# Patient Record
Sex: FEMALE | Race: BLACK OR AFRICAN AMERICAN | Employment: OTHER | URBAN - METROPOLITAN AREA
[De-identification: names, ages, dates, MRNs, and addresses within clinical notes are randomized per-mention and may not be internally consistent; named-entity substitution may affect disease eponyms.]

---

## 2017-01-01 ENCOUNTER — CARE COORDINATION (OUTPATIENT)
Dept: CASE MANAGEMENT | Age: 62
End: 2017-01-01

## 2017-01-03 ENCOUNTER — CARE COORDINATION (OUTPATIENT)
Dept: CASE MANAGEMENT | Age: 62
End: 2017-01-03

## 2017-01-04 ENCOUNTER — TELEPHONE (OUTPATIENT)
Dept: PHARMACY | Facility: CLINIC | Age: 62
End: 2017-01-04

## 2017-01-05 ENCOUNTER — CARE COORDINATION (OUTPATIENT)
Dept: CASE MANAGEMENT | Age: 62
End: 2017-01-05

## 2017-01-06 ENCOUNTER — CARE COORDINATION (OUTPATIENT)
Dept: CASE MANAGEMENT | Age: 62
End: 2017-01-06

## 2017-01-08 ENCOUNTER — CARE COORDINATION (OUTPATIENT)
Dept: CASE MANAGEMENT | Age: 62
End: 2017-01-08

## 2017-01-09 ENCOUNTER — TELEPHONE (OUTPATIENT)
Dept: CARDIOLOGY CLINIC | Age: 62
End: 2017-01-09

## 2017-01-09 ENCOUNTER — CARE COORDINATION (OUTPATIENT)
Dept: CASE MANAGEMENT | Age: 62
End: 2017-01-09

## 2017-01-09 ENCOUNTER — TELEPHONE (OUTPATIENT)
Dept: FAMILY MEDICINE CLINIC | Age: 62
End: 2017-01-09

## 2017-01-09 ENCOUNTER — ANTI-COAG VISIT (OUTPATIENT)
Dept: CARDIOLOGY CLINIC | Age: 62
End: 2017-01-09

## 2017-01-09 LAB — INR BLD: 1.9

## 2017-01-09 RX ORDER — ONDANSETRON 4 MG/1
4 TABLET, FILM COATED ORAL DAILY PRN
Qty: 30 TABLET | Refills: 0 | Status: SHIPPED | OUTPATIENT
Start: 2017-01-09 | End: 2017-01-14 | Stop reason: SDUPTHER

## 2017-01-10 DIAGNOSIS — R80.9 PROTEINURIA: Primary | ICD-10-CM

## 2017-01-11 ENCOUNTER — OFFICE VISIT (OUTPATIENT)
Dept: SURGERY | Age: 62
End: 2017-01-11

## 2017-01-11 VITALS — SYSTOLIC BLOOD PRESSURE: 122 MMHG | DIASTOLIC BLOOD PRESSURE: 84 MMHG

## 2017-01-11 DIAGNOSIS — K46.9 HERNIA: Primary | ICD-10-CM

## 2017-01-11 PROCEDURE — 99024 POSTOP FOLLOW-UP VISIT: CPT | Performed by: SURGERY

## 2017-01-12 ENCOUNTER — CARE COORDINATION (OUTPATIENT)
Dept: CASE MANAGEMENT | Age: 62
End: 2017-01-12

## 2017-01-13 ENCOUNTER — CARE COORDINATION (OUTPATIENT)
Dept: CASE MANAGEMENT | Age: 62
End: 2017-01-13

## 2017-01-14 ENCOUNTER — CARE COORDINATION (OUTPATIENT)
Dept: FAMILY MEDICINE CLINIC | Age: 62
End: 2017-01-14

## 2017-01-16 ENCOUNTER — ANTI-COAG VISIT (OUTPATIENT)
Dept: CARDIOLOGY CLINIC | Age: 62
End: 2017-01-16

## 2017-01-16 ENCOUNTER — TELEPHONE (OUTPATIENT)
Dept: CARDIOLOGY CLINIC | Age: 62
End: 2017-01-16

## 2017-01-16 DIAGNOSIS — J96.11 CHRONIC HYPOXEMIC RESPIRATORY FAILURE (HCC): ICD-10-CM

## 2017-01-16 DIAGNOSIS — J45.50 UNCOMPLICATED SEVERE PERSISTENT ASTHMA: Primary | ICD-10-CM

## 2017-01-16 LAB — INR BLD: 2

## 2017-01-16 RX ORDER — ALBUTEROL SULFATE 90 UG/1
2 AEROSOL, METERED RESPIRATORY (INHALATION) EVERY 6 HOURS PRN
Qty: 1 INHALER | Refills: 3 | Status: SHIPPED | OUTPATIENT
Start: 2017-01-16 | End: 2017-10-26 | Stop reason: SDUPTHER

## 2017-01-24 ENCOUNTER — TELEPHONE (OUTPATIENT)
Dept: CARDIOLOGY CLINIC | Age: 62
End: 2017-01-24

## 2017-01-24 ENCOUNTER — OFFICE VISIT (OUTPATIENT)
Dept: FAMILY MEDICINE CLINIC | Age: 62
End: 2017-01-24

## 2017-01-24 ENCOUNTER — TELEPHONE (OUTPATIENT)
Dept: FAMILY MEDICINE CLINIC | Age: 62
End: 2017-01-24

## 2017-01-24 VITALS
SYSTOLIC BLOOD PRESSURE: 128 MMHG | OXYGEN SATURATION: 97 % | HEIGHT: 63 IN | RESPIRATION RATE: 16 BRPM | DIASTOLIC BLOOD PRESSURE: 82 MMHG | HEART RATE: 94 BPM | WEIGHT: 293 LBS | BODY MASS INDEX: 51.91 KG/M2

## 2017-01-24 DIAGNOSIS — Z98.890 S/P HERNIA REPAIR: Primary | ICD-10-CM

## 2017-01-24 DIAGNOSIS — Z87.19 S/P HERNIA REPAIR: Primary | ICD-10-CM

## 2017-01-24 PROCEDURE — 99212 OFFICE O/P EST SF 10 MIN: CPT | Performed by: NURSE PRACTITIONER

## 2017-01-24 ASSESSMENT — ENCOUNTER SYMPTOMS
ABDOMINAL PAIN: 0
BLOOD IN STOOL: 0
CONSTIPATION: 0
DIARRHEA: 0
VOMITING: 0
NAUSEA: 0

## 2017-01-25 ENCOUNTER — OFFICE VISIT (OUTPATIENT)
Dept: SURGERY | Age: 62
End: 2017-01-25

## 2017-01-25 VITALS — DIASTOLIC BLOOD PRESSURE: 82 MMHG | SYSTOLIC BLOOD PRESSURE: 122 MMHG

## 2017-01-25 DIAGNOSIS — K46.9 HERNIA: Primary | ICD-10-CM

## 2017-01-25 PROCEDURE — 99024 POSTOP FOLLOW-UP VISIT: CPT | Performed by: SURGERY

## 2017-01-26 ENCOUNTER — OFFICE VISIT (OUTPATIENT)
Dept: ORTHOPEDIC SURGERY | Age: 62
End: 2017-01-26

## 2017-01-26 VITALS — HEIGHT: 63 IN | WEIGHT: 293 LBS | BODY MASS INDEX: 51.91 KG/M2 | RESPIRATION RATE: 17 BRPM

## 2017-01-26 DIAGNOSIS — M17.0 PRIMARY OSTEOARTHRITIS OF BOTH KNEES: Primary | ICD-10-CM

## 2017-01-26 DIAGNOSIS — E66.01 MORBID OBESITY WITH BMI OF 60.0-69.9, ADULT (HCC): ICD-10-CM

## 2017-01-26 PROCEDURE — 20610 DRAIN/INJ JOINT/BURSA W/O US: CPT | Performed by: ORTHOPAEDIC SURGERY

## 2017-01-26 PROCEDURE — 99214 OFFICE O/P EST MOD 30 MIN: CPT | Performed by: ORTHOPAEDIC SURGERY

## 2017-01-27 ENCOUNTER — TELEPHONE (OUTPATIENT)
Dept: CARDIOLOGY CLINIC | Age: 62
End: 2017-01-27

## 2017-01-27 ENCOUNTER — TELEPHONE (OUTPATIENT)
Dept: FAMILY MEDICINE CLINIC | Age: 62
End: 2017-01-27

## 2017-01-30 ENCOUNTER — CARE COORDINATION (OUTPATIENT)
Dept: FAMILY MEDICINE CLINIC | Age: 62
End: 2017-01-30

## 2017-01-30 RX ORDER — HYDRALAZINE HYDROCHLORIDE 50 MG/1
TABLET, FILM COATED ORAL
Qty: 270 TABLET | Refills: 0 | Status: ON HOLD | OUTPATIENT
Start: 2017-01-30 | End: 2017-04-24 | Stop reason: HOSPADM

## 2017-01-30 RX ORDER — NITROGLYCERIN 0.4 MG/1
TABLET SUBLINGUAL
Qty: 75 TABLET | Refills: 0 | Status: SHIPPED | OUTPATIENT
Start: 2017-01-30 | End: 2017-05-30 | Stop reason: SDUPTHER

## 2017-01-31 RX ORDER — DOXAZOSIN MESYLATE 4 MG/1
TABLET ORAL
Qty: 180 TABLET | Refills: 3 | Status: ON HOLD | OUTPATIENT
Start: 2017-01-31 | End: 2017-04-24 | Stop reason: HOSPADM

## 2017-02-01 ENCOUNTER — TELEPHONE (OUTPATIENT)
Dept: CARDIOLOGY CLINIC | Age: 62
End: 2017-02-01

## 2017-02-01 RX ORDER — WARFARIN SODIUM 7.5 MG/1
TABLET ORAL
Qty: 30 TABLET | Refills: 3 | Status: SHIPPED | OUTPATIENT
Start: 2017-02-01 | End: 2017-03-23 | Stop reason: SDUPTHER

## 2017-02-02 ENCOUNTER — TELEPHONE (OUTPATIENT)
Dept: CARDIOLOGY CLINIC | Age: 62
End: 2017-02-02

## 2017-02-03 ENCOUNTER — OFFICE VISIT (OUTPATIENT)
Dept: CARDIOLOGY CLINIC | Age: 62
End: 2017-02-03

## 2017-02-03 ENCOUNTER — OFFICE VISIT (OUTPATIENT)
Dept: FAMILY MEDICINE CLINIC | Age: 62
End: 2017-02-03

## 2017-02-03 VITALS
HEIGHT: 63 IN | SYSTOLIC BLOOD PRESSURE: 126 MMHG | OXYGEN SATURATION: 96 % | HEART RATE: 79 BPM | BODY MASS INDEX: 51.91 KG/M2 | DIASTOLIC BLOOD PRESSURE: 84 MMHG | WEIGHT: 293 LBS

## 2017-02-03 VITALS
HEART RATE: 80 BPM | WEIGHT: 293 LBS | BODY MASS INDEX: 63.96 KG/M2 | OXYGEN SATURATION: 94 % | SYSTOLIC BLOOD PRESSURE: 130 MMHG | DIASTOLIC BLOOD PRESSURE: 80 MMHG

## 2017-02-03 DIAGNOSIS — N18.4 CKD (CHRONIC KIDNEY DISEASE), STAGE 4 (SEVERE): ICD-10-CM

## 2017-02-03 DIAGNOSIS — I50.32 DIASTOLIC CHF, CHRONIC (HCC): Primary | ICD-10-CM

## 2017-02-03 DIAGNOSIS — D64.89 ANEMIA DUE TO OTHER CAUSE: ICD-10-CM

## 2017-02-03 DIAGNOSIS — I10 ESSENTIAL HYPERTENSION: ICD-10-CM

## 2017-02-03 DIAGNOSIS — G47.33 OBSTRUCTIVE SLEEP APNEA SYNDROME: ICD-10-CM

## 2017-02-03 DIAGNOSIS — B37.0 THRUSH, ORAL: Primary | ICD-10-CM

## 2017-02-03 PROCEDURE — 99213 OFFICE O/P EST LOW 20 MIN: CPT | Performed by: FAMILY MEDICINE

## 2017-02-03 PROCEDURE — 99214 OFFICE O/P EST MOD 30 MIN: CPT | Performed by: NURSE PRACTITIONER

## 2017-02-10 ENCOUNTER — TELEPHONE (OUTPATIENT)
Dept: CARDIOLOGY CLINIC | Age: 62
End: 2017-02-10

## 2017-02-14 ENCOUNTER — TELEPHONE (OUTPATIENT)
Dept: CARDIOLOGY CLINIC | Age: 62
End: 2017-02-14

## 2017-02-14 ENCOUNTER — ANTI-COAG VISIT (OUTPATIENT)
Dept: CARDIOLOGY CLINIC | Age: 62
End: 2017-02-14

## 2017-02-14 LAB — INR BLD: 2.3

## 2017-02-14 RX ORDER — BUMETANIDE 2 MG/1
2 TABLET ORAL 2 TIMES DAILY
Qty: 30 TABLET | Refills: 3 | Status: SHIPPED | OUTPATIENT
Start: 2017-02-14 | End: 2017-02-28 | Stop reason: SDUPTHER

## 2017-02-15 ENCOUNTER — TELEPHONE (OUTPATIENT)
Dept: FAMILY MEDICINE CLINIC | Age: 62
End: 2017-02-15

## 2017-02-25 ENCOUNTER — CARE COORDINATION (OUTPATIENT)
Dept: CASE MANAGEMENT | Age: 62
End: 2017-02-25

## 2017-02-27 ENCOUNTER — CARE COORDINATION (OUTPATIENT)
Dept: CASE MANAGEMENT | Age: 62
End: 2017-02-27

## 2017-02-27 ENCOUNTER — TELEPHONE (OUTPATIENT)
Dept: FAMILY MEDICINE CLINIC | Age: 62
End: 2017-02-27

## 2017-03-01 ENCOUNTER — HOSPITAL ENCOUNTER (OUTPATIENT)
Dept: OTHER | Age: 62
Discharge: HOME | End: 2017-03-31
Attending: INTERNAL MEDICINE | Admitting: INTERNAL MEDICINE

## 2017-03-01 RX ORDER — BUMETANIDE 2 MG/1
2 TABLET ORAL 2 TIMES DAILY
Qty: 60 TABLET | Refills: 3 | Status: ON HOLD | OUTPATIENT
Start: 2017-03-01 | End: 2017-03-16 | Stop reason: HOSPADM

## 2017-03-03 ENCOUNTER — ANTI-COAG VISIT (OUTPATIENT)
Dept: CARDIOLOGY CLINIC | Age: 62
End: 2017-03-03

## 2017-03-03 ENCOUNTER — TELEPHONE (OUTPATIENT)
Dept: CARDIOLOGY CLINIC | Age: 62
End: 2017-03-03

## 2017-03-03 ENCOUNTER — CARE COORDINATION (OUTPATIENT)
Dept: CASE MANAGEMENT | Age: 62
End: 2017-03-03

## 2017-03-03 LAB — INR BLD: 2.7

## 2017-03-07 ENCOUNTER — CARE COORDINATION (OUTPATIENT)
Dept: CASE MANAGEMENT | Age: 62
End: 2017-03-07

## 2017-03-07 LAB
ANION GAP SERPL CALCULATED.3IONS-SCNC: 14 MMOL/L (ref 3–16)
BUN BLDV-MCNC: 27 MG/DL (ref 7–20)
CALCIUM SERPL-MCNC: 8.7 MG/DL (ref 8.3–10.6)
CHLORIDE BLD-SCNC: 97 MMOL/L (ref 99–110)
CO2: 30 MMOL/L (ref 21–32)
CREAT SERPL-MCNC: 2.9 MG/DL (ref 0.6–1.2)
GFR AFRICAN AMERICAN: 20
GFR NON-AFRICAN AMERICAN: 16
GLUCOSE BLD-MCNC: 289 MG/DL (ref 70–99)
POTASSIUM SERPL-SCNC: 3.5 MMOL/L (ref 3.5–5.1)
PRO-BNP: 466 PG/ML (ref 0–124)
SODIUM BLD-SCNC: 141 MMOL/L (ref 136–145)

## 2017-03-08 ENCOUNTER — CARE COORDINATION (OUTPATIENT)
Dept: CASE MANAGEMENT | Age: 62
End: 2017-03-08

## 2017-03-08 ENCOUNTER — TELEPHONE (OUTPATIENT)
Dept: FAMILY MEDICINE CLINIC | Age: 62
End: 2017-03-08

## 2017-03-08 ENCOUNTER — EPISODE CHANGES (OUTPATIENT)
Dept: CASE MANAGEMENT | Age: 62
End: 2017-03-08

## 2017-03-08 ENCOUNTER — TELEPHONE (OUTPATIENT)
Dept: CARDIOLOGY CLINIC | Age: 62
End: 2017-03-08

## 2017-03-10 ENCOUNTER — TELEPHONE (OUTPATIENT)
Dept: CARDIOLOGY CLINIC | Age: 62
End: 2017-03-10

## 2017-03-10 ENCOUNTER — ANTI-COAG VISIT (OUTPATIENT)
Dept: CARDIOLOGY CLINIC | Age: 62
End: 2017-03-10

## 2017-03-10 LAB — INR BLD: 2.7

## 2017-03-15 ENCOUNTER — TELEPHONE (OUTPATIENT)
Dept: CARDIOLOGY CLINIC | Age: 62
End: 2017-03-15

## 2017-03-16 ENCOUNTER — CARE COORDINATION (OUTPATIENT)
Dept: FAMILY MEDICINE CLINIC | Age: 62
End: 2017-03-16

## 2017-03-17 ENCOUNTER — TELEPHONE (OUTPATIENT)
Dept: CARDIOLOGY CLINIC | Age: 62
End: 2017-03-17

## 2017-03-17 ENCOUNTER — TELEPHONE (OUTPATIENT)
Dept: PHARMACY | Facility: CLINIC | Age: 62
End: 2017-03-17

## 2017-03-17 ENCOUNTER — CARE COORDINATION (OUTPATIENT)
Dept: CASE MANAGEMENT | Age: 62
End: 2017-03-17

## 2017-03-17 RX ORDER — ALLOPURINOL 100 MG/1
100 TABLET ORAL DAILY
COMMUNITY
End: 2019-03-26 | Stop reason: SDUPTHER

## 2017-03-18 ENCOUNTER — CARE COORDINATION (OUTPATIENT)
Dept: CASE MANAGEMENT | Age: 62
End: 2017-03-18

## 2017-03-18 RX ORDER — INSULIN DETEMIR 100 [IU]/ML
INJECTION, SOLUTION SUBCUTANEOUS
Qty: 75 ML | Refills: 5 | Status: ON HOLD | OUTPATIENT
Start: 2017-03-18 | End: 2017-04-24

## 2017-03-19 ENCOUNTER — CARE COORDINATION (OUTPATIENT)
Dept: CASE MANAGEMENT | Age: 62
End: 2017-03-19

## 2017-03-20 ENCOUNTER — TELEPHONE (OUTPATIENT)
Dept: CARDIOLOGY CLINIC | Age: 62
End: 2017-03-20

## 2017-03-20 ENCOUNTER — TELEPHONE (OUTPATIENT)
Dept: OTHER | Age: 62
End: 2017-03-20

## 2017-03-20 ENCOUNTER — CARE COORDINATION (OUTPATIENT)
Dept: FAMILY MEDICINE CLINIC | Age: 62
End: 2017-03-20

## 2017-03-20 DIAGNOSIS — I10 UNSPECIFIED ESSENTIAL HYPERTENSION: ICD-10-CM

## 2017-03-20 RX ORDER — NEBIVOLOL HYDROCHLORIDE 20 MG/1
TABLET ORAL
Qty: 180 TABLET | Refills: 1 | Status: SHIPPED | OUTPATIENT
Start: 2017-03-20 | End: 2017-03-30 | Stop reason: DRUGHIGH

## 2017-03-20 ASSESSMENT — PATIENT HEALTH QUESTIONNAIRE - PHQ9
7. TROUBLE CONCENTRATING ON THINGS, SUCH AS READING THE NEWSPAPER OR WATCHING TELEVISION: 0
9. THOUGHTS THAT YOU WOULD BE BETTER OFF DEAD, OR OF HURTING YOURSELF: 0
2. FEELING DOWN, DEPRESSED OR HOPELESS: 0
10. IF YOU CHECKED OFF ANY PROBLEMS, HOW DIFFICULT HAVE THESE PROBLEMS MADE IT FOR YOU TO DO YOUR WORK, TAKE CARE OF THINGS AT HOME, OR GET ALONG WITH OTHER PEOPLE: 0
8. MOVING OR SPEAKING SO SLOWLY THAT OTHER PEOPLE COULD HAVE NOTICED. OR THE OPPOSITE, BEING SO FIGETY OR RESTLESS THAT YOU HAVE BEEN MOVING AROUND A LOT MORE THAN USUAL: 0
4. FEELING TIRED OR HAVING LITTLE ENERGY: 0
5. POOR APPETITE OR OVEREATING: 0
6. FEELING BAD ABOUT YOURSELF - OR THAT YOU ARE A FAILURE OR HAVE LET YOURSELF OR YOUR FAMILY DOWN: 0
3. TROUBLE FALLING OR STAYING ASLEEP: 0
SUM OF ALL RESPONSES TO PHQ9 QUESTIONS 1 & 2: 0
SUM OF ALL RESPONSES TO PHQ QUESTIONS 1-9: 0
1. LITTLE INTEREST OR PLEASURE IN DOING THINGS: 0

## 2017-03-22 ENCOUNTER — TELEPHONE (OUTPATIENT)
Dept: CARDIOLOGY CLINIC | Age: 62
End: 2017-03-22

## 2017-03-23 ENCOUNTER — TELEPHONE (OUTPATIENT)
Dept: FAMILY MEDICINE CLINIC | Age: 62
End: 2017-03-23

## 2017-03-23 RX ORDER — WARFARIN SODIUM 7.5 MG/1
TABLET ORAL
Qty: 30 TABLET | Refills: 3 | Status: SHIPPED | OUTPATIENT
Start: 2017-03-23 | End: 2018-03-27 | Stop reason: SDUPTHER

## 2017-03-24 ENCOUNTER — ANTI-COAG VISIT (OUTPATIENT)
Dept: CARDIOLOGY CLINIC | Age: 62
End: 2017-03-24

## 2017-03-24 ENCOUNTER — TELEPHONE (OUTPATIENT)
Dept: CARDIOLOGY | Age: 62
End: 2017-03-24

## 2017-03-24 ENCOUNTER — TELEPHONE (OUTPATIENT)
Dept: CARDIOLOGY CLINIC | Age: 62
End: 2017-03-24

## 2017-03-24 LAB
ANION GAP SERPL CALCULATED.3IONS-SCNC: 11 MMOL/L (ref 3–16)
BUN BLDV-MCNC: 43 MG/DL (ref 7–20)
CALCIUM SERPL-MCNC: 8.6 MG/DL (ref 8.3–10.6)
CHLORIDE BLD-SCNC: 102 MMOL/L (ref 99–110)
CO2: 29 MMOL/L (ref 21–32)
CREAT SERPL-MCNC: 2.8 MG/DL (ref 0.6–1.2)
GFR AFRICAN AMERICAN: 21
GFR NON-AFRICAN AMERICAN: 17
GLUCOSE BLD-MCNC: 198 MG/DL (ref 70–99)
INR BLD: 3.6
POTASSIUM SERPL-SCNC: 3.7 MMOL/L (ref 3.5–5.1)
PRO-BNP: 731 PG/ML (ref 0–124)
SODIUM BLD-SCNC: 142 MMOL/L (ref 136–145)

## 2017-03-24 RX ORDER — TORSEMIDE 20 MG/1
40 TABLET ORAL 2 TIMES DAILY
Qty: 30 TABLET | Refills: 3 | Status: SHIPPED | OUTPATIENT
Start: 2017-03-24 | End: 2017-03-30 | Stop reason: ALTCHOICE

## 2017-03-28 ENCOUNTER — TELEPHONE (OUTPATIENT)
Dept: FAMILY MEDICINE CLINIC | Age: 62
End: 2017-03-28

## 2017-03-29 RX ORDER — PEN NEEDLE, DIABETIC 29 G X1/2"
NEEDLE, DISPOSABLE MISCELLANEOUS
Qty: 100 EACH | Refills: 5 | Status: SHIPPED | OUTPATIENT
Start: 2017-03-29 | End: 2018-04-17 | Stop reason: SDUPTHER

## 2017-03-30 ENCOUNTER — CARE COORDINATION (OUTPATIENT)
Dept: FAMILY MEDICINE CLINIC | Age: 62
End: 2017-03-30

## 2017-03-31 ENCOUNTER — TELEPHONE (OUTPATIENT)
Dept: FAMILY MEDICINE CLINIC | Age: 62
End: 2017-03-31

## 2017-03-31 ENCOUNTER — TELEPHONE (OUTPATIENT)
Dept: CARDIOLOGY CLINIC | Age: 62
End: 2017-03-31

## 2017-03-31 ENCOUNTER — ANTI-COAG VISIT (OUTPATIENT)
Dept: CARDIOLOGY CLINIC | Age: 62
End: 2017-03-31

## 2017-03-31 LAB
ANION GAP SERPL CALCULATED.3IONS-SCNC: 16 MMOL/L (ref 3–16)
BUN BLDV-MCNC: 30 MG/DL (ref 7–20)
CALCIUM SERPL-MCNC: 9 MG/DL (ref 8.3–10.6)
CHLORIDE BLD-SCNC: 96 MMOL/L (ref 99–110)
CO2: 28 MMOL/L (ref 21–32)
CREAT SERPL-MCNC: 2.3 MG/DL (ref 0.6–1.2)
GFR AFRICAN AMERICAN: 26
GFR NON-AFRICAN AMERICAN: 21
GLUCOSE BLD-MCNC: 344 MG/DL (ref 70–99)
INR BLD: 2.2
POTASSIUM SERPL-SCNC: 3.8 MMOL/L (ref 3.5–5.1)
PRO-BNP: 1378 PG/ML (ref 0–124)
SODIUM BLD-SCNC: 140 MMOL/L (ref 136–145)

## 2017-04-02 PROBLEM — J96.00 ACUTE RESPIRATORY FAILURE (HCC): Status: ACTIVE | Noted: 2017-04-02

## 2017-04-03 ENCOUNTER — CARE COORDINATION (OUTPATIENT)
Dept: FAMILY MEDICINE CLINIC | Age: 62
End: 2017-04-03

## 2017-04-03 ENCOUNTER — TELEPHONE (OUTPATIENT)
Dept: FAMILY MEDICINE CLINIC | Age: 62
End: 2017-04-03

## 2017-04-04 PROBLEM — J96.00 ACUTE RESPIRATORY FAILURE (HCC): Status: RESOLVED | Noted: 2017-04-02 | Resolved: 2017-04-04

## 2017-04-06 ENCOUNTER — CARE COORDINATION (OUTPATIENT)
Dept: FAMILY MEDICINE CLINIC | Age: 62
End: 2017-04-06

## 2017-04-06 ENCOUNTER — TELEPHONE (OUTPATIENT)
Dept: OTHER | Age: 62
End: 2017-04-06

## 2017-04-06 ENCOUNTER — TELEPHONE (OUTPATIENT)
Dept: PHARMACY | Facility: CLINIC | Age: 62
End: 2017-04-06

## 2017-04-06 ENCOUNTER — CARE COORDINATION (OUTPATIENT)
Dept: CASE MANAGEMENT | Age: 62
End: 2017-04-06

## 2017-04-07 ENCOUNTER — CARE COORDINATION (OUTPATIENT)
Dept: CASE MANAGEMENT | Age: 62
End: 2017-04-07

## 2017-04-10 ENCOUNTER — ANTI-COAG VISIT (OUTPATIENT)
Dept: CARDIOLOGY CLINIC | Age: 62
End: 2017-04-10

## 2017-04-10 ENCOUNTER — OFFICE VISIT (OUTPATIENT)
Dept: FAMILY MEDICINE CLINIC | Age: 62
End: 2017-04-10

## 2017-04-10 ENCOUNTER — TELEPHONE (OUTPATIENT)
Dept: CARDIOLOGY CLINIC | Age: 62
End: 2017-04-10

## 2017-04-10 ENCOUNTER — TELEPHONE (OUTPATIENT)
Dept: FAMILY MEDICINE CLINIC | Age: 62
End: 2017-04-10

## 2017-04-10 ENCOUNTER — CARE COORDINATION (OUTPATIENT)
Dept: CASE MANAGEMENT | Age: 62
End: 2017-04-10

## 2017-04-10 VITALS
BODY MASS INDEX: 51.91 KG/M2 | DIASTOLIC BLOOD PRESSURE: 88 MMHG | RESPIRATION RATE: 20 BRPM | HEIGHT: 63 IN | OXYGEN SATURATION: 98 % | HEART RATE: 82 BPM | WEIGHT: 293 LBS | SYSTOLIC BLOOD PRESSURE: 136 MMHG

## 2017-04-10 DIAGNOSIS — J44.9 CHRONIC OBSTRUCTIVE PULMONARY DISEASE, UNSPECIFIED COPD TYPE (HCC): ICD-10-CM

## 2017-04-10 DIAGNOSIS — N18.4 CKD (CHRONIC KIDNEY DISEASE), STAGE 4 (SEVERE): ICD-10-CM

## 2017-04-10 DIAGNOSIS — Z79.4 TYPE 2 DIABETES MELLITUS WITH STAGE 4 CHRONIC KIDNEY DISEASE, WITH LONG-TERM CURRENT USE OF INSULIN (HCC): ICD-10-CM

## 2017-04-10 DIAGNOSIS — E11.22 TYPE 2 DIABETES MELLITUS WITH STAGE 4 CHRONIC KIDNEY DISEASE, WITH LONG-TERM CURRENT USE OF INSULIN (HCC): ICD-10-CM

## 2017-04-10 DIAGNOSIS — R79.89 ELEVATED PTHRP LEVEL: ICD-10-CM

## 2017-04-10 DIAGNOSIS — Z78.0 POST-MENOPAUSAL: ICD-10-CM

## 2017-04-10 DIAGNOSIS — N18.4 TYPE 2 DIABETES MELLITUS WITH STAGE 4 CHRONIC KIDNEY DISEASE, WITH LONG-TERM CURRENT USE OF INSULIN (HCC): ICD-10-CM

## 2017-04-10 DIAGNOSIS — J96.11 HYPOXEMIC RESPIRATORY FAILURE, CHRONIC (HCC): ICD-10-CM

## 2017-04-10 DIAGNOSIS — J44.1 COPD EXACERBATION (HCC): Primary | ICD-10-CM

## 2017-04-10 LAB
HBA1C MFR BLD: 9.5 %
INR BLD: 1.3

## 2017-04-10 PROCEDURE — 83036 HEMOGLOBIN GLYCOSYLATED A1C: CPT | Performed by: NURSE PRACTITIONER

## 2017-04-10 PROCEDURE — 99214 OFFICE O/P EST MOD 30 MIN: CPT | Performed by: NURSE PRACTITIONER

## 2017-04-13 ENCOUNTER — TELEPHONE (OUTPATIENT)
Dept: CARDIOLOGY CLINIC | Age: 62
End: 2017-04-13

## 2017-04-14 PROBLEM — L03.116 CELLULITIS OF LEFT LOWER EXTREMITY: Status: ACTIVE | Noted: 2017-04-14

## 2017-04-18 ENCOUNTER — TELEPHONE (OUTPATIENT)
Dept: ADMINISTRATIVE | Age: 62
End: 2017-04-18

## 2017-04-20 ENCOUNTER — TELEPHONE (OUTPATIENT)
Dept: FAMILY MEDICINE CLINIC | Age: 62
End: 2017-04-20

## 2017-04-24 ENCOUNTER — CARE COORDINATION (OUTPATIENT)
Dept: FAMILY MEDICINE CLINIC | Age: 62
End: 2017-04-24

## 2017-04-25 ENCOUNTER — CARE COORDINATION (OUTPATIENT)
Dept: FAMILY MEDICINE CLINIC | Age: 62
End: 2017-04-25

## 2017-05-04 ENCOUNTER — CARE COORDINATION (OUTPATIENT)
Dept: OTHER | Facility: CLINIC | Age: 62
End: 2017-05-04

## 2017-05-12 ENCOUNTER — CARE COORDINATION (OUTPATIENT)
Dept: OTHER | Facility: CLINIC | Age: 62
End: 2017-05-12

## 2017-05-19 ENCOUNTER — CARE COORDINATION (OUTPATIENT)
Dept: OTHER | Facility: CLINIC | Age: 62
End: 2017-05-19

## 2017-05-25 ENCOUNTER — TELEPHONE (OUTPATIENT)
Dept: FAMILY MEDICINE CLINIC | Age: 62
End: 2017-05-25

## 2017-05-30 RX ORDER — HYDRALAZINE HYDROCHLORIDE 50 MG/1
TABLET, FILM COATED ORAL
Qty: 270 TABLET | Refills: 0 | Status: SHIPPED | OUTPATIENT
Start: 2017-05-30 | End: 2017-06-26 | Stop reason: ALTCHOICE

## 2017-05-30 RX ORDER — NITROGLYCERIN 0.4 MG/1
TABLET SUBLINGUAL
Qty: 75 TABLET | Refills: 0 | Status: SHIPPED | OUTPATIENT
Start: 2017-05-30 | End: 2017-08-17 | Stop reason: SDUPTHER

## 2017-05-30 RX ORDER — CLONIDINE HYDROCHLORIDE 0.2 MG/1
TABLET ORAL
Qty: 180 TABLET | Refills: 1 | Status: SHIPPED | OUTPATIENT
Start: 2017-05-30 | End: 2017-06-26 | Stop reason: DRUGHIGH

## 2017-05-31 ENCOUNTER — CARE COORDINATION (OUTPATIENT)
Dept: CARE COORDINATION | Age: 62
End: 2017-05-31

## 2017-06-02 ENCOUNTER — CARE COORDINATION (OUTPATIENT)
Dept: OTHER | Facility: CLINIC | Age: 62
End: 2017-06-02

## 2017-06-21 ENCOUNTER — TELEPHONE (OUTPATIENT)
Dept: FAMILY MEDICINE CLINIC | Age: 62
End: 2017-06-21

## 2017-06-21 ENCOUNTER — HOSPITAL ENCOUNTER (OUTPATIENT)
Dept: ENDOSCOPY | Age: 62
Discharge: HOME | End: 2017-06-14
Attending: INTERNAL MEDICINE | Admitting: INTERNAL MEDICINE

## 2017-06-21 ENCOUNTER — TELEPHONE (OUTPATIENT)
Dept: CARDIOLOGY CLINIC | Age: 62
End: 2017-06-21

## 2017-06-21 ENCOUNTER — CARE COORDINATION (OUTPATIENT)
Dept: OTHER | Facility: CLINIC | Age: 62
End: 2017-06-21

## 2017-06-21 ENCOUNTER — CARE COORDINATION (OUTPATIENT)
Dept: CASE MANAGEMENT | Age: 62
End: 2017-06-21

## 2017-06-21 RX ORDER — SODIUM CHLORIDE 0.9 % (FLUSH) 0.9 %
10 SYRINGE (ML) INJECTION EVERY 12 HOURS SCHEDULED
Status: DISCONTINUED | OUTPATIENT
Start: 2017-06-21 | End: 2017-06-22 | Stop reason: HOSPADM

## 2017-06-21 RX ORDER — SODIUM CHLORIDE 9 MG/ML
INJECTION, SOLUTION INTRAVENOUS CONTINUOUS
Status: DISCONTINUED | OUTPATIENT
Start: 2017-06-21 | End: 2017-06-22 | Stop reason: HOSPADM

## 2017-06-21 RX ORDER — SODIUM CHLORIDE 0.9 % (FLUSH) 0.9 %
10 SYRINGE (ML) INJECTION PRN
Status: DISCONTINUED | OUTPATIENT
Start: 2017-06-21 | End: 2017-06-22 | Stop reason: HOSPADM

## 2017-06-22 ENCOUNTER — TELEPHONE (OUTPATIENT)
Dept: FAMILY MEDICINE CLINIC | Age: 62
End: 2017-06-22

## 2017-06-23 ENCOUNTER — TELEPHONE (OUTPATIENT)
Dept: CARDIOLOGY CLINIC | Age: 62
End: 2017-06-23

## 2017-06-23 ENCOUNTER — ANTI-COAG VISIT (OUTPATIENT)
Dept: CARDIOLOGY CLINIC | Age: 62
End: 2017-06-23

## 2017-06-23 LAB — INR BLD: 1.5

## 2017-06-28 ENCOUNTER — CARE COORDINATION (OUTPATIENT)
Dept: CASE MANAGEMENT | Age: 62
End: 2017-06-28

## 2017-06-30 ENCOUNTER — OFFICE VISIT (OUTPATIENT)
Dept: FAMILY MEDICINE CLINIC | Age: 62
End: 2017-06-30

## 2017-06-30 VITALS
SYSTOLIC BLOOD PRESSURE: 134 MMHG | OXYGEN SATURATION: 94 % | DIASTOLIC BLOOD PRESSURE: 84 MMHG | WEIGHT: 293 LBS | RESPIRATION RATE: 16 BRPM | HEART RATE: 80 BPM | BODY MASS INDEX: 61.47 KG/M2

## 2017-06-30 DIAGNOSIS — Z09 HOSPITAL DISCHARGE FOLLOW-UP: Primary | ICD-10-CM

## 2017-06-30 DIAGNOSIS — R77.8 ELEVATED TROPONIN: ICD-10-CM

## 2017-06-30 DIAGNOSIS — E66.01 MORBID OBESITY WITH BMI OF 60.0-69.9, ADULT (HCC): ICD-10-CM

## 2017-06-30 DIAGNOSIS — I10 ACCELERATED HYPERTENSION: ICD-10-CM

## 2017-06-30 DIAGNOSIS — N18.4 CKD (CHRONIC KIDNEY DISEASE) STAGE 4, GFR 15-29 ML/MIN (HCC): ICD-10-CM

## 2017-06-30 DIAGNOSIS — Z12.31 ENCOUNTER FOR SCREENING MAMMOGRAM FOR BREAST CANCER: ICD-10-CM

## 2017-06-30 DIAGNOSIS — R07.89 OTHER CHEST PAIN: ICD-10-CM

## 2017-06-30 DIAGNOSIS — D63.1 ANEMIA OF CHRONIC KIDNEY FAILURE, STAGE 4 (SEVERE) (HCC): ICD-10-CM

## 2017-06-30 DIAGNOSIS — N18.4 ANEMIA OF CHRONIC KIDNEY FAILURE, STAGE 4 (SEVERE) (HCC): ICD-10-CM

## 2017-06-30 LAB — HBA1C MFR BLD: 7.5 %

## 2017-06-30 PROCEDURE — 99495 TRANSJ CARE MGMT MOD F2F 14D: CPT | Performed by: FAMILY MEDICINE

## 2017-06-30 PROCEDURE — 83036 HEMOGLOBIN GLYCOSYLATED A1C: CPT | Performed by: FAMILY MEDICINE

## 2017-06-30 RX ORDER — AMLODIPINE BESYLATE 10 MG/1
TABLET ORAL
Qty: 90 TABLET | Refills: 3 | Status: ON HOLD | OUTPATIENT
Start: 2017-06-30 | End: 2018-01-22 | Stop reason: HOSPADM

## 2017-06-30 RX ORDER — NEBIVOLOL HYDROCHLORIDE 20 MG/1
TABLET ORAL
Qty: 180 TABLET | Refills: 3 | Status: ON HOLD | OUTPATIENT
Start: 2017-06-30 | End: 2017-11-05 | Stop reason: HOSPADM

## 2017-06-30 RX ORDER — OMEPRAZOLE 40 MG/1
CAPSULE, DELAYED RELEASE ORAL
Qty: 90 CAPSULE | Refills: 2 | Status: ON HOLD | OUTPATIENT
Start: 2017-06-30 | End: 2017-09-09 | Stop reason: HOSPADM

## 2017-06-30 RX ORDER — DOXAZOSIN MESYLATE 4 MG/1
4 TABLET ORAL 2 TIMES DAILY
Status: ON HOLD | COMMUNITY
End: 2018-01-22

## 2017-06-30 RX ORDER — HYDRALAZINE HYDROCHLORIDE 25 MG/1
25 TABLET, FILM COATED ORAL 3 TIMES DAILY
COMMUNITY
End: 2017-09-25 | Stop reason: DRUGHIGH

## 2017-06-30 RX ORDER — ROSUVASTATIN CALCIUM 10 MG/1
TABLET, COATED ORAL
Qty: 90 TABLET | Refills: 2 | Status: SHIPPED | OUTPATIENT
Start: 2017-06-30 | End: 2018-02-13 | Stop reason: SDUPTHER

## 2017-07-03 ENCOUNTER — CARE COORDINATION (OUTPATIENT)
Dept: CASE MANAGEMENT | Age: 62
End: 2017-07-03

## 2017-07-05 ENCOUNTER — TELEPHONE (OUTPATIENT)
Dept: FAMILY MEDICINE CLINIC | Age: 62
End: 2017-07-05

## 2017-07-06 ENCOUNTER — TELEPHONE (OUTPATIENT)
Dept: FAMILY MEDICINE CLINIC | Age: 62
End: 2017-07-06

## 2017-07-06 RX ORDER — FLUOCINONIDE 1 MG/G
2 CREAM TOPICAL DAILY
Qty: 120 G | Refills: 0 | Status: SHIPPED | OUTPATIENT
Start: 2017-07-06 | End: 2017-08-09 | Stop reason: SDUPTHER

## 2017-07-07 ENCOUNTER — TELEPHONE (OUTPATIENT)
Dept: FAMILY MEDICINE CLINIC | Age: 62
End: 2017-07-07

## 2017-07-07 ENCOUNTER — CARE COORDINATION (OUTPATIENT)
Dept: CASE MANAGEMENT | Age: 62
End: 2017-07-07

## 2017-07-10 ENCOUNTER — CARE COORDINATION (OUTPATIENT)
Dept: CARE COORDINATION | Age: 62
End: 2017-07-10

## 2017-07-10 ENCOUNTER — TELEPHONE (OUTPATIENT)
Dept: CARDIOLOGY CLINIC | Age: 62
End: 2017-07-10

## 2017-07-13 ENCOUNTER — HOSPITAL ENCOUNTER (OUTPATIENT)
Dept: OTHER | Age: 62
Discharge: HOME | End: 2017-07-13

## 2017-07-17 ENCOUNTER — ANTI-COAG VISIT (OUTPATIENT)
Dept: CARDIOLOGY CLINIC | Age: 62
End: 2017-07-17

## 2017-07-17 ENCOUNTER — TELEPHONE (OUTPATIENT)
Dept: CARDIOLOGY CLINIC | Age: 62
End: 2017-07-17

## 2017-07-17 LAB — INR BLD: 2.6

## 2017-07-21 ENCOUNTER — TELEPHONE (OUTPATIENT)
Dept: FAMILY MEDICINE CLINIC | Age: 62
End: 2017-07-21

## 2017-07-24 ENCOUNTER — TELEPHONE (OUTPATIENT)
Dept: FAMILY MEDICINE CLINIC | Age: 62
End: 2017-07-24

## 2017-07-24 ENCOUNTER — TELEPHONE (OUTPATIENT)
Dept: CARDIOLOGY CLINIC | Age: 62
End: 2017-07-24

## 2017-07-24 LAB — INR BLD: 1.6

## 2017-07-25 ENCOUNTER — ANTI-COAG VISIT (OUTPATIENT)
Dept: CARDIOLOGY CLINIC | Age: 62
End: 2017-07-25

## 2017-07-26 ENCOUNTER — TELEPHONE (OUTPATIENT)
Dept: FAMILY MEDICINE CLINIC | Age: 62
End: 2017-07-26

## 2017-07-31 ENCOUNTER — TELEPHONE (OUTPATIENT)
Dept: CARDIOLOGY CLINIC | Age: 62
End: 2017-07-31

## 2017-07-31 ENCOUNTER — ANTI-COAG VISIT (OUTPATIENT)
Dept: CARDIOLOGY CLINIC | Age: 62
End: 2017-07-31

## 2017-07-31 LAB — INR BLD: 2.3

## 2017-07-31 RX ORDER — POTASSIUM CHLORIDE 750 MG/1
TABLET, EXTENDED RELEASE ORAL
Qty: 180 TABLET | Refills: 0 | OUTPATIENT
Start: 2017-07-31

## 2017-08-04 ENCOUNTER — TELEPHONE (OUTPATIENT)
Dept: FAMILY MEDICINE CLINIC | Age: 62
End: 2017-08-04

## 2017-08-07 ENCOUNTER — TELEPHONE (OUTPATIENT)
Dept: CARDIOLOGY CLINIC | Age: 62
End: 2017-08-07

## 2017-08-07 LAB — INR BLD: 2.5

## 2017-08-08 ENCOUNTER — ANTI-COAG VISIT (OUTPATIENT)
Dept: CARDIOLOGY CLINIC | Age: 62
End: 2017-08-08

## 2017-08-09 ENCOUNTER — TELEPHONE (OUTPATIENT)
Dept: FAMILY MEDICINE CLINIC | Age: 62
End: 2017-08-09

## 2017-08-09 ENCOUNTER — CARE COORDINATION (OUTPATIENT)
Dept: CARE COORDINATION | Age: 62
End: 2017-08-09

## 2017-08-09 ENCOUNTER — OFFICE VISIT (OUTPATIENT)
Dept: FAMILY MEDICINE CLINIC | Age: 62
End: 2017-08-09

## 2017-08-09 VITALS
HEART RATE: 82 BPM | SYSTOLIC BLOOD PRESSURE: 132 MMHG | WEIGHT: 293 LBS | RESPIRATION RATE: 14 BRPM | OXYGEN SATURATION: 96 % | BODY MASS INDEX: 51.91 KG/M2 | DIASTOLIC BLOOD PRESSURE: 88 MMHG | HEIGHT: 63 IN

## 2017-08-09 DIAGNOSIS — E11.65 UNCONTROLLED TYPE 2 DIABETES MELLITUS WITH OTHER DIABETIC KIDNEY COMPLICATION, UNSPECIFIED LONG TERM INSULIN USE STATUS: Primary | ICD-10-CM

## 2017-08-09 DIAGNOSIS — M79.605 PAIN IN BOTH LOWER EXTREMITIES: ICD-10-CM

## 2017-08-09 DIAGNOSIS — L03.116 CELLULITIS OF LEFT LOWER EXTREMITY: ICD-10-CM

## 2017-08-09 DIAGNOSIS — E11.29 UNCONTROLLED TYPE 2 DIABETES MELLITUS WITH OTHER DIABETIC KIDNEY COMPLICATION, UNSPECIFIED LONG TERM INSULIN USE STATUS: Primary | ICD-10-CM

## 2017-08-09 DIAGNOSIS — M79.604 PAIN IN BOTH LOWER EXTREMITIES: ICD-10-CM

## 2017-08-09 LAB — HBA1C MFR BLD: 8.1 %

## 2017-08-09 PROCEDURE — 99214 OFFICE O/P EST MOD 30 MIN: CPT | Performed by: NURSE PRACTITIONER

## 2017-08-09 PROCEDURE — 83036 HEMOGLOBIN GLYCOSYLATED A1C: CPT | Performed by: NURSE PRACTITIONER

## 2017-08-09 RX ORDER — FLUOCINONIDE 1 MG/G
2 CREAM TOPICAL DAILY
Qty: 120 G | Refills: 2 | Status: SHIPPED | OUTPATIENT
Start: 2017-08-09 | End: 2017-08-10

## 2017-08-09 RX ORDER — HYDROMORPHONE HYDROCHLORIDE 4 MG/1
4 TABLET ORAL 2 TIMES DAILY PRN
Qty: 15 TABLET | Refills: 0 | Status: SHIPPED | OUTPATIENT
Start: 2017-08-09 | End: 2017-08-24

## 2017-08-09 RX ORDER — BETAMETHASONE DIPROPIONATE 0.5 MG/G
CREAM TOPICAL
Qty: 45 G | Refills: 2 | Status: ON HOLD | OUTPATIENT
Start: 2017-08-09 | End: 2017-11-01

## 2017-08-09 RX ORDER — TRAZODONE HYDROCHLORIDE 50 MG/1
50 TABLET ORAL NIGHTLY
Qty: 30 TABLET | Refills: 5 | Status: SHIPPED | OUTPATIENT
Start: 2017-08-09 | End: 2018-05-01 | Stop reason: SDUPTHER

## 2017-08-10 RX ORDER — FLUOCINONIDE 1 MG/G
2 CREAM TOPICAL DAILY
Qty: 120 G | Refills: 2 | Status: SHIPPED | OUTPATIENT
Start: 2017-08-10 | End: 2017-11-06 | Stop reason: ALTCHOICE

## 2017-08-12 ENCOUNTER — CARE COORDINATION (OUTPATIENT)
Dept: CARE COORDINATION | Age: 62
End: 2017-08-12

## 2017-08-14 ENCOUNTER — CARE COORDINATION (OUTPATIENT)
Dept: CARE COORDINATION | Age: 62
End: 2017-08-14

## 2017-08-14 ENCOUNTER — TELEPHONE (OUTPATIENT)
Dept: CARDIOLOGY CLINIC | Age: 62
End: 2017-08-14

## 2017-08-14 ENCOUNTER — ANTI-COAG VISIT (OUTPATIENT)
Dept: CARDIOLOGY CLINIC | Age: 62
End: 2017-08-14

## 2017-08-14 LAB — INR BLD: 3.9

## 2017-08-14 RX ORDER — LANCING DEVICE
EACH MISCELLANEOUS
Refills: 11 | COMMUNITY
Start: 2017-08-07 | End: 2017-08-14 | Stop reason: SDUPTHER

## 2017-08-14 RX ORDER — UBIQUINOL 100 MG
CAPSULE ORAL
Refills: 11 | COMMUNITY
Start: 2017-07-03 | End: 2017-08-14 | Stop reason: SDUPTHER

## 2017-08-15 ENCOUNTER — TELEPHONE (OUTPATIENT)
Dept: CARDIOLOGY CLINIC | Age: 62
End: 2017-08-15

## 2017-08-15 RX ORDER — LANCING DEVICE
EACH MISCELLANEOUS
Qty: 1 EACH | Refills: 0 | Status: SHIPPED | OUTPATIENT
Start: 2017-08-15 | End: 2017-11-14 | Stop reason: ALTCHOICE

## 2017-08-15 RX ORDER — BLOOD-GLUCOSE METER
EACH MISCELLANEOUS
Qty: 1 KIT | Refills: 0 | Status: SHIPPED | OUTPATIENT
Start: 2017-08-15

## 2017-08-15 RX ORDER — UBIQUINOL 100 MG
CAPSULE ORAL
Qty: 300 EACH | Refills: 3 | Status: SHIPPED | OUTPATIENT
Start: 2017-08-15

## 2017-08-15 RX ORDER — BLOOD SUGAR DIAGNOSTIC
STRIP MISCELLANEOUS
Qty: 250 STRIP | Refills: 3 | Status: SHIPPED | OUTPATIENT
Start: 2017-08-15 | End: 2017-11-14

## 2017-08-17 ENCOUNTER — OFFICE VISIT (OUTPATIENT)
Dept: ORTHOPEDIC SURGERY | Age: 62
End: 2017-08-17

## 2017-08-17 VITALS — RESPIRATION RATE: 17 BRPM | BODY MASS INDEX: 51.91 KG/M2 | WEIGHT: 293 LBS | HEART RATE: 68 BPM | HEIGHT: 63 IN

## 2017-08-17 DIAGNOSIS — E66.01 MORBID OBESITY WITH BMI OF 60.0-69.9, ADULT (HCC): ICD-10-CM

## 2017-08-17 DIAGNOSIS — M17.0 PRIMARY OSTEOARTHRITIS OF BOTH KNEES: Primary | ICD-10-CM

## 2017-08-17 PROCEDURE — 20610 DRAIN/INJ JOINT/BURSA W/O US: CPT | Performed by: ORTHOPAEDIC SURGERY

## 2017-08-17 RX ORDER — HYDRALAZINE HYDROCHLORIDE 50 MG/1
TABLET, FILM COATED ORAL
Qty: 270 TABLET | Refills: 1 | Status: ON HOLD | OUTPATIENT
Start: 2017-08-17 | End: 2017-11-05 | Stop reason: HOSPADM

## 2017-08-17 RX ORDER — NITROGLYCERIN 0.4 MG/1
TABLET SUBLINGUAL
Qty: 75 TABLET | Refills: 0 | Status: SHIPPED | OUTPATIENT
Start: 2017-08-17 | End: 2018-04-11 | Stop reason: SDUPTHER

## 2017-08-21 ENCOUNTER — ANTI-COAG VISIT (OUTPATIENT)
Dept: CARDIOLOGY CLINIC | Age: 62
End: 2017-08-21

## 2017-08-21 ENCOUNTER — CARE COORDINATION (OUTPATIENT)
Dept: CARE COORDINATION | Age: 62
End: 2017-08-21

## 2017-08-21 ENCOUNTER — TELEPHONE (OUTPATIENT)
Dept: CARDIOLOGY CLINIC | Age: 62
End: 2017-08-21

## 2017-08-21 LAB — INR BLD: 2

## 2017-08-25 ENCOUNTER — TELEPHONE (OUTPATIENT)
Dept: CARDIOLOGY | Age: 62
End: 2017-08-25

## 2017-08-28 ENCOUNTER — ANTI-COAG VISIT (OUTPATIENT)
Dept: CARDIOLOGY CLINIC | Age: 62
End: 2017-08-28

## 2017-08-28 ENCOUNTER — TELEPHONE (OUTPATIENT)
Dept: CARDIOLOGY CLINIC | Age: 62
End: 2017-08-28

## 2017-08-28 DIAGNOSIS — M1A.9XX0 CHRONIC GOUT WITHOUT TOPHUS, UNSPECIFIED CAUSE, UNSPECIFIED SITE: ICD-10-CM

## 2017-08-28 LAB — INR BLD: 2.2

## 2017-08-28 RX ORDER — COLCHICINE 0.6 MG/1
TABLET ORAL
Qty: 30 TABLET | Refills: 3 | Status: SHIPPED | OUTPATIENT
Start: 2017-08-28 | End: 2018-02-13

## 2017-09-05 ENCOUNTER — TELEPHONE (OUTPATIENT)
Dept: FAMILY MEDICINE CLINIC | Age: 62
End: 2017-09-05

## 2017-09-05 ENCOUNTER — ANTI-COAG VISIT (OUTPATIENT)
Dept: CARDIOLOGY CLINIC | Age: 62
End: 2017-09-05

## 2017-09-05 ENCOUNTER — TELEPHONE (OUTPATIENT)
Dept: CARDIOLOGY CLINIC | Age: 62
End: 2017-09-05

## 2017-09-05 LAB — INR BLD: 2.4

## 2017-09-07 ENCOUNTER — CARE COORDINATION (OUTPATIENT)
Dept: CARE COORDINATION | Age: 62
End: 2017-09-07

## 2017-09-08 ENCOUNTER — TELEPHONE (OUTPATIENT)
Dept: CARDIOLOGY CLINIC | Age: 62
End: 2017-09-08

## 2017-09-08 ASSESSMENT — ENCOUNTER SYMPTOMS: DYSPNEA ASSOCIATED WITH: MINIMAL EXERTION

## 2017-09-09 PROBLEM — Z45.2 PICC (PERIPHERALLY INSERTED CENTRAL CATHETER) IN PLACE: Status: ACTIVE | Noted: 2017-09-09

## 2017-09-09 PROBLEM — R11.2 NAUSEA & VOMITING: Status: ACTIVE | Noted: 2017-09-09

## 2017-09-09 PROBLEM — K59.00 CONSTIPATION: Status: ACTIVE | Noted: 2017-09-09

## 2017-09-09 PROBLEM — E11.9 DIABETES (HCC): Status: ACTIVE | Noted: 2017-09-09

## 2017-09-09 PROBLEM — R79.89 ELEVATED TROPONIN: Status: ACTIVE | Noted: 2017-09-09

## 2017-09-09 PROBLEM — R77.8 ELEVATED TROPONIN: Status: ACTIVE | Noted: 2017-09-09

## 2017-09-10 ENCOUNTER — CARE COORDINATION (OUTPATIENT)
Dept: CASE MANAGEMENT | Age: 62
End: 2017-09-10

## 2017-09-11 ENCOUNTER — ANTI-COAG VISIT (OUTPATIENT)
Dept: CARDIOLOGY CLINIC | Age: 62
End: 2017-09-11

## 2017-09-11 ENCOUNTER — TELEPHONE (OUTPATIENT)
Dept: CARDIOLOGY CLINIC | Age: 62
End: 2017-09-11

## 2017-09-11 LAB — INR BLD: 2.5

## 2017-09-12 ENCOUNTER — OFFICE VISIT (OUTPATIENT)
Dept: FAMILY MEDICINE CLINIC | Age: 62
End: 2017-09-12

## 2017-09-12 ENCOUNTER — CARE COORDINATOR VISIT (OUTPATIENT)
Dept: CARE COORDINATION | Age: 62
End: 2017-09-12

## 2017-09-12 VITALS
BODY MASS INDEX: 51.91 KG/M2 | DIASTOLIC BLOOD PRESSURE: 90 MMHG | SYSTOLIC BLOOD PRESSURE: 138 MMHG | OXYGEN SATURATION: 92 % | HEIGHT: 63 IN | HEART RATE: 89 BPM | WEIGHT: 293 LBS

## 2017-09-12 DIAGNOSIS — R11.0 NAUSEA: ICD-10-CM

## 2017-09-12 DIAGNOSIS — Z79.4 TYPE 2 DIABETES MELLITUS WITH DIABETIC NEUROPATHY, WITH LONG-TERM CURRENT USE OF INSULIN (HCC): ICD-10-CM

## 2017-09-12 DIAGNOSIS — H10.33 ACUTE CONJUNCTIVITIS OF BOTH EYES, UNSPECIFIED ACUTE CONJUNCTIVITIS TYPE: Primary | ICD-10-CM

## 2017-09-12 DIAGNOSIS — E11.40 TYPE 2 DIABETES MELLITUS WITH DIABETIC NEUROPATHY, WITH LONG-TERM CURRENT USE OF INSULIN (HCC): ICD-10-CM

## 2017-09-12 PROCEDURE — 99214 OFFICE O/P EST MOD 30 MIN: CPT | Performed by: NURSE PRACTITIONER

## 2017-09-12 RX ORDER — GABAPENTIN 100 MG/1
100 CAPSULE ORAL 3 TIMES DAILY
Qty: 90 CAPSULE | Refills: 5 | Status: SHIPPED | OUTPATIENT
Start: 2017-09-12 | End: 2018-02-13 | Stop reason: SDUPTHER

## 2017-09-12 RX ORDER — PROMETHAZINE HYDROCHLORIDE 25 MG/1
25 TABLET ORAL EVERY 8 HOURS PRN
Qty: 30 TABLET | Refills: 2 | Status: SHIPPED | OUTPATIENT
Start: 2017-09-12 | End: 2019-01-29 | Stop reason: SDUPTHER

## 2017-09-12 RX ORDER — POLYMYXIN B SULFATE AND TRIMETHOPRIM 1; 10000 MG/ML; [USP'U]/ML
1 SOLUTION OPHTHALMIC EVERY 6 HOURS
Qty: 1 BOTTLE | Refills: 0 | Status: SHIPPED | OUTPATIENT
Start: 2017-09-12 | End: 2017-09-19

## 2017-09-12 ASSESSMENT — ENCOUNTER SYMPTOMS
EYE PAIN: 1
COUGH: 0
ABDOMINAL PAIN: 0
DIARRHEA: 0
CONSTIPATION: 0
NAUSEA: 1
EYE DISCHARGE: 1
ORTHOPNEA: 0
DOUBLE VISION: 0
VOMITING: 0
PHOTOPHOBIA: 0
SHORTNESS OF BREATH: 1
SORE THROAT: 0
BLURRED VISION: 0
EYE REDNESS: 1

## 2017-09-15 ENCOUNTER — TELEPHONE (OUTPATIENT)
Dept: FAMILY MEDICINE CLINIC | Age: 62
End: 2017-09-15

## 2017-09-15 RX ORDER — AMOXICILLIN 500 MG/1
500 CAPSULE ORAL 2 TIMES DAILY
Qty: 20 CAPSULE | Refills: 0 | Status: ON HOLD | OUTPATIENT
Start: 2017-09-15 | End: 2017-11-01

## 2017-09-18 ENCOUNTER — TELEPHONE (OUTPATIENT)
Dept: CARDIOLOGY CLINIC | Age: 62
End: 2017-09-18

## 2017-09-18 ENCOUNTER — ANTI-COAG VISIT (OUTPATIENT)
Dept: CARDIOLOGY CLINIC | Age: 62
End: 2017-09-18

## 2017-09-18 LAB — INR BLD: 1.9

## 2017-09-19 ENCOUNTER — CARE COORDINATION (OUTPATIENT)
Dept: CARE COORDINATION | Age: 62
End: 2017-09-19

## 2017-09-19 ENCOUNTER — CARE COORDINATION (OUTPATIENT)
Dept: CASE MANAGEMENT | Age: 62
End: 2017-09-19

## 2017-09-19 ASSESSMENT — ENCOUNTER SYMPTOMS: DYSPNEA ASSOCIATED WITH: MINIMAL EXERTION

## 2017-09-22 ENCOUNTER — CARE COORDINATION (OUTPATIENT)
Dept: CARE COORDINATION | Age: 62
End: 2017-09-22

## 2017-09-25 ENCOUNTER — TELEPHONE (OUTPATIENT)
Dept: CARDIOLOGY CLINIC | Age: 62
End: 2017-09-25

## 2017-09-25 ENCOUNTER — OFFICE VISIT (OUTPATIENT)
Dept: CARDIOLOGY CLINIC | Age: 62
End: 2017-09-25

## 2017-09-25 VITALS
WEIGHT: 293 LBS | DIASTOLIC BLOOD PRESSURE: 90 MMHG | SYSTOLIC BLOOD PRESSURE: 120 MMHG | BODY MASS INDEX: 51.91 KG/M2 | HEIGHT: 63 IN | HEART RATE: 88 BPM

## 2017-09-25 DIAGNOSIS — Z99.89 OBSTRUCTIVE SLEEP APNEA ON CPAP: ICD-10-CM

## 2017-09-25 DIAGNOSIS — I50.32 DIASTOLIC CHF, CHRONIC (HCC): Primary | ICD-10-CM

## 2017-09-25 DIAGNOSIS — N18.4 CKD (CHRONIC KIDNEY DISEASE) STAGE 4, GFR 15-29 ML/MIN (HCC): ICD-10-CM

## 2017-09-25 DIAGNOSIS — J44.9 COPD, MODERATE (HCC): ICD-10-CM

## 2017-09-25 DIAGNOSIS — I10 ESSENTIAL HYPERTENSION, MALIGNANT: ICD-10-CM

## 2017-09-25 DIAGNOSIS — E66.01 MORBID OBESITY WITH BMI OF 60.0-69.9, ADULT (HCC): ICD-10-CM

## 2017-09-25 DIAGNOSIS — G47.33 OBSTRUCTIVE SLEEP APNEA ON CPAP: ICD-10-CM

## 2017-09-25 LAB — INR BLD: 1.7

## 2017-09-25 PROCEDURE — 99214 OFFICE O/P EST MOD 30 MIN: CPT | Performed by: CLINICAL NURSE SPECIALIST

## 2017-09-26 ENCOUNTER — ANTI-COAG VISIT (OUTPATIENT)
Dept: CARDIOLOGY CLINIC | Age: 62
End: 2017-09-26

## 2017-09-28 RX ORDER — POLYMYXIN B SULFATE AND TRIMETHOPRIM 1; 10000 MG/ML; [USP'U]/ML
SOLUTION OPHTHALMIC
Refills: 0 | OUTPATIENT
Start: 2017-09-28

## 2017-09-29 ENCOUNTER — CARE COORDINATION (OUTPATIENT)
Dept: CARE COORDINATION | Age: 62
End: 2017-09-29

## 2017-09-29 ENCOUNTER — TELEPHONE (OUTPATIENT)
Dept: FAMILY MEDICINE CLINIC | Age: 62
End: 2017-09-29

## 2017-10-02 ENCOUNTER — TELEPHONE (OUTPATIENT)
Dept: CARDIOLOGY CLINIC | Age: 62
End: 2017-10-02

## 2017-10-02 ENCOUNTER — ANTI-COAG VISIT (OUTPATIENT)
Dept: CARDIOLOGY CLINIC | Age: 62
End: 2017-10-02

## 2017-10-02 ENCOUNTER — CARE COORDINATION (OUTPATIENT)
Dept: CARE COORDINATION | Age: 62
End: 2017-10-02

## 2017-10-02 LAB — INR BLD: 3.6

## 2017-10-02 NOTE — PROGRESS NOTES
Left a message for Clydia Downs the dosage today to 3.75, no other changes. Recheck in one week. Spoke to the pt with instructions, as well.

## 2017-10-02 NOTE — TELEPHONE ENCOUNTER
Coumadin/PT/INR    PT: 43.1    INR:3.6    Where do you have your blood drawn?home  (lab/HHRN/Home Monitor)    When was it drawn?today    Why do you take Coumadin/Warfarin? Current medication dosage and instructions? 7.5 mg Mon Wed, and 3.75 mg all other days    Have you missed any doses? No    Any change in your other medications? No    Are you taking an antibiotic? No    Any dietary changes?  No

## 2017-10-05 NOTE — CARE COORDINATION
Ambulatory Care Coordination Note  10/5/2017  CM Risk Score: 17  Priti Mortality Risk Score:      ACC: Osmar Gray, RN    Summary Note: Outreach call to pt. Pt stated she is starting to feel better \"but that comes and goes\". Pt encouraged to be vocal and tell people who are ill not to come to her home. Pt verbalized understanding. Pt stated her glucose fasting today was 146. Pt stated over the weekend she was using her rescue/fast acting inhaler more then she was supposed to. Pt encouraged any time she starts using her inhalers more then ordered to make appt to see PCP. Pt verbalized understanding. Pt encouraged to call Cardiology when she was becoming SOB- for possible IV diuretics that could be given via [facility] RN to avoid ED visit or readmission to hospital. Pt verbalized understanding. Will follow up with pt in next 7-10 days. Pt verbalized understanding. Pt encouraged to call office to leave message for her provider if unable to reach writer. Pt verbalized understanding. Pt expressed she had labs drawn at Cardiology office and wasn't sure how they were. Per chart review:    Results for Lillette Session (MRN [id number]) as of 10/5/2017 17:51   Ref. Range 9/25/2017 15:00   Pro-BNP Latest Ref Range: 0 - 124 pg/mL 467 (H)     Pt aware of result.        Care Coordination Interventions    Program Enrollment:  Complex Care   Referral from Primary Care Provider:  No   Suggested Interventions and Community Resources   Diabetes Education:  Completed   Fall Risk Prevention:  Completed (Comment: uses walker)   Home Care Waiver:  Completed (Comment: see services/interventions)   Home Health Services:  Completed (Comment: [facility])   Registered Dietician:  Completed   Transportation Support:  Declined   Other Services or Interventions:  [employer], [facility], [employer], O[employer]/B[facility], [facility], [facility]             Goals Addressed             Most Recent      I will take my medications as prescibed by my 2 times daily.  8/9/17  Yes Rei Gonzales CNP   traZODone (DESYREL) 50 MG tablet Take 1 tablet by mouth nightly 8/9/17  Yes Rei Gonzales CNP   Dulaglutide (TRULICITY) 7.07 RH/3.3CB SOPN Inject 0.75 mg into the skin once a week 8/9/17  Yes Rei Gonzales CNP   BYSTOLIC 20 MG TABS tablet Take 2 tablets by mouth  daily 6/30/17  Yes Nataliia Rebolledo MD   amLODIPine (NORVASC) 10 MG tablet Take 1 tablet by mouth  daily 6/30/17  Yes Nataliia Rebolledo MD   rosuvastatin (CRESTOR) 10 MG tablet Take 1 tablet by mouth  daily 6/30/17  Yes Nataliia Rebolledo MD   doxazosin (CARDURA) 4 MG tablet Take 4 mg by mouth 2 times daily   Yes Mir Jacobo MD   Elastic Bandages & Supports (ABDOMINAL BINDER/ELASTIC 3XL) MISC Wear as directed 6/30/17  Yes Nataliia Rebolledo MD   insulin detemir (LEVEMIR FLEXTOUCH) 100 UNIT/ML injection pen Inject 30 Units into the skin 2 times daily 6/27/17  Yes Bella Esquivel MD   insulin lispro (HUMALOG KWIKPEN) 100 UNIT/ML pen Inject 12 Units into the skin 3 times daily (before meals) 6/27/17  Yes Bella Esquivel MD   cloNIDine (CATAPRES) 0.3 MG tablet Take 1 tablet by mouth 3 times daily  Patient taking differently: Take 0.2 mg by mouth 3 times daily  4/24/17  Yes Zain Bourne MD   bumetanide (BUMEX) 1 MG tablet Take 3 tablets by mouth 2 times daily  Patient taking differently: Take 1 mg by mouth 2 times daily  4/24/17  Yes Zain Bourne MD   ferrous sulfate 325 (65 FE) MG tablet Take 1 tablet by mouth 2 times daily (with meals) 4/24/17  Yes Zain Bourne MD   calcitRIOL (ROCALTROL) 0.25 MCG capsule Take 1 capsule by mouth every other day 4/24/17  Yes Zain Bourne MD   RELION SHORT PEN NEEDLES 31G X 8 MM MISC USE THREE TIMES DAILY AS DIRECTED 3/29/17  Yes Nataliia Rebolledo MD   warfarin (COUMADIN) 7.5 MG tablet Continue f/up with coumadin clinic  Patient taking differently: No sig reported 3/23/17  Yes Nataliia Rebolledo MD   allopurinol (ZYLOPRIM) 100 MG tablet Take 100 mg by mouth daily   Yes Historical Provider, MD   aspirin 81 MG chewable tablet Take 1 tablet by mouth daily 2/24/17  Yes She Sykes MD   albuterol sulfate HFA (PROAIR HFA) 108 (90 BASE) MCG/ACT inhaler Inhale 2 puffs into the lungs every 6 hours as needed for Wheezing 1/16/17  Yes Nataliia Rebolledo MD   isosorbide mononitrate (IMDUR) 60 MG extended release tablet Take 1 tablet by mouth once a day 12/19/16  Yes Rossy Bustillos CNP   BiPAP Machine MISC by Does not apply route nightly   Yes Historical Provider, MD   fluticasone-salmeterol (ADVAIR HFA) 230-21 MCG/ACT inhaler Inhale 1 puff into the lungs 2 times daily 10/4/16  Yes Lauri Rascon MD   ipratropium-albuterol (DUONEB) 0.5-2.5 (3) MG/3ML SOLN nebulizer solution INHALE 3MLS INTO THE LUNGS EVERY 4 HOURS AS NEEDED 8/17/16  Yes Nataliia Rebolledo MD   Spacer/Aero-Holding Chambers VENANCIO 1 Device by Does not apply route daily as needed 3/27/16  Yes Wilda Hernandez CNP   fluticasone (FLONASE) 50 MCG/ACT nasal spray 2 sprays by Nasal route daily 2/23/16  Yes Celine Salgado MD   cetirizine (ZYRTEC) 10 MG tablet Take 1 tablet by mouth daily 12/22/15  Yes Lyle Britt MD   OXYGEN Inhale 2 L into the lungs daily as needed   Yes Historical Provider, MD       No future appointments.     Nadira ROONEYN, RN Care Coordinator  [facility],  [address] [place] [address] Primary Care  [phone]

## 2017-10-09 ENCOUNTER — TELEPHONE (OUTPATIENT)
Dept: CARDIOLOGY CLINIC | Age: 62
End: 2017-10-09

## 2017-10-09 ENCOUNTER — ANTI-COAG VISIT (OUTPATIENT)
Dept: CARDIOLOGY CLINIC | Age: 62
End: 2017-10-09

## 2017-10-09 LAB — INR BLD: 1.8

## 2017-10-16 ENCOUNTER — CARE COORDINATION (OUTPATIENT)
Dept: FAMILY MEDICINE CLINIC | Age: 62
End: 2017-10-16

## 2017-10-16 ENCOUNTER — TELEPHONE (OUTPATIENT)
Dept: CARDIOLOGY CLINIC | Age: 62
End: 2017-10-16

## 2017-10-16 ENCOUNTER — ANTI-COAG VISIT (OUTPATIENT)
Dept: CARDIOLOGY CLINIC | Age: 62
End: 2017-10-16

## 2017-10-16 LAB
INR BLD: 1.6
INR BLD: 1.6

## 2017-10-16 NOTE — TELEPHONE ENCOUNTER
Coumadin/PT/INR    Pt: 19.2    INR:1.6    Where do you have your blood drawn?  (lab/HHRN/Home Monitor) HHRN    When was it drawn?10/16/17    Why do you take Coumadin/Warfarin? Current medication dosage and instructions? 3.75 mg daily    Have you missed any doses? no    Any change in your other medications? no    Are you taking an antibiotic?no    Any dietary changes? no

## 2017-10-16 NOTE — PROGRESS NOTES
Called left message with HHN that we are going to have her do 7.5 mg coumadin tonight and Wednesday and 3.75 mg all other nights recheck in 1 week

## 2017-10-23 ENCOUNTER — CARE COORDINATION (OUTPATIENT)
Dept: CARE COORDINATION | Age: 62
End: 2017-10-23

## 2017-10-23 ENCOUNTER — HOSPITAL ENCOUNTER (OUTPATIENT)
Dept: OTHER | Age: 62
Discharge: HOME | End: 2017-10-31
Attending: INTERNAL MEDICINE | Admitting: INTERNAL MEDICINE

## 2017-10-23 ENCOUNTER — TELEPHONE (OUTPATIENT)
Dept: CARDIOLOGY CLINIC | Age: 62
End: 2017-10-23

## 2017-10-23 ENCOUNTER — ANTI-COAG VISIT (OUTPATIENT)
Dept: CARDIOLOGY CLINIC | Age: 62
End: 2017-10-23

## 2017-10-23 LAB
ANION GAP SERPL CALCULATED.3IONS-SCNC: 13 MMOL/L (ref 3–16)
BASOPHILS ABSOLUTE: 0.1 K/UL (ref 0–0.2)
BASOPHILS RELATIVE PERCENT: 0.9 %
BUN BLDV-MCNC: 21 MG/DL (ref 7–20)
CALCIUM SERPL-MCNC: 9.1 MG/DL (ref 8.3–10.6)
CHLORIDE BLD-SCNC: 107 MMOL/L (ref 99–110)
CO2: 26 MMOL/L (ref 21–32)
CREAT SERPL-MCNC: 2.5 MG/DL (ref 0.6–1.2)
EOSINOPHILS ABSOLUTE: 0.3 K/UL (ref 0–0.6)
EOSINOPHILS RELATIVE PERCENT: 2.8 %
FERRITIN: 271.4 NG/ML (ref 15–150)
GFR AFRICAN AMERICAN: 24
GFR NON-AFRICAN AMERICAN: 19
GLUCOSE BLD-MCNC: 66 MG/DL (ref 70–99)
HCT VFR BLD CALC: 32.4 % (ref 36–48)
HEMOGLOBIN: 10.3 G/DL (ref 12–16)
INR BLD: 1.6
IRON: 51 UG/DL (ref 37–145)
LYMPHOCYTES ABSOLUTE: 1.2 K/UL (ref 1–5.1)
LYMPHOCYTES RELATIVE PERCENT: 13.3 %
MCH RBC QN AUTO: 29.1 PG (ref 26–34)
MCHC RBC AUTO-ENTMCNC: 31.7 G/DL (ref 31–36)
MCV RBC AUTO: 91.7 FL (ref 80–100)
MONOCYTES ABSOLUTE: 0.5 K/UL (ref 0–1.3)
MONOCYTES RELATIVE PERCENT: 5.7 %
NEUTROPHILS ABSOLUTE: 7 K/UL (ref 1.7–7.7)
NEUTROPHILS RELATIVE PERCENT: 77.3 %
PDW BLD-RTO: 16.9 % (ref 12.4–15.4)
PHOSPHORUS: 2.9 MG/DL (ref 2.5–4.9)
PLATELET # BLD: 201 K/UL (ref 135–450)
PMV BLD AUTO: 9.3 FL (ref 5–10.5)
POTASSIUM SERPL-SCNC: 3.5 MMOL/L (ref 3.5–5.1)
RBC # BLD: 3.53 M/UL (ref 4–5.2)
SODIUM BLD-SCNC: 146 MMOL/L (ref 136–145)
TOTAL IRON BINDING CAPACITY: 184 UG/DL (ref 260–445)
VITAMIN D 25-HYDROXY: 23.7 NG/ML
WBC # BLD: 9.1 K/UL (ref 4–11)

## 2017-10-23 NOTE — TELEPHONE ENCOUNTER
Coumadin/PT/INR    PT:  19.2    INR: 1.6    Where do you have your blood drawn?  (lab/HHRN/Home Monitor)    When was it drawn? Today     Why do you take Coumadin/Warfarin? Current medication dosage and instructions? 7.5mg m-w, 3.75mg all other days    Have you missed any doses? Any change in your other medications? no    Are you taking an antibiotic?no    Any dietary changes? no

## 2017-10-23 NOTE — CARE COORDINATION
Ambulatory Care Coordination Note  10/23/2017  CM Risk Score: 17  Priti Mortality Risk Score:      ACC: Maurice Hilario RN    Summary Note: Outreach call to pt. Per pt her glucose today was 138. Pt stated her weight is 344lbs. Pt  Assisted with scheduling flu vaccine. Pt stated her only issue is pain in her stomach pain. Pt stated she is going to call and make an appt to see Dr. Elba Jose. Pt encouraged to call after call ended to call his office to see his availability. Pt verbalized understanding. Care Coordination Interventions    Program Enrollment:  Complex Care  Referral from Primary Care Provider:  No  Suggested Interventions and Community Resources  Diabetes Education:  Completed  Fall Risk Prevention:  Completed (Comment: uses walker)  Home Care Waiver:  Completed (Comment: see services/interventions)  Home Health Services:  Completed (Comment: [facility])  Registered Dietician:  Completed  Transportation Support:  Declined  Other Services or Interventions:  [address], [employer], [employer], [facility], [facility]         Goals Addressed             Most Recent      I will take my medications as prescibed by my providers (pt-stated)   On track (10/23/2017)          Prior to Admission medications    Medication Sig Start Date End Date Taking? Authorizing Provider   amoxicillin (AMOXIL) 500 MG capsule Take 1 capsule by mouth 2 times daily 9/15/17  Yes Erika Chandler CNP   gabapentin (NEURONTIN) 100 MG capsule Take 1 capsule by mouth 3 times daily 9/12/17 3/11/18 Yes Erika Chandler CNP   promethazine (PHENERGAN) 25 MG tablet Take 1 tablet by mouth every 8 hours as needed for Nausea 9/12/17  Yes Erika Chandler CNP   pantoprazole (PROTONIX) 40 MG tablet Take 1 tablet by mouth 2 times daily 9/9/17  Yes Breanna Mcgee MD   colchicine (COLCRYS) 0.6 MG tablet TAKE 2 TABLETS BY MOUTH AT THE ONSET OF FLARE, THEN TAKE ONE TABLET ONE HOUR LATER.  DO NOT EXCEED TREATMENT MORE THAN ONCE EVERY 2 WEEKS 8/28/17  Yes Adam Mclaughlin MD   nitroGLYCERIN (NITROSTAT) 0.4 MG SL tablet Dissolve 1 tablet under  tongue every 5 minutes as  needed. Maximum of 3  tablets in 15 minutes 8/17/17  Yes Di Colindres CNP   hydrALAZINE (APRESOLINE) 50 MG tablet Take 1 tablet by mouth 3  times daily 8/17/17  Yes Di Colindres CNP   Lancet Devices (ADJUSTABLE LANCING DEVICE) MISC USE WITH LANCETS TO TEST BLOOD GLUCOSE 8/15/17  Yes Di Colindres CNP   ASSURE COMFORT LANCETS 30G MISC USE TO TEST BLOOD GLUCOSE THREE TIMES DAILY 8/15/17  Yes Di Colindres CNP   Alcohol Swabs (ALCOHOL PREP) 70 % PADS USE TO TEST BLOOD GLUCOSE THREE TIMES DAILY 8/15/17  Yes Di Colindres CNP   ACCU-CHEK SMARTVIEW strip USE TO TEST BLOOD GLUCOSE THREE TIMES DAILY 8/15/17  Yes Di Colindres CNP   Blood Glucose Monitoring Suppl (ACCU-CHEK KENNETH SMARTVIEW) w/Device KIT USE TO TEST BLOOD GLUCOSE 8/15/17  Yes Di Colindres CNP   Fluocinonide 0.1 % CREA Apply 2 g topically daily To each leg 8/10/17  Yes Di Colindres CNP   betamethasone dipropionate (DIPROLENE) 0.05 % cream Apply topically 2 times daily.  8/9/17  Yes Di Colindres CNP   traZODone (DESYREL) 50 MG tablet Take 1 tablet by mouth nightly 8/9/17  Yes Di Colindres CNP   Dulaglutide (TRULICITY) 9.54 LZ/9.6PN SOPN Inject 0.75 mg into the skin once a week 8/9/17  Yes Di Colindres CNP   BYSTOLIC 20 MG TABS tablet Take 2 tablets by mouth  daily 6/30/17  Yes Adam Mclaughlin MD   amLODIPine (NORVASC) 10 MG tablet Take 1 tablet by mouth  daily 6/30/17  Yes Adam Mclaughlin MD   rosuvastatin (CRESTOR) 10 MG tablet Take 1 tablet by mouth  daily 6/30/17  Yes Adam Mcluaghlin MD   doxazosin (CARDURA) 4 MG tablet Take 4 mg by mouth 2 times daily   Yes Historical MD Odalis   Elastic Bandages & Supports (ABDOMINAL BINDER/ELASTIC 3XL) MISC Wear as directed 6/30/17  Yes Adam Mclaughlin MD   insulin detemir (LEVEMIR FLEXTOUCH) 100 UNIT/ML injection pen Inject 30 Units into the skin 2 times daily 6/27/17  Yes Heavenly Tuttle MD   insulin lispro (HUMALOG KWIKPEN) 100 UNIT/ML pen Inject 12 Units into the skin 3 times daily (before meals) 6/27/17  Yes Heavenly Tuttle MD   cloNIDine (CATAPRES) 0.3 MG tablet Take 1 tablet by mouth 3 times daily  Patient taking differently: Take 0.2 mg by mouth 3 times daily  4/24/17  Yes Jaime Lanier MD   bumetanide (BUMEX) 1 MG tablet Take 3 tablets by mouth 2 times daily  Patient taking differently: Take 1 mg by mouth 2 times daily  4/24/17  Yes Jaime Lanier MD   ferrous sulfate 325 (65 FE) MG tablet Take 1 tablet by mouth 2 times daily (with meals) 4/24/17  Yes Jaime Lanier MD   calcitRIOL (ROCALTROL) 0.25 MCG capsule Take 1 capsule by mouth every other day 4/24/17  Yes Jaime Lanier MD   RELION SHORT PEN NEEDLES 31G X 8 MM MISC USE THREE TIMES DAILY AS DIRECTED 3/29/17  Yes Nori Bella MD   warfarin (COUMADIN) 7.5 MG tablet Continue f/up with coumadin clinic  Patient taking differently: No sig reported 3/23/17  Yes Nori Bella MD   allopurinol (ZYLOPRIM) 100 MG tablet Take 100 mg by mouth daily   Yes Historical Provider, MD   aspirin 81 MG chewable tablet Take 1 tablet by mouth daily 2/24/17  Yes Indra Lazar MD   albuterol sulfate HFA (PROAIR HFA) 108 (90 BASE) MCG/ACT inhaler Inhale 2 puffs into the lungs every 6 hours as needed for Wheezing 1/16/17  Yes Nori Bella MD   isosorbide mononitrate (IMDUR) 60 MG extended release tablet Take 1 tablet by mouth once a day 12/19/16  Yes Kina Burgess CNP   BiPAP Machine MISC by Does not apply route nightly   Yes Historical Provider, MD   fluticasone-salmeterol (ADVAIR HFA) 230-21 MCG/ACT inhaler Inhale 1 puff into the lungs 2 times daily 10/4/16  Yes Vega Espinoza MD   ipratropium-albuterol (DUONEB) 0.5-2.5 (3) MG/3ML SOLN nebulizer solution INHALE 3MLS INTO THE LUNGS EVERY 4 HOURS AS NEEDED 8/17/16  Yes Nori Bella MD   Spacer/Aero-Holding Rolena Brunner 1 Device by Does not apply route daily as needed 3/27/16  Yes Wilda Hernandez CNP   fluticasone (FLONASE) 50 MCG/ACT nasal spray 2 sprays by Nasal route daily 2/23/16  Yes Jase Hamilton MD   cetirizine (ZYRTEC) 10 MG tablet Take 1 tablet by mouth daily 12/22/15  Yes Alia Mcclendon MD   OXYGEN Inhale 2 L into the lungs daily as needed   Yes Historical Provider, MD       Future Appointments  Date Time Provider Aziza Barraza   10/25/2017 2:30 PM SCHEDULE, [facility]     - Pt will be calling Dr. Juan White (GI) for stomach pain      Patricia Hanna BSN, RN Care Coordinator  [address] [place] [address]  [phone]

## 2017-10-25 ENCOUNTER — IMMUNIZATION (OUTPATIENT)
Dept: FAMILY MEDICINE CLINIC | Age: 62
End: 2017-10-25

## 2017-10-25 DIAGNOSIS — Z23 NEEDS FLU SHOT: Primary | ICD-10-CM

## 2017-10-25 PROCEDURE — 90630 INFLUENZA, QUADV, 18-64 YRS, ID, PF, MICRO INJ, 0.1ML (FLUZONE QUADV, PF): CPT | Performed by: NURSE PRACTITIONER

## 2017-10-25 PROCEDURE — G0008 ADMIN INFLUENZA VIRUS VAC: HCPCS | Performed by: NURSE PRACTITIONER

## 2017-10-25 NOTE — CARE COORDINATION
Ambulatory Care Coordination Note  10/25/2017  CM Risk Score: 17  Priti Mortality Risk Score:      ACC: Nisa Day RN    Summary Note: Pt has been working with writer with goal of education to help prevent being sent to ED or readmitted to Hospital related to CHF/DM. Pt has been to ED for chest pain in recent months. Pt given encouragement to call her specialist when having issues related to breathing, swelling ext. Pt is followed by PCP, GI, Nephrology and Cardiology. Pt is active with [facility] (). Ambulatory Care Coordination Assessment    Care Coordination Protocol  Program Enrollment:  Complex Care  Referral from Primary Care Provider:  No  Week 1 - Initial Assessment     Do you have all of your prescriptions and are they filled?:  Yes  Barriers to medication adherence:  None  Are you able to afford your medications?:  Yes  How often do you have trouble taking your medications the way you have been told to take them?:  I always take them as prescribed. Do you have Home O2 Therapy?:  Yes   Oxygen Regimen:  PRN Flow - Enter rate/FIO2:  2   Method of Delivery:  Nasal Cannula, Has cylinders in home   CPAP Use:  BiPAP      Ability to seek help/take action for Emergent Urgent situations i.e. fire, crime, inclement weather or health crisis. :  Independent  Ability to ambulate to restroom:  Independent  Ability handle personal hygeine needs (bathing/dressing/grooming): Independent  Ability to manage Medications: Independent  Ability to prepare Food Preparation:  Independent  Ability to maintain home (clean home, laundry):  Needs Assistance  Ability to drive and/or has transportation:  Needs Assistance  Ability to do shopping:  Independent  Ability to manage finances:   Independent  Is patient able to live independently?:  Yes     Current Housing:  Apartment  Does the person that you care for see a [facility] PCP?:  Yes     Patient Home Equipment:  Oxygen, BiPAP     Per the Fall Risk Screening, did the patient have 2 or more falls or 1 fall with injury in the past year?:  No     Frequent urination at night?:  No  Do you use rails/bars?:  No  Do you have a non-slip tub mat?:  Yes     Are you experiencing loss of meaning?:  No  Are you experiencing loss of hope and peace?:  No     Thinking about your patient's physical health needs, are there any symptoms or problems (risk indicators) you are unsure about that require further investigation?:  Mild vague physical symptoms or problems; but do not impact on daily life or are not of concern to patient   Are the patients physical health problems impacting on their mental well-being?:  Mild impact on mental well-being e.g. \"\"feeling fed-up\"\", \"\"reduced enjoyment\"\"   Are there any problems with your patients lifestyle behaviors (alcohol, drugs, diet, exercise) that are impacting on physical or mental well-being?:  Some mild concern of potential negative impact on well-being   Do you have any other concerns about your patients mental well-being?  How would you rate their severity and impact on the patient?:  Mild problems - don't interfere with function   How would you rate their home environment in terms of safety and stability (including domestic violence, insecure housing, neighbor harassment)?:  Safe, stable, but with some inconsistency   How do daily activities impact on the patient's well-being? (include current or anticipated unemployment, work, caregiving, access to transportation or other):  Some general dissatisfaction but no concern   How would you rate their social network (family, work, friends)?:  Adequate participation with social networks   How would you rate their financial resources (including ability to afford all required medical care)?:  Financially secure, some resource challenges   How wells does the patient now understand their health and well-being (symptoms, signs or risk factors) and what they need to do to manage their health?:  Reasonable to good understanding but do not feel able to engage with advice at this time   How well do you think your patient can engage in healthcare discussions? (Barriers include language, deafness, aphasia, alcohol or drug problems, learning difficulties, concentration): Adequate communication, with or without minor barriers   Do other services need to be involved to help this patient?:  Other care/services in place and adequate   Are current services involved with this patient well-coordinated? (Include coordination with other services you are now recommendation):  Required care/services in place and adequately coordinated   Suggested Interventions and Community Resources  Diabetes Education:  Completed   Fall Risk Prevention:  Completed Home Health Services:  Completed (Comment: [facility])   Home Care Waiver:  Completed   Other Services or Interventions:  [address], [employer], Oxygen/Bip[employer], Nebulizer, [facility]   Registered Dietician:  Completed   Transportation Services:  [address]              Prior to Admission medications    Medication Sig Start Date End Date Taking? Authorizing Provider   amoxicillin (AMOXIL) 500 MG capsule Take 1 capsule by mouth 2 times daily 9/15/17  Yes Eun Byrd CNP   gabapentin (NEURONTIN) 100 MG capsule Take 1 capsule by mouth 3 times daily 9/12/17 3/11/18 Yes Eun Byrd CNP   promethazine (PHENERGAN) 25 MG tablet Take 1 tablet by mouth every 8 hours as needed for Nausea 9/12/17  Yes Eun Byrd CNP   pantoprazole (PROTONIX) 40 MG tablet Take 1 tablet by mouth 2 times daily 9/9/17  Yes Alejandro Presley MD   colchicine (COLCRYS) 0.6 MG tablet TAKE 2 TABLETS BY MOUTH AT THE ONSET OF FLARE, THEN TAKE ONE TABLET ONE HOUR LATER. DO NOT EXCEED TREATMENT MORE THAN ONCE EVERY 2 WEEKS 8/28/17  Yes Shad Choi MD   nitroGLYCERIN (NITROSTAT) 0.4 MG SL tablet Dissolve 1 tablet under  tongue every 5 minutes as  needed.  Maximum of 3 6/27/17  Yes Sophie Avalos MD   cloNIDine (CATAPRES) 0.3 MG tablet Take 1 tablet by mouth 3 times daily  Patient taking differently: Take 0.2 mg by mouth 3 times daily  4/24/17  Yes Arnoldo Zaragoza MD   bumetanide (BUMEX) 1 MG tablet Take 3 tablets by mouth 2 times daily  Patient taking differently: Take 1 mg by mouth 2 times daily  4/24/17  Yes Arnoldo Zaragoza MD   ferrous sulfate 325 (65 FE) MG tablet Take 1 tablet by mouth 2 times daily (with meals) 4/24/17  Yes Arnoldo Zaragoza MD   calcitRIOL (ROCALTROL) 0.25 MCG capsule Take 1 capsule by mouth every other day 4/24/17  Yes Arnoldo Zaragoza MD   RELION SHORT PEN NEEDLES 31G X 8 MM MISC USE THREE TIMES DAILY AS DIRECTED 3/29/17  Yes Alice Soto MD   warfarin (COUMADIN) 7.5 MG tablet Continue f/up with coumadin clinic  Patient taking differently: No sig reported 3/23/17  Yes Alice Soto MD   allopurinol (ZYLOPRIM) 100 MG tablet Take 100 mg by mouth daily   Yes Historical Provider, MD   aspirin 81 MG chewable tablet Take 1 tablet by mouth daily 2/24/17  Yes Katie Erickson MD   albuterol sulfate HFA (PROAIR HFA) 108 (90 BASE) MCG/ACT inhaler Inhale 2 puffs into the lungs every 6 hours as needed for Wheezing 1/16/17  Yes Alice Soto MD   isosorbide mononitrate (IMDUR) 60 MG extended release tablet Take 1 tablet by mouth once a day 12/19/16  Yes Michael Morrison CNP   BiPAP Machine MISC by Does not apply route nightly   Yes Historical Provider, MD   fluticasone-salmeterol (ADVAIR HFA) 230-21 MCG/ACT inhaler Inhale 1 puff into the lungs 2 times daily 10/4/16  Yes Codi Pulido MD   ipratropium-albuterol (DUONEB) 0.5-2.5 (3) MG/3ML SOLN nebulizer solution INHALE 3MLS INTO THE LUNGS EVERY 4 HOURS AS NEEDED 8/17/16  Yes Alice Soto MD   Spacer/Aero-Holding Chambers VENANCIO 1 Device by Does not apply route daily as needed 3/27/16  Yes Wilda Hernandez, CNP   fluticasone (FLONASE) 50 MCG/ACT nasal spray 2 sprays by Nasal route daily

## 2017-10-25 NOTE — PROGRESS NOTES
Vaccine Information Sheet, \"Influenza - Inactivated\"  given to Elvie Combs, or parent/legal guardian of  Elvie Combs and verbalized understanding. Patient responses:    Have you ever had a reaction to a flu vaccine? No  Are you able to eat eggs without adverse effects? Yes  Do you have any current illness? No  Have you ever had Guillian Lachine Syndrome? No    Flu vaccine given per order. Please see immunization tab.

## 2017-10-26 DIAGNOSIS — J96.11 CHRONIC HYPOXEMIC RESPIRATORY FAILURE (HCC): ICD-10-CM

## 2017-10-26 DIAGNOSIS — J45.50 UNCOMPLICATED SEVERE PERSISTENT ASTHMA: ICD-10-CM

## 2017-10-30 ENCOUNTER — TELEPHONE (OUTPATIENT)
Dept: FAMILY MEDICINE CLINIC | Age: 62
End: 2017-10-30

## 2017-10-30 ENCOUNTER — ANTI-COAG VISIT (OUTPATIENT)
Dept: CARDIOLOGY CLINIC | Age: 62
End: 2017-10-30

## 2017-10-30 ENCOUNTER — TELEPHONE (OUTPATIENT)
Dept: CARDIOLOGY CLINIC | Age: 62
End: 2017-10-30

## 2017-10-30 LAB — INR BLD: 2.7

## 2017-10-30 NOTE — TELEPHONE ENCOUNTER
[facility]. She states that she has listen to her and can't hear anything in her lungs. The patient is coughing up thick yellow sputum.  Please advise

## 2017-11-01 ENCOUNTER — TELEPHONE (OUTPATIENT)
Dept: FAMILY MEDICINE CLINIC | Age: 62
End: 2017-11-01

## 2017-11-01 ENCOUNTER — HOSPITAL ENCOUNTER (OUTPATIENT)
Dept: OTHER | Age: 62
Discharge: HOME | End: 2017-11-30
Attending: INTERNAL MEDICINE | Admitting: INTERNAL MEDICINE

## 2017-11-06 ENCOUNTER — TELEPHONE (OUTPATIENT)
Dept: PHARMACY | Facility: CLINIC | Age: 62
End: 2017-11-06

## 2017-11-06 ENCOUNTER — CARE COORDINATION (OUTPATIENT)
Dept: CARE COORDINATION | Age: 62
End: 2017-11-06

## 2017-11-06 ENCOUNTER — ANTI-COAG VISIT (OUTPATIENT)
Dept: CARDIOLOGY CLINIC | Age: 62
End: 2017-11-06

## 2017-11-06 ENCOUNTER — TELEPHONE (OUTPATIENT)
Dept: CARDIOLOGY CLINIC | Age: 62
End: 2017-11-06

## 2017-11-06 LAB — INR BLD: 2.8

## 2017-11-06 NOTE — TELEPHONE ENCOUNTER
Coumadin/PT/INR    PT: 34    INR: 2.8    Where do you have your blood drawn? Home  (lab/HHRN/Home Monitor)    When was it drawn? 11/6/17    Why do you take Coumadin/Warfarin? Current medication dosage and instructions? 7.5 mg     Have you missed any doses? Any change in your other medications? Are you taking an antibiotic? Any dietary changes?

## 2017-11-06 NOTE — TELEPHONE ENCOUNTER
CLINICAL PHARMACY NOTE  Post-Discharge Transitions of Care ([facility])    Patient was discharged from [facility] on 11/5/17. Reached patient to review medications. Declines complete review of medications, stating the home health nurse was just there this morning (also noted in RN CTC outreach earlier today) and reviewed everything. Denies any medication questions or concerns.        Zena Ramirez, PharmD, [address] [facility]  Direct: [phone]  Department, toll free: [phone], option 7    =======================================================   CLINICAL PHARMACY NOTE   POST-DISCHARGE TELEPHONE FOLLOW-UP ADDENDUM    For Pharmacy Admin Tracking Only    TCM Call Made?: Yes  [facility] Select Patient?: Yes  Outreach Status: Patient Refused  Care Coordinator Outreach to Patient?: Yes  Time Spent (min): 5

## 2017-11-06 NOTE — CARE COORDINATION
Ambulatory Care Coordination Note  11/6/2017  CM Risk Score: 13  Priti Mortality Risk Score:      ACC: Shelby Irizarry RN    Summary Note: Outreach call to pt. Pt was released from [facility] on 11/5/2017 for CHF exacerbation. Pt admitted weighing 283. Pt didn't weigh herself today. Pt was presently being visited by Johnna Dietrich RN: [facility]. Soham Butcher was asking for V/O for lab work. Due to Cardiology managing her labs/INR- Soham Butcher referred to call their office. Pt stated she is taking her medications. Med list reviewed. Pt is to follow up with Nephrology. Pt offered assistance with PCP Hospital F/U. Pt stated she will call and make her nephrology appt then call [facility] and make appt with [facility]. Pt's Diuretic was stopped d/t Renal Function (see below)    Results for Pat Sigala (MRN [id number]) as of 11/6/2017 10:59   Ref. Range 11/5/2017 04:40   BUN Latest Ref Range: 7 - 20 mg/dL 39 (H)   Creatinine Latest Ref Range: 0.6 - 1.2 mg/dL 4.5 (H)     Pt stated her Coumadin was held yesterday due to her PT/INR being elevated (see below)    Results for Pat Sigala (MRN [id number]) as of 11/6/2017 10:59   Ref. Range 11/5/2017 04:40   Prothrombin Time Latest Ref Range: 9.6 - 13.0 sec 36.6 (H)   INR Latest Ref Range: 0.85 - 1.15  3.24 (H)     No other needs expressed during call. Message sent to [facility] covering pt post hospital D/C to inform her pt was contacted.        Care Coordination Interventions    Program Enrollment:  Complex Care  Referral from Primary Care Provider:  No  Suggested Interventions and Community Resources  Diabetes Education:  Completed  Fall Risk Prevention:  Completed (Comment: uses walker)  Home Care Waiver:  Completed (Comment: see services/interventions)  Home Health Services:  Completed (Comment: [facility])  Registered Dietician:  Completed  Transportation Support:  Declined  Other Services or Interventions:  [employer], [employer], [employer], [employer], [employer], [employer]         Goals Addressed 8/15/17  Yes Vincent Head CNP   traZODone (DESYREL) 50 MG tablet Take 1 tablet by mouth nightly 8/9/17  Yes Vincent Head CNP   Dulaglutide (TRULICITY) 6.54 DR/0.7NF SOPN Inject 0.75 mg into the skin once a week  Patient taking differently: Inject 0.75 mg into the skin once a week Sunday 8/9/17  Yes Vincent Head CNP   amLODIPine (NORVASC) 10 MG tablet Take 1 tablet by mouth  daily 6/30/17  Yes Catrina Lorenzana MD   rosuvastatin (CRESTOR) 10 MG tablet Take 1 tablet by mouth  daily  Patient taking differently: Take 1 tablet by mouth HS 6/30/17  Yes Catrina Lorenzana MD   doxazosin (CARDURA) 4 MG tablet Take 4 mg by mouth 2 times daily   Yes Historical Provider, MD   insulin detemir (LEVEMIR FLEXTOUCH) 100 UNIT/ML injection pen Inject 30 Units into the skin 2 times daily 6/27/17  Yes Rommel Heredia MD   insulin lispro (HUMALOG KWIKPEN) 100 UNIT/ML pen Inject 12 Units into the skin 3 times daily (before meals) 6/27/17  Yes Rommel Heredia MD   ferrous sulfate 325 (65 FE) MG tablet Take 1 tablet by mouth 2 times daily (with meals) 4/24/17  Yes Kaur aKur MD   calcitRIOL (ROCALTROL) 0.25 MCG capsule Take 1 capsule by mouth every other day 4/24/17  Yes Kaur Kaur MD   RELION SHORT PEN NEEDLES 31G X 8 MM MISC USE THREE TIMES DAILY AS DIRECTED 3/29/17  Yes Catrina Lorenzana MD   allopurinol (ZYLOPRIM) 100 MG tablet Take 100 mg by mouth daily   Yes Historical Provider, MD   aspirin 81 MG chewable tablet Take 1 tablet by mouth daily 2/24/17  Yes Domenico Ramos MD   isosorbide mononitrate (IMDUR) 60 MG extended release tablet Take 1 tablet by mouth once a day 12/19/16  Yes Vincent Head CNP   BiPAP Machine MISC by Does not apply route nightly   Yes Historical Provider, MD   fluticasone-salmeterol (ADVAIR HFA) 230-21 MCG/ACT inhaler Inhale 1 puff into the lungs 2 times daily 10/4/16  Yes Kya Hunt MD   ipratropium-albuterol (DUONEB) 0.5-2.5 (3) MG/3ML SOLN nebulizer solution INHALE 3MLS INTO THE LUNGS EVERY 4 HOURS AS NEEDED 8/17/16  Yes Kj Alberto MD   Spacer/Aero-Holding Chambers VENACNIO 1 Device by Does not apply route daily as needed 3/27/16  Yes Wilda Hernandez CNP   fluticasone (FLONASE) 50 MCG/ACT nasal spray 2 sprays by Nasal route daily  Patient taking differently: 2 sprays by Nasal route daily as needed  2/23/16  Yes Hansa Mackey MD   OXYGEN Inhale 2 L into the lungs daily as needed   Yes Historical Provider, MD   cetirizine (ZYRTEC) 10 MG tablet Take 0.5 tablets by mouth daily 11/5/17   Jocelin Feldman MD   nitroGLYCERIN (NITROSTAT) 0.4 MG SL tablet Dissolve 1 tablet under  tongue every 5 minutes as  needed.  Maximum of 3  tablets in 15 minutes 8/17/17   Jose J Enriquez CNP   Elastic Bandages & Supports (ABDOMINAL BINDER/ELASTIC 3XL) MISC Wear as directed 6/30/17   Kj Alberto MD   warfarin (COUMADIN) 7.5 MG tablet Continue f/up with coumadin clinic  Patient taking differently: No sig reported 3/23/17   Kj Alberto MD       - Pt is to call Nephrologist about F/U regarding Renal Function  - Pt to call and schedule appt with PCP for Hospital F/U    [place], RN Care Coordinator  [facility],  [address] [place] [address] Primary Care  [phone]

## 2017-11-07 ENCOUNTER — TELEPHONE (OUTPATIENT)
Dept: OTHER | Age: 62
End: 2017-11-07

## 2017-11-07 NOTE — TELEPHONE ENCOUNTER
[place] [address]  TELEPHONE ENCOUNTER FORM    Bia Finley 1955    Attempted to call patient for HF follow-up. No answer at this time. Next MD/ Clinic appointment: No follow up appointment scheduled.      KAVITA ZULETA 11/7/2017 4:23 PM

## 2017-11-08 RX ORDER — CLONIDINE HYDROCHLORIDE 0.2 MG/1
TABLET ORAL
Qty: 180 TABLET | Refills: 3 | Status: ON HOLD | OUTPATIENT
Start: 2017-11-08 | End: 2018-01-22 | Stop reason: HOSPADM

## 2017-11-08 RX ORDER — ISOSORBIDE MONONITRATE 60 MG/1
TABLET, EXTENDED RELEASE ORAL
Qty: 90 TABLET | Refills: 3 | Status: SHIPPED | OUTPATIENT
Start: 2017-11-08 | End: 2018-02-13 | Stop reason: SDUPTHER

## 2017-11-08 NOTE — TELEPHONE ENCOUNTER
Medication and Quantity requested: Isosorbide MN ER 60 MG Tab    Last Visit  9/25/17    Pharmacy and phone number updated in [facility]:  yes

## 2017-11-09 ENCOUNTER — CARE COORDINATION (OUTPATIENT)
Dept: CASE MANAGEMENT | Age: 62
End: 2017-11-09

## 2017-11-09 LAB
ANION GAP SERPL CALCULATED.3IONS-SCNC: 12 MMOL/L (ref 3–16)
BASOPHILS ABSOLUTE: 0.1 K/UL (ref 0–0.2)
BASOPHILS RELATIVE PERCENT: 1 %
BUN BLDV-MCNC: 29 MG/DL (ref 7–20)
CALCIUM SERPL-MCNC: 9 MG/DL (ref 8.3–10.6)
CHLORIDE BLD-SCNC: 104 MMOL/L (ref 99–110)
CO2: 28 MMOL/L (ref 21–32)
CREAT SERPL-MCNC: 3.1 MG/DL (ref 0.6–1.2)
EOSINOPHILS ABSOLUTE: 0.3 K/UL (ref 0–0.6)
EOSINOPHILS RELATIVE PERCENT: 3.3 %
FERRITIN: 256.9 NG/ML (ref 15–150)
GFR AFRICAN AMERICAN: 18
GFR NON-AFRICAN AMERICAN: 15
GLUCOSE BLD-MCNC: 82 MG/DL (ref 70–99)
HCT VFR BLD CALC: 28.9 % (ref 36–48)
HEMOGLOBIN: 9.8 G/DL (ref 12–16)
IRON: 31 UG/DL (ref 37–145)
LYMPHOCYTES ABSOLUTE: 1.2 K/UL (ref 1–5.1)
LYMPHOCYTES RELATIVE PERCENT: 14.1 %
MCH RBC QN AUTO: 30.7 PG (ref 26–34)
MCHC RBC AUTO-ENTMCNC: 33.9 G/DL (ref 31–36)
MCV RBC AUTO: 90.4 FL (ref 80–100)
MONOCYTES ABSOLUTE: 0.6 K/UL (ref 0–1.3)
MONOCYTES RELATIVE PERCENT: 6.9 %
NEUTROPHILS ABSOLUTE: 6.6 K/UL (ref 1.7–7.7)
NEUTROPHILS RELATIVE PERCENT: 74.7 %
PARATHYROID HORMONE INTACT: 146.6 PG/ML (ref 14–72)
PDW BLD-RTO: 16.4 % (ref 12.4–15.4)
PHOSPHORUS: 3.2 MG/DL (ref 2.5–4.9)
PLATELET # BLD: 200 K/UL (ref 135–450)
PMV BLD AUTO: 9 FL (ref 5–10.5)
POTASSIUM SERPL-SCNC: 4.5 MMOL/L (ref 3.5–5.1)
RBC # BLD: 3.2 M/UL (ref 4–5.2)
SODIUM BLD-SCNC: 144 MMOL/L (ref 136–145)
TOTAL IRON BINDING CAPACITY: 164 UG/DL (ref 260–445)
VITAMIN D 25-HYDROXY: 24.9 NG/ML
WBC # BLD: 8.8 K/UL (ref 4–11)

## 2017-11-09 NOTE — CARE COORDINATION
Raj 45 Transitions Follow Up Call    2017    Patient: Andrew Gonzalez  Patient : 1955   MRN: [id number]  Reason for Admission: There are no discharge diagnoses documented for the most recent discharge. Discharge Date: 17 RARS: Risk Score: 29     Spoke with: Ryan Pineda    Non-face-to-face services provided:  Obtained and reviewed discharge summary and/or continuity of care documents    Inpatient Assessment  Care Transitions Subsequent and Final Call    Subsequent and Final Calls  Do you have any ongoing symptoms?:  No  Have your medications changed?:  Yes  Patient Reports:  catapress 0.2mg  Do you have any questions related to your medications?:  No  Do you currently have any active services?:  Yes  Are you currently active with any services?:  Home Health  Do you have any needs or concerns that I can assist you with?:  No  Identified Barriers:  Lack of Education  Care Transitions Interventions  No Identified Needs  Other Interventions:          Reports she is doing well, [place] for COPD and CHF. Taking meds as prescribed,   No concerns today. BS 89 this am. Did not give weight. CTC continue to monitor.    Follow Up  Future Appointments  Date Time Provider Aziza Barraza   2017 2:00 PM ALONDRA August RN

## 2017-11-10 ENCOUNTER — TELEPHONE (OUTPATIENT)
Dept: CARDIOLOGY CLINIC | Age: 62
End: 2017-11-10

## 2017-11-10 ENCOUNTER — CARE COORDINATION (OUTPATIENT)
Dept: CARE COORDINATION | Age: 62
End: 2017-11-10

## 2017-11-10 NOTE — TELEPHONE ENCOUNTER
Patient called stating that she has been fatigued, sob and nausea. She has some antinausea medications at home and took one this morning.   I offered her an appt for today at 1030 am.

## 2017-11-10 NOTE — TELEPHONE ENCOUNTER
Calling to see if NPRG can still see pt today. She adv that pt  can bring her to appt.  Please call pt to adv thank you

## 2017-11-11 LAB — VITAMIN D 1,25-DIHYDROXY: 11.5 PG/ML (ref 19.9–79.3)

## 2017-11-13 ENCOUNTER — CARE COORDINATION (OUTPATIENT)
Dept: CASE MANAGEMENT | Age: 62
End: 2017-11-13

## 2017-11-13 NOTE — CARE COORDINATION
Raj 45 Transitions Follow Up Call    2017    Patient: Blessing Cunningham  Patient : 1955   MRN: [id number]  Reason for Admission: CHF  Discharge Date: 17 RARS: Risk Score: 28     Non-face-to-face services provided:  Obtained and reviewed discharge summary and/or continuity of care documents    CTC placed follow up transition call to patient, unable to reach, left message on voicemail, with reason for call and contact information. Care Transitions Subsequent and Final Call    Subsequent and Final Calls  Are you currently active with any services?:  Home Health  Care Transitions Interventions  Other Interventions:             Follow Up  Future Appointments  Date Time Provider Aziza Barraza   2017 2:00 PM Lacy Cottrell CNP [facility] FP MMA   2017 2:30 PM [facility] [address], [address], NP FF Cardio [facility]       Keyonna Chavez Se, RN

## 2017-11-13 NOTE — TELEPHONE ENCOUNTER
Let pt know we will clarify and refill med when I see her tomorrow for hosp follow up  Please ask her to bring all active medication bottles with her

## 2017-11-14 ENCOUNTER — OFFICE VISIT (OUTPATIENT)
Dept: FAMILY MEDICINE CLINIC | Age: 62
End: 2017-11-14

## 2017-11-14 ENCOUNTER — TELEPHONE (OUTPATIENT)
Dept: OTHER | Age: 62
End: 2017-11-14

## 2017-11-14 VITALS
RESPIRATION RATE: 16 BRPM | SYSTOLIC BLOOD PRESSURE: 124 MMHG | HEIGHT: 63 IN | HEART RATE: 83 BPM | WEIGHT: 293 LBS | OXYGEN SATURATION: 92 % | DIASTOLIC BLOOD PRESSURE: 66 MMHG | BODY MASS INDEX: 51.91 KG/M2

## 2017-11-14 DIAGNOSIS — N18.4 CKD (CHRONIC KIDNEY DISEASE) STAGE 4, GFR 15-29 ML/MIN (HCC): ICD-10-CM

## 2017-11-14 DIAGNOSIS — I50.33 ACUTE ON CHRONIC DIASTOLIC CONGESTIVE HEART FAILURE (HCC): Primary | ICD-10-CM

## 2017-11-14 DIAGNOSIS — M25.552 LEFT HIP PAIN: ICD-10-CM

## 2017-11-14 DIAGNOSIS — Z86.711 HISTORY OF PULMONARY EMBOLISM: ICD-10-CM

## 2017-11-14 DIAGNOSIS — Z12.39 SCREENING FOR BREAST CANCER: ICD-10-CM

## 2017-11-14 DIAGNOSIS — E66.01 MORBID OBESITY WITH BMI OF 60.0-69.9, ADULT (HCC): ICD-10-CM

## 2017-11-14 DIAGNOSIS — Z09 HOSPITAL DISCHARGE FOLLOW-UP: ICD-10-CM

## 2017-11-14 DIAGNOSIS — J44.9 COPD, MODERATE (HCC): ICD-10-CM

## 2017-11-14 DIAGNOSIS — E11.29 UNCONTROLLED TYPE 2 DIABETES MELLITUS WITH OTHER DIABETIC KIDNEY COMPLICATION, UNSPECIFIED LONG TERM INSULIN USE STATUS: ICD-10-CM

## 2017-11-14 DIAGNOSIS — E11.65 UNCONTROLLED TYPE 2 DIABETES MELLITUS WITH OTHER DIABETIC KIDNEY COMPLICATION, UNSPECIFIED LONG TERM INSULIN USE STATUS: ICD-10-CM

## 2017-11-14 LAB
HBA1C MFR BLD: 7.2 %
INR BLD: 3.1

## 2017-11-14 PROCEDURE — 83036 HEMOGLOBIN GLYCOSYLATED A1C: CPT | Performed by: NURSE PRACTITIONER

## 2017-11-14 PROCEDURE — 99214 OFFICE O/P EST MOD 30 MIN: CPT | Performed by: NURSE PRACTITIONER

## 2017-11-14 PROCEDURE — 3045F PR MOST RECENT HEMOGLOBIN A1C LEVEL 7.0-9.0%: CPT | Performed by: NURSE PRACTITIONER

## 2017-11-14 RX ORDER — HYDROMORPHONE HYDROCHLORIDE 2 MG/1
2 TABLET ORAL 3 TIMES DAILY PRN
Qty: 20 TABLET | Refills: 0 | Status: ON HOLD | OUTPATIENT
Start: 2017-11-14 | End: 2018-01-22 | Stop reason: HOSPADM

## 2017-11-14 RX ORDER — FLUTICASONE PROPIONATE 50 MCG
2 SPRAY, SUSPENSION (ML) NASAL DAILY PRN
Qty: 1 BOTTLE | Refills: 11 | Status: SHIPPED | OUTPATIENT
Start: 2017-11-14 | End: 2018-09-14 | Stop reason: ALTCHOICE

## 2017-11-14 RX ORDER — PREDNISONE 10 MG/1
TABLET ORAL
Qty: 20 TABLET | Refills: 0 | Status: ON HOLD | OUTPATIENT
Start: 2017-11-14 | End: 2018-01-22 | Stop reason: HOSPADM

## 2017-11-14 NOTE — PROGRESS NOTES
Post-Discharge Transitional Care Management Services      [facility]   YOB: 1955    Date of Visit:  11/14/2017  30 Day Post-Discharge Date: 12/5/17    Allergies   Allergen Reactions    Codeine Nausea Only    Fentanyl Itching    Morphine      CHEST PAIN    Nsaids Other (See Comments)     Due to CRF    Hydrocodone-Acetaminophen Itching and Rash    Percocet [Oxycodone-Acetaminophen] Itching    Procardia [Nifedipine] Nausea And Vomiting     Headache  Takes norvasc at home 12/22/15    Vicodin [Hydrocodone-Acetaminophen] Rash     Outpatient Prescriptions Marked as Taking for the 11/14/17 encounter (Office Visit) with Zak Blue CNP   Medication Sig Dispense Refill    ONE TOUCH ULTRA TEST strip USE TO TEST 3-4 TIMES DAILY DUE TO FLUCTUATING SUGAR 100 strip 5    fluticasone-salmeterol (ADVAIR HFA) 230-21 MCG/ACT inhaler Inhale 1 puff into the lungs 2 times daily 1 Inhaler 11    fluticasone (FLONASE) 50 MCG/ACT nasal spray 2 sprays by Nasal route daily as needed for Allergies 1 Bottle 11    TRULICITY 1.69 WG/2.3IJ SOPN INJECT ONE SYRINGE SUBCUTANEOUSLY ONCE A WEEK 4 Pen 5    traMADol (ULTRAM) 50 MG tablet Take 1 tablet by mouth every 6 hours as needed for Pain . 8 tablet 0    cloNIDine (CATAPRES) 0.2 MG tablet TAKE 1 TABLET BY MOUTH TWO  TIMES DAILY 180 tablet 3    isosorbide mononitrate (IMDUR) 60 MG extended release tablet Take 1 tablet by mouth once a day 90 tablet 3    cetirizine (ZYRTEC) 10 MG tablet Take 0.5 tablets by mouth daily 30 tablet 1    hydrALAZINE (APRESOLINE) 25 MG tablet Take 1 tablet by mouth every 8 hours 90 tablet 0    nebivolol (BYSTOLIC) 20 MG TABS tablet Take 1 tablet by mouth 2 times daily 60 tablet 0    HYDROmorphone (DILAUDID) 2 MG tablet Take 2 mg by mouth every 6 hours as needed for Pain .       PROAIR  (90 Base) MCG/ACT inhaler INHALE TWO PUFFS BY MOUTH INTO THE LUNGS EVERY 6 HOURS AS NEEDED FOR WHEEZING 9 Inhaler 2    gabapentin (NEURONTIN) allopurinol (ZYLOPRIM) 100 MG tablet Take 100 mg by mouth daily      aspirin 81 MG chewable tablet Take 1 tablet by mouth daily 30 tablet 0    BiPAP Machine MISC by Does not apply route nightly      ipratropium-albuterol (DUONEB) 0.5-2.5 (3) MG/3ML SOLN nebulizer solution INHALE 3MLS INTO THE LUNGS EVERY 4 HOURS AS NEEDED 360 vial 3    Spacer/Aero-Holding Chambers VENANCIO 1 Device by Does not apply route daily as needed 1 Device 0    OXYGEN Inhale 2 L into the lungs daily as needed           Vitals:    11/14/17 1419   BP: 124/66   Site: Right Arm   Position: Sitting   Cuff Size: Large Adult   Pulse: 83   Resp: 16   SpO2: 92%   Weight: (!) 360 lb 12.8 oz (163.7 kg)   Height: 5' 3\" (1.6 m)     Body mass index is 63.91 kg/m². Wt Readings from Last 3 Encounters:   11/14/17 (!) 360 lb 12.8 oz (163.7 kg)   11/11/17 (!) 375 lb (170.1 kg)   11/05/17 (!) 383 lb (173.7 kg)     BP Readings from Last 3 Encounters:   11/14/17 124/66   11/11/17 (!) 165/103   11/05/17 129/85        Patient was admitted to [facility] from 11/1/17 to 11/5/17 for Acute on Chronic dCHF; SANGEETHA on CKD; IDDM, HTN. Inpatient course: Discharge summary reviewed- see chart. Current status: states breathing, chest pain and swelling improving. C/o severe left hip pain. Started after getting d/c'd home. Back to [facility] ED 11/11/17 d/t severe pain. Xray completed and showed arthritis. No improvement with Tramadol. Taking occasional dilaudid which lessens pain, however never gets her pain free. States pain so severe causing nausea, decreased appetite and low back pain. Pain from hip radiates to groin. C/o mild SOB today. Only using O2 at night. States making healthier diet options. Compliant with Trulicity and levemir, requesting samples of Trulicity. Review of Systems:  A comprehensive review of systems was negative except for what was noted in the HPI.     Physical Exam:  General Appearance: alert and oriented to person, place and time, well developed and well- nourished, in no acute distress  Skin: warm and dry, no rash or erythema  Head: normocephalic and atraumatic  Eyes: pupils equal, round, and reactive to light, extraocular eye movements intact, conjunctivae normal  Neck: supple and non-tender without mass, no thyromegaly or thyroid nodules, no cervical lymphadenopathy  Pulmonary/Chest: clear to auscultation bilaterally- no wheezes, rales or rhonchi, normal air movement, no respiratory distress. Decreased air mov't bases, bilateral  Cardiovascular: normal rate, regular rhythm, normal S1 and S2, no murmurs, rubs, clicks, or gallops, distal pulses intact, no carotid bruits  Abdomen: obese abdomen; soft, non-tender, non-distended, normal bowel sounds, no masses or organomegaly  Extremities: no cyanosis, clubbing or edema  Musculoskeletal: normal range of motion, no joint swelling, deformity or tenderness  Neurologic: gait, coordination and speech normal    Initial post-discharge communication occurred between nurse care coordinator and patient on 11/6/17- see documentation in chart: telephone encounter. Assessment/Plan: Antony Rizzo was seen today for follow-up from hospital and leg pain. Diagnoses and all orders for this visit:    Acute on chronic diastolic congestive heart failure [facility])  Improving  Continue med changes from hospital  Keep upcoming appt with Cardiology in December  Palliative discussed today- pt apprehensive to start.  Plans to discuss with specialists    CKD (chronic kidney disease) stage 4, GFR 15-29 ml/min (HCC)  Improving  Lasix currently on hold  Keep upcoming appt with Dr Iris Araiza    Uncontrolled type 2 diabetes mellitus with other diabetic kidney complication, unspecified long term insulin use status (HCC)  Stable, improving, at goal  -     POCT glycosylated hemoglobin (Hb A1C): 7.2  Continue current medications  Samples of trulicity today  Tighter control of diet discussed    COPD, moderate (HCC)  Stable  advair

## 2017-11-14 NOTE — TELEPHONE ENCOUNTER
Dhruv Alexandra from [facility] 1965 WOULD LIKE A CLARIFICATION ON medication for her Dulaglutide (TRULICITY) 0.74 PA/6.5IL SOPN  Would  Like to know if it should be 0.75 which one entire syringe or 75mf which is 100 times more?  Please advise

## 2017-11-14 NOTE — PROGRESS NOTES
I have discussed the importance of the Hep C and HIV screening with the patient. She is aware that it is recommended by the CDC, is a one time test, and completed in a single blood draw. The patient is aware that she is due for a Mammogram and eye exam. She understands the significance of completing these tests.

## 2017-11-15 ENCOUNTER — ANTI-COAG VISIT (OUTPATIENT)
Dept: CARDIOLOGY CLINIC | Age: 62
End: 2017-11-15

## 2017-11-17 DIAGNOSIS — R10.32 GROIN PAIN, LEFT: Primary | ICD-10-CM

## 2017-11-20 ENCOUNTER — CARE COORDINATION (OUTPATIENT)
Dept: CARE COORDINATION | Age: 62
End: 2017-11-20

## 2017-11-20 ENCOUNTER — TELEPHONE (OUTPATIENT)
Dept: CARDIOLOGY CLINIC | Age: 62
End: 2017-11-20

## 2017-11-20 ENCOUNTER — TELEPHONE (OUTPATIENT)
Dept: FAMILY MEDICINE CLINIC | Age: 62
End: 2017-11-20

## 2017-11-20 ENCOUNTER — ANTI-COAG VISIT (OUTPATIENT)
Dept: CARDIOLOGY CLINIC | Age: 62
End: 2017-11-20

## 2017-11-20 DIAGNOSIS — M25.552 PAIN OF LEFT HIP JOINT: Primary | ICD-10-CM

## 2017-11-20 LAB — INR BLD: 3.1

## 2017-11-20 NOTE — TELEPHONE ENCOUNTER
NICHO giron from [facility] [phone] . Patient seen Clara Diamond in the office on 11/14/2017 informed her that Clara Diamond recommends she she a ortho doctor and that a referral would be placed in the system. The name of the physician was provided.

## 2017-11-20 NOTE — CARE COORDINATION
DAILY DUE TO FLUCTUATING SUGAR 11/14/17  Yes Steven Salamanca CNP   fluticasone-salmeterol (ADVAIR HFA) 230-21 MCG/ACT inhaler Inhale 1 puff into the lungs 2 times daily 11/14/17  Yes Steven Salamanca CNP   fluticasone (FLONASE) 50 MCG/ACT nasal spray 2 sprays by Nasal route daily as needed for Allergies 11/14/17  Yes Steven Salamanca CNP   Dulaglutide (TRULICITY) 7.01 SY/9.7TS SOPN Inject 75 mg into the skin once a week 11/14/17  Yes Steven Salamanca CNP   predniSONE (DELTASONE) 10 MG tablet 4 po daily for 2 days, 3 for 2 days, 2 for 2 days, 1 for 2 days, then stop 11/14/17  Yes Steven Salamanca CNP   HYDROmorphone (DILAUDID) 2 MG tablet Take 1 tablet by mouth 3 times daily as needed for Pain . 11/14/17  Yes Steven Salamanca CNP   traMADol (ULTRAM) 50 MG tablet Take 1 tablet by mouth every 6 hours as needed for Pain . 11/11/17 11/21/17 Yes Talia Soto DO   cloNIDine (CATAPRES) 0.2 MG tablet TAKE 1 TABLET BY MOUTH TWO  TIMES DAILY 11/8/17  Yes Steven Salamanca CNP   isosorbide mononitrate (IMDUR) 60 MG extended release tablet Take 1 tablet by mouth once a day 11/8/17  Yes Steven Salamanca CNP   cetirizine (ZYRTEC) 10 MG tablet Take 0.5 tablets by mouth daily 11/5/17  Yes Octavio Sage MD   hydrALAZINE (APRESOLINE) 25 MG tablet Take 1 tablet by mouth every 8 hours 11/5/17  Yes Octavio Sage MD   nebivolol (BYSTOLIC) 20 MG TABS tablet Take 1 tablet by mouth 2 times daily 11/5/17  Yes Octavio Sage MD   gabapentin (NEURONTIN) 100 MG capsule Take 1 capsule by mouth 3 times daily 9/12/17 3/11/18 Yes Steven Salamanca CNP   promethazine (PHENERGAN) 25 MG tablet Take 1 tablet by mouth every 8 hours as needed for Nausea 9/12/17  Yes Steven Salamanca CNP   pantoprazole (PROTONIX) 40 MG tablet Take 1 tablet by mouth 2 times daily 9/9/17  Yes Rod Chamorro MD   colchicine (COLCRYS) 0.6 MG tablet TAKE 2 TABLETS BY MOUTH AT THE ONSET OF FLARE, THEN TAKE ONE TABLET ONE HOUR LATER.  DO NOT EXCEED TREATMENT MORE THAN ONCE EVERY 2 WEEKS 8/28/17  Yes Benita Dos Santos MD   nitroGLYCERIN (NITROSTAT) 0.4 MG SL tablet Dissolve 1 tablet under  tongue every 5 minutes as  needed.  Maximum of 3  tablets in 15 minutes 8/17/17  Yes Sorin Bundy CNP   ASSURE COMFORT LANCETS 30G MISC USE TO TEST BLOOD GLUCOSE THREE TIMES DAILY 8/15/17  Yes Sorin Bundy CNP   Alcohol Swabs (ALCOHOL PREP) 70 % PADS USE TO TEST BLOOD GLUCOSE THREE TIMES DAILY 8/15/17  Yes Sorin Bundy CNP   Blood Glucose Monitoring Suppl (ACCU-CHEK KENNETH SMARTVIEW) w/Device KIT USE TO TEST BLOOD GLUCOSE 8/15/17  Yes Sorin Bundy CNP   traZODone (DESYREL) 50 MG tablet Take 1 tablet by mouth nightly 8/9/17  Yes Sorin Bundy CNP   amLODIPine (NORVASC) 10 MG tablet Take 1 tablet by mouth  daily 6/30/17  Yes Benita Dos Santos MD   rosuvastatin (CRESTOR) 10 MG tablet Take 1 tablet by mouth  daily  Patient taking differently: Take 1 tablet by mouth HS 6/30/17  Yes Benita Dos Santos MD   doxazosin (CARDURA) 4 MG tablet Take 4 mg by mouth 2 times daily   Yes Historical Provider, MD   insulin detemir (LEVEMIR FLEXTOUCH) 100 UNIT/ML injection pen Inject 30 Units into the skin 2 times daily 6/27/17  Yes Bernie Wilde MD   insulin lispro (HUMALOG KWIKPEN) 100 UNIT/ML pen Inject 12 Units into the skin 3 times daily (before meals) 6/27/17  Yes Bernie Wilde MD   ferrous sulfate 325 (65 FE) MG tablet Take 1 tablet by mouth 2 times daily (with meals) 4/24/17  Yes Brittany Dennis MD   calcitRIOL (ROCALTROL) 0.25 MCG capsule Take 1 capsule by mouth every other day 4/24/17  Yes Brittany Dennis MD   RELION SHORT PEN NEEDLES 31G X 8 MM MISC USE THREE TIMES DAILY AS DIRECTED 3/29/17  Yes Benita Dos Santos MD   warfarin (COUMADIN) 7.5 MG tablet Continue f/up with coumadin clinic  Patient taking differently: No sig reported 3/23/17  Yes Benita Dos Santos MD   allopurinol (ZYLOPRIM) 100 MG tablet Take 100 mg by mouth daily   Yes Historical Provider, MD   aspirin 81 MG chewable

## 2017-11-20 NOTE — TELEPHONE ENCOUNTER
Left VM for Marcella Mondragon letting her know referral for pt has been placed if any questions give us a call back.

## 2017-11-20 NOTE — TELEPHONE ENCOUNTER
Coumadin/PT/INR    PT: 37.7    INR:3.1    Where do you have your blood drawn?home  (lab/HHRN/Home Monitor)    When was it drawn?today    Why do you take Coumadin/Warfarin? Current medication dosage and instructions? 7.5 mg Wed Fri and 3.75 mg all other days    Have you missed any doses? No     Any change in your other medications? Prescribed Prednisone last week and is now done with it    Are you taking an antibiotic? no    Any dietary changes?  no

## 2017-11-20 NOTE — TELEPHONE ENCOUNTER
I spoke with Claudia Lindsay from [address] at: [phone]. She states that the patient already has an ortho and can go to see him, but they are wondering if a doppler should also be done? The patient has a history of blood clots and the pain is shooting up and down her leg. They are requesting an order if [facility] agrees. Please advise.

## 2017-11-21 ENCOUNTER — OFFICE VISIT (OUTPATIENT)
Dept: ORTHOPEDIC SURGERY | Age: 62
End: 2017-11-21

## 2017-11-21 VITALS
WEIGHT: 293 LBS | SYSTOLIC BLOOD PRESSURE: 167 MMHG | HEART RATE: 96 BPM | DIASTOLIC BLOOD PRESSURE: 84 MMHG | BODY MASS INDEX: 51.91 KG/M2 | HEIGHT: 63 IN

## 2017-11-21 DIAGNOSIS — E66.01 MORBID OBESITY WITH BMI OF 60.0-69.9, ADULT (HCC): ICD-10-CM

## 2017-11-21 DIAGNOSIS — M16.12 PRIMARY OSTEOARTHRITIS OF LEFT HIP: Primary | ICD-10-CM

## 2017-11-21 DIAGNOSIS — S76.212A STRAIN OF ADDUCTOR MAGNUS MUSCLE OF LEFT LOWER EXTREMITY, INITIAL ENCOUNTER: ICD-10-CM

## 2017-11-21 PROCEDURE — 3014F SCREEN MAMMO DOC REV: CPT | Performed by: ORTHOPAEDIC SURGERY

## 2017-11-21 PROCEDURE — 99214 OFFICE O/P EST MOD 30 MIN: CPT | Performed by: ORTHOPAEDIC SURGERY

## 2017-11-21 PROCEDURE — G8417 CALC BMI ABV UP PARAM F/U: HCPCS | Performed by: ORTHOPAEDIC SURGERY

## 2017-11-21 PROCEDURE — 1036F TOBACCO NON-USER: CPT | Performed by: ORTHOPAEDIC SURGERY

## 2017-11-21 PROCEDURE — 1111F DSCHRG MED/CURRENT MED MERGE: CPT | Performed by: ORTHOPAEDIC SURGERY

## 2017-11-21 PROCEDURE — 3017F COLORECTAL CA SCREEN DOC REV: CPT | Performed by: ORTHOPAEDIC SURGERY

## 2017-11-21 PROCEDURE — G8484 FLU IMMUNIZE NO ADMIN: HCPCS | Performed by: ORTHOPAEDIC SURGERY

## 2017-11-21 PROCEDURE — G8427 DOCREV CUR MEDS BY ELIG CLIN: HCPCS | Performed by: ORTHOPAEDIC SURGERY

## 2017-11-21 NOTE — LETTER
Radiology Procedure Request Form:    Patient Name:  Carrie Dooley  Patient :  1955     Patient MR#:  [id number]    Patient Phone:  [phone] (home) [phone] (work)     Patient Address:  [address] [place] [address]    Location:  Patient's choice  Surgeon:  Mary Maravilla. Vinnie Glaser M.D.    PCP:  Yolanda Taylor CNP  Insurance:  Payor/Plan Subscr  Sex Relation Sub. Ins. ID Effective Group Num   1. [address] 1955 Female  [id number] 10/1/17 [id number]                                   [address] [address]   2. Carissa Pineda 1955 Female Self [id number] 11/1/15                                    [address] [address]     Diagnosis:  Left hip osteoarthritis, morbid obesity    M16.12    Procedure:  Fluorscopically guided Left hip injection performed by radiology        Scheduling Instruction:  elective  Requested Date:       Patient Arrival Time:    Comments:  Please inject 4cc 1% lidocaine, 4cc 0.5% marcaine, and 40mg Kenalog              Nilo Glaser M.D.        Pre-Certification: NPR

## 2017-11-21 NOTE — PROGRESS NOTES
CHIEF COMPLAINT: Left hip pain. History: Lorie Lynch is a 58 y.o. morbidly obese Black female who presents today complaining of left hip pain. Patient was referred by [facility]. She states that the pain began 2-3 weeks ago. Patient did not have a history of injury. Patient rates the pain as a 5/10. She states pain is located in her groin and buttock. Hip pain is described as aching. Pain is aggravated by standing, walking, laying flat. Patient states hip pain is relieved by heat. Patient has not had PT. The patient has not taken NSAIDs because of renal insufficiency. The patient has not had an intra-articular hip injection. Outside reports reviewed: ER records.     Past Medical History:   Diagnosis Date    Asthma     CAD (coronary artery disease)     Cervical cancer ([facility])     CHF (congestive heart failure) ([facility])     Chronic kidney disease, stage IV (severe) ([facility])     Dr Lenin Gutierrez CKD (chronic kidney disease) stage 4, GFR 15-29 ml/min ([facility]) 6/30/2017    Colon polyps     COPD (chronic obstructive pulmonary disease) ([facility])     Degenerative disc disease at L5-S1 level 6/18/2013     CT    Diabetes mellitus, insulin dependent (IDDM), uncontrolled ([facility])     GERD (gastroesophageal reflux disease)     Gout     History of blood transfusion     Hyperlipidemia     Hypertension     LVH (left ventricular hypertrophy)     Morbid obesity ([facility])     Obstructive sleep apnea on CPAP     wears 2L O2 and CPAP at night    Pneumonia     Psychiatric problem     breakdown long time ago but not current    Pulmonary embolism ([address] 2/6/13    Type 2 diabetes mellitus with diabetic neuropathy, with long-term current use of insulin ([address] 9/12/2017    Urinary incontinence in female     Venous stasis of lower extremity        Past Surgical History:   Procedure Laterality Date    CARDIAC CATHETERIZATION  1/14    Grace; clean cors    COLONOSCOPY  2010    polyp removed; Caroline Lyons; repeat 5 years    mouth HS) 90 tablet 2    doxazosin (CARDURA) 4 MG tablet Take 4 mg by mouth 2 times daily      insulin detemir (LEVEMIR FLEXTOUCH) 100 UNIT/ML injection pen Inject 30 Units into the skin 2 times daily 75 mL 5    insulin lispro (HUMALOG KWIKPEN) 100 UNIT/ML pen Inject 12 Units into the skin 3 times daily (before meals) 5 Pen 5    ferrous sulfate 325 (65 FE) MG tablet Take 1 tablet by mouth 2 times daily (with meals) 60 tablet 3    calcitRIOL (ROCALTROL) 0.25 MCG capsule Take 1 capsule by mouth every other day 30 capsule 3    RELION SHORT PEN NEEDLES 31G X 8 MM MISC USE THREE TIMES DAILY AS DIRECTED 100 each 5    warfarin (COUMADIN) 7.5 MG tablet Continue f/up with coumadin clinic (Patient taking differently: No sig reported) 30 tablet 3    allopurinol (ZYLOPRIM) 100 MG tablet Take 100 mg by mouth daily      aspirin 81 MG chewable tablet Take 1 tablet by mouth daily 30 tablet 0    BiPAP Machine MISC by Does not apply route nightly      ipratropium-albuterol (DUONEB) 0.5-2.5 (3) MG/3ML SOLN nebulizer solution INHALE 3MLS INTO THE LUNGS EVERY 4 HOURS AS NEEDED 360 vial 3    Spacer/Aero-Holding Chambers VENANCIO 1 Device by Does not apply route daily as needed 1 Device 0    OXYGEN Inhale 2 L into the lungs daily as needed       No current facility-administered medications for this visit. Allergies   Allergen Reactions    Codeine Nausea Only    Fentanyl Itching    Morphine      CHEST PAIN    Nsaids Other (See Comments)     Due to CRF    Hydrocodone-Acetaminophen Itching and Rash    Percocet [Oxycodone-Acetaminophen] Itching    Procardia [Nifedipine] Nausea And Vomiting     Headache  Takes norvasc at home 12/22/15    Vicodin [Hydrocodone-Acetaminophen] Rash        Review of Systems:  Pertinent items are noted in HPI. Review of systems from Patient History Form dated 11/21/17 and available in the patient's chart under the Media tab.       Physical Examination:     Vital signs:   BP (!) 167/84   Pulse

## 2017-11-22 DIAGNOSIS — M16.12 PRIMARY OSTEOARTHRITIS OF LEFT HIP: Primary | ICD-10-CM

## 2017-11-27 ENCOUNTER — ANTI-COAG VISIT (OUTPATIENT)
Dept: CARDIOLOGY CLINIC | Age: 62
End: 2017-11-27

## 2017-11-27 ENCOUNTER — TELEPHONE (OUTPATIENT)
Dept: CARDIOLOGY CLINIC | Age: 62
End: 2017-11-27

## 2017-11-27 LAB — INR BLD: 2

## 2017-11-27 NOTE — TELEPHONE ENCOUNTER
Coumadin/PT/INR    PT: 24.3    INR: 2.0    Where do you have your blood drawn? Home   (lab/HHRN/Home Monitor)    When was it drawn? 11/27/17    Why do you take Coumadin/Warfarin? Current medication dosage and instructions? 7.5 mg Wednesday and Friday and 3.75 all other days     Have you missed any doses? no    Any change in your other medications? no    Are you taking an antibiotic? no    Any dietary changes?  Pt had some greens over the weekend

## 2017-11-29 ENCOUNTER — HOSPITAL ENCOUNTER (OUTPATIENT)
Dept: GENERAL RADIOLOGY | Age: 62
Discharge: HOME | End: 2017-11-29
Attending: ORTHOPAEDIC SURGERY | Admitting: ORTHOPAEDIC SURGERY

## 2017-11-29 DIAGNOSIS — M16.2 BILATERAL OSTEOARTHRITIS RESULTING FROM HIP DYSPLASIA: ICD-10-CM

## 2017-11-29 DIAGNOSIS — M16.12 PRIMARY OSTEOARTHRITIS OF LEFT HIP: ICD-10-CM

## 2017-11-29 RX ORDER — TRIAMCINOLONE ACETONIDE 40 MG/ML
INJECTION, SUSPENSION INTRA-ARTICULAR; INTRAMUSCULAR
Status: COMPLETED
Start: 2017-11-29 | End: 2017-11-29

## 2017-11-29 RX ORDER — LIDOCAINE HYDROCHLORIDE 10 MG/ML
INJECTION, SOLUTION EPIDURAL; INFILTRATION; INTRACAUDAL; PERINEURAL
Status: COMPLETED
Start: 2017-11-29 | End: 2017-11-29

## 2017-11-29 RX ORDER — LIDOCAINE HYDROCHLORIDE 10 MG/ML
30 INJECTION, SOLUTION EPIDURAL; INFILTRATION; INTRACAUDAL; PERINEURAL ONCE
Status: DISCONTINUED | OUTPATIENT
Start: 2017-11-29 | End: 2017-11-30 | Stop reason: HOSPADM

## 2017-11-29 RX ORDER — BUPIVACAINE HYDROCHLORIDE 5 MG/ML
INJECTION, SOLUTION EPIDURAL; INTRACAUDAL
Status: COMPLETED
Start: 2017-11-29 | End: 2017-11-29

## 2017-11-29 RX ADMIN — TRIAMCINOLONE ACETONIDE 40 MG: 40 INJECTION, SUSPENSION INTRA-ARTICULAR; INTRAMUSCULAR at 15:33

## 2017-11-29 RX ADMIN — LIDOCAINE HYDROCHLORIDE 6 ML: 10 INJECTION, SOLUTION EPIDURAL; INFILTRATION; INTRACAUDAL; PERINEURAL at 15:32

## 2017-11-29 RX ADMIN — BUPIVACAINE HYDROCHLORIDE 4 ML: 5 INJECTION, SOLUTION EPIDURAL; INTRACAUDAL at 15:31

## 2017-12-01 ENCOUNTER — HOSPITAL ENCOUNTER (OUTPATIENT)
Dept: OTHER | Age: 62
Discharge: HOME | End: 2017-12-31
Attending: INTERNAL MEDICINE | Admitting: INTERNAL MEDICINE

## 2017-12-04 ENCOUNTER — CARE COORDINATION (OUTPATIENT)
Dept: CARE COORDINATION | Age: 62
End: 2017-12-04

## 2017-12-04 ENCOUNTER — TELEPHONE (OUTPATIENT)
Dept: CARDIOLOGY CLINIC | Age: 62
End: 2017-12-04

## 2017-12-04 ENCOUNTER — ANTI-COAG VISIT (OUTPATIENT)
Dept: CARDIOLOGY CLINIC | Age: 62
End: 2017-12-04

## 2017-12-04 LAB — INR BLD: 1.6

## 2017-12-04 NOTE — TELEPHONE ENCOUNTER
Coumadin/PT/INR    PT: 19.4    INR: 1.6    Where do you have your blood drawn? Home  (lab/HHRN/Home Monitor)    When was it drawn? 12/4/17    Why do you take Coumadin/Warfarin? Current medication dosage and instructions? 7.5 mg Wednesday and Friday and 3.75 all other days     Have you missed any doses? no    Any change in your other medications? Pt had a Cortizone shot on Wednesday in her hip     Are you taking an antibiotic? No    Any dietary changes?  No

## 2017-12-04 NOTE — CARE COORDINATION
Confidence: 7/10  Date goal set: 10/10/2017  Date goal attained: Not as of 8/14/2017- A1C:  Results for Cecilia Irizarry (MRN [id number]) as of 8/14/2017 12:48   Ref. Range 8/9/2017 14:58   Hemoglobin A1C Latest Units: % 8.1            walk for 5 min twice a week   Not on track (12/4/2017)          Prior to Admission medications    Medication Sig Start Date End Date Taking? Authorizing Provider   ONE TOUCH ULTRA TEST strip USE TO TEST 3-4 TIMES DAILY DUE TO FLUCTUATING SUGAR 11/14/17  Yes Lacy Cottrell CNP   fluticasone-salmeterol (ADVAIR HFA) 230-21 MCG/ACT inhaler Inhale 1 puff into the lungs 2 times daily 11/14/17  Yes Lacy Cottrell CNP   fluticasone (FLONASE) 50 MCG/ACT nasal spray 2 sprays by Nasal route daily as needed for Allergies 11/14/17  Yes Lacy Cottrell CNP   Dulaglutide (TRULICITY) 9.92 DI/0.1OA SOPN Inject 75 mg into the skin once a week 11/14/17  Yes Lacy Cottrell CNP   predniSONE (DELTASONE) 10 MG tablet 4 po daily for 2 days, 3 for 2 days, 2 for 2 days, 1 for 2 days, then stop 11/14/17  Yes Lacy Cottrell CNP   HYDROmorphone (DILAUDID) 2 MG tablet Take 1 tablet by mouth 3 times daily as needed for Pain .  11/14/17  Yes Lacy Cottrell CNP   cloNIDine (CATAPRES) 0.2 MG tablet TAKE 1 TABLET BY MOUTH TWO  TIMES DAILY 11/8/17  Yes Lacy Cottrell CNP   isosorbide mononitrate (IMDUR) 60 MG extended release tablet Take 1 tablet by mouth once a day 11/8/17  Yes Lacy Cottrell CNP   cetirizine (ZYRTEC) 10 MG tablet Take 0.5 tablets by mouth daily 11/5/17  Yes Morris Moreno MD   hydrALAZINE (APRESOLINE) 25 MG tablet Take 1 tablet by mouth every 8 hours 11/5/17  Yes Morris Moreno MD   nebivolol (BYSTOLIC) 20 MG TABS tablet Take 1 tablet by mouth 2 times daily 11/5/17  Yes Morris Moreno MD   gabapentin (NEURONTIN) 100 MG capsule Take 1 capsule by mouth 3 times daily 9/12/17 3/11/18 Yes Lacy Cottrell CNP   promethazine (PHENERGAN) 25 MG tablet Take 1 tablet by mouth every 8 hours as DIRECTED 3/29/17  Yes Leana Oates MD   warfarin (COUMADIN) 7.5 MG tablet Continue f/up with coumadin clinic  Patient taking differently: No sig reported 3/23/17  Yes Leana Oates MD   allopurinol (ZYLOPRIM) 100 MG tablet Take 100 mg by mouth daily   Yes Historical Provider, MD   aspirin 81 MG chewable tablet Take 1 tablet by mouth daily 2/24/17  Yes Ninfa Chappell MD   BiPAP Machine MISC by Does not apply route nightly   Yes Historical Provider, MD   ipratropium-albuterol (DUONEB) 0.5-2.5 (3) MG/3ML SOLN nebulizer solution INHALE 3MLS INTO THE LUNGS EVERY 4 HOURS AS NEEDED 8/17/16  Yes Leana Oates MD   Spacer/Aero-Holding [facility] 1 Device by Does not apply route daily as needed 3/27/16  Yes Wilda Hernandez CNP   OXYGEN Inhale 2 L into the lungs daily as needed   Yes Historical Provider, MD   PROAIR  (90 Base) MCG/ACT inhaler INHALE TWO PUFFS BY MOUTH INTO THE LUNGS EVERY 6 HOURS AS NEEDED FOR WHEEZING 10/27/17   Jovanna Berman CNP       Future Appointments  Date Time Provider Aziza Barraza   12/21/2017 2:30 PM Marina Jose, NP FF Cardio MMA           Florian Anders BSN, RN Care Coordinator  [address],  [address] [facility]  [phone]

## 2017-12-04 NOTE — CARE COORDINATION
Writer spoke with PCP: Kathy Ulrich CNP- Recommendation is go to ED for further evaluation, possible admission. Per Brigid Masters she spoke with pt about Palliative Care and pt was not receptive to idea at time of conversation (at last OV). Spoke with pt and encouraged her to go to ED. Pt verbalized understanding.      Dony ROONEYN, RN Care Coordinator  [address] [place] [address] Primary Care  [phone]

## 2017-12-05 LAB
ANION GAP SERPL CALCULATED.3IONS-SCNC: 13 MMOL/L (ref 3–16)
BUN BLDV-MCNC: 42 MG/DL (ref 7–20)
CALCIUM SERPL-MCNC: 8.6 MG/DL (ref 8.3–10.6)
CHLORIDE BLD-SCNC: 103 MMOL/L (ref 99–110)
CO2: 28 MMOL/L (ref 21–32)
CREAT SERPL-MCNC: 3 MG/DL (ref 0.6–1.2)
GFR AFRICAN AMERICAN: 19
GFR NON-AFRICAN AMERICAN: 16
GLUCOSE BLD-MCNC: 199 MG/DL (ref 70–99)
POTASSIUM SERPL-SCNC: 4.2 MMOL/L (ref 3.5–5.1)
SODIUM BLD-SCNC: 144 MMOL/L (ref 136–145)

## 2017-12-06 ENCOUNTER — TELEPHONE (OUTPATIENT)
Dept: FAMILY MEDICINE CLINIC | Age: 62
End: 2017-12-06

## 2017-12-06 ENCOUNTER — CARE COORDINATION (OUTPATIENT)
Dept: CARE COORDINATION | Age: 62
End: 2017-12-06

## 2017-12-07 RX ORDER — BUMETANIDE 1 MG/1
1 TABLET ORAL 3 TIMES DAILY
COMMUNITY
Start: 2017-12-06 | End: 2017-12-11

## 2017-12-07 NOTE — CARE COORDINATION
Ambulatory Care Coordination Note  12/7/2017  CM Risk Score: 17  Priti Mortality Risk Score:      ACC: Niurka Medina RN    Summary Note: Outreach call to pt to review labs per request. Pt updated writer that she will be following up with Nephrology on Monday regarding her current weight and current Bumex dose. Current weight 361lb. Will continue to follow. Results for Michell Camacho (MRN [id number]) as of 12/7/2017 17:35   Ref. Range 12/5/2017 15:46   BUN Latest Ref Range: 7 - 20 mg/dL 42 (H)   Creatinine Latest Ref Range: 0.6 - 1.2 mg/dL 3.0 (H)       Care Coordination Interventions    Program Enrollment:  Complex Care  Referral from Primary Care Provider:  No  Suggested Interventions and Community Resources  Diabetes Education:  Completed  Fall Risk Prevention:  Completed (Comment: uses walker)  Home Care Waiver:  Completed (Comment: see services/interventions)  Home Health Services:  Completed (Comment: [facility])  Registered Dietician:  Completed  Transportation Support:  Declined  Other Services or Interventions:  [facility], [employer], [employer], [facility], [employer]         Goals Addressed     None          Prior to Admission medications    Medication Sig Start Date End Date Taking?  Authorizing Provider   bumetanide (BUMEX) 1 MG tablet Take 1 mg by mouth 3 times daily Dose will be evaluated on 12/11/2017 by Nephrology 12/6/17 12/11/17 Yes Historical Provider, MD   ONE TOUCH ULTRA TEST strip USE TO TEST 3-4 TIMES DAILY DUE TO FLUCTUATING SUGAR 11/14/17   Michael Morrison CNP   fluticasone-salmeterol (ADVAIR HFA) 230-21 MCG/ACT inhaler Inhale 1 puff into the lungs 2 times daily 11/14/17   Michael Morrison CNP   fluticasone (FLONASE) 50 MCG/ACT nasal spray 2 sprays by Nasal route daily as needed for Allergies 11/14/17   Michael Morrison CNP   Dulaglutide (TRULICITY) 5.25 SE/1.4VX SOPN Inject 75 mg into the skin once a week 11/14/17   Michael Morrison CNP   predniSONE (DELTASONE) 10 MG w/Device KIT USE TO TEST BLOOD GLUCOSE 8/15/17   Lacy Plate, CNP   traZODone (DESYREL) 50 MG tablet Take 1 tablet by mouth nightly 8/9/17   Lacy Plate, CNP   amLODIPine (NORVASC) 10 MG tablet Take 1 tablet by mouth  daily 6/30/17   Roz Aj MD   rosuvastatin (CRESTOR) 10 MG tablet Take 1 tablet by mouth  daily  Patient taking differently: Take 1 tablet by mouth HS 6/30/17   Roz Aj MD   doxazosin (CARDURA) 4 MG tablet Take 4 mg by mouth 2 times daily    Historical Provider, MD   insulin detemir (LEVEMIR FLEXTOUCH) 100 UNIT/ML injection pen Inject 30 Units into the skin 2 times daily 6/27/17   Katharine Keith MD   insulin lispro (HUMALOG KWIKPEN) 100 UNIT/ML pen Inject 12 Units into the skin 3 times daily (before meals) 6/27/17   Katharine Keith MD   ferrous sulfate 325 (65 FE) MG tablet Take 1 tablet by mouth 2 times daily (with meals) 4/24/17   Ramesh Wiley MD   calcitRIOL (ROCALTROL) 0.25 MCG capsule Take 1 capsule by mouth every other day 4/24/17   Ramesh Wiley MD   RELION SHORT PEN NEEDLES 31G X 8 MM MISC USE THREE TIMES DAILY AS DIRECTED 3/29/17   Roz Aj MD   warfarin (COUMADIN) 7.5 MG tablet Continue f/up with coumadin clinic  Patient taking differently: No sig reported 3/23/17   Roz Aj MD   allopurinol (ZYLOPRIM) 100 MG tablet Take 100 mg by mouth daily    Historical Provider, MD   aspirin 81 MG chewable tablet Take 1 tablet by mouth daily 2/24/17   Janet Rodriguez MD   BiPAP Machine MISC by Does not apply route nightly    Historical Provider, MD   ipratropium-albuterol (DUONEB) 0.5-2.5 (3) MG/3ML SOLN nebulizer solution INHALE 3MLS INTO THE LUNGS EVERY 4 HOURS AS NEEDED 8/17/16   Roz Aj MD   Spacer/Aero-Holding Chambers VENANCIO 1 Device by Does not apply route daily as needed 3/27/16   Wilda Hernandez CNP   OXYGEN Inhale 2 L into the lungs daily as needed    Historical Provider, MD       Future Appointments  Date Time Provider

## 2017-12-11 ENCOUNTER — TELEPHONE (OUTPATIENT)
Dept: CARDIOLOGY CLINIC | Age: 62
End: 2017-12-11

## 2017-12-11 ENCOUNTER — ANTI-COAG VISIT (OUTPATIENT)
Dept: CARDIOLOGY CLINIC | Age: 62
End: 2017-12-11

## 2017-12-11 ENCOUNTER — CARE COORDINATION (OUTPATIENT)
Dept: CARE COORDINATION | Age: 62
End: 2017-12-11

## 2017-12-11 LAB — INR BLD: 3.1

## 2017-12-11 NOTE — CARE COORDINATION
Ambulatory Care Coordination Note  12/11/2017  CM Risk Score: 17  Priti Mortality Risk Score:      ACC: Griffin Eller RN    Summary Note: Outreach call to pt to follow up regarding weight. Pt stated her weight today was 368lb (increase from 361 on 12/8/2017). Pt stated she called her nephrologist at 0930 and is waiting on a return call. Pt advised to call Cardiology if her SOB worsens and/or to go to the ED for further evaluation. Pt declined need to be seen by PCP. Pt verbalized understanding. Will send FYI to PCP: Jamel Marroquin CNP    Pt aware plan of action starts with Nephrologist (due to renal function) then Cardiology then PCP- If SOB/Weight gain significant pt encouraged to go to ED for further work up. Pt aware of plan of action to follow. Encouraged to watch sodium and fluid intake!!! Pt aware writer will be out of office between 12/13-12/25. Will follow up with pt upon return. Care Coordination Interventions    Program Enrollment:  Complex Care  Referral from Primary Care Provider:  No  Suggested Interventions and Community Resources  Diabetes Education:  Completed  Fall Risk Prevention:  Completed (Comment: uses walker)  Home Care Waiver:  Completed (Comment: see services/interventions)  Home Health Services:  Completed (Comment: [facility])  Registered Dietician:  Completed  Transportation Support:  Declined  Other Services or Interventions:  [address], [employer], [employer], [place], [employer]         Goals Addressed             Most Recent      I will take my medications as prescibed by my providers (pt-stated)   On track (12/11/2017)          Prior to Admission medications    Medication Sig Start Date End Date Taking?  Authorizing Provider   bumetanide (BUMEX) 1 MG tablet Take 1 mg by mouth 3 times daily Dose will be evaluated on 12/11/2017 by Nephrology 12/6/17 12/11/17 Yes Historical Provider, MD   ONE TOUCH ULTRA TEST strip USE TO TEST 3-4 TIMES DAILY DUE TO tablet Take 1 tablet by mouth daily 2/24/17  Yes Marion Cummings MD   BiPAP Machine MISC by Does not apply route nightly   Yes Historical Provider, MD   ipratropium-albuterol (DUONEB) 0.5-2.5 (3) MG/3ML SOLN nebulizer solution INHALE 3MLS INTO THE LUNGS EVERY 4 HOURS AS NEEDED 8/17/16  Yes Derek Jackson MD   Spacer/Aero-Holding Chambers VENANCIO 1 Device by Does not apply route daily as needed 3/27/16  Yes Wilda Hernadnez CNP   OXYGEN Inhale 2 L into the lungs daily as needed   Yes Historical Provider, MD       Future Appointments  Date Time Provider Aziza Barraza   12/21/2017 2:30 PM [address] [id number] , NP FF Cardio MMA       Ayla Payne BSN, RN Care Coordinator  [address] [place] [address] Primary Care  [phone]

## 2017-12-11 NOTE — TELEPHONE ENCOUNTER
Coumadin/PT/INR    Pt:36.7    INR:3.1    Where do you have your blood drawn?  (lab/HHRN/Home Monitor)HHRN    When was it drawn?12/11/17    Why do you take Coumadin/Warfarin? Current medication dosage and instructions? 7.5 mg m w f  And 3.75 all other days    Have you missed any doses no    Any change in your other medications? 1 mg 3x a day bumetanide (BUMEX) 1 MG tablet    Are you taking an antibiotic?no    Any dietary changes? no

## 2017-12-18 ENCOUNTER — TELEPHONE (OUTPATIENT)
Dept: CARDIOLOGY CLINIC | Age: 62
End: 2017-12-18

## 2017-12-18 LAB
ANION GAP SERPL CALCULATED.3IONS-SCNC: 12 MMOL/L (ref 3–16)
BASOPHILS ABSOLUTE: 0 K/UL (ref 0–0.2)
BASOPHILS RELATIVE PERCENT: 0.4 %
BUN BLDV-MCNC: 33 MG/DL (ref 7–20)
CALCIUM SERPL-MCNC: 8.7 MG/DL (ref 8.3–10.6)
CHLORIDE BLD-SCNC: 100 MMOL/L (ref 99–110)
CO2: 28 MMOL/L (ref 21–32)
CREAT SERPL-MCNC: 3.5 MG/DL (ref 0.6–1.2)
EOSINOPHILS ABSOLUTE: 0.2 K/UL (ref 0–0.6)
EOSINOPHILS RELATIVE PERCENT: 2.3 %
GFR AFRICAN AMERICAN: 16
GFR NON-AFRICAN AMERICAN: 13
GLUCOSE BLD-MCNC: 309 MG/DL (ref 70–99)
HCT VFR BLD CALC: 29.5 % (ref 36–48)
HEMOGLOBIN: 9.6 G/DL (ref 12–16)
INR BLD: 2.5
LYMPHOCYTES ABSOLUTE: 1.2 K/UL (ref 1–5.1)
LYMPHOCYTES RELATIVE PERCENT: 14.7 %
MCH RBC QN AUTO: 29.7 PG (ref 26–34)
MCHC RBC AUTO-ENTMCNC: 32.4 G/DL (ref 31–36)
MCV RBC AUTO: 91.5 FL (ref 80–100)
MONOCYTES ABSOLUTE: 0.5 K/UL (ref 0–1.3)
MONOCYTES RELATIVE PERCENT: 5.8 %
NEUTROPHILS ABSOLUTE: 6.1 K/UL (ref 1.7–7.7)
NEUTROPHILS RELATIVE PERCENT: 76.8 %
PARATHYROID HORMONE INTACT: 186.2 PG/ML (ref 14–72)
PDW BLD-RTO: 16.5 % (ref 12.4–15.4)
PHOSPHORUS: 3.8 MG/DL (ref 2.5–4.9)
PLATELET # BLD: 179 K/UL (ref 135–450)
PMV BLD AUTO: 9.6 FL (ref 5–10.5)
POTASSIUM SERPL-SCNC: 3.4 MMOL/L (ref 3.5–5.1)
RBC # BLD: 3.22 M/UL (ref 4–5.2)
SODIUM BLD-SCNC: 140 MMOL/L (ref 136–145)
WBC # BLD: 8 K/UL (ref 4–11)

## 2017-12-18 NOTE — TELEPHONE ENCOUNTER
Coumadin/PT/INR    PT: 30.1    INR: 2.5    Where do you have your blood drawn?home  (lab/HHRN/Home Monitor)    When was it drawn?today    Why do you take Coumadin/Warfarin? Current medication dosage and instructions? 7.5 mg Mon Wed Fri and 3.75 mg all other days    Have you missed any doses? No    Any change in your other medications? NO    Are you taking an antibiotic? No    Any dietary changes?  No

## 2017-12-19 ENCOUNTER — ANTI-COAG VISIT (OUTPATIENT)
Dept: CARDIOLOGY CLINIC | Age: 62
End: 2017-12-19

## 2017-12-19 LAB
FERRITIN: 235.5 NG/ML (ref 15–150)
IRON SATURATION: 23 % (ref 15–50)
IRON: 37 UG/DL (ref 37–145)
TOTAL IRON BINDING CAPACITY: 159 UG/DL (ref 260–445)
VITAMIN D 25-HYDROXY: 17.9 NG/ML

## 2017-12-25 PROBLEM — I50.31 ACUTE DIASTOLIC CHF (CONGESTIVE HEART FAILURE) (HCC): Status: ACTIVE | Noted: 2017-12-25

## 2017-12-26 ENCOUNTER — CARE COORDINATION (OUTPATIENT)
Dept: CARE COORDINATION | Age: 62
End: 2017-12-26

## 2017-12-26 PROBLEM — N18.9 CHRONIC KIDNEY DISEASE: Status: ACTIVE | Noted: 2017-06-30

## 2018-01-01 ENCOUNTER — HOSPITAL ENCOUNTER (OUTPATIENT)
Dept: OTHER | Age: 63
Discharge: HOME | End: 2018-01-31
Attending: INTERNAL MEDICINE | Admitting: INTERNAL MEDICINE

## 2018-01-02 ENCOUNTER — CARE COORDINATION (OUTPATIENT)
Dept: CARE COORDINATION | Age: 63
End: 2018-01-02

## 2018-01-23 ENCOUNTER — CARE COORDINATION (OUTPATIENT)
Dept: CARE COORDINATION | Age: 63
End: 2018-01-23

## 2018-01-30 ENCOUNTER — SURG/PROC ORDERS (OUTPATIENT)
Dept: VASCULAR SURGERY | Age: 63
End: 2018-01-30

## 2018-01-30 DIAGNOSIS — N18.9 CHRONIC KIDNEY DISEASE, UNSPECIFIED CKD STAGE: Primary | ICD-10-CM

## 2018-01-30 RX ORDER — CLINDAMYCIN PHOSPHATE 900 MG/50ML
900 INJECTION INTRAVENOUS
Status: CANCELLED | OUTPATIENT
Start: 2018-01-30 | End: 2018-01-30

## 2018-01-30 RX ORDER — SODIUM CHLORIDE 0.9 % (FLUSH) 0.9 %
10 SYRINGE (ML) INJECTION EVERY 12 HOURS SCHEDULED
Status: CANCELLED | OUTPATIENT
Start: 2018-01-30

## 2018-01-30 RX ORDER — SODIUM CHLORIDE 0.9 % (FLUSH) 0.9 %
10 SYRINGE (ML) INJECTION PRN
Status: CANCELLED | OUTPATIENT
Start: 2018-01-30

## 2018-01-31 ENCOUNTER — TELEPHONE (OUTPATIENT)
Dept: FAMILY MEDICINE CLINIC | Age: 63
End: 2018-01-31

## 2018-02-01 ENCOUNTER — HOSPITAL ENCOUNTER (OUTPATIENT)
Dept: OTHER | Age: 63
Discharge: HOME | End: 2018-02-28
Attending: INTERNAL MEDICINE | Admitting: INTERNAL MEDICINE

## 2018-02-01 ENCOUNTER — TELEPHONE (OUTPATIENT)
Dept: CARDIOLOGY CLINIC | Age: 63
End: 2018-02-01

## 2018-02-02 ENCOUNTER — CARE COORDINATION (OUTPATIENT)
Dept: CASE MANAGEMENT | Age: 63
End: 2018-02-02

## 2018-02-05 ENCOUNTER — TELEPHONE (OUTPATIENT)
Dept: FAMILY MEDICINE CLINIC | Age: 63
End: 2018-02-05

## 2018-02-06 ENCOUNTER — TELEPHONE (OUTPATIENT)
Dept: OTHER | Age: 63
End: 2018-02-06

## 2018-02-06 NOTE — TELEPHONE ENCOUNTER
[facility] FAILURE PROGRAM  TELEPHONE ENCOUNTER FORM    Laurie Vorar 1955    ASSESSMENT:   1. Baseline weight:   340 lbs  2. Current weight:333 lbs  3. Symptoms: dyspnea, edema; denies dyspnea, edema  4. Diet history: following a low sodium diet Yes  5. Medication history: taking medications as instructed Yes; medication side effects noted No  6. Tobacco history: never  7. Activity history: normal activities of daily living  8. Have you made a lifestyle change since being home? Yes    RECOMMENDATIONS:   1. Medication: None  2. Diet: low sodium  3. MD/ Clinic appointment: missed appointment with RG NP  4. Other: Patient currently residing at [facility]. Being sent home Saturday Feb 10 th. Needs to schedule visit with heart failure. She missed appoinment.  Patient will call when get home          [address] 2/6/2018 11:37 AM

## 2018-02-07 DIAGNOSIS — E11.65 UNCONTROLLED TYPE 2 DIABETES MELLITUS WITH OTHER DIABETIC KIDNEY COMPLICATION, UNSPECIFIED LONG TERM INSULIN USE STATUS: Primary | ICD-10-CM

## 2018-02-07 DIAGNOSIS — E11.29 UNCONTROLLED TYPE 2 DIABETES MELLITUS WITH OTHER DIABETIC KIDNEY COMPLICATION, UNSPECIFIED LONG TERM INSULIN USE STATUS: Primary | ICD-10-CM

## 2018-02-12 ENCOUNTER — CARE COORDINATION (OUTPATIENT)
Dept: CASE MANAGEMENT | Age: 63
End: 2018-02-12

## 2018-02-12 ENCOUNTER — TELEPHONE (OUTPATIENT)
Dept: FAMILY MEDICINE CLINIC | Age: 63
End: 2018-02-12

## 2018-02-12 ENCOUNTER — TELEPHONE (OUTPATIENT)
Dept: CARDIOLOGY CLINIC | Age: 63
End: 2018-02-12

## 2018-02-13 ENCOUNTER — TELEPHONE (OUTPATIENT)
Dept: FAMILY MEDICINE CLINIC | Age: 63
End: 2018-02-13

## 2018-02-13 ENCOUNTER — OFFICE VISIT (OUTPATIENT)
Dept: FAMILY MEDICINE CLINIC | Age: 63
End: 2018-02-13

## 2018-02-13 ENCOUNTER — HOSPITAL ENCOUNTER (OUTPATIENT)
Dept: PREADMISSION TESTING | Age: 63
Discharge: HOME | End: 2018-02-15
Attending: SURGERY | Admitting: SURGERY

## 2018-02-13 VITALS
BODY MASS INDEX: 59.59 KG/M2 | WEIGHT: 293 LBS | HEART RATE: 96 BPM | DIASTOLIC BLOOD PRESSURE: 74 MMHG | OXYGEN SATURATION: 96 % | SYSTOLIC BLOOD PRESSURE: 128 MMHG

## 2018-02-13 DIAGNOSIS — J96.11 HYPOXEMIC RESPIRATORY FAILURE, CHRONIC (HCC): ICD-10-CM

## 2018-02-13 DIAGNOSIS — D63.1 ANEMIA OF CHRONIC RENAL FAILURE, STAGE 4 (SEVERE) (HCC): ICD-10-CM

## 2018-02-13 DIAGNOSIS — E11.22 STAGE 5 CHRONIC RENAL IMPAIRMENT ASSOCIATED WITH TYPE 2 DIABETES MELLITUS (HCC): ICD-10-CM

## 2018-02-13 DIAGNOSIS — E11.42 DIABETIC POLYNEUROPATHY ASSOCIATED WITH TYPE 2 DIABETES MELLITUS (HCC): ICD-10-CM

## 2018-02-13 DIAGNOSIS — I50.32 HEART FAILURE, DIASTOLIC, CHRONIC (HCC): ICD-10-CM

## 2018-02-13 DIAGNOSIS — N18.5 STAGE 5 CHRONIC RENAL IMPAIRMENT ASSOCIATED WITH TYPE 2 DIABETES MELLITUS (HCC): ICD-10-CM

## 2018-02-13 DIAGNOSIS — I50.33 ACUTE ON CHRONIC DIASTOLIC CONGESTIVE HEART FAILURE (HCC): ICD-10-CM

## 2018-02-13 DIAGNOSIS — D68.32 WARFARIN-INDUCED COAGULOPATHY (HCC): ICD-10-CM

## 2018-02-13 DIAGNOSIS — Z09 HOSPITAL DISCHARGE FOLLOW-UP: Primary | ICD-10-CM

## 2018-02-13 DIAGNOSIS — N18.6 ESRD (END STAGE RENAL DISEASE) ON DIALYSIS (HCC): ICD-10-CM

## 2018-02-13 DIAGNOSIS — N18.9 CHRONIC KIDNEY DISEASE, UNSPECIFIED CKD STAGE: ICD-10-CM

## 2018-02-13 DIAGNOSIS — N18.5 CHRONIC KIDNEY DISEASE, STAGE V (VERY SEVERE) (HCC): ICD-10-CM

## 2018-02-13 DIAGNOSIS — Z99.2 ESRD (END STAGE RENAL DISEASE) ON DIALYSIS (HCC): ICD-10-CM

## 2018-02-13 DIAGNOSIS — N18.4 ANEMIA OF CHRONIC RENAL FAILURE, STAGE 4 (SEVERE) (HCC): ICD-10-CM

## 2018-02-13 DIAGNOSIS — E66.01 MORBID OBESITY WITH BMI OF 50.0-59.9, ADULT (HCC): ICD-10-CM

## 2018-02-13 DIAGNOSIS — J44.9 COPD, MODERATE (HCC): ICD-10-CM

## 2018-02-13 DIAGNOSIS — T45.515A WARFARIN-INDUCED COAGULOPATHY (HCC): ICD-10-CM

## 2018-02-13 PROBLEM — R11.2 NAUSEA & VOMITING: Status: RESOLVED | Noted: 2017-09-09 | Resolved: 2018-02-13

## 2018-02-13 PROCEDURE — 99496 TRANSJ CARE MGMT HIGH F2F 7D: CPT | Performed by: NURSE PRACTITIONER

## 2018-02-13 RX ORDER — NEBIVOLOL 10 MG/1
10 TABLET ORAL 2 TIMES DAILY
Qty: 60 TABLET | Refills: 1 | Status: SHIPPED | OUTPATIENT
Start: 2018-02-13 | End: 2018-07-10 | Stop reason: SDUPTHER

## 2018-02-13 RX ORDER — ROSUVASTATIN CALCIUM 10 MG/1
TABLET, COATED ORAL
Qty: 90 TABLET | Refills: 2 | Status: SHIPPED | OUTPATIENT
Start: 2018-02-13 | End: 2019-03-08 | Stop reason: SDUPTHER

## 2018-02-13 RX ORDER — ALBUTEROL SULFATE 90 UG/1
2 AEROSOL, METERED RESPIRATORY (INHALATION) EVERY 6 HOURS PRN
Qty: 1 INHALER | Refills: 3 | Status: SHIPPED | OUTPATIENT
Start: 2018-02-13 | End: 2018-04-03 | Stop reason: SDUPTHER

## 2018-02-13 RX ORDER — FERROUS SULFATE 325(65) MG
325 TABLET ORAL 2 TIMES DAILY WITH MEALS
Qty: 60 TABLET | Refills: 3 | Status: ON HOLD | OUTPATIENT
Start: 2018-02-13 | End: 2018-07-10 | Stop reason: HOSPADM

## 2018-02-13 RX ORDER — LISINOPRIL 5 MG/1
5 TABLET ORAL DAILY
Qty: 30 TABLET | Refills: 5 | Status: SHIPPED | OUTPATIENT
Start: 2018-02-13 | End: 2018-04-10 | Stop reason: SDUPTHER

## 2018-02-13 RX ORDER — GABAPENTIN 100 MG/1
100 CAPSULE ORAL 3 TIMES DAILY
Qty: 90 CAPSULE | Refills: 5 | Status: SHIPPED | OUTPATIENT
Start: 2018-02-13 | End: 2018-07-09 | Stop reason: DRUGHIGH

## 2018-02-13 RX ORDER — ISOSORBIDE MONONITRATE 60 MG/1
TABLET, EXTENDED RELEASE ORAL
Qty: 90 TABLET | Refills: 3 | Status: ON HOLD | OUTPATIENT
Start: 2018-02-13 | End: 2018-05-17 | Stop reason: HOSPADM

## 2018-02-13 RX ORDER — PANTOPRAZOLE SODIUM 40 MG/1
40 TABLET, DELAYED RELEASE ORAL 2 TIMES DAILY
Qty: 60 TABLET | Refills: 5 | Status: SHIPPED | OUTPATIENT
Start: 2018-02-13 | End: 2018-07-09 | Stop reason: ALTCHOICE

## 2018-02-13 NOTE — PATIENT INSTRUCTIONS
Stop warfarin 5 days prior to surgery  No insulin morning of the surgery        Patient Education        How to Prepare for Surgery  How do you prepare for surgery? Surgery can be stressful. This information will help you understand what you can expect. And it will help you safely prepare for surgery. Follow-up care is a key part of your treatment and safety. Be sure to make and go to all appointments, and call your doctor if you are having problems. It's also a good idea to know your test results and keep a list of the medicines you take. What happens before surgery? ?Preparing for surgery  ? · Understand exactly what surgery is planned, along with the risks, benefits, and other options. · Tell your doctors ALL the medicines, vitamins, supplements, and herbal remedies you take. Some of these can increase the risk of bleeding or interact with anesthesia. ? · If you take blood thinners, such as warfarin (Coumadin), clopidogrel (Plavix), or aspirin, be sure to talk to your doctor. He or she will tell you if you should stop taking these medicines before your surgery. Make sure that you understand exactly what your doctor wants you to do.   ? · Your doctor will tell you which medicines to take or stop before your surgery. You may need to stop taking certain medicines a week or more before surgery. So talk to your doctor as soon as you can.   ? · If you have an advance directive, let your doctor know. It may include a living will and a durable power of  for health care. Bring a copy to the hospital. If don't have one, you may want to prepare one. It lets your doctor and loved ones know your health care wishes. Doctors advise that everyone prepare these papers before any type of surgery or procedure. What happens on the day of surgery? ? · Follow the instructions about when to stop eating and drinking. If you don't, your surgery may be canceled.  If your doctor told you to take your medicines on the day

## 2018-02-13 NOTE — PROGRESS NOTES
Post-Discharge Transitional Care Management Services      Adiel Lino   YOB: 1955    Date of Visit:  2/13/2018  30 Day Post-Discharge Date: 3/9/18    Allergies   Allergen Reactions    Codeine Nausea Only    Fentanyl Itching    Morphine      CHEST PAIN    Nsaids Other (See Comments)     Due to CRF    Hydrocodone-Acetaminophen Itching and Rash    Percocet [Oxycodone-Acetaminophen] Itching    Procardia [Nifedipine] Nausea And Vomiting     Headache  Takes norvasc at home 12/22/15    Vicodin [Hydrocodone-Acetaminophen] Rash     Outpatient Prescriptions Marked as Taking for the 2/13/18 encounter (Office Visit) with Crystal Low CNP   Medication Sig Dispense Refill    lisinopril (PRINIVIL;ZESTRIL) 5 MG tablet Take 1 tablet by mouth daily 30 tablet 5    Dulaglutide (TRULICITY) 2.37 CD/7.0JB SOPN Inject 75 mg into the skin once a week 12 pen 3    isosorbide mononitrate (IMDUR) 60 MG extended release tablet Take 1 tablet by mouth once a day 90 tablet 3    gabapentin (NEURONTIN) 100 MG capsule Take 1 capsule by mouth 3 times daily for 180 days.  90 capsule 5    rosuvastatin (CRESTOR) 10 MG tablet Take 1 tablet by mouth  daily 90 tablet 2    insulin detemir (LEVEMIR FLEXTOUCH) 100 UNIT/ML injection pen Inject 30 Units into the skin 2 times daily 75 mL 5    insulin lispro (HUMALOG KWIKPEN) 100 UNIT/ML pen Inject 12 Units into the skin 3 times daily (before meals) 5 pen 5    ferrous sulfate 325 (65 Fe) MG tablet Take 1 tablet by mouth 2 times daily (with meals) 60 tablet 3    nebivolol (BYSTOLIC) 10 MG tablet Take 1 tablet by mouth 2 times daily 60 tablet 1    pantoprazole (PROTONIX) 40 MG tablet Take 1 tablet by mouth 2 times daily 60 tablet 5    fluticasone-salmeterol (ADVAIR HFA) 230-21 MCG/ACT inhaler Inhale 2 puffs into the lungs 2 times daily 1 Inhaler 5    albuterol sulfate HFA (PROVENTIL HFA) 108 (90 Base) MCG/ACT inhaler Inhale 2 puffs into the lungs every 6 hours as needed for Vitals:    02/13/18 1333   BP: 128/74   Site: Right Arm   Position: Sitting   Cuff Size: Large Adult   Pulse: 96   SpO2: 96%   Weight: (!) 336 lb 6.4 oz (152.6 kg)     Body mass index is 59.59 kg/m². Wt Readings from Last 3 Encounters:   02/13/18 (!) 336 lb 6.4 oz (152.6 kg)   01/22/18 (!) 334 lb 3.5 oz (151.6 kg)   11/21/17 (!) 359 lb (162.8 kg)     BP Readings from Last 3 Encounters:   02/13/18 128/74   01/22/18 95/69   11/21/17 (!) 167/84        Patient was admitted to [facility] from 12/25/17 to 1/22/18 and [facility] from 1/22/18-2/10/18 for Acute on Chronic hypoxia and hypercapnic failure; Acute on Chronic renal failure; acute on chronic DHF; T2DM. Inpatient course: Discharge summary reviewed- see chart. Current status: Improving slowly. States fatigue still present, dyspnea with exertion. (+) home health with physical therapy. Occasional dizziness or lightheadedness when first standing. She denies chest pain, headache, blurred vision, peripheral edema and palpitations. Checks sugars at home:Yes. states readings are good, can't remember exact readings. .    She denies abd pain, nausea, vomiting, diarrhea, constipation, heartburn or bloating. States urine output smaller quantity. Slight irritation when urinating. Denies freq, abd pain, flank pain, or hematuria. Current warfarin dose 5 mg daily. Home health checking ever Monday    Started hemodialysis when hospitalized. Port in chest at present, will have fistula placed 2.22 with Dr Lon Mijares. M,W,F. Asking for medication refills- not sure what meds have changed since leaving [facility]    Requesting referral to nephrologist closer to home    Review of Systems:  A comprehensive review of systems was negative except for what was noted in the HPI.     Physical Exam:  General Appearance: alert and oriented to person, place and time, well developed and well- nourished, in no acute distress  Skin: warm and dry, no rash or

## 2018-02-13 NOTE — TELEPHONE ENCOUNTER
Moon an Occupational Therapist with [employer] is needing verbal orders to continue in home therapy 2x a week for 2 weeks then 1x a week for 2 weeks.

## 2018-02-14 ENCOUNTER — TELEPHONE (OUTPATIENT)
Dept: FAMILY MEDICINE CLINIC | Age: 63
End: 2018-02-14

## 2018-02-14 PROBLEM — N18.6 ESRD (END STAGE RENAL DISEASE) ON DIALYSIS (HCC): Status: ACTIVE | Noted: 2018-02-14

## 2018-02-14 PROBLEM — Z99.2 ESRD (END STAGE RENAL DISEASE) ON DIALYSIS (HCC): Status: ACTIVE | Noted: 2018-02-14

## 2018-02-14 NOTE — PROGRESS NOTES
[facility] Family Medicine Preoperative Evaluation       Seven Rasheed, CNP     [address], [address]     (phone) [phone]       (fax) [phone]    Dear Dr. Asim Hall,     Thank you for referring Bridget Aschoff to me for Preoperative Evaluation. Below are the relevant portions of my assessment and plan of care. Bridget Aschoff    63 y.o.   1955    [address] [facility],[address]    Vitals:    02/13/18 1333   BP: 128/74   Site: Right Arm   Position: Sitting   Cuff Size: Large Adult   Pulse: 96   SpO2: 96%   Weight: (!) 336 lb 6.4 oz (152.6 kg)      Wt Readings from Last 2 Encounters:   02/13/18 (!) 336 lb 6.4 oz (152.6 kg)   01/22/18 (!) 334 lb 3.5 oz (151.6 kg)     BP Readings from Last 3 Encounters:   02/13/18 128/74   01/22/18 95/69   11/21/17 (!) 167/84        Allergies   Allergen Reactions    Codeine Nausea Only    Fentanyl Itching    Morphine      CHEST PAIN    Nsaids Other (See Comments)     Due to CRF    Hydrocodone-Acetaminophen Itching and Rash    Percocet [Oxycodone-Acetaminophen] Itching    Procardia [Nifedipine] Nausea And Vomiting     Headache  Takes norvasc at home 12/22/15    Vicodin [Hydrocodone-Acetaminophen] Rash     Current Outpatient Prescriptions   Medication Sig Dispense Refill    lisinopril (PRINIVIL;ZESTRIL) 5 MG tablet Take 1 tablet by mouth daily 30 tablet 5    Dulaglutide (TRULICITY) 5.99 BE/1.7XU SOPN Inject 75 mg into the skin once a week 12 pen 3    isosorbide mononitrate (IMDUR) 60 MG extended release tablet Take 1 tablet by mouth once a day 90 tablet 3    gabapentin (NEURONTIN) 100 MG capsule Take 1 capsule by mouth 3 times daily for 180 days.  90 capsule 5    rosuvastatin (CRESTOR) 10 MG tablet Take 1 tablet by mouth  daily 90 tablet 2    insulin detemir (LEVEMIR FLEXTOUCH) 100 UNIT/ML injection pen Inject 30 Units into the skin 2 times daily 75 mL 5    insulin lispro (HUMALOG KWIKPEN) 100 UNIT/ML pen obtained. If you have questions, please do not hesitate to call me.     Sincerely,        Radha Cuello, CNP

## 2018-02-15 ENCOUNTER — TELEPHONE (OUTPATIENT)
Dept: CARDIOLOGY CLINIC | Age: 63
End: 2018-02-15

## 2018-02-15 ENCOUNTER — TELEPHONE (OUTPATIENT)
Dept: FAMILY MEDICINE CLINIC | Age: 63
End: 2018-02-15

## 2018-02-15 ENCOUNTER — ANTI-COAG VISIT (OUTPATIENT)
Dept: CARDIOLOGY CLINIC | Age: 63
End: 2018-02-15

## 2018-02-15 VITALS — BODY MASS INDEX: 51.91 KG/M2 | HEIGHT: 63 IN | WEIGHT: 293 LBS

## 2018-02-15 LAB
ABO/RH: NORMAL
ANTIBODY SCREEN: NORMAL
APTT: 27 SEC (ref 24.1–34.9)
HCT VFR BLD CALC: 33.1 % (ref 36–48)
HEMOGLOBIN: 10.4 G/DL (ref 12–16)
INR BLD: 2.1 (ref 0.85–1.15)
MCH RBC QN AUTO: 30.9 PG (ref 26–34)
MCHC RBC AUTO-ENTMCNC: 31.4 G/DL (ref 31–36)
MCV RBC AUTO: 98.3 FL (ref 80–100)
PDW BLD-RTO: 22.2 % (ref 12.4–15.4)
PLATELET # BLD: 233 K/UL (ref 135–450)
PMV BLD AUTO: 7.7 FL (ref 5–10.5)
PROTHROMBIN TIME: 23.7 SEC (ref 9.6–13)
RBC # BLD: 3.37 M/UL (ref 4–5.2)
WBC # BLD: 7.8 K/UL (ref 4–11)

## 2018-02-15 RX ORDER — CIPROFLOXACIN 250 MG/1
250 TABLET, FILM COATED ORAL 2 TIMES DAILY
Qty: 10 TABLET | Refills: 0 | Status: SHIPPED | OUTPATIENT
Start: 2018-02-15 | End: 2018-02-22 | Stop reason: HOSPADM

## 2018-02-15 ASSESSMENT — ENCOUNTER SYMPTOMS: SHORTNESS OF BREATH: 1

## 2018-02-15 NOTE — ANESTHESIA PRE-OP
3/29/17   Namita Cruz MD   warfarin (COUMADIN) 7.5 MG tablet Continue f/up with coumadin clinic  Patient taking differently: No sig reported 3/23/17   Namita Cruz MD   allopurinol (ZYLOPRIM) 100 MG tablet Take 100 mg by mouth daily    Historical Provider, MD   aspirin 81 MG chewable tablet Take 1 tablet by mouth daily  Patient taking differently: Take 81 mg by mouth daily [address] PCP 2/24/17   Yamila Zavala MD   BiPAP Machine MISC by Does not apply route nightly    Historical Provider, MD   ipratropium-albuterol (DUONEB) 0.5-2.5 (3) MG/3ML SOLN nebulizer solution INHALE 3MLS INTO THE LUNGS EVERY 4 HOURS AS NEEDED 8/17/16   Namita Cruz MD   Spacer/Aero-Holding Vasquez Lolling 1 Device by Does not apply route daily as needed 3/27/16   Wilda Hernandez CNP   OXYGEN Inhale 2 L into the lungs daily as needed At night prn    Historical Provider, MD       Current medications:    Current Outpatient Prescriptions   Medication Sig Dispense Refill    lisinopril (PRINIVIL;ZESTRIL) 5 MG tablet Take 1 tablet by mouth daily 30 tablet 5    Dulaglutide (TRULICITY) 4.30 RA/5.4IH SOPN Inject 75 mg into the skin once a week (Patient taking differently: Inject 75 mg into the skin once a week SUNDAY) 12 pen 3    isosorbide mononitrate (IMDUR) 60 MG extended release tablet Take 1 tablet by mouth once a day 90 tablet 3    gabapentin (NEURONTIN) 100 MG capsule Take 1 capsule by mouth 3 times daily for 180 days.  90 capsule 5    rosuvastatin (CRESTOR) 10 MG tablet Take 1 tablet by mouth  daily 90 tablet 2    insulin detemir (LEVEMIR FLEXTOUCH) 100 UNIT/ML injection pen Inject 30 Units into the skin 2 times daily 75 mL 5    insulin lispro (HUMALOG KWIKPEN) 100 UNIT/ML pen Inject 12 Units into the skin 3 times daily (before meals) 5 pen 5    ferrous sulfate 325 (65 Fe) MG tablet Take 1 tablet by mouth 2 times daily (with meals) 60 tablet 3    nebivolol (BYSTOLIC) 10 MG tablet Take 1 tablet by mouth 2 times daily 60 tablet 1    pantoprazole (PROTONIX) 40 MG tablet Take 1 tablet by mouth 2 times daily 60 tablet 5    fluticasone-salmeterol (ADVAIR HFA) 230-21 MCG/ACT inhaler Inhale 2 puffs into the lungs 2 times daily 1 Inhaler 5    albuterol sulfate HFA (PROVENTIL HFA) 108 (90 Base) MCG/ACT inhaler Inhale 2 puffs into the lungs every 6 hours as needed for Wheezing 1 Inhaler 3    doxazosin (CARDURA) 4 MG tablet Take 0.5 tablets by mouth nightly 30 tablet 3    hydrALAZINE (APRESOLINE) 25 MG tablet Take 0.5 tablets by mouth every 8 hours 90 tablet 0    darbepoetin brii-polysorbate (ARANESP) 100 MCG/ML injection Inject 1 mL into the skin once a week 8.4 mL 0    ONE TOUCH ULTRA TEST strip USE TO TEST 3-4 TIMES DAILY DUE TO FLUCTUATING SUGAR 100 strip 5    fluticasone-salmeterol (ADVAIR HFA) 230-21 MCG/ACT inhaler Inhale 1 puff into the lungs 2 times daily 1 Inhaler 11    fluticasone (FLONASE) 50 MCG/ACT nasal spray 2 sprays by Nasal route daily as needed for Allergies 1 Bottle 11    cetirizine (ZYRTEC) 10 MG tablet Take 0.5 tablets by mouth daily 30 tablet 1    nebivolol (BYSTOLIC) 20 MG TABS tablet Take 1 tablet by mouth 2 times daily 60 tablet 0    PROAIR  (90 Base) MCG/ACT inhaler INHALE TWO PUFFS BY MOUTH INTO THE LUNGS EVERY 6 HOURS AS NEEDED FOR WHEEZING 9 Inhaler 2    promethazine (PHENERGAN) 25 MG tablet Take 1 tablet by mouth every 8 hours as needed for Nausea 30 tablet 2    nitroGLYCERIN (NITROSTAT) 0.4 MG SL tablet Dissolve 1 tablet under  tongue every 5 minutes as  needed.  Maximum of 3  tablets in 15 minutes 75 tablet 0    ASSURE COMFORT LANCETS 30G MISC USE TO TEST BLOOD GLUCOSE THREE TIMES DAILY 300 each 3    Alcohol Swabs (ALCOHOL PREP) 70 % PADS USE TO TEST BLOOD GLUCOSE THREE TIMES DAILY 300 each 3    Blood Glucose Monitoring Suppl (ACCU-CHEK KENNETH SMARTVIEW) w/Device KIT USE TO TEST BLOOD GLUCOSE 1 kit 0    traZODone (DESYREL) 50 MG tablet Take 1 tablet by mouth nightly

## 2018-02-15 NOTE — PRE-PROCEDURE INSTRUCTIONS
DOS             22. Bring a complete list of all your medications with name and dose include any supplements. 26. Other__________________________________________   *Please call pre admission testing if you any further questions   [address]    [phone] [facility]  [phone]   [address] [place] [address]       All above information reviewed with patient in person or by phone. Patient verbalizes understanding. All questions and concerns addressed.                                                                                                  Patient/Rep____________________                                                                                                                                    PRE OP INSTRUCTIONS

## 2018-02-20 ENCOUNTER — TELEPHONE (OUTPATIENT)
Dept: FAMILY MEDICINE CLINIC | Age: 63
End: 2018-02-20

## 2018-02-20 NOTE — TELEPHONE ENCOUNTER
[address] [facility] [phone] patient reported that she has diarrhea and iniated started to use a anti-diarrhea medication called loperamide

## 2018-02-21 RX ORDER — ALBUTEROL SULFATE 90 UG/1
2 AEROSOL, METERED RESPIRATORY (INHALATION) EVERY 6 HOURS PRN
Qty: 1 INHALER | Refills: 3 | Status: SHIPPED | OUTPATIENT
Start: 2018-02-21 | End: 2018-09-25 | Stop reason: SDUPTHER

## 2018-02-21 NOTE — TELEPHONE ENCOUNTER
PA response was received and the PA has been     APPROVED    Exp: 12/31/18    Please advise patient thank you!

## 2018-02-22 ENCOUNTER — HOSPITAL ENCOUNTER (OUTPATIENT)
Dept: SURGERY | Age: 63
Discharge: HOME | End: 2018-02-22
Attending: SURGERY | Admitting: SURGERY

## 2018-02-22 VITALS
OXYGEN SATURATION: 98 % | HEART RATE: 85 BPM | HEIGHT: 63 IN | BODY MASS INDEX: 51.91 KG/M2 | SYSTOLIC BLOOD PRESSURE: 115 MMHG | WEIGHT: 293 LBS | DIASTOLIC BLOOD PRESSURE: 80 MMHG | RESPIRATION RATE: 10 BRPM | TEMPERATURE: 98 F

## 2018-02-22 DIAGNOSIS — N18.6 ESRD (END STAGE RENAL DISEASE) ON DIALYSIS (HCC): Primary | ICD-10-CM

## 2018-02-22 DIAGNOSIS — Z99.2 ESRD (END STAGE RENAL DISEASE) ON DIALYSIS (HCC): Primary | ICD-10-CM

## 2018-02-22 LAB
ABO/RH: NORMAL
ABO/RH: NORMAL
ANION GAP SERPL CALCULATED.3IONS-SCNC: 11 MMOL/L (ref 3–16)
ANTIBODY SCREEN: NORMAL
BUN BLDV-MCNC: 12 MG/DL (ref 7–20)
CALCIUM SERPL-MCNC: 9.2 MG/DL (ref 8.3–10.6)
CHLORIDE BLD-SCNC: 101 MMOL/L (ref 99–110)
CO2: 25 MMOL/L (ref 21–32)
CREAT SERPL-MCNC: 6.4 MG/DL (ref 0.6–1.2)
GFR AFRICAN AMERICAN: 8
GFR NON-AFRICAN AMERICAN: 7
GLUCOSE BLD-MCNC: 115 MG/DL (ref 70–99)
GLUCOSE BLD-MCNC: 165 MG/DL (ref 70–99)
PERFORMED ON: ABNORMAL
POTASSIUM SERPL-SCNC: 5 MMOL/L (ref 3.5–5.1)
SODIUM BLD-SCNC: 137 MMOL/L (ref 136–145)

## 2018-02-22 PROCEDURE — 36830 ARTERY-VEIN NONAUTOGRAFT: CPT | Performed by: SURGERY

## 2018-02-22 RX ORDER — CLINDAMYCIN PHOSPHATE 900 MG/50ML
900 INJECTION INTRAVENOUS
Status: DISCONTINUED | OUTPATIENT
Start: 2018-02-22 | End: 2018-02-22 | Stop reason: ALTCHOICE

## 2018-02-22 RX ORDER — SODIUM CHLORIDE 0.9 % (FLUSH) 0.9 %
10 SYRINGE (ML) INJECTION EVERY 12 HOURS SCHEDULED
Status: DISCONTINUED | OUTPATIENT
Start: 2018-02-22 | End: 2018-02-22 | Stop reason: SDUPTHER

## 2018-02-22 RX ORDER — FENTANYL CITRATE 50 UG/ML
50 INJECTION, SOLUTION INTRAMUSCULAR; INTRAVENOUS EVERY 5 MIN PRN
Status: DISCONTINUED | OUTPATIENT
Start: 2018-02-22 | End: 2018-02-23 | Stop reason: HOSPADM

## 2018-02-22 RX ORDER — ONDANSETRON 2 MG/ML
4 INJECTION INTRAMUSCULAR; INTRAVENOUS PRN
Status: DISCONTINUED | OUTPATIENT
Start: 2018-02-22 | End: 2018-02-23 | Stop reason: HOSPADM

## 2018-02-22 RX ORDER — SODIUM CHLORIDE 9 MG/ML
INJECTION, SOLUTION INTRAVENOUS CONTINUOUS
Status: DISCONTINUED | OUTPATIENT
Start: 2018-02-22 | End: 2018-02-23 | Stop reason: HOSPADM

## 2018-02-22 RX ORDER — FENTANYL CITRATE 50 UG/ML
25 INJECTION, SOLUTION INTRAMUSCULAR; INTRAVENOUS EVERY 5 MIN PRN
Status: DISCONTINUED | OUTPATIENT
Start: 2018-02-22 | End: 2018-02-23 | Stop reason: HOSPADM

## 2018-02-22 RX ORDER — HYDROMORPHONE HCL 110MG/55ML
0.25 PATIENT CONTROLLED ANALGESIA SYRINGE INTRAVENOUS EVERY 5 MIN PRN
Status: DISCONTINUED | OUTPATIENT
Start: 2018-02-22 | End: 2018-02-23 | Stop reason: HOSPADM

## 2018-02-22 RX ORDER — BUPIVACAINE HYDROCHLORIDE 5 MG/ML
INJECTION, SOLUTION EPIDURAL; INTRACAUDAL
Status: DISCONTINUED
Start: 2018-02-22 | End: 2018-02-23 | Stop reason: HOSPADM

## 2018-02-22 RX ORDER — HYDROCODONE BITARTRATE AND ACETAMINOPHEN 5; 325 MG/1; MG/1
1 TABLET ORAL EVERY 4 HOURS PRN
Qty: 30 TABLET | Refills: 0 | Status: SHIPPED | OUTPATIENT
Start: 2018-02-22 | End: 2018-02-25

## 2018-02-22 RX ORDER — SODIUM CHLORIDE 0.9 % (FLUSH) 0.9 %
10 SYRINGE (ML) INJECTION PRN
Status: DISCONTINUED | OUTPATIENT
Start: 2018-02-22 | End: 2018-02-22 | Stop reason: SDUPTHER

## 2018-02-22 RX ORDER — LIDOCAINE HYDROCHLORIDE 10 MG/ML
1 INJECTION, SOLUTION EPIDURAL; INFILTRATION; INTRACAUDAL; PERINEURAL
Status: ACTIVE | OUTPATIENT
Start: 2018-02-22 | End: 2018-02-22

## 2018-02-22 RX ORDER — SODIUM CHLORIDE 0.9 % (FLUSH) 0.9 %
10 SYRINGE (ML) INJECTION EVERY 12 HOURS SCHEDULED
Status: DISCONTINUED | OUTPATIENT
Start: 2018-02-22 | End: 2018-02-23 | Stop reason: HOSPADM

## 2018-02-22 RX ORDER — SODIUM CHLORIDE 0.9 % (FLUSH) 0.9 %
10 SYRINGE (ML) INJECTION PRN
Status: DISCONTINUED | OUTPATIENT
Start: 2018-02-22 | End: 2018-02-23 | Stop reason: HOSPADM

## 2018-02-22 RX ORDER — ONDANSETRON 2 MG/ML
4 INJECTION INTRAMUSCULAR; INTRAVENOUS
Status: ACTIVE | OUTPATIENT
Start: 2018-02-22 | End: 2018-02-22

## 2018-02-22 RX ORDER — HYDROMORPHONE HCL 110MG/55ML
0.5 PATIENT CONTROLLED ANALGESIA SYRINGE INTRAVENOUS EVERY 5 MIN PRN
Status: DISCONTINUED | OUTPATIENT
Start: 2018-02-22 | End: 2018-02-23 | Stop reason: HOSPADM

## 2018-02-22 RX ADMIN — Medication 0.5 MG: at 15:11

## 2018-02-22 RX ADMIN — Medication 0.25 MG: at 15:22

## 2018-02-22 ASSESSMENT — PAIN DESCRIPTION - LOCATION
LOCATION: ARM

## 2018-02-22 ASSESSMENT — PAIN DESCRIPTION - PAIN TYPE
TYPE: SURGICAL PAIN

## 2018-02-22 ASSESSMENT — ENCOUNTER SYMPTOMS
DYSPNEA ACTIVITY LEVEL: AFTER AMBULATING 1 FLIGHT OF STAIRS
SHORTNESS OF BREATH: 1

## 2018-02-22 ASSESSMENT — PAIN DESCRIPTION - ORIENTATION
ORIENTATION: LEFT

## 2018-02-22 ASSESSMENT — PAIN SCALES - GENERAL
PAINLEVEL_OUTOF10: 2
PAINLEVEL_OUTOF10: 7
PAINLEVEL_OUTOF10: 5

## 2018-02-22 ASSESSMENT — PAIN - FUNCTIONAL ASSESSMENT: PAIN_FUNCTIONAL_ASSESSMENT: 0-10

## 2018-02-22 NOTE — ANESTHESIA PRE-OP
needed for Wheezing 2/13/18 2/13/19  Charisma Grover, ALONDRA   doxazosin (CARDURA) 4 MG tablet Take 0.5 tablets by mouth nightly 1/22/18   Evelyn Oviedo MD   hydrALAZINE (APRESOLINE) 25 MG tablet Take 0.5 tablets by mouth every 8 hours 1/22/18   Evelyn Oviedo MD   darbepoetin brii-polysorbate (ARANESP) 100 MCG/ML injection Inject 1 mL into the skin once a week 1/22/18   Evelyn Oviedo MD   ONE TOUCH ULTRA TEST strip USE TO TEST 3-4 TIMES DAILY DUE TO FLUCTUATING SUGAR 11/14/17   Charismaa Govern, ALONDRA   fluticasone-salmeterol (ADVAIR HFA) 230-21 MCG/ACT inhaler Inhale 1 puff into the lungs 2 times daily 11/14/17   Charismaa Govern, ALONDRA   fluticasone (FLONASE) 50 MCG/ACT nasal spray 2 sprays by Nasal route daily as needed for Allergies 11/14/17 Oza Govern, ALONDRA   cetirizine (ZYRTEC) 10 MG tablet Take 0.5 tablets by mouth daily 11/5/17   Amaury Joaquin MD   PROAIR  (64 Base) MCG/ACT inhaler INHALE TWO PUFFS BY MOUTH INTO THE LUNGS EVERY 6 HOURS AS NEEDED FOR WHEEZING 10/27/17   Charismaa Govern, ALONDRA   promethazine (PHENERGAN) 25 MG tablet Take 1 tablet by mouth every 8 hours as needed for Nausea 9/12/17   Charismaa Govern, ALONDRA   nitroGLYCERIN (NITROSTAT) 0.4 MG SL tablet Dissolve 1 tablet under  tongue every 5 minutes as  needed.  Maximum of 3  tablets in 15 minutes 8/17/17 Oza Govern, ALONDRA   ASSURE COMFORT LANCETS 30G MISC USE TO TEST BLOOD GLUCOSE THREE TIMES DAILY 8/15/17   Charisma Lenore, ALONDRA   Alcohol Swabs (ALCOHOL PREP) 70 % PADS USE TO TEST BLOOD GLUCOSE THREE TIMES DAILY 8/15/17   Charisma Lenore, ALONDRA   Blood Glucose Monitoring Suppl (ACCU-CHEK KENNETH SMARTVIEW) w/Device KIT USE TO TEST BLOOD GLUCOSE 8/15/17   Charisma Lenore, ALONDRA   traZODone (DESYREL) 50 MG tablet Take 1 tablet by mouth nightly  Patient taking differently: Take 50 mg by mouth nightly TAKES PRN 8/9/17 Oza Lenore, ALONDRA   RELION SHORT PEN NEEDLES 31G X 8 MM MISC USE THREE TIMES DAILY AS DIRECTED 3/29/17   Ping Gibson mouth 2 times daily (with meals) 60 tablet 3    nebivolol (BYSTOLIC) 10 MG tablet Take 1 tablet by mouth 2 times daily 60 tablet 1    pantoprazole (PROTONIX) 40 MG tablet Take 1 tablet by mouth 2 times daily 60 tablet 5    fluticasone-salmeterol (ADVAIR HFA) 230-21 MCG/ACT inhaler Inhale 2 puffs into the lungs 2 times daily 1 Inhaler 5    albuterol sulfate HFA (PROVENTIL HFA) 108 (90 Base) MCG/ACT inhaler Inhale 2 puffs into the lungs every 6 hours as needed for Wheezing 1 Inhaler 3    doxazosin (CARDURA) 4 MG tablet Take 0.5 tablets by mouth nightly 30 tablet 3    hydrALAZINE (APRESOLINE) 25 MG tablet Take 0.5 tablets by mouth every 8 hours 90 tablet 0    darbepoetin brii-polysorbate (ARANESP) 100 MCG/ML injection Inject 1 mL into the skin once a week 8.4 mL 0    ONE TOUCH ULTRA TEST strip USE TO TEST 3-4 TIMES DAILY DUE TO FLUCTUATING SUGAR 100 strip 5    fluticasone-salmeterol (ADVAIR HFA) 230-21 MCG/ACT inhaler Inhale 1 puff into the lungs 2 times daily 1 Inhaler 11    fluticasone (FLONASE) 50 MCG/ACT nasal spray 2 sprays by Nasal route daily as needed for Allergies 1 Bottle 11    cetirizine (ZYRTEC) 10 MG tablet Take 0.5 tablets by mouth daily 30 tablet 1    PROAIR  (90 Base) MCG/ACT inhaler INHALE TWO PUFFS BY MOUTH INTO THE LUNGS EVERY 6 HOURS AS NEEDED FOR WHEEZING 9 Inhaler 2    promethazine (PHENERGAN) 25 MG tablet Take 1 tablet by mouth every 8 hours as needed for Nausea 30 tablet 2    nitroGLYCERIN (NITROSTAT) 0.4 MG SL tablet Dissolve 1 tablet under  tongue every 5 minutes as  needed.  Maximum of 3  tablets in 15 minutes 75 tablet 0    ASSURE COMFORT LANCETS 30G MISC USE TO TEST BLOOD GLUCOSE THREE TIMES DAILY 300 each 3    Alcohol Swabs (ALCOHOL PREP) 70 % PADS USE TO TEST BLOOD GLUCOSE THREE TIMES DAILY 300 each 3    Blood Glucose Monitoring Suppl (ACCU-CHEK KENNETH SMARTVIEW) w/Device KIT USE TO TEST BLOOD GLUCOSE 1 kit 0    traZODone (DESYREL) 50 MG tablet Take 1 tablet by mouth nightly (Patient taking differently: Take 50 mg by mouth nightly TAKES PRN) 30 tablet 5    RELION SHORT PEN NEEDLES 31G X 8 MM MISC USE THREE TIMES DAILY AS DIRECTED 100 each 5    warfarin (COUMADIN) 7.5 MG tablet Continue f/up with coumadin clinic (Patient taking differently: No sig reported) 30 tablet 3    allopurinol (ZYLOPRIM) 100 MG tablet Take 100 mg by mouth daily      aspirin 81 MG chewable tablet Take 1 tablet by mouth daily (Patient taking differently: Take 81 mg by mouth daily STATES DISCONTINUED BY PCP) 30 tablet 0    BiPAP Machine MISC by Does not apply route nightly      ipratropium-albuterol (DUONEB) 0.5-2.5 (3) MG/3ML SOLN nebulizer solution INHALE 3MLS INTO THE LUNGS EVERY 4 HOURS AS NEEDED 360 vial 3    Spacer/Aero-Holding Chambers VENANCIO 1 Device by Does not apply route daily as needed 1 Device 0    OXYGEN Inhale 2 L into the lungs daily as needed At night prn       Current Facility-Administered Medications   Medication Dose Route Frequency Provider Last Rate Last Dose    ceFAZolin (ANCEF) 3 g in dextrose 5 % 100 mL IVPB  3 g Intravenous On Call to [address] [place] [address], [facility]        fentaNYL (SUBLIMAZE) injection 25 mcg  25 mcg Intravenous Q5 Min PRN Catherin Jensen, DO        fentaNYL (SUBLIMAZE) injection 50 mcg  50 mcg Intravenous Q5 Min PRN Catherin Jensen, DO        HYDROmorphone (DILAUDID) injection 0.25 mg  0.25 mg Intravenous Q5 Min PRN Catherin Jensen, DO        HYDROmorphone (DILAUDID) injection 0.5 mg  0.5 mg Intravenous Q5 Min PRN Catherin Jensen, DO        ondansetron [facility]) injection 4 mg  4 mg Intravenous Once PRN Catherin Jensen, DO           Vital Signs  (Current) There were no vitals filed for this visit.   (for past 48 hrs)  No Data Recorded  (last three values)   BP Readings from Last 3 Encounters:   02/13/18 128/74   01/22/18 95/69   11/21/17 (!) 167/84       CBC  Lab Results   Component Value Date    WBC 7.8 02/15/2018    RBC 3.37 02/15/2018    HGB 10.4 02/15/2018 HCT 33.1 02/15/2018    MCV 98.3 02/15/2018    RDW 22.2 02/15/2018     02/15/2018       CMP    Lab Results   Component Value Date     01/22/2018    K 4.3 01/22/2018    CL 99 01/22/2018    CO2 23 01/22/2018    BUN 19 01/22/2018    CREATININE 6.1 01/22/2018    GFRAA 8 01/22/2018    GFRAA 50 05/12/2013    AGRATIO 0.9 12/25/2017    LABGLOM 7 01/22/2018    GLUCOSE 127 01/22/2018    PROT 6.5 12/25/2017    PROT 6.6 03/05/2013    CALCIUM 9.6 01/22/2018    BILITOT <0.2 12/25/2017    ALKPHOS 101 12/25/2017    AST 17 12/25/2017    ALT 17 12/25/2017       BMP    Lab Results   Component Value Date     01/22/2018    K 4.3 01/22/2018    CL 99 01/22/2018    CO2 23 01/22/2018    BUN 19 01/22/2018    CREATININE 6.1 01/22/2018    CALCIUM 9.6 01/22/2018    GFRAA 8 01/22/2018    GFRAA 50 05/12/2013    LABGLOM 7 01/22/2018    GLUCOSE 127 01/22/2018       Coags   Lab Results   Component Value Date    PROTIME 23.7 02/15/2018    PROTIME 22.8 09/08/2014    INR 2.10 02/15/2018    APTT 27.0 02/15/2018       HCG (If Applicable) No results found for: PREGTESTUR, PREGSERUM, HCG, HCGQUANT     ABGs  Lab Results   Component Value Date    PHART 7.501 01/11/2018    PO2ART 93.9 01/11/2018    DYE7QPE 38.3 01/11/2018    UEF4TII 29.3 01/11/2018    BEART 5.7 01/11/2018    K1QRSBDL 97.9 01/11/2018        Type & Screen (If Applicable)  No results found for: LABABO, LABRH      POCGlucose  No results for input(s): GLUCOSE in the last 72 hours. NPO Status  > 8 hours                       BMI  There is no height or weight on file to calculate BMI. Estimated body mass index is 59.52 kg/m² as calculated from the following:    Height as of 2/15/18: 5' 3\" (1.6 m). Weight as of 2/15/18: 336 lb (152.4 kg).       Additional Testing (Echo, Stress, ECG, PFTs, etc)        Anesthesia Evaluation  Patient summary reviewed and Nursing notes reviewed no history of anesthetic complications:   Airway: Mallampati: IV  TM distance: >3 FB   Neck ROM:

## 2018-02-22 NOTE — BRIEF OP NOTE
Brief Postoperative Note    Maggie Oconnor  YOB: 1955  [id number]    Pre-operative Diagnosis: ESRD    Post-operative Diagnosis: Same    Procedure: left brachial artery axillary vein AV graft    Anesthesia: Nerve Block    Surgeons/Assistants: Zac Gonzalez    Estimated Blood Loss: less than 50     Complications: None    Specimens: Was Not Obtained    Findings: stenotic left cephalic vein proximal to [facility]    Electronically signed by Zainab Arango MD on 2/22/2018 at 2:38 PM

## 2018-02-22 NOTE — OP NOTE
[address]                      [address] [facility], [address] [facility]                                 OPERATIVE REPORT    PATIENT NAME: Trang Martines                    :        1955  MED REC NO:   [id number]                          ROOM:  ACCOUNT NO:   [de-identified]                          ADMIT DATE: 2018  PROVIDER:     Larisa Aguayo II, MD    DATE OF PROCEDURE:  2018        PREPROCEDURE DIAGNOSIS:  End-stage renal disease. POSTPROCEDURE DIAGNOSIS:  End-stage renal disease. PROCEDURE:  Left brachial artery axillary vein AV graft (6 mm probe  patent). SURGEON:  Larisa Aguayo II, MD.    ANESTHESIA:  Nerve block. ESTIMATED BLOOD LOSS:  Minimal.    COMPLICATIONS:  None. INDICATIONS:  The patient is a 41-year-old -American female with  end-stage renal disease, now on hemodialysis with a tunneled dialysis  catheter. She presents for long-term access creation. All risks,  benefits, and alternatives were discussed in detail. All questions were  answered. OPERATIVE PROCEDURE:  After witnessed informed consent was obtained, the  patient was brought to the operating room. A nerve block was performed and  found to be adequate. Her left arm was carefully prepped and draped. A  skin incision was made just proximal left antecubital fossa with a #10  scalpel blade and dissected down through subcutaneous tissue with Bovie  electrocautery. The cephalic vein was identified. There was noted to be  highly fibrotic area in the vein just proximally to the antecubital fossa. The vein was transected  at this point. I was able to take a 2 dilator; however, a 2-1/2 and a 3  dilator were unable to be passed through this location and this vein would  not be suitable for a primary brachiocephalic fistula. As a result, the  decision at this time was then made to proceed with an upper arm AV graft  to allow for long-term access for her.   The

## 2018-02-22 NOTE — ANESTHESIA POST-OP
Anesthesia Post-op Note    Patient:  Tanna Ceron  MRN: [id number]  YOB: 1955  Date of evaluation: 2/22/2018  Time:  4:35 PM     Procedure(s) Performed:     Last Vitals: /80   Pulse 85   Temp 98 °F (36.7 °C) (Temporal)   Resp 10   Ht 5' 3\" (1.6 m)   Wt (!) 336 lb (152.4 kg)   LMP 11/01/1996   SpO2 98%   BMI 59.52 kg/m²     Petra Phase I: Petra Score: 10    Petra Phase II: Petra Score: 10    Anesthesia Post Evaluation    Final anesthesia type: MAC and regional  Patient location during evaluation: PACU  Patient participation: complete - patient participated  Level of consciousness: awake and alert  Pain score: 0  Airway patency: patent  Nausea & Vomiting: no nausea and no vomiting  Complications: no  Cardiovascular status: blood pressure returned to baseline  Respiratory status: acceptable  Hydration status: euvolemic        Stephane Styles DO  4:35 PM

## 2018-02-22 NOTE — H&P
Consultation/History & Physical    Date of Admission:  2/22/2018  Date of Consultation:  2/22/2018    PCP:  Kenyon Holguin CNP     Chief Complaint: ESRD    History of Present Illness: Tanna Ceron is a 61 y.o. female who presents with ESRD in need of access creation. PMH:   has a past medical history of Asthma; CAD (coronary artery disease); Cervical cancer ([address] CHF (congestive heart failure) ([address] Chronic kidney disease, stage IV (severe) (HCC); CKD (chronic kidney disease) stage 4, GFR 15-29 ml/min ([address] Colon polyps; COPD (chronic obstructive pulmonary disease) ([address] Degenerative disc disease at L5-S1 level; Diabetes mellitus, insulin dependent (IDDM), uncontrolled ([address] ESRD (end stage renal disease) on dialysis ([address] GERD (gastroesophageal reflux disease); Gout; History of blood transfusion; History of cervical cancer; Hyperlipidemia; Hypertension; LVH (left ventricular hypertrophy); Morbid obesity ([address] Obstructive sleep apnea on CPAP; Pneumonia; Psychiatric problem; Pulmonary embolism ([address] Type 2 diabetes mellitus with diabetic neuropathy, with long-term current use of insulin ([address] Urinary incontinence in female; and Venous stasis of lower extremity. PSH:   has a past surgical history that includes jennifer and bso (cervix removed) (10/96); Knee arthroscopy (Right, 1999); doppler echocardiography (2/21/09); Colonoscopy (2010); Upper gastrointestinal endoscopy (6/25/13); Colonoscopy (6/25/13, 11/14); Cardiac catheterization (1/14); Hysterectomy; open skull supratent explore; and ventral hernia repair (12/24/2016). Allergies:     Allergies   Allergen Reactions    Codeine Nausea Only    Fentanyl Itching    Morphine      CHEST PAIN    Nsaids Other (See Comments)     Due to CRF    Hydrocodone-Acetaminophen Itching and Rash    Percocet [Oxycodone-Acetaminophen] Itching    Procardia [Nifedipine] Nausea And Vomiting     Headache  Takes norvasc at home 12/22/15    Vicodin KIT USE TO TEST BLOOD GLUCOSE 8/15/17   Jennifer Prince CNP   traZODone (DESYREL) 50 MG tablet Take 1 tablet by mouth nightly  Patient taking differently: Take 50 mg by mouth nightly TAKES PRN 8/9/17   Jennifer Prince CNP   RELION SHORT PEN NEEDLES 31G X 8 MM MISC USE THREE TIMES DAILY AS DIRECTED 3/29/17   Aki Juares MD   BiPAP Machine MISC by Does not apply route nightly    Historical Provider, MD   Spacer/Aero-Holding Chambers VENANCIO 1 Device by Does not apply route daily as needed 3/27/16   Wilda Hernandez CNP   OXYGEN Inhale 2 L into the lungs daily as needed At night prn    Historical Provider, MD        Hospital Meds:    Current Outpatient Prescriptions   Medication Sig Dispense Refill    albuterol sulfate HFA (PROAIR HFA) 108 (90 Base) MCG/ACT inhaler Inhale 2 puffs into the lungs every 6 hours as needed for Wheezing 1 Inhaler 3    lisinopril (PRINIVIL;ZESTRIL) 5 MG tablet Take 1 tablet by mouth daily 30 tablet 5    isosorbide mononitrate (IMDUR) 60 MG extended release tablet Take 1 tablet by mouth once a day 90 tablet 3    gabapentin (NEURONTIN) 100 MG capsule Take 1 capsule by mouth 3 times daily for 180 days.  90 capsule 5    rosuvastatin (CRESTOR) 10 MG tablet Take 1 tablet by mouth  daily 90 tablet 2    insulin detemir (LEVEMIR FLEXTOUCH) 100 UNIT/ML injection pen Inject 30 Units into the skin 2 times daily 75 mL 5    insulin lispro (HUMALOG KWIKPEN) 100 UNIT/ML pen Inject 12 Units into the skin 3 times daily (before meals) 5 pen 5    ferrous sulfate 325 (65 Fe) MG tablet Take 1 tablet by mouth 2 times daily (with meals) 60 tablet 3    nebivolol (BYSTOLIC) 10 MG tablet Take 1 tablet by mouth 2 times daily 60 tablet 1    pantoprazole (PROTONIX) 40 MG tablet Take 1 tablet by mouth 2 times daily 60 tablet 5    fluticasone-salmeterol (ADVAIR HFA) 230-21 MCG/ACT inhaler Inhale 2 puffs into the lungs 2 times daily 1 Inhaler 5    albuterol sulfate HFA (PROVENTIL HFA) 108 (90 Base) MCG/ACT  Smokeless tobacco: Never Used    Alcohol use No    Drug use: No    Sexual activity: Not Asked     Other Topics Concern    None     Social History Narrative    None       Family Histroy:      Family History   Problem Relation Age of Onset    Colon Cancer Father     Diabetes Father     High Blood Pressure Father     Colon Cancer Mother     Diabetes Mother     Colon Cancer Maternal Grandmother     Kidney Cancer Brother     Heart Disease Brother       age 54    High Blood Pressure Brother     High Blood Pressure Sister     High Blood Pressure Maternal Aunt     High Blood Pressure Sister     Heart Disease Sister        Review of Systems:  Review of systems performed and negative with the exception of the above findings        Physical Exam   Vital Signs: /77   Pulse 96   Temp 97.6 °F (36.4 °C) (Temporal)   Resp 18   Ht 5' 3\" (1.6 m)   Wt (!) 336 lb (152.4 kg)   LMP 1996   SpO2 100%   BMI 59.52 kg/m²        Admission Weight: (!) 336 lb (152.4 kg)     General appearance: alert, appears stated age, cooperative and no distress  Head: Normocephalic, without obvious abnormality, atraumatic  Lungs: clear to auscultation bilaterally  Heart: regular rate and rhythm, S1, S2 normal, no murmur, click, rub or gallop  Abdomen: soft, non-tender.  Bowel sounds normal. No masses,  no organomegaly  Extremities: extremities normal, atraumatic, no cyanosis or edema  Pulses:      Labs:    BMP:   Lab Results   Component Value Date     2018    K 4.3 2018    CL 99 2018    CO2 23 2018    PHOS 5.7 2018    BUN 19 2018    CREATININE 6.1 2018      No components found for: GLU  Lab Results   Component Value Date    MG 2.20 2018      CBC:   Lab Results   Component Value Date    WBC 7.8 02/15/2018    HGB 10.4 02/15/2018    HCT 33.1 02/15/2018    MCV 98.3 02/15/2018     02/15/2018      Coagulation:   Lab Results   Component Value Date    INR 2.10

## 2018-02-26 ENCOUNTER — TELEPHONE (OUTPATIENT)
Dept: CARDIOLOGY CLINIC | Age: 63
End: 2018-02-26

## 2018-02-27 NOTE — TELEPHONE ENCOUNTER
Received correspondence from [facility].   They will cover a temporary supply of the following:    Ventolin HFA AER    Would you want to change the medication or start a PA?    (scanned correspondence in epic)

## 2018-03-01 ENCOUNTER — ANTI-COAG VISIT (OUTPATIENT)
Dept: CARDIOLOGY CLINIC | Age: 63
End: 2018-03-01

## 2018-03-01 ENCOUNTER — TELEPHONE (OUTPATIENT)
Dept: CARDIOLOGY CLINIC | Age: 63
End: 2018-03-01

## 2018-03-01 LAB
INR BLD: 1.2
INR BLD: 2.1

## 2018-03-01 NOTE — TELEPHONE ENCOUNTER
Coumadin/PT/INR    PT:13.9    INR:1.2    Where do you have your blood drawn?  (lab/HHRN/Home Monitor)    When was it drawn?today    Why do you take Coumadin/Warfarin? Current medication dosage and instructions? 2mg QD    Have you missed any doses? resumed taking warfarin at the end of last week    Any change in your other medications? Are you taking an antibiotic? Any dietary changes?

## 2018-03-06 ENCOUNTER — TELEPHONE (OUTPATIENT)
Dept: FAMILY MEDICINE CLINIC | Age: 63
End: 2018-03-06

## 2018-03-06 NOTE — TELEPHONE ENCOUNTER
Gisele Horowitz from [address] [place] [address] [phone] patient has declined farther 1 Jhonny Callaway. she  has inproved with strength and endurance. Her oxygen saturation dropped to 86 with activity but able to recover in less than a minute.  Please advise

## 2018-03-06 NOTE — TELEPHONE ENCOUNTER
I spoke with Huy Teixeira from [facility]. Pt has been reluctant and resistant to OT.   Will discontinue d/t patient not participating when they are there

## 2018-03-07 PROBLEM — R07.9 CHEST PAIN: Status: ACTIVE | Noted: 2018-03-07

## 2018-03-10 ENCOUNTER — CARE COORDINATION (OUTPATIENT)
Dept: CASE MANAGEMENT | Age: 63
End: 2018-03-10

## 2018-03-10 DIAGNOSIS — R07.9 CHEST PAIN, UNSPECIFIED TYPE: Primary | ICD-10-CM

## 2018-03-10 PROCEDURE — 1111F DSCHRG MED/CURRENT MED MERGE: CPT | Performed by: NURSE PRACTITIONER

## 2018-03-11 NOTE — CARE COORDINATION
[facility] Transitions Initial Follow Up Call    Call within 2 business days of discharge: Yes    Patient: Titi Craig Patient : 1955   MRN: [id number]  Reason for Admission: CP  Discharge Date: 3/9/18 RARS: [facility] Risk Score: 28     Spoke with: [address]: [facility]    Non-face-to-face services provided:  Obtained and reviewed discharge summary and/or continuity of care documents    Care Transitions 24 Hour Call    Schedule Follow Up Appointment with PCP:  Declined  Do you have any ongoing symptoms?:  No  Do you have a copy of your discharge instructions?:  Yes  Do you have all of your prescriptions and are they filled?:  Yes  Have you been contacted by a [facility] Pharmacist?:  No  Have you scheduled your follow up appointment?:  No (Comment: will make PCP appt on own; has to work around dialysis schedule)  Were you discharged with any Home Care or Post Acute Services:  Yes  Post Acute Services:  [facility]  Patient DME:  Wheelchair, Other, Shower chair, Walker  Patient Home Equipment:  Oxygen, Nebulizer, BiPAP  Do you have support at home?:  Partner/Spouse/SO  Do you feel like you have everything you need to keep you well at home?:  Yes  Are you an active caregiver in your home?:  No  Care Transitions Interventions  No Identified Needs       Pt states doing pretty well, just a little tired. Reviewed all meds, HC will be resuming services. Has an appt with Dr. Sebastien Cobb on 3/20, Pt states she will make own appt with PCP; has to work around  her dialysis schedule.  Agreed to more CTC f/u calls      Follow Up  Future Appointments  Date Time Provider Aziza Barraza   3/20/2018 12:15 PM Hilda Figueredo MD FF VASC/BRONWYN Yuo, RN

## 2018-03-15 ENCOUNTER — CARE COORDINATION (OUTPATIENT)
Dept: CASE MANAGEMENT | Age: 63
End: 2018-03-15

## 2018-03-15 ENCOUNTER — TELEPHONE (OUTPATIENT)
Dept: FAMILY MEDICINE CLINIC | Age: 63
End: 2018-03-15

## 2018-03-15 NOTE — CARE COORDINATION
[address] [facility] Transitions Follow Up Call    3/15/2018    Patient:  Tanna Ceron  Patient : 1955   MRN: [id number]  Reason for Admission: CP  Discharge Date: 3/9/18 RARS: Risk Score: 28       Follow up call attempted, left contact info on vm      Follow Up  Future Appointments  Date Time Provider Aziza Barraza   3/20/2018 12:15 PM Suzie Cooks, MD FF VASC/ENDO LINDA Dawn RN

## 2018-03-16 NOTE — CARE COORDINATION
[facility] Transitions Follow Up Call    3/16/2018    Patient:  [place] A[place]  Patient : 1955   MRN: [id number]  Reason for Admission: CP  Discharge Date: 3/9/18 RARS: Risk Score: 28       2nd attempt at a Follow up call, left contact info on vm    Follow Up  Future Appointments  Date Time Provider Aziza Barraza   3/20/2018 12:15 PM Katerin Gaviria MD FF VASC/ENDO LINDA Solis RN

## 2018-03-20 ENCOUNTER — OFFICE VISIT (OUTPATIENT)
Dept: VASCULAR SURGERY | Age: 63
End: 2018-03-20

## 2018-03-20 VITALS — BODY MASS INDEX: 51.91 KG/M2 | HEIGHT: 63 IN | WEIGHT: 293 LBS

## 2018-03-20 DIAGNOSIS — N18.6 ESRD (END STAGE RENAL DISEASE) ON DIALYSIS (HCC): Primary | ICD-10-CM

## 2018-03-20 DIAGNOSIS — Z99.2 ESRD (END STAGE RENAL DISEASE) ON DIALYSIS (HCC): Primary | ICD-10-CM

## 2018-03-20 PROCEDURE — 99024 POSTOP FOLLOW-UP VISIT: CPT | Performed by: SURGERY

## 2018-03-20 RX ORDER — LIDOCAINE AND PRILOCAINE 25; 25 MG/G; MG/G
CREAM TOPICAL
Qty: 1 TUBE | Refills: 3 | Status: SHIPPED | OUTPATIENT
Start: 2018-03-20 | End: 2018-05-29 | Stop reason: SDUPTHER

## 2018-03-20 NOTE — PROGRESS NOTES
[facility])   Vascular Surgery Followup    Referring Provider:  Guevara Oh CNP     Chief Complaint   Patient presents with    Post-Op Check        History of Present Illness:   40-year-old female here today for postoperative visit following left brachial artery axillary vein AV graft February 22, 2018. She is doing well without complaints. Past Medical History:   has a past medical history of Asthma; CAD (coronary artery disease); Cervical cancer ([address] CHF (congestive heart failure) ([address] Chronic kidney disease, stage IV (severe) (HCC); CKD (chronic kidney disease) stage 4, GFR 15-29 ml/min ([address] Colon polyps; COPD (chronic obstructive pulmonary disease) ([address] Degenerative disc disease at L5-S1 level; Diabetes mellitus, insulin dependent (IDDM), uncontrolled ([address] ESRD (end stage renal disease) on dialysis ([address] GERD (gastroesophageal reflux disease); Gout; History of blood transfusion; History of cervical cancer; Hyperlipidemia; Hypertension; LVH (left ventricular hypertrophy); Morbid obesity ([address] Obstructive sleep apnea on CPAP; Pneumonia; Psychiatric problem; Pulmonary embolism ([address] Type 2 diabetes mellitus with diabetic neuropathy, with long-term current use of insulin ([address] Urinary incontinence in female; and Venous stasis of lower extremity. Surgical History:   has a past surgical history that includes jennifer and bso (cervix removed) (10/96); Knee arthroscopy (Right, 1999); doppler echocardiography (2/21/09); Colonoscopy (2010); Upper gastrointestinal endoscopy (6/25/13); Colonoscopy (6/25/13, 11/14); Cardiac catheterization (1/14); Hysterectomy; pr open skull supratent explore; ventral hernia repair (12/24/2016); and other surgical history (02/22/2018). Social History:   reports that she has never smoked. She has never used smokeless tobacco. She reports that she does not drink alcohol or use drugs.      Family History:  family history includes Colon Cancer in her father, maternal

## 2018-03-21 ENCOUNTER — TELEPHONE (OUTPATIENT)
Dept: SLEEP MEDICINE | Age: 63
End: 2018-03-21

## 2018-03-22 NOTE — TELEPHONE ENCOUNTER
Spoke to pt and scheduled her an appt with Dona on 4/3/18 at [address] [place] [address], advised her to bring her machine.

## 2018-03-27 ENCOUNTER — OFFICE VISIT (OUTPATIENT)
Dept: FAMILY MEDICINE CLINIC | Age: 63
End: 2018-03-27

## 2018-03-27 ENCOUNTER — TELEPHONE (OUTPATIENT)
Dept: CARDIOLOGY CLINIC | Age: 63
End: 2018-03-27

## 2018-03-27 ENCOUNTER — ANTI-COAG VISIT (OUTPATIENT)
Dept: CARDIOLOGY CLINIC | Age: 63
End: 2018-03-27

## 2018-03-27 VITALS
WEIGHT: 293 LBS | SYSTOLIC BLOOD PRESSURE: 110 MMHG | DIASTOLIC BLOOD PRESSURE: 70 MMHG | OXYGEN SATURATION: 96 % | RESPIRATION RATE: 16 BRPM | BODY MASS INDEX: 59.45 KG/M2 | HEART RATE: 94 BPM

## 2018-03-27 DIAGNOSIS — I95.3 HEMODIALYSIS-ASSOCIATED HYPOTENSION: Primary | ICD-10-CM

## 2018-03-27 DIAGNOSIS — Z12.39 SCREENING FOR BREAST CANCER: ICD-10-CM

## 2018-03-27 DIAGNOSIS — T82.9XXA COMPLICATION OF ARTERIOVENOUS DIALYSIS FISTULA, INITIAL ENCOUNTER: ICD-10-CM

## 2018-03-27 DIAGNOSIS — B36.9 FUNGAL DERMATITIS: ICD-10-CM

## 2018-03-27 DIAGNOSIS — G89.4 CHRONIC PAIN SYNDROME: ICD-10-CM

## 2018-03-27 DIAGNOSIS — Z99.2 ESRD (END STAGE RENAL DISEASE) ON DIALYSIS (HCC): ICD-10-CM

## 2018-03-27 DIAGNOSIS — N18.6 ESRD (END STAGE RENAL DISEASE) ON DIALYSIS (HCC): ICD-10-CM

## 2018-03-27 LAB — INR BLD: 1.3

## 2018-03-27 PROCEDURE — 3017F COLORECTAL CA SCREEN DOC REV: CPT | Performed by: NURSE PRACTITIONER

## 2018-03-27 PROCEDURE — 1036F TOBACCO NON-USER: CPT | Performed by: NURSE PRACTITIONER

## 2018-03-27 PROCEDURE — G8427 DOCREV CUR MEDS BY ELIG CLIN: HCPCS | Performed by: NURSE PRACTITIONER

## 2018-03-27 PROCEDURE — G8417 CALC BMI ABV UP PARAM F/U: HCPCS | Performed by: NURSE PRACTITIONER

## 2018-03-27 PROCEDURE — G8482 FLU IMMUNIZE ORDER/ADMIN: HCPCS | Performed by: NURSE PRACTITIONER

## 2018-03-27 PROCEDURE — 1111F DSCHRG MED/CURRENT MED MERGE: CPT | Performed by: NURSE PRACTITIONER

## 2018-03-27 PROCEDURE — 3014F SCREEN MAMMO DOC REV: CPT | Performed by: NURSE PRACTITIONER

## 2018-03-27 PROCEDURE — 99214 OFFICE O/P EST MOD 30 MIN: CPT | Performed by: NURSE PRACTITIONER

## 2018-03-27 RX ORDER — HYDROMORPHONE HYDROCHLORIDE 2 MG/1
2 TABLET ORAL 3 TIMES DAILY PRN
Qty: 20 TABLET | Refills: 0 | Status: SHIPPED | OUTPATIENT
Start: 2018-03-27 | End: 2018-04-26

## 2018-03-27 RX ORDER — CALCITRIOL 0.25 UG/1
0.25 CAPSULE, LIQUID FILLED ORAL DAILY
Status: ON HOLD | COMMUNITY
End: 2018-07-10 | Stop reason: HOSPADM

## 2018-03-27 RX ORDER — WARFARIN SODIUM 7.5 MG/1
TABLET ORAL
Qty: 30 TABLET | Refills: 3 | Status: ON HOLD | OUTPATIENT
Start: 2018-03-27 | End: 2019-05-30 | Stop reason: HOSPADM

## 2018-03-27 RX ORDER — KETOCONAZOLE 20 MG/G
CREAM TOPICAL
Qty: 60 G | Refills: 1 | Status: SHIPPED | OUTPATIENT
Start: 2018-03-27 | End: 2018-09-14 | Stop reason: ALTCHOICE

## 2018-03-27 ASSESSMENT — ENCOUNTER SYMPTOMS
DIARRHEA: 0
SHORTNESS OF BREATH: 0
VOMITING: 0
ABDOMINAL PAIN: 0
BACK PAIN: 1
ORTHOPNEA: 0
NAUSEA: 0

## 2018-03-27 NOTE — PROGRESS NOTES
mouth once a day 90 tablet 3    gabapentin (NEURONTIN) 100 MG capsule Take 1 capsule by mouth 3 times daily for 180 days. 90 capsule 5    rosuvastatin (CRESTOR) 10 MG tablet Take 1 tablet by mouth  daily 90 tablet 2    insulin detemir (LEVEMIR FLEXTOUCH) 100 UNIT/ML injection pen Inject 30 Units into the skin 2 times daily 75 mL 5    insulin lispro (HUMALOG KWIKPEN) 100 UNIT/ML pen Inject 12 Units into the skin 3 times daily (before meals) 5 pen 5    ferrous sulfate 325 (65 Fe) MG tablet Take 1 tablet by mouth 2 times daily (with meals) 60 tablet 3    nebivolol (BYSTOLIC) 10 MG tablet Take 1 tablet by mouth 2 times daily 60 tablet 1    pantoprazole (PROTONIX) 40 MG tablet Take 1 tablet by mouth 2 times daily 60 tablet 5    albuterol sulfate HFA (PROVENTIL HFA) 108 (90 Base) MCG/ACT inhaler Inhale 2 puffs into the lungs every 6 hours as needed for Wheezing 1 Inhaler 3    doxazosin (CARDURA) 4 MG tablet Take 0.5 tablets by mouth nightly 30 tablet 3    hydrALAZINE (APRESOLINE) 25 MG tablet Take 0.5 tablets by mouth every 8 hours 90 tablet 0    darbepoetin brii-polysorbate (ARANESP) 100 MCG/ML injection Inject 1 mL into the skin once a week 8.4 mL 0    ONE TOUCH ULTRA TEST strip USE TO TEST 3-4 TIMES DAILY DUE TO FLUCTUATING SUGAR 100 strip 5    fluticasone-salmeterol (ADVAIR HFA) 230-21 MCG/ACT inhaler Inhale 1 puff into the lungs 2 times daily 1 Inhaler 11    fluticasone (FLONASE) 50 MCG/ACT nasal spray 2 sprays by Nasal route daily as needed for Allergies 1 Bottle 11    cetirizine (ZYRTEC) 10 MG tablet Take 0.5 tablets by mouth daily 30 tablet 1    PROAIR  (90 Base) MCG/ACT inhaler INHALE TWO PUFFS BY MOUTH INTO THE LUNGS EVERY 6 HOURS AS NEEDED FOR WHEEZING 9 Inhaler 2    promethazine (PHENERGAN) 25 MG tablet Take 1 tablet by mouth every 8 hours as needed for Nausea 30 tablet 2    nitroGLYCERIN (NITROSTAT) 0.4 MG SL tablet Dissolve 1 tablet under  tongue every 5 minutes as  needed.  Maximum and malaise/fatigue. Respiratory: Negative for shortness of breath. Cardiovascular: Negative for chest pain, palpitations, orthopnea and leg swelling. Hypotension with dialysis     Gastrointestinal: Negative for abdominal pain, diarrhea, nausea and vomiting. Musculoskeletal: Positive for back pain (chronic) and joint pain (B, chronic). Negative for myalgias. Skin:        Large painful hematoma to dialysis graft    Red rash to groin     Neurological: Negative for dizziness and headaches. Endo/Heme/Allergies: Negative for polydipsia. I have reviewed the patient's medical history in detail and updated the computerized patient record as appropriate. Physical Exam   Constitutional: She is oriented to person, place, and time. She appears well-developed and well-nourished. Neck: Carotid bruit is not present. Cardiovascular: Normal rate, regular rhythm and normal heart sounds. No murmur heard. Pulses:       Radial pulses are 2+ on the right side, and 2+ on the left side. Pulmonary/Chest: Effort normal and breath sounds normal.   Abdominal: Soft. Normal appearance and bowel sounds are normal. There is no tenderness. There is no rigidity, no rebound and no guarding. Neurological: She is alert and oriented to person, place, and time. She has normal reflexes. Skin: Skin is warm and dry. Large hematoma to left upper, medial arm where graft is. approx 3 cm in diameter, TTP. Slight warmth, no erythema. Unable to obtain pulse with graft   Vitals reviewed. /70 (Site: Right Arm, Position: Sitting, Cuff Size: Small Adult) Comment (Site): forearm  Pulse 94   Resp 16   Wt (!) 335 lb 9.6 oz (152.2 kg)   LMP 11/01/1996   SpO2 96%   BMI 59.45 kg/m²       ASSESSMENT:  1. Hemodialysis-associated hypotension    2. ESRD (end stage renal disease) on dialysis ([facility]    3. Chronic pain syndrome    4. Fungal dermatitis    5. Screening for breast cancer    6.  Complication of arteriovenous

## 2018-03-27 NOTE — PATIENT INSTRUCTIONS
doctor about stop-smoking programs and medicines. These can increase your chances of quitting for good. · Limit alcohol to 2 drinks a day for men and 1 drink a day for women. Alcohol may interfere with your medicine. In addition, alcohol can make your low blood pressure worse by causing your body to lose water. When should you call for help? Call 911 anytime you think you may need emergency care. For example, call if:  ? · You passed out (lost consciousness). ?Call your doctor now or seek immediate medical care if:  ? · You are dizzy or lightheaded, or you feel like you may faint. ? Watch closely for changes in your health, and be sure to contact your doctor if you have any problems. Where can you learn more? Go to https://[email or web address]. org and sign in to your [employer] account. Enter C304 in the UserZoom box to learn more about \"Low Blood Pressure: Care Instructions. \"     If you do not have an account, please click on the \"Sign Up Now\" link. Current as of: September 21, 2016  Content Version: 11.5  © 6326-2334 Healthwise, Incorporated. Care instructions adapted under license by [facility] ([facility]). If you have questions about a medical condition or this instruction, always ask your healthcare professional. [address]  any warranty or liability for your use of this information.

## 2018-03-28 ENCOUNTER — CARE COORDINATION (OUTPATIENT)
Dept: CASE MANAGEMENT | Age: 63
End: 2018-03-28

## 2018-03-29 ENCOUNTER — TELEPHONE (OUTPATIENT)
Dept: FAMILY MEDICINE CLINIC | Age: 63
End: 2018-03-29

## 2018-03-29 RX ORDER — SULFAMETHOXAZOLE AND TRIMETHOPRIM 800; 160 MG/1; MG/1
1 TABLET ORAL 2 TIMES DAILY
Qty: 20 TABLET | Refills: 0 | Status: SHIPPED | OUTPATIENT
Start: 2018-03-29 | End: 2018-04-08

## 2018-04-03 ENCOUNTER — ANTI-COAG VISIT (OUTPATIENT)
Dept: CARDIOLOGY CLINIC | Age: 63
End: 2018-04-03

## 2018-04-03 ENCOUNTER — TELEPHONE (OUTPATIENT)
Dept: CARDIOLOGY CLINIC | Age: 63
End: 2018-04-03

## 2018-04-03 ENCOUNTER — OFFICE VISIT (OUTPATIENT)
Dept: SLEEP MEDICINE | Age: 63
End: 2018-04-03

## 2018-04-03 VITALS
WEIGHT: 293 LBS | SYSTOLIC BLOOD PRESSURE: 130 MMHG | HEIGHT: 63 IN | DIASTOLIC BLOOD PRESSURE: 74 MMHG | HEART RATE: 80 BPM | BODY MASS INDEX: 51.91 KG/M2 | OXYGEN SATURATION: 96 %

## 2018-04-03 DIAGNOSIS — J44.9 COPD, MODERATE (HCC): Chronic | ICD-10-CM

## 2018-04-03 DIAGNOSIS — I10 ESSENTIAL HYPERTENSION: Chronic | ICD-10-CM

## 2018-04-03 DIAGNOSIS — I50.32 DIASTOLIC CHF, CHRONIC (HCC): Chronic | ICD-10-CM

## 2018-04-03 DIAGNOSIS — G47.33 OSA (OBSTRUCTIVE SLEEP APNEA): Primary | Chronic | ICD-10-CM

## 2018-04-03 LAB — INR BLD: 4.4

## 2018-04-03 PROCEDURE — 3023F SPIROM DOC REV: CPT | Performed by: NURSE PRACTITIONER

## 2018-04-03 PROCEDURE — G8417 CALC BMI ABV UP PARAM F/U: HCPCS | Performed by: NURSE PRACTITIONER

## 2018-04-03 PROCEDURE — 3014F SCREEN MAMMO DOC REV: CPT | Performed by: NURSE PRACTITIONER

## 2018-04-03 PROCEDURE — 1036F TOBACCO NON-USER: CPT | Performed by: NURSE PRACTITIONER

## 2018-04-03 PROCEDURE — 99214 OFFICE O/P EST MOD 30 MIN: CPT | Performed by: NURSE PRACTITIONER

## 2018-04-03 PROCEDURE — 1111F DSCHRG MED/CURRENT MED MERGE: CPT | Performed by: NURSE PRACTITIONER

## 2018-04-03 PROCEDURE — G8926 SPIRO NO PERF OR DOC: HCPCS | Performed by: NURSE PRACTITIONER

## 2018-04-03 PROCEDURE — 3017F COLORECTAL CA SCREEN DOC REV: CPT | Performed by: NURSE PRACTITIONER

## 2018-04-03 PROCEDURE — G8427 DOCREV CUR MEDS BY ELIG CLIN: HCPCS | Performed by: NURSE PRACTITIONER

## 2018-04-03 ASSESSMENT — SLEEP AND FATIGUE QUESTIONNAIRES
HOW LIKELY ARE YOU TO NOD OFF OR FALL ASLEEP WHILE SITTING QUIETLY AFTER LUNCH WITHOUT ALCOHOL: 3
HOW LIKELY ARE YOU TO NOD OFF OR FALL ASLEEP WHILE SITTING AND READING: 3
ESS TOTAL SCORE: 17
HOW LIKELY ARE YOU TO NOD OFF OR FALL ASLEEP WHEN YOU ARE A PASSENGER IN A CAR FOR AN HOUR WITHOUT A BREAK: 1
HOW LIKELY ARE YOU TO NOD OFF OR FALL ASLEEP WHILE SITTING AND TALKING TO SOMEONE: 2
HOW LIKELY ARE YOU TO NOD OFF OR FALL ASLEEP WHILE WATCHING TV: 3
HOW LIKELY ARE YOU TO NOD OFF OR FALL ASLEEP WHILE SITTING INACTIVE IN A PUBLIC PLACE: 2
HOW LIKELY ARE YOU TO NOD OFF OR FALL ASLEEP IN A CAR, WHILE STOPPED FOR A FEW MINUTES IN TRAFFIC: 0
HOW LIKELY ARE YOU TO NOD OFF OR FALL ASLEEP WHILE LYING DOWN TO REST IN THE AFTERNOON WHEN CIRCUMSTANCES PERMIT: 3

## 2018-04-03 ASSESSMENT — ENCOUNTER SYMPTOMS
COUGH: 0
APNEA: 0
SHORTNESS OF BREATH: 0
ABDOMINAL PAIN: 0
ABDOMINAL DISTENTION: 0
RHINORRHEA: 0
SINUS PRESSURE: 1

## 2018-04-05 ENCOUNTER — TELEPHONE (OUTPATIENT)
Dept: CARDIOLOGY CLINIC | Age: 63
End: 2018-04-05

## 2018-04-05 ENCOUNTER — ANTI-COAG VISIT (OUTPATIENT)
Dept: CARDIOLOGY CLINIC | Age: 63
End: 2018-04-05

## 2018-04-05 ENCOUNTER — TELEPHONE (OUTPATIENT)
Dept: SLEEP MEDICINE | Age: 63
End: 2018-04-05

## 2018-04-05 LAB — INR BLD: 3.5

## 2018-04-06 ENCOUNTER — TELEPHONE (OUTPATIENT)
Dept: SLEEP MEDICINE | Age: 63
End: 2018-04-06

## 2018-04-10 ENCOUNTER — TELEPHONE (OUTPATIENT)
Dept: CARDIOLOGY CLINIC | Age: 63
End: 2018-04-10

## 2018-04-10 ENCOUNTER — ANTI-COAG VISIT (OUTPATIENT)
Dept: CARDIOLOGY CLINIC | Age: 63
End: 2018-04-10

## 2018-04-10 DIAGNOSIS — I50.33 ACUTE ON CHRONIC DIASTOLIC CONGESTIVE HEART FAILURE (HCC): ICD-10-CM

## 2018-04-10 LAB — INR BLD: 2.5

## 2018-04-10 RX ORDER — LISINOPRIL 5 MG/1
5 TABLET ORAL DAILY
Qty: 90 TABLET | Refills: 3 | Status: SHIPPED | OUTPATIENT
Start: 2018-04-10 | End: 2018-04-17

## 2018-04-11 ENCOUNTER — TELEPHONE (OUTPATIENT)
Dept: SLEEP MEDICINE | Age: 63
End: 2018-04-11

## 2018-04-11 ENCOUNTER — TELEPHONE (OUTPATIENT)
Dept: FAMILY MEDICINE CLINIC | Age: 63
End: 2018-04-11

## 2018-04-11 RX ORDER — HYDRALAZINE HYDROCHLORIDE 50 MG/1
TABLET, FILM COATED ORAL
Qty: 270 TABLET | Refills: 1 | Status: ON HOLD | OUTPATIENT
Start: 2018-04-11 | End: 2018-05-17 | Stop reason: HOSPADM

## 2018-04-11 RX ORDER — NITROGLYCERIN 0.4 MG/1
TABLET SUBLINGUAL
Qty: 75 TABLET | Refills: 1 | Status: SHIPPED | OUTPATIENT
Start: 2018-04-11

## 2018-04-13 ENCOUNTER — TELEPHONE (OUTPATIENT)
Dept: FAMILY MEDICINE CLINIC | Age: 63
End: 2018-04-13

## 2018-04-17 ENCOUNTER — OFFICE VISIT (OUTPATIENT)
Dept: FAMILY MEDICINE CLINIC | Age: 63
End: 2018-04-17

## 2018-04-17 VITALS
SYSTOLIC BLOOD PRESSURE: 124 MMHG | HEART RATE: 85 BPM | OXYGEN SATURATION: 97 % | DIASTOLIC BLOOD PRESSURE: 84 MMHG | BODY MASS INDEX: 57.15 KG/M2 | RESPIRATION RATE: 16 BRPM | WEIGHT: 293 LBS

## 2018-04-17 DIAGNOSIS — L29.9 PRURITUS: ICD-10-CM

## 2018-04-17 DIAGNOSIS — I95.3 HEMODIALYSIS-ASSOCIATED HYPOTENSION: Primary | ICD-10-CM

## 2018-04-17 DIAGNOSIS — E16.2 HYPOGLYCEMIA: ICD-10-CM

## 2018-04-17 PROCEDURE — 1036F TOBACCO NON-USER: CPT | Performed by: NURSE PRACTITIONER

## 2018-04-17 PROCEDURE — 3014F SCREEN MAMMO DOC REV: CPT | Performed by: NURSE PRACTITIONER

## 2018-04-17 PROCEDURE — 3017F COLORECTAL CA SCREEN DOC REV: CPT | Performed by: NURSE PRACTITIONER

## 2018-04-17 PROCEDURE — G8427 DOCREV CUR MEDS BY ELIG CLIN: HCPCS | Performed by: NURSE PRACTITIONER

## 2018-04-17 PROCEDURE — 99214 OFFICE O/P EST MOD 30 MIN: CPT | Performed by: NURSE PRACTITIONER

## 2018-04-17 PROCEDURE — G8417 CALC BMI ABV UP PARAM F/U: HCPCS | Performed by: NURSE PRACTITIONER

## 2018-04-17 RX ORDER — PEN NEEDLE, DIABETIC 29 G X1/2"
NEEDLE, DISPOSABLE MISCELLANEOUS
Qty: 100 EACH | Refills: 5 | Status: SHIPPED | OUTPATIENT
Start: 2018-04-17

## 2018-04-17 ASSESSMENT — PATIENT HEALTH QUESTIONNAIRE - PHQ9
2. FEELING DOWN, DEPRESSED OR HOPELESS: 0
SUM OF ALL RESPONSES TO PHQ QUESTIONS 1-9: 0
1. LITTLE INTEREST OR PLEASURE IN DOING THINGS: 0
SUM OF ALL RESPONSES TO PHQ9 QUESTIONS 1 & 2: 0

## 2018-04-17 ASSESSMENT — ENCOUNTER SYMPTOMS
SHORTNESS OF BREATH: 0
ORTHOPNEA: 0

## 2018-04-25 ENCOUNTER — TELEPHONE (OUTPATIENT)
Dept: FAMILY MEDICINE CLINIC | Age: 63
End: 2018-04-25

## 2018-04-25 DIAGNOSIS — N18.6 ESRD (END STAGE RENAL DISEASE) ON DIALYSIS (HCC): ICD-10-CM

## 2018-04-25 DIAGNOSIS — J44.9 COPD, MODERATE (HCC): ICD-10-CM

## 2018-04-25 DIAGNOSIS — I50.31 ACUTE DIASTOLIC CHF (CONGESTIVE HEART FAILURE) (HCC): ICD-10-CM

## 2018-04-25 DIAGNOSIS — J96.11 CHRONIC RESPIRATORY FAILURE WITH HYPOXIA (HCC): ICD-10-CM

## 2018-04-25 DIAGNOSIS — E11.65 UNCONTROLLED TYPE 2 DIABETES MELLITUS WITH OTHER DIABETIC KIDNEY COMPLICATION, UNSPECIFIED LONG TERM INSULIN USE STATUS: Primary | ICD-10-CM

## 2018-04-25 DIAGNOSIS — E66.01 MORBID OBESITY WITH BMI OF 60.0-69.9, ADULT (HCC): ICD-10-CM

## 2018-04-25 DIAGNOSIS — E11.29 UNCONTROLLED TYPE 2 DIABETES MELLITUS WITH OTHER DIABETIC KIDNEY COMPLICATION, UNSPECIFIED LONG TERM INSULIN USE STATUS: Primary | ICD-10-CM

## 2018-04-25 DIAGNOSIS — Z99.2 ESRD (END STAGE RENAL DISEASE) ON DIALYSIS (HCC): ICD-10-CM

## 2018-04-25 PROBLEM — R07.9 CHEST PAIN: Status: RESOLVED | Noted: 2018-03-07 | Resolved: 2018-04-25

## 2018-04-25 PROBLEM — R79.89 ELEVATED TROPONIN: Status: RESOLVED | Noted: 2017-09-09 | Resolved: 2018-04-25

## 2018-04-25 PROBLEM — N18.9 CHRONIC KIDNEY DISEASE: Status: RESOLVED | Noted: 2017-06-30 | Resolved: 2018-04-25

## 2018-04-25 PROBLEM — R77.8 ELEVATED TROPONIN: Status: RESOLVED | Noted: 2017-09-09 | Resolved: 2018-04-25

## 2018-04-26 ENCOUNTER — ANTI-COAG VISIT (OUTPATIENT)
Dept: CARDIOLOGY CLINIC | Age: 63
End: 2018-04-26

## 2018-04-26 ENCOUNTER — TELEPHONE (OUTPATIENT)
Dept: CARDIOLOGY CLINIC | Age: 63
End: 2018-04-26

## 2018-04-26 LAB — INR BLD: 2.3

## 2018-05-01 ENCOUNTER — CARE COORDINATION (OUTPATIENT)
Dept: CARE COORDINATION | Age: 63
End: 2018-05-01

## 2018-05-01 ENCOUNTER — OFFICE VISIT (OUTPATIENT)
Dept: FAMILY MEDICINE CLINIC | Age: 63
End: 2018-05-01

## 2018-05-01 VITALS
HEART RATE: 90 BPM | BODY MASS INDEX: 59.98 KG/M2 | DIASTOLIC BLOOD PRESSURE: 74 MMHG | SYSTOLIC BLOOD PRESSURE: 118 MMHG | OXYGEN SATURATION: 97 % | RESPIRATION RATE: 16 BRPM | WEIGHT: 293 LBS

## 2018-05-01 DIAGNOSIS — Z99.2 ESRD (END STAGE RENAL DISEASE) ON DIALYSIS (HCC): Primary | ICD-10-CM

## 2018-05-01 DIAGNOSIS — E16.2 HYPOGLYCEMIA: ICD-10-CM

## 2018-05-01 DIAGNOSIS — F51.01 PRIMARY INSOMNIA: ICD-10-CM

## 2018-05-01 DIAGNOSIS — I95.3 HEMODIALYSIS-ASSOCIATED HYPOTENSION: ICD-10-CM

## 2018-05-01 DIAGNOSIS — N18.6 ESRD (END STAGE RENAL DISEASE) ON DIALYSIS (HCC): Primary | ICD-10-CM

## 2018-05-01 PROCEDURE — G8417 CALC BMI ABV UP PARAM F/U: HCPCS | Performed by: NURSE PRACTITIONER

## 2018-05-01 PROCEDURE — 3017F COLORECTAL CA SCREEN DOC REV: CPT | Performed by: NURSE PRACTITIONER

## 2018-05-01 PROCEDURE — 1036F TOBACCO NON-USER: CPT | Performed by: NURSE PRACTITIONER

## 2018-05-01 PROCEDURE — G8427 DOCREV CUR MEDS BY ELIG CLIN: HCPCS | Performed by: NURSE PRACTITIONER

## 2018-05-01 PROCEDURE — 99214 OFFICE O/P EST MOD 30 MIN: CPT | Performed by: NURSE PRACTITIONER

## 2018-05-01 RX ORDER — TRAZODONE HYDROCHLORIDE 100 MG/1
100 TABLET ORAL NIGHTLY
Qty: 30 TABLET | Refills: 5 | Status: SHIPPED | OUTPATIENT
Start: 2018-05-01 | End: 2019-05-03 | Stop reason: SDUPTHER

## 2018-05-01 ASSESSMENT — ENCOUNTER SYMPTOMS
SHORTNESS OF BREATH: 0
ORTHOPNEA: 0

## 2018-05-03 ENCOUNTER — TELEPHONE (OUTPATIENT)
Dept: CARDIOLOGY CLINIC | Age: 63
End: 2018-05-03

## 2018-05-03 ENCOUNTER — ANTI-COAG VISIT (OUTPATIENT)
Dept: CARDIOLOGY CLINIC | Age: 63
End: 2018-05-03

## 2018-05-03 LAB — INR BLD: 1.8

## 2018-05-10 ENCOUNTER — ANTI-COAG VISIT (OUTPATIENT)
Dept: CARDIOLOGY CLINIC | Age: 63
End: 2018-05-10

## 2018-05-10 ENCOUNTER — TELEPHONE (OUTPATIENT)
Dept: CARDIOLOGY CLINIC | Age: 63
End: 2018-05-10

## 2018-05-10 LAB — INR BLD: 4.1

## 2018-05-11 ENCOUNTER — TELEPHONE (OUTPATIENT)
Dept: FAMILY MEDICINE CLINIC | Age: 63
End: 2018-05-11

## 2018-05-16 PROBLEM — R07.9 CHEST PAIN: Status: ACTIVE | Noted: 2018-05-16

## 2018-05-17 ENCOUNTER — TELEPHONE (OUTPATIENT)
Dept: FAMILY MEDICINE CLINIC | Age: 63
End: 2018-05-17

## 2018-05-18 ENCOUNTER — CARE COORDINATION (OUTPATIENT)
Dept: CASE MANAGEMENT | Age: 63
End: 2018-05-18

## 2018-05-18 DIAGNOSIS — R07.9 CHEST PAIN, UNSPECIFIED TYPE: Primary | ICD-10-CM

## 2018-05-18 PROCEDURE — 1111F DSCHRG MED/CURRENT MED MERGE: CPT | Performed by: NURSE PRACTITIONER

## 2018-05-22 ENCOUNTER — ANTI-COAG VISIT (OUTPATIENT)
Dept: CARDIOLOGY CLINIC | Age: 63
End: 2018-05-22

## 2018-05-22 ENCOUNTER — TELEPHONE (OUTPATIENT)
Dept: CARDIOLOGY CLINIC | Age: 63
End: 2018-05-22

## 2018-05-22 LAB — INR BLD: 1.8

## 2018-05-23 ENCOUNTER — CARE COORDINATION (OUTPATIENT)
Dept: CASE MANAGEMENT | Age: 63
End: 2018-05-23

## 2018-05-23 ENCOUNTER — ANTI-COAG VISIT (OUTPATIENT)
Dept: CARDIOLOGY CLINIC | Age: 63
End: 2018-05-23

## 2018-05-29 ENCOUNTER — ANTI-COAG VISIT (OUTPATIENT)
Dept: CARDIOLOGY CLINIC | Age: 63
End: 2018-05-29

## 2018-05-29 ENCOUNTER — TELEPHONE (OUTPATIENT)
Dept: CARDIOLOGY CLINIC | Age: 63
End: 2018-05-29

## 2018-05-29 ENCOUNTER — OFFICE VISIT (OUTPATIENT)
Dept: FAMILY MEDICINE CLINIC | Age: 63
End: 2018-05-29

## 2018-05-29 VITALS
HEIGHT: 63 IN | BODY MASS INDEX: 51.91 KG/M2 | OXYGEN SATURATION: 99 % | SYSTOLIC BLOOD PRESSURE: 132 MMHG | WEIGHT: 293 LBS | DIASTOLIC BLOOD PRESSURE: 80 MMHG | HEART RATE: 69 BPM

## 2018-05-29 DIAGNOSIS — I10 ESSENTIAL HYPERTENSION, MALIGNANT: Primary | ICD-10-CM

## 2018-05-29 DIAGNOSIS — I95.3 HEMODIALYSIS-ASSOCIATED HYPOTENSION: ICD-10-CM

## 2018-05-29 DIAGNOSIS — Z09 HOSPITAL DISCHARGE FOLLOW-UP: ICD-10-CM

## 2018-05-29 LAB — INR BLD: 2.8

## 2018-05-29 PROCEDURE — 1036F TOBACCO NON-USER: CPT | Performed by: NURSE PRACTITIONER

## 2018-05-29 PROCEDURE — 99214 OFFICE O/P EST MOD 30 MIN: CPT | Performed by: NURSE PRACTITIONER

## 2018-05-29 PROCEDURE — 3017F COLORECTAL CA SCREEN DOC REV: CPT | Performed by: NURSE PRACTITIONER

## 2018-05-29 PROCEDURE — 2022F DILAT RTA XM EVC RTNOPTHY: CPT | Performed by: NURSE PRACTITIONER

## 2018-05-29 PROCEDURE — G8427 DOCREV CUR MEDS BY ELIG CLIN: HCPCS | Performed by: NURSE PRACTITIONER

## 2018-05-29 PROCEDURE — 3044F HG A1C LEVEL LT 7.0%: CPT | Performed by: NURSE PRACTITIONER

## 2018-05-29 PROCEDURE — G8417 CALC BMI ABV UP PARAM F/U: HCPCS | Performed by: NURSE PRACTITIONER

## 2018-05-29 RX ORDER — LIDOCAINE AND PRILOCAINE 25; 25 MG/G; MG/G
CREAM TOPICAL
Qty: 1 TUBE | Refills: 5 | Status: SHIPPED | OUTPATIENT
Start: 2018-05-29 | End: 2019-03-05 | Stop reason: SDUPTHER

## 2018-05-29 ASSESSMENT — ENCOUNTER SYMPTOMS
SHORTNESS OF BREATH: 0
ORTHOPNEA: 0

## 2018-05-30 ENCOUNTER — CARE COORDINATION (OUTPATIENT)
Dept: CASE MANAGEMENT | Age: 63
End: 2018-05-30

## 2018-06-05 ENCOUNTER — TELEPHONE (OUTPATIENT)
Dept: CARDIOLOGY CLINIC | Age: 63
End: 2018-06-05

## 2018-06-05 ENCOUNTER — ANTI-COAG VISIT (OUTPATIENT)
Dept: CARDIOLOGY CLINIC | Age: 63
End: 2018-06-05

## 2018-06-05 LAB — INR BLD: 2.9

## 2018-06-11 ENCOUNTER — TELEPHONE (OUTPATIENT)
Dept: FAMILY MEDICINE CLINIC | Age: 63
End: 2018-06-11

## 2018-06-12 ENCOUNTER — TELEPHONE (OUTPATIENT)
Dept: CARDIOLOGY CLINIC | Age: 63
End: 2018-06-12

## 2018-06-12 ENCOUNTER — TELEPHONE (OUTPATIENT)
Dept: FAMILY MEDICINE CLINIC | Age: 63
End: 2018-06-12

## 2018-06-12 LAB — INR BLD: 4

## 2018-06-13 ENCOUNTER — TELEPHONE (OUTPATIENT)
Dept: FAMILY MEDICINE CLINIC | Age: 63
End: 2018-06-13

## 2018-06-13 ENCOUNTER — ANTI-COAG VISIT (OUTPATIENT)
Dept: CARDIOLOGY CLINIC | Age: 63
End: 2018-06-13

## 2018-06-13 ENCOUNTER — TELEPHONE (OUTPATIENT)
Dept: PHARMACY | Age: 63
End: 2018-06-13

## 2018-06-13 DIAGNOSIS — T45.515A WARFARIN-INDUCED COAGULOPATHY (HCC): Primary | ICD-10-CM

## 2018-06-13 DIAGNOSIS — D68.32 WARFARIN-INDUCED COAGULOPATHY (HCC): Primary | ICD-10-CM

## 2018-06-13 DIAGNOSIS — Z86.711 HISTORY OF PULMONARY EMBOLISM: ICD-10-CM

## 2018-06-14 ENCOUNTER — ANTI-COAG VISIT (OUTPATIENT)
Dept: CARDIOLOGY CLINIC | Age: 63
End: 2018-06-14

## 2018-06-14 ENCOUNTER — TELEPHONE (OUTPATIENT)
Dept: CARDIOLOGY CLINIC | Age: 63
End: 2018-06-14

## 2018-06-14 ENCOUNTER — TELEPHONE (OUTPATIENT)
Dept: FAMILY MEDICINE CLINIC | Age: 63
End: 2018-06-14

## 2018-06-14 LAB — INR BLD: 3.2

## 2018-06-18 RX ORDER — OMEPRAZOLE 40 MG/1
CAPSULE, DELAYED RELEASE ORAL
Qty: 90 CAPSULE | Refills: 3 | Status: SHIPPED | OUTPATIENT
Start: 2018-06-18 | End: 2019-06-04 | Stop reason: SDUPTHER

## 2018-06-19 ENCOUNTER — TELEPHONE (OUTPATIENT)
Dept: FAMILY MEDICINE CLINIC | Age: 63
End: 2018-06-19

## 2018-06-21 ENCOUNTER — OFFICE VISIT (OUTPATIENT)
Dept: FAMILY MEDICINE CLINIC | Age: 63
End: 2018-06-21

## 2018-06-21 ENCOUNTER — TELEPHONE (OUTPATIENT)
Dept: FAMILY MEDICINE CLINIC | Age: 63
End: 2018-06-21

## 2018-06-21 VITALS
HEART RATE: 81 BPM | DIASTOLIC BLOOD PRESSURE: 60 MMHG | HEIGHT: 63 IN | SYSTOLIC BLOOD PRESSURE: 110 MMHG | WEIGHT: 293 LBS | OXYGEN SATURATION: 97 % | BODY MASS INDEX: 51.91 KG/M2 | RESPIRATION RATE: 16 BRPM

## 2018-06-21 DIAGNOSIS — R07.89 OTHER CHEST PAIN: ICD-10-CM

## 2018-06-21 DIAGNOSIS — I50.31 ACUTE DIASTOLIC CHF (CONGESTIVE HEART FAILURE) (HCC): ICD-10-CM

## 2018-06-21 DIAGNOSIS — R53.1 GENERAL WEAKNESS: ICD-10-CM

## 2018-06-21 DIAGNOSIS — E66.01 MORBID OBESITY (HCC): ICD-10-CM

## 2018-06-21 DIAGNOSIS — R06.02 SHORTNESS OF BREATH: ICD-10-CM

## 2018-06-21 PROCEDURE — 3017F COLORECTAL CA SCREEN DOC REV: CPT | Performed by: NURSE PRACTITIONER

## 2018-06-21 PROCEDURE — 2022F DILAT RTA XM EVC RTNOPTHY: CPT | Performed by: NURSE PRACTITIONER

## 2018-06-21 PROCEDURE — G8510 SCR DEP NEG, NO PLAN REQD: HCPCS | Performed by: NURSE PRACTITIONER

## 2018-06-21 PROCEDURE — G8427 DOCREV CUR MEDS BY ELIG CLIN: HCPCS | Performed by: NURSE PRACTITIONER

## 2018-06-21 PROCEDURE — 1036F TOBACCO NON-USER: CPT | Performed by: NURSE PRACTITIONER

## 2018-06-21 PROCEDURE — G8417 CALC BMI ABV UP PARAM F/U: HCPCS | Performed by: NURSE PRACTITIONER

## 2018-06-21 PROCEDURE — 3044F HG A1C LEVEL LT 7.0%: CPT | Performed by: NURSE PRACTITIONER

## 2018-06-21 PROCEDURE — 99214 OFFICE O/P EST MOD 30 MIN: CPT | Performed by: NURSE PRACTITIONER

## 2018-06-21 ASSESSMENT — ENCOUNTER SYMPTOMS
ORTHOPNEA: 0
DIARRHEA: 0
ABDOMINAL PAIN: 0
NAUSEA: 1
WHEEZING: 0
CONSTIPATION: 0
SHORTNESS OF BREATH: 1
COUGH: 0

## 2018-06-21 ASSESSMENT — PATIENT HEALTH QUESTIONNAIRE - PHQ9
2. FEELING DOWN, DEPRESSED OR HOPELESS: 0
SUM OF ALL RESPONSES TO PHQ QUESTIONS 1-9: 0
SUM OF ALL RESPONSES TO PHQ9 QUESTIONS 1 & 2: 0
1. LITTLE INTEREST OR PLEASURE IN DOING THINGS: 0

## 2018-06-22 DIAGNOSIS — M16.12 PRIMARY OSTEOARTHRITIS OF LEFT HIP: ICD-10-CM

## 2018-06-22 RX ORDER — TRAMADOL HYDROCHLORIDE 50 MG/1
50 TABLET ORAL EVERY 6 HOURS PRN
Qty: 20 TABLET | Refills: 0 | Status: SHIPPED | OUTPATIENT
Start: 2018-06-22 | End: 2018-07-19 | Stop reason: SDUPTHER

## 2018-06-25 ENCOUNTER — TELEPHONE (OUTPATIENT)
Dept: FAMILY MEDICINE CLINIC | Age: 63
End: 2018-06-25

## 2018-06-28 ENCOUNTER — OFFICE VISIT (OUTPATIENT)
Dept: CARDIOLOGY CLINIC | Age: 63
End: 2018-06-28

## 2018-06-28 VITALS
DIASTOLIC BLOOD PRESSURE: 52 MMHG | HEIGHT: 63 IN | WEIGHT: 293 LBS | OXYGEN SATURATION: 99 % | HEART RATE: 74 BPM | SYSTOLIC BLOOD PRESSURE: 100 MMHG | BODY MASS INDEX: 51.91 KG/M2

## 2018-06-28 DIAGNOSIS — E66.01 MORBID OBESITY WITH BMI OF 50.0-59.9, ADULT (HCC): ICD-10-CM

## 2018-06-28 DIAGNOSIS — G47.33 OSA ON CPAP: ICD-10-CM

## 2018-06-28 DIAGNOSIS — I10 ESSENTIAL HYPERTENSION: ICD-10-CM

## 2018-06-28 DIAGNOSIS — J42 CHRONIC BRONCHITIS, UNSPECIFIED CHRONIC BRONCHITIS TYPE (HCC): ICD-10-CM

## 2018-06-28 DIAGNOSIS — N18.5 CKD (CHRONIC KIDNEY DISEASE), STAGE V (HCC): ICD-10-CM

## 2018-06-28 DIAGNOSIS — Z99.89 OSA ON CPAP: ICD-10-CM

## 2018-06-28 DIAGNOSIS — I50.32 CHRONIC DIASTOLIC HEART FAILURE (HCC): Primary | ICD-10-CM

## 2018-06-28 PROCEDURE — 3017F COLORECTAL CA SCREEN DOC REV: CPT | Performed by: CLINICAL NURSE SPECIALIST

## 2018-06-28 PROCEDURE — G8926 SPIRO NO PERF OR DOC: HCPCS | Performed by: CLINICAL NURSE SPECIALIST

## 2018-06-28 PROCEDURE — G8417 CALC BMI ABV UP PARAM F/U: HCPCS | Performed by: CLINICAL NURSE SPECIALIST

## 2018-06-28 PROCEDURE — 99214 OFFICE O/P EST MOD 30 MIN: CPT | Performed by: CLINICAL NURSE SPECIALIST

## 2018-06-28 PROCEDURE — 1036F TOBACCO NON-USER: CPT | Performed by: CLINICAL NURSE SPECIALIST

## 2018-06-28 PROCEDURE — 3023F SPIROM DOC REV: CPT | Performed by: CLINICAL NURSE SPECIALIST

## 2018-06-28 PROCEDURE — G8427 DOCREV CUR MEDS BY ELIG CLIN: HCPCS | Performed by: CLINICAL NURSE SPECIALIST

## 2018-07-09 DIAGNOSIS — I50.33 ACUTE ON CHRONIC DIASTOLIC CONGESTIVE HEART FAILURE (HCC): ICD-10-CM

## 2018-07-11 ENCOUNTER — CARE COORDINATION (OUTPATIENT)
Dept: CASE MANAGEMENT | Age: 63
End: 2018-07-11

## 2018-07-11 DIAGNOSIS — E11.42 DIABETIC POLYNEUROPATHY ASSOCIATED WITH TYPE 2 DIABETES MELLITUS (HCC): ICD-10-CM

## 2018-07-11 DIAGNOSIS — R07.89 OTHER CHEST PAIN: Primary | ICD-10-CM

## 2018-07-11 PROCEDURE — 1111F DSCHRG MED/CURRENT MED MERGE: CPT

## 2018-07-11 RX ORDER — NEBIVOLOL 10 MG/1
10 TABLET ORAL 2 TIMES DAILY
Qty: 180 TABLET | Refills: 3 | Status: ON HOLD | OUTPATIENT
Start: 2018-07-11 | End: 2019-01-05 | Stop reason: HOSPADM

## 2018-07-12 NOTE — CARE COORDINATION
[facility] Transitions Initial Follow Up Call    Call within 2 business days of discharge: Yes    Patient: Haylie Potter Patient : 1955   MRN: [id number]  Reason for Admission: chest pain;UTI  Discharge Date: 7/10/18 RARS: Readmission Risk Score: 48     Spoke with: [address]: F    Non-face-to-face services provided:  Obtained and reviewed discharge summary and/or continuity of care documents    Care Transitions 24 Hour Call    Schedule Follow Up Appointment with PCP:  Completed  Do you have any ongoing symptoms?:  No  Do you have a copy of your discharge instructions?:  Yes  Do you have all of your prescriptions and are they filled?:  Yes  Have you been contacted by a [facility] Pharmacist?:  No  Have you scheduled your follow up appointment?:  Yes  How are you going to get to your appointment?:  Car - family or friend to transport  Were you discharged with any Home Care or Post Acute Services:  No  Post Acute Services:  Home Health (Comment: [facility])  Patient DME:  Wheelchair, Other, Shower chair, Walker  Patient Home Equipment:  Oxygen, Nebulizer, BiPAP  Do you have support at home?:  Partner/Spouse/SO  Do you feel like you have everything you need to keep you well at home?:  Yes  Are you an active caregiver in your home?:  No  Care Transitions Interventions  No Identified Needs       Pt states doing well, no further chest pain, went to dialysis yesterday. Has antibiotic, reviewed all other meds. This nurse helped schedule a PCP f/u appt.   Agreed to more CTC f/u calls      Follow Up  Future Appointments  Date Time Provider Aziza Barraza   2018 2:45 PM JURGEN Sandoval CNP SD[facility] FP [facility]   2018 4:20 PM JURGEN Butts CNP FF SLEEP MED [facility]   2018 10:30 AM [facility] [id number], [address] [facility], APRN - CNS FF Cardio LINDA Tobias RN

## 2018-07-17 ENCOUNTER — CARE COORDINATION (OUTPATIENT)
Dept: CASE MANAGEMENT | Age: 63
End: 2018-07-17

## 2018-07-17 NOTE — CARE COORDINATION
[facility] Transitions Follow Up Call    2018    Patient: Baylee Mckee  Patient : 1955   MRN: [id number]  Reason for Admission: There are no discharge diagnoses documented for the most recent discharge.   Discharge Date: 7/10/18 RARS: Readmission Risk Score: 48       Follow up call attempted, left contact info on vm      Follow Up  Future Appointments  Date Time Provider Aziza Barraza   2018 1:00 PM JURGEN Flanagan CNPALE FP [facility]   2018 4:20 PM JURGEN Anthony CNP FF SLEEP MED [facility]   2018 10:30 AM [facility] [id number], [address] , APRN - CNS FF Cardio [facility]       Abraham Patterson RN

## 2018-07-18 NOTE — CARE COORDINATION
[facility] Transitions Follow Up Call    2018    Patient: Kourtney Jones  Patient : 1955   MRN: [id number]  Reason for Admission: There are no discharge diagnoses documented for the most recent discharge.   Discharge Date: 7/10/18 RARS: Readmission Risk Score: 48     2nd attempt at a Follow up call, left contact info on         Follow Up  Future Appointments  Date Time Provider Aziza Barraza   2018 1:00 PM JURGEN Park CNP [facility] FP [facility]   2018 4:20 PM JURGEN Garcia CNP FF SLEEP MED [facility]   2018 10:30 AM [facility] [address], [address] , APRN - CNS FF Cardio [facility]       She Irby RN

## 2018-07-19 ENCOUNTER — OFFICE VISIT (OUTPATIENT)
Dept: FAMILY MEDICINE CLINIC | Age: 63
End: 2018-07-19

## 2018-07-19 VITALS
RESPIRATION RATE: 16 BRPM | WEIGHT: 293 LBS | BODY MASS INDEX: 51.91 KG/M2 | HEIGHT: 63 IN | OXYGEN SATURATION: 99 % | SYSTOLIC BLOOD PRESSURE: 102 MMHG | DIASTOLIC BLOOD PRESSURE: 70 MMHG | HEART RATE: 74 BPM

## 2018-07-19 DIAGNOSIS — E11.40 TYPE 2 DIABETES MELLITUS WITH DIABETIC NEUROPATHY, WITH LONG-TERM CURRENT USE OF INSULIN (HCC): ICD-10-CM

## 2018-07-19 DIAGNOSIS — L29.9 PRURITUS: ICD-10-CM

## 2018-07-19 DIAGNOSIS — Z79.4 TYPE 2 DIABETES MELLITUS WITH DIABETIC NEUROPATHY, WITH LONG-TERM CURRENT USE OF INSULIN (HCC): ICD-10-CM

## 2018-07-19 DIAGNOSIS — M16.12 PRIMARY OSTEOARTHRITIS OF LEFT HIP: ICD-10-CM

## 2018-07-19 DIAGNOSIS — Z99.2 ESRD (END STAGE RENAL DISEASE) ON DIALYSIS (HCC): Primary | ICD-10-CM

## 2018-07-19 DIAGNOSIS — N18.6 ESRD (END STAGE RENAL DISEASE) ON DIALYSIS (HCC): Primary | ICD-10-CM

## 2018-07-19 DIAGNOSIS — I10 ESSENTIAL HYPERTENSION, MALIGNANT: ICD-10-CM

## 2018-07-19 DIAGNOSIS — Z09 HOSPITAL DISCHARGE FOLLOW-UP: ICD-10-CM

## 2018-07-19 PROCEDURE — 99495 TRANSJ CARE MGMT MOD F2F 14D: CPT | Performed by: NURSE PRACTITIONER

## 2018-07-19 PROCEDURE — 1111F DSCHRG MED/CURRENT MED MERGE: CPT | Performed by: NURSE PRACTITIONER

## 2018-07-19 RX ORDER — HYDROXYZINE HYDROCHLORIDE 25 MG/1
25 TABLET, FILM COATED ORAL EVERY 8 HOURS PRN
Qty: 30 TABLET | Refills: 0 | Status: SHIPPED | OUTPATIENT
Start: 2018-07-19 | End: 2018-07-29

## 2018-07-19 RX ORDER — TRAMADOL HYDROCHLORIDE 50 MG/1
50 TABLET ORAL EVERY 6 HOURS PRN
Qty: 20 TABLET | Refills: 2 | Status: SHIPPED | OUTPATIENT
Start: 2018-07-19 | End: 2018-08-15 | Stop reason: ALTCHOICE

## 2018-07-19 ASSESSMENT — ENCOUNTER SYMPTOMS
DIARRHEA: 0
COLOR CHANGE: 0
VOMITING: 0
CHEST TIGHTNESS: 1
SHORTNESS OF BREATH: 1
CONSTIPATION: 0
ABDOMINAL PAIN: 0
NAUSEA: 0
WHEEZING: 0
COUGH: 0

## 2018-07-19 NOTE — PROGRESS NOTES
home 12/22/15    Vicodin [Hydrocodone-Acetaminophen] Rash       Medications listed as ordered at the time of discharge from hospital     Janet Mcgill   Home Medication Instructions KISHORE:    Printed on:07/19/18 1381   Medication Information                      albuterol sulfate HFA (PROAIR HFA) 108 (90 Base) MCG/ACT inhaler  Inhale 2 puffs into the lungs every 6 hours as needed for Wheezing             Alcohol Swabs (ALCOHOL PREP) 70 % PADS  USE TO TEST BLOOD GLUCOSE THREE TIMES DAILY             allopurinol (ZYLOPRIM) 100 MG tablet  Take 100 mg by mouth daily             ASSURE COMFORT LANCETS 30G MISC  USE TO TEST BLOOD GLUCOSE THREE TIMES DAILY             BiPAP Machine MISC  by Does not apply route nightly             Blood Glucose Monitoring Suppl (ACCU-CHEK KENNETH SMARTVIEW) w/Device KIT  USE TO TEST BLOOD GLUCOSE             cetirizine (ZYRTEC) 10 MG tablet  Take 0.5 tablets by mouth daily             Dulaglutide (TRULICITY) 2.86 RA/1.8QR SOPN  Inject 75 mg into the skin once a week             fluticasone (FLONASE) 50 MCG/ACT nasal spray  2 sprays by Nasal route daily as needed for Allergies             fluticasone-salmeterol (ADVAIR HFA) 230-21 MCG/ACT inhaler  Inhale 1 puff into the lungs 2 times daily             gabapentin (NEURONTIN) 300 MG capsule  Take 300 mg by mouth nightly. .             hydrOXYzine (ATARAX) 25 MG tablet  Take 1 tablet by mouth every 8 hours as needed for Itching             insulin detemir (LEVEMIR FLEXTOUCH) 100 UNIT/ML injection pen  Inject 30 Units into the skin 2 times daily             insulin lispro (HUMALOG KWIKPEN) 100 UNIT/ML pen  Inject 12 Units into the skin 3 times daily (before meals)             ipratropium-albuterol (DUONEB) 0.5-2.5 (3) MG/3ML SOLN nebulizer solution  INHALE 3MLS INTO THE LUNGS EVERY 4 HOURS AS NEEDED             ketoconazole (NIZORAL) 2 % cream  Apply topically daily.              lidocaine-prilocaine (EMLA) 2.5-2.5 % cream  Apply topically as needed. nebivolol (BYSTOLIC) 10 MG tablet  Take 1 tablet by mouth 2 times daily             nitroGLYCERIN (NITROSTAT) 0.4 MG SL tablet  DISSOLVE 1 TABLET UNDER THE TONGUE EVERY 5 MINUTES AS  NEEDED ; MAXIMUM OF 3  TABLETS IN 15 MINUTES             omeprazole (PRILOSEC) 40 MG delayed release capsule  TAKE 1 CAPSULE BY MOUTH  DAILY             ONE TOUCH ULTRA TEST strip  USE TO TEST 3-4 TIMES DAILY DUE TO FLUCTUATING SUGAR             OXYGEN  Inhale 2 L into the lungs daily as needed At night prn             promethazine (PHENERGAN) 25 MG tablet  Take 1 tablet by mouth every 8 hours as needed for Nausea             RELION PEN NEEDLE 31G/8MM 31G X 8 MM MISC  USE  THREE TIMES DAILY AS DIRECTED             rosuvastatin (CRESTOR) 10 MG tablet  Take 1 tablet by mouth  daily             Spacer/Aero-Holding Chambers VENANCIO  1 Device by Does not apply route daily as needed             traMADol (ULTRAM) 50 MG tablet  Take 1 tablet by mouth every 6 hours as needed for Pain for up to 60 days. .             traZODone (DESYREL) 100 MG tablet  Take 1 tablet by mouth nightly TAKES PRN             warfarin (COUMADIN) 7.5 MG tablet  Continue f/up with coumadin clinic                   Medications marked \"taking\" at this time    Outpatient Prescriptions Marked as Taking for the 7/19/18 encounter (Office Visit) with JURGEN Olea - CNP   Medication Sig Dispense Refill    hydrOXYzine (ATARAX) 25 MG tablet Take 1 tablet by mouth every 8 hours as needed for Itching 30 tablet 0    traMADol (ULTRAM) 50 MG tablet Take 1 tablet by mouth every 6 hours as needed for Pain for up to 60 days. . 20 tablet 2    insulin detemir (LEVEMIR FLEXTOUCH) 100 UNIT/ML injection pen Inject 30 Units into the skin 2 times daily 75 mL 5    nebivolol (BYSTOLIC) 10 MG tablet Take 1 tablet by mouth 2 times daily 180 tablet 3    gabapentin (NEURONTIN) 300 MG capsule Take 300 mg by mouth nightly. Luis Estrada omeprazole (PRILOSEC) 40 MG delayed release by mouth daily      BiPAP Machine MISC by Does not apply route nightly      ipratropium-albuterol (DUONEB) 0.5-2.5 (3) MG/3ML SOLN nebulizer solution INHALE 3MLS INTO THE LUNGS EVERY 4 HOURS AS NEEDED 360 vial 3    Spacer/Aero-Holding Chambers VENANCIO 1 Device by Does not apply route daily as needed 1 Device 0    OXYGEN Inhale 2 L into the lungs daily as needed At night prn          Medications patient taking as of now reconciled against medications ordered at time of hospital discharge: Yes    Vitals:    07/19/18 1307   BP: 102/70   Pulse: 74   Resp: 16   SpO2: 99%   Weight: (!) 335 lb 3.2 oz (152 kg)   Height: 5' 3\" (1.6 m)     Body mass index is 59.38 kg/m². Wt Readings from Last 3 Encounters:   07/19/18 (!) 335 lb 3.2 oz (152 kg)   07/10/18 (!) 334 lb (151.5 kg)   06/28/18 (!) 335 lb (152 kg)     BP Readings from Last 3 Encounters:   07/19/18 102/70   07/10/18 113/80   06/28/18 (!) 100/52        Inpatient course: Discharge summary reviewed- see chart. Chief Complaint   Patient presents with    Follow-Up from Hospital     over night stay, breathing issues not any better. itching all over, constant wakes pt up at night , loosing balance after dialysis bp 214/100ish       HPI   Current status: states feeling gen weakness and fatigue and intermittent SOB since discharge. States was not feeling that great when she was discharged. No swelling in legs. Denies CP, palpitations, dizziness or lightheadedness. (+) itching without rash. Using OTC cream without improvement. Started since starting dialysis in Dec. Denies hypo or hyperglycemia. BP fluctuating. High before dialysis and low after. No recent eval with Nephrologist- states keeps missing him on her dialysis days. She has not missed any dialysis since hospitalization. Recent eval with cardiologist and no changes to treatment plan. Review of Systems   Constitutional: Positive for fatigue (chronic).  Negative for activity change, appetite change, chills, diaphoresis and fever. Respiratory: Positive for chest tightness and shortness of breath. Negative for cough and wheezing. On non dialysis days   Cardiovascular: Negative for chest pain, palpitations and leg swelling. Gastrointestinal: Negative for abdominal pain, constipation, diarrhea, nausea and vomiting. Endocrine: Positive for polydipsia. Negative for polyphagia and polyuria. Denies hypo or hyperglycemia     Musculoskeletal:        Chronic knee pain     Skin: Negative for color change, pallor and rash. (+) itching, no rash. No improvement with OTC creams     Neurological: Negative for dizziness, syncope, weakness, light-headedness, numbness and headaches. Non face to face  following discharge, date last encounter closed (first attempt may have been earlier): 7/12/2018 12:08 PM 7/12/2018 12:08 PM    Call initiated 2 business days of discharge: Yes     Interval history/Current status: See HPI      Physical Exam   Constitutional: She is oriented to person, place, and time. She appears well-developed and well-nourished. Neck: Carotid bruit is not present. Cardiovascular: Normal rate, regular rhythm and normal heart sounds. No murmur heard. Pulses:       Radial pulses are 2+ on the right side, and 2+ on the left side. Pulmonary/Chest: Effort normal and breath sounds normal.   Abdominal: Soft. Normal appearance and bowel sounds are normal. There is no tenderness. There is no rigidity, no rebound and no guarding. Neurological: She is alert and oriented to person, place, and time. Skin: Skin is warm and dry. Psychiatric: She has a normal mood and affect. Her behavior is normal.   Vitals reviewed. Assessment/Plan:   [facility] was seen today for follow-up from hospital.    Diagnoses and all orders for this visit:    ESRD (end stage renal disease) on dialysis [facility])  Continue M, W, F schedule  Advised needs to see nephrologist ASAP for routine follow up  Discussed prognosis of dialysis today  Reassurance today    Type 2 diabetes mellitus with diabetic neuropathy, with long-term current use of insulin (HCC)  Stable, well controlled  Continue sugar logs, will consider decreasing Levemir if sugars remain well controlled  Follow up 3 months, sooner prn    Essential hypertension, malignant  Stable  Reassurance today- again I encourage her to discuss fluctuating BP with nephrologist    Pruritus  New problem  -     hydrOXYzine (ATARAX) 25 MG tablet; Take 1 tablet by mouth every 8 hours as needed for Itching    Primary osteoarthritis of left hip  Stable,  -     traMADol (ULTRAM) 50 MG tablet; Take 1 tablet by mouth every 6 hours as needed for Pain for up to 60 days. .  Controlled substances monitoring: possible medication side effects, risk of tolerance and/or dependence, and alternative treatments discussed, no signs of potential drug abuse or diversion identified and [place] report reviewed today- activity consistent with treatment plan.     Hospital discharge follow-up  -     AK DISCHARGE MEDS RECONCILED W/ CURRENT OUTPATIENT MED LIST          Medical Decision Making: moderate complexity

## 2018-07-19 NOTE — CARE COORDINATION
[facility] Transitions Follow Up Call    2018    Patient: Cherelle Rock  Patient : 1955   MRN: [id number]  Reason for Admission: There are no discharge diagnoses documented for the most recent discharge. Discharge Date: 7/10/18 RARS: Readmission Risk Score: 48       3rd and final attempt at a f/u call, contact info left on . Saw PCP today per [facility] notes.     Follow Up  Future Appointments  Date Time Provider Aziza Barraza   2018 4:20 PM JURGEN Ferguson CNP FF SLEEP MED [facility]   2018 10:30 AM JURGEN Peres FF Cardio [facility]       Yun Streeter RN

## 2018-07-24 ENCOUNTER — OFFICE VISIT (OUTPATIENT)
Dept: SLEEP MEDICINE | Age: 63
End: 2018-07-24

## 2018-07-24 VITALS
HEART RATE: 62 BPM | HEIGHT: 63 IN | SYSTOLIC BLOOD PRESSURE: 126 MMHG | WEIGHT: 293 LBS | DIASTOLIC BLOOD PRESSURE: 7 MMHG | BODY MASS INDEX: 51.91 KG/M2 | OXYGEN SATURATION: 93 %

## 2018-07-24 DIAGNOSIS — J44.9 COPD, MODERATE (HCC): Chronic | ICD-10-CM

## 2018-07-24 DIAGNOSIS — I10 ESSENTIAL HYPERTENSION: Chronic | ICD-10-CM

## 2018-07-24 DIAGNOSIS — G47.33 OSA (OBSTRUCTIVE SLEEP APNEA): Primary | Chronic | ICD-10-CM

## 2018-07-24 DIAGNOSIS — I50.32 DIASTOLIC CHF, CHRONIC (HCC): Chronic | ICD-10-CM

## 2018-07-24 PROCEDURE — 1111F DSCHRG MED/CURRENT MED MERGE: CPT | Performed by: NURSE PRACTITIONER

## 2018-07-24 PROCEDURE — 3023F SPIROM DOC REV: CPT | Performed by: NURSE PRACTITIONER

## 2018-07-24 PROCEDURE — 3017F COLORECTAL CA SCREEN DOC REV: CPT | Performed by: NURSE PRACTITIONER

## 2018-07-24 PROCEDURE — 99214 OFFICE O/P EST MOD 30 MIN: CPT | Performed by: NURSE PRACTITIONER

## 2018-07-24 PROCEDURE — G8427 DOCREV CUR MEDS BY ELIG CLIN: HCPCS | Performed by: NURSE PRACTITIONER

## 2018-07-24 PROCEDURE — G8417 CALC BMI ABV UP PARAM F/U: HCPCS | Performed by: NURSE PRACTITIONER

## 2018-07-24 PROCEDURE — 1036F TOBACCO NON-USER: CPT | Performed by: NURSE PRACTITIONER

## 2018-07-24 PROCEDURE — G8926 SPIRO NO PERF OR DOC: HCPCS | Performed by: NURSE PRACTITIONER

## 2018-07-24 ASSESSMENT — SLEEP AND FATIGUE QUESTIONNAIRES
HOW LIKELY ARE YOU TO NOD OFF OR FALL ASLEEP IN A CAR, WHILE STOPPED FOR A FEW MINUTES IN TRAFFIC: 0
HOW LIKELY ARE YOU TO NOD OFF OR FALL ASLEEP WHILE WATCHING TV: 1
HOW LIKELY ARE YOU TO NOD OFF OR FALL ASLEEP WHILE SITTING INACTIVE IN A PUBLIC PLACE: 1
HOW LIKELY ARE YOU TO NOD OFF OR FALL ASLEEP WHILE SITTING AND READING: 1
HOW LIKELY ARE YOU TO NOD OFF OR FALL ASLEEP WHILE SITTING QUIETLY AFTER LUNCH WITHOUT ALCOHOL: 1
HOW LIKELY ARE YOU TO NOD OFF OR FALL ASLEEP WHILE SITTING AND TALKING TO SOMEONE: 0
HOW LIKELY ARE YOU TO NOD OFF OR FALL ASLEEP WHEN YOU ARE A PASSENGER IN A CAR FOR AN HOUR WITHOUT A BREAK: 2
ESS TOTAL SCORE: 7
HOW LIKELY ARE YOU TO NOD OFF OR FALL ASLEEP WHILE LYING DOWN TO REST IN THE AFTERNOON WHEN CIRCUMSTANCES PERMIT: 1

## 2018-07-24 ASSESSMENT — ENCOUNTER SYMPTOMS
ABDOMINAL PAIN: 0
APNEA: 0
COUGH: 0
SINUS PRESSURE: 0
SHORTNESS OF BREATH: 0
ABDOMINAL DISTENTION: 0
RHINORRHEA: 0

## 2018-07-24 NOTE — PROGRESS NOTES
Difficulties falling asleep  [] Yes  [x] No   Difficulties staying asleep  [] Yes  [x] No   Approximate time to bed  11:30pm   Approximate wake time  7-8am   Taking Naps  frequent   If taking naps usual length  15-30 min    If taking naps using the machine  [] Yes  [x] No  [] NA   Drowsy when driving  [] Yes  [x] No     Does patient carry a DOT/CDL  [] Yes  [x] No     Does patient carry FAA/[employer] License   [] Yes  [x] No      Any concerns noted with the machine at this time  [] Yes  [x] No         Review of Systems   Constitutional: Positive for fatigue. Negative for appetite change, chills and fever. HENT: Negative for congestion, nosebleeds, rhinorrhea and sinus pressure. Respiratory: Negative for apnea, cough and shortness of breath. Cardiovascular: Negative for chest pain and palpitations. Gastrointestinal: Negative for abdominal distention and abdominal pain. Neurological: Negative for dizziness and headaches. Psychiatric/Behavioral: Positive for sleep disturbance. Social History     Social History    Marital status:      Spouse name: Romeo Long Number of children: 3    Years of education: N/A     Occupational History    NA      Social History Main Topics    Smoking status: Never Smoker    Smokeless tobacco: Never Used    Alcohol use No    Drug use: No    Sexual activity: Not on file     Other Topics Concern    Not on file     Social History Narrative    No narrative on file       Prior to Admission medications    Medication Sig Start Date End Date Taking? Authorizing Provider   hydrOXYzine (ATARAX) 25 MG tablet Take 1 tablet by mouth every 8 hours as needed for Itching 7/19/18 7/29/18 Yes JURGEN Garnett CNP   traMADol (ULTRAM) 50 MG tablet Take 1 tablet by mouth every 6 hours as needed for Pain for up to 60 days. . 7/19/18 9/17/18 Yes JURGEN Garnett CNP   insulin detemir (LEVEMIR FLEXTOUCH) 100 UNIT/ML injection pen Inject 30 Units into the skin 2 times daily 7/12/18  Yes JURGEN Ba CNP   nebivolol (BYSTOLIC) 10 MG tablet Take 1 tablet by mouth 2 times daily 7/11/18 7/11/19 Yes JURGEN Ba CNP   gabapentin (NEURONTIN) 300 MG capsule Take 300 mg by mouth nightly. .   Yes Historical Provider, MD   omeprazole (PRILOSEC) 40 MG delayed release capsule TAKE 1 CAPSULE BY MOUTH  DAILY 6/18/18  Yes JURGEN Ba CNP   lidocaine-prilocaine (EMLA) 2.5-2.5 % cream Apply topically as needed. 5/29/18  Yes JURGEN Ba CNP   traZODone (DESYREL) 100 MG tablet Take 1 tablet by mouth nightly TAKES PRN 5/1/18  Yes JURGEN Ba CNP   RELION PEN NEEDLE 31G/8MM 31G X 8 MM MISC USE  THREE TIMES DAILY AS DIRECTED 4/17/18  Yes JURGEN Ba CNP   nitroGLYCERIN (NITROSTAT) 0.4 MG SL tablet DISSOLVE 1 TABLET UNDER THE TONGUE EVERY 5 MINUTES AS  NEEDED ; MAXIMUM OF 3  TABLETS IN 15 MINUTES 4/11/18  Yes JURGEN Ba CNP   warfarin (COUMADIN) 7.5 MG tablet Continue f/up with coumadin clinic 3/27/18  Yes JURGEN Ba CNP   ketoconazole (NIZORAL) 2 % cream Apply topically daily.  3/27/18  Yes JURGEN Ba CNP   albuterol sulfate HFA (PROAIR HFA) 108 (90 Base) MCG/ACT inhaler Inhale 2 puffs into the lungs every 6 hours as needed for Wheezing 2/21/18  Yes JURGEN Ba CNP   Dulaglutide (TRULICITY) 2.91 QJ/3.7SS SOPN Inject 75 mg into the skin once a week 2/13/18  Yes JURGEN Ba CNP   rosuvastatin (CRESTOR) 10 MG tablet Take 1 tablet by mouth  daily 2/13/18  Yes JURGEN Ba CNP   insulin lispro (HUMALOG KWIKPEN) 100 UNIT/ML pen Inject 12 Units into the skin 3 times daily (before meals) 2/13/18  Yes JURGEN Ba CNP   ONE TOUCH ULTRA TEST strip USE TO TEST 3-4 TIMES DAILY DUE TO FLUCTUATING SUGAR 11/14/17  Yes JURGEN Ba CNP   fluticasone-salmeterol (ADVAIR HFA) 230-21 MCG/ACT inhaler Inhale 1 puff into the lungs 2 times daily 11/14/17  Yes JURGEN Ba CNP fluticasone (FLONASE) 50 MCG/ACT nasal spray 2 sprays by Nasal route daily as needed for Allergies 11/14/17  Yes JURGEN Barakat CNP   cetirizine (ZYRTEC) 10 MG tablet Take 0.5 tablets by mouth daily 11/5/17  Yes Fidel Boucher MD   promethazine (PHENERGAN) 25 MG tablet Take 1 tablet by mouth every 8 hours as needed for Nausea 9/12/17  Yes JURGEN Barakat CNP   ASSURE COMFORT LANCETS 30G MISC USE TO TEST BLOOD GLUCOSE THREE TIMES DAILY 8/15/17  Yes JURGEN Barakat CNP   Alcohol Swabs (ALCOHOL PREP) 70 % PADS USE TO TEST BLOOD GLUCOSE THREE TIMES DAILY 8/15/17  Yes JURGEN Barakat CNP   Blood Glucose Monitoring Suppl (ACCU-CHEK KENNETH SMARTVIEW) w/Device KIT USE TO TEST BLOOD GLUCOSE 8/15/17  Yes JURGEN Barakat CNP   allopurinol (ZYLOPRIM) 100 MG tablet Take 100 mg by mouth daily   Yes Historical Provider, MD   BiPAP Machine MISC by Does not apply route nightly   Yes Historical Provider, MD   ipratropium-albuterol (DUONEB) 0.5-2.5 (3) MG/3ML SOLN nebulizer solution INHALE 3MLS INTO THE LUNGS EVERY 4 HOURS AS NEEDED 8/17/16  Yes Mackenzie Martines MD   Spacer/Aero-Holding Chambers VENANCIO 1 Device by Does not apply route daily as needed 3/27/16  Yes JURGEN Dey CNP   OXYGEN Inhale 2 L into the lungs daily as needed At night prn   Yes Historical Provider, MD       Allergies as of 07/24/2018 - Review Complete 07/24/2018   Allergen Reaction Noted    Codeine Nausea Only 03/16/2010    Fentanyl Itching 09/05/2014    Morphine  02/25/2010    Nsaids Other (See Comments) 02/27/2013    Hydrocodone-acetaminophen Itching and Rash 09/01/2010    Percocet [oxycodone-acetaminophen] Itching 02/06/2013    Procardia [nifedipine] Nausea And Vomiting 02/25/2010    Vicodin [hydrocodone-acetaminophen] Rash 02/25/2010       Patient Active Problem List   Diagnosis    Asthma    Colon polyps    Microalbuminuria    Venous stasis of lower extremity    Obstructive sleep apnea on CPAP   

## 2018-07-31 RX ORDER — DULAGLUTIDE 0.75 MG/.5ML
INJECTION, SOLUTION SUBCUTANEOUS
Qty: 15 PEN | Refills: 3 | Status: SHIPPED | OUTPATIENT
Start: 2018-07-31

## 2018-08-03 ENCOUNTER — TELEPHONE (OUTPATIENT)
Dept: OTHER | Facility: CLINIC | Age: 63
End: 2018-08-03

## 2018-08-03 NOTE — TELEPHONE ENCOUNTER
Writer contacted Mariely Telles, ED provider to inform of 30 day readmission risk. ED provider informed writer admit or discharge has not been determined. Continue to follow-up.

## 2018-08-15 ENCOUNTER — TELEPHONE (OUTPATIENT)
Dept: FAMILY MEDICINE CLINIC | Age: 63
End: 2018-08-15

## 2018-08-15 RX ORDER — ONDANSETRON 4 MG/1
4 TABLET, FILM COATED ORAL EVERY 8 HOURS PRN
Qty: 20 TABLET | Refills: 0 | Status: ON HOLD | OUTPATIENT
Start: 2018-08-15 | End: 2018-09-17 | Stop reason: HOSPADM

## 2018-08-15 NOTE — PROGRESS NOTES
INR 1.3  Left a message for VAZQUEZ Benitez, with increased dosage instructions. POD 1 from sleeve gastrectomy recovering in stable condition on the floor    - PCA d/c POD 1 and convert to hycet, prn nausea control with zofran and phenergan  - normalizing with home meds POD 1 (crushed form)   - wean to room air   - adv to bariatric CLD, HLIV once tolerating, protonix IV 40 BID  - voiding spontaneously  - prn electrolyte replacement   - alba-op abx  - Plov, ICS 10x/H while awake, OOB, SCDs   Dispo: d/c pending PO tolerance and pain/nausea control

## 2018-08-15 NOTE — TELEPHONE ENCOUNTER
Je Ferguson called today with vomiting, diarrhea, stomach pain, body aches  Was offered and appointment for today , refused appointment. Patient would like JURGEN Garnett CNP to give her suggestions on what she can do to help with these symptoms. [address] confirmed in [facility].

## 2018-08-20 ENCOUNTER — TELEPHONE (OUTPATIENT)
Dept: FAMILY MEDICINE CLINIC | Age: 63
End: 2018-08-20

## 2018-08-20 PROBLEM — R10.9 ABDOMINAL PAIN: Status: ACTIVE | Noted: 2018-08-20

## 2018-08-23 ENCOUNTER — CARE COORDINATION (OUTPATIENT)
Dept: CASE MANAGEMENT | Age: 63
End: 2018-08-23

## 2018-08-23 DIAGNOSIS — R10.32 LEFT LOWER QUADRANT PAIN: Primary | ICD-10-CM

## 2018-08-23 PROCEDURE — 1111F DSCHRG MED/CURRENT MED MERGE: CPT | Performed by: NURSE PRACTITIONER

## 2018-08-23 NOTE — CARE COORDINATION
[facility] Transitions Initial Follow Up Call    Call within 2 business days of discharge: Yes    Patient: Baylee Mckee Patient : 1955   MRN: [id number]  Reason for Admission: abd. pain  Discharge Date: 18 RARS: Readmission Risk Score: 54     Spoke with: [address]: [facility]    Non-face-to-face services provided:  Obtained and reviewed discharge summary and/or continuity of care documents    Care Transitions 24 Hour Call    Do you have any ongoing symptoms?:  Yes  Patient-reported symptoms:  Abdominal Pain  Interventions for patient-reported symptoms:  Other (Comment: MD aware; reason for hospitalization; pt is calling PCP)  Do you have a copy of your discharge instructions?:  Yes  Do you have all of your prescriptions and are they filled?:  Yes  Have you been contacted by a [facility] Pharmacist?:  No  Have you scheduled your follow up appointment?:  No (Comment: She is calling today)  Were you discharged with any Home Care or Post Acute Services:  No  Post Acute Services:  Home Health (Comment: [facility])  Patient DME:  Wheelchair, Other, Shower chair, Walker  Patient Home Equipment:  Oxygen, Nebulizer, BiPAP  Do you have support at home?:  Partner/Spouse/SO  Do you feel like you have everything you need to keep you well at home?:  Yes  Are you an active caregiver in your home?:  No  Care Transitions Interventions  No Identified Needs       Pt states doing ok, still having abdominal pain, states she is calling PCP to see if she can get in to see her. Taking her antibiotic as directed. Reviewed all other meds.  Agreed to more CTC f/u calls      Follow Up  Future Appointments  Date Time Provider Aziza Barraza   2018 2:15 PM Sherryll Shone, MD PULM & CC [facility]   10/30/2018 3:40 PM Dona Gay APRN - CNP FF SLEEP MED [facility]   2018 10:30 AM JURGEN Trimble - CNS FF Cardio [facility]       Abraham Patterson RN

## 2018-08-27 ENCOUNTER — CARE COORDINATION (OUTPATIENT)
Dept: CASE MANAGEMENT | Age: 63
End: 2018-08-27

## 2018-09-18 ENCOUNTER — CARE COORDINATION (OUTPATIENT)
Dept: CASE MANAGEMENT | Age: 63
End: 2018-09-18

## 2018-09-18 NOTE — CARE COORDINATION
[facility] Transitions Initial Follow Up Call    Call within 2 business days of discharge: Yes    Patient: Chris Javed Patient : 1955   MRN: [id number]  Reason for Admission: There are no discharge diagnoses documented for the most recent discharge. Discharge Date: 18 RARS: Readmission Risk Score: 62     Spoke with: [facility]: [facility]     Non-face-to-face services provided:  Obtained and reviewed discharge summary and/or continuity of care documents    Care Transitions 24 Hour Call    Do you have a copy of your discharge instructions?:  Yes  Do you have all of your prescriptions and are they filled?:  Yes  Have you been contacted by a [facility] Pharmacist?:  No  Have you scheduled your follow up appointment?:  Yes  How are you going to get to your appointment?:  Car - family or friend to transport  Were you discharged with any Home Care or Post Acute Services:  No  Post Acute Services:  Home Health (Comment: [facility])  Patient DME:  Wheelchair, Other, Shower chair, Walker  Patient Home Equipment:  Oxygen, Nebulizer, BiPAP  Do you have support at home?:  Partner/Spouse/SO  Do you feel like you have everything you need to keep you well at home?:  Yes  Are you an active caregiver in your home?:  No  Care Transitions Interventions         Follow Up : Spoke with patient who said she is calling Dr. Chichi Dalal for a follow up appt with him to see about her stomach problems. Pt said after she ate yesterday her stomach still \"bothers\" her. Pt did deny any diarrhea or vomiting. Pt said she is checking her blood sugars three times a day but she did not reveal the results to me. Pt declined to review medications as she said the nurse at the hospital already did that. Pt said she has her weight recorded at dialysis before and after her runs. Pt weight entered in record upon discharge from the hospital was # 326 4.5 ounces. Pt said she has not noticed any swelling in her feet or ankels.  Pt is

## 2018-09-25 ENCOUNTER — OFFICE VISIT (OUTPATIENT)
Dept: PULMONOLOGY | Age: 63
End: 2018-09-25
Payer: MEDICARE

## 2018-09-25 VITALS — HEART RATE: 61 BPM | SYSTOLIC BLOOD PRESSURE: 134 MMHG | OXYGEN SATURATION: 99 % | DIASTOLIC BLOOD PRESSURE: 71 MMHG

## 2018-09-25 DIAGNOSIS — J98.4 RESTRICTIVE LUNG DISEASE: ICD-10-CM

## 2018-09-25 DIAGNOSIS — J96.11 CHRONIC HYPOXEMIC RESPIRATORY FAILURE (HCC): ICD-10-CM

## 2018-09-25 DIAGNOSIS — G47.33 OBSTRUCTIVE SLEEP APNEA ON CPAP: ICD-10-CM

## 2018-09-25 DIAGNOSIS — Z99.89 OBSTRUCTIVE SLEEP APNEA ON CPAP: ICD-10-CM

## 2018-09-25 DIAGNOSIS — J44.9 COPD, MODERATE (HCC): Primary | ICD-10-CM

## 2018-09-25 PROCEDURE — G8417 CALC BMI ABV UP PARAM F/U: HCPCS | Performed by: INTERNAL MEDICINE

## 2018-09-25 PROCEDURE — G8926 SPIRO NO PERF OR DOC: HCPCS | Performed by: INTERNAL MEDICINE

## 2018-09-25 PROCEDURE — 99214 OFFICE O/P EST MOD 30 MIN: CPT | Performed by: INTERNAL MEDICINE

## 2018-09-25 PROCEDURE — 3023F SPIROM DOC REV: CPT | Performed by: INTERNAL MEDICINE

## 2018-09-25 PROCEDURE — 3017F COLORECTAL CA SCREEN DOC REV: CPT | Performed by: INTERNAL MEDICINE

## 2018-09-25 PROCEDURE — G8427 DOCREV CUR MEDS BY ELIG CLIN: HCPCS | Performed by: INTERNAL MEDICINE

## 2018-09-25 PROCEDURE — 1111F DSCHRG MED/CURRENT MED MERGE: CPT | Performed by: INTERNAL MEDICINE

## 2018-09-25 PROCEDURE — 1036F TOBACCO NON-USER: CPT | Performed by: INTERNAL MEDICINE

## 2018-09-25 RX ORDER — ALBUTEROL SULFATE 90 UG/1
2 AEROSOL, METERED RESPIRATORY (INHALATION) EVERY 6 HOURS PRN
Qty: 1 INHALER | Refills: 11 | Status: SHIPPED | OUTPATIENT
Start: 2018-09-25

## 2018-09-25 RX ORDER — IPRATROPIUM BROMIDE AND ALBUTEROL SULFATE 2.5; .5 MG/3ML; MG/3ML
1 SOLUTION RESPIRATORY (INHALATION) EVERY 4 HOURS
Qty: 360 ML | Refills: 11 | Status: SHIPPED | OUTPATIENT
Start: 2018-09-25 | End: 2019-09-25 | Stop reason: ALTCHOICE

## 2018-09-25 NOTE — PROGRESS NOTES
[facility]  Progress Note  MD Radha Cash   YOB: 1955    Date of Visit:  9/25/2018    Assessment/Plan:  1. COPD, moderate ([address]  PFT 4/16:  INTERPRETATION: Spirometry showed decreased FVC of 1.77 L or 61% predicted  and decreased FEV-1 of 1.5 L or 64% predicted. FEV-1/FVC ratio was normal.   No response to bronchodilators demonstrated on spirometry. Lung volumes  showed decreased total lung capacity of 76% predicted. Diffusion capacity  showed decreased DLCO of 30% predicted.      IMPRESSION: Moderate restrictive pattern seen on this pulmonary function  test with severe decreased in diffusion capacity. Clinical correlation is  recommended. No response to bronchodilators demonstrated on spirometry. Restrictive component of PFT likely secondary to her obesity. Continue Advair twice daily  She uses DuoNeb and albuterol as needed  Her HHN is broken. She benefits from sing the [facility]. It works better for her than the HFA    2. Chronic hypoxemic respiratory failure (HCC)  O2 sats are acceptable on supplemental O2. The patient benefits from the use of supplemental O2. Resting room air oxygen saturation in the office today was 94%. With minimal exertion, she fell to 87%. While wearing supplemental oxygen at 2 L/m, exercise oximetry was 98%. She will benefit from the addition of supplemental oxygen for exertion as well as her current prescription for supplemental oxygen with sleep    3. Gastroesophageal reflux disease, esophagitis presence not specified  This is actually much better for her  Takes Prilosec only occasionally now    4. Obstructive sleep apnea on CPAP  Known severe COPD  Has a CPAP machine but still struggling with comfort. Is going to try to get a new mask to see if that helps her    5. Essential hypertension  Blood pressure is a little bit high today. Follow up with her primary care physician    6. KWIKPEN) 100 UNIT/ML pen Inject 0-12 Units into the skin 3 times daily (before meals)  Johnathon Farr MD   traMADol (ULTRAM) 50 MG tablet Take 50 mg by mouth every 6 hours as needed for Pain. Ishan Tamayo Historical Provider, MD   TRULICITY 7.35 RT/3.7JH SOPN INJECT ONE  SYRINGE SUBCUTANEOUSLY ONCE A WEEK  JURGEN Jo CNP   nebivolol (BYSTOLIC) 10 MG tablet Take 1 tablet by mouth 2 times daily  JURGEN Jo CNP   gabapentin (NEURONTIN) 300 MG capsule Take 300 mg by mouth nightly. Ishan Tamayo Historical Provider, MD   omeprazole (PRILOSEC) 40 MG delayed release capsule TAKE 1 CAPSULE BY MOUTH  DAILY  JURGEN Jo CNP   lidocaine-prilocaine (EMLA) 2.5-2.5 % cream Apply topically as needed.   JURGEN Jo CNP   traZODone (DESYREL) 100 MG tablet Take 1 tablet by mouth nightly TAKES PRN  JURGEN Jo CNP   RELION PEN NEEDLE 31G/8MM 31G X 8 MM MISC USE  THREE TIMES DAILY AS DIRECTED  JURGEN Jo CNP   nitroGLYCERIN (NITROSTAT) 0.4 MG SL tablet DISSOLVE 1 TABLET UNDER THE TONGUE EVERY 5 MINUTES AS  NEEDED ; MAXIMUM OF 3  TABLETS IN 15 MINUTES  JURGEN Jo CNP   warfarin (COUMADIN) 7.5 MG tablet Continue f/up with coumadin clinic  JURGEN Jo CNP   albuterol sulfate HFA (PROAIR HFA) 108 (90 Base) MCG/ACT inhaler Inhale 2 puffs into the lungs every 6 hours as needed for Wheezing  JURGEN Jo CNP   rosuvastatin (CRESTOR) 10 MG tablet Take 1 tablet by mouth  daily  JURGEN Jo CNP   ONE TOUCH ULTRA TEST strip USE TO TEST 3-4 TIMES DAILY DUE TO FLUCTUATING SUGAR  JURGEN Jo CNP   fluticasone-salmeterol (ADVAIR HFA) 230-21 MCG/ACT inhaler Inhale 1 puff into the lungs 2 times daily  JURGEN Jo CNP   promethazine (PHENERGAN) 25 MG tablet Take 1 tablet by mouth every 8 hours as needed for Nausea  JURGEN Jo CNP   ASSURE COMFORT LANCETS 30G MISC USE TO TEST BLOOD GLUCOSE THREE TIMES DAILY  JURGEN Jo - ALONDRA

## 2018-10-01 ENCOUNTER — TELEPHONE (OUTPATIENT)
Dept: FAMILY MEDICINE CLINIC | Age: 63
End: 2018-10-01

## 2018-10-05 ENCOUNTER — HOSPITAL ENCOUNTER (EMERGENCY)
Age: 63
Discharge: HOME | End: 2018-10-05
Attending: EMERGENCY MEDICINE
Payer: MEDICARE

## 2018-10-05 ENCOUNTER — TELEPHONE (OUTPATIENT)
Dept: OTHER | Facility: CLINIC | Age: 63
End: 2018-10-05

## 2018-10-05 ENCOUNTER — APPOINTMENT (OUTPATIENT)
Dept: GENERAL RADIOLOGY | Age: 63
End: 2018-10-05
Payer: MEDICARE

## 2018-10-05 VITALS
TEMPERATURE: 96.9 F | RESPIRATION RATE: 17 BRPM | HEIGHT: 63 IN | BODY MASS INDEX: 51.91 KG/M2 | SYSTOLIC BLOOD PRESSURE: 133 MMHG | WEIGHT: 293 LBS | DIASTOLIC BLOOD PRESSURE: 93 MMHG | HEART RATE: 74 BPM

## 2018-10-05 DIAGNOSIS — R10.32 ABDOMINAL PAIN, CHRONIC, LEFT LOWER QUADRANT: Primary | ICD-10-CM

## 2018-10-05 DIAGNOSIS — G89.29 ABDOMINAL PAIN, CHRONIC, LEFT LOWER QUADRANT: Primary | ICD-10-CM

## 2018-10-05 LAB
A/G RATIO: 0.8 (ref 1.1–2.2)
ALBUMIN SERPL-MCNC: 3.4 G/DL (ref 3.4–5)
ALP BLD-CCNC: 114 U/L (ref 40–129)
ALT SERPL-CCNC: 34 U/L (ref 10–40)
ANION GAP SERPL CALCULATED.3IONS-SCNC: 17 MMOL/L (ref 3–16)
AST SERPL-CCNC: 21 U/L (ref 15–37)
BASOPHILS ABSOLUTE: 0.1 K/UL (ref 0–0.2)
BASOPHILS RELATIVE PERCENT: 1 %
BILIRUB SERPL-MCNC: 0.3 MG/DL (ref 0–1)
BUN BLDV-MCNC: 32 MG/DL (ref 7–20)
CALCIUM SERPL-MCNC: 9.4 MG/DL (ref 8.3–10.6)
CHLORIDE BLD-SCNC: 96 MMOL/L (ref 99–110)
CO2: 22 MMOL/L (ref 21–32)
CREAT SERPL-MCNC: 8.6 MG/DL (ref 0.6–1.2)
EOSINOPHILS ABSOLUTE: 0.3 K/UL (ref 0–0.6)
EOSINOPHILS RELATIVE PERCENT: 4.8 %
GFR AFRICAN AMERICAN: 6
GFR NON-AFRICAN AMERICAN: 5
GLOBULIN: 4.3 G/DL
GLUCOSE BLD-MCNC: 120 MG/DL (ref 70–99)
HCT VFR BLD CALC: 36 % (ref 36–48)
HEMOGLOBIN: 11.7 G/DL (ref 12–16)
LIPASE: 60 U/L (ref 13–60)
LYMPHOCYTES ABSOLUTE: 1.4 K/UL (ref 1–5.1)
LYMPHOCYTES RELATIVE PERCENT: 21.4 %
MCH RBC QN AUTO: 32.3 PG (ref 26–34)
MCHC RBC AUTO-ENTMCNC: 32.5 G/DL (ref 31–36)
MCV RBC AUTO: 99.3 FL (ref 80–100)
MONOCYTES ABSOLUTE: 0.6 K/UL (ref 0–1.3)
MONOCYTES RELATIVE PERCENT: 8.4 %
NEUTROPHILS ABSOLUTE: 4.3 K/UL (ref 1.7–7.7)
NEUTROPHILS RELATIVE PERCENT: 64.4 %
PDW BLD-RTO: 17.2 % (ref 12.4–15.4)
PLATELET # BLD: 178 K/UL (ref 135–450)
PMV BLD AUTO: 8.5 FL (ref 5–10.5)
POTASSIUM REFLEX MAGNESIUM: 5.2 MMOL/L (ref 3.5–5.1)
RBC # BLD: 3.62 M/UL (ref 4–5.2)
SODIUM BLD-SCNC: 135 MMOL/L (ref 136–145)
TOTAL PROTEIN: 7.7 G/DL (ref 6.4–8.2)
TROPONIN: 0.08 NG/ML
WBC # BLD: 6.7 K/UL (ref 4–11)

## 2018-10-05 PROCEDURE — 84484 ASSAY OF TROPONIN QUANT: CPT

## 2018-10-05 PROCEDURE — 85025 COMPLETE CBC W/AUTO DIFF WBC: CPT

## 2018-10-05 PROCEDURE — 6370000000 HC RX 637 (ALT 250 FOR IP): Performed by: EMERGENCY MEDICINE

## 2018-10-05 PROCEDURE — 96372 THER/PROPH/DIAG INJ SC/IM: CPT

## 2018-10-05 PROCEDURE — 83690 ASSAY OF LIPASE: CPT

## 2018-10-05 PROCEDURE — 93010 ELECTROCARDIOGRAM REPORT: CPT | Performed by: INTERNAL MEDICINE

## 2018-10-05 PROCEDURE — 6360000002 HC RX W HCPCS: Performed by: EMERGENCY MEDICINE

## 2018-10-05 PROCEDURE — 71045 X-RAY EXAM CHEST 1 VIEW: CPT

## 2018-10-05 PROCEDURE — 93005 ELECTROCARDIOGRAM TRACING: CPT | Performed by: EMERGENCY MEDICINE

## 2018-10-05 PROCEDURE — 99285 EMERGENCY DEPT VISIT HI MDM: CPT

## 2018-10-05 PROCEDURE — 80053 COMPREHEN METABOLIC PANEL: CPT

## 2018-10-05 RX ORDER — PROMETHAZINE HYDROCHLORIDE 25 MG/ML
25 INJECTION, SOLUTION INTRAMUSCULAR; INTRAVENOUS ONCE
Status: COMPLETED | OUTPATIENT
Start: 2018-10-05 | End: 2018-10-05

## 2018-10-05 RX ORDER — TRAMADOL HYDROCHLORIDE 50 MG/1
50 TABLET ORAL ONCE
Status: COMPLETED | OUTPATIENT
Start: 2018-10-05 | End: 2018-10-05

## 2018-10-05 RX ORDER — PROMETHAZINE HYDROCHLORIDE 25 MG/1
25 TABLET ORAL EVERY 6 HOURS PRN
Qty: 10 TABLET | Refills: 0 | Status: SHIPPED | OUTPATIENT
Start: 2018-10-05 | End: 2018-10-11

## 2018-10-05 RX ADMIN — TRAMADOL HYDROCHLORIDE 50 MG: 50 TABLET, FILM COATED ORAL at 11:11

## 2018-10-05 RX ADMIN — PROMETHAZINE HYDROCHLORIDE 25 MG: 25 INJECTION INTRAMUSCULAR; INTRAVENOUS at 12:22

## 2018-10-05 ASSESSMENT — PAIN SCALES - GENERAL: PAINLEVEL_OUTOF10: 5

## 2018-10-05 ASSESSMENT — PAIN DESCRIPTION - PAIN TYPE: TYPE: ACUTE PAIN

## 2018-10-05 ASSESSMENT — PAIN DESCRIPTION - LOCATION: LOCATION: CHEST

## 2018-10-08 ENCOUNTER — TELEPHONE (OUTPATIENT)
Dept: OTHER | Facility: CLINIC | Age: 63
End: 2018-10-08

## 2018-10-08 ENCOUNTER — HOSPITAL ENCOUNTER (EMERGENCY)
Age: 63
Discharge: HOME | End: 2018-10-08
Attending: EMERGENCY MEDICINE
Payer: MEDICARE

## 2018-10-08 ENCOUNTER — APPOINTMENT (OUTPATIENT)
Dept: CT IMAGING | Age: 63
End: 2018-10-08
Payer: MEDICARE

## 2018-10-08 VITALS
SYSTOLIC BLOOD PRESSURE: 97 MMHG | OXYGEN SATURATION: 88 % | RESPIRATION RATE: 21 BRPM | DIASTOLIC BLOOD PRESSURE: 57 MMHG | WEIGHT: 293 LBS | HEIGHT: 63 IN | HEART RATE: 91 BPM | BODY MASS INDEX: 51.91 KG/M2 | TEMPERATURE: 97.6 F

## 2018-10-08 DIAGNOSIS — R51.9 HEADACHE, UNSPECIFIED HEADACHE TYPE: Primary | ICD-10-CM

## 2018-10-08 LAB
ANION GAP SERPL CALCULATED.3IONS-SCNC: 14 MMOL/L (ref 3–16)
BASOPHILS ABSOLUTE: 0.1 K/UL (ref 0–0.2)
BASOPHILS RELATIVE PERCENT: 1.2 %
BUN BLDV-MCNC: 13 MG/DL (ref 7–20)
CALCIUM SERPL-MCNC: 9.6 MG/DL (ref 8.3–10.6)
CHLORIDE BLD-SCNC: 94 MMOL/L (ref 99–110)
CO2: 28 MMOL/L (ref 21–32)
CREAT SERPL-MCNC: 5.4 MG/DL (ref 0.6–1.2)
EOSINOPHILS ABSOLUTE: 0.3 K/UL (ref 0–0.6)
EOSINOPHILS RELATIVE PERCENT: 4.4 %
GFR AFRICAN AMERICAN: 10
GFR NON-AFRICAN AMERICAN: 8
GLUCOSE BLD-MCNC: 161 MG/DL (ref 70–99)
HCT VFR BLD CALC: 36.7 % (ref 36–48)
HEMOGLOBIN: 11.8 G/DL (ref 12–16)
INR BLD: 2.33 (ref 0.86–1.14)
LYMPHOCYTES ABSOLUTE: 1 K/UL (ref 1–5.1)
LYMPHOCYTES RELATIVE PERCENT: 14.4 %
MCH RBC QN AUTO: 31.3 PG (ref 26–34)
MCHC RBC AUTO-ENTMCNC: 32.1 G/DL (ref 31–36)
MCV RBC AUTO: 97.5 FL (ref 80–100)
MONOCYTES ABSOLUTE: 0.5 K/UL (ref 0–1.3)
MONOCYTES RELATIVE PERCENT: 7 %
NEUTROPHILS ABSOLUTE: 4.9 K/UL (ref 1.7–7.7)
NEUTROPHILS RELATIVE PERCENT: 73 %
PDW BLD-RTO: 17.2 % (ref 12.4–15.4)
PLATELET # BLD: 226 K/UL (ref 135–450)
PMV BLD AUTO: 8 FL (ref 5–10.5)
POTASSIUM SERPL-SCNC: 4.1 MMOL/L (ref 3.5–5.1)
PROTHROMBIN TIME: 26.6 SEC (ref 9.8–13)
RBC # BLD: 3.76 M/UL (ref 4–5.2)
SODIUM BLD-SCNC: 136 MMOL/L (ref 136–145)
WBC # BLD: 6.7 K/UL (ref 4–11)

## 2018-10-08 PROCEDURE — 93005 ELECTROCARDIOGRAM TRACING: CPT | Performed by: INTERNAL MEDICINE

## 2018-10-08 PROCEDURE — 85025 COMPLETE CBC W/AUTO DIFF WBC: CPT

## 2018-10-08 PROCEDURE — 36415 COLL VENOUS BLD VENIPUNCTURE: CPT

## 2018-10-08 PROCEDURE — 70450 CT HEAD/BRAIN W/O DYE: CPT

## 2018-10-08 PROCEDURE — 85610 PROTHROMBIN TIME: CPT

## 2018-10-08 PROCEDURE — 6360000002 HC RX W HCPCS: Performed by: EMERGENCY MEDICINE

## 2018-10-08 PROCEDURE — 80048 BASIC METABOLIC PNL TOTAL CA: CPT

## 2018-10-08 PROCEDURE — 6370000000 HC RX 637 (ALT 250 FOR IP): Performed by: EMERGENCY MEDICINE

## 2018-10-08 PROCEDURE — 96372 THER/PROPH/DIAG INJ SC/IM: CPT

## 2018-10-08 PROCEDURE — 99285 EMERGENCY DEPT VISIT HI MDM: CPT

## 2018-10-08 RX ORDER — DIPHENHYDRAMINE HYDROCHLORIDE 50 MG/ML
25 INJECTION INTRAMUSCULAR; INTRAVENOUS ONCE
Status: DISCONTINUED | OUTPATIENT
Start: 2018-10-08 | End: 2018-10-08

## 2018-10-08 RX ORDER — METOCLOPRAMIDE HYDROCHLORIDE 5 MG/ML
10 INJECTION INTRAMUSCULAR; INTRAVENOUS ONCE
Status: COMPLETED | OUTPATIENT
Start: 2018-10-08 | End: 2018-10-08

## 2018-10-08 RX ORDER — DIPHENHYDRAMINE HCL 25 MG
25 TABLET ORAL ONCE
Status: COMPLETED | OUTPATIENT
Start: 2018-10-08 | End: 2018-10-08

## 2018-10-08 RX ORDER — METOCLOPRAMIDE HYDROCHLORIDE 5 MG/ML
10 INJECTION INTRAMUSCULAR; INTRAVENOUS ONCE
Status: DISCONTINUED | OUTPATIENT
Start: 2018-10-08 | End: 2018-10-08

## 2018-10-08 RX ADMIN — METOCLOPRAMIDE 10 MG: 5 INJECTION, SOLUTION INTRAMUSCULAR; INTRAVENOUS at 17:59

## 2018-10-08 RX ADMIN — DIPHENHYDRAMINE HCL 25 MG: 25 TABLET ORAL at 17:58

## 2018-10-08 ASSESSMENT — PAIN DESCRIPTION - LOCATION: LOCATION: HEAD

## 2018-10-08 ASSESSMENT — PAIN SCALES - GENERAL: PAINLEVEL_OUTOF10: 8

## 2018-10-08 NOTE — ED PROVIDER NOTES
100 mg by mouth daily      BiPAP Machine MISC by Does not apply route nightly      Spacer/Aero-Holding Chambers VENANCIO 1 Device by Does not apply route daily as needed 1 Device 0    OXYGEN Inhale 2 L into the lungs daily as needed At night prn      promethazine (PHENERGAN) 25 MG tablet Take 1 tablet by mouth every 6 hours as needed for Nausea 10 tablet 0     ALLERGIES  Codeine; Fentanyl; Morphine; Nsaids; Hydrocodone-acetaminophen; Percocet [oxycodone-acetaminophen]; Procardia [nifedipine]; and Vicodin [hydrocodone-acetaminophen]  FAMILY HISTORY  Family History   Problem Relation Age of Onset    Colon Cancer Father     Diabetes Father     High Blood Pressure Father     Colon Cancer Mother     Diabetes Mother     Colon Cancer Maternal Grandmother     Kidney Cancer Brother     Heart Disease Brother          age 54    High Blood Pressure Brother     High Blood Pressure Sister     High Blood Pressure Maternal Aunt     High Blood Pressure Sister     Heart Disease Sister      SOCIAL HISTORY:  Social History   Substance Use Topics    Smoking status: Never Smoker    Smokeless tobacco: Never Used    Alcohol use No     IMMUNIZATIONS:  Noncontributory    PHYSICAL EXAM  VITAL SIGNS:  Blood pressure (!) 141/77, pulse 83, temperature 97.6 °F (36.4 °C), temperature source Oral, resp. rate 16, height 5' 3\" (1.6 m), weight (!) 331 lb (150.1 kg), last menstrual period 1996, SpO2 98 %, not currently breastfeeding.   Constitutional:  61 y.o. female alert, cooperative, nontoxic  HENT:  Atraumatic, mucous membranes moist  Eyes:   Conjunctiva clear, no icterus  Neck:  Supple, no JVD, no signs of injury  Cardiovascular:  Regular, no rubs, no discernible murmur  Thorax & Lungs:  No accessory muscle usage, clear  Abdomen:  Soft, non distended, bowel sounds present, NT  Back:  No deformity  Genitalia:  Deferred  Rectal:  Deferred  Extremities:  No cyanosis, no edema, AV dialysis access site with palpable thrill arm Skin:  Warm, dry  Neurologic:  Alert, no slurred speech, no focal deficits noted  Psychiatric:  Affect appropriate    DIAGNOSTIC RESULTS:  Labs resulted at the time of this note reviewed. Labs Reviewed   CBC WITH AUTO DIFFERENTIAL - Abnormal; Notable for the following:        Result Value    RBC 3.76 (*)     Hemoglobin 11.8 (*)     RDW 17.2 (*)     All other components within normal limits    Narrative:     Performed at:  [facility] [address] [facility] [address], [facility]   Phone [phone]   BASIC METABOLIC PANEL - Abnormal; Notable for the following:     Chloride 94 (*)     Glucose 161 (*)     CREATININE 5.4 (*)     GFR Non- 8 (*)     GFR  10 (*)     All other components within normal limits    Narrative:     Tony EUGENEERF tel. [phone],  Chemistry results called to and read back by Papo Cheng, 10/08/2018 17:53,  by Issophie Cassette  Performed at:  [facility] [address] [facility] [address], [facility]   Phone [phone]   PROTIME-INR - Abnormal; Notable for the following:     Protime 26.6 (*)     INR 2.33 (*)     All other components within normal limits    Narrative:     Performed at:  [facility] [address] [facility] [address], [facility]   Phone [phone]     EKG:  Read by me in the absence of a cardiologist shows:  Sinus rhythm, rate 79, no ectopy, intervals normal, axis -22°, delayed R-wave progression, no acute injury pattern, comparison with 3 August 2018 shows no major change    RADIOLOGY:    Plain x-rays were viewed by me:   CT Head WO Contrast   Final Result   1. No acute intracranial abnormality. 2. Extra-axial parafalcine partially calcified lesion likely representing a   meningioma.            ED COURSE:    Medications administered:  Medications   metoclopramide (REGLAN) injection 10 mg (10 mg Intramuscular Given 10/8/18 8428)   diphenhydrAMINE (BENADRYL) tablet 25 mg

## 2018-10-09 LAB
EKG ATRIAL RATE: 82 BPM
EKG DIAGNOSIS: NORMAL
EKG P AXIS: 36 DEGREES
EKG P-R INTERVAL: 192 MS
EKG Q-T INTERVAL: 376 MS
EKG QRS DURATION: 86 MS
EKG QTC CALCULATION (BAZETT): 439 MS
EKG R AXIS: -24 DEGREES
EKG T AXIS: 76 DEGREES
EKG VENTRICULAR RATE: 82 BPM

## 2018-10-09 PROCEDURE — 93010 ELECTROCARDIOGRAM REPORT: CPT | Performed by: INTERNAL MEDICINE

## 2018-10-09 NOTE — ED NOTES
Pt states HA is better. No signs of distress Respiration even and easy. Pt able to transfer to chair. Pt taken out to car by wheelchair.       Mela Llamas RN  10/08/18 2040

## 2018-10-10 LAB
EKG ATRIAL RATE: 79 BPM
EKG DIAGNOSIS: NORMAL
EKG P AXIS: 38 DEGREES
EKG P-R INTERVAL: 192 MS
EKG Q-T INTERVAL: 382 MS
EKG QRS DURATION: 80 MS
EKG QTC CALCULATION (BAZETT): 438 MS
EKG R AXIS: -22 DEGREES
EKG T AXIS: 83 DEGREES
EKG VENTRICULAR RATE: 79 BPM

## 2018-10-11 ENCOUNTER — OFFICE VISIT (OUTPATIENT)
Dept: FAMILY MEDICINE CLINIC | Age: 63
End: 2018-10-11
Payer: MEDICARE

## 2018-10-11 ENCOUNTER — CARE COORDINATION (OUTPATIENT)
Dept: CARE COORDINATION | Age: 63
End: 2018-10-11

## 2018-10-11 VITALS
WEIGHT: 293 LBS | SYSTOLIC BLOOD PRESSURE: 120 MMHG | DIASTOLIC BLOOD PRESSURE: 90 MMHG | HEART RATE: 58 BPM | BODY MASS INDEX: 51.91 KG/M2 | OXYGEN SATURATION: 98 % | HEIGHT: 63 IN

## 2018-10-11 DIAGNOSIS — N18.6 ESRD (END STAGE RENAL DISEASE) ON DIALYSIS (HCC): Primary | ICD-10-CM

## 2018-10-11 DIAGNOSIS — E66.01 MORBID OBESITY WITH BMI OF 60.0-69.9, ADULT (HCC): ICD-10-CM

## 2018-10-11 DIAGNOSIS — K46.9 NON-RECURRENT ABDOMINAL HERNIA WITHOUT OBSTRUCTION OR GANGRENE, UNSPECIFIED HERNIA TYPE: ICD-10-CM

## 2018-10-11 DIAGNOSIS — Z99.2 ESRD (END STAGE RENAL DISEASE) ON DIALYSIS (HCC): Primary | ICD-10-CM

## 2018-10-11 DIAGNOSIS — F51.01 PRIMARY INSOMNIA: ICD-10-CM

## 2018-10-11 PROCEDURE — G8417 CALC BMI ABV UP PARAM F/U: HCPCS | Performed by: NURSE PRACTITIONER

## 2018-10-11 PROCEDURE — 3017F COLORECTAL CA SCREEN DOC REV: CPT | Performed by: NURSE PRACTITIONER

## 2018-10-11 PROCEDURE — G8484 FLU IMMUNIZE NO ADMIN: HCPCS | Performed by: NURSE PRACTITIONER

## 2018-10-11 PROCEDURE — 1111F DSCHRG MED/CURRENT MED MERGE: CPT | Performed by: NURSE PRACTITIONER

## 2018-10-11 PROCEDURE — G8427 DOCREV CUR MEDS BY ELIG CLIN: HCPCS | Performed by: NURSE PRACTITIONER

## 2018-10-11 PROCEDURE — 99214 OFFICE O/P EST MOD 30 MIN: CPT | Performed by: NURSE PRACTITIONER

## 2018-10-11 PROCEDURE — 1036F TOBACCO NON-USER: CPT | Performed by: NURSE PRACTITIONER

## 2018-10-11 ASSESSMENT — ENCOUNTER SYMPTOMS
NAUSEA: 0
ABDOMINAL PAIN: 0
VOMITING: 0
DIARRHEA: 0
SHORTNESS OF BREATH: 0

## 2018-10-11 NOTE — CARE COORDINATION
Met with pt due to pt expressing she has question regarding HD. Pt stated she has been informed she only needs to see Nephrologist at HD Clinic days she is receiving HD. Outreach call to [facility], per Elissa \"some pt's can be seen in the clinic where they get HD, if they have any other issues to be addressed by Nephrologist they are welcome to be seen in the office by the provider\"- will update pt. Per Noel Ramirez pt is currently a pt of Dr. Murray Lion and hasn't been seen since 11/2017 in the office- at present time pt has to remain with Murray Lion. Pt stated [facility] signed off 2 weeks ago- currently not active with Johnna Dietrich.      Rizwan Peoples BSN, RN Care Coordinator  [address] [place] [address],  [address] [place] [address] Primary Care   [phone]

## 2018-10-23 ENCOUNTER — CARE COORDINATION (OUTPATIENT)
Dept: CARE COORDINATION | Age: 63
End: 2018-10-23

## 2018-10-23 ASSESSMENT — PATIENT HEALTH QUESTIONNAIRE - PHQ9
SUM OF ALL RESPONSES TO PHQ QUESTIONS 1-9: 0
SUM OF ALL RESPONSES TO PHQ QUESTIONS 1-9: 0

## 2018-10-23 NOTE — CARE COORDINATION
weight (lb):  (Comment: weighed at HD M/W/F)  Weight trend:  stable (Comment: no notable swelling, worsening SOB, ISSA per pt)  Salt intake watch compared to last visit:  stable         Diabetes Assessment    Medic Alert ID:  No  Meal Planning:  Avoidance of concentrated sweets   How often do you test your blood sugar?:  Meals   Do you have barriers with adherence to non-pharmacologic self-management interventions? (Nutrition/Exercise/Self-Monitoring):  No   Have you ever had to go to the ED for symptoms of low blood sugar?:  No       Increase or Decrease trend in Blood Sugars, No patient-reported symptoms   Do you have hyperglycemia symptoms?:  No   Do you have hypoglycemia symptoms?:  No   Last Blood Sugar Value:  117   Blood Sugar Monitoring Regimen:  Once a Day   Blood Sugar Trends:  Steady Decrease          Ambulatory Care Coordination Assessment    Care Coordination Protocol  Program Enrollment:  Complex Care  Referral from Primary Care Provider:  No  Week 1 - Initial Assessment     Do you have all of your prescriptions and are they filled?:  Yes  Barriers to medication adherence:  None  Are you able to afford your medications?:  Yes  How often do you have trouble taking your medications the way you have been told to take them?:  I always take them as prescribed. Do you have Home O2 Therapy?:  Yes   Oxygen Regimen:  PRN Flow - Enter rate/FIO2:  2   Method of Delivery:  Nasal Cannula, Has cylinders in home   CPAP Use:  BiPAP      Ability to seek help/take action for Emergent Urgent situations i.e. fire, crime, inclement weather or health crisis.:  Needs Assistance  Ability to ambulate to restroom:  Independent  Ability handle personal hygeine needs (bathing/dressing/grooming): Independent  Ability to manage Medications:   Independent  Ability to prepare Food Preparation:  Needs Assistance  Ability to maintain home (clean home, laundry):  Needs Assistance  Ability to drive and/or has transportation:

## 2018-10-27 DIAGNOSIS — E11.42 DIABETIC POLYNEUROPATHY ASSOCIATED WITH TYPE 2 DIABETES MELLITUS (HCC): ICD-10-CM

## 2018-10-29 RX ORDER — GABAPENTIN 100 MG/1
CAPSULE ORAL
Qty: 90 CAPSULE | Refills: 5 | OUTPATIENT
Start: 2018-10-29

## 2018-10-30 ENCOUNTER — CARE COORDINATION (OUTPATIENT)
Dept: CARE COORDINATION | Age: 63
End: 2018-10-30

## 2018-10-30 ENCOUNTER — OFFICE VISIT (OUTPATIENT)
Dept: ORTHOPEDIC SURGERY | Age: 63
End: 2018-10-30
Payer: MEDICARE

## 2018-10-30 VITALS — HEIGHT: 63 IN | WEIGHT: 293 LBS | BODY MASS INDEX: 51.91 KG/M2

## 2018-10-30 DIAGNOSIS — M17.0 PRIMARY OSTEOARTHRITIS OF BOTH KNEES: Primary | ICD-10-CM

## 2018-10-30 PROCEDURE — 1036F TOBACCO NON-USER: CPT | Performed by: ORTHOPAEDIC SURGERY

## 2018-10-30 PROCEDURE — 3017F COLORECTAL CA SCREEN DOC REV: CPT | Performed by: ORTHOPAEDIC SURGERY

## 2018-10-30 PROCEDURE — 99213 OFFICE O/P EST LOW 20 MIN: CPT | Performed by: ORTHOPAEDIC SURGERY

## 2018-10-30 PROCEDURE — G8417 CALC BMI ABV UP PARAM F/U: HCPCS | Performed by: ORTHOPAEDIC SURGERY

## 2018-10-30 PROCEDURE — G8484 FLU IMMUNIZE NO ADMIN: HCPCS | Performed by: ORTHOPAEDIC SURGERY

## 2018-10-30 PROCEDURE — 20610 DRAIN/INJ JOINT/BURSA W/O US: CPT | Performed by: ORTHOPAEDIC SURGERY

## 2018-10-30 PROCEDURE — G8427 DOCREV CUR MEDS BY ELIG CLIN: HCPCS | Performed by: ORTHOPAEDIC SURGERY

## 2018-10-30 RX ORDER — OXICONAZOLE NITRATE 10 MG/G
CREAM TOPICAL
Refills: 2 | COMMUNITY
Start: 2018-10-17 | End: 2018-12-10 | Stop reason: SDUPTHER

## 2018-10-30 RX ORDER — TRIAMCINOLONE ACETONIDE 40 MG/ML
40 INJECTION, SUSPENSION INTRA-ARTICULAR; INTRAMUSCULAR ONCE
Status: COMPLETED | OUTPATIENT
Start: 2018-10-30 | End: 2018-10-30

## 2018-10-30 RX ORDER — LIDOCAINE 50 MG/G
OINTMENT TOPICAL
Refills: 11 | Status: ON HOLD | COMMUNITY
Start: 2018-10-17 | End: 2019-02-20

## 2018-10-30 RX ADMIN — TRIAMCINOLONE ACETONIDE 40 MG: 40 INJECTION, SUSPENSION INTRA-ARTICULAR; INTRAMUSCULAR at 10:59

## 2018-10-30 NOTE — PROGRESS NOTES
Topics    Smoking status: Never Smoker    Smokeless tobacco: Never Used    Alcohol use No    Drug use: No    Sexual activity: Not Asked     Other Topics Concern    None     Social History Narrative    None       Current Outpatient Prescriptions   Medication Sig Dispense Refill    gabapentin (NEURONTIN) 300 MG capsule Take 1 capsule by mouth nightly for 180 days. . 90 capsule 1    Suvorexant (BELSOMRA) 10 MG TABS Take 10 mg by mouth nightly for 180 days. . 30 tablet 5    albuterol sulfate HFA (PROAIR HFA) 108 (90 Base) MCG/ACT inhaler Inhale 2 puffs into the lungs every 6 hours as needed for Wheezing 1 Inhaler 11    fluticasone-salmeterol (ADVAIR HFA) 230-21 MCG/ACT inhaler Inhale 1 puff into the lungs 2 times daily 1 Inhaler 11    ipratropium-albuterol (DUONEB) 0.5-2.5 (3) MG/3ML SOLN nebulizer solution Inhale 3 mLs into the lungs every 4 hours DX:COPD J44.9 360 mL 11    insulin detemir (LEVEMIR FLEXTOUCH) 100 UNIT/ML injection pen Inject 20 Units into the skin 2 times daily 75 mL 5    insulin lispro (HUMALOG KWIKPEN) 100 UNIT/ML pen Inject 0-12 Units into the skin 3 times daily (before meals) 5 pen 5    traMADol (ULTRAM) 50 MG tablet Take 50 mg by mouth every 6 hours as needed for Pain. .      TRULICITY 0.19 KL/6.8WJ SOPN INJECT ONE  SYRINGE SUBCUTANEOUSLY ONCE A WEEK 15 pen 3    nebivolol (BYSTOLIC) 10 MG tablet Take 1 tablet by mouth 2 times daily 180 tablet 3    omeprazole (PRILOSEC) 40 MG delayed release capsule TAKE 1 CAPSULE BY MOUTH  DAILY 90 capsule 3    lidocaine-prilocaine (EMLA) 2.5-2.5 % cream Apply topically as needed.  1 Tube 5    traZODone (DESYREL) 100 MG tablet Take 1 tablet by mouth nightly TAKES PRN 30 tablet 5    RELION PEN NEEDLE 31G/8MM 31G X 8 MM MISC USE  THREE TIMES DAILY AS DIRECTED 100 each 5    nitroGLYCERIN (NITROSTAT) 0.4 MG SL tablet DISSOLVE 1 TABLET UNDER THE TONGUE EVERY 5 MINUTES AS  NEEDED ; MAXIMUM OF 3  TABLETS IN 15 MINUTES 75 tablet 1    warfarin (COUMADIN)

## 2018-10-31 ENCOUNTER — HOSPITAL ENCOUNTER (EMERGENCY)
Age: 63
Discharge: HOME | End: 2018-10-31
Attending: EMERGENCY MEDICINE
Payer: MEDICARE

## 2018-10-31 ENCOUNTER — APPOINTMENT (OUTPATIENT)
Dept: CT IMAGING | Age: 63
End: 2018-10-31
Payer: MEDICARE

## 2018-10-31 VITALS
HEART RATE: 83 BPM | SYSTOLIC BLOOD PRESSURE: 156 MMHG | TEMPERATURE: 98.5 F | BODY MASS INDEX: 58.46 KG/M2 | DIASTOLIC BLOOD PRESSURE: 71 MMHG | OXYGEN SATURATION: 98 % | WEIGHT: 293 LBS

## 2018-10-31 DIAGNOSIS — K42.9 UMBILICAL HERNIA WITHOUT OBSTRUCTION AND WITHOUT GANGRENE: Primary | ICD-10-CM

## 2018-10-31 LAB
A/G RATIO: 0.8 (ref 1.1–2.2)
ALBUMIN SERPL-MCNC: 3.4 G/DL (ref 3.4–5)
ALP BLD-CCNC: 121 U/L (ref 40–129)
ALT SERPL-CCNC: 23 U/L (ref 10–40)
ANION GAP SERPL CALCULATED.3IONS-SCNC: 17 MMOL/L (ref 3–16)
AST SERPL-CCNC: 18 U/L (ref 15–37)
BILIRUB SERPL-MCNC: 0.3 MG/DL (ref 0–1)
BUN BLDV-MCNC: 32 MG/DL (ref 7–20)
CALCIUM SERPL-MCNC: 9.4 MG/DL (ref 8.3–10.6)
CHLORIDE BLD-SCNC: 95 MMOL/L (ref 99–110)
CO2: 23 MMOL/L (ref 21–32)
CREAT SERPL-MCNC: 6.8 MG/DL (ref 0.6–1.2)
GFR AFRICAN AMERICAN: 7
GFR NON-AFRICAN AMERICAN: 6
GLOBULIN: 4.4 G/DL
GLUCOSE BLD-MCNC: 334 MG/DL (ref 70–99)
HCT VFR BLD CALC: 39.1 % (ref 36–48)
HEMOGLOBIN: 12.7 G/DL (ref 12–16)
LIPASE: 73 U/L (ref 13–60)
MCH RBC QN AUTO: 32.5 PG (ref 26–34)
MCHC RBC AUTO-ENTMCNC: 32.5 G/DL (ref 31–36)
MCV RBC AUTO: 100 FL (ref 80–100)
PDW BLD-RTO: 18.4 % (ref 12.4–15.4)
PLATELET # BLD: 239 K/UL (ref 135–450)
PMV BLD AUTO: 8.5 FL (ref 5–10.5)
POTASSIUM SERPL-SCNC: 5.2 MMOL/L (ref 3.5–5.1)
POTASSIUM SERPL-SCNC: 5.5 MMOL/L (ref 3.5–5.1)
RBC # BLD: 3.91 M/UL (ref 4–5.2)
SODIUM BLD-SCNC: 135 MMOL/L (ref 136–145)
TOTAL PROTEIN: 7.8 G/DL (ref 6.4–8.2)
WBC # BLD: 16.6 K/UL (ref 4–11)

## 2018-10-31 PROCEDURE — 80053 COMPREHEN METABOLIC PANEL: CPT

## 2018-10-31 PROCEDURE — 99284 EMERGENCY DEPT VISIT MOD MDM: CPT

## 2018-10-31 PROCEDURE — 36415 COLL VENOUS BLD VENIPUNCTURE: CPT

## 2018-10-31 PROCEDURE — 83690 ASSAY OF LIPASE: CPT

## 2018-10-31 PROCEDURE — 74176 CT ABD & PELVIS W/O CONTRAST: CPT

## 2018-10-31 PROCEDURE — 96375 TX/PRO/DX INJ NEW DRUG ADDON: CPT

## 2018-10-31 PROCEDURE — 96374 THER/PROPH/DIAG INJ IV PUSH: CPT

## 2018-10-31 PROCEDURE — 85027 COMPLETE CBC AUTOMATED: CPT

## 2018-10-31 PROCEDURE — 6360000002 HC RX W HCPCS: Performed by: EMERGENCY MEDICINE

## 2018-10-31 PROCEDURE — 84132 ASSAY OF SERUM POTASSIUM: CPT

## 2018-10-31 RX ORDER — ONDANSETRON 2 MG/ML
4 INJECTION INTRAMUSCULAR; INTRAVENOUS ONCE
Status: COMPLETED | OUTPATIENT
Start: 2018-10-31 | End: 2018-10-31

## 2018-10-31 RX ADMIN — ONDANSETRON 4 MG: 2 INJECTION INTRAMUSCULAR; INTRAVENOUS at 14:17

## 2018-10-31 RX ADMIN — HYDROMORPHONE HYDROCHLORIDE 0.5 MG: 1 INJECTION, SOLUTION INTRAMUSCULAR; INTRAVENOUS; SUBCUTANEOUS at 14:17

## 2018-10-31 ASSESSMENT — PAIN SCALES - GENERAL
PAINLEVEL_OUTOF10: 9
PAINLEVEL_OUTOF10: 10

## 2018-10-31 NOTE — ED PROVIDER NOTES
Facility-Administered Medications   Medication Dose Route Frequency Provider Last Rate Last Dose    HYDROmorphone (DILAUDID) injection 0.5 mg  0.5 mg Intravenous Once Eric Worthington MD        ondansetron [facility]) injection 4 mg  4 mg Intravenous Once Eric Worthington MD         Current Outpatient Prescriptions   Medication Sig Dispense Refill    lidocaine (XYLOCAINE) 5 % ointment APPLY 2 GRAMS (2 SCOOPS) TO AFFECTED AREA TWO TIMES DAILY AS NEEDED  11    oxiconazole nitrate (OXISTAT) 1 % CREA cream APPLY TO AFFECTED AREA(S) 1 TO 2 TIMES DAILY FOR UP TO 1 MONTH OR AS DIRECTED  2    gabapentin (NEURONTIN) 300 MG capsule Take 1 capsule by mouth nightly for 180 days. . 90 capsule 1    Suvorexant (BELSOMRA) 10 MG TABS Take 10 mg by mouth nightly for 180 days. . 30 tablet 5    albuterol sulfate HFA (PROAIR HFA) 108 (90 Base) MCG/ACT inhaler Inhale 2 puffs into the lungs every 6 hours as needed for Wheezing 1 Inhaler 11    fluticasone-salmeterol (ADVAIR HFA) 230-21 MCG/ACT inhaler Inhale 1 puff into the lungs 2 times daily 1 Inhaler 11    ipratropium-albuterol (DUONEB) 0.5-2.5 (3) MG/3ML SOLN nebulizer solution Inhale 3 mLs into the lungs every 4 hours DX:COPD J44.9 360 mL 11    insulin detemir (LEVEMIR FLEXTOUCH) 100 UNIT/ML injection pen Inject 20 Units into the skin 2 times daily 75 mL 5    insulin lispro (HUMALOG KWIKPEN) 100 UNIT/ML pen Inject 0-12 Units into the skin 3 times daily (before meals) 5 pen 5    traMADol (ULTRAM) 50 MG tablet Take 50 mg by mouth every 6 hours as needed for Pain. .      TRULICITY 0.05 CE/5.0FD SOPN INJECT ONE  SYRINGE SUBCUTANEOUSLY ONCE A WEEK 15 pen 3    nebivolol (BYSTOLIC) 10 MG tablet Take 1 tablet by mouth 2 times daily 180 tablet 3    omeprazole (PRILOSEC) 40 MG delayed release capsule TAKE 1 CAPSULE BY MOUTH  DAILY 90 capsule 3    lidocaine-prilocaine (EMLA) 2.5-2.5 % cream Apply topically as needed.  1 Tube 5    traZODone (DESYREL) 100 MG tablet Take 1 tablet by mouth nightly TAKES PRN 30 tablet 5    RELION PEN NEEDLE 31G/8MM 31G X 8 MM MISC USE  THREE TIMES DAILY AS DIRECTED 100 each 5    nitroGLYCERIN (NITROSTAT) 0.4 MG SL tablet DISSOLVE 1 TABLET UNDER THE TONGUE EVERY 5 MINUTES AS  NEEDED ; MAXIMUM OF 3  TABLETS IN 15 MINUTES 75 tablet 1    warfarin (COUMADIN) 7.5 MG tablet Continue f/up with coumadin clinic 30 tablet 3    rosuvastatin (CRESTOR) 10 MG tablet Take 1 tablet by mouth  daily 90 tablet 2    ONE TOUCH ULTRA TEST strip USE TO TEST 3-4 TIMES DAILY DUE TO FLUCTUATING SUGAR 100 strip 5    promethazine (PHENERGAN) 25 MG tablet Take 1 tablet by mouth every 8 hours as needed for Nausea 30 tablet 2    ASSURE COMFORT LANCETS 30G MISC USE TO TEST BLOOD GLUCOSE THREE TIMES DAILY 300 each 3    Alcohol Swabs (ALCOHOL PREP) 70 % PADS USE TO TEST BLOOD GLUCOSE THREE TIMES DAILY 300 each 3    Blood Glucose Monitoring Suppl (ACCU-CHEK KENNETH SMARTVIEW) w/Device KIT USE TO TEST BLOOD GLUCOSE 1 kit 0    allopurinol (ZYLOPRIM) 100 MG tablet Take 100 mg by mouth daily      BiPAP Machine MISC by Does not apply route nightly      Spacer/Aero-Holding Chambers VENANCIO 1 Device by Does not apply route daily as needed 1 Device 0    OXYGEN Inhale 2 L into the lungs daily as needed At night prn       Allergies   Allergen Reactions    Codeine Nausea Only    Fentanyl Itching    Morphine      CHEST PAIN    Nsaids Other (See Comments)     Due to CRF    Hydrocodone-Acetaminophen Itching and Rash    Percocet [Oxycodone-Acetaminophen] Itching    Procardia [Nifedipine] Nausea And Vomiting     Headache  Takes norvasc at home 12/22/15    Vicodin [Hydrocodone-Acetaminophen] Rash       REVIEW OF SYSTEMS  10 systems reviewed, pertinent positives per HPI otherwise noted to be negative. PHYSICAL EXAM  BP (!) 156/71   Pulse 83   Temp 98.5 °F (36.9 °C)   Wt (!) 330 lb (149.7 kg)   LMP 11/01/1996   SpO2 98%   BMI 58.46 kg/m²   GENERAL APPEARANCE: Awake and alert. RADIOLOGY  Ct Abdomen Pelvis Wo Contrast Additional Contrast? None    Result Date: 10/31/2018  EXAMINATION: CT OF THE ABDOMEN AND PELVIS WITHOUT CONTRAST 10/31/2018 11:50 am TECHNIQUE: CT of the abdomen and pelvis was performed without the administration of intravenous contrast. Multiplanar reformatted images are provided for review. Dose modulation, iterative reconstruction, and/or weight based adjustment of the mA/kV was utilized to reduce the radiation dose to as low as reasonably achievable. COMPARISON: 09/14/2018 08/20/2018 HISTORY: ORDERING SYSTEM PROVIDED HISTORY: periumbilical hernia, eval for incarceration TECHNOLOGIST PROVIDED HISTORY: Additional Contrast?->None Ordering Physician Provided Reason for Exam: periumbilical hernia, eval for incarceration Acuity: Unknown Type of Exam: Unknown FINDINGS: Evaluation is limited by the patient's body habitus, with creates streak artifact. Lower Chest: The visualized lungs are clear. Organs: Evaluation of the solid abdominal viscera is limited without intravenous contrast.  Having said that, no acute abnormalities are seen involving the liver and spleen. The gallbladder is unremarkable. Normal adrenals. Normal noncontrast imaging of the pancreas. Kidneys are stable, with no acute abnormality detected. GI/Bowel: There is a wide mouth periumbilical hernia which extends into the pannus, containing loops of both large and small bowel, but without evidence of obstruction. There is mild diverticulosis of the large bowel, without CT evidence of diverticulitis. The appendix is not well visualized but no asymmetric fat stranding in the pericecal region is found. No small bowel abnormality detected. There is a small moderate-sized hiatal hernia. The stomach and duodenum are otherwise unremarkable in appearance. Pelvis: Uterus is not identified. Urinary bladder is nondistended. No free pelvic fluid is found.  Peritoneum/Retroperitoneum: Mild vascular breath, and advised her to lay more on her side did not put quite as much pressure, and she has follow-up with her surgeon tomorrow. CT did not show any changes and she received dialysis today, we rechecked her potassium which was 5.2 on recheck. Patient was given scripts for the following medications. I counseled patient how to take these medications. New Prescriptions    No medications on file       CLINICAL IMPRESSION  1.  Umbilical hernia without obstruction and without gangrene                      Eric Worthington MD  10/31/18 2035

## 2018-11-01 ENCOUNTER — CARE COORDINATION (OUTPATIENT)
Dept: CARE COORDINATION | Age: 63
End: 2018-11-01

## 2018-11-01 ENCOUNTER — OFFICE VISIT (OUTPATIENT)
Dept: SURGERY | Age: 63
End: 2018-11-01
Payer: MEDICARE

## 2018-11-01 VITALS — DIASTOLIC BLOOD PRESSURE: 70 MMHG | HEIGHT: 63 IN | BODY MASS INDEX: 58.46 KG/M2 | SYSTOLIC BLOOD PRESSURE: 122 MMHG

## 2018-11-01 DIAGNOSIS — K43.2 RECURRENT VENTRAL HERNIA: Primary | ICD-10-CM

## 2018-11-01 PROCEDURE — G8417 CALC BMI ABV UP PARAM F/U: HCPCS | Performed by: SURGERY

## 2018-11-01 PROCEDURE — 1036F TOBACCO NON-USER: CPT | Performed by: SURGERY

## 2018-11-01 PROCEDURE — G8427 DOCREV CUR MEDS BY ELIG CLIN: HCPCS | Performed by: SURGERY

## 2018-11-01 PROCEDURE — G8484 FLU IMMUNIZE NO ADMIN: HCPCS | Performed by: SURGERY

## 2018-11-01 PROCEDURE — 3017F COLORECTAL CA SCREEN DOC REV: CPT | Performed by: SURGERY

## 2018-11-01 PROCEDURE — 99213 OFFICE O/P EST LOW 20 MIN: CPT | Performed by: SURGERY

## 2018-11-01 ASSESSMENT — ENCOUNTER SYMPTOMS
EYES NEGATIVE: 1
APNEA: 1
BACK PAIN: 1
CHEST TIGHTNESS: 1
ALLERGIC/IMMUNOLOGIC NEGATIVE: 1
ABDOMINAL PAIN: 1
NAUSEA: 1

## 2018-11-01 NOTE — PROGRESS NOTES
Subjective: Yoselyn Silva is a 61 y.o. female     CC: Bulge of the abdominal wall    HPI: 57-year-old female who presents for evaluation of a bulge of the abdominal wall.         Family History   Problem Relation Age of Onset    Colon Cancer Father     Diabetes Father     High Blood Pressure Father     Colon Cancer Mother     Diabetes Mother     Colon Cancer Maternal Grandmother     Kidney Cancer Brother     Heart Disease Brother          age 54    High Blood Pressure Brother     High Blood Pressure Sister     High Blood Pressure Maternal Aunt     High Blood Pressure Sister     Heart Disease Sister        Past Medical History:   Diagnosis Date    Acute on chronic congestive heart failure ([address] 2014    Asthma     CAD (coronary artery disease)     Cervical cancer ([address]     Chest pain 2018    [facility]estive [facility]) [facility]     Chronic kidney disease, stage IV (severe) ([facility])     Dr Susana Byrd    Chronic pain syndrome 3/27/2018    Chronic respiratory failure with hypoxia ([facility])     CKD (chronic kidney disease) stage 4, GFR 15-29 ml/min ([facility]) 2017    Colon polyps     COPD (chronic obstructive pulmonary disease) ([address]     Degenerative disc disease at L5-S1 level 2013     CT    Diabetes mellitus, insulin dependent (IDDM), uncontrolled ([address]     Elevated troponin 2017    ESRD (end stage renal disease) on dialysis ([address] 2018    MELVI-WTOMMIE Harris Covert    GERD (gastroesophageal reflux disease)     Gout     History of blood transfusion     History of cervical cancer     Hyperlipidemia     Hypertension     Incarcerated ventral hernia     LVH (left ventricular hypertrophy)     Morbid obesity ([facility])     Mucus plugging of bronchi     Obstructive sleep apnea on CPAP     wears 2L O2 and CPAP at night    Pneumonia     Psychiatric problem     breakdown long time ago but not current    Pulmonary embolism ([address] 13    Type 2 diabetes mellitus with Philippe Price MD   insulin lispro (HUMALOG KWIKPEN) 100 UNIT/ML pen Inject 0-12 Units into the skin 3 times daily (before meals) Yes Philippe Price MD   traMADol (ULTRAM) 50 MG tablet Take 50 mg by mouth every 6 hours as needed for Pain. . Yes Historical Provider, MD   TRULICITY 7.33 LH/6.4VO SOPN INJECT ONE  SYRINGE SUBCUTANEOUSLY ONCE A WEEK Yes JURGEN Chance CNP   nebivolol (BYSTOLIC) 10 MG tablet Take 1 tablet by mouth 2 times daily Yes JURGEN Chance CNP   omeprazole (PRILOSEC) 40 MG delayed release capsule TAKE 1 CAPSULE BY MOUTH  DAILY Yes JURGEN Chance CNP   lidocaine-prilocaine (EMLA) 2.5-2.5 % cream Apply topically as needed.  Yes JURGEN Chance CNP   traZODone (DESYREL) 100 MG tablet Take 1 tablet by mouth nightly TAKES PRN Yes JURGEN Chance CNP   RELION PEN NEEDLE 31G/8MM 31G X 8 MM MISC USE  THREE TIMES DAILY AS DIRECTED Yes JURGEN Chance CNP   nitroGLYCERIN (NITROSTAT) 0.4 MG SL tablet DISSOLVE 1 TABLET UNDER THE TONGUE EVERY 5 MINUTES AS  NEEDED ; MAXIMUM OF 3  TABLETS IN 15 MINUTES Yes JURGEN Chance CNP   warfarin (COUMADIN) 7.5 MG tablet Continue f/up with coumadin clinic Yes JURGEN Chance CNP   rosuvastatin (CRESTOR) 10 MG tablet Take 1 tablet by mouth  daily Yes JURGEN Chance CNP   ONE TOUCH ULTRA TEST strip USE TO TEST 3-4 TIMES DAILY DUE TO FLUCTUATING SUGAR Yes JURGEN Chance CNP   promethazine (PHENERGAN) 25 MG tablet Take 1 tablet by mouth every 8 hours as needed for Nausea Yes JURGEN Chance CNP   ASSURE COMFORT LANCETS 30G MISC USE TO TEST BLOOD GLUCOSE THREE TIMES DAILY Yes JURGEN Chance CNP   Alcohol Swabs (ALCOHOL PREP) 70 % PADS USE TO TEST BLOOD GLUCOSE THREE TIMES DAILY Yes JURGEN Chance CNP   Blood Glucose Monitoring Suppl (ACCU-CHEK KENNETH SMARTVIEW) w/Device KIT USE TO TEST BLOOD GLUCOSE Yes Bri Potter, APRN - CNP   allopurinol (ZYLOPRIM) 100 MG tablet Take 100 mg by mouth Neurological: She is alert and oriented to person, place, and time. Skin: Skin is warm and dry. Psychiatric: She has a normal mood and affect. Her behavior is normal.       Assessment:      71-year-old female who presents for evaluation of a bulge of the abdominal wall. She has a past history of repair of a periumbilical ventral hernia with mesh on 12/24/16. Physical examination reveals a small hernia above the level of the umbilicus. The patient is very symptomatic from a pain standpoint with regards to the hernia. Plan:      Recommended that the patient pursues weight loss prior to the hernia repair as her risk of recurrence is much higher at her current BMI. She strongly wishes to pursue surgery at this time because of the symptomatic nature of the hernia and understands the higher risk of complications. Lap ventral hernia repair with mesh. Patient explained risks, benefits and complications of hernia repair including hernia recurrence, infection requiring mesh removal, bowel injury or erosion of mesh into bowel and nerve entrapment.

## 2018-11-02 ENCOUNTER — TELEPHONE (OUTPATIENT)
Dept: FAMILY MEDICINE CLINIC | Age: 63
End: 2018-11-02

## 2018-11-03 ENCOUNTER — APPOINTMENT (OUTPATIENT)
Dept: CT IMAGING | Age: 63
End: 2018-11-03
Payer: MEDICARE

## 2018-11-03 ENCOUNTER — HOSPITAL ENCOUNTER (EMERGENCY)
Age: 63
Discharge: HOME | End: 2018-11-03
Attending: EMERGENCY MEDICINE
Payer: MEDICARE

## 2018-11-03 VITALS
TEMPERATURE: 98.6 F | RESPIRATION RATE: 20 BRPM | HEART RATE: 82 BPM | DIASTOLIC BLOOD PRESSURE: 74 MMHG | BODY MASS INDEX: 51.91 KG/M2 | OXYGEN SATURATION: 98 % | SYSTOLIC BLOOD PRESSURE: 138 MMHG | HEIGHT: 63 IN | WEIGHT: 293 LBS

## 2018-11-03 DIAGNOSIS — K43.9 VENTRAL HERNIA WITHOUT OBSTRUCTION OR GANGRENE: Primary | ICD-10-CM

## 2018-11-03 DIAGNOSIS — Z79.01 ANTICOAGULATED: ICD-10-CM

## 2018-11-03 DIAGNOSIS — N18.6 ESRD (END STAGE RENAL DISEASE) (HCC): ICD-10-CM

## 2018-11-03 LAB
A/G RATIO: 1.2 (ref 1.1–2.2)
ALBUMIN SERPL-MCNC: 3.7 G/DL (ref 3.4–5)
ALP BLD-CCNC: 102 U/L (ref 40–129)
ALT SERPL-CCNC: 15 U/L (ref 10–40)
ANION GAP SERPL CALCULATED.3IONS-SCNC: 14 MMOL/L (ref 3–16)
AST SERPL-CCNC: 9 U/L (ref 15–37)
BASOPHILS ABSOLUTE: 0 K/UL (ref 0–0.2)
BASOPHILS RELATIVE PERCENT: 0.5 %
BILIRUB SERPL-MCNC: 0.3 MG/DL (ref 0–1)
BUN BLDV-MCNC: 53 MG/DL (ref 7–20)
CALCIUM SERPL-MCNC: 8.3 MG/DL (ref 8.3–10.6)
CHLORIDE BLD-SCNC: 93 MMOL/L (ref 99–110)
CO2: 25 MMOL/L (ref 21–32)
CREAT SERPL-MCNC: 8.7 MG/DL (ref 0.6–1.2)
EOSINOPHILS ABSOLUTE: 0 K/UL (ref 0–0.6)
EOSINOPHILS RELATIVE PERCENT: 0.5 %
GFR AFRICAN AMERICAN: 6
GFR NON-AFRICAN AMERICAN: 5
GLOBULIN: 3.2 G/DL
GLUCOSE BLD-MCNC: 381 MG/DL (ref 70–99)
HCT VFR BLD CALC: 36 % (ref 36–48)
HEMOGLOBIN: 11.6 G/DL (ref 12–16)
INR BLD: 4.2 (ref 0.86–1.14)
LACTIC ACID: 2.1 MMOL/L (ref 0.4–2)
LIPASE: 114 U/L (ref 13–60)
LYMPHOCYTES ABSOLUTE: 1.2 K/UL (ref 1–5.1)
LYMPHOCYTES RELATIVE PERCENT: 13.5 %
MCH RBC QN AUTO: 32 PG (ref 26–34)
MCHC RBC AUTO-ENTMCNC: 32.2 G/DL (ref 31–36)
MCV RBC AUTO: 99.4 FL (ref 80–100)
MONOCYTES ABSOLUTE: 0.8 K/UL (ref 0–1.3)
MONOCYTES RELATIVE PERCENT: 8.5 %
NEUTROPHILS ABSOLUTE: 6.9 K/UL (ref 1.7–7.7)
NEUTROPHILS RELATIVE PERCENT: 77 %
PDW BLD-RTO: 17.8 % (ref 12.4–15.4)
PLATELET # BLD: 217 K/UL (ref 135–450)
PMV BLD AUTO: 8.9 FL (ref 5–10.5)
POTASSIUM SERPL-SCNC: 4.5 MMOL/L (ref 3.5–5.1)
PROTHROMBIN TIME: 47.9 SEC (ref 9.8–13)
RBC # BLD: 3.62 M/UL (ref 4–5.2)
SODIUM BLD-SCNC: 132 MMOL/L (ref 136–145)
TOTAL PROTEIN: 6.9 G/DL (ref 6.4–8.2)
WBC # BLD: 9 K/UL (ref 4–11)

## 2018-11-03 PROCEDURE — 6360000002 HC RX W HCPCS

## 2018-11-03 PROCEDURE — 85610 PROTHROMBIN TIME: CPT

## 2018-11-03 PROCEDURE — 74176 CT ABD & PELVIS W/O CONTRAST: CPT

## 2018-11-03 PROCEDURE — 96375 TX/PRO/DX INJ NEW DRUG ADDON: CPT

## 2018-11-03 PROCEDURE — 83605 ASSAY OF LACTIC ACID: CPT

## 2018-11-03 PROCEDURE — 85025 COMPLETE CBC W/AUTO DIFF WBC: CPT

## 2018-11-03 PROCEDURE — 83690 ASSAY OF LIPASE: CPT

## 2018-11-03 PROCEDURE — 6360000002 HC RX W HCPCS: Performed by: EMERGENCY MEDICINE

## 2018-11-03 PROCEDURE — 99284 EMERGENCY DEPT VISIT MOD MDM: CPT

## 2018-11-03 PROCEDURE — 96372 THER/PROPH/DIAG INJ SC/IM: CPT

## 2018-11-03 PROCEDURE — 80053 COMPREHEN METABOLIC PANEL: CPT

## 2018-11-03 PROCEDURE — 96374 THER/PROPH/DIAG INJ IV PUSH: CPT

## 2018-11-03 RX ORDER — ONDANSETRON 4 MG/1
4-8 TABLET, ORALLY DISINTEGRATING ORAL EVERY 8 HOURS PRN
Qty: 12 TABLET | Refills: 0 | Status: SHIPPED | OUTPATIENT
Start: 2018-11-03 | End: 2019-03-21

## 2018-11-03 RX ORDER — HYDROMORPHONE HYDROCHLORIDE 2 MG/1
2 TABLET ORAL EVERY 8 HOURS PRN
Qty: 15 TABLET | Refills: 0 | Status: SHIPPED | OUTPATIENT
Start: 2018-11-03 | End: 2018-11-09

## 2018-11-03 RX ORDER — ONDANSETRON 2 MG/ML
4 INJECTION INTRAMUSCULAR; INTRAVENOUS ONCE
Status: COMPLETED | OUTPATIENT
Start: 2018-11-03 | End: 2018-11-03

## 2018-11-03 RX ORDER — HYDROMORPHONE HCL 110MG/55ML
PATIENT CONTROLLED ANALGESIA SYRINGE INTRAVENOUS
Status: COMPLETED
Start: 2018-11-03 | End: 2018-11-03

## 2018-11-03 RX ORDER — HYDROMORPHONE HCL 110MG/55ML
1 PATIENT CONTROLLED ANALGESIA SYRINGE INTRAVENOUS ONCE
Status: COMPLETED | OUTPATIENT
Start: 2018-11-03 | End: 2018-11-03

## 2018-11-03 RX ADMIN — ONDANSETRON 4 MG: 2 INJECTION INTRAMUSCULAR; INTRAVENOUS at 16:42

## 2018-11-03 RX ADMIN — HYDROMORPHONE HYDROCHLORIDE 1 MG: 2 INJECTION INTRAMUSCULAR; INTRAVENOUS; SUBCUTANEOUS at 16:43

## 2018-11-03 RX ADMIN — HYDROMORPHONE HYDROCHLORIDE 0.5 MG: 2 INJECTION INTRAMUSCULAR; INTRAVENOUS; SUBCUTANEOUS at 17:59

## 2018-11-03 ASSESSMENT — PAIN SCALES - GENERAL
PAINLEVEL_OUTOF10: 9
PAINLEVEL_OUTOF10: 8
PAINLEVEL_OUTOF10: 10
PAINLEVEL_OUTOF10: 8

## 2018-11-03 NOTE — ED PROVIDER NOTES
capsule 1    Suvorexant (BELSOMRA) 10 MG TABS Take 10 mg by mouth nightly for 180 days. . 30 tablet 5    albuterol sulfate HFA (PROAIR HFA) 108 (90 Base) MCG/ACT inhaler Inhale 2 puffs into the lungs every 6 hours as needed for Wheezing 1 Inhaler 11    fluticasone-salmeterol (ADVAIR HFA) 230-21 MCG/ACT inhaler Inhale 1 puff into the lungs 2 times daily 1 Inhaler 11    ipratropium-albuterol (DUONEB) 0.5-2.5 (3) MG/3ML SOLN nebulizer solution Inhale 3 mLs into the lungs every 4 hours DX:COPD J44.9 360 mL 11    insulin detemir (LEVEMIR FLEXTOUCH) 100 UNIT/ML injection pen Inject 20 Units into the skin 2 times daily 75 mL 5    insulin lispro (HUMALOG KWIKPEN) 100 UNIT/ML pen Inject 0-12 Units into the skin 3 times daily (before meals) 5 pen 5    traMADol (ULTRAM) 50 MG tablet Take 50 mg by mouth every 6 hours as needed for Pain. .      TRULICITY 2.43 KN/5.1XN SOPN INJECT ONE  SYRINGE SUBCUTANEOUSLY ONCE A WEEK 15 pen 3    nebivolol (BYSTOLIC) 10 MG tablet Take 1 tablet by mouth 2 times daily 180 tablet 3    omeprazole (PRILOSEC) 40 MG delayed release capsule TAKE 1 CAPSULE BY MOUTH  DAILY 90 capsule 3    lidocaine-prilocaine (EMLA) 2.5-2.5 % cream Apply topically as needed.  1 Tube 5    traZODone (DESYREL) 100 MG tablet Take 1 tablet by mouth nightly TAKES PRN 30 tablet 5    RELION PEN NEEDLE 31G/8MM 31G X 8 MM MISC USE  THREE TIMES DAILY AS DIRECTED 100 each 5    nitroGLYCERIN (NITROSTAT) 0.4 MG SL tablet DISSOLVE 1 TABLET UNDER THE TONGUE EVERY 5 MINUTES AS  NEEDED ; MAXIMUM OF 3  TABLETS IN 15 MINUTES 75 tablet 1    warfarin (COUMADIN) 7.5 MG tablet Continue f/up with coumadin clinic 30 tablet 3    rosuvastatin (CRESTOR) 10 MG tablet Take 1 tablet by mouth  daily 90 tablet 2    ONE TOUCH ULTRA TEST strip USE TO TEST 3-4 TIMES DAILY DUE TO FLUCTUATING SUGAR 100 strip 5    promethazine (PHENERGAN) 25 MG tablet Take 1 tablet by mouth every 8 hours as needed for Nausea 30 tablet 2    ASSURE COMFORT

## 2018-11-03 NOTE — ED NOTES
Pt reports feeling much relief of pain prior to dismissal.      Luis Enrique Mooney RN  11/03/18 6704

## 2018-11-05 ENCOUNTER — CARE COORDINATION (OUTPATIENT)
Dept: CARE COORDINATION | Age: 63
End: 2018-11-05

## 2018-11-06 ENCOUNTER — TELEPHONE (OUTPATIENT)
Dept: CARDIOLOGY CLINIC | Age: 63
End: 2018-11-06

## 2018-11-06 ENCOUNTER — TELEPHONE (OUTPATIENT)
Dept: PULMONOLOGY | Age: 63
End: 2018-11-06

## 2018-11-06 ENCOUNTER — OFFICE VISIT (OUTPATIENT)
Dept: FAMILY MEDICINE CLINIC | Age: 63
End: 2018-11-06
Payer: MEDICARE

## 2018-11-06 ENCOUNTER — TELEPHONE (OUTPATIENT)
Dept: SURGERY | Age: 63
End: 2018-11-06

## 2018-11-06 VITALS
DIASTOLIC BLOOD PRESSURE: 86 MMHG | OXYGEN SATURATION: 98 % | SYSTOLIC BLOOD PRESSURE: 134 MMHG | HEART RATE: 83 BPM | WEIGHT: 293 LBS | BODY MASS INDEX: 59.06 KG/M2

## 2018-11-06 DIAGNOSIS — K43.9 VENTRAL HERNIA WITHOUT OBSTRUCTION OR GANGRENE: Primary | ICD-10-CM

## 2018-11-06 DIAGNOSIS — D63.1 ANEMIA OF CHRONIC RENAL FAILURE, STAGE 4 (SEVERE) (HCC): ICD-10-CM

## 2018-11-06 DIAGNOSIS — Z01.818 PREOP EXAMINATION: ICD-10-CM

## 2018-11-06 DIAGNOSIS — T45.515A WARFARIN-INDUCED COAGULOPATHY (HCC): ICD-10-CM

## 2018-11-06 DIAGNOSIS — E11.40 TYPE 2 DIABETES MELLITUS WITH DIABETIC NEUROPATHY, WITH LONG-TERM CURRENT USE OF INSULIN (HCC): ICD-10-CM

## 2018-11-06 DIAGNOSIS — E66.2 OBESITY HYPOVENTILATION SYNDROME (HCC): ICD-10-CM

## 2018-11-06 DIAGNOSIS — Z86.711 HISTORY OF PULMONARY EMBOLISM: ICD-10-CM

## 2018-11-06 DIAGNOSIS — Z99.2 ESRD (END STAGE RENAL DISEASE) ON DIALYSIS (HCC): ICD-10-CM

## 2018-11-06 DIAGNOSIS — I10 ESSENTIAL HYPERTENSION, MALIGNANT: ICD-10-CM

## 2018-11-06 DIAGNOSIS — E66.01 MORBID OBESITY (HCC): ICD-10-CM

## 2018-11-06 DIAGNOSIS — I51.7 LVH (LEFT VENTRICULAR HYPERTROPHY): ICD-10-CM

## 2018-11-06 DIAGNOSIS — Z99.89 OBSTRUCTIVE SLEEP APNEA ON CPAP: ICD-10-CM

## 2018-11-06 DIAGNOSIS — G47.33 OBSTRUCTIVE SLEEP APNEA ON CPAP: ICD-10-CM

## 2018-11-06 DIAGNOSIS — N18.6 ESRD (END STAGE RENAL DISEASE) ON DIALYSIS (HCC): ICD-10-CM

## 2018-11-06 DIAGNOSIS — D68.32 WARFARIN-INDUCED COAGULOPATHY (HCC): ICD-10-CM

## 2018-11-06 DIAGNOSIS — Z79.4 TYPE 2 DIABETES MELLITUS WITH DIABETIC NEUROPATHY, WITH LONG-TERM CURRENT USE OF INSULIN (HCC): ICD-10-CM

## 2018-11-06 DIAGNOSIS — J44.9 COPD, MODERATE (HCC): ICD-10-CM

## 2018-11-06 DIAGNOSIS — J45.20 MILD INTERMITTENT ASTHMA WITHOUT COMPLICATION: ICD-10-CM

## 2018-11-06 DIAGNOSIS — I50.32 CHRONIC DIASTOLIC CHF (CONGESTIVE HEART FAILURE) (HCC): ICD-10-CM

## 2018-11-06 DIAGNOSIS — K21.9 GASTROESOPHAGEAL REFLUX DISEASE, ESOPHAGITIS PRESENCE NOT SPECIFIED: ICD-10-CM

## 2018-11-06 DIAGNOSIS — N18.4 ANEMIA OF CHRONIC RENAL FAILURE, STAGE 4 (SEVERE) (HCC): ICD-10-CM

## 2018-11-06 PROBLEM — Z45.2 PICC (PERIPHERALLY INSERTED CENTRAL CATHETER) IN PLACE: Status: RESOLVED | Noted: 2017-09-09 | Resolved: 2018-11-06

## 2018-11-06 LAB — HBA1C MFR BLD: 8.1 %

## 2018-11-06 PROCEDURE — 99214 OFFICE O/P EST MOD 30 MIN: CPT | Performed by: NURSE PRACTITIONER

## 2018-11-06 PROCEDURE — 83036 HEMOGLOBIN GLYCOSYLATED A1C: CPT | Performed by: NURSE PRACTITIONER

## 2018-11-06 PROCEDURE — 2022F DILAT RTA XM EVC RTNOPTHY: CPT | Performed by: NURSE PRACTITIONER

## 2018-11-06 PROCEDURE — 3017F COLORECTAL CA SCREEN DOC REV: CPT | Performed by: NURSE PRACTITIONER

## 2018-11-06 PROCEDURE — G8484 FLU IMMUNIZE NO ADMIN: HCPCS | Performed by: NURSE PRACTITIONER

## 2018-11-06 PROCEDURE — G8417 CALC BMI ABV UP PARAM F/U: HCPCS | Performed by: NURSE PRACTITIONER

## 2018-11-06 PROCEDURE — G8926 SPIRO NO PERF OR DOC: HCPCS | Performed by: NURSE PRACTITIONER

## 2018-11-06 PROCEDURE — 3023F SPIROM DOC REV: CPT | Performed by: NURSE PRACTITIONER

## 2018-11-06 PROCEDURE — 1036F TOBACCO NON-USER: CPT | Performed by: NURSE PRACTITIONER

## 2018-11-06 PROCEDURE — G8427 DOCREV CUR MEDS BY ELIG CLIN: HCPCS | Performed by: NURSE PRACTITIONER

## 2018-11-06 PROCEDURE — 3045F PR MOST RECENT HEMOGLOBIN A1C LEVEL 7.0-9.0%: CPT | Performed by: NURSE PRACTITIONER

## 2018-11-06 NOTE — PATIENT INSTRUCTIONS
water.     · Take a bath or shower before coming in for your surgery. Do not apply lotions, perfumes, deodorants, or nail polish.     · Do not shave the surgical site yourself.     · Take off all jewelry and piercings. And take out contact lenses, if you wear them.    At the hospital or surgery center   · Bring a picture ID.     · The area for surgery is often marked to make sure there are no errors.     · You will be kept comfortable and safe by your anesthesia provider. The anesthesia may make you sleep. Or it may just numb the area being worked on. Going home   · Be sure you have someone to drive you home. Anesthesia and pain medicine make it unsafe for you to drive.     · You will be given more specific instructions about recovering from your surgery. They will cover things like diet, wound care, follow-up care, driving, and getting back to your normal routine. When should you call your doctor? · You have questions or concerns.     · You don't understand how to prepare for your surgery.     · You become ill before the surgery (such as fever, flu, or a cold).     · You need to reschedule or have changed your mind about having the surgery. Where can you learn more? Go to [email or web address] and sign in to your [employer] account. Enter Q270 in the [facility] box to learn more about \"How to Prepare for Surgery. \"     If you do not have an account, please click on the \"Sign Up Now\" link. Current as of: May 16, 2017  Content Version: 11.7  © 6805-5932 [employer]. Care instructions adapted under license by [facility] ([facility] If you have questions about a medical condition or this instruction, always ask your healthcare professional. [place] [address] any warranty or liability for your use of this information.

## 2018-11-06 NOTE — PROGRESS NOTES
Suvorexant (BELSOMRA) 10 MG TABS Take 10 mg by mouth nightly for 180 days. . 30 tablet 5    albuterol sulfate HFA (PROAIR HFA) 108 (90 Base) MCG/ACT inhaler Inhale 2 puffs into the lungs every 6 hours as needed for Wheezing 1 Inhaler 11    fluticasone-salmeterol (ADVAIR HFA) 230-21 MCG/ACT inhaler Inhale 1 puff into the lungs 2 times daily 1 Inhaler 11    ipratropium-albuterol (DUONEB) 0.5-2.5 (3) MG/3ML SOLN nebulizer solution Inhale 3 mLs into the lungs every 4 hours DX:COPD J44.9 360 mL 11    insulin detemir (LEVEMIR FLEXTOUCH) 100 UNIT/ML injection pen Inject 20 Units into the skin 2 times daily 75 mL 5    insulin lispro (HUMALOG KWIKPEN) 100 UNIT/ML pen Inject 0-12 Units into the skin 3 times daily (before meals) 5 pen 5    traMADol (ULTRAM) 50 MG tablet Take 50 mg by mouth every 6 hours as needed for Pain. .      TRULICITY 9.04 PA/8.0FW SOPN INJECT ONE  SYRINGE SUBCUTANEOUSLY ONCE A WEEK 15 pen 3    nebivolol (BYSTOLIC) 10 MG tablet Take 1 tablet by mouth 2 times daily 180 tablet 3    omeprazole (PRILOSEC) 40 MG delayed release capsule TAKE 1 CAPSULE BY MOUTH  DAILY 90 capsule 3    lidocaine-prilocaine (EMLA) 2.5-2.5 % cream Apply topically as needed.  1 Tube 5    traZODone (DESYREL) 100 MG tablet Take 1 tablet by mouth nightly TAKES PRN 30 tablet 5    RELION PEN NEEDLE 31G/8MM 31G X 8 MM MISC USE  THREE TIMES DAILY AS DIRECTED 100 each 5    nitroGLYCERIN (NITROSTAT) 0.4 MG SL tablet DISSOLVE 1 TABLET UNDER THE TONGUE EVERY 5 MINUTES AS  NEEDED ; MAXIMUM OF 3  TABLETS IN 15 MINUTES 75 tablet 1    warfarin (COUMADIN) 7.5 MG tablet Continue f/up with coumadin clinic 30 tablet 3    rosuvastatin (CRESTOR) 10 MG tablet Take 1 tablet by mouth  daily 90 tablet 2    ONE TOUCH ULTRA TEST strip USE TO TEST 3-4 TIMES DAILY DUE TO FLUCTUATING SUGAR 100 strip 5    promethazine (PHENERGAN) 25 MG tablet Take 1 tablet by mouth every 8 hours as needed for Nausea 30 tablet 2    ASSURE COMFORT LANCETS 30G MISC USE

## 2018-11-08 ENCOUNTER — HOSPITAL ENCOUNTER (OUTPATIENT)
Age: 63
Setting detail: OUTPATIENT SURGERY
Discharge: HOME | End: 2018-11-08
Attending: SURGERY | Admitting: SURGERY
Payer: MEDICARE

## 2018-11-08 ENCOUNTER — APPOINTMENT (OUTPATIENT)
Dept: INTERVENTIONAL RADIOLOGY/VASCULAR | Age: 63
End: 2018-11-08
Attending: SURGERY
Payer: MEDICARE

## 2018-11-08 VITALS
OXYGEN SATURATION: 95 % | HEART RATE: 81 BPM | WEIGHT: 293 LBS | HEIGHT: 63 IN | RESPIRATION RATE: 16 BRPM | TEMPERATURE: 97 F | SYSTOLIC BLOOD PRESSURE: 130 MMHG | DIASTOLIC BLOOD PRESSURE: 90 MMHG | BODY MASS INDEX: 51.91 KG/M2

## 2018-11-08 LAB
ANION GAP SERPL CALCULATED.3IONS-SCNC: 13 MMOL/L (ref 3–16)
BUN BLDV-MCNC: 33 MG/DL (ref 7–20)
CALCIUM SERPL-MCNC: 9 MG/DL (ref 8.3–10.6)
CHLORIDE BLD-SCNC: 97 MMOL/L (ref 99–110)
CO2: 28 MMOL/L (ref 21–32)
CREAT SERPL-MCNC: 7.5 MG/DL (ref 0.6–1.2)
GFR AFRICAN AMERICAN: 7
GFR NON-AFRICAN AMERICAN: 5
GLUCOSE BLD-MCNC: 119 MG/DL (ref 70–99)
GLUCOSE BLD-MCNC: 131 MG/DL (ref 70–99)
INR BLD: 1.17 (ref 0.86–1.14)
PERFORMED ON: ABNORMAL
POTASSIUM SERPL-SCNC: 5.5 MMOL/L (ref 3.5–5.1)
PROTHROMBIN TIME: 13.3 SEC (ref 9.8–13)
SODIUM BLD-SCNC: 138 MMOL/L (ref 136–145)

## 2018-11-08 PROCEDURE — 6370000000 HC RX 637 (ALT 250 FOR IP): Performed by: SURGERY

## 2018-11-08 PROCEDURE — 85610 PROTHROMBIN TIME: CPT

## 2018-11-08 PROCEDURE — 36561 INSERT TUNNELED CV CATH: CPT

## 2018-11-08 PROCEDURE — 80048 BASIC METABOLIC PNL TOTAL CA: CPT

## 2018-11-08 PROCEDURE — 76937 US GUIDE VASCULAR ACCESS: CPT

## 2018-11-08 PROCEDURE — 6360000002 HC RX W HCPCS: Performed by: RADIOLOGY

## 2018-11-08 PROCEDURE — 7100000010 HC PHASE II RECOVERY - FIRST 15 MIN: Performed by: SURGERY

## 2018-11-08 PROCEDURE — 6360000002 HC RX W HCPCS: Performed by: SURGERY

## 2018-11-08 PROCEDURE — 77001 FLUOROGUIDE FOR VEIN DEVICE: CPT

## 2018-11-08 PROCEDURE — 94640 AIRWAY INHALATION TREATMENT: CPT

## 2018-11-08 PROCEDURE — 7100000011 HC PHASE II RECOVERY - ADDTL 15 MIN: Performed by: SURGERY

## 2018-11-08 PROCEDURE — 6370000000 HC RX 637 (ALT 250 FOR IP): Performed by: ANESTHESIOLOGY

## 2018-11-08 PROCEDURE — 2580000003 HC RX 258: Performed by: SURGERY

## 2018-11-08 PROCEDURE — 2500000003 HC RX 250 WO HCPCS: Performed by: SURGERY

## 2018-11-08 PROCEDURE — 99152 MOD SED SAME PHYS/QHP 5/>YRS: CPT

## 2018-11-08 PROCEDURE — 99153 MOD SED SAME PHYS/QHP EA: CPT

## 2018-11-08 PROCEDURE — C1769 GUIDE WIRE: HCPCS

## 2018-11-08 RX ORDER — SODIUM CHLORIDE 0.9 % (FLUSH) 0.9 %
10 SYRINGE (ML) INJECTION EVERY 12 HOURS SCHEDULED
Status: DISCONTINUED | OUTPATIENT
Start: 2018-11-08 | End: 2018-11-08 | Stop reason: HOSPADM

## 2018-11-08 RX ORDER — MIDAZOLAM HYDROCHLORIDE 1 MG/ML
INJECTION INTRAMUSCULAR; INTRAVENOUS
Status: COMPLETED | OUTPATIENT
Start: 2018-11-08 | End: 2018-11-08

## 2018-11-08 RX ORDER — CEFAZOLIN SODIUM 2 G/100ML
2 INJECTION, SOLUTION INTRAVENOUS ONCE
Status: COMPLETED | OUTPATIENT
Start: 2018-11-08 | End: 2018-11-08

## 2018-11-08 RX ORDER — HYDROMORPHONE HCL 110MG/55ML
0.5 PATIENT CONTROLLED ANALGESIA SYRINGE INTRAVENOUS ONCE
Status: COMPLETED | OUTPATIENT
Start: 2018-11-08 | End: 2018-11-08

## 2018-11-08 RX ORDER — MEPERIDINE HYDROCHLORIDE 25 MG/ML
12.5 INJECTION INTRAMUSCULAR; INTRAVENOUS; SUBCUTANEOUS EVERY 5 MIN PRN
Status: DISCONTINUED | OUTPATIENT
Start: 2018-11-08 | End: 2018-11-08 | Stop reason: HOSPADM

## 2018-11-08 RX ORDER — FENTANYL CITRATE 50 UG/ML
25 INJECTION, SOLUTION INTRAMUSCULAR; INTRAVENOUS EVERY 5 MIN PRN
Status: DISCONTINUED | OUTPATIENT
Start: 2018-11-08 | End: 2018-11-08 | Stop reason: HOSPADM

## 2018-11-08 RX ORDER — LIDOCAINE HYDROCHLORIDE AND EPINEPHRINE BITARTRATE 20; .01 MG/ML; MG/ML
20 INJECTION, SOLUTION SUBCUTANEOUS ONCE
Status: COMPLETED | OUTPATIENT
Start: 2018-11-08 | End: 2018-11-08

## 2018-11-08 RX ORDER — BUPIVACAINE HYDROCHLORIDE AND EPINEPHRINE 2.5; 5 MG/ML; UG/ML
30 INJECTION, SOLUTION EPIDURAL; INFILTRATION; INTRACAUDAL; PERINEURAL ONCE
Status: COMPLETED | OUTPATIENT
Start: 2018-11-08 | End: 2018-11-08

## 2018-11-08 RX ORDER — HYDROMORPHONE HCL 110MG/55ML
0.5 PATIENT CONTROLLED ANALGESIA SYRINGE INTRAVENOUS ONCE
Status: DISCONTINUED | OUTPATIENT
Start: 2018-11-08 | End: 2018-11-08 | Stop reason: HOSPADM

## 2018-11-08 RX ORDER — HYDROMORPHONE HCL 110MG/55ML
0.25 PATIENT CONTROLLED ANALGESIA SYRINGE INTRAVENOUS EVERY 5 MIN PRN
Status: DISCONTINUED | OUTPATIENT
Start: 2018-11-08 | End: 2018-11-08 | Stop reason: HOSPADM

## 2018-11-08 RX ORDER — FENTANYL CITRATE 50 UG/ML
50 INJECTION, SOLUTION INTRAMUSCULAR; INTRAVENOUS EVERY 5 MIN PRN
Status: DISCONTINUED | OUTPATIENT
Start: 2018-11-08 | End: 2018-11-08 | Stop reason: HOSPADM

## 2018-11-08 RX ORDER — ONDANSETRON 2 MG/ML
4 INJECTION INTRAMUSCULAR; INTRAVENOUS PRN
Status: DISCONTINUED | OUTPATIENT
Start: 2018-11-08 | End: 2018-11-08 | Stop reason: HOSPADM

## 2018-11-08 RX ORDER — MAGNESIUM HYDROXIDE 1200 MG/15ML
LIQUID ORAL CONTINUOUS PRN
Status: DISCONTINUED | OUTPATIENT
Start: 2018-11-08 | End: 2018-11-08 | Stop reason: HOSPADM

## 2018-11-08 RX ORDER — ONDANSETRON 2 MG/ML
4 INJECTION INTRAMUSCULAR; INTRAVENOUS
Status: DISCONTINUED | OUTPATIENT
Start: 2018-11-08 | End: 2018-11-08 | Stop reason: HOSPADM

## 2018-11-08 RX ORDER — HYDROMORPHONE HCL 110MG/55ML
0.5 PATIENT CONTROLLED ANALGESIA SYRINGE INTRAVENOUS EVERY 5 MIN PRN
Status: DISCONTINUED | OUTPATIENT
Start: 2018-11-08 | End: 2018-11-08 | Stop reason: HOSPADM

## 2018-11-08 RX ORDER — BACTERIOSTATIC SODIUM CHLORIDE 0.9 %
15 VIAL (ML) INJECTION ONCE
Status: COMPLETED | OUTPATIENT
Start: 2018-11-08 | End: 2018-11-08

## 2018-11-08 RX ORDER — BUPIVACAINE HYDROCHLORIDE AND EPINEPHRINE 5; 5 MG/ML; UG/ML
INJECTION, SOLUTION EPIDURAL; INTRACAUDAL; PERINEURAL
Status: COMPLETED | OUTPATIENT
Start: 2018-11-08 | End: 2018-11-08

## 2018-11-08 RX ORDER — SODIUM CHLORIDE 9 MG/ML
INJECTION, SOLUTION INTRAVENOUS
Status: DISCONTINUED
Start: 2018-11-08 | End: 2018-11-08 | Stop reason: HOSPADM

## 2018-11-08 RX ORDER — LIDOCAINE HYDROCHLORIDE 10 MG/ML
1 INJECTION, SOLUTION EPIDURAL; INFILTRATION; INTRACAUDAL; PERINEURAL
Status: DISCONTINUED | OUTPATIENT
Start: 2018-11-08 | End: 2018-11-08 | Stop reason: HOSPADM

## 2018-11-08 RX ORDER — SODIUM CHLORIDE 0.9 % (FLUSH) 0.9 %
10 SYRINGE (ML) INJECTION PRN
Status: DISCONTINUED | OUTPATIENT
Start: 2018-11-08 | End: 2018-11-08 | Stop reason: HOSPADM

## 2018-11-08 RX ORDER — IPRATROPIUM BROMIDE AND ALBUTEROL SULFATE 2.5; .5 MG/3ML; MG/3ML
SOLUTION RESPIRATORY (INHALATION)
Status: DISCONTINUED
Start: 2018-11-08 | End: 2018-11-08 | Stop reason: HOSPADM

## 2018-11-08 RX ORDER — LIDOCAINE HYDROCHLORIDE 10 MG/ML
20 INJECTION, SOLUTION EPIDURAL; INFILTRATION; INTRACAUDAL; PERINEURAL ONCE
Status: COMPLETED | OUTPATIENT
Start: 2018-11-08 | End: 2018-11-08

## 2018-11-08 RX ORDER — IPRATROPIUM BROMIDE AND ALBUTEROL SULFATE 2.5; .5 MG/3ML; MG/3ML
1 SOLUTION RESPIRATORY (INHALATION) ONCE
Status: COMPLETED | OUTPATIENT
Start: 2018-11-08 | End: 2018-11-08

## 2018-11-08 RX ORDER — SODIUM CHLORIDE 9 MG/ML
INJECTION, SOLUTION INTRAVENOUS CONTINUOUS
Status: DISCONTINUED | OUTPATIENT
Start: 2018-11-08 | End: 2018-11-08 | Stop reason: HOSPADM

## 2018-11-08 RX ADMIN — SODIUM CHLORIDE 15 ML: 9 INJECTION, SOLUTION INTRAMUSCULAR; INTRAVENOUS; SUBCUTANEOUS at 12:46

## 2018-11-08 RX ADMIN — Medication: at 12:47

## 2018-11-08 RX ADMIN — LIDOCAINE HYDROCHLORIDE AND EPINEPHRINE 8 ML: 20; 10 INJECTION, SOLUTION INFILTRATION; PERINEURAL at 12:44

## 2018-11-08 RX ADMIN — IPRATROPIUM BROMIDE AND ALBUTEROL SULFATE 1 AMPULE: .5; 3 SOLUTION RESPIRATORY (INHALATION) at 10:37

## 2018-11-08 RX ADMIN — BUPIVACAINE HYDROCHLORIDE AND EPINEPHRINE 7.5 ML: 2.5; 5 INJECTION, SOLUTION EPIDURAL; INFILTRATION; INTRACAUDAL; PERINEURAL at 12:48

## 2018-11-08 RX ADMIN — HYDROMORPHONE HYDROCHLORIDE 0.5 MG: 2 INJECTION, SOLUTION INTRAMUSCULAR; INTRAVENOUS; SUBCUTANEOUS at 12:06

## 2018-11-08 RX ADMIN — MIDAZOLAM HYDROCHLORIDE 1 MG: 1 INJECTION INTRAMUSCULAR; INTRAVENOUS at 12:17

## 2018-11-08 RX ADMIN — HEPARIN SODIUM IN SODIUM CHLORIDE 30 ML/HR: 200 INJECTION INTRAVENOUS at 11:50

## 2018-11-08 RX ADMIN — MIDAZOLAM HYDROCHLORIDE 1 MG: 1 INJECTION INTRAMUSCULAR; INTRAVENOUS at 12:08

## 2018-11-08 RX ADMIN — CEFAZOLIN SODIUM 2 G: 2 INJECTION, SOLUTION INTRAVENOUS at 12:05

## 2018-11-08 RX ADMIN — MIDAZOLAM HYDROCHLORIDE 1 MG: 1 INJECTION INTRAMUSCULAR; INTRAVENOUS at 12:22

## 2018-11-08 RX ADMIN — LIDOCAINE HYDROCHLORIDE 8 ML: 10 INJECTION, SOLUTION EPIDURAL; INFILTRATION; INTRACAUDAL; PERINEURAL at 12:44

## 2018-11-08 ASSESSMENT — PAIN SCALES - GENERAL
PAINLEVEL_OUTOF10: 0

## 2018-11-08 NOTE — PROGRESS NOTES
Dr. Ozuna Ing to bedside. Spoke with pt regarding canceling surgery today and pt having PAC placed today in radiology.
Unable to obtain IV Access on pt. DR. Emmanuel Syed, DR. Leena Aly at bedside. Dr. Leena Aly to radiology to speak with radiologist regarding possible PAC placement today.
22. Bring a complete list of all your medications with name and dose include any supplements. 26. Other__________________________________________   *Please call pre admission testing if you any further questions   [facility]   [address]    [phone] [facility]  [phone]   [address] [place] [address]       All above information reviewed with patient in person or by phone. Patient verbalizes understanding. All questions and concerns addressed.                                                                                                  Patient/Rep____________________                                                                                                                                    PRE OP INSTRUCTIONS

## 2018-11-14 ENCOUNTER — CARE COORDINATION (OUTPATIENT)
Dept: CARE COORDINATION | Age: 63
End: 2018-11-14

## 2018-11-20 ENCOUNTER — HOSPITAL ENCOUNTER (INPATIENT)
Age: 63
LOS: 1 days | Discharge: HOME | DRG: 353 | End: 2018-11-21
Attending: SURGERY | Admitting: SURGERY
Payer: MEDICARE

## 2018-11-20 ENCOUNTER — ANESTHESIA (OUTPATIENT)
Dept: OPERATING ROOM | Age: 63
DRG: 353 | End: 2018-11-20
Payer: MEDICARE

## 2018-11-20 ENCOUNTER — ANESTHESIA EVENT (OUTPATIENT)
Dept: OPERATING ROOM | Age: 63
DRG: 353 | End: 2018-11-20
Payer: MEDICARE

## 2018-11-20 VITALS
DIASTOLIC BLOOD PRESSURE: 107 MMHG | RESPIRATION RATE: 3 BRPM | SYSTOLIC BLOOD PRESSURE: 201 MMHG | OXYGEN SATURATION: 100 %

## 2018-11-20 DIAGNOSIS — K43.2 RECURRENT VENTRAL HERNIA: Primary | ICD-10-CM

## 2018-11-20 LAB
ANION GAP SERPL CALCULATED.3IONS-SCNC: 16 MMOL/L (ref 3–16)
APTT: 31 SEC (ref 26–36)
BUN BLDV-MCNC: 25 MG/DL (ref 7–20)
CALCIUM SERPL-MCNC: 9.7 MG/DL (ref 8.3–10.6)
CHLORIDE BLD-SCNC: 97 MMOL/L (ref 99–110)
CO2: 26 MMOL/L (ref 21–32)
CREAT SERPL-MCNC: 8 MG/DL (ref 0.6–1.2)
GFR AFRICAN AMERICAN: 6
GFR NON-AFRICAN AMERICAN: 5
GLUCOSE BLD-MCNC: 102 MG/DL (ref 70–99)
GLUCOSE BLD-MCNC: 106 MG/DL (ref 70–99)
GLUCOSE BLD-MCNC: 221 MG/DL (ref 70–99)
GLUCOSE BLD-MCNC: 81 MG/DL (ref 70–99)
GLUCOSE BLD-MCNC: 98 MG/DL (ref 70–99)
INR BLD: 1.11 (ref 0.86–1.14)
PERFORMED ON: ABNORMAL
PERFORMED ON: ABNORMAL
PERFORMED ON: NORMAL
PERFORMED ON: NORMAL
POTASSIUM SERPL-SCNC: 4.3 MMOL/L (ref 3.5–5.1)
PROTHROMBIN TIME: 12.7 SEC (ref 9.8–13)
SODIUM BLD-SCNC: 139 MMOL/L (ref 136–145)

## 2018-11-20 PROCEDURE — 94760 N-INVAS EAR/PLS OXIMETRY 1: CPT

## 2018-11-20 PROCEDURE — 6360000002 HC RX W HCPCS: Performed by: ANESTHESIOLOGY

## 2018-11-20 PROCEDURE — 2500000003 HC RX 250 WO HCPCS: Performed by: NURSE ANESTHETIST, CERTIFIED REGISTERED

## 2018-11-20 PROCEDURE — 1200000000 HC SEMI PRIVATE

## 2018-11-20 PROCEDURE — 7100000000 HC PACU RECOVERY - FIRST 15 MIN: Performed by: SURGERY

## 2018-11-20 PROCEDURE — 2709999900 HC NON-CHARGEABLE SUPPLY: Performed by: SURGERY

## 2018-11-20 PROCEDURE — 2700000000 HC OXYGEN THERAPY PER DAY

## 2018-11-20 PROCEDURE — 49656 PR LAP, RECURRENT INCISIONAL HERNIA REPAIR,REDUCIBLE: CPT | Performed by: SURGERY

## 2018-11-20 PROCEDURE — 6370000000 HC RX 637 (ALT 250 FOR IP): Performed by: SURGERY

## 2018-11-20 PROCEDURE — G0378 HOSPITAL OBSERVATION PER HR: HCPCS

## 2018-11-20 PROCEDURE — 2580000003 HC RX 258: Performed by: SURGERY

## 2018-11-20 PROCEDURE — 80048 BASIC METABOLIC PNL TOTAL CA: CPT

## 2018-11-20 PROCEDURE — 3700000000 HC ANESTHESIA ATTENDED CARE: Performed by: SURGERY

## 2018-11-20 PROCEDURE — 6360000002 HC RX W HCPCS: Performed by: NURSE ANESTHETIST, CERTIFIED REGISTERED

## 2018-11-20 PROCEDURE — 85730 THROMBOPLASTIN TIME PARTIAL: CPT

## 2018-11-20 PROCEDURE — 2720000010 HC SURG SUPPLY STERILE: Performed by: SURGERY

## 2018-11-20 PROCEDURE — 3600000004 HC SURGERY LEVEL 4 BASE: Performed by: SURGERY

## 2018-11-20 PROCEDURE — 2500000003 HC RX 250 WO HCPCS: Performed by: SURGERY

## 2018-11-20 PROCEDURE — S0028 INJECTION, FAMOTIDINE, 20 MG: HCPCS | Performed by: SURGERY

## 2018-11-20 PROCEDURE — 3700000001 HC ADD 15 MINUTES (ANESTHESIA): Performed by: SURGERY

## 2018-11-20 PROCEDURE — 0WJF4ZZ INSPECTION OF ABDOMINAL WALL, PERCUTANEOUS ENDOSCOPIC APPROACH: ICD-10-PCS | Performed by: SURGERY

## 2018-11-20 PROCEDURE — 7100000001 HC PACU RECOVERY - ADDTL 15 MIN: Performed by: SURGERY

## 2018-11-20 PROCEDURE — C1781 MESH (IMPLANTABLE): HCPCS | Performed by: SURGERY

## 2018-11-20 PROCEDURE — 6360000002 HC RX W HCPCS: Performed by: SURGERY

## 2018-11-20 PROCEDURE — 6370000000 HC RX 637 (ALT 250 FOR IP): Performed by: ANESTHESIOLOGY

## 2018-11-20 PROCEDURE — L0625 LO FLEX L1-BELOW L5 PRE OTS: HCPCS | Performed by: SURGERY

## 2018-11-20 PROCEDURE — 0WUF4JZ SUPPLEMENT ABDOMINAL WALL WITH SYNTHETIC SUBSTITUTE, PERCUTANEOUS ENDOSCOPIC APPROACH: ICD-10-PCS | Performed by: SURGERY

## 2018-11-20 PROCEDURE — 85610 PROTHROMBIN TIME: CPT

## 2018-11-20 PROCEDURE — 94640 AIRWAY INHALATION TREATMENT: CPT

## 2018-11-20 PROCEDURE — 94664 DEMO&/EVAL PT USE INHALER: CPT

## 2018-11-20 PROCEDURE — 6370000000 HC RX 637 (ALT 250 FOR IP): Performed by: HOSPITALIST

## 2018-11-20 PROCEDURE — 2580000003 HC RX 258: Performed by: NURSE ANESTHETIST, CERTIFIED REGISTERED

## 2018-11-20 PROCEDURE — 3600000014 HC SURGERY LEVEL 4 ADDTL 15MIN: Performed by: SURGERY

## 2018-11-20 DEVICE — MESH HERN W6XL8IN ELLIPSE W/ ECHO PS POS SYS VENTRALIGHT ST: Type: IMPLANTABLE DEVICE | Site: ABDOMEN | Status: FUNCTIONAL

## 2018-11-20 RX ORDER — LIDOCAINE HYDROCHLORIDE 10 MG/ML
1 INJECTION, SOLUTION EPIDURAL; INFILTRATION; INTRACAUDAL; PERINEURAL
Status: DISCONTINUED | OUTPATIENT
Start: 2018-11-20 | End: 2018-11-20 | Stop reason: HOSPADM

## 2018-11-20 RX ORDER — GLYCOPYRROLATE 0.2 MG/ML
INJECTION INTRAMUSCULAR; INTRAVENOUS PRN
Status: DISCONTINUED | OUTPATIENT
Start: 2018-11-20 | End: 2018-11-20 | Stop reason: SDUPTHER

## 2018-11-20 RX ORDER — MIDAZOLAM HYDROCHLORIDE 1 MG/ML
INJECTION INTRAMUSCULAR; INTRAVENOUS PRN
Status: DISCONTINUED | OUTPATIENT
Start: 2018-11-20 | End: 2018-11-20 | Stop reason: SDUPTHER

## 2018-11-20 RX ORDER — IPRATROPIUM BROMIDE AND ALBUTEROL SULFATE 2.5; .5 MG/3ML; MG/3ML
1 SOLUTION RESPIRATORY (INHALATION) 4 TIMES DAILY
Status: DISCONTINUED | OUTPATIENT
Start: 2018-11-21 | End: 2018-11-21 | Stop reason: HOSPADM

## 2018-11-20 RX ORDER — HYDROMORPHONE HCL 110MG/55ML
0.5 PATIENT CONTROLLED ANALGESIA SYRINGE INTRAVENOUS EVERY 5 MIN PRN
Status: COMPLETED | OUTPATIENT
Start: 2018-11-20 | End: 2018-11-20

## 2018-11-20 RX ORDER — SODIUM CHLORIDE, SODIUM LACTATE, POTASSIUM CHLORIDE, AND CALCIUM CHLORIDE .6; .31; .03; .02 G/100ML; G/100ML; G/100ML; G/100ML
IRRIGANT IRRIGATION
Status: COMPLETED | OUTPATIENT
Start: 2018-11-20 | End: 2018-11-20

## 2018-11-20 RX ORDER — SUCCINYLCHOLINE CHLORIDE 20 MG/ML
INJECTION INTRAMUSCULAR; INTRAVENOUS PRN
Status: DISCONTINUED | OUTPATIENT
Start: 2018-11-20 | End: 2018-11-20 | Stop reason: SDUPTHER

## 2018-11-20 RX ORDER — BUPIVACAINE HYDROCHLORIDE AND EPINEPHRINE 5; 5 MG/ML; UG/ML
INJECTION, SOLUTION EPIDURAL; INTRACAUDAL; PERINEURAL
Status: COMPLETED | OUTPATIENT
Start: 2018-11-20 | End: 2018-11-20

## 2018-11-20 RX ORDER — GABAPENTIN 300 MG/1
300 CAPSULE ORAL NIGHTLY
Status: DISCONTINUED | OUTPATIENT
Start: 2018-11-20 | End: 2018-11-21 | Stop reason: HOSPADM

## 2018-11-20 RX ORDER — SODIUM CHLORIDE 0.9 % (FLUSH) 0.9 %
10 SYRINGE (ML) INJECTION PRN
Status: DISCONTINUED | OUTPATIENT
Start: 2018-11-20 | End: 2018-11-21 | Stop reason: HOSPADM

## 2018-11-20 RX ORDER — DEXTROSE MONOHYDRATE 25 G/50ML
12.5 INJECTION, SOLUTION INTRAVENOUS PRN
Status: DISCONTINUED | OUTPATIENT
Start: 2018-11-20 | End: 2018-11-21 | Stop reason: HOSPADM

## 2018-11-20 RX ORDER — SODIUM CHLORIDE 0.9 % (FLUSH) 0.9 %
10 SYRINGE (ML) INJECTION EVERY 12 HOURS SCHEDULED
Status: DISCONTINUED | OUTPATIENT
Start: 2018-11-20 | End: 2018-11-21 | Stop reason: HOSPADM

## 2018-11-20 RX ORDER — MAGNESIUM HYDROXIDE 1200 MG/15ML
LIQUID ORAL CONTINUOUS PRN
Status: COMPLETED | OUTPATIENT
Start: 2018-11-20 | End: 2018-11-20

## 2018-11-20 RX ORDER — LIDOCAINE HYDROCHLORIDE 20 MG/ML
INJECTION, SOLUTION EPIDURAL; INFILTRATION; INTRACAUDAL; PERINEURAL PRN
Status: DISCONTINUED | OUTPATIENT
Start: 2018-11-20 | End: 2018-11-20 | Stop reason: SDUPTHER

## 2018-11-20 RX ORDER — HYDROMORPHONE HCL 110MG/55ML
0.5 PATIENT CONTROLLED ANALGESIA SYRINGE INTRAVENOUS EVERY 10 MIN PRN
Status: DISCONTINUED | OUTPATIENT
Start: 2018-11-20 | End: 2018-11-20 | Stop reason: HOSPADM

## 2018-11-20 RX ORDER — FENTANYL CITRATE 50 UG/ML
INJECTION, SOLUTION INTRAMUSCULAR; INTRAVENOUS PRN
Status: DISCONTINUED | OUTPATIENT
Start: 2018-11-20 | End: 2018-11-20 | Stop reason: SDUPTHER

## 2018-11-20 RX ORDER — NEBIVOLOL 5 MG/1
10 TABLET ORAL 2 TIMES DAILY
Status: DISCONTINUED | OUTPATIENT
Start: 2018-11-20 | End: 2018-11-21 | Stop reason: HOSPADM

## 2018-11-20 RX ORDER — HYDRALAZINE HYDROCHLORIDE 20 MG/ML
5 INJECTION INTRAMUSCULAR; INTRAVENOUS EVERY 10 MIN PRN
Status: DISCONTINUED | OUTPATIENT
Start: 2018-11-20 | End: 2018-11-20 | Stop reason: HOSPADM

## 2018-11-20 RX ORDER — DEXTROSE, SODIUM CHLORIDE, AND POTASSIUM CHLORIDE 5; .45; .15 G/100ML; G/100ML; G/100ML
INJECTION INTRAVENOUS CONTINUOUS
Status: DISCONTINUED | OUTPATIENT
Start: 2018-11-20 | End: 2018-11-21 | Stop reason: HOSPADM

## 2018-11-20 RX ORDER — DEXTROSE MONOHYDRATE 50 MG/ML
100 INJECTION, SOLUTION INTRAVENOUS PRN
Status: DISCONTINUED | OUTPATIENT
Start: 2018-11-20 | End: 2018-11-21 | Stop reason: HOSPADM

## 2018-11-20 RX ORDER — OXYCODONE HYDROCHLORIDE AND ACETAMINOPHEN 5; 325 MG/1; MG/1
2 TABLET ORAL EVERY 4 HOURS PRN
Status: DISCONTINUED | OUTPATIENT
Start: 2018-11-20 | End: 2018-11-21 | Stop reason: HOSPADM

## 2018-11-20 RX ORDER — IPRATROPIUM BROMIDE AND ALBUTEROL SULFATE 2.5; .5 MG/3ML; MG/3ML
1 SOLUTION RESPIRATORY (INHALATION) EVERY 4 HOURS
Status: DISCONTINUED | OUTPATIENT
Start: 2018-11-20 | End: 2018-11-20

## 2018-11-20 RX ORDER — LABETALOL HYDROCHLORIDE 5 MG/ML
5 INJECTION, SOLUTION INTRAVENOUS EVERY 10 MIN PRN
Status: DISCONTINUED | OUTPATIENT
Start: 2018-11-20 | End: 2018-11-20 | Stop reason: HOSPADM

## 2018-11-20 RX ORDER — ONDANSETRON 2 MG/ML
4 INJECTION INTRAMUSCULAR; INTRAVENOUS EVERY 6 HOURS PRN
Status: DISCONTINUED | OUTPATIENT
Start: 2018-11-20 | End: 2018-11-21 | Stop reason: HOSPADM

## 2018-11-20 RX ORDER — ONDANSETRON 2 MG/ML
4 INJECTION INTRAMUSCULAR; INTRAVENOUS
Status: COMPLETED | OUTPATIENT
Start: 2018-11-20 | End: 2018-11-20

## 2018-11-20 RX ORDER — OXYCODONE HYDROCHLORIDE AND ACETAMINOPHEN 5; 325 MG/1; MG/1
1 TABLET ORAL
Status: DISCONTINUED | OUTPATIENT
Start: 2018-11-20 | End: 2018-11-20 | Stop reason: HOSPADM

## 2018-11-20 RX ORDER — DEXAMETHASONE SODIUM PHOSPHATE 4 MG/ML
INJECTION, SOLUTION INTRA-ARTICULAR; INTRALESIONAL; INTRAMUSCULAR; INTRAVENOUS; SOFT TISSUE PRN
Status: DISCONTINUED | OUTPATIENT
Start: 2018-11-20 | End: 2018-11-20 | Stop reason: SDUPTHER

## 2018-11-20 RX ORDER — SODIUM CHLORIDE 9 MG/ML
INJECTION, SOLUTION INTRAVENOUS CONTINUOUS PRN
Status: DISCONTINUED | OUTPATIENT
Start: 2018-11-20 | End: 2018-11-20 | Stop reason: SDUPTHER

## 2018-11-20 RX ORDER — FENTANYL CITRATE 50 UG/ML
25 INJECTION, SOLUTION INTRAMUSCULAR; INTRAVENOUS EVERY 5 MIN PRN
Status: DISCONTINUED | OUTPATIENT
Start: 2018-11-20 | End: 2018-11-20 | Stop reason: HOSPADM

## 2018-11-20 RX ORDER — OXYCODONE HYDROCHLORIDE AND ACETAMINOPHEN 5; 325 MG/1; MG/1
1 TABLET ORAL EVERY 4 HOURS PRN
Status: DISCONTINUED | OUTPATIENT
Start: 2018-11-20 | End: 2018-11-21 | Stop reason: HOSPADM

## 2018-11-20 RX ORDER — ALLOPURINOL 100 MG/1
100 TABLET ORAL DAILY
Status: DISCONTINUED | OUTPATIENT
Start: 2018-11-20 | End: 2018-11-21 | Stop reason: HOSPADM

## 2018-11-20 RX ORDER — ACETAMINOPHEN 325 MG/1
650 TABLET ORAL EVERY 4 HOURS PRN
Status: DISCONTINUED | OUTPATIENT
Start: 2018-11-20 | End: 2018-11-21 | Stop reason: HOSPADM

## 2018-11-20 RX ORDER — ALBUTEROL SULFATE 90 UG/1
2 AEROSOL, METERED RESPIRATORY (INHALATION) EVERY 6 HOURS PRN
Status: DISCONTINUED | OUTPATIENT
Start: 2018-11-20 | End: 2018-11-21 | Stop reason: HOSPADM

## 2018-11-20 RX ORDER — SODIUM CHLORIDE 0.9 % (FLUSH) 0.9 %
10 SYRINGE (ML) INJECTION PRN
Status: DISCONTINUED | OUTPATIENT
Start: 2018-11-20 | End: 2018-11-20 | Stop reason: HOSPADM

## 2018-11-20 RX ORDER — ROCURONIUM BROMIDE 10 MG/ML
INJECTION, SOLUTION INTRAVENOUS PRN
Status: DISCONTINUED | OUTPATIENT
Start: 2018-11-20 | End: 2018-11-20 | Stop reason: SDUPTHER

## 2018-11-20 RX ORDER — PROPOFOL 10 MG/ML
INJECTION, EMULSION INTRAVENOUS PRN
Status: DISCONTINUED | OUTPATIENT
Start: 2018-11-20 | End: 2018-11-20 | Stop reason: SDUPTHER

## 2018-11-20 RX ORDER — NICOTINE POLACRILEX 4 MG
15 LOZENGE BUCCAL PRN
Status: DISCONTINUED | OUTPATIENT
Start: 2018-11-20 | End: 2018-11-21 | Stop reason: HOSPADM

## 2018-11-20 RX ORDER — SODIUM CHLORIDE 0.9 % (FLUSH) 0.9 %
10 SYRINGE (ML) INJECTION EVERY 12 HOURS SCHEDULED
Status: DISCONTINUED | OUTPATIENT
Start: 2018-11-20 | End: 2018-11-20 | Stop reason: HOSPADM

## 2018-11-20 RX ORDER — HYDROMORPHONE HCL 110MG/55ML
PATIENT CONTROLLED ANALGESIA SYRINGE INTRAVENOUS
Status: DISPENSED
Start: 2018-11-20 | End: 2018-11-21

## 2018-11-20 RX ORDER — IPRATROPIUM BROMIDE AND ALBUTEROL SULFATE 2.5; .5 MG/3ML; MG/3ML
1 SOLUTION RESPIRATORY (INHALATION) ONCE
Status: COMPLETED | OUTPATIENT
Start: 2018-11-20 | End: 2018-11-20

## 2018-11-20 RX ORDER — SODIUM CHLORIDE, SODIUM LACTATE, POTASSIUM CHLORIDE, CALCIUM CHLORIDE 600; 310; 30; 20 MG/100ML; MG/100ML; MG/100ML; MG/100ML
INJECTION, SOLUTION INTRAVENOUS CONTINUOUS
Status: DISCONTINUED | OUTPATIENT
Start: 2018-11-20 | End: 2018-11-20

## 2018-11-20 RX ORDER — HYDROMORPHONE HCL 110MG/55ML
1 PATIENT CONTROLLED ANALGESIA SYRINGE INTRAVENOUS
Status: DISCONTINUED | OUTPATIENT
Start: 2018-11-20 | End: 2018-11-21 | Stop reason: HOSPADM

## 2018-11-20 RX ADMIN — FENTANYL CITRATE 100 MCG: 50 INJECTION, SOLUTION INTRAMUSCULAR; INTRAVENOUS at 13:33

## 2018-11-20 RX ADMIN — ALLOPURINOL 100 MG: 100 TABLET ORAL at 21:59

## 2018-11-20 RX ADMIN — MIDAZOLAM HYDROCHLORIDE 1 MG: 1 INJECTION, SOLUTION INTRAMUSCULAR; INTRAVENOUS at 12:59

## 2018-11-20 RX ADMIN — IPRATROPIUM BROMIDE AND ALBUTEROL SULFATE 1 AMPULE: .5; 3 SOLUTION RESPIRATORY (INHALATION) at 11:58

## 2018-11-20 RX ADMIN — Medication 2 PUFF: at 20:51

## 2018-11-20 RX ADMIN — HYDROMORPHONE HYDROCHLORIDE 0.5 MG: 2 INJECTION INTRAMUSCULAR; INTRAVENOUS; SUBCUTANEOUS at 15:40

## 2018-11-20 RX ADMIN — HYDROMORPHONE HYDROCHLORIDE 0.5 MG: 2 INJECTION INTRAMUSCULAR; INTRAVENOUS; SUBCUTANEOUS at 17:18

## 2018-11-20 RX ADMIN — FENTANYL CITRATE 50 MCG: 50 INJECTION, SOLUTION INTRAMUSCULAR; INTRAVENOUS at 15:13

## 2018-11-20 RX ADMIN — MIDAZOLAM HYDROCHLORIDE 1 MG: 1 INJECTION, SOLUTION INTRAMUSCULAR; INTRAVENOUS at 13:11

## 2018-11-20 RX ADMIN — SODIUM CHLORIDE: 9 INJECTION, SOLUTION INTRAVENOUS at 14:35

## 2018-11-20 RX ADMIN — PHENYLEPHRINE HYDROCHLORIDE 200 MCG: 10 INJECTION INTRAVENOUS at 13:45

## 2018-11-20 RX ADMIN — HYDROMORPHONE HYDROCHLORIDE 0.5 MG: 2 INJECTION INTRAMUSCULAR; INTRAVENOUS; SUBCUTANEOUS at 16:07

## 2018-11-20 RX ADMIN — ROCURONIUM BROMIDE 30 MG: 10 INJECTION, SOLUTION INTRAVENOUS at 13:20

## 2018-11-20 RX ADMIN — IPRATROPIUM BROMIDE AND ALBUTEROL SULFATE 3 ML: .5; 3 SOLUTION RESPIRATORY (INHALATION) at 20:51

## 2018-11-20 RX ADMIN — SUGAMMADEX 300 MG: 100 INJECTION, SOLUTION INTRAVENOUS at 15:02

## 2018-11-20 RX ADMIN — POTASSIUM CHLORIDE, DEXTROSE MONOHYDRATE AND SODIUM CHLORIDE: 150; 5; 450 INJECTION, SOLUTION INTRAVENOUS at 17:19

## 2018-11-20 RX ADMIN — GLYCOPYRROLATE 0.3 MG: 0.2 INJECTION, SOLUTION INTRAMUSCULAR; INTRAVENOUS at 13:16

## 2018-11-20 RX ADMIN — INSULIN LISPRO 1 UNITS: 100 INJECTION, SOLUTION INTRAVENOUS; SUBCUTANEOUS at 22:11

## 2018-11-20 RX ADMIN — PROPOFOL 30 MG: 10 INJECTION, EMULSION INTRAVENOUS at 14:25

## 2018-11-20 RX ADMIN — LIDOCAINE HYDROCHLORIDE 20 MG: 20 INJECTION, SOLUTION EPIDURAL; INFILTRATION; INTRACAUDAL; PERINEURAL at 13:12

## 2018-11-20 RX ADMIN — HYDROMORPHONE HYDROCHLORIDE 0.5 MG: 2 INJECTION INTRAMUSCULAR; INTRAVENOUS; SUBCUTANEOUS at 15:29

## 2018-11-20 RX ADMIN — HYDROMORPHONE HYDROCHLORIDE 1 MG: 2 INJECTION INTRAMUSCULAR; INTRAVENOUS; SUBCUTANEOUS at 22:02

## 2018-11-20 RX ADMIN — MIDAZOLAM HYDROCHLORIDE 2 MG: 1 INJECTION, SOLUTION INTRAMUSCULAR; INTRAVENOUS at 12:38

## 2018-11-20 RX ADMIN — SODIUM CHLORIDE: 9 INJECTION, SOLUTION INTRAVENOUS at 12:32

## 2018-11-20 RX ADMIN — IPRATROPIUM BROMIDE AND ALBUTEROL SULFATE 3 ML: .5; 3 SOLUTION RESPIRATORY (INHALATION) at 18:30

## 2018-11-20 RX ADMIN — INSULIN GLARGINE 20 UNITS: 100 INJECTION, SOLUTION SUBCUTANEOUS at 22:12

## 2018-11-20 RX ADMIN — HYDROMORPHONE HYDROCHLORIDE 0.5 MG: 2 INJECTION INTRAMUSCULAR; INTRAVENOUS; SUBCUTANEOUS at 15:16

## 2018-11-20 RX ADMIN — PHENYLEPHRINE HYDROCHLORIDE 200 MCG: 10 INJECTION INTRAVENOUS at 13:49

## 2018-11-20 RX ADMIN — SUCCINYLCHOLINE CHLORIDE 120 MG: 20 INJECTION, SOLUTION INTRAMUSCULAR; INTRAVENOUS at 13:12

## 2018-11-20 RX ADMIN — Medication 10 ML: at 22:03

## 2018-11-20 RX ADMIN — NEBIVOLOL HYDROCHLORIDE 10 MG: 5 TABLET ORAL at 21:59

## 2018-11-20 RX ADMIN — FAMOTIDINE 20 MG: 10 INJECTION, SOLUTION INTRAVENOUS at 22:01

## 2018-11-20 RX ADMIN — GABAPENTIN 300 MG: 300 CAPSULE ORAL at 21:59

## 2018-11-20 RX ADMIN — HYDROMORPHONE HYDROCHLORIDE 1 MG: 2 INJECTION INTRAMUSCULAR; INTRAVENOUS; SUBCUTANEOUS at 18:38

## 2018-11-20 RX ADMIN — Medication 3 G: at 12:28

## 2018-11-20 RX ADMIN — PHENYLEPHRINE HYDROCHLORIDE 100 MCG: 10 INJECTION INTRAVENOUS at 13:42

## 2018-11-20 RX ADMIN — ONDANSETRON 4 MG: 2 INJECTION INTRAMUSCULAR; INTRAVENOUS at 13:32

## 2018-11-20 RX ADMIN — ROCURONIUM BROMIDE 10 MG: 10 INJECTION, SOLUTION INTRAVENOUS at 14:25

## 2018-11-20 RX ADMIN — PROPOFOL 50 MG: 10 INJECTION, EMULSION INTRAVENOUS at 13:12

## 2018-11-20 RX ADMIN — DEXAMETHASONE SODIUM PHOSPHATE 8 MG: 4 INJECTION, SOLUTION INTRAMUSCULAR; INTRAVENOUS at 13:31

## 2018-11-20 ASSESSMENT — PULMONARY FUNCTION TESTS
PIF_VALUE: 38
PIF_VALUE: 38
PIF_VALUE: 4
PIF_VALUE: 1
PIF_VALUE: 18
PIF_VALUE: 38
PIF_VALUE: 0
PIF_VALUE: 38
PIF_VALUE: 39
PIF_VALUE: 0
PIF_VALUE: 38
PIF_VALUE: 38
PIF_VALUE: 35
PIF_VALUE: 38
PIF_VALUE: 38
PIF_VALUE: 39
PIF_VALUE: 1
PIF_VALUE: 38
PIF_VALUE: 0
PIF_VALUE: 38
PIF_VALUE: 33
PIF_VALUE: 35
PIF_VALUE: 1
PIF_VALUE: 0
PIF_VALUE: 39
PIF_VALUE: 38
PIF_VALUE: 38
PIF_VALUE: 2
PIF_VALUE: 38
PIF_VALUE: 38
PIF_VALUE: 39
PIF_VALUE: 38
PIF_VALUE: 38
PIF_VALUE: 1
PIF_VALUE: 38
PIF_VALUE: 1
PIF_VALUE: 32
PIF_VALUE: 1
PIF_VALUE: 38
PIF_VALUE: 6
PIF_VALUE: 39
PIF_VALUE: 38
PIF_VALUE: 0
PIF_VALUE: 1
PIF_VALUE: 8
PIF_VALUE: 39
PIF_VALUE: 39
PIF_VALUE: 1
PIF_VALUE: 39
PIF_VALUE: 33
PIF_VALUE: 38
PIF_VALUE: 38
PIF_VALUE: 32
PIF_VALUE: 39
PIF_VALUE: 38
PIF_VALUE: 38
PIF_VALUE: 1
PIF_VALUE: 2
PIF_VALUE: 38
PIF_VALUE: 8
PIF_VALUE: 1
PIF_VALUE: 38
PIF_VALUE: 38
PIF_VALUE: 39
PIF_VALUE: 39
PIF_VALUE: 6
PIF_VALUE: 39
PIF_VALUE: 1
PIF_VALUE: 38
PIF_VALUE: 38
PIF_VALUE: 39
PIF_VALUE: 0
PIF_VALUE: 38
PIF_VALUE: 0
PIF_VALUE: 32
PIF_VALUE: 1
PIF_VALUE: 39
PIF_VALUE: 38
PIF_VALUE: 1
PIF_VALUE: 5
PIF_VALUE: 39
PIF_VALUE: 1
PIF_VALUE: 39
PIF_VALUE: 39
PIF_VALUE: 38
PIF_VALUE: 38
PIF_VALUE: 23
PIF_VALUE: 6
PIF_VALUE: 0
PIF_VALUE: 38
PIF_VALUE: 39
PIF_VALUE: 34
PIF_VALUE: 33
PIF_VALUE: 38
PIF_VALUE: 0
PIF_VALUE: 1
PIF_VALUE: 33
PIF_VALUE: 39
PIF_VALUE: 38
PIF_VALUE: 44
PIF_VALUE: 1
PIF_VALUE: 38
PIF_VALUE: 1
PIF_VALUE: 33
PIF_VALUE: 38
PIF_VALUE: 39
PIF_VALUE: 36
PIF_VALUE: 38
PIF_VALUE: 39
PIF_VALUE: 1
PIF_VALUE: 39
PIF_VALUE: 38
PIF_VALUE: 33
PIF_VALUE: 2
PIF_VALUE: 39
PIF_VALUE: 39
PIF_VALUE: 32
PIF_VALUE: 38
PIF_VALUE: 41
PIF_VALUE: 38
PIF_VALUE: 38
PIF_VALUE: 1
PIF_VALUE: 28
PIF_VALUE: 2
PIF_VALUE: 38
PIF_VALUE: 21
PIF_VALUE: 33
PIF_VALUE: 39
PIF_VALUE: 38
PIF_VALUE: 36
PIF_VALUE: 36
PIF_VALUE: 38

## 2018-11-20 ASSESSMENT — PAIN DESCRIPTION - PAIN TYPE
TYPE: ACUTE PAIN;SURGICAL PAIN
TYPE: SURGICAL PAIN

## 2018-11-20 ASSESSMENT — PAIN DESCRIPTION - DESCRIPTORS
DESCRIPTORS: SHARP

## 2018-11-20 ASSESSMENT — PAIN SCALES - GENERAL
PAINLEVEL_OUTOF10: 7
PAINLEVEL_OUTOF10: 8
PAINLEVEL_OUTOF10: 10
PAINLEVEL_OUTOF10: 8
PAINLEVEL_OUTOF10: 9
PAINLEVEL_OUTOF10: 8

## 2018-11-20 ASSESSMENT — PAIN DESCRIPTION - LOCATION
LOCATION: ABDOMEN

## 2018-11-20 NOTE — ANESTHESIA POSTPROCEDURE EVALUATION
Department of Anesthesiology  Postprocedure Note    Patient: Henrique Webb  MRN: [id number]  YOB: 1955  Date of evaluation: 11/20/2018  Time:  3:33 PM     Procedure Summary     Date:  11/20/18 Room / Location:  [facility] OR [address] [place] [address] OR    Anesthesia Start:  1232 Anesthesia Stop:  0927    Procedure:  LAPAROSCOPIC VENTRAL HERNIA REPAIR WITH MESH (N/A Abdomen) Diagnosis:  (K43.9 VENTRAL HERNIA)    Surgeon:  Isha Howell MD Responsible Provider:  Dimas Gaviria MD    Anesthesia Type:  general ASA Status:  3          Anesthesia Type: general    Petra Phase I: Petra Score: 9    Petra Phase II:      Last vitals: Reviewed and per EMR flowsheets.        Anesthesia Post Evaluation    Patient location during evaluation: PACU  Patient participation: complete - patient participated  Level of consciousness: awake and alert  Airway patency: patent  Nausea & Vomiting: no nausea and no vomiting  Complications: no  Cardiovascular status: hemodynamically stable  Respiratory status: acceptable  Hydration status: stable

## 2018-11-20 NOTE — ANESTHESIA PRE PROCEDURE
1 Device by Does not apply route daily as needed 3/27/16   Wilda Hernandez, APRN - CNP   OXYGEN Inhale 2 L into the lungs daily as needed At night prn    Historical Provider, MD       Current medications:    No current facility-administered medications for this encounter. Allergies:     Allergies   Allergen Reactions    Codeine Nausea Only    Fentanyl Itching    Morphine      CHEST PAIN    Nsaids Other (See Comments)     Due to CRF    Hydrocodone-Acetaminophen Itching and Rash    Percocet [Oxycodone-Acetaminophen] Itching    Procardia [Nifedipine] Nausea And Vomiting     Headache  Takes norvasc at home 12/22/15    Vicodin [Hydrocodone-Acetaminophen] Rash       Problem List:    Patient Active Problem List   Diagnosis Code    Asthma J45.909    Colon polyps K63.5    Microalbuminuria R80.9    Venous stasis of lower extremity I87.8    Obstructive sleep apnea on CPAP G47.33, Z99.89    Chronic diastolic CHF (congestive heart failure) ([facility]) I50.32    Renal osteodystrophy N25.0    Uncontrolled type 2 diabetes with renal manifestation ([facility]) E11.29, E11.65    History of pulmonary embolism Z86.711    Anemia of chronic kidney failure ([facility]) N18.9, D63.1    Primary osteoarthritis of both knees M17.0    Essential hypertension, malignant I10    Restrictive lung disease J98.4    Gastroesophageal reflux disease K21.9    LVH (left ventricular hypertrophy) I51.7    Morbid obesity with BMI of 60.0-69.9, adult ([facility]) E66.01, Z68.44    Acute on chronic respiratory failure with hypoxia and hypercapnia ([facility]) J96.21, J96.22    Obesity hypoventilation syndrome ([facility]) E66.2    Morbid obesity ([facility]) E66.01    COPD, moderate ([facility]) J44.9    Gout M10.9    Warfarin-induced coagulopathy ([facility]) D68.32, T45.515A    Chronic hypoxemic respiratory failure ([facility]) J96.11    Constipation K59.00    Diabetes ([facility]) E11.9    Type 2 diabetes mellitus with diabetic neuropathy, with long-term current use of insulin ([facility]) E11.40,

## 2018-11-21 VITALS
DIASTOLIC BLOOD PRESSURE: 88 MMHG | TEMPERATURE: 98.7 F | SYSTOLIC BLOOD PRESSURE: 130 MMHG | HEART RATE: 97 BPM | WEIGHT: 293 LBS | RESPIRATION RATE: 18 BRPM | OXYGEN SATURATION: 100 % | BODY MASS INDEX: 51.91 KG/M2 | HEIGHT: 63 IN

## 2018-11-21 LAB
BASOPHILS ABSOLUTE: 0.1 K/UL (ref 0–0.2)
BASOPHILS RELATIVE PERCENT: 0.5 %
EOSINOPHILS ABSOLUTE: 0 K/UL (ref 0–0.6)
EOSINOPHILS RELATIVE PERCENT: 0 %
GLUCOSE BLD-MCNC: 266 MG/DL (ref 70–99)
HCT VFR BLD CALC: 31.7 % (ref 36–48)
HEMOGLOBIN: 10.2 G/DL (ref 12–16)
LYMPHOCYTES ABSOLUTE: 0.7 K/UL (ref 1–5.1)
LYMPHOCYTES RELATIVE PERCENT: 5.2 %
MCH RBC QN AUTO: 32.1 PG (ref 26–34)
MCHC RBC AUTO-ENTMCNC: 32 G/DL (ref 31–36)
MCV RBC AUTO: 100.3 FL (ref 80–100)
MONOCYTES ABSOLUTE: 0.9 K/UL (ref 0–1.3)
MONOCYTES RELATIVE PERCENT: 6.3 %
NEUTROPHILS ABSOLUTE: 12 K/UL (ref 1.7–7.7)
NEUTROPHILS RELATIVE PERCENT: 88 %
PDW BLD-RTO: 17.3 % (ref 12.4–15.4)
PERFORMED ON: ABNORMAL
PLATELET # BLD: 181 K/UL (ref 135–450)
PMV BLD AUTO: 8.9 FL (ref 5–10.5)
RBC # BLD: 3.16 M/UL (ref 4–5.2)
WBC # BLD: 13.6 K/UL (ref 4–11)

## 2018-11-21 PROCEDURE — 99024 POSTOP FOLLOW-UP VISIT: CPT | Performed by: SURGERY

## 2018-11-21 PROCEDURE — 96376 TX/PRO/DX INJ SAME DRUG ADON: CPT

## 2018-11-21 PROCEDURE — 85025 COMPLETE CBC W/AUTO DIFF WBC: CPT

## 2018-11-21 PROCEDURE — 2500000003 HC RX 250 WO HCPCS

## 2018-11-21 PROCEDURE — G0378 HOSPITAL OBSERVATION PER HR: HCPCS

## 2018-11-21 PROCEDURE — 96374 THER/PROPH/DIAG INJ IV PUSH: CPT

## 2018-11-21 PROCEDURE — 5A1D70Z PERFORMANCE OF URINARY FILTRATION, INTERMITTENT, LESS THAN 6 HOURS PER DAY: ICD-10-PCS | Performed by: INTERNAL MEDICINE

## 2018-11-21 PROCEDURE — G0257 UNSCHED DIALYSIS ESRD PT HOS: HCPCS

## 2018-11-21 PROCEDURE — 6360000002 HC RX W HCPCS: Performed by: SURGERY

## 2018-11-21 PROCEDURE — 94640 AIRWAY INHALATION TREATMENT: CPT

## 2018-11-21 PROCEDURE — 94760 N-INVAS EAR/PLS OXIMETRY 1: CPT

## 2018-11-21 PROCEDURE — 6370000000 HC RX 637 (ALT 250 FOR IP): Performed by: HOSPITALIST

## 2018-11-21 PROCEDURE — 2580000003 HC RX 258: Performed by: SURGERY

## 2018-11-21 PROCEDURE — 90937 HEMODIALYSIS REPEATED EVAL: CPT

## 2018-11-21 RX ORDER — LIDOCAINE HYDROCHLORIDE 10 MG/ML
INJECTION, SOLUTION EPIDURAL; INFILTRATION; INTRACAUDAL; PERINEURAL
Status: COMPLETED
Start: 2018-11-21 | End: 2018-11-21

## 2018-11-21 RX ORDER — TRAMADOL HYDROCHLORIDE 50 MG/1
50 TABLET ORAL EVERY 4 HOURS PRN
Qty: 42 TABLET | Refills: 0 | Status: ON HOLD | OUTPATIENT
Start: 2018-11-21 | End: 2018-11-29 | Stop reason: HOSPADM

## 2018-11-21 RX ORDER — DOCUSATE SODIUM 100 MG/1
100 CAPSULE, LIQUID FILLED ORAL 2 TIMES DAILY
Qty: 60 CAPSULE | Refills: 0 | Status: SHIPPED | OUTPATIENT
Start: 2018-11-21 | End: 2019-09-25 | Stop reason: ALTCHOICE

## 2018-11-21 RX ORDER — SODIUM CHLORIDE 9 MG/ML
INJECTION, SOLUTION INTRAVENOUS
Status: DISCONTINUED
Start: 2018-11-21 | End: 2018-11-21 | Stop reason: HOSPADM

## 2018-11-21 RX ORDER — WARFARIN SODIUM 5 MG/1
5 TABLET ORAL
Status: DISCONTINUED | OUTPATIENT
Start: 2018-11-22 | End: 2018-11-21 | Stop reason: HOSPADM

## 2018-11-21 RX ORDER — LIDOCAINE HYDROCHLORIDE 10 MG/ML
0.4 INJECTION, SOLUTION EPIDURAL; INFILTRATION; INTRACAUDAL; PERINEURAL PRN
Status: DISCONTINUED | OUTPATIENT
Start: 2018-11-21 | End: 2018-11-21 | Stop reason: HOSPADM

## 2018-11-21 RX ADMIN — Medication 2 PUFF: at 04:00

## 2018-11-21 RX ADMIN — HYDROMORPHONE HYDROCHLORIDE 1 MG: 2 INJECTION INTRAMUSCULAR; INTRAVENOUS; SUBCUTANEOUS at 01:50

## 2018-11-21 RX ADMIN — HYDROMORPHONE HYDROCHLORIDE 1 MG: 2 INJECTION INTRAMUSCULAR; INTRAVENOUS; SUBCUTANEOUS at 08:26

## 2018-11-21 RX ADMIN — LIDOCAINE HYDROCHLORIDE: 10 INJECTION, SOLUTION EPIDURAL; INFILTRATION; INTRACAUDAL; PERINEURAL at 09:10

## 2018-11-21 RX ADMIN — HYDROMORPHONE HYDROCHLORIDE 1 MG: 2 INJECTION INTRAMUSCULAR; INTRAVENOUS; SUBCUTANEOUS at 05:23

## 2018-11-21 RX ADMIN — INSULIN LISPRO 3 UNITS: 100 INJECTION, SOLUTION INTRAVENOUS; SUBCUTANEOUS at 08:25

## 2018-11-21 RX ADMIN — INSULIN GLARGINE 20 UNITS: 100 INJECTION, SOLUTION SUBCUTANEOUS at 08:25

## 2018-11-21 RX ADMIN — HYDROMORPHONE HYDROCHLORIDE 1 MG: 2 INJECTION INTRAMUSCULAR; INTRAVENOUS; SUBCUTANEOUS at 12:02

## 2018-11-21 RX ADMIN — Medication 10 ML: at 08:26

## 2018-11-21 ASSESSMENT — PAIN DESCRIPTION - PAIN TYPE
TYPE: SURGICAL PAIN
TYPE: SURGICAL PAIN

## 2018-11-21 ASSESSMENT — PAIN SCALES - GENERAL
PAINLEVEL_OUTOF10: 7
PAINLEVEL_OUTOF10: 8
PAINLEVEL_OUTOF10: 0
PAINLEVEL_OUTOF10: 10
PAINLEVEL_OUTOF10: 9
PAINLEVEL_OUTOF10: 0

## 2018-11-21 ASSESSMENT — PAIN DESCRIPTION - LOCATION
LOCATION: ABDOMEN
LOCATION: ABDOMEN

## 2018-11-21 ASSESSMENT — PAIN DESCRIPTION - DESCRIPTORS: DESCRIPTORS: ACHING;SHARP

## 2018-11-23 ENCOUNTER — CARE COORDINATION (OUTPATIENT)
Dept: CASE MANAGEMENT | Age: 63
End: 2018-11-23

## 2018-11-23 ENCOUNTER — HOSPITAL ENCOUNTER (EMERGENCY)
Age: 63
Discharge: LEFT BEFORE DISCHARGE | DRG: 190 | End: 2018-11-23
Attending: EMERGENCY MEDICINE
Payer: MEDICARE

## 2018-11-23 ENCOUNTER — CARE COORDINATION (OUTPATIENT)
Dept: CARE COORDINATION | Age: 63
End: 2018-11-23

## 2018-11-23 ENCOUNTER — APPOINTMENT (OUTPATIENT)
Dept: GENERAL RADIOLOGY | Age: 63
DRG: 190 | End: 2018-11-23
Payer: MEDICARE

## 2018-11-23 ENCOUNTER — TELEPHONE (OUTPATIENT)
Dept: OTHER | Facility: CLINIC | Age: 63
End: 2018-11-23

## 2018-11-23 VITALS
HEART RATE: 103 BPM | RESPIRATION RATE: 20 BRPM | TEMPERATURE: 97.5 F | WEIGHT: 293 LBS | DIASTOLIC BLOOD PRESSURE: 83 MMHG | SYSTOLIC BLOOD PRESSURE: 126 MMHG | OXYGEN SATURATION: 99 % | BODY MASS INDEX: 58.1 KG/M2

## 2018-11-23 PROCEDURE — 93005 ELECTROCARDIOGRAM TRACING: CPT | Performed by: NURSE PRACTITIONER

## 2018-11-23 PROCEDURE — 4500000002 HC ER NO CHARGE

## 2018-11-23 PROCEDURE — 71046 X-RAY EXAM CHEST 2 VIEWS: CPT

## 2018-11-23 ASSESSMENT — PATIENT HEALTH QUESTIONNAIRE - PHQ9
SUM OF ALL RESPONSES TO PHQ QUESTIONS 1-9: 1
SUM OF ALL RESPONSES TO PHQ QUESTIONS 1-9: 1

## 2018-11-23 ASSESSMENT — PAIN DESCRIPTION - LOCATION: LOCATION: CHEST

## 2018-11-23 ASSESSMENT — PAIN DESCRIPTION - ORIENTATION: ORIENTATION: LEFT

## 2018-11-23 ASSESSMENT — PAIN SCALES - GENERAL: PAINLEVEL_OUTOF10: 8

## 2018-11-23 ASSESSMENT — PAIN DESCRIPTION - PAIN TYPE: TYPE: ACUTE PAIN

## 2018-11-24 ENCOUNTER — APPOINTMENT (OUTPATIENT)
Dept: CT IMAGING | Age: 63
DRG: 190 | End: 2018-11-24
Payer: MEDICARE

## 2018-11-24 ENCOUNTER — HOSPITAL ENCOUNTER (INPATIENT)
Age: 63
LOS: 5 days | Discharge: HOME | DRG: 190 | End: 2018-11-29
Attending: EMERGENCY MEDICINE | Admitting: INTERNAL MEDICINE
Payer: MEDICARE

## 2018-11-24 ENCOUNTER — APPOINTMENT (OUTPATIENT)
Dept: GENERAL RADIOLOGY | Age: 63
DRG: 190 | End: 2018-11-24
Payer: MEDICARE

## 2018-11-24 ENCOUNTER — TELEPHONE (OUTPATIENT)
Dept: OTHER | Facility: CLINIC | Age: 63
End: 2018-11-24

## 2018-11-24 DIAGNOSIS — R06.00 DYSPNEA, UNSPECIFIED TYPE: Primary | ICD-10-CM

## 2018-11-24 DIAGNOSIS — E11.42 DIABETIC POLYNEUROPATHY ASSOCIATED WITH TYPE 2 DIABETES MELLITUS (HCC): ICD-10-CM

## 2018-11-24 DIAGNOSIS — E16.2 HYPOGLYCEMIA: ICD-10-CM

## 2018-11-24 LAB
A/G RATIO: 0.9 (ref 1.1–2.2)
ALBUMIN SERPL-MCNC: 3.4 G/DL (ref 3.4–5)
ALP BLD-CCNC: 101 U/L (ref 40–129)
ALT SERPL-CCNC: <5 U/L (ref 10–40)
ANION GAP SERPL CALCULATED.3IONS-SCNC: 20 MMOL/L (ref 3–16)
AST SERPL-CCNC: 12 U/L (ref 15–37)
BASOPHILS ABSOLUTE: 0.1 K/UL (ref 0–0.2)
BASOPHILS RELATIVE PERCENT: 0.6 %
BILIRUB SERPL-MCNC: 0.3 MG/DL (ref 0–1)
BUN BLDV-MCNC: 44 MG/DL (ref 7–20)
CALCIUM SERPL-MCNC: 8.7 MG/DL (ref 8.3–10.6)
CHLORIDE BLD-SCNC: 99 MMOL/L (ref 99–110)
CO2: 22 MMOL/L (ref 21–32)
CREAT SERPL-MCNC: 11 MG/DL (ref 0.6–1.2)
EOSINOPHILS ABSOLUTE: 0.3 K/UL (ref 0–0.6)
EOSINOPHILS RELATIVE PERCENT: 3.3 %
GFR AFRICAN AMERICAN: 4
GFR NON-AFRICAN AMERICAN: 4
GLOBULIN: 3.6 G/DL
GLUCOSE BLD-MCNC: 271 MG/DL (ref 70–99)
GLUCOSE BLD-MCNC: 343 MG/DL (ref 70–99)
GLUCOSE BLD-MCNC: 49 MG/DL (ref 70–99)
GLUCOSE BLD-MCNC: 51 MG/DL (ref 70–99)
GLUCOSE BLD-MCNC: 57 MG/DL (ref 70–99)
GLUCOSE BLD-MCNC: 70 MG/DL (ref 70–99)
GLUCOSE BLD-MCNC: 92 MG/DL (ref 70–99)
GLUCOSE BLD-MCNC: 95 MG/DL (ref 70–99)
HCT VFR BLD CALC: 29.3 % (ref 36–48)
HEMOGLOBIN: 9.3 G/DL (ref 12–16)
HEPATITIS B SURFACE ANTIGEN INTERPRETATION: NORMAL
HEPATITIS B SURFACE ANTIGEN INTERPRETATION: NORMAL
INR BLD: 1.23 (ref 0.86–1.14)
INR BLD: 1.23 (ref 0.86–1.14)
LACTIC ACID: 1.4 MMOL/L (ref 0.4–2)
LYMPHOCYTES ABSOLUTE: 1.2 K/UL (ref 1–5.1)
LYMPHOCYTES RELATIVE PERCENT: 13.2 %
MCH RBC QN AUTO: 31.6 PG (ref 26–34)
MCHC RBC AUTO-ENTMCNC: 31.6 G/DL (ref 31–36)
MCV RBC AUTO: 99.9 FL (ref 80–100)
MONOCYTES ABSOLUTE: 0.8 K/UL (ref 0–1.3)
MONOCYTES RELATIVE PERCENT: 8.6 %
NEUTROPHILS ABSOLUTE: 7 K/UL (ref 1.7–7.7)
NEUTROPHILS RELATIVE PERCENT: 74.3 %
PDW BLD-RTO: 17.5 % (ref 12.4–15.4)
PERFORMED ON: ABNORMAL
PERFORMED ON: NORMAL
PLATELET # BLD: 205 K/UL (ref 135–450)
PMV BLD AUTO: 8.2 FL (ref 5–10.5)
POTASSIUM REFLEX MAGNESIUM: 4.1 MMOL/L (ref 3.5–5.1)
PROTHROMBIN TIME: 14 SEC (ref 9.8–13)
PROTHROMBIN TIME: 14 SEC (ref 9.8–13)
RAPID INFLUENZA  B AGN: NEGATIVE
RAPID INFLUENZA A AGN: NEGATIVE
RBC # BLD: 2.93 M/UL (ref 4–5.2)
SODIUM BLD-SCNC: 141 MMOL/L (ref 136–145)
TOTAL PROTEIN: 7 G/DL (ref 6.4–8.2)
TROPONIN: 0.1 NG/ML
WBC # BLD: 9.4 K/UL (ref 4–11)

## 2018-11-24 PROCEDURE — 74176 CT ABD & PELVIS W/O CONTRAST: CPT

## 2018-11-24 PROCEDURE — 96376 TX/PRO/DX INJ SAME DRUG ADON: CPT

## 2018-11-24 PROCEDURE — 87086 URINE CULTURE/COLONY COUNT: CPT

## 2018-11-24 PROCEDURE — 6360000002 HC RX W HCPCS: Performed by: EMERGENCY MEDICINE

## 2018-11-24 PROCEDURE — 93010 ELECTROCARDIOGRAM REPORT: CPT | Performed by: INTERNAL MEDICINE

## 2018-11-24 PROCEDURE — 93005 ELECTROCARDIOGRAM TRACING: CPT | Performed by: EMERGENCY MEDICINE

## 2018-11-24 PROCEDURE — 94664 DEMO&/EVAL PT USE INHALER: CPT

## 2018-11-24 PROCEDURE — 2700000000 HC OXYGEN THERAPY PER DAY

## 2018-11-24 PROCEDURE — 85610 PROTHROMBIN TIME: CPT

## 2018-11-24 PROCEDURE — 71250 CT THORAX DX C-: CPT

## 2018-11-24 PROCEDURE — 90937 HEMODIALYSIS REPEATED EVAL: CPT

## 2018-11-24 PROCEDURE — 87350 HEPATITIS BE AG IA: CPT

## 2018-11-24 PROCEDURE — 2500000003 HC RX 250 WO HCPCS: Performed by: EMERGENCY MEDICINE

## 2018-11-24 PROCEDURE — 6370000000 HC RX 637 (ALT 250 FOR IP): Performed by: INTERNAL MEDICINE

## 2018-11-24 PROCEDURE — 85025 COMPLETE CBC W/AUTO DIFF WBC: CPT

## 2018-11-24 PROCEDURE — 87804 INFLUENZA ASSAY W/OPTIC: CPT

## 2018-11-24 PROCEDURE — 84484 ASSAY OF TROPONIN QUANT: CPT

## 2018-11-24 PROCEDURE — 94760 N-INVAS EAR/PLS OXIMETRY 1: CPT

## 2018-11-24 PROCEDURE — 96372 THER/PROPH/DIAG INJ SC/IM: CPT

## 2018-11-24 PROCEDURE — 87340 HEPATITIS B SURFACE AG IA: CPT

## 2018-11-24 PROCEDURE — 6360000002 HC RX W HCPCS: Performed by: INTERNAL MEDICINE

## 2018-11-24 PROCEDURE — 5A1D70Z PERFORMANCE OF URINARY FILTRATION, INTERMITTENT, LESS THAN 6 HOURS PER DAY: ICD-10-PCS | Performed by: INTERNAL MEDICINE

## 2018-11-24 PROCEDURE — 94640 AIRWAY INHALATION TREATMENT: CPT

## 2018-11-24 PROCEDURE — 96366 THER/PROPH/DIAG IV INF ADDON: CPT

## 2018-11-24 PROCEDURE — 2580000003 HC RX 258: Performed by: EMERGENCY MEDICINE

## 2018-11-24 PROCEDURE — 80053 COMPREHEN METABOLIC PANEL: CPT

## 2018-11-24 PROCEDURE — 6370000000 HC RX 637 (ALT 250 FOR IP): Performed by: NURSE PRACTITIONER

## 2018-11-24 PROCEDURE — 96365 THER/PROPH/DIAG IV INF INIT: CPT

## 2018-11-24 PROCEDURE — 94762 N-INVAS EAR/PLS OXIMTRY CONT: CPT

## 2018-11-24 PROCEDURE — 99285 EMERGENCY DEPT VISIT HI MDM: CPT

## 2018-11-24 PROCEDURE — 2580000003 HC RX 258: Performed by: INTERNAL MEDICINE

## 2018-11-24 PROCEDURE — 6370000000 HC RX 637 (ALT 250 FOR IP): Performed by: EMERGENCY MEDICINE

## 2018-11-24 PROCEDURE — 96375 TX/PRO/DX INJ NEW DRUG ADDON: CPT

## 2018-11-24 PROCEDURE — 2060000000 HC ICU INTERMEDIATE R&B

## 2018-11-24 PROCEDURE — 83605 ASSAY OF LACTIC ACID: CPT

## 2018-11-24 RX ORDER — DEXTROSE MONOHYDRATE 25 G/50ML
25 INJECTION, SOLUTION INTRAVENOUS PRN
Status: DISCONTINUED | OUTPATIENT
Start: 2018-11-24 | End: 2018-11-24 | Stop reason: HOSPADM

## 2018-11-24 RX ORDER — LIDOCAINE HYDROCHLORIDE 10 MG/ML
2 INJECTION, SOLUTION EPIDURAL; INFILTRATION; INTRACAUDAL; PERINEURAL PRN
Status: DISCONTINUED | OUTPATIENT
Start: 2018-11-24 | End: 2018-11-29 | Stop reason: HOSPADM

## 2018-11-24 RX ORDER — TRAMADOL HYDROCHLORIDE 50 MG/1
50 TABLET ORAL EVERY 4 HOURS PRN
Status: DISCONTINUED | OUTPATIENT
Start: 2018-11-24 | End: 2018-11-29 | Stop reason: HOSPADM

## 2018-11-24 RX ORDER — DOCUSATE SODIUM 100 MG/1
100 CAPSULE, LIQUID FILLED ORAL 2 TIMES DAILY
Status: DISCONTINUED | OUTPATIENT
Start: 2018-11-24 | End: 2018-11-29 | Stop reason: HOSPADM

## 2018-11-24 RX ORDER — SODIUM CHLORIDE 9 MG/ML
INJECTION, SOLUTION INTRAVENOUS
Status: DISPENSED
Start: 2018-11-24 | End: 2018-11-25

## 2018-11-24 RX ORDER — IPRATROPIUM BROMIDE AND ALBUTEROL SULFATE 2.5; .5 MG/3ML; MG/3ML
1 SOLUTION RESPIRATORY (INHALATION) ONCE
Status: COMPLETED | OUTPATIENT
Start: 2018-11-24 | End: 2018-11-24

## 2018-11-24 RX ORDER — SODIUM CHLORIDE 0.9 % (FLUSH) 0.9 %
10 SYRINGE (ML) INJECTION PRN
Status: DISCONTINUED | OUTPATIENT
Start: 2018-11-24 | End: 2018-11-29 | Stop reason: HOSPADM

## 2018-11-24 RX ORDER — NICOTINE POLACRILEX 4 MG
15 LOZENGE BUCCAL PRN
Status: DISCONTINUED | OUTPATIENT
Start: 2018-11-24 | End: 2018-11-29 | Stop reason: HOSPADM

## 2018-11-24 RX ORDER — ALLOPURINOL 100 MG/1
100 TABLET ORAL DAILY
Status: DISCONTINUED | OUTPATIENT
Start: 2018-11-24 | End: 2018-11-29 | Stop reason: HOSPADM

## 2018-11-24 RX ORDER — ONDANSETRON 4 MG/1
4 TABLET, ORALLY DISINTEGRATING ORAL EVERY 8 HOURS PRN
Status: DISCONTINUED | OUTPATIENT
Start: 2018-11-24 | End: 2018-11-29 | Stop reason: HOSPADM

## 2018-11-24 RX ORDER — HEPARIN SODIUM 5000 [USP'U]/ML
5000 INJECTION, SOLUTION INTRAVENOUS; SUBCUTANEOUS EVERY 8 HOURS SCHEDULED
Status: DISCONTINUED | OUTPATIENT
Start: 2018-11-24 | End: 2018-11-24

## 2018-11-24 RX ORDER — DEXTROSE MONOHYDRATE 25 G/50ML
12.5 INJECTION, SOLUTION INTRAVENOUS PRN
Status: DISCONTINUED | OUTPATIENT
Start: 2018-11-24 | End: 2018-11-29 | Stop reason: HOSPADM

## 2018-11-24 RX ORDER — HEPARIN SODIUM 10000 [USP'U]/100ML
18 INJECTION, SOLUTION INTRAVENOUS CONTINUOUS
Status: DISCONTINUED | OUTPATIENT
Start: 2018-11-25 | End: 2018-11-24

## 2018-11-24 RX ORDER — ROSUVASTATIN CALCIUM 10 MG/1
10 TABLET, COATED ORAL NIGHTLY
Status: DISCONTINUED | OUTPATIENT
Start: 2018-11-24 | End: 2018-11-29 | Stop reason: HOSPADM

## 2018-11-24 RX ORDER — SODIUM CHLORIDE 0.9 % (FLUSH) 0.9 %
10 SYRINGE (ML) INJECTION EVERY 12 HOURS SCHEDULED
Status: DISCONTINUED | OUTPATIENT
Start: 2018-11-24 | End: 2018-11-29 | Stop reason: HOSPADM

## 2018-11-24 RX ORDER — ONDANSETRON 2 MG/ML
4 INJECTION INTRAMUSCULAR; INTRAVENOUS EVERY 6 HOURS PRN
Status: DISCONTINUED | OUTPATIENT
Start: 2018-11-24 | End: 2018-11-29 | Stop reason: HOSPADM

## 2018-11-24 RX ORDER — NITROGLYCERIN 0.4 MG/1
0.4 TABLET SUBLINGUAL EVERY 5 MIN PRN
Status: DISCONTINUED | OUTPATIENT
Start: 2018-11-24 | End: 2018-11-29 | Stop reason: HOSPADM

## 2018-11-24 RX ORDER — LIDOCAINE HYDROCHLORIDE 10 MG/ML
INJECTION, SOLUTION EPIDURAL; INFILTRATION; INTRACAUDAL; PERINEURAL
Status: DISPENSED
Start: 2018-11-24 | End: 2018-11-25

## 2018-11-24 RX ORDER — PROMETHAZINE HYDROCHLORIDE 25 MG/1
25 TABLET ORAL EVERY 8 HOURS PRN
Status: DISCONTINUED | OUTPATIENT
Start: 2018-11-24 | End: 2018-11-29 | Stop reason: HOSPADM

## 2018-11-24 RX ORDER — IPRATROPIUM BROMIDE AND ALBUTEROL SULFATE 2.5; .5 MG/3ML; MG/3ML
1 SOLUTION RESPIRATORY (INHALATION) EVERY 4 HOURS
Status: DISCONTINUED | OUTPATIENT
Start: 2018-11-24 | End: 2018-11-24

## 2018-11-24 RX ORDER — HEPARIN SODIUM 1000 [USP'U]/ML
80 INJECTION, SOLUTION INTRAVENOUS; SUBCUTANEOUS PRN
Status: DISCONTINUED | OUTPATIENT
Start: 2018-11-24 | End: 2018-11-24

## 2018-11-24 RX ORDER — SODIUM CHLORIDE 0.9 % (FLUSH) 0.9 %
SYRINGE (ML) INJECTION
Status: DISPENSED
Start: 2018-11-24 | End: 2018-11-25

## 2018-11-24 RX ORDER — PANTOPRAZOLE SODIUM 40 MG/1
40 TABLET, DELAYED RELEASE ORAL
Status: DISCONTINUED | OUTPATIENT
Start: 2018-11-25 | End: 2018-11-29 | Stop reason: HOSPADM

## 2018-11-24 RX ORDER — IPRATROPIUM BROMIDE AND ALBUTEROL SULFATE 2.5; .5 MG/3ML; MG/3ML
1 SOLUTION RESPIRATORY (INHALATION) EVERY 4 HOURS
Status: DISCONTINUED | OUTPATIENT
Start: 2018-11-24 | End: 2018-11-28

## 2018-11-24 RX ORDER — ALBUTEROL SULFATE 90 UG/1
2 AEROSOL, METERED RESPIRATORY (INHALATION) EVERY 6 HOURS PRN
Status: DISCONTINUED | OUTPATIENT
Start: 2018-11-24 | End: 2018-11-29 | Stop reason: HOSPADM

## 2018-11-24 RX ORDER — NEBIVOLOL 5 MG/1
10 TABLET ORAL 2 TIMES DAILY
Status: DISCONTINUED | OUTPATIENT
Start: 2018-11-24 | End: 2018-11-29 | Stop reason: HOSPADM

## 2018-11-24 RX ORDER — WARFARIN SODIUM 7.5 MG/1
7.5 TABLET ORAL DAILY
Status: DISCONTINUED | OUTPATIENT
Start: 2018-11-24 | End: 2018-11-26

## 2018-11-24 RX ORDER — DEXTROSE MONOHYDRATE 50 MG/ML
100 INJECTION, SOLUTION INTRAVENOUS PRN
Status: DISCONTINUED | OUTPATIENT
Start: 2018-11-24 | End: 2018-11-29 | Stop reason: HOSPADM

## 2018-11-24 RX ORDER — HEPARIN SODIUM 1000 [USP'U]/ML
40 INJECTION, SOLUTION INTRAVENOUS; SUBCUTANEOUS PRN
Status: DISCONTINUED | OUTPATIENT
Start: 2018-11-24 | End: 2018-11-24

## 2018-11-24 RX ORDER — GABAPENTIN 300 MG/1
300 CAPSULE ORAL NIGHTLY
Status: DISCONTINUED | OUTPATIENT
Start: 2018-11-24 | End: 2018-11-29 | Stop reason: HOSPADM

## 2018-11-24 RX ORDER — METHYLPREDNISOLONE SODIUM SUCCINATE 40 MG/ML
40 INJECTION, POWDER, LYOPHILIZED, FOR SOLUTION INTRAMUSCULAR; INTRAVENOUS EVERY 8 HOURS
Status: DISCONTINUED | OUTPATIENT
Start: 2018-11-24 | End: 2018-11-27

## 2018-11-24 RX ORDER — METHYLPREDNISOLONE SODIUM SUCCINATE 125 MG/2ML
125 INJECTION, POWDER, LYOPHILIZED, FOR SOLUTION INTRAMUSCULAR; INTRAVENOUS ONCE
Status: COMPLETED | OUTPATIENT
Start: 2018-11-24 | End: 2018-11-24

## 2018-11-24 RX ORDER — TRAZODONE HYDROCHLORIDE 50 MG/1
100 TABLET ORAL NIGHTLY PRN
Status: DISCONTINUED | OUTPATIENT
Start: 2018-11-24 | End: 2018-11-29 | Stop reason: HOSPADM

## 2018-11-24 RX ORDER — HEPARIN SODIUM 1000 [USP'U]/ML
80 INJECTION, SOLUTION INTRAVENOUS; SUBCUTANEOUS ONCE
Status: DISCONTINUED | OUTPATIENT
Start: 2018-11-25 | End: 2018-11-24

## 2018-11-24 RX ADMIN — Medication 10 ML: at 21:44

## 2018-11-24 RX ADMIN — NYSTATIN 500000 UNITS: 500000 SUSPENSION ORAL at 23:14

## 2018-11-24 RX ADMIN — INSULIN GLARGINE 20 UNITS: 100 INJECTION, SOLUTION SUBCUTANEOUS at 21:37

## 2018-11-24 RX ADMIN — IPRATROPIUM BROMIDE AND ALBUTEROL SULFATE 3 ML: .5; 3 SOLUTION RESPIRATORY (INHALATION) at 20:45

## 2018-11-24 RX ADMIN — GABAPENTIN 300 MG: 300 CAPSULE ORAL at 21:35

## 2018-11-24 RX ADMIN — NEBIVOLOL HYDROCHLORIDE 10 MG: 5 TABLET ORAL at 21:35

## 2018-11-24 RX ADMIN — HYDROMORPHONE HYDROCHLORIDE 0.5 MG: 1 INJECTION, SOLUTION INTRAMUSCULAR; INTRAVENOUS; SUBCUTANEOUS at 04:12

## 2018-11-24 RX ADMIN — HEPARIN SODIUM 5000 UNITS: 5000 INJECTION INTRAVENOUS; SUBCUTANEOUS at 21:36

## 2018-11-24 RX ADMIN — DOCUSATE SODIUM 100 MG: 100 CAPSULE, LIQUID FILLED ORAL at 21:36

## 2018-11-24 RX ADMIN — Medication 2 PUFF: at 13:33

## 2018-11-24 RX ADMIN — INSULIN LISPRO 2 UNITS: 100 INJECTION, SOLUTION INTRAVENOUS; SUBCUTANEOUS at 21:37

## 2018-11-24 RX ADMIN — METHYLPREDNISOLONE SODIUM SUCCINATE 125 MG: 125 INJECTION, POWDER, FOR SOLUTION INTRAMUSCULAR; INTRAVENOUS at 10:55

## 2018-11-24 RX ADMIN — DEXTROSE MONOHYDRATE 25 G: 25 INJECTION, SOLUTION INTRAVENOUS at 07:34

## 2018-11-24 RX ADMIN — HYDROMORPHONE HYDROCHLORIDE 0.5 MG: 1 INJECTION, SOLUTION INTRAMUSCULAR; INTRAVENOUS; SUBCUTANEOUS at 06:06

## 2018-11-24 RX ADMIN — INSULIN LISPRO 3 UNITS: 100 INJECTION, SOLUTION INTRAVENOUS; SUBCUTANEOUS at 19:18

## 2018-11-24 RX ADMIN — DEXTROSE MONOHYDRATE 25 G: 25 INJECTION, SOLUTION INTRAVENOUS at 05:05

## 2018-11-24 RX ADMIN — Medication 2 PUFF: at 20:46

## 2018-11-24 RX ADMIN — IPRATROPIUM BROMIDE AND ALBUTEROL SULFATE 1 AMPULE: .5; 3 SOLUTION RESPIRATORY (INHALATION) at 08:42

## 2018-11-24 RX ADMIN — METHYLPREDNISOLONE SODIUM SUCCINATE 40 MG: 40 INJECTION, POWDER, FOR SOLUTION INTRAMUSCULAR; INTRAVENOUS at 21:36

## 2018-11-24 RX ADMIN — IPRATROPIUM BROMIDE AND ALBUTEROL SULFATE 3 ML: .5; 3 SOLUTION RESPIRATORY (INHALATION) at 16:00

## 2018-11-24 RX ADMIN — TRAMADOL HYDROCHLORIDE 50 MG: 50 TABLET, FILM COATED ORAL at 11:05

## 2018-11-24 RX ADMIN — TRAMADOL HYDROCHLORIDE 50 MG: 50 TABLET, FILM COATED ORAL at 21:38

## 2018-11-24 RX ADMIN — ROSUVASTATIN CALCIUM 10 MG: 10 TABLET, FILM COATED ORAL at 21:35

## 2018-11-24 RX ADMIN — WARFARIN SODIUM 7.5 MG: 7.5 TABLET ORAL at 23:13

## 2018-11-24 RX ADMIN — GLUCAGON HYDROCHLORIDE 1 MG: KIT at 06:37

## 2018-11-24 ASSESSMENT — PAIN SCALES - GENERAL
PAINLEVEL_OUTOF10: 8
PAINLEVEL_OUTOF10: 8
PAINLEVEL_OUTOF10: 5
PAINLEVEL_OUTOF10: 3
PAINLEVEL_OUTOF10: 8
PAINLEVEL_OUTOF10: 8

## 2018-11-24 ASSESSMENT — PAIN DESCRIPTION - LOCATION: LOCATION: ABDOMEN

## 2018-11-24 ASSESSMENT — PAIN DESCRIPTION - PAIN TYPE: TYPE: SURGICAL PAIN

## 2018-11-24 NOTE — ED PROVIDER NOTES
Signed out pending admission. History of asthma. No history of intubation. Last on steroids years ago. Presents with wheezing and cough. Did evaluate her. Immediately instructed her to take 2 puffs of her albuterol rescue inhaler. With some improvement. She continues to have diminished breath sounds in the right lung field on auscultation with expiratory wheeze prolonged expiratory phase. No rhonchi crackles. CT of the chest is negative for pneumonia. Likely asthma exacerbation. Stress steroid dose given. LenooNeb.   Will follow     Arminda Jerez MD  11/24/18 3658
abdominal hernia repair may be   still postoperative related given proximity to surgery as opposed to   infection. Correlate with wound evaluation. No acute intra-abdominal or intrapelvic abnormality. Cholelithiasis without evidence of cholecystitis or bile duct dilatation. No acute cardiopulmonary findings. CT ABDOMEN PELVIS WO CONTRAST Additional Contrast? None   Final Result   Fluid collection and gas along the ventral abdominal hernia repair may be   still postoperative related given proximity to surgery as opposed to   infection. Correlate with wound evaluation. No acute intra-abdominal or intrapelvic abnormality. Cholelithiasis without evidence of cholecystitis or bile duct dilatation. No acute cardiopulmonary findings. ED BEDSIDE ULTRASOUND:   Performed by ED Physician - none    LABS:  Labs Reviewed   CBC WITH AUTO DIFFERENTIAL - Abnormal; Notable for the following:        Result Value    RBC 2.93 (*)     Hemoglobin 9.3 (*)     Hematocrit 29.3 (*)     RDW 17.5 (*)     All other components within normal limits    Narrative:     Performed at:  [facility] [address], [place], [facility] [employer]   Phone [phone]   COMPREHENSIVE METABOLIC PANEL W/ REFLEX TO MG FOR LOW K - Abnormal; Notable for the following:      Anion Gap 20 (*)     Glucose 49 (*)     BUN 44 (*)     CREATININE 11.0 (*)     GFR Non- 4 (*)     GFR African American 4 (*)     Albumin/Globulin Ratio 0.9 (*)     ALT <5 (*)     AST 12 (*)     All other components within normal limits    Narrative:     CALL  [facility] tel. [phone],  Chemistry results called to and read back by Melissa Gilbert RN, 11/24/2018  05:00, by Ricardo Beltran  Performed at:  [facility] [address], [place], [facility] [employer]   Phone [phone]   TROPONIN - Abnormal; Notable for the following:     Troponin 0.10 (*)     All other components

## 2018-11-24 NOTE — PROGRESS NOTES
RESPIRATORY THERAPY ASSESSMENT    Name:  [address] Record Number:  [id number]  Age: 61 y.o. Gender: female  : 1955  Today's Date:  2018  Room:  [facility]3379/3379-01    Assessment   Patient Admission Diagnosis      Allergies  Allergies   Allergen Reactions    Codeine Nausea Only    Fentanyl Itching    Morphine      CHEST PAIN    Nsaids Other (See Comments)     Due to CRF    Hydrocodone-Acetaminophen Itching and Rash    Percocet [Oxycodone-Acetaminophen] Itching    Procardia [Nifedipine] Nausea And Vomiting     Headache  Takes norvasc at home 12/22/15    Vicodin [Hydrocodone-Acetaminophen] Rash       Minimum Predicted Vital Capacity:     she          Actual Vital Capacity:      she          Pulmonary History: COPD, Asthma and CHF/Pulmonary Edema   Home Oxygen Therapy:  2 liters/min via nasal cannula at night  Home Respiratory Therapy: albuterol mdi q6 prn, advair, duoneb q4  Current Respiratory Therapy:  same  Treatment Type: HHN  Medications: Albuterol/Ipratropium    Respiratory Severity Index(RSI)   Patients with orders for inhalation medications, oxygen, or any therapeutic treatment modality will be placed on Respiratory Protocol. They will be assessed with the first treatment and at least every 72 hours thereafter. The following severity scale will be used to determine frequency of treatment intervention.     Smoking History: Pulmonary Disease or Smoking History, Greater than 15 pack year = 2    Social History  Social History   Substance Use Topics    Smoking status: Never Smoker    Smokeless tobacco: Never Used    Alcohol use No       Recent Surgical History: None = 0  Past Surgical History  Past Surgical History:   Procedure Laterality Date    CARDIAC CATHETERIZATION      Grace; clean cors    COLONOSCOPY      polyp removed; Lucie Arvizu; repeat 5 years    COLONOSCOPY  13,     Carmen    DOPPLER ECHOCARDIOGRAPHY  09    LVH with global hypokinesis; EF 55-60%    HYSTERECTOMY      KNEE ARTHROSCOPY Right 1999    OTHER SURGICAL HISTORY  02/22/2018    LEFT brachial artery axillary vein AV graft     PA OPEN SKULL SUPRATENT EXPLORE      Craniotomy    PA REPAIR INCISIONAL HERNIA,REDUCIBLE N/A 11/20/2018    LAPAROSCOPIC VENTRAL HERNIA REPAIR WITH MESH performed by Matilde Mandujano MD at [place] [id number]  10/96    UPPER GASTROINTESTINAL ENDOSCOPY  6/25/13    VENTRAL HERNIA REPAIR  12/24/2016    Incarcerated ventral hernia repair with mesh       Level of Consciousness: Alert, Oriented, and Cooperative = 0    Level of Activity: Walking with assistance = 1    Respiratory Pattern: Regular Pattern; RR 8-20 = 0    Breath Sounds: Diminshed bilaterally and/or crackles = 2    Sputum   ,  ,    Cough: Strong, spontaneous, non-productive = 0    Vital Signs   BP (!) 148/79   Pulse 96   Temp 98.2 °F (36.8 °C) (Temporal)   Resp 14   Ht 5' 3\" (1.6 m)   Wt (!) 328 lb (148.8 kg)   LMP 11/01/1996 (Exact Date)   SpO2 93%   BMI 58.10 kg/m²   SPO2 (COPD values may differ): Greater than or equal to 92% on room air = 0    Peak Flow (asthma only): not applicable = 0    RSI: 0-4 = See once and convert to home regimen or discontinue        Plan       Goals: medication delivery, mobilize retained secretions, volume expansion and improve oxygenation  Plan of Care: home regimen    Patient/caregiver was educated on the proper method of use of Respiratory Therapy device:  Yes      Level of understanding, patient and caregiver was able to:(check appropriate box(es))   [x] Verbalize understanding   [] Demonstrate understanding       [] Teach back        [] Needs reinforcement       []  No available caregiver               []  Other:     Response to education:  Excellent     Teaching Time:  5  minutes      Is patient being placed on Home Treatment Regimen?   Yes     Electronically signed by Ronak Barakat RCP on 11/24/2018 at 1:24 PM    Respiratory Protocol Guidelines

## 2018-11-24 NOTE — ED NOTES
Dr. Grace Teran notified of BG 49 and Creat 11.  Verbal orders received for D50     Erin Boland RN  11/24/18 1973

## 2018-11-24 NOTE — CONSULTS
[facility]  Consult Note      Referring Physician:  Susan Burt MD    Reason:    ESRD    HPI:   The patient is a 61 y.o. female with significant past medical history of ESRD on HD MWF, HTN, CHF/CAD, DM2, COPD who is admitted with shortness of breath. She is s/p electiveventral  hernia repair on 11/2/108. She reports last HD to be on Wednesday. She was in ED through the day yesterday. Has wheezing and exertional shortness of breath. No abdominal pain.        Past Medical History   Active Ambulatory Problems     Diagnosis Date Noted    Asthma 11/11/2010    Colon polyps     Microalbuminuria     Venous stasis of lower extremity     Obstructive sleep apnea on CPAP     Chronic diastolic CHF (congestive heart failure) ([address] 09/27/2013    Renal osteodystrophy 07/07/2014    Uncontrolled type 2 diabetes with renal manifestation ([address] 09/05/2014    History of pulmonary embolism 10/16/2014    Anemia of chronic kidney failure ([address] 01/02/2015    Primary osteoarthritis of both knees 09/18/2015    Essential hypertension, malignant 11/06/2015    Restrictive lung disease     Gastroesophageal reflux disease     LVH (left ventricular hypertrophy) 01/14/2016    Morbid obesity with BMI of 60.0-69.9, adult ([address]     Acute on chronic respiratory failure with hypoxia and hypercapnia (HCC)     Obesity hypoventilation syndrome (HCC)     Morbid obesity (HCC)     COPD, moderate ([address] 05/04/2016    Gout     Warfarin-induced coagulopathy ([address] 12/05/2016    Chronic hypoxemic respiratory failure ([address]     Constipation 09/09/2017    Diabetes ([address] 09/09/2017    Type 2 diabetes mellitus with diabetic neuropathy, with long-term current use of insulin ([address] 09/12/2017    Primary osteoarthritis of left hip 11/21/2017    Acute diastolic CHF (congestive heart failure) ([address] 12/25/2017    ESRD (end stage renal disease) on dialysis ([address] 02/14/2018    Chronic pain syndrome 03/27/2018    Chest pain mucous membranes moist, pharynx normal without lesions  Neck - supple, no significant adenopathy, trachea midline  Chest - fair air entry, bilateral expiratory wheeze  Heart - normal rate, regular rhythm, normal S1, S2,   Abdomen - soft, nontender, nondistended, no masses or organomegaly  Neurological - alert, oriented, normal speech, no focal findings or movement disorder noted  Extremities - peripheral pulses normal, no pedal edema, no clubbing or cyanosis  Skin - normal coloration and turgor, no rashes, no suspicious skin lesions noted  Access - left upper arm AVF, right IJ port       Labs:  CBC:   Lab Results   Component Value Date    WBC 9.4 11/24/2018    RBC 2.93 11/24/2018    HGB 9.3 11/24/2018    HCT 29.3 11/24/2018    MCV 99.9 11/24/2018    MCH 31.6 11/24/2018    MCHC 31.6 11/24/2018    RDW 17.5 11/24/2018     11/24/2018    MPV 8.2 11/24/2018     BMP:    Lab Results   Component Value Date     11/24/2018    K 4.1 11/24/2018    CL 99 11/24/2018    CO2 22 11/24/2018    BUN 44 11/24/2018    LABALBU 3.4 11/24/2018    CREATININE 11.0 11/24/2018    CALCIUM 8.7 11/24/2018    GFRAA 4 11/24/2018    GFRAA 50 05/12/2013    LABGLOM 4 11/24/2018    GLUCOSE 49 11/24/2018     U/A:    Lab Results   Component Value Date    NITRITE neg 05/06/2015    COLORU YELLOW 09/15/2018    PHUR 6.0 09/15/2018    LABCAST 1-3 Mixed Cells 07/09/2018    WBCUA 50 09/15/2018    RBCUA 32 09/15/2018    MUCUS 3+ 06/18/2013    YEAST Present 08/20/2018    BACTERIA 2+ 09/15/2018    CLARITYU TURBID 09/15/2018    SPECGRAV 1.015 09/15/2018    LEUKOCYTESUR SMALL 09/15/2018    UROBILINOGEN 0.2 09/15/2018    BILIRUBINUR SMALL 09/15/2018    BILIRUBINUR neg 05/06/2015    BILIRUBINUR NEGATIVE 11/23/2010    BLOODU SMALL 09/15/2018    GLUCOSEU Negative 09/15/2018    GLUCOSEU NEGATIVE 11/23/2010           Assessment/Plan:      ESRD: HD MWF  -HD today     SOB: oxygenating well  -perhaps COPD related  -she is 4 kg over dry weight.  Will try to get volume off of her     FEN:  -K stable      Ventral hernia: s/p repair   -healing well     HTN: variable  -on bystolic will continue same     Anemia: stable post op-recheck in am      CKD-MBD: check phosphorus in am        Dr[facility]

## 2018-11-24 NOTE — ED NOTES
Pt medicated per MAR. VS obtained. Pt to CT with rad tech.       Criss Almendarez, RN  11/24/18 0651

## 2018-11-24 NOTE — ED NOTES
Pt's power port accessed with 3/4\" lopez needles on first attempt by this RN using sterile technique, pt tolerated well. Blood work collected and sent to lab, pt medicated per [facility]. Pt to x-ray. Will continue to monitor.       Maria Esther Deshpande RN  11/24/18 4822

## 2018-11-25 LAB
ANION GAP SERPL CALCULATED.3IONS-SCNC: 15 MMOL/L (ref 3–16)
BACTERIA: ABNORMAL /HPF
BILIRUBIN URINE: ABNORMAL
BLOOD, URINE: ABNORMAL
BUN BLDV-MCNC: 28 MG/DL (ref 7–20)
CALCIUM SERPL-MCNC: 9.2 MG/DL (ref 8.3–10.6)
CHLORIDE BLD-SCNC: 92 MMOL/L (ref 99–110)
CLARITY: ABNORMAL
CO2: 28 MMOL/L (ref 21–32)
COLOR: ABNORMAL
CREAT SERPL-MCNC: 7.2 MG/DL (ref 0.6–1.2)
EPITHELIAL CELLS, UA: >100 /HPF
GFR AFRICAN AMERICAN: 7
GFR NON-AFRICAN AMERICAN: 6
GLUCOSE BLD-MCNC: 243 MG/DL (ref 70–99)
GLUCOSE BLD-MCNC: 262 MG/DL (ref 70–99)
GLUCOSE BLD-MCNC: 277 MG/DL (ref 70–99)
GLUCOSE BLD-MCNC: 305 MG/DL (ref 70–99)
GLUCOSE BLD-MCNC: 338 MG/DL (ref 70–99)
GLUCOSE BLD-MCNC: 341 MG/DL (ref 70–99)
GLUCOSE URINE: 100 MG/DL
HCT VFR BLD CALC: 29.1 % (ref 36–48)
HEMOGLOBIN: 9.6 G/DL (ref 12–16)
INR BLD: 1.38 (ref 0.86–1.14)
KETONES, URINE: ABNORMAL MG/DL
LEUKOCYTE ESTERASE, URINE: ABNORMAL
MCH RBC QN AUTO: 32.7 PG (ref 26–34)
MCHC RBC AUTO-ENTMCNC: 32.9 G/DL (ref 31–36)
MCV RBC AUTO: 99.4 FL (ref 80–100)
MICROSCOPIC EXAMINATION: YES
NITRITE, URINE: POSITIVE
PDW BLD-RTO: 17.4 % (ref 12.4–15.4)
PERFORMED ON: ABNORMAL
PH UA: 5.5
PHOSPHORUS: 4.4 MG/DL (ref 2.5–4.9)
PLATELET # BLD: 214 K/UL (ref 135–450)
PMV BLD AUTO: 8.5 FL (ref 5–10.5)
POTASSIUM REFLEX MAGNESIUM: 5.4 MMOL/L (ref 3.5–5.1)
PROTEIN UA: >=300 MG/DL
PROTHROMBIN TIME: 15.7 SEC (ref 9.8–13)
RBC # BLD: 2.93 M/UL (ref 4–5.2)
RBC UA: ABNORMAL /HPF (ref 0–2)
SODIUM BLD-SCNC: 135 MMOL/L (ref 136–145)
SPECIFIC GRAVITY UA: >=1.03
URINE REFLEX TO CULTURE: YES
URINE TYPE: ABNORMAL
UROBILINOGEN, URINE: 0.2 E.U./DL
WBC # BLD: 10.9 K/UL (ref 4–11)
WBC UA: ABNORMAL /HPF (ref 0–5)

## 2018-11-25 PROCEDURE — 2700000000 HC OXYGEN THERAPY PER DAY

## 2018-11-25 PROCEDURE — G8979 MOBILITY GOAL STATUS: HCPCS

## 2018-11-25 PROCEDURE — 94760 N-INVAS EAR/PLS OXIMETRY 1: CPT

## 2018-11-25 PROCEDURE — G8980 MOBILITY D/C STATUS: HCPCS

## 2018-11-25 PROCEDURE — 6370000000 HC RX 637 (ALT 250 FOR IP): Performed by: NURSE PRACTITIONER

## 2018-11-25 PROCEDURE — 6370000000 HC RX 637 (ALT 250 FOR IP): Performed by: INTERNAL MEDICINE

## 2018-11-25 PROCEDURE — 2580000003 HC RX 258: Performed by: INTERNAL MEDICINE

## 2018-11-25 PROCEDURE — 97162 PT EVAL MOD COMPLEX 30 MIN: CPT

## 2018-11-25 PROCEDURE — 36591 DRAW BLOOD OFF VENOUS DEVICE: CPT

## 2018-11-25 PROCEDURE — 6360000002 HC RX W HCPCS: Performed by: INTERNAL MEDICINE

## 2018-11-25 PROCEDURE — 80048 BASIC METABOLIC PNL TOTAL CA: CPT

## 2018-11-25 PROCEDURE — 85610 PROTHROMBIN TIME: CPT

## 2018-11-25 PROCEDURE — 85027 COMPLETE CBC AUTOMATED: CPT

## 2018-11-25 PROCEDURE — 84100 ASSAY OF PHOSPHORUS: CPT

## 2018-11-25 PROCEDURE — 2060000000 HC ICU INTERMEDIATE R&B

## 2018-11-25 PROCEDURE — 81001 URINALYSIS AUTO W/SCOPE: CPT

## 2018-11-25 PROCEDURE — 94640 AIRWAY INHALATION TREATMENT: CPT

## 2018-11-25 RX ORDER — CYCLOBENZAPRINE HCL 10 MG
10 TABLET ORAL 3 TIMES DAILY PRN
Status: DISCONTINUED | OUTPATIENT
Start: 2018-11-25 | End: 2018-11-29 | Stop reason: HOSPADM

## 2018-11-25 RX ADMIN — NYSTATIN 500000 UNITS: 500000 SUSPENSION ORAL at 14:24

## 2018-11-25 RX ADMIN — IPRATROPIUM BROMIDE AND ALBUTEROL SULFATE 3 ML: .5; 3 SOLUTION RESPIRATORY (INHALATION) at 21:12

## 2018-11-25 RX ADMIN — DOCUSATE SODIUM 100 MG: 100 CAPSULE, LIQUID FILLED ORAL at 21:03

## 2018-11-25 RX ADMIN — NYSTATIN 500000 UNITS: 500000 SUSPENSION ORAL at 21:03

## 2018-11-25 RX ADMIN — METHYLPREDNISOLONE SODIUM SUCCINATE 40 MG: 40 INJECTION, POWDER, FOR SOLUTION INTRAMUSCULAR; INTRAVENOUS at 06:02

## 2018-11-25 RX ADMIN — ALLOPURINOL 100 MG: 100 TABLET ORAL at 09:06

## 2018-11-25 RX ADMIN — IPRATROPIUM BROMIDE AND ALBUTEROL SULFATE 3 ML: .5; 3 SOLUTION RESPIRATORY (INHALATION) at 04:28

## 2018-11-25 RX ADMIN — IPRATROPIUM BROMIDE AND ALBUTEROL SULFATE 3 ML: .5; 3 SOLUTION RESPIRATORY (INHALATION) at 09:26

## 2018-11-25 RX ADMIN — IPRATROPIUM BROMIDE AND ALBUTEROL SULFATE 3 ML: .5; 3 SOLUTION RESPIRATORY (INHALATION) at 13:15

## 2018-11-25 RX ADMIN — TRAZODONE HYDROCHLORIDE 100 MG: 50 TABLET ORAL at 00:58

## 2018-11-25 RX ADMIN — METHYLPREDNISOLONE SODIUM SUCCINATE 40 MG: 40 INJECTION, POWDER, FOR SOLUTION INTRAMUSCULAR; INTRAVENOUS at 21:04

## 2018-11-25 RX ADMIN — IPRATROPIUM BROMIDE AND ALBUTEROL SULFATE 3 ML: .5; 3 SOLUTION RESPIRATORY (INHALATION) at 00:47

## 2018-11-25 RX ADMIN — DOCUSATE SODIUM 100 MG: 100 CAPSULE, LIQUID FILLED ORAL at 09:06

## 2018-11-25 RX ADMIN — PANTOPRAZOLE SODIUM 40 MG: 40 TABLET, DELAYED RELEASE ORAL at 06:02

## 2018-11-25 RX ADMIN — INSULIN LISPRO 2 UNITS: 100 INJECTION, SOLUTION INTRAVENOUS; SUBCUTANEOUS at 21:07

## 2018-11-25 RX ADMIN — WARFARIN SODIUM 7.5 MG: 7.5 TABLET ORAL at 18:33

## 2018-11-25 RX ADMIN — Medication 10 ML: at 21:04

## 2018-11-25 RX ADMIN — NYSTATIN 500000 UNITS: 500000 SUSPENSION ORAL at 09:06

## 2018-11-25 RX ADMIN — METHYLPREDNISOLONE SODIUM SUCCINATE 40 MG: 40 INJECTION, POWDER, FOR SOLUTION INTRAMUSCULAR; INTRAVENOUS at 15:53

## 2018-11-25 RX ADMIN — Medication 2 PUFF: at 21:12

## 2018-11-25 RX ADMIN — Medication 10 ML: at 09:10

## 2018-11-25 RX ADMIN — NEBIVOLOL HYDROCHLORIDE 10 MG: 5 TABLET ORAL at 09:05

## 2018-11-25 RX ADMIN — NYSTATIN 500000 UNITS: 500000 SUSPENSION ORAL at 18:21

## 2018-11-25 RX ADMIN — TRAMADOL HYDROCHLORIDE 50 MG: 50 TABLET, FILM COATED ORAL at 14:24

## 2018-11-25 RX ADMIN — Medication 2 PUFF: at 09:26

## 2018-11-25 RX ADMIN — NEBIVOLOL HYDROCHLORIDE 10 MG: 5 TABLET ORAL at 21:03

## 2018-11-25 RX ADMIN — CYCLOBENZAPRINE 10 MG: 10 TABLET, FILM COATED ORAL at 15:55

## 2018-11-25 RX ADMIN — INSULIN LISPRO 3 UNITS: 100 INJECTION, SOLUTION INTRAVENOUS; SUBCUTANEOUS at 12:42

## 2018-11-25 RX ADMIN — GABAPENTIN 300 MG: 300 CAPSULE ORAL at 21:03

## 2018-11-25 RX ADMIN — INSULIN LISPRO 3 UNITS: 100 INJECTION, SOLUTION INTRAVENOUS; SUBCUTANEOUS at 09:07

## 2018-11-25 RX ADMIN — INSULIN LISPRO 5 UNITS: 100 INJECTION, SOLUTION INTRAVENOUS; SUBCUTANEOUS at 18:19

## 2018-11-25 RX ADMIN — ROSUVASTATIN CALCIUM 10 MG: 10 TABLET, FILM COATED ORAL at 21:03

## 2018-11-25 RX ADMIN — IPRATROPIUM BROMIDE AND ALBUTEROL SULFATE 3 ML: .5; 3 SOLUTION RESPIRATORY (INHALATION) at 16:34

## 2018-11-25 ASSESSMENT — PAIN SCALES - GENERAL
PAINLEVEL_OUTOF10: 0
PAINLEVEL_OUTOF10: 0
PAINLEVEL_OUTOF10: 8
PAINLEVEL_OUTOF10: 0

## 2018-11-26 ENCOUNTER — CARE COORDINATION (OUTPATIENT)
Dept: CARE COORDINATION | Age: 63
End: 2018-11-26

## 2018-11-26 LAB
ALBUMIN SERPL-MCNC: 3.4 G/DL (ref 3.4–5)
ANION GAP SERPL CALCULATED.3IONS-SCNC: 18 MMOL/L (ref 3–16)
BUN BLDV-MCNC: 51 MG/DL (ref 7–20)
CALCIUM SERPL-MCNC: 9.5 MG/DL (ref 8.3–10.6)
CHLORIDE BLD-SCNC: 91 MMOL/L (ref 99–110)
CO2: 25 MMOL/L (ref 21–32)
CREAT SERPL-MCNC: 9 MG/DL (ref 0.6–1.2)
GFR AFRICAN AMERICAN: 5
GFR NON-AFRICAN AMERICAN: 4
GLUCOSE BLD-MCNC: 290 MG/DL (ref 70–99)
GLUCOSE BLD-MCNC: 346 MG/DL (ref 70–99)
GLUCOSE BLD-MCNC: 348 MG/DL (ref 70–99)
GLUCOSE BLD-MCNC: 381 MG/DL (ref 70–99)
GLUCOSE BLD-MCNC: 391 MG/DL (ref 70–99)
HCT VFR BLD CALC: 30.4 % (ref 36–48)
HEMOGLOBIN: 9.7 G/DL (ref 12–16)
HEPATITIS BE ANTIGEN: NEGATIVE
INR BLD: 1.65 (ref 0.86–1.14)
MCH RBC QN AUTO: 31.6 PG (ref 26–34)
MCHC RBC AUTO-ENTMCNC: 31.8 G/DL (ref 31–36)
MCV RBC AUTO: 99.1 FL (ref 80–100)
PDW BLD-RTO: 17.5 % (ref 12.4–15.4)
PERFORMED ON: ABNORMAL
PHOSPHORUS: 6 MG/DL (ref 2.5–4.9)
PLATELET # BLD: 224 K/UL (ref 135–450)
PMV BLD AUTO: 7.8 FL (ref 5–10.5)
POTASSIUM SERPL-SCNC: 5.1 MMOL/L (ref 3.5–5.1)
PROTHROMBIN TIME: 18.8 SEC (ref 9.8–13)
RBC # BLD: 3.07 M/UL (ref 4–5.2)
SODIUM BLD-SCNC: 134 MMOL/L (ref 136–145)
WBC # BLD: 11.9 K/UL (ref 4–11)

## 2018-11-26 PROCEDURE — 6370000000 HC RX 637 (ALT 250 FOR IP): Performed by: NURSE PRACTITIONER

## 2018-11-26 PROCEDURE — 6370000000 HC RX 637 (ALT 250 FOR IP): Performed by: INTERNAL MEDICINE

## 2018-11-26 PROCEDURE — 85027 COMPLETE CBC AUTOMATED: CPT

## 2018-11-26 PROCEDURE — 94640 AIRWAY INHALATION TREATMENT: CPT

## 2018-11-26 PROCEDURE — 2580000003 HC RX 258: Performed by: INTERNAL MEDICINE

## 2018-11-26 PROCEDURE — 2500000003 HC RX 250 WO HCPCS: Performed by: INTERNAL MEDICINE

## 2018-11-26 PROCEDURE — 2060000000 HC ICU INTERMEDIATE R&B

## 2018-11-26 PROCEDURE — 80069 RENAL FUNCTION PANEL: CPT

## 2018-11-26 PROCEDURE — 85610 PROTHROMBIN TIME: CPT

## 2018-11-26 PROCEDURE — 94760 N-INVAS EAR/PLS OXIMETRY 1: CPT

## 2018-11-26 PROCEDURE — 90937 HEMODIALYSIS REPEATED EVAL: CPT

## 2018-11-26 PROCEDURE — 6360000002 HC RX W HCPCS: Performed by: INTERNAL MEDICINE

## 2018-11-26 RX ADMIN — IPRATROPIUM BROMIDE AND ALBUTEROL SULFATE 3 ML: .5; 3 SOLUTION RESPIRATORY (INHALATION) at 00:25

## 2018-11-26 RX ADMIN — GABAPENTIN 300 MG: 300 CAPSULE ORAL at 20:41

## 2018-11-26 RX ADMIN — NYSTATIN 500000 UNITS: 500000 SUSPENSION ORAL at 20:41

## 2018-11-26 RX ADMIN — Medication 10 ML: at 12:34

## 2018-11-26 RX ADMIN — IPRATROPIUM BROMIDE AND ALBUTEROL SULFATE 3 ML: .5; 3 SOLUTION RESPIRATORY (INHALATION) at 12:37

## 2018-11-26 RX ADMIN — WARFARIN SODIUM 6 MG: 5 TABLET ORAL at 17:50

## 2018-11-26 RX ADMIN — INSULIN LISPRO 6 UNITS: 100 INJECTION, SOLUTION INTRAVENOUS; SUBCUTANEOUS at 20:47

## 2018-11-26 RX ADMIN — METHYLPREDNISOLONE SODIUM SUCCINATE 40 MG: 40 INJECTION, POWDER, FOR SOLUTION INTRAMUSCULAR; INTRAVENOUS at 14:02

## 2018-11-26 RX ADMIN — Medication 2 PUFF: at 21:40

## 2018-11-26 RX ADMIN — IPRATROPIUM BROMIDE AND ALBUTEROL SULFATE 3 ML: .5; 3 SOLUTION RESPIRATORY (INHALATION) at 16:58

## 2018-11-26 RX ADMIN — Medication 10 ML: at 20:46

## 2018-11-26 RX ADMIN — NEBIVOLOL HYDROCHLORIDE 10 MG: 5 TABLET ORAL at 12:31

## 2018-11-26 RX ADMIN — DOCUSATE SODIUM 100 MG: 100 CAPSULE, LIQUID FILLED ORAL at 12:32

## 2018-11-26 RX ADMIN — ROSUVASTATIN CALCIUM 10 MG: 10 TABLET, FILM COATED ORAL at 20:41

## 2018-11-26 RX ADMIN — NYSTATIN 500000 UNITS: 500000 SUSPENSION ORAL at 17:50

## 2018-11-26 RX ADMIN — LIDOCAINE HYDROCHLORIDE 0.2 ML: 10 INJECTION, SOLUTION EPIDURAL; INFILTRATION; INTRACAUDAL; PERINEURAL at 07:20

## 2018-11-26 RX ADMIN — NYSTATIN 500000 UNITS: 500000 SUSPENSION ORAL at 12:32

## 2018-11-26 RX ADMIN — PANTOPRAZOLE SODIUM 40 MG: 40 TABLET, DELAYED RELEASE ORAL at 05:57

## 2018-11-26 RX ADMIN — METHYLPREDNISOLONE SODIUM SUCCINATE 40 MG: 40 INJECTION, POWDER, FOR SOLUTION INTRAMUSCULAR; INTRAVENOUS at 20:41

## 2018-11-26 RX ADMIN — IPRATROPIUM BROMIDE AND ALBUTEROL SULFATE 3 ML: .5; 3 SOLUTION RESPIRATORY (INHALATION) at 04:14

## 2018-11-26 RX ADMIN — NEBIVOLOL HYDROCHLORIDE 10 MG: 5 TABLET ORAL at 20:41

## 2018-11-26 RX ADMIN — TRAMADOL HYDROCHLORIDE 50 MG: 50 TABLET, FILM COATED ORAL at 14:01

## 2018-11-26 RX ADMIN — INSULIN LISPRO 9 UNITS: 100 INJECTION, SOLUTION INTRAVENOUS; SUBCUTANEOUS at 12:32

## 2018-11-26 RX ADMIN — IPRATROPIUM BROMIDE AND ALBUTEROL SULFATE 3 ML: .5; 3 SOLUTION RESPIRATORY (INHALATION) at 21:36

## 2018-11-26 RX ADMIN — METHYLPREDNISOLONE SODIUM SUCCINATE 40 MG: 40 INJECTION, POWDER, FOR SOLUTION INTRAMUSCULAR; INTRAVENOUS at 05:57

## 2018-11-26 RX ADMIN — ALLOPURINOL 100 MG: 100 TABLET ORAL at 12:32

## 2018-11-26 RX ADMIN — INSULIN LISPRO 12 UNITS: 100 INJECTION, SOLUTION INTRAVENOUS; SUBCUTANEOUS at 17:50

## 2018-11-26 RX ADMIN — DOCUSATE SODIUM 100 MG: 100 CAPSULE, LIQUID FILLED ORAL at 20:41

## 2018-11-26 ASSESSMENT — PAIN SCALES - GENERAL
PAINLEVEL_OUTOF10: 0
PAINLEVEL_OUTOF10: 8
PAINLEVEL_OUTOF10: 4
PAINLEVEL_OUTOF10: 0
PAINLEVEL_OUTOF10: 0

## 2018-11-26 NOTE — PROGRESS NOTES
[facility]  Progress Note      Referring Physician:  Minh Carter MD    Reason:      ESRD on HD    HPI:   The patient is a 61 y.o. female with significant past medical history of ESRD on HD MWF, HTN, CHF/CAD, DM2, COPD  is admitted with shortness of breath. She is s/p elective ventral  hernia repair on 11/2/108.  She was also found to be hypoglycemic on arrival.  Last HD as out-pt was on 11/19/18    Subjective  Pt seen and examined on HD  Tolerating well  Sob better  On room air  Denies chest pain      Physical Exam:  Vitals:    11/26/18 0724   BP: (!) 141/92   Pulse: 103   Resp:    Temp: 97.4 °F (36.3 °C)   SpO2:      General appearance - alert, well appearing, and in no distress, obese  Mental status - alert, oriented to person, place, and time  HEENT- PERRL, no palor or icterus  Chest decreased breath sounds at the bases  Heart - regular rhythm, normal S1, S2 +  Abdomen - soft, nontender, nondistended  Neurological - alert, oriented, normal speech, no focal findings    Extremities - no pedal edema, no clubbing or cyanosis  Access - left upper arm AVG, right IJ mediport +      Labs:  CBC:   Lab Results   Component Value Date    WBC 11.9 11/26/2018    RBC 3.07 11/26/2018    HGB 9.7 11/26/2018    HCT 30.4 11/26/2018    MCV 99.1 11/26/2018    MCH 31.6 11/26/2018    MCHC 31.8 11/26/2018    RDW 17.5 11/26/2018     11/26/2018    MPV 7.8 11/26/2018     BMP:    Lab Results   Component Value Date     11/26/2018    K 5.1 11/26/2018    K 5.4 11/25/2018    CL 91 11/26/2018    CO2 25 11/26/2018    BUN 51 11/26/2018    LABALBU 3.4 11/26/2018    CREATININE 9.0 11/26/2018    CALCIUM 9.5 11/26/2018    GFRAA 5 11/26/2018    GFRAA 50 05/12/2013    LABGLOM 4 11/26/2018    GLUCOSE 381 11/26/2018     U/A:    Lab Results   Component Value Date    NITRITE neg 05/06/2015    COLORU Brown 11/25/2018    PHUR 5.5 11/25/2018    LABCAST 1-3 Mixed Cells 07/09/2018    WBCUA see below 11/25/2018    RBCUA see

## 2018-11-26 NOTE — FLOWSHEET NOTE
11/26/18 0724 11/26/18 1132   Vital Signs   BP (!) 141/92 (!) 157/90   Temp 97.4 °F (36.3 °C) 98.3 °F (36.8 °C)   Pulse 103 98     Treatment time: 4 hours    Net UF: 1.4 L    Pre weight: 149.2 kg (standing scale)  Post weight: 147.3 kg (standing scale)  EDW: 148 kg    Access used: CAREY AVG  Access function: High  throughout tx would improve after needle manipulation, then would increase again. Qb 250 last .5 hour of tx. States is going to access center in near future, Dr. Gera Hauser aware. Medications or blood products given: None    Regular outpatient schedule: [facility]    Summary of response to treatment: Tolerated tx without difficulty. VSS. Copy of dialysis treatment record placed in chart, to be scanned into EMR. Report called to Haylie Alvarez RN.

## 2018-11-27 LAB
GLUCOSE BLD-MCNC: 268 MG/DL (ref 70–99)
GLUCOSE BLD-MCNC: 301 MG/DL (ref 70–99)
GLUCOSE BLD-MCNC: 336 MG/DL (ref 70–99)
GLUCOSE BLD-MCNC: 361 MG/DL (ref 70–99)
GLUCOSE BLD-MCNC: 363 MG/DL (ref 70–99)
INR BLD: 2.08 (ref 0.86–1.14)
PERFORMED ON: ABNORMAL
PROTHROMBIN TIME: 23.7 SEC (ref 9.8–13)
URINE CULTURE, ROUTINE: NORMAL

## 2018-11-27 PROCEDURE — 6360000002 HC RX W HCPCS: Performed by: INTERNAL MEDICINE

## 2018-11-27 PROCEDURE — 6370000000 HC RX 637 (ALT 250 FOR IP): Performed by: INTERNAL MEDICINE

## 2018-11-27 PROCEDURE — 2580000003 HC RX 258: Performed by: INTERNAL MEDICINE

## 2018-11-27 PROCEDURE — 94760 N-INVAS EAR/PLS OXIMETRY 1: CPT

## 2018-11-27 PROCEDURE — 85610 PROTHROMBIN TIME: CPT

## 2018-11-27 PROCEDURE — 6370000000 HC RX 637 (ALT 250 FOR IP): Performed by: NURSE PRACTITIONER

## 2018-11-27 PROCEDURE — 94664 DEMO&/EVAL PT USE INHALER: CPT

## 2018-11-27 PROCEDURE — 94667 MNPJ CHEST WALL 1ST: CPT

## 2018-11-27 PROCEDURE — 2060000000 HC ICU INTERMEDIATE R&B

## 2018-11-27 PROCEDURE — 2700000000 HC OXYGEN THERAPY PER DAY

## 2018-11-27 PROCEDURE — 94640 AIRWAY INHALATION TREATMENT: CPT

## 2018-11-27 RX ORDER — METHYLPREDNISOLONE SODIUM SUCCINATE 40 MG/ML
40 INJECTION, POWDER, LYOPHILIZED, FOR SOLUTION INTRAMUSCULAR; INTRAVENOUS EVERY 12 HOURS
Status: DISCONTINUED | OUTPATIENT
Start: 2018-11-27 | End: 2018-11-29 | Stop reason: HOSPADM

## 2018-11-27 RX ADMIN — IPRATROPIUM BROMIDE AND ALBUTEROL SULFATE 3 ML: .5; 3 SOLUTION RESPIRATORY (INHALATION) at 15:22

## 2018-11-27 RX ADMIN — IPRATROPIUM BROMIDE AND ALBUTEROL SULFATE 3 ML: .5; 3 SOLUTION RESPIRATORY (INHALATION) at 11:28

## 2018-11-27 RX ADMIN — METHYLPREDNISOLONE SODIUM SUCCINATE 40 MG: 40 INJECTION, POWDER, FOR SOLUTION INTRAMUSCULAR; INTRAVENOUS at 17:35

## 2018-11-27 RX ADMIN — METHYLPREDNISOLONE SODIUM SUCCINATE 40 MG: 40 INJECTION, POWDER, FOR SOLUTION INTRAMUSCULAR; INTRAVENOUS at 05:54

## 2018-11-27 RX ADMIN — PANTOPRAZOLE SODIUM 40 MG: 40 TABLET, DELAYED RELEASE ORAL at 05:54

## 2018-11-27 RX ADMIN — IPRATROPIUM BROMIDE AND ALBUTEROL SULFATE 3 ML: .5; 3 SOLUTION RESPIRATORY (INHALATION) at 07:06

## 2018-11-27 RX ADMIN — INSULIN LISPRO 15 UNITS: 100 INJECTION, SOLUTION INTRAVENOUS; SUBCUTANEOUS at 12:00

## 2018-11-27 RX ADMIN — WARFARIN SODIUM 6 MG: 5 TABLET ORAL at 17:35

## 2018-11-27 RX ADMIN — NEBIVOLOL HYDROCHLORIDE 10 MG: 5 TABLET ORAL at 09:45

## 2018-11-27 RX ADMIN — INSULIN LISPRO 12 UNITS: 100 INJECTION, SOLUTION INTRAVENOUS; SUBCUTANEOUS at 17:35

## 2018-11-27 RX ADMIN — NYSTATIN 500000 UNITS: 500000 SUSPENSION ORAL at 09:45

## 2018-11-27 RX ADMIN — IPRATROPIUM BROMIDE AND ALBUTEROL SULFATE 3 ML: .5; 3 SOLUTION RESPIRATORY (INHALATION) at 03:46

## 2018-11-27 RX ADMIN — TRAMADOL HYDROCHLORIDE 50 MG: 50 TABLET, FILM COATED ORAL at 21:26

## 2018-11-27 RX ADMIN — TRAMADOL HYDROCHLORIDE 50 MG: 50 TABLET, FILM COATED ORAL at 11:20

## 2018-11-27 RX ADMIN — DOCUSATE SODIUM 100 MG: 100 CAPSULE, LIQUID FILLED ORAL at 21:26

## 2018-11-27 RX ADMIN — IPRATROPIUM BROMIDE AND ALBUTEROL SULFATE 3 ML: .5; 3 SOLUTION RESPIRATORY (INHALATION) at 00:08

## 2018-11-27 RX ADMIN — ROSUVASTATIN CALCIUM 10 MG: 10 TABLET, FILM COATED ORAL at 21:26

## 2018-11-27 RX ADMIN — DOCUSATE SODIUM 100 MG: 100 CAPSULE, LIQUID FILLED ORAL at 09:45

## 2018-11-27 RX ADMIN — TRAMADOL HYDROCHLORIDE 50 MG: 50 TABLET, FILM COATED ORAL at 17:35

## 2018-11-27 RX ADMIN — Medication 2 PUFF: at 07:07

## 2018-11-27 RX ADMIN — NYSTATIN 500000 UNITS: 500000 SUSPENSION ORAL at 17:35

## 2018-11-27 RX ADMIN — Medication 10 ML: at 09:45

## 2018-11-27 RX ADMIN — NEBIVOLOL HYDROCHLORIDE 10 MG: 5 TABLET ORAL at 21:31

## 2018-11-27 RX ADMIN — ALLOPURINOL 100 MG: 100 TABLET ORAL at 09:45

## 2018-11-27 RX ADMIN — NYSTATIN 500000 UNITS: 500000 SUSPENSION ORAL at 13:05

## 2018-11-27 RX ADMIN — INSULIN LISPRO 5 UNITS: 100 INJECTION, SOLUTION INTRAVENOUS; SUBCUTANEOUS at 17:35

## 2018-11-27 RX ADMIN — GABAPENTIN 300 MG: 300 CAPSULE ORAL at 21:26

## 2018-11-27 RX ADMIN — NYSTATIN 500000 UNITS: 500000 SUSPENSION ORAL at 21:26

## 2018-11-27 RX ADMIN — Medication 10 ML: at 21:27

## 2018-11-27 RX ADMIN — INSULIN LISPRO 7 UNITS: 100 INJECTION, SOLUTION INTRAVENOUS; SUBCUTANEOUS at 21:31

## 2018-11-27 RX ADMIN — INSULIN LISPRO 9 UNITS: 100 INJECTION, SOLUTION INTRAVENOUS; SUBCUTANEOUS at 09:45

## 2018-11-27 RX ADMIN — INSULIN LISPRO 5 UNITS: 100 INJECTION, SOLUTION INTRAVENOUS; SUBCUTANEOUS at 12:00

## 2018-11-27 RX ADMIN — TRAZODONE HYDROCHLORIDE 100 MG: 50 TABLET ORAL at 21:26

## 2018-11-27 ASSESSMENT — PAIN DESCRIPTION - LOCATION
LOCATION: ABDOMEN

## 2018-11-27 ASSESSMENT — PAIN DESCRIPTION - PAIN TYPE
TYPE: ACUTE PAIN

## 2018-11-27 ASSESSMENT — PAIN DESCRIPTION - DESCRIPTORS: DESCRIPTORS: ACHING

## 2018-11-27 ASSESSMENT — PAIN SCALES - GENERAL
PAINLEVEL_OUTOF10: 0
PAINLEVEL_OUTOF10: 7
PAINLEVEL_OUTOF10: 5
PAINLEVEL_OUTOF10: 0
PAINLEVEL_OUTOF10: 4
PAINLEVEL_OUTOF10: 8

## 2018-11-27 NOTE — PROGRESS NOTES
Occupational Therapy    Patient moving I in room, declines need for OT politely. Will discharge at this time.  Thank you,  Kirk Ordonez OTR/L [id number]

## 2018-11-27 NOTE — PROGRESS NOTES
Hospitalist Progress Note      PCP: Jose Khan, APRN - CNP    Date of Admission: 11/24/2018    Chief Complaint: dyspnea      Subjective:   Dyspnea is improving. Intermittent productive cough. BS still uncontrolled. Medications:  Reviewed    Infusion Medications    dextrose       Scheduled Medications    insulin glargine  25 Units Subcutaneous BID    insulin lispro  5 Units Subcutaneous TID WC    methylPREDNISolone  40 mg Intravenous Q12H    warfarin  6 mg Oral Daily    insulin lispro  0-18 Units Subcutaneous TID WC    insulin lispro  0-9 Units Subcutaneous Nightly    allopurinol  100 mg Oral Daily    docusate sodium  100 mg Oral BID    mometasone-formoterol  2 puff Inhalation BID    gabapentin  300 mg Oral Nightly    rosuvastatin  10 mg Oral Nightly    pantoprazole  40 mg Oral QAM AC    nebivolol  10 mg Oral BID    sodium chloride flush  10 mL Intravenous 2 times per day    ipratropium-albuterol  1 vial Inhalation Q4H    nystatin  5 mL Oral 4x Daily     PRN Meds: cyclobenzaprine, albuterol sulfate HFA, traZODone, traMADol, promethazine, ondansetron, nitroGLYCERIN, sodium chloride flush, ondansetron, glucose, dextrose, glucagon (rDNA), dextrose, lidocaine PF      Intake/Output Summary (Last 24 hours) at 11/27/18 1410  Last data filed at 11/26/18 1758   Gross per 24 hour   Intake              360 ml   Output                0 ml   Net              360 ml       Exam:    /84   Pulse 108   Temp 97.9 °F (36.6 °C) (Oral)   Resp 16   Ht 5' 3\" (1.6 m)   Wt (!) 328 lb 3.2 oz (148.9 kg)   LMP 11/01/1996 (Exact Date)   SpO2 98%   BMI 58.14 kg/m²     Gen/overall appearance: Not in acute distress. Alert. Obese  Head: Normocephalic, atraumatic  Eyes: EOMI, no scleral icterus  CVS: regular rate and rhythm, Normal S1S2  Pulm: Diminished, Clear to auscultation bilaterally. No crackles/wheezes  Gastrointestinal: Soft, nontender, nondistended, no guarding or rebound  Extremities: No edema.

## 2018-11-28 LAB
ANION GAP SERPL CALCULATED.3IONS-SCNC: 18 MMOL/L (ref 3–16)
BUN BLDV-MCNC: 66 MG/DL (ref 7–20)
CALCIUM SERPL-MCNC: 9.2 MG/DL (ref 8.3–10.6)
CHLORIDE BLD-SCNC: 93 MMOL/L (ref 99–110)
CO2: 23 MMOL/L (ref 21–32)
CREAT SERPL-MCNC: 9.7 MG/DL (ref 0.6–1.2)
GFR AFRICAN AMERICAN: 5
GFR NON-AFRICAN AMERICAN: 4
GLUCOSE BLD-MCNC: 235 MG/DL (ref 70–99)
GLUCOSE BLD-MCNC: 246 MG/DL (ref 70–99)
GLUCOSE BLD-MCNC: 275 MG/DL (ref 70–99)
GLUCOSE BLD-MCNC: 291 MG/DL (ref 70–99)
HCT VFR BLD CALC: 28.3 % (ref 36–48)
HEMOGLOBIN: 9.1 G/DL (ref 12–16)
INR BLD: 2.81 (ref 0.86–1.14)
MCH RBC QN AUTO: 31.9 PG (ref 26–34)
MCHC RBC AUTO-ENTMCNC: 32.3 G/DL (ref 31–36)
MCV RBC AUTO: 98.8 FL (ref 80–100)
PDW BLD-RTO: 17.6 % (ref 12.4–15.4)
PERFORMED ON: ABNORMAL
PLATELET # BLD: 218 K/UL (ref 135–450)
PMV BLD AUTO: 8.4 FL (ref 5–10.5)
POTASSIUM SERPL-SCNC: 5 MMOL/L (ref 3.5–5.1)
PROTHROMBIN TIME: 32 SEC (ref 9.8–13)
RBC # BLD: 2.86 M/UL (ref 4–5.2)
SODIUM BLD-SCNC: 134 MMOL/L (ref 136–145)
WBC # BLD: 10.4 K/UL (ref 4–11)

## 2018-11-28 PROCEDURE — 6370000000 HC RX 637 (ALT 250 FOR IP): Performed by: INTERNAL MEDICINE

## 2018-11-28 PROCEDURE — 6370000000 HC RX 637 (ALT 250 FOR IP): Performed by: NURSE PRACTITIONER

## 2018-11-28 PROCEDURE — 90937 HEMODIALYSIS REPEATED EVAL: CPT

## 2018-11-28 PROCEDURE — 6360000002 HC RX W HCPCS: Performed by: INTERNAL MEDICINE

## 2018-11-28 PROCEDURE — 80048 BASIC METABOLIC PNL TOTAL CA: CPT

## 2018-11-28 PROCEDURE — 85610 PROTHROMBIN TIME: CPT

## 2018-11-28 PROCEDURE — 2060000000 HC ICU INTERMEDIATE R&B

## 2018-11-28 PROCEDURE — 94640 AIRWAY INHALATION TREATMENT: CPT

## 2018-11-28 PROCEDURE — 85027 COMPLETE CBC AUTOMATED: CPT

## 2018-11-28 PROCEDURE — 94668 MNPJ CHEST WALL SBSQ: CPT

## 2018-11-28 PROCEDURE — 94760 N-INVAS EAR/PLS OXIMETRY 1: CPT

## 2018-11-28 PROCEDURE — 2580000003 HC RX 258: Performed by: INTERNAL MEDICINE

## 2018-11-28 RX ORDER — SODIUM CHLORIDE 9 MG/ML
INJECTION, SOLUTION INTRAVENOUS
Status: DISPENSED
Start: 2018-11-28 | End: 2018-11-28

## 2018-11-28 RX ORDER — IPRATROPIUM BROMIDE AND ALBUTEROL SULFATE 2.5; .5 MG/3ML; MG/3ML
1 SOLUTION RESPIRATORY (INHALATION)
Status: DISCONTINUED | OUTPATIENT
Start: 2018-11-28 | End: 2018-11-29 | Stop reason: HOSPADM

## 2018-11-28 RX ORDER — IPRATROPIUM BROMIDE AND ALBUTEROL SULFATE 2.5; .5 MG/3ML; MG/3ML
1 SOLUTION RESPIRATORY (INHALATION) EVERY 4 HOURS PRN
Status: DISCONTINUED | OUTPATIENT
Start: 2018-11-28 | End: 2018-11-29 | Stop reason: HOSPADM

## 2018-11-28 RX ADMIN — Medication 10 ML: at 17:50

## 2018-11-28 RX ADMIN — INSULIN LISPRO 3 UNITS: 100 INJECTION, SOLUTION INTRAVENOUS; SUBCUTANEOUS at 21:10

## 2018-11-28 RX ADMIN — INSULIN LISPRO 9 UNITS: 100 INJECTION, SOLUTION INTRAVENOUS; SUBCUTANEOUS at 17:50

## 2018-11-28 RX ADMIN — Medication 10 ML: at 06:09

## 2018-11-28 RX ADMIN — DOCUSATE SODIUM 100 MG: 100 CAPSULE, LIQUID FILLED ORAL at 21:10

## 2018-11-28 RX ADMIN — ALLOPURINOL 100 MG: 100 TABLET ORAL at 15:00

## 2018-11-28 RX ADMIN — NYSTATIN 500000 UNITS: 500000 SUSPENSION ORAL at 17:50

## 2018-11-28 RX ADMIN — DOCUSATE SODIUM 100 MG: 100 CAPSULE, LIQUID FILLED ORAL at 14:59

## 2018-11-28 RX ADMIN — NEBIVOLOL HYDROCHLORIDE 10 MG: 5 TABLET ORAL at 15:00

## 2018-11-28 RX ADMIN — Medication 10 ML: at 21:10

## 2018-11-28 RX ADMIN — METHYLPREDNISOLONE SODIUM SUCCINATE 40 MG: 40 INJECTION, POWDER, FOR SOLUTION INTRAMUSCULAR; INTRAVENOUS at 17:50

## 2018-11-28 RX ADMIN — NEBIVOLOL HYDROCHLORIDE 10 MG: 5 TABLET ORAL at 21:10

## 2018-11-28 RX ADMIN — IPRATROPIUM BROMIDE AND ALBUTEROL SULFATE 3 ML: .5; 3 SOLUTION RESPIRATORY (INHALATION) at 19:36

## 2018-11-28 RX ADMIN — IPRATROPIUM BROMIDE AND ALBUTEROL SULFATE 3 ML: .5; 3 SOLUTION RESPIRATORY (INHALATION) at 00:00

## 2018-11-28 RX ADMIN — GABAPENTIN 300 MG: 300 CAPSULE ORAL at 21:10

## 2018-11-28 RX ADMIN — IPRATROPIUM BROMIDE AND ALBUTEROL SULFATE 3 ML: .5; 3 SOLUTION RESPIRATORY (INHALATION) at 16:23

## 2018-11-28 RX ADMIN — METHYLPREDNISOLONE SODIUM SUCCINATE 40 MG: 40 INJECTION, POWDER, FOR SOLUTION INTRAMUSCULAR; INTRAVENOUS at 06:08

## 2018-11-28 RX ADMIN — INSULIN LISPRO 6 UNITS: 100 INJECTION, SOLUTION INTRAVENOUS; SUBCUTANEOUS at 15:02

## 2018-11-28 RX ADMIN — Medication 10 ML: at 15:01

## 2018-11-28 RX ADMIN — PANTOPRAZOLE SODIUM 40 MG: 40 TABLET, DELAYED RELEASE ORAL at 06:08

## 2018-11-28 RX ADMIN — TRAMADOL HYDROCHLORIDE 50 MG: 50 TABLET, FILM COATED ORAL at 09:26

## 2018-11-28 RX ADMIN — WARFARIN SODIUM 3 MG: 2 TABLET ORAL at 17:50

## 2018-11-28 RX ADMIN — NYSTATIN 500000 UNITS: 500000 SUSPENSION ORAL at 15:00

## 2018-11-28 RX ADMIN — Medication 2 PUFF: at 19:40

## 2018-11-28 RX ADMIN — ROSUVASTATIN CALCIUM 10 MG: 10 TABLET, FILM COATED ORAL at 21:10

## 2018-11-28 RX ADMIN — NYSTATIN 500000 UNITS: 500000 SUSPENSION ORAL at 21:10

## 2018-11-28 RX ADMIN — TRAMADOL HYDROCHLORIDE 50 MG: 50 TABLET, FILM COATED ORAL at 17:50

## 2018-11-28 ASSESSMENT — PAIN DESCRIPTION - PAIN TYPE
TYPE: ACUTE PAIN
TYPE: ACUTE PAIN

## 2018-11-28 ASSESSMENT — PAIN SCALES - GENERAL
PAINLEVEL_OUTOF10: 0
PAINLEVEL_OUTOF10: 0
PAINLEVEL_OUTOF10: 7
PAINLEVEL_OUTOF10: 8
PAINLEVEL_OUTOF10: 7
PAINLEVEL_OUTOF10: 0

## 2018-11-28 ASSESSMENT — PAIN DESCRIPTION - LOCATION
LOCATION: ABDOMEN
LOCATION: ABDOMEN

## 2018-11-28 ASSESSMENT — PAIN DESCRIPTION - ORIENTATION: ORIENTATION: LEFT

## 2018-11-28 ASSESSMENT — PAIN DESCRIPTION - DESCRIPTORS: DESCRIPTORS: ACHING

## 2018-11-28 NOTE — PROGRESS NOTES
RESPIRATORY THERAPY ASSESSMENT    Name:  [address] Record Number:  [id number]  Age: 61 y.o. Gender: female  : 1955  Today's Date:  2018  Room:  [facility]3379/3379-01    Assessment   Patient Admission Diagnosis      Allergies  Allergies   Allergen Reactions    Codeine Nausea Only    Fentanyl Itching    Morphine      CHEST PAIN    Nsaids Other (See Comments)     Due to CRF    Hydrocodone-Acetaminophen Itching and Rash    Percocet [Oxycodone-Acetaminophen] Itching    Procardia [Nifedipine] Nausea And Vomiting     Headache  Takes norvasc at home 12/22/15    Vicodin [Hydrocodone-Acetaminophen] Rash       Minimum Predicted Vital Capacity:     CRISTINA          Actual Vital Capacity:      CRISTINA          Pulmonary History: COPD, Asthma and CHF/Pulmonary Edema   Home Oxygen Therapy:  2 liters/min via nasal cannula  Home Respiratory Therapy: Albuterol, Atrovent and Duoneb  Current Respiratory Therapy:  HHN Duoneb q4h  Treatment [facility]: [facility]  Medications: Albuterol/Ipratropium    Respiratory Severity Index(RSI)   Patients with orders for inhalation medications, oxygen, or any therapeutic treatment modality will be placed on Respiratory Protocol. They will be assessed with the first treatment and at least every 72 hours thereafter. The following severity scale will be used to determine frequency of treatment intervention.     Smoking History: Pulmonary Disease or Smoking History, Greater than 15 pack year = 2    Social History  Social History   Substance Use Topics    Smoking status: Never Smoker    Smokeless tobacco: Never Used    Alcohol use No       Recent Surgical History: None = 0  Past Surgical History  Past Surgical History:   Procedure Laterality Date    CARDIAC CATHETERIZATION      Grace; clean cors    COLONOSCOPY      polyp removed; Jeb Ramey; repeat 5 years    COLONOSCOPY  13,     Carmen    DOPPLER ECHOCARDIOGRAPHY  09    LVH with global hypokinesis; EF 55-60%    Amy Miller RCP on 11/28/2018 at 5:38 PM    Respiratory Protocol Guidelines     1. Assessment and treatment by Respiratory Therapy will be initiated for medication and therapeutic interventions upon initiation of aerosolized medication. 2. Physician will be contacted for respiratory rate (RR) greater than 35 breaths per minute. Therapy will be held for heart rate (HR) greater than 140 beats per minute, pending direction from physician. 3. Bronchodilators will be administered via Metered Dose Inhaler (MDI) with spacer when the following criteria are met:  a. Alert and cooperative     b. HR < 140 bpm  c. RR < 30 bpm                d. Can demonstrate a 2-3 second inspiratory hold  4. Bronchodilators will be administered via Hand Held Nebulizer [facility]) to patients when ANY of the following criteria are met  a. Incognizant or uncooperative          b. Patients treated with HHN at Home        c. Unable to demonstrate proper MDI or HFA technique     d. RR > 30 bpm   5. Bronchodilators will be delivered via Metered Dose Inhaler (MDI) or Hydrofluoroalkane (HFA) with spacer to intubated patients on mechanical ventilation. 6. Inhalation medication orders will be delivered and/or substituted as outlined below. Aerosolized Medications Ordering and Administration Guidelines:    1. All Medications will be ordered by a physician, and their frequency and/or modality will be adjusted as defined by the patients Respiratory Severity Index (RSI) score. 2. If the patient does not have documented COPD, consider discontinuing anticholinergics when RSI is less than 9.  3. If the bronchospasm worsens (increased RSI), then the bronchodilator frequency can be increased to a maximum of every 4 hours. If greater than every 4 hours is required, the physician will be contacted.   4. If the bronchospasm improves, the frequency of the bronchodilator can be decreased, based on the patient's RSI, but not less than home treatment regimen

## 2018-11-29 VITALS
SYSTOLIC BLOOD PRESSURE: 138 MMHG | DIASTOLIC BLOOD PRESSURE: 86 MMHG | WEIGHT: 293 LBS | HEART RATE: 94 BPM | RESPIRATION RATE: 16 BRPM | BODY MASS INDEX: 51.91 KG/M2 | OXYGEN SATURATION: 97 % | TEMPERATURE: 98.1 F | HEIGHT: 63 IN

## 2018-11-29 LAB
GLUCOSE BLD-MCNC: 192 MG/DL (ref 70–99)
GLUCOSE BLD-MCNC: 194 MG/DL (ref 70–99)
GLUCOSE BLD-MCNC: 264 MG/DL (ref 70–99)
INR BLD: 2.35 (ref 0.86–1.14)
PERFORMED ON: ABNORMAL
PROTHROMBIN TIME: 26.8 SEC (ref 9.8–13)

## 2018-11-29 PROCEDURE — 2580000003 HC RX 258: Performed by: INTERNAL MEDICINE

## 2018-11-29 PROCEDURE — 6370000000 HC RX 637 (ALT 250 FOR IP): Performed by: INTERNAL MEDICINE

## 2018-11-29 PROCEDURE — 85610 PROTHROMBIN TIME: CPT

## 2018-11-29 PROCEDURE — 94668 MNPJ CHEST WALL SBSQ: CPT

## 2018-11-29 PROCEDURE — 94640 AIRWAY INHALATION TREATMENT: CPT

## 2018-11-29 PROCEDURE — 6370000000 HC RX 637 (ALT 250 FOR IP): Performed by: NURSE PRACTITIONER

## 2018-11-29 PROCEDURE — 6360000002 HC RX W HCPCS: Performed by: INTERNAL MEDICINE

## 2018-11-29 PROCEDURE — 94760 N-INVAS EAR/PLS OXIMETRY 1: CPT

## 2018-11-29 RX ORDER — PREDNISONE 20 MG/1
40 TABLET ORAL DAILY
Qty: 10 TABLET | Refills: 0 | Status: SHIPPED | OUTPATIENT
Start: 2018-11-29 | End: 2018-12-04

## 2018-11-29 RX ADMIN — NEBIVOLOL HYDROCHLORIDE 10 MG: 5 TABLET ORAL at 08:38

## 2018-11-29 RX ADMIN — PANTOPRAZOLE SODIUM 40 MG: 40 TABLET, DELAYED RELEASE ORAL at 05:40

## 2018-11-29 RX ADMIN — METHYLPREDNISOLONE SODIUM SUCCINATE 40 MG: 40 INJECTION, POWDER, FOR SOLUTION INTRAMUSCULAR; INTRAVENOUS at 05:41

## 2018-11-29 RX ADMIN — Medication 10 ML: at 05:40

## 2018-11-29 RX ADMIN — TRAMADOL HYDROCHLORIDE 50 MG: 50 TABLET, FILM COATED ORAL at 10:54

## 2018-11-29 RX ADMIN — IPRATROPIUM BROMIDE AND ALBUTEROL SULFATE 3 ML: .5; 3 SOLUTION RESPIRATORY (INHALATION) at 11:18

## 2018-11-29 RX ADMIN — DOCUSATE SODIUM 100 MG: 100 CAPSULE, LIQUID FILLED ORAL at 08:38

## 2018-11-29 RX ADMIN — Medication 10 ML: at 08:38

## 2018-11-29 RX ADMIN — INSULIN LISPRO 3 UNITS: 100 INJECTION, SOLUTION INTRAVENOUS; SUBCUTANEOUS at 12:52

## 2018-11-29 RX ADMIN — IPRATROPIUM BROMIDE AND ALBUTEROL SULFATE 3 ML: .5; 3 SOLUTION RESPIRATORY (INHALATION) at 08:43

## 2018-11-29 RX ADMIN — NYSTATIN 500000 UNITS: 500000 SUSPENSION ORAL at 08:38

## 2018-11-29 RX ADMIN — ALLOPURINOL 100 MG: 100 TABLET ORAL at 08:38

## 2018-11-29 RX ADMIN — INSULIN LISPRO 3 UNITS: 100 INJECTION, SOLUTION INTRAVENOUS; SUBCUTANEOUS at 08:33

## 2018-11-29 RX ADMIN — Medication 2 PUFF: at 08:44

## 2018-11-29 RX ADMIN — TRAMADOL HYDROCHLORIDE 50 MG: 50 TABLET, FILM COATED ORAL at 03:08

## 2018-11-29 ASSESSMENT — PAIN DESCRIPTION - FREQUENCY: FREQUENCY: CONTINUOUS

## 2018-11-29 ASSESSMENT — PAIN SCALES - GENERAL
PAINLEVEL_OUTOF10: 2
PAINLEVEL_OUTOF10: 5
PAINLEVEL_OUTOF10: 8
PAINLEVEL_OUTOF10: 7

## 2018-11-29 ASSESSMENT — PAIN DESCRIPTION - PAIN TYPE: TYPE: ACUTE PAIN

## 2018-11-29 ASSESSMENT — PAIN DESCRIPTION - LOCATION: LOCATION: ABDOMEN

## 2018-11-29 ASSESSMENT — PAIN DESCRIPTION - ORIENTATION: ORIENTATION: LOWER

## 2018-11-29 ASSESSMENT — PAIN DESCRIPTION - ONSET: ONSET: ON-GOING

## 2018-11-29 ASSESSMENT — PAIN DESCRIPTION - DESCRIPTORS: DESCRIPTORS: ACHING

## 2018-11-29 ASSESSMENT — PAIN DESCRIPTION - PROGRESSION: CLINICAL_PROGRESSION: NOT CHANGED

## 2018-11-29 NOTE — DISCHARGE SUMMARY
Hospital Medicine Discharge Summary    Patient ID: Neelam Velasco      Patient's PCP: Brandie Rondon, APRN - CNP    Admit Date: 11/24/2018     Discharge Date: 11/29/2018    Admitting Physician: Benson Spencer MD     Discharge Physician: Gilda Giles MD     Discharge Diagnoses and Hospital Course: Active Hospital Problems    Diagnosis Date Noted    Hypoglycemia [E16.2] 11/24/2018     Acute COPD exacerbation. CT chest non acute. Flu neg. Pt with clinical improvement.    - Weaned from IV CS to po prednisone upon discharge  - Med nebs  - Respiratory care  - O2 per protocol    Pulmonary insufficiency 2/2 above. Resolved.     Hypoglycemia likely sec to poor intake upon admission. Now with hyperglycemia likely 2/2 IV steroids. - lantus with mild titration up upon discharge  - controlled with sliding scale  - weaned CS     ESRD: HD per nephro. Appears euvolemic     Ho PE: inr subtherapeutic upon admission.   - coumadin dosing per pharmacy     Recent ventral hernia repair         PMH of htn, GERD, hld    The patient was seen and examined on day of discharge and this discharge summary is in conjunction with any daily progress note from day of discharge. Exam:     /86   Pulse 94   Temp 98.1 °F (36.7 °C) (Oral)   Resp 16   Ht 5' 3\" (1.6 m)   Wt (!) 332 lb (150.6 kg)   LMP 11/01/1996 (Exact Date)   SpO2 97%   BMI 58.81 kg/m²     Gen/overall appearance: Not in acute distress. Alert. Obese  Head: Normocephalic, atraumatic  Eyes: EOMI, no scleral icterus  CVS: regular rate and rhythm, Normal S1S2  Pulm: Diminished, Clear to auscultation bilaterally. No crackles/wheezes  Gastrointestinal: Soft, nontender, nondistended, no guarding or rebound  Extremities: No edema.  No erythema or warmth  Neuro: No gross focal deficits noted  Skin: Warm, dry    Consults:     IP CONSULT TO HOSPITALIST  IP CONSULT TO SOCIAL WORK  IP CONSULT TO NEPHROLOGY  IP CONSULT TO PHARMACY    Disposition:  home needed for Wheezing, Disp-1 Inhaler, R-11Normal      fluticasone-salmeterol (ADVAIR HFA) 230-21 MCG/ACT inhaler Inhale 1 puff into the lungs 2 times daily, Disp-1 Inhaler, R-11Normal      ipratropium-albuterol (DUONEB) 0.5-2.5 (3) MG/3ML SOLN nebulizer solution Inhale 3 mLs into the lungs every 4 hours DX:COPD J44.9, Disp-360 mL, R-11Normal      insulin lispro (HUMALOG KWIKPEN) 100 UNIT/ML pen Inject 0-12 Units into the skin 3 times daily (before meals), Disp-5 pen, R-5Normal      traMADol (ULTRAM) 50 MG tablet Take 50 mg by mouth every 6 hours as needed for Pain. [facility]      TRULICITY 4.86 GI/3.2SW SOPN INJECT ONE  SYRINGE SUBCUTANEOUSLY ONCE A WEEK, Disp-15 pen, R-3Please consider 90 day supplies to promote better adherenceNormal      nebivolol (BYSTOLIC) 10 MG tablet Take 1 tablet by mouth 2 times daily, Disp-180 tablet, R-3Normal      omeprazole (PRILOSEC) 40 MG delayed release capsule TAKE 1 CAPSULE BY MOUTH  DAILY, Disp-90 capsule, R-3Normal      lidocaine-prilocaine (EMLA) 2.5-2.5 % cream Apply topically as needed. , Disp-1 Tube, R-5, Normal      traZODone (DESYREL) 100 MG tablet Take 1 tablet by mouth nightly TAKES PRN, Disp-30 tablet, R-5Normal      RELION PEN NEEDLE 31G/8MM 31G X 8 MM MISC Disp-100 each, R-5, Normal      nitroGLYCERIN (NITROSTAT) 0.4 MG SL tablet DISSOLVE 1 TABLET UNDER THE TONGUE EVERY 5 MINUTES AS  NEEDED ; MAXIMUM OF 3  TABLETS IN 15 MINUTES, Disp-75 tablet, R-1Normal      warfarin (COUMADIN) 7.5 MG tablet Continue f/up with coumadin clinic, Disp-30 tablet, R-3Normal      rosuvastatin (CRESTOR) 10 MG tablet Take 1 tablet by mouth  daily, Disp-90 tablet, R-2Normal      ONE TOUCH ULTRA TEST strip USE TO TEST 3-4 TIMES DAILY DUE TO FLUCTUATING SUGAR, Disp-100 strip, R-5Please consider 90 day supplies to promote better adherenceNormal      promethazine (PHENERGAN) 25 MG tablet Take 1 tablet by mouth every 8 hours as needed for Nausea, Disp-30 tablet, R-2Normal      ASSURE COMFORT

## 2018-11-30 ENCOUNTER — TELEPHONE (OUTPATIENT)
Dept: FAMILY MEDICINE CLINIC | Age: 63
End: 2018-11-30

## 2018-11-30 ENCOUNTER — CARE COORDINATION (OUTPATIENT)
Dept: CASE MANAGEMENT | Age: 63
End: 2018-11-30

## 2018-11-30 DIAGNOSIS — E16.2 HYPOGLYCEMIA: Primary | ICD-10-CM

## 2018-11-30 LAB
EKG ATRIAL RATE: 100 BPM
EKG ATRIAL RATE: 103 BPM
EKG DIAGNOSIS: NORMAL
EKG DIAGNOSIS: NORMAL
EKG P AXIS: 27 DEGREES
EKG P AXIS: 46 DEGREES
EKG P-R INTERVAL: 142 MS
EKG P-R INTERVAL: 178 MS
EKG Q-T INTERVAL: 344 MS
EKG Q-T INTERVAL: 350 MS
EKG QRS DURATION: 76 MS
EKG QRS DURATION: 86 MS
EKG QTC CALCULATION (BAZETT): 450 MS
EKG QTC CALCULATION (BAZETT): 451 MS
EKG R AXIS: -22 DEGREES
EKG R AXIS: -23 DEGREES
EKG T AXIS: 62 DEGREES
EKG T AXIS: 88 DEGREES
EKG VENTRICULAR RATE: 100 BPM
EKG VENTRICULAR RATE: 103 BPM

## 2018-11-30 PROCEDURE — 1111F DSCHRG MED/CURRENT MED MERGE: CPT | Performed by: NURSE PRACTITIONER

## 2018-11-30 NOTE — CARE COORDINATION
[facility]    Call within 2 business days of discharge: Yes    Patient:  Cheryle Brooke Patient : 1955   MRN: [id number]  Reason for Admission: SOB;hypoglycemia  Discharge Date: 18 RARS: Readmission Risk Score: 66     Initial 24 hr call attempted, contact info left on vm    Follow Up  Future Appointments  Date Time Provider Aziza Barraza   2018 2:45 PM MD PHIL Rodriguez & CC [facility]   2018 10:00 AM JURGEN Hernandez CNP FF Cardio [facility]   2019 2:00 PM JURGEN Rodriguez CNP SDALE FP [facility]   2019 2:40 PM JURGEN Curz CNP FF SLEEP MED [facility]   3/26/2019 2:15 PM MD PHIL Rodriguez & CC LINDA Thomson RN

## 2018-12-02 ENCOUNTER — CARE COORDINATION (OUTPATIENT)
Dept: CASE MANAGEMENT | Age: 63
End: 2018-12-02

## 2018-12-03 ENCOUNTER — TELEPHONE (OUTPATIENT)
Dept: FAMILY MEDICINE CLINIC | Age: 63
End: 2018-12-03

## 2018-12-06 ENCOUNTER — CARE COORDINATION (OUTPATIENT)
Dept: CASE MANAGEMENT | Age: 63
End: 2018-12-06

## 2018-12-06 ENCOUNTER — TELEPHONE (OUTPATIENT)
Dept: FAMILY MEDICINE CLINIC | Age: 63
End: 2018-12-06

## 2018-12-07 ENCOUNTER — TELEPHONE (OUTPATIENT)
Dept: GENERAL RADIOLOGY | Age: 63
End: 2018-12-07

## 2018-12-10 RX ORDER — OXICONAZOLE NITRATE 10 MG/G
CREAM TOPICAL
Qty: 60 G | Refills: 2 | Status: SHIPPED | OUTPATIENT
Start: 2018-12-10 | End: 2019-01-29 | Stop reason: ALTCHOICE

## 2018-12-10 NOTE — CARE COORDINATION
[facility] Transitions Follow Up Call    12/10/2018    Patient: Jose Antonio Candelaria  Patient : 1955   MRN: [id number]  Reason for Admission: There are no discharge diagnoses documented for the most recent discharge. Discharge Date: 18 RARS: Readmission Risk Score: 77       Spoke with: [address] Transitions Subsequent and Final Call    Subsequent and Final Calls  Do you have any ongoing symptoms?:  No  Have your medications changed?:  No  Do you have any questions related to your medications?:  No  Do you currently have any active services?:  Yes  Are you currently active with any services?:  Home Health  Do you have any needs or concerns that I can assist you with?:  No  Identified Barriers:  None  Care Transitions Interventions  No Identified Needs  Other Interventions:          Pt states finally feeling like herself, no issues or concerns. Denies SOB, CP. Bs have been stable. F/u appts listed below. No need for further f/u CTC calls, will notify the [address].       Follow Up  Future Appointments  Date Time Provider Aziza Barraza   2018 9:30 AM JURGEN Hill CNP [facility]   2018 10:00 AM JURGEN Law CNP Cardio [facility]   2019 2:00 PM JURGEN Hill CNP [facility]   2019 2:40 PM JURGEN Mcclain CNP SLEEP MED [facility]   3/26/2019 2:15 PM Thelma Sands MD PULM & CC [facility]       Maciej Pulido RN

## 2018-12-11 ENCOUNTER — OFFICE VISIT (OUTPATIENT)
Dept: FAMILY MEDICINE CLINIC | Age: 63
End: 2018-12-11
Payer: MEDICARE

## 2018-12-11 VITALS
HEART RATE: 96 BPM | SYSTOLIC BLOOD PRESSURE: 116 MMHG | BODY MASS INDEX: 58.28 KG/M2 | WEIGHT: 293 LBS | DIASTOLIC BLOOD PRESSURE: 70 MMHG

## 2018-12-11 DIAGNOSIS — Z87.19 HISTORY OF VENTRAL HERNIA REPAIR: ICD-10-CM

## 2018-12-11 DIAGNOSIS — Z98.890 HISTORY OF VENTRAL HERNIA REPAIR: ICD-10-CM

## 2018-12-11 DIAGNOSIS — Z09 HOSPITAL DISCHARGE FOLLOW-UP: ICD-10-CM

## 2018-12-11 DIAGNOSIS — Z99.2 ESRD (END STAGE RENAL DISEASE) ON DIALYSIS (HCC): ICD-10-CM

## 2018-12-11 DIAGNOSIS — J44.1 COPD EXACERBATION (HCC): Primary | ICD-10-CM

## 2018-12-11 DIAGNOSIS — E16.2 HYPOGLYCEMIA: ICD-10-CM

## 2018-12-11 DIAGNOSIS — T82.9XXD COMPLICATION OF ARTERIOVENOUS DIALYSIS FISTULA, SUBSEQUENT ENCOUNTER: ICD-10-CM

## 2018-12-11 DIAGNOSIS — N18.6 ESRD (END STAGE RENAL DISEASE) ON DIALYSIS (HCC): ICD-10-CM

## 2018-12-11 PROCEDURE — 1111F DSCHRG MED/CURRENT MED MERGE: CPT | Performed by: NURSE PRACTITIONER

## 2018-12-11 PROCEDURE — 99495 TRANSJ CARE MGMT MOD F2F 14D: CPT | Performed by: NURSE PRACTITIONER

## 2018-12-11 ASSESSMENT — ENCOUNTER SYMPTOMS
ABDOMINAL PAIN: 0
SHORTNESS OF BREATH: 0
VOMITING: 0
NAUSEA: 0
DIARRHEA: 0

## 2018-12-11 NOTE — PROGRESS NOTES
(EMLA) 2.5-2.5 % cream  Apply topically as needed. nebivolol (BYSTOLIC) 10 MG tablet  Take 1 tablet by mouth 2 times daily             nitroGLYCERIN (NITROSTAT) 0.4 MG SL tablet  DISSOLVE 1 TABLET UNDER THE TONGUE EVERY 5 MINUTES AS  NEEDED ; MAXIMUM OF 3  TABLETS IN 15 MINUTES             nystatin (MYCOSTATIN) 518043 UNIT/ML suspension  Take 5 mLs by mouth 4 times daily             omeprazole (PRILOSEC) 40 MG delayed release capsule  TAKE 1 CAPSULE BY MOUTH  DAILY             ondansetron (ZOFRAN ODT) 4 MG disintegrating tablet  Take 1-2 tablets by mouth every 8 hours as needed for Nausea May substitute the non ODT tablets if not covered financially by the insurance plan. ONE TOUCH ULTRA TEST strip  USE TO TEST 3-4 TIMES DAILY DUE TO FLUCTUATING SUGAR             oxiconazole nitrate (OXISTAT) 1 % CREA cream  APPLY TO AFFECTED AREA(S) 1 TO 2 TIMES DAILY FOR UP TO 1 MONTH OR AS DIRECTED             OXYGEN  Inhale 2 L into the lungs daily as needed At night prn             promethazine (PHENERGAN) 25 MG tablet  Take 1 tablet by mouth every 8 hours as needed for Nausea             RELION PEN NEEDLE 31G/8MM 31G X 8 MM MISC  USE  THREE TIMES DAILY AS DIRECTED             rosuvastatin (CRESTOR) 10 MG tablet  Take 1 tablet by mouth  daily             Spacer/Aero-Holding Chambers VENANCIO  1 Device by Does not apply route daily as needed             traMADol (ULTRAM) 50 MG tablet  Take 50 mg by mouth every 6 hours as needed for Pain. .             traZODone (DESYREL) 100 MG tablet  Take 1 tablet by mouth nightly TAKES PRN             TRULICITY 9.47 RT/5.9AQ SOPN  INJECT ONE  SYRINGE SUBCUTANEOUSLY ONCE A WEEK             warfarin (COUMADIN) 7.5 MG tablet  Continue f/up with coumadin clinic                   Medications marked \"taking\" at this time  Outpatient Prescriptions Marked as Taking for the 12/11/18 encounter (Office Visit) with JURGEN Rodriguez CNP   Medication Sig Dispense Refill   [facility] filtering blood correctly. Next week having vein mapping for new location- likely right arm. While inpatient levemir decreased to 23 units BID, states no hypoglycemia since. SS and other meds remained unchanged. No new starts. Current SS is 12 units with meals. Diet poor at present with holidays. Laparoscopic ventral hernia repair 11/20with Dr Antonio Hewitt. Med port placed at same time. States abd pain better since surgery. Denies nausea, vomiting, diarrhea or constipation at present    Review of Systems   Constitutional: Negative for diaphoresis. Respiratory: Negative for shortness of breath. Cardiovascular: Negative for chest pain, palpitations and leg swelling. Gastrointestinal: Negative for abdominal pain, diarrhea, nausea and vomiting. Endocrine: Negative for polydipsia. Musculoskeletal: Negative for myalgias. Neurological: Negative for dizziness and headaches. Vitals:    12/11/18 0938   BP: 116/70   Pulse: 96   Weight: (!) 329 lb (149.2 kg)     Body mass index is 58.28 kg/m². Wt Readings from Last 3 Encounters:   12/11/18 (!) 329 lb (149.2 kg)   11/29/18 (!) 332 lb (150.6 kg)   11/23/18 (!) 328 lb (148.8 kg)     BP Readings from Last 3 Encounters:   12/11/18 116/70   11/29/18 138/86   11/23/18 126/83       Physical Exam   Constitutional: She is oriented to person, place, and time. She appears well-developed and well-nourished. Neck: Carotid bruit is not present. Cardiovascular: Normal rate, regular rhythm and normal heart sounds. No murmur heard. Pulses:       Radial pulses are 2+ on the right side, and 2+ on the left side. Pulmonary/Chest: Effort normal and breath sounds normal.   Abdominal: Soft. Normal appearance and bowel sounds are normal. There is no tenderness. There is no rigidity, no rebound and no guarding. Neurological: She is alert and oriented to person, place, and time. Skin: Skin is warm and dry. Psychiatric: She has a normal mood and affect.  Her

## 2018-12-13 ENCOUNTER — TELEPHONE (OUTPATIENT)
Dept: FAMILY MEDICINE CLINIC | Age: 63
End: 2018-12-13

## 2018-12-13 ENCOUNTER — CARE COORDINATION (OUTPATIENT)
Dept: CARE COORDINATION | Age: 63
End: 2018-12-13

## 2018-12-13 ASSESSMENT — ENCOUNTER SYMPTOMS: DYSPNEA ASSOCIATED WITH: MINIMAL EXERTION

## 2018-12-13 NOTE — CARE COORDINATION
Ambulatory Care Coordination Note  12/13/2018  CM Risk Score: 17  Priti Mortality Risk Score:      ACC: Lou Biggs, RN    Summary Note: Outreach call to pt. Pt stated she did follow up as recommended for appt to see Dr. Jaimee Thomas. Pt stated she is waiting for return call from the office once Dr. Irvin Aviles signs off, then she can make an appt with Dr. Jaimee Thomas in . Pt stated right now she is not interested in [employer] transportation- however she still has their number, mainly due to the fact that her spouse is driving her to her appts. Pt stated glucoses are doing well (hasn't tested today yet). Pt expressed she is taking all her medications as directed and her breathing, SOB has improved- still there, but improved. Pt stated she has appt for vein mapping on 12/27 for a new graft or fistula most likely to be placed in her right arm. Pt aware writer will follow up next week to make sure she has an appt with Jaimee Thomas and to answer any questions or concerns. Diabetes Assessment    Medic Alert ID:  No  Meal Planning:  Avoidance of concentrated sweets   How often do you test your blood sugar?:  Meals   Do you have barriers with adherence to non-pharmacologic self-management interventions?  (Nutrition/Exercise/Self-Monitoring):  No   Have you ever had to go to the ED for symptoms of low blood sugar?:  No       No patient-reported symptoms   Do you have hyperglycemia symptoms?:  No   Do you have hypoglycemia symptoms?:  No   Last Blood Sugar Value:  118   Blood Sugar Trends:  Other (Comment: per pt glucoses are improving and no Hypo events since medication decrease)           Congestive Heart Failure Assessment    Are you currently restricting fluids?:  Other (Comment: 1500)  Do you understand a low sodium diet?:  Yes  Do you understand how to read food labels?:  Yes  How many restaurant meals do you eat per week?:  0  Do you salt your food before tasting it?:  No     No patient-reported symptoms      Symptoms:   CHF associated

## 2018-12-19 ENCOUNTER — HOSPITAL ENCOUNTER (OUTPATIENT)
Dept: MAMMOGRAPHY | Age: 63
Discharge: HOME | End: 2018-12-23
Payer: MEDICARE

## 2018-12-19 DIAGNOSIS — Z12.31 VISIT FOR SCREENING MAMMOGRAM: ICD-10-CM

## 2018-12-20 ENCOUNTER — CARE COORDINATION (OUTPATIENT)
Dept: CARE COORDINATION | Age: 63
End: 2018-12-20

## 2018-12-20 ASSESSMENT — PATIENT HEALTH QUESTIONNAIRE - PHQ9
SUM OF ALL RESPONSES TO PHQ QUESTIONS 1-9: 1
SUM OF ALL RESPONSES TO PHQ QUESTIONS 1-9: 1

## 2019-01-01 ENCOUNTER — APPOINTMENT (OUTPATIENT)
Dept: GENERAL RADIOLOGY | Age: 64
End: 2019-01-01
Payer: MEDICARE

## 2019-01-01 ENCOUNTER — HOSPITAL ENCOUNTER (OUTPATIENT)
Age: 64
Setting detail: OBSERVATION
Discharge: HOME | End: 2019-01-05
Attending: EMERGENCY MEDICINE | Admitting: INTERNAL MEDICINE
Payer: MEDICARE

## 2019-01-01 DIAGNOSIS — Z99.2 END STAGE RENAL DISEASE ON DIALYSIS (HCC): ICD-10-CM

## 2019-01-01 DIAGNOSIS — I20.0 UNSTABLE ANGINA (HCC): ICD-10-CM

## 2019-01-01 DIAGNOSIS — N18.6 END STAGE RENAL DISEASE ON DIALYSIS (HCC): ICD-10-CM

## 2019-01-01 DIAGNOSIS — R77.8 ELEVATED TROPONIN: ICD-10-CM

## 2019-01-01 DIAGNOSIS — R07.89 OTHER CHEST PAIN: Primary | ICD-10-CM

## 2019-01-01 LAB
A/G RATIO: 1.1 (ref 1.1–2.2)
ALBUMIN SERPL-MCNC: 3.6 G/DL (ref 3.4–5)
ALP BLD-CCNC: 115 U/L (ref 40–129)
ALT SERPL-CCNC: 13 U/L (ref 10–40)
ANION GAP SERPL CALCULATED.3IONS-SCNC: 15 MMOL/L (ref 3–16)
ANISOCYTOSIS: ABNORMAL
AST SERPL-CCNC: 16 U/L (ref 15–37)
BASOPHILS ABSOLUTE: 0.1 K/UL (ref 0–0.2)
BASOPHILS RELATIVE PERCENT: 1.1 %
BILIRUB SERPL-MCNC: 0.4 MG/DL (ref 0–1)
BUN BLDV-MCNC: 28 MG/DL (ref 7–20)
CALCIUM SERPL-MCNC: 9.1 MG/DL (ref 8.3–10.6)
CHLORIDE BLD-SCNC: 95 MMOL/L (ref 99–110)
CO2: 26 MMOL/L (ref 21–32)
CREAT SERPL-MCNC: 9.2 MG/DL (ref 0.6–1.2)
EKG ATRIAL RATE: 89 BPM
EKG DIAGNOSIS: NORMAL
EKG P AXIS: 37 DEGREES
EKG P-R INTERVAL: 176 MS
EKG Q-T INTERVAL: 370 MS
EKG QRS DURATION: 78 MS
EKG QTC CALCULATION (BAZETT): 450 MS
EKG R AXIS: -25 DEGREES
EKG T AXIS: 75 DEGREES
EKG VENTRICULAR RATE: 89 BPM
EOSINOPHILS ABSOLUTE: 0.1 K/UL (ref 0–0.6)
EOSINOPHILS RELATIVE PERCENT: 1.3 %
GFR AFRICAN AMERICAN: 5
GFR NON-AFRICAN AMERICAN: 4
GLOBULIN: 3.3 G/DL
GLUCOSE BLD-MCNC: 186 MG/DL (ref 70–99)
GLUCOSE BLD-MCNC: 214 MG/DL (ref 70–99)
GLUCOSE BLD-MCNC: 281 MG/DL (ref 70–99)
GLUCOSE BLD-MCNC: 285 MG/DL (ref 70–99)
GLUCOSE BLD-MCNC: 295 MG/DL (ref 70–99)
HCT VFR BLD CALC: 32.6 % (ref 36–48)
HEMOGLOBIN: 10.3 G/DL (ref 12–16)
INR BLD: 1.39 (ref 0.86–1.14)
LYMPHOCYTES ABSOLUTE: 1.5 K/UL (ref 1–5.1)
LYMPHOCYTES RELATIVE PERCENT: 18.6 %
MACROCYTES: ABNORMAL
MCH RBC QN AUTO: 32.9 PG (ref 26–34)
MCHC RBC AUTO-ENTMCNC: 31.5 G/DL (ref 31–36)
MCV RBC AUTO: 104.7 FL (ref 80–100)
MONOCYTES ABSOLUTE: 0.7 K/UL (ref 0–1.3)
MONOCYTES RELATIVE PERCENT: 8.5 %
NEUTROPHILS ABSOLUTE: 5.5 K/UL (ref 1.7–7.7)
NEUTROPHILS RELATIVE PERCENT: 70.5 %
PDW BLD-RTO: 22.5 % (ref 12.4–15.4)
PERFORMED ON: ABNORMAL
PHOSPHORUS: 6.1 MG/DL (ref 2.5–4.9)
PLATELET # BLD: 213 K/UL (ref 135–450)
PLATELET SLIDE REVIEW: ADEQUATE
PMV BLD AUTO: 8.4 FL (ref 5–10.5)
POLYCHROMASIA: ABNORMAL
POTASSIUM REFLEX MAGNESIUM: 4 MMOL/L (ref 3.5–5.1)
PRO-BNP: 2516 PG/ML (ref 0–124)
PROTHROMBIN TIME: 15.8 SEC (ref 9.8–13)
RBC # BLD: 3.12 M/UL (ref 4–5.2)
SLIDE REVIEW: ABNORMAL
SODIUM BLD-SCNC: 136 MMOL/L (ref 136–145)
TOTAL PROTEIN: 6.9 G/DL (ref 6.4–8.2)
TROPONIN: 0.09 NG/ML
TROPONIN: 0.1 NG/ML
WBC # BLD: 7.8 K/UL (ref 4–11)

## 2019-01-01 PROCEDURE — 85610 PROTHROMBIN TIME: CPT

## 2019-01-01 PROCEDURE — 94640 AIRWAY INHALATION TREATMENT: CPT

## 2019-01-01 PROCEDURE — 93010 ELECTROCARDIOGRAM REPORT: CPT | Performed by: INTERNAL MEDICINE

## 2019-01-01 PROCEDURE — 84484 ASSAY OF TROPONIN QUANT: CPT

## 2019-01-01 PROCEDURE — G0378 HOSPITAL OBSERVATION PER HR: HCPCS

## 2019-01-01 PROCEDURE — 2580000003 HC RX 258: Performed by: EMERGENCY MEDICINE

## 2019-01-01 PROCEDURE — 2580000003 HC RX 258

## 2019-01-01 PROCEDURE — 84100 ASSAY OF PHOSPHORUS: CPT

## 2019-01-01 PROCEDURE — 94760 N-INVAS EAR/PLS OXIMETRY 1: CPT

## 2019-01-01 PROCEDURE — 85025 COMPLETE CBC W/AUTO DIFF WBC: CPT

## 2019-01-01 PROCEDURE — 6370000000 HC RX 637 (ALT 250 FOR IP): Performed by: INTERNAL MEDICINE

## 2019-01-01 PROCEDURE — 96375 TX/PRO/DX INJ NEW DRUG ADDON: CPT

## 2019-01-01 PROCEDURE — 2580000003 HC RX 258: Performed by: INTERNAL MEDICINE

## 2019-01-01 PROCEDURE — 36415 COLL VENOUS BLD VENIPUNCTURE: CPT

## 2019-01-01 PROCEDURE — 99220 PR INITIAL OBSERVATION CARE/DAY 70 MINUTES: CPT | Performed by: INTERNAL MEDICINE

## 2019-01-01 PROCEDURE — 96376 TX/PRO/DX INJ SAME DRUG ADON: CPT

## 2019-01-01 PROCEDURE — 83036 HEMOGLOBIN GLYCOSYLATED A1C: CPT

## 2019-01-01 PROCEDURE — 83880 ASSAY OF NATRIURETIC PEPTIDE: CPT

## 2019-01-01 PROCEDURE — 6360000002 HC RX W HCPCS: Performed by: INTERNAL MEDICINE

## 2019-01-01 PROCEDURE — 80053 COMPREHEN METABOLIC PANEL: CPT

## 2019-01-01 PROCEDURE — 6370000000 HC RX 637 (ALT 250 FOR IP)

## 2019-01-01 PROCEDURE — 71046 X-RAY EXAM CHEST 2 VIEWS: CPT

## 2019-01-01 PROCEDURE — 93005 ELECTROCARDIOGRAM TRACING: CPT | Performed by: INTERNAL MEDICINE

## 2019-01-01 PROCEDURE — 99285 EMERGENCY DEPT VISIT HI MDM: CPT

## 2019-01-01 PROCEDURE — 96372 THER/PROPH/DIAG INJ SC/IM: CPT

## 2019-01-01 RX ORDER — DEXTROSE MONOHYDRATE 25 G/50ML
12.5 INJECTION, SOLUTION INTRAVENOUS PRN
Status: DISCONTINUED | OUTPATIENT
Start: 2019-01-01 | End: 2019-01-05 | Stop reason: HOSPADM

## 2019-01-01 RX ORDER — MORPHINE SULFATE 4 MG/ML
4 INJECTION, SOLUTION INTRAMUSCULAR; INTRAVENOUS EVERY 30 MIN PRN
Status: DISCONTINUED | OUTPATIENT
Start: 2019-01-01 | End: 2019-01-01 | Stop reason: HOSPADM

## 2019-01-01 RX ORDER — NICOTINE POLACRILEX 4 MG
15 LOZENGE BUCCAL PRN
Status: DISCONTINUED | OUTPATIENT
Start: 2019-01-01 | End: 2019-01-05 | Stop reason: HOSPADM

## 2019-01-01 RX ORDER — WARFARIN SODIUM 5 MG/1
5 TABLET ORAL
Status: DISCONTINUED | OUTPATIENT
Start: 2019-01-01 | End: 2019-01-03

## 2019-01-01 RX ORDER — TRAMADOL HYDROCHLORIDE 50 MG/1
50 TABLET ORAL EVERY 6 HOURS PRN
Status: DISCONTINUED | OUTPATIENT
Start: 2019-01-01 | End: 2019-01-05 | Stop reason: HOSPADM

## 2019-01-01 RX ORDER — ROSUVASTATIN CALCIUM 10 MG/1
10 TABLET, COATED ORAL NIGHTLY
Status: DISCONTINUED | OUTPATIENT
Start: 2019-01-01 | End: 2019-01-05 | Stop reason: HOSPADM

## 2019-01-01 RX ORDER — ASPIRIN 81 MG/1
81 TABLET, CHEWABLE ORAL DAILY
Status: DISCONTINUED | OUTPATIENT
Start: 2019-01-02 | End: 2019-01-05 | Stop reason: HOSPADM

## 2019-01-01 RX ORDER — SODIUM CHLORIDE 0.9 % (FLUSH) 0.9 %
10 SYRINGE (ML) INJECTION EVERY 12 HOURS SCHEDULED
Status: DISCONTINUED | OUTPATIENT
Start: 2019-01-01 | End: 2019-01-05 | Stop reason: HOSPADM

## 2019-01-01 RX ORDER — DEXTROSE MONOHYDRATE 50 MG/ML
100 INJECTION, SOLUTION INTRAVENOUS PRN
Status: DISCONTINUED | OUTPATIENT
Start: 2019-01-01 | End: 2019-01-05 | Stop reason: HOSPADM

## 2019-01-01 RX ORDER — SODIUM CHLORIDE 9 MG/ML
INJECTION, SOLUTION INTRAVENOUS
Status: COMPLETED
Start: 2019-01-01 | End: 2019-01-01

## 2019-01-01 RX ORDER — OMEPRAZOLE 20 MG/1
40 CAPSULE, DELAYED RELEASE ORAL EVERY MORNING
Status: DISCONTINUED | OUTPATIENT
Start: 2019-01-01 | End: 2019-01-01 | Stop reason: CLARIF

## 2019-01-01 RX ORDER — IPRATROPIUM BROMIDE AND ALBUTEROL SULFATE 2.5; .5 MG/3ML; MG/3ML
1 SOLUTION RESPIRATORY (INHALATION) 4 TIMES DAILY
Status: DISCONTINUED | OUTPATIENT
Start: 2019-01-01 | End: 2019-01-05 | Stop reason: HOSPADM

## 2019-01-01 RX ORDER — 0.9 % SODIUM CHLORIDE 0.9 %
1000 INTRAVENOUS SOLUTION INTRAVENOUS ONCE
Status: COMPLETED | OUTPATIENT
Start: 2019-01-01 | End: 2019-01-01

## 2019-01-01 RX ORDER — NEBIVOLOL 5 MG/1
10 TABLET ORAL 2 TIMES DAILY
Status: DISCONTINUED | OUTPATIENT
Start: 2019-01-01 | End: 2019-01-03

## 2019-01-01 RX ORDER — GABAPENTIN 300 MG/1
300 CAPSULE ORAL NIGHTLY
Status: DISCONTINUED | OUTPATIENT
Start: 2019-01-01 | End: 2019-01-05 | Stop reason: HOSPADM

## 2019-01-01 RX ORDER — SODIUM CHLORIDE 0.9 % (FLUSH) 0.9 %
10 SYRINGE (ML) INJECTION PRN
Status: DISCONTINUED | OUTPATIENT
Start: 2019-01-01 | End: 2019-01-05 | Stop reason: HOSPADM

## 2019-01-01 RX ORDER — PANTOPRAZOLE SODIUM 40 MG/1
40 TABLET, DELAYED RELEASE ORAL
Status: DISCONTINUED | OUTPATIENT
Start: 2019-01-01 | End: 2019-01-05 | Stop reason: HOSPADM

## 2019-01-01 RX ORDER — TRAZODONE HYDROCHLORIDE 50 MG/1
100 TABLET ORAL NIGHTLY
Status: DISCONTINUED | OUTPATIENT
Start: 2019-01-01 | End: 2019-01-05 | Stop reason: HOSPADM

## 2019-01-01 RX ORDER — NITROGLYCERIN 0.4 MG/1
0.4 TABLET SUBLINGUAL EVERY 5 MIN PRN
Status: DISCONTINUED | OUTPATIENT
Start: 2019-01-01 | End: 2019-01-05 | Stop reason: HOSPADM

## 2019-01-01 RX ORDER — ONDANSETRON 2 MG/ML
4 INJECTION INTRAMUSCULAR; INTRAVENOUS EVERY 6 HOURS PRN
Status: DISCONTINUED | OUTPATIENT
Start: 2019-01-01 | End: 2019-01-05 | Stop reason: HOSPADM

## 2019-01-01 RX ORDER — ALLOPURINOL 100 MG/1
100 TABLET ORAL DAILY
Status: DISCONTINUED | OUTPATIENT
Start: 2019-01-01 | End: 2019-01-05 | Stop reason: HOSPADM

## 2019-01-01 RX ADMIN — WARFARIN SODIUM 5 MG: 5 TABLET ORAL at 17:34

## 2019-01-01 RX ADMIN — INSULIN LISPRO 5 UNITS: 100 INJECTION, SOLUTION INTRAVENOUS; SUBCUTANEOUS at 20:56

## 2019-01-01 RX ADMIN — SODIUM CHLORIDE: 9 INJECTION, SOLUTION INTRAVENOUS at 07:51

## 2019-01-01 RX ADMIN — Medication 10 ML: at 16:25

## 2019-01-01 RX ADMIN — NEBIVOLOL HYDROCHLORIDE 10 MG: 5 TABLET ORAL at 20:55

## 2019-01-01 RX ADMIN — INSULIN GLARGINE 23 UNITS: 100 INJECTION, SOLUTION SUBCUTANEOUS at 20:56

## 2019-01-01 RX ADMIN — IPRATROPIUM BROMIDE AND ALBUTEROL SULFATE 3 ML: .5; 3 SOLUTION RESPIRATORY (INHALATION) at 20:24

## 2019-01-01 RX ADMIN — HYDROMORPHONE HYDROCHLORIDE 0.5 MG: 1 INJECTION, SOLUTION INTRAMUSCULAR; INTRAVENOUS; SUBCUTANEOUS at 20:59

## 2019-01-01 RX ADMIN — HYDROMORPHONE HYDROCHLORIDE 0.5 MG: 1 INJECTION, SOLUTION INTRAMUSCULAR; INTRAVENOUS; SUBCUTANEOUS at 08:00

## 2019-01-01 RX ADMIN — GABAPENTIN 300 MG: 300 CAPSULE ORAL at 20:55

## 2019-01-01 RX ADMIN — Medication 10 ML: at 12:12

## 2019-01-01 RX ADMIN — Medication 2 PUFF: at 20:24

## 2019-01-01 RX ADMIN — INSULIN GLARGINE 23 UNITS: 100 INJECTION, SOLUTION SUBCUTANEOUS at 09:24

## 2019-01-01 RX ADMIN — IPRATROPIUM BROMIDE AND ALBUTEROL SULFATE 3 ML: .5; 3 SOLUTION RESPIRATORY (INHALATION) at 11:16

## 2019-01-01 RX ADMIN — SODIUM CHLORIDE 1000 ML: 9 INJECTION, SOLUTION INTRAVENOUS at 05:22

## 2019-01-01 RX ADMIN — Medication 2 PUFF: at 11:17

## 2019-01-01 RX ADMIN — NITROGLYCERIN 0.5 INCH: 20 OINTMENT TOPICAL at 04:12

## 2019-01-01 RX ADMIN — NEBIVOLOL HYDROCHLORIDE 10 MG: 5 TABLET ORAL at 08:00

## 2019-01-01 RX ADMIN — Medication 10 ML: at 21:01

## 2019-01-01 RX ADMIN — INSULIN LISPRO 6 UNITS: 100 INJECTION, SOLUTION INTRAVENOUS; SUBCUTANEOUS at 09:23

## 2019-01-01 RX ADMIN — HYDROMORPHONE HYDROCHLORIDE 0.5 MG: 1 INJECTION, SOLUTION INTRAMUSCULAR; INTRAVENOUS; SUBCUTANEOUS at 12:11

## 2019-01-01 RX ADMIN — INSULIN LISPRO 9 UNITS: 100 INJECTION, SOLUTION INTRAVENOUS; SUBCUTANEOUS at 12:12

## 2019-01-01 RX ADMIN — TRAZODONE HYDROCHLORIDE 100 MG: 50 TABLET ORAL at 20:55

## 2019-01-01 RX ADMIN — IPRATROPIUM BROMIDE AND ALBUTEROL SULFATE 3 ML: .5; 3 SOLUTION RESPIRATORY (INHALATION) at 16:48

## 2019-01-01 RX ADMIN — PANTOPRAZOLE SODIUM 40 MG: 40 TABLET, DELAYED RELEASE ORAL at 09:23

## 2019-01-01 RX ADMIN — HYDROMORPHONE HYDROCHLORIDE 0.5 MG: 1 INJECTION, SOLUTION INTRAMUSCULAR; INTRAVENOUS; SUBCUTANEOUS at 16:25

## 2019-01-01 RX ADMIN — ROSUVASTATIN CALCIUM 10 MG: 10 TABLET, FILM COATED ORAL at 20:55

## 2019-01-01 RX ADMIN — ALLOPURINOL 100 MG: 100 TABLET ORAL at 08:00

## 2019-01-01 RX ADMIN — Medication 10 ML: at 08:00

## 2019-01-01 RX ADMIN — INSULIN LISPRO 3 UNITS: 100 INJECTION, SOLUTION INTRAVENOUS; SUBCUTANEOUS at 17:11

## 2019-01-01 ASSESSMENT — PAIN DESCRIPTION - LOCATION
LOCATION: CHEST
LOCATION: ABDOMEN

## 2019-01-01 ASSESSMENT — PAIN DESCRIPTION - PAIN TYPE
TYPE: ACUTE PAIN
TYPE: ACUTE PAIN
TYPE: CHRONIC PAIN

## 2019-01-01 ASSESSMENT — PAIN SCALES - GENERAL
PAINLEVEL_OUTOF10: 8
PAINLEVEL_OUTOF10: 3
PAINLEVEL_OUTOF10: 7
PAINLEVEL_OUTOF10: 8
PAINLEVEL_OUTOF10: 4
PAINLEVEL_OUTOF10: 8

## 2019-01-01 ASSESSMENT — PAIN DESCRIPTION - ONSET: ONSET: ON-GOING

## 2019-01-01 ASSESSMENT — PAIN DESCRIPTION - DESCRIPTORS: DESCRIPTORS: SHARP

## 2019-01-01 ASSESSMENT — PAIN DESCRIPTION - FREQUENCY: FREQUENCY: CONTINUOUS

## 2019-01-02 ENCOUNTER — CARE COORDINATION (OUTPATIENT)
Dept: CARE COORDINATION | Age: 64
End: 2019-01-02

## 2019-01-02 LAB
ALBUMIN SERPL-MCNC: 3.5 G/DL (ref 3.4–5)
ANION GAP SERPL CALCULATED.3IONS-SCNC: 18 MMOL/L (ref 3–16)
BUN BLDV-MCNC: 42 MG/DL (ref 7–20)
CALCIUM SERPL-MCNC: 8.8 MG/DL (ref 8.3–10.6)
CHLORIDE BLD-SCNC: 97 MMOL/L (ref 99–110)
CO2: 22 MMOL/L (ref 21–32)
CREAT SERPL-MCNC: 11.4 MG/DL (ref 0.6–1.2)
ESTIMATED AVERAGE GLUCOSE: 185.8 MG/DL
GFR AFRICAN AMERICAN: 4
GFR NON-AFRICAN AMERICAN: 3
GLUCOSE BLD-MCNC: 136 MG/DL (ref 70–99)
GLUCOSE BLD-MCNC: 194 MG/DL (ref 70–99)
GLUCOSE BLD-MCNC: 234 MG/DL (ref 70–99)
GLUCOSE BLD-MCNC: 239 MG/DL (ref 70–99)
GLUCOSE BLD-MCNC: 249 MG/DL (ref 70–99)
HBA1C MFR BLD: 8.1 %
HCT VFR BLD CALC: 28.2 % (ref 36–48)
HEMOGLOBIN: 9.1 G/DL (ref 12–16)
INR BLD: 1.41 (ref 0.86–1.14)
MCH RBC QN AUTO: 34 PG (ref 26–34)
MCHC RBC AUTO-ENTMCNC: 32.4 G/DL (ref 31–36)
MCV RBC AUTO: 105.1 FL (ref 80–100)
PDW BLD-RTO: 23 % (ref 12.4–15.4)
PERFORMED ON: ABNORMAL
PHOSPHORUS: 8.2 MG/DL (ref 2.5–4.9)
PLATELET # BLD: 191 K/UL (ref 135–450)
PMV BLD AUTO: 8 FL (ref 5–10.5)
POTASSIUM SERPL-SCNC: 4.2 MMOL/L (ref 3.5–5.1)
PROTHROMBIN TIME: 16.1 SEC (ref 9.8–13)
RBC # BLD: 2.69 M/UL (ref 4–5.2)
SODIUM BLD-SCNC: 137 MMOL/L (ref 136–145)
WBC # BLD: 8.8 K/UL (ref 4–11)

## 2019-01-02 PROCEDURE — 85610 PROTHROMBIN TIME: CPT

## 2019-01-02 PROCEDURE — 90937 HEMODIALYSIS REPEATED EVAL: CPT

## 2019-01-02 PROCEDURE — 86706 HEP B SURFACE ANTIBODY: CPT

## 2019-01-02 PROCEDURE — 85027 COMPLETE CBC AUTOMATED: CPT

## 2019-01-02 PROCEDURE — G0378 HOSPITAL OBSERVATION PER HR: HCPCS

## 2019-01-02 PROCEDURE — 36415 COLL VENOUS BLD VENIPUNCTURE: CPT

## 2019-01-02 PROCEDURE — 94640 AIRWAY INHALATION TREATMENT: CPT

## 2019-01-02 PROCEDURE — 96376 TX/PRO/DX INJ SAME DRUG ADON: CPT

## 2019-01-02 PROCEDURE — 6370000000 HC RX 637 (ALT 250 FOR IP): Performed by: INTERNAL MEDICINE

## 2019-01-02 PROCEDURE — 6360000002 HC RX W HCPCS: Performed by: INTERNAL MEDICINE

## 2019-01-02 PROCEDURE — 87340 HEPATITIS B SURFACE AG IA: CPT

## 2019-01-02 PROCEDURE — 86704 HEP B CORE ANTIBODY TOTAL: CPT

## 2019-01-02 PROCEDURE — 99233 SBSQ HOSP IP/OBS HIGH 50: CPT | Performed by: INTERNAL MEDICINE

## 2019-01-02 PROCEDURE — 94760 N-INVAS EAR/PLS OXIMETRY 1: CPT

## 2019-01-02 PROCEDURE — 80069 RENAL FUNCTION PANEL: CPT

## 2019-01-02 PROCEDURE — G0257 UNSCHED DIALYSIS ESRD PT HOS: HCPCS

## 2019-01-02 PROCEDURE — 2580000003 HC RX 258: Performed by: INTERNAL MEDICINE

## 2019-01-02 RX ORDER — LIDOCAINE HYDROCHLORIDE 10 MG/ML
5 INJECTION, SOLUTION INFILTRATION; PERINEURAL PRN
Status: DISCONTINUED | OUTPATIENT
Start: 2019-01-02 | End: 2019-01-05 | Stop reason: HOSPADM

## 2019-01-02 RX ADMIN — INSULIN LISPRO 3 UNITS: 100 INJECTION, SOLUTION INTRAVENOUS; SUBCUTANEOUS at 22:50

## 2019-01-02 RX ADMIN — Medication 2 PUFF: at 18:14

## 2019-01-02 RX ADMIN — WARFARIN SODIUM 6 MG: 5 TABLET ORAL at 17:04

## 2019-01-02 RX ADMIN — Medication 10 ML: at 13:57

## 2019-01-02 RX ADMIN — HYDROMORPHONE HYDROCHLORIDE 0.5 MG: 1 INJECTION, SOLUTION INTRAMUSCULAR; INTRAVENOUS; SUBCUTANEOUS at 05:55

## 2019-01-02 RX ADMIN — TRAZODONE HYDROCHLORIDE 100 MG: 50 TABLET ORAL at 22:41

## 2019-01-02 RX ADMIN — INSULIN LISPRO 6 UNITS: 100 INJECTION, SOLUTION INTRAVENOUS; SUBCUTANEOUS at 17:05

## 2019-01-02 RX ADMIN — CALCIUM ACETATE 1334 MG: 667 CAPSULE ORAL at 13:57

## 2019-01-02 RX ADMIN — HYDROMORPHONE HYDROCHLORIDE 0.5 MG: 1 INJECTION, SOLUTION INTRAMUSCULAR; INTRAVENOUS; SUBCUTANEOUS at 01:28

## 2019-01-02 RX ADMIN — HYDROMORPHONE HYDROCHLORIDE 0.5 MG: 1 INJECTION, SOLUTION INTRAMUSCULAR; INTRAVENOUS; SUBCUTANEOUS at 10:05

## 2019-01-02 RX ADMIN — IPRATROPIUM BROMIDE AND ALBUTEROL SULFATE 3 ML: .5; 3 SOLUTION RESPIRATORY (INHALATION) at 15:27

## 2019-01-02 RX ADMIN — IPRATROPIUM BROMIDE AND ALBUTEROL SULFATE 3 ML: .5; 3 SOLUTION RESPIRATORY (INHALATION) at 18:14

## 2019-01-02 RX ADMIN — DARBEPOETIN ALFA 60 MCG: 60 SOLUTION INTRAVENOUS; SUBCUTANEOUS at 15:03

## 2019-01-02 RX ADMIN — HYDROMORPHONE HYDROCHLORIDE 0.5 MG: 1 INJECTION, SOLUTION INTRAMUSCULAR; INTRAVENOUS; SUBCUTANEOUS at 23:05

## 2019-01-02 RX ADMIN — Medication 10 ML: at 22:43

## 2019-01-02 RX ADMIN — INSULIN LISPRO 3 UNITS: 100 INJECTION, SOLUTION INTRAVENOUS; SUBCUTANEOUS at 07:54

## 2019-01-02 RX ADMIN — INSULIN GLARGINE 23 UNITS: 100 INJECTION, SOLUTION SUBCUTANEOUS at 22:47

## 2019-01-02 RX ADMIN — GABAPENTIN 300 MG: 300 CAPSULE ORAL at 22:41

## 2019-01-02 RX ADMIN — ASPIRIN 81 MG 81 MG: 81 TABLET ORAL at 13:56

## 2019-01-02 RX ADMIN — PANTOPRAZOLE SODIUM 40 MG: 40 TABLET, DELAYED RELEASE ORAL at 05:46

## 2019-01-02 RX ADMIN — HYDROMORPHONE HYDROCHLORIDE 0.5 MG: 1 INJECTION, SOLUTION INTRAMUSCULAR; INTRAVENOUS; SUBCUTANEOUS at 14:08

## 2019-01-02 RX ADMIN — CALCIUM ACETATE 1334 MG: 667 CAPSULE ORAL at 17:05

## 2019-01-02 RX ADMIN — ALLOPURINOL 100 MG: 100 TABLET ORAL at 13:57

## 2019-01-02 RX ADMIN — ROSUVASTATIN CALCIUM 10 MG: 10 TABLET, FILM COATED ORAL at 22:40

## 2019-01-02 ASSESSMENT — PAIN DESCRIPTION - ORIENTATION
ORIENTATION: MID
ORIENTATION: MID

## 2019-01-02 ASSESSMENT — PAIN SCALES - GENERAL
PAINLEVEL_OUTOF10: 0
PAINLEVEL_OUTOF10: 2
PAINLEVEL_OUTOF10: 0
PAINLEVEL_OUTOF10: 8
PAINLEVEL_OUTOF10: 7
PAINLEVEL_OUTOF10: 8
PAINLEVEL_OUTOF10: 8
PAINLEVEL_OUTOF10: 0
PAINLEVEL_OUTOF10: 9
PAINLEVEL_OUTOF10: 0
PAINLEVEL_OUTOF10: 0

## 2019-01-02 ASSESSMENT — PAIN DESCRIPTION - LOCATION
LOCATION: CHEST
LOCATION: CHEST

## 2019-01-02 ASSESSMENT — PAIN DESCRIPTION - PAIN TYPE
TYPE: CHRONIC PAIN
TYPE: CHRONIC PAIN

## 2019-01-02 ASSESSMENT — PAIN DESCRIPTION - DESCRIPTORS: DESCRIPTORS: SHARP

## 2019-01-03 ENCOUNTER — APPOINTMENT (OUTPATIENT)
Dept: GENERAL RADIOLOGY | Age: 64
End: 2019-01-03
Payer: MEDICARE

## 2019-01-03 LAB
GLUCOSE BLD-MCNC: 230 MG/DL (ref 70–99)
GLUCOSE BLD-MCNC: 261 MG/DL (ref 70–99)
GLUCOSE BLD-MCNC: 263 MG/DL (ref 70–99)
GLUCOSE BLD-MCNC: 368 MG/DL (ref 70–99)
HBV SURFACE AB TITR SER: <3.5 MIU/ML
HEPATITIS B SURFACE ANTIGEN INTERPRETATION: NORMAL
INR BLD: 1.51 (ref 0.86–1.14)
PERFORMED ON: ABNORMAL
PROTHROMBIN TIME: 17.2 SEC (ref 9.8–13)

## 2019-01-03 PROCEDURE — 2700000000 HC OXYGEN THERAPY PER DAY

## 2019-01-03 PROCEDURE — G0378 HOSPITAL OBSERVATION PER HR: HCPCS

## 2019-01-03 PROCEDURE — 6370000000 HC RX 637 (ALT 250 FOR IP): Performed by: INTERNAL MEDICINE

## 2019-01-03 PROCEDURE — 85610 PROTHROMBIN TIME: CPT

## 2019-01-03 PROCEDURE — 96366 THER/PROPH/DIAG IV INF ADDON: CPT

## 2019-01-03 PROCEDURE — 93308 TTE F-UP OR LMTD: CPT

## 2019-01-03 PROCEDURE — 6360000002 HC RX W HCPCS: Performed by: INTERNAL MEDICINE

## 2019-01-03 PROCEDURE — 2580000003 HC RX 258: Performed by: INTERNAL MEDICINE

## 2019-01-03 PROCEDURE — 94760 N-INVAS EAR/PLS OXIMETRY 1: CPT

## 2019-01-03 PROCEDURE — 99232 SBSQ HOSP IP/OBS MODERATE 35: CPT | Performed by: INTERNAL MEDICINE

## 2019-01-03 PROCEDURE — 94640 AIRWAY INHALATION TREATMENT: CPT

## 2019-01-03 PROCEDURE — 71045 X-RAY EXAM CHEST 1 VIEW: CPT

## 2019-01-03 PROCEDURE — 2580000003 HC RX 258

## 2019-01-03 PROCEDURE — 96365 THER/PROPH/DIAG IV INF INIT: CPT

## 2019-01-03 RX ORDER — WARFARIN SODIUM 5 MG/1
5 TABLET ORAL
Status: DISCONTINUED | OUTPATIENT
Start: 2019-01-05 | End: 2019-01-05

## 2019-01-03 RX ORDER — LEVOFLOXACIN 5 MG/ML
250 INJECTION, SOLUTION INTRAVENOUS
Status: DISCONTINUED | OUTPATIENT
Start: 2019-01-03 | End: 2019-01-03 | Stop reason: DRUGHIGH

## 2019-01-03 RX ORDER — SODIUM CHLORIDE 9 MG/ML
INJECTION, SOLUTION INTRAVENOUS
Status: COMPLETED
Start: 2019-01-03 | End: 2019-01-03

## 2019-01-03 RX ORDER — LEVOFLOXACIN 5 MG/ML
500 INJECTION, SOLUTION INTRAVENOUS
Status: DISCONTINUED | OUTPATIENT
Start: 2019-01-05 | End: 2019-01-05 | Stop reason: HOSPADM

## 2019-01-03 RX ORDER — LEVOFLOXACIN 5 MG/ML
750 INJECTION, SOLUTION INTRAVENOUS ONCE
Status: COMPLETED | OUTPATIENT
Start: 2019-01-03 | End: 2019-01-03

## 2019-01-03 RX ORDER — HYDROMORPHONE HYDROCHLORIDE 2 MG/1
0.5 TABLET ORAL ONCE
Status: COMPLETED | OUTPATIENT
Start: 2019-01-03 | End: 2019-01-03

## 2019-01-03 RX ORDER — WARFARIN SODIUM 5 MG/1
10 TABLET ORAL
Status: DISCONTINUED | OUTPATIENT
Start: 2019-01-03 | End: 2019-01-03

## 2019-01-03 RX ORDER — GUAIFENESIN 600 MG/1
600 TABLET, EXTENDED RELEASE ORAL 2 TIMES DAILY
Status: DISCONTINUED | OUTPATIENT
Start: 2019-01-03 | End: 2019-01-05 | Stop reason: HOSPADM

## 2019-01-03 RX ORDER — ACETAMINOPHEN 80 MG
TABLET,CHEWABLE ORAL
Status: COMPLETED
Start: 2019-01-03 | End: 2019-01-03

## 2019-01-03 RX ORDER — NEBIVOLOL 5 MG/1
5 TABLET ORAL 2 TIMES DAILY
Status: DISCONTINUED | OUTPATIENT
Start: 2019-01-03 | End: 2019-01-04

## 2019-01-03 RX ADMIN — LEVOFLOXACIN 750 MG: 5 INJECTION, SOLUTION INTRAVENOUS at 16:53

## 2019-01-03 RX ADMIN — ASPIRIN 81 MG 81 MG: 81 TABLET ORAL at 09:43

## 2019-01-03 RX ADMIN — IPRATROPIUM BROMIDE AND ALBUTEROL SULFATE 3 ML: .5; 3 SOLUTION RESPIRATORY (INHALATION) at 07:34

## 2019-01-03 RX ADMIN — INSULIN LISPRO 5 UNITS: 100 INJECTION, SOLUTION INTRAVENOUS; SUBCUTANEOUS at 21:57

## 2019-01-03 RX ADMIN — ALLOPURINOL 100 MG: 100 TABLET ORAL at 09:43

## 2019-01-03 RX ADMIN — ROSUVASTATIN CALCIUM 10 MG: 10 TABLET, FILM COATED ORAL at 21:53

## 2019-01-03 RX ADMIN — PANTOPRAZOLE SODIUM 40 MG: 40 TABLET, DELAYED RELEASE ORAL at 06:39

## 2019-01-03 RX ADMIN — CALCIUM ACETATE 1334 MG: 667 CAPSULE ORAL at 17:03

## 2019-01-03 RX ADMIN — WARFARIN SODIUM 8 MG: 5 TABLET ORAL at 17:03

## 2019-01-03 RX ADMIN — IPRATROPIUM BROMIDE AND ALBUTEROL SULFATE 3 ML: .5; 3 SOLUTION RESPIRATORY (INHALATION) at 15:39

## 2019-01-03 RX ADMIN — IPRATROPIUM BROMIDE AND ALBUTEROL SULFATE 3 ML: .5; 3 SOLUTION RESPIRATORY (INHALATION) at 19:22

## 2019-01-03 RX ADMIN — CALCIUM ACETATE 1334 MG: 667 CAPSULE ORAL at 09:43

## 2019-01-03 RX ADMIN — HYDROMORPHONE HYDROCHLORIDE 0.5 MG: 2 TABLET ORAL at 10:34

## 2019-01-03 RX ADMIN — INSULIN GLARGINE 23 UNITS: 100 INJECTION, SOLUTION SUBCUTANEOUS at 21:53

## 2019-01-03 RX ADMIN — INSULIN LISPRO 6 UNITS: 100 INJECTION, SOLUTION INTRAVENOUS; SUBCUTANEOUS at 12:17

## 2019-01-03 RX ADMIN — CALCIUM ACETATE 1334 MG: 667 CAPSULE ORAL at 12:31

## 2019-01-03 RX ADMIN — IPRATROPIUM BROMIDE AND ALBUTEROL SULFATE 3 ML: .5; 3 SOLUTION RESPIRATORY (INHALATION) at 11:46

## 2019-01-03 RX ADMIN — Medication 10 ML: at 21:58

## 2019-01-03 RX ADMIN — INSULIN LISPRO 9 UNITS: 100 INJECTION, SOLUTION INTRAVENOUS; SUBCUTANEOUS at 09:44

## 2019-01-03 RX ADMIN — INSULIN GLARGINE 23 UNITS: 100 INJECTION, SOLUTION SUBCUTANEOUS at 09:44

## 2019-01-03 RX ADMIN — Medication 2 PUFF: at 07:34

## 2019-01-03 RX ADMIN — Medication 2 PUFF: at 19:22

## 2019-01-03 RX ADMIN — GUAIFENESIN 600 MG: 600 TABLET, EXTENDED RELEASE ORAL at 17:03

## 2019-01-03 RX ADMIN — TRAZODONE HYDROCHLORIDE 100 MG: 50 TABLET ORAL at 21:53

## 2019-01-03 RX ADMIN — GABAPENTIN 300 MG: 300 CAPSULE ORAL at 21:53

## 2019-01-03 RX ADMIN — Medication: at 12:17

## 2019-01-03 RX ADMIN — SODIUM CHLORIDE 250 ML: 9 INJECTION, SOLUTION INTRAVENOUS at 17:04

## 2019-01-03 RX ADMIN — Medication 10 ML: at 09:43

## 2019-01-03 RX ADMIN — INSULIN LISPRO 15 UNITS: 100 INJECTION, SOLUTION INTRAVENOUS; SUBCUTANEOUS at 17:05

## 2019-01-03 ASSESSMENT — PAIN SCALES - GENERAL
PAINLEVEL_OUTOF10: 0
PAINLEVEL_OUTOF10: 0
PAINLEVEL_OUTOF10: 7

## 2019-01-04 LAB
ANION GAP SERPL CALCULATED.3IONS-SCNC: 15 MMOL/L (ref 3–16)
BASOPHILS ABSOLUTE: 0.1 K/UL (ref 0–0.2)
BASOPHILS RELATIVE PERCENT: 0.7 %
BUN BLDV-MCNC: 30 MG/DL (ref 7–20)
CALCIUM SERPL-MCNC: 9 MG/DL (ref 8.3–10.6)
CHLORIDE BLD-SCNC: 99 MMOL/L (ref 99–110)
CO2: 24 MMOL/L (ref 21–32)
CREAT SERPL-MCNC: 9.3 MG/DL (ref 0.6–1.2)
EOSINOPHILS ABSOLUTE: 0.1 K/UL (ref 0–0.6)
EOSINOPHILS RELATIVE PERCENT: 1.4 %
GFR AFRICAN AMERICAN: 5
GFR NON-AFRICAN AMERICAN: 4
GLUCOSE BLD-MCNC: 132 MG/DL (ref 70–99)
GLUCOSE BLD-MCNC: 177 MG/DL (ref 70–99)
GLUCOSE BLD-MCNC: 226 MG/DL (ref 70–99)
GLUCOSE BLD-MCNC: 233 MG/DL (ref 70–99)
HCT VFR BLD CALC: 26.1 % (ref 36–48)
HEMOGLOBIN: 8.5 G/DL (ref 12–16)
INR BLD: 1.93 (ref 0.86–1.14)
LYMPHOCYTES ABSOLUTE: 0.9 K/UL (ref 1–5.1)
LYMPHOCYTES RELATIVE PERCENT: 12.2 %
MCH RBC QN AUTO: 33.8 PG (ref 26–34)
MCHC RBC AUTO-ENTMCNC: 32.6 G/DL (ref 31–36)
MCV RBC AUTO: 103.5 FL (ref 80–100)
MONOCYTES ABSOLUTE: 0.5 K/UL (ref 0–1.3)
MONOCYTES RELATIVE PERCENT: 6.3 %
NEUTROPHILS ABSOLUTE: 6.1 K/UL (ref 1.7–7.7)
NEUTROPHILS RELATIVE PERCENT: 79.4 %
PDW BLD-RTO: 23 % (ref 12.4–15.4)
PERFORMED ON: ABNORMAL
PHOSPHORUS: 6.3 MG/DL (ref 2.5–4.9)
PLATELET # BLD: 164 K/UL (ref 135–450)
PMV BLD AUTO: 8.4 FL (ref 5–10.5)
POTASSIUM SERPL-SCNC: 4.6 MMOL/L (ref 3.5–5.1)
PROTHROMBIN TIME: 22 SEC (ref 9.8–13)
RBC # BLD: 2.53 M/UL (ref 4–5.2)
SODIUM BLD-SCNC: 138 MMOL/L (ref 136–145)
WBC # BLD: 7.7 K/UL (ref 4–11)

## 2019-01-04 PROCEDURE — 85025 COMPLETE CBC W/AUTO DIFF WBC: CPT

## 2019-01-04 PROCEDURE — 6360000002 HC RX W HCPCS: Performed by: INTERNAL MEDICINE

## 2019-01-04 PROCEDURE — 85610 PROTHROMBIN TIME: CPT

## 2019-01-04 PROCEDURE — 90937 HEMODIALYSIS REPEATED EVAL: CPT

## 2019-01-04 PROCEDURE — 6370000000 HC RX 637 (ALT 250 FOR IP): Performed by: INTERNAL MEDICINE

## 2019-01-04 PROCEDURE — G0257 UNSCHED DIALYSIS ESRD PT HOS: HCPCS

## 2019-01-04 PROCEDURE — 84100 ASSAY OF PHOSPHORUS: CPT

## 2019-01-04 PROCEDURE — 2500000003 HC RX 250 WO HCPCS: Performed by: INTERNAL MEDICINE

## 2019-01-04 PROCEDURE — 99232 SBSQ HOSP IP/OBS MODERATE 35: CPT | Performed by: INTERNAL MEDICINE

## 2019-01-04 PROCEDURE — G0378 HOSPITAL OBSERVATION PER HR: HCPCS

## 2019-01-04 PROCEDURE — 94640 AIRWAY INHALATION TREATMENT: CPT

## 2019-01-04 PROCEDURE — P9047 ALBUMIN (HUMAN), 25%, 50ML: HCPCS | Performed by: INTERNAL MEDICINE

## 2019-01-04 PROCEDURE — 96376 TX/PRO/DX INJ SAME DRUG ADON: CPT

## 2019-01-04 PROCEDURE — 2580000003 HC RX 258: Performed by: INTERNAL MEDICINE

## 2019-01-04 PROCEDURE — 94660 CPAP INITIATION&MGMT: CPT

## 2019-01-04 PROCEDURE — 80048 BASIC METABOLIC PNL TOTAL CA: CPT

## 2019-01-04 PROCEDURE — 94760 N-INVAS EAR/PLS OXIMETRY 1: CPT

## 2019-01-04 RX ORDER — NEBIVOLOL 5 MG/1
2.5 TABLET ORAL 2 TIMES DAILY
Status: DISCONTINUED | OUTPATIENT
Start: 2019-01-04 | End: 2019-01-05 | Stop reason: HOSPADM

## 2019-01-04 RX ORDER — IPRATROPIUM BROMIDE AND ALBUTEROL SULFATE 2.5; .5 MG/3ML; MG/3ML
1 SOLUTION RESPIRATORY (INHALATION) EVERY 4 HOURS PRN
Status: DISCONTINUED | OUTPATIENT
Start: 2019-01-04 | End: 2019-01-05 | Stop reason: HOSPADM

## 2019-01-04 RX ORDER — ALBUMIN (HUMAN) 12.5 G/50ML
50 SOLUTION INTRAVENOUS ONCE
Status: COMPLETED | OUTPATIENT
Start: 2019-01-04 | End: 2019-01-04

## 2019-01-04 RX ADMIN — ROSUVASTATIN CALCIUM 10 MG: 10 TABLET, FILM COATED ORAL at 21:53

## 2019-01-04 RX ADMIN — CALCIUM ACETATE 1334 MG: 667 CAPSULE ORAL at 12:58

## 2019-01-04 RX ADMIN — IPRATROPIUM BROMIDE AND ALBUTEROL SULFATE 1 AMPULE: .5; 3 SOLUTION RESPIRATORY (INHALATION) at 01:32

## 2019-01-04 RX ADMIN — PANTOPRAZOLE SODIUM 40 MG: 40 TABLET, DELAYED RELEASE ORAL at 06:14

## 2019-01-04 RX ADMIN — Medication 2 PUFF: at 07:56

## 2019-01-04 RX ADMIN — ALBUMIN (HUMAN) 50 G: 0.25 INJECTION, SOLUTION INTRAVENOUS at 08:37

## 2019-01-04 RX ADMIN — HYDROMORPHONE HYDROCHLORIDE 0.5 MG: 1 INJECTION, SOLUTION INTRAMUSCULAR; INTRAVENOUS; SUBCUTANEOUS at 00:46

## 2019-01-04 RX ADMIN — IPRATROPIUM BROMIDE AND ALBUTEROL SULFATE 3 ML: .5; 3 SOLUTION RESPIRATORY (INHALATION) at 21:26

## 2019-01-04 RX ADMIN — Medication 10 ML: at 21:55

## 2019-01-04 RX ADMIN — GUAIFENESIN 600 MG: 600 TABLET, EXTENDED RELEASE ORAL at 13:08

## 2019-01-04 RX ADMIN — Medication 2 PUFF: at 21:26

## 2019-01-04 RX ADMIN — NEBIVOLOL HYDROCHLORIDE 2.5 MG: 5 TABLET ORAL at 21:55

## 2019-01-04 RX ADMIN — INSULIN LISPRO 3 UNITS: 100 INJECTION, SOLUTION INTRAVENOUS; SUBCUTANEOUS at 21:56

## 2019-01-04 RX ADMIN — INSULIN GLARGINE 23 UNITS: 100 INJECTION, SOLUTION SUBCUTANEOUS at 21:56

## 2019-01-04 RX ADMIN — ALLOPURINOL 100 MG: 100 TABLET ORAL at 12:58

## 2019-01-04 RX ADMIN — CALCIUM ACETATE 1334 MG: 667 CAPSULE ORAL at 17:22

## 2019-01-04 RX ADMIN — Medication 10 ML: at 13:08

## 2019-01-04 RX ADMIN — ASPIRIN 81 MG 81 MG: 81 TABLET ORAL at 12:58

## 2019-01-04 RX ADMIN — LIDOCAINE HYDROCHLORIDE 0.5 ML: 10 INJECTION, SOLUTION EPIDURAL; INFILTRATION; INTRACAUDAL; PERINEURAL at 07:14

## 2019-01-04 RX ADMIN — INSULIN LISPRO 3 UNITS: 100 INJECTION, SOLUTION INTRAVENOUS; SUBCUTANEOUS at 17:22

## 2019-01-04 RX ADMIN — TRAZODONE HYDROCHLORIDE 100 MG: 50 TABLET ORAL at 21:53

## 2019-01-04 RX ADMIN — WARFARIN SODIUM 6 MG: 5 TABLET ORAL at 17:22

## 2019-01-04 RX ADMIN — IPRATROPIUM BROMIDE AND ALBUTEROL SULFATE 3 ML: .5; 3 SOLUTION RESPIRATORY (INHALATION) at 07:56

## 2019-01-04 RX ADMIN — GUAIFENESIN 600 MG: 600 TABLET, EXTENDED RELEASE ORAL at 21:53

## 2019-01-04 RX ADMIN — IPRATROPIUM BROMIDE AND ALBUTEROL SULFATE 3 ML: .5; 3 SOLUTION RESPIRATORY (INHALATION) at 15:06

## 2019-01-04 RX ADMIN — GABAPENTIN 300 MG: 300 CAPSULE ORAL at 21:53

## 2019-01-04 ASSESSMENT — PAIN SCALES - GENERAL
PAINLEVEL_OUTOF10: 0
PAINLEVEL_OUTOF10: 8
PAINLEVEL_OUTOF10: 10

## 2019-01-05 VITALS
TEMPERATURE: 97.5 F | BODY MASS INDEX: 51.91 KG/M2 | RESPIRATION RATE: 18 BRPM | WEIGHT: 293 LBS | OXYGEN SATURATION: 100 % | HEART RATE: 101 BPM | SYSTOLIC BLOOD PRESSURE: 132 MMHG | DIASTOLIC BLOOD PRESSURE: 76 MMHG | HEIGHT: 63 IN

## 2019-01-05 LAB
ALBUMIN SERPL-MCNC: 3.7 G/DL (ref 3.4–5)
ANION GAP SERPL CALCULATED.3IONS-SCNC: 12 MMOL/L (ref 3–16)
ANION GAP SERPL CALCULATED.3IONS-SCNC: 12 MMOL/L (ref 3–16)
BUN BLDV-MCNC: 26 MG/DL (ref 7–20)
BUN BLDV-MCNC: 26 MG/DL (ref 7–20)
CALCIUM SERPL-MCNC: 9.3 MG/DL (ref 8.3–10.6)
CALCIUM SERPL-MCNC: 9.4 MG/DL (ref 8.3–10.6)
CHLORIDE BLD-SCNC: 99 MMOL/L (ref 99–110)
CHLORIDE BLD-SCNC: 99 MMOL/L (ref 99–110)
CO2: 27 MMOL/L (ref 21–32)
CO2: 27 MMOL/L (ref 21–32)
CREAT SERPL-MCNC: 7.3 MG/DL (ref 0.6–1.2)
CREAT SERPL-MCNC: 7.5 MG/DL (ref 0.6–1.2)
GFR AFRICAN AMERICAN: 7
GFR AFRICAN AMERICAN: 7
GFR NON-AFRICAN AMERICAN: 5
GFR NON-AFRICAN AMERICAN: 6
GLUCOSE BLD-MCNC: 209 MG/DL (ref 70–99)
GLUCOSE BLD-MCNC: 308 MG/DL (ref 70–99)
GLUCOSE BLD-MCNC: 312 MG/DL (ref 70–99)
GLUCOSE BLD-MCNC: >600 MG/DL (ref 70–99)
HCT VFR BLD CALC: 27.1 % (ref 36–48)
HEMOGLOBIN: 8.6 G/DL (ref 12–16)
HEPATITIS B CORE TOTAL ANTIBODY: NEGATIVE
INR BLD: 2.49 (ref 0.86–1.14)
MCH RBC QN AUTO: 33.1 PG (ref 26–34)
MCHC RBC AUTO-ENTMCNC: 31.7 G/DL (ref 31–36)
MCV RBC AUTO: 104.3 FL (ref 80–100)
PDW BLD-RTO: 23 % (ref 12.4–15.4)
PERFORMED ON: ABNORMAL
PERFORMED ON: ABNORMAL
PHOSPHORUS: 4.8 MG/DL (ref 2.5–4.9)
PHOSPHORUS: 4.8 MG/DL (ref 2.5–4.9)
PLATELET # BLD: 169 K/UL (ref 135–450)
PMV BLD AUTO: 8.7 FL (ref 5–10.5)
POTASSIUM SERPL-SCNC: 5 MMOL/L (ref 3.5–5.1)
POTASSIUM SERPL-SCNC: 5 MMOL/L (ref 3.5–5.1)
PROTHROMBIN TIME: 28.4 SEC (ref 9.8–13)
RBC # BLD: 2.6 M/UL (ref 4–5.2)
SODIUM BLD-SCNC: 138 MMOL/L (ref 136–145)
SODIUM BLD-SCNC: 138 MMOL/L (ref 136–145)
WBC # BLD: 6.6 K/UL (ref 4–11)

## 2019-01-05 PROCEDURE — 2500000003 HC RX 250 WO HCPCS: Performed by: INTERNAL MEDICINE

## 2019-01-05 PROCEDURE — 6370000000 HC RX 637 (ALT 250 FOR IP): Performed by: INTERNAL MEDICINE

## 2019-01-05 PROCEDURE — 85610 PROTHROMBIN TIME: CPT

## 2019-01-05 PROCEDURE — 84100 ASSAY OF PHOSPHORUS: CPT

## 2019-01-05 PROCEDURE — 85027 COMPLETE CBC AUTOMATED: CPT

## 2019-01-05 PROCEDURE — 80069 RENAL FUNCTION PANEL: CPT

## 2019-01-05 PROCEDURE — 99225 PR SBSQ OBSERVATION CARE/DAY 25 MINUTES: CPT | Performed by: NURSE PRACTITIONER

## 2019-01-05 PROCEDURE — 2580000003 HC RX 258: Performed by: INTERNAL MEDICINE

## 2019-01-05 PROCEDURE — 94640 AIRWAY INHALATION TREATMENT: CPT

## 2019-01-05 PROCEDURE — 94760 N-INVAS EAR/PLS OXIMETRY 1: CPT

## 2019-01-05 PROCEDURE — G0257 UNSCHED DIALYSIS ESRD PT HOS: HCPCS

## 2019-01-05 PROCEDURE — 90937 HEMODIALYSIS REPEATED EVAL: CPT

## 2019-01-05 PROCEDURE — G0378 HOSPITAL OBSERVATION PER HR: HCPCS

## 2019-01-05 RX ORDER — WARFARIN SODIUM 2.5 MG/1
2.5 TABLET ORAL DAILY
Status: DISCONTINUED | OUTPATIENT
Start: 2019-01-05 | End: 2019-01-05 | Stop reason: HOSPADM

## 2019-01-05 RX ORDER — GUAIFENESIN 600 MG/1
600 TABLET, EXTENDED RELEASE ORAL 2 TIMES DAILY
Qty: 10 TABLET | Refills: 0 | Status: SHIPPED | OUTPATIENT
Start: 2019-01-05 | End: 2019-01-29 | Stop reason: ALTCHOICE

## 2019-01-05 RX ORDER — NEBIVOLOL 2.5 MG/1
2.5 TABLET ORAL 2 TIMES DAILY
Qty: 30 TABLET | Refills: 3 | Status: SHIPPED | OUTPATIENT
Start: 2019-01-05 | End: 2019-09-25 | Stop reason: ALTCHOICE

## 2019-01-05 RX ORDER — LEVOFLOXACIN 250 MG/1
250 TABLET ORAL EVERY OTHER DAY
Qty: 3 TABLET | Refills: 0 | Status: SHIPPED | OUTPATIENT
Start: 2019-01-05 | End: 2019-01-10

## 2019-01-05 RX ADMIN — LIDOCAINE HYDROCHLORIDE 0.2 ML: 10 INJECTION, SOLUTION EPIDURAL; INFILTRATION; INTRACAUDAL; PERINEURAL at 11:27

## 2019-01-05 RX ADMIN — IPRATROPIUM BROMIDE AND ALBUTEROL SULFATE 3 ML: .5; 3 SOLUTION RESPIRATORY (INHALATION) at 08:56

## 2019-01-05 RX ADMIN — INSULIN GLARGINE 23 UNITS: 100 INJECTION, SOLUTION SUBCUTANEOUS at 07:48

## 2019-01-05 RX ADMIN — INSULIN LISPRO 6 UNITS: 100 INJECTION, SOLUTION INTRAVENOUS; SUBCUTANEOUS at 07:48

## 2019-01-05 RX ADMIN — Medication 2 PUFF: at 08:54

## 2019-01-05 RX ADMIN — Medication 10 ML: at 08:56

## 2019-01-05 RX ADMIN — IPRATROPIUM BROMIDE AND ALBUTEROL SULFATE 3 ML: .5; 3 SOLUTION RESPIRATORY (INHALATION) at 15:37

## 2019-01-05 RX ADMIN — ASPIRIN 81 MG 81 MG: 81 TABLET ORAL at 10:48

## 2019-01-05 RX ADMIN — PANTOPRAZOLE SODIUM 40 MG: 40 TABLET, DELAYED RELEASE ORAL at 06:57

## 2019-01-05 RX ADMIN — GUAIFENESIN 600 MG: 600 TABLET, EXTENDED RELEASE ORAL at 08:30

## 2019-01-05 RX ADMIN — ALLOPURINOL 100 MG: 100 TABLET ORAL at 10:48

## 2019-01-05 ASSESSMENT — PAIN SCALES - GENERAL: PAINLEVEL_OUTOF10: 10

## 2019-01-07 ENCOUNTER — TELEPHONE (OUTPATIENT)
Dept: FAMILY MEDICINE CLINIC | Age: 64
End: 2019-01-07

## 2019-01-15 ENCOUNTER — CARE COORDINATION (OUTPATIENT)
Dept: CARE COORDINATION | Age: 64
End: 2019-01-15

## 2019-01-15 ENCOUNTER — OFFICE VISIT (OUTPATIENT)
Dept: FAMILY MEDICINE CLINIC | Age: 64
End: 2019-01-15
Payer: MEDICARE

## 2019-01-15 ENCOUNTER — TELEPHONE (OUTPATIENT)
Dept: FAMILY MEDICINE CLINIC | Age: 64
End: 2019-01-15

## 2019-01-15 VITALS
WEIGHT: 293 LBS | OXYGEN SATURATION: 95 % | DIASTOLIC BLOOD PRESSURE: 74 MMHG | TEMPERATURE: 98.6 F | SYSTOLIC BLOOD PRESSURE: 102 MMHG | BODY MASS INDEX: 59.52 KG/M2 | HEART RATE: 60 BPM

## 2019-01-15 DIAGNOSIS — R07.9 CHEST PAIN, UNSPECIFIED TYPE: Primary | ICD-10-CM

## 2019-01-15 DIAGNOSIS — K52.9 ACUTE GASTROENTERITIS: ICD-10-CM

## 2019-01-15 DIAGNOSIS — Z99.2 ESRD (END STAGE RENAL DISEASE) ON DIALYSIS (HCC): ICD-10-CM

## 2019-01-15 DIAGNOSIS — I50.32 CHRONIC DIASTOLIC CHF (CONGESTIVE HEART FAILURE) (HCC): ICD-10-CM

## 2019-01-15 DIAGNOSIS — E66.2 OBESITY HYPOVENTILATION SYNDROME (HCC): ICD-10-CM

## 2019-01-15 DIAGNOSIS — N18.6 ESRD (END STAGE RENAL DISEASE) ON DIALYSIS (HCC): ICD-10-CM

## 2019-01-15 DIAGNOSIS — T45.515A WARFARIN-INDUCED COAGULOPATHY (HCC): ICD-10-CM

## 2019-01-15 DIAGNOSIS — Z99.89 OBSTRUCTIVE SLEEP APNEA ON CPAP: ICD-10-CM

## 2019-01-15 DIAGNOSIS — E66.01 MORBID OBESITY (HCC): ICD-10-CM

## 2019-01-15 DIAGNOSIS — E11.40 TYPE 2 DIABETES MELLITUS WITH DIABETIC NEUROPATHY, WITH LONG-TERM CURRENT USE OF INSULIN (HCC): ICD-10-CM

## 2019-01-15 DIAGNOSIS — Z09 HOSPITAL DISCHARGE FOLLOW-UP: ICD-10-CM

## 2019-01-15 DIAGNOSIS — J44.9 COPD, MODERATE (HCC): ICD-10-CM

## 2019-01-15 DIAGNOSIS — Z79.4 TYPE 2 DIABETES MELLITUS WITH DIABETIC NEUROPATHY, WITH LONG-TERM CURRENT USE OF INSULIN (HCC): ICD-10-CM

## 2019-01-15 DIAGNOSIS — D68.32 WARFARIN-INDUCED COAGULOPATHY (HCC): ICD-10-CM

## 2019-01-15 DIAGNOSIS — G47.33 OBSTRUCTIVE SLEEP APNEA ON CPAP: ICD-10-CM

## 2019-01-15 PROBLEM — E11.9 DIABETES (HCC): Status: RESOLVED | Noted: 2017-09-09 | Resolved: 2019-01-15

## 2019-01-15 PROBLEM — I50.31 ACUTE DIASTOLIC CHF (CONGESTIVE HEART FAILURE) (HCC): Status: RESOLVED | Noted: 2017-12-25 | Resolved: 2019-01-15

## 2019-01-15 PROBLEM — R10.9 ABDOMINAL PAIN: Status: RESOLVED | Noted: 2018-08-20 | Resolved: 2019-01-15

## 2019-01-15 PROCEDURE — 1111F DSCHRG MED/CURRENT MED MERGE: CPT | Performed by: NURSE PRACTITIONER

## 2019-01-15 PROCEDURE — 99495 TRANSJ CARE MGMT MOD F2F 14D: CPT | Performed by: NURSE PRACTITIONER

## 2019-01-15 ASSESSMENT — ENCOUNTER SYMPTOMS
SHORTNESS OF BREATH: 0
NAUSEA: 1
VOMITING: 1
ABDOMINAL PAIN: 0
DIARRHEA: 1

## 2019-01-18 ENCOUNTER — OFFICE VISIT (OUTPATIENT)
Dept: VASCULAR SURGERY | Age: 64
End: 2019-01-18
Payer: MEDICARE

## 2019-01-18 VITALS
SYSTOLIC BLOOD PRESSURE: 130 MMHG | HEIGHT: 63 IN | WEIGHT: 293 LBS | BODY MASS INDEX: 51.91 KG/M2 | DIASTOLIC BLOOD PRESSURE: 74 MMHG

## 2019-01-18 DIAGNOSIS — Z99.2 ESRD (END STAGE RENAL DISEASE) ON DIALYSIS (HCC): Primary | ICD-10-CM

## 2019-01-18 DIAGNOSIS — N18.6 ESRD (END STAGE RENAL DISEASE) ON DIALYSIS (HCC): Primary | ICD-10-CM

## 2019-01-18 PROCEDURE — G8417 CALC BMI ABV UP PARAM F/U: HCPCS | Performed by: SURGERY

## 2019-01-18 PROCEDURE — 1036F TOBACCO NON-USER: CPT | Performed by: SURGERY

## 2019-01-18 PROCEDURE — 99212 OFFICE O/P EST SF 10 MIN: CPT | Performed by: SURGERY

## 2019-01-18 PROCEDURE — G8484 FLU IMMUNIZE NO ADMIN: HCPCS | Performed by: SURGERY

## 2019-01-18 PROCEDURE — 3017F COLORECTAL CA SCREEN DOC REV: CPT | Performed by: SURGERY

## 2019-01-18 PROCEDURE — G8598 ASA/ANTIPLAT THER USED: HCPCS | Performed by: SURGERY

## 2019-01-18 PROCEDURE — G8427 DOCREV CUR MEDS BY ELIG CLIN: HCPCS | Performed by: SURGERY

## 2019-01-25 ENCOUNTER — CARE COORDINATION (OUTPATIENT)
Dept: FAMILY MEDICINE CLINIC | Age: 64
End: 2019-01-25

## 2019-01-28 ENCOUNTER — TELEPHONE (OUTPATIENT)
Dept: VASCULAR SURGERY | Age: 64
End: 2019-01-28

## 2019-01-28 DIAGNOSIS — N18.6 END STAGE RENAL DISEASE (HCC): Primary | ICD-10-CM

## 2019-01-29 ENCOUNTER — HOSPITAL ENCOUNTER (OUTPATIENT)
Dept: VASCULAR LAB | Age: 64
Discharge: HOME | End: 2019-01-29
Payer: MEDICARE

## 2019-01-29 ENCOUNTER — OFFICE VISIT (OUTPATIENT)
Dept: CARDIOLOGY CLINIC | Age: 64
End: 2019-01-29
Payer: MEDICARE

## 2019-01-29 VITALS
BODY MASS INDEX: 51.91 KG/M2 | HEART RATE: 80 BPM | SYSTOLIC BLOOD PRESSURE: 104 MMHG | DIASTOLIC BLOOD PRESSURE: 60 MMHG | RESPIRATION RATE: 16 BRPM | HEIGHT: 63 IN | WEIGHT: 293 LBS | OXYGEN SATURATION: 99 %

## 2019-01-29 DIAGNOSIS — N18.6 ESRD (END STAGE RENAL DISEASE) ON DIALYSIS (HCC): ICD-10-CM

## 2019-01-29 DIAGNOSIS — Z99.2 ESRD (END STAGE RENAL DISEASE) ON DIALYSIS (HCC): ICD-10-CM

## 2019-01-29 DIAGNOSIS — G47.33 OSA (OBSTRUCTIVE SLEEP APNEA): ICD-10-CM

## 2019-01-29 DIAGNOSIS — I10 ESSENTIAL HYPERTENSION: ICD-10-CM

## 2019-01-29 DIAGNOSIS — R11.0 NAUSEA: ICD-10-CM

## 2019-01-29 DIAGNOSIS — N18.6 END STAGE RENAL DISEASE (HCC): ICD-10-CM

## 2019-01-29 DIAGNOSIS — I50.32 CHRONIC DIASTOLIC CHF (CONGESTIVE HEART FAILURE) (HCC): Primary | ICD-10-CM

## 2019-01-29 PROCEDURE — 3017F COLORECTAL CA SCREEN DOC REV: CPT | Performed by: NURSE PRACTITIONER

## 2019-01-29 PROCEDURE — G8417 CALC BMI ABV UP PARAM F/U: HCPCS | Performed by: NURSE PRACTITIONER

## 2019-01-29 PROCEDURE — G0365 VESSEL MAPPING HEMO ACCESS: HCPCS

## 2019-01-29 PROCEDURE — G8598 ASA/ANTIPLAT THER USED: HCPCS | Performed by: NURSE PRACTITIONER

## 2019-01-29 PROCEDURE — G8484 FLU IMMUNIZE NO ADMIN: HCPCS | Performed by: NURSE PRACTITIONER

## 2019-01-29 PROCEDURE — G8427 DOCREV CUR MEDS BY ELIG CLIN: HCPCS | Performed by: NURSE PRACTITIONER

## 2019-01-29 PROCEDURE — 99214 OFFICE O/P EST MOD 30 MIN: CPT | Performed by: NURSE PRACTITIONER

## 2019-01-29 PROCEDURE — 1036F TOBACCO NON-USER: CPT | Performed by: NURSE PRACTITIONER

## 2019-01-30 RX ORDER — PROMETHAZINE HYDROCHLORIDE 25 MG/1
25 TABLET ORAL EVERY 8 HOURS PRN
Qty: 30 TABLET | Refills: 2 | Status: ON HOLD
Start: 2019-01-30 | End: 2020-02-05 | Stop reason: HOSPADM

## 2019-02-12 ENCOUNTER — OFFICE VISIT (OUTPATIENT)
Dept: PULMONOLOGY | Age: 64
End: 2019-02-12
Payer: MEDICARE

## 2019-02-12 VITALS
SYSTOLIC BLOOD PRESSURE: 110 MMHG | DIASTOLIC BLOOD PRESSURE: 68 MMHG | WEIGHT: 293 LBS | BODY MASS INDEX: 51.91 KG/M2 | HEART RATE: 77 BPM | HEIGHT: 63 IN | OXYGEN SATURATION: 99 %

## 2019-02-12 DIAGNOSIS — E66.2 OBESITY HYPOVENTILATION SYNDROME (HCC): ICD-10-CM

## 2019-02-12 DIAGNOSIS — I10 ESSENTIAL HYPERTENSION, MALIGNANT: ICD-10-CM

## 2019-02-12 DIAGNOSIS — E66.01 MORBID OBESITY (HCC): ICD-10-CM

## 2019-02-12 DIAGNOSIS — J98.4 RESTRICTIVE LUNG DISEASE: ICD-10-CM

## 2019-02-12 DIAGNOSIS — I50.32 CHRONIC DIASTOLIC CHF (CONGESTIVE HEART FAILURE) (HCC): ICD-10-CM

## 2019-02-12 DIAGNOSIS — G47.33 OBSTRUCTIVE SLEEP APNEA ON CPAP: Primary | ICD-10-CM

## 2019-02-12 DIAGNOSIS — Z99.89 OBSTRUCTIVE SLEEP APNEA ON CPAP: Primary | ICD-10-CM

## 2019-02-12 PROBLEM — E11.9 TYPE II OR UNSPECIFIED TYPE DIABETES MELLITUS WITHOUT MENTION OF COMPLICATION, NOT STATED AS UNCONTROLLED: Status: ACTIVE | Noted: 2017-09-12

## 2019-02-12 PROCEDURE — 3045F PR MOST RECENT HEMOGLOBIN A1C LEVEL 7.0-9.0%: CPT | Performed by: NURSE PRACTITIONER

## 2019-02-12 PROCEDURE — G8427 DOCREV CUR MEDS BY ELIG CLIN: HCPCS | Performed by: NURSE PRACTITIONER

## 2019-02-12 PROCEDURE — G8598 ASA/ANTIPLAT THER USED: HCPCS | Performed by: NURSE PRACTITIONER

## 2019-02-12 PROCEDURE — 3017F COLORECTAL CA SCREEN DOC REV: CPT | Performed by: NURSE PRACTITIONER

## 2019-02-12 PROCEDURE — 1036F TOBACCO NON-USER: CPT | Performed by: NURSE PRACTITIONER

## 2019-02-12 PROCEDURE — G8417 CALC BMI ABV UP PARAM F/U: HCPCS | Performed by: NURSE PRACTITIONER

## 2019-02-12 PROCEDURE — G8484 FLU IMMUNIZE NO ADMIN: HCPCS | Performed by: NURSE PRACTITIONER

## 2019-02-12 PROCEDURE — 99214 OFFICE O/P EST MOD 30 MIN: CPT | Performed by: NURSE PRACTITIONER

## 2019-02-12 PROCEDURE — 2022F DILAT RTA XM EVC RTNOPTHY: CPT | Performed by: NURSE PRACTITIONER

## 2019-02-12 ASSESSMENT — ENCOUNTER SYMPTOMS
SINUS PRESSURE: 0
COUGH: 0
ABDOMINAL DISTENTION: 0
APNEA: 0
RHINORRHEA: 0
SHORTNESS OF BREATH: 0
ABDOMINAL PAIN: 0
EYE PAIN: 0
EYE REDNESS: 0

## 2019-02-12 ASSESSMENT — SLEEP AND FATIGUE QUESTIONNAIRES
HOW LIKELY ARE YOU TO NOD OFF OR FALL ASLEEP WHILE SITTING AND READING: 3
HOW LIKELY ARE YOU TO NOD OFF OR FALL ASLEEP WHILE SITTING INACTIVE IN A PUBLIC PLACE: 1
HOW LIKELY ARE YOU TO NOD OFF OR FALL ASLEEP WHILE LYING DOWN TO REST IN THE AFTERNOON WHEN CIRCUMSTANCES PERMIT: 2
HOW LIKELY ARE YOU TO NOD OFF OR FALL ASLEEP IN A CAR, WHILE STOPPED FOR A FEW MINUTES IN TRAFFIC: 0
ESS TOTAL SCORE: 11
HOW LIKELY ARE YOU TO NOD OFF OR FALL ASLEEP WHILE SITTING AND TALKING TO SOMEONE: 0
HOW LIKELY ARE YOU TO NOD OFF OR FALL ASLEEP WHILE SITTING QUIETLY AFTER LUNCH WITHOUT ALCOHOL: 1
HOW LIKELY ARE YOU TO NOD OFF OR FALL ASLEEP WHILE WATCHING TV: 3
HOW LIKELY ARE YOU TO NOD OFF OR FALL ASLEEP WHEN YOU ARE A PASSENGER IN A CAR FOR AN HOUR WITHOUT A BREAK: 1

## 2019-02-18 ENCOUNTER — TELEPHONE (OUTPATIENT)
Dept: FAMILY MEDICINE CLINIC | Age: 64
End: 2019-02-18

## 2019-02-20 ENCOUNTER — APPOINTMENT (OUTPATIENT)
Dept: GENERAL RADIOLOGY | Age: 64
End: 2019-02-20
Payer: MEDICARE

## 2019-02-20 ENCOUNTER — HOSPITAL ENCOUNTER (OUTPATIENT)
Age: 64
Setting detail: OBSERVATION
Discharge: HOME HEALTH | End: 2019-02-21
Attending: EMERGENCY MEDICINE | Admitting: INTERNAL MEDICINE
Payer: MEDICARE

## 2019-02-20 DIAGNOSIS — R77.8 ELEVATED TROPONIN I MEASUREMENT: ICD-10-CM

## 2019-02-20 DIAGNOSIS — G89.4 CHRONIC PAIN SYNDROME: ICD-10-CM

## 2019-02-20 DIAGNOSIS — Z99.2 END-STAGE RENAL DISEASE ON HEMODIALYSIS (HCC): ICD-10-CM

## 2019-02-20 DIAGNOSIS — R07.9 CHEST PAIN, UNSPECIFIED TYPE: Primary | ICD-10-CM

## 2019-02-20 DIAGNOSIS — N18.6 END-STAGE RENAL DISEASE ON HEMODIALYSIS (HCC): ICD-10-CM

## 2019-02-20 DIAGNOSIS — E66.01 MORBID OBESITY (HCC): ICD-10-CM

## 2019-02-20 LAB
A/G RATIO: 0.9 (ref 1.1–2.2)
ALBUMIN SERPL-MCNC: 3.5 G/DL (ref 3.4–5)
ALP BLD-CCNC: 97 U/L (ref 40–129)
ALT SERPL-CCNC: 9 U/L (ref 10–40)
ANION GAP SERPL CALCULATED.3IONS-SCNC: 13 MMOL/L (ref 3–16)
ANISOCYTOSIS: ABNORMAL
APTT: 44 SEC (ref 26–36)
AST SERPL-CCNC: 12 U/L (ref 15–37)
BANDED NEUTROPHILS RELATIVE PERCENT: 4 % (ref 0–7)
BASOPHILS ABSOLUTE: 0 K/UL (ref 0–0.2)
BASOPHILS RELATIVE PERCENT: 0 %
BILIRUB SERPL-MCNC: 0.4 MG/DL (ref 0–1)
BUN BLDV-MCNC: 10 MG/DL (ref 7–20)
CALCIUM SERPL-MCNC: 9.4 MG/DL (ref 8.3–10.6)
CHLORIDE BLD-SCNC: 98 MMOL/L (ref 99–110)
CO2: 27 MMOL/L (ref 21–32)
CREAT SERPL-MCNC: 4.9 MG/DL (ref 0.6–1.2)
EKG ATRIAL RATE: 81 BPM
EKG DIAGNOSIS: NORMAL
EKG P AXIS: 29 DEGREES
EKG P-R INTERVAL: 162 MS
EKG Q-T INTERVAL: 386 MS
EKG QRS DURATION: 80 MS
EKG QTC CALCULATION (BAZETT): 448 MS
EKG R AXIS: -20 DEGREES
EKG T AXIS: 83 DEGREES
EKG VENTRICULAR RATE: 81 BPM
EOSINOPHILS ABSOLUTE: 0.1 K/UL (ref 0–0.6)
EOSINOPHILS RELATIVE PERCENT: 2 %
GFR AFRICAN AMERICAN: 11
GFR NON-AFRICAN AMERICAN: 9
GLOBULIN: 3.7 G/DL
GLUCOSE BLD-MCNC: 110 MG/DL (ref 70–99)
GLUCOSE BLD-MCNC: 113 MG/DL (ref 70–99)
GLUCOSE BLD-MCNC: 139 MG/DL (ref 70–99)
HCT VFR BLD CALC: 34.9 % (ref 36–48)
HEMOGLOBIN: 11.2 G/DL (ref 12–16)
INR BLD: 2.81 (ref 0.86–1.14)
LIPASE: 42 U/L (ref 13–60)
LYMPHOCYTES ABSOLUTE: 1 K/UL (ref 1–5.1)
LYMPHOCYTES RELATIVE PERCENT: 13 %
MACROCYTES: ABNORMAL
MCH RBC QN AUTO: 32.8 PG (ref 26–34)
MCHC RBC AUTO-ENTMCNC: 32.2 G/DL (ref 31–36)
MCV RBC AUTO: 102.1 FL (ref 80–100)
MONOCYTES ABSOLUTE: 0.6 K/UL (ref 0–1.3)
MONOCYTES RELATIVE PERCENT: 8 %
NEUTROPHILS ABSOLUTE: 5.7 K/UL (ref 1.7–7.7)
NEUTROPHILS RELATIVE PERCENT: 73 %
PDW BLD-RTO: 18.6 % (ref 12.4–15.4)
PERFORMED ON: ABNORMAL
PERFORMED ON: ABNORMAL
PLATELET # BLD: 203 K/UL (ref 135–450)
PLATELET SLIDE REVIEW: ADEQUATE
PMV BLD AUTO: 7.9 FL (ref 5–10.5)
POTASSIUM REFLEX MAGNESIUM: 3.8 MMOL/L (ref 3.5–5.1)
PRO-BNP: 4183 PG/ML (ref 0–124)
PROTHROMBIN TIME: 32 SEC (ref 9.8–13)
RBC # BLD: 3.42 M/UL (ref 4–5.2)
SLIDE REVIEW: ABNORMAL
SODIUM BLD-SCNC: 138 MMOL/L (ref 136–145)
TEAR DROP CELLS: ABNORMAL
TOTAL PROTEIN: 7.2 G/DL (ref 6.4–8.2)
TROPONIN: 0.1 NG/ML
WBC # BLD: 7.4 K/UL (ref 4–11)

## 2019-02-20 PROCEDURE — 2580000003 HC RX 258: Performed by: INTERNAL MEDICINE

## 2019-02-20 PROCEDURE — 6370000000 HC RX 637 (ALT 250 FOR IP): Performed by: INTERNAL MEDICINE

## 2019-02-20 PROCEDURE — 93005 ELECTROCARDIOGRAM TRACING: CPT | Performed by: EMERGENCY MEDICINE

## 2019-02-20 PROCEDURE — 93010 ELECTROCARDIOGRAM REPORT: CPT | Performed by: INTERNAL MEDICINE

## 2019-02-20 PROCEDURE — 85730 THROMBOPLASTIN TIME PARTIAL: CPT

## 2019-02-20 PROCEDURE — 83880 ASSAY OF NATRIURETIC PEPTIDE: CPT

## 2019-02-20 PROCEDURE — 83690 ASSAY OF LIPASE: CPT

## 2019-02-20 PROCEDURE — G0378 HOSPITAL OBSERVATION PER HR: HCPCS

## 2019-02-20 PROCEDURE — 85610 PROTHROMBIN TIME: CPT

## 2019-02-20 PROCEDURE — 80053 COMPREHEN METABOLIC PANEL: CPT

## 2019-02-20 PROCEDURE — 71045 X-RAY EXAM CHEST 1 VIEW: CPT

## 2019-02-20 PROCEDURE — 84484 ASSAY OF TROPONIN QUANT: CPT

## 2019-02-20 PROCEDURE — 99285 EMERGENCY DEPT VISIT HI MDM: CPT

## 2019-02-20 PROCEDURE — 6370000000 HC RX 637 (ALT 250 FOR IP): Performed by: PHYSICIAN ASSISTANT

## 2019-02-20 PROCEDURE — 85025 COMPLETE CBC W/AUTO DIFF WBC: CPT

## 2019-02-20 RX ORDER — ASPIRIN 81 MG/1
324 TABLET, CHEWABLE ORAL ONCE
Status: COMPLETED | OUTPATIENT
Start: 2019-02-20 | End: 2019-02-20

## 2019-02-20 RX ORDER — ALBUTEROL SULFATE 90 UG/1
2 AEROSOL, METERED RESPIRATORY (INHALATION) EVERY 6 HOURS PRN
Status: DISCONTINUED | OUTPATIENT
Start: 2019-02-20 | End: 2019-02-21

## 2019-02-20 RX ORDER — DEXTROSE MONOHYDRATE 50 MG/ML
100 INJECTION, SOLUTION INTRAVENOUS PRN
Status: DISCONTINUED | OUTPATIENT
Start: 2019-02-20 | End: 2019-02-21 | Stop reason: HOSPADM

## 2019-02-20 RX ORDER — DEXTROSE MONOHYDRATE 25 G/50ML
12.5 INJECTION, SOLUTION INTRAVENOUS PRN
Status: DISCONTINUED | OUTPATIENT
Start: 2019-02-20 | End: 2019-02-21 | Stop reason: HOSPADM

## 2019-02-20 RX ORDER — TRAZODONE HYDROCHLORIDE 50 MG/1
100 TABLET ORAL NIGHTLY
Status: DISCONTINUED | OUTPATIENT
Start: 2019-02-20 | End: 2019-02-21 | Stop reason: HOSPADM

## 2019-02-20 RX ORDER — NEBIVOLOL 5 MG/1
2.5 TABLET ORAL 2 TIMES DAILY
Status: DISCONTINUED | OUTPATIENT
Start: 2019-02-20 | End: 2019-02-21 | Stop reason: HOSPADM

## 2019-02-20 RX ORDER — NITROGLYCERIN 0.4 MG/1
0.4 TABLET SUBLINGUAL EVERY 5 MIN PRN
Status: DISCONTINUED | OUTPATIENT
Start: 2019-02-20 | End: 2019-02-21 | Stop reason: HOSPADM

## 2019-02-20 RX ORDER — TRAMADOL HYDROCHLORIDE 50 MG/1
50 TABLET ORAL EVERY 6 HOURS PRN
Status: DISCONTINUED | OUTPATIENT
Start: 2019-02-20 | End: 2019-02-21 | Stop reason: HOSPADM

## 2019-02-20 RX ORDER — WARFARIN SODIUM 5 MG/1
5 TABLET ORAL DAILY
Status: DISCONTINUED | OUTPATIENT
Start: 2019-02-20 | End: 2019-02-21 | Stop reason: CLARIF

## 2019-02-20 RX ORDER — ONDANSETRON 2 MG/ML
4 INJECTION INTRAMUSCULAR; INTRAVENOUS EVERY 6 HOURS PRN
Status: DISCONTINUED | OUTPATIENT
Start: 2019-02-20 | End: 2019-02-21 | Stop reason: HOSPADM

## 2019-02-20 RX ORDER — ALLOPURINOL 100 MG/1
100 TABLET ORAL DAILY
Status: DISCONTINUED | OUTPATIENT
Start: 2019-02-20 | End: 2019-02-21 | Stop reason: HOSPADM

## 2019-02-20 RX ORDER — NICOTINE POLACRILEX 4 MG
15 LOZENGE BUCCAL PRN
Status: DISCONTINUED | OUTPATIENT
Start: 2019-02-20 | End: 2019-02-21 | Stop reason: HOSPADM

## 2019-02-20 RX ORDER — SODIUM CHLORIDE 0.9 % (FLUSH) 0.9 %
10 SYRINGE (ML) INJECTION EVERY 12 HOURS SCHEDULED
Status: DISCONTINUED | OUTPATIENT
Start: 2019-02-20 | End: 2019-02-21 | Stop reason: HOSPADM

## 2019-02-20 RX ORDER — GABAPENTIN 300 MG/1
300 CAPSULE ORAL NIGHTLY
Status: DISCONTINUED | OUTPATIENT
Start: 2019-02-20 | End: 2019-02-21 | Stop reason: HOSPADM

## 2019-02-20 RX ORDER — PANTOPRAZOLE SODIUM 40 MG/1
40 TABLET, DELAYED RELEASE ORAL
Status: DISCONTINUED | OUTPATIENT
Start: 2019-02-21 | End: 2019-02-21 | Stop reason: HOSPADM

## 2019-02-20 RX ORDER — PROMETHAZINE HYDROCHLORIDE 25 MG/1
25 TABLET ORAL EVERY 8 HOURS PRN
Status: DISCONTINUED | OUTPATIENT
Start: 2019-02-20 | End: 2019-02-21 | Stop reason: HOSPADM

## 2019-02-20 RX ORDER — ROSUVASTATIN CALCIUM 10 MG/1
10 TABLET, COATED ORAL NIGHTLY
Status: DISCONTINUED | OUTPATIENT
Start: 2019-02-20 | End: 2019-02-21 | Stop reason: HOSPADM

## 2019-02-20 RX ORDER — SODIUM CHLORIDE 0.9 % (FLUSH) 0.9 %
10 SYRINGE (ML) INJECTION PRN
Status: DISCONTINUED | OUTPATIENT
Start: 2019-02-20 | End: 2019-02-21 | Stop reason: HOSPADM

## 2019-02-20 RX ADMIN — WARFARIN SODIUM 5 MG: 5 TABLET ORAL at 20:10

## 2019-02-20 RX ADMIN — ROSUVASTATIN CALCIUM 10 MG: 10 TABLET, FILM COATED ORAL at 20:09

## 2019-02-20 RX ADMIN — ASPIRIN 81 MG 324 MG: 81 TABLET ORAL at 16:16

## 2019-02-20 RX ADMIN — GABAPENTIN 300 MG: 300 CAPSULE ORAL at 20:10

## 2019-02-20 RX ADMIN — ALLOPURINOL 100 MG: 100 TABLET ORAL at 20:10

## 2019-02-20 RX ADMIN — NEBIVOLOL HYDROCHLORIDE 2.5 MG: 5 TABLET ORAL at 20:11

## 2019-02-20 RX ADMIN — TRAZODONE HYDROCHLORIDE 100 MG: 50 TABLET ORAL at 20:10

## 2019-02-20 RX ADMIN — Medication 10 ML: at 20:12

## 2019-02-20 RX ADMIN — INSULIN GLARGINE 30 UNITS: 100 INJECTION, SOLUTION SUBCUTANEOUS at 22:25

## 2019-02-20 ASSESSMENT — ENCOUNTER SYMPTOMS
COLOR CHANGE: 0
ABDOMINAL PAIN: 0
COUGH: 0
VOMITING: 0
CHEST TIGHTNESS: 0
BACK PAIN: 0
NAUSEA: 0
WHEEZING: 0
SHORTNESS OF BREATH: 0
DIARRHEA: 0

## 2019-02-20 ASSESSMENT — PAIN SCALES - GENERAL
PAINLEVEL_OUTOF10: 4
PAINLEVEL_OUTOF10: 0
PAINLEVEL_OUTOF10: 0

## 2019-02-20 ASSESSMENT — HEART SCORE: ECG: 1

## 2019-02-21 VITALS
HEIGHT: 63 IN | HEART RATE: 71 BPM | DIASTOLIC BLOOD PRESSURE: 71 MMHG | BODY MASS INDEX: 51.91 KG/M2 | RESPIRATION RATE: 16 BRPM | WEIGHT: 293 LBS | OXYGEN SATURATION: 99 % | SYSTOLIC BLOOD PRESSURE: 120 MMHG | TEMPERATURE: 97.6 F

## 2019-02-21 LAB
ANION GAP SERPL CALCULATED.3IONS-SCNC: 13 MMOL/L (ref 3–16)
BUN BLDV-MCNC: 17 MG/DL (ref 7–20)
CALCIUM SERPL-MCNC: 9.1 MG/DL (ref 8.3–10.6)
CHLORIDE BLD-SCNC: 98 MMOL/L (ref 99–110)
CO2: 27 MMOL/L (ref 21–32)
CREAT SERPL-MCNC: 6.9 MG/DL (ref 0.6–1.2)
GFR AFRICAN AMERICAN: 7
GFR NON-AFRICAN AMERICAN: 6
GLUCOSE BLD-MCNC: 104 MG/DL (ref 70–99)
GLUCOSE BLD-MCNC: 143 MG/DL (ref 70–99)
GLUCOSE BLD-MCNC: 148 MG/DL (ref 70–99)
INR BLD: 3.3 (ref 0.86–1.14)
PERFORMED ON: ABNORMAL
PERFORMED ON: ABNORMAL
POTASSIUM REFLEX MAGNESIUM: 4.3 MMOL/L (ref 3.5–5.1)
PROTHROMBIN TIME: 37.6 SEC (ref 9.8–13)
SODIUM BLD-SCNC: 138 MMOL/L (ref 136–145)
TROPONIN: 0.09 NG/ML
TROPONIN: 0.1 NG/ML

## 2019-02-21 PROCEDURE — 99215 OFFICE O/P EST HI 40 MIN: CPT | Performed by: INTERNAL MEDICINE

## 2019-02-21 PROCEDURE — 85610 PROTHROMBIN TIME: CPT

## 2019-02-21 PROCEDURE — G0378 HOSPITAL OBSERVATION PER HR: HCPCS

## 2019-02-21 PROCEDURE — 6370000000 HC RX 637 (ALT 250 FOR IP): Performed by: INTERNAL MEDICINE

## 2019-02-21 PROCEDURE — 80048 BASIC METABOLIC PNL TOTAL CA: CPT

## 2019-02-21 PROCEDURE — 84484 ASSAY OF TROPONIN QUANT: CPT

## 2019-02-21 PROCEDURE — 36415 COLL VENOUS BLD VENIPUNCTURE: CPT

## 2019-02-21 PROCEDURE — 2580000003 HC RX 258: Performed by: INTERNAL MEDICINE

## 2019-02-21 RX ORDER — ALBUTEROL SULFATE 90 UG/1
2 AEROSOL, METERED RESPIRATORY (INHALATION) EVERY 4 HOURS PRN
Status: DISCONTINUED | OUTPATIENT
Start: 2019-02-21 | End: 2019-02-21 | Stop reason: HOSPADM

## 2019-02-21 RX ADMIN — NEBIVOLOL HYDROCHLORIDE 2.5 MG: 5 TABLET ORAL at 12:41

## 2019-02-21 RX ADMIN — Medication 10 ML: at 08:39

## 2019-02-21 RX ADMIN — INSULIN GLARGINE 30 UNITS: 100 INJECTION, SOLUTION SUBCUTANEOUS at 08:40

## 2019-02-21 RX ADMIN — INSULIN LISPRO 2 UNITS: 100 INJECTION, SOLUTION INTRAVENOUS; SUBCUTANEOUS at 08:39

## 2019-02-21 RX ADMIN — ALLOPURINOL 100 MG: 100 TABLET ORAL at 08:39

## 2019-02-21 RX ADMIN — PANTOPRAZOLE SODIUM 40 MG: 40 TABLET, DELAYED RELEASE ORAL at 06:07

## 2019-02-21 ASSESSMENT — PAIN SCALES - GENERAL
PAINLEVEL_OUTOF10: 0

## 2019-03-05 RX ORDER — LIDOCAINE AND PRILOCAINE 25; 25 MG/G; MG/G
CREAM TOPICAL
Qty: 1 TUBE | Refills: 5 | Status: SHIPPED | OUTPATIENT
Start: 2019-03-05 | End: 2019-09-06 | Stop reason: SDUPTHER

## 2019-03-06 DIAGNOSIS — E11.42 DIABETIC POLYNEUROPATHY ASSOCIATED WITH TYPE 2 DIABETES MELLITUS (HCC): ICD-10-CM

## 2019-03-07 DIAGNOSIS — E11.42 DIABETIC POLYNEUROPATHY ASSOCIATED WITH TYPE 2 DIABETES MELLITUS (HCC): ICD-10-CM

## 2019-03-08 RX ORDER — ROSUVASTATIN CALCIUM 10 MG/1
10 TABLET, COATED ORAL DAILY
Qty: 90 TABLET | Refills: 3 | Status: SHIPPED | OUTPATIENT
Start: 2019-03-08 | End: 2019-05-11 | Stop reason: SDUPTHER

## 2019-03-08 RX ORDER — PEN NEEDLE, DIABETIC 31 GX3/16"
NEEDLE, DISPOSABLE MISCELLANEOUS
Qty: 400 EACH | Refills: 3 | Status: SHIPPED | OUTPATIENT
Start: 2019-03-08

## 2019-03-12 ENCOUNTER — OFFICE VISIT (OUTPATIENT)
Dept: VASCULAR SURGERY | Age: 64
End: 2019-03-12
Payer: MEDICARE

## 2019-03-12 VITALS
SYSTOLIC BLOOD PRESSURE: 110 MMHG | DIASTOLIC BLOOD PRESSURE: 70 MMHG | BODY MASS INDEX: 51.91 KG/M2 | WEIGHT: 293 LBS | HEIGHT: 63 IN

## 2019-03-12 DIAGNOSIS — N18.6 END STAGE RENAL DISEASE (HCC): Primary | ICD-10-CM

## 2019-03-12 PROCEDURE — G8484 FLU IMMUNIZE NO ADMIN: HCPCS | Performed by: SURGERY

## 2019-03-12 PROCEDURE — G8417 CALC BMI ABV UP PARAM F/U: HCPCS | Performed by: SURGERY

## 2019-03-12 PROCEDURE — 1036F TOBACCO NON-USER: CPT | Performed by: SURGERY

## 2019-03-12 PROCEDURE — G8427 DOCREV CUR MEDS BY ELIG CLIN: HCPCS | Performed by: SURGERY

## 2019-03-12 PROCEDURE — 99213 OFFICE O/P EST LOW 20 MIN: CPT | Performed by: SURGERY

## 2019-03-12 PROCEDURE — 3017F COLORECTAL CA SCREEN DOC REV: CPT | Performed by: SURGERY

## 2019-03-12 PROCEDURE — G8598 ASA/ANTIPLAT THER USED: HCPCS | Performed by: SURGERY

## 2019-03-15 ENCOUNTER — TELEPHONE (OUTPATIENT)
Dept: FAMILY MEDICINE CLINIC | Age: 64
End: 2019-03-15

## 2019-03-16 RX ORDER — INCONTINENCE PAD,LINER,DISP
1 EACH MISCELLANEOUS PRN
Qty: 60 EACH | Refills: 5 | Status: SHIPPED | OUTPATIENT
Start: 2019-03-16

## 2019-03-18 ENCOUNTER — PREP FOR PROCEDURE (OUTPATIENT)
Dept: VASCULAR SURGERY | Age: 64
End: 2019-03-18

## 2019-03-18 DIAGNOSIS — N18.6 END STAGE RENAL DISEASE (HCC): Primary | ICD-10-CM

## 2019-03-18 RX ORDER — SODIUM CHLORIDE 0.9 % (FLUSH) 0.9 %
10 SYRINGE (ML) INJECTION PRN
Status: CANCELLED | OUTPATIENT
Start: 2019-03-18

## 2019-03-18 RX ORDER — SODIUM CHLORIDE 0.9 % (FLUSH) 0.9 %
10 SYRINGE (ML) INJECTION EVERY 12 HOURS SCHEDULED
Status: CANCELLED | OUTPATIENT
Start: 2019-03-18

## 2019-03-19 ENCOUNTER — TELEPHONE (OUTPATIENT)
Dept: FAMILY MEDICINE CLINIC | Age: 64
End: 2019-03-19

## 2019-03-19 PROBLEM — E11.9 TYPE II OR UNSPECIFIED TYPE DIABETES MELLITUS WITHOUT MENTION OF COMPLICATION, NOT STATED AS UNCONTROLLED: Chronic | Status: ACTIVE | Noted: 2017-09-12

## 2019-03-21 ENCOUNTER — ANESTHESIA EVENT (OUTPATIENT)
Dept: OPERATING ROOM | Age: 64
DRG: 628 | End: 2019-03-21
Payer: MEDICARE

## 2019-03-21 ENCOUNTER — HOSPITAL ENCOUNTER (OUTPATIENT)
Dept: PREADMISSION TESTING | Age: 64
Discharge: HOME | End: 2019-03-25
Payer: MEDICARE

## 2019-03-21 VITALS — BODY MASS INDEX: 51.91 KG/M2 | WEIGHT: 293 LBS | HEIGHT: 63 IN

## 2019-03-21 DIAGNOSIS — N18.6 END STAGE RENAL DISEASE (HCC): ICD-10-CM

## 2019-03-21 LAB
ABO/RH: NORMAL
ANION GAP SERPL CALCULATED.3IONS-SCNC: 16 MMOL/L (ref 3–16)
ANTIBODY SCREEN: NORMAL
APTT: 48.2 SEC (ref 26–36)
BUN BLDV-MCNC: 17 MG/DL (ref 7–20)
CALCIUM SERPL-MCNC: 9.8 MG/DL (ref 8.3–10.6)
CHLORIDE BLD-SCNC: 98 MMOL/L (ref 99–110)
CO2: 26 MMOL/L (ref 21–32)
CREAT SERPL-MCNC: 6.6 MG/DL (ref 0.6–1.2)
GFR AFRICAN AMERICAN: 8
GFR NON-AFRICAN AMERICAN: 6
GLUCOSE BLD-MCNC: 147 MG/DL (ref 70–99)
HCT VFR BLD CALC: 38.7 % (ref 36–48)
HEMOGLOBIN: 12.4 G/DL (ref 12–16)
INR BLD: 2.61 (ref 0.86–1.14)
MCH RBC QN AUTO: 32.5 PG (ref 26–34)
MCHC RBC AUTO-ENTMCNC: 32.1 G/DL (ref 31–36)
MCV RBC AUTO: 101.2 FL (ref 80–100)
PDW BLD-RTO: 18.5 % (ref 12.4–15.4)
PLATELET # BLD: 211 K/UL (ref 135–450)
PMV BLD AUTO: 8.3 FL (ref 5–10.5)
POTASSIUM SERPL-SCNC: 4 MMOL/L (ref 3.5–5.1)
PROTHROMBIN TIME: 29.8 SEC (ref 9.8–13)
RBC # BLD: 3.82 M/UL (ref 4–5.2)
SODIUM BLD-SCNC: 140 MMOL/L (ref 136–145)
WBC # BLD: 5.6 K/UL (ref 4–11)

## 2019-03-21 PROCEDURE — 85027 COMPLETE CBC AUTOMATED: CPT

## 2019-03-21 PROCEDURE — 86850 RBC ANTIBODY SCREEN: CPT

## 2019-03-21 PROCEDURE — 36415 COLL VENOUS BLD VENIPUNCTURE: CPT

## 2019-03-21 PROCEDURE — 85730 THROMBOPLASTIN TIME PARTIAL: CPT

## 2019-03-21 PROCEDURE — 86900 BLOOD TYPING SEROLOGIC ABO: CPT

## 2019-03-21 PROCEDURE — 85610 PROTHROMBIN TIME: CPT

## 2019-03-21 PROCEDURE — 86901 BLOOD TYPING SEROLOGIC RH(D): CPT

## 2019-03-21 PROCEDURE — 80048 BASIC METABOLIC PNL TOTAL CA: CPT

## 2019-03-21 NOTE — PROGRESS NOTES
Name_______________________________________Printed:____________________  Date and time of surgery_3/28/18  1245_______________________Arrival Time:_1115____main hospital___________   1. Do not eat or drink anything after 12 midnight (or____hours) prior to surgery. This includes no water, chewing gum or mints. Endoscopy patients follow your doctors bowel prep instructions,which may include taking part of prep after midnight. 2. Take the following pills with a small sip of water on the morning of surgery_____________________________________________________   3. Aspirin, Ibuprofen, Advil, Naproxen, Vitamin E and other Anti-inflammatory products should be stopped for 5 days before surgery or as directed by your physician. 4. Check with your Doctor regarding stopping Plavix, Coumadin,Eliquis, Lovenox,Effient,Pradaxa,Xarelto, Fragmin or other blood thinners and follow their instructions. 5. Do not smoke, and do not drink any alcoholic beverages 24 hours prior to surgery. This includes NA Beer. Refrain from the usage of any recreational drugs. 6. You may brush your teeth and gargle the morning of surgery. DO NOT SWALLOW WATER   7. You MUST make arrangements for a responsible adult to stay on site while you are here and take you home after your surgery. You will not be allowed to leave alone or drive yourself home. It is strongly suggested someone stay with you the first 24 hrs. Your surgery will be cancelled if you do not have a ride home. 8. A parent/legal guardian must accompany a child scheduled for surgery and plan to stay at the hospital until the child is discharged. Please do not bring other children with you. 9. Please wear simple, loose fitting clothing to the hospital.  [facility] not bring valuables (money, credit cards, checkbooks, etc.) Do not wear any makeup (including no eye makeup) or nail polish on your fingers or toes. 10. DO NOT wear any jewelry or piercings on day of surgery.   All body piercing jewelry must be removed. 11. If you have ___dentures, they will be removed before going to the OR; we will provide you a container. If you wear ___contact lenses or ___glasses, they will be removed; please bring a case for them. 12. Please see your family doctor/pediatrician for a history & physical and/or concerning medications. Bring any test results/reports from your physician's office. PCP__________________Phone___________H&P Appt. Date________             13 If you  have a Living Will and Durable Power of  for Healthcare, please bring in a copy. 15. Notify your Surgeon if you develop any illness between now and surgery  time, cough, cold, fever, sore throat, nausea, vomiting, etc.  Please notify your surgeon if you experience dizziness, shortness of breath or blurred vision between now & the time of your surgery             15. DO NOT shave your operative site 96 hours prior to surgery. For face & neck surgery, men may use an electric razor 48 hours prior to surgery. 16. Shower the night before surgery with ___Antibacterial soap ___Hibiclens             17. To provide excellent care visitors will be limited to one in the room at any given time. 18.  Please bring picture ID and insurance card. 19.  Visit our web site for additional information:  [email or web address]              20.During flu season no children under the age of 15 are permitted in the hospital for the safety of all patients. 21. If you take a long acting insulin in the evening only  take half of your usual  dose the night  before your procedure              22. If you use a c-pap please bring DOS if staying overnight,             23.For your convenience [id number] [facility] has a pharmacy on site to fill your prescriptions.              24. If you use oxygen and have a portable tank please bring it  with you the DOS             25. Bring a complete list of all your medications with name and dose include any supplements. 26. Other__________________________________________   *Please call pre admission testing if you any further questions   [address]    [place] [id number] [facility]  [phone]   [address] [place] [address]       All above information reviewed with patient in person or by phone. Patient verbalizes understanding. All questions and concerns addressed.                                                                                                  Patient/Rep_patient___________________                                                                                                                                    PRE OP INSTRUCTIONS

## 2019-03-26 ENCOUNTER — OFFICE VISIT (OUTPATIENT)
Dept: FAMILY MEDICINE CLINIC | Age: 64
End: 2019-03-26
Payer: MEDICARE

## 2019-03-26 VITALS
DIASTOLIC BLOOD PRESSURE: 84 MMHG | BODY MASS INDEX: 58 KG/M2 | HEART RATE: 83 BPM | WEIGHT: 293 LBS | SYSTOLIC BLOOD PRESSURE: 126 MMHG | OXYGEN SATURATION: 99 %

## 2019-03-26 DIAGNOSIS — J96.11 CHRONIC HYPOXEMIC RESPIRATORY FAILURE (HCC): ICD-10-CM

## 2019-03-26 DIAGNOSIS — E11.42 DIABETIC POLYNEUROPATHY ASSOCIATED WITH TYPE 2 DIABETES MELLITUS (HCC): Primary | ICD-10-CM

## 2019-03-26 PROCEDURE — G8598 ASA/ANTIPLAT THER USED: HCPCS | Performed by: NURSE PRACTITIONER

## 2019-03-26 PROCEDURE — 3017F COLORECTAL CA SCREEN DOC REV: CPT | Performed by: NURSE PRACTITIONER

## 2019-03-26 PROCEDURE — 99213 OFFICE O/P EST LOW 20 MIN: CPT | Performed by: NURSE PRACTITIONER

## 2019-03-26 PROCEDURE — G8417 CALC BMI ABV UP PARAM F/U: HCPCS | Performed by: NURSE PRACTITIONER

## 2019-03-26 PROCEDURE — G8484 FLU IMMUNIZE NO ADMIN: HCPCS | Performed by: NURSE PRACTITIONER

## 2019-03-26 PROCEDURE — 3045F PR MOST RECENT HEMOGLOBIN A1C LEVEL 7.0-9.0%: CPT | Performed by: NURSE PRACTITIONER

## 2019-03-26 PROCEDURE — G8427 DOCREV CUR MEDS BY ELIG CLIN: HCPCS | Performed by: NURSE PRACTITIONER

## 2019-03-26 PROCEDURE — 2022F DILAT RTA XM EVC RTNOPTHY: CPT | Performed by: NURSE PRACTITIONER

## 2019-03-26 PROCEDURE — G8510 SCR DEP NEG, NO PLAN REQD: HCPCS | Performed by: NURSE PRACTITIONER

## 2019-03-26 PROCEDURE — 1036F TOBACCO NON-USER: CPT | Performed by: NURSE PRACTITIONER

## 2019-03-26 RX ORDER — PREGABALIN 50 MG/1
50 CAPSULE ORAL 3 TIMES DAILY
Qty: 90 CAPSULE | Refills: 5 | Status: SHIPPED | OUTPATIENT
Start: 2019-03-26 | End: 2019-04-16 | Stop reason: SDUPTHER

## 2019-03-26 RX ORDER — ALLOPURINOL 100 MG/1
100 TABLET ORAL DAILY
Qty: 90 TABLET | Refills: 3 | Status: ON HOLD
Start: 2019-03-26 | End: 2020-02-05 | Stop reason: HOSPADM

## 2019-03-26 ASSESSMENT — PATIENT HEALTH QUESTIONNAIRE - PHQ9
SUM OF ALL RESPONSES TO PHQ QUESTIONS 1-9: 0
1. LITTLE INTEREST OR PLEASURE IN DOING THINGS: 0
SUM OF ALL RESPONSES TO PHQ9 QUESTIONS 1 & 2: 0
2. FEELING DOWN, DEPRESSED OR HOPELESS: 0
SUM OF ALL RESPONSES TO PHQ QUESTIONS 1-9: 0

## 2019-03-26 ASSESSMENT — ENCOUNTER SYMPTOMS: SHORTNESS OF BREATH: 0

## 2019-03-28 ENCOUNTER — HOSPITAL ENCOUNTER (OUTPATIENT)
Age: 64
Setting detail: OUTPATIENT SURGERY
Discharge: HOME | DRG: 628 | End: 2019-03-28
Attending: SURGERY | Admitting: SURGERY
Payer: MEDICARE

## 2019-03-28 ENCOUNTER — ANESTHESIA (OUTPATIENT)
Dept: OPERATING ROOM | Age: 64
DRG: 628 | End: 2019-03-28
Payer: MEDICARE

## 2019-03-28 VITALS
DIASTOLIC BLOOD PRESSURE: 80 MMHG | OXYGEN SATURATION: 92 % | RESPIRATION RATE: 16 BRPM | TEMPERATURE: 97.9 F | SYSTOLIC BLOOD PRESSURE: 180 MMHG

## 2019-03-28 VITALS
SYSTOLIC BLOOD PRESSURE: 169 MMHG | OXYGEN SATURATION: 93 % | HEART RATE: 75 BPM | DIASTOLIC BLOOD PRESSURE: 85 MMHG | TEMPERATURE: 97.7 F | WEIGHT: 293 LBS | BODY MASS INDEX: 57.98 KG/M2 | RESPIRATION RATE: 19 BRPM

## 2019-03-28 DIAGNOSIS — N18.6 END STAGE RENAL DISEASE (HCC): ICD-10-CM

## 2019-03-28 LAB
ANION GAP SERPL CALCULATED.3IONS-SCNC: 13 MMOL/L (ref 3–16)
BUN BLDV-MCNC: 31 MG/DL (ref 7–20)
CALCIUM SERPL-MCNC: 9.4 MG/DL (ref 8.3–10.6)
CHLORIDE BLD-SCNC: 98 MMOL/L (ref 99–110)
CO2: 26 MMOL/L (ref 21–32)
CREAT SERPL-MCNC: 8.2 MG/DL (ref 0.6–1.2)
GFR AFRICAN AMERICAN: 6
GFR NON-AFRICAN AMERICAN: 5
GLUCOSE BLD-MCNC: 132 MG/DL (ref 70–99)
GLUCOSE BLD-MCNC: 70 MG/DL (ref 70–99)
GLUCOSE BLD-MCNC: 81 MG/DL (ref 70–99)
GLUCOSE BLD-MCNC: 90 MG/DL (ref 70–99)
INR BLD: 1.1 (ref 0.86–1.14)
PERFORMED ON: NORMAL
POTASSIUM SERPL-SCNC: 4.1 MMOL/L (ref 3.5–5.1)
PROTHROMBIN TIME: 12.5 SEC (ref 9.8–13)
SODIUM BLD-SCNC: 137 MMOL/L (ref 136–145)

## 2019-03-28 PROCEDURE — 85610 PROTHROMBIN TIME: CPT

## 2019-03-28 PROCEDURE — 7100000000 HC PACU RECOVERY - FIRST 15 MIN: Performed by: SURGERY

## 2019-03-28 PROCEDURE — 6360000002 HC RX W HCPCS: Performed by: FAMILY MEDICINE

## 2019-03-28 PROCEDURE — 36415 COLL VENOUS BLD VENIPUNCTURE: CPT

## 2019-03-28 PROCEDURE — 6360000002 HC RX W HCPCS: Performed by: NURSE ANESTHETIST, CERTIFIED REGISTERED

## 2019-03-28 PROCEDURE — 2500000003 HC RX 250 WO HCPCS: Performed by: FAMILY MEDICINE

## 2019-03-28 PROCEDURE — 03170ZD BYPASS RIGHT BRACHIAL ARTERY TO UPPER ARM VEIN, OPEN APPROACH: ICD-10-PCS | Performed by: SURGERY

## 2019-03-28 PROCEDURE — 7100000001 HC PACU RECOVERY - ADDTL 15 MIN: Performed by: SURGERY

## 2019-03-28 PROCEDURE — 6370000000 HC RX 637 (ALT 250 FOR IP): Performed by: SURGERY

## 2019-03-28 PROCEDURE — 7100000010 HC PHASE II RECOVERY - FIRST 15 MIN: Performed by: SURGERY

## 2019-03-28 PROCEDURE — 7100000011 HC PHASE II RECOVERY - ADDTL 15 MIN: Performed by: SURGERY

## 2019-03-28 PROCEDURE — 36821 AV FUSION DIRECT ANY SITE: CPT | Performed by: SURGERY

## 2019-03-28 PROCEDURE — 80048 BASIC METABOLIC PNL TOTAL CA: CPT

## 2019-03-28 PROCEDURE — 2500000003 HC RX 250 WO HCPCS: Performed by: NURSE ANESTHETIST, CERTIFIED REGISTERED

## 2019-03-28 PROCEDURE — 6360000002 HC RX W HCPCS

## 2019-03-28 PROCEDURE — 6360000002 HC RX W HCPCS: Performed by: NURSE PRACTITIONER

## 2019-03-28 PROCEDURE — 3700000001 HC ADD 15 MINUTES (ANESTHESIA): Performed by: SURGERY

## 2019-03-28 PROCEDURE — 6360000002 HC RX W HCPCS: Performed by: SURGERY

## 2019-03-28 PROCEDURE — 3700000000 HC ANESTHESIA ATTENDED CARE: Performed by: SURGERY

## 2019-03-28 PROCEDURE — 6370000000 HC RX 637 (ALT 250 FOR IP)

## 2019-03-28 PROCEDURE — 2580000003 HC RX 258: Performed by: NURSE PRACTITIONER

## 2019-03-28 PROCEDURE — 2580000003 HC RX 258: Performed by: NURSE ANESTHETIST, CERTIFIED REGISTERED

## 2019-03-28 PROCEDURE — 2709999900 HC NON-CHARGEABLE SUPPLY: Performed by: SURGERY

## 2019-03-28 PROCEDURE — 3600000014 HC SURGERY LEVEL 4 ADDTL 15MIN: Performed by: SURGERY

## 2019-03-28 PROCEDURE — 3600000004 HC SURGERY LEVEL 4 BASE: Performed by: SURGERY

## 2019-03-28 PROCEDURE — 2580000003 HC RX 258: Performed by: SURGERY

## 2019-03-28 RX ORDER — PROMETHAZINE HYDROCHLORIDE 25 MG/ML
6.25 INJECTION, SOLUTION INTRAMUSCULAR; INTRAVENOUS PRN
Status: DISCONTINUED | OUTPATIENT
Start: 2019-03-28 | End: 2019-03-28 | Stop reason: HOSPADM

## 2019-03-28 RX ORDER — CLINDAMYCIN PHOSPHATE 900 MG/50ML
900 INJECTION INTRAVENOUS
Status: DISCONTINUED | OUTPATIENT
Start: 2019-03-28 | End: 2019-03-28

## 2019-03-28 RX ORDER — NEOSTIGMINE METHYLSULFATE 5 MG/5 ML
SYRINGE (ML) INTRAVENOUS PRN
Status: DISCONTINUED | OUTPATIENT
Start: 2019-03-28 | End: 2019-03-28 | Stop reason: SDUPTHER

## 2019-03-28 RX ORDER — ONDANSETRON 2 MG/ML
INJECTION INTRAMUSCULAR; INTRAVENOUS PRN
Status: DISCONTINUED | OUTPATIENT
Start: 2019-03-28 | End: 2019-03-28 | Stop reason: SDUPTHER

## 2019-03-28 RX ORDER — HYDROMORPHONE HCL 110MG/55ML
PATIENT CONTROLLED ANALGESIA SYRINGE INTRAVENOUS PRN
Status: DISCONTINUED | OUTPATIENT
Start: 2019-03-28 | End: 2019-03-28 | Stop reason: SDUPTHER

## 2019-03-28 RX ORDER — SODIUM CHLORIDE 9 MG/ML
INJECTION, SOLUTION INTRAVENOUS CONTINUOUS
Status: CANCELLED | OUTPATIENT
Start: 2019-03-28

## 2019-03-28 RX ORDER — HYDROMORPHONE HCL 110MG/55ML
0.5 PATIENT CONTROLLED ANALGESIA SYRINGE INTRAVENOUS EVERY 5 MIN PRN
Status: DISCONTINUED | OUTPATIENT
Start: 2019-03-28 | End: 2019-03-28 | Stop reason: HOSPADM

## 2019-03-28 RX ORDER — ONDANSETRON 2 MG/ML
4 INJECTION INTRAMUSCULAR; INTRAVENOUS
Status: CANCELLED | OUTPATIENT
Start: 2019-03-28 | End: 2019-03-28

## 2019-03-28 RX ORDER — PROTAMINE SULFATE 10 MG/ML
INJECTION, SOLUTION INTRAVENOUS PRN
Status: DISCONTINUED | OUTPATIENT
Start: 2019-03-28 | End: 2019-03-28 | Stop reason: SDUPTHER

## 2019-03-28 RX ORDER — LIDOCAINE HYDROCHLORIDE 10 MG/ML
1 INJECTION, SOLUTION EPIDURAL; INFILTRATION; INTRACAUDAL; PERINEURAL
Status: CANCELLED | OUTPATIENT
Start: 2019-03-28 | End: 2019-03-28

## 2019-03-28 RX ORDER — SODIUM CHLORIDE 0.9 % (FLUSH) 0.9 %
10 SYRINGE (ML) INJECTION EVERY 12 HOURS SCHEDULED
Status: DISCONTINUED | OUTPATIENT
Start: 2019-03-28 | End: 2019-03-28 | Stop reason: HOSPADM

## 2019-03-28 RX ORDER — PROPOFOL 10 MG/ML
INJECTION, EMULSION INTRAVENOUS PRN
Status: DISCONTINUED | OUTPATIENT
Start: 2019-03-28 | End: 2019-03-28 | Stop reason: SDUPTHER

## 2019-03-28 RX ORDER — HYDROMORPHONE HCL 110MG/55ML
0.25 PATIENT CONTROLLED ANALGESIA SYRINGE INTRAVENOUS EVERY 5 MIN PRN
Status: DISCONTINUED | OUTPATIENT
Start: 2019-03-28 | End: 2019-03-28 | Stop reason: HOSPADM

## 2019-03-28 RX ORDER — ROCURONIUM BROMIDE 10 MG/ML
INJECTION, SOLUTION INTRAVENOUS PRN
Status: DISCONTINUED | OUTPATIENT
Start: 2019-03-28 | End: 2019-03-28 | Stop reason: SDUPTHER

## 2019-03-28 RX ORDER — LIDOCAINE HYDROCHLORIDE 20 MG/ML
INJECTION, SOLUTION EPIDURAL; INFILTRATION; INTRACAUDAL; PERINEURAL PRN
Status: DISCONTINUED | OUTPATIENT
Start: 2019-03-28 | End: 2019-03-28 | Stop reason: SDUPTHER

## 2019-03-28 RX ORDER — SODIUM CHLORIDE 0.9 % (FLUSH) 0.9 %
10 SYRINGE (ML) INJECTION PRN
Status: DISCONTINUED | OUTPATIENT
Start: 2019-03-28 | End: 2019-03-28 | Stop reason: HOSPADM

## 2019-03-28 RX ORDER — HEPARIN SODIUM 1000 [USP'U]/ML
INJECTION, SOLUTION INTRAVENOUS; SUBCUTANEOUS PRN
Status: DISCONTINUED | OUTPATIENT
Start: 2019-03-28 | End: 2019-03-28 | Stop reason: SDUPTHER

## 2019-03-28 RX ORDER — DIPHENHYDRAMINE HYDROCHLORIDE 50 MG/ML
12.5 INJECTION INTRAMUSCULAR; INTRAVENOUS
Status: DISCONTINUED | OUTPATIENT
Start: 2019-03-28 | End: 2019-03-28 | Stop reason: HOSPADM

## 2019-03-28 RX ORDER — DEXAMETHASONE SODIUM PHOSPHATE 4 MG/ML
INJECTION, SOLUTION INTRA-ARTICULAR; INTRALESIONAL; INTRAMUSCULAR; INTRAVENOUS; SOFT TISSUE PRN
Status: DISCONTINUED | OUTPATIENT
Start: 2019-03-28 | End: 2019-03-28 | Stop reason: SDUPTHER

## 2019-03-28 RX ORDER — GLYCOPYRROLATE 0.2 MG/ML
INJECTION INTRAMUSCULAR; INTRAVENOUS PRN
Status: DISCONTINUED | OUTPATIENT
Start: 2019-03-28 | End: 2019-03-28 | Stop reason: SDUPTHER

## 2019-03-28 RX ORDER — HYDROMORPHONE HYDROCHLORIDE 2 MG/1
2 TABLET ORAL EVERY 12 HOURS PRN
Qty: 20 TABLET | Refills: 0 | Status: ON HOLD | OUTPATIENT
Start: 2019-03-28 | End: 2019-04-01 | Stop reason: HOSPADM

## 2019-03-28 RX ORDER — SODIUM CHLORIDE 9 MG/ML
INJECTION, SOLUTION INTRAVENOUS CONTINUOUS PRN
Status: DISCONTINUED | OUTPATIENT
Start: 2019-03-28 | End: 2019-03-28 | Stop reason: SDUPTHER

## 2019-03-28 RX ORDER — LABETALOL HYDROCHLORIDE 5 MG/ML
5 INJECTION, SOLUTION INTRAVENOUS EVERY 10 MIN PRN
Status: DISCONTINUED | OUTPATIENT
Start: 2019-03-28 | End: 2019-03-28 | Stop reason: HOSPADM

## 2019-03-28 RX ORDER — HYDROMORPHONE HCL 110MG/55ML
PATIENT CONTROLLED ANALGESIA SYRINGE INTRAVENOUS
Status: COMPLETED
Start: 2019-03-28 | End: 2019-03-28

## 2019-03-28 RX ORDER — SODIUM CHLORIDE 9 MG/ML
INJECTION, SOLUTION INTRAVENOUS
Status: COMPLETED
Start: 2019-03-28 | End: 2019-03-28

## 2019-03-28 RX ADMIN — HYDROMORPHONE HYDROCHLORIDE 0.2 MG: 2 INJECTION, SOLUTION INTRAMUSCULAR; INTRAVENOUS; SUBCUTANEOUS at 16:26

## 2019-03-28 RX ADMIN — DEXAMETHASONE SODIUM PHOSPHATE 8 MG: 4 INJECTION, SOLUTION INTRAMUSCULAR; INTRAVENOUS at 16:20

## 2019-03-28 RX ADMIN — PHENYLEPHRINE HYDROCHLORIDE 200 MCG: 10 INJECTION INTRAVENOUS at 17:03

## 2019-03-28 RX ADMIN — HEPARIN SODIUM 5000 UNITS: 1000 INJECTION INTRAVENOUS; SUBCUTANEOUS at 16:33

## 2019-03-28 RX ADMIN — PROTAMINE SULFATE 30 MG: 10 INJECTION, SOLUTION INTRAVENOUS at 17:11

## 2019-03-28 RX ADMIN — PROPOFOL 50 MG: 10 INJECTION, EMULSION INTRAVENOUS at 17:25

## 2019-03-28 RX ADMIN — HYDROMORPHONE HYDROCHLORIDE 0.2 MG: 2 INJECTION, SOLUTION INTRAMUSCULAR; INTRAVENOUS; SUBCUTANEOUS at 16:07

## 2019-03-28 RX ADMIN — PHENYLEPHRINE HYDROCHLORIDE 100 MCG: 10 INJECTION INTRAVENOUS at 16:55

## 2019-03-28 RX ADMIN — LABETALOL HYDROCHLORIDE 5 MG: 5 INJECTION INTRAVENOUS at 18:32

## 2019-03-28 RX ADMIN — PHENYLEPHRINE HYDROCHLORIDE 100 MCG: 10 INJECTION INTRAVENOUS at 16:20

## 2019-03-28 RX ADMIN — Medication 2 MG: at 17:21

## 2019-03-28 RX ADMIN — Medication 3 G: at 16:00

## 2019-03-28 RX ADMIN — ONDANSETRON 4 MG: 2 INJECTION INTRAMUSCULAR; INTRAVENOUS at 16:20

## 2019-03-28 RX ADMIN — HYDROMORPHONE HYDROCHLORIDE 0.5 MG: 2 INJECTION, SOLUTION INTRAMUSCULAR; INTRAVENOUS; SUBCUTANEOUS at 18:05

## 2019-03-28 RX ADMIN — SODIUM CHLORIDE: 9 INJECTION, SOLUTION INTRAVENOUS at 16:01

## 2019-03-28 RX ADMIN — LIDOCAINE HYDROCHLORIDE 100 MG: 20 INJECTION, SOLUTION EPIDURAL; INFILTRATION; INTRACAUDAL; PERINEURAL at 16:08

## 2019-03-28 RX ADMIN — PROPOFOL 100 MG: 10 INJECTION, EMULSION INTRAVENOUS at 16:09

## 2019-03-28 RX ADMIN — HYDROMORPHONE HYDROCHLORIDE 0.5 MG: 2 INJECTION, SOLUTION INTRAMUSCULAR; INTRAVENOUS; SUBCUTANEOUS at 17:55

## 2019-03-28 RX ADMIN — PHENYLEPHRINE HYDROCHLORIDE 100 MCG: 10 INJECTION INTRAVENOUS at 16:38

## 2019-03-28 RX ADMIN — PHENYLEPHRINE HYDROCHLORIDE 200 MCG: 10 INJECTION INTRAVENOUS at 16:45

## 2019-03-28 RX ADMIN — GLYCOPYRROLATE 0.2 MG: 0.2 INJECTION INTRAMUSCULAR; INTRAVENOUS at 17:21

## 2019-03-28 RX ADMIN — PHENYLEPHRINE HYDROCHLORIDE 100 MCG: 10 INJECTION INTRAVENOUS at 16:30

## 2019-03-28 RX ADMIN — GLYCOPYRROLATE 0.2 MG: 0.2 INJECTION INTRAMUSCULAR; INTRAVENOUS at 16:13

## 2019-03-28 RX ADMIN — ROCURONIUM BROMIDE 20 MG: 10 INJECTION, SOLUTION INTRAVENOUS at 16:25

## 2019-03-28 RX ADMIN — LABETALOL HYDROCHLORIDE 5 MG: 5 INJECTION INTRAVENOUS at 18:43

## 2019-03-28 RX ADMIN — PHENYLEPHRINE HYDROCHLORIDE 200 MCG: 10 INJECTION INTRAVENOUS at 17:08

## 2019-03-28 ASSESSMENT — PULMONARY FUNCTION TESTS
PIF_VALUE: 33
PIF_VALUE: 33
PIF_VALUE: 34
PIF_VALUE: 33
PIF_VALUE: 27
PIF_VALUE: 3
PIF_VALUE: 34
PIF_VALUE: 35
PIF_VALUE: 34
PIF_VALUE: 35
PIF_VALUE: 32
PIF_VALUE: 33
PIF_VALUE: 4
PIF_VALUE: 33
PIF_VALUE: 34
PIF_VALUE: 34
PIF_VALUE: 19
PIF_VALUE: 34
PIF_VALUE: 35
PIF_VALUE: 35
PIF_VALUE: 33
PIF_VALUE: 35
PIF_VALUE: 29
PIF_VALUE: 23
PIF_VALUE: 34
PIF_VALUE: 33
PIF_VALUE: 28
PIF_VALUE: 35
PIF_VALUE: 39
PIF_VALUE: 35
PIF_VALUE: 35
PIF_VALUE: 33
PIF_VALUE: 34
PIF_VALUE: 34
PIF_VALUE: 0
PIF_VALUE: 32
PIF_VALUE: 34
PIF_VALUE: 34
PIF_VALUE: 4
PIF_VALUE: 33
PIF_VALUE: 34
PIF_VALUE: 35
PIF_VALUE: 34
PIF_VALUE: 0
PIF_VALUE: 18
PIF_VALUE: 34
PIF_VALUE: 34
PIF_VALUE: 33
PIF_VALUE: 15
PIF_VALUE: 32
PIF_VALUE: 37
PIF_VALUE: 34
PIF_VALUE: 33
PIF_VALUE: 2
PIF_VALUE: 34
PIF_VALUE: 0
PIF_VALUE: 34
PIF_VALUE: 35
PIF_VALUE: 30
PIF_VALUE: 34
PIF_VALUE: 40
PIF_VALUE: 35
PIF_VALUE: 34
PIF_VALUE: 34
PIF_VALUE: 54
PIF_VALUE: 34
PIF_VALUE: 34
PIF_VALUE: 38
PIF_VALUE: 34
PIF_VALUE: 35
PIF_VALUE: 16
PIF_VALUE: 34
PIF_VALUE: 22
PIF_VALUE: 1
PIF_VALUE: 33
PIF_VALUE: 34
PIF_VALUE: 32
PIF_VALUE: 0
PIF_VALUE: 35
PIF_VALUE: 35
PIF_VALUE: 33
PIF_VALUE: 13
PIF_VALUE: 29
PIF_VALUE: 29
PIF_VALUE: 3

## 2019-03-28 ASSESSMENT — PAIN SCALES - GENERAL
PAINLEVEL_OUTOF10: 10
PAINLEVEL_OUTOF10: 9

## 2019-03-28 NOTE — H&P
H&P Update    Patient's History and Physical from March 12,  was reviewed. Patient examined. There has been no change. Clarisa Navarrete M.D., FACS.   3/28/2019  3:14 PM

## 2019-03-28 NOTE — PROGRESS NOTES
Rechecked blood sugar and up to 81. Pt stated she is not feeling nauseous. Pain is tolerable. Pt is ready to be discharged.

## 2019-03-28 NOTE — OP NOTE
Indications: Marylen Poet is a 59 y.o. female with ESRD                   Nephrologist:  Paula  Hand Dominance:  right  Antibiotics: Ancef    Procedure:   Right brachiocephalic fistula    After witnessed informed consent was obtained the patient was brought to the operating room and placed in the supine position. GET anesthesia was administered and found to be adequate. The Right arm was prepped and draped in a sterile fashion. After injection of local anesthesia, the incision was made and carried through the superficial fascia. The cephalic vein was exposed and tributaries were ligated with 4-0 silk ties and divided. The cephalic vein was circumferentially mobilized. It was marked anteriorly with a surgical marker to ensure no twisting of the vein. Moving to the brachial artery, it was fully mobilized and its branches were ligated and divided to assist in elevating the artery above the fascial layer. It was noted to be of good caliper. The vein was divided and its transected end was irrigated with a heparin solution to allow for gentle mechanical dilatation of the vein. The artery was occluded with vascular clamps and an arteriotomy was made. An end of vein to side of the artery anastomosis was created with 6-0 prolene suture in a parachute technique. The distal arterial clamp was removed and inspection of the suture line for bleeding was made. The proximal arterial clamp was removed. Hemostasis was obtained. The course of the outflow vein was checked to remove any fascial bands and to assure that there were no kinks or twists. Wound closure was accomplished with 3-0 Vicryl in the superficial fascia and 4-0 Vicryl in the skin. A gauze and Tegaderm dressing was applied. The patient was transferred to the [address] [place] [address] Unit in good condition. Oksana Zarco M.D., FACS.   3/28/2019  5:19 PM

## 2019-03-28 NOTE — PROGRESS NOTES
VSS. Dressing c/d/i. Right radial pulse +2. Pt no longer complaining of pain and nausea. Criteria for phase one met. Will transition to phase two.

## 2019-03-30 ENCOUNTER — APPOINTMENT (OUTPATIENT)
Dept: GENERAL RADIOLOGY | Age: 64
DRG: 628 | End: 2019-03-30
Payer: MEDICARE

## 2019-03-30 ENCOUNTER — HOSPITAL ENCOUNTER (INPATIENT)
Age: 64
LOS: 2 days | Discharge: HOME | DRG: 628 | End: 2019-04-01
Attending: EMERGENCY MEDICINE | Admitting: INTERNAL MEDICINE
Payer: MEDICARE

## 2019-03-30 DIAGNOSIS — R06.00 DYSPNEA AND RESPIRATORY ABNORMALITIES: Primary | ICD-10-CM

## 2019-03-30 DIAGNOSIS — T81.40XA POSTOPERATIVE INFECTION, UNSPECIFIED TYPE, INITIAL ENCOUNTER: ICD-10-CM

## 2019-03-30 DIAGNOSIS — Z99.2 DIALYSIS PATIENT (HCC): ICD-10-CM

## 2019-03-30 DIAGNOSIS — R06.89 DYSPNEA AND RESPIRATORY ABNORMALITIES: Primary | ICD-10-CM

## 2019-03-30 PROBLEM — T82.7XXA INFECTION OF ARTERIOVENOUS GRAFT FOR HEMODIALYSIS (HCC): Status: ACTIVE | Noted: 2019-03-30

## 2019-03-30 PROBLEM — I10 ESSENTIAL HYPERTENSION: Status: ACTIVE | Noted: 2019-03-30

## 2019-03-30 LAB
A/G RATIO: 0.9 (ref 1.1–2.2)
ALBUMIN SERPL-MCNC: 3.3 G/DL (ref 3.4–5)
ALP BLD-CCNC: 95 U/L (ref 40–129)
ALT SERPL-CCNC: <5 U/L (ref 10–40)
ANION GAP SERPL CALCULATED.3IONS-SCNC: 17 MMOL/L (ref 3–16)
APTT: 26.4 SEC (ref 26–36)
AST SERPL-CCNC: 8 U/L (ref 15–37)
BASOPHILS ABSOLUTE: 0.1 K/UL (ref 0–0.2)
BASOPHILS RELATIVE PERCENT: 0.7 %
BILIRUB SERPL-MCNC: <0.2 MG/DL (ref 0–1)
BUN BLDV-MCNC: 58 MG/DL (ref 7–20)
CALCIUM SERPL-MCNC: 8.6 MG/DL (ref 8.3–10.6)
CHLORIDE BLD-SCNC: 97 MMOL/L (ref 99–110)
CO2: 21 MMOL/L (ref 21–32)
CREAT SERPL-MCNC: 12.3 MG/DL (ref 0.6–1.2)
EOSINOPHILS ABSOLUTE: 0.2 K/UL (ref 0–0.6)
EOSINOPHILS RELATIVE PERCENT: 2.2 %
GFR AFRICAN AMERICAN: 4
GFR NON-AFRICAN AMERICAN: 3
GLOBULIN: 3.5 G/DL
GLUCOSE BLD-MCNC: 175 MG/DL (ref 70–99)
GLUCOSE BLD-MCNC: 238 MG/DL (ref 70–99)
HCT VFR BLD CALC: 32.8 % (ref 36–48)
HEMOGLOBIN: 10.5 G/DL (ref 12–16)
INR BLD: 1.05 (ref 0.86–1.14)
LACTIC ACID: 1.4 MMOL/L (ref 0.4–2)
LYMPHOCYTES ABSOLUTE: 1.1 K/UL (ref 1–5.1)
LYMPHOCYTES RELATIVE PERCENT: 12.8 %
MCH RBC QN AUTO: 32.6 PG (ref 26–34)
MCHC RBC AUTO-ENTMCNC: 32 G/DL (ref 31–36)
MCV RBC AUTO: 101.8 FL (ref 80–100)
MONOCYTES ABSOLUTE: 0.6 K/UL (ref 0–1.3)
MONOCYTES RELATIVE PERCENT: 7.5 %
NEUTROPHILS ABSOLUTE: 6.4 K/UL (ref 1.7–7.7)
NEUTROPHILS RELATIVE PERCENT: 76.8 %
PDW BLD-RTO: 19.1 % (ref 12.4–15.4)
PERFORMED ON: ABNORMAL
PLATELET # BLD: 177 K/UL (ref 135–450)
PMV BLD AUTO: 8.4 FL (ref 5–10.5)
POTASSIUM REFLEX MAGNESIUM: 4.3 MMOL/L (ref 3.5–5.1)
PROTHROMBIN TIME: 12 SEC (ref 9.8–13)
RBC # BLD: 3.22 M/UL (ref 4–5.2)
SODIUM BLD-SCNC: 135 MMOL/L (ref 136–145)
TOTAL PROTEIN: 6.8 G/DL (ref 6.4–8.2)
TROPONIN: 0.06 NG/ML
WBC # BLD: 8.3 K/UL (ref 4–11)

## 2019-03-30 PROCEDURE — 96365 THER/PROPH/DIAG IV INF INIT: CPT

## 2019-03-30 PROCEDURE — 6360000002 HC RX W HCPCS: Performed by: EMERGENCY MEDICINE

## 2019-03-30 PROCEDURE — 6370000000 HC RX 637 (ALT 250 FOR IP): Performed by: EMERGENCY MEDICINE

## 2019-03-30 PROCEDURE — 2580000003 HC RX 258: Performed by: EMERGENCY MEDICINE

## 2019-03-30 PROCEDURE — 1200000000 HC SEMI PRIVATE

## 2019-03-30 PROCEDURE — 93005 ELECTROCARDIOGRAM TRACING: CPT | Performed by: EMERGENCY MEDICINE

## 2019-03-30 PROCEDURE — 85025 COMPLETE CBC W/AUTO DIFF WBC: CPT

## 2019-03-30 PROCEDURE — 99285 EMERGENCY DEPT VISIT HI MDM: CPT

## 2019-03-30 PROCEDURE — 96375 TX/PRO/DX INJ NEW DRUG ADDON: CPT

## 2019-03-30 PROCEDURE — 36415 COLL VENOUS BLD VENIPUNCTURE: CPT

## 2019-03-30 PROCEDURE — 85730 THROMBOPLASTIN TIME PARTIAL: CPT

## 2019-03-30 PROCEDURE — 2500000003 HC RX 250 WO HCPCS: Performed by: EMERGENCY MEDICINE

## 2019-03-30 PROCEDURE — 6370000000 HC RX 637 (ALT 250 FOR IP): Performed by: INTERNAL MEDICINE

## 2019-03-30 PROCEDURE — 85610 PROTHROMBIN TIME: CPT

## 2019-03-30 PROCEDURE — 2580000003 HC RX 258: Performed by: INTERNAL MEDICINE

## 2019-03-30 PROCEDURE — 87040 BLOOD CULTURE FOR BACTERIA: CPT

## 2019-03-30 PROCEDURE — 84484 ASSAY OF TROPONIN QUANT: CPT

## 2019-03-30 PROCEDURE — 94640 AIRWAY INHALATION TREATMENT: CPT

## 2019-03-30 PROCEDURE — 80053 COMPREHEN METABOLIC PANEL: CPT

## 2019-03-30 PROCEDURE — 96366 THER/PROPH/DIAG IV INF ADDON: CPT

## 2019-03-30 PROCEDURE — 71045 X-RAY EXAM CHEST 1 VIEW: CPT

## 2019-03-30 PROCEDURE — 83605 ASSAY OF LACTIC ACID: CPT

## 2019-03-30 RX ORDER — HYDROMORPHONE HYDROCHLORIDE 1 MG/ML
1 INJECTION, SOLUTION INTRAMUSCULAR; INTRAVENOUS; SUBCUTANEOUS ONCE
Status: COMPLETED | OUTPATIENT
Start: 2019-03-30 | End: 2019-03-30

## 2019-03-30 RX ORDER — NICOTINE POLACRILEX 4 MG
15 LOZENGE BUCCAL PRN
Status: DISCONTINUED | OUTPATIENT
Start: 2019-03-30 | End: 2019-04-01 | Stop reason: HOSPADM

## 2019-03-30 RX ORDER — PREGABALIN 50 MG/1
50 CAPSULE ORAL 3 TIMES DAILY
Status: DISCONTINUED | OUTPATIENT
Start: 2019-03-30 | End: 2019-03-30

## 2019-03-30 RX ORDER — SODIUM CHLORIDE 0.9 % (FLUSH) 0.9 %
10 SYRINGE (ML) INJECTION PRN
Status: DISCONTINUED | OUTPATIENT
Start: 2019-03-30 | End: 2019-04-01 | Stop reason: HOSPADM

## 2019-03-30 RX ORDER — ONDANSETRON 2 MG/ML
4 INJECTION INTRAMUSCULAR; INTRAVENOUS ONCE
Status: COMPLETED | OUTPATIENT
Start: 2019-03-30 | End: 2019-03-30

## 2019-03-30 RX ORDER — WARFARIN SODIUM 7.5 MG/1
7.5 TABLET ORAL DAILY
Status: DISCONTINUED | OUTPATIENT
Start: 2019-03-30 | End: 2019-04-01 | Stop reason: HOSPADM

## 2019-03-30 RX ORDER — ALLOPURINOL 100 MG/1
100 TABLET ORAL DAILY
Status: DISCONTINUED | OUTPATIENT
Start: 2019-03-31 | End: 2019-04-01 | Stop reason: HOSPADM

## 2019-03-30 RX ORDER — ACETAMINOPHEN 325 MG/1
650 TABLET ORAL EVERY 4 HOURS PRN
Status: DISCONTINUED | OUTPATIENT
Start: 2019-03-30 | End: 2019-04-01 | Stop reason: HOSPADM

## 2019-03-30 RX ORDER — HYDROMORPHONE HYDROCHLORIDE 2 MG/1
2 TABLET ORAL EVERY 12 HOURS PRN
Status: DISCONTINUED | OUTPATIENT
Start: 2019-03-30 | End: 2019-04-01 | Stop reason: HOSPADM

## 2019-03-30 RX ORDER — NEBIVOLOL 5 MG/1
2.5 TABLET ORAL 2 TIMES DAILY
Status: DISCONTINUED | OUTPATIENT
Start: 2019-03-30 | End: 2019-04-01 | Stop reason: HOSPADM

## 2019-03-30 RX ORDER — IPRATROPIUM BROMIDE AND ALBUTEROL SULFATE 2.5; .5 MG/3ML; MG/3ML
1 SOLUTION RESPIRATORY (INHALATION) EVERY 4 HOURS PRN
Status: DISCONTINUED | OUTPATIENT
Start: 2019-03-30 | End: 2019-03-30 | Stop reason: SDUPTHER

## 2019-03-30 RX ORDER — DEXTROSE MONOHYDRATE 25 G/50ML
12.5 INJECTION, SOLUTION INTRAVENOUS PRN
Status: DISCONTINUED | OUTPATIENT
Start: 2019-03-30 | End: 2019-04-01 | Stop reason: HOSPADM

## 2019-03-30 RX ORDER — TRAZODONE HYDROCHLORIDE 50 MG/1
100 TABLET ORAL NIGHTLY
Status: DISCONTINUED | OUTPATIENT
Start: 2019-03-30 | End: 2019-04-01 | Stop reason: HOSPADM

## 2019-03-30 RX ORDER — DEXTROSE MONOHYDRATE 50 MG/ML
100 INJECTION, SOLUTION INTRAVENOUS PRN
Status: DISCONTINUED | OUTPATIENT
Start: 2019-03-30 | End: 2019-04-01 | Stop reason: HOSPADM

## 2019-03-30 RX ORDER — IPRATROPIUM BROMIDE AND ALBUTEROL SULFATE 2.5; .5 MG/3ML; MG/3ML
1 SOLUTION RESPIRATORY (INHALATION) EVERY 4 HOURS PRN
Status: DISCONTINUED | OUTPATIENT
Start: 2019-03-30 | End: 2019-04-01 | Stop reason: HOSPADM

## 2019-03-30 RX ORDER — DOCUSATE SODIUM 100 MG/1
100 CAPSULE, LIQUID FILLED ORAL 2 TIMES DAILY PRN
Status: DISCONTINUED | OUTPATIENT
Start: 2019-03-30 | End: 2019-04-01 | Stop reason: HOSPADM

## 2019-03-30 RX ORDER — PREGABALIN 50 MG/1
50 CAPSULE ORAL 2 TIMES DAILY
Status: DISCONTINUED | OUTPATIENT
Start: 2019-03-30 | End: 2019-04-01 | Stop reason: HOSPADM

## 2019-03-30 RX ORDER — ROSUVASTATIN CALCIUM 10 MG/1
10 TABLET, COATED ORAL DAILY
Status: DISCONTINUED | OUTPATIENT
Start: 2019-03-31 | End: 2019-04-01 | Stop reason: HOSPADM

## 2019-03-30 RX ORDER — TRAMADOL HYDROCHLORIDE 50 MG/1
50 TABLET ORAL EVERY 6 HOURS PRN
Status: DISCONTINUED | OUTPATIENT
Start: 2019-03-30 | End: 2019-04-01 | Stop reason: HOSPADM

## 2019-03-30 RX ORDER — SODIUM CHLORIDE 0.9 % (FLUSH) 0.9 %
10 SYRINGE (ML) INJECTION EVERY 12 HOURS SCHEDULED
Status: DISCONTINUED | OUTPATIENT
Start: 2019-03-30 | End: 2019-04-01 | Stop reason: HOSPADM

## 2019-03-30 RX ORDER — ONDANSETRON 2 MG/ML
4 INJECTION INTRAMUSCULAR; INTRAVENOUS EVERY 4 HOURS PRN
Status: DISCONTINUED | OUTPATIENT
Start: 2019-03-30 | End: 2019-04-01 | Stop reason: HOSPADM

## 2019-03-30 RX ORDER — IPRATROPIUM BROMIDE AND ALBUTEROL SULFATE 2.5; .5 MG/3ML; MG/3ML
1 SOLUTION RESPIRATORY (INHALATION) ONCE
Status: COMPLETED | OUTPATIENT
Start: 2019-03-30 | End: 2019-03-30

## 2019-03-30 RX ORDER — PANTOPRAZOLE SODIUM 40 MG/1
40 TABLET, DELAYED RELEASE ORAL
Status: DISCONTINUED | OUTPATIENT
Start: 2019-03-31 | End: 2019-04-01 | Stop reason: HOSPADM

## 2019-03-30 RX ADMIN — TRAMADOL HYDROCHLORIDE 50 MG: 50 TABLET, FILM COATED ORAL at 22:45

## 2019-03-30 RX ADMIN — CEFEPIME HYDROCHLORIDE 1 G: 1 INJECTION, POWDER, FOR SOLUTION INTRAMUSCULAR; INTRAVENOUS at 18:27

## 2019-03-30 RX ADMIN — Medication 10 ML: at 22:47

## 2019-03-30 RX ADMIN — HYDROMORPHONE HYDROCHLORIDE 2 MG: 2 TABLET ORAL at 20:33

## 2019-03-30 RX ADMIN — HYDROMORPHONE HYDROCHLORIDE 1 MG: 1 INJECTION, SOLUTION INTRAMUSCULAR; INTRAVENOUS; SUBCUTANEOUS at 15:39

## 2019-03-30 RX ADMIN — INSULIN GLARGINE 30 UNITS: 100 INJECTION, SOLUTION SUBCUTANEOUS at 21:10

## 2019-03-30 RX ADMIN — NEBIVOLOL HYDROCHLORIDE 2.5 MG: 5 TABLET ORAL at 20:33

## 2019-03-30 RX ADMIN — INSULIN LISPRO 1 UNITS: 100 INJECTION, SOLUTION INTRAVENOUS; SUBCUTANEOUS at 21:10

## 2019-03-30 RX ADMIN — ONDANSETRON 4 MG: 2 INJECTION INTRAMUSCULAR; INTRAVENOUS at 15:38

## 2019-03-30 RX ADMIN — IPRATROPIUM BROMIDE AND ALBUTEROL SULFATE 1 AMPULE: .5; 3 SOLUTION RESPIRATORY (INHALATION) at 14:55

## 2019-03-30 RX ADMIN — PREGABALIN 50 MG: 50 CAPSULE ORAL at 20:32

## 2019-03-30 RX ADMIN — TRAZODONE HYDROCHLORIDE 100 MG: 50 TABLET ORAL at 20:33

## 2019-03-30 RX ADMIN — VANCOMYCIN HYDROCHLORIDE 1250 MG: 10 INJECTION, POWDER, LYOPHILIZED, FOR SOLUTION INTRAVENOUS at 16:44

## 2019-03-30 ASSESSMENT — PAIN DESCRIPTION - LOCATION
LOCATION: ARM

## 2019-03-30 ASSESSMENT — PAIN SCALES - GENERAL
PAINLEVEL_OUTOF10: 6
PAINLEVEL_OUTOF10: 8
PAINLEVEL_OUTOF10: 5
PAINLEVEL_OUTOF10: 4
PAINLEVEL_OUTOF10: 6
PAINLEVEL_OUTOF10: 5
PAINLEVEL_OUTOF10: 8

## 2019-03-30 ASSESSMENT — PAIN DESCRIPTION - PAIN TYPE
TYPE: SURGICAL PAIN

## 2019-03-30 ASSESSMENT — PAIN DESCRIPTION - ORIENTATION
ORIENTATION: RIGHT

## 2019-03-30 NOTE — ED NOTES
Pt report called to , RN, states no questions or concerns at this time.       [facility], RN  03/30/19 980 [facility], RN  03/30/19 [id number]

## 2019-03-30 NOTE — ED PROVIDER NOTES
[facility] Emergency Department      Pt Name: Alisha Blue  MRN: [id number]  [place] 1955  Date of evaluation: 3/30/2019  Provider: Elliott Winter MD     CHIEF COMPLAINT  Chief Complaint   Patient presents with    Shortness of Breath     worsening shortness of breath since yesterday with chest \"tightness\". No better after home tx. HPI  Alisha Blue is a 59 y.o. female who presents because of difficulty breathing. She has a history of COPD but is also a dialysis patient and she missed dialysis yesterday because she felt poorly. She had a fistula surgically placed to her right arm two days ago and it is giving her pain. She denies chest pain. She does have some extremity cramping. She denies fever. She received dialysis to her left arm the last time she received dialysis. REVIEW OF SYSTEMS:  No fever, fatigue, arm pain, no abdominal pain Pertinent positives and negatives as per the HPI. All other review of systems reviewed and negative. Nursing notes reviewed.     PAST MEDICAL HISTORY  Past Medical History:   Diagnosis Date    Abdominal pain 8/20/2018    Acute on chronic congestive heart failure ([address] 6/26/2014    Asthma     Cervical cancer ([address]     Chest pain 5/16/2018    CHF (congestive heart failure) (HCC)     Chronic kidney disease, stage IV (severe) ([facility])     Dr Lucia Kearney Chronic pain syndrome 3/27/2018    Chronic respiratory failure with hypoxia ([facility])     CKD (chronic kidney disease) stage 4, GFR 15-29 ml/min ([facility]) 6/30/2017    Colon polyps     COPD (chronic obstructive pulmonary disease) ([address]     Degenerative disc disease at L5-S1 level 6/18/2013     CT    Diabetes mellitus, insulin dependent (IDDM), uncontrolled ([address]     Diabetic polyneuropathy associated with type 2 diabetes mellitus ([address] 3/26/2019    Elevated troponin 9/9/2017    ESRD (end stage renal disease) on dialysis ([address] 02/14/2018    JU SHAFER    GERD (gastroesophageal reflux disease)     Gout     History of blood transfusion     History of cervical cancer     Hyperlipidemia     Hypertension     Incarcerated ventral hernia     LVH (left ventricular hypertrophy)     Morbid obesity (HCC)     Mucus plugging of bronchi     Obstructive sleep apnea on CPAP     wears 2L O2 and BIPAP at night    Pneumonia     Psychiatric problem     breakdown long time ago but not current    Pulmonary embolism ([facility] 2/6/13    Type 2 diabetes mellitus with diabetic neuropathy, with long-term current use of insulin ([facility] 9/12/2017    Urinary incontinence in female     Venous stasis of lower extremity      SURGICAL HISTORY  Past Surgical History:   Procedure Laterality Date    CARDIAC CATHETERIZATION  1/14    Grace; clean cors    COLONOSCOPY  2010    polyp removed; Rohan Marmolejo; repeat 5 years    COLONOSCOPY  6/25/13, 11/14    [place]    DIALYSIS FISTULA CREATION Right 3/28/2019    RIGHT BRACHIO CEPHALIC FISTULA CREATION performed by Chapman Goldberg, MD at [address]  2/21/09    LVH with global hypokinesis; EF 55-60%    HYSTERECTOMY      KNEE ARTHROSCOPY Right 1999    OTHER SURGICAL HISTORY  02/22/2018    LEFT brachial artery axillary vein AV graft     OK OPEN SKULL SUPRATENT EXPLORE      Craniotomy, 3/21/19 patient denies any brain surgery or craniotomy    OK REPAIR INCISIONAL HERNIA,REDUCIBLE N/A 11/20/2018    LAPAROSCOPIC VENTRAL HERNIA REPAIR WITH MESH performed by Dorota Hicks MD at [place] [address]  10/96    TUNNELED VENOUS PORT PLACEMENT Right 11/2019    UPPER GASTROINTESTINAL ENDOSCOPY  6/25/13    VENTRAL HERNIA REPAIR  12/24/2016    Incarcerated ventral hernia repair with mesh     MEDICATIONS:  No current facility-administered medications on file prior to encounter.       Current Outpatient Medications on File Prior to Encounter   Medication Sig Dispense Refill    TRULICITY 9.81 DENISE/8.2FC SOPN INJECT ONE  SYRINGE SUBCUTANEOUSLY ONCE A WEEK 15 pen 3    HYDROmorphone (DILAUDID) 2 MG tablet Take 1 tablet by mouth every 12 hours as needed for Pain for up to 3 days. 20 tablet 0    allopurinol (ZYLOPRIM) 100 MG tablet Take 1 tablet by mouth daily 90 tablet 3    pregabalin (LYRICA) 50 MG capsule Take 1 capsule by mouth 3 times daily for 30 days. 90 capsule 5    Incontinence Supply Disposable (DEPEND ADJUSTABLE UNDERWEAR LG) MISC 1 each by Does not apply route as needed (for urine incompetence) 60 each 5    UNIFINE PENTIPS 31G X 5 MM MISC USE WITH INSULIN PENS FOUR TIMES DAILY 400 each 3    rosuvastatin (CRESTOR) 10 MG tablet Take 1 tablet by mouth daily Take 1 tablet by mouth  daily 90 tablet 3    insulin lispro (HUMALOG KWIKPEN) 100 UNIT/ML pen Inject 0-12 Units into the skin 3 times daily (before meals) 5 pen 5    lidocaine-prilocaine (EMLA) 2.5-2.5 % cream Apply topically as needed.  1 Tube 5    promethazine (PHENERGAN) 25 MG tablet Take 1 tablet by mouth every 8 hours as needed for Nausea 30 tablet 2    calcium acetate (PHOSLO) 667 MG capsule Take 2 capsules by mouth 3 times daily (with meals) 60 capsule 3    nebivolol (BYSTOLIC) 2.5 MG tablet Take 1 tablet by mouth 2 times daily 30 tablet 3    insulin detemir (LEVEMIR FLEXTOUCH) 100 UNIT/ML injection pen Inject 23 Units into the skin 2 times daily (Patient taking differently: Inject 30 Units into the skin 2 times daily ) 75 mL 5    docusate sodium (COLACE) 100 MG capsule Take 1 capsule by mouth 2 times daily (Patient taking differently: Take 100 mg by mouth 2 times daily as needed ) 60 capsule 0    albuterol sulfate HFA (PROAIR HFA) 108 (90 Base) MCG/ACT inhaler Inhale 2 puffs into the lungs every 6 hours as needed for Wheezing 1 Inhaler 11    fluticasone-salmeterol (ADVAIR HFA) 230-21 MCG/ACT inhaler Inhale 1 puff into the lungs 2 times daily 1 Inhaler 11    ipratropium-albuterol (DUONEB) 0.5-2.5 (3) MG/3ML SOLN nebulizer solution Inhale 3 mLs into the lungs every 4 hours DX:COPD J44.9 (Patient taking differently: Inhale 1 vial into the lungs every 4 hours as needed DX:COPD J44.9) 360 mL 11    traMADol (ULTRAM) 50 MG tablet Take 50 mg by mouth every 6 hours as needed for Pain. Jose Lyons omeprazole (PRILOSEC) 40 MG delayed release capsule TAKE 1 CAPSULE BY MOUTH  DAILY 90 capsule 3    traZODone (DESYREL) 100 MG tablet Take 1 tablet by mouth nightly TAKES PRN 30 tablet 5    RELION PEN NEEDLE 31G/8MM 31G X 8 MM MISC USE  THREE TIMES DAILY AS DIRECTED 100 each 5    nitroGLYCERIN (NITROSTAT) 0.4 MG SL tablet DISSOLVE 1 TABLET UNDER THE TONGUE EVERY 5 MINUTES AS  NEEDED ; MAXIMUM OF 3  TABLETS IN 15 MINUTES 75 tablet 1    warfarin (COUMADIN) 7.5 MG tablet Continue f/up with coumadin clinic (Patient taking differently: 5 mg daily Patient taking 4mg on non dialysis days, 5 mg on dialysis days (Mon, Weds, Fri). Told to hold 5 days before surgery by Dr Kyree Roberts' office. Per patient coumadin is sometimes held by dialysis when levels high.) 30 tablet 3    ONE TOUCH ULTRA TEST strip USE TO TEST 3-4 TIMES DAILY DUE TO FLUCTUATING SUGAR 100 strip 5    ASSURE COMFORT LANCETS 30G MISC USE TO TEST BLOOD GLUCOSE THREE TIMES DAILY 300 each 3    Alcohol Swabs (ALCOHOL PREP) 70 % PADS USE TO TEST BLOOD GLUCOSE THREE TIMES DAILY 300 each 3    Blood Glucose Monitoring Suppl (ACCU-CHEK KENNETH SMARTVIEW) w/Device KIT USE TO TEST BLOOD GLUCOSE 1 kit 0    BiPAP Machine MISC by Does not apply route nightly      Spacer/Aero-Holding Chambers VENANCIO 1 Device by Does not apply route daily as needed 1 Device 0    OXYGEN Inhale 2 L into the lungs daily as needed At night prn       ALLERGIES  Codeine; Fentanyl; Morphine; Nsaids; Hydrocodone-acetaminophen; Percocet [oxycodone-acetaminophen];  Procardia [nifedipine]; and Vicodin [hydrocodone-acetaminophen]  FAMILY HISTORY:  Family History   Problem Relation Age of Onset    Colon Cancer Father     Diabetes Father     High Blood Pressure Father     Colon Cancer Mother     Diabetes Mother     Colon Cancer Maternal Grandmother     Kidney Cancer Brother     Heart Disease Brother          age 50    High Blood Pressure Brother     High Blood Pressure Sister     High Blood Pressure Maternal Aunt     High Blood Pressure Sister     Heart Disease Sister      SOCIAL HISTORY:  Social History     Tobacco Use    Smoking status: Never Smoker    Smokeless tobacco: Never Used   Substance Use Topics    Alcohol use: No     Alcohol/week: 0.0 oz    Drug use: No     IMMUNIZATIONS:    Immunization History   Administered Date(s) Administered    Influenza Vaccine, unspecified formulation 2016    Influenza Virus Vaccine 2010, 10/25/2011, 09/10/2014, 2015, 10/17/2018    Influenza, Intradermal, Preservative free 2012, 10/16/2013    Influenza, Intradermal, Quadrivalent, Preservative Free 10/25/2017    Pneumococcal 13-valent Conjugate ([id number]) 2016    Pneumococcal Conjugate 7-valent 2010    Pneumococcal Polysaccharide ([id number]) 2015    Tdap (Boostrix, Adacel) 2011    Zoster Live (Zostavax) 2015       PHYSICAL EXAM  VITAL SIGNS:  BP (!) 183/96   Pulse 102   Temp 98 °F (36.7 °C)   Resp 28   Ht 5' 3\" (1.6 m)   Wt (!) 327 lb (148.3 kg)   LMP 1996 (Exact Date)   BMI 57.93 kg/m²   Constitutional:  59 y.o. female alert, nontoxic  HENT:  Atraumatic, mucous membranes moist  Eyes:   Conjunctiva clear, no discharge, no icterus  Neck:  Supple, no adenopathy, no visible JVD, no stridor  Cardiovascular:  Regular, no rubs, no discernible murmur  Thorax & Lungs:  No accessory muscle usage, wheezes  Abdomen:  Soft, non distended, bowel sounds present, nontender  Back:  No deformity  Genitalia:  Deferred  Rectal:  Deferred  Extremities:  No cyanosis, no tenderness, edema mild  Skin:  Warm, dry, erythema over the surgical vascular fistula on right with warmth and tenderness  Neurologic:  Alert, mentation normal  Psychiatric:  Affect appropriate    DIAGNOSTIC RESULTS:  Labs resulted at the time of this note reviewed.   Labs Reviewed   CBC WITH AUTO DIFFERENTIAL - Abnormal; Notable for the following components:       Result Value    RBC 3.22 (*)     Hemoglobin 10.5 (*)     Hematocrit 32.8 (*)     .8 (*)     RDW 19.1 (*)     All other components within normal limits    Narrative:     Performed at:  [facility] [address], [place], [address] [place]   Phone [phone]   COMPREHENSIVE METABOLIC PANEL W/ REFLEX TO MG FOR LOW K - Abnormal; Notable for the following components:    Sodium 135 (*)     Chloride 97 (*)     Anion Gap 17 (*)     Glucose 238 (*)     BUN 58 (*)     CREATININE 12.3 (*)     GFR Non- 3 (*)     GFR  4 (*)     Alb 3.3 (*)     Albumin/Globulin Ratio 0.9 (*)     ALT <5 (*)     AST 8 (*)     All other components within normal limits    Narrative:     CALL  [facility] tel. [phone],  Chemistry results called to and read back by RNdominick, 03/30/2019  16:18, by [facility]  Performed at:  [facility] [address], [place], [facility] [place]   Phone [phone]   TROPONIN - Abnormal; Notable for the following components:    Troponin 0.06 (*)     All other components within normal limits    Narrative:     Performed at:  [facility] [address], [place], [address] [place]   Phone [phone]   POCT GLUCOSE - Abnormal; Notable for the following components:    POC Glucose 175 (*)     All other components within normal limits    Narrative:     Performed at:  [facility] [address], [place], [facility] [place]   Phone [phone]   CULTURE BLOOD #1   CULTURE BLOOD #2   LACTIC ACID, PLASMA    Narrative:     Performed at:  [facility] [address] [facility] [place]   Phone [phone]   PROTIME-INR    Narrative: Performed at:  [facility]  [address] [facility]  [place], [address] [facility]   Phone [phone]   APTT    Narrative:     Performed at:  [facility]  [address] [facility],  [place], [address] [facility]   Phone [phone]   PROTIME-INR   BASIC METABOLIC PANEL W/ REFLEX TO MG FOR LOW K   CBC WITH AUTO DIFFERENTIAL   POCT GLUCOSE   POCT GLUCOSE   POCT GLUCOSE     RADIOLOGY:    Plain x-rays were viewed by me:   XR CHEST PORTABLE   Final Result   No acute process.            EKG:  Read by me in the absence of a cardiologist shows: st, rate 104, no acute injury pattern, left axis, poor r wave progression, no major change from prior study     ED COURSE:    Medications administered:  Medications   traZODone (DESYREL) tablet 100 mg (100 mg Oral Given 3/30/19 2033)   traMADol (ULTRAM) tablet 50 mg (50 mg Oral Given 3/30/19 2245)   rosuvastatin (CRESTOR) tablet 10 mg (has no administration in time range)   pantoprazole (PROTONIX) tablet 40 mg (has no administration in time range)   nebivolol (BYSTOLIC) tablet 2.5 mg (2.5 mg Oral Given 3/30/19 2033)   HYDROmorphone (DILAUDID) tablet 2 mg (2 mg Oral Given 3/30/19 2033)   mometasone-formoterol (DULERA) 200-5 MCG/ACT inhaler 2 puff (0 puffs Inhalation Held 3/30/19 2211)   docusate sodium (COLACE) capsule 100 mg (has no administration in time range)   calcium acetate (PHOSLO) capsule 1,334 mg (has no administration in time range)   allopurinol (ZYLOPRIM) tablet 100 mg (has no administration in time range)   cefepime (MAXIPIME) 1 g IVPB minibag (has no administration in time range)   sodium chloride flush 0.9 % injection 10 mL (10 mLs Intravenous Given 3/30/19 2247)   sodium chloride flush 0.9 % injection 10 mL (has no administration in time range)   magnesium hydroxide (MILK OF MAGNESIA) 400 MG/5ML suspension 30 mL (has no administration in time range)   ondansetron (ZOFRAN) injection 4 mg (has no administration in time range)   insulin lispro (HUMALOG) injection pen 0-12 Units (has no administration in time range)   insulin lispro (HUMALOG) injection pen 0-6 Units (1 Units Subcutaneous Given 3/30/19 2110)   glucose (GLUTOSE) 40 % oral gel 15 g (has no administration in time range)   dextrose 50 % solution 12.5 g (has no administration in time range)   glucagon (rDNA) injection 1 mg (has no administration in time range)   dextrose 5 % solution (has no administration in time range)   acetaminophen (TYLENOL) tablet 650 mg (650 mg Oral Given 3/30/19 2244)   ipratropium-albuterol (DUONEB) nebulizer solution 1 ampule (has no administration in time range)   insulin glargine (LANTUS) injection pen 30 Units (30 Units Subcutaneous Given 3/30/19 2110)   pregabalin (LYRICA) capsule 50 mg (50 mg Oral Given 3/30/19 2032)   vancomycin (VANCOCIN) intermittent dosing (placeholder) (has no administration in time range)   warfarin (COUMADIN) tablet 7.5 mg (7.5 mg Oral Given 3/30/19 2244)   HYDROmorphone HCl PF (DILAUDID) injection 1 mg (1 mg Intravenous Given 3/30/19 1539)   ondansetron (ZOFRAN) injection 4 mg (4 mg Intravenous Given 3/30/19 1538)   ipratropium-albuterol (DUONEB) nebulizer solution 1 ampule (1 ampule Inhalation Given 3/30/19 1455)   cefepime (MAXIPIME) 1 g IVPB minibag (0 g Intravenous Stopped 3/30/19 1900)   vancomycin (VANCOCIN) 1,250 mg in dextrose 5 % 250 mL IVPB (0 mg Intravenous Stopped 3/30/19 1827)     PROCEDURES:  None    CRITICAL CARE:  None    CONSULTATIONS:  Hospitalist, Dr. Maryanna Pallas, Dr. Shanika Montenegro: Shira Mcdermott is a 59 y.o. female who presented because of breathing difficulty. She did miss dialysis but does not appear volume overloaded and potassium level is acceptable. She has findings of cellulitis to her surgical vascular site. Her breathing was improved with respiratory treatment and she is oxygenating well. I consulted with the hospitalist, vascular surgery, nephrology.   I discussed the case in full with the admitting physician. Differential Diagnosis: pneumonia, pneumothorax, PE, ACS, CHF, aspiration, sepsis, other    FINAL IMPRESSION:    1. Dyspnea and respiratory abnormalities    2. Postoperative infection, unspecified type, initial encounter    3. Dialysis patient [facility])        (Please note that I used voice recognition software to generate this note.   Occasionally words are mistranscribed despite my efforts to edit errors.)       Fleeta Blizzard, MD  03/30/19 2090

## 2019-03-30 NOTE — ED NOTES
Bed: 15  Expected date:   Expected time:   Means of arrival:   Comments:  Anali after triage     Hilaria Bella RN  03/30/19 [id number]

## 2019-03-30 NOTE — H&P
Hospital Medicine  History and Physical    PCP: JURGEN Bartlett - ALONDRA  Patient Name: Sue Sexton    Date of Service: Pt seen/examined on 03/30/2019 and Admitted to Inpatient with expected LOS greater than two midnights due to medical therapy    CHIEF COMPLAINT:  Pt c/o shortness of breath, HD graft pain and erythema  HISTORY OF PRESENT ILLNESS: Patient is a 78-year-old woman with a history of hypertension, diabetes mellitus, COPD, CHF, and hemodialysis dependent end stage renal disease. She recently had a new HD graft placed, and comes to the emergency room complaining of a 2 to 3 day history of increased pain and erythema of the area. She also reports increased shortness of breath, she was unable to go to hemodialysis yesterday due to the pain in her new graft. She is being admitted for further evaluation and treatment. Associated signs and symptoms do not include fever or chills, nausea, vomiting, abdominal pain, hemoptysis, hematochezia, diarrhea, constipation or urinary symptoms.       Past Medical History:        Diagnosis Date    Abdominal pain 8/20/2018    Acute on chronic congestive heart failure (HCC) 6/26/2014    Asthma     Cervical cancer ([address]     Chest pain 5/16/2018    CH[facility]congestive heart failure) [facility])     Chronic kidney disease, stage IV (severe) ([facility])     Dr Natalie Hargrove Chronic pain syndrome 3/27/2018    Chronic respiratory failure with hypoxia ([facility])     CKD (chronic kidney disease) stage 4, GFR 15-29 ml/min ([facility]) 6/30/2017    Colon polyps     COPD (chronic obstructive pulmonary disease) ([address]     Degenerative disc disease at L5-S1 level 6/18/2013     CT    Diabetes mellitus, insulin dependent (IDDM), uncontrolled ([address]     Diabetic polyneuropathy associated with type 2 diabetes mellitus ([address] 3/26/2019    Elevated troponin 9/9/2017    ESRD (end stage renal disease) on dialysis ([address] 02/14/2018    JU SHAFER    GERD (gastroesophageal reflux disease)     Gout  History of blood transfusion     History of cervical cancer     Hyperlipidemia     Hypertension     Incarcerated ventral hernia     LVH (left ventricular hypertrophy)     Morbid obesity (HCC)     Mucus plugging of bronchi     Obstructive sleep apnea on CPAP     wears 2L O2 and BIPAP at night    Pneumonia     Psychiatric problem     breakdown long time ago but not current    Pulmonary embolism [facility] 2/6/13    Type 2 diabetes mellitus with diabetic neuropathy, with long-term current use of insulin ([facility] 9/12/2017    Urinary incontinence in female     Venous stasis of lower extremity        Past Surgical History:        Procedure Laterality Date    CARDIAC CATHETERIZATION  1/14    Grace; clean cors    COLONOSCOPY  2010    polyp removed; [facility] Safe; repeat 5 years    COLONOSCOPY  6/25/13, 11/14    [place]    DIALYSIS FISTULA CREATION Right 3/28/2019    RIGHT BRACHIO CEPHALIC FISTULA CREATION performed by Jose Khan MD at [address]  2/21/09    LVH with global hypokinesis; EF 55-60%    HYSTERECTOMY      KNEE ARTHROSCOPY Right 1999    OTHER SURGICAL HISTORY  02/22/2018    LEFT brachial artery axillary vein AV graft     NJ OPEN SKULL SUPRATENT EXPLORE      Craniotomy, 3/21/19 patient denies any brain surgery or craniotomy    NJ REPAIR INCISIONAL HERNIA,REDUCIBLE N/A 11/20/2018    LAPAROSCOPIC VENTRAL HERNIA REPAIR WITH MESH performed by Lucia Sacks, MD at [place] [address]  10/96    TUNNELED VENOUS PORT PLACEMENT Right 11/2019    UPPER GASTROINTESTINAL ENDOSCOPY  6/25/13    VENTRAL HERNIA REPAIR  12/24/2016    Incarcerated ventral hernia repair with mesh       Allergies:  Codeine; Fentanyl; Morphine; Nsaids; Hydrocodone-acetaminophen; Percocet [oxycodone-acetaminophen]; Procardia [nifedipine]; and Vicodin [hydrocodone-acetaminophen]    Medications Prior to Admission:    Prior to Admission medications    Medication Sig Start Date End Date Taking? the lungs every 6 hours as needed for Wheezing 9/25/18   Charisse Guerra MD   fluticasone-salmeterol ([facility]) 336-33 MCG/ACT inhaler Inhale 1 puff into the lungs 2 times daily 9/25/18   Charisse Guerra MD   ipratropium-albuterol (DUONEB) 0.5-2.5 (3) MG/3ML SOLN nebulizer solution Inhale 3 mLs into the lungs every 4 hours DX:COPD J44.9  Patient taking differently: Inhale 1 vial into the lungs every 4 hours as needed DX:COPD J44.9 9/25/18   Charisse Guerra MD   traMADol (ULTRAM) 50 MG tablet Take 50 mg by mouth every 6 hours as needed for Pain. Grayson Early Historical Provider, MD   TRULICITY 4.02 AY/1.4MS SOPN INJECT ONE  SYRINGE SUBCUTANEOUSLY ONCE A WEEK 7/31/18   JURGEN Malone CNP   omeprazole (PRILOSEC) 40 MG delayed release capsule TAKE 1 CAPSULE BY MOUTH  DAILY 6/18/18   JURGEN Malone CNP   traZODone (DESYREL) 100 MG tablet Take 1 tablet by mouth nightly TAKES PRN 5/1/18   JURGEN Malone CNP   RELION PEN NEEDLE 31G/8MM 31G X 8 MM MISC USE  THREE TIMES DAILY AS DIRECTED 4/17/18   JURGEN Malone CNP   nitroGLYCERIN (NITROSTAT) 0.4 MG SL tablet DISSOLVE 1 TABLET UNDER THE TONGUE EVERY 5 MINUTES AS  NEEDED ; MAXIMUM OF 3  TABLETS IN 15 MINUTES 4/11/18   JURGEN Malone CNP   warfarin (COUMADIN) 7.5 MG tablet Continue f/up with coumadin clinic  Patient taking differently: 5 mg daily Patient taking 4mg on non dialysis days, 5 mg on dialysis days (Mon, Weds, Fri). Told to hold 5 days before surgery by Dr Terrence Rocha' office.   Per patient coumadin is sometimes held by dialysis when levels high. 3/27/18   JURGEN Malone CNP   ONE TOUCH ULTRA TEST strip USE TO TEST 3-4 TIMES DAILY DUE TO FLUCTUATING SUGAR 11/14/17   JURGEN Malone CNP   ASSURE COMFORT LANCETS 30G MISC USE TO TEST BLOOD GLUCOSE THREE TIMES DAILY 8/15/17   JURGEN Malone CNP   Alcohol Swabs (ALCOHOL PREP) 70 % PADS USE TO TEST BLOOD GLUCOSE THREE TIMES DAILY 8/15/17   JURGEN Malone CNP   Blood Glucose Monitoring Suppl (ACCU-CHEK KENNETH SMARTVIEW) w/Device KIT USE TO TEST BLOOD GLUCOSE 8/15/17   JURGEN Kaiser CNP   BiPAP Machine MISC by Does not apply route nightly    Historical Provider, MD   Spacer/Aero-Holding Chambers VENANCIO 1 Device by Does not apply route daily as needed 3/27/16   JURGEN Dey CNP   OXYGEN Inhale 2 L into the lungs daily as needed At night prn    Historical Provider, MD       Family History:       Problem Relation Age of Onset    Colon Cancer Father     Diabetes Father     High Blood Pressure Father     Colon Cancer Mother     Diabetes Mother     Colon Cancer Maternal Grandmother     Kidney Cancer Brother     Heart Disease Brother          age 54    High Blood Pressure Brother     High Blood Pressure Sister     High Blood Pressure Maternal Aunt     High Blood Pressure Sister     Heart Disease Sister      Social History:   TOBACCO:   reports that she has never smoked. She has never used smokeless tobacco.  ETOH:   reports that she does not drink alcohol. OCCUPATION:      REVIEW OF SYSTEMS:  A full review of systems was performed and is negative except for that which appears in the HPI    Physical Exam:    Vitals: BP (!) 161/78   Pulse 91   Temp 97.2 °F (36.2 °C) (Oral)   Resp 14   Ht 5' 3\" (1.6 m)   Wt (!) 327 lb (148.3 kg)   LMP 1996 (Exact Date)   SpO2 98%   BMI 57.93 kg/m²   General appearance: Morbidly Obese 59y.o. year-old AA female who is alert, appears stated age and is cooperative  HEENT: Head: Normocephalic, no lesions, without obvious abnormality. Eye: Normal external eye, conjunctiva, lids cornea, ELVA. Ears: Normal external ears. Non-tender. Nose: Normal external nose, mucus membranes and septum. Pharynx: Dental Hygiene adequate. Normal buccal mucosa. Normal pharynx.   Neck: no adenopathy, no carotid bruit, no JVD, supple, symmetrical, trachea midline and thyroid not enlarged, symmetric, no tenderness/mass/nodules  Lungs: clear to auscultation bilaterally and no use of accessory muscles. Heart: regular rate and rhythm, S1, S2 normal, no murmur, click, rub or gallop and normal apical impulse  Abdomen: soft, non-tender; bowel sounds normal; no masses,  no organomegaly  Extremities: right arm HD graft with erythema, otherwise extremities atraumatic, no cyanosis or edema and Homans sign is negative, no sign of DVT. Capillary Refill: Acceptable < 3 seconds  Peripheral Pulses: +3 easily felt, not easily obliterated with pressures  Skin: Skin color, texture, turgor normal. No rashes or lesions on exposed skin  Neurologic: Neurovascularly intact without any focal sensory/motor deficits. Cranial nerves: II-XII intact, grossly non-focal. Gait was not tested. Psychiatric: Alert and oriented, thought content appropriate, normal insight    CBC:   Recent Labs     03/30/19  1541   WBC 8.3   HGB 10.5*        BMP:    Recent Labs     03/28/19  1304 03/30/19  1541    135*   K 4.1 4.3   CL 98* 97*   CO2 26 21   BUN 31* 58*   CREATININE 8.2* 12.3*   GLUCOSE 132* 238*     Troponin:   Recent Labs     03/30/19  1541   TROPONINI 0.06*     PT/INR:  No results found for: PTINR  U/A:    Lab Results   Component Value Date    LEUKOCYTESUR SMALL 11/25/2018    NITRITE neg 05/06/2015    RBCUA see below 11/25/2018    SPECGRAV >=1.030 11/25/2018    UROBILINOGEN 0.2 11/25/2018    BILIRUBINUR SMALL 11/25/2018    BILIRUBINUR neg 05/06/2015    BILIRUBINUR NEGATIVE 11/23/2010    BLOODU TRACE-INTACT 11/25/2018    GLUCOSEU 100 11/25/2018    GLUCOSEU NEGATIVE 11/23/2010    PROTEINU >=300 11/25/2018         RAD:   I have independently reviewed and interpreted the imaging studies below and based my recommendations to the patient on those findings.     Xr Chest Portable    Result Date: 3/30/2019  EXAMINATION: SINGLE XRAY VIEW OF THE CHEST 3/30/2019 2:40 pm COMPARISON: 02/20/2019 HISTORY: ORDERING SYSTEM PROVIDED HISTORY: sob TECHNOLOGIST PROVIDED HISTORY: Reason for exam:->sob Acuity: Unknown Type of Exam: Unknown FINDINGS: Right-sided central venous catheter remains in place terminating in the SVC. The lungs are without acute focal process. There is no effusion or pneumothorax. The cardiomediastinal silhouette is stable. The osseous structures are stable. No acute process. Assessment:   Principal Problem:    Infection of arteriovenous graft for hemodialysis ([facility])  Active Problems:    Chronic diastolic CHF (congestive heart failure) ([facility])    History of pulmonary embolism    Morbid obesity due to excess calories ([facility])    COPD, moderate ([facility])    DMII (diabetes mellitus, type 2) ([facility])    ESRD (end stage renal disease) on dialysis [facility])    Essential hypertension  Resolved Problems:    * No resolved hospital problems. *      Plan:       Infection of arteriovenous graft for hemodialysis ([address] - Pt started on Vancomycin and Cefepime. Nephrology and ID consulted    ESRD (end stage renal disease) on dialysis [facility]) - Nephrology consulted. Missed HD yesterday    Chronic diastolic CHF (congestive heart failure) ([facility]) - compensated; monitor I&Os and daily weights    History of pulmonary embolism - continue Warfarin    COPD (chronic obstructive pulmonary disease) - without acute exacerbation. Will provide Nebulizer treatments as needed and continue home medications. Patient will be monitored closely, and deep breathing and coughing will be encouraged while awake. Diabetes mellitus II - Levemir, SSI and CCD    Essential (primary) hypertension - continue home meds and monitor blood pressure    Morbid obesity due to excess calories (Body mass index is 57.93 kg/m². ) - Complicating assessment and treatment. Placing patient at risk for multiple co-morbidities as well as early death and contributing to the patient's presentation.  on weight loss when appropriate.           DVT Prophylaxis: Coumadin  Diet: No diet orders on file  Code Status: Prior  (Advanced care planning has been discussed with patient and/or responsible family member and is reflected in the code status. Further orders associated with this have been entered if appropriate)    Disposition: Anticipate that patient will remain in the hospital for 2 to 3 days depending on further evaluation and clinical course.      Alba Guerrero MD

## 2019-03-31 LAB
ANION GAP SERPL CALCULATED.3IONS-SCNC: 16 MMOL/L (ref 3–16)
BASOPHILS ABSOLUTE: 0.1 K/UL (ref 0–0.2)
BASOPHILS RELATIVE PERCENT: 0.9 %
BUN BLDV-MCNC: 64 MG/DL (ref 7–20)
CALCIUM SERPL-MCNC: 8.4 MG/DL (ref 8.3–10.6)
CHLORIDE BLD-SCNC: 100 MMOL/L (ref 99–110)
CO2: 22 MMOL/L (ref 21–32)
CREAT SERPL-MCNC: 13.5 MG/DL (ref 0.6–1.2)
EKG ATRIAL RATE: 104 BPM
EKG DIAGNOSIS: NORMAL
EKG P AXIS: 50 DEGREES
EKG P-R INTERVAL: 172 MS
EKG Q-T INTERVAL: 338 MS
EKG QRS DURATION: 78 MS
EKG QTC CALCULATION (BAZETT): 444 MS
EKG R AXIS: -30 DEGREES
EKG T AXIS: 93 DEGREES
EKG VENTRICULAR RATE: 104 BPM
EOSINOPHILS ABSOLUTE: 0.3 K/UL (ref 0–0.6)
EOSINOPHILS RELATIVE PERCENT: 4.2 %
GFR AFRICAN AMERICAN: 3
GFR NON-AFRICAN AMERICAN: 3
GLUCOSE BLD-MCNC: 121 MG/DL (ref 70–99)
GLUCOSE BLD-MCNC: 137 MG/DL (ref 70–99)
GLUCOSE BLD-MCNC: 150 MG/DL (ref 70–99)
GLUCOSE BLD-MCNC: 172 MG/DL (ref 70–99)
GLUCOSE BLD-MCNC: 187 MG/DL (ref 70–99)
HCT VFR BLD CALC: 33.3 % (ref 36–48)
HEMOGLOBIN: 10.6 G/DL (ref 12–16)
INR BLD: 1.13 (ref 0.86–1.14)
LYMPHOCYTES ABSOLUTE: 1.1 K/UL (ref 1–5.1)
LYMPHOCYTES RELATIVE PERCENT: 13.4 %
MCH RBC QN AUTO: 31.6 PG (ref 26–34)
MCHC RBC AUTO-ENTMCNC: 31.7 G/DL (ref 31–36)
MCV RBC AUTO: 99.7 FL (ref 80–100)
MONOCYTES ABSOLUTE: 0.6 K/UL (ref 0–1.3)
MONOCYTES RELATIVE PERCENT: 7.6 %
NEUTROPHILS ABSOLUTE: 6 K/UL (ref 1.7–7.7)
NEUTROPHILS RELATIVE PERCENT: 73.9 %
PDW BLD-RTO: 18.7 % (ref 12.4–15.4)
PERFORMED ON: ABNORMAL
PLATELET # BLD: 181 K/UL (ref 135–450)
PMV BLD AUTO: 7.7 FL (ref 5–10.5)
POTASSIUM REFLEX MAGNESIUM: 5.7 MMOL/L (ref 3.5–5.1)
PROTHROMBIN TIME: 12.9 SEC (ref 9.8–13)
RBC # BLD: 3.34 M/UL (ref 4–5.2)
SODIUM BLD-SCNC: 138 MMOL/L (ref 136–145)
WBC # BLD: 8.2 K/UL (ref 4–11)

## 2019-03-31 PROCEDURE — 2580000003 HC RX 258: Performed by: INTERNAL MEDICINE

## 2019-03-31 PROCEDURE — 93010 ELECTROCARDIOGRAM REPORT: CPT | Performed by: INTERNAL MEDICINE

## 2019-03-31 PROCEDURE — 6370000000 HC RX 637 (ALT 250 FOR IP): Performed by: INTERNAL MEDICINE

## 2019-03-31 PROCEDURE — 1200000000 HC SEMI PRIVATE

## 2019-03-31 PROCEDURE — 94640 AIRWAY INHALATION TREATMENT: CPT

## 2019-03-31 PROCEDURE — 6370000000 HC RX 637 (ALT 250 FOR IP): Performed by: PHYSICIAN ASSISTANT

## 2019-03-31 PROCEDURE — 2500000003 HC RX 250 WO HCPCS: Performed by: SURGERY

## 2019-03-31 PROCEDURE — 85610 PROTHROMBIN TIME: CPT

## 2019-03-31 PROCEDURE — 90937 HEMODIALYSIS REPEATED EVAL: CPT

## 2019-03-31 PROCEDURE — 6360000002 HC RX W HCPCS: Performed by: PHYSICIAN ASSISTANT

## 2019-03-31 PROCEDURE — 94760 N-INVAS EAR/PLS OXIMETRY 1: CPT

## 2019-03-31 PROCEDURE — 85025 COMPLETE CBC W/AUTO DIFF WBC: CPT

## 2019-03-31 PROCEDURE — 80048 BASIC METABOLIC PNL TOTAL CA: CPT

## 2019-03-31 PROCEDURE — 5A1D70Z PERFORMANCE OF URINARY FILTRATION, INTERMITTENT, LESS THAN 6 HOURS PER DAY: ICD-10-PCS | Performed by: INTERNAL MEDICINE

## 2019-03-31 RX ORDER — HYDROMORPHONE HYDROCHLORIDE 1 MG/ML
0.5 INJECTION, SOLUTION INTRAMUSCULAR; INTRAVENOUS; SUBCUTANEOUS EVERY 4 HOURS PRN
Status: DISCONTINUED | OUTPATIENT
Start: 2019-03-31 | End: 2019-04-01 | Stop reason: HOSPADM

## 2019-03-31 RX ORDER — HEPARIN SODIUM 5000 [USP'U]/ML
5000 INJECTION, SOLUTION INTRAVENOUS; SUBCUTANEOUS EVERY 8 HOURS SCHEDULED
Status: DISCONTINUED | OUTPATIENT
Start: 2019-03-31 | End: 2019-04-01 | Stop reason: HOSPADM

## 2019-03-31 RX ORDER — CEPHALEXIN 250 MG/1
500 CAPSULE ORAL EVERY 12 HOURS SCHEDULED
Status: DISCONTINUED | OUTPATIENT
Start: 2019-03-31 | End: 2019-04-01 | Stop reason: HOSPADM

## 2019-03-31 RX ORDER — ACETAMINOPHEN 80 MG
TABLET,CHEWABLE ORAL
Status: DISPENSED
Start: 2019-03-31 | End: 2019-03-31

## 2019-03-31 RX ORDER — LIDOCAINE HYDROCHLORIDE 10 MG/ML
INJECTION, SOLUTION EPIDURAL; INFILTRATION; INTRACAUDAL; PERINEURAL
Status: DISPENSED
Start: 2019-03-31 | End: 2019-04-01

## 2019-03-31 RX ORDER — SODIUM CHLORIDE 0.9 % (FLUSH) 0.9 %
SYRINGE (ML) INJECTION
Status: DISPENSED
Start: 2019-03-31 | End: 2019-03-31

## 2019-03-31 RX ORDER — SODIUM CHLORIDE 9 MG/ML
INJECTION, SOLUTION INTRAVENOUS
Status: DISPENSED
Start: 2019-03-31 | End: 2019-03-31

## 2019-03-31 RX ADMIN — ALLOPURINOL 100 MG: 100 TABLET ORAL at 09:51

## 2019-03-31 RX ADMIN — ROSUVASTATIN CALCIUM 10 MG: 10 TABLET, FILM COATED ORAL at 09:51

## 2019-03-31 RX ADMIN — TRAMADOL HYDROCHLORIDE 50 MG: 50 TABLET, FILM COATED ORAL at 05:34

## 2019-03-31 RX ADMIN — CALCIUM ACETATE 1334 MG: 667 CAPSULE ORAL at 09:51

## 2019-03-31 RX ADMIN — Medication 2 PUFF: at 09:20

## 2019-03-31 RX ADMIN — HYDROMORPHONE HYDROCHLORIDE 0.5 MG: 1 INJECTION, SOLUTION INTRAMUSCULAR; INTRAVENOUS; SUBCUTANEOUS at 21:28

## 2019-03-31 RX ADMIN — NEBIVOLOL HYDROCHLORIDE 2.5 MG: 5 TABLET ORAL at 21:19

## 2019-03-31 RX ADMIN — HEPARIN SODIUM 5000 UNITS: 5000 INJECTION INTRAVENOUS; SUBCUTANEOUS at 21:33

## 2019-03-31 RX ADMIN — TRAZODONE HYDROCHLORIDE 100 MG: 50 TABLET ORAL at 21:18

## 2019-03-31 RX ADMIN — Medication 2 PUFF: at 21:44

## 2019-03-31 RX ADMIN — INSULIN LISPRO 2 UNITS: 100 INJECTION, SOLUTION INTRAVENOUS; SUBCUTANEOUS at 09:59

## 2019-03-31 RX ADMIN — CEPHALEXIN 500 MG: 250 CAPSULE ORAL at 21:19

## 2019-03-31 RX ADMIN — PREGABALIN 50 MG: 50 CAPSULE ORAL at 21:19

## 2019-03-31 RX ADMIN — CALCIUM ACETATE 1334 MG: 667 CAPSULE ORAL at 16:58

## 2019-03-31 RX ADMIN — Medication 10 ML: at 21:23

## 2019-03-31 RX ADMIN — PANTOPRAZOLE SODIUM 40 MG: 40 TABLET, DELAYED RELEASE ORAL at 05:34

## 2019-03-31 RX ADMIN — INSULIN GLARGINE 30 UNITS: 100 INJECTION, SOLUTION SUBCUTANEOUS at 21:35

## 2019-03-31 RX ADMIN — PREGABALIN 50 MG: 50 CAPSULE ORAL at 09:51

## 2019-03-31 RX ADMIN — HYDROMORPHONE HYDROCHLORIDE 0.5 MG: 1 INJECTION, SOLUTION INTRAMUSCULAR; INTRAVENOUS; SUBCUTANEOUS at 09:55

## 2019-03-31 RX ADMIN — NEBIVOLOL HYDROCHLORIDE 2.5 MG: 5 TABLET ORAL at 09:51

## 2019-03-31 RX ADMIN — INSULIN LISPRO 1 UNITS: 100 INJECTION, SOLUTION INTRAVENOUS; SUBCUTANEOUS at 21:35

## 2019-03-31 RX ADMIN — HYDROMORPHONE HYDROCHLORIDE 0.5 MG: 1 INJECTION, SOLUTION INTRAMUSCULAR; INTRAVENOUS; SUBCUTANEOUS at 16:58

## 2019-03-31 RX ADMIN — INSULIN GLARGINE 30 UNITS: 100 INJECTION, SOLUTION SUBCUTANEOUS at 10:19

## 2019-03-31 RX ADMIN — WARFARIN SODIUM 7.5 MG: 7.5 TABLET ORAL at 17:04

## 2019-03-31 ASSESSMENT — PAIN SCALES - GENERAL
PAINLEVEL_OUTOF10: 6
PAINLEVEL_OUTOF10: 7
PAINLEVEL_OUTOF10: 0
PAINLEVEL_OUTOF10: 6
PAINLEVEL_OUTOF10: 5
PAINLEVEL_OUTOF10: 3
PAINLEVEL_OUTOF10: 8
PAINLEVEL_OUTOF10: 0
PAINLEVEL_OUTOF10: 6

## 2019-03-31 NOTE — CONSULTS
12/2016. FAMILY HISTORY:  Positive for high blood pressure and diabetes. No  history of kidney disease or kidney stone disease. PERSONAL HISTORY:  She is housewife. Does not abuse alcohol or drug. She does not smoke. ALLERGIES:  HYDROCODONE, TYLENOL, PERCOCET, and VICODIN. PHYSICAL EXAMINATION:  GENERAL:  Middle-aged morbidly obese female, alert, oriented, conscious,  cooperative, bit short of breath at rest.  VITAL SIGNS:  Blood pressure 137/72, pulse 87, regular, temperature 98  Fahrenheit, respiratory rate 17. Height 5 feet 3 inches, weight 154 kg  with a BMI of 60. HEENT:  Pupils are midsize, reacting to light. Extraocular movement  normal.  Pallor positive. No icterus. NECK:  JVP not raised. ACCESS EXAM:  Left upper arm AV graft which has failed to mature and  then either thrombosed or failed to mature right brachiocephalic  fistula. CARDIOVASCULAR SYSTEM:  Regular rate and rhythm. No murmur or rub. LUNGS:  Bilateral air entry. Bilateral rhonchi. ABDOMEN:  Obese, _____ scar, nonreducible hernia. EXTREMITIES:  There is no edema. SKIN:  No rash. NEUROLOGIC:  No neurological deficit. LABORATORY DATA:  Hemoglobin 10.6 with a crit of 33.3, white count 8200,  platelets are 089. Sodium 138, potassium 5.7, chloride 100, bicarb 22,  BUN 64, creatinine 13.5. Glucose 177. Phosphorous 12.8. Troponin  0.06. DIAGNOSES AND PLAN:  1. ESRD, on hemodialysis. She has missed her dialysis on Friday. We  will do make up dialysis today for fluid removal and hyperkalemia. 2.  Fluid overload, will improve with dialysis. 3.  Hyperkalemia, should correct with dialysis today. 4.  Anemia. The patient's target of a hemoglobin lies between 9 and 11.  3.  Renal osteodystrophy. We will monitor calcium and phosphorus  levels.         Kenia Frias MD    D: 03/31/2019 12:21:10       T: 03/31/2019 14:22:30     [email or web address]  Job#: [id number]     Doc#: [id number]    CC:

## 2019-03-31 NOTE — CONSULTS
GFRAA 3 03/31/2019    GFRAA 50 05/12/2013    AGRATIO 0.9 03/30/2019    LABGLOM 3 03/31/2019    GLUCOSE 172 03/31/2019    PROT 6.8 03/30/2019    PROT 6.6 03/05/2013    LABALBU 3.3 03/30/2019    CALCIUM 8.4 03/31/2019    BILITOT <0.2 03/30/2019    ALKPHOS 95 03/30/2019    AST 8 03/30/2019    ALT <5 03/30/2019     Phosphorus:    Lab Results   Component Value Date    PHOS 4.8 01/05/2019    PHOS 4.8 01/05/2019     Troponin:    Lab Results   Component Value Date    TROPONINI 0.06 03/30/2019         IMPRESSION/RECOMMENDATIONS:      Diagnosis and Plan     1. ESRD on HD  MWF at [facility]  2. Fluid overload  3. Hyperkalemia  k 5.7 , for urgent HD today   4. Anemia of CKD  Target Hb 9-11   5.  Renal osteodystrophy     Mary Rebolledo

## 2019-03-31 NOTE — PROGRESS NOTES
Clinical Pharmacist Note:    Pharmacy consulted by Dr. Stu Kramer to dose warfarin for h/o PE. Home dose: 5 mg on MWF and 4 mg all other days. Goal INR range: 2-3    INR today: 1.05  Plan to start warfarin 7.5 mg tonight. Daily INR is ordered. Pharmacy will continue to follow.      Kit Marie, PharmD  PGY-1 Pharmacy Resident  [phone]

## 2019-03-31 NOTE — PLAN OF CARE
Problem: Falls - Risk of:  Goal: Will remain free from falls  Description  Will remain free from falls  3/31/2019 0750 by Reza Ramirez RN  Outcome: Ongoing  Note:   Fall precautions in place. Hourly rounding, call light & belongings in reach. Bed in lowest position, wheels locked. Side rails up x2. Walk way free of clutter. 3/31/2019 0034 by Sundeep Massey RN  Outcome: Ongoing  Goal: Absence of physical injury  Description  Absence of physical injury  3/31/2019 0750 by Reza Ramirez RN  Outcome: Ongoing  3/31/2019 0034 by Sundeep Massey RN  Outcome: Ongoing     Problem: Pain:  Description  Pain management should include both nonpharmacologic and pharmacologic interventions. Goal: Pain level will decrease  Description  Pain level will decrease  3/31/2019 0750 by Reza Ramirez RN  Outcome: Ongoing  Note:   Pain assessed using 0-10 scale. Patient able to voice pain level and states pain improved with PRN medication administration. 3/31/2019 0034 by Sundeep Massey RN  Outcome: Ongoing  Goal: Control of acute pain  Description  Control of acute pain  3/31/2019 0750 by Reza Ramirez RN  Outcome: Ongoing  3/31/2019 0034 by Sundeep Massey RN  Outcome: Ongoing  Goal: Control of chronic pain  Description  Control of chronic pain  3/31/2019 0750 by Reza Ramirez RN  Outcome: Ongoing  3/31/2019 0034 by Sundeep Massey RN  Outcome: Ongoing     Problem: Risk for Impaired Skin Integrity  Goal: Tissue integrity - skin and mucous membranes  Description  Structural intactness and normal physiological function of skin and  mucous membranes. Outcome: Ongoing  Note:   Skin and mucous membranes will remain CDI.

## 2019-03-31 NOTE — PROGRESS NOTES
nebulizer solution 1 ampule Q4H PRN   insulin glargine (LANTUS) injection pen 30 Units BID   pregabalin (LYRICA) capsule 50 mg BID   warfarin (COUMADIN) tablet 7.5 mg Daily       Objective:  /72   Pulse 87   Temp 98 °F (36.7 °C) (Oral)   Resp 17   Ht 5' 3\" (1.6 m)   Wt (!) 338 lb 9.6 oz (153.6 kg)   LMP 11/01/1996 (Exact Date)   SpO2 97%   BMI 59.98 kg/m²     Intake/Output Summary (Last 24 hours) at 3/31/2019 1244  Last data filed at 3/30/2019 2247  Gross per 24 hour   Intake 10 ml   Output --   Net 10 ml    Wt Readings from Last 3 Encounters:   03/31/19 (!) 338 lb 9.6 oz (153.6 kg)   03/28/19 (!) 327 lb 4.8 oz (148.5 kg)   03/26/19 (!) 327 lb 6.4 oz (148.5 kg)       General appearance:  Appears comfortable  Eyes: Sclera clear. Pupils equal.  ENT: Moist oral mucosa. Trachea midline, no adenopathy. Cardiovascular: Regular rhythm, normal S1, S2. No murmur. No edema in lower extremities  Respiratory: Not using accessory muscles. Good inspiratory effort. GI: Abdomen soft, no tenderness, not distended, normal bowel sounds  Musculoskeletal: No cyanosis in digits, neck supple. ecchymosis and hematoma r antecubital fossa. Fistula patent. Neurology: CN 2-12 grossly intact. No speech or motor deficits  Psych: Normal affect.  Alert and oriented in time, place and person  Skin: Warm, dry, normal turgor    Labs and Tests:  CBC:   Recent Labs     03/30/19  1541 03/31/19  0547   WBC 8.3 8.2   HGB 10.5* 10.6*    181     BMP:  Recent Labs     03/28/19  1304 03/30/19  1541 03/31/19  0547    135* 138   K 4.1 4.3 5.7*   CL 98* 97* 100   CO2 26 21 22   BUN 31* 58* 64*   CREATININE 8.2* 12.3* 13.5*   GLUCOSE 132* 238* 172*     Hepatic: Recent Labs     03/30/19  1541   AST 8*   ALT <5*   BILITOT <0.2   ALKPHOS 95       Problem List  Principal Problem:    Infection of arteriovenous graft for hemodialysis (HCC)  Active Problems:    Chronic diastolic CHF (congestive heart failure) (HCC)    History of pulmonary embolism    Morbid obesity due to excess calories (HCC)    COPD, moderate (HCC)    DMII (diabetes mellitus, type 2) ([facility]    ESRD (end stage renal disease) on dialysis [facility])    Essential hypertension  Resolved Problems:    * No resolved hospital problems. *       Assessment & Plan:   1. Pulmonary edema-secondary to missed dialysis. Going to be dialyzed today. 2. R arm hematoma-doubt cellulitis. Looks more like ecchymosis, hematoma. Dc iv abx, change to keflex. Could stop abx if ok with surgery. 3. ESRD-to be dialyzed today. 4. History of PE-on coumadin  5. Dm 2-on lantus 30 and SSI  6.  Morbid obesity      Diet: DIET RENAL; Dental Soft  Code:Full Code  DVT PPX: heparin      Raffi Villarreal PA-C   3/31/2019 12:44 PM

## 2019-03-31 NOTE — PROGRESS NOTES
13.5 03/31/2019    GFRAA 3 03/31/2019    GFRAA 50 05/12/2013    AGRATIO 0.9 03/30/2019    LABGLOM 3 03/31/2019    GLUCOSE 172 03/31/2019    PROT 6.8 03/30/2019    PROT 6.6 03/05/2013    LABALBU 3.3 03/30/2019    CALCIUM 8.4 03/31/2019    BILITOT <0.2 03/30/2019    ALKPHOS 95 03/30/2019    AST 8 03/30/2019    ALT <5 03/30/2019     Phosphorus:    Lab Results   Component Value Date    PHOS 4.8 01/05/2019    PHOS 4.8 01/05/2019     Troponin:    Lab Results   Component Value Date    TROPONINI 0.06 03/30/2019         IMPRESSION/RECOMMENDATIONS:      Diagnosis and Plan     1. ESRD on HD  MWF at [facility]  2. Fluid overload  Will remove 3 kg today   3. Hyperkalemia  k 5.7 , for urgent HD today   4. Anemia of CKD  Target Hb 9-11   5.  Renal osteodystrophy     Mera Goldsmith

## 2019-03-31 NOTE — PROGRESS NOTES
Vascular f/u    R Brachio-cephalic AVF created 1/03. Reports pain at site. Significant SOB- which is why she came to ED. Concern for possible infection. On exam bruit heard in fistula, but no palpable thrill. Bruising and swelling around incision. No fluctuance. No drainage. WBC normal.  Afebrile. Not convinced there is infection present. [id number] [address] Hamilton with short course antbx nevertheless- could change to po. Requesting Dilaudid, IV for pain while inpt.    Dr Bola Orellana to f/u tomorrow am.

## 2019-04-01 VITALS
HEIGHT: 63 IN | WEIGHT: 293 LBS | TEMPERATURE: 98.4 F | BODY MASS INDEX: 51.91 KG/M2 | DIASTOLIC BLOOD PRESSURE: 72 MMHG | RESPIRATION RATE: 18 BRPM | SYSTOLIC BLOOD PRESSURE: 106 MMHG | OXYGEN SATURATION: 95 % | HEART RATE: 83 BPM

## 2019-04-01 PROBLEM — Z71.89 DIABETES EDUCATION, ENCOUNTER FOR: Chronic | Status: ACTIVE | Noted: 2017-09-12

## 2019-04-01 LAB
ALBUMIN SERPL-MCNC: 3.5 G/DL (ref 3.4–5)
ANION GAP SERPL CALCULATED.3IONS-SCNC: 16 MMOL/L (ref 3–16)
BASOPHILS ABSOLUTE: 0.1 K/UL (ref 0–0.2)
BASOPHILS RELATIVE PERCENT: 0.7 %
BUN BLDV-MCNC: 41 MG/DL (ref 7–20)
CALCIUM SERPL-MCNC: 8.5 MG/DL (ref 8.3–10.6)
CHLORIDE BLD-SCNC: 96 MMOL/L (ref 99–110)
CO2: 24 MMOL/L (ref 21–32)
CREAT SERPL-MCNC: 9.7 MG/DL (ref 0.6–1.2)
EOSINOPHILS ABSOLUTE: 0.2 K/UL (ref 0–0.6)
EOSINOPHILS RELATIVE PERCENT: 2.9 %
GFR AFRICAN AMERICAN: 5
GFR NON-AFRICAN AMERICAN: 4
GLUCOSE BLD-MCNC: 128 MG/DL (ref 70–99)
GLUCOSE BLD-MCNC: 71 MG/DL (ref 70–99)
GLUCOSE BLD-MCNC: 79 MG/DL (ref 70–99)
HBV SURFACE AB TITR SER: <3.5 MIU/ML
HCT VFR BLD CALC: 32.8 % (ref 36–48)
HEMOGLOBIN: 10.5 G/DL (ref 12–16)
HEPATITIS B SURFACE ANTIGEN INTERPRETATION: NORMAL
INR BLD: 1.25 (ref 0.86–1.14)
LYMPHOCYTES ABSOLUTE: 1.2 K/UL (ref 1–5.1)
LYMPHOCYTES RELATIVE PERCENT: 13.9 %
MCH RBC QN AUTO: 31.9 PG (ref 26–34)
MCHC RBC AUTO-ENTMCNC: 31.9 G/DL (ref 31–36)
MCV RBC AUTO: 100 FL (ref 80–100)
MONOCYTES ABSOLUTE: 0.8 K/UL (ref 0–1.3)
MONOCYTES RELATIVE PERCENT: 9.8 %
NEUTROPHILS ABSOLUTE: 6.2 K/UL (ref 1.7–7.7)
NEUTROPHILS RELATIVE PERCENT: 72.7 %
PDW BLD-RTO: 18.8 % (ref 12.4–15.4)
PERFORMED ON: NORMAL
PERFORMED ON: NORMAL
PHOSPHORUS: 4.9 MG/DL (ref 2.5–4.9)
PLATELET # BLD: 186 K/UL (ref 135–450)
PMV BLD AUTO: 8.2 FL (ref 5–10.5)
POTASSIUM SERPL-SCNC: 5 MMOL/L (ref 3.5–5.1)
PROTHROMBIN TIME: 14.2 SEC (ref 9.8–13)
RBC # BLD: 3.28 M/UL (ref 4–5.2)
SODIUM BLD-SCNC: 136 MMOL/L (ref 136–145)
WBC # BLD: 8.5 K/UL (ref 4–11)

## 2019-04-01 PROCEDURE — 2500000003 HC RX 250 WO HCPCS: Performed by: INTERNAL MEDICINE

## 2019-04-01 PROCEDURE — 86706 HEP B SURFACE ANTIBODY: CPT

## 2019-04-01 PROCEDURE — 94760 N-INVAS EAR/PLS OXIMETRY 1: CPT

## 2019-04-01 PROCEDURE — 2700000000 HC OXYGEN THERAPY PER DAY

## 2019-04-01 PROCEDURE — 5A1D70Z PERFORMANCE OF URINARY FILTRATION, INTERMITTENT, LESS THAN 6 HOURS PER DAY: ICD-10-PCS | Performed by: INTERNAL MEDICINE

## 2019-04-01 PROCEDURE — 80069 RENAL FUNCTION PANEL: CPT

## 2019-04-01 PROCEDURE — 85610 PROTHROMBIN TIME: CPT

## 2019-04-01 PROCEDURE — 99222 1ST HOSP IP/OBS MODERATE 55: CPT | Performed by: INTERNAL MEDICINE

## 2019-04-01 PROCEDURE — 94660 CPAP INITIATION&MGMT: CPT

## 2019-04-01 PROCEDURE — 87340 HEPATITIS B SURFACE AG IA: CPT

## 2019-04-01 PROCEDURE — 6370000000 HC RX 637 (ALT 250 FOR IP): Performed by: PHYSICIAN ASSISTANT

## 2019-04-01 PROCEDURE — 90937 HEMODIALYSIS REPEATED EVAL: CPT

## 2019-04-01 PROCEDURE — 85025 COMPLETE CBC W/AUTO DIFF WBC: CPT

## 2019-04-01 PROCEDURE — 2500000003 HC RX 250 WO HCPCS: Performed by: SURGERY

## 2019-04-01 PROCEDURE — 92610 EVALUATE SWALLOWING FUNCTION: CPT

## 2019-04-01 PROCEDURE — 6370000000 HC RX 637 (ALT 250 FOR IP): Performed by: INTERNAL MEDICINE

## 2019-04-01 PROCEDURE — 6360000002 HC RX W HCPCS: Performed by: PHYSICIAN ASSISTANT

## 2019-04-01 PROCEDURE — APPSS30 APP SPLIT SHARED TIME 16-30 MINUTES: Performed by: NURSE PRACTITIONER

## 2019-04-01 PROCEDURE — 92526 ORAL FUNCTION THERAPY: CPT

## 2019-04-01 RX ADMIN — PREGABALIN 50 MG: 50 CAPSULE ORAL at 12:22

## 2019-04-01 RX ADMIN — CEPHALEXIN 500 MG: 250 CAPSULE ORAL at 12:22

## 2019-04-01 RX ADMIN — NEBIVOLOL HYDROCHLORIDE 2.5 MG: 5 TABLET ORAL at 12:22

## 2019-04-01 RX ADMIN — INSULIN GLARGINE 30 UNITS: 100 INJECTION, SOLUTION SUBCUTANEOUS at 12:31

## 2019-04-01 RX ADMIN — LIDOCAINE HYDROCHLORIDE 0.2 ML: 10 INJECTION, SOLUTION EPIDURAL; INFILTRATION; INTRACAUDAL; PERINEURAL at 07:28

## 2019-04-01 RX ADMIN — HYDROMORPHONE HYDROCHLORIDE 0.5 MG: 1 INJECTION, SOLUTION INTRAMUSCULAR; INTRAVENOUS; SUBCUTANEOUS at 01:43

## 2019-04-01 RX ADMIN — HEPARIN SODIUM 5000 UNITS: 5000 INJECTION INTRAVENOUS; SUBCUTANEOUS at 14:28

## 2019-04-01 RX ADMIN — CALCIUM ACETATE 1334 MG: 667 CAPSULE ORAL at 12:21

## 2019-04-01 RX ADMIN — HYDROMORPHONE HYDROCHLORIDE 0.5 MG: 1 INJECTION, SOLUTION INTRAMUSCULAR; INTRAVENOUS; SUBCUTANEOUS at 06:30

## 2019-04-01 RX ADMIN — ALLOPURINOL 100 MG: 100 TABLET ORAL at 12:22

## 2019-04-01 RX ADMIN — ROSUVASTATIN CALCIUM 10 MG: 10 TABLET, FILM COATED ORAL at 12:21

## 2019-04-01 RX ADMIN — HEPARIN SODIUM 5000 UNITS: 5000 INJECTION INTRAVENOUS; SUBCUTANEOUS at 06:31

## 2019-04-01 RX ADMIN — PANTOPRAZOLE SODIUM 40 MG: 40 TABLET, DELAYED RELEASE ORAL at 06:31

## 2019-04-01 RX ADMIN — HYDROMORPHONE HYDROCHLORIDE 0.5 MG: 1 INJECTION, SOLUTION INTRAMUSCULAR; INTRAVENOUS; SUBCUTANEOUS at 12:22

## 2019-04-01 ASSESSMENT — ENCOUNTER SYMPTOMS
CHEST TIGHTNESS: 0
RHINORRHEA: 0
DIARRHEA: 0
STRIDOR: 0
COUGH: 0
TROUBLE SWALLOWING: 0
SHORTNESS OF BREATH: 0
FACIAL SWELLING: 0
ABDOMINAL PAIN: 0
PHOTOPHOBIA: 0
EYE DISCHARGE: 0
APNEA: 0
EYE REDNESS: 0
BLOOD IN STOOL: 0
NAUSEA: 0
CHOKING: 0
COLOR CHANGE: 0

## 2019-04-01 ASSESSMENT — PAIN SCALES - GENERAL
PAINLEVEL_OUTOF10: 8
PAINLEVEL_OUTOF10: 8
PAINLEVEL_OUTOF10: 10
PAINLEVEL_OUTOF10: 8
PAINLEVEL_OUTOF10: 9

## 2019-04-01 NOTE — CARE COORDINATION
Patient is okay to be discharged from infectious disease standpoint, once cleared by primary service and other sub-specialties. My final orders are in the chart. Feel free to call me with questions, if needed.       Francesca Heath MD, MPH  4/1/2019, 2:48 PM  [facility] Infectious Disease   Office: [phone]  Fax: [phone]  Tuesday AM clinic:   [address] [address]  Thursday AM clinic: [facility]

## 2019-04-01 NOTE — PROGRESS NOTES
Discharge instructions completed w/ pt and family. Informed for f/u apt. No new medications. PAC de-accessed, tolerated well. Transport request placed.

## 2019-04-01 NOTE — PROGRESS NOTES
Speech Language Pathology  Attempt   Nitishmo Richardson   1955     SLP eval and treat orders received. SLP attempted to see pt to complete evaluation. Pt off floor at time of attempt. Will re-attempt to follow-up as therapy schedule allows.     Thanks,  Ish Heredia, [address] [facility]  Speech Language Pathologist

## 2019-04-01 NOTE — DISCHARGE INSTR - COC
Continuity of Care Form    Patient Name: Marylen Poet   :  1955  MRN:  [id number]    Admit date:  3/30/2019  Discharge date:  ***    Code Status Order: Full Code   Advance Directives:   Advance Care Flowsheet Documentation     Date/Time Healthcare Directive Type of Healthcare Directive Copy in [address] Agent's Name Healthcare Agent's Phone Number    19  Yes, patient has an advance directive for healthcare treatment  Durable power of  for health care  No, copy requested from family  --  Ananda Sexton  [phone]          Admitting Physician:  Genoveva Draper MD  PCP: JURGEN Downing CNP    Discharging Nurse: [facility] Unit/Room#: [id number]-[id number]/9933-10  Discharging Unit Phone Number: ***    Emergency Contact:   Extended Emergency Contact Information  Primary Emergency Contact: Anoop Briones  Address: [address], [address] [place] [address] Phone: [phone]  Relation: Spouse  Secondary Emergency Contact: [address] [place] [address] Phone: [phone]  Mobile Phone: [phone]  Relation: Brother/Sister    Past Surgical History:  Past Surgical History:   Procedure Laterality Date    CARDIAC CATHETERIZATION      Grace; clean cors    COLONOSCOPY      polyp removed; Jamila Smith; repeat 5 years    COLONOSCOPY  13,     Carmen    DIALYSIS FISTULA CREATION Right 3/28/2019    RIGHT BRACHIO CEPHALIC FISTULA CREATION performed by Jing Mclean MD at [address]  09    LVH with global hypokinesis; EF 55-60%    HYSTERECTOMY      KNEE ARTHROSCOPY Right     OTHER SURGICAL HISTORY  2018    LEFT brachial artery axillary vein AV graft     CT OPEN SKULL SUPRATENT EXPLORE      Craniotomy, 3/21/19 patient denies any brain surgery or craniotomy    CT REPAIR INCISIONAL HERNIA,REDUCIBLE N/A 2018    LAPAROSCOPIC VENTRAL HERNIA REPAIR WITH MESH performed by Tanya Oro MD at [place] [address]  10/96    TUNNELED VENOUS PORT PLACEMENT Right 11/2019    UPPER GASTROINTESTINAL ENDOSCOPY  6/25/13    VENTRAL HERNIA REPAIR  12/24/2016    Incarcerated ventral hernia repair with mesh       Immunization History:   Immunization History   Administered Date(s) Administered    Influenza Vaccine, unspecified formulation 09/18/2016    Influenza Virus Vaccine 11/01/2010, 10/25/2011, 09/10/2014, 09/18/2015, 10/17/2018    Influenza, Intradermal, Preservative free 09/19/2012, 10/16/2013    Influenza, Intradermal, Quadrivalent, Preservative Free 10/25/2017    Pneumococcal 13-valent Conjugate ([id number]) 08/25/2016    Pneumococcal Conjugate 7-valent 11/01/2010    Pneumococcal Polysaccharide ([id number]) 09/18/2015    Tdap (Boostrix, Adacel) 05/31/2011    Zoster Live (Zostavax) 02/19/2015       Active Problems:  Patient Active Problem List   Diagnosis Code    Asthma J45.909    Colon polyps K63.5    Microalbuminuria R80.9    Venous stasis of lower extremity I87.8    Obstructive sleep apnea on CPAP G47.33, Z99.89    Chronic diastolic CHF (congestive heart failure) ([facility]) I50.32    Renal osteodystrophy N25.0    Uncontrolled type 2 diabetes with renal manifestation ([facility]) E11.29, E11.65    History of pulmonary embolism Z86.711    Anemia of chronic kidney failure ([facility]) N18.9, D63.1    Primary osteoarthritis of both knees M17.0    Essential hypertension, malignant I10    Restrictive lung disease J98.4    Gastroesophageal reflux disease K21.9    LVH (left ventricular hypertrophy) I51.7    Acute on chronic respiratory failure with hypoxia and hypercapnia ([facility]) J96.21, J96.22    Obesity hypoventilation syndrome ([facility]) E66.2    Morbid obesity due to excess calories ([facility]) E66.01    COPD, moderate ([facility]) J44.9    Gout M10.9    Warfarin-induced coagulopathy ([facility]) D68.32, T45.515A    Chronic hypoxemic respiratory failure ([facility] J96.11    Constipation K59.00    DMII (diabetes mellitus, type 2) ([facility] E11.9    Primary osteoarthritis of left hip M16.12    ESRD (end stage renal disease) on dialysis ([facility]) N18.6, Z99.2    Chronic pain syndrome G89.4    Chest pain R07.9    Recurrent ventral hernia K43.2    Hypoglycemia E16.2    Diabetic polyneuropathy associated with type 2 diabetes mellitus ([facility] E11.42    Infection of arteriovenous graft for hemodialysis ([facility] T82. 7XXA    Essential hypertension I10       Isolation/Infection:   Isolation          No Isolation            Nurse Assessment:  Last Vital Signs: /72   Pulse 83   Temp 98.4 °F (36.9 °C) (Oral)   Resp 18   Ht 5' 3\" (1.6 m)   Wt (!) 328 lb 11.3 oz (149.1 kg)   LMP 1996 (Exact Date)   SpO2 95%   BMI 58.23 kg/m²     Last documented pain score (0-10 scale): Pain Level: 8  Last Weight:   Wt Readings from Last 1 Encounters:   19 (!) 328 lb 11.3 oz (149.1 kg)     Mental Status:  {IP PT MENTAL STATUS:}    IV Access:  { RONALDO IV ACCESS:564895635}    Nursing Mobility/ADLs:  Walking   {CHP DME CYAK:349704390}  Transfer  {CHP DME ULQC:496653627}  Bathing  {CHP DME XTFO:508784440}  Dressing  {CHP DME HNGA:804386736}  Toileting  {CHP DME APR}  Feeding  {P DME MNCB:132765645}  Med Admin  {P DME IGHC:940858218}  Med Delivery   { RONALDO MED Delivery:823333671}    Wound Care Documentation and Therapy:        Elimination:  Continence:   · Bowel: {YES / GN:04347}  · Bladder: {YES / LN:12305}  Urinary Catheter: {Urinary Catheter:169798850}   Colostomy/Ileostomy/Ileal Conduit: {YES / UW:89942}       Date of Last BM: ***    Intake/Output Summary (Last 24 hours) at 2019 1506  Last data filed at 2019 1104  Gross per 24 hour   Intake 1640 ml   Output 5500 ml   Net -3860 ml     I/O last 3 completed shifts:   In: 1640 [P.O.:240]  Out: 5500     Safety Concerns:     [address] Safety Concerns:424312554}    Impairments/Disabilities:      [address] H[address] Impairments/Disabilities:405217489}    Nutrition Therapy:  Current Nutrition Therapy:   [address] RONALDO Diet List:383845667}    Routes of Feeding: {CHP DME Other Feedings:908013969}  Liquids: {Slp liquid thickness:04467}  Daily Fluid Restriction: {CHP DME Yes amt example:561061444}  Last Modified Barium Swallow with Video (Video Swallowing Test): {Done Not Done FJVP:723133163}    Treatments at the Time of Hospital Discharge:   Respiratory Treatments: ***  Oxygen Therapy:  {Therapy; copd oxygen:61852}  Ventilator:    {MH CC Vent XMAJ:436724199}    Rehab Therapies: {THERAPEUTIC INTERVENTION:7818223130}  Weight Bearing Status/Restrictions: [address] Zur[address] CC Weight Bearin}  Other Medical Equipment (for information only, NOT a DME order):  {EQUIPMENT:933125519}  Other Treatments: ***    Patient's personal belongings (please select all that are sent with patient):  {[facility] DME Belongings:613957568}    RN SIGNATURE:  {Esignature:167483804}    CASE MANAGEMENT/SOCIAL WORK SECTION    Inpatient Status Date: 19    Readmission Risk Assessment Score:  Readmission Risk              Risk of Unplanned Readmission:        54           Discharging to Facility/ Agency   · Name:   · Address:  · Phone:  · Fax:  / signature:Electronically signed by HOLLIS Krueger on 2019 at 3:06 PM      PHYSICIAN SECTION    Prognosis: Fair    Condition at Discharge: Stable    Rehab Potential (if transferring to Rehab):     Recommended Labs or Other Treatments After Discharge: continue routine HD q M W F,  Home speech therapy ,  Skilled RN visits     Physician Certification: I certify the above information and transfer of Obed Allen  is necessary for the continuing treatment of the diagnosis listed and that she requires Home Care for greater 30 days.      Update Admission H&P: No change in H&P    PHYSICIAN SIGNATURE:  Electronically signed by JURGEN Ga CNP on 19 at 3:13 PM

## 2019-04-01 NOTE — PROGRESS NOTES
[facility])  Nephrology Progress Note  2019 1:25 PM     Date of Admit: 3/30/2019 LOS: 2 days  Referring physician: Jayjay Jarquin MD  Outpatient Nephrologist: Dr. Paulo Sylvester M/W/F    Consulted on and following since: 3/31/19  Reason for Consult / Summary / Events   ESRD Management Assistance    Chief concern on admission: SOB after missed HD, RUE pain  Interval History   Patient seen and examined on iHD. Tolerating UF well approximately 1.5L. Pain improved from RUE. Mildly swollen but no fever or chills or drainage. Vascular saw in AM; stable for DC. Denies N/V/D/F/C/Cp. SOB improved. Past medical, family, and social histories were reviewed as previously documented. Updates were made as necessary. Review of Systems: ROS with pertinent positives and negatives listed in interval history  Medications   Inpatient Meds:  Medications Prior to Admission: TRULICITY 7.49 KF/1.8IS SOPN, INJECT ONE  SYRINGE SUBCUTANEOUSLY ONCE A WEEK  [] HYDROmorphone (DILAUDID) 2 MG tablet, Take 1 tablet by mouth every 12 hours as needed for Pain for up to 3 days. allopurinol (ZYLOPRIM) 100 MG tablet, Take 1 tablet by mouth daily  pregabalin (LYRICA) 50 MG capsule, Take 1 capsule by mouth 3 times daily for 30 days. Incontinence Supply Disposable (DEPEND ADJUSTABLE UNDERWEAR LG) MISC, 1 each by Does not apply route as needed (for urine incompetence)  UNIFINE PENTIPS 31G X 5 MM MISC, USE WITH INSULIN PENS FOUR TIMES DAILY  rosuvastatin (CRESTOR) 10 MG tablet, Take 1 tablet by mouth daily Take 1 tablet by mouth  daily  insulin lispro (HUMALOG KWIKPEN) 100 UNIT/ML pen, Inject 0-12 Units into the skin 3 times daily (before meals)  lidocaine-prilocaine (EMLA) 2.5-2.5 % cream, Apply topically as needed.   promethazine (PHENERGAN) 25 MG tablet, Take 1 tablet by mouth every 8 hours as needed for Nausea  calcium acetate (PHOSLO) 667 MG capsule, Take 2 capsules by mouth 3 times daily (with meals)  nebivolol (BYSTOLIC) 2.5 MG tablet, Take 1 tablet by mouth 2 times daily  insulin detemir (LEVEMIR FLEXTOUCH) 100 UNIT/ML injection pen, Inject 23 Units into the skin 2 times daily (Patient taking differently: Inject 30 Units into the skin 2 times daily )  docusate sodium (COLACE) 100 MG capsule, Take 1 capsule by mouth 2 times daily (Patient taking differently: Take 100 mg by mouth 2 times daily as needed )  albuterol sulfate HFA (PROAIR HFA) 108 (90 Base) MCG/ACT inhaler, Inhale 2 puffs into the lungs every 6 hours as needed for Wheezing  fluticasone-salmeterol (ADVAIR HFA) 230-21 MCG/ACT inhaler, Inhale 1 puff into the lungs 2 times daily  ipratropium-albuterol (DUONEB) 0.5-2.5 (3) MG/3ML SOLN nebulizer solution, Inhale 3 mLs into the lungs every 4 hours DX:COPD J44.9 (Patient taking differently: Inhale 1 vial into the lungs every 4 hours as needed DX:COPD J44.9)  traMADol (ULTRAM) 50 MG tablet, Take 50 mg by mouth every 6 hours as needed for Pain. Eric Chino omeprazole (PRILOSEC) 40 MG delayed release capsule, TAKE 1 CAPSULE BY MOUTH  DAILY  traZODone (DESYREL) 100 MG tablet, Take 1 tablet by mouth nightly TAKES PRN  RELION PEN NEEDLE 31G/8MM 31G X 8 MM MISC, USE  THREE TIMES DAILY AS DIRECTED  nitroGLYCERIN (NITROSTAT) 0.4 MG SL tablet, DISSOLVE 1 TABLET UNDER THE TONGUE EVERY 5 MINUTES AS  NEEDED ; MAXIMUM OF 3  TABLETS IN 15 MINUTES  warfarin (COUMADIN) 7.5 MG tablet, Continue f/up with coumadin clinic (Patient taking differently: 5 mg daily Patient taking 4mg on non dialysis days, 5 mg on dialysis days (Mon, Weds, Fri). Told to hold 5 days before surgery by Dr Kasi Nguyen' office.  Per patient coumadin is sometimes held by dialysis when levels high.)  ONE TOUCH ULTRA TEST strip, USE TO TEST 3-4 TIMES DAILY DUE TO FLUCTUATING SUGAR  ASSURE COMFORT LANCETS 30G MISC, USE TO TEST BLOOD GLUCOSE THREE TIMES DAILY  Alcohol Swabs (ALCOHOL PREP) 70 % PADS, USE TO TEST BLOOD GLUCOSE THREE TIMES DAILY  Blood Glucose Monitoring Suppl (ACCU-CHEK KENNETH SMARTVIEW) w/Device KIT, USE TO TEST BLOOD GLUCOSE  BiPAP Machine MISC, by Does not apply route nightly  Spacer/Aero-Holding Chambers Bijan CRAFT Device by Does not apply route daily as needed  OXYGEN, Inhale 2 L into the lungs daily as needed At night prn    Scheduled Meds:   mometasone-formoterol  2 puff Inhalation BID    cephALEXin  500 mg Oral 2 times per day    heparin (porcine)  5,000 Units Subcutaneous 3 times per day    traZODone  100 mg Oral Nightly    rosuvastatin  10 mg Oral Daily    pantoprazole  40 mg Oral QAM AC    nebivolol  2.5 mg Oral BID    calcium acetate  1,334 mg Oral TID WC    allopurinol  100 mg Oral Daily    sodium chloride flush  10 mL Intravenous 2 times per day    insulin lispro  0-12 Units Subcutaneous TID WC    insulin lispro  0-6 Units Subcutaneous Nightly    insulin glargine  30 Units Subcutaneous BID    pregabalin  50 mg Oral BID    warfarin  7.5 mg Oral Daily     Continuous Infusions:   dextrose       PRN medications: HYDROmorphone, lidocaine, traMADol, HYDROmorphone, docusate sodium, sodium chloride flush, magnesium hydroxide, ondansetron, glucose, dextrose, glucagon (rDNA), dextrose, acetaminophen, ipratropium-albuterol    Allergies:    Allergies   Allergen Reactions    Codeine Nausea Only    Fentanyl Itching    Morphine      CHEST PAIN    Nsaids Other (See Comments)     Due to CRF    Hydrocodone-Acetaminophen Itching and Rash    Percocet [Oxycodone-Acetaminophen] Itching    Procardia [Nifedipine] Nausea And Vomiting     Headache  Takes norvasc at home 12/22/15    Vicodin [Hydrocodone-Acetaminophen] Rash     Vital Signs     Vitals:    04/01/19 0408 04/01/19 0700 04/01/19 1104 04/01/19 1200   BP: 104/74 119/75 (!) 141/79 106/72   Pulse: 83 85 82 83   Resp: 16   18   Temp: 98.3 °F (36.8 °C) 98.7 °F (37.1 °C) 98.4 °F (36.9 °C) 98.4 °F (36.9 °C)   TempSrc: Axillary   Oral   SpO2: 98%   95%   Weight: (!) 333 lb 11.2 oz (151.4 kg) (!) 332 lb 0.2 oz (150.6 kg) (!) 328 lb 11.3 oz (149.1 kg)    Height:         Wt Readings from Last 3 Encounters:   19 (!) 328 lb 11.3 oz (149.1 kg)   19 (!) 327 lb 4.8 oz (148.5 kg)   19 (!) 327 lb 6.4 oz (148.5 kg)     Admit Wt: Weight: (!) 327 lb (148.3 kg)   Todays Wt: Weight: (!) 328 lb 11.3 oz (149.1 kg)  Average, Min, and Max for last 24 hours Vitals:  TEMPERATURE:  Temp  Av.5 °F (36.9 °C)  Min: 98.3 °F (36.8 °C)  Max: 98.8 °F (37.1 °C)  RESPIRATIONS RANGE: Resp  Av.8  Min: 12  Max: 19  PULSE RANGE: Pulse  Av.1  Min: 80  Max: 85  BLOOD PRESSURE RANGE:    Systolic (73XAV), FMD:700 , Min:104 , GJY:736     Diastolic (63MLE), OTD:45, Min:58, Max:85    PULSE OXIMETRY RANGE: SpO2  Av.1 %  Min: 92 %  Max: 98 %    Estimated body mass index is 58.23 kg/m² as calculated from the following:    Height as of this encounter: 5' 3\" (1.6 m). Weight as of this encounter: 328 lb 11.3 oz (149.1 kg). Date 19 0000 - 19 235   Shift 6729-3373 0020-0204 3392-0422 24 Hour Total   INTAKE   Dialysis(mL/kg)  400(2.7)  400(2.7)   Shift Total(mL/kg)  400(2.7)  400(2.7)   OUTPUT   Dialysis  1500  1500   Shift Total(mL/kg)  1500(10.1)  1500(10.1)   Weight (kg) 150.6 149.1 149.1 149.1       Intake/Output Summary (Last 24 hours) at 2019 1325  Last data filed at 2019 1104  Gross per 24 hour   Intake 1640 ml   Output 5500 ml   Net -3860 ml     I/O last 3 completed shifts: In: 200 [P.O.:240]  Out: 4000     Physical Exam     General Appearance NAD, Alert, Comfortable appearing. Cardiovascular RRR, no heave. Respiratory Clear to auscultation bilaterally, unlabored. MSK No clubbing or cyanosis,  warm well perfused, no obvious edema  RUE AVF weak thrill bruit,    LUE AVG in use cannulated.  Deferred.      Laboratory Data     Recent Labs     19  1541 19  0547 19  0715   WBC 8.3 8.2 8.5   HGB 10.5* 10.6* 10.5*   HCT 32.8* 33.3* 32.8*   .8* 99.7 100.0  181 186     Recent Labs     03/30/19  1541 03/31/19  0547 04/01/19  0715   * 138 136   K 4.3 5.7* 5.0   CL 97* 100 96*   CO2 21 22 24   BUN 58* 64* 41*   CREATININE 12.3* 13.5* 9.7*   GLUCOSE 238* 172* 128*   CALCIUM 8.6 8.4 8.5   PHOS  --   --  4.9   ANIONGAP 17* 16 16     Estimated Creatinine Clearance: 8 mL/min (A) (based on SCr of 9.7 mg/dL [facility])).       Lab Results   Component Value Date    .2 (H) 12/18/2017    CALCIUM 8.5 04/01/2019    CAION 1.26 01/07/2018    PHOS 4.9 04/01/2019      Recent Labs     03/30/19  1541 03/31/19  0547 04/01/19  0729   INR 1.05 1.13 1.25*   PROTIME 12.0 12.9 14.2*       Lab Results   Component Value Date    IRON 23 (L) 01/11/2018    TIBC 119 (L) 01/11/2018    FERRITIN 556.3 (H) 01/11/2018     Lab Results   Component Value Date    TEGCVFMV08 465 01/11/2018       Lab Results   Component Value Date    COLORU Brown 11/25/2018    CLARITYU TURBID (A) 11/25/2018    PHUR 5.5 11/25/2018    GLUCOSEU 100 (A) 11/25/2018    BLOODU TRACE-INTACT (A) 11/25/2018    LEUKOCYTESUR SMALL (A) 11/25/2018    NITRITE neg 05/06/2015    BILIRUBINUR SMALL (A) 11/25/2018    UROBILINOGEN 0.2 11/25/2018    RBCUA see below 11/25/2018    WBCUA see below 11/25/2018    BACTERIA 3+ (A) 11/25/2018    CRYSTAL None Seen 07/02/2014         Lab Results   Component Value Date    HEPBCAB Negative 01/02/2019    HBEAG Negative 11/24/2018        Recent Labs     03/30/19  1541   ALT <5*   AST 8*   ALKPHOS 95   BILITOT <0.2       Recent Labs     03/30/19  1541   TROPONINI 0.06*     Lab Results   Component Value Date    LABA1C 8.1 01/01/2019       Morbidity / complication risk is felt to be: moderate  Diagnostic Studies   Xr Chest Portable    Result Date: 3/30/2019  EXAMINATION: SINGLE XRAY VIEW OF THE CHEST 3/30/2019 2:40 pm COMPARISON: 02/20/2019 HISTORY: ORDERING SYSTEM PROVIDED HISTORY: sob TECHNOLOGIST PROVIDED HISTORY: Reason for exam:->sob Acuity: Unknown Type of Exam: Unknown FINDINGS: Right-sided central venous catheter remains in place terminating in the SVC. The lungs are without acute focal process. There is no effusion or pneumothorax. The cardiomediastinal silhouette is stable. The osseous structures are stable. No acute process. Prior TTE:  12/25/17   Summary   -Very technically difficult exam due to morbid obesity.   -Global left ventricular function is normal with ejection fraction estimated   at 55 %. -Wall motion assessment was limited due to poor endocardial border   definition secondary to large body habitus.   -There is mild tricuspid regurgitation with RVSP estimated at 30 mmHg. -Mild circumferential pericardial effusion noted. There is no cardiac   evidence of tamponade. In addition to the above personally reviewed labs, vitals, imaging and chart. Morbidity / complication risk is felt to be: moderate  Assessment & Plan   Hemal Tavarez is a 59 y.o. female with a past medical history of DM, HTN, Obesity who presented 3/30/2019  with SOB after missed HD for whom we are consulted 4/1/19 for ESRD management assistance. #.) ESRD 2/2 presumed DM/HTN; on iHD M/W/F @ [place] under care of Dr. Zurita   - HD continued today; pre weight 150.6 kg target UF 1.5L; .5 kg.    #.) Anemia of CKD; epo continued  #.) Secondary HPTH; binders/vda continued  #.) RUE pain; RUE BC AVF 3/28/19 By Dr. Patsy Hollis; no signs of infection. Seen by vascular; stable for DC and f/u with Dr. Patsy Hollis in 2 weeks  #.) SOB; 2/2 missed HD/vol o/l. Improved with fluid removal with HD. Recommendations:   iHD today below EDW; stable and doing well on RA  Stable for DC. Thank you for involving us in the care of this patient, please call with any questions.   Signed:  Dany Portillo M.D.   [facility]  Office Number: [phone]  Fax Number: [phone]  Answering Service: [phone]  4/1/2019, 1:25 PM

## 2019-04-01 NOTE — PROGRESS NOTES
[facility]    4/1/2019 7:49 AM        Name: Barbara Alvarado . Admitted: 3/30/2019  Primary Care Provider: JURGEN Sher CNP (Tel: [phone])      Subjective: Lucrecia Elias Pt seen this am while on HD  States she is feeling better today but does reports some difficulty with swallowing. Speech is following     Breathing is improved. No current reports of chest pain.     Also had HD yesterday   Notes reflect that she missed HD on Friday     Reviewed interval ancillary notes    Current Medications    mometasone-formoterol (DULERA) 200-5 MCG/ACT inhaler 2 puff BID   HYDROmorphone HCl PF (DILAUDID) injection 0.5 mg Q4H PRN   lidocaine 1 % injection 1 mL PRN   cephALEXin (KEFLEX) capsule 500 mg 2 times per day   heparin (porcine) injection 5,000 Units 3 times per day   traZODone (DESYREL) tablet 100 mg Nightly   traMADol (ULTRAM) tablet 50 mg Q6H PRN   rosuvastatin (CRESTOR) tablet 10 mg Daily   pantoprazole (PROTONIX) tablet 40 mg QAM AC   nebivolol (BYSTOLIC) tablet 2.5 mg BID   HYDROmorphone (DILAUDID) tablet 2 mg Q12H PRN   docusate sodium (COLACE) capsule 100 mg BID PRN   calcium acetate (PHOSLO) capsule 1,334 mg TID WC   allopurinol (ZYLOPRIM) tablet 100 mg Daily   sodium chloride flush 0.9 % injection 10 mL 2 times per day   sodium chloride flush 0.9 % injection 10 mL PRN   magnesium hydroxide (MILK OF MAGNESIA) 400 MG/5ML suspension 30 mL Daily PRN   ondansetron (ZOFRAN) injection 4 mg Q4H PRN   insulin lispro (HUMALOG) injection pen 0-12 Units TID WC   insulin lispro (HUMALOG) injection pen 0-6 Units Nightly   glucose (GLUTOSE) 40 % oral gel 15 g PRN   dextrose 50 % solution 12.5 g PRN   glucagon (rDNA) injection 1 mg PRN   dextrose 5 % solution PRN   acetaminophen (TYLENOL) tablet 650 mg Q4H PRN   ipratropium-albuterol (DUONEB) nebulizer solution 1 ampule Q4H PRN   insulin glargine (LANTUS) injection pen 30 Units BID   pregabalin (LYRICA) capsule 50 mg BID   warfarin (COUMADIN) tablet 7.5 mg Daily       Objective:  /74   Pulse 83   Temp 98.3 °F (36.8 °C) (Axillary)   Resp 16   Ht 5' 3\" (1.6 m)   Wt (!) 333 lb 11.2 oz (151.4 kg)   LMP 11/01/1996 (Exact Date)   SpO2 98%   BMI 59.11 kg/m²     Intake/Output Summary (Last 24 hours) at 4/1/2019 0749  Last data filed at 3/31/2019 1713  Gross per 24 hour   Intake 1240 ml   Output 4000 ml   Net -2760 ml    Wt Readings from Last 3 Encounters:   04/01/19 (!) 333 lb 11.2 oz (151.4 kg)   03/28/19 (!) 327 lb 4.8 oz (148.5 kg)   03/26/19 (!) 327 lb 6.4 oz (148.5 kg)       General appearance:  Appears comfortable  Eyes: Sclera clear. Pupils equal.  ENT: Moist oral mucosa. Trachea midline, no adenopathy. Cardiovascular: Regular rhythm, normal S1, S2. No murmur. No edema in lower extremities  Respiratory: Not using accessory muscles. Good inspiratory effort. Clear to auscultation bilaterally, no wheeze or crackles. GI: Abdomen soft, no tenderness, not distended, normal bowel sounds  Musculoskeletal: No cyanosis in digits, neck supple  Neurology: CN 2-12 grossly intact. No speech or motor deficits  Psych: Normal affect. Alert and oriented in time, place and person  Skin: Warm, dry, normal turgor,  Some ecchymosis noted right AC fossa     Labs and Tests:  CBC:   Recent Labs     03/30/19  1541 03/31/19  0547 04/01/19  0715   WBC 8.3 8.2 8.5   HGB 10.5* 10.6* 10.5*    181 186     BMP:  Recent Labs     03/30/19  1541 03/31/19  0547 04/01/19  0715   * 138 136   K 4.3 5.7* 5.0   CL 97* 100 96*   CO2 21 22 24   BUN 58* 64* 41*   CREATININE 12.3* 13.5* 9.7*   GLUCOSE 238* 172* 128*     Hepatic: Recent Labs     03/30/19  1541   AST 8*   ALT <5*   BILITOT <0.2   ALKPHOS 95       Results for Ruthy Syed (MRN [id number]) as of 4/1/2019 14:57   Ref.  Range 3/31/2019 07:55 3/31/2019 11:29 3/31/2019 16:43 3/31/2019 20:17 4/1/2019 12:11   POC Glucose Latest Ref Range: 70 - 99 mg/dl 187 (H) 121 (H) 137 (H) 150 (H) 71       Problem List  Principal Problem:    Infection of arteriovenous graft for hemodialysis (HCC)  Active Problems:    Chronic diastolic CHF (congestive heart failure) (HCC)    History of pulmonary embolism    Morbid obesity due to excess calories (HCC)    COPD, moderate (HCC)    DMII (diabetes mellitus, type 2) (HCC)    ESRD (end stage renal disease) on dialysis [facility])    Essential hypertension  Resolved Problems:    * No resolved hospital problems. *       Assessment & Plan:   1. Previous reports of dyspnea/ chest pain: likely due to missed HD:  Sx improved with fluid removal with HD on Sunday and today. 2. ESRD:  On HD,  Nephrology is following  3. Anemia of chronic disease: On epo   4. S/p right bracheocephalic AV fistua on 1/40/1972 with Dr Lucinda Dawson,  Vascular does not feel site is infected ,  ID to  Follow  5. T2DM:  BG control is adequate on current insulin therapy   6. History of PE: on daily coumadin. INR is 1.25 today   7. Likely ready for d/c home in the next 24 hours.        Diet: DIET RENAL; Dental Soft  Code:Full Code  DVT PPX      Tanya Prieto, APRN - CNP   4/1/2019 7:49 AM

## 2019-04-01 NOTE — DISCHARGE SUMMARY
[facility]    [facility] Demographics    Patient. Ricardo Figueroa  Date of Birth. 1955  MRN. [id number]     Primary care provider. JURGEN Dave CNP  (Tel: [phone])    Admit date: 3/30/2019    Discharge date 2019  Note Date: 2019     Reason for Hospitalization. Chief Complaint   Patient presents with    Shortness of Breath     worsening shortness of breath since yesterday with chest \"tightness\". No better after home tx. Significant Findings. 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 *       Problems and results from this hospitalization that need follow up. 1. ESRD:  Follow up HD q M W   2. Recent right AV fistula: Follow up with Dr Roberto Mckenzie in 2 weeks     Significant test results and incidental findings. Invasive procedures and treatments. [facility] Course. The patient missed her HD appointment on Friday and was admitted with fluid overload. She was followed by nephrology , vascular and ID. She was treated for the followin. Previous reports of dyspnea/ chest pain: likely due to missed HD:  Sx improved with fluid removal with HD on  and Monday. 2. ESRD:  On HD,  Nephrology is following  3. Anemia of chronic disease: On epo   4. S/p right bracheocephalic AV fistua on  with Dr Roberto Mckenzie,  Vascular and ID  does not feel site is infected. No antibiotics indicated at discharge.    5. T2DM:  BG control is adequate on current insulin therapy   6. History of PE: on daily coumadin. INR is 1.25 today ,  Pt has outpatient follow up for coumadin monitoring. She was feeling improved after HD and was eager to be released           Consults. PHARMACY TO DOSE VANCOMYCIN  PHARMACY TO DOSE VANCOMYCIN  IP CONSULT TO HOSPITALIST  IP CONSULT TO NEPHROLOGY  IP CONSULT TO VASCULAR SURGERY  IP CONSULT TO INFECTIOUS DISEASES  IP CONSULT TO PHARMACY  PHARMACY TO DOSE VANCOMYCIN  IP CONSULT TO NEPHROLOGY  IP CONSULT TO HOME CARE NEEDS    Physical examination on discharge day. /72   Pulse 83   Temp 98.4 °F (36.9 °C) (Oral)   Resp 18   Ht 5' 3\" (1.6 m)   Wt (!) 328 lb 11.3 oz (149.1 kg)   LMP 11/01/1996 (Exact Date)   SpO2 95%   BMI 58.23 kg/m²   General appearance. Alert. Looks comfortable but chronically ill and morbidly obese   HEENT. Sclera clear. Moist mucus membranes. Cardiovascular. Regular rate and rhythm, normal S1, S2. No murmur. Respiratory. Not using accessory muscles. Clear to auscultation bilaterally, no wheeze. Gastrointestinal. Abdomen soft, non-tender, not distended, normal bowel sounds  Neurology. Facial symmetry. No speech deficits. Moving all extremities equally. Extremities. No edema in lower extremities. Skin. Warm, dry, echymosis right AC fossa noted. No signs of infection     Condition at time of discharge:   Stable     Medication instructions provided to patient at discharge.      Medication List      CHANGE how you take these medications    docusate sodium 100 MG capsule  Commonly known as:  COLACE  Take 1 capsule by mouth 2 times daily  What changed:    · when to take this  · reasons to take this     insulin detemir 100 UNIT/ML injection pen  Commonly known as:  LEVEMIR FLEXTOUCH  Inject 23 Units into the skin 2 times daily  What changed:  how much to take     ipratropium-albuterol 0.5-2.5 (3) MG/3ML Soln nebulizer solution  Commonly known as:  DUONEB  Inhale 3 mLs into the lungs every 4 hours DX:COPD J44.9  What changed:    · when to take this  · reasons to take this  · additional instructions     warfarin 7.5 MG tablet  Commonly known as:  COUMADIN  Continue f/up with coumadin clinic  What changed:    · how much to take  · when to take this  · additional instructions        CONTINUE taking these medications    ACCU-CHEK KENNETH SMARTVIEW w/Device Kit  USE TO TEST BLOOD GLUCOSE     albuterol sulfate  (90 Base) MCG/ACT inhaler  Commonly known as:  PROAIR HFA  Inhale 2 puffs into the lungs every 6 hours as needed for Wheezing     Alcohol Prep 70 % Pads  USE TO TEST BLOOD GLUCOSE THREE TIMES DAILY     allopurinol 100 MG tablet  Commonly known as:  ZYLOPRIM  Take 1 tablet by mouth daily     ASSURE COMFORT LANCETS 30G Misc  USE TO TEST BLOOD GLUCOSE THREE TIMES DAILY     BiPAP Machine Misc     calcium acetate 667 MG capsule  Commonly known as:  PHOSLO  Take 2 capsules by mouth 3 times daily (with meals)     DEPEND ADJUSTABLE UNDERWEAR LG Misc  1 each by Does not apply route as needed (for urine incompetence)     fluticasone-salmeterol 230-21 MCG/ACT inhaler  Commonly known as:  ADVAIR HFA  Inhale 1 puff into the lungs 2 times daily     insulin lispro 100 UNIT/ML pen  Commonly known as:  HUMALOG KWIKPEN  Inject 0-12 Units into the skin 3 times daily (before meals)     lidocaine-prilocaine 2.5-2.5 % cream  Commonly known as:  EMLA  Apply topically as needed.      nebivolol 2.5 MG tablet  Commonly known as:  BYSTOLIC  Take 1 tablet by mouth 2 times daily     nitroGLYCERIN 0.4 MG SL tablet  Commonly known as:  NITROSTAT  DISSOLVE 1 TABLET UNDER THE TONGUE EVERY 5 MINUTES AS  NEEDED ; MAXIMUM OF 3  TABLETS IN 15 MINUTES     omeprazole 40 MG delayed release capsule  Commonly known as:  PRILOSEC  TAKE 1 CAPSULE BY MOUTH  DAILY     ONE TOUCH ULTRA TEST strip  Generic drug:  blood glucose test strips  USE TO TEST 3-4 TIMES DAILY DUE TO FLUCTUATING SUGAR     OXYGEN     pregabalin 50 MG capsule  Commonly known as: LYRICA  Take 1 capsule by mouth 3 times daily for 30 days. promethazine 25 MG tablet  Commonly known as:  PHENERGAN  Take 1 tablet by mouth every 8 hours as needed for Nausea     * RELION PEN NEEDLE 31G/8MM 31G X 8 MM Misc  Generic drug:  Insulin Pen Needle  USE  THREE TIMES DAILY AS DIRECTED     * UNIFINE PENTIPS 31G X 5 MM Misc  Generic drug:  Insulin Pen Needle  USE WITH INSULIN PENS FOUR TIMES DAILY     rosuvastatin 10 MG tablet  Commonly known as:  CRESTOR  Take 1 tablet by mouth daily Take 1 tablet by mouth  daily     Spacer/Aero-Holding [employer]  1 Device by Does not apply route daily as needed     traMADol 50 MG tablet  Commonly known as:  ULTRAM     traZODone 100 MG tablet  Commonly known as:  DESYREL  Take 1 tablet by mouth nightly TAKES PRN     TRULICITY 6.23 TX/2.5TP Sopn  Generic drug:  Dulaglutide  INJECT ONE  SYRINGE SUBCUTANEOUSLY ONCE A WEEK         * This list has 2 medication(s) that are the same as other medications prescribed for you. Read the directions carefully, and ask your doctor or other care provider to review them with you. STOP taking these medications    HYDROmorphone 2 MG tablet  Commonly known as:  DILAUDID            Discharge recommendations given to patient. Follow Up. in 1 week   Disposition. Home with home care and speech therapy   Activity. activity as tolerated  Diet: DIET RENAL; Dental Soft      Spent > 30  minutes in discharge process.     Signed:  JURGEN Carballo CNP     4/1/2019 4:19 PM

## 2019-04-01 NOTE — CARE COORDINATION
Discharge Planning Assessment  SW discharge planner met with patient and spouse to discuss reason for admission, current living situation, and potential needs at the time of discharge     Demographics/Insurance verified Yes     Current type of dwelling:  Apt     Living arrangements:  w/spouse     Level of function/Support:  Pt reports uses cane at all times but independent with other ADLs. Spouse is supportive but has some of his own medical limitations     PCP:  Dr. Rendon Friends     Last Visit to PCP:  03/26/19     DME:  Lift chair, walker w/seat, shower chair     Active with any community resources/agencies/skilled home care:  Yes, active with [facility]. Has adie to assist with cleaning, cooking and personal care. Pt stated \"She does anything I need. \"      Medication compliance issues:  No     Financial issues that could impact healthcare: No     Transportation at the time of discharge:  Spouse     Tentative discharge plan:  Most likely home. Has non-skilled with [facility]. No other needs identified at this time.      Electronically signed by HOLLIS Joy on 4/1/2019 at 1:20 PM

## 2019-04-01 NOTE — PROGRESS NOTES
Vascular Progress Note    4/1/2019 10:53 AM    Chief complaint / Reason for visit : Right brachiocephalic AV fistula created on 3/28/19. She presented with complaints of SOB. We were asked to see for concerns for infection from AV fistula. Subjective:  Patient seen during HD. She reports her breathing has improved. She reports the redness and swelling to her right arm surgical site has improved. She is afebrile and appears hemodynamically stable. Vital Signs: /75   Pulse 85   Temp 98.7 °F (37.1 °C)   Resp 16   Ht 5' 3\" (1.6 m)   Wt (!) 332 lb 0.2 oz (150.6 kg)   LMP 11/01/1996 (Exact Date)   SpO2 98%   BMI 58.81 kg/m²  O2 Flow Rate (L/min): 0 L/min   I/O:      Intake/Output Summary (Last 24 hours) at 4/1/2019 1053  Last data filed at 3/31/2019 1713  Gross per 24 hour   Intake 1240 ml   Output 4000 ml   Net -2760 ml       Physical Exam:   General: no apparent distress, appears stated age  Chest/Lungs: clear to auscultation bilaterally, no accessory muscle use  Cardiac:  regular rate and rhythm, S1, S2 normal, no murmur, click, rub or gallop  Abdomen:  abdomen is soft without significant tenderness, masses, organomegaly or guarding  Vascular: 2+ right radial pulse, hand warm. Right upper arm AV fistula with no erythema or drainage, some mild ecchymosis and swelling noted around incision. Weak bruit/thrill.       Labs:   Lab Results   Component Value Date     04/01/2019    K 5.0 04/01/2019    K 5.7 03/31/2019    CL 96 04/01/2019    CO2 24 04/01/2019    BUN 41 04/01/2019    CREATININE 9.7 04/01/2019    GFRAA 5 04/01/2019    GFRAA 50 05/12/2013    LABGLOM 4 04/01/2019    GLUCOSE 128 04/01/2019    PHOS 4.9 04/01/2019    MG 2.40 08/03/2018    CALCIUM 8.5 04/01/2019     Lab Results   Component Value Date    WBC 8.5 04/01/2019    RBC 3.28 04/01/2019    HGB 10.5 04/01/2019    HCT 32.8 04/01/2019    .0 04/01/2019    RDW 18.8 04/01/2019     04/01/2019     Lab Results   Component Value Date    INR 1.25 (H) 04/01/2019    PROTIME 14.2 (H) 04/01/2019        Scheduled Meds:    mometasone-formoterol  2 puff Inhalation BID    cephALEXin  500 mg Oral 2 times per day    heparin (porcine)  5,000 Units Subcutaneous 3 times per day    traZODone  100 mg Oral Nightly    rosuvastatin  10 mg Oral Daily    pantoprazole  40 mg Oral QAM AC    nebivolol  2.5 mg Oral BID    calcium acetate  1,334 mg Oral TID WC    allopurinol  100 mg Oral Daily    sodium chloride flush  10 mL Intravenous 2 times per day    insulin lispro  0-12 Units Subcutaneous TID WC    insulin lispro  0-6 Units Subcutaneous Nightly    insulin glargine  30 Units Subcutaneous BID    pregabalin  50 mg Oral BID    warfarin  7.5 mg Oral Daily     Continuous Infusions:    dextrose           Assessment:   ESRD on HD  SOB, pulmonary edema  S/p right brachiocephalic AV fistula on 1/52/27 - do not feel cellulitis/infection is present, afebrile, no leukocytosis, no drainage or erythema, mild ecchymosis and swelling    Plan:  Okay with short course of oral antibiotics. Okay to discharge from vascular standpoint. Follow up with Dr. Kenny Coker in about 2 weeks. Patient is stable from vascular standpoint. We will sign off for now, but please do not hesitate to contact us with any questions. Thank you for the consultation. Patient educated on plan of care and disease process. All questions answered.         Electronically signed by JURGEN Chaves CNP on 4/1/2019 at 10:53 AM

## 2019-04-01 NOTE — FLOWSHEET NOTE
04/01/19 0700 04/01/19 1104   Vital Signs   /75 (!) 141/79   Temp 98.7 °F (37.1 °C) 98.4 °F (36.9 °C)   Pulse 85 82     Treatment time: 4 hours    Net UF: 1.1 L    Pre weight: 150.6 kg (standing scale)  Post weight: 149.1 kg (standing scale)  EDW: 149.5 kg    Access used: CAREY AVG. ROBERT AVF maturing, not in use. Access function: No problems    Medications or blood products given: Lidocaine precannulation given. Regular outpatient schedule: [facility] HOLLIS    Summary of response to treatment: Tolerated 4 hour HD tx without difficulty. VSS. Copy of dialysis treatment record placed in chart, to be scanned into EMR. Report called to Benita Norwood RN.

## 2019-04-01 NOTE — CARE COORDINATION
Aware of speech recommendations for home care. Met w/pt to discuss. Pt is refusing speech services at home at this time. Informed provider.   Electronically signed by HOLLIS Pollard on 4/1/2019 at 3:31 PM

## 2019-04-01 NOTE — PROGRESS NOTES
Speech Language Pathology  CLINICAL BEDSIDE SWALLOWING EVALUATION  Speech Therapy Department    Patient Name:  Bridgette Dill  :  1955  Pain level: Denies  Medical Diagnosis:   Infection of arteriovenous graft for hemodialysis ([facility] [address] [T82. 7XXA]  Infection of arteriovenous graft for hemodialysis ([facility] [address] [T82. 7XXA]    HPI: Tommie Nolasco is a 59 y.o. female who presents because of difficulty breathing. She has a history of COPD but is also a dialysis patient and she missed dialysis yesterday because she felt poorly. She had a fistula surgically placed to her right arm two days ago and it is giving her pain. She denies chest pain. She does have some extremity cramping. She denies fever. She received dialysis to her left arm the last time she received dialysis. \"    CXR (3/30/19): Impression   No acute process. SLP eval and treat orders received due to pt's complaint of difficulty swallowing. Pt placed on dental soft diet 3/30/19. Pt previously seen by ST 2018, recommended Dysphagia III (Advanced) diet with thin liquids at d/c. Treatment Diagnosis: Mild Oropharyngeal Dysphagia    Impressions: Pt sitting upright in bed on RA upon arrival, A&Ox4. Pt reports feeling like dry and hard foods are getting stuck in her throat, and she is occasionally choking. Oral mechanism examination revealed all structures to be grossly intact and functional. Pt difficult to palpate larynx during swallow due to body habitus. Various consistency texture trials were given to assess overall swallow function. Thin and nectar liquids via cup revealed suspected delayed swallow initiation with no overt clinical s/s of aspiration/penetration. Regular texture solids revealed prolonged mastication with delayed bolus transfer. Soft solids revealed improved timing of mastication and improved A-P propulsion.  Pt tolerated all solid textures in isolation and in combination with thin liquids with no overt clinical s/s of aspiration/penetration. At this time, recommend continuation of dental soft diet with thin liquids. If symptoms persist or worsen pt may benefit from completion of Modified Barium Swallow (can be completed as an outpatient). Dietary Recommendations:  1) Continue Dental soft diet with thin liquids    Strategies: 90 degree positioning with all p.o. intake; small bites/sips; alternate textures through meal; reduce rate of intake    Treatment/Goals: Speech therapy for dysphagia tx 3-5 times per week. ST.) Pt will tolerate recommended diet without s/s of aspiration   2.) If clinical symptoms of penetration/aspiration continue to be noted,Pt will tolerate MBS to r/o aspiration and determine appropriate diet/liquid level. 3.) Pt will tolerate diet advance to least restricted diet, as clinically indicated, with no signs of aspiration     Oral motor Exam:  Dentition: adequate  Labial/Facial: grossly functional  Lingual: grossly functional  Voice: mildly hoarse    Oral Phase:   Reduced mastication  Reduced A-P propulsion    Pharyngeal Phase:  Apparent pharyngeal pooling  Delayed swallow initiation  Suspect mildly decreased laryngeal elevation via palpation; difficult to determine due to pt's habitus      Patient/Family Education:Education, results and recommendations given to the Pt and nurse, who verbalized understanding    Timed Code Treatment: 0 minutes    Total Treatment Time: 23 minutes    Discharge Recommendations: Speech Therapy for Speech/Dysphagia treatment at discharge. Signature:  Mikal Elkins, [address] [place] [address] [id number]  Speech Language Pathologist      The speech-language pathologist was present, directed the patient's care, made skilled judgment and was responsible for assessment and treatment.     [facility]   Speech-Language Pathology Student

## 2019-04-01 NOTE — CONSULTS
Infectious Diseases   Consult Note        Admission Date: 3/30/2019  Hospital Day: Hospital Day: 3  Attending: No att. providers found  Date of service: 4/1/19    Presenting complaint:   Chief Complaint   Patient presents with    Shortness of Breath     worsening shortness of breath since yesterday with chest \"tightness\". No better after home tx. Reason for admission: Infection of arteriovenous graft for hemodialysis ([facility] [address] [T82. 7XXA]  Infection of arteriovenous graft for hemodialysis ([facility] [address] [T82. 7XXA]    Chief complaint/ Reason for consult : Concern for infection of right arm AV graft    Problem list:       Patient Active Problem List   Diagnosis Code    Asthma J45.909    Colon polyps K63.5    Microalbuminuria R80.9    Venous stasis of lower extremity I87.8    Obstructive sleep apnea on CPAP G47.33, Z99.89    Chronic diastolic CHF (congestive heart failure) ([facility]) I50.32    Renal osteodystrophy N25.0    Uncontrolled type 2 diabetes with renal manifestation (HCC) E11.29, E11.65    History of pulmonary embolism Z86.711    Anemia of chronic kidney failure (HCC) N18.9, D63.1    Primary osteoarthritis of both knees M17.0    Essential hypertension, malignant I10    Restrictive lung disease J98.4    Gastroesophageal reflux disease K21.9    LVH (left ventricular hypertrophy) I51.7    Dyspnea and respiratory abnormalities R06.00, R06.89    Acute on chronic respiratory failure with hypoxia and hypercapnia (HCC) J96.21, J96.22    Obesity hypoventilation syndrome (HCC) E66.2    Morbid obesity due to excess calories ([facility]) E66.01    COPD, moderate (HCC) J44.9    Gout M10.9    Warfarin-induced coagulopathy (HCC) D68.32, T45.515A    Chronic hypoxemic respiratory failure (HCC) J96.11    Constipation K59.00    Diabetes education, encounter for Z71.89    Primary osteoarthritis of left hip M16.12    ESRD (end stage renal disease) on dialysis (HCC) N18.6, Z99.2    Chronic pain syndrome G89.4    lower extremity. with following problems:    · Pain at hemodialysis the right brachial cephalic fistula site  · Status post right brachial cephalic fistula creation on 3/28/19  · Poorly controlled type 2 diabetes mellitus  · Diabetic nephropathy  · ESRD on hemodialysis  · GERD  · Essential hypertension  · Mixed hyperlipidemia  · GALE on CPAP next history of pulmonary embolism  · COPD  · Obesity Class 3 due to excess calorie intake : Body mass index is 58.23 kg/m². ·       Discussion:      The patient was given IV vancomycin and IV cefepime on admission and oral cephalexin yesterday. She has been afebrile. She has no leukocytosis. Shortness of breath on presentation was likely due to fluid overload due to her missing her hemodialysis. Right arm AV graft site looks healthy. No signs of obvious infection    Plan:     Diagnostic Workup:    · Continue to follow fever curve, WBC count and blood cultures  · Follow up on liverand renal functions closely    Antimicrobials:    · No signs of obvious infection at right arm AV graft site  · I don't think any antibiotics are needed  · Will recommend stopping all antibiotics and watch him clinically  · The pain in the right arm AV fistula is likely postoperative. Continue to watch for any signs of infection like worsening redness or purulence. She has none of these at this time  · Maintain good glycemic control  · DVT prophylaxis  · Discussed the above plan with patient and RN     I/v access Management:    · Continue to monitor i.v access sites for erythema, induration, discharge or tenderness. · As always, continue efforts to minimizetubes/lines/drains as clinically appropriate to reduce chances of line associated infections.     Patient education and counseling:     · The patient was educated in detailabout the side-effects of various antibiotics and things to watch for like new rashes, lip swelling, severe reaction, worsening diarrhea, break through fever etc.  · Discussed patient'scondition and what to expect. All of the patient's questions were addressed in a satisfactory manner and patient verbalized understanding all instructions. Diabetes mellitus education and counseling:    Patient was educated in detail about the importance of keeping diabetes under control to improve the cure rate of infection and prevent future infections. Patient was advised to check blood glucose level regularly and to stay compliant with the diabetes medications. Patient was advised to the trim the toe nails carefully, wear shoes or slippers at all times and check both feet everyday before going to bed to look for any cuts, blisters, swelling or redness. Importance of washing the feet everyday with soap and water and keeping them dry, and seeking prompt medical attention in case of a non-healing wound or ulcer on the feet was also highlighted. Weight loss counseling:    Extensive weight loss counseling was done. It is important to set a realistic weight loss goal. First goal should be to avoid gaining more weight and staying at current weight (or within 5 percent). People at high risk of developing diabetes who are able to lose 5 percent of their body weight and maintain this weight will reduce their risk of developing diabetes by about 50 percent and reduce their blood pressure. Losing more than 15 percent of  body weight and staying at this weight is an extremely good result, even if you never reach your \"dream\" or \"ideal\" weight. Lifestyle changes including changing eating habits, substituting excess carbohydrates with proteins, stress reduction, using self-help programs like Weight Watchers®, Overeaters Anonymous®, and Take Off Pounds Sensibly (TOPS)© , following DASH diet and increasing exercise or walking briskly daily for half hour to and hour 5-7 days a week was suggested among other measures.  Information was given about various weight loss education programs and their websites like www.cdc.gov/healthyweight, www.choosemyplate.gov and www.health.gov/dietaryguidelines/      Thank you for involving me in the care of your patient. I will continue to follow. If you have any additional questions, please do not hesitate to contact me. Subjective:       HPI: Aliza Medina is a 59 y.o. female patient, who was seen at the request of Dr. Spring painter. providers found. History was obtained from chart review and the patient. The patient was admitted on 3/30/2019. I have been consulted to see the patient for above mentioned reason(s). The patient has multiple medical comorbidities, and presented to the ER for difficulty breathing. The patient has COPD and end-stage renal disease for which she has been on hemodialysis. She had a right arm fistula placed 2 days before presentation to the ER which was becoming painful. Due to the pain, she did not go for her scheduled dialysis on March 29 and became short of breath and came to the ER and was admitted. Primary team was concerned about possible graft site infection and I have been asked to see the patient for this. Past Medical History: All past medical history reviewed today.     Past Medical History:   Diagnosis Date    Abdominal pain 8/20/2018    Acute on chronic congestive heart failure (HCC) 6/26/2014    Asthma     Cervical cancer ([address]     Chest pain 5/16/2018    CH[facility]congestive [facility] failure) [facility]     Chronic kidney disease, stage IV (severe) ([facility])     Dr Lizarraga Number    Chronic pain syndrome 3/27/2018    Chronic respiratory failure with hypoxia ([facility])     CKD (chronic kidney disease) stage 4, GFR 15-29 ml/min ([facility]) 6/30/2017    Colon polyps     COPD (chronic obstructive pulmonary disease) ([address]     Degenerative disc disease at L5-S1 level 6/18/2013     CT    Diabetes mellitus, insulin dependent (IDDM), uncontrolled ([address]     Diabetic polyneuropathy associated with type 2 diabetes mellitus ([facility] [address] 3/26/2019    Elevated troponin 9/9/2017    ESRD (end stage renal disease) on dialysis ([facility] [address] 02/14/2018    M-W-F JOSE Smyth Ihrosita    GERD (gastroesophageal reflux disease)     Gout     History of blood transfusion     History of cervical cancer     Hyperlipidemia     Hypertension     Incarcerated ventral hernia     LVH (left ventricular hypertrophy)     Morbid obesity (HCC)     Mucus plugging of bronchi     Obstructive sleep apnea on CPAP     wears 2L O2 and BIPAP at night    Pneumonia     Psychiatric problem     breakdown long time ago but not current    Pulmonary embolism ([facility] [address] 2/6/13    Type 2 diabetes mellitus with diabetic neuropathy, with long-term current use of insulin ([facility] [address] 9/12/2017    Urinary incontinence in female     Venous stasis of lower extremity          Past Surgical History: All pastsurgical history was reviewed today.     Past Surgical History:   Procedure Laterality Date    CARDIAC CATHETERIZATION  1/14    Grace; clean cors    COLONOSCOPY  2010    polyp removed; Kina Sousa; repeat 5 years    COLONOSCOPY  6/25/13, 11/14    [facility]    DIALYSIS FISTULA CREATION Right 3/28/2019    RIGHT BRACHIO CEPHALIC FISTULA CREATION performed by Denis Talamantes MD at [address]  2/21/09    LVH with global hypokinesis; EF 55-60%    HYSTERECTOMY      KNEE ARTHROSCOPY Right 1999    OTHER SURGICAL HISTORY  02/22/2018    LEFT brachial artery axillary vein AV graft     MI OPEN SKULL SUPRATENT EXPLORE      Craniotomy, 3/21/19 patient denies any brain surgery or craniotomy    MI REPAIR INCISIONAL HERNIA,REDUCIBLE N/A 11/20/2018    LAPAROSCOPIC VENTRAL HERNIA REPAIR WITH MESH performed by Patrice Juarez MD at [place] [address]  10/96    TUNNELED VENOUS PORT PLACEMENT Right 11/2019    UPPER GASTROINTESTINAL ENDOSCOPY  6/25/13    VENTRAL HERNIA REPAIR  12/24/2016    Incarcerated ventral hernia repair with mesh       Social History:  All social discharge, ear pain, facial swelling, hearing loss, rhinorrhea and trouble swallowing. Eyes: Negative for photophobia, discharge, redness and visual disturbance. Respiratory: Negative for apnea, cough, choking, chest tightness, shortness of breath and stridor. Cardiovascular: Negative for chest pain and palpitations. Gastrointestinal: Negative for abdominal pain, blood in stool, diarrhea and nausea. Endocrine: Negative for polydipsia, polyphagia and polyuria. Genitourinary: Negative for difficulty urinating, dysuria, frequency, hematuria, menstrual problem and vaginal discharge. Musculoskeletal: Negative for arthralgias, joint swelling, myalgias and neck stiffness. Skin: Negative for color change and rash. Postoperative pain in the right arm AV graft site   Allergic/Immunologic: Negative for immunocompromised state. Neurological: Negative for dizziness, seizures, speech difficulty, light-headedness and headaches. Hematological: Negative for adenopathy. Psychiatric/Behavioral: Negative for agitation, hallucinations and suicidal ideas. Objective:       PHYSICAL EXAM:      Vitals:   Vitals:    04/01/19 0408 04/01/19 0700 04/01/19 1104 04/01/19 1200   BP: 104/74 119/75 (!) 141/79 106/72   Pulse: 83 85 82 83   Resp: 16   18   Temp: 98.3 °F (36.8 °C) 98.7 °F (37.1 °C) 98.4 °F (36.9 °C) 98.4 °F (36.9 °C)   TempSrc: Axillary   Oral   SpO2: 98%   95%   Weight: (!) 333 lb 11.2 oz (151.4 kg) (!) 332 lb 0.2 oz (150.6 kg) (!) 328 lb 11.3 oz (149.1 kg)    Height:           Physical Exam   Constitutional: She is oriented to person, place, and time. She appears well-developed. No distress. HENT:   Head: Normocephalic. Right Ear: External ear normal.   Left Ear: External ear normal.   Nose: Nose normal.   Mouth/Throat: Oropharynx is clear and moist.   Eyes: Pupils are equal, round, and reactive to light. Conjunctivae are normal. Right eye exhibits no discharge.  Left eye exhibits no Date    CKTOTAL 129 07/11/2017     ESR:   Lab Results   Component Value Date    SEDRATE 69 (H) 04/10/2013     CRP: No results found for: CRP      Imaging: All pertinent images and reports for the current visit were reviewed by meduring this visit. XR CHEST PORTABLE   Final Result   No acute process. Outside records:    Labs, Microbiology, Radiology and pertinent results from Care everywhere, if available, were reviewed as a part ofthe consultation. Known drug Allergies: All allergies were reviewed and updated    Allergies   Allergen Reactions    Codeine Nausea Only    Fentanyl Itching    Morphine      CHEST PAIN    Nsaids Other (See Comments)     Due to CRF    Hydrocodone-Acetaminophen Itching and Rash    Percocet [Oxycodone-Acetaminophen] Itching    Procardia [Nifedipine] Nausea And Vomiting     Headache  Takes norvasc at home 12/22/15    Vicodin [Hydrocodone-Acetaminophen] Rash         Please note that this chart was generated using Dragon dictation software. Although every effort was made to ensure the accuracy of this automated transcription, some errors in transcription may have occurred inadvertently. If you may need any clarification, please do not hesitate to contact me through EPIC or at the phone number provided below with my electronic signature.       Peri Stock MD, MPH  4/1/2019, 11:04 PM  [facility]   Office: [phone]  Fax: [phone]  Tuesday AM clinic:   [address] [facility] [address]  Thursday AM clinic: [address] [facility]

## 2019-04-02 ENCOUNTER — CARE COORDINATION (OUTPATIENT)
Dept: CASE MANAGEMENT | Age: 64
End: 2019-04-02

## 2019-04-02 NOTE — CARE COORDINATION
[facility] Transitions Initial Follow Up Call    Call within 2 business days of discharge: Yes    Patient: Francesco Blakely Patient : 1955   MRN: [id number]  Reason for Admission: SOB  Discharge Date: 19 RARS: Readmission Risk Score: 47       Spoke with: Soni: Deonte    Non-face-to-face services provided:  Obtained and reviewed discharge summary and/or continuity of care documents    Care Transitions 24 Hour Call    Do you have any ongoing symptoms?:  No  Do you have a copy of your discharge instructions?:  Yes  Do you have all of your prescriptions and are they filled?:  Yes  Have you been contacted by a [facility] Pharmacist?:  No  Have you scheduled your follow up appointment?:  Yes  How are you going to get to your appointment?:  Car - family or friend to transport  Were you discharged with any Home Care or Post Acute Services:  No  Post Acute Services:  Home Health (Comment: [facility])  Patient DME:  Wheelchair, Other, Shower chair, Walker  Other Patient DME:  glucometer  Patient Home Equipment:  Oxygen, Nebulizer, BiPAP  Do you have support at home?:  Partner/Spouse/SO  Do you feel like you have everything you need to keep you well at home?:  Yes  Are you an active caregiver in your home?:  No  Care Transitions Interventions         Follow Up: Patient reports that she is doing well, denies SOB or chest pain. Discussed discharge instructions, patient declined to review med list at this time. CTC explained that it could be reviewed at a later time. Patient will see Dr. Kamaljit Stephens today. CTC provided contact information and will continue with follow up outreach calls.         Future Appointments   Date Time Provider Aziza Barraza   2019  1:45 PM Onelia Palza MD PULM & CC [facility]   2019  2:20 PM Dona Goodman, APRN - CNP FF SLEEP MED [facility]   2019  2:00 PM Guillermina Islas MD FF Cardio [facility]       Yarely Camargo RN

## 2019-04-02 NOTE — PROGRESS NOTES
Speech/Language Pathology  Discharge Note    Name: Barbara Alvarado   : 1955     Speech Therapy/Dysphagia  Pt discharged to home on 19. Bedside Swallow Eval completed 19 (see report). No goals met prior to discharge. Recommend: Continued Dysphagia treatment at home, with goals and treatment per [facility]. DIET LEVEL AT DISCHARGE:  Dental soft diet with thin liquids    DYSPHAGIA GOALS:  1.) Pt will tolerate recommended diet without s/s of aspiration (GOAL NOT MET)  2.) If clinical symptoms of penetration/aspiration continue to be noted,Pt will tolerate MBS to r/o aspiration and determine appropriate diet/liquid level.   (GOAL NOT MET)  3.) Pt will tolerate diet advance to least restricted diet, as clinically indicated, with no signs of aspiration  (GOAL NOT MET)    Kanwal Marroquin M.A., [address] [place] [address]  Speech-Language Pathologist

## 2019-04-03 ENCOUNTER — OFFICE VISIT (OUTPATIENT)
Dept: FAMILY MEDICINE CLINIC | Age: 64
End: 2019-04-03
Payer: MEDICARE

## 2019-04-03 ENCOUNTER — TELEPHONE (OUTPATIENT)
Dept: FAMILY MEDICINE CLINIC | Age: 64
End: 2019-04-03

## 2019-04-03 VITALS
OXYGEN SATURATION: 97 % | BODY MASS INDEX: 58.1 KG/M2 | SYSTOLIC BLOOD PRESSURE: 142 MMHG | HEART RATE: 106 BPM | DIASTOLIC BLOOD PRESSURE: 80 MMHG | WEIGHT: 293 LBS

## 2019-04-03 DIAGNOSIS — M54.2 NECK PAIN ON LEFT SIDE: Primary | ICD-10-CM

## 2019-04-03 DIAGNOSIS — M54.2 NECK PAIN ON LEFT SIDE: ICD-10-CM

## 2019-04-03 PROCEDURE — G8427 DOCREV CUR MEDS BY ELIG CLIN: HCPCS | Performed by: NURSE PRACTITIONER

## 2019-04-03 PROCEDURE — G8598 ASA/ANTIPLAT THER USED: HCPCS | Performed by: NURSE PRACTITIONER

## 2019-04-03 PROCEDURE — 1036F TOBACCO NON-USER: CPT | Performed by: NURSE PRACTITIONER

## 2019-04-03 PROCEDURE — G8417 CALC BMI ABV UP PARAM F/U: HCPCS | Performed by: NURSE PRACTITIONER

## 2019-04-03 PROCEDURE — 3017F COLORECTAL CA SCREEN DOC REV: CPT | Performed by: NURSE PRACTITIONER

## 2019-04-03 PROCEDURE — 1111F DSCHRG MED/CURRENT MED MERGE: CPT | Performed by: NURSE PRACTITIONER

## 2019-04-03 PROCEDURE — 99213 OFFICE O/P EST LOW 20 MIN: CPT | Performed by: NURSE PRACTITIONER

## 2019-04-03 RX ORDER — TRAMADOL HYDROCHLORIDE 50 MG/1
50 TABLET ORAL EVERY 8 HOURS PRN
Qty: 42 TABLET | Refills: 0 | Status: SHIPPED | OUTPATIENT
Start: 2019-04-03 | End: 2019-04-17

## 2019-04-03 RX ORDER — TRAMADOL HYDROCHLORIDE 50 MG/1
50 TABLET ORAL EVERY 8 HOURS PRN
Qty: 42 TABLET | Refills: 0 | Status: SHIPPED | OUTPATIENT
Start: 2019-04-03 | End: 2019-04-03 | Stop reason: SDUPTHER

## 2019-04-03 RX ORDER — TIZANIDINE 4 MG/1
4 TABLET ORAL 3 TIMES DAILY PRN
Qty: 30 TABLET | Refills: 0 | Status: SHIPPED | OUTPATIENT
Start: 2019-04-03 | End: 2019-05-03

## 2019-04-03 ASSESSMENT — ENCOUNTER SYMPTOMS: SHORTNESS OF BREATH: 0

## 2019-04-03 NOTE — TELEPHONE ENCOUNTER
Pharmacy is asking for acute or chronic condition for the Tramadol prescribed today by valerio White CNP  If acute she can only be on for 7 days  If chronic, what specific pain ?   Pharmacy has concerns because she is on Lyrica and Dilaudid  Ph [phone] [facility]-contact

## 2019-04-03 NOTE — TELEPHONE ENCOUNTER
Dilaudid was given by vascular surgeon for recent dialysis graft placement- pt is no longer taking  Lyrica is for diabetic neuropathy pain  Pt has chronic pain and has taken and tolerated Ultram well.  Recent next strain- ok to fill

## 2019-04-03 NOTE — PROGRESS NOTES
SUBJECTIVE:  Pt is a of 59 y.o. female comes in today with   Chief Complaint   Patient presents with    Neck Pain     pt states she has neck pain and head hurting        Patient presenting today for evaluation of left sided neck pain. Started 2 days ago. Feels tight, \"feels like I can't hold my head up\". Denies injury. (+) decreased ROM. Had dialysis graft placed in right arm 1 week ago. Denies numbness or tingling in arm. Denies CP, SOB, palpitations, dizziness or lightheadedness. No previous eval or treatment. Out of Tramadol. Prior to Visit Medications    Medication Sig Taking? Authorizing Provider   allopurinol (ZYLOPRIM) 100 MG tablet Take 1 tablet by mouth daily Yes JURGEN Vergara CNP   pregabalin (LYRICA) 50 MG capsule Take 1 capsule by mouth 3 times daily for 30 days. Yes JURGEN Vergara CNP   Incontinence Supply Disposable (DEPEND ADJUSTABLE UNDERWEAR LG) MISC 1 each by Does not apply route as needed (for urine incompetence) Yes JURGEN Vergara CNP   UNIFINE PENTIPS 31G X 5 MM MISC USE WITH INSULIN PENS FOUR TIMES DAILY Yes JURGEN Vergara CNP   rosuvastatin (CRESTOR) 10 MG tablet Take 1 tablet by mouth daily Take 1 tablet by mouth  daily Yes JURGEN Vergara CNP   insulin lispro (HUMALOG KWIKPEN) 100 UNIT/ML pen Inject 0-12 Units into the skin 3 times daily (before meals) Yes JURGEN Vergara CNP   lidocaine-prilocaine (EMLA) 2.5-2.5 % cream Apply topically as needed.  Yes JURGEN Vergara CNP   promethazine (PHENERGAN) 25 MG tablet Take 1 tablet by mouth every 8 hours as needed for Nausea Yes JURGEN Vergara CNP   calcium acetate (PHOSLO) 667 MG capsule Take 2 capsules by mouth 3 times daily (with meals) Yes Abner Diaz MD   nebivolol (BYSTOLIC) 2.5 MG tablet Take 1 tablet by mouth 2 times daily Yes Abner Diaz MD   insulin detemir (LEVEMIR FLEXTOUCH) 100 UNIT/ML injection pen Inject 23 Units into the skin 2 times daily  Patient taking differently: Inject 30 Units into the skin 2 times daily  Yes Narendra Canales MD   docusate sodium (COLACE) 100 MG capsule Take 1 capsule by mouth 2 times daily  Patient taking differently: Take 100 mg by mouth 2 times daily as needed  Yes Iftikhar Buitrago MD   albuterol sulfate HFA (PROAIR HFA) 108 (90 Base) MCG/ACT inhaler Inhale 2 puffs into the lungs every 6 hours as needed for Wheezing Yes Luis Cardona MD   fluticasone-salmeterol (ADVAIR HFA) 230-21 MCG/ACT inhaler Inhale 1 puff into the lungs 2 times daily Yes Luis Cardona MD   ipratropium-albuterol (DUONEB) 0.5-2.5 (3) MG/3ML SOLN nebulizer solution Inhale 3 mLs into the lungs every 4 hours DX:COPD J44.9  Patient taking differently: Inhale 1 vial into the lungs every 4 hours as needed DX:COPD J44.9 Yes Luis Cardona MD   traMADol (ULTRAM) 50 MG tablet Take 50 mg by mouth every 6 hours as needed for Pain. . Yes Historical Provider, MD   TRULICITY 1.01 OX/3.9PV SOPN INJECT ONE  SYRINGE SUBCUTANEOUSLY ONCE A WEEK Yes JURGEN Jansen CNP   omeprazole (PRILOSEC) 40 MG delayed release capsule TAKE 1 CAPSULE BY MOUTH  DAILY Yes JURGEN Jansen CNP   traZODone (DESYREL) 100 MG tablet Take 1 tablet by mouth nightly TAKES PRN Yes JURGEN Jansen CNP   RELION PEN NEEDLE 31G/8MM 31G X 8 MM MISC USE  THREE TIMES DAILY AS DIRECTED Yes JURGEN Jansen CNP   nitroGLYCERIN (NITROSTAT) 0.4 MG SL tablet DISSOLVE 1 TABLET UNDER THE TONGUE EVERY 5 MINUTES AS  NEEDED ; MAXIMUM OF 3  TABLETS IN 15 MINUTES Yes JURGEN Jansen CNP   warfarin (COUMADIN) 7.5 MG tablet Continue f/up with coumadin clinic  Patient taking differently: 5 mg daily Patient taking 4mg on non dialysis days, 5 mg on dialysis days (Mon, Weds, Fri). Told to hold 5 days before surgery by Dr Kristen Montalvo' office. Per patient coumadin is sometimes held by dialysis when levels high.  Yes JURGEN Jansen CNP   ONE TOUCH ULTRA TEST strip USE TO TEST 3-4 TIMES DAILY DUE TO FLUCTUATING SUGAR Yes JURGEN Ornelas CNP   ASSURE COMFORT LANCETS 30G MISC USE TO TEST BLOOD GLUCOSE THREE TIMES DAILY Yes JURGEN Ornelas CNP   Alcohol Swabs (ALCOHOL PREP) 70 % PADS USE TO TEST BLOOD GLUCOSE THREE TIMES DAILY Yes JURGEN Ornelas CNP   Blood Glucose Monitoring Suppl (ACCU-CHEK KENNETH SMARTVIEW) w/Device KIT USE TO TEST BLOOD GLUCOSE Yes JURGEN Ornelas CNP   BiPAP Machine MISC by Does not apply route nightly Yes Historical Provider, MD   Spacer/Aero-Holding Chambers VENANCIO 1 Device by Does not apply route daily as needed Yes JURGEN Dey CNP   OXYGEN Inhale 2 L into the lungs daily as needed At night prn Yes Historical Provider, MD         Patient's allergies, past medical, surgical, social and family histories werereviewed and updated as appropriate. Review of Systems   Constitutional: Negative for diaphoresis. Respiratory: Negative for shortness of breath. Cardiovascular: Negative for chest pain and palpitations. Musculoskeletal: Positive for joint swelling (left side of neck), neck pain and neck stiffness. Left sided neck pain     Neurological: Negative for dizziness and headaches. Physical Exam   Constitutional: She is oriented to person, place, and time. She appears well-developed and well-nourished. Neck: Muscular tenderness present. No spinous process tenderness present. Decreased range of motion present. No erythema present. Cardiovascular: Normal rate, regular rhythm and normal heart sounds. No murmur heard. Pulses:       Radial pulses are 2+ on the left side. Pulmonary/Chest: Effort normal and breath sounds normal.   Neurological: She is alert and oriented to person, place, and time. Skin: Skin is warm and dry. Vitals reviewed. Vitals:    04/03/19 1200   BP: (!) 142/80   Pulse: 106   SpO2: 97%   Weight: (!) 328 lb (148.8 kg)             ASSESSMENT:  1.  Neck pain on left side      Controlled Substances Monitoring:     RX Monitoring 4/3/2019   Attestation The Prescription Monitoring Report for this patient was reviewed today. Chronic Pain Routine Monitoring Possible medication side effects, risk of tolerance/dependence & alternative treatments discussed. ;No signs of potential drug abuse or diversion identified: otherwise, see note documentation       PLAN:  1. Neck pain on left side  New problem  Suspect a strain  Rest, ice and heat prn  -     tiZANidine (ZANAFLEX) 4 MG tablet; Take 1 tablet by mouth 3 times daily as needed (neck pain)  -     traMADol (ULTRAM) 50 MG tablet; Take 1 tablet by mouth every 8 hours as needed for Pain for up to 14 days. See pt instructions  F/u 5-7 days if no improvement, sooner if symptomsworsen. Discussed use, benefit, and side effects of prescribed medications. All patient questions answered. Pt voiced understanding.

## 2019-04-03 NOTE — PATIENT INSTRUCTIONS
Patient Education        Neck Pain: Care Instructions  Your Care Instructions    You can have neck pain anywhere from the bottom of your head to the top of your shoulders. It can spread to the upper back or arms. Injuries, painting a ceiling, sleeping with your neck twisted, staying in one position for too long, and many other activities can cause neck pain. Most neck pain gets better with home care. Your doctor may recommend medicine to relieve pain or relax your muscles. He or she may suggest exercise and physical therapy to increase flexibility and relieve stress. You may need to wear a special (cervical) collar to support your neck for a day or two. Follow-up care is a key part of your treatment and safety. Be sure to make and go to all appointments, and call your doctor if you are having problems. It's also a good idea to know your test results and keep a list of the medicines you take. How can you care for yourself at home? · Try using a heating pad on a low or medium setting for 15 to 20 minutes every 2 or 3 hours. Try a warm shower in place of one session with the heating pad. · You can also try an ice pack for 10 to 15 minutes every 2 to 3 hours. Put a thin cloth between the ice and your skin. · Take pain medicines exactly as directed. ? If the doctor gave you a prescription medicine for pain, take it as prescribed. ? If you are not taking a prescription pain medicine, ask your doctor if you can take an over-the-counter medicine. · If your doctor recommends a cervical collar, wear it exactly as directed. When should you call for help? Call your doctor now or seek immediate medical care if:    · You have new or worsening numbness in your arms, buttocks or legs.     · You have new or worsening weakness in your arms or legs.  (This could make it hard to stand up.)     · You lose control of your bladder or bowels.    Watch closely for changes in your health, and be sure to contact your doctor if:    · Your neck pain is getting worse.     · You are not getting better after 1 week.     · You do not get better as expected. Where can you learn more? Go to https://chpeflaviaeb.[email or web address]. org and sign in to your [employer] account. Enter [id number] in the [facility] box to learn more about \"Neck Pain: Care Instructions. \"     If you do not have an account, please click on the \"Sign Up Now\" link. Current as of: September 20, 2018  Content Version: 11.9  © 7833-3671 [employer]. Care instructions adapted under license by [facility] ([facility]). If you have questions about a medical condition or this instruction, always ask your healthcare professional. [address] any warranty or liability for your use of this information. Patient Education        Neck: Exercises  Your Care Instructions  Here are some examples of typical rehabilitation exercises for your condition. Start each exercise slowly. Ease off the exercise if you start to have pain. Your doctor or physical therapist will tell you when you can start these exercises and which ones will work best for you. How to do the exercises  Neck stretch    1. This stretch works best if you keep your shoulder down as you lean away from it. To help you remember to do this, start by relaxing your shoulders and lightly holding on to your thighs or your chair. 2. Tilt your head toward your shoulder and hold for 15 to 30 seconds. Let the weight of your head stretch your muscles. 3. If you would like a little added stretch, use your hand to gently and steadily pull your head toward your shoulder. For example, keeping your right shoulder down, lean your head to the left. 4. Repeat 2 to 4 times toward each shoulder. Diagonal neck stretch    1. Turn your head slightly toward the direction you will be stretching, and tilt your head diagonally toward your chest and hold for 15 to 30 seconds.   2. If you would like a little added

## 2019-04-04 LAB
BLOOD CULTURE, ROUTINE: NORMAL
CULTURE, BLOOD 2: NORMAL

## 2019-04-06 ENCOUNTER — TELEPHONE (OUTPATIENT)
Dept: FAMILY MEDICINE CLINIC | Age: 64
End: 2019-04-06

## 2019-04-06 NOTE — TELEPHONE ENCOUNTER
Pt is requesting order for 2XL women's pull up incontinance diapers be sent to [facility] phone: [phone]

## 2019-04-07 ENCOUNTER — HOSPITAL ENCOUNTER (EMERGENCY)
Age: 64
Discharge: HOME | End: 2019-04-08
Payer: MEDICARE

## 2019-04-07 VITALS
RESPIRATION RATE: 18 BRPM | OXYGEN SATURATION: 100 % | BODY MASS INDEX: 51.91 KG/M2 | SYSTOLIC BLOOD PRESSURE: 146 MMHG | HEIGHT: 63 IN | DIASTOLIC BLOOD PRESSURE: 87 MMHG | TEMPERATURE: 98.2 F | HEART RATE: 78 BPM | WEIGHT: 293 LBS

## 2019-04-07 DIAGNOSIS — S16.1XXA STRAIN OF NECK MUSCLE, INITIAL ENCOUNTER: Primary | ICD-10-CM

## 2019-04-07 PROCEDURE — 99282 EMERGENCY DEPT VISIT SF MDM: CPT

## 2019-04-07 ASSESSMENT — PAIN SCALES - GENERAL: PAINLEVEL_OUTOF10: 8

## 2019-04-07 ASSESSMENT — PAIN DESCRIPTION - ORIENTATION: ORIENTATION: LEFT

## 2019-04-07 ASSESSMENT — PAIN DESCRIPTION - LOCATION: LOCATION: NECK

## 2019-04-07 ASSESSMENT — PAIN DESCRIPTION - PAIN TYPE: TYPE: ACUTE PAIN

## 2019-04-08 PROCEDURE — 6370000000 HC RX 637 (ALT 250 FOR IP): Performed by: PHYSICIAN ASSISTANT

## 2019-04-08 RX ORDER — DIAZEPAM 5 MG/1
5 TABLET ORAL EVERY 8 HOURS PRN
Qty: 15 TABLET | Refills: 0 | Status: SHIPPED | OUTPATIENT
Start: 2019-04-08 | End: 2019-04-13

## 2019-04-08 RX ORDER — DIAZEPAM 5 MG/1
5 TABLET ORAL ONCE
Status: COMPLETED | OUTPATIENT
Start: 2019-04-08 | End: 2019-04-08

## 2019-04-08 RX ADMIN — DIAZEPAM 5 MG: 5 TABLET ORAL at 00:08

## 2019-04-08 NOTE — ED PROVIDER NOTES
eMERGENCY dEPARTMENT eNCOUnter        [facility]    Chief Complaint   Patient presents with    Neck Pain     Patient presents to ER with complaints of left sided neck pain x1 week. Patient states that it hurts to turn her head to the left. Saw PCP this week and was given muscle relaxers; no improvement. HPI    Robert Kee is a 59 y.o. female who presents with left sided neck pain. Onset was 7-10 days ago. The duration has been constant. Mechanism of injury: no specific injury or cause. Reports that she was seen by her PCP for the same and started on Ultram and Zanaflex. Reports no improvement in her pain. Patient rates pain as 8/10 and described as aching and sharp. Denies fever, chills, other joint pain, numbness, tingling, focal weakness, or other associated signs or symptoms. REVIEW OF SYSTEMS    At least 6 systems reviewed and otherwise acutely negative except as in the [address]       PAST MEDICAL AND SURGICAL HISTORY    Past Medical History:   Diagnosis Date    Abdominal pain 8/20/2018    Acute on chronic congestive heart failure ([address] 6/26/2014    Asthma     Cervical cancer ([address]     Chest pain 5/16/2018    CHF (congestive heart failure) (HCC)     Chronic kidney disease, stage IV (severe) ([facility])     Dr Prema Villanueva    Chronic pain syndrome 3/27/2018    Chronic respiratory failure with hypoxia ([facility])     CKD (chronic kidney disease) stage 4, GFR 15-29 ml/min ([facility]) 6/30/2017    Colon polyps     COPD (chronic obstructive pulmonary disease) ([address]     Degenerative disc disease at L5-S1 level 6/18/2013     CT    Diabetes mellitus, insulin dependent (IDDM), uncontrolled ([address]     Diabetic polyneuropathy associated with type 2 diabetes mellitus ([address] 3/26/2019    Elevated troponin 9/9/2017    ESRD (end stage renal disease) on dialysis ([address] 02/14/2018    JU Dela Cruz    GERD (gastroesophageal reflux disease)     Gout     History of blood transfusion     History of cervical cancer     Hyperlipidemia     Hypertension     Incarcerated ventral hernia     LVH (left ventricular hypertrophy)     Morbid obesity (HCC)     Mucus plugging of bronchi     Obstructive sleep apnea on CPAP     wears 2L O2 and BIPAP at night    Pneumonia     Psychiatric problem     breakdown long time ago but not current    Pulmonary embolism [facility] 2/6/13    Type 2 diabetes mellitus with diabetic neuropathy, with long-term current use of insulin ([facility] 9/12/2017    Urinary incontinence in female     Venous stasis of lower extremity      Past Surgical History:   Procedure Laterality Date    CARDIAC CATHETERIZATION  1/14    Grace; clean cors    COLONOSCOPY  2010    polyp removed; [facility] Safe; repeat 5 years    COLONOSCOPY  6/25/13, 11/14    [place]    DIALYSIS FISTULA CREATION Right 3/28/2019    RIGHT BRACHIO CEPHALIC FISTULA CREATION performed by Jose Khan MD at [facility]  2/21/09    LVH with global hypokinesis; EF 55-60%    HYSTERECTOMY      KNEE ARTHROSCOPY Right 1999    OTHER SURGICAL HISTORY  02/22/2018    LEFT brachial artery axillary vein AV graft     OH OPEN SKULL SUPRATENT EXPLORE      Craniotomy, 3/21/19 patient denies any brain surgery or craniotomy    OH REPAIR INCISIONAL HERNIA,REDUCIBLE N/A 11/20/2018    LAPAROSCOPIC VENTRAL HERNIA REPAIR WITH MESH performed by Lucia Sacks, MD at [place] [id number]  10/96    TUNNELED VENOUS PORT PLACEMENT Right 11/2019    UPPER GASTROINTESTINAL ENDOSCOPY  6/25/13    VENTRAL HERNIA REPAIR  12/24/2016    Incarcerated ventral hernia repair with mesh       CURRENT MEDICATIONS    Current Outpatient Rx   Medication Sig Dispense Refill    diazepam (VALIUM) 5 MG tablet Take 1 tablet by mouth every 8 hours as needed (muscle spasms) for up to 5 days.  15 tablet 0    tiZANidine (ZANAFLEX) 4 MG tablet Take 1 tablet by mouth 3 times daily as needed (neck pain) 30 tablet 0    traMADol (ULTRAM) 50 MG tablet Take 1 Inhale 1 vial into the lungs every 4 hours as needed DX:COPD J44.9) 368 mL 11    TRULICITY 7.12 PX/5.4OF SOPN INJECT ONE  SYRINGE SUBCUTANEOUSLY ONCE A WEEK 15 pen 3    omeprazole (PRILOSEC) 40 MG delayed release capsule TAKE 1 CAPSULE BY MOUTH  DAILY 90 capsule 3    traZODone (DESYREL) 100 MG tablet Take 1 tablet by mouth nightly TAKES PRN 30 tablet 5    RELION PEN NEEDLE 31G/8MM 31G X 8 MM MISC USE  THREE TIMES DAILY AS DIRECTED 100 each 5    nitroGLYCERIN (NITROSTAT) 0.4 MG SL tablet DISSOLVE 1 TABLET UNDER THE TONGUE EVERY 5 MINUTES AS  NEEDED ; MAXIMUM OF 3  TABLETS IN 15 MINUTES 75 tablet 1    warfarin (COUMADIN) 7.5 MG tablet Continue f/up with coumadin clinic (Patient taking differently: 5 mg daily Patient taking 4mg on non dialysis days, 5 mg on dialysis days (Mon, Weds, Fri). Told to hold 5 days before surgery by Dr Roberto Mckenzie' office.   Per patient coumadin is sometimes held by dialysis when levels high.) 30 tablet 3    ONE TOUCH ULTRA TEST strip USE TO TEST 3-4 TIMES DAILY DUE TO FLUCTUATING SUGAR 100 strip 5    ASSURE COMFORT LANCETS 30G MISC USE TO TEST BLOOD GLUCOSE THREE TIMES DAILY 300 each 3    Alcohol Swabs (ALCOHOL PREP) 70 % PADS USE TO TEST BLOOD GLUCOSE THREE TIMES DAILY 300 each 3    Blood Glucose Monitoring Suppl (ACCU-CHEK KENNETH SMARTVIEW) w/Device KIT USE TO TEST BLOOD GLUCOSE 1 kit 0    BiPAP Machine MISC by Does not apply route nightly      Spacer/Aero-Holding Chambers VENANCIO 1 Device by Does not apply route daily as needed 1 Device 0    OXYGEN Inhale 2 L into the lungs daily as needed At night prn         ALLERGIES    Allergies   Allergen Reactions    Codeine Nausea Only    Fentanyl Itching    Morphine      CHEST PAIN    Nsaids Other (See Comments)     Due to CRF    Hydrocodone-Acetaminophen Itching and Rash    Percocet [Oxycodone-Acetaminophen] Itching    Procardia [Nifedipine] Nausea And Vomiting     Headache  Takes norvasc at home 12/22/15    Vicodin [Hydrocodone-Acetaminophen] Rash       SOCIAL AND FAMILY HISTORY    Social History     Socioeconomic History    Marital status:      Spouse name: Mela Mario Number of children: 3    Years of education: None    Highest education level: None   Occupational History    Occupation: NA   Social Needs    Financial resource strain: None    Food insecurity:     Worry: None     Inability: None    Transportation needs:     Medical: None     Non-medical: None   Tobacco Use    Smoking status: Never Smoker    Smokeless tobacco: Never Used   Substance and Sexual Activity    Alcohol use: No     Alcohol/week: 0.0 oz    Drug use: No    Sexual activity: Never   Lifestyle    Physical activity:     Days per week: None     Minutes per session: None    Stress: None   Relationships    Social connections:     Talks on phone: None     Gets together: None     Attends Denominational service: None     Active member of club or organization: None     Attends meetings of clubs or organizations: None     Relationship status: None    Intimate partner violence:     Fear of current or ex partner: None     Emotionally abused: None     Physically abused: None     Forced sexual activity: None   Other Topics Concern    None   Social History Narrative    None     Family History   Problem Relation Age of Onset    Colon Cancer Father     Diabetes Father     High Blood Pressure Father     Colon Cancer Mother     Diabetes Mother     Colon Cancer Maternal Grandmother     Kidney Cancer Brother     Heart Disease Brother          age 54    High Blood Pressure Brother     High Blood Pressure Sister     High Blood Pressure Maternal Aunt     High Blood Pressure Sister     Heart Disease Sister        PHYSICAL EXAM    VITAL SIGNS: BP (!) 146/87   Pulse 78   Temp 98.2 °F (36.8 °C) (Oral)   Resp 18   Ht 5' 3\" (1.6 m)   Wt (!) 328 lb (148.8 kg)   LMP 1996 (Exact Date)   SpO2 100%   BMI 58.10 kg/m²   Constitutional:  Well developed, well nourished, mild distress due to pain  Vascular: Radial pulses 2+ bilaterally, capillary refill <3 sec  Musculoskeletal:  CERVICAL SPINE: There is no cervical midline tenderness to palpation, step-offs, or acute deformities. No posterior midline pain on ROM. There is left sided trapezius and paraspinous tenderness to palpation. Moderate tenderness over the left SCM as well. Full ROM of c-spine. Integument:  Skin is warm and dry, no rash   Neurologic:  Hand  5/5 motor testing bilaterally, patellar reflexes 2+ equal bilaterally       X-RAYS:   No orders to display        Chart review shows recent radiographs:  Xr Chest Portable    Result Date: 3/30/2019  EXAMINATION: SINGLE XRAY VIEW OF THE CHEST 3/30/2019 2:40 pm COMPARISON: 02/20/2019 HISTORY: ORDERING SYSTEM PROVIDED HISTORY: sob TECHNOLOGIST PROVIDED HISTORY: Reason for exam:->sob Acuity: Unknown Type of Exam: Unknown FINDINGS: Right-sided central venous catheter remains in place terminating in the SVC. The lungs are without acute focal process. There is no effusion or pneumothorax. The cardiomediastinal silhouette is stable. The osseous structures are stable. No acute process. MEDICAL DECISION MAKING / ED COURSE:      Patient seen by Advanced practice provider    Differential Diagnosis: Neck fracture or dislocation, visceral injury, Intracranial Bleed, spinal cord injury, muscle strain, other. See chart for details of medications given during the ED stay. Vital signs and nursing notes reviewed during ED course. Any prior medical records have been reviewed. Please see Medical record for review of this ED visit for care recieved. Given patient's history and symptoms no labs or imaging or indicated at this time. Likely cervical muscles/trapezius strain. Discussed with patient about changing up muscle relaxing agent for optimal relief. She will be switched to Valium and is instructed to stop taking the Zanaflex.   She is to continue to use heat and gentle massage to help increase her range of motion. They are to follow up with their PCP within a week or return if new or worsening symptoms develop. Patient voiced understanding and is in agreement with this plan. I estimate there is LOW risk for CENTRAL CORD SYNDROME, EPIDURAL MASS LESION, HERNIATED DISK CAUSING SEVERE STENOSIS, or BACTERIAL MENINGITIS thus I consider the discharge disposition reasonable. Vidal Likes (or their surrogate) and I have discussed the diagnosis and risks, and we agree with discharging home with close follow-up. We also discussed returning to the Emergency Department immediately if new or worsening symptoms occur. We have discussed the symptoms which are most concerning that necessitate immediate return. CLINICAL IMPRESSION:  1. Strain of neck muscle, initial encounter          DISCHARGE MEDICATIONS:  Discharge Medication List as of 4/8/2019 12:04 AM      START taking these medications    Details   diazepam (VALIUM) 5 MG tablet Take 1 tablet by mouth every 8 hours as needed (muscle spasms) for up to 5 days. , Disp-15 tablet, R-0Print             (Please note the MDM and HPI sections of this note were completed with a voice recognition program.  Efforts were made to edit the dictations but occasionally words are mis-transcribed.)       Otf Villegas PA-C  04/08/19 [id number]

## 2019-04-10 ENCOUNTER — TELEPHONE (OUTPATIENT)
Dept: FAMILY MEDICINE CLINIC | Age: 64
End: 2019-04-10

## 2019-04-10 ENCOUNTER — CARE COORDINATION (OUTPATIENT)
Dept: CASE MANAGEMENT | Age: 64
End: 2019-04-10

## 2019-04-10 ENCOUNTER — HOSPITAL ENCOUNTER (EMERGENCY)
Age: 64
Discharge: LEFT BEFORE DISCHARGE | End: 2019-04-10
Payer: MEDICARE

## 2019-04-10 VITALS
HEIGHT: 63 IN | WEIGHT: 293 LBS | DIASTOLIC BLOOD PRESSURE: 75 MMHG | TEMPERATURE: 98 F | HEART RATE: 71 BPM | OXYGEN SATURATION: 95 % | RESPIRATION RATE: 16 BRPM | SYSTOLIC BLOOD PRESSURE: 155 MMHG | BODY MASS INDEX: 51.91 KG/M2

## 2019-04-10 PROCEDURE — 4500000002 HC ER NO CHARGE

## 2019-04-10 NOTE — ED NOTES
Bed: 22  Expected date:   Expected time:   Means of arrival: Walk In  Comments:     Hector Mendoza, SYMONE  04/10/19 [id number]

## 2019-04-10 NOTE — TELEPHONE ENCOUNTER
Patient was seen by Nayan Leblanc on 4-3-19  Patient was given muscle relaxer and other medications to help with her neck pain  Patient went to ED on 4-7-19 and was taken off the muscle relaxer-  ED told her to take Valium and it would help with the neck pain  Patient is having trouble walking, weak, neck pain, hurts more when she turns her neck, HA, hands and legs shake  Contact patient

## 2019-04-10 NOTE — TELEPHONE ENCOUNTER
FYI  Patient called in first wanting to speak to [facility]. Advised that [facility] was out of the office and that I could put in a message for her to call back. She then stated that she was going to go to the ER because she was had been having head pain on side of head, swelling in neck and feeling very weak. Going on for about 1 week. I advised her that I agree she should go to ER if she feels she needs to. Advised that I would let Susan Bundy know.

## 2019-04-10 NOTE — TELEPHONE ENCOUNTER
I left detailed message with pt  Explained strains can take couple weeks to improve, I am not comfortable prescribing Valium with other medications she takes- she can continue muscle relaxer.    She would need eval for other symptoms- trouble walking, weakness d/t I doubt they are related to neck pain  Advised to call back as needed  ED if symptoms are severe  Ok to close

## 2019-04-11 ENCOUNTER — CARE COORDINATION (OUTPATIENT)
Dept: CASE MANAGEMENT | Age: 64
End: 2019-04-11

## 2019-04-16 ENCOUNTER — OFFICE VISIT (OUTPATIENT)
Dept: FAMILY MEDICINE CLINIC | Age: 64
End: 2019-04-16
Payer: MEDICARE

## 2019-04-16 VITALS
SYSTOLIC BLOOD PRESSURE: 135 MMHG | WEIGHT: 293 LBS | BODY MASS INDEX: 56.54 KG/M2 | HEART RATE: 70 BPM | DIASTOLIC BLOOD PRESSURE: 85 MMHG | OXYGEN SATURATION: 98 %

## 2019-04-16 DIAGNOSIS — R11.0 NAUSEA: ICD-10-CM

## 2019-04-16 DIAGNOSIS — R63.4 WEIGHT LOSS: ICD-10-CM

## 2019-04-16 DIAGNOSIS — E11.42 DIABETIC POLYNEUROPATHY ASSOCIATED WITH TYPE 2 DIABETES MELLITUS (HCC): Primary | ICD-10-CM

## 2019-04-16 DIAGNOSIS — R42 DIZZINESS: ICD-10-CM

## 2019-04-16 DIAGNOSIS — E66.01 MORBID OBESITY (HCC): ICD-10-CM

## 2019-04-16 LAB — HBA1C MFR BLD: 7 %

## 2019-04-16 PROCEDURE — 2022F DILAT RTA XM EVC RTNOPTHY: CPT | Performed by: NURSE PRACTITIONER

## 2019-04-16 PROCEDURE — 3045F PR MOST RECENT HEMOGLOBIN A1C LEVEL 7.0-9.0%: CPT | Performed by: NURSE PRACTITIONER

## 2019-04-16 PROCEDURE — 1111F DSCHRG MED/CURRENT MED MERGE: CPT | Performed by: NURSE PRACTITIONER

## 2019-04-16 PROCEDURE — 1036F TOBACCO NON-USER: CPT | Performed by: NURSE PRACTITIONER

## 2019-04-16 PROCEDURE — G8417 CALC BMI ABV UP PARAM F/U: HCPCS | Performed by: NURSE PRACTITIONER

## 2019-04-16 PROCEDURE — 99214 OFFICE O/P EST MOD 30 MIN: CPT | Performed by: NURSE PRACTITIONER

## 2019-04-16 PROCEDURE — G8427 DOCREV CUR MEDS BY ELIG CLIN: HCPCS | Performed by: NURSE PRACTITIONER

## 2019-04-16 PROCEDURE — G8598 ASA/ANTIPLAT THER USED: HCPCS | Performed by: NURSE PRACTITIONER

## 2019-04-16 PROCEDURE — 83036 HEMOGLOBIN GLYCOSYLATED A1C: CPT | Performed by: NURSE PRACTITIONER

## 2019-04-16 PROCEDURE — 3017F COLORECTAL CA SCREEN DOC REV: CPT | Performed by: NURSE PRACTITIONER

## 2019-04-16 RX ORDER — PREGABALIN 50 MG/1
50 CAPSULE ORAL 3 TIMES DAILY
Qty: 90 CAPSULE | Refills: 5 | Status: SHIPPED | OUTPATIENT
Start: 2019-04-16 | End: 2019-09-25 | Stop reason: ALTCHOICE

## 2019-04-16 RX ORDER — ONDANSETRON 4 MG/1
4 TABLET, ORALLY DISINTEGRATING ORAL 3 TIMES DAILY PRN
Qty: 30 TABLET | Refills: 0 | Status: SHIPPED | OUTPATIENT
Start: 2019-04-16 | End: 2019-04-26

## 2019-04-16 ASSESSMENT — ENCOUNTER SYMPTOMS
NAUSEA: 1
VOMITING: 0
SHORTNESS OF BREATH: 0

## 2019-04-16 NOTE — PATIENT INSTRUCTIONS
May take over the counter Zyrtec 10 mg nightly and Flonase 2 sprays each nostril once a day for 7-10 days      Patient Education        Vertigo: Care Instructions  Your Care Instructions    Vertigo is the feeling that you or your surroundings are moving when there is no actual movement. It is often described as a feeling of spinning, whirling, falling, or tilting. Vertigo may make you vomit or feel nauseated. You may have trouble standing or walking and may lose your balance. Vertigo is often related to an inner ear problem, but it can have other more serious causes. If vertigo continues, you may need more tests to find its cause. Follow-up care is a key part of your treatment and safety. Be sure to make and go to all appointments, and call your doctor if you are having problems. It's also a good idea to know your test results and keep a list of the medicines you take. How can you care for yourself at home? · Do not lie flat on your back. Prop yourself up slightly. This may reduce the spinning feeling. Keep your eyes open. · Move slowly so that you do not fall. · If your doctor recommends medicine, take it exactly as directed. · Do not drive while you are having vertigo. Certain exercises, called Weiner-Daroff exercises, can help decrease vertigo. To do Weiner-Daroff exercises:  · Sit on the edge of a bed or sofa and quickly lie down on the side that causes the worst vertigo. Lie on your side with your ear down. · Stay in this position for at least 30 seconds or until the vertigo goes away. · Sit up. If this causes vertigo, wait for it to stop. · Repeat the procedure on the other side. · Repeat this 10 times. Do these exercises 2 times a day until the vertigo is gone. When should you call for help? Call 911 anytime you think you may need emergency care. For example, call if:    · You passed out (lost consciousness).     · You have symptoms of a stroke.  These may include:  ? Sudden numbness, tingling,

## 2019-04-16 NOTE — PROGRESS NOTES
SUBJECTIVE:  Pt is a of 59 y.o. female comes in today with   Chief Complaint   Patient presents with    Dizziness     pt states she's been losing weight and feeling dizzy. Patient presenting today for evaluation of dizziness. Intermittent dizziness, happens with position changes. Present for 3 weeks, can happen when first standing, sitting, rotating head or repositioning in bed. Dizziness present for couple minutes and resolves. Associated with nausea, no vomiting. Also notices left side of body feeling weak at times- will feel weak even without dizziness. Ears feel full/pressure. Also c/o facial itching and ears itching. Having left sided head pain. Decreased appetite d/t nausea, (+) weight loss. Still maintaining normal dialysis schedule, due to be dialyzed tomorrow. Wt Readings from Last 3 Encounters:   04/16/19 (!) 319 lb 3.2 oz (144.8 kg)   04/10/19 (!) 327 lb (148.3 kg)   04/07/19 (!) 328 lb (148.8 kg)       Prior to Visit Medications    Medication Sig Taking? Authorizing Provider   pregabalin (LYRICA) 50 MG capsule Take 1 capsule by mouth 3 times daily for 180 days. Yes JURGEN Montes CNP   ondansetron (ZOFRAN-ODT) 4 MG disintegrating tablet Take 1 tablet by mouth 3 times daily as needed for Nausea or Vomiting Yes JURGEN Montes CNP   Diapers & Supplies MISC 1 each by Does not apply route 2 times daily Yes JURGEN Montes CNP   tiZANidine (ZANAFLEX) 4 MG tablet Take 1 tablet by mouth 3 times daily as needed (neck pain) Yes JURGEN Montes CNP   traMADol (ULTRAM) 50 MG tablet Take 1 tablet by mouth every 8 hours as needed for Pain (neck pain, chronic pain) for up to 14 days.  Yes JURGEN Montes CNP   allopurinol (ZYLOPRIM) 100 MG tablet Take 1 tablet by mouth daily Yes JURGEN Montes CNP   Incontinence Supply Disposable (DEPEND ADJUSTABLE UNDERWEAR LG) MISC 1 each by Does not apply route as needed (for urine incompetence) Yes JURGEN Montes CNP UNIFINE PENTIPS 31G X 5 MM MISC USE WITH INSULIN PENS FOUR TIMES DAILY Yes JURGEN Randle CNP   rosuvastatin (CRESTOR) 10 MG tablet Take 1 tablet by mouth daily Take 1 tablet by mouth  daily Yes JURGEN Randle CNP   insulin lispro (HUMALOG KWIKPEN) 100 UNIT/ML pen Inject 0-12 Units into the skin 3 times daily (before meals) Yes JURGEN Randle CNP   lidocaine-prilocaine (EMLA) 2.5-2.5 % cream Apply topically as needed.  Yes JURGEN Randle CNP   promethazine (PHENERGAN) 25 MG tablet Take 1 tablet by mouth every 8 hours as needed for Nausea Yes JURGEN Randle CNP   calcium acetate (PHOSLO) 667 MG capsule Take 2 capsules by mouth 3 times daily (with meals) Yes Brittnee Broussard MD   nebivolol (BYSTOLIC) 2.5 MG tablet Take 1 tablet by mouth 2 times daily Yes Brittnee Broussard MD   insulin detemir (LEVEMIR FLEXTOUCH) 100 UNIT/ML injection pen Inject 23 Units into the skin 2 times daily  Patient taking differently: Inject 30 Units into the skin 2 times daily  Yes Baron Manish MD   docusate sodium (COLACE) 100 MG capsule Take 1 capsule by mouth 2 times daily  Patient taking differently: Take 100 mg by mouth 2 times daily as needed  Yes Lyudmila Gabriel MD   albuterol sulfate HFA (PROAIR HFA) 108 (90 Base) MCG/ACT inhaler Inhale 2 puffs into the lungs every 6 hours as needed for Wheezing Yes Pedrito Xiao MD   fluticasone-salmeterol (ADVAIR HFA) 230-21 MCG/ACT inhaler Inhale 1 puff into the lungs 2 times daily Yes Pedrito Xiao MD   ipratropium-albuterol (DUONEB) 0.5-2.5 (3) MG/3ML SOLN nebulizer solution Inhale 3 mLs into the lungs every 4 hours DX:COPD J44.9  Patient taking differently: Inhale 1 vial into the lungs every 4 hours as needed DX:COPD J44.9 Yes Pedrito Xiao MD   TRULICITY 1.[address] RD/8.3NG SOPN INJECT ONE  SYRINGE SUBCUTANEOUSLY ONCE A WEEK Yes Marney Ari, APRN - CNP   omeprazole (PRILOSEC) 40 MG delayed release capsule TAKE 1 CAPSULE BY MOUTH DAILY Yes JURGEN Kaiser - CNP   traZODone (DESYREL) 100 MG tablet Take 1 tablet by mouth nightly TAKES PRN Yes JURGEN Kaiser - CNP   RELION PEN NEEDLE 31G/8MM 31G X 8 MM MISC USE  THREE TIMES DAILY AS DIRECTED Yes Daxa Pruitt APRN - CNP   nitroGLYCERIN (NITROSTAT) 0.4 MG SL tablet DISSOLVE 1 TABLET UNDER THE TONGUE EVERY 5 MINUTES AS  NEEDED ; MAXIMUM OF 3  TABLETS IN 15 MINUTES Yes JURGEN Kaiser CNP   warfarin (COUMADIN) 7.5 MG tablet Continue f/up with coumadin clinic  Patient taking differently: 5 mg daily Patient taking 4mg on non dialysis days, 5 mg on dialysis days (Mon, Weds, Fri). Told to hold 5 days before surgery by Dr Denis Kline' office. Per patient coumadin is sometimes held by dialysis when levels high. Yes JURGEN Kaiser - CNP   ONE TOUCH ULTRA TEST strip USE TO TEST 3-4 TIMES DAILY DUE TO FLUCTUATING SUGAR Yes JURGEN Kaiser - CNP   ASSURE COMFORT LANCETS 30G MISC USE TO TEST BLOOD GLUCOSE THREE TIMES DAILY Yes Daxa Pruitt APRN - CNP   Alcohol Swabs (ALCOHOL PREP) 70 % PADS USE TO TEST BLOOD GLUCOSE THREE TIMES DAILY Yes Daxa Pruitt APRN - CNP   Blood Glucose Monitoring Suppl (ACCU-CHEK KENNETH SMARTVIEW) w/Device KIT USE TO TEST BLOOD GLUCOSE Yes JURGEN Kaiser - CNP   BiPAP Machine MISC by Does not apply route nightly Yes Historical Provider, MD   Spacer/Aero-Holding Chambers VENANCIO 1 Device by Does not apply route daily as needed Yes JURGEN Dey CNP   OXYGEN Inhale 2 L into the lungs daily as needed At night prn Yes Historical Provider, MD         Patient's allergies, past medical, surgical, social and family histories werereviewed and updated as appropriate. Review of Systems   Constitutional: Positive for appetite change (decreased) and unexpected weight change (weight loss). Negative for chills, fatigue and fever. Respiratory: Negative for shortness of breath. Cardiovascular: Negative for chest pain and palpitations.    Gastrointestinal: Positive for nausea. Negative for vomiting. Neurological: Positive for dizziness, weakness (left sided, intermittent) and headaches (left sided). Negative for speech difficulty, light-headedness and numbness. Physical Exam   Constitutional: She is oriented to person, place, and time. She appears well-developed and well-nourished. HENT:   Right Ear: Tympanic membrane is bulging. Left Ear: Tympanic membrane is bulging (fluid present). Cardiovascular: Normal rate, regular rhythm and normal heart sounds. No murmur heard. No lower extremity edema noted. Pulmonary/Chest: Effort normal and breath sounds normal.   Neurological: She is alert and oriented to person, place, and time. Skin: Skin is warm and dry. Vitals reviewed. Vitals:    04/16/19 1210 04/16/19 1231   BP: (!) 142/100 135/85   Pulse: 70    SpO2: 98%    Weight: (!) 319 lb 3.2 oz (144.8 kg)              ASSESSMENT:  1. Diabetic polyneuropathy associated with type 2 diabetes mellitus ([address]    2. Dizziness    3. Nausea    4. Weight loss    5. Morbid obesity ([address]        PLAN:  1. Diabetic polyneuropathy associated with type 2 diabetes mellitus ([address]  Improving  Recommend weight loss- recommend she keep eating smaller portions with less snacking  -     pregabalin (LYRICA) 50 MG capsule; Take 1 capsule by mouth 3 times daily for 180 days.  -     POCT glycosylated hemoglobin (Hb A1C)    2. Dizziness  New problem  Suspect vertigo  Recommend OTC Zyrtec and Flonase    3. Nausea  New problem  Zofran per orders    4. Weight loss  Related to nausea from dizziness  Trial Zofran    5. Morbid obesity ([address]  Recommend weight reduction, healthy diet and regular exercise    See pt instructions  F/u prn  Discussed use, benefit, and side effects of prescribed medications. All patient questions answered. Pt voiced understanding.

## 2019-04-17 ENCOUNTER — CARE COORDINATION (OUTPATIENT)
Dept: CASE MANAGEMENT | Age: 64
End: 2019-04-17

## 2019-04-17 RX ORDER — FLUTICASONE PROPIONATE 50 MCG
SPRAY, SUSPENSION (ML) NASAL
Qty: 1 BOTTLE | Refills: 5 | Status: ON HOLD
Start: 2019-04-17 | End: 2020-02-05 | Stop reason: HOSPADM

## 2019-05-06 ENCOUNTER — TELEPHONE (OUTPATIENT)
Dept: FAMILY MEDICINE CLINIC | Age: 64
End: 2019-05-06

## 2019-05-06 NOTE — TELEPHONE ENCOUNTER
Home Care Prescription from [facility] received, scanned, and forwarded to Katey Segovia for completion.

## 2019-05-07 ENCOUNTER — OFFICE VISIT (OUTPATIENT)
Dept: VASCULAR SURGERY | Age: 64
End: 2019-05-07

## 2019-05-07 VITALS — WEIGHT: 293 LBS | HEIGHT: 63 IN | BODY MASS INDEX: 51.91 KG/M2

## 2019-05-07 DIAGNOSIS — N18.6 END STAGE RENAL DISEASE (HCC): Primary | ICD-10-CM

## 2019-05-07 PROCEDURE — 99024 POSTOP FOLLOW-UP VISIT: CPT | Performed by: SURGERY

## 2019-05-07 RX ORDER — PREGABALIN 50 MG/1
50 CAPSULE ORAL 3 TIMES DAILY
COMMUNITY
End: 2019-05-29 | Stop reason: SDUPTHER

## 2019-05-10 DIAGNOSIS — E11.42 DIABETIC POLYNEUROPATHY ASSOCIATED WITH TYPE 2 DIABETES MELLITUS (HCC): ICD-10-CM

## 2019-05-11 RX ORDER — ROSUVASTATIN CALCIUM 10 MG/1
10 TABLET, COATED ORAL DAILY
Qty: 90 TABLET | Refills: 3 | Status: ON HOLD
Start: 2019-05-11 | End: 2020-02-05 | Stop reason: HOSPADM

## 2019-05-13 NOTE — TELEPHONE ENCOUNTER
[address] [facility] - prescription for rosuvastatin has not been picked up yet. Pharmacy staff verified that the patient is enrolled in notifications when a medication is ready to . Called patient and informed her that she has a prescription ready to  at [address] [facility]. Patient states that she will pick this up today. Will sign off at this time.      Cherise Lara, PharmD, [address] [facility]  Direct: [phone]  Department, toll free [phone], option 7        For Pharmacy Admin Tracking Only    PHSO: Yes  Total # of Interventions Recommended: 1  - New Order #: 1 New Medication Order Reason(s): Needs Additional Medication Therapy  - Maintenance Safety Lab Monitoring #: 1  Recommended intervention potential cost savings: 1  Total Interventions Accepted: 1  Time Spent (min): 20
Lucille Gonsalves, APRN-CNP    Patient has not filled her rosuvastatin in 2019. Obtained refill from you on 3/8/19 and was sent to [address] [place] [address]. Patient then requested prescriptions to be sent to [facility] pharmacy. The following day, she called and requested the prescriptions be sent back to [facility]. [facility] does not have the rosuvastatin on file and neither does [facility]. I am trying to get this patient the medication that she needs. Can you please authorize another refill of this medication to be sent to [facility]? Thank you,   Danny OglesbyD, [address]  Direct: [phone]  Department, toll free [phone], option 7    =======================================  CLINICAL PHARMACY: STATIN THERAPY REVIEW    Identified care gap per [employer]: statin use in persons with diabetes and adherence   Per chart review, patient is prescribed rosuvastatin 10 mg daily. New script was sent to [address] [place] [address] on 3/8/19. Per [facility]: patient transferred all prescriptions back to [facility]. Per [facility]: they received all prescriptions back from [facility] pharmacy except for rosuvastatin. [facility] does not have a prescription and neither does [facility]. Will outreach to PCP to send a new script to [address] [place] [address] today. Attempted to contact patient to review above. Left message for patient to return my call. Will attempt to contact patient again next week.      Felix Gillette PharmD, [address]  Direct: [phone]  Department, toll free [phone], option 7
abdominal pain

## 2019-05-20 ENCOUNTER — HOSPITAL ENCOUNTER (EMERGENCY)
Age: 64
Discharge: HOME | End: 2019-05-20
Payer: MEDICARE

## 2019-05-20 ENCOUNTER — APPOINTMENT (OUTPATIENT)
Dept: GENERAL RADIOLOGY | Age: 64
End: 2019-05-20
Payer: MEDICARE

## 2019-05-20 VITALS
BODY MASS INDEX: 51.91 KG/M2 | WEIGHT: 293 LBS | HEIGHT: 63 IN | SYSTOLIC BLOOD PRESSURE: 145 MMHG | RESPIRATION RATE: 18 BRPM | TEMPERATURE: 98.1 F | HEART RATE: 79 BPM | DIASTOLIC BLOOD PRESSURE: 78 MMHG | OXYGEN SATURATION: 96 %

## 2019-05-20 DIAGNOSIS — N18.6 ESRD (END STAGE RENAL DISEASE) ON DIALYSIS (HCC): ICD-10-CM

## 2019-05-20 DIAGNOSIS — Z99.2 ESRD (END STAGE RENAL DISEASE) ON DIALYSIS (HCC): ICD-10-CM

## 2019-05-20 DIAGNOSIS — M79.605 LEFT LEG PAIN: Primary | ICD-10-CM

## 2019-05-20 LAB
A/G RATIO: 0.9 (ref 1.1–2.2)
ALBUMIN SERPL-MCNC: 3.4 G/DL (ref 3.4–5)
ALP BLD-CCNC: 90 U/L (ref 40–129)
ALT SERPL-CCNC: 9 U/L (ref 10–40)
ANION GAP SERPL CALCULATED.3IONS-SCNC: 13 MMOL/L (ref 3–16)
AST SERPL-CCNC: 9 U/L (ref 15–37)
BASOPHILS ABSOLUTE: 0.1 K/UL (ref 0–0.2)
BASOPHILS RELATIVE PERCENT: 0.6 %
BILIRUB SERPL-MCNC: 0.4 MG/DL (ref 0–1)
BUN BLDV-MCNC: 39 MG/DL (ref 7–20)
CALCIUM SERPL-MCNC: 9.3 MG/DL (ref 8.3–10.6)
CHLORIDE BLD-SCNC: 97 MMOL/L (ref 99–110)
CO2: 23 MMOL/L (ref 21–32)
CREAT SERPL-MCNC: 11.5 MG/DL (ref 0.6–1.2)
EOSINOPHILS ABSOLUTE: 0.1 K/UL (ref 0–0.6)
EOSINOPHILS RELATIVE PERCENT: 1.1 %
GFR AFRICAN AMERICAN: 4
GFR NON-AFRICAN AMERICAN: 3
GLOBULIN: 3.6 G/DL
GLUCOSE BLD-MCNC: 170 MG/DL (ref 70–99)
HCT VFR BLD CALC: 31.7 % (ref 36–48)
HEMOGLOBIN: 10.3 G/DL (ref 12–16)
INR BLD: 3.89 (ref 0.86–1.14)
LYMPHOCYTES ABSOLUTE: 1.2 K/UL (ref 1–5.1)
LYMPHOCYTES RELATIVE PERCENT: 12.4 %
MCH RBC QN AUTO: 32.5 PG (ref 26–34)
MCHC RBC AUTO-ENTMCNC: 32.4 G/DL (ref 31–36)
MCV RBC AUTO: 100.4 FL (ref 80–100)
MONOCYTES ABSOLUTE: 0.6 K/UL (ref 0–1.3)
MONOCYTES RELATIVE PERCENT: 6.2 %
NEUTROPHILS ABSOLUTE: 8 K/UL (ref 1.7–7.7)
NEUTROPHILS RELATIVE PERCENT: 79.7 %
PDW BLD-RTO: 20.4 % (ref 12.4–15.4)
PLATELET # BLD: 186 K/UL (ref 135–450)
PMV BLD AUTO: 8.8 FL (ref 5–10.5)
POTASSIUM SERPL-SCNC: 5 MMOL/L (ref 3.5–5.1)
PROTHROMBIN TIME: 44.3 SEC (ref 9.8–13)
RBC # BLD: 3.16 M/UL (ref 4–5.2)
SODIUM BLD-SCNC: 133 MMOL/L (ref 136–145)
TOTAL PROTEIN: 7 G/DL (ref 6.4–8.2)
WBC # BLD: 10.1 K/UL (ref 4–11)

## 2019-05-20 PROCEDURE — 99284 EMERGENCY DEPT VISIT MOD MDM: CPT

## 2019-05-20 PROCEDURE — 85025 COMPLETE CBC W/AUTO DIFF WBC: CPT

## 2019-05-20 PROCEDURE — 6370000000 HC RX 637 (ALT 250 FOR IP): Performed by: NURSE PRACTITIONER

## 2019-05-20 PROCEDURE — 6360000002 HC RX W HCPCS: Performed by: NURSE PRACTITIONER

## 2019-05-20 PROCEDURE — 80053 COMPREHEN METABOLIC PANEL: CPT

## 2019-05-20 PROCEDURE — 96374 THER/PROPH/DIAG INJ IV PUSH: CPT

## 2019-05-20 PROCEDURE — 73560 X-RAY EXAM OF KNEE 1 OR 2: CPT

## 2019-05-20 PROCEDURE — 71045 X-RAY EXAM CHEST 1 VIEW: CPT

## 2019-05-20 PROCEDURE — 85610 PROTHROMBIN TIME: CPT

## 2019-05-20 PROCEDURE — 93971 EXTREMITY STUDY: CPT

## 2019-05-20 PROCEDURE — 96375 TX/PRO/DX INJ NEW DRUG ADDON: CPT

## 2019-05-20 RX ORDER — DIPHENHYDRAMINE HYDROCHLORIDE 50 MG/ML
25 INJECTION INTRAMUSCULAR; INTRAVENOUS ONCE
Status: DISCONTINUED | OUTPATIENT
Start: 2019-05-20 | End: 2019-05-20

## 2019-05-20 RX ORDER — LIDOCAINE 4 G/G
1 PATCH TOPICAL DAILY
Status: DISCONTINUED | OUTPATIENT
Start: 2019-05-20 | End: 2019-05-20

## 2019-05-20 RX ORDER — FENTANYL CITRATE 50 UG/ML
50 INJECTION, SOLUTION INTRAMUSCULAR; INTRAVENOUS ONCE
Status: DISCONTINUED | OUTPATIENT
Start: 2019-05-20 | End: 2019-05-20

## 2019-05-20 RX ORDER — MORPHINE SULFATE 4 MG/ML
4 INJECTION, SOLUTION INTRAMUSCULAR; INTRAVENOUS ONCE
Status: COMPLETED | OUTPATIENT
Start: 2019-05-20 | End: 2019-05-20

## 2019-05-20 RX ORDER — ONDANSETRON 2 MG/ML
4 INJECTION INTRAMUSCULAR; INTRAVENOUS EVERY 6 HOURS PRN
Status: DISCONTINUED | OUTPATIENT
Start: 2019-05-20 | End: 2019-05-20 | Stop reason: HOSPADM

## 2019-05-20 RX ORDER — CYCLOBENZAPRINE HCL 10 MG
10 TABLET ORAL 3 TIMES DAILY PRN
Qty: 30 TABLET | Refills: 0 | Status: SHIPPED | OUTPATIENT
Start: 2019-05-20 | End: 2019-05-30

## 2019-05-20 RX ORDER — TRAMADOL HYDROCHLORIDE 50 MG/1
50 TABLET ORAL ONCE
Status: COMPLETED | OUTPATIENT
Start: 2019-05-20 | End: 2019-05-20

## 2019-05-20 RX ORDER — LIDOCAINE 4 G/G
1 PATCH TOPICAL ONCE
Status: DISCONTINUED | OUTPATIENT
Start: 2019-05-20 | End: 2019-05-20 | Stop reason: HOSPADM

## 2019-05-20 RX ADMIN — ONDANSETRON 4 MG: 2 INJECTION INTRAMUSCULAR; INTRAVENOUS at 09:21

## 2019-05-20 RX ADMIN — TRAMADOL HYDROCHLORIDE 50 MG: 50 TABLET, FILM COATED ORAL at 06:34

## 2019-05-20 RX ADMIN — MORPHINE SULFATE 4 MG: 4 INJECTION INTRAVENOUS at 08:08

## 2019-05-20 ASSESSMENT — PAIN DESCRIPTION - PROGRESSION
CLINICAL_PROGRESSION: GRADUALLY IMPROVING
CLINICAL_PROGRESSION: NOT CHANGED

## 2019-05-20 ASSESSMENT — PAIN DESCRIPTION - DESCRIPTORS: DESCRIPTORS: ACHING

## 2019-05-20 ASSESSMENT — PAIN DESCRIPTION - LOCATION
LOCATION: LEG

## 2019-05-20 ASSESSMENT — PAIN DESCRIPTION - ORIENTATION
ORIENTATION: LEFT

## 2019-05-20 ASSESSMENT — PAIN DESCRIPTION - FREQUENCY: FREQUENCY: CONTINUOUS

## 2019-05-20 ASSESSMENT — PAIN SCALES - GENERAL
PAINLEVEL_OUTOF10: 10
PAINLEVEL_OUTOF10: 10
PAINLEVEL_OUTOF10: 8
PAINLEVEL_OUTOF10: 10

## 2019-05-20 ASSESSMENT — ENCOUNTER SYMPTOMS
NAUSEA: 0
CHEST TIGHTNESS: 0
DIARRHEA: 0
VOMITING: 0
ABDOMINAL PAIN: 0
SHORTNESS OF BREATH: 0

## 2019-05-20 ASSESSMENT — PAIN DESCRIPTION - PAIN TYPE
TYPE: ACUTE PAIN

## 2019-05-20 NOTE — ED NOTES
Pt. Reports that her pain level remains the same 10/10. Pt. Repositioned in bed per pt. Request, Lucie Ugarte NP notified that pt. Is still in a lot of pain and states that she will order more meds.       Alix Harvey RN  05/20/19 9859

## 2019-05-20 NOTE — ED NOTES
Pt with right sided portacath - accessed site with difficulty with blood obtained and sent to lab. Meds given per orders. Xrays to be obtained.       Kay Colon RN  05/20/19 6799

## 2019-05-20 NOTE — ED PROVIDER NOTES
[facility]        Pt Name: Thuy Spann  MRN: [id number]  [place] 1955  Date of evaluation: 5/20/2019  Provider: JURGEN Espinosa CNP  PCP: JURGEN Rodríguez CNP    This patient was not seen and evaluated by the attending physician No att. providers found. CHIEF COMPLAINT       Chief Complaint   Patient presents with    Leg Pain     pt went to dialysis this morning and started c/o left leg pain. Denies any shortness of breath       HISTORY OF PRESENT ILLNESS   (Location/Symptom, Timing/Onset, Context/Setting, Quality, Duration, Modifying Factors, Severity)  Note limiting factors. Thuy Spann is a 59 y.o. female presents to the emergency department with complaint of left leg pain. The patient does report a dull aching sensation to the left hip without injury or trauma radiates up into the left thigh. States that the pain has been present for a couple of days but was overwhelming this morning, she was unable to go to dialysis. She is unable to walk on the leg due to pain, no swelling appreciated. Denies any headache, fever, lightheadedness, dizziness, visual disturbances. No chest pain or pressure. No neck or back pain. No shortness of breath, cough, or congestion. No abdominal pain, nausea, vomiting, diarrhea, constipation, or dysuria. No rash. Nursing Notes were all reviewed and agreed with or any disagreements were addressed  in the HPI. REVIEW OF SYSTEMS    (2-9 systems for level 4, 10 or more for level 5)     Review of Systems   Constitutional: Negative for activity change, chills and fever. Respiratory: Negative for chest tightness and shortness of breath. Cardiovascular: Negative for chest pain. Gastrointestinal: Negative for abdominal pain, diarrhea, nausea and vomiting. Genitourinary: Negative for dysuria.    Musculoskeletal:        Left leg pain starts as knee and radiates into the left thigh   All other systems reviewed and are negative. Positives and Pertinent negatives as per HPI. Except as noted abovein the ROS, all other systems were reviewed and negative.        PAST MEDICAL HISTORY     Past Medical History:   Diagnosis Date    Abdominal pain 8/20/2018    Acute on chronic congestive heart failure ([address] 6/26/2014    Asthma     Cervical cancer ([address]     Chest pain 5/16/2018    CHF [facility]congestive heart failure) [facility])     Chronic kidney disease, stage IV (severe) ([facility])     Dr Bledsoe Falling Chronic pain syndrome 3/27/2018    Chronic respiratory failure with hypoxia ([facility])     CKD (chronic kidney disease) stage 4, GFR 15-29 ml/min ([facility]) 6/30/2017    Colon polyps     COPD (chronic obstructive pulmonary disease) ([address]     Degenerative disc disease at L5-S1 level 6/18/2013     CT    Diabetes mellitus, insulin dependent (IDDM), uncontrolled ([address]     Diabetic polyneuropathy associated with type 2 diabetes mellitus ([address] 3/26/2019    Elevated troponin 9/9/2017    ESRD (end stage renal disease) on dialysis ([address] 02/14/2018    M-W-F [facility]    GERD (gastroesophageal reflux disease)     Gout     History of blood transfusion     History of cervical cancer     Hyperlipidemia     Hypertension     Incarcerated ventral hernia     LVH (left ventricular hypertrophy)     Morbid obesity ([facility])     Mucus plugging of bronchi     Obstructive sleep apnea on CPAP     wears 2L O2 and BIPAP at night    Pneumonia     Psychiatric problem     breakdown long time ago but not current    Pulmonary embolism ([address] 2/6/13    Type 2 diabetes mellitus with diabetic neuropathy, with long-term current use of insulin ([address] 9/12/2017    Urinary incontinence in female     Venous stasis of lower extremity          SURGICAL HISTORY     Past Surgical History:   Procedure Laterality Date    CARDIAC CATHETERIZATION  1/14    Grace; clean cors    COLONOSCOPY  2010    polyp removed; Kraig Herring; repeat 5 years    COLONOSCOPY  6/25/13, 11/14    [facility]    DIALYSIS FISTULA CREATION Right 3/28/2019    RIGHT BRACHIO CEPHALIC FISTULA CREATION performed by Georgie Morataya MD at [address]  2/21/09    LVH with global hypokinesis; EF 55-60%    HYSTERECTOMY      KNEE ARTHROSCOPY Right 1999    OTHER SURGICAL HISTORY  02/22/2018    LEFT brachial artery axillary vein AV graft     NM OPEN SKULL SUPRATENT EXPLORE      Craniotomy, 3/21/19 patient denies any brain surgery or craniotomy    NM REPAIR INCISIONAL HERNIA,REDUCIBLE N/A 11/20/2018    LAPAROSCOPIC VENTRAL HERNIA REPAIR WITH MESH performed by Bebeto Bustamante MD at [place] [address]  10/96    TUNNELED VENOUS PORT PLACEMENT Right 11/2019    UPPER GASTROINTESTINAL ENDOSCOPY  6/25/13    VENTRAL HERNIA REPAIR  12/24/2016    Incarcerated ventral hernia repair with mesh         CURRENTMEDICATIONS       Discharge Medication List as of 5/20/2019  9:35 AM      CONTINUE these medications which have NOT CHANGED    Details   rosuvastatin (CRESTOR) 10 MG tablet Take 1 tablet by mouth daily Take 1 tablet by mouth  daily, Disp-90 tablet, R-3Normal      !! pregabalin (LYRICA) 50 MG capsule Take 50 mg by mouth 3 times daily. Historical Med      traZODone (DESYREL) 100 MG tablet TAKE 1 TABLET BY MOUTH ONCE DAILY NIGHTLY  AS  NEEDED, Disp-90 tablet, R-3Please consider 90 day supplies to promote better adherenceNormal      fluticasone (FLONASE) 50 MCG/ACT nasal spray USE TWO SPRAY(S) IN EACH NOSTRIL ONCE DAILY, Disp-1 Bottle, R-5Please consider 90 day supplies to promote better adherenceNormal      !! pregabalin (LYRICA) 50 MG capsule Take 1 capsule by mouth 3 times daily for 180 days. , Disp-90 capsule, R-5Normal      allopurinol (ZYLOPRIM) 100 MG tablet Take 1 tablet by mouth daily, Disp-90 tablet, R-3Normal      insulin lispro (HUMALOG KWIKPEN) 100 UNIT/ML pen Inject 0-12 Units into the skin 3 times daily (before meals), Disp-5 pen, Disp-100 each, R-5, Print      Incontinence Supply Disposable (DEPEND ADJUSTABLE UNDERWEAR LG) MISC PRN Starting Sat 3/16/2019, Disp-60 each, R-5, Print      !! UNIFINE PENTIPS 31G X 5 MM MISC Disp-400 each, R-3, Normal      !! RELION PEN NEEDLE 31G/8MM 31G X 8 MM MISC Disp-100 each, R-5, Normal      ONE TOUCH ULTRA TEST strip USE TO TEST 3-4 TIMES DAILY DUE TO FLUCTUATING SUGAR, Disp-100 strip, R-5Please consider 90 day supplies to promote better adherenceNormal      ASSURE COMFORT LANCETS 30G MISC USE TO TEST BLOOD GLUCOSE THREE TIMES DAILY, Disp-300 each, R-3Normal      Alcohol Swabs (ALCOHOL PREP) 70 % PADS Disp-300 each, R-3, Normal      Blood Glucose Monitoring Suppl (ACCU-CHEK KENNETH SMARTVIEW) w/Device KIT Disp-1 kit, R-0, Normal      Spacer/Aero-Holding Chambers VENANCIO DAILY PRN Starting 3/27/2016, Until Discontinued, Disp-1 Device, R-0, Print       !! - Potential duplicate medications found. Please discuss with provider. ALLERGIES     Codeine; Fentanyl; Morphine; Nsaids; Hydrocodone-acetaminophen; Percocet [oxycodone-acetaminophen];  Procardia [nifedipine]; and Vicodin [hydrocodone-acetaminophen]    FAMILYHISTORY       Family History   Problem Relation Age of Onset    Colon Cancer Father     Diabetes Father     High Blood Pressure Father     Colon Cancer Mother     Diabetes Mother     Colon Cancer Maternal Grandmother     Kidney Cancer Brother     Heart Disease Brother          age 54    High Blood Pressure Brother     High Blood Pressure Sister     High Blood Pressure Maternal Aunt     High Blood Pressure Sister     Heart Disease Sister           SOCIAL HISTORY       Social History     Socioeconomic History    Marital status:      Spouse name: Casimiro Ferguson Number of children: 1    Years of education: None    Highest education level: None   Occupational History    Occupation: NA   Social Needs    Financial resource strain: None    Food insecurity:     Worry: None Inability: None    Transportation needs:     Medical: None     Non-medical: None   Tobacco Use    Smoking status: Never Smoker    Smokeless tobacco: Never Used   Substance and Sexual Activity    Alcohol use: No     Alcohol/week: 0.0 oz    Drug use: No    Sexual activity: Never   Lifestyle    Physical activity:     Days per week: None     Minutes per session: None    Stress: None   Relationships    Social connections:     Talks on phone: None     Gets together: None     Attends [employer] service: None     Active member of club or organization: None     Attends meetings of clubs or organizations: None     Relationship status: None    Intimate partner violence:     Fear of current or ex partner: None     Emotionally abused: None     Physically abused: None     Forced sexual activity: None   Other Topics Concern    None   Social History Narrative    None       SCREENINGS             PHYSICAL EXAM    (up to 7 for level 4, 8 or more for level 5)     ED Triage Vitals [05/20/19 0602]   BP Temp Temp src Pulse Resp SpO2 Height Weight   139/71 98.1 °F (36.7 °C) -- 84 20 97 % 5' 3\" (1.6 m) (!) 319 lb (144.7 kg)       Physical Exam   Constitutional: She is oriented to person, place, and time. She appears well-developed and well-nourished. HENT:   Head: Normocephalic and atraumatic. Right Ear: External ear normal.   Left Ear: External ear normal.   Eyes: Right eye exhibits no discharge. Left eye exhibits no discharge. Neck: Normal range of motion. Neck supple. No JVD present. Cardiovascular: Normal rate and regular rhythm. Exam reveals no friction rub. No murmur heard. Pulmonary/Chest: Effort normal and breath sounds normal. No stridor. No respiratory distress. She has no wheezes. Abdominal: Soft. She exhibits no mass. There is no tenderness. Musculoskeletal: Normal range of motion. Left knee: Tenderness found. Legs:  Neurological: She is alert and oriented to person, place, and time. visualized andpreliminarily interpreted by the  ED Provider with the below findings:        Interpretation perthe Radiologist below, if available at the time of this note:    VL Extremity Venous Left         XR CHEST PORTABLE   Final Result   No acute disease. No significant change         XR KNEE LEFT (1-2 VIEWS)   Final Result   1. No acute abnormality. Xr Knee Left (1-2 Views)    Result Date: 5/20/2019  EXAMINATION: 2 XRAY VIEWS OF THE LEFT KNEE 5/20/2019 6:44 am COMPARISON: 10/30/2018 HISTORY: ORDERING SYSTEM PROVIDED HISTORY: pain, no injury- arthritis TECHNOLOGIST PROVIDED HISTORY: Reason for exam:->pain, no injury- arthritis Ordering Physician Provided Reason for Exam: Leg Pain (pt went to dialysis this morning and started c/o left leg pain. Denies any shortness of breath) Acuity: Unknown Type of Exam: Unknown FINDINGS: There is no acute fracture, dislocation or joint effusion. The bones are demineralized. There are no bony destructive lesions. There is moderate tricompartmental osteoarthritis. Chondrocalcinosis involves the bilateral menisci. Calcifications present along the course of the quadriceps tendon. Small patellar enthesophytes are noted. 1. No acute abnormality. Xr Chest Portable    Result Date: 5/20/2019  EXAMINATION: ONE XRAY VIEW OF THE CHEST 5/20/2019 6:44 am COMPARISON: 03/30/2019 HISTORY: ORDERING SYSTEM PROVIDED HISTORY: SOB TECHNOLOGIST PROVIDED HISTORY: Reason for exam:->SOB Ordering Physician Provided Reason for Exam: Leg Pain (pt went to dialysis this morning and started c/o left leg pain. Denies any shortness of breath) Acuity: Unknown Type of Exam: Unknown FINDINGS: Lung volumes are low accentuating heart size and bronchovascular markings at the lung bases. Patient body habitus limits evaluation of fine pulmonary parenchymal changes. Course of MediPort is unchanged. No areas of increasing lung consolidation     No acute disease.   No significant change PROCEDURES   Unless otherwise noted below, none     Procedures    CRITICAL CARE TIME   N/A    CONSULTS:  None      EMERGENCY DEPARTMENT COURSE and DIFFERENTIALDIAGNOSIS/MDM:   Vitals:    Vitals:    05/20/19 0602 05/20/19 0745 05/20/19 0903 05/20/19 0938   BP: 139/71 138/73 (!) 149/75 (!) 145/78   Pulse: 84 80 79 79   Resp: 20 20 20 18   Temp: 98.1 °F (36.7 °C)      SpO2: 97% 95% 99% 96%   Weight: (!) 319 lb (144.7 kg)      Height: 5' 3\" (1.6 m)          Patient was given thefollowing medications:  Medications   traMADol (ULTRAM) tablet 50 mg (50 mg Oral Given 5/20/19 0634)   morphine injection 4 mg (4 mg Intravenous Given 5/20/19 0808)       Briefly, this is a 59year old female that presents to the emergency department with complaint of left leg pain. The patient does report a dull aching sensation to the left hip without injury or trauma radiates up into the left thigh. States that the pain has been present for a couple of days but was overwhelming this morning, she was unable to go to dialysis. She is unable to walk on the leg due to pain, no swelling appreciated. Patient was given morphine after being offered fentanyl and Benadryl, she did request Dilaudid several times by name. She accepted the morphine and had no particular allergic reaction. This allergy should be removed from her allergy list.  Ultram was also given without significant relief. We placed a lidocaine patch on the knee. Labs were collected and studies ordered. CBC shows no acute infection. CMP does show end-stage renal failure with a creatinine of 11.5, patient did miss dialysis this morning. inr 3.89. Left  Limited study due to pt's increased body habitus and depth of vessels. No evidence of deep vein or superficial vein thrombosis involving the left  lower extremity and the right common femoral vein. Calf veins were poorly visualized on the left. Peroneal veins were not  visualized. Impression:    1.  No acute abnormality. The patient will be discharged to dialysis, I did speak with her dialysis center and they're welcome to accept her, she was taken over by taxicab given a prescription for Flexeril. I estimate there is LOW risk for FRACTURE, COMPARTMENT SYNDROME, DEEP VENOUS THROMBOSIS, SEPTIC ARTHRITIS, TENDON OR NEUROVASCULAR INJURY, thus I consider the discharge disposition reasonable. FINAL IMPRESSION      1. Left leg pain    2. ESRD (end stage renal disease) on dialysis [facility])          DISPOSITION/PLAN   DISPOSITION Decision To Discharge 05/20/2019 09:20:43 AM      PATIENT REFERREDTO:  JURGEN Garcia CNP  [address] [address]  [phone]    Schedule an appointment as soon as possible for a visit       [facility]  [address] [place] [address]  [phone]    If symptoms worsen      DISCHARGE MEDICATIONS:  Discharge Medication List as of 5/20/2019  9:35 AM      START taking these medications    Details   cyclobenzaprine (FLEXERIL) 10 MG tablet Take 1 tablet by mouth 3 times daily as needed for Muscle spasms, Disp-30 tablet, R-0Print             DISCONTINUED MEDICATIONS:  Discharge Medication List as of 5/20/2019  9:35 AM                 (Please note that portions ofthis note were completed with a voice recognition program.  Efforts were made to edit the dictations but occasionally words are mis-transcribed.)    JURGEN Mcqueen CNP (electronically signed)           JURGEN Mcqueen CNP  05/20/19 [id number]

## 2019-05-20 NOTE — ED NOTES
Pt presents with left leg pain that seemed to get worse when getting to dialysis this morning. Pt stated that her legs hurt sometimes at night but it really seemed severe this morning. Denies any known injury. Moves all extremities. Pt did not receive any of her dialysis this morning - usually M,W,F. Warm blanket provided. Call ty in reach.      Awilda Dong RN  05/20/19 3284

## 2019-05-20 NOTE — ED NOTES
Pt. Reports that her pain level has improved rating it an 8/10. Pt. Resting in bed with no distress noted, pt. Requesting nausea medication, Lacy Banks notified.      Rodrigo Marrero RN  05/20/19 0978

## 2019-05-21 ENCOUNTER — TELEPHONE (OUTPATIENT)
Dept: FAMILY MEDICINE CLINIC | Age: 64
End: 2019-05-21

## 2019-05-29 ENCOUNTER — APPOINTMENT (OUTPATIENT)
Dept: GENERAL RADIOLOGY | Age: 64
End: 2019-05-29
Payer: MEDICARE

## 2019-05-29 ENCOUNTER — HOSPITAL ENCOUNTER (OUTPATIENT)
Age: 64
Setting detail: OBSERVATION
Discharge: HOME | End: 2019-05-30
Attending: EMERGENCY MEDICINE | Admitting: HOSPITALIST
Payer: MEDICARE

## 2019-05-29 DIAGNOSIS — R07.9 CHEST PAIN, UNSPECIFIED TYPE: Primary | ICD-10-CM

## 2019-05-29 DIAGNOSIS — Z99.2 ESRD ON HEMODIALYSIS (HCC): ICD-10-CM

## 2019-05-29 DIAGNOSIS — N18.6 ESRD ON HEMODIALYSIS (HCC): ICD-10-CM

## 2019-05-29 LAB
A/G RATIO: 1 (ref 1.1–2.2)
ALBUMIN SERPL-MCNC: 3.9 G/DL (ref 3.4–5)
ALP BLD-CCNC: 108 U/L (ref 40–129)
ALT SERPL-CCNC: 17 U/L (ref 10–40)
ANION GAP SERPL CALCULATED.3IONS-SCNC: 12 MMOL/L (ref 3–16)
AST SERPL-CCNC: 17 U/L (ref 15–37)
BASOPHILS ABSOLUTE: 0.1 K/UL (ref 0–0.2)
BASOPHILS RELATIVE PERCENT: 0.9 %
BILIRUB SERPL-MCNC: 0.4 MG/DL (ref 0–1)
BUN BLDV-MCNC: 11 MG/DL (ref 7–20)
CALCIUM SERPL-MCNC: 10.1 MG/DL (ref 8.3–10.6)
CHLORIDE BLD-SCNC: 98 MMOL/L (ref 99–110)
CO2: 28 MMOL/L (ref 21–32)
CREAT SERPL-MCNC: 4.4 MG/DL (ref 0.6–1.2)
EKG ATRIAL RATE: 85 BPM
EKG DIAGNOSIS: NORMAL
EKG P AXIS: 38 DEGREES
EKG P-R INTERVAL: 178 MS
EKG Q-T INTERVAL: 368 MS
EKG QRS DURATION: 80 MS
EKG QTC CALCULATION (BAZETT): 437 MS
EKG R AXIS: -29 DEGREES
EKG T AXIS: 68 DEGREES
EKG VENTRICULAR RATE: 85 BPM
EOSINOPHILS ABSOLUTE: 0.2 K/UL (ref 0–0.6)
EOSINOPHILS RELATIVE PERCENT: 2 %
GFR AFRICAN AMERICAN: 12
GFR NON-AFRICAN AMERICAN: 10
GLOBULIN: 4.1 G/DL
GLUCOSE BLD-MCNC: 149 MG/DL (ref 70–99)
GLUCOSE BLD-MCNC: 158 MG/DL (ref 70–99)
GLUCOSE BLD-MCNC: 178 MG/DL (ref 70–99)
HCT VFR BLD CALC: 35.3 % (ref 36–48)
HEMOGLOBIN: 11.6 G/DL (ref 12–16)
INR BLD: 1.18 (ref 0.86–1.14)
LYMPHOCYTES ABSOLUTE: 1.2 K/UL (ref 1–5.1)
LYMPHOCYTES RELATIVE PERCENT: 14.6 %
MCH RBC QN AUTO: 33.3 PG (ref 26–34)
MCHC RBC AUTO-ENTMCNC: 32.8 G/DL (ref 31–36)
MCV RBC AUTO: 101.6 FL (ref 80–100)
MONOCYTES ABSOLUTE: 0.7 K/UL (ref 0–1.3)
MONOCYTES RELATIVE PERCENT: 7.9 %
NEUTROPHILS ABSOLUTE: 6.2 K/UL (ref 1.7–7.7)
NEUTROPHILS RELATIVE PERCENT: 74.6 %
PDW BLD-RTO: 22.5 % (ref 12.4–15.4)
PERFORMED ON: ABNORMAL
PERFORMED ON: ABNORMAL
PLATELET # BLD: 233 K/UL (ref 135–450)
PMV BLD AUTO: 9 FL (ref 5–10.5)
POTASSIUM SERPL-SCNC: 4.5 MMOL/L (ref 3.5–5.1)
PROTHROMBIN TIME: 13.4 SEC (ref 9.8–13)
RBC # BLD: 3.47 M/UL (ref 4–5.2)
SODIUM BLD-SCNC: 138 MMOL/L (ref 136–145)
TOTAL PROTEIN: 8 G/DL (ref 6.4–8.2)
TROPONIN: 0.1 NG/ML
TROPONIN: 0.11 NG/ML
TROPONIN: 0.11 NG/ML
WBC # BLD: 8.4 K/UL (ref 4–11)

## 2019-05-29 PROCEDURE — 93010 ELECTROCARDIOGRAM REPORT: CPT | Performed by: INTERNAL MEDICINE

## 2019-05-29 PROCEDURE — 93005 ELECTROCARDIOGRAM TRACING: CPT | Performed by: PHYSICIAN ASSISTANT

## 2019-05-29 PROCEDURE — 71045 X-RAY EXAM CHEST 1 VIEW: CPT

## 2019-05-29 PROCEDURE — G0378 HOSPITAL OBSERVATION PER HR: HCPCS

## 2019-05-29 PROCEDURE — 99285 EMERGENCY DEPT VISIT HI MDM: CPT

## 2019-05-29 PROCEDURE — 6370000000 HC RX 637 (ALT 250 FOR IP): Performed by: HOSPITALIST

## 2019-05-29 PROCEDURE — 84484 ASSAY OF TROPONIN QUANT: CPT

## 2019-05-29 PROCEDURE — 80053 COMPREHEN METABOLIC PANEL: CPT

## 2019-05-29 PROCEDURE — 85610 PROTHROMBIN TIME: CPT

## 2019-05-29 PROCEDURE — 94761 N-INVAS EAR/PLS OXIMETRY MLT: CPT

## 2019-05-29 PROCEDURE — 2580000003 HC RX 258: Performed by: HOSPITALIST

## 2019-05-29 PROCEDURE — 6360000002 HC RX W HCPCS: Performed by: PHYSICIAN ASSISTANT

## 2019-05-29 PROCEDURE — 36415 COLL VENOUS BLD VENIPUNCTURE: CPT

## 2019-05-29 PROCEDURE — 6370000000 HC RX 637 (ALT 250 FOR IP): Performed by: PHYSICIAN ASSISTANT

## 2019-05-29 PROCEDURE — 83036 HEMOGLOBIN GLYCOSYLATED A1C: CPT

## 2019-05-29 PROCEDURE — 85025 COMPLETE CBC W/AUTO DIFF WBC: CPT

## 2019-05-29 PROCEDURE — 94640 AIRWAY INHALATION TREATMENT: CPT

## 2019-05-29 RX ORDER — PANTOPRAZOLE SODIUM 40 MG/1
40 TABLET, DELAYED RELEASE ORAL
Status: DISCONTINUED | OUTPATIENT
Start: 2019-05-30 | End: 2019-05-30 | Stop reason: HOSPADM

## 2019-05-29 RX ORDER — PREGABALIN 25 MG/1
50 CAPSULE ORAL 3 TIMES DAILY
Status: DISCONTINUED | OUTPATIENT
Start: 2019-05-29 | End: 2019-05-30 | Stop reason: HOSPADM

## 2019-05-29 RX ORDER — ORPHENADRINE CITRATE 30 MG/ML
60 INJECTION INTRAMUSCULAR; INTRAVENOUS ONCE
Status: COMPLETED | OUTPATIENT
Start: 2019-05-29 | End: 2019-05-29

## 2019-05-29 RX ORDER — PROMETHAZINE HYDROCHLORIDE 25 MG/1
25 TABLET ORAL EVERY 8 HOURS PRN
Status: DISCONTINUED | OUTPATIENT
Start: 2019-05-29 | End: 2019-05-30 | Stop reason: HOSPADM

## 2019-05-29 RX ORDER — ROSUVASTATIN CALCIUM 10 MG/1
10 TABLET, COATED ORAL NIGHTLY
Status: DISCONTINUED | OUTPATIENT
Start: 2019-05-29 | End: 2019-05-30 | Stop reason: HOSPADM

## 2019-05-29 RX ORDER — NICOTINE POLACRILEX 4 MG
15 LOZENGE BUCCAL PRN
Status: DISCONTINUED | OUTPATIENT
Start: 2019-05-29 | End: 2019-05-30 | Stop reason: HOSPADM

## 2019-05-29 RX ORDER — HYDROXYZINE HYDROCHLORIDE 25 MG/1
50 TABLET, FILM COATED ORAL ONCE
Status: COMPLETED | OUTPATIENT
Start: 2019-05-29 | End: 2019-05-29

## 2019-05-29 RX ORDER — ONDANSETRON 2 MG/ML
4 INJECTION INTRAMUSCULAR; INTRAVENOUS EVERY 6 HOURS PRN
Status: DISCONTINUED | OUTPATIENT
Start: 2019-05-29 | End: 2019-05-30 | Stop reason: HOSPADM

## 2019-05-29 RX ORDER — NITROGLYCERIN 0.4 MG/1
0.4 TABLET SUBLINGUAL EVERY 5 MIN PRN
Status: DISCONTINUED | OUTPATIENT
Start: 2019-05-29 | End: 2019-05-30 | Stop reason: HOSPADM

## 2019-05-29 RX ORDER — LIDOCAINE 4 G/G
1 PATCH TOPICAL DAILY
Status: DISCONTINUED | OUTPATIENT
Start: 2019-05-29 | End: 2019-05-29 | Stop reason: HOSPADM

## 2019-05-29 RX ORDER — NEBIVOLOL 5 MG/1
2.5 TABLET ORAL 2 TIMES DAILY
Status: DISCONTINUED | OUTPATIENT
Start: 2019-05-29 | End: 2019-05-30 | Stop reason: HOSPADM

## 2019-05-29 RX ORDER — ASPIRIN 81 MG/1
81 TABLET, CHEWABLE ORAL DAILY
Status: DISCONTINUED | OUTPATIENT
Start: 2019-05-30 | End: 2019-05-30 | Stop reason: HOSPADM

## 2019-05-29 RX ORDER — SODIUM CHLORIDE 0.9 % (FLUSH) 0.9 %
10 SYRINGE (ML) INJECTION PRN
Status: DISCONTINUED | OUTPATIENT
Start: 2019-05-29 | End: 2019-05-30 | Stop reason: HOSPADM

## 2019-05-29 RX ORDER — DOCUSATE SODIUM 100 MG/1
100 CAPSULE, LIQUID FILLED ORAL 2 TIMES DAILY
Status: DISCONTINUED | OUTPATIENT
Start: 2019-05-29 | End: 2019-05-30 | Stop reason: HOSPADM

## 2019-05-29 RX ORDER — ALLOPURINOL 100 MG/1
100 TABLET ORAL DAILY
Status: DISCONTINUED | OUTPATIENT
Start: 2019-05-29 | End: 2019-05-30 | Stop reason: HOSPADM

## 2019-05-29 RX ORDER — PROMETHAZINE HYDROCHLORIDE 25 MG/1
25 TABLET ORAL ONCE
Status: DISCONTINUED | OUTPATIENT
Start: 2019-05-29 | End: 2019-05-29 | Stop reason: HOSPADM

## 2019-05-29 RX ORDER — WARFARIN SODIUM 5 MG/1
5 TABLET ORAL
Status: COMPLETED | OUTPATIENT
Start: 2019-05-29 | End: 2019-05-29

## 2019-05-29 RX ORDER — SODIUM CHLORIDE 0.9 % (FLUSH) 0.9 %
10 SYRINGE (ML) INJECTION EVERY 12 HOURS SCHEDULED
Status: DISCONTINUED | OUTPATIENT
Start: 2019-05-29 | End: 2019-05-30 | Stop reason: HOSPADM

## 2019-05-29 RX ORDER — NITROGLYCERIN 0.4 MG/1
0.4 TABLET SUBLINGUAL EVERY 5 MIN PRN
Status: DISCONTINUED | OUTPATIENT
Start: 2019-05-29 | End: 2019-05-29 | Stop reason: SDUPTHER

## 2019-05-29 RX ORDER — CYCLOBENZAPRINE HCL 10 MG
10 TABLET ORAL 3 TIMES DAILY PRN
Status: DISCONTINUED | OUTPATIENT
Start: 2019-05-29 | End: 2019-05-30 | Stop reason: HOSPADM

## 2019-05-29 RX ORDER — TRAZODONE HYDROCHLORIDE 50 MG/1
50 TABLET ORAL NIGHTLY PRN
Status: DISCONTINUED | OUTPATIENT
Start: 2019-05-29 | End: 2019-05-30 | Stop reason: HOSPADM

## 2019-05-29 RX ORDER — DEXTROSE MONOHYDRATE 25 G/50ML
12.5 INJECTION, SOLUTION INTRAVENOUS PRN
Status: DISCONTINUED | OUTPATIENT
Start: 2019-05-29 | End: 2019-05-30 | Stop reason: HOSPADM

## 2019-05-29 RX ORDER — ACETAMINOPHEN 500 MG
1000 TABLET ORAL ONCE
Status: COMPLETED | OUTPATIENT
Start: 2019-05-29 | End: 2019-05-29

## 2019-05-29 RX ORDER — ALBUTEROL SULFATE 90 UG/1
2 AEROSOL, METERED RESPIRATORY (INHALATION) EVERY 6 HOURS PRN
Status: DISCONTINUED | OUTPATIENT
Start: 2019-05-29 | End: 2019-05-30 | Stop reason: HOSPADM

## 2019-05-29 RX ORDER — DEXTROSE MONOHYDRATE 50 MG/ML
100 INJECTION, SOLUTION INTRAVENOUS PRN
Status: DISCONTINUED | OUTPATIENT
Start: 2019-05-29 | End: 2019-05-30 | Stop reason: HOSPADM

## 2019-05-29 RX ORDER — ATORVASTATIN CALCIUM 40 MG/1
40 TABLET, FILM COATED ORAL NIGHTLY
Status: DISCONTINUED | OUTPATIENT
Start: 2019-05-29 | End: 2019-05-29 | Stop reason: ALTCHOICE

## 2019-05-29 RX ORDER — FLUTICASONE PROPIONATE 50 MCG
1 SPRAY, SUSPENSION (ML) NASAL DAILY
Status: DISCONTINUED | OUTPATIENT
Start: 2019-05-30 | End: 2019-05-30 | Stop reason: HOSPADM

## 2019-05-29 RX ADMIN — ROSUVASTATIN CALCIUM 10 MG: 10 TABLET, FILM COATED ORAL at 19:54

## 2019-05-29 RX ADMIN — Medication 2 PUFF: at 21:37

## 2019-05-29 RX ADMIN — INSULIN GLARGINE 30 UNITS: 100 INJECTION, SOLUTION SUBCUTANEOUS at 19:55

## 2019-05-29 RX ADMIN — DOCUSATE SODIUM 100 MG: 100 CAPSULE, LIQUID FILLED ORAL at 19:54

## 2019-05-29 RX ADMIN — NEBIVOLOL HYDROCHLORIDE 2.5 MG: 5 TABLET ORAL at 19:54

## 2019-05-29 RX ADMIN — CALCIUM ACETATE 1334 MG: 667 CAPSULE ORAL at 18:47

## 2019-05-29 RX ADMIN — ORPHENADRINE CITRATE 60 MG: 30 INJECTION INTRAMUSCULAR; INTRAVENOUS at 11:54

## 2019-05-29 RX ADMIN — ALLOPURINOL 100 MG: 100 TABLET ORAL at 18:47

## 2019-05-29 RX ADMIN — HYDROXYZINE HYDROCHLORIDE 50 MG: 25 TABLET, FILM COATED ORAL at 11:54

## 2019-05-29 RX ADMIN — WARFARIN SODIUM 5 MG: 5 TABLET ORAL at 18:47

## 2019-05-29 RX ADMIN — Medication 10 ML: at 19:55

## 2019-05-29 RX ADMIN — INSULIN LISPRO 1 UNITS: 100 INJECTION, SOLUTION INTRAVENOUS; SUBCUTANEOUS at 19:55

## 2019-05-29 RX ADMIN — INSULIN LISPRO 1 UNITS: 100 INJECTION, SOLUTION INTRAVENOUS; SUBCUTANEOUS at 18:47

## 2019-05-29 RX ADMIN — ACETAMINOPHEN 1000 MG: 500 TABLET, FILM COATED ORAL at 11:54

## 2019-05-29 RX ADMIN — PREGABALIN 50 MG: 25 CAPSULE ORAL at 19:54

## 2019-05-29 ASSESSMENT — PAIN SCALES - GENERAL
PAINLEVEL_OUTOF10: 0
PAINLEVEL_OUTOF10: 7
PAINLEVEL_OUTOF10: 7
PAINLEVEL_OUTOF10: 0

## 2019-05-29 ASSESSMENT — ENCOUNTER SYMPTOMS
ABDOMINAL PAIN: 0
SHORTNESS OF BREATH: 1
BACK PAIN: 0
VOMITING: 0
BLOOD IN STOOL: 0
COUGH: 1
DIARRHEA: 0

## 2019-05-29 NOTE — ED PROVIDER NOTES
Kal Navarro        Pt Name: Sue Sexton  MRN: [id number]  [place] 1955  Date of evaluation: 5/29/2019  Provider: Garfield Sauer PA-C  PCP: JURGEN Bartlett CNP    This patient was seen and evaluated by the attending physician Dr. Danilo Barnhart   Patient presents with    Chest Pain     Patient in with complaints of chest pain at dialysis. States the pain eased up following nitro. States no radiation. Did get all of dialysis treatment        HISTORY OF PRESENT ILLNESS   (Location/Symptom, Timing/Onset, Context/Setting, Quality, Duration, Modifying Factors, Severity)  Note limiting factors. Sue Sexton is a 59 y.o. female that presents to the emergency department with a chief complaint of sharp left-sided chest pain that began 3 days ago. This been intermittent over the past 3 days coming every few hours. She states the episodes she has right now however began last night at 11:30 PM when she was just sitting down. She went to bed with the pain woke up at 3:00 this morning and the pain was still there. She went to her dialysis this morning and got all 4 hours of her dialysis and then told him about the pain she was having. Patient states she really did not want to come to the emergency department. She was given full strength aspirin and 1 nitro in squad and she states she's had some mild improvement of her pain to a 7 out of 10. She has history of neuropathy, CHF, hypertension, hyperlipidemia, diabetes and obesity. Gets dialysis Monday Wednesday and Friday. Does not produce urine. She states before the pain began 3 days ago she started with a mild cough that she has some mild productive clear sputum when she sits up along with some generalized fatigue and shortness of breath first.  She has diarrhea, bloody stool, rash.   Had normal angiogram per chart review in 2014 and unremarkable stress test last  Hyperlipidemia     Hypertension     Incarcerated ventral hernia     LVH (left ventricular hypertrophy)     Morbid obesity (HCC)     Mucus plugging of bronchi     Obstructive sleep apnea on CPAP     wears 2L O2 and BIPAP at night    Pneumonia     Psychiatric problem     breakdown long time ago but not current    Pulmonary embolism ([address] 2/6/13    Type 2 diabetes mellitus with diabetic neuropathy, with long-term current use of insulin ([facility] [address] [address] 9/12/2017    Urinary incontinence in female     Venous stasis of lower extremity          SURGICAL HISTORY     Past Surgical History:   Procedure Laterality Date    CARDIAC CATHETERIZATION  1/14    Grace; clean cors    COLONOSCOPY  2010    polyp removed; Bridgette Gonzalez; repeat 5 years    COLONOSCOPY  6/25/13, 11/14    Carmen    CORONARY ANGIOPLASTY WITH STENT PLACEMENT      DIALYSIS FISTULA CREATION Right 3/28/2019    RIGHT BRACHIO CEPHALIC FISTULA CREATION performed by Nelly Simpson MD at [address]  2/21/09    LVH with global hypokinesis; EF 55-60%    HYSTERECTOMY      KNEE ARTHROSCOPY Right 1999    OTHER SURGICAL HISTORY  02/22/2018    LEFT brachial artery axillary vein AV graft     VT OPEN SKULL SUPRATENT EXPLORE      Craniotomy, 3/21/19 patient denies any brain surgery or craniotomy    VT REPAIR INCISIONAL HERNIA,REDUCIBLE N/A 11/20/2018    LAPAROSCOPIC VENTRAL HERNIA REPAIR WITH MESH performed by Brent Owens MD at [place] [address]  10/96    TUNNELED VENOUS PORT PLACEMENT Right 11/2019    UPPER GASTROINTESTINAL ENDOSCOPY  6/25/13    VENTRAL HERNIA REPAIR  12/24/2016    Incarcerated ventral hernia repair with mesh         CURRENTMEDICATIONS       Current Discharge Medication List      CONTINUE these medications which have NOT CHANGED    Details   insulin detemir (LEVEMIR FLEXTOUCH) 100 UNIT/ML injection pen Inject 30 Units into the skin 2 times daily      cyclobenzaprine (FLEXERIL) 10 MG tablet Take 1 tablet by (ADVAIR HFA) 230-21 MCG/ACT inhaler Inhale 1 puff into the lungs 2 times daily  Qty: 1 Inhaler, Refills: 11      ipratropium-albuterol (DUONEB) 0.5-2.5 (3) MG/3ML SOLN nebulizer solution Inhale 3 mLs into the lungs every 4 hours DX:COPD J44.9  Qty: 360 mL, Refills: 11      omeprazole (PRILOSEC) 40 MG delayed release capsule TAKE 1 CAPSULE BY MOUTH  DAILY  Qty: 90 capsule, Refills: 3      nitroGLYCERIN (NITROSTAT) 0.4 MG SL tablet DISSOLVE 1 TABLET UNDER THE TONGUE EVERY 5 MINUTES AS  NEEDED ; MAXIMUM OF 3  TABLETS IN 15 MINUTES  Qty: 75 tablet, Refills: 1      warfarin (COUMADIN) 7.5 MG tablet Continue f/up with coumadin clinic  Qty: 30 tablet, Refills: 3    Associated Diagnoses: ESRD (end stage renal disease) on dialysis ([facility])      Diapers & Supplies MISC 1 each by Does not apply route 2 times daily  Qty: 100 each, Refills: 5      Incontinence Supply Disposable (DEPEND ADJUSTABLE UNDERWEAR LG) MISC 1 each by Does not apply route as needed (for urine incompetence)  Qty: 60 each, Refills: 5      !! UNIFINE PENTIPS 31G X 5 MM MISC USE WITH INSULIN PENS FOUR TIMES DAILY  Qty: 400 each, Refills: 3      lidocaine-prilocaine (EMLA) 2.5-2.5 % cream Apply topically as needed.   Qty: 1 Tube, Refills: 5      TRULICITY 6.12 OW/4.8GX SOPN INJECT ONE  SYRINGE SUBCUTANEOUSLY ONCE A WEEK  Qty: 15 pen, Refills: 3    Comments: Please consider 90 day supplies to promote better adherence      !! RELION PEN NEEDLE 31G/8MM 31G X 8 MM MISC USE  THREE TIMES DAILY AS DIRECTED  Qty: 100 each, Refills: 5    Comments: Please consider 90 day supplies to promote better adherence      ONE TOUCH ULTRA TEST strip USE TO TEST 3-4 TIMES DAILY DUE TO FLUCTUATING SUGAR  Qty: 100 strip, Refills: 5    Comments: Please consider 90 day supplies to promote better adherence      ASSURE COMFORT LANCETS 30G MISC USE TO TEST BLOOD GLUCOSE THREE TIMES DAILY  Qty: 300 each, Refills: 3      Alcohol Swabs (ALCOHOL PREP) 70 % PADS USE TO TEST BLOOD GLUCOSE THREE TIMES DAILY  Qty: 300 each, Refills: 3      Blood Glucose Monitoring Suppl (ACCU-CHEK KENNETH SMARTVIEW) w/Device KIT USE TO TEST BLOOD GLUCOSE  Qty: 1 kit, Refills: 0      BiPAP Machine MISC by Does not apply route nightly      Spacer/Aero-Holding Chambers VENANCIO 1 Device by Does not apply route daily as needed  Qty: 1 Device, Refills: 0      OXYGEN Inhale 2 L into the lungs daily as needed At night prn       !! - Potential duplicate medications found. Please discuss with provider. ALLERGIES     Codeine; Fentanyl; Morphine; Nsaids; Hydrocodone-acetaminophen; Percocet [oxycodone-acetaminophen];  Procardia [nifedipine]; and Vicodin [hydrocodone-acetaminophen]    FAMILYHISTORY       Family History   Problem Relation Age of Onset    Colon Cancer Father     Diabetes Father     High Blood Pressure Father     Colon Cancer Mother     Diabetes Mother     Colon Cancer Maternal Grandmother     Kidney Cancer Brother     Heart Disease Brother          age 54    High Blood Pressure Brother     High Blood Pressure Sister     High Blood Pressure Maternal Aunt     High Blood Pressure Sister     Heart Disease Sister           SOCIAL HISTORY       Social History     Socioeconomic History    Marital status:      Spouse name: Augusto Ross Number of children: 1    Years of education: None    Highest education level: None   Occupational History    Occupation: NA   Social Needs    Financial resource strain: None    Food insecurity:     Worry: None     Inability: None    Transportation needs:     Medical: None     Non-medical: None   Tobacco Use    Smoking status: Never Smoker    Smokeless tobacco: Never Used   Substance and Sexual Activity    Alcohol use: No     Alcohol/week: 0.0 oz    Drug use: No    Sexual activity: None   Lifestyle    Physical activity:     Days per week: None     Minutes per session: None    Stress: None   Relationships    Social connections:     Talks on phone: None     Gets together: None     Attends Yazdanism service: None     Active member of club or organization: None     Attends meetings of clubs or organizations: None     Relationship status: None    Intimate partner violence:     Fear of current or ex partner: None     Emotionally abused: None     Physically abused: None     Forced sexual activity: None   Other Topics Concern    None   Social History Narrative    None       SCREENINGS             PHYSICAL EXAM    (up to 7 for level 4, 8 or more for level 5)     ED Triage Vitals [05/29/19 1006]   BP Temp Temp src Pulse Resp SpO2 Height Weight   118/70 97.7 °F (36.5 °C) -- 83 18 100 % -- --       Physical Exam   Constitutional: She is oriented to person, place, and time. HENT:   Head: Atraumatic. Eyes: Right eye exhibits no discharge. Left eye exhibits no discharge. Neck: Normal range of motion. Cardiovascular: Normal rate, regular rhythm and normal heart sounds. Exam reveals no gallop and no friction rub. No murmur heard. Pulmonary/Chest: Effort normal and breath sounds normal. No stridor. No respiratory distress. She has no wheezes. She has no rales. She exhibits tenderness. Tenderness to palpation over the left anterior chest without rash, color change   Abdominal: Soft. Bowel sounds are normal. She exhibits no distension and no mass. There is no tenderness. There is no rebound and no guarding. No hernia. Musculoskeletal: Normal range of motion. She exhibits no edema, tenderness or deformity. Neurological: She is alert and oriented to person, place, and time. No cranial nerve deficit. Skin: Skin is warm and dry. No rash noted. She is not diaphoretic. No erythema. Psychiatric: She has a normal mood and affect. Her behavior is normal.   Nursing note and vitals reviewed.       DIAGNOSTIC RESULTS   LABS:    Labs Reviewed   CBC WITH AUTO DIFFERENTIAL - Abnormal; Notable for the following components:       Result Value    RBC 3.47 (*)     Hemoglobin 11.6 (*) Hematocrit 35.3 (*)     .6 (*)     RDW 22.5 (*)     All other components within normal limits    Narrative:     Performed at:  [facility] [address], [place]   Phone [phone]   COMPREHENSIVE METABOLIC PANEL - Abnormal; Notable for the following components:    Chloride 98 (*)     Glucose 149 (*)     CREATININE 4.4 (*)     GFR Non- 10 (*)     GFR African American 12 (*)     Albumin/Globulin Ratio 1.0 (*)     All other components within normal limits    Narrative:     Performed at:  [facility] [address], [place]   Phone [phone]   TROPONIN - Abnormal; Notable for the following components:    Troponin 0.10 (*)     All other components within normal limits    Narrative:     Performed at:  [facility] [address], [place]   Phone [phone]   PROTIME-INR - Abnormal; Notable for the following components:    Protime 13.4 (*)     INR 1.18 (*)     All other components within normal limits    Narrative:     Performed at:  [facility] [address], [place]   Phone [phone]   TROPONIN - Abnormal; Notable for the following components:    Troponin 0.11 (*)     All other components within normal limits    Narrative:     Performed at:  [facility] [address], [place]   Phone [phone]   TROPONIN - Abnormal; Notable for the following components:    Troponin 0.11 (*)     All other components within normal limits    Narrative:     Collection has been rescheduled by HIPOLITO at 05/29/2019 17:18.  Reason:   Patient has port or line  Performed at:  [facility] [address], [place]   Phone [phone]   POCT GLUCOSE - Abnormal; Notable for the following components:    POC Glucose 158 (*) hemodialysis [facility])          DISPOSITION/PLAN   DISPOSITION Admitted 05/29/2019 03:27:14 PM      PATIENT REFERREDTO:  Satya Deshpande, APRN - CNP  [address] [facility]  [phone]            DISCHARGE MEDICATIONS:  Current Discharge Medication List          DISCONTINUED MEDICATIONS:  Current Discharge Medication List                 (Please note that portions ofthis note were completed with a voice recognition program.  Efforts were made to edit the dictations but occasionally words are mis-transcribed.)    Kassandra Seo PA-C (electronically signed)            Kassandra Seo PA-C  05/29/19 2007

## 2019-05-29 NOTE — ED PROVIDER NOTES
different    MDM:  Diagnostic considerations included acute coronary syndrome, pulmonary embolism, COPD/asthma, pneumonia, musculoskeletal, reflux/PUD/gastritis, pneumothorax, CHF, thoracic aortic dissection, anxiety    ED course was notable for chest pain, somewhat chronic. Trop is 0.1 which is stable for her. EKG is not acutely ischemic and pt did get dialysis today. Her pain is reproducible on exam.  CXR is without infiltrate or acute structural disease. Had lengthy shared MDM discussion with pt who would prefer to go home. Dr. Mercy Dalton from cardiology consulted and did not offer specific comment regarding disposition recommendations. Repeat troponin is marginally increased but increased nonetheless so we will admit. Per cardiology note from Dr. Laine Amezcua in February:  No need for further work up  Chest pain is chronic. Troponin always 0.09 to 0.1. Cath 2014 is normal  Okay for discharge from our perspective  She can continue to take NTG when she has the pain  She does not need to come to the ED every time she has chest pain. During the patient's ED course, the patient was given:  Medications   lidocaine 4 % external patch 1 patch (1 patch Transdermal Patch Applied 5/29/19 1154)   promethazine (PHENERGAN) tablet 25 mg (0 mg Oral Held 5/29/19 1404)   orphenadrine (NORFLEX) injection 60 mg (60 mg Intravenous Given 5/29/19 1154)   acetaminophen (TYLENOL) tablet 1,000 mg (1,000 mg Oral Given 5/29/19 1154)   hydrOXYzine (ATARAX) tablet 50 mg (50 mg Oral Given 5/29/19 1154)        CLINICAL IMPRESSION  1. Chest pain, unspecified type    2. ESRD on hemodialysis [facility]        [facility] was admitted in fair condition. I have discussed the findings of today's workup with the patient and addressed the patient's questions and concerns. The plan is to admit to the hospital at this time under the hospitalist service.   I spoke with Dr. Raul Mendenhall who accepted the patient and will take over the

## 2019-05-29 NOTE — ED NOTES
CHRISTEN, PA at bedside for update of plan of care. Patient remains stable. Denies needs. Will continue to monitor.       Mckenna Roman RN  05/29/19 2664

## 2019-05-29 NOTE — ED NOTES
Pharmacy Medication History Note      List of current medications patient is taking is complete. Source of information: patient    Changes made to medication list:  Medications flagged for removal (include reason, ex. noncompliance):  N/A    Medications removed (include reason, ex. therapy complete or physician discontinued):  N/A    Medications added/doses adjusted:  N/A    Other notes (ex. Recent course of antibiotics, Coumadin dosing):  INR monitored by dialysis taking 3mg QD, checked today patient unsure of result. Denies use of other OTC or herbal medications. Last dose times updated. Jovana Palacios [facility]        No current facility-administered medications on file prior to encounter. Current Outpatient Medications on File Prior to Encounter   Medication Sig Dispense Refill    insulin detemir (LEVEMIR FLEXTOUCH) 100 UNIT/ML injection pen Inject 30 Units into the skin 2 times daily      cyclobenzaprine (FLEXERIL) 10 MG tablet Take 1 tablet by mouth 3 times daily as needed for Muscle spasms 30 tablet 0    rosuvastatin (CRESTOR) 10 MG tablet Take 1 tablet by mouth daily Take 1 tablet by mouth  daily 90 tablet 3    traZODone (DESYREL) 100 MG tablet TAKE 1 TABLET BY MOUTH ONCE DAILY NIGHTLY  AS  NEEDED 90 tablet 3    fluticasone (FLONASE) 50 MCG/ACT nasal spray USE TWO SPRAY(S) IN EACH NOSTRIL ONCE DAILY 1 Bottle 5    pregabalin (LYRICA) 50 MG capsule Take 1 capsule by mouth 3 times daily for 180 days.  90 capsule 5    allopurinol (ZYLOPRIM) 100 MG tablet Take 1 tablet by mouth daily 90 tablet 3    insulin lispro (HUMALOG KWIKPEN) 100 UNIT/ML pen Inject 0-12 Units into the skin 3 times daily (before meals) 5 pen 5    promethazine (PHENERGAN) 25 MG tablet Take 1 tablet by mouth every 8 hours as needed for Nausea 30 tablet 2    calcium acetate (PHOSLO) 667 MG capsule Take 2 capsules by mouth 3 times daily (with meals) 60 capsule 3    nebivolol (BYSTOLIC) 2.5 MG tablet Take 1 tablet by mouth 2 times daily 30 tablet 3    docusate sodium (COLACE) 100 MG capsule Take 1 capsule by mouth 2 times daily 60 capsule 0    albuterol sulfate HFA (PROAIR HFA) 108 (90 Base) MCG/ACT inhaler Inhale 2 puffs into the lungs every 6 hours as needed for Wheezing 1 Inhaler 11    fluticasone-salmeterol (ADVAIR HFA) 230-21 MCG/ACT inhaler Inhale 1 puff into the lungs 2 times daily 1 Inhaler 11    ipratropium-albuterol (DUONEB) 0.5-2.5 (3) MG/3ML SOLN nebulizer solution Inhale 3 mLs into the lungs every 4 hours DX:COPD J44.9 360 mL 11    omeprazole (PRILOSEC) 40 MG delayed release capsule TAKE 1 CAPSULE BY MOUTH  DAILY 90 capsule 3    nitroGLYCERIN (NITROSTAT) 0.4 MG SL tablet DISSOLVE 1 TABLET UNDER THE TONGUE EVERY 5 MINUTES AS  NEEDED ; MAXIMUM OF 3  TABLETS IN 15 MINUTES 75 tablet 1    warfarin (COUMADIN) 7.5 MG tablet Continue f/up with coumadin clinic 30 tablet 3    [DISCONTINUED] pregabalin (LYRICA) 50 MG capsule Take 50 mg by mouth 3 times daily. Diapers & Supplies MISC 1 each by Does not apply route 2 times daily 100 each 5    Incontinence Supply Disposable (DEPEND ADJUSTABLE UNDERWEAR LG) MISC 1 each by Does not apply route as needed (for urine incompetence) 60 each 5    UNIFINE PENTIPS 31G X 5 MM MISC USE WITH INSULIN PENS FOUR TIMES DAILY 400 each 3    lidocaine-prilocaine (EMLA) 2.5-2.5 % cream Apply topically as needed.  1 Tube 5    [DISCONTINUED] insulin detemir (LEVEMIR FLEXTOUCH) 100 UNIT/ML injection pen Inject 23 Units into the skin 2 times daily (Patient taking differently: Inject 30 Units into the skin 2 times daily ) 75 mL 5    TRULICITY 7.55 PP/0.7OU SOPN INJECT ONE  SYRINGE SUBCUTANEOUSLY ONCE A WEEK 15 pen 3    RELION PEN NEEDLE 31G/8MM 31G X 8 MM MISC USE  THREE TIMES DAILY AS DIRECTED 100 each 5    ONE TOUCH ULTRA TEST strip USE TO TEST 3-4 TIMES DAILY DUE TO FLUCTUATING SUGAR 100 strip 5    ASSURE COMFORT LANCETS 30G MISC USE TO TEST BLOOD GLUCOSE THREE TIMES DAILY 300 each 3    Alcohol Swabs (ALCOHOL PREP) 70

## 2019-05-29 NOTE — ED NOTES
Pt alert and oriented, complaints of non radiating chest pain following dialysis treatment, heart rate regular, S1, S2 heard. Cap refill less than three seconds, radial pulse 2+, no signs of edema or JVD and lungs sounds clear. Port accessed and blood sent to lab. Denies needs at this time. Nitro given in route with relief of pain. Will continue to monitor.      Flori Rodriguez RN  05/29/19 [id number]

## 2019-05-29 NOTE — ED NOTES
Patient asking to eat. Per [facility] wait until consult with cardiology.  Patient updated      Tamara Monroe RN  05/29/19 2328

## 2019-05-29 NOTE — PROGRESS NOTES
Clinical Pharmacist Note: Pharmacy to dose warfarin. Indication: PE  INR Goal Range: 2-3  Home regimen: 3 mg daily    Prothrombin Time/INR: 1.18    INR today is subtherapeutic. Will dose one-time 5 mg dose tonight. Plan to resume home dose warfarin 3 mg po daily starting tomorrow night. Daily INR is ordered. Pharmacy will continue to daily monitor and dose warfarin. Thanks for the consult.     Yandel Jeter, PharmD  5/29/2019

## 2019-05-29 NOTE — H&P
[facility]    5/29/2019 3:27 PM    Patient Information:  Isha Enamorado is a 59 y.o. female [id number]  PCP:  JURGEN Sanchez CNP (Tel: [phone] )    Chief complaint:    Chief Complaint   Patient presents with    Chest Pain     Patient in with complaints of chest pain at dialysis. States the pain eased up following nitro. States no radiation. Did get all of dialysis treatment         History of Present Illness: Sarika Aviles is a 59 y.o. present with chest pain. Left sided sharp pain. Onset about 3 days ago. Intermittent on and off for past few hours. States woke uer from sleep as well. Went to her Diaylsis center and got her dialysis. Had cp there. She was give asa. And nitro with some improvement. Currently rates it at 6/10. No n/v/d. Pt with hx of neuropathy, CHF, HTN  - some cough. .    onset about   Location. Radiation  Describes it as  imnproved with  Worsen with  Associated symptoms include   In ER give nitro with   Previous cardiac work up  [employer]. History obtained from patient  Old medical records show   REVIEW OF SYSTEMS:   Constitutional: Negative for fever,chills or night sweats  ENT: Negative for rhinorrhea, epistaxis, hoarseness, sore throat. Respiratory: Negative for shortness of breath,wheezing  Cardiovascular:postive  for chest pain, negative for palpitations   Gastrointestinal: Negative for nausea, vomiting, diarrhea  Genitourinary: Negative for polyuria, dysuria   Hematologic/Lymphatic: Negative for bleeding tendency, easy bruising  Musculoskeletal: Negative for myalgias and arthralgias  Neurologic: Negative for confusion,dysarthria. Skin: Negative for itching,rash  Psychiatric: Negative for depression,anxiety, agitation. Endocrine: Negative for polydipsia,polyuria,heat /cold intolerance.     Past Medical History:   has a past medical history of Abdominal pain, Acute injection pen Inject 30 Units into the skin 2 times daily      cyclobenzaprine (FLEXERIL) 10 MG tablet Take 1 tablet by mouth 3 times daily as needed for Muscle spasms 30 tablet 0    rosuvastatin (CRESTOR) 10 MG tablet Take 1 tablet by mouth daily Take 1 tablet by mouth  daily 90 tablet 3    traZODone (DESYREL) 100 MG tablet TAKE 1 TABLET BY MOUTH ONCE DAILY NIGHTLY  AS  NEEDED 90 tablet 3    fluticasone (FLONASE) 50 MCG/ACT nasal spray USE TWO SPRAY(S) IN EACH NOSTRIL ONCE DAILY 1 Bottle 5    pregabalin (LYRICA) 50 MG capsule Take 1 capsule by mouth 3 times daily for 180 days.  90 capsule 5    allopurinol (ZYLOPRIM) 100 MG tablet Take 1 tablet by mouth daily 90 tablet 3    insulin lispro (HUMALOG KWIKPEN) 100 UNIT/ML pen Inject 0-12 Units into the skin 3 times daily (before meals) 5 pen 5    promethazine (PHENERGAN) 25 MG tablet Take 1 tablet by mouth every 8 hours as needed for Nausea 30 tablet 2    calcium acetate (PHOSLO) 667 MG capsule Take 2 capsules by mouth 3 times daily (with meals) 60 capsule 3    nebivolol (BYSTOLIC) 2.5 MG tablet Take 1 tablet by mouth 2 times daily 30 tablet 3    docusate sodium (COLACE) 100 MG capsule Take 1 capsule by mouth 2 times daily 60 capsule 0    albuterol sulfate HFA (PROAIR HFA) 108 (90 Base) MCG/ACT inhaler Inhale 2 puffs into the lungs every 6 hours as needed for Wheezing 1 Inhaler 11    fluticasone-salmeterol (ADVAIR HFA) 230-21 MCG/ACT inhaler Inhale 1 puff into the lungs 2 times daily 1 Inhaler 11    ipratropium-albuterol (DUONEB) 0.5-2.5 (3) MG/3ML SOLN nebulizer solution Inhale 3 mLs into the lungs every 4 hours DX:COPD J44.9 360 mL 11    omeprazole (PRILOSEC) 40 MG delayed release capsule TAKE 1 CAPSULE BY MOUTH  DAILY 90 capsule 3    nitroGLYCERIN (NITROSTAT) 0.4 MG SL tablet DISSOLVE 1 TABLET UNDER THE TONGUE EVERY 5 MINUTES AS  NEEDED ; MAXIMUM OF 3  TABLETS IN 15 MINUTES 75 tablet 1    warfarin (COUMADIN) 7.5 MG tablet Continue f/up with coumadin clinic 30 tablet 3    Diapers & Supplies MISC 1 each by Does not apply route 2 times daily 100 each 5    Incontinence Supply Disposable (DEPEND ADJUSTABLE UNDERWEAR LG) MISC 1 each by Does not apply route as needed (for urine incompetence) 60 each 5    UNIFINE PENTIPS 31G X 5 MM MISC USE WITH INSULIN PENS FOUR TIMES DAILY 400 each 3    lidocaine-prilocaine (EMLA) 2.5-2.5 % cream Apply topically as needed. 1 Tube 5    TRULICITY 5.20 BR/0.9TV SOPN INJECT ONE  SYRINGE SUBCUTANEOUSLY ONCE A WEEK 15 pen 3    RELION PEN NEEDLE 31G/8MM 31G X 8 MM MISC USE  THREE TIMES DAILY AS DIRECTED 100 each 5    ONE TOUCH ULTRA TEST strip USE TO TEST 3-4 TIMES DAILY DUE TO FLUCTUATING SUGAR 100 strip 5    ASSURE COMFORT LANCETS 30G MISC USE TO TEST BLOOD GLUCOSE THREE TIMES DAILY 300 each 3    Alcohol Swabs (ALCOHOL PREP) 70 % PADS USE TO TEST BLOOD GLUCOSE THREE TIMES DAILY 300 each 3    Blood Glucose Monitoring Suppl (ACCU-CHEK KENNETH SMARTVIEW) w/Device KIT USE TO TEST BLOOD GLUCOSE 1 kit 0    BiPAP Machine MISC by Does not apply route nightly      Spacer/Aero-Holding Chambers VENANCIO 1 Device by Does not apply route daily as needed 1 Device 0    OXYGEN Inhale 2 L into the lungs daily as needed At night prn         Allergies: Allergies   Allergen Reactions    Codeine Nausea Only    Fentanyl Itching    Morphine      CHEST PAIN    Nsaids Other (See Comments)     Due to CRF    Hydrocodone-Acetaminophen Itching and Rash    Percocet [Oxycodone-Acetaminophen] Itching    Procardia [Nifedipine] Nausea And Vomiting     Headache  Takes norvasc at home 12/22/15    Vicodin [Hydrocodone-Acetaminophen] Rash        Social History:   reports that she has never smoked. She has never used smokeless tobacco. She reports that she does not drink alcohol or use drugs.      Family History:  family history includes Colon Cancer in her father, maternal grandmother, and mother; Diabetes in her father and mother; Heart Disease in her brother and sister; High Blood Pressure in her brother, father, maternal aunt, sister, and sister; Kidney Cancer in her brother. ,     Physical Exam:  /68   Pulse 84   Temp 97.7 °F (36.5 °C)   Resp 18   LMP 11/01/1996 (Exact Date)   SpO2 91%     General appearance:  Appears comfortable. Well nourished  Eyes: Sclera clear, pupils equal  ENT: Moist mucus membranes, no thrush. Trachea midline. Cardiovascular: Regular rhythm, normal S1, S2. No murmur, gallop, rub. No edema in lower extremities  Respiratory: Clear to auscultation bilaterally, no wheeze, good inspiratory effort  Gastrointestinal: Abdomen soft, non-tender, not distended, normal bowel sounds  Musculoskeletal: No cyanosis in digits, neck supple  Neurology: Cranial nerves grossly intact. Alert and oriented in time, place and person. No speech or motor deficits  Psychiatry: Appropriate affect. Not agitated  Skin: Warm, dry, normal turgor, no rash    Labs:  CBC:   Lab Results   Component Value Date    WBC 8.4 05/29/2019    RBC 3.47 05/29/2019    HGB 11.6 05/29/2019    HCT 35.3 05/29/2019    .6 05/29/2019    MCH 33.3 05/29/2019    MCHC 32.8 05/29/2019    RDW 22.5 05/29/2019     05/29/2019    MPV 9.0 05/29/2019     BMP:    Lab Results   Component Value Date     05/29/2019    K 4.5 05/29/2019    K 5.7 03/31/2019    CL 98 05/29/2019    CO2 28 05/29/2019    BUN 11 05/29/2019    CREATININE 4.4 05/29/2019    CALCIUM 10.1 05/29/2019    GFRAA 12 05/29/2019    GFRAA 50 05/12/2013    LABGLOM 10 05/29/2019    GLUCOSE 149 05/29/2019       Chest Xray:   EKG:    I visualized CXR images and EKG strips-         Problem List  Active Problems:    Chest pain  Resolved Problems:    * No resolved hospital problems. *        Assessment/Plan:   Assessment:     1. Chest pain  Chest pain  - unclear etilogy.  ddx iclude SHEILA, angina, MSK, GI cause  - intial work up no acute ischemia  - does have risk factors and warrants further work up  - stress test,  If positive consult cardiogly x  - negative trops. Tomas trops x2.   - ASA, statin BB, morphine and oxygen therapy. - risk factor discussed. Continue medical management     ESRD on HD  - per nephrology  = had HD today    IDDM  - resume home regimen. GALE  - continue cpap    On coumadin  - daily inr.          Pam Jamison MD    5/29/2019 3:27 PM

## 2019-05-30 ENCOUNTER — TELEPHONE (OUTPATIENT)
Dept: FAMILY MEDICINE CLINIC | Age: 64
End: 2019-05-30

## 2019-05-30 VITALS
DIASTOLIC BLOOD PRESSURE: 70 MMHG | HEIGHT: 63 IN | SYSTOLIC BLOOD PRESSURE: 109 MMHG | OXYGEN SATURATION: 95 % | BODY MASS INDEX: 51.91 KG/M2 | RESPIRATION RATE: 18 BRPM | WEIGHT: 293 LBS | HEART RATE: 91 BPM | TEMPERATURE: 98.4 F

## 2019-05-30 LAB
ESTIMATED AVERAGE GLUCOSE: 165.7 MG/DL
GLUCOSE BLD-MCNC: 140 MG/DL (ref 70–99)
GLUCOSE BLD-MCNC: 237 MG/DL (ref 70–99)
HBA1C MFR BLD: 7.4 %
HCT VFR BLD CALC: 33.7 % (ref 36–48)
HEMOGLOBIN: 10.9 G/DL (ref 12–16)
INR BLD: 1.32 (ref 0.86–1.14)
MCH RBC QN AUTO: 32.8 PG (ref 26–34)
MCHC RBC AUTO-ENTMCNC: 32.4 G/DL (ref 31–36)
MCV RBC AUTO: 101.4 FL (ref 80–100)
PDW BLD-RTO: 22.7 % (ref 12.4–15.4)
PERFORMED ON: ABNORMAL
PERFORMED ON: ABNORMAL
PLATELET # BLD: 214 K/UL (ref 135–450)
PMV BLD AUTO: 8.5 FL (ref 5–10.5)
PROTHROMBIN TIME: 15.1 SEC (ref 9.8–13)
RBC # BLD: 3.33 M/UL (ref 4–5.2)
TROPONIN: 0.1 NG/ML
WBC # BLD: 7.7 K/UL (ref 4–11)

## 2019-05-30 PROCEDURE — G0378 HOSPITAL OBSERVATION PER HR: HCPCS

## 2019-05-30 PROCEDURE — 2580000003 HC RX 258: Performed by: HOSPITALIST

## 2019-05-30 PROCEDURE — 99222 1ST HOSP IP/OBS MODERATE 55: CPT | Performed by: INTERNAL MEDICINE

## 2019-05-30 PROCEDURE — 6370000000 HC RX 637 (ALT 250 FOR IP): Performed by: INTERNAL MEDICINE

## 2019-05-30 PROCEDURE — 94761 N-INVAS EAR/PLS OXIMETRY MLT: CPT

## 2019-05-30 PROCEDURE — 94640 AIRWAY INHALATION TREATMENT: CPT

## 2019-05-30 PROCEDURE — 85610 PROTHROMBIN TIME: CPT

## 2019-05-30 PROCEDURE — 85027 COMPLETE CBC AUTOMATED: CPT

## 2019-05-30 PROCEDURE — 6370000000 HC RX 637 (ALT 250 FOR IP): Performed by: HOSPITALIST

## 2019-05-30 RX ORDER — CELECOXIB 200 MG/1
200 CAPSULE ORAL 2 TIMES DAILY
Status: DISCONTINUED | OUTPATIENT
Start: 2019-05-30 | End: 2019-05-30 | Stop reason: HOSPADM

## 2019-05-30 RX ORDER — WARFARIN SODIUM 1 MG/1
3 TABLET ORAL DAILY
Qty: 30 TABLET | Refills: 0 | Status: ON HOLD | OUTPATIENT
Start: 2019-05-30 | End: 2019-08-07 | Stop reason: SDUPTHER

## 2019-05-30 RX ORDER — CELECOXIB 200 MG/1
200 CAPSULE ORAL 2 TIMES DAILY
Qty: 60 CAPSULE | Refills: 0 | Status: SHIPPED | OUTPATIENT
Start: 2019-05-30 | End: 2019-06-20

## 2019-05-30 RX ADMIN — CYCLOBENZAPRINE HYDROCHLORIDE 10 MG: 10 TABLET, FILM COATED ORAL at 09:48

## 2019-05-30 RX ADMIN — ASPIRIN 81 MG 81 MG: 81 TABLET ORAL at 09:48

## 2019-05-30 RX ADMIN — CALCIUM ACETATE 1334 MG: 667 CAPSULE ORAL at 12:39

## 2019-05-30 RX ADMIN — INSULIN LISPRO 2 UNITS: 100 INJECTION, SOLUTION INTRAVENOUS; SUBCUTANEOUS at 12:39

## 2019-05-30 RX ADMIN — NEBIVOLOL HYDROCHLORIDE 2.5 MG: 5 TABLET ORAL at 09:48

## 2019-05-30 RX ADMIN — CELECOXIB 200 MG: 200 CAPSULE ORAL at 12:39

## 2019-05-30 RX ADMIN — ALLOPURINOL 100 MG: 100 TABLET ORAL at 09:48

## 2019-05-30 RX ADMIN — Medication 10 ML: at 09:56

## 2019-05-30 RX ADMIN — CALCIUM ACETATE 1334 MG: 667 CAPSULE ORAL at 09:49

## 2019-05-30 RX ADMIN — Medication 2 PUFF: at 08:32

## 2019-05-30 RX ADMIN — INSULIN GLARGINE 30 UNITS: 100 INJECTION, SOLUTION SUBCUTANEOUS at 09:49

## 2019-05-30 RX ADMIN — PREGABALIN 50 MG: 25 CAPSULE ORAL at 09:48

## 2019-05-30 RX ADMIN — PANTOPRAZOLE SODIUM 40 MG: 40 TABLET, DELAYED RELEASE ORAL at 05:45

## 2019-05-30 RX ADMIN — DOCUSATE SODIUM 100 MG: 100 CAPSULE, LIQUID FILLED ORAL at 09:48

## 2019-05-30 RX ADMIN — INSULIN LISPRO 1 UNITS: 100 INJECTION, SOLUTION INTRAVENOUS; SUBCUTANEOUS at 09:46

## 2019-05-30 ASSESSMENT — PAIN SCALES - GENERAL
PAINLEVEL_OUTOF10: 0

## 2019-05-30 NOTE — PROGRESS NOTES
Data- discharge order received, pt verbalized agreement to discharge, disposition to previous residence, no needs for HHC/DME. Action- discharge instructions prepared and given to pt, pt verbalized understanding. Medication information packet given r/t NEW and/or CHANGED prescriptions emphasizing name/purpose/side effects, pt verbalized understanding. Discharge instruction summary: Diet- renal carb control, Activity- as tolerated, Primary Care Physician as follows: Gavin Aase, JURGEN - [phone] f/u appointment in 1 week,  prescription medications filled at [facility]. Inpatient surgical procedure precautions reviewed: n/a CHF Education reviewed. Pt/ Family has had a total of 60 minutes CHF education this admission encounter. Response- Pt belongings gathered, IV removed. Disposition is home (no HHC/DME needs), transported with , taken to lobby via w/c w/ RN, no complications.

## 2019-05-30 NOTE — DISCHARGE SUMMARY
Hospital Discharge Summary    Patient's PCP: JURGEN Ornelas - CNP  Admit Date: 5/29/2019   Discharge Date: 5/30/2019    Admitting Physician: Dr. Jose Collins MD  Discharge Physician: Dr. Soren Reynolds:   MARIO [place] Bethanie    Brief HPI:   Hemal Tavarez is a 59 y.o. present with chest pain. Left sided sharp pain. Onset about 3 days ago. Intermittent on and off for past few hours. States woke uer from sleep as well. Went to her Diaylsis center and got her dialysis. Had cp there. She was give asa. And nitro with some improvement. Currently rates it at 6/10. No n/v/d. Pt with hx of neuropathy, CHF, HTN  - some cough. .    onset about   Location. Radiation  Describes it as  imnproved with  Worsen with  Associated symptoms include   In ER give nitro with   Previous cardiac work up  Family hx.            Brief hospital course:   Chest pain  Chest pain  - unclear etilogy. ddx iclude SHEILA, angina, MSK, GI cause  - intial work up no acute ischemia  - does have risk factors and warrants further work up  - stress test,  If positive consult cardiogly x  - negative trops. Tomas trops x2.   - ASA, statin BB, morphine and oxygen therapy. - risk factor discussed. Continue medical management      ESRD on HD  - per nephrology  = had HD today     IDDM  - resume home regimen.      GALE  - continue cpap     On coumadin  - daily inr. Pt seen by cardio and nephro and cleared for DC, will take celebrex for chest wall pain and place ice to affected area. Discharge Diagnoses: Active Problems:    Obstructive sleep apnea on CPAP    Uncontrolled type 2 diabetes with renal manifestation (HCC)    Morbid obesity due to excess calories (HCC)    Chronic hypoxemic respiratory failure (HCC)    Chronic pain syndrome    Chest pain    Dialysis patient [facility])  Resolved Problems:    * No resolved hospital problems. *      Physical Exam: /70   Pulse 91   Temp 98.4 °F (36.9 °C) (Oral)   Resp 18   Ht 5' 3\" (1.6 m)   Wt (!) 324 lb 12.8 oz (147.3 kg)   LMP 11/01/1996 (Exact Date)   SpO2 95%   BMI 57.54 kg/m²   Gen/overall appearance: Not in acute distress. Alert. Head: Normocephalic, atraumatic  Eyes: EOMI, good acuity  ENT:- Oral mucosa moist  Neck: No JVD, thyromegaly  CVS: Nml S1S2, no MRG, RRR  Pulm: Clear bilaterally. No crackles/wheezes  Gastrointestinal: Soft, NT/ND, +BS  Musculoskeletal: No edema. Warm, + chest wall TTP  Neuro: No focal deficit. Moves extremity spontaneously. Psychiatry: Appropriate affect. Not agitated. Skin: Warm, dry with normal turgor. No rash        Significant diagnostic studies that may require follow up:  Xr Knee Left (1-2 Views)    Result Date: 5/20/2019  EXAMINATION: 2 XRAY VIEWS OF THE LEFT KNEE 5/20/2019 6:44 am COMPARISON: 10/30/2018 HISTORY: ORDERING SYSTEM PROVIDED HISTORY: pain, no injury- arthritis TECHNOLOGIST PROVIDED HISTORY: Reason for exam:->pain, no injury- arthritis Ordering Physician Provided Reason for Exam: Leg Pain (pt went to dialysis this morning and started c/o left leg pain. Denies any shortness of breath) Acuity: Unknown Type of Exam: Unknown FINDINGS: There is no acute fracture, dislocation or joint effusion. The bones are demineralized. There are no bony destructive lesions. There is moderate tricompartmental osteoarthritis. Chondrocalcinosis involves the bilateral menisci. Calcifications present along the course of the quadriceps tendon. Small patellar enthesophytes are noted. 1. No acute abnormality.      Vl Extremity Venous Left    Result Date: 5/21/2019  Lower Extremities DVT Study  Demographics   Patient Name       Tiesha Cruz   Date of Study      05/20/2019         Gender              Female   Patient Number     [id number]         Date of Birth       1955   Visit Number       [id number]          Age                 59 year(s) Accession Number   [id number]          Room Number         JUAN   Corporate ID       [id number]            Sonographer         Andriy Gaviria                                                            RVT, RDMS, AB,                                                            OB/GYN   Ordering Physician Elsa Melendez,  Interpreting        Shaista Manjarrez MD                     CNP                Physician  Procedure Type of Study:   Veins:Lower Extremities DVT Study, VL EXTREMITY VENOUS DUPLEX LEFT. Vascular Sonographer Report  Additional Indications:left leg pain Impressions Left Impression Limited study due to pt's increased body habitus and depth of vessels. No evidence of deep vein or superficial vein thrombosis involving the left lower extremity and the right common femoral vein. Calf veins were poorly visualized on the left. Peroneal veins were not visualized. Conclusions   Summary   No evidence of deep vein or superficial vein thrombosis involving the left  lower extremity and the right common femoral vein, within the limits of the  study. Signature   ------------------------------------------------------------------  Electronically signed by Shaista Manjarrez MD (Interpreting  physician) on 05/21/2019 at 07:48 AM  ------------------------------------------------------------------  Patient Status:ER. [address] [place] [address] Vascular Lab. Technical Quality:Limited visualization. Velocities are measured in cm/s ; Diameters are measured in mm Right Lower Extremities DVT Study Measurements Right 2D Measurements +--------------+----------+---------------+----------+ ! Location      ! Visualized! Compressibility! Thrombosis! +--------------+----------+---------------+----------+ ! Common Femoral!Yes       ! Yes            ! None      ! +--------------+----------+---------------+----------+ Right Doppler Measurements +--------------+------+------+------------+ ! Location      ! Signal!Reflux! Reflux (sec)! +--------------+------+------+------------+ ! Common Femoral!Phasic!      !            ! +--------------+------+------+------------+ Left Lower Extremities DVT Study Measurements Left 2D Measurements +------------------------+----------+---------------+----------+ ! Location                ! Visualized! Compressibility! Thrombosis! +------------------------+----------+---------------+----------+ ! Sapheno Femoral Junction! Yes       ! Yes            ! None      ! +------------------------+----------+---------------+----------+ ! GSV Thigh               ! Yes       ! Yes            ! None      ! +------------------------+----------+---------------+----------+ ! Common Femoral          !Yes       ! Yes            ! None      ! +------------------------+----------+---------------+----------+ ! Prox Femoral            !Yes       ! Yes            ! None      ! +------------------------+----------+---------------+----------+ ! Mid Femoral             !Yes       ! Yes            ! None      ! +------------------------+----------+---------------+----------+ ! Dist Femoral            !Partial   !Yes            ! None      ! +------------------------+----------+---------------+----------+ ! Deep Femoral            !Yes       ! Yes            ! None      ! +------------------------+----------+---------------+----------+ ! Popliteal               !Yes       ! Yes            ! None      ! +------------------------+----------+---------------+----------+ ! GSV Below Knee          ! Yes       ! Yes            ! None      ! +------------------------+----------+---------------+----------+ ! Gastroc                 ! Yes       ! Yes            ! None      ! +------------------------+----------+---------------+----------+ ! PTV                     ! Partial   !Yes            ! None      ! +------------------------+----------+---------------+----------+ ! SSV                     ! Partial   !Yes            ! None      ! for Exam: Leg Pain (pt went to dialysis this morning and started c/o left leg pain. Denies any shortness of breath) Acuity: Unknown Type of Exam: Unknown FINDINGS: Lung volumes are low accentuating heart size and bronchovascular markings at the lung bases. Patient body habitus limits evaluation of fine pulmonary parenchymal changes. Course of MediPort is unchanged. No areas of increasing lung consolidation     No acute disease. No significant change             Treatments: As above.       Discharge Medications:     Medication List      ASK your doctor about these medications    ACCU-CHEK KENNETH SMARTVIEW w/Device Kit  USE TO TEST BLOOD GLUCOSE     albuterol sulfate  (90 Base) MCG/ACT inhaler  Commonly known as:  PROAIR HFA  Inhale 2 puffs into the lungs every 6 hours as needed for Wheezing     Alcohol Prep 70 % Pads  USE TO TEST BLOOD GLUCOSE THREE TIMES DAILY     allopurinol 100 MG tablet  Commonly known as:  ZYLOPRIM  Take 1 tablet by mouth daily     ASSURE COMFORT LANCETS 30G Misc  USE TO TEST BLOOD GLUCOSE THREE TIMES DAILY     BiPAP Machine Misc     calcium acetate 667 MG capsule  Commonly known as:  PHOSLO  Take 2 capsules by mouth 3 times daily (with meals)     cyclobenzaprine 10 MG tablet  Commonly known as:  FLEXERIL  Take 1 tablet by mouth 3 times daily as needed for Muscle spasms     DEPEND ADJUSTABLE UNDERWEAR LG Misc  1 each by Does not apply route as needed (for urine incompetence)     Diapers & Supplies Misc  1 each by Does not apply route 2 times daily     docusate sodium 100 MG capsule  Commonly known as:  COLACE  Take 1 capsule by mouth 2 times daily     fluticasone 50 MCG/ACT nasal spray  Commonly known as:  FLONASE  USE TWO SPRAY(S) IN EACH NOSTRIL ONCE DAILY     fluticasone-salmeterol 230-21 MCG/ACT inhaler  Commonly known as:  ADVAIR HFA  Inhale 1 puff into the lungs 2 times daily     insulin lispro 100 UNIT/ML pen  Commonly known as:  HUMALOG KWIKPEN  Inject 0-12 Units into the skin 3 times daily (before meals)     ipratropium-albuterol 0.5-2.5 (3) MG/3ML Soln nebulizer solution  Commonly known as:  DUONEB  Inhale 3 mLs into the lungs every 4 hours DX:COPD J44.9     LEVEMIR FLEXTOUCH 100 UNIT/ML injection pen  Generic drug:  insulin detemir     lidocaine-prilocaine 2.5-2.5 % cream  Commonly known as:  EMLA  Apply topically as needed. nebivolol 2.5 MG tablet  Commonly known as:  BYSTOLIC  Take 1 tablet by mouth 2 times daily     nitroGLYCERIN 0.4 MG SL tablet  Commonly known as:  NITROSTAT  DISSOLVE 1 TABLET UNDER THE TONGUE EVERY 5 MINUTES AS  NEEDED ; MAXIMUM OF 3  TABLETS IN 15 MINUTES     omeprazole 40 MG delayed release capsule  Commonly known as:  PRILOSEC  TAKE 1 CAPSULE BY MOUTH  DAILY     ONE TOUCH ULTRA TEST strip  Generic drug:  blood glucose test strips  USE TO TEST 3-4 TIMES DAILY DUE TO FLUCTUATING SUGAR     OXYGEN     pregabalin 50 MG capsule  Commonly known as:  LYRICA  Take 1 capsule by mouth 3 times daily for 180 days. promethazine 25 MG tablet  Commonly known as:  PHENERGAN  Take 1 tablet by mouth every 8 hours as needed for Nausea     * RELION PEN NEEDLE 31G/8MM 31G X 8 MM Misc  Generic drug:  Insulin Pen Needle  USE  THREE TIMES DAILY AS DIRECTED     * UNIFINE PENTIPS 31G X 5 MM Misc  Generic drug:  Insulin Pen Needle  USE WITH INSULIN PENS FOUR TIMES DAILY     rosuvastatin 10 MG tablet  Commonly known as:  CRESTOR  Take 1 tablet by mouth daily Take 1 tablet by mouth  daily     Spacer/Aero-Holding [employer]  1 Device by Does not apply route daily as needed     traZODone 100 MG tablet  Commonly known as:  DESYREL  TAKE 1 TABLET BY MOUTH ONCE DAILY NIGHTLY  AS  NEEDED     TRULICITY 9.97 SUDHA/3.1WG Sopn  Generic drug:  Dulaglutide  INJECT ONE  SYRINGE SUBCUTANEOUSLY ONCE A WEEK     warfarin 7.5 MG tablet  Commonly known as:  COUMADIN  Continue f/up with coumadin clinic         * This list has 2 medication(s) that are the same as other medications prescribed for you. Read the directions carefully, and ask your doctor or other care provider to review them with you. Activity: activity as tolerated  Diet: DIET RENAL; Low Sodium (2 GM); Daily Fluid Restriction: 1800 ml; No Caffeine      Disposition: home  Discharged Condition: Stable  Follow Up:   JURGEN Donaldson CNP  [facility] [address]  [phone]              Code status:  Full Code         Total time spent on discharge, finalizing medications, referrals and arranging outpatient follow up was more than 1 hour      Thank you JURGEN Damon CNP for the opportunity to be involved in this patients care.

## 2019-05-30 NOTE — TELEPHONE ENCOUNTER
Please complete form and I will sign. There is previously completed form in media from March. Please ask them why this needs to be completed so often- are they not receiving the completed form?

## 2019-05-30 NOTE — CARE COORDINATION
Discharge Planning Assessment  SW discharge planner met with patient and spouse to discuss reason for admission, current living situation, and potential needs at the time of discharge     Demographics/Insurance verified Yes     Current type of dwelling:  Apartment     Living arrangements:  w/spouse     Level of function/Support:  Pt reports uses cane at all times but independent with other ADLs. Spouse is supportive but has some of his own medical limitations     PCP:  Dr. Jossue Lee     Last Visit to PCP:  4/16/19     DME:  Lift chair, walker w/seat, shower chair, nebulizer     Active with any community resources/agencies/skilled home care:  Yes, active with [facility]. Has adie to assist with cleaning, cooking and personal care. ESRD on HD, MWF at [facility] 5am chair time.       Medication compliance issues:  No     Financial issues that could impact healthcare: No     Transportation at the time of discharge:  Spouse     Tentative discharge plan:  Most likely home. Has non-skilled with [facility]. No other needs identified at this time.       Claudetta Honour MSW, [address]

## 2019-05-30 NOTE — CONSULTS
Nephrology Attending  Progress Note        SUMMARY :  We are following this patient for ESRD   Admitted with chest pain     SUBJECTIVE:   Patient progress reviewed.  The patient has sharp chest pain , no radiation , mimicked by pressing costochondral junction   No fever/ cough     Physical Exam:    VITALS:  /70   Pulse 91   Temp 98.4 °F (36.9 °C) (Oral)   Resp 18   Ht 5' 3\" (1.6 m)   Wt (!) 324 lb 12.8 oz (147.3 kg)   LMP 11/01/1996 (Exact Date)   SpO2 95%   BMI 57.54 kg/m²   BLOOD PRESSURE RANGE:  Systolic (64HDT), GKY:898 , Min:103 , RXM:598   ; Diastolic (10TKE), QCO:16, Min:50, Max:90    24HR INTAKE/OUTPUT:  No intake or output data in the 24 hours ending 05/30/19 1111    Constitutional:  Alert, oriented x 3  PERRL , EOM +  Pallor +  Morbid obesity   Access Exam:  Left Upper arm AVG , Thrill +  Cardiovascular/Edema: RRR, no murmur/ rub   Tender costochondral Junction - Left and Rt  Respiratory:  B/ L air entry, Normal breath sounds   Gastrointestinal:  Pannus +, No organomegaly   Other:  No edema   No rash  No focal neurological deficit     DATA:    CBC with Differential:    Lab Results   Component Value Date    WBC 7.7 05/30/2019    RBC 3.33 05/30/2019    HGB 10.9 05/30/2019    HCT 33.7 05/30/2019     05/30/2019    .4 05/30/2019    MCH 32.8 05/30/2019    MCHC 32.4 05/30/2019    RDW 22.7 05/30/2019    SEGSPCT 77.6 05/12/2013    BANDSPCT 4 02/20/2019    METASPCT 1 01/12/2018    LYMPHOPCT 14.6 05/29/2019    MONOPCT 7.9 05/29/2019    EOSPCT 2.6 01/24/2012    BASOPCT 0.9 05/29/2019    MONOSABS 0.7 05/29/2019    LYMPHSABS 1.2 05/29/2019    EOSABS 0.2 05/29/2019    BASOSABS 0.1 05/29/2019    DIFFTYPE Auto 05/12/2013     CMP:    Lab Results   Component Value Date     05/29/2019    K 4.5 05/29/2019    K 5.7 03/31/2019    CL 98 05/29/2019    CO2 28 05/29/2019    BUN 11 05/29/2019    CREATININE 4.4 05/29/2019    GFRAA 12 05/29/2019    GFRAA 50 05/12/2013    AGRATIO 1.0 05/29/2019

## 2019-05-30 NOTE — CONSULTS
[address] [facility]  [phone]        Reason for Consultation/Chief Complaint: \" Chest pain\"    History of Present Illness: Marylen Poet is a 59 y.o. patient who presented to the hospital with complaints of left sided chest pain which started about 4 days ago. Sharp, non-radiating. Lasting about 15 minutes which would then diminish to a level that she could tolerate. Associated shortness of breath. Nothing helped improve it until the ER yesterday during which she receives which she was to be a muscle relaxant. Past Medical History:   has a past medical history of Abdominal pain, Acute on chronic congestive heart failure ([address] Asthma, Cervical cancer ([address] Chest pain, CHF (congestive heart failure) ([address] Chronic kidney disease, stage IV (severe) ([facility]), Chronic pain syndrome, Chronic respiratory failure with hypoxia ([address] CKD (chronic kidney disease) stage 4, GFR 15-29 ml/min ([facility]), Colon polyps, COPD (chronic obstructive pulmonary disease) ([address] Degenerative disc disease at L5-S1 level, Diabetes mellitus, insulin dependent (IDDM), uncontrolled ([address] Diabetic polyneuropathy associated with type 2 diabetes mellitus ([address] Elevated troponin, ESRD (end stage renal disease) on dialysis ([address] GERD (gastroesophageal reflux disease), Gout, History of blood transfusion, History of cervical cancer, Hyperlipidemia, Hypertension, Incarcerated ventral hernia, LVH (left ventricular hypertrophy), Morbid obesity ([address] Mucus plugging of bronchi, Obstructive sleep apnea on CPAP, Pneumonia, Psychiatric problem, Pulmonary embolism ([address] Type 2 diabetes mellitus with diabetic neuropathy, with long-term current use of insulin ([address] Urinary incontinence in female, and Venous stasis of lower extremity. Surgical History:   has a past surgical history that includes jennifer and bso (cervix removed) (10/96); Knee arthroscopy (Right, 1999); doppler echocardiography (2/21/09); Colonoscopy (2010);  Upper 3/25/20 Yes JURGEN Downing CNP   insulin lispro (HUMALOG KWIKPEN) 100 UNIT/ML pen Inject 0-12 Units into the skin 3 times daily (before meals) 3/7/19  Yes JURGEN Downing CNP   promethazine (PHENERGAN) 25 MG tablet Take 1 tablet by mouth every 8 hours as needed for Nausea 1/30/19  Yes JURGEN Downing CNP   calcium acetate (PHOSLO) 667 MG capsule Take 2 capsules by mouth 3 times daily (with meals) 1/5/19  Yes Jose Cardozo MD   nebivolol (BYSTOLIC) 2.5 MG tablet Take 1 tablet by mouth 2 times daily 1/5/19  Yes Jose Cardozo MD   docusate sodium (COLACE) 100 MG capsule Take 1 capsule by mouth 2 times daily 11/21/18  Yes Ford Shepard MD   albuterol sulfate HFA (PROAIR HFA) 108 (90 Base) MCG/ACT inhaler Inhale 2 puffs into the lungs every 6 hours as needed for Wheezing 9/25/18  Yes Cade Monson MD   fluticasone-salmeterol ([facility]) 230-21 MCG/ACT inhaler Inhale 1 puff into the lungs 2 times daily 9/25/18  Yes Cade Monson MD   ipratropium-albuterol (DUONEB) 0.5-2.5 (3) MG/3ML SOLN nebulizer solution Inhale 3 mLs into the lungs every 4 hours DX:COPD J44.9 9/25/18  Yes Cade Monson MD   omeprazole (PRILOSEC) 40 MG delayed release capsule TAKE 1 CAPSULE BY MOUTH  DAILY 6/18/18  Yes JURGEN Downing CNP   nitroGLYCERIN (NITROSTAT) 0.4 MG SL tablet DISSOLVE 1 TABLET UNDER THE TONGUE EVERY 5 MINUTES AS  NEEDED ; MAXIMUM OF 3  TABLETS IN 15 MINUTES 4/11/18  Yes JURGEN Downing CNP   warfarin (COUMADIN) 7.5 MG tablet Continue f/up with coumadin clinic 3/27/18  Yes JURGEN Downing CNP   Diapers & Supplies MISC 1 each by Does not apply route 2 times daily 4/8/19   [employer], APRN - CNP   Incontinence Supply Disposable (DEPEND ADJUSTABLE UNDERWEAR LG) MISC 1 each by Does not apply route as needed (for urine incompetence) 3/16/19   JURGEN Downing - CNP   UNIFINE PENTIPS 31G X 5 MM MISC USE WITH INSULIN PENS FOUR TIMES DAILY 3/8/19   Christian Moore, APRN - CNP   lidocaine-prilocaine (EMLA) 2.5-2.5 % cream Apply topically as needed. 3/5/19   [facility], APRN - CNP   TRULICITY 2.99 JR/3.6MM SOPN INJECT ONE  SYRINGE SUBCUTANEOUSLY ONCE A WEEK 7/31/18   [facility], APRN - CNP   RELION PEN NEEDLE 31G/8MM 31G X 8 MM MISC USE  THREE TIMES DAILY AS DIRECTED 4/17/18   [facility], APRN - CNP   ONE TOUCH ULTRA TEST strip USE TO TEST 3-4 TIMES DAILY DUE TO FLUCTUATING SUGAR 11/14/17   [facility], APRN - CNP   ASSURE COMFORT LANCETS 30G MISC USE TO TEST BLOOD GLUCOSE THREE TIMES DAILY 8/15/17   [facility], APRN - CNP   Alcohol Swabs (ALCOHOL PREP) 70 % PADS USE TO TEST BLOOD GLUCOSE THREE TIMES DAILY 8/15/17   [facility], APRN - CNP   Blood Glucose Monitoring Suppl (ACCU-CHEK KENNETH SMARTVIEW) w/Device KIT USE TO TEST BLOOD GLUCOSE 8/15/17   [facility], APRN - CNP   BiPAP Machine MISC by Does not apply route nightly    Historical Provider, MD   Spacer/Aero-Holding Chambers VENANCIO 1 Device by Does not apply route daily as needed 3/27/16   JURGEN Dey CNP   OXYGEN Inhale 2 L into the lungs daily as needed At night prn    Historical Provider, MD        Allergies:  Codeine; Fentanyl; Morphine; Nsaids; Hydrocodone-acetaminophen; Percocet [oxycodone-acetaminophen]; Procardia [nifedipine]; and Vicodin [hydrocodone-acetaminophen]     Review of Systems:   A complete review of systems has been reviewed and updated today and is negative except as noted in the history of present illness.       Physical Examination:    Vitals:    05/30/19 0545   BP: 111/74   Pulse: 86   Resp: 18   Temp: 98.1 °F (36.7 °C)   SpO2:     Weight: (!) 324 lb 12.8 oz (147.3 kg)         General Appearance:  Alert, cooperative, no distress, appears stated age   Head:  Normocephalic, without obvious abnormality, atraumatic   Eyes:  EOMI, conjunctiva/corneas clear       Nose: Nares normal   Throat: Lips normal   Neck: Supple, symmetrical, trachea midline,  no carotid bruit or JVD Lungs:   Clear to auscultation bilaterally, respirations unlabored   Chest Wall:  +++ Chest wall tenderness   Heart:  Regular rate and rhythm, S1, S2 normal, no significant murmur, rub or gallop   Abdomen:   Soft, non-tender, bowel sounds active all four quadrants,  no masses, no organomegaly           Extremities: Extremities normal, atraumatic, no cyanosis or edema   Pulses: 1+ and symmetric   Skin: Skin color, texture, turgor normal, no rashes or lesions   Pysch: Normal mood and affect   Neurologic: Normal gross motor and sensory exam.         Labs  CBC:   Lab Results   Component Value Date    WBC 7.7 05/30/2019    RBC 3.33 05/30/2019    HGB 10.9 05/30/2019    HCT 33.7 05/30/2019    .4 05/30/2019    RDW 22.7 05/30/2019     05/30/2019     CMP:    Lab Results   Component Value Date     05/29/2019    K 4.5 05/29/2019    K 5.7 03/31/2019    CL 98 05/29/2019    CO2 28 05/29/2019    BUN 11 05/29/2019    CREATININE 4.4 05/29/2019    GFRAA 12 05/29/2019    GFRAA 50 05/12/2013    AGRATIO 1.0 05/29/2019    LABGLOM 10 05/29/2019    GLUCOSE 149 05/29/2019    PROT 8.0 05/29/2019    PROT 6.6 03/05/2013    CALCIUM 10.1 05/29/2019    BILITOT 0.4 05/29/2019    ALKPHOS 108 05/29/2019    AST 17 05/29/2019    ALT 17 05/29/2019     PT/INR:  No results found for: PTINR  Lab Results   Component Value Date    CKTOTAL 129 07/11/2017    CKMB 1.9 04/19/2015    TROPONINI 0.10 (H) 05/29/2019       EKG:  I have reviewed EKG with the following interpretation:  Impression:  29-MAY-2019 10:31:49 [facility] ROUTINE RECORD  Normal sinus rhythm  Low voltage QRS  Possible Anterolateral infarct , age undetermined  No significant change    Myoview stress test 3/9/18:  Summary   Normal myocardial perfusion.   Borderline LV function with ejection fraction of 52%.   Low risk scan.     Echo 12/26/17:  Summary   -Very technically difficult exam due to morbid obesity.   -Global left ventricular function is normal with ejection fraction estimated   at 55 %.  -Wall motion assessment was limited due to poor endocardial border   definition secondary to large body habitus.   -There is mild tricuspid regurgitation with RVSP estimated at 30 mmHg.   -Mild circumferential pericardial effusion noted. There is no cardiac   evidence of tamponade.     Myoview stress test 2/23/17:  Summary    Normal myocardial perfusion study.    Normal LV size and systolic function. Assessment/Plan:  Patient Active Hospital Problem List:   Chest pain (5/16/2018)    Assessment: Noncardiac. Definite musculoskeletal component. Plan: Does not need cardiac workup at this time. Recommend analgesia. If okay from nephrology standpoint, trial of Toradol may be helpful. She can be discharged from a cardiology standpoint. We will sign off, please call if we can be of further assistance. Thank you for allowing us to participate in the care of Sue Sexton. Further evaluation will be based upon the patient's clinical course and testing results. All questions and concerns were addressed to the patient/family. Alternatives to my treatment were discussed. The note was completed using EMR. Every effort was made to ensure accuracy; however, inadvertent computerized transcription errors may be present.     Mahamed Titus M.D.

## 2019-06-03 ENCOUNTER — HOSPITAL ENCOUNTER (OUTPATIENT)
Dept: MAMMOGRAPHY | Age: 64
Discharge: HOME | End: 2019-06-07
Payer: MEDICARE

## 2019-06-03 DIAGNOSIS — Z12.31 VISIT FOR SCREENING MAMMOGRAM: ICD-10-CM

## 2019-06-04 ENCOUNTER — TELEPHONE (OUTPATIENT)
Dept: FAMILY MEDICINE CLINIC | Age: 64
End: 2019-06-04

## 2019-06-04 ENCOUNTER — OFFICE VISIT (OUTPATIENT)
Dept: VASCULAR SURGERY | Age: 64
End: 2019-06-04

## 2019-06-04 ENCOUNTER — TELEPHONE (OUTPATIENT)
Dept: VASCULAR SURGERY | Age: 64
End: 2019-06-04

## 2019-06-04 VITALS
BODY MASS INDEX: 51.91 KG/M2 | WEIGHT: 293 LBS | SYSTOLIC BLOOD PRESSURE: 130 MMHG | HEIGHT: 63 IN | DIASTOLIC BLOOD PRESSURE: 66 MMHG

## 2019-06-04 DIAGNOSIS — N18.6 END STAGE RENAL DISEASE (HCC): Primary | ICD-10-CM

## 2019-06-04 PROCEDURE — 1036F TOBACCO NON-USER: CPT | Performed by: SURGERY

## 2019-06-04 PROCEDURE — G8417 CALC BMI ABV UP PARAM F/U: HCPCS | Performed by: SURGERY

## 2019-06-04 PROCEDURE — G8598 ASA/ANTIPLAT THER USED: HCPCS | Performed by: SURGERY

## 2019-06-04 PROCEDURE — 99024 POSTOP FOLLOW-UP VISIT: CPT | Performed by: SURGERY

## 2019-06-04 PROCEDURE — 3017F COLORECTAL CA SCREEN DOC REV: CPT | Performed by: SURGERY

## 2019-06-04 PROCEDURE — G8427 DOCREV CUR MEDS BY ELIG CLIN: HCPCS | Performed by: SURGERY

## 2019-06-04 RX ORDER — OMEPRAZOLE 40 MG/1
CAPSULE, DELAYED RELEASE ORAL
Qty: 90 CAPSULE | Refills: 3 | Status: SHIPPED | OUTPATIENT
Start: 2019-06-04

## 2019-06-04 NOTE — PROGRESS NOTES
[facility])   Vascular Surgery Followup    Referring Provider:  JURGEN Bartlett CNP     Chief Complaint   Patient presents with    Post-Op Check        History of Present Illness:   66-year-old female here today for follow-up of a right brachiocephalic fistula, March 28. She has no new complaints today. Past Medical History:   has a past medical history of Abdominal pain, Acute on chronic congestive heart failure ([address] Asthma, Cervical cancer ([address] Chest pain, CHF (congestive heart failure) ([address] Chronic kidney disease, stage IV (severe) ([facility]), Chronic pain syndrome, Chronic respiratory failure with hypoxia ([address] CKD (chronic kidney disease) stage 4, GFR 15-29 ml/min ([facility]), Colon polyps, COPD (chronic obstructive pulmonary disease) ([address] Degenerative disc disease at L5-S1 level, Diabetes mellitus, insulin dependent (IDDM), uncontrolled ([address] Diabetic polyneuropathy associated with type 2 diabetes mellitus ([address] Elevated troponin, ESRD (end stage renal disease) on dialysis ([address] GERD (gastroesophageal reflux disease), Gout, History of blood transfusion, History of cervical cancer, Hyperlipidemia, Hypertension, Incarcerated ventral hernia, LVH (left ventricular hypertrophy), Morbid obesity ([address] Mucus plugging of bronchi, Obstructive sleep apnea on CPAP, Pneumonia, Psychiatric problem, Pulmonary embolism ([address] Type 2 diabetes mellitus with diabetic neuropathy, with long-term current use of insulin ([address] Urinary incontinence in female, and Venous stasis of lower extremity. Surgical History:   has a past surgical history that includes jennifer and bso (cervix removed) (10/96); Knee arthroscopy (Right, 1999); doppler echocardiography (2/21/09); Colonoscopy (2010); Upper gastrointestinal endoscopy (6/25/13); Colonoscopy (6/25/13, 11/14); Cardiac catheterization (1/14);  Hysterectomy; pr open skull supratent explore; ventral hernia repair (12/24/2016); other surgical history (02/22/2018); pr repair incisional hernia,reducible (N/A, 11/20/2018); Tunneled venous port placement (Right, 11/2019); Dialysis fistula creation (Right, 3/28/2019); and Coronary angioplasty with stent. Social History:   reports that she has never smoked. She has never used smokeless tobacco. She reports that she does not drink alcohol or use drugs. Family History:  family history includes Colon Cancer in her father, maternal grandmother, and mother; Diabetes in her father and mother; Heart Disease in her brother and sister; High Blood Pressure in her brother, father, maternal aunt, sister, and sister; Kidney Cancer in her brother. Home Medications:  Current Outpatient Medications   Medication Sig Dispense Refill    celecoxib (CELEBREX) 200 MG capsule Take 1 capsule by mouth 2 times daily 60 capsule 0    warfarin (COUMADIN) 1 MG tablet Take 3 tablets by mouth daily 30 tablet 0    insulin detemir (LEVEMIR FLEXTOUCH) 100 UNIT/ML injection pen Inject 30 Units into the skin 2 times daily      rosuvastatin (CRESTOR) 10 MG tablet Take 1 tablet by mouth daily Take 1 tablet by mouth  daily 90 tablet 3    traZODone (DESYREL) 100 MG tablet TAKE 1 TABLET BY MOUTH ONCE DAILY NIGHTLY  AS  NEEDED 90 tablet 3    fluticasone (FLONASE) 50 MCG/ACT nasal spray USE TWO SPRAY(S) IN EACH NOSTRIL ONCE DAILY 1 Bottle 5    pregabalin (LYRICA) 50 MG capsule Take 1 capsule by mouth 3 times daily for 180 days.  90 capsule 5    Diapers & Supplies MISC 1 each by Does not apply route 2 times daily 100 each 5    allopurinol (ZYLOPRIM) 100 MG tablet Take 1 tablet by mouth daily 90 tablet 3    Incontinence Supply Disposable (DEPEND ADJUSTABLE UNDERWEAR LG) MISC 1 each by Does not apply route as needed (for urine incompetence) 60 each 5    UNIFINE PENTIPS 31G X 5 MM MISC USE WITH INSULIN PENS FOUR TIMES DAILY 400 each 3    insulin lispro (HUMALOG KWIKPEN) 100 UNIT/ML pen Inject 0-12 Units into the skin 3 times daily (before meals) 5 pen 5    lidocaine-prilocaine (EMLA) 2.5-2.5 % cream Apply topically as needed.  1 Tube 5    promethazine (PHENERGAN) 25 MG tablet Take 1 tablet by mouth every 8 hours as needed for Nausea 30 tablet 2    calcium acetate (PHOSLO) 667 MG capsule Take 2 capsules by mouth 3 times daily (with meals) 60 capsule 3    nebivolol (BYSTOLIC) 2.5 MG tablet Take 1 tablet by mouth 2 times daily 30 tablet 3    docusate sodium (COLACE) 100 MG capsule Take 1 capsule by mouth 2 times daily 60 capsule 0    albuterol sulfate HFA (PROAIR HFA) 108 (90 Base) MCG/ACT inhaler Inhale 2 puffs into the lungs every 6 hours as needed for Wheezing 1 Inhaler 11    fluticasone-salmeterol (ADVAIR HFA) 230-21 MCG/ACT inhaler Inhale 1 puff into the lungs 2 times daily 1 Inhaler 11    ipratropium-albuterol (DUONEB) 0.5-2.5 (3) MG/3ML SOLN nebulizer solution Inhale 3 mLs into the lungs every 4 hours DX:COPD J44.9 427 mL 11    TRULICITY 4.09 VF/6.1RX SOPN INJECT ONE  SYRINGE SUBCUTANEOUSLY ONCE A WEEK 15 pen 3    omeprazole (PRILOSEC) 40 MG delayed release capsule TAKE 1 CAPSULE BY MOUTH  DAILY 90 capsule 3    RELION PEN NEEDLE 31G/8MM 31G X 8 MM MISC USE  THREE TIMES DAILY AS DIRECTED 100 each 5    nitroGLYCERIN (NITROSTAT) 0.4 MG SL tablet DISSOLVE 1 TABLET UNDER THE TONGUE EVERY 5 MINUTES AS  NEEDED ; MAXIMUM OF 3  TABLETS IN 15 MINUTES 75 tablet 1    ONE TOUCH ULTRA TEST strip USE TO TEST 3-4 TIMES DAILY DUE TO FLUCTUATING SUGAR 100 strip 5    ASSURE COMFORT LANCETS 30G MISC USE TO TEST BLOOD GLUCOSE THREE TIMES DAILY 300 each 3    Alcohol Swabs (ALCOHOL PREP) 70 % PADS USE TO TEST BLOOD GLUCOSE THREE TIMES DAILY 300 each 3    Blood Glucose Monitoring Suppl (ACCU-CHEK KENNETH SMARTVIEW) w/Device KIT USE TO TEST BLOOD GLUCOSE 1 kit 0    BiPAP Machine MISC by Does not apply route nightly      Spacer/Aero-Holding Chambers VENANCIO 1 Device by Does not apply route daily as needed 1 Device 0    OXYGEN Inhale 2 L into the lungs daily as needed At night prn       No current facility-administered medications for this visit. Allergies:  Codeine; Fentanyl; Morphine; Hydrocodone-acetaminophen; Percocet [oxycodone-acetaminophen]; Procardia [nifedipine]; and Vicodin [hydrocodone-acetaminophen]     Review of Systems:   neg      Physical Examination:    There were no vitals filed for this visit. General appearance: alert, appears stated age, cooperative and no distress  Right arm with thrill and bruit. Fistula does feel deep. No distal cyanosis or ischemia.       Assessment:     Patient Active Problem List   Diagnosis    Asthma    Colon polyps    Microalbuminuria    Venous stasis of lower extremity    Obstructive sleep apnea on CPAP    Chronic diastolic CHF (congestive heart failure) ([address]    Renal osteodystrophy    Uncontrolled type 2 diabetes with renal manifestation (HCC)    History of pulmonary embolism    Anemia of chronic kidney failure (HCC)    Primary osteoarthritis of both knees    Essential hypertension, malignant    Restrictive lung disease    Gastroesophageal reflux disease    LVH (left ventricular hypertrophy)    Dyspnea and respiratory abnormalities    Acute on chronic respiratory failure with hypoxia and hypercapnia (HCC)    Obesity hypoventilation syndrome ([address]    Morbid obesity due to excess calories ([address]    COPD, moderate (HCC)    Gout    Warfarin-induced coagulopathy (HCC)    Chronic hypoxemic respiratory failure (HCC)    Constipation    Diabetes education, encounter for    Primary osteoarthritis of left hip    ESRD (end stage renal disease) on dialysis ([address]    Chronic pain syndrome    Chest pain    Recurrent ventral hernia    Hypoglycemia    Diabetic polyneuropathy associated with type 2 diabetes mellitus ([address]    Infection of arteriovenous graft for hemodialysis ([address]    Essential hypertension    Dialysis patient ([address]    Postoperative pain    Other complication of arteriovenous dialysis fistula ([address]    Weight loss counseling, encounter for       Plan:  71-year-old female with functioning right brachiocephalic fistula. Unclear if the fistula is deep given the patient's size or there is some underlying stenosis. Clinically, it does not feel like there is a stenosis. We'll refer her to the access center for imaging to ensure wide patency of her fistula. If it is widely patent would recommend a duplex of her right arm to assess for depth and she may need a superficialization procedure    Thank you for allowing me to participate in the care of this individual.  Please do not hesitate to contact me with any questions. Chilango Roberts M.D., FACS.  6/4/2019  10:05 AM

## 2019-06-20 ENCOUNTER — OFFICE VISIT (OUTPATIENT)
Dept: FAMILY MEDICINE CLINIC | Age: 64
End: 2019-06-20
Payer: MEDICARE

## 2019-06-20 VITALS
HEART RATE: 104 BPM | SYSTOLIC BLOOD PRESSURE: 138 MMHG | BODY MASS INDEX: 58.56 KG/M2 | OXYGEN SATURATION: 100 % | DIASTOLIC BLOOD PRESSURE: 86 MMHG | WEIGHT: 293 LBS

## 2019-06-20 DIAGNOSIS — N61.0 MASTITIS, RIGHT, ACUTE: ICD-10-CM

## 2019-06-20 DIAGNOSIS — E11.42 DIABETIC POLYNEUROPATHY ASSOCIATED WITH TYPE 2 DIABETES MELLITUS (HCC): ICD-10-CM

## 2019-06-20 DIAGNOSIS — Z12.39 SCREENING FOR BREAST CANCER: ICD-10-CM

## 2019-06-20 DIAGNOSIS — Z09 HOSPITAL DISCHARGE FOLLOW-UP: ICD-10-CM

## 2019-06-20 DIAGNOSIS — R07.89 OTHER CHEST PAIN: Primary | ICD-10-CM

## 2019-06-20 PROCEDURE — 3017F COLORECTAL CA SCREEN DOC REV: CPT | Performed by: NURSE PRACTITIONER

## 2019-06-20 PROCEDURE — 2022F DILAT RTA XM EVC RTNOPTHY: CPT | Performed by: NURSE PRACTITIONER

## 2019-06-20 PROCEDURE — 1036F TOBACCO NON-USER: CPT | Performed by: NURSE PRACTITIONER

## 2019-06-20 PROCEDURE — G8427 DOCREV CUR MEDS BY ELIG CLIN: HCPCS | Performed by: NURSE PRACTITIONER

## 2019-06-20 PROCEDURE — 3045F PR MOST RECENT HEMOGLOBIN A1C LEVEL 7.0-9.0%: CPT | Performed by: NURSE PRACTITIONER

## 2019-06-20 PROCEDURE — 1111F DSCHRG MED/CURRENT MED MERGE: CPT | Performed by: NURSE PRACTITIONER

## 2019-06-20 PROCEDURE — G8417 CALC BMI ABV UP PARAM F/U: HCPCS | Performed by: NURSE PRACTITIONER

## 2019-06-20 PROCEDURE — G8598 ASA/ANTIPLAT THER USED: HCPCS | Performed by: NURSE PRACTITIONER

## 2019-06-20 PROCEDURE — 99214 OFFICE O/P EST MOD 30 MIN: CPT | Performed by: NURSE PRACTITIONER

## 2019-06-20 RX ORDER — TRAMADOL HYDROCHLORIDE 50 MG/1
50 TABLET ORAL EVERY 8 HOURS PRN
Qty: 75 TABLET | Refills: 2 | Status: ON HOLD | OUTPATIENT
Start: 2019-06-20 | End: 2019-08-22 | Stop reason: HOSPADM

## 2019-06-20 RX ORDER — CEPHALEXIN 500 MG/1
500 CAPSULE ORAL 3 TIMES DAILY
Qty: 30 CAPSULE | Refills: 0 | Status: SHIPPED | OUTPATIENT
Start: 2019-06-20 | End: 2019-06-30

## 2019-06-20 ASSESSMENT — ENCOUNTER SYMPTOMS
ROS SKIN COMMENTS: RIGHT BREAST PAIN
SHORTNESS OF BREATH: 0

## 2019-06-20 NOTE — PROGRESS NOTES
Post-Discharge Transitional Care Management Services or Hospital Follow Up      Carrie Short   YOB: 1955    Date of Office Visit:  6/20/2019  Date of Hospital Admission: 5/29/19  Date of Hospital Discharge: 5/30/19  Readmission Risk Score(high >=14%.  Medium >=10%):Readmission Risk Score: 0      Care management risk score Rising risk (score 2-5) and Complex Care (Scores >=6): 17     Non face to face  following discharge, date last encounter closed (first attempt may have been earlier): *No documented post hospital discharge outreach found in the last 14 days *No documented post hospital discharge outreach found in the last 14 days    Call initiated 2 business days of discharge: *No response recorded in the last 14 days     Patient Active Problem List   Diagnosis    Asthma    Colon polyps    Microalbuminuria    Venous stasis of lower extremity    Obstructive sleep apnea on CPAP    Chronic diastolic CHF (congestive heart failure) ([address]    Renal osteodystrophy    Uncontrolled type 2 diabetes with renal manifestation ([address]    History of pulmonary embolism    Anemia of chronic kidney failure ([address]    Primary osteoarthritis of both knees    Essential hypertension, malignant    Restrictive lung disease    Gastroesophageal reflux disease    LVH (left ventricular hypertrophy)    Dyspnea and respiratory abnormalities    Acute on chronic respiratory failure with hypoxia and hypercapnia ([address]    Obesity hypoventilation syndrome ([address]    Morbid obesity due to excess calories ([address]    COPD, moderate (HCC)    Gout    Warfarin-induced coagulopathy (HCC)    Chronic hypoxemic respiratory failure (HCC)    Constipation    Diabetes education, encounter for    Primary osteoarthritis of left hip    ESRD (end stage renal disease) on dialysis ([address]    Chronic pain syndrome    Chest pain    Recurrent ventral hernia    Hypoglycemia    Diabetic polyneuropathy associated with type 2 diabetes mellitus [facility]    Infection of arteriovenous graft for hemodialysis ([facility]    Essential hypertension    Dialysis patient ([facility]    Postoperative pain    Other complication of arteriovenous dialysis fistula (HCC)    Weight loss counseling, encounter for       Allergies   Allergen Reactions    Codeine Nausea Only    Fentanyl Itching    Morphine      CHEST PAIN    Hydrocodone-Acetaminophen Itching and Rash    Percocet [Oxycodone-Acetaminophen] Itching    Procardia [Nifedipine] Nausea And Vomiting     Headache  Takes norvasc at home 12/22/15    Vicodin [Hydrocodone-Acetaminophen] Rash       Medications listed as ordered at the time of discharge from hospital   [facility]   Home Medication Instructions KISHORE:    Printed on:06/20/19 7973   Medication Information                      albuterol sulfate HFA (PROAIR HFA) 108 (90 Base) MCG/ACT inhaler  Inhale 2 puffs into the lungs every 6 hours as needed for Wheezing             Alcohol Swabs (ALCOHOL PREP) 70 % PADS  USE TO TEST BLOOD GLUCOSE THREE TIMES DAILY             allopurinol (ZYLOPRIM) 100 MG tablet  Take 1 tablet by mouth daily             ASSURE COMFORT LANCETS 30G MISC  USE TO TEST BLOOD GLUCOSE THREE TIMES DAILY             BiPAP Machine MISC  by Does not apply route nightly             Blood Glucose Monitoring Suppl (ACCU-CHEK KENNETH SMARTVIEW) w/Device KIT  USE TO TEST BLOOD GLUCOSE             calcium acetate (PHOSLO) 667 MG capsule  Take 2 capsules by mouth 3 times daily (with meals)             cephALEXin (KEFLEX) 500 MG capsule  Take 1 capsule by mouth 3 times daily for 10 days             Diapers & Supplies MISC  1 each by Does not apply route 2 times daily             docusate sodium (COLACE) 100 MG capsule  Take 1 capsule by mouth 2 times daily             fluticasone (FLONASE) 50 MCG/ACT nasal spray  USE TWO SPRAY(S) IN EACH NOSTRIL ONCE DAILY             fluticasone-salmeterol (ADVAIR HFA) 230-21 MCG/ACT inhaler  Inhale 1 puff into the lungs 2 times daily             Incontinence Supply Disposable (DEPEND ADJUSTABLE UNDERWEAR LG) MISC  1 each by Does not apply route as needed (for urine incompetence)             insulin detemir (LEVEMIR FLEXTOUCH) 100 UNIT/ML injection pen  Inject 30 Units into the skin 2 times daily             insulin lispro (HUMALOG KWIKPEN) 100 UNIT/ML pen  Inject 0-12 Units into the skin 3 times daily (before meals)             ipratropium-albuterol (DUONEB) 0.5-2.5 (3) MG/3ML SOLN nebulizer solution  Inhale 3 mLs into the lungs every 4 hours DX:COPD J44.9             lidocaine-prilocaine (EMLA) 2.5-2.5 % cream  Apply topically as needed. nebivolol (BYSTOLIC) 2.5 MG tablet  Take 1 tablet by mouth 2 times daily             nitroGLYCERIN (NITROSTAT) 0.4 MG SL tablet  DISSOLVE 1 TABLET UNDER THE TONGUE EVERY 5 MINUTES AS  NEEDED ; MAXIMUM OF 3  TABLETS IN 15 MINUTES             omeprazole (PRILOSEC) 40 MG delayed release capsule  TAKE 1 CAPSULE BY MOUTH  DAILY             ONE TOUCH ULTRA TEST strip  USE TO TEST 3-4 TIMES DAILY DUE TO FLUCTUATING SUGAR             OXYGEN  Inhale 2 L into the lungs daily as needed At night prn             pregabalin (LYRICA) 50 MG capsule  Take 1 capsule by mouth 3 times daily for 180 days. promethazine (PHENERGAN) 25 MG tablet  Take 1 tablet by mouth every 8 hours as needed for Nausea             RELION PEN NEEDLE 31G/8MM 31G X 8 MM MISC  USE  THREE TIMES DAILY AS DIRECTED             rosuvastatin (CRESTOR) 10 MG tablet  Take 1 tablet by mouth daily Take 1 tablet by mouth  daily             Spacer/Aero-Holding Chambers VENANCIO  1 Device by Does not apply route daily as needed             traMADol (ULTRAM) 50 MG tablet  Take 1 tablet by mouth every 8 hours as needed for Pain for up to 90 days.              traZODone (DESYREL) 100 MG tablet  TAKE 1 TABLET BY MOUTH ONCE DAILY NIGHTLY  AS  NEEDED             TRULICITY 0.52 QS/0.0XB SOPN  INJECT ONE  SYRINGE SUBCUTANEOUSLY ONCE A WEEK             UNIFINE PENTIPS 31G X 5 MM MISC  USE WITH INSULIN PENS FOUR TIMES DAILY             warfarin (COUMADIN) 1 MG tablet  Take 3 tablets by mouth daily                   Medications marked \"taking\" at this time  Outpatient Medications Marked as Taking for the 6/20/19 encounter (Office Visit) with JURGEN Iverson CNP   Medication Sig Dispense Refill    traMADol (ULTRAM) 50 MG tablet Take 1 tablet by mouth every 8 hours as needed for Pain for up to 90 days. 75 tablet 2    cephALEXin (KEFLEX) 500 MG capsule Take 1 capsule by mouth 3 times daily for 10 days 30 capsule 0    omeprazole (PRILOSEC) 40 MG delayed release capsule TAKE 1 CAPSULE BY MOUTH  DAILY 90 capsule 3    warfarin (COUMADIN) 1 MG tablet Take 3 tablets by mouth daily 30 tablet 0    insulin detemir (LEVEMIR FLEXTOUCH) 100 UNIT/ML injection pen Inject 30 Units into the skin 2 times daily      rosuvastatin (CRESTOR) 10 MG tablet Take 1 tablet by mouth daily Take 1 tablet by mouth  daily 90 tablet 3    traZODone (DESYREL) 100 MG tablet TAKE 1 TABLET BY MOUTH ONCE DAILY NIGHTLY  AS  NEEDED 90 tablet 3    fluticasone (FLONASE) 50 MCG/ACT nasal spray USE TWO SPRAY(S) IN EACH NOSTRIL ONCE DAILY 1 Bottle 5    pregabalin (LYRICA) 50 MG capsule Take 1 capsule by mouth 3 times daily for 180 days. 90 capsule 5    Diapers & Supplies MISC 1 each by Does not apply route 2 times daily 100 each 5    allopurinol (ZYLOPRIM) 100 MG tablet Take 1 tablet by mouth daily 90 tablet 3    Incontinence Supply Disposable (DEPEND ADJUSTABLE UNDERWEAR LG) MISC 1 each by Does not apply route as needed (for urine incompetence) 60 each 5    UNIFINE PENTIPS 31G X 5 MM MISC USE WITH INSULIN PENS FOUR TIMES DAILY 400 each 3    insulin lispro (HUMALOG KWIKPEN) 100 UNIT/ML pen Inject 0-12 Units into the skin 3 times daily (before meals) 5 pen 5    lidocaine-prilocaine (EMLA) 2.5-2.5 % cream Apply topically as needed.  1 Tube 5    promethazine (PHENERGAN) 25 MG tablet Take 1 tablet by mouth every 8 hours as needed for Nausea 30 tablet 2    calcium acetate (PHOSLO) 667 MG capsule Take 2 capsules by mouth 3 times daily (with meals) 60 capsule 3    nebivolol (BYSTOLIC) 2.5 MG tablet Take 1 tablet by mouth 2 times daily 30 tablet 3    docusate sodium (COLACE) 100 MG capsule Take 1 capsule by mouth 2 times daily 60 capsule 0    albuterol sulfate HFA (PROAIR HFA) 108 (90 Base) MCG/ACT inhaler Inhale 2 puffs into the lungs every 6 hours as needed for Wheezing 1 Inhaler 11    fluticasone-salmeterol (ADVAIR HFA) 230-21 MCG/ACT inhaler Inhale 1 puff into the lungs 2 times daily 1 Inhaler 11    ipratropium-albuterol (DUONEB) 0.5-2.5 (3) MG/3ML SOLN nebulizer solution Inhale 3 mLs into the lungs every 4 hours DX:COPD J44.9 892 mL 11    TRULICITY 2.30 NQ/2.9SU SOPN INJECT ONE  SYRINGE SUBCUTANEOUSLY ONCE A WEEK 15 pen 3    RELION PEN NEEDLE 31G/8MM 31G X 8 MM MISC USE  THREE TIMES DAILY AS DIRECTED 100 each 5    nitroGLYCERIN (NITROSTAT) 0.4 MG SL tablet DISSOLVE 1 TABLET UNDER THE TONGUE EVERY 5 MINUTES AS  NEEDED ; MAXIMUM OF 3  TABLETS IN 15 MINUTES 75 tablet 1    ONE TOUCH ULTRA TEST strip USE TO TEST 3-4 TIMES DAILY DUE TO FLUCTUATING SUGAR 100 strip 5    ASSURE COMFORT LANCETS 30G MISC USE TO TEST BLOOD GLUCOSE THREE TIMES DAILY 300 each 3    Alcohol Swabs (ALCOHOL PREP) 70 % PADS USE TO TEST BLOOD GLUCOSE THREE TIMES DAILY 300 each 3    Blood Glucose Monitoring Suppl (ACCU-CHEK KENNETH SMARTVIEW) w/Device KIT USE TO TEST BLOOD GLUCOSE 1 kit 0    BiPAP Machine MISC by Does not apply route nightly      Spacer/Aero-Holding Chambers VENANCIO 1 Device by Does not apply route daily as needed 1 Device 0    OXYGEN Inhale 2 L into the lungs daily as needed At night prn          Medications patient taking as of now reconciled against medications ordered at time of hospital discharge: Yes    Chief Complaint   Patient presents with    [facility] from [facility] 5/29-5/30 chest pain, pain with neuropathy feels lyrica is not doing anything, rx for nerves    Other     lump on right side of breast       HPI    Inpatient course: Discharge summary reviewed- see chart. Interval history/Current status: admitted for CP, sharp left sided chest pain. She was able to complete dialysis prior to hospital. She had been having pain for 3 days prior. Had some improvement NTG. States occasional chest pain on dialysis days since hospital. Feels manageable, so does not go to hospital. Denies associated symptoms with it- no SOB, palpitations, dizziness or lightheadedness    C/o worsening neuropathy in hands and feet. Lyrical helps some but not much. Dr Goyo Taveras increased  To 100 mg at once on dialysis and no change. Feeling constant tingling and stinging pain in hands and feet. No redness or swelling. Also c/o right breast pain. Painful lump just below nipple. Present for several days, unchanged. No fevers or malaise. No drainage. Overdue for mammogram.      Review of Systems   Constitutional: Negative for diaphoresis, fatigue and fever. Respiratory: Negative for shortness of breath. Cardiovascular: Positive for chest pain (intermittent). Negative for palpitations and leg swelling. Skin:        Right breast pain   Neurological: Positive for numbness (with tingling and stinging pain to hands and feet). Negative for dizziness and headaches. Vitals:    06/20/19 1406   BP: 138/86   Pulse: 104   SpO2: 100%   Weight: (!) 330 lb 9.6 oz (150 kg)     Body mass index is 58.56 kg/m². Wt Readings from Last 3 Encounters:   06/20/19 (!) 330 lb 9.6 oz (150 kg)   06/04/19 (!) 324 lb 12.8 oz (147.3 kg)   05/30/19 (!) 324 lb 12.8 oz (147.3 kg)     BP Readings from Last 3 Encounters:   06/20/19 138/86   06/04/19 130/66   05/30/19 109/70       Physical Exam   Constitutional: She is oriented to person, place, and time.    Morbidly obese AA female   Cardiovascular: Normal rate, regular rhythm and normal

## 2019-07-02 ENCOUNTER — HOSPITAL ENCOUNTER (EMERGENCY)
Age: 64
Discharge: HOME | End: 2019-07-02
Payer: MEDICARE

## 2019-07-02 ENCOUNTER — OFFICE VISIT (OUTPATIENT)
Dept: FAMILY MEDICINE CLINIC | Age: 64
End: 2019-07-02
Payer: MEDICARE

## 2019-07-02 VITALS
WEIGHT: 293 LBS | OXYGEN SATURATION: 97 % | RESPIRATION RATE: 18 BRPM | DIASTOLIC BLOOD PRESSURE: 85 MMHG | SYSTOLIC BLOOD PRESSURE: 138 MMHG | HEIGHT: 63 IN | HEART RATE: 83 BPM | TEMPERATURE: 98.4 F | BODY MASS INDEX: 51.91 KG/M2

## 2019-07-02 VITALS
BODY MASS INDEX: 57.54 KG/M2 | DIASTOLIC BLOOD PRESSURE: 88 MMHG | SYSTOLIC BLOOD PRESSURE: 142 MMHG | OXYGEN SATURATION: 99 % | HEART RATE: 84 BPM | WEIGHT: 293 LBS

## 2019-07-02 DIAGNOSIS — L02.91 ABSCESS: Primary | ICD-10-CM

## 2019-07-02 DIAGNOSIS — M79.641 HAND PAIN, RIGHT: ICD-10-CM

## 2019-07-02 DIAGNOSIS — Z99.2 ESRD (END STAGE RENAL DISEASE) ON DIALYSIS (HCC): ICD-10-CM

## 2019-07-02 DIAGNOSIS — N18.6 ESRD (END STAGE RENAL DISEASE) ON DIALYSIS (HCC): ICD-10-CM

## 2019-07-02 DIAGNOSIS — G62.9 NEUROPATHY: ICD-10-CM

## 2019-07-02 PROCEDURE — G8417 CALC BMI ABV UP PARAM F/U: HCPCS | Performed by: NURSE PRACTITIONER

## 2019-07-02 PROCEDURE — 99283 EMERGENCY DEPT VISIT LOW MDM: CPT

## 2019-07-02 PROCEDURE — 99213 OFFICE O/P EST LOW 20 MIN: CPT | Performed by: NURSE PRACTITIONER

## 2019-07-02 PROCEDURE — 1036F TOBACCO NON-USER: CPT | Performed by: NURSE PRACTITIONER

## 2019-07-02 PROCEDURE — 4500000021 HC ED LEVEL 1 PROCEDURE

## 2019-07-02 PROCEDURE — G8598 ASA/ANTIPLAT THER USED: HCPCS | Performed by: NURSE PRACTITIONER

## 2019-07-02 PROCEDURE — G8427 DOCREV CUR MEDS BY ELIG CLIN: HCPCS | Performed by: NURSE PRACTITIONER

## 2019-07-02 PROCEDURE — 3017F COLORECTAL CA SCREEN DOC REV: CPT | Performed by: NURSE PRACTITIONER

## 2019-07-02 RX ORDER — CEPHALEXIN 500 MG/1
500 CAPSULE ORAL 3 TIMES DAILY
Qty: 30 CAPSULE | Refills: 0 | Status: SHIPPED | OUTPATIENT
Start: 2019-07-02 | End: 2019-07-11

## 2019-07-02 RX ORDER — SULFAMETHOXAZOLE AND TRIMETHOPRIM 800; 160 MG/1; MG/1
1 TABLET ORAL 2 TIMES DAILY
Qty: 20 TABLET | Refills: 0 | Status: SHIPPED | OUTPATIENT
Start: 2019-07-02 | End: 2019-07-11 | Stop reason: ALTCHOICE

## 2019-07-02 ASSESSMENT — ENCOUNTER SYMPTOMS
SHORTNESS OF BREATH: 0
NAUSEA: 0
VOMITING: 0
COLOR CHANGE: 1

## 2019-07-02 ASSESSMENT — PAIN DESCRIPTION - PAIN TYPE: TYPE: ACUTE PAIN

## 2019-07-02 ASSESSMENT — PAIN DESCRIPTION - LOCATION: LOCATION: ARM

## 2019-07-02 ASSESSMENT — PAIN SCALES - GENERAL: PAINLEVEL_OUTOF10: 5

## 2019-07-02 ASSESSMENT — PAIN DESCRIPTION - ORIENTATION: ORIENTATION: RIGHT

## 2019-07-02 NOTE — ED PROVIDER NOTES
UPPER GASTROINTESTINAL ENDOSCOPY  6/25/13    VENTRAL HERNIA REPAIR  12/24/2016    Incarcerated ventral hernia repair with mesh       Medications:  Discharge Medication List as of 7/2/2019  5:32 PM      CONTINUE these medications which have NOT CHANGED    Details   sulfamethoxazole-trimethoprim (BACTRIM DS;SEPTRA DS) 800-160 MG per tablet Take 1 tablet by mouth 2 times daily for 10 days, Disp-20 tablet, R-0Normal      cephALEXin (KEFLEX) 500 MG capsule Take 1 capsule by mouth 3 times daily for 10 days, Disp-30 capsule, R-0Normal      traMADol (ULTRAM) 50 MG tablet Take 1 tablet by mouth every 8 hours as needed for Pain for up to 90 days. , Disp-75 tablet, R-2Normal      omeprazole (PRILOSEC) 40 MG delayed release capsule TAKE 1 CAPSULE BY MOUTH  DAILY, Disp-90 capsule, R-3Normal      warfarin (COUMADIN) 1 MG tablet Take 3 tablets by mouth daily, Disp-30 tablet, R-0Normal      insulin detemir (LEVEMIR FLEXTOUCH) 100 UNIT/ML injection pen Inject 30 Units into the skin 2 times dailyHistorical Med      rosuvastatin (CRESTOR) 10 MG tablet Take 1 tablet by mouth daily Take 1 tablet by mouth  daily, Disp-90 tablet, R-3Normal      traZODone (DESYREL) 100 MG tablet TAKE 1 TABLET BY MOUTH ONCE DAILY NIGHTLY  AS  NEEDED, Disp-90 tablet, R-3Please consider 90 day supplies to promote better adherenceNormal      fluticasone (FLONASE) 50 MCG/ACT nasal spray USE TWO SPRAY(S) IN EACH NOSTRIL ONCE DAILY, Disp-1 Bottle, R-5Please consider 90 day supplies to promote better adherenceNormal      pregabalin (LYRICA) 50 MG capsule Take 1 capsule by mouth 3 times daily for 180 days. , Disp-90 capsule, R-5Normal      Diapers & Supplies MISC 2 TIMES DAILY Starting Mon 4/8/2019, Disp-100 each, R-5, Print      allopurinol (ZYLOPRIM) 100 MG tablet Take 1 tablet by mouth daily, Disp-90 tablet, R-3Normal      Incontinence Supply Disposable (DEPEND ADJUSTABLE UNDERWEAR LG) MISC PRN Starting Sat 3/16/2019, Disp-60 each, R-5, Print      !! Gabriele Hewitt PENTIPS 31G X 5 MM MISC Disp-400 each, R-3, Normal      insulin lispro (HUMALOG KWIKPEN) 100 UNIT/ML pen Inject 0-12 Units into the skin 3 times daily (before meals), Disp-5 pen, R-5Normal      lidocaine-prilocaine (EMLA) 2.5-2.5 % cream Apply topically as needed. , Disp-1 Tube, R-5, Normal      promethazine (PHENERGAN) 25 MG tablet Take 1 tablet by mouth every 8 hours as needed for Nausea, Disp-30 tablet, R-2Normal      calcium acetate (PHOSLO) 667 MG capsule Take 2 capsules by mouth 3 times daily (with meals), Disp-60 capsule, R-3Normal      nebivolol (BYSTOLIC) 2.5 MG tablet Take 1 tablet by mouth 2 times daily, Disp-30 tablet, R-3Normal      docusate sodium (COLACE) 100 MG capsule Take 1 capsule by mouth 2 times daily, Disp-60 capsule, R-0Print      albuterol sulfate HFA (PROAIR HFA) 108 (90 Base) MCG/ACT inhaler Inhale 2 puffs into the lungs every 6 hours as needed for Wheezing, Disp-1 Inhaler, R-11Normal      fluticasone-salmeterol (ADVAIR HFA) 230-21 MCG/ACT inhaler Inhale 1 puff into the lungs 2 times daily, Disp-1 Inhaler, R-11Normal      ipratropium-albuterol (DUONEB) 0.5-2.5 (3) MG/3ML SOLN nebulizer solution Inhale 3 mLs into the lungs every 4 hours DX:COPD J44.9, Disp-360 mL, T-40SESIDB      TRULICITY 3.78 WL/1.8AJ SOPN INJECT ONE  SYRINGE SUBCUTANEOUSLY ONCE A WEEK, Disp-15 pen, R-3Please consider 90 day supplies to promote better adherenceNormal      !! RELION PEN NEEDLE 31G/8MM 31G X 8 MM MISC Disp-100 each, R-5, Normal      nitroGLYCERIN (NITROSTAT) 0.4 MG SL tablet DISSOLVE 1 TABLET UNDER THE TONGUE EVERY 5 MINUTES AS  NEEDED ; MAXIMUM OF 3  TABLETS IN 15 MINUTES, Disp-75 tablet, R-1Normal      ONE TOUCH ULTRA TEST strip USE TO TEST 3-4 TIMES DAILY DUE TO FLUCTUATING SUGAR, Disp-100 strip, R-5Please consider 90 day supplies to promote better adherenceNormal      ASSURE COMFORT LANCETS 30G MISC USE TO TEST BLOOD GLUCOSE THREE TIMES DAILY, Disp-300 each, R-3Normal      Alcohol Swabs (ALCOHOL PREP) 70

## 2019-07-11 ENCOUNTER — OFFICE VISIT (OUTPATIENT)
Dept: CARDIOLOGY CLINIC | Age: 64
End: 2019-07-11
Payer: MEDICARE

## 2019-07-11 VITALS
HEART RATE: 84 BPM | DIASTOLIC BLOOD PRESSURE: 67 MMHG | HEIGHT: 63 IN | WEIGHT: 293 LBS | SYSTOLIC BLOOD PRESSURE: 101 MMHG | BODY MASS INDEX: 51.91 KG/M2

## 2019-07-11 DIAGNOSIS — I50.32 CHRONIC DIASTOLIC CHF (CONGESTIVE HEART FAILURE) (HCC): Primary | ICD-10-CM

## 2019-07-11 DIAGNOSIS — E66.01 MORBID OBESITY DUE TO EXCESS CALORIES (HCC): ICD-10-CM

## 2019-07-11 DIAGNOSIS — I10 ESSENTIAL HYPERTENSION, MALIGNANT: ICD-10-CM

## 2019-07-11 DIAGNOSIS — Z99.2 ESRD (END STAGE RENAL DISEASE) ON DIALYSIS (HCC): ICD-10-CM

## 2019-07-11 DIAGNOSIS — R06.89 DYSPNEA AND RESPIRATORY ABNORMALITIES: ICD-10-CM

## 2019-07-11 DIAGNOSIS — I51.7 LVH (LEFT VENTRICULAR HYPERTROPHY): ICD-10-CM

## 2019-07-11 DIAGNOSIS — E66.2 OBESITY HYPOVENTILATION SYNDROME (HCC): ICD-10-CM

## 2019-07-11 DIAGNOSIS — N18.6 ESRD (END STAGE RENAL DISEASE) ON DIALYSIS (HCC): ICD-10-CM

## 2019-07-11 DIAGNOSIS — Z99.89 OBSTRUCTIVE SLEEP APNEA ON CPAP: ICD-10-CM

## 2019-07-11 DIAGNOSIS — G47.33 OBSTRUCTIVE SLEEP APNEA ON CPAP: ICD-10-CM

## 2019-07-11 DIAGNOSIS — R06.00 DYSPNEA AND RESPIRATORY ABNORMALITIES: ICD-10-CM

## 2019-07-11 PROCEDURE — 3017F COLORECTAL CA SCREEN DOC REV: CPT | Performed by: INTERNAL MEDICINE

## 2019-07-11 PROCEDURE — G8417 CALC BMI ABV UP PARAM F/U: HCPCS | Performed by: INTERNAL MEDICINE

## 2019-07-11 PROCEDURE — 99214 OFFICE O/P EST MOD 30 MIN: CPT | Performed by: INTERNAL MEDICINE

## 2019-07-11 PROCEDURE — 1036F TOBACCO NON-USER: CPT | Performed by: INTERNAL MEDICINE

## 2019-07-11 PROCEDURE — G8598 ASA/ANTIPLAT THER USED: HCPCS | Performed by: INTERNAL MEDICINE

## 2019-07-11 PROCEDURE — G8427 DOCREV CUR MEDS BY ELIG CLIN: HCPCS | Performed by: INTERNAL MEDICINE

## 2019-07-11 NOTE — PROGRESS NOTES
[address]  Advanced CHF/Pulmonary Hypertension   Cardiac Evaluation      Henry Arguello  YOB: 1955    Date of Visit:  7/12/19      Chief Complaint   Patient presents with    Congestive Heart Failure     Dialysis      fistula right arm         History of Present Illness: Henry Arguello is a 59 y.o. female who presents from referral from Dr. Elicia Sousa for consultation and management of shortness of breath. I saw her in consult at the hospital on 2-21-19. She has ESRD and chronic chest pain who was at the dialysis center and had onset of chest pain described as pressure in the center of her chest at the end of dialysis. She took a NTG and pain went away. The dialysis nurses urged her to come to the ED for evaluation. She has chronic shortness of breath but is morbidly obese. Angiogram in 2014 showed normal coronary arteries. She has been admitted about once a month for the same type of pain. She says that the NTG usually helps the pain. No vomiting or diarrhea. Her troponin was 0.09 and 0.1, but this is what it almost always is. Today she states she has an axially abscess and a right breast mass. She is getting 3 hours of dialysis instead of 4 hours due to pain in her right hand and her fistula has been manipulated as it was placed too deep per her statement. Her weight has been steady. Dialysis is Mon, Wed, Fri. Right upper arm fistula in place. Allergies   Allergen Reactions    Codeine Nausea Only    Fentanyl Itching    Morphine      CHEST PAIN    Hydrocodone-Acetaminophen Itching and Rash    Percocet [Oxycodone-Acetaminophen] Itching    Procardia [Nifedipine] Nausea And Vomiting     Headache  Takes norvasc at home 12/22/15    Vicodin [Hydrocodone-Acetaminophen] Rash     Current Outpatient Medications   Medication Sig Dispense Refill    traMADol (ULTRAM) 50 MG tablet Take 1 tablet by mouth every 8 hours as needed for Pain for up to 90 days.  75 tablet dependent (IDDM), uncontrolled ([address]     Diabetic polyneuropathy associated with type 2 diabetes mellitus ([address] 3/26/2019    Elevated troponin 9/9/2017    ESRD (end stage renal disease) on dialysis ([address] 02/14/2018    JU Hunt    GERD (gastroesophageal reflux disease)     Gout     History of blood transfusion     History of cervical cancer     Hyperlipidemia     Hypertension     Incarcerated ventral hernia     LVH (left ventricular hypertrophy)     Morbid obesity (HCC)     Mucus plugging of bronchi     Obstructive sleep apnea on CPAP     wears 2L O2 and BIPAP at night    Pneumonia     Psychiatric problem     breakdown long time ago but not current    Pulmonary embolism ([address] 2/6/13    Type 2 diabetes mellitus with diabetic neuropathy, with long-term current use of insulin ([address] 9/12/2017    Urinary incontinence in female     Venous stasis of lower extremity      Past Surgical History:   Procedure Laterality Date    CARDIAC CATHETERIZATION  1/14    Grace; clean cors    COLONOSCOPY  2010    polyp removed; Analisa Petties; repeat 5 years    COLONOSCOPY  6/25/13, 11/14    [place]    CORONARY ANGIOPLASTY WITH STENT PLACEMENT      DIALYSIS FISTULA CREATION Right 3/28/2019    RIGHT BRACHIO CEPHALIC FISTULA CREATION performed by Mega Hooper MD at [address]  2/21/09    LVH with global hypokinesis; EF 55-60%    HYSTERECTOMY      KNEE ARTHROSCOPY Right 1999    OTHER SURGICAL HISTORY  02/22/2018    LEFT brachial artery axillary vein AV graft     MI OPEN SKULL SUPRATENT EXPLORE      Craniotomy, 3/21/19 patient denies any brain surgery or craniotomy    MI REPAIR INCISIONAL HERNIA,REDUCIBLE N/A 11/20/2018    LAPAROSCOPIC VENTRAL HERNIA REPAIR WITH MESH performed by Germain Lorenzo MD at [place] [address]  10/96    TUNNELED VENOUS PORT PLACEMENT Right 11/2019    UPPER GASTROINTESTINAL ENDOSCOPY  6/25/13    VENTRAL HERNIA REPAIR  12/24/2016 been no change in energy level, sleep pattern, or activity level. · Eyes: No visual changes or diplopia. No scleral icterus. · ENT: No Headaches, hearing loss or vertigo. No mouth sores or sore throat. · Cardiovascular: Reviewed in HPI  · Respiratory: No cough or wheezing, no sputum production. No hematemesis. · Gastrointestinal: No abdominal pain, appetite loss, blood in stools. No change in bowel or bladder habits. · Genitourinary: No dysuria, trouble voiding, or hematuria. · Musculoskeletal:  No gait disturbance, weakness or joint complaints. · Integumentary: No rash or pruritis. · Neurological: No headache, diplopia, change in muscle strength, numbness or tingling. No change in gait, balance, coordination, mood, affect, memory, mentation, behavior. · Psychiatric: No anxiety, no depression. · Endocrine: No malaise, fatigue or temperature intolerance. No excessive thirst, fluid intake, or urination. No tremor. · Hematologic/Lymphatic: No abnormal bruising or bleeding, blood clots or swollen lymph nodes. · Allergic/Immunologic: No nasal congestion or hives. Physical Examination:    Vitals:    07/11/19 1344   BP: 101/67   Pulse: 84   Weight: (!) 329 lb (149.2 kg)   Height: 5' 3\" (1.6 m)     Body mass index is 58.28 kg/m². Wt Readings from Last 3 Encounters:   07/11/19 (!) 329 lb (149.2 kg)   07/02/19 (!) 327 lb (148.3 kg)   07/02/19 (!) 324 lb 12.8 oz (147.3 kg)     BP Readings from Last 3 Encounters:   07/11/19 101/67   07/02/19 138/85   07/02/19 (!) 142/88     Constitutional and General Appearance: In wheel chair.    WD/WN in NAD  HEENT:  NC/AT  TRE  No problems with hearing  Skin:  Warm, dry  Respiratory:  · Normal excursion and expansion without use of accessory muscles  · Resp Auscultation: Normal breath sounds without dullness  Cardiovascular:  · The apical impulses not displaced  · Heart tones are crisp and normal  · Cervical veins are not engorged  · The carotid upstroke is normal

## 2019-07-12 ENCOUNTER — TELEPHONE (OUTPATIENT)
Dept: VASCULAR SURGERY | Age: 64
End: 2019-07-12

## 2019-07-12 NOTE — TELEPHONE ENCOUNTER
Discuss recent fistulogram results with patient. We'll move forward with right upper arm AV graft and ligation of her brachiocephalic fistula.

## 2019-07-15 ENCOUNTER — PREP FOR PROCEDURE (OUTPATIENT)
Dept: VASCULAR SURGERY | Age: 64
End: 2019-07-15

## 2019-07-15 DIAGNOSIS — N18.6 ESRD (END STAGE RENAL DISEASE) ON DIALYSIS (HCC): Primary | ICD-10-CM

## 2019-07-15 DIAGNOSIS — Z99.2 ESRD (END STAGE RENAL DISEASE) ON DIALYSIS (HCC): Primary | ICD-10-CM

## 2019-07-15 RX ORDER — SODIUM CHLORIDE 0.9 % (FLUSH) 0.9 %
10 SYRINGE (ML) INJECTION EVERY 12 HOURS SCHEDULED
Status: CANCELLED | OUTPATIENT
Start: 2019-07-15

## 2019-07-15 RX ORDER — SODIUM CHLORIDE 0.9 % (FLUSH) 0.9 %
10 SYRINGE (ML) INJECTION PRN
Status: CANCELLED | OUTPATIENT
Start: 2019-07-15

## 2019-07-16 ENCOUNTER — ANESTHESIA EVENT (OUTPATIENT)
Dept: OPERATING ROOM | Age: 64
DRG: 189 | End: 2019-07-16
Payer: MEDICARE

## 2019-07-16 ENCOUNTER — HOSPITAL ENCOUNTER (OUTPATIENT)
Dept: PREADMISSION TESTING | Age: 64
Discharge: HOME | DRG: 189 | End: 2019-07-20
Payer: MEDICARE

## 2019-07-16 VITALS — BODY MASS INDEX: 51.91 KG/M2 | WEIGHT: 293 LBS | HEIGHT: 63 IN

## 2019-07-16 DIAGNOSIS — N18.6 ESRD (END STAGE RENAL DISEASE) ON DIALYSIS (HCC): ICD-10-CM

## 2019-07-16 DIAGNOSIS — Z99.2 ESRD (END STAGE RENAL DISEASE) ON DIALYSIS (HCC): ICD-10-CM

## 2019-07-16 LAB
ABO/RH: NORMAL
ABO/RH: NORMAL
ANION GAP SERPL CALCULATED.3IONS-SCNC: 13 MMOL/L (ref 3–16)
ANTIBODY SCREEN: NORMAL
APTT: 28.5 SEC (ref 26–36)
BUN BLDV-MCNC: 33 MG/DL (ref 7–20)
CALCIUM SERPL-MCNC: 9.6 MG/DL (ref 8.3–10.6)
CHLORIDE BLD-SCNC: 96 MMOL/L (ref 99–110)
CO2: 26 MMOL/L (ref 21–32)
CREAT SERPL-MCNC: 9.8 MG/DL (ref 0.6–1.2)
GFR AFRICAN AMERICAN: 5
GFR NON-AFRICAN AMERICAN: 4
GLUCOSE BLD-MCNC: 119 MG/DL (ref 70–99)
HCT VFR BLD CALC: 36.4 % (ref 36–48)
HEMOGLOBIN: 11.7 G/DL (ref 12–16)
INR BLD: 1.26 (ref 0.86–1.14)
MCH RBC QN AUTO: 34.4 PG (ref 26–34)
MCHC RBC AUTO-ENTMCNC: 32.2 G/DL (ref 31–36)
MCV RBC AUTO: 107 FL (ref 80–100)
PDW BLD-RTO: 20.5 % (ref 12.4–15.4)
PLATELET # BLD: 202 K/UL (ref 135–450)
PMV BLD AUTO: 8.6 FL (ref 5–10.5)
POTASSIUM SERPL-SCNC: 5.9 MMOL/L (ref 3.5–5.1)
PROTHROMBIN TIME: 14.4 SEC (ref 9.8–13)
RBC # BLD: 3.4 M/UL (ref 4–5.2)
SODIUM BLD-SCNC: 135 MMOL/L (ref 136–145)
WBC # BLD: 6.3 K/UL (ref 4–11)

## 2019-07-16 PROCEDURE — 86850 RBC ANTIBODY SCREEN: CPT

## 2019-07-16 PROCEDURE — 85610 PROTHROMBIN TIME: CPT

## 2019-07-16 PROCEDURE — 36415 COLL VENOUS BLD VENIPUNCTURE: CPT

## 2019-07-16 PROCEDURE — 86901 BLOOD TYPING SEROLOGIC RH(D): CPT

## 2019-07-16 PROCEDURE — 86900 BLOOD TYPING SEROLOGIC ABO: CPT

## 2019-07-16 PROCEDURE — 85027 COMPLETE CBC AUTOMATED: CPT

## 2019-07-16 PROCEDURE — 80048 BASIC METABOLIC PNL TOTAL CA: CPT

## 2019-07-16 PROCEDURE — 85730 THROMBOPLASTIN TIME PARTIAL: CPT

## 2019-07-16 ASSESSMENT — PAIN DESCRIPTION - PAIN TYPE: TYPE: ACUTE PAIN

## 2019-07-16 ASSESSMENT — PAIN DESCRIPTION - LOCATION: LOCATION: HAND

## 2019-07-16 ASSESSMENT — PAIN DESCRIPTION - ORIENTATION: ORIENTATION: RIGHT

## 2019-07-16 ASSESSMENT — LIFESTYLE VARIABLES: SMOKING_STATUS: 0

## 2019-07-16 ASSESSMENT — COPD QUESTIONNAIRES: CAT_SEVERITY: MODERATE

## 2019-07-16 ASSESSMENT — ENCOUNTER SYMPTOMS: SHORTNESS OF BREATH: 1

## 2019-07-16 ASSESSMENT — PAIN SCALES - GENERAL: PAINLEVEL_OUTOF10: 7

## 2019-07-16 NOTE — PROGRESS NOTES
23.For your convenience [facility] has a pharmacy on site to fill your prescriptions. 24. If you use oxygen and have a portable tank please bring it  with you the DOS             25. Bring a complete list of all your medications with name and dose include any supplements. 26. Other__________________________________________   *Please call pre admission testing if you any further questions   [facility] [id number]    [facility] [address] [facility]  [phone]   [address] [place] [address]       All above information reviewed with patient in person or by phone. Patient verbalizes understanding. All questions and concerns addressed.                                                                                                  Patient/Rep__PT__________________                                                                                                                                    PRE OP INSTRUCTIONS

## 2019-07-16 NOTE — ANESTHESIA PRE PROCEDURE
incompetence) 3/16/19   JURGEN Rose - CNP   UNIFINE PENTIPS 31G X 5 MM MISC USE WITH INSULIN PENS FOUR TIMES DAILY 3/8/19   JURGEN Rose CNP   insulin lispro (HUMALOG KWIKPEN) 100 UNIT/ML pen Inject 0-12 Units into the skin 3 times daily (before meals) 3/7/19   JURGEN Rose - CNP   lidocaine-prilocaine (EMLA) 2.5-2.5 % cream Apply topically as needed.  3/5/19   JURGEN Rose - CNP   promethazine (PHENERGAN) 25 MG tablet Take 1 tablet by mouth every 8 hours as needed for Nausea 1/30/19   JURGEN Rose CNP   calcium acetate (PHOSLO) 667 MG capsule Take 2 capsules by mouth 3 times daily (with meals) 1/5/19   Capri Hobbs MD   nebivolol (BYSTOLIC) 2.5 MG tablet Take 1 tablet by mouth 2 times daily 1/5/19   Capri Hobbs MD   docusate sodium (COLACE) 100 MG capsule Take 1 capsule by mouth 2 times daily 11/21/18   Liudmila Shah MD   albuterol sulfate HFA (PROAIR HFA) 108 (90 Base) MCG/ACT inhaler Inhale 2 puffs into the lungs every 6 hours as needed for Wheezing 9/25/18   Elijah Brothers MD   fluticasone-salmeterol (ADVAIR HFA) 230-21 MCG/ACT inhaler Inhale 1 puff into the lungs 2 times daily 9/25/18   Elijah Brothers MD   ipratropium-albuterol (DUONEB) 0.5-2.5 (3) MG/3ML SOLN nebulizer solution Inhale 3 mLs into the lungs every 4 hours DX:COPD J44.9 9/25/18   Elijah Brothers MD   TRULICITY 8.06 MC/0.3FB SOPN INJECT ONE  SYRINGE SUBCUTANEOUSLY ONCE A WEEK 7/31/18   JURGEN Rose - CNP   RELION PEN NEEDLE 31G/8MM 31G X 8 MM MISC USE  THREE TIMES DAILY AS DIRECTED 4/17/18   JURGEN Rose - CNP   nitroGLYCERIN (NITROSTAT) 0.4 MG SL tablet DISSOLVE 1 TABLET UNDER THE TONGUE EVERY 5 MINUTES AS  NEEDED ; MAXIMUM OF 3  TABLETS IN 15 MINUTES 4/11/18   JURGEN Rose CNP   ONE TOUCH ULTRA TEST strip USE TO TEST 3-4 TIMES DAILY DUE TO FLUCTUATING SUGAR 11/14/17   JURGEN Rose CNP   ASSURE COMFORT LANCETS 30G MISC USE TO TEST BLOOD GLUCOSE  UNIFINE PENTIPS 31G X 5 MM MISC USE WITH INSULIN PENS FOUR TIMES DAILY 400 each 3    insulin lispro (HUMALOG KWIKPEN) 100 UNIT/ML pen Inject 0-12 Units into the skin 3 times daily (before meals) 5 pen 5    lidocaine-prilocaine (EMLA) 2.5-2.5 % cream Apply topically as needed.  1 Tube 5    promethazine (PHENERGAN) 25 MG tablet Take 1 tablet by mouth every 8 hours as needed for Nausea 30 tablet 2    calcium acetate (PHOSLO) 667 MG capsule Take 2 capsules by mouth 3 times daily (with meals) 60 capsule 3    nebivolol (BYSTOLIC) 2.5 MG tablet Take 1 tablet by mouth 2 times daily 30 tablet 3    docusate sodium (COLACE) 100 MG capsule Take 1 capsule by mouth 2 times daily 60 capsule 0    albuterol sulfate HFA (PROAIR HFA) 108 (90 Base) MCG/ACT inhaler Inhale 2 puffs into the lungs every 6 hours as needed for Wheezing 1 Inhaler 11    fluticasone-salmeterol (ADVAIR HFA) 230-21 MCG/ACT inhaler Inhale 1 puff into the lungs 2 times daily 1 Inhaler 11    ipratropium-albuterol (DUONEB) 0.5-2.5 (3) MG/3ML SOLN nebulizer solution Inhale 3 mLs into the lungs every 4 hours DX:COPD J44.9 128 mL 11    TRULICITY 8.95 PS/6.0NL SOPN INJECT ONE  SYRINGE SUBCUTANEOUSLY ONCE A WEEK 15 pen 3    RELION PEN NEEDLE 31G/8MM 31G X 8 MM MISC USE  THREE TIMES DAILY AS DIRECTED 100 each 5    nitroGLYCERIN (NITROSTAT) 0.4 MG SL tablet DISSOLVE 1 TABLET UNDER THE TONGUE EVERY 5 MINUTES AS  NEEDED ; MAXIMUM OF 3  TABLETS IN 15 MINUTES 75 tablet 1    ONE TOUCH ULTRA TEST strip USE TO TEST 3-4 TIMES DAILY DUE TO FLUCTUATING SUGAR 100 strip 5    ASSURE COMFORT LANCETS 30G MISC USE TO TEST BLOOD GLUCOSE THREE TIMES DAILY 300 each 3    Alcohol Swabs (ALCOHOL PREP) 70 % PADS USE TO TEST BLOOD GLUCOSE THREE TIMES DAILY 300 each 3    Blood Glucose Monitoring Suppl (ACCU-CHEK KENNETH SMARTVIEW) w/Device KIT USE TO TEST BLOOD GLUCOSE 1 kit 0    BiPAP Machine MISC by Does not apply route nightly      Spacer/Aero-Holding Chambers VENANCIO 1 Device by

## 2019-07-17 ENCOUNTER — TELEPHONE (OUTPATIENT)
Dept: FAMILY MEDICINE CLINIC | Age: 64
End: 2019-07-17

## 2019-07-17 RX ORDER — SODIUM CHLORIDE 0.9 % (FLUSH) 0.9 %
30 SYRINGE (ML) INJECTION ONCE
Status: CANCELLED
Start: 2019-07-17

## 2019-07-18 ENCOUNTER — ANESTHESIA (OUTPATIENT)
Dept: OPERATING ROOM | Age: 64
DRG: 189 | End: 2019-07-18
Payer: MEDICARE

## 2019-07-18 ENCOUNTER — HOSPITAL ENCOUNTER (OUTPATIENT)
Age: 64
Setting detail: OUTPATIENT SURGERY
Discharge: HOME | DRG: 189 | End: 2019-07-18
Attending: SURGERY | Admitting: SURGERY
Payer: MEDICARE

## 2019-07-18 VITALS
SYSTOLIC BLOOD PRESSURE: 173 MMHG | RESPIRATION RATE: 6 BRPM | OXYGEN SATURATION: 86 % | TEMPERATURE: 96.8 F | DIASTOLIC BLOOD PRESSURE: 79 MMHG

## 2019-07-18 VITALS
HEART RATE: 77 BPM | OXYGEN SATURATION: 98 % | RESPIRATION RATE: 21 BRPM | TEMPERATURE: 97 F | BODY MASS INDEX: 57.85 KG/M2 | DIASTOLIC BLOOD PRESSURE: 87 MMHG | SYSTOLIC BLOOD PRESSURE: 130 MMHG | WEIGHT: 293 LBS

## 2019-07-18 DIAGNOSIS — N18.6 ESRD (END STAGE RENAL DISEASE) ON DIALYSIS (HCC): Primary | ICD-10-CM

## 2019-07-18 DIAGNOSIS — Z99.2 ESRD (END STAGE RENAL DISEASE) ON DIALYSIS (HCC): Primary | ICD-10-CM

## 2019-07-18 LAB
ABO/RH: NORMAL
ABO/RH: NORMAL
ANION GAP SERPL CALCULATED.3IONS-SCNC: 14 MMOL/L (ref 3–16)
ANTIBODY SCREEN: NORMAL
APTT: 31.7 SEC (ref 26–36)
BUN BLDV-MCNC: 35 MG/DL (ref 7–20)
CALCIUM SERPL-MCNC: 9.3 MG/DL (ref 8.3–10.6)
CHLORIDE BLD-SCNC: 98 MMOL/L (ref 99–110)
CO2: 24 MMOL/L (ref 21–32)
CREAT SERPL-MCNC: 9.4 MG/DL (ref 0.6–1.2)
EKG ATRIAL RATE: 80 BPM
EKG DIAGNOSIS: NORMAL
EKG P AXIS: 43 DEGREES
EKG P-R INTERVAL: 186 MS
EKG Q-T INTERVAL: 382 MS
EKG QRS DURATION: 82 MS
EKG QTC CALCULATION (BAZETT): 440 MS
EKG R AXIS: -29 DEGREES
EKG T AXIS: 77 DEGREES
EKG VENTRICULAR RATE: 80 BPM
GFR AFRICAN AMERICAN: 5
GFR NON-AFRICAN AMERICAN: 4
GLUCOSE BLD-MCNC: 123 MG/DL (ref 70–99)
GLUCOSE BLD-MCNC: 137 MG/DL (ref 70–99)
INR BLD: 1.22 (ref 0.86–1.14)
PERFORMED ON: ABNORMAL
POTASSIUM SERPL-SCNC: 5.6 MMOL/L (ref 3.5–5.1)
PROTHROMBIN TIME: 13.9 SEC (ref 9.8–13)
SODIUM BLD-SCNC: 136 MMOL/L (ref 136–145)

## 2019-07-18 PROCEDURE — 2580000003 HC RX 258: Performed by: SURGERY

## 2019-07-18 PROCEDURE — 2580000003 HC RX 258: Performed by: NURSE PRACTITIONER

## 2019-07-18 PROCEDURE — 6360000002 HC RX W HCPCS: Performed by: ANESTHESIOLOGY

## 2019-07-18 PROCEDURE — 94760 N-INVAS EAR/PLS OXIMETRY 1: CPT

## 2019-07-18 PROCEDURE — 3600000004 HC SURGERY LEVEL 4 BASE: Performed by: SURGERY

## 2019-07-18 PROCEDURE — 3700000001 HC ADD 15 MINUTES (ANESTHESIA): Performed by: SURGERY

## 2019-07-18 PROCEDURE — 86900 BLOOD TYPING SEROLOGIC ABO: CPT

## 2019-07-18 PROCEDURE — 86850 RBC ANTIBODY SCREEN: CPT

## 2019-07-18 PROCEDURE — 6370000000 HC RX 637 (ALT 250 FOR IP): Performed by: ANESTHESIOLOGY

## 2019-07-18 PROCEDURE — 36830 ARTERY-VEIN NONAUTOGRAFT: CPT | Performed by: SURGERY

## 2019-07-18 PROCEDURE — 93010 ELECTROCARDIOGRAM REPORT: CPT | Performed by: INTERNAL MEDICINE

## 2019-07-18 PROCEDURE — 2500000003 HC RX 250 WO HCPCS: Performed by: NURSE ANESTHETIST, CERTIFIED REGISTERED

## 2019-07-18 PROCEDURE — 6360000002 HC RX W HCPCS: Performed by: SURGERY

## 2019-07-18 PROCEDURE — 94640 AIRWAY INHALATION TREATMENT: CPT

## 2019-07-18 PROCEDURE — 93005 ELECTROCARDIOGRAM TRACING: CPT | Performed by: ANESTHESIOLOGY

## 2019-07-18 PROCEDURE — 3600000014 HC SURGERY LEVEL 4 ADDTL 15MIN: Performed by: SURGERY

## 2019-07-18 PROCEDURE — 85610 PROTHROMBIN TIME: CPT

## 2019-07-18 PROCEDURE — 2709999900 HC NON-CHARGEABLE SUPPLY: Performed by: SURGERY

## 2019-07-18 PROCEDURE — 86901 BLOOD TYPING SEROLOGIC RH(D): CPT

## 2019-07-18 PROCEDURE — C1768 GRAFT, VASCULAR: HCPCS | Performed by: SURGERY

## 2019-07-18 PROCEDURE — 6360000002 HC RX W HCPCS: Performed by: NURSE ANESTHETIST, CERTIFIED REGISTERED

## 2019-07-18 PROCEDURE — 37607 LIG/BANDING ANGIOACS AV FSTL: CPT | Performed by: SURGERY

## 2019-07-18 PROCEDURE — 6360000002 HC RX W HCPCS: Performed by: NURSE PRACTITIONER

## 2019-07-18 PROCEDURE — 2580000003 HC RX 258: Performed by: ANESTHESIOLOGY

## 2019-07-18 PROCEDURE — 7100000010 HC PHASE II RECOVERY - FIRST 15 MIN: Performed by: SURGERY

## 2019-07-18 PROCEDURE — 6370000000 HC RX 637 (ALT 250 FOR IP): Performed by: SURGERY

## 2019-07-18 PROCEDURE — 7100000011 HC PHASE II RECOVERY - ADDTL 15 MIN: Performed by: SURGERY

## 2019-07-18 PROCEDURE — 80048 BASIC METABOLIC PNL TOTAL CA: CPT

## 2019-07-18 PROCEDURE — 85730 THROMBOPLASTIN TIME PARTIAL: CPT

## 2019-07-18 PROCEDURE — 7100000000 HC PACU RECOVERY - FIRST 15 MIN: Performed by: SURGERY

## 2019-07-18 PROCEDURE — 3700000000 HC ANESTHESIA ATTENDED CARE: Performed by: SURGERY

## 2019-07-18 PROCEDURE — 6370000000 HC RX 637 (ALT 250 FOR IP)

## 2019-07-18 PROCEDURE — 7100000001 HC PACU RECOVERY - ADDTL 15 MIN: Performed by: SURGERY

## 2019-07-18 DEVICE — GRAFT VASC L45CM DIA4-6MM UNIV PTFE STR STD WALL TAPR: Type: IMPLANTABLE DEVICE | Site: ARTERIAL | Status: FUNCTIONAL

## 2019-07-18 RX ORDER — NEOSTIGMINE METHYLSULFATE 5 MG/5 ML
SYRINGE (ML) INTRAVENOUS PRN
Status: DISCONTINUED | OUTPATIENT
Start: 2019-07-18 | End: 2019-07-18 | Stop reason: SDUPTHER

## 2019-07-18 RX ORDER — HEPARIN SODIUM 1000 [USP'U]/ML
INJECTION, SOLUTION INTRAVENOUS; SUBCUTANEOUS PRN
Status: DISCONTINUED | OUTPATIENT
Start: 2019-07-18 | End: 2019-07-18 | Stop reason: SDUPTHER

## 2019-07-18 RX ORDER — IPRATROPIUM BROMIDE AND ALBUTEROL SULFATE 2.5; .5 MG/3ML; MG/3ML
SOLUTION RESPIRATORY (INHALATION)
Status: DISCONTINUED
Start: 2019-07-18 | End: 2019-07-18 | Stop reason: HOSPADM

## 2019-07-18 RX ORDER — LIDOCAINE HYDROCHLORIDE 10 MG/ML
1 INJECTION, SOLUTION EPIDURAL; INFILTRATION; INTRACAUDAL; PERINEURAL
Status: DISCONTINUED | OUTPATIENT
Start: 2019-07-18 | End: 2019-07-18 | Stop reason: HOSPADM

## 2019-07-18 RX ORDER — HYDROMORPHONE HCL 110MG/55ML
0.25 PATIENT CONTROLLED ANALGESIA SYRINGE INTRAVENOUS EVERY 5 MIN PRN
Status: DISCONTINUED | OUTPATIENT
Start: 2019-07-18 | End: 2019-07-18 | Stop reason: HOSPADM

## 2019-07-18 RX ORDER — HYDROMORPHONE HCL 110MG/55ML
0.5 PATIENT CONTROLLED ANALGESIA SYRINGE INTRAVENOUS EVERY 5 MIN PRN
Status: DISCONTINUED | OUTPATIENT
Start: 2019-07-18 | End: 2019-07-18 | Stop reason: HOSPADM

## 2019-07-18 RX ORDER — IPRATROPIUM BROMIDE AND ALBUTEROL SULFATE 2.5; .5 MG/3ML; MG/3ML
1 SOLUTION RESPIRATORY (INHALATION) ONCE
Status: DISCONTINUED | OUTPATIENT
Start: 2019-07-18 | End: 2019-07-18

## 2019-07-18 RX ORDER — ONDANSETRON 2 MG/ML
INJECTION INTRAMUSCULAR; INTRAVENOUS PRN
Status: DISCONTINUED | OUTPATIENT
Start: 2019-07-18 | End: 2019-07-18 | Stop reason: SDUPTHER

## 2019-07-18 RX ORDER — GLYCOPYRROLATE 1 MG/5 ML
SYRINGE (ML) INTRAVENOUS PRN
Status: DISCONTINUED | OUTPATIENT
Start: 2019-07-18 | End: 2019-07-18 | Stop reason: SDUPTHER

## 2019-07-18 RX ORDER — HYDROMORPHONE HCL 110MG/55ML
PATIENT CONTROLLED ANALGESIA SYRINGE INTRAVENOUS PRN
Status: DISCONTINUED | OUTPATIENT
Start: 2019-07-18 | End: 2019-07-18 | Stop reason: SDUPTHER

## 2019-07-18 RX ORDER — CISATRACURIUM BESYLATE 2 MG/ML
INJECTION, SOLUTION INTRAVENOUS PRN
Status: DISCONTINUED | OUTPATIENT
Start: 2019-07-18 | End: 2019-07-18 | Stop reason: SDUPTHER

## 2019-07-18 RX ORDER — PROTAMINE SULFATE 10 MG/ML
INJECTION, SOLUTION INTRAVENOUS PRN
Status: DISCONTINUED | OUTPATIENT
Start: 2019-07-18 | End: 2019-07-18 | Stop reason: SDUPTHER

## 2019-07-18 RX ORDER — HYDROCODONE BITARTRATE AND ACETAMINOPHEN 5; 325 MG/1; MG/1
1 TABLET ORAL ONCE
Status: DISCONTINUED | OUTPATIENT
Start: 2019-07-18 | End: 2019-07-18

## 2019-07-18 RX ORDER — SODIUM CHLORIDE 9 MG/ML
INJECTION, SOLUTION INTRAVENOUS CONTINUOUS
Status: DISCONTINUED | OUTPATIENT
Start: 2019-07-18 | End: 2019-07-18 | Stop reason: HOSPADM

## 2019-07-18 RX ORDER — PHENYLEPHRINE HCL IN 0.9% NACL 1 MG/10 ML
SYRINGE (ML) INTRAVENOUS PRN
Status: DISCONTINUED | OUTPATIENT
Start: 2019-07-18 | End: 2019-07-18 | Stop reason: SDUPTHER

## 2019-07-18 RX ORDER — KETAMINE HCL IN NACL, ISO-OSM 100MG/10ML
SYRINGE (ML) INJECTION PRN
Status: DISCONTINUED | OUTPATIENT
Start: 2019-07-18 | End: 2019-07-18 | Stop reason: SDUPTHER

## 2019-07-18 RX ORDER — HYDROCODONE BITARTRATE AND ACETAMINOPHEN 5; 325 MG/1; MG/1
TABLET ORAL
Status: DISCONTINUED
Start: 2019-07-18 | End: 2019-07-18 | Stop reason: HOSPADM

## 2019-07-18 RX ORDER — SODIUM CHLORIDE 0.9 % (FLUSH) 0.9 %
10 SYRINGE (ML) INJECTION PRN
Status: DISCONTINUED | OUTPATIENT
Start: 2019-07-18 | End: 2019-07-18 | Stop reason: HOSPADM

## 2019-07-18 RX ORDER — IPRATROPIUM BROMIDE AND ALBUTEROL SULFATE 2.5; .5 MG/3ML; MG/3ML
1 SOLUTION RESPIRATORY (INHALATION) ONCE
Status: COMPLETED | OUTPATIENT
Start: 2019-07-18 | End: 2019-07-18

## 2019-07-18 RX ORDER — SODIUM CHLORIDE 0.9 % (FLUSH) 0.9 %
10 SYRINGE (ML) INJECTION EVERY 12 HOURS SCHEDULED
Status: DISCONTINUED | OUTPATIENT
Start: 2019-07-18 | End: 2019-07-18 | Stop reason: HOSPADM

## 2019-07-18 RX ORDER — CLINDAMYCIN PHOSPHATE 900 MG/50ML
900 INJECTION INTRAVENOUS
Status: DISCONTINUED | OUTPATIENT
Start: 2019-07-18 | End: 2019-07-18 | Stop reason: HOSPADM

## 2019-07-18 RX ORDER — HYDROCODONE BITARTRATE AND ACETAMINOPHEN 5; 325 MG/1; MG/1
1 TABLET ORAL EVERY 6 HOURS PRN
Qty: 10 TABLET | Refills: 0 | Status: SHIPPED | OUTPATIENT
Start: 2019-07-18 | End: 2019-07-21

## 2019-07-18 RX ORDER — ONDANSETRON 2 MG/ML
4 INJECTION INTRAMUSCULAR; INTRAVENOUS
Status: COMPLETED | OUTPATIENT
Start: 2019-07-18 | End: 2019-07-18

## 2019-07-18 RX ADMIN — HYDROMORPHONE HYDROCHLORIDE 0.5 MG: 2 INJECTION, SOLUTION INTRAMUSCULAR; INTRAVENOUS; SUBCUTANEOUS at 14:22

## 2019-07-18 RX ADMIN — Medication 100 MCG: at 13:38

## 2019-07-18 RX ADMIN — SODIUM CHLORIDE: 9 INJECTION, SOLUTION INTRAVENOUS at 10:41

## 2019-07-18 RX ADMIN — CISATRACURIUM BESYLATE 8 MG: 2 INJECTION INTRAVENOUS at 11:39

## 2019-07-18 RX ADMIN — HYDROMORPHONE HYDROCHLORIDE 0.5 MG: 2 INJECTION, SOLUTION INTRAMUSCULAR; INTRAVENOUS; SUBCUTANEOUS at 14:10

## 2019-07-18 RX ADMIN — Medication 0.4 MG: at 13:18

## 2019-07-18 RX ADMIN — PROTAMINE SULFATE 20 MG: 10 INJECTION, SOLUTION INTRAVENOUS at 13:10

## 2019-07-18 RX ADMIN — Medication 4 MG: at 13:18

## 2019-07-18 RX ADMIN — Medication 10 MG: at 12:34

## 2019-07-18 RX ADMIN — ONDANSETRON 4 MG: 2 INJECTION INTRAMUSCULAR; INTRAVENOUS at 13:39

## 2019-07-18 RX ADMIN — CISATRACURIUM BESYLATE 2 MG: 2 INJECTION INTRAVENOUS at 12:10

## 2019-07-18 RX ADMIN — Medication 100 MCG: at 12:23

## 2019-07-18 RX ADMIN — HYDROMORPHONE HYDROCHLORIDE 0.5 MG: 2 INJECTION, SOLUTION INTRAMUSCULAR; INTRAVENOUS; SUBCUTANEOUS at 11:51

## 2019-07-18 RX ADMIN — Medication 100 MCG: at 12:25

## 2019-07-18 RX ADMIN — HEPARIN SODIUM 5000 UNITS: 1000 INJECTION INTRAVENOUS; SUBCUTANEOUS at 12:28

## 2019-07-18 RX ADMIN — Medication 100 MCG: at 12:13

## 2019-07-18 RX ADMIN — Medication 3 G: at 11:20

## 2019-07-18 RX ADMIN — Medication 200 MCG: at 12:38

## 2019-07-18 RX ADMIN — ONDANSETRON 4 MG: 2 INJECTION INTRAMUSCULAR; INTRAVENOUS at 14:04

## 2019-07-18 RX ADMIN — CISATRACURIUM BESYLATE 2 MG: 2 INJECTION INTRAVENOUS at 12:02

## 2019-07-18 RX ADMIN — Medication 100 MCG: at 12:19

## 2019-07-18 RX ADMIN — IPRATROPIUM BROMIDE AND ALBUTEROL SULFATE 1 AMPULE: .5; 3 SOLUTION RESPIRATORY (INHALATION) at 11:09

## 2019-07-18 RX ADMIN — Medication 100 MCG: at 13:14

## 2019-07-18 RX ADMIN — Medication 100 MCG: at 12:17

## 2019-07-18 RX ADMIN — HYDROMORPHONE HYDROCHLORIDE 0.5 MG: 2 INJECTION, SOLUTION INTRAMUSCULAR; INTRAVENOUS; SUBCUTANEOUS at 14:49

## 2019-07-18 RX ADMIN — HYDROMORPHONE HYDROCHLORIDE 0.5 MG: 2 INJECTION, SOLUTION INTRAMUSCULAR; INTRAVENOUS; SUBCUTANEOUS at 11:20

## 2019-07-18 RX ADMIN — Medication 10 MG: at 13:03

## 2019-07-18 RX ADMIN — Medication 10 MG: at 11:56

## 2019-07-18 ASSESSMENT — PULMONARY FUNCTION TESTS
PIF_VALUE: 35
PIF_VALUE: 34
PIF_VALUE: 35
PIF_VALUE: 35
PIF_VALUE: 36
PIF_VALUE: 34
PIF_VALUE: 1
PIF_VALUE: 35
PIF_VALUE: 35
PIF_VALUE: 37
PIF_VALUE: 38
PIF_VALUE: 36
PIF_VALUE: 35
PIF_VALUE: 34
PIF_VALUE: 37
PIF_VALUE: 36
PIF_VALUE: 35
PIF_VALUE: 34
PIF_VALUE: 35
PIF_VALUE: 36
PIF_VALUE: 35
PIF_VALUE: 4
PIF_VALUE: 37
PIF_VALUE: 38
PIF_VALUE: 35
PIF_VALUE: 33
PIF_VALUE: 36
PIF_VALUE: 37
PIF_VALUE: 37
PIF_VALUE: 36
PIF_VALUE: 35
PIF_VALUE: 3
PIF_VALUE: 36
PIF_VALUE: 36
PIF_VALUE: 1
PIF_VALUE: 3
PIF_VALUE: 35
PIF_VALUE: 36
PIF_VALUE: 3
PIF_VALUE: 37
PIF_VALUE: 35
PIF_VALUE: 34
PIF_VALUE: 35
PIF_VALUE: 0
PIF_VALUE: 34
PIF_VALUE: 35
PIF_VALUE: 36
PIF_VALUE: 34
PIF_VALUE: 35
PIF_VALUE: 36
PIF_VALUE: 38
PIF_VALUE: 34
PIF_VALUE: 38
PIF_VALUE: 35
PIF_VALUE: 36
PIF_VALUE: 32
PIF_VALUE: 34
PIF_VALUE: 38
PIF_VALUE: 35
PIF_VALUE: 33
PIF_VALUE: 34
PIF_VALUE: 3
PIF_VALUE: 39
PIF_VALUE: 35
PIF_VALUE: 1
PIF_VALUE: 38
PIF_VALUE: 35
PIF_VALUE: 37
PIF_VALUE: 37
PIF_VALUE: 1
PIF_VALUE: 36
PIF_VALUE: 3
PIF_VALUE: 1
PIF_VALUE: 1
PIF_VALUE: 34
PIF_VALUE: 36
PIF_VALUE: 35
PIF_VALUE: 39
PIF_VALUE: 35
PIF_VALUE: 0
PIF_VALUE: 38
PIF_VALUE: 36
PIF_VALUE: 35
PIF_VALUE: 37
PIF_VALUE: 34
PIF_VALUE: 35
PIF_VALUE: 36
PIF_VALUE: 35
PIF_VALUE: 37
PIF_VALUE: 35
PIF_VALUE: 36
PIF_VALUE: 35
PIF_VALUE: 1
PIF_VALUE: 37
PIF_VALUE: 38
PIF_VALUE: 32
PIF_VALUE: 33
PIF_VALUE: 36
PIF_VALUE: 0
PIF_VALUE: 35
PIF_VALUE: 36
PIF_VALUE: 37
PIF_VALUE: 35
PIF_VALUE: 36
PIF_VALUE: 35
PIF_VALUE: 37
PIF_VALUE: 37
PIF_VALUE: 35
PIF_VALUE: 23
PIF_VALUE: 36
PIF_VALUE: 37
PIF_VALUE: 35
PIF_VALUE: 33
PIF_VALUE: 38
PIF_VALUE: 35
PIF_VALUE: 35
PIF_VALUE: 36
PIF_VALUE: 35
PIF_VALUE: 38
PIF_VALUE: 34
PIF_VALUE: 35
PIF_VALUE: 37
PIF_VALUE: 38
PIF_VALUE: 5
PIF_VALUE: 1
PIF_VALUE: 35
PIF_VALUE: 38
PIF_VALUE: 35
PIF_VALUE: 21
PIF_VALUE: 35

## 2019-07-18 ASSESSMENT — PAIN SCALES - GENERAL
PAINLEVEL_OUTOF10: 7
PAINLEVEL_OUTOF10: 7
PAINLEVEL_OUTOF10: 8

## 2019-07-18 ASSESSMENT — COPD QUESTIONNAIRES: CAT_SEVERITY: MODERATE

## 2019-07-18 ASSESSMENT — ENCOUNTER SYMPTOMS: SHORTNESS OF BREATH: 1

## 2019-07-18 ASSESSMENT — LIFESTYLE VARIABLES: SMOKING_STATUS: 0

## 2019-07-18 ASSESSMENT — PAIN - FUNCTIONAL ASSESSMENT: PAIN_FUNCTIONAL_ASSESSMENT: 0-10

## 2019-07-18 NOTE — H&P
and negative with the exception of the above findings        Physical Exam   Vital Signs: /86   Pulse 75   Temp 96.6 °F (35.9 °C) (Temporal)   Resp 18   Wt (!) 326 lb 9 oz (148.1 kg)   LMP 11/01/1996 (Exact Date)   SpO2 100%   BMI 57.85 kg/m²        Admission Weight: (!) 326 lb 9 oz (148.1 kg)     General appearance: alert, appears stated age, cooperative and no distress  Head: Normocephalic, without obvious abnormality, atraumatic  Lungs: clear to auscultation bilaterally  Heart: regular rate and rhythm  Abdomen: soft, non-tender.  Bowel sounds normal. No masses,  no organomegaly  Extremities: extremities normal, atraumatic, no cyanosis or edema  Pulses:      Labs:    BMP:   Lab Results   Component Value Date     07/18/2019    K 5.6 07/18/2019    K 5.7 03/31/2019    CL 98 07/18/2019    CO2 24 07/18/2019    PHOS 4.9 04/01/2019    BUN 35 07/18/2019    CREATININE 9.4 07/18/2019      No components found for: GLU  Lab Results   Component Value Date    MG 2.40 08/03/2018      CBC:   Lab Results   Component Value Date    WBC 6.3 07/16/2019    HGB 11.7 07/16/2019    HCT 36.4 07/16/2019    .0 07/16/2019     07/16/2019      Coagulation:   Lab Results   Component Value Date    INR 1.22 07/18/2019    APTT 31.7 07/18/2019     Cardiac markers:   Lab Results   Component Value Date    CKMB 1.9 04/19/2015    CKMB 1.65 06/29/2011    CKTOTAL 129 07/11/2017    TROPONINI 0.10 05/29/2019     Lab Results   Component Value Date    LABA1C 7.4 05/29/2019    No results found for: ALB    Lab Results   Component Value Date    BILITOT 0.4 05/29/2019    BILIDIR 0.2 10/16/2014    AST 17 05/29/2019    ALT 17 05/29/2019    ALKPHOS 108 05/29/2019      Lab Results   Component Value Date    CHOL 117 06/27/2017    HDL 36 06/27/2017    HDL 60 09/30/2011    LDLCALC 52 06/27/2017    TRIG 144 06/27/2017          Diagnosis:  Patient Active Problem List   Diagnosis    Asthma    Colon polyps    Microalbuminuria    Venous

## 2019-07-18 NOTE — BRIEF OP NOTE
Brief Postoperative Note  ______________________________________________________________    Patient: William Ayoub  YOB: 1955  MRN: [id number]  Date of Procedure: 7/18/2019    Pre-Op Diagnosis: N18.6  END STAGE RENAL DISEASE    Post-Op Diagnosis: Same       Procedure(s):  RIGHT BRACHIAL ARTERY AXILLARY VEIN GRAFT AND LIGATION OF RIGHT BRACHIO-CEPHALIC FISTULA (03712, 49807)    Anesthesia: General    Surgeon(s):  Trinidad Hernandez MD    Assistant: Fab King    Estimated Blood Loss (mL): 656     Complications: None    Specimens:   * No specimens in log *    Implants:  Implant Name Type Inv.  Item Serial No.  Lot No. LRB No. Used   GRAFT HEP 4-6MM X 45CM US Vascular/Graft/Patch/Filter GRAFT HEP 4-6MM X 45CM US   [employer] [id number] Right 1         Drains: * No LDAs found *        Trinidad Hernandez MD  Date: 7/18/2019  Time: 1:18 PM

## 2019-07-18 NOTE — ANESTHESIA PRE PROCEDURE
incompetence) 3/16/19   JURGEN Mills CNP   UNIFINE PENTIPS 31G X 5 MM MISC USE WITH INSULIN PENS FOUR TIMES DAILY 3/8/19   JURGEN Mills CNP   insulin lispro (HUMALOG KWIKPEN) 100 UNIT/ML pen Inject 0-12 Units into the skin 3 times daily (before meals) 3/7/19   JURGEN Mills CNP   lidocaine-prilocaine (EMLA) 2.5-2.5 % cream Apply topically as needed.  3/5/19   JURGEN Mills CNP   promethazine (PHENERGAN) 25 MG tablet Take 1 tablet by mouth every 8 hours as needed for Nausea 1/30/19   JURGEN Mills CNP   calcium acetate (PHOSLO) 667 MG capsule Take 2 capsules by mouth 3 times daily (with meals) 1/5/19   Dimitrios Timmons MD   nebivolol (BYSTOLIC) 2.5 MG tablet Take 1 tablet by mouth 2 times daily 1/5/19   Dimitrios Timmons MD   docusate sodium (COLACE) 100 MG capsule Take 1 capsule by mouth 2 times daily 11/21/18   Ramsey Gilbert MD   albuterol sulfate HFA (PROAIR HFA) 108 (90 Base) MCG/ACT inhaler Inhale 2 puffs into the lungs every 6 hours as needed for Wheezing 9/25/18   Delmi Sanchez MD   fluticasone-salmeterol (ADVAIR HFA) 230-21 MCG/ACT inhaler Inhale 1 puff into the lungs 2 times daily 9/25/18   Delmi Sanchez MD   ipratropium-albuterol (DUONEB) 0.5-2.5 (3) MG/3ML SOLN nebulizer solution Inhale 3 mLs into the lungs every 4 hours DX:COPD J44.9 9/25/18   Delmi Sanchez MD   TRULICITY 5.43 TU/1.9UC SOPN INJECT ONE  SYRINGE SUBCUTANEOUSLY ONCE A WEEK 7/31/18   JURGEN Mills CNP   RELION PEN NEEDLE 31G/8MM 31G X 8 MM MISC USE  THREE TIMES DAILY AS DIRECTED 4/17/18   JURGEN Mills CNP   nitroGLYCERIN (NITROSTAT) 0.4 MG SL tablet DISSOLVE 1 TABLET UNDER THE TONGUE EVERY 5 MINUTES AS  NEEDED ; MAXIMUM OF 3  TABLETS IN 15 MINUTES 4/11/18   JURGEN Mills CNP   ONE TOUCH ULTRA TEST strip USE TO TEST 3-4 TIMES DAILY DUE TO FLUCTUATING SUGAR 11/14/17   JURGEN Mills CNP   ASSURE COMFORT LANCETS 30G MISC USE TO TEST BLOOD GLUCOSE within 24 Hrs      ROS comment: Has not used ntg in 'a few months'     Neuro/Psych:   (+) seizures (diabetic neuropathy):, neuromuscular disease:,    (-) TIA and CVA           GI/Hepatic/Renal:   (+) GERD: well controlled, renal disease (HD M/W/F): ESRD and dialysis, morbid obesity     (-) liver disease       Endo/Other:    (+) Diabetes (a1c 7.4)Type II DM, using insulin, blood dyscrasia: anticoagulation therapy, arthritis (Gout and OA): OA., .    (-) hypothyroidism, hyperthyroidism               Abdominal:   (+) obese,         Vascular:   + PE (on coumadin lifelong). Anesthesia Plan      general     ASA 4     (Patient verbalizes understanding that she is at increased risk for complications with anesthesia including respiratory and cardiovascular due to her medical history. Patient verbalizes request to be \"completely asleep\" with GA for procedure.)  Induction: intravenous. MIPS: Postoperative opioids intended, Prophylactic antiemetics administered and Postoperative trial extubation. Anesthetic plan and risks discussed with patient. Use of blood products discussed with patient whom consented to blood products. Plan discussed with CRNA.                 Nba Torrez MD   7/18/2019

## 2019-07-19 NOTE — OP NOTE
[address]                     [address] [facility], [address] [facility]                                OPERATIVE REPORT    PATIENT NAME: Tulio Barton                    :        1955  MED REC NO:   [id number]                          ROOM:  ACCOUNT NO:   [de-identified]                           ADMIT DATE: 2019  PROVIDER:     Mark Whyte MD    DATE OF PROCEDURE:  2019    PRE-PROCEDURE DIAGNOSIS:  End-stage renal disease. POST-PROCEDURE DIAGNOSIS:  End-stage renal disease. PROCEDURE:  1.  Ligation of right brachiocephalic fistula. 2.  Right brachial artery to axillary vein, upper arm arteriovenous  graft (4-6 mm tapered graft). SURGEON:  Mark Whyte MD    ANESTHESIA:  General endotracheal anesthesia. ESTIMATED BLOOD LOSS:  100 mL. COMPLICATIONS:  None. INDICATIONS:  The patient is a 72-year-old female with difficult  vascular access. She underwent previous brachiocephalic fistula  creation and has developed a steal in her right forearm. She has a  cephalic arch stenosis and the recommendation was for ligation of this  fistula and placement of a new graft in her right upper arm. All risks,  benefits, and alternative were discussed in detail. All questions were  answered. PROCEDURE DETAILS:  After witnessed informed consent was obtained, the  patient was brought to the operating room where general anesthesia was  administered and found be adequate. Prior to the induction of  anesthesia, she received her appropriate preoperative antibiotics. Her  right arm was carefully prepped and draped. A skin incision was made in  the right axillary region with a #10 scalpel blade and dissected down  through the subcutaneous tissue with Bovie electrocautery. The axillary  vein was carefully dissected free and encircled proximally and distally  with a vessel loop.   With this then done, the previous brachial incision  just proximal to the

## 2019-07-20 ENCOUNTER — APPOINTMENT (OUTPATIENT)
Dept: GENERAL RADIOLOGY | Age: 64
DRG: 189 | End: 2019-07-20
Payer: MEDICARE

## 2019-07-20 ENCOUNTER — APPOINTMENT (OUTPATIENT)
Dept: CT IMAGING | Age: 64
DRG: 189 | End: 2019-07-20
Payer: MEDICARE

## 2019-07-20 ENCOUNTER — HOSPITAL ENCOUNTER (INPATIENT)
Age: 64
LOS: 1 days | Discharge: HOME | DRG: 189 | End: 2019-07-21
Attending: EMERGENCY MEDICINE | Admitting: FAMILY MEDICINE
Payer: MEDICARE

## 2019-07-20 DIAGNOSIS — N18.6 ESRD ON HEMODIALYSIS (HCC): ICD-10-CM

## 2019-07-20 DIAGNOSIS — R06.02 SHORTNESS OF BREATH: Primary | ICD-10-CM

## 2019-07-20 DIAGNOSIS — Z99.2 ESRD ON HEMODIALYSIS (HCC): ICD-10-CM

## 2019-07-20 DIAGNOSIS — R06.03 ACUTE RESPIRATORY DISTRESS: ICD-10-CM

## 2019-07-20 DIAGNOSIS — E87.5 HYPERKALEMIA: ICD-10-CM

## 2019-07-20 DIAGNOSIS — E87.20 LACTIC ACIDOSIS: ICD-10-CM

## 2019-07-20 PROBLEM — J96.90 RESPIRATORY FAILURE (HCC): Status: ACTIVE | Noted: 2019-07-20

## 2019-07-20 LAB
A/G RATIO: 0.9 (ref 1.1–2.2)
ALBUMIN SERPL-MCNC: 3.2 G/DL (ref 3.4–5)
ALP BLD-CCNC: 78 U/L (ref 40–129)
ALT SERPL-CCNC: <5 U/L (ref 10–40)
ANION GAP SERPL CALCULATED.3IONS-SCNC: 21 MMOL/L (ref 3–16)
APTT: 31.9 SEC (ref 26–36)
AST SERPL-CCNC: 12 U/L (ref 15–37)
BASE EXCESS VENOUS: -4.9 MMOL/L (ref -3–3)
BASOPHILS ABSOLUTE: 0.1 K/UL (ref 0–0.2)
BASOPHILS RELATIVE PERCENT: 0.6 %
BILIRUB SERPL-MCNC: 0.3 MG/DL (ref 0–1)
BUN BLDV-MCNC: 55 MG/DL (ref 7–20)
CALCIUM SERPL-MCNC: 8.9 MG/DL (ref 8.3–10.6)
CARBOXYHEMOGLOBIN: 3.4 % (ref 0–1.5)
CHLORIDE BLD-SCNC: 94 MMOL/L (ref 99–110)
CO2: 19 MMOL/L (ref 21–32)
CREAT SERPL-MCNC: 13.4 MG/DL (ref 0.6–1.2)
EOSINOPHILS ABSOLUTE: 0.2 K/UL (ref 0–0.6)
EOSINOPHILS RELATIVE PERCENT: 2.1 %
GFR AFRICAN AMERICAN: 3
GFR NON-AFRICAN AMERICAN: 3
GLOBULIN: 3.7 G/DL
GLUCOSE BLD-MCNC: 187 MG/DL (ref 70–99)
GLUCOSE BLD-MCNC: 227 MG/DL (ref 70–99)
HCO3 VENOUS: 19.6 MMOL/L (ref 23–29)
HCT VFR BLD CALC: 32.1 % (ref 36–48)
HEMOGLOBIN: 10.2 G/DL (ref 12–16)
INR BLD: 1.48 (ref 0.86–1.14)
LACTIC ACID: 3.1 MMOL/L (ref 0.4–2)
LYMPHOCYTES ABSOLUTE: 1.1 K/UL (ref 1–5.1)
LYMPHOCYTES RELATIVE PERCENT: 13.4 %
MAGNESIUM: 2.1 MG/DL (ref 1.8–2.4)
MCH RBC QN AUTO: 34.2 PG (ref 26–34)
MCHC RBC AUTO-ENTMCNC: 31.9 G/DL (ref 31–36)
MCV RBC AUTO: 107.1 FL (ref 80–100)
METHEMOGLOBIN VENOUS: 0.7 %
MONOCYTES ABSOLUTE: 0.6 K/UL (ref 0–1.3)
MONOCYTES RELATIVE PERCENT: 7.7 %
NEUTROPHILS ABSOLUTE: 6.4 K/UL (ref 1.7–7.7)
NEUTROPHILS RELATIVE PERCENT: 76.2 %
O2 CONTENT, VEN: 15 VOL %
O2 SAT, VEN: 100 %
O2 THERAPY: ABNORMAL
PCO2, VEN: 33.6 MMHG (ref 40–50)
PDW BLD-RTO: 19.2 % (ref 12.4–15.4)
PERFORMED ON: ABNORMAL
PH VENOUS: 7.38 (ref 7.35–7.45)
PLATELET # BLD: 164 K/UL (ref 135–450)
PMV BLD AUTO: 8.7 FL (ref 5–10.5)
PO2, VEN: 179 MMHG (ref 25–40)
POTASSIUM SERPL-SCNC: 5.5 MMOL/L (ref 3.5–5.1)
PRO-BNP: 2376 PG/ML (ref 0–124)
PROTHROMBIN TIME: 16.9 SEC (ref 9.8–13)
RBC # BLD: 3 M/UL (ref 4–5.2)
SODIUM BLD-SCNC: 134 MMOL/L (ref 136–145)
TCO2 CALC VENOUS: 46 MMOL/L
TOTAL PROTEIN: 6.9 G/DL (ref 6.4–8.2)
TROPONIN: 0.11 NG/ML
WBC # BLD: 8.4 K/UL (ref 4–11)

## 2019-07-20 PROCEDURE — 2580000003 HC RX 258: Performed by: FAMILY MEDICINE

## 2019-07-20 PROCEDURE — 84484 ASSAY OF TROPONIN QUANT: CPT

## 2019-07-20 PROCEDURE — 87040 BLOOD CULTURE FOR BACTERIA: CPT

## 2019-07-20 PROCEDURE — 82803 BLOOD GASES ANY COMBINATION: CPT

## 2019-07-20 PROCEDURE — 2060000000 HC ICU INTERMEDIATE R&B

## 2019-07-20 PROCEDURE — 6360000004 HC RX CONTRAST MEDICATION: Performed by: EMERGENCY MEDICINE

## 2019-07-20 PROCEDURE — 71045 X-RAY EXAM CHEST 1 VIEW: CPT

## 2019-07-20 PROCEDURE — G0378 HOSPITAL OBSERVATION PER HR: HCPCS

## 2019-07-20 PROCEDURE — 2700000000 HC OXYGEN THERAPY PER DAY

## 2019-07-20 PROCEDURE — 6370000000 HC RX 637 (ALT 250 FOR IP): Performed by: FAMILY MEDICINE

## 2019-07-20 PROCEDURE — 85730 THROMBOPLASTIN TIME PARTIAL: CPT

## 2019-07-20 PROCEDURE — 99285 EMERGENCY DEPT VISIT HI MDM: CPT

## 2019-07-20 PROCEDURE — 93005 ELECTROCARDIOGRAM TRACING: CPT | Performed by: EMERGENCY MEDICINE

## 2019-07-20 PROCEDURE — 6370000000 HC RX 637 (ALT 250 FOR IP): Performed by: PHYSICIAN ASSISTANT

## 2019-07-20 PROCEDURE — 96374 THER/PROPH/DIAG INJ IV PUSH: CPT

## 2019-07-20 PROCEDURE — 93005 ELECTROCARDIOGRAM TRACING: CPT | Performed by: INTERNAL MEDICINE

## 2019-07-20 PROCEDURE — 83880 ASSAY OF NATRIURETIC PEPTIDE: CPT

## 2019-07-20 PROCEDURE — 83735 ASSAY OF MAGNESIUM: CPT

## 2019-07-20 PROCEDURE — 85610 PROTHROMBIN TIME: CPT

## 2019-07-20 PROCEDURE — 5A1D70Z PERFORMANCE OF URINARY FILTRATION, INTERMITTENT, LESS THAN 6 HOURS PER DAY: ICD-10-PCS | Performed by: INTERNAL MEDICINE

## 2019-07-20 PROCEDURE — 90935 HEMODIALYSIS ONE EVALUATION: CPT

## 2019-07-20 PROCEDURE — 94640 AIRWAY INHALATION TREATMENT: CPT

## 2019-07-20 PROCEDURE — 2500000003 HC RX 250 WO HCPCS: Performed by: PHYSICIAN ASSISTANT

## 2019-07-20 PROCEDURE — 85025 COMPLETE CBC W/AUTO DIFF WBC: CPT

## 2019-07-20 PROCEDURE — 94761 N-INVAS EAR/PLS OXIMETRY MLT: CPT

## 2019-07-20 PROCEDURE — 94660 CPAP INITIATION&MGMT: CPT

## 2019-07-20 PROCEDURE — 83605 ASSAY OF LACTIC ACID: CPT

## 2019-07-20 PROCEDURE — 80053 COMPREHEN METABOLIC PANEL: CPT

## 2019-07-20 PROCEDURE — 83036 HEMOGLOBIN GLYCOSYLATED A1C: CPT

## 2019-07-20 PROCEDURE — 71260 CT THORAX DX C+: CPT

## 2019-07-20 RX ORDER — SENNA PLUS 8.6 MG/1
1 TABLET ORAL DAILY PRN
Status: DISCONTINUED | OUTPATIENT
Start: 2019-07-20 | End: 2019-07-21 | Stop reason: HOSPADM

## 2019-07-20 RX ORDER — LIDOCAINE HYDROCHLORIDE 10 MG/ML
INJECTION, SOLUTION EPIDURAL; INFILTRATION; INTRACAUDAL; PERINEURAL
Status: DISPENSED
Start: 2019-07-20 | End: 2019-07-21

## 2019-07-20 RX ORDER — ACETAMINOPHEN 325 MG/1
325 TABLET ORAL ONCE
Status: COMPLETED | OUTPATIENT
Start: 2019-07-20 | End: 2019-07-20

## 2019-07-20 RX ORDER — PANTOPRAZOLE SODIUM 40 MG/1
40 TABLET, DELAYED RELEASE ORAL
Status: DISCONTINUED | OUTPATIENT
Start: 2019-07-21 | End: 2019-07-21 | Stop reason: HOSPADM

## 2019-07-20 RX ORDER — SODIUM CHLORIDE 0.9 % (FLUSH) 0.9 %
10 SYRINGE (ML) INJECTION EVERY 12 HOURS SCHEDULED
Status: DISCONTINUED | OUTPATIENT
Start: 2019-07-20 | End: 2019-07-21 | Stop reason: HOSPADM

## 2019-07-20 RX ORDER — DEXTROSE MONOHYDRATE 50 MG/ML
100 INJECTION, SOLUTION INTRAVENOUS PRN
Status: DISCONTINUED | OUTPATIENT
Start: 2019-07-20 | End: 2019-07-21 | Stop reason: HOSPADM

## 2019-07-20 RX ORDER — ALLOPURINOL 100 MG/1
100 TABLET ORAL DAILY
Status: DISCONTINUED | OUTPATIENT
Start: 2019-07-20 | End: 2019-07-21 | Stop reason: HOSPADM

## 2019-07-20 RX ORDER — NICOTINE POLACRILEX 4 MG
15 LOZENGE BUCCAL PRN
Status: DISCONTINUED | OUTPATIENT
Start: 2019-07-20 | End: 2019-07-21 | Stop reason: HOSPADM

## 2019-07-20 RX ORDER — IPRATROPIUM BROMIDE AND ALBUTEROL SULFATE 2.5; .5 MG/3ML; MG/3ML
1 SOLUTION RESPIRATORY (INHALATION) ONCE
Status: COMPLETED | OUTPATIENT
Start: 2019-07-20 | End: 2019-07-20

## 2019-07-20 RX ORDER — SODIUM CHLORIDE 0.9 % (FLUSH) 0.9 %
10 SYRINGE (ML) INJECTION PRN
Status: DISCONTINUED | OUTPATIENT
Start: 2019-07-20 | End: 2019-07-21 | Stop reason: HOSPADM

## 2019-07-20 RX ORDER — DEXTROSE MONOHYDRATE 25 G/50ML
12.5 INJECTION, SOLUTION INTRAVENOUS PRN
Status: DISCONTINUED | OUTPATIENT
Start: 2019-07-20 | End: 2019-07-21 | Stop reason: HOSPADM

## 2019-07-20 RX ORDER — OXYCODONE HYDROCHLORIDE AND ACETAMINOPHEN 5; 325 MG/1; MG/1
1 TABLET ORAL ONCE
Status: DISCONTINUED | OUTPATIENT
Start: 2019-07-20 | End: 2019-07-21 | Stop reason: HOSPADM

## 2019-07-20 RX ORDER — PREGABALIN 25 MG/1
50 CAPSULE ORAL 3 TIMES DAILY
Status: DISCONTINUED | OUTPATIENT
Start: 2019-07-20 | End: 2019-07-21 | Stop reason: HOSPADM

## 2019-07-20 RX ORDER — ROSUVASTATIN CALCIUM 10 MG/1
10 TABLET, COATED ORAL DAILY
Status: DISCONTINUED | OUTPATIENT
Start: 2019-07-20 | End: 2019-07-21 | Stop reason: HOSPADM

## 2019-07-20 RX ORDER — IPRATROPIUM BROMIDE AND ALBUTEROL SULFATE 2.5; .5 MG/3ML; MG/3ML
1 SOLUTION RESPIRATORY (INHALATION)
Status: DISCONTINUED | OUTPATIENT
Start: 2019-07-20 | End: 2019-07-21 | Stop reason: HOSPADM

## 2019-07-20 RX ORDER — HYDROMORPHONE HYDROCHLORIDE 1 MG/ML
0.5 INJECTION, SOLUTION INTRAMUSCULAR; INTRAVENOUS; SUBCUTANEOUS ONCE
Status: COMPLETED | OUTPATIENT
Start: 2019-07-20 | End: 2019-07-20

## 2019-07-20 RX ORDER — HYDROCODONE BITARTRATE AND ACETAMINOPHEN 5; 325 MG/1; MG/1
1 TABLET ORAL EVERY 6 HOURS PRN
Status: DISCONTINUED | OUTPATIENT
Start: 2019-07-20 | End: 2019-07-21 | Stop reason: HOSPADM

## 2019-07-20 RX ORDER — OMEPRAZOLE 20 MG/1
40 CAPSULE, DELAYED RELEASE ORAL
Status: DISCONTINUED | OUTPATIENT
Start: 2019-07-21 | End: 2019-07-20 | Stop reason: CLARIF

## 2019-07-20 RX ORDER — NITROGLYCERIN 0.4 MG/1
0.4 TABLET SUBLINGUAL EVERY 5 MIN PRN
Status: DISCONTINUED | OUTPATIENT
Start: 2019-07-20 | End: 2019-07-21 | Stop reason: HOSPADM

## 2019-07-20 RX ORDER — ONDANSETRON 2 MG/ML
4 INJECTION INTRAMUSCULAR; INTRAVENOUS EVERY 6 HOURS PRN
Status: DISCONTINUED | OUTPATIENT
Start: 2019-07-20 | End: 2019-07-21 | Stop reason: HOSPADM

## 2019-07-20 RX ADMIN — Medication 10 ML: at 23:36

## 2019-07-20 RX ADMIN — HYDROCODONE BITARTRATE AND ACETAMINOPHEN 1 TABLET: 5; 325 TABLET ORAL at 23:35

## 2019-07-20 RX ADMIN — ACETAMINOPHEN 325 MG: 325 TABLET, FILM COATED ORAL at 12:59

## 2019-07-20 RX ADMIN — IPRATROPIUM BROMIDE AND ALBUTEROL SULFATE 1 AMPULE: .5; 3 SOLUTION RESPIRATORY (INHALATION) at 11:35

## 2019-07-20 RX ADMIN — INSULIN LISPRO 4 UNITS: 100 INJECTION, SOLUTION INTRAVENOUS; SUBCUTANEOUS at 23:58

## 2019-07-20 RX ADMIN — HYDROMORPHONE HYDROCHLORIDE 0.5 MG: 1 INJECTION, SOLUTION INTRAMUSCULAR; INTRAVENOUS; SUBCUTANEOUS at 14:43

## 2019-07-20 RX ADMIN — ALLOPURINOL 100 MG: 100 TABLET ORAL at 23:35

## 2019-07-20 RX ADMIN — ROSUVASTATIN CALCIUM 10 MG: 10 TABLET, FILM COATED ORAL at 23:34

## 2019-07-20 RX ADMIN — PREGABALIN 50 MG: 25 CAPSULE ORAL at 23:35

## 2019-07-20 RX ADMIN — WARFARIN SODIUM 3 MG: 2 TABLET ORAL at 23:35

## 2019-07-20 RX ADMIN — INSULIN GLARGINE 30 UNITS: 100 INJECTION, SOLUTION SUBCUTANEOUS at 23:57

## 2019-07-20 RX ADMIN — IOPAMIDOL 75 ML: 755 INJECTION, SOLUTION INTRAVENOUS at 13:07

## 2019-07-20 ASSESSMENT — PAIN SCALES - GENERAL
PAINLEVEL_OUTOF10: 7
PAINLEVEL_OUTOF10: 9
PAINLEVEL_OUTOF10: 9
PAINLEVEL_OUTOF10: 8
PAINLEVEL_OUTOF10: 9

## 2019-07-20 ASSESSMENT — ENCOUNTER SYMPTOMS
ABDOMINAL DISTENTION: 0
BACK PAIN: 0
COLOR CHANGE: 0
NAUSEA: 0
COUGH: 0
ABDOMINAL PAIN: 0
DIARRHEA: 0
SHORTNESS OF BREATH: 1
VOMITING: 0
STRIDOR: 0
WHEEZING: 1
CONSTIPATION: 0

## 2019-07-20 ASSESSMENT — PAIN DESCRIPTION - PAIN TYPE
TYPE: ACUTE PAIN
TYPE: ACUTE PAIN;SURGICAL PAIN

## 2019-07-20 ASSESSMENT — PAIN DESCRIPTION - LOCATION
LOCATION: ARM;HAND
LOCATION: ARM

## 2019-07-20 ASSESSMENT — PAIN DESCRIPTION - ORIENTATION
ORIENTATION: RIGHT
ORIENTATION: RIGHT;UPPER

## 2019-07-20 NOTE — ED PROVIDER NOTES
[facility]        Pt Name: Prema Bergman  MRN: [id number]  [place] 1955  Date of evaluation: 7/20/2019  Provider: Alejandra Andrew PA-C  PCP: JURGEN Hand CNP    This patient was seen and evaluated by the attending physician Dr. Sabi Prather       Chief Complaint   Patient presents with    Shortness of Breath     pt reprots she had surgery on Wednesday done by Dr Mily Aguila, states she is now SOB, states she last got dialyzed on Wednesday, missed yesterday because she didnt feel good, denies CP       HISTORY OF PRESENT ILLNESS   (Location/Symptom, Timing/Onset, Context/Setting, Quality, Duration, Modifying Factors, Severity)  Note limiting factors. Prema Bergman is a 59 y.o. female with extensive past medical history who presents to the emergency department complaining of shortness of breath for 2 days. She denies productive cough, chest pain, palpitations, hemoptysis. She reports that on Thursday, she had a vascular graft placed in her right upper extremity by Dr. Prasanna Cerrato for dialysis. She currently has a fistula in her left upper extremity that they use for dialysis. The last time she went to dialysis was on Wednesday. She missed Fridays appointment for dialysis because she was not feeling well. She is not reporting any significant pain or bleeding or drainage from the recent surgical incision site of the right upper extremity. She does have history of pulmonary embolism. She does take Coumadin but was recently taken off for this procedure. She does not produce urine any longer. Dr. Bishnu Adams is her nephrologist.    Nursing Notes were all reviewed and agreed with or any disagreements were addressed  in the HPI. REVIEW OF SYSTEMS    (2-9 systems for level 4, 10 or more for level 5)     Review of Systems   Constitutional: Positive for fatigue. Negative for chills and fever. HENT: Negative. education level: None   Occupational History    Occupation: NA   Social Needs    Financial resource strain: None    Food insecurity:     Worry: None     Inability: None    Transportation needs:     Medical: None     Non-medical: None   Tobacco Use    Smoking status: Never Smoker    Smokeless tobacco: Never Used   Substance and Sexual Activity    Alcohol use: No     Alcohol/week: 0.0 standard drinks    Drug use: No    Sexual activity: None   Lifestyle    Physical activity:     Days per week: None     Minutes per session: None    Stress: None   Relationships    Social connections:     Talks on phone: None     Gets together: None     Attends Tenri[employer] service: None     Active member of club or organization: None     Attends meetings of clubs or organizations: None     Relationship status: None    Intimate partner violence:     Fear of current or ex partner: None     Emotionally abused: None     Physically abused: None     Forced sexual activity: None   Other Topics Concern    None   Social History Narrative    None       SCREENINGS             PHYSICAL EXAM    (up to 7 for level 4, 8 or more for level 5)     ED Triage Vitals [07/20/19 1110]   BP Temp Temp Source Pulse Resp SpO2 Height Weight   (!) 105/59 97.2 °F (36.2 °C) Oral 117 18 -- 5' 3\" (1.6 m) (!) 326 lb (147.9 kg)       Physical Exam   Constitutional: She is oriented to person, place, and time. She appears well-developed and well-nourished. She appears distressed. HENT:   Head: Normocephalic and atraumatic. Right Ear: External ear normal.   Left Ear: External ear normal.   Nose: Nose normal.   Mouth/Throat: Oropharynx is clear and moist.   Eyes: Pupils are equal, round, and reactive to light. Conjunctivae and EOM are normal. Right eye exhibits no discharge. Left eye exhibits no discharge. No scleral icterus. Neck: Normal range of motion. No JVD present. Cardiovascular: Tachycardia present.    Pulmonary/Chest: Accessory muscle usage 07/20/2019  12:38, by Johnson Mandujano  Performed at:  [facility] [address], [place], [address] [employer]   Phone [phone]   TROPONIN - Abnormal; Notable for the following components:    Troponin 0.11 (*)     All other components within normal limits    Narrative:     Arlys Aid  SFERF tel. [phone],  Chemistry results called to and read back by Cade ASHBY, 07/20/2019  12:38, by Johnson Mandujano  Performed at:  [facility] [address], [place], [facility] [employer]   Phone [phone]   PROTIME-INR - Abnormal; Notable for the following components:    Protime 16.9 (*)     INR 1.48 (*)     All other components within normal limits    Narrative:     Performed at:  [facility] [address], [place], [facility] [employer]   Phone 21  - Abnormal; Notable for the following components:    Pro-BNP 2,376 (*)     All other components within normal limits    Narrative:     Arlys Aid  SFERF tel. [phone],  Chemistry results called to and read back by Cade ASHBY, 07/20/2019  12:38, by Johnson Mandujano  Performed at:  [facility] [address], [place], [facility] [employer]   Phone [phone]   LACTIC ACID, PLASMA - Abnormal; Notable for the following components:    Lactic Acid 3.1 (*)     All other components within normal limits    Narrative:     Performed at:  [facility] [address], [place], [facility] [employer]   Phone [phone]   BLOOD GAS, VENOUS - Abnormal; Notable for the following components:    pCO2, Cristopher 33.6 (*)     pO2, Cristopher 179.0 (*)     HCO3, Venous 19.6 (*)     Base Excess, Cristopher -4.9 (*)     Carboxyhemoglobin 3.4 (*)     All other components within normal limits    Narrative:     Performed at:  [facility] [address], [place], [facility] [employer]   Phone [phone]   CULTURE BLOOD #2   CULTURE BLOOD TO HOSPITALIST  I spoke with Dr. Zaid Jorgensen, nephrologist, at [phone]. I discussed doing a CTPA and he agrees with plan to evaluate for PE. He will arrange for dialysis during patient's admission. Dr. CRUZ [facility] will admit ([id number])    EMERGENCY DEPARTMENT COURSE and DIFFERENTIAL DIAGNOSIS/MDM:   Vitals:    Vitals:    07/20/19 1200 07/20/19 1230 07/20/19 1245 07/20/19 1300   BP: 127/65 125/64 132/62 (!) 127/59   Pulse: 114 112 110 109   Resp:    17   Temp:       TempSrc:       SpO2: (!) 86% 98% (!) 88% 92%   Weight:       Height:           Patient was given thefollowing medications:  Medications   ipratropium-albuterol (DUONEB) nebulizer solution 1 ampule (1 ampule Inhalation Given 7/20/19 1135)   acetaminophen (TYLENOL) tablet 325 mg (325 mg Oral Given 7/20/19 1259)   iopamidol (ISOVUE-370) 76 % injection 75 mL (75 mLs Intravenous Given 7/20/19 1307)       This patient presents with shortness of breath and hypoxia. She appears very short of breath and tachypnea on arrival.  Did not get much improvement with breathing treatment, therefore was placed on BiPAP. She is feeling much better now. Should be able to wean her off of BiPAP. She does have some slight metabolic disturbances, likely because she missed dialysis on Friday. Chest x-ray shows some mild interstitial edema. CT scan does not show PE. We do feel admission is warranted for further management. Nephrologist will manage dialysis. Plan for hospitalist to admit. Patient understands and agrees with plan. My suspicion is low for ACS, PE, myocarditis, pericarditis, endocarditis,  pleural effusion, pericardial effusion, cardiac tamponade,  thoracic aortic dissection, esophageal rupture, other life-threatening arrhythmia, hypertensive urgency or emergency, hemothorax, pulmonary contusion, subcutaneous emphysema, flail chest, pneumo mediastinum,acute  rib fracture, pneumonia, pneumothorax or other concerning pathology. FINAL IMPRESSION      1.  Shortness of breath    2. ESRD on hemodialysis ([facility] [address]    3. Hyperkalemia    4. Acute respiratory distress    5. Lactic acidosis          DISPOSITION/PLAN   DISPOSITION        PATIENT REFERREDTO:  No follow-up provider specified.     DISCHARGE MEDICATIONS:  New Prescriptions    No medications on file       DISCONTINUED MEDICATIONS:  Discontinued Medications    No medications on file              (Please note that portions ofthis note were completed with a voice recognition program.  Efforts were made to edit the dictations but occasionally words are mis-transcribed.)    Dory Sainz PA-C (electronically signed)           Dory Sainz PA-C  07/20/19 5240

## 2019-07-20 NOTE — H&P
for up to 3 days. Intended supply: 3 days. Take lowest dose possible to manage pain 10 tablet 0    traMADol (ULTRAM) 50 MG tablet Take 1 tablet by mouth every 8 hours as needed for Pain for up to 90 days. 75 tablet 2    omeprazole (PRILOSEC) 40 MG delayed release capsule TAKE 1 CAPSULE BY MOUTH  DAILY 90 capsule 3    warfarin (COUMADIN) 1 MG tablet Take 3 tablets by mouth daily (Patient taking differently: Take 3 mg by mouth daily Managed by [facility] in [place], [place]) 30 tablet 0    insulin detemir (LEVEMIR FLEXTOUCH) 100 UNIT/ML injection pen Inject 30 Units into the skin 2 times daily      rosuvastatin (CRESTOR) 10 MG tablet Take 1 tablet by mouth daily Take 1 tablet by mouth  daily 90 tablet 3    traZODone (DESYREL) 100 MG tablet TAKE 1 TABLET BY MOUTH ONCE DAILY NIGHTLY  AS  NEEDED 90 tablet 3    fluticasone (FLONASE) 50 MCG/ACT nasal spray USE TWO SPRAY(S) IN EACH NOSTRIL ONCE DAILY 1 Bottle 5    pregabalin (LYRICA) 50 MG capsule Take 1 capsule by mouth 3 times daily for 180 days. 90 capsule 5    Diapers & Supplies MISC 1 each by Does not apply route 2 times daily 100 each 5    allopurinol (ZYLOPRIM) 100 MG tablet Take 1 tablet by mouth daily 90 tablet 3    Incontinence Supply Disposable (DEPEND ADJUSTABLE UNDERWEAR LG) MISC 1 each by Does not apply route as needed (for urine incompetence) 60 each 5    UNIFINE PENTIPS 31G X 5 MM MISC USE WITH INSULIN PENS FOUR TIMES DAILY 400 each 3    insulin lispro (HUMALOG KWIKPEN) 100 UNIT/ML pen Inject 0-12 Units into the skin 3 times daily (before meals) 5 pen 5    lidocaine-prilocaine (EMLA) 2.5-2.5 % cream Apply topically as needed.  1 Tube 5    promethazine (PHENERGAN) 25 MG tablet Take 1 tablet by mouth every 8 hours as needed for Nausea 30 tablet 2    calcium acetate (PHOSLO) 667 MG capsule Take 2 capsules by mouth 3 times daily (with meals) 60 capsule 3    nebivolol (BYSTOLIC) 2.5 MG tablet Take 1 tablet by mouth 2 times daily 30 tablet 3    alcohol or use drugs. Family History:  family history includes Colon Cancer in her father, maternal grandmother, and mother; Diabetes in her father and mother; Heart Disease in her brother and sister; High Blood Pressure in her brother, father, maternal aunt, sister, and sister; Kidney Cancer in her brother. ,     Physical Exam:  /65   Pulse 97   Temp 97.4 °F (36.3 °C) (Oral)   Resp 16   Ht 5' 3\" (1.6 m)   Wt (!) 326 lb (147.9 kg)   LMP 11/01/1996 (Exact Date)   SpO2 93%   BMI 57.75 kg/m²     General appearance:  Appears comfortable. Well nourished  Eyes: Sclera clear, pupils equal  ENT: Moist mucus membranes, no thrush. Trachea midline. Cardiovascular: Regular rhythm, normal S1, S2. No murmur, gallop, rub. No edema in lower extremities  Respiratory: Clear to auscultation bilaterally, no wheeze, good inspiratory effort  Gastrointestinal: Abdomen soft, non-tender, not distended, normal bowel sounds  Musculoskeletal: No cyanosis in digits, neck supple  Neurology: Cranial nerves grossly intact. Alert and oriented in time, place and person. No speech or motor deficits  Psychiatry: Appropriate affect. Not agitated  Skin: Warm, dry, normal turgor, no rash    Labs:  CBC:   Lab Results   Component Value Date    WBC 8.4 07/20/2019    RBC 3.00 07/20/2019    HGB 10.2 07/20/2019    HCT 32.1 07/20/2019    .1 07/20/2019    MCH 34.2 07/20/2019    MCHC 31.9 07/20/2019    RDW 19.2 07/20/2019     07/20/2019    MPV 8.7 07/20/2019     BMP:    Lab Results   Component Value Date     07/20/2019    K 5.5 07/20/2019    K 5.7 03/31/2019    CL 94 07/20/2019    CO2 19 07/20/2019    BUN 55 07/20/2019    CREATININE 13.4 07/20/2019    CALCIUM 8.9 07/20/2019    GFRAA 3 07/20/2019    GFRAA 50 05/12/2013    LABGLOM 3 07/20/2019    GLUCOSE 187 07/20/2019       Chest Xray:   EKG:        Problem List  Active Problems:    Respiratory failure ([address]  Resolved Problems:    * No resolved hospital problems.  *

## 2019-07-20 NOTE — PROGRESS NOTES
Pt admitted to [phone] from ED. Pt is a/o, VSS. She is on 3L O2 currently, wears prn at home and has biPaP at home. Pt skipped HD yesterday d/t not feeling well. She is in dialysis now. Admission completed.  will be staying at bedside tonight. Will monitor.

## 2019-07-20 NOTE — ED NOTES
Pt alert and oriented, Pt to Er with SOB since yesterday, states missed dialysis yesterday because was feeling sick, states sOb started after. Pt states surgery to have graph placed in right arm on Thursday. lung sounds diminished, respirations labored with RR 25/min, no use of accessory respiratory muscles. No pursed lip breathing present, pt unable to lay flat, pt able to speak in full sentences with short pauses. Pt skin warm, dry, and temp WNL. Heart sounds regular, S1, S2 heard. Pt states pain from graph 9/10 at this time. No signs of infection noted at this time. Pt denies any other problems or needs at this time.      Gianna Shah, SYMONE  07/20/19 [id number]

## 2019-07-20 NOTE — ED NOTES
Pt updated on plan of care. Requesting something more for pain.nayla aiken aware. Patient resting comfortably with no signs of distress. Denies any needs at this time. Bed locked and in lowest position with both side rails raised. Call light within reach.      Sofia Pradhan RN  07/20/19 7553

## 2019-07-21 VITALS
BODY MASS INDEX: 51.91 KG/M2 | HEIGHT: 63 IN | RESPIRATION RATE: 18 BRPM | WEIGHT: 293 LBS | SYSTOLIC BLOOD PRESSURE: 134 MMHG | TEMPERATURE: 97.2 F | OXYGEN SATURATION: 100 % | DIASTOLIC BLOOD PRESSURE: 66 MMHG | HEART RATE: 89 BPM

## 2019-07-21 LAB
ANION GAP SERPL CALCULATED.3IONS-SCNC: 10 MMOL/L (ref 3–16)
BUN BLDV-MCNC: 23 MG/DL (ref 7–20)
CALCIUM SERPL-MCNC: 8.1 MG/DL (ref 8.3–10.6)
CHLORIDE BLD-SCNC: 97 MMOL/L (ref 99–110)
CO2: 29 MMOL/L (ref 21–32)
CREAT SERPL-MCNC: 7.7 MG/DL (ref 0.6–1.2)
EKG ATRIAL RATE: 118 BPM
EKG ATRIAL RATE: 78 BPM
EKG DIAGNOSIS: NORMAL
EKG DIAGNOSIS: NORMAL
EKG P AXIS: 27 DEGREES
EKG P AXIS: 39 DEGREES
EKG P-R INTERVAL: 146 MS
EKG P-R INTERVAL: 162 MS
EKG Q-T INTERVAL: 314 MS
EKG Q-T INTERVAL: 372 MS
EKG QRS DURATION: 76 MS
EKG QRS DURATION: 84 MS
EKG QTC CALCULATION (BAZETT): 424 MS
EKG QTC CALCULATION (BAZETT): 440 MS
EKG R AXIS: -45 DEGREES
EKG R AXIS: 50 DEGREES
EKG T AXIS: 104 DEGREES
EKG T AXIS: 2 DEGREES
EKG VENTRICULAR RATE: 118 BPM
EKG VENTRICULAR RATE: 78 BPM
ESTIMATED AVERAGE GLUCOSE: 151.3 MG/DL
GFR AFRICAN AMERICAN: 6
GFR NON-AFRICAN AMERICAN: 5
GLUCOSE BLD-MCNC: 105 MG/DL (ref 70–99)
GLUCOSE BLD-MCNC: 137 MG/DL (ref 70–99)
GLUCOSE BLD-MCNC: 181 MG/DL (ref 70–99)
GLUCOSE BLD-MCNC: 187 MG/DL (ref 70–99)
GLUCOSE BLD-MCNC: 25 MG/DL (ref 70–99)
GLUCOSE BLD-MCNC: 35 MG/DL (ref 70–99)
GLUCOSE BLD-MCNC: 37 MG/DL (ref 70–99)
GLUCOSE BLD-MCNC: 96 MG/DL (ref 70–99)
HBA1C MFR BLD: 6.9 %
HCT VFR BLD CALC: 28.5 % (ref 36–48)
HEMOGLOBIN: 9.3 G/DL (ref 12–16)
INR BLD: 1.64 (ref 0.86–1.14)
MCH RBC QN AUTO: 34.4 PG (ref 26–34)
MCHC RBC AUTO-ENTMCNC: 32.6 G/DL (ref 31–36)
MCV RBC AUTO: 105.5 FL (ref 80–100)
PDW BLD-RTO: 19.6 % (ref 12.4–15.4)
PERFORMED ON: ABNORMAL
PERFORMED ON: NORMAL
PLATELET # BLD: 164 K/UL (ref 135–450)
PMV BLD AUTO: 8.7 FL (ref 5–10.5)
POTASSIUM SERPL-SCNC: 5 MMOL/L (ref 3.5–5.1)
PROTHROMBIN TIME: 18.7 SEC (ref 9.8–13)
RBC # BLD: 2.7 M/UL (ref 4–5.2)
SODIUM BLD-SCNC: 136 MMOL/L (ref 136–145)
WBC # BLD: 7.6 K/UL (ref 4–11)

## 2019-07-21 PROCEDURE — 6370000000 HC RX 637 (ALT 250 FOR IP): Performed by: FAMILY MEDICINE

## 2019-07-21 PROCEDURE — 94761 N-INVAS EAR/PLS OXIMETRY MLT: CPT

## 2019-07-21 PROCEDURE — 93010 ELECTROCARDIOGRAM REPORT: CPT | Performed by: INTERNAL MEDICINE

## 2019-07-21 PROCEDURE — 6370000000 HC RX 637 (ALT 250 FOR IP): Performed by: NURSE PRACTITIONER

## 2019-07-21 PROCEDURE — 90935 HEMODIALYSIS ONE EVALUATION: CPT

## 2019-07-21 PROCEDURE — 94660 CPAP INITIATION&MGMT: CPT

## 2019-07-21 PROCEDURE — 94640 AIRWAY INHALATION TREATMENT: CPT

## 2019-07-21 PROCEDURE — 2580000003 HC RX 258: Performed by: FAMILY MEDICINE

## 2019-07-21 PROCEDURE — 2700000000 HC OXYGEN THERAPY PER DAY

## 2019-07-21 PROCEDURE — 85027 COMPLETE CBC AUTOMATED: CPT

## 2019-07-21 PROCEDURE — G0378 HOSPITAL OBSERVATION PER HR: HCPCS

## 2019-07-21 PROCEDURE — 85610 PROTHROMBIN TIME: CPT

## 2019-07-21 PROCEDURE — 80048 BASIC METABOLIC PNL TOTAL CA: CPT

## 2019-07-21 RX ORDER — WARFARIN SODIUM 5 MG/1
5 TABLET ORAL DAILY
Status: DISCONTINUED | OUTPATIENT
Start: 2019-07-21 | End: 2019-07-21 | Stop reason: HOSPADM

## 2019-07-21 RX ORDER — TRAZODONE HYDROCHLORIDE 50 MG/1
100 TABLET ORAL NIGHTLY
Status: DISCONTINUED | OUTPATIENT
Start: 2019-07-21 | End: 2019-07-21 | Stop reason: HOSPADM

## 2019-07-21 RX ORDER — DIPHENHYDRAMINE HCL 25 MG
25 TABLET ORAL EVERY 6 HOURS PRN
Status: DISCONTINUED | OUTPATIENT
Start: 2019-07-21 | End: 2019-07-21 | Stop reason: HOSPADM

## 2019-07-21 RX ADMIN — ROSUVASTATIN CALCIUM 10 MG: 10 TABLET, FILM COATED ORAL at 09:32

## 2019-07-21 RX ADMIN — DIPHENHYDRAMINE HCL 25 MG: 25 TABLET ORAL at 08:00

## 2019-07-21 RX ADMIN — DIPHENHYDRAMINE HCL 25 MG: 25 TABLET ORAL at 00:35

## 2019-07-21 RX ADMIN — INSULIN GLARGINE 30 UNITS: 100 INJECTION, SOLUTION SUBCUTANEOUS at 09:33

## 2019-07-21 RX ADMIN — PREGABALIN 50 MG: 25 CAPSULE ORAL at 09:32

## 2019-07-21 RX ADMIN — ALLOPURINOL 100 MG: 100 TABLET ORAL at 09:32

## 2019-07-21 RX ADMIN — Medication 10 ML: at 09:32

## 2019-07-21 RX ADMIN — IPRATROPIUM BROMIDE AND ALBUTEROL SULFATE 1 AMPULE: .5; 3 SOLUTION RESPIRATORY (INHALATION) at 07:01

## 2019-07-21 RX ADMIN — HYDROCODONE BITARTRATE AND ACETAMINOPHEN 1 TABLET: 5; 325 TABLET ORAL at 08:00

## 2019-07-21 RX ADMIN — PANTOPRAZOLE SODIUM 40 MG: 40 TABLET, DELAYED RELEASE ORAL at 05:49

## 2019-07-21 RX ADMIN — PREGABALIN 50 MG: 25 CAPSULE ORAL at 17:13

## 2019-07-21 RX ADMIN — INSULIN LISPRO 2 UNITS: 100 INJECTION, SOLUTION INTRAVENOUS; SUBCUTANEOUS at 00:03

## 2019-07-21 RX ADMIN — TRAZODONE HYDROCHLORIDE 100 MG: 50 TABLET ORAL at 00:35

## 2019-07-21 RX ADMIN — IPRATROPIUM BROMIDE AND ALBUTEROL SULFATE 1 AMPULE: .5; 3 SOLUTION RESPIRATORY (INHALATION) at 11:25

## 2019-07-21 ASSESSMENT — PAIN DESCRIPTION - PAIN TYPE: TYPE: ACUTE PAIN

## 2019-07-21 ASSESSMENT — PAIN SCALES - GENERAL
PAINLEVEL_OUTOF10: 9
PAINLEVEL_OUTOF10: 4
PAINLEVEL_OUTOF10: 8

## 2019-07-21 ASSESSMENT — PAIN DESCRIPTION - ORIENTATION: ORIENTATION: RIGHT

## 2019-07-21 ASSESSMENT — PAIN DESCRIPTION - LOCATION: LOCATION: ARM;HAND

## 2019-07-21 NOTE — PROGRESS NOTES
Data- discharge order received, pt verbalized agreement to discharge, disposition to previous residence, no needs for HHC/DME. Action- discharge instructions prepared and given to patient, pt verbalized understanding. Medication information packet given r/t NEW and/or CHANGED prescriptions emphasizing name/purpose/side effects, pt verbalized understanding. Discharge instruction summary: Diet- renal, Activity- up ad fiorella, Primary Care Physician as follows: Kevyn Duggan, APRN - [phone] f/u appointment to be made by patient, immunizations reviewed, prescription medications filled none. Response- Pt belongings gathered, IV removed. Disposition is home (no HHC/DME needs), transported with , taken to lobby via w/c w/ transport, no complications.

## 2019-07-21 NOTE — PROGRESS NOTES
Patient was eating breakfast I took her vitals and gave her Norco for  pain and benadryl for itching.

## 2019-07-21 NOTE — ED PROVIDER NOTES
ED Course as of Jul 20 2105   Sat Jul 20, 2019 2058 Brain Natriuretic Peptide(!):    Pro-BNP 2,376(!) [WL]   2058 Comprehensive Metabolic Panel(!!):    Sodium 134(!)   Potassium 5.5(!)   Chloride 94(!)   CO2 19(!)   Anion Gap 21(!)   Glucose 187(!)   BUN 55(!)   Creatinine 13.4(!!)   GFR Non- 3(!)   GFR African American 3(!)   Calcium 8.9   Total Protein 6.9   Albumin 3.2(!)   Albumin/Globulin Ratio 0.9(!)   Bilirubin 0.3   Alk Phos 78   ALT <5(!)   AST 12(!)   Globulin 3.7 [WL]   2058 Blood gas, venous(!):    pH, Cristopher 7.375   pCO2, Cristopher 33.6(!)   pO2, Cristopher 179.0(!)   HCO3, Venous 19.6(!)   Base Excess, Cristopher -4. 9(!)   O2 Sat, Cristopher 100   Carboxyhemoglobin 3.4(!)   MetHgb, Cristopher 0.7   TC02 (Calc), Cristopher 46   O2 Content, Cristopher 15   O2 Therapy Unknown [WL]   2058 Magnesium:    Magnesium 2.10 [WL]   2058 Troponin(!):    Troponin 0.11(!) [WL]   2058 Lactic Acid, Plasma(!):    Lactic Acid 3.1(!) [WL]   2058 Protime-INR(!):    Prothrombin Time 16.9(!)   INR 1.48(!) [WL]   2058 CBC Auto Differential(!):    WBC 8.4   RBC 3.00(!)   Hemoglobin Quant 10. 2(!)   Hematocrit 32.1(!)   . 1(!)   MCH 34.2(!)   MCHC 31.9   RDW 19.2(!)   Platelet Count 429   MPV 8.7   Neutrophils % 76.2   Lymphocyte % 13.4   Monocytes % 7.7   Eosinophils % 2.1   Basophils % 0.6   Neutrophils # 6.4   Lymphocytes # 1.1   Monocytes # 0.6   Eosinophils # 0.2   Basophils # 0.1 [WL]   2058 Sinus rhythm at 118. Left axis. QTc 440. Anterolateral Q waves, and high lateral T wave inversion stable from prior July 18, 2018   EKG 12 Lead [WL]   2059 CT Chest Pulmonary Embolism W Contrast [WL]   2059 XR CHEST PORTABLE [WL]   2059 I have personally performed and/or participated in the history, exam and medical decision making and agree with all pertinent clinical information. I have also reviewed and agree with the past medical, family and social history unless otherwise noted. Patient presents for evaluation of worsening dyspnea.   She missed dialysis yesterday due to not feeling well. She has not had fever or chills. She denies any chest pain or pressure. Due to respiratory distress, patient required BiPAP on arrival.  Overall symptoms consistent with volume overload due to missed dialysis. Lung sounds bilaterally diminished. Cardiac exam shows regular rate and rhythm no murmurs rubs gallops. She has mild lower extremity edema. Patient with volume overload, symptoms consistent with pulmonary edema required BiPAP in the ED for respiratory support. She was admitted for urgent/emergent dialysis. The total Critical Care time is 30 minutes which excludes separately billable procedures.         [WL]      ED Course User Index  [WL] DO Jewel You DO  07/20/19 0901

## 2019-07-21 NOTE — PLAN OF CARE
Problem: Pain:  Goal: Control of chronic pain  Description  Control of chronic pain  7/21/2019 1118 by Millie Lundborg, RN  Outcome: Ongoing  Pt able to properly identify pain and properly notified RN when necessary. Pt able to properly use pain scale and rate pain. Home norco and benadryl administered PRN per MAR. Problem: Falls - Risk of:  Goal: Will remain free from falls  Description  Will remain free from falls  7/21/2019 1118 by Millie Lundborg, RN  Outcome: Ongoing   Fall precautions in place, bed alarm on, nonskid foot wear applied, bed in lowest position, and call light within reach. Will continue to monitor. Problem: Fluid Volume:  Goal: Will show no signs or symptoms of fluid imbalance  Description  Will show no signs or symptoms of fluid imbalance  Outcome: Ongoing    HD last night, resume MWF. Nephrology following.   Strict I&Os, daily weights

## 2019-07-21 NOTE — PROGRESS NOTES
Pt completed 4 hours of hemodialysis and removed 2 liters of fluid.  Hypotensive at times with sbp in the 70s, and corrected with reduction in uf rate.       07/20/19 1840 07/20/19 2310   Vital Signs   Temp 96.9 °F (36.1 °C) 98.4 °F (36.9 °C)   Pulse 97 95   Resp 18 16   /66 109/61   Height and Weight   Weight (!) 335 lb 8.6 oz (152.2 kg) (!) 330 lb 11 oz (150 kg)

## 2019-07-22 ENCOUNTER — CARE COORDINATION (OUTPATIENT)
Dept: CASE MANAGEMENT | Age: 64
End: 2019-07-22

## 2019-07-22 NOTE — CARE COORDINATION
7/22/19  First 24 hr call contact attempt. Left message with name and number on Patients Home VM and Mobile VM. Will try at another time. Blaze ROONEYN RN.

## 2019-07-23 ENCOUNTER — OFFICE VISIT (OUTPATIENT)
Dept: VASCULAR SURGERY | Age: 64
End: 2019-07-23

## 2019-07-23 VITALS — BODY MASS INDEX: 51.91 KG/M2 | WEIGHT: 293 LBS | HEIGHT: 63 IN

## 2019-07-23 DIAGNOSIS — N18.6 ESRD (END STAGE RENAL DISEASE) ON DIALYSIS (HCC): Primary | ICD-10-CM

## 2019-07-23 DIAGNOSIS — Z99.2 ESRD (END STAGE RENAL DISEASE) ON DIALYSIS (HCC): Primary | ICD-10-CM

## 2019-07-23 PROCEDURE — 99024 POSTOP FOLLOW-UP VISIT: CPT | Performed by: SURGERY

## 2019-07-23 NOTE — PROGRESS NOTES
(6/25/13, 11/14); Cardiac catheterization (1/14); Hysterectomy; pr open skull supratent explore; ventral hernia repair (12/24/2016); other surgical history (02/22/2018); pr repair incisional hernia,reducible (N/A, 11/20/2018); Tunneled venous port placement (Right, 11/2019); Dialysis fistula creation (Right, 3/28/2019); Coronary angioplasty with stent; and shunt revision (Right, 7/18/2019). Social History:   reports that she has never smoked. She has never used smokeless tobacco. She reports that she does not drink alcohol or use drugs. Family History:  family history includes Colon Cancer in her father, maternal grandmother, and mother; Diabetes in her father and mother; Heart Disease in her brother and sister; High Blood Pressure in her brother, father, maternal aunt, sister, and sister; Kidney Cancer in her brother. Home Medications:  Current Outpatient Medications   Medication Sig Dispense Refill    traMADol (ULTRAM) 50 MG tablet Take 1 tablet by mouth every 8 hours as needed for Pain for up to 90 days. 75 tablet 2    omeprazole (PRILOSEC) 40 MG delayed release capsule TAKE 1 CAPSULE BY MOUTH  DAILY 90 capsule 3    warfarin (COUMADIN) 1 MG tablet Take 3 tablets by mouth daily (Patient taking differently: Take 3 mg by mouth daily [facility] [place] [place]) 30 tablet 0    insulin detemir (LEVEMIR FLEXTOUCH) 100 UNIT/ML injection pen Inject 30 Units into the skin 2 times daily      rosuvastatin (CRESTOR) 10 MG tablet Take 1 tablet by mouth daily Take 1 tablet by mouth  daily 90 tablet 3    traZODone (DESYREL) 100 MG tablet TAKE 1 TABLET BY MOUTH ONCE DAILY NIGHTLY  AS  NEEDED 90 tablet 3    fluticasone (FLONASE) 50 MCG/ACT nasal spray USE TWO SPRAY(S) IN EACH NOSTRIL ONCE DAILY 1 Bottle 5    pregabalin (LYRICA) 50 MG capsule Take 1 capsule by mouth 3 times daily for 180 days.  90 capsule 5    Diapers & Supplies MISC 1 each by Does not apply route 2 times daily 100 each 5   

## 2019-07-24 RX ORDER — ALBUTEROL SULFATE 90 UG/1
AEROSOL, METERED RESPIRATORY (INHALATION)
Qty: 1 INHALER | Refills: 3 | Status: SHIPPED | OUTPATIENT
Start: 2019-07-24 | End: 2019-09-25 | Stop reason: SDUPTHER

## 2019-07-25 ENCOUNTER — TELEPHONE (OUTPATIENT)
Dept: FAMILY MEDICINE CLINIC | Age: 64
End: 2019-07-25

## 2019-07-25 LAB
BLOOD CULTURE, ROUTINE: NORMAL
CULTURE, BLOOD 2: NORMAL

## 2019-07-29 ENCOUNTER — OFFICE VISIT (OUTPATIENT)
Dept: FAMILY MEDICINE CLINIC | Age: 64
End: 2019-07-29
Payer: MEDICARE

## 2019-07-29 VITALS
SYSTOLIC BLOOD PRESSURE: 132 MMHG | DIASTOLIC BLOOD PRESSURE: 86 MMHG | HEART RATE: 81 BPM | BODY MASS INDEX: 56.12 KG/M2 | OXYGEN SATURATION: 96 % | WEIGHT: 293 LBS

## 2019-07-29 DIAGNOSIS — Z99.2 DIALYSIS PATIENT (HCC): ICD-10-CM

## 2019-07-29 DIAGNOSIS — E11.22 TYPE 2 DIABETES MELLITUS WITH STAGE 5 CHRONIC KIDNEY DISEASE NOT ON CHRONIC DIALYSIS, WITH LONG-TERM CURRENT USE OF INSULIN (HCC): ICD-10-CM

## 2019-07-29 DIAGNOSIS — R06.02 SOB (SHORTNESS OF BREATH): ICD-10-CM

## 2019-07-29 DIAGNOSIS — Z79.4 TYPE 2 DIABETES MELLITUS WITH STAGE 5 CHRONIC KIDNEY DISEASE NOT ON CHRONIC DIALYSIS, WITH LONG-TERM CURRENT USE OF INSULIN (HCC): ICD-10-CM

## 2019-07-29 DIAGNOSIS — J44.1 CHRONIC OBSTRUCTIVE PULMONARY DISEASE WITH ACUTE EXACERBATION (HCC): Primary | ICD-10-CM

## 2019-07-29 DIAGNOSIS — R42 DIZZINESS: ICD-10-CM

## 2019-07-29 DIAGNOSIS — N18.5 TYPE 2 DIABETES MELLITUS WITH STAGE 5 CHRONIC KIDNEY DISEASE NOT ON CHRONIC DIALYSIS, WITH LONG-TERM CURRENT USE OF INSULIN (HCC): ICD-10-CM

## 2019-07-29 PROCEDURE — 99214 OFFICE O/P EST MOD 30 MIN: CPT | Performed by: NURSE PRACTITIONER

## 2019-07-29 PROCEDURE — 3017F COLORECTAL CA SCREEN DOC REV: CPT | Performed by: NURSE PRACTITIONER

## 2019-07-29 PROCEDURE — 2022F DILAT RTA XM EVC RTNOPTHY: CPT | Performed by: NURSE PRACTITIONER

## 2019-07-29 PROCEDURE — 1111F DSCHRG MED/CURRENT MED MERGE: CPT | Performed by: NURSE PRACTITIONER

## 2019-07-29 PROCEDURE — G8417 CALC BMI ABV UP PARAM F/U: HCPCS | Performed by: NURSE PRACTITIONER

## 2019-07-29 PROCEDURE — G8427 DOCREV CUR MEDS BY ELIG CLIN: HCPCS | Performed by: NURSE PRACTITIONER

## 2019-07-29 PROCEDURE — 3023F SPIROM DOC REV: CPT | Performed by: NURSE PRACTITIONER

## 2019-07-29 PROCEDURE — 3044F HG A1C LEVEL LT 7.0%: CPT | Performed by: NURSE PRACTITIONER

## 2019-07-29 PROCEDURE — G8926 SPIRO NO PERF OR DOC: HCPCS | Performed by: NURSE PRACTITIONER

## 2019-07-29 PROCEDURE — G8598 ASA/ANTIPLAT THER USED: HCPCS | Performed by: NURSE PRACTITIONER

## 2019-07-29 PROCEDURE — 1036F TOBACCO NON-USER: CPT | Performed by: NURSE PRACTITIONER

## 2019-07-29 RX ORDER — PREDNISONE 20 MG/1
40 TABLET ORAL DAILY
Qty: 10 TABLET | Refills: 0 | Status: ON HOLD | OUTPATIENT
Start: 2019-07-29 | End: 2019-08-07

## 2019-07-29 ASSESSMENT — ENCOUNTER SYMPTOMS
COUGH: 0
SHORTNESS OF BREATH: 1
WHEEZING: 0
CHEST TIGHTNESS: 0

## 2019-08-02 ENCOUNTER — HOSPITAL ENCOUNTER (INPATIENT)
Age: 64
LOS: 4 days | Discharge: HOME HEALTH | DRG: 190 | End: 2019-08-07
Attending: EMERGENCY MEDICINE | Admitting: INTERNAL MEDICINE
Payer: MEDICARE

## 2019-08-02 DIAGNOSIS — R06.02 SOB (SHORTNESS OF BREATH): ICD-10-CM

## 2019-08-02 DIAGNOSIS — J44.1 COPD EXACERBATION (HCC): Primary | ICD-10-CM

## 2019-08-02 DIAGNOSIS — J44.1 CHRONIC OBSTRUCTIVE PULMONARY DISEASE WITH ACUTE EXACERBATION (HCC): ICD-10-CM

## 2019-08-02 PROCEDURE — 99285 EMERGENCY DEPT VISIT HI MDM: CPT

## 2019-08-02 PROCEDURE — 93005 ELECTROCARDIOGRAM TRACING: CPT | Performed by: EMERGENCY MEDICINE

## 2019-08-02 ASSESSMENT — PAIN DESCRIPTION - LOCATION: LOCATION: CHEST

## 2019-08-02 ASSESSMENT — PAIN DESCRIPTION - PAIN TYPE: TYPE: ACUTE PAIN

## 2019-08-02 ASSESSMENT — PAIN DESCRIPTION - ORIENTATION: ORIENTATION: MID

## 2019-08-02 ASSESSMENT — PAIN SCALES - GENERAL: PAINLEVEL_OUTOF10: 5

## 2019-08-03 ENCOUNTER — APPOINTMENT (OUTPATIENT)
Dept: CT IMAGING | Age: 64
DRG: 190 | End: 2019-08-03
Payer: MEDICARE

## 2019-08-03 ENCOUNTER — APPOINTMENT (OUTPATIENT)
Dept: GENERAL RADIOLOGY | Age: 64
DRG: 190 | End: 2019-08-03
Payer: MEDICARE

## 2019-08-03 ENCOUNTER — TELEPHONE (OUTPATIENT)
Dept: OTHER | Facility: CLINIC | Age: 64
End: 2019-08-03

## 2019-08-03 PROBLEM — J44.1 COPD EXACERBATION (HCC): Status: ACTIVE | Noted: 2019-08-03

## 2019-08-03 LAB
A/G RATIO: 1 (ref 1.1–2.2)
ALBUMIN SERPL-MCNC: 3.6 G/DL (ref 3.4–5)
ALP BLD-CCNC: 98 U/L (ref 40–129)
ALT SERPL-CCNC: 10 U/L (ref 10–40)
ANION GAP SERPL CALCULATED.3IONS-SCNC: 17 MMOL/L (ref 3–16)
AST SERPL-CCNC: 10 U/L (ref 15–37)
BASOPHILS ABSOLUTE: 0 K/UL (ref 0–0.2)
BASOPHILS RELATIVE PERCENT: 0.2 %
BILIRUB SERPL-MCNC: <0.2 MG/DL (ref 0–1)
BUN BLDV-MCNC: 41 MG/DL (ref 7–20)
CALCIUM SERPL-MCNC: 9.4 MG/DL (ref 8.3–10.6)
CHLORIDE BLD-SCNC: 90 MMOL/L (ref 99–110)
CO2: 22 MMOL/L (ref 21–32)
CREAT SERPL-MCNC: 7.5 MG/DL (ref 0.6–1.2)
EKG ATRIAL RATE: 105 BPM
EKG DIAGNOSIS: NORMAL
EKG P AXIS: 46 DEGREES
EKG P-R INTERVAL: 168 MS
EKG Q-T INTERVAL: 330 MS
EKG QRS DURATION: 88 MS
EKG QTC CALCULATION (BAZETT): 436 MS
EKG R AXIS: -38 DEGREES
EKG T AXIS: 83 DEGREES
EKG VENTRICULAR RATE: 105 BPM
EOSINOPHILS ABSOLUTE: 0 K/UL (ref 0–0.6)
EOSINOPHILS RELATIVE PERCENT: 0 %
GFR AFRICAN AMERICAN: 7
GFR NON-AFRICAN AMERICAN: 5
GLOBULIN: 3.7 G/DL
GLUCOSE BLD-MCNC: 232 MG/DL (ref 70–99)
GLUCOSE BLD-MCNC: 284 MG/DL (ref 70–99)
GLUCOSE BLD-MCNC: 301 MG/DL (ref 70–99)
GLUCOSE BLD-MCNC: 338 MG/DL (ref 70–99)
GLUCOSE BLD-MCNC: 341 MG/DL (ref 70–99)
GLUCOSE BLD-MCNC: 429 MG/DL (ref 70–99)
GLUCOSE BLD-MCNC: 433 MG/DL (ref 70–99)
GLUCOSE BLD-MCNC: 570 MG/DL (ref 70–99)
HCT VFR BLD CALC: 34.6 % (ref 36–48)
HEMOGLOBIN: 11 G/DL (ref 12–16)
INR BLD: 2.42 (ref 0.86–1.14)
LYMPHOCYTES ABSOLUTE: 0.9 K/UL (ref 1–5.1)
LYMPHOCYTES RELATIVE PERCENT: 8.4 %
MCH RBC QN AUTO: 34.2 PG (ref 26–34)
MCHC RBC AUTO-ENTMCNC: 31.7 G/DL (ref 31–36)
MCV RBC AUTO: 107.8 FL (ref 80–100)
MONOCYTES ABSOLUTE: 0.7 K/UL (ref 0–1.3)
MONOCYTES RELATIVE PERCENT: 6.4 %
NEUTROPHILS ABSOLUTE: 9 K/UL (ref 1.7–7.7)
NEUTROPHILS RELATIVE PERCENT: 85 %
PDW BLD-RTO: 19.3 % (ref 12.4–15.4)
PERFORMED ON: ABNORMAL
PLATELET # BLD: 160 K/UL (ref 135–450)
PMV BLD AUTO: 8.8 FL (ref 5–10.5)
POTASSIUM REFLEX MAGNESIUM: 4.9 MMOL/L (ref 3.5–5.1)
PROTHROMBIN TIME: 27.6 SEC (ref 9.8–13)
RBC # BLD: 3.21 M/UL (ref 4–5.2)
SODIUM BLD-SCNC: 129 MMOL/L (ref 136–145)
TOTAL PROTEIN: 7.3 G/DL (ref 6.4–8.2)
WBC # BLD: 10.6 K/UL (ref 4–11)

## 2019-08-03 PROCEDURE — 71045 X-RAY EXAM CHEST 1 VIEW: CPT

## 2019-08-03 PROCEDURE — 6370000000 HC RX 637 (ALT 250 FOR IP): Performed by: INTERNAL MEDICINE

## 2019-08-03 PROCEDURE — 85610 PROTHROMBIN TIME: CPT

## 2019-08-03 PROCEDURE — 6360000002 HC RX W HCPCS: Performed by: INTERNAL MEDICINE

## 2019-08-03 PROCEDURE — 2580000003 HC RX 258: Performed by: INTERNAL MEDICINE

## 2019-08-03 PROCEDURE — 93010 ELECTROCARDIOGRAM REPORT: CPT | Performed by: INTERNAL MEDICINE

## 2019-08-03 PROCEDURE — 96374 THER/PROPH/DIAG INJ IV PUSH: CPT

## 2019-08-03 PROCEDURE — 85025 COMPLETE CBC W/AUTO DIFF WBC: CPT

## 2019-08-03 PROCEDURE — 71250 CT THORAX DX C-: CPT

## 2019-08-03 PROCEDURE — 83036 HEMOGLOBIN GLYCOSYLATED A1C: CPT

## 2019-08-03 PROCEDURE — 80053 COMPREHEN METABOLIC PANEL: CPT

## 2019-08-03 PROCEDURE — 99223 1ST HOSP IP/OBS HIGH 75: CPT | Performed by: INTERNAL MEDICINE

## 2019-08-03 PROCEDURE — 94640 AIRWAY INHALATION TREATMENT: CPT

## 2019-08-03 PROCEDURE — 2580000003 HC RX 258: Performed by: PHYSICIAN ASSISTANT

## 2019-08-03 PROCEDURE — 6360000002 HC RX W HCPCS: Performed by: PHYSICIAN ASSISTANT

## 2019-08-03 PROCEDURE — 96375 TX/PRO/DX INJ NEW DRUG ADDON: CPT

## 2019-08-03 PROCEDURE — 6370000000 HC RX 637 (ALT 250 FOR IP): Performed by: PHYSICIAN ASSISTANT

## 2019-08-03 PROCEDURE — 1200000000 HC SEMI PRIVATE

## 2019-08-03 PROCEDURE — 94761 N-INVAS EAR/PLS OXIMETRY MLT: CPT

## 2019-08-03 PROCEDURE — 2700000000 HC OXYGEN THERAPY PER DAY

## 2019-08-03 PROCEDURE — 36415 COLL VENOUS BLD VENIPUNCTURE: CPT

## 2019-08-03 PROCEDURE — 94660 CPAP INITIATION&MGMT: CPT

## 2019-08-03 PROCEDURE — 96361 HYDRATE IV INFUSION ADD-ON: CPT

## 2019-08-03 RX ORDER — ROSUVASTATIN CALCIUM 10 MG/1
10 TABLET, COATED ORAL NIGHTLY
Status: DISCONTINUED | OUTPATIENT
Start: 2019-08-03 | End: 2019-08-07 | Stop reason: HOSPADM

## 2019-08-03 RX ORDER — IPRATROPIUM BROMIDE AND ALBUTEROL SULFATE 2.5; .5 MG/3ML; MG/3ML
1 SOLUTION RESPIRATORY (INHALATION)
Status: DISCONTINUED | OUTPATIENT
Start: 2019-08-03 | End: 2019-08-07 | Stop reason: HOSPADM

## 2019-08-03 RX ORDER — PREDNISONE 20 MG/1
40 TABLET ORAL DAILY
Status: DISCONTINUED | OUTPATIENT
Start: 2019-08-05 | End: 2019-08-04

## 2019-08-03 RX ORDER — TRAMADOL HYDROCHLORIDE 50 MG/1
50 TABLET ORAL EVERY 6 HOURS PRN
Status: DISCONTINUED | OUTPATIENT
Start: 2019-08-03 | End: 2019-08-07 | Stop reason: HOSPADM

## 2019-08-03 RX ORDER — ALLOPURINOL 100 MG/1
100 TABLET ORAL DAILY
Status: DISCONTINUED | OUTPATIENT
Start: 2019-08-03 | End: 2019-08-07 | Stop reason: HOSPADM

## 2019-08-03 RX ORDER — NICOTINE POLACRILEX 4 MG
15 LOZENGE BUCCAL PRN
Status: DISCONTINUED | OUTPATIENT
Start: 2019-08-03 | End: 2019-08-07 | Stop reason: HOSPADM

## 2019-08-03 RX ORDER — DEXTROSE MONOHYDRATE 50 MG/ML
100 INJECTION, SOLUTION INTRAVENOUS PRN
Status: DISCONTINUED | OUTPATIENT
Start: 2019-08-03 | End: 2019-08-07 | Stop reason: HOSPADM

## 2019-08-03 RX ORDER — IPRATROPIUM BROMIDE AND ALBUTEROL SULFATE 2.5; .5 MG/3ML; MG/3ML
1 SOLUTION RESPIRATORY (INHALATION) ONCE
Status: COMPLETED | OUTPATIENT
Start: 2019-08-03 | End: 2019-08-03

## 2019-08-03 RX ORDER — SODIUM CHLORIDE 0.9 % (FLUSH) 0.9 %
10 SYRINGE (ML) INJECTION EVERY 12 HOURS SCHEDULED
Status: DISCONTINUED | OUTPATIENT
Start: 2019-08-03 | End: 2019-08-07 | Stop reason: HOSPADM

## 2019-08-03 RX ORDER — IPRATROPIUM BROMIDE AND ALBUTEROL SULFATE 2.5; .5 MG/3ML; MG/3ML
1 SOLUTION RESPIRATORY (INHALATION) EVERY 4 HOURS
Status: DISCONTINUED | OUTPATIENT
Start: 2019-08-03 | End: 2019-08-03

## 2019-08-03 RX ORDER — DOCUSATE SODIUM 100 MG/1
100 CAPSULE, LIQUID FILLED ORAL 2 TIMES DAILY
Status: DISCONTINUED | OUTPATIENT
Start: 2019-08-03 | End: 2019-08-07 | Stop reason: HOSPADM

## 2019-08-03 RX ORDER — ONDANSETRON 2 MG/ML
4 INJECTION INTRAMUSCULAR; INTRAVENOUS ONCE
Status: COMPLETED | OUTPATIENT
Start: 2019-08-03 | End: 2019-08-03

## 2019-08-03 RX ORDER — METHYLPREDNISOLONE SODIUM SUCCINATE 40 MG/ML
40 INJECTION, POWDER, LYOPHILIZED, FOR SOLUTION INTRAMUSCULAR; INTRAVENOUS EVERY 6 HOURS
Status: DISCONTINUED | OUTPATIENT
Start: 2019-08-03 | End: 2019-08-04

## 2019-08-03 RX ORDER — AZITHROMYCIN 250 MG/1
500 TABLET, FILM COATED ORAL DAILY
Status: COMPLETED | OUTPATIENT
Start: 2019-08-03 | End: 2019-08-03

## 2019-08-03 RX ORDER — ALBUTEROL SULFATE 2.5 MG/3ML
2.5 SOLUTION RESPIRATORY (INHALATION)
Status: DISCONTINUED | OUTPATIENT
Start: 2019-08-03 | End: 2019-08-07 | Stop reason: HOSPADM

## 2019-08-03 RX ORDER — NEBIVOLOL 5 MG/1
2.5 TABLET ORAL 2 TIMES DAILY
Status: DISCONTINUED | OUTPATIENT
Start: 2019-08-03 | End: 2019-08-07 | Stop reason: HOSPADM

## 2019-08-03 RX ORDER — ONDANSETRON 2 MG/ML
4 INJECTION INTRAMUSCULAR; INTRAVENOUS EVERY 6 HOURS PRN
Status: DISCONTINUED | OUTPATIENT
Start: 2019-08-03 | End: 2019-08-07 | Stop reason: HOSPADM

## 2019-08-03 RX ORDER — DEXTROSE MONOHYDRATE 25 G/50ML
12.5 INJECTION, SOLUTION INTRAVENOUS PRN
Status: DISCONTINUED | OUTPATIENT
Start: 2019-08-03 | End: 2019-08-07 | Stop reason: HOSPADM

## 2019-08-03 RX ORDER — AZITHROMYCIN 250 MG/1
250 TABLET, FILM COATED ORAL DAILY
Status: COMPLETED | OUTPATIENT
Start: 2019-08-04 | End: 2019-08-07

## 2019-08-03 RX ORDER — TRAMADOL HYDROCHLORIDE 50 MG/1
100 TABLET ORAL EVERY 6 HOURS PRN
Status: DISCONTINUED | OUTPATIENT
Start: 2019-08-03 | End: 2019-08-07 | Stop reason: HOSPADM

## 2019-08-03 RX ORDER — OMEPRAZOLE 10 MG/1
40 CAPSULE, DELAYED RELEASE ORAL EVERY MORNING
Status: DISCONTINUED | OUTPATIENT
Start: 2019-08-03 | End: 2019-08-03 | Stop reason: CLARIF

## 2019-08-03 RX ORDER — SODIUM CHLORIDE 0.9 % (FLUSH) 0.9 %
10 SYRINGE (ML) INJECTION PRN
Status: DISCONTINUED | OUTPATIENT
Start: 2019-08-03 | End: 2019-08-07 | Stop reason: HOSPADM

## 2019-08-03 RX ORDER — PANTOPRAZOLE SODIUM 40 MG/1
40 TABLET, DELAYED RELEASE ORAL
Status: DISCONTINUED | OUTPATIENT
Start: 2019-08-03 | End: 2019-08-07 | Stop reason: HOSPADM

## 2019-08-03 RX ORDER — 0.9 % SODIUM CHLORIDE 0.9 %
1000 INTRAVENOUS SOLUTION INTRAVENOUS ONCE
Status: COMPLETED | OUTPATIENT
Start: 2019-08-03 | End: 2019-08-03

## 2019-08-03 RX ADMIN — METHYLPREDNISOLONE SODIUM SUCCINATE 40 MG: 40 INJECTION, POWDER, FOR SOLUTION INTRAMUSCULAR; INTRAVENOUS at 21:30

## 2019-08-03 RX ADMIN — ALBUTEROL SULFATE 2.5 MG: 2.5 SOLUTION RESPIRATORY (INHALATION) at 23:30

## 2019-08-03 RX ADMIN — Medication 10 ML: at 21:37

## 2019-08-03 RX ADMIN — INSULIN LISPRO 8 UNITS: 100 INJECTION, SOLUTION INTRAVENOUS; SUBCUTANEOUS at 13:06

## 2019-08-03 RX ADMIN — Medication 10 ML: at 08:55

## 2019-08-03 RX ADMIN — INSULIN LISPRO 6 UNITS: 100 INJECTION, SOLUTION INTRAVENOUS; SUBCUTANEOUS at 21:26

## 2019-08-03 RX ADMIN — ROSUVASTATIN CALCIUM 10 MG: 10 TABLET, FILM COATED ORAL at 21:30

## 2019-08-03 RX ADMIN — NEBIVOLOL HYDROCHLORIDE 2.5 MG: 5 TABLET ORAL at 08:52

## 2019-08-03 RX ADMIN — NEBIVOLOL HYDROCHLORIDE 2.5 MG: 5 TABLET ORAL at 21:31

## 2019-08-03 RX ADMIN — ONDANSETRON 4 MG: 2 INJECTION INTRAMUSCULAR; INTRAVENOUS at 10:16

## 2019-08-03 RX ADMIN — ALBUTEROL SULFATE 5 MG: 2.5 SOLUTION RESPIRATORY (INHALATION) at 03:14

## 2019-08-03 RX ADMIN — INSULIN HUMAN 10 UNITS: 100 INJECTION, SOLUTION PARENTERAL at 04:02

## 2019-08-03 RX ADMIN — METHYLPREDNISOLONE SODIUM SUCCINATE 40 MG: 40 INJECTION, POWDER, FOR SOLUTION INTRAMUSCULAR; INTRAVENOUS at 08:55

## 2019-08-03 RX ADMIN — INSULIN LISPRO 18 UNITS: 100 INJECTION, SOLUTION INTRAVENOUS; SUBCUTANEOUS at 16:37

## 2019-08-03 RX ADMIN — CALCIUM ACETATE 1334 MG: 667 CAPSULE ORAL at 16:31

## 2019-08-03 RX ADMIN — METHYLPREDNISOLONE SODIUM SUCCINATE 40 MG: 40 INJECTION, POWDER, FOR SOLUTION INTRAMUSCULAR; INTRAVENOUS at 13:15

## 2019-08-03 RX ADMIN — INSULIN GLARGINE 30 UNITS: 100 INJECTION, SOLUTION SUBCUTANEOUS at 21:24

## 2019-08-03 RX ADMIN — INSULIN GLARGINE 30 UNITS: 100 INJECTION, SOLUTION SUBCUTANEOUS at 09:03

## 2019-08-03 RX ADMIN — IPRATROPIUM BROMIDE AND ALBUTEROL SULFATE 1 AMPULE: .5; 3 SOLUTION RESPIRATORY (INHALATION) at 15:15

## 2019-08-03 RX ADMIN — CALCIUM ACETATE 1334 MG: 667 CAPSULE ORAL at 08:52

## 2019-08-03 RX ADMIN — SODIUM CHLORIDE 1000 ML: 9 INJECTION, SOLUTION INTRAVENOUS at 04:02

## 2019-08-03 RX ADMIN — IPRATROPIUM BROMIDE AND ALBUTEROL SULFATE 1 AMPULE: .5; 3 SOLUTION RESPIRATORY (INHALATION) at 10:30

## 2019-08-03 RX ADMIN — WARFARIN SODIUM 3 MG: 2 TABLET ORAL at 16:31

## 2019-08-03 RX ADMIN — ALLOPURINOL 100 MG: 100 TABLET ORAL at 08:51

## 2019-08-03 RX ADMIN — PANTOPRAZOLE SODIUM 40 MG: 40 TABLET, DELAYED RELEASE ORAL at 08:52

## 2019-08-03 RX ADMIN — AZITHROMYCIN MONOHYDRATE 500 MG: 250 TABLET ORAL at 08:52

## 2019-08-03 RX ADMIN — TRAMADOL HYDROCHLORIDE 100 MG: 50 TABLET, FILM COATED ORAL at 18:58

## 2019-08-03 RX ADMIN — IPRATROPIUM BROMIDE AND ALBUTEROL SULFATE 1 AMPULE: .5; 3 SOLUTION RESPIRATORY (INHALATION) at 03:14

## 2019-08-03 RX ADMIN — INSULIN LISPRO 4 UNITS: 100 INJECTION, SOLUTION INTRAVENOUS; SUBCUTANEOUS at 09:01

## 2019-08-03 RX ADMIN — ONDANSETRON 4 MG: 2 INJECTION INTRAMUSCULAR; INTRAVENOUS at 04:02

## 2019-08-03 RX ADMIN — IPRATROPIUM BROMIDE AND ALBUTEROL SULFATE 1 AMPULE: .5; 3 SOLUTION RESPIRATORY (INHALATION) at 21:02

## 2019-08-03 RX ADMIN — CALCIUM ACETATE 1334 MG: 667 CAPSULE ORAL at 13:14

## 2019-08-03 ASSESSMENT — PAIN SCALES - GENERAL
PAINLEVEL_OUTOF10: 8
PAINLEVEL_OUTOF10: 0
PAINLEVEL_OUTOF10: 0
PAINLEVEL_OUTOF10: 7

## 2019-08-03 ASSESSMENT — PAIN DESCRIPTION - ORIENTATION
ORIENTATION: MID
ORIENTATION: MID

## 2019-08-03 ASSESSMENT — PAIN DESCRIPTION - PAIN TYPE
TYPE: ACUTE PAIN
TYPE: ACUTE PAIN

## 2019-08-03 ASSESSMENT — ENCOUNTER SYMPTOMS
WHEEZING: 0
NAUSEA: 0
SHORTNESS OF BREATH: 1
ABDOMINAL PAIN: 0
VOMITING: 0

## 2019-08-03 ASSESSMENT — PAIN DESCRIPTION - LOCATION
LOCATION: CHEST
LOCATION: CHEST

## 2019-08-03 NOTE — ED NOTES
Bed: 05  Expected date:   Expected time:   Means of arrival: [facility]  Comments:  [facility] [address], [address] [facility]  08/02/19 5317

## 2019-08-03 NOTE — H&P
Hospital Medicine History & Physical      PCP: Amina Walker, APRN - CNP    Date of Admission: 8/2/2019    Date of Service: Pt seen/examined on 8/3/2019  and Admitted to Inpatient with expected LOS greater than two midnights due to medical therapy. Chief Complaint:    Chief Complaint   Patient presents with    Shortness of Breath     Pt from home via [facility] for c/o SoB x2 days, worsening tonight. Pt states Hx COPD and HD, treatment MWF, did not help. History Of Present Illness: The patient is a 59 y.o. female with history of type 2 diabetes, hypertension, end-stage renal disease on dialysis, COPD, chronic respiratory failure, chronic diastolic heart failure, obstructive sleep apnea who presents with a few days history of worsening shortness of breath with associated cough and scanty production. She was placed on 5-day course of steroids with inhalers with no symptomatic relief. No fevers, no chills, no palpitations or chest pains.   Chest x-ray done was unremarkable for acute pathology        Past Medical History:        Diagnosis Date    Abdominal pain 8/20/2018    Acute on chronic congestive heart failure (HCC) 6/26/2014    Asthma     Cervical cancer ([address]     Chest pain 5/16/2018    CHF (congestive heart failure) [facility]     Chronic kidney disease, stage IV (severe) ([facility]     Dr Lesvia Lombardo    Chronic pain syndrome 3/27/2018    Chronic respiratory failure with hypoxia ([facility])     CKD (chronic kidney disease) stage 4, GFR 15-29 ml/min ([facility]) 6/30/2017    Colon polyps     COPD (chronic obstructive pulmonary disease) ([address]     Degenerative disc disease at L5-S1 level 6/18/2013     CT    Diabetes mellitus, insulin dependent (IDDM), uncontrolled ([address]     Diabetic polyneuropathy associated with type 2 diabetes mellitus ([address] 3/26/2019    Elevated troponin 9/9/2017    ESRD (end stage renal disease) on dialysis ([address] 02/14/2018    JU SHAFER    GERD (gastroesophageal reflux Pupils equal, round, and reactive to light. Extra ocular muscles intact. Conjunctivae/corneas clear. Neck: Supple, No jugular venous distention/bruits. Lungs: Expiratory wheezing with prolonged expiration   Heart: Regular rate and rhythm with Normal S1/S2 without murmurs, rubs or gallops  Abdomen: Soft, non-tender or non-distended without rigidity or guarding and positive bowel sounds   Extremities: No clubbing, cyanosis, or edema bilaterally. Skin: Skin color, texture, turgor normal.  No rashes or lesions. Neurologic: Alert and oriented X 3, neurovascularly intact with sensory/motor intact upper extremities/lower extremities, bilaterally. Cranial nerves: II-XII intact, grossly non-focal.  Mental status: Alert, oriented, thought content appropriate.         CBC   Recent Labs     08/03/19 0152   WBC 10.6   HGB 11.0*   HCT 34.6*         RENAL  Recent Labs     08/03/19 0152   *   K 4.9   CL 90*   CO2 22   BUN 41*   CREATININE 7.5*     LFT'S  Recent Labs     08/03/19 0152   AST 10*   ALT 10   BILITOT <0.2   ALKPHOS 98       U/A:    Lab Results   Component Value Date    NITRITE neg 05/06/2015    COLORU Edy Ouch 11/25/2018    WBCUA see below 11/25/2018    RBCUA see below 11/25/2018    MUCUS 3+ 06/18/2013    BACTERIA 3+ 11/25/2018    CLARITYU TURBID 11/25/2018    SPECGRAV >=1.030 11/25/2018    LEUKOCYTESUR SMALL 11/25/2018    BLOODU TRACE-INTACT 11/25/2018    GLUCOSEU 100 11/25/2018    GLUCOSEU NEGATIVE 11/23/2010       ABG    Lab Results   Component Value Date    DJN4DZW 29.3 01/11/2018    BEART 5.7 01/11/2018    D3YHNTRX 97.9 01/11/2018    PHART 7.501 01/11/2018    THGBART 12.9 11/11/2010    CAI5FHA 38.3 01/11/2018    PO2ART 93.9 01/11/2018    ZFQ0HMA 30.4 01/11/2018           Active Hospital Problems    Diagnosis Date Noted    COPD exacerbation ([facility] [J44.1] 08/03/2019    Essential hypertension [I10] 03/30/2019    Diabetic polyneuropathy associated with type 2 diabetes mellitus ([facility] [E11.42] 03/26/2019    ESRD (end stage renal disease) on dialysis ([facility] [N18.6, Z99.2] 02/14/2018    Chronic hypoxemic respiratory failure (HCC) [J96.11]     Uncontrolled type 2 diabetes with renal manifestation (HCC) [E11.29, E11.65] 09/05/2014    Chronic diastolic CHF (congestive heart failure) ([facility] [I50.32] 09/27/2013         ASSESSMENT/PLAN:  59 y.o. female with history of type 2 diabetes, hypertension, end-stage renal disease on dialysis, COPD, chronic respiratory failure, chronic diastolic heart failure, obstructive sleep apnea who presents with a few days history of worsening shortness of breath with associated cough and scanty production found to have acute COPD exacerbation    Plan:  -Continue on breathing therapy with cardiopulmonary protocol  -Systemic steroids  - Oxygen supplementation with target saturations greater than 90%  - Pulmonology consult  -Given chronicity of symptoms with no significant symptom improvement, will get a CT chest for better details to rule out any interstitial pathology  - Sliding scale insulin with basal bolus glargine feels glycemic control  - Nephrology consult for dialysis      DVT Prophylaxis: Subcut heparin  Diet: Diabetic diet  Code Status: Full code         Boston Cooper MD    Thank you JURGEN Stephens - ALONDRA for the opportunity to be involved in this patient's care. If you have any questions or concerns please feel free to contact me at [phone].

## 2019-08-03 NOTE — ED NOTES
Pt updated on plan of care, denies needs at this time. Call light within reach.       Tesha Morales RN  08/03/19 4855

## 2019-08-03 NOTE — CONSULTS
[facility]  Consult Note      Referring Physician:  Jose Victor MD    Reason:    ESRD management     HPI:  60 y/o AAF with history of COPD, morbid obesity and ESRD presents to the hospital with shortness of breath. She reports worsening wheezing and dyspnea that did not respond to her usual nebulizer treatments at home. Feeling a bit better this am. She is on dialysis at [facility]. Has been compliant with her dialysis treatments. Has had her dry weight reduced to 145.5 over the last 2-3 weeks. Does report some cramping at the end of treatment. Has a left upper arm AVG for access. There is a newly placed right upper arm AVG in place.        Past Medical History   Active Ambulatory Problems     Diagnosis Date Noted    Asthma 11/11/2010    Colon polyps     Microalbuminuria     Venous stasis of lower extremity     Obstructive sleep apnea on CPAP     Chronic diastolic CHF (congestive heart failure) ([address] 09/27/2013    Renal osteodystrophy 07/07/2014    Uncontrolled type 2 diabetes with renal manifestation ([address] 09/05/2014    History of pulmonary embolism 10/16/2014    Anemia of chronic kidney failure ([address] 01/02/2015    Primary osteoarthritis of both knees 09/18/2015    Essential hypertension, malignant 11/06/2015    Restrictive lung disease     Gastroesophageal reflux disease     LVH (left ventricular hypertrophy) 01/14/2016    Dyspnea and respiratory abnormalities 03/12/2016    Acute on chronic respiratory failure with hypoxia and hypercapnia (HCC)     Obesity hypoventilation syndrome ([address]     Morbid obesity due to excess calories ([address]     COPD, moderate ([address] 05/04/2016    Gout     Warfarin-induced coagulopathy ([address] 12/05/2016    Chronic hypoxemic respiratory failure ([address]     Constipation 09/09/2017    Diabetes education, encounter for 09/12/2017    Primary osteoarthritis of left hip 11/21/2017    ESRD (end stage renal disease) on dialysis ([address] 02/14/2018 History  Past Surgical History:   Procedure Laterality Date    CARDIAC CATHETERIZATION  1/14    [facility]; clean cors    COLONOSCOPY  2010    polyp removed; Ari Acosta; repeat 5 years    COLONOS[place]25/13, 11/14    [place]    CORONARY ANGIOPLASTY WITH STENT PLACEMENT      DIALYSIS FISTULA CREATION Right 3/28/2019    RIGHT BRACHIO CEPHALIC FISTULA CREATION performed by Vishnu Cabrera MD at [address]  2/21/09    LVH with global hypokinesis; EF 55-60%    HYSTERECTOMY      KNEE ARTHROSCOPY Right 1999    OTHER SURGICAL HISTORY  02/22/2018    LEFT brachial artery axillary vein AV graft     ND OPEN SKULL SUPRATENT EXPLORE      Craniotomy, 3/21/19 patient denies any brain surgery or craniotomy    ND REPAIR INCISIONAL HERNIA,REDUCIBLE N/A 11/20/2018    LAPAROSCOPIC VENTRAL HERNIA REPAIR WITH MESH performed by Yash Kim MD at [address] Right 7/18/2019    RIGHT BRACHIAL ARTERY AXILLARY VEIN GRAFT AND LIGATION OF RIGHT BRACHIO-CEPHALIC FISTULA (77920, [id number]) performed by Vishnu Cabrera MD at [place] [address]  10/96    TUNNELED VENOUS PORT PLACEMENT Right 11/2019    UPPER GASTROINTESTINAL ENDOSCOPY  6/25/13    VENTRAL HERNIA REPAIR  12/24/2016    Incarcerated ventral hernia repair with mesh         Family History  family history includes Colon Cancer in her father, maternal grandmother, and mother; Diabetes in her father and mother; Heart Disease in her brother and sister; High Blood Pressure in her brother, father, maternal aunt, sister, and sister; Kidney Cancer in her brother.     Social History  Social History     Socioeconomic History    Marital status:      Spouse name: Neil Hernandez Number of children: 3    Years of education: Not on file    Highest education level: Not on file   Occupational History    Occupation: NA   Social Needs    Financial resource strain: Not on file    Food insecurity:     Worry: Not on file     Inability: Not

## 2019-08-03 NOTE — ED PROVIDER NOTES
abdominal pain, nausea and vomiting. Genitourinary: Negative. Musculoskeletal: Negative. Skin: Negative. Neurological: Negative. Positives and Pertinent negatives as per HPI. Except as noted abovein the ROS, all other systems were reviewed and negative.        PAST MEDICAL HISTORY     Past Medical History:   Diagnosis Date    Abdominal pain 8/20/2018    Acute on chronic congestive heart failure ([address] 6/26/2014    Asthma     Cervical cancer ([address]     Chest pain 5/16/2018    CHF (congestive heart failure) ([facility])     Chronic kidney disease, stage IV (severe) ([facility])     Dr Yessi Hewitt Chronic pain syndrome 3/27/2018    Chronic respiratory failure with hypoxia ([facility])     CKD (chronic kidney disease) stage 4, GFR 15-29 ml/min ([facility]) 6/30/2017    Colon polyps     COPD (chronic obstructive pulmonary disease) ([address]     Degenerative disc disease at L5-S1 level 6/18/2013     CT    Diabetes mellitus, insulin dependent (IDDM), uncontrolled ([address]     Diabetic polyneuropathy associated with type 2 diabetes mellitus ([address] 3/26/2019    Elevated troponin 9/9/2017    ESRD (end stage renal disease) on dialysis ([address] 02/14/2018    M-W-F JOSE Del [facility]o    GERD (gastroesophageal reflux disease)     Gout     History of blood transfusion     History of cervical cancer     Hyperlipidemia     Hypertension     Incarcerated ventral hernia     LVH (left ventricular hypertrophy)     Morbid obesity ([facility])     Mucus plugging of bronchi     Obstructive sleep apnea on CPAP     wears 2L O2 and BIPAP at night    Pneumonia     Psychiatric problem     breakdown long time ago but not current    Pulmonary embolism ([address] 2/6/13    Type 2 diabetes mellitus with diabetic neuropathy, with long-term current use of insulin ([address] 9/12/2017    Urinary incontinence in female     Venous stasis of lower extremity          SURGICAL HISTORY     Past Surgical History:   Procedure Laterality Date    CARDIAC CATHETERIZATION 1/14    Grace; clean cors    COLONOSCOPY  2010    polyp removed; Mariaa Chowdary; repeat 5 years    COLONOSCOPY  6/25/13, 11/14    [place]    CORONARY ANGIOPLASTY WITH STENT PLACEMENT      DIALYSIS FISTULA CREATION Right 3/28/2019    RIGHT BRACHIO CEPHALIC FISTULA CREATION performed by Ileana Gonzalez MD at [address]  2/21/09    LVH with global hypokinesis; EF 55-60%    HYSTERECTOMY      KNEE ARTHROSCOPY Right 1999    OTHER SURGICAL HISTORY  02/22/2018    LEFT brachial artery axillary vein AV graft     KS OPEN SKULL SUPRATENT EXPLORE      Craniotomy, 3/21/19 patient denies any brain surgery or craniotomy    KS REPAIR INCISIONAL HERNIA,REDUCIBLE N/A 11/20/2018    LAPAROSCOPIC VENTRAL HERNIA REPAIR WITH MESH performed by Efrain Fisher MD at [address] Right 7/18/2019    RIGHT BRACHIAL ARTERY AXILLARY VEIN GRAFT AND LIGATION OF RIGHT BRACHIO-CEPHALIC FISTULA ([address]12, [id number]) performed by Ileana Gonzalez MD at [place] [address]  10/96    TUNNELED VENOUS PORT PLACEMENT Right 11/2019    UPPER GASTROINTESTINAL ENDOSCOPY  6/25/13    VENTRAL HERNIA REPAIR  12/24/2016    Incarcerated ventral hernia repair with mesh         CURRENTMEDICATIONS       Previous Medications    ALBUTEROL SULFATE HFA (PROAIR HFA) 108 (90 BASE) MCG/ACT INHALER    Inhale 2 puffs into the lungs every 6 hours as needed for Wheezing    ALBUTEROL SULFATE  (90 BASE) MCG/ACT INHALER    INHALE 2 PUFFS BY MOUTH EVERY 6 HOURS AS NEEDED FOR WHEEZING    ALCOHOL SWABS (ALCOHOL PREP) 70 % PADS    USE TO TEST BLOOD GLUCOSE THREE TIMES DAILY    ALLOPURINOL (ZYLOPRIM) 100 MG TABLET    Take 1 tablet by mouth daily    ASSURE COMFORT LANCETS 30G MISC    USE TO TEST BLOOD GLUCOSE THREE TIMES DAILY    BIPAP MACHINE MISC    by Does not apply route nightly    BLOOD GLUCOSE MONITORING SUPPL (ACCU-CHEK KENNETH SMARTVIEW) W/DEVICE KIT    USE TO TEST BLOOD GLUCOSE    CALCIUM ACETATE (PHOSLO) 667 MG CAPSULE

## 2019-08-03 NOTE — CONSULTS
vial, Inhalation, Q4H  nebivolol (BYSTOLIC) tablet 2.5 mg, 2.5 mg, Oral, BID  rosuvastatin (CRESTOR) tablet 10 mg, 10 mg, Oral, Nightly  warfarin (COUMADIN) tablet 3 mg, 3 mg, Oral, Daily  sodium chloride flush 0.9 % injection 10 mL, 10 mL, Intravenous, 2 times per day  sodium chloride flush 0.9 % injection 10 mL, 10 mL, Intravenous, PRN  magnesium hydroxide (MILK OF MAGNESIA) 400 MG/5ML suspension 30 mL, 30 mL, Oral, Daily PRN  ondansetron (ZOFRAN) injection 4 mg, 4 mg, Intravenous, Q6H PRN  enoxaparin (LOVENOX) injection 40 mg, 40 mg, Subcutaneous, Daily  ipratropium-albuterol (DUONEB) nebulizer solution 1 ampule, 1 ampule, Inhalation, Q4H WA  methylPREDNISolone sodium (SOLU-MEDROL) injection 40 mg, 40 mg, Intravenous, Q6H **FOLLOWED BY** [START ON 8/5/2019] predniSONE (DELTASONE) tablet 40 mg, 40 mg, Oral, Daily  [COMPLETED] azithromycin (ZITHROMAX) tablet 500 mg, 500 mg, Oral, Daily **FOLLOWED BY** [START ON 8/4/2019] azithromycin (ZITHROMAX) tablet 250 mg, 250 mg, Oral, Daily  glucose (GLUTOSE) 40 % oral gel 15 g, 15 g, Oral, PRN  dextrose 50 % IV solution, 12.5 g, Intravenous, PRN  glucagon (rDNA) injection 1 mg, 1 mg, Intramuscular, PRN  dextrose 5 % solution, 100 mL/hr, Intravenous, PRN  insulin lispro (HUMALOG) injection pen 0-12 Units, 0-12 Units, Subcutaneous, TID WC  insulin lispro (HUMALOG) injection pen 0-6 Units, 0-6 Units, Subcutaneous, Nightly  traMADol (ULTRAM) tablet 50 mg, 50 mg, Oral, Q6H PRN **OR** traMADol (ULTRAM) tablet 100 mg, 100 mg, Oral, Q6H PRN  insulin glargine (LANTUS) injection pen 30 Units, 30 Units, Subcutaneous, BID  pantoprazole (PROTONIX) tablet 40 mg, 40 mg, Oral, QAM AC    Allergies   Allergen Reactions    Codeine Nausea Only    Fentanyl Itching    Morphine      CHEST PAIN    Hydrocodone-Acetaminophen Itching and Rash    Percocet [Oxycodone-Acetaminophen] Itching    Procardia [Nifedipine] Nausea And Vomiting     Headache  Takes norvasc at home 12/22/15    Vicodin negative for hallucinations, behavioral problems, confusion and agitation. Objective:   PHYSICAL EXAM:      VITALS:  /89   Pulse 84   Temp 97.6 °F (36.4 °C) (Oral)   Resp 17   Ht 5' 3\" (1.6 m)   Wt (!) 319 lb (144.7 kg)   LMP 11/01/1996 (Exact Date)   SpO2 100%   BMI 56.51 kg/m²      24HR INTAKE/OUTPUT:  No intake or output data in the 24 hours ending 08/03/19 1136  CONSTITUTIONAL:  awake, alert, cooperative, no apparent distress, and appears stated age  NECK:  Supple, symmetrical, trachea midline, no adenopathy, thyroid symmetric, not enlarged and no tenderness, skin normal  LUNGS:  no increased work of breathing and clear to auscultation. No accessory muscle use  CARDIOVASCULAR: S1 and S2, no edema and no JVD  ABDOMEN:  normal bowel sounds, non-distended and no masses palpated, and no tenderness to palpation. No hepatospleenomegaly  LYMPHADENOPATHY:  no axillary or supraclavicular adenopathy. No cervical adnenopathy  PSYCHIATRIC: Oriented to person place and time. No obvious depression or anxiety. MUSCULOSKELETAL: No obvious misalignment or effusion of the joints. No clubbing, cyanosis of the digits. SKIN:  normal skin color, texture, turgor and no redness, warmth, or swelling. No palpable nodules    DATA:    Old records have been reviewed    CBC:  Recent Labs     08/03/19  0152   WBC 10.6   RBC 3.21*   HGB 11.0*   HCT 34.6*      .8*   MCH 34.2*   MCHC 31.7   RDW 19.3*      BMP:  Recent Labs     08/03/19  0152   *   K 4.9   CL 90*   CO2 22   BUN 41*   CREATININE 7.5*   CALCIUM 9.4   GLUCOSE 570*      ABG:  No results for input(s): PHART, OTJ1KQA, PO2ART, KCE8ILJ, W0GEVLBK, BEART in the last 72 hours.     Lab Results   Component Value Date    BNP 48 03/13/2014     Lab Results   Component Value Date    CKTOTAL 129 07/11/2017    CKMB 1.9 04/19/2015    TROPONINI 0.11 (H) 07/20/2019       Cultures:     Abx:    Radiology Review:  All pertinent images / reports were reviewed as a part of this visit. Assessment:     1. COPD exacerbation  2. Restrictive lung disease  3. Obstructive sleep apnea  4. Chronic diastolic heart failure  5.   End-stage renal disease on hemodialysis    Chest x-ray is clear  CT scan just a couple weeks ago was also clear  She is wheezing on exam  PFT is showing restrictive lung disease but she does act like she has COPD clinically  She is on azithromycin for the possibility of bronchitis  Continue IV Solu-Medrol as she is wheezing  She is on scheduled nebulizer treatments  Agree with Protonix his GERD is often a trigger for COPD flareups

## 2019-08-04 LAB
BASOPHILS ABSOLUTE: 0 K/UL (ref 0–0.2)
BASOPHILS RELATIVE PERCENT: 0 %
EOSINOPHILS ABSOLUTE: 0 K/UL (ref 0–0.6)
EOSINOPHILS RELATIVE PERCENT: 0 %
ESTIMATED AVERAGE GLUCOSE: 165.7 MG/DL
GLUCOSE BLD-MCNC: 278 MG/DL (ref 70–99)
GLUCOSE BLD-MCNC: 331 MG/DL (ref 70–99)
GLUCOSE BLD-MCNC: 357 MG/DL (ref 70–99)
GLUCOSE BLD-MCNC: 367 MG/DL (ref 70–99)
GLUCOSE BLD-MCNC: 378 MG/DL (ref 70–99)
GLUCOSE BLD-MCNC: 418 MG/DL (ref 70–99)
HBA1C MFR BLD: 7.4 %
HCT VFR BLD CALC: 34.4 % (ref 36–48)
HEMOGLOBIN: 11.1 G/DL (ref 12–16)
INR BLD: 2.89 (ref 0.86–1.14)
LYMPHOCYTES ABSOLUTE: 0.9 K/UL (ref 1–5.1)
LYMPHOCYTES RELATIVE PERCENT: 7.2 %
MCH RBC QN AUTO: 33.8 PG (ref 26–34)
MCHC RBC AUTO-ENTMCNC: 32.3 G/DL (ref 31–36)
MCV RBC AUTO: 104.7 FL (ref 80–100)
MONOCYTES ABSOLUTE: 0.4 K/UL (ref 0–1.3)
MONOCYTES RELATIVE PERCENT: 3.1 %
NEUTROPHILS ABSOLUTE: 11.7 K/UL (ref 1.7–7.7)
NEUTROPHILS RELATIVE PERCENT: 89.7 %
PDW BLD-RTO: 18.9 % (ref 12.4–15.4)
PERFORMED ON: ABNORMAL
PLATELET # BLD: 161 K/UL (ref 135–450)
PMV BLD AUTO: 8.6 FL (ref 5–10.5)
PROTHROMBIN TIME: 33 SEC (ref 9.8–13)
RBC # BLD: 3.29 M/UL (ref 4–5.2)
WBC # BLD: 13.1 K/UL (ref 4–11)

## 2019-08-04 PROCEDURE — 94640 AIRWAY INHALATION TREATMENT: CPT

## 2019-08-04 PROCEDURE — 6360000002 HC RX W HCPCS: Performed by: INTERNAL MEDICINE

## 2019-08-04 PROCEDURE — 85610 PROTHROMBIN TIME: CPT

## 2019-08-04 PROCEDURE — 94760 N-INVAS EAR/PLS OXIMETRY 1: CPT

## 2019-08-04 PROCEDURE — 6370000000 HC RX 637 (ALT 250 FOR IP): Performed by: INTERNAL MEDICINE

## 2019-08-04 PROCEDURE — 2700000000 HC OXYGEN THERAPY PER DAY

## 2019-08-04 PROCEDURE — 94660 CPAP INITIATION&MGMT: CPT

## 2019-08-04 PROCEDURE — 2580000003 HC RX 258: Performed by: INTERNAL MEDICINE

## 2019-08-04 PROCEDURE — 99233 SBSQ HOSP IP/OBS HIGH 50: CPT | Performed by: INTERNAL MEDICINE

## 2019-08-04 PROCEDURE — 94761 N-INVAS EAR/PLS OXIMETRY MLT: CPT

## 2019-08-04 PROCEDURE — 6370000000 HC RX 637 (ALT 250 FOR IP): Performed by: PHYSICIAN ASSISTANT

## 2019-08-04 PROCEDURE — 85025 COMPLETE CBC W/AUTO DIFF WBC: CPT

## 2019-08-04 PROCEDURE — 1200000000 HC SEMI PRIVATE

## 2019-08-04 PROCEDURE — 36591 DRAW BLOOD OFF VENOUS DEVICE: CPT

## 2019-08-04 RX ORDER — METHYLPREDNISOLONE SODIUM SUCCINATE 40 MG/ML
40 INJECTION, POWDER, LYOPHILIZED, FOR SOLUTION INTRAMUSCULAR; INTRAVENOUS EVERY 8 HOURS
Status: DISCONTINUED | OUTPATIENT
Start: 2019-08-04 | End: 2019-08-05

## 2019-08-04 RX ORDER — TRAZODONE HYDROCHLORIDE 50 MG/1
100 TABLET ORAL NIGHTLY PRN
Status: DISCONTINUED | OUTPATIENT
Start: 2019-08-04 | End: 2019-08-07 | Stop reason: HOSPADM

## 2019-08-04 RX ORDER — CEPHALEXIN 250 MG/1
500 CAPSULE ORAL EVERY 12 HOURS SCHEDULED
Status: DISCONTINUED | OUTPATIENT
Start: 2019-08-04 | End: 2019-08-06

## 2019-08-04 RX ADMIN — WARFARIN SODIUM 3 MG: 2 TABLET ORAL at 16:54

## 2019-08-04 RX ADMIN — INSULIN LISPRO 15 UNITS: 100 INJECTION, SOLUTION INTRAVENOUS; SUBCUTANEOUS at 12:40

## 2019-08-04 RX ADMIN — PANTOPRAZOLE SODIUM 40 MG: 40 TABLET, DELAYED RELEASE ORAL at 05:56

## 2019-08-04 RX ADMIN — CALCIUM ACETATE 1334 MG: 667 CAPSULE ORAL at 12:30

## 2019-08-04 RX ADMIN — INSULIN GLARGINE 30 UNITS: 100 INJECTION, SOLUTION SUBCUTANEOUS at 08:39

## 2019-08-04 RX ADMIN — DOCUSATE SODIUM 100 MG: 100 CAPSULE, LIQUID FILLED ORAL at 08:38

## 2019-08-04 RX ADMIN — TRAZODONE HYDROCHLORIDE 100 MG: 50 TABLET ORAL at 22:45

## 2019-08-04 RX ADMIN — INSULIN LISPRO 25 UNITS: 100 INJECTION, SOLUTION INTRAVENOUS; SUBCUTANEOUS at 16:54

## 2019-08-04 RX ADMIN — INSULIN LISPRO 9 UNITS: 100 INJECTION, SOLUTION INTRAVENOUS; SUBCUTANEOUS at 16:55

## 2019-08-04 RX ADMIN — CALCIUM ACETATE 1334 MG: 667 CAPSULE ORAL at 16:54

## 2019-08-04 RX ADMIN — TRAMADOL HYDROCHLORIDE 100 MG: 50 TABLET, FILM COATED ORAL at 21:32

## 2019-08-04 RX ADMIN — NEBIVOLOL HYDROCHLORIDE 2.5 MG: 5 TABLET ORAL at 08:38

## 2019-08-04 RX ADMIN — DOCUSATE SODIUM 100 MG: 100 CAPSULE, LIQUID FILLED ORAL at 21:32

## 2019-08-04 RX ADMIN — IPRATROPIUM BROMIDE AND ALBUTEROL SULFATE 1 AMPULE: .5; 3 SOLUTION RESPIRATORY (INHALATION) at 15:47

## 2019-08-04 RX ADMIN — Medication 10 ML: at 21:33

## 2019-08-04 RX ADMIN — AZITHROMYCIN MONOHYDRATE 250 MG: 250 TABLET ORAL at 08:38

## 2019-08-04 RX ADMIN — METHYLPREDNISOLONE SODIUM SUCCINATE 40 MG: 40 INJECTION, POWDER, FOR SOLUTION INTRAMUSCULAR; INTRAVENOUS at 04:04

## 2019-08-04 RX ADMIN — METHYLPREDNISOLONE SODIUM SUCCINATE 40 MG: 40 INJECTION, POWDER, FOR SOLUTION INTRAMUSCULAR; INTRAVENOUS at 16:54

## 2019-08-04 RX ADMIN — INSULIN LISPRO 6 UNITS: 100 INJECTION, SOLUTION INTRAVENOUS; SUBCUTANEOUS at 21:30

## 2019-08-04 RX ADMIN — CEPHALEXIN 500 MG: 250 CAPSULE ORAL at 12:31

## 2019-08-04 RX ADMIN — NEBIVOLOL HYDROCHLORIDE 2.5 MG: 5 TABLET ORAL at 21:33

## 2019-08-04 RX ADMIN — ONDANSETRON 4 MG: 2 INJECTION INTRAMUSCULAR; INTRAVENOUS at 21:33

## 2019-08-04 RX ADMIN — CEPHALEXIN 500 MG: 250 CAPSULE ORAL at 21:32

## 2019-08-04 RX ADMIN — CALCIUM ACETATE 1334 MG: 667 CAPSULE ORAL at 08:37

## 2019-08-04 RX ADMIN — IPRATROPIUM BROMIDE AND ALBUTEROL SULFATE 1 AMPULE: .5; 3 SOLUTION RESPIRATORY (INHALATION) at 21:00

## 2019-08-04 RX ADMIN — INSULIN LISPRO 12 UNITS: 100 INJECTION, SOLUTION INTRAVENOUS; SUBCUTANEOUS at 05:55

## 2019-08-04 RX ADMIN — ALLOPURINOL 100 MG: 100 TABLET ORAL at 08:38

## 2019-08-04 RX ADMIN — IPRATROPIUM BROMIDE AND ALBUTEROL SULFATE 1 AMPULE: .5; 3 SOLUTION RESPIRATORY (INHALATION) at 11:52

## 2019-08-04 RX ADMIN — INSULIN LISPRO 25 UNITS: 100 INJECTION, SOLUTION INTRAVENOUS; SUBCUTANEOUS at 12:36

## 2019-08-04 RX ADMIN — IPRATROPIUM BROMIDE AND ALBUTEROL SULFATE 1 AMPULE: .5; 3 SOLUTION RESPIRATORY (INHALATION) at 08:03

## 2019-08-04 RX ADMIN — METHYLPREDNISOLONE SODIUM SUCCINATE 40 MG: 40 INJECTION, POWDER, FOR SOLUTION INTRAMUSCULAR; INTRAVENOUS at 08:38

## 2019-08-04 RX ADMIN — Medication 10 ML: at 09:30

## 2019-08-04 RX ADMIN — INSULIN LISPRO 18 UNITS: 100 INJECTION, SOLUTION INTRAVENOUS; SUBCUTANEOUS at 08:39

## 2019-08-04 RX ADMIN — ROSUVASTATIN CALCIUM 10 MG: 10 TABLET, FILM COATED ORAL at 21:32

## 2019-08-04 ASSESSMENT — PAIN DESCRIPTION - ORIENTATION
ORIENTATION: MID
ORIENTATION: RIGHT;MID;UPPER

## 2019-08-04 ASSESSMENT — PAIN DESCRIPTION - PAIN TYPE
TYPE: ACUTE PAIN
TYPE: ACUTE PAIN

## 2019-08-04 ASSESSMENT — PAIN DESCRIPTION - LOCATION
LOCATION: ARM
LOCATION: CHEST

## 2019-08-04 ASSESSMENT — PAIN DESCRIPTION - PROGRESSION: CLINICAL_PROGRESSION: NOT CHANGED

## 2019-08-04 ASSESSMENT — PAIN DESCRIPTION - FREQUENCY: FREQUENCY: CONTINUOUS

## 2019-08-04 ASSESSMENT — PAIN SCALES - GENERAL
PAINLEVEL_OUTOF10: 0
PAINLEVEL_OUTOF10: 0
PAINLEVEL_OUTOF10: 8

## 2019-08-04 ASSESSMENT — PAIN - FUNCTIONAL ASSESSMENT: PAIN_FUNCTIONAL_ASSESSMENT: PREVENTS OR INTERFERES SOME ACTIVE ACTIVITIES AND ADLS

## 2019-08-04 ASSESSMENT — PAIN DESCRIPTION - ONSET: ONSET: ON-GOING

## 2019-08-04 ASSESSMENT — PAIN DESCRIPTION - DESCRIPTORS: DESCRIPTORS: SHARP;SHOOTING

## 2019-08-04 NOTE — FLOWSHEET NOTE
Afternoon meds given. VS obtained. bp elevated. Nephrology aware. NNO at this time. Will continue to monitor. Pt. Denies any other additional needs at this time.  Call light in reach      08/04/19 1229   Vital Signs   Temp 97.8 °F (36.6 °C)   Temp Source Oral   Pulse 94   Heart Rate Source Monitor   Resp 15   BP (!) 157/104   BP Location Right upper arm   BP Upper/Lower Upper   MAP (mmHg) 122   Patient Position Sitting   Level of Consciousness 0   MEWS Score 1   Oxygen Therapy   SpO2 95 %   O2 Device None (Room air)

## 2019-08-04 NOTE — CONSULTS
P Pulmonary and Critical Care   Consult Note      Reason for Consult: Shortness of breath, COPD exacerbation  Requesting Physician: Dr Hemphill Service:   [id number] [facility]:                The patient is a 59 y.o. female with significant past medical history of:      Diagnosis Date    Abdominal pain 8/20/2018    Acute on chronic congestive heart failure ([address] 6/26/2014    Asthma     Cervical cancer ([address]     Chest pain 5/16/2018    CHF (congestive heart failure) ([address]     Chronic kidney disease, stage IV (severe) ([address]     Dr Angel Galeana Chronic pain syndrome 3/27/2018    Chronic respiratory failure with hypoxia ([address]     CKD (chronic kidney disease) stage 4, GFR 15-29 ml/min ([facility]) 6/30/2017    Colon polyps     COPD (chronic obstructive pulmonary disease) ([address]     Degenerative disc disease at L5-S1 level 6/18/2013     CT    Diabetes mellitus, insulin dependent (IDDM), uncontrolled ([address]     Diabetic polyneuropathy associated with type 2 diabetes mellitus ([address] 3/26/2019    Elevated troponin 9/9/2017    ESRD (end stage renal disease) on dialysis ([address] 02/14/2018    MELVI-GERTRUDE-ARTHUR Maldonado    GERD (gastroesophageal reflux disease)     Gout     History of blood transfusion     History of cervical cancer     Hyperlipidemia     Hypertension     Incarcerated ventral hernia     LVH (left ventricular hypertrophy)     Morbid obesity ([facility])     Mucus plugging of bronchi     Obstructive sleep apnea on CPAP     wears 2L O2 and BIPAP at night    Pneumonia     Psychiatric problem     breakdown long time ago but not current    Pulmonary embolism ([address] 2/6/13    Type 2 diabetes mellitus with diabetic neuropathy, with long-term current use of insulin ([address] 9/12/2017    Urinary incontinence in female     Venous stasis of lower extremity      The patient presented to the emergency department with a 2 to 3-day history of worsening shortness of breath and cough.   She feels congested but cannot

## 2019-08-04 NOTE — PROGRESS NOTES
08/04/2019    MPV 8.6 08/04/2019     BMP:    Lab Results   Component Value Date     08/03/2019    K 4.9 08/03/2019    CL 90 08/03/2019    CO2 22 08/03/2019    BUN 41 08/03/2019    LABALBU 3.6 08/03/2019    CREATININE 7.5 08/03/2019    CALCIUM 9.4 08/03/2019    GFRAA 7 08/03/2019    GFRAA 50 05/12/2013    LABGLOM 5 08/03/2019    GLUCOSE 570 08/03/2019     U/A:    Lab Results   Component Value Date    NITRITE neg 05/06/2015    COLORU Brown 11/25/2018    PHUR 5.5 11/25/2018    LABCAST 1-3 Mixed Cells 07/09/2018    WBCUA see below 11/25/2018    RBCUA see below 11/25/2018    MUCUS 3+ 06/18/2013    YEAST Present 08/20/2018    BACTERIA 3+ 11/25/2018    CLARITYU TURBID 11/25/2018    SPECGRAV >=1.030 11/25/2018    LEUKOCYTESUR SMALL 11/25/2018    UROBILINOGEN 0.2 11/25/2018    BILIRUBINUR SMALL 11/25/2018    BILIRUBINUR neg 05/06/2015    BILIRUBINUR NEGATIVE 11/23/2010    BLOODU TRACE-INTACT 11/25/2018    GLUCOSEU 100 11/25/2018    GLUCOSEU NEGATIVE 11/23/2010           Assessment/Plan:    1) ESRD- normal HD MWF. Has been compliant with treatment. Did get to her target weight of 145.5 kg on Friday. Will plan for HD in am     2) respiratory failure - history of COPD. She is on steroids and bronchodilator therapy. Reports feeling a little bit better this am     3) anemia- no need for GUANACO at present time     4) renal osteodystrophy- binder has been restarted     5) HTN- blood pressures are elevated at times in acute setting. Monitor for now    6) right AVG wound dehiscence - has a small wound dehiscence over the arterial anastomosis of her new right arm AVG. There is drainage noted on dressing.  Given presence of steroids and DM treat with [facility]

## 2019-08-04 NOTE — PROGRESS NOTES
Pt. Danny Shepardly on bipap per V60 for hs. Pt. Agreed to wear a hospital bipap since she did not bring her home unit. Pt. Appears to be tolerating well.

## 2019-08-04 NOTE — FLOWSHEET NOTE
Evening meds given. Pt. Denies any other additional needs at this time.  Call light in reach      08/04/19 1647   Vital Signs   Temp 97.9 °F (36.6 °C)   Temp Source Oral   Pulse 97   Heart Rate Source Monitor   Resp 15   BP (!) 162/90   BP Location Right upper arm   BP Upper/Lower Upper   MAP (mmHg) 114   Patient Position Semi fowlers   Level of Consciousness 0   MEWS Score 1   Oxygen Therapy   SpO2 98 %   O2 Device Nasal cannula   O2 Flow Rate (L/min) 2 L/min

## 2019-08-04 NOTE — ED PROVIDER NOTES
patient is nontoxic, in no acute distress. She has diffuse expiratory wheezing. Cardiac exam shows regular rate and rhythm with no murmurs rubs or gallops. She had initial mild tachycardia. Patient with wheezing and shortness of breath relieved by nebs consistent with COPD exacerbation. Due to being on steroids, I believe she will benefit from monitoring in the hospital as steroid failure. She was admitted for further stabilization after nebulizers and steroids in the ED. There is no evidence of acute infectious process.     [WL]      ED Course User Index  [WL] DO Malathi Abarca DO  08/03/19 2005

## 2019-08-05 LAB
ALBUMIN SERPL-MCNC: 3.7 G/DL (ref 3.4–5)
ANION GAP SERPL CALCULATED.3IONS-SCNC: 21 MMOL/L (ref 3–16)
BANDED NEUTROPHILS RELATIVE PERCENT: 5 % (ref 0–7)
BASOPHILS ABSOLUTE: 0 K/UL (ref 0–0.2)
BASOPHILS RELATIVE PERCENT: 0 %
BUN BLDV-MCNC: 105 MG/DL (ref 7–20)
CALCIUM SERPL-MCNC: 9.5 MG/DL (ref 8.3–10.6)
CHLORIDE BLD-SCNC: 93 MMOL/L (ref 99–110)
CO2: 18 MMOL/L (ref 21–32)
CREAT SERPL-MCNC: 11.9 MG/DL (ref 0.6–1.2)
EOSINOPHILS ABSOLUTE: 0 K/UL (ref 0–0.6)
EOSINOPHILS RELATIVE PERCENT: 0 %
GFR AFRICAN AMERICAN: 4
GFR NON-AFRICAN AMERICAN: 3
GLUCOSE BLD-MCNC: 152 MG/DL (ref 70–99)
GLUCOSE BLD-MCNC: 227 MG/DL (ref 70–99)
GLUCOSE BLD-MCNC: 296 MG/DL (ref 70–99)
GLUCOSE BLD-MCNC: 302 MG/DL (ref 70–99)
GLUCOSE BLD-MCNC: 346 MG/DL (ref 70–99)
HCT VFR BLD CALC: 34 % (ref 36–48)
HCT VFR BLD CALC: 35.3 % (ref 36–48)
HCT VFR BLD CALC: 35.6 % (ref 36–48)
HEMOGLOBIN: 11.2 G/DL (ref 12–16)
HEMOGLOBIN: 11.6 G/DL (ref 12–16)
HEMOGLOBIN: 11.6 G/DL (ref 12–16)
INR BLD: 3.03 (ref 0.86–1.14)
LYMPHOCYTES ABSOLUTE: 0.5 K/UL (ref 1–5.1)
LYMPHOCYTES RELATIVE PERCENT: 3 %
MCH RBC QN AUTO: 34.3 PG (ref 26–34)
MCH RBC QN AUTO: 34.3 PG (ref 26–34)
MCH RBC QN AUTO: 34.5 PG (ref 26–34)
MCHC RBC AUTO-ENTMCNC: 32.6 G/DL (ref 31–36)
MCHC RBC AUTO-ENTMCNC: 32.8 G/DL (ref 31–36)
MCHC RBC AUTO-ENTMCNC: 33 G/DL (ref 31–36)
MCV RBC AUTO: 104.5 FL (ref 80–100)
MCV RBC AUTO: 104.7 FL (ref 80–100)
MCV RBC AUTO: 105.5 FL (ref 80–100)
MONOCYTES ABSOLUTE: 0 K/UL (ref 0–1.3)
MONOCYTES RELATIVE PERCENT: 0 %
NEUTROPHILS ABSOLUTE: 14.8 K/UL (ref 1.7–7.7)
NEUTROPHILS RELATIVE PERCENT: 92 %
PDW BLD-RTO: 18.7 % (ref 12.4–15.4)
PDW BLD-RTO: 19.1 % (ref 12.4–15.4)
PDW BLD-RTO: 19.2 % (ref 12.4–15.4)
PERFORMED ON: ABNORMAL
PHOSPHORUS: 4.9 MG/DL (ref 2.5–4.9)
PLATELET # BLD: 158 K/UL (ref 135–450)
PLATELET # BLD: 172 K/UL (ref 135–450)
PLATELET # BLD: 177 K/UL (ref 135–450)
PLATELET SLIDE REVIEW: ADEQUATE
PMV BLD AUTO: 9 FL (ref 5–10.5)
PMV BLD AUTO: 9.2 FL (ref 5–10.5)
PMV BLD AUTO: 9.2 FL (ref 5–10.5)
POTASSIUM SERPL-SCNC: 5.8 MMOL/L (ref 3.5–5.1)
PROTHROMBIN TIME: 34.5 SEC (ref 9.8–13)
RBC # BLD: 3.26 M/UL (ref 4–5.2)
RBC # BLD: 3.38 M/UL (ref 4–5.2)
RBC # BLD: 3.38 M/UL (ref 4–5.2)
RBC # BLD: NORMAL 10*6/UL
SLIDE REVIEW: ABNORMAL
SODIUM BLD-SCNC: 132 MMOL/L (ref 136–145)
WBC # BLD: 15.3 K/UL (ref 4–11)
WBC # BLD: 17 K/UL (ref 4–11)
WBC # BLD: 18.3 K/UL (ref 4–11)

## 2019-08-05 PROCEDURE — 80069 RENAL FUNCTION PANEL: CPT

## 2019-08-05 PROCEDURE — 94660 CPAP INITIATION&MGMT: CPT

## 2019-08-05 PROCEDURE — 94640 AIRWAY INHALATION TREATMENT: CPT

## 2019-08-05 PROCEDURE — 6370000000 HC RX 637 (ALT 250 FOR IP): Performed by: INTERNAL MEDICINE

## 2019-08-05 PROCEDURE — 6370000000 HC RX 637 (ALT 250 FOR IP): Performed by: PHYSICIAN ASSISTANT

## 2019-08-05 PROCEDURE — 5A1D70Z PERFORMANCE OF URINARY FILTRATION, INTERMITTENT, LESS THAN 6 HOURS PER DAY: ICD-10-PCS | Performed by: INTERNAL MEDICINE

## 2019-08-05 PROCEDURE — 99232 SBSQ HOSP IP/OBS MODERATE 35: CPT | Performed by: INTERNAL MEDICINE

## 2019-08-05 PROCEDURE — 94760 N-INVAS EAR/PLS OXIMETRY 1: CPT

## 2019-08-05 PROCEDURE — 6360000002 HC RX W HCPCS: Performed by: INTERNAL MEDICINE

## 2019-08-05 PROCEDURE — 85025 COMPLETE CBC W/AUTO DIFF WBC: CPT

## 2019-08-05 PROCEDURE — 85610 PROTHROMBIN TIME: CPT

## 2019-08-05 PROCEDURE — 2580000003 HC RX 258: Performed by: INTERNAL MEDICINE

## 2019-08-05 PROCEDURE — 2500000003 HC RX 250 WO HCPCS: Performed by: INTERNAL MEDICINE

## 2019-08-05 PROCEDURE — 1200000000 HC SEMI PRIVATE

## 2019-08-05 PROCEDURE — 90935 HEMODIALYSIS ONE EVALUATION: CPT

## 2019-08-05 PROCEDURE — 36415 COLL VENOUS BLD VENIPUNCTURE: CPT

## 2019-08-05 PROCEDURE — 36591 DRAW BLOOD OFF VENOUS DEVICE: CPT

## 2019-08-05 PROCEDURE — 85027 COMPLETE CBC AUTOMATED: CPT

## 2019-08-05 PROCEDURE — 94761 N-INVAS EAR/PLS OXIMETRY MLT: CPT

## 2019-08-05 RX ORDER — METHYLPREDNISOLONE SODIUM SUCCINATE 40 MG/ML
40 INJECTION, POWDER, LYOPHILIZED, FOR SOLUTION INTRAMUSCULAR; INTRAVENOUS EVERY 12 HOURS
Status: DISCONTINUED | OUTPATIENT
Start: 2019-08-05 | End: 2019-08-05

## 2019-08-05 RX ORDER — METHYLPREDNISOLONE SODIUM SUCCINATE 40 MG/ML
40 INJECTION, POWDER, LYOPHILIZED, FOR SOLUTION INTRAMUSCULAR; INTRAVENOUS DAILY
Status: DISCONTINUED | OUTPATIENT
Start: 2019-08-05 | End: 2019-08-06

## 2019-08-05 RX ORDER — LIDOCAINE HYDROCHLORIDE 10 MG/ML
0.2 INJECTION, SOLUTION EPIDURAL; INFILTRATION; INTRACAUDAL; PERINEURAL PRN
Status: DISCONTINUED | OUTPATIENT
Start: 2019-08-05 | End: 2019-08-07 | Stop reason: HOSPADM

## 2019-08-05 RX ADMIN — TRAZODONE HYDROCHLORIDE 100 MG: 50 TABLET ORAL at 23:38

## 2019-08-05 RX ADMIN — METHYLPREDNISOLONE SODIUM SUCCINATE 40 MG: 40 INJECTION, POWDER, FOR SOLUTION INTRAMUSCULAR; INTRAVENOUS at 01:20

## 2019-08-05 RX ADMIN — IPRATROPIUM BROMIDE AND ALBUTEROL SULFATE 1 AMPULE: .5; 3 SOLUTION RESPIRATORY (INHALATION) at 20:10

## 2019-08-05 RX ADMIN — LIDOCAINE HYDROCHLORIDE 0.2 ML: 10 INJECTION, SOLUTION EPIDURAL; INFILTRATION; INTRACAUDAL; PERINEURAL at 11:57

## 2019-08-05 RX ADMIN — CEPHALEXIN 500 MG: 250 CAPSULE ORAL at 21:33

## 2019-08-05 RX ADMIN — DOCUSATE SODIUM 100 MG: 100 CAPSULE, LIQUID FILLED ORAL at 10:37

## 2019-08-05 RX ADMIN — INSULIN LISPRO 25 UNITS: 100 INJECTION, SOLUTION INTRAVENOUS; SUBCUTANEOUS at 17:40

## 2019-08-05 RX ADMIN — INSULIN LISPRO 3 UNITS: 100 INJECTION, SOLUTION INTRAVENOUS; SUBCUTANEOUS at 21:35

## 2019-08-05 RX ADMIN — INSULIN LISPRO 12 UNITS: 100 INJECTION, SOLUTION INTRAVENOUS; SUBCUTANEOUS at 10:40

## 2019-08-05 RX ADMIN — INSULIN LISPRO 25 UNITS: 100 INJECTION, SOLUTION INTRAVENOUS; SUBCUTANEOUS at 10:39

## 2019-08-05 RX ADMIN — NEBIVOLOL HYDROCHLORIDE 2.5 MG: 5 TABLET ORAL at 17:23

## 2019-08-05 RX ADMIN — Medication 10 ML: at 10:37

## 2019-08-05 RX ADMIN — DOCUSATE SODIUM 100 MG: 100 CAPSULE, LIQUID FILLED ORAL at 21:33

## 2019-08-05 RX ADMIN — CALCIUM ACETATE 1334 MG: 667 CAPSULE ORAL at 17:20

## 2019-08-05 RX ADMIN — NEBIVOLOL HYDROCHLORIDE 2.5 MG: 5 TABLET ORAL at 21:33

## 2019-08-05 RX ADMIN — METHYLPREDNISOLONE SODIUM SUCCINATE 40 MG: 40 INJECTION, POWDER, FOR SOLUTION INTRAMUSCULAR; INTRAVENOUS at 10:46

## 2019-08-05 RX ADMIN — IPRATROPIUM BROMIDE AND ALBUTEROL SULFATE 1 AMPULE: .5; 3 SOLUTION RESPIRATORY (INHALATION) at 07:55

## 2019-08-05 RX ADMIN — INSULIN LISPRO 3 UNITS: 100 INJECTION, SOLUTION INTRAVENOUS; SUBCUTANEOUS at 17:39

## 2019-08-05 RX ADMIN — AZITHROMYCIN MONOHYDRATE 250 MG: 250 TABLET ORAL at 10:38

## 2019-08-05 RX ADMIN — PANTOPRAZOLE SODIUM 40 MG: 40 TABLET, DELAYED RELEASE ORAL at 10:37

## 2019-08-05 RX ADMIN — IPRATROPIUM BROMIDE AND ALBUTEROL SULFATE 1 AMPULE: .5; 3 SOLUTION RESPIRATORY (INHALATION) at 16:01

## 2019-08-05 RX ADMIN — METHYLPREDNISOLONE SODIUM SUCCINATE 40 MG: 40 INJECTION, POWDER, FOR SOLUTION INTRAMUSCULAR; INTRAVENOUS at 18:49

## 2019-08-05 RX ADMIN — CEPHALEXIN 500 MG: 250 CAPSULE ORAL at 10:38

## 2019-08-05 RX ADMIN — ROSUVASTATIN CALCIUM 10 MG: 10 TABLET, FILM COATED ORAL at 21:33

## 2019-08-05 RX ADMIN — CALCIUM ACETATE 1334 MG: 667 CAPSULE ORAL at 10:38

## 2019-08-05 RX ADMIN — TRAMADOL HYDROCHLORIDE 100 MG: 50 TABLET, FILM COATED ORAL at 23:38

## 2019-08-05 RX ADMIN — Medication 10 ML: at 21:36

## 2019-08-05 RX ADMIN — ALLOPURINOL 100 MG: 100 TABLET ORAL at 10:37

## 2019-08-05 ASSESSMENT — PAIN SCALES - GENERAL: PAINLEVEL_OUTOF10: 8

## 2019-08-05 NOTE — PROGRESS NOTES
[facility] Pulmonary/CCM Progress note      Admit Date: 8/2/2019    Chief Complaint: Shortness of breath and cough    Subjective: Interval History: Currently having hemodialysis-dyspnea is better. Denies any significant phlegm with the cough. Currently on room air.     Scheduled Meds:   warfarin (COUMADIN) daily dosing (placeholder)   Other RX Placeholder    insulin glargine  40 Units Subcutaneous BID    methylPREDNISolone  40 mg Intravenous Q12H    insulin lispro  25 Units Subcutaneous TID     cephALEXin  500 mg Oral 2 times per day    allopurinol  100 mg Oral Daily    calcium acetate  1,334 mg Oral TID WC    docusate sodium  100 mg Oral BID    nebivolol  2.5 mg Oral BID    rosuvastatin  10 mg Oral Nightly    sodium chloride flush  10 mL Intravenous 2 times per day    ipratropium-albuterol  1 ampule Inhalation Q4H WA    azithromycin  250 mg Oral Daily    pantoprazole  40 mg Oral QAM AC    insulin lispro  0-18 Units Subcutaneous TID WC    insulin lispro  0-9 Units Subcutaneous Nightly     Continuous Infusions:   dextrose       PRN Meds:lidocaine PF, traZODone, sodium chloride flush, magnesium hydroxide, ondansetron, glucose, dextrose, glucagon (rDNA), dextrose, traMADol **OR** traMADol, albuterol    Review of Systems  Constitutional: negative for fatigue, fevers, malaise and weight loss  Ears, nose, mouth, throat: negative for ear drainage, epistaxis, hoarseness, nasal congestion, sore throat and voice change  Respiratory: negative except for cough and shortness of breath  Cardiovascular: negative for chest pain, chest pressure/discomfort, irregular heart beat, lower extremity edema and palpitations  Gastrointestinal: negative for abdominal pain, constipation, diarrhea, jaundice, melena, odynophagia, reflux symptoms and vomiting  Hematologic/lymphatic: negative for bleeding, easy bruising, lymphadenopathy and petechiae  Musculoskeletal:negative for arthralgias, bone pain, muscle weakness, neck pain tenderness  Neurologic: alert, no focal neurologic deficits    Data Review:  CBC:   Lab Results   Component Value Date    WBC 15.3 08/05/2019    RBC 3.26 08/05/2019     BMP:   Lab Results   Component Value Date    GLUCOSE 296 08/05/2019    CO2 18 08/05/2019     08/05/2019    CREATININE 11.9 08/05/2019    CALCIUM 9.5 08/05/2019     ABG:   Lab Results   Component Value Date    YPM4OBL 29.3 01/11/2018    BEART 5.7 01/11/2018    N6AXWGMB 97.9 01/11/2018    PHART 7.501 01/11/2018    THGBART 12.9 11/11/2010    BEB7TJO 38.3 01/11/2018    PO2ART 93.9 01/11/2018    FHG7XTX 30.4 01/11/2018       Radiology: All pertinent images / reports were reviewed as a part of this visit. Narrative   EXAMINATION:   CT OF THE CHEST WITHOUT CONTRAST 8/3/2019 11:44 am       TECHNIQUE:   CT of the chest was performed without the administration of intravenous   contrast. Multiplanar reformatted images are provided for review.  Dose   modulation, iterative reconstruction, and/or weight based adjustment of the   mA/kV was utilized to reduce the radiation dose to as low as reasonably   achievable.       COMPARISON:   11/24/2018, 02/22/2017       HISTORY:   ORDERING SYSTEM PROVIDED HISTORY: persistent SOB, cough r/o pulm pathology   TECHNOLOGIST PROVIDED HISTORY:   Reason for Exam: persistent SOB, cough r/o pulm pathology   Acuity: Unknown   Type of Exam: Subsequent/Follow-up       FINDINGS:   Mediastinum: Calcification of the thoracic aorta.  Port in the right chest   wall.  Within the mediastinum, no pathologic lymphadenopathy by size   criteria.  Thyroid is symmetric in size.  No axillary adenopathy.       Lungs/pleura: 4 mm left lower lobe nodule on series 2, image 76 is unchanged   for greater than 2 years, favoring a nonaggressive etiology.  Calcified   pulmonary nodules are seen, in keeping with granulomatous disease.  No   pneumothorax or pleural effusion.  No confluent airspace disease.       Upper Abdomen: No acute process in the

## 2019-08-05 NOTE — PROGRESS NOTES
Message sent via [employer]: \" Pt. asking if home trazadone 100mg can be ordered? Med is on home med list. Can we have order please? thanks! \". See orders. Will continue to monitor.

## 2019-08-05 NOTE — PROGRESS NOTES
Clinical Pharmacy Note: Stephany Horowitz is a 59 y.o. female  is receiving warfarin for Atrial fibrillation. INR goal 2-3    INR (no units)   Date Value   08/05/2019 3.03 (H)   08/04/2019 2.89 (H)   08/03/2019 2.42 (H)     Interacting medication: keflex  Hold warfarin today for INR:3.03    Daily INR is ordered. Will continue to monitor. Per pharmacy consult.   Terrence Nugent PharmD 8/5/2019

## 2019-08-05 NOTE — PROGRESS NOTES
increased pulse pressure. Right upper arm AVG with good thrill, wound dehiscence over arterial anastomosis, drainage on dressing        Labs:  CBC:   Lab Results   Component Value Date    WBC 17.0 08/05/2019    RBC 3.38 08/05/2019    HGB 11.6 08/05/2019    HCT 35.3 08/05/2019    .7 08/05/2019    MCH 34.3 08/05/2019    MCHC 32.8 08/05/2019    RDW 19.1 08/05/2019     08/05/2019    MPV 9.0 08/05/2019     BMP:    Lab Results   Component Value Date     08/05/2019    K 5.8 08/05/2019    K 4.9 08/03/2019    CL 93 08/05/2019    CO2 18 08/05/2019     08/05/2019    LABALBU 3.7 08/05/2019    CREATININE 11.9 08/05/2019    CALCIUM 9.5 08/05/2019    GFRAA 4 08/05/2019    GFRAA 50 05/12/2013    LABGLOM 3 08/05/2019    GLUCOSE 296 08/05/2019     U/A:    Lab Results   Component Value Date    NITRITE neg 05/06/2015    COLORU Brown 11/25/2018    PHUR 5.5 11/25/2018    LABCAST 1-3 Mixed Cells 07/09/2018    WBCUA see below 11/25/2018    RBCUA see below 11/25/2018    MUCUS 3+ 06/18/2013    YEAST Present 08/20/2018    BACTERIA 3+ 11/25/2018    CLARITYU TURBID 11/25/2018    SPECGRAV >=1.030 11/25/2018    LEUKOCYTESUR SMALL 11/25/2018    UROBILINOGEN 0.2 11/25/2018    BILIRUBINUR SMALL 11/25/2018    BILIRUBINUR neg 05/06/2015    BILIRUBINUR NEGATIVE 11/23/2010    BLOODU TRACE-INTACT 11/25/2018    GLUCOSEU 100 11/25/2018    GLUCOSEU NEGATIVE 11/23/2010           Assessment/Plan:    1) ESRD- normal HD MWF. Has been compliant with treatment. .5 kg; continue M/W/F schedule    2) respiratory failure - history of COPD. She is on steroids and bronchodilator therapy. 3) anemia- no need for GUANACO at present time     4) renal osteodystrophy- binder has been restarted     5) HTN- blood pressures are elevated at times in acute setting. Monitor for now    6) right AVG wound dehiscence - has a small wound dehiscence over the arterial anastomosis of her new right arm AVG. There is drainage noted on dressing.  Given presence of steroids and DM treat with Keflex. Recommendations:  Difficult exam given body habitus; decreased sounds throughout  IHD to EDW today with heavy IDWG. AM CXR if pulmonary edema may need IUF  May need to consult Dr. Arndt Hands team in AM for possible evaluation of her dehiscence, was supposed to see him in office tomorrow but will remain admitted and having difficulty with transport to her appts. Thank you for involving us in the care of this patient, please call with any questions.     Signed:  Geovany Dodge M.D.   [facility]  Office Number: [phone]  Fax Number: [phone]  Answering Service: [phone]  8/5/2019, 7:39 PM

## 2019-08-05 NOTE — FLOWSHEET NOTE
08/05/19 1159 08/05/19 1437   Vital Signs   BP (!) 171/98 133/73   Temp 97.5 °F (36.4 °C) 98 °F (36.7 °C)   Pulse 81 78   Resp 18 18     Treatment time: 4 hours ordered. Off AMA with 1:23 RTD, actual tx time 2:37. Net UF: 1633 ml    Pre weight: 152 kg (standing scale)  Post weight: 150.1 kg (standing scale)  EDW: 145.5 kg    Access used: CAREY AVG. ROBERT AVG not in use. Access function: No problems    Medications or blood products given: None    Regular outpatient schedule: [facility]    Summary of response to treatment: Off AMA with 1:23 RTD. Actual tx time 2:37. Dr. Cindi Ordaz aware. AMA form signed and placed in chart. BP dropped to 83/53 mid tx, UFG was decreased to minimum, total 400 ml NS was given. BP improved to 107/61. Copy of dialysis treatment record placed in chart, to be scanned into EMR. Report called to Jimena Nguyen RN.

## 2019-08-05 NOTE — DISCHARGE INSTR - COC
Catheter: None   Colostomy/Ileostomy/Ileal Conduit: No       Date of Last BM: 08/06/2019  No intake or output data in the 24 hours ending 08/05/19 1111  No intake/output data recorded. Safety Concerns:     None    Impairments/Disabilities:      None    Nutrition Therapy:  Current Nutrition Therapy:   - Oral Diet:  Carb Control 4 carbs/meal (1800kcals/day), Renal and low potassium    Routes of Feeding: Oral  Liquids: No Restrictions  Daily Fluid Restriction: no  Last Modified Barium Swallow with Video (Video Swallowing Test): not done    Rehab Therapies: SN  Weight Bearing Status/Restrictions: No weight bearing restirctions  Other Medical Equipment (for information only, NOT a DME order):  n/a  Other Treatments:     Patient's personal belongings (please select all that are sent with patient):  please see belongings list    RN SIGNATURE:  Electronically signed by Alexander Jeans, RN on 8/7/19 at 2:58 PM    CASE MANAGEMENT/SOCIAL WORK SECTION    Inpatient Status Date: ***    Readmission Risk Assessment Score:  Readmission Risk              Risk of Unplanned Readmission:        61           Discharging to Facility/ Agency   Name: Caesar Ventura  will call for Appointment  Phone: [phone]  Fax: [phone]    Dialysis Facility (if applicable)   · Name:  · Address:  · Dialysis Schedule:  · Phone:  · Fax:    / signature: {Esignature:[id number]}    PHYSICIAN SECTION    Prognosis: GOOD    Condition at Discharge: FAIR    Rehab Potential (if transferring to Rehab): GOOD    Recommended Labs or Other Treatments After Discharge:     Physician Certification: I certify the above information and transfer of Noni Herrera  is necessary for the continuing treatment of the diagnosis listed and that she requires home care for 30 days.      Update Admission H&P: UPDATED    PHYSICIAN SIGNATURE: Soren Patel MD on 08/07/19

## 2019-08-05 NOTE — PROGRESS NOTES
Pt. Ax performed; VSS - see flowsheets. Pt. Rx'd meds given - see MAR. Pt. Denies other needs at this time. Pt. Shows no signs of distress or discomfort. Call light/table in reach. Will continue to monitor.

## 2019-08-05 NOTE — PROGRESS NOTES
Hospitalist Progress Note      PCP: Fatemeh Van, APRN - CNP    Date of Admission: 8/2/2019    Chief Complaint: Dyspnea      Subjective:   Dyspnea persistent but slowly improving. Otherwise no acute complaints. Hyperglycemic overnight. Medications:  Reviewed    Infusion Medications    dextrose       Scheduled Medications    warfarin (COUMADIN) daily dosing (placeholder)   Other RX Placeholder    insulin glargine  40 Units Subcutaneous BID    insulin lispro  25 Units Subcutaneous TID WC    cephALEXin  500 mg Oral 2 times per day    methylPREDNISolone  40 mg Intravenous Q8H    allopurinol  100 mg Oral Daily    calcium acetate  1,334 mg Oral TID WC    docusate sodium  100 mg Oral BID    nebivolol  2.5 mg Oral BID    rosuvastatin  10 mg Oral Nightly    sodium chloride flush  10 mL Intravenous 2 times per day    ipratropium-albuterol  1 ampule Inhalation Q4H WA    azithromycin  250 mg Oral Daily    pantoprazole  40 mg Oral QAM AC    insulin lispro  0-18 Units Subcutaneous TID WC    insulin lispro  0-9 Units Subcutaneous Nightly     PRN Meds: traZODone, sodium chloride flush, magnesium hydroxide, ondansetron, glucose, dextrose, glucagon (rDNA), dextrose, traMADol **OR** traMADol, albuterol    No intake or output data in the 24 hours ending 08/05/19 1324    Exam:    BP (!) 165/119   Pulse 84   Temp 97.4 °F (36.3 °C) (Oral)   Resp 18   Ht 5' 3\" (1.6 m)   Wt (!) 332 lb 0.2 oz (150.6 kg)   LMP 11/01/1996 (Exact Date)   SpO2 99%   BMI 58.81 kg/m²     Gen/overall appearance: Not in acute distress. Alert. Head: Normocephalic, atraumatic  Eyes: EOMI, no scleral icterus  CVS: regular rate and rhythm, Normal S1S2  Pulm: diminished, diffuse wheezing  Gastrointestinal: Soft, nontender, obese, no guarding or rebound  Extremities: No edema.  No erythema or warmth  Neuro: No gross focal deficits noted  Skin: Warm, dry    Labs:   Recent Labs     08/04/19  0424 08/05/19  0637 08/05/19  1155   WBC

## 2019-08-06 ENCOUNTER — APPOINTMENT (OUTPATIENT)
Dept: GENERAL RADIOLOGY | Age: 64
DRG: 190 | End: 2019-08-06
Payer: MEDICARE

## 2019-08-06 LAB
GLUCOSE BLD-MCNC: 129 MG/DL (ref 70–99)
GLUCOSE BLD-MCNC: 138 MG/DL (ref 70–99)
GLUCOSE BLD-MCNC: 194 MG/DL (ref 70–99)
GLUCOSE BLD-MCNC: 295 MG/DL (ref 70–99)
INR BLD: 2.93 (ref 0.86–1.14)
PERFORMED ON: ABNORMAL
PROTHROMBIN TIME: 33.4 SEC (ref 9.8–13)

## 2019-08-06 PROCEDURE — 71045 X-RAY EXAM CHEST 1 VIEW: CPT

## 2019-08-06 PROCEDURE — APPSS60 APP SPLIT SHARED TIME 46-60 MINUTES: Performed by: NURSE PRACTITIONER

## 2019-08-06 PROCEDURE — 36591 DRAW BLOOD OFF VENOUS DEVICE: CPT

## 2019-08-06 PROCEDURE — 2580000003 HC RX 258: Performed by: INTERNAL MEDICINE

## 2019-08-06 PROCEDURE — 94761 N-INVAS EAR/PLS OXIMETRY MLT: CPT

## 2019-08-06 PROCEDURE — 6370000000 HC RX 637 (ALT 250 FOR IP): Performed by: INTERNAL MEDICINE

## 2019-08-06 PROCEDURE — 99232 SBSQ HOSP IP/OBS MODERATE 35: CPT | Performed by: INTERNAL MEDICINE

## 2019-08-06 PROCEDURE — 85610 PROTHROMBIN TIME: CPT

## 2019-08-06 PROCEDURE — 94660 CPAP INITIATION&MGMT: CPT

## 2019-08-06 PROCEDURE — 6360000002 HC RX W HCPCS: Performed by: INTERNAL MEDICINE

## 2019-08-06 PROCEDURE — 6370000000 HC RX 637 (ALT 250 FOR IP): Performed by: SURGERY

## 2019-08-06 PROCEDURE — 94640 AIRWAY INHALATION TREATMENT: CPT

## 2019-08-06 PROCEDURE — 1200000000 HC SEMI PRIVATE

## 2019-08-06 RX ORDER — WARFARIN SODIUM 2.5 MG/1
2.5 TABLET ORAL DAILY
Status: DISCONTINUED | OUTPATIENT
Start: 2019-08-06 | End: 2019-08-07 | Stop reason: HOSPADM

## 2019-08-06 RX ORDER — HEPARIN SODIUM 5000 [USP'U]/ML
5000 INJECTION, SOLUTION INTRAVENOUS; SUBCUTANEOUS EVERY 8 HOURS SCHEDULED
Status: DISCONTINUED | OUTPATIENT
Start: 2019-08-06 | End: 2019-08-06 | Stop reason: ALTCHOICE

## 2019-08-06 RX ORDER — CEPHALEXIN 250 MG/1
500 CAPSULE ORAL EVERY 12 HOURS SCHEDULED
Status: DISCONTINUED | OUTPATIENT
Start: 2019-08-06 | End: 2019-08-07 | Stop reason: HOSPADM

## 2019-08-06 RX ORDER — PREDNISONE 20 MG/1
40 TABLET ORAL DAILY
Status: DISCONTINUED | OUTPATIENT
Start: 2019-08-07 | End: 2019-08-07 | Stop reason: HOSPADM

## 2019-08-06 RX ADMIN — CALCIUM ACETATE 1334 MG: 667 CAPSULE ORAL at 17:31

## 2019-08-06 RX ADMIN — ONDANSETRON 4 MG: 2 INJECTION INTRAMUSCULAR; INTRAVENOUS at 15:18

## 2019-08-06 RX ADMIN — INSULIN LISPRO 25 UNITS: 100 INJECTION, SOLUTION INTRAVENOUS; SUBCUTANEOUS at 12:34

## 2019-08-06 RX ADMIN — Medication 10 ML: at 09:07

## 2019-08-06 RX ADMIN — NEBIVOLOL HYDROCHLORIDE 2.5 MG: 5 TABLET ORAL at 21:51

## 2019-08-06 RX ADMIN — CALCIUM ACETATE 1334 MG: 667 CAPSULE ORAL at 12:33

## 2019-08-06 RX ADMIN — INSULIN LISPRO 3 UNITS: 100 INJECTION, SOLUTION INTRAVENOUS; SUBCUTANEOUS at 12:35

## 2019-08-06 RX ADMIN — IPRATROPIUM BROMIDE AND ALBUTEROL SULFATE 1 AMPULE: .5; 3 SOLUTION RESPIRATORY (INHALATION) at 07:15

## 2019-08-06 RX ADMIN — Medication 10 ML: at 21:51

## 2019-08-06 RX ADMIN — DOCUSATE SODIUM 100 MG: 100 CAPSULE, LIQUID FILLED ORAL at 09:07

## 2019-08-06 RX ADMIN — CALCIUM ACETATE 1334 MG: 667 CAPSULE ORAL at 09:07

## 2019-08-06 RX ADMIN — Medication 10 ML: at 06:42

## 2019-08-06 RX ADMIN — DOCUSATE SODIUM 100 MG: 100 CAPSULE, LIQUID FILLED ORAL at 21:51

## 2019-08-06 RX ADMIN — AZITHROMYCIN MONOHYDRATE 250 MG: 250 TABLET ORAL at 09:07

## 2019-08-06 RX ADMIN — METHYLPREDNISOLONE SODIUM SUCCINATE 40 MG: 40 INJECTION, POWDER, FOR SOLUTION INTRAMUSCULAR; INTRAVENOUS at 09:07

## 2019-08-06 RX ADMIN — INSULIN LISPRO 25 UNITS: 100 INJECTION, SOLUTION INTRAVENOUS; SUBCUTANEOUS at 17:30

## 2019-08-06 RX ADMIN — INSULIN LISPRO 9 UNITS: 100 INJECTION, SOLUTION INTRAVENOUS; SUBCUTANEOUS at 09:10

## 2019-08-06 RX ADMIN — NEBIVOLOL HYDROCHLORIDE 2.5 MG: 5 TABLET ORAL at 09:07

## 2019-08-06 RX ADMIN — INSULIN LISPRO 25 UNITS: 100 INJECTION, SOLUTION INTRAVENOUS; SUBCUTANEOUS at 09:08

## 2019-08-06 RX ADMIN — ALLOPURINOL 100 MG: 100 TABLET ORAL at 09:08

## 2019-08-06 RX ADMIN — CEPHALEXIN 500 MG: 250 CAPSULE ORAL at 09:08

## 2019-08-06 RX ADMIN — CEPHALEXIN 500 MG: 250 CAPSULE ORAL at 21:51

## 2019-08-06 RX ADMIN — ROSUVASTATIN CALCIUM 10 MG: 10 TABLET, FILM COATED ORAL at 21:51

## 2019-08-06 RX ADMIN — WARFARIN SODIUM 2.5 MG: 2.5 TABLET ORAL at 17:31

## 2019-08-06 RX ADMIN — PANTOPRAZOLE SODIUM 40 MG: 40 TABLET, DELAYED RELEASE ORAL at 06:42

## 2019-08-06 RX ADMIN — IPRATROPIUM BROMIDE AND ALBUTEROL SULFATE 1 AMPULE: .5; 3 SOLUTION RESPIRATORY (INHALATION) at 15:13

## 2019-08-06 RX ADMIN — IPRATROPIUM BROMIDE AND ALBUTEROL SULFATE 1 AMPULE: .5; 3 SOLUTION RESPIRATORY (INHALATION) at 12:19

## 2019-08-06 ASSESSMENT — PAIN SCALES - GENERAL
PAINLEVEL_OUTOF10: 0

## 2019-08-06 NOTE — PROGRESS NOTES
negative for dizziness, gait problems, headaches, seizures, speech problems, tremors and weakness  Behavioral/Psych: negative for anxiety, behavior problems, depression, fatigue and sleep disturbance  Endocrine: negative for diabetic symptoms including none, neuropathy, polyphagia, polyuria, polydipsia, vomiting and diarrhea and temperature intolerance  Allergic/Immunologic: negative for anaphylaxis, angioedema, hay fever and urticaria    Objective:     Patient Vitals for the past 8 hrs:   BP Temp Temp src Pulse Resp SpO2   08/06/19 1715 (!) 145/86 98.3 °F (36.8 °C) -- 108 18 99 %   08/06/19 1513 -- -- -- -- 18 100 %   08/06/19 1219 -- -- -- -- 18 97 %   08/06/19 1209 131/85 97.2 °F (36.2 °C) Oral 87 20 99 %     No intake/output data recorded. No intake/output data recorded.     General Appearance: alert and oriented to person, place and time, well developed and well- nourished, in no acute distress  Skin: warm and dry, no rash or erythema  Head: normocephalic and atraumatic  Eyes: pupils equal, round, and reactive to light, extraocular eye movements intact, conjunctivae normal  ENT: external ear and ear canal normal bilaterally, nose without deformity, nasal mucosa and turbinates normal  Neck: supple and non-tender without mass, no cervical lymphadenopathy  Pulmonary/Chest: clear to auscultation bilaterally- no wheezes, rales or rhonchi, normal air movement, no respiratory distress  Cardiovascular: normal rate, regular rhythm,  no murmurs, rubs, distal pulses intact, no carotid bruits  Abdomen: soft, non-tender, non-distended, normal bowel sounds, no masses or organomegaly  Lymph Nodes: Cervical, supraclavicular normal  Extremities: no cyanosis, clubbing or edema  Musculoskeletal: normal range of motion, no joint swelling, deformity or tenderness  Neurologic: alert, no focal neurologic deficits    Data Review:  CBC:   Lab Results   Component Value Date    WBC 17.0 08/05/2019    RBC 3.38 08/05/2019     BMP:   Lab

## 2019-08-06 NOTE — PROGRESS NOTES
Pt ambulated in the halls with walker approximately 20ft and was feeling SOB and was grunting to breathe on expiration. Provided pt with chair to rest until back to room and incentive spirometer to assist with breathing.  Will relay this to MD.

## 2019-08-06 NOTE — CONSULTS
[facility]  Consultation Note    Chief Complaint / Reason for Consultation  right AVG wound dehiscence had appt to see Dr. Xochilt Heredia 8/6/2019 but will remain admitted    History of Present Illness  Patient is a 59 y.o. female with past medical history of ESRD on HD, diabetes mellitus, CHF, COPD, hypertension and hyperlipidemia who presented to the ED with complaints of shortness of breath and cough for the past 2-3 days that had been getting worse despite being placed on steroids and inhalers. She had a right brachial axillary AV graft placed on 7/18/2019 with Dr. Xochilt Heredia and was seen in the office post-op and was noted to have slight wound separation. She had follow up appointment scheduled for today, but since inpatient, we were asked by her nephrologist to see patient regarding worsening separation of wound. She reports about a week ago is when she started noticing drainage from the wound. She reports she has been keeping it clean and dry and covered with dry dressing. She denies any fever or chills. She denies any foul odor from the drainage. Review of Systems  + shortness of breath, + cough, + right arm AV graft wound separation with drainage. Denies fevers, chills, chest pain, nausea, vomiting, hematemesis, diarrhea, constipation, melena, hematochezia, wt changes, vision problems, blindness, hearing problems, facial droop, slurred speech, extremity weakness, extremity numbness, dysuria.     Past Medical History:   Diagnosis Date    Abdominal pain 8/20/2018    Acute on chronic congestive heart failure ([facility] [address] 6/26/2014    Asthma     Cervical cancer ([facility] [address]     Chest pain 5/16/2018    CHF (congestive heart failure) (HCC)     Chronic kidney disease, stage IV (severe) ([facility])     Dr Itz Spicer    Chronic pain syndrome 3/27/2018    Chronic respiratory failure with hypoxia ([facility])     CKD (chronic kidney disease) stage 4, GFR 15-29 ml/min ([facility]) 6/30/2017    Colon polyps     COPD 7/18/2019    RIGHT BRACHIAL ARTERY AXILLARY VEIN GRAFT AND LIGATION OF RIGHT BRACHIO-CEPHALIC FISTULA (21429, 91759) performed by Chela Tapia MD at [place] [address]  10/96    TUNNELED VENOUS PORT PLACEMENT Right 11/2019    UPPER GASTROINTESTINAL ENDOSCOPY  6/25/13    VENTRAL HERNIA REPAIR  12/24/2016    Incarcerated ventral hernia repair with mesh       Allergies   Allergen Reactions    Codeine Nausea Only    Fentanyl Itching    Morphine      CHEST PAIN    Hydrocodone-Acetaminophen Itching and Rash    Percocet [Oxycodone-Acetaminophen] Itching    Procardia [Nifedipine] Nausea And Vomiting     Headache  Takes norvasc at home 12/22/15    Vicodin [Hydrocodone-Acetaminophen] Rash       Social History     Socioeconomic History    Marital status:      Spouse name: Isra Fletcher Number of children: 3    Years of education: Not on file    Highest education level: Not on file   Occupational History    Occupation: NA   Social Needs    Financial resource strain: Not on file    Food insecurity:     Worry: Not on file     Inability: Not on file   Smackages needs:     Medical: Not on file     Non-medical: Not on file   Tobacco Use    Smoking status: Never Smoker    Smokeless tobacco: Never Used   Substance and Sexual Activity    Alcohol use: No     Alcohol/week: 0.0 standard drinks    Drug use: No    Sexual activity: Not on file   Lifestyle    Physical activity:     Days per week: Not on file     Minutes per session: Not on file    Stress: Not on file   Relationships    Social connections:     Talks on phone: Not on file     Gets together: Not on file     Attends Taoism service: Not on file     Active member of club or organization: Not on file     Attends meetings of clubs or organizations: Not on file     Relationship status: Not on file    Intimate partner violence:     Fear of current or ex partner: Not on file     Emotionally abused: Not on file     Physically abused: Not

## 2019-08-07 VITALS
DIASTOLIC BLOOD PRESSURE: 81 MMHG | RESPIRATION RATE: 18 BRPM | HEIGHT: 63 IN | SYSTOLIC BLOOD PRESSURE: 156 MMHG | TEMPERATURE: 97.6 F | OXYGEN SATURATION: 98 % | WEIGHT: 293 LBS | HEART RATE: 93 BPM | BODY MASS INDEX: 51.91 KG/M2

## 2019-08-07 LAB
GLUCOSE BLD-MCNC: 172 MG/DL (ref 70–99)
GLUCOSE BLD-MCNC: 91 MG/DL (ref 70–99)
INR BLD: 2.89 (ref 0.86–1.14)
PERFORMED ON: ABNORMAL
PERFORMED ON: NORMAL
PROTHROMBIN TIME: 33 SEC (ref 9.8–13)

## 2019-08-07 PROCEDURE — 90935 HEMODIALYSIS ONE EVALUATION: CPT

## 2019-08-07 PROCEDURE — 94640 AIRWAY INHALATION TREATMENT: CPT

## 2019-08-07 PROCEDURE — 6370000000 HC RX 637 (ALT 250 FOR IP): Performed by: SURGERY

## 2019-08-07 PROCEDURE — 2580000003 HC RX 258: Performed by: INTERNAL MEDICINE

## 2019-08-07 PROCEDURE — 94660 CPAP INITIATION&MGMT: CPT

## 2019-08-07 PROCEDURE — 94761 N-INVAS EAR/PLS OXIMETRY MLT: CPT

## 2019-08-07 PROCEDURE — 36591 DRAW BLOOD OFF VENOUS DEVICE: CPT

## 2019-08-07 PROCEDURE — 6370000000 HC RX 637 (ALT 250 FOR IP): Performed by: INTERNAL MEDICINE

## 2019-08-07 PROCEDURE — 2700000000 HC OXYGEN THERAPY PER DAY

## 2019-08-07 PROCEDURE — APPSS30 APP SPLIT SHARED TIME 16-30 MINUTES: Performed by: NURSE PRACTITIONER

## 2019-08-07 PROCEDURE — 85610 PROTHROMBIN TIME: CPT

## 2019-08-07 RX ORDER — WARFARIN SODIUM 1 MG/1
3 TABLET ORAL DAILY
Qty: 30 TABLET | Refills: 0 | Status: SHIPPED | OUTPATIENT
Start: 2019-08-07 | End: 2019-09-23 | Stop reason: ALTCHOICE

## 2019-08-07 RX ORDER — PREDNISONE 20 MG/1
40 TABLET ORAL DAILY
Qty: 10 TABLET | Refills: 0 | Status: SHIPPED | OUTPATIENT
Start: 2019-08-07 | End: 2019-08-12

## 2019-08-07 RX ADMIN — AZITHROMYCIN MONOHYDRATE 250 MG: 250 TABLET ORAL at 09:04

## 2019-08-07 RX ADMIN — CALCIUM ACETATE 1334 MG: 667 CAPSULE ORAL at 18:24

## 2019-08-07 RX ADMIN — TRAMADOL HYDROCHLORIDE 100 MG: 50 TABLET, FILM COATED ORAL at 11:50

## 2019-08-07 RX ADMIN — CALCIUM ACETATE 1334 MG: 667 CAPSULE ORAL at 11:43

## 2019-08-07 RX ADMIN — CALCIUM ACETATE 1334 MG: 667 CAPSULE ORAL at 09:04

## 2019-08-07 RX ADMIN — WARFARIN SODIUM 2.5 MG: 2.5 TABLET ORAL at 18:24

## 2019-08-07 RX ADMIN — Medication 10 ML: at 09:05

## 2019-08-07 RX ADMIN — INSULIN LISPRO 3 UNITS: 100 INJECTION, SOLUTION INTRAVENOUS; SUBCUTANEOUS at 11:43

## 2019-08-07 RX ADMIN — PANTOPRAZOLE SODIUM 40 MG: 40 TABLET, DELAYED RELEASE ORAL at 06:25

## 2019-08-07 RX ADMIN — IPRATROPIUM BROMIDE AND ALBUTEROL SULFATE 1 AMPULE: .5; 3 SOLUTION RESPIRATORY (INHALATION) at 12:02

## 2019-08-07 RX ADMIN — ALLOPURINOL 100 MG: 100 TABLET ORAL at 09:04

## 2019-08-07 RX ADMIN — PREDNISONE 40 MG: 20 TABLET ORAL at 09:04

## 2019-08-07 RX ADMIN — CEPHALEXIN 500 MG: 250 CAPSULE ORAL at 09:04

## 2019-08-07 RX ADMIN — NEBIVOLOL HYDROCHLORIDE 2.5 MG: 5 TABLET ORAL at 10:27

## 2019-08-07 RX ADMIN — TRAMADOL HYDROCHLORIDE 100 MG: 50 TABLET, FILM COATED ORAL at 18:24

## 2019-08-07 RX ADMIN — DOCUSATE SODIUM 100 MG: 100 CAPSULE, LIQUID FILLED ORAL at 09:04

## 2019-08-07 ASSESSMENT — PAIN SCALES - GENERAL
PAINLEVEL_OUTOF10: 10
PAINLEVEL_OUTOF10: 0
PAINLEVEL_OUTOF10: 7

## 2019-08-07 NOTE — PROGRESS NOTES
[facility]  Progress Note      Referring Physician:  Maxx Diaz MD    Reason:    ESRD management     HPI:  60 y/o AAF with history of COPD, morbid obesity and ESRD presents to the hospital with shortness of breath. She reports worsening wheezing and dyspnea that did not respond to her usual nebulizer treatments at home. Feeling a bit better this am. She is on dialysis at [facility]. Has been compliant with her dialysis treatments. Has had her dry weight reduced to 145.5 over the last 2-3 weeks. Does report some cramping at the end of treatment. Has a left upper arm AVG for access. There is a newly placed right upper arm AVG in place. Subjective:  -pt seen and examined  -PMSHx and meds reviewed  -No family at bedside    -plan for HD  -home after HD  -SOB is much improved      Denies f/c.  + SOB chronic    Physical Exam:  Vitals:    08/07/19 0845   BP: 128/84   Pulse: 91   Resp: 18   Temp: 97.7 °F (36.5 °C)   SpO2: 98%     General appearance - alert, well appearing, and in no distress  Mental status - alert, oriented to person, place, and time  Eyes -  extraocular eye movements intact, sclera anicteric  Mouth - mucous membranes moist, pharynx normal without lesions  Neck - supple, trachea midline  Chest - + wheezes and rhonchi, symmetric air entry, no tachypnea   Heart - normal rate, regular rhythm, normal S1, S2 s  Abdomen - soft, nontender, nondistended, no masses or organomegaly  Neurological - alert, oriented, normal speech, no focal findings   Extremities - no pedal edema, no clubbing or cyanosis  Access: left upper arm AVG with increased pulse pressure.  Right upper arm AVG with good thrill, wound dehiscence over arterial anastomosis, drainage on dressing        Labs:  CBC:   Lab Results   Component Value Date    WBC 17.0 08/05/2019    RBC 3.38 08/05/2019    HGB 11.6 08/05/2019    HCT 35.3 08/05/2019    .7 08/05/2019    MCH 34.3 08/05/2019    MCHC 32.8 08/05/2019    RDW 19.1 08/05/2019     08/05/2019    MPV 9.0 08/05/2019     BMP:    Lab Results   Component Value Date     08/05/2019    K 5.8 08/05/2019    K 4.9 08/03/2019    CL 93 08/05/2019    CO2 18 08/05/2019     08/05/2019    LABALBU 3.7 08/05/2019    CREATININE 11.9 08/05/2019    CALCIUM 9.5 08/05/2019    GFRAA 4 08/05/2019    GFRAA 50 05/12/2013    LABGLOM 3 08/05/2019    GLUCOSE 296 08/05/2019     U/A:    Lab Results   Component Value Date    NITRITE neg 05/06/2015    COLORU Brown 11/25/2018    PHUR 5.5 11/25/2018    LABCAST 1-3 Mixed Cells 07/09/2018    WBCUA see below 11/25/2018    RBCUA see below 11/25/2018    MUCUS 3+ 06/18/2013    YEAST Present 08/20/2018    BACTERIA 3+ 11/25/2018    CLARITYU TURBID 11/25/2018    SPECGRAV >=1.030 11/25/2018    LEUKOCYTESUR SMALL 11/25/2018    UROBILINOGEN 0.2 11/25/2018    BILIRUBINUR SMALL 11/25/2018    BILIRUBINUR neg 05/06/2015    BILIRUBINUR NEGATIVE 11/23/2010    BLOODU TRACE-INTACT 11/25/2018    GLUCOSEU 100 11/25/2018    GLUCOSEU NEGATIVE 11/23/2010           Assessment/Plan:    1) ESRD- normal HD MWF. Has been compliant with treatment. .5 kg; continue M/W/F schedule  -HD today  -continue nebs/steroids for COPD    2) respiratory failure - history of COPD. She is on steroids and bronchodilator therapy. -per pulmonary  -feels better    3) anemia- no need for GUANACO at present time     4) renal osteodystrophy- binder has been restarted     5) HTN- blood pressures are elevated at times in acute setting. Monitor for now    6) right AVG wound dehiscence - has a small wound dehiscence over the arterial anastomosis of her new right arm AVG. There is drainage noted on dressing.  -on keflex  -consulted vascular surgery-appreciate assistance    .     Signed:  Arash Shaver M.D.   [facility]  Office Number: [phone]  Fax Number: [phone]  Answering Service: [phone]  8/7/2019, 10:57 AM

## 2019-08-07 NOTE — DISCHARGE SUMMARY
Chronic obstructive pulmonary disease with acute exacerbation (HCC); SOB (shortness of breath)           Current Discharge Medication List      CONTINUE these medications which have NOT CHANGED    Details   !! albuterol sulfate  (90 Base) MCG/ACT inhaler INHALE 2 PUFFS BY MOUTH EVERY 6 HOURS AS NEEDED FOR WHEEZING  Qty: 1 Inhaler, Refills: 3    Comments: Please consider 90 day supplies to promote better adherence      traMADol (ULTRAM) 50 MG tablet Take 1 tablet by mouth every 8 hours as needed for Pain for up to 90 days. Qty: 75 tablet, Refills: 2    Comments: Reduce doses taken as pain becomes manageable  Associated Diagnoses: Diabetic polyneuropathy associated with type 2 diabetes mellitus (HCC)      omeprazole (PRILOSEC) 40 MG delayed release capsule TAKE 1 CAPSULE BY MOUTH  DAILY  Qty: 90 capsule, Refills: 3      warfarin (COUMADIN) 1 MG tablet Take 3 tablets by mouth daily  Qty: 30 tablet, Refills: 0      insulin detemir (LEVEMIR FLEXTOUCH) 100 UNIT/ML injection pen Inject 30 Units into the skin 2 times daily      rosuvastatin (CRESTOR) 10 MG tablet Take 1 tablet by mouth daily Take 1 tablet by mouth  daily  Qty: 90 tablet, Refills: 3    Associated Diagnoses: Diabetic polyneuropathy associated with type 2 diabetes mellitus ([facility])      traZODone (DESYREL) 100 MG tablet TAKE 1 TABLET BY MOUTH ONCE DAILY NIGHTLY  AS  NEEDED  Qty: 90 tablet, Refills: 3    Comments: Please consider 90 day supplies to promote better adherence  Associated Diagnoses: Primary insomnia      fluticasone (FLONASE) 50 MCG/ACT nasal spray USE TWO SPRAY(S) IN EACH NOSTRIL ONCE DAILY  Qty: 1 Bottle, Refills: 5    Comments: Please consider 90 day supplies to promote better adherence      pregabalin (LYRICA) 50 MG capsule Take 1 capsule by mouth 3 times daily for 180 days.   Qty: 90 capsule, Refills: 5    Associated Diagnoses: Diabetic polyneuropathy associated with type 2 diabetes mellitus ([facility] [address]      Diapers & Supplies MISC 1 each by TIMES DAILY AS DIRECTED  Qty: 100 each, Refills: 5    Comments: Please consider 90 day supplies to promote better adherence      nitroGLYCERIN (NITROSTAT) 0.4 MG SL tablet DISSOLVE 1 TABLET UNDER THE TONGUE EVERY 5 MINUTES AS  NEEDED ; MAXIMUM OF 3  TABLETS IN 15 MINUTES  Qty: 75 tablet, Refills: 1      ONE TOUCH ULTRA TEST strip USE TO TEST 3-4 TIMES DAILY DUE TO FLUCTUATING SUGAR  Qty: 100 strip, Refills: 5    Comments: Please consider 90 day supplies to promote better adherence      ASSURE COMFORT LANCETS 30G MISC USE TO TEST BLOOD GLUCOSE THREE TIMES DAILY  Qty: 300 each, Refills: 3      Alcohol Swabs (ALCOHOL PREP) 70 % PADS USE TO TEST BLOOD GLUCOSE THREE TIMES DAILY  Qty: 300 each, Refills: 3      Blood Glucose Monitoring Suppl (ACCU-CHEK KENNETH SMARTVIEW) w/Device KIT USE TO TEST BLOOD GLUCOSE  Qty: 1 kit, Refills: 0      BiPAP Machine MISC by Does not apply route nightly      Spacer/Aero-Holding Chambers VENANCIO 1 Device by Does not apply route daily as needed  Qty: 1 Device, Refills: 0      OXYGEN Inhale 2 L into the lungs daily as needed At night prn       !! - Potential duplicate medications found. Please discuss with provider. Current Discharge Medication List          Discharge ROS:  A complete review of systems was asked and negative      Discharge Exam:    /84   Pulse 91   Temp 97.7 °F (36.5 °C) (Oral)   Resp 18   Ht 5' 3\" (1.6 m)   Wt (!) 337 lb 12.8 oz (153.2 kg)   LMP 11/01/1996 (Exact Date)   SpO2 98%   BMI 59.84 kg/m²   General appearance:  NAD  HEENT:   Normal cephalic, atraumatic, moist mucous membranes, no oropharyngeal erythema or exudate  Neck: Supple, trachea midline, no anterior cervical or SC LAD  Heart[de-identified] Normal S1/S2, no S3 or S4 RRR, no murmurs or rubs. Lungs:  Clear to auscultation bilaterally, No wheeze, rales or rhonchi noted.   Abdomen: Soft, non-tender, non-distended,no organomegaly noted,  bowel sounds present, no masses  Musculoskeletal: Grossly intact,muscle grossly clear. The heart size is mildly enlarged, stable. There is no large pleural effusion or definite evidence for pneumothorax. No acute disease. Xr Chest Portable    Result Date: 7/20/2019  EXAMINATION: ONE XRAY VIEW OF THE CHEST 7/20/2019 11:30 am COMPARISON: May 29, 2019 HISTORY: ORDERING SYSTEM PROVIDED HISTORY: SOB TECHNOLOGIST PROVIDED HISTORY: Reason for exam:->SOB Reason for Exam: Shortness of Breath (pt reprots she had surgery on Wednesday done by Dr Joy Gupta, states she is now SOB, states she last got dialyzed on Wednesday, missed yesterday because she didnt feel good, denies CP) Acuity: Unknown Type of Exam: Unknown FINDINGS: Right Port-A-Cath. No focal consolidation. Cardiomegaly. No pulmonary edema. No acute findings. Ct Chest Pulmonary Embolism W Contrast    Result Date: 7/20/2019  EXAMINATION: CTA OF THE CHEST 7/20/2019 1:06 pm TECHNIQUE: CTA of the chest was performed after the administration of intravenous contrast.  Multiplanar reformatted images are provided for review. MIP images are provided for review. Dose modulation, iterative reconstruction, and/or weight based adjustment of the mA/kV was utilized to reduce the radiation dose to as low as reasonably achievable. COMPARISON: CT 11/24/2018 HISTORY: ORDERING SYSTEM PROVIDED HISTORY: SOB/ recent surgical procedure TECHNOLOGIST PROVIDED HISTORY: Reason for Exam: sob,cp Acuity: Unknown Type of Exam: Unknown FINDINGS: Pulmonary Arteries: Pulmonary arteries are adequately opacified for evaluation. No evidence of intraluminal filling defect to suggest pulmonary embolism. Main pulmonary artery is normal in caliber. Mediastinum: No evidence of mediastinal lymphadenopathy. The heart and pericardium demonstrate no acute abnormality. There is no acute abnormality of the thoracic aorta. Lungs/pleura: Central airways are patent. Linear atelectasis/scarring in the lower lobes. No suspicious pulmonary nodules.  Upper Abdomen: Small

## 2019-08-07 NOTE — PLAN OF CARE
Problem: Pain:  Description  Pain management should include both nonpharmacologic and pharmacologic interventions. Goal: Pain level will decrease  Description  Pain level will decrease  Outcome: Ongoing  Goal: Control of acute pain  Description  Control of acute pain  Outcome: Ongoing  Goal: Control of chronic pain  Description  Control of chronic pain  Outcome: Ongoing     Problem: Risk for Impaired Skin Integrity  Goal: Tissue integrity - skin and mucous membranes  Description  Structural intactness and normal physiological function of skin and  mucous membranes.   Outcome: Ongoing     Problem: Falls - Risk of:  Goal: Will remain free from falls  Description  Will remain free from falls  Outcome: Ongoing  Goal: Absence of physical injury  Description  Absence of physical injury  Outcome: Ongoing     Problem: OXYGENATION/RESPIRATORY FUNCTION  Goal: Patient will maintain patent airway  Outcome: Ongoing  Goal: Patient will achieve/maintain normal respiratory rate/effort  Description  Respiratory rate and effort will be within normal limits for the patient  Outcome: Ongoing

## 2019-08-07 NOTE — PROGRESS NOTES
Assessment complete. VSS. Patient resting in bed.  at bedside. Respirations even and easy. Call light in reach. No other needs expressed at this time. Will continue to monitor.

## 2019-08-08 ENCOUNTER — CARE COORDINATION (OUTPATIENT)
Dept: CASE MANAGEMENT | Age: 64
End: 2019-08-08

## 2019-08-08 DIAGNOSIS — J44.1 COPD EXACERBATION (HCC): Primary | ICD-10-CM

## 2019-08-08 PROCEDURE — 1111F DSCHRG MED/CURRENT MED MERGE: CPT | Performed by: NURSE PRACTITIONER

## 2019-08-08 NOTE — CARE COORDINATION
[facility] Transitions Initial Follow Up Call    Call within 2 business days of discharge: Yes    Patient: Domonique Mattson Patient : 1955   MRN: [id number]  Reason for Admission:   Discharge Date: 19 RARS: Readmission Risk Score: 60      Last Discharge [facility]       Complaint Diagnosis Description Type Department Provider    19 Shortness of Breath COPD exacerbation ([facility] [address] . .. ED to Hosp-Admission (Discharged) (ADMITTED) FZ 5T Mikhail Paul MD; Jena Aguila... Spoke with: [address]: [facility]    Non-face-to-face services provided:  Obtained and reviewed discharge summary and/or continuity of care documents    Care Transitions 24 Hour Call    Do you have any ongoing symptoms?:  No  Do you have a copy of your discharge instructions?:  Yes  Do you have all of your prescriptions and are they filled?:  Yes  Have you been contacted by a [address]?:  No  Have you scheduled your follow up appointment?:  Yes  How are you going to get to your appointment?:  Car - family or friend to transport  Were you discharged with any Home Care or Post Acute Services:  Yes  Post Acute Services:  Home Health (Comment: [facility])  Patient DME:  Wheelchair, Other, Shower chair, Walker  Other Patient DME:  glucometer  Patient Home Equipment:  Oxygen, Nebulizer, BiPAP  Do you have support at home?:  Partner/Spouse/SO  Do you feel like you have everything you need to keep you well at home?:  Yes  Are you an active caregiver in your home?:  No  Care Transitions Interventions  No Identified Needs       Pt states doing well, no issues or concerns. Denies SOB, CP, fever. No new meds, reviewed all others. HC left message, pt needs to return call. This nurse made PC to [facility], they have the the resumption of care order. F/U appts listed below.  Agreed to more CTC f/u calls      Follow Up  Future Appointments   Date Time Provider Aziza Barraza   2019 10:15 AM Sulma Douglas MD FF

## 2019-08-12 ENCOUNTER — TELEPHONE (OUTPATIENT)
Dept: FAMILY MEDICINE CLINIC | Age: 64
End: 2019-08-12

## 2019-08-14 ENCOUNTER — CARE COORDINATION (OUTPATIENT)
Dept: CARE COORDINATION | Age: 64
End: 2019-08-14

## 2019-08-14 ENCOUNTER — TELEPHONE (OUTPATIENT)
Dept: FAMILY MEDICINE CLINIC | Age: 64
End: 2019-08-14

## 2019-08-16 ENCOUNTER — CARE COORDINATION (OUTPATIENT)
Dept: CASE MANAGEMENT | Age: 64
End: 2019-08-16

## 2019-08-16 NOTE — CARE COORDINATION
Raj 45 Transitions Follow Up Call    2019    Patient: Jesse Horowitz  Patient : 1955   MRN: [id number]  Reason for Admission: SOB, COPD  Discharge Date: 19 RARS: Readmission Risk Score: 61         Spoke with: [id number] Alexsandra Huizar Transitions Subsequent and Final Call    Subsequent and Final Calls  Care Transitions Interventions  Other Interventions: Follow Up:Patient reports that she is doing well, she will see surgeon Tuesday. She denies CP, fever, SOB. She is checking blood glucose tid, this am it was 124. She has dialysis 3x/week, checking weights there. She reports that she is adhering to her medication regimen and C remains active. CTN encouraged patient to contact MD with any questions or concerns . CTN will continue with follow up outreach calls.         Future Appointments   Date Time Provider Aziza Barraza   2019  9:00 AM MD SHERI Anthony/BRONWYN MCKEON   2019  2:30 PM JURGEN Kelly  Demetrius Christianson RN

## 2019-08-20 ENCOUNTER — OFFICE VISIT (OUTPATIENT)
Dept: VASCULAR SURGERY | Age: 64
End: 2019-08-20

## 2019-08-20 VITALS — WEIGHT: 293 LBS | BODY MASS INDEX: 51.91 KG/M2 | HEIGHT: 63 IN

## 2019-08-20 DIAGNOSIS — N18.6 ESRD (END STAGE RENAL DISEASE) ON DIALYSIS (HCC): Primary | ICD-10-CM

## 2019-08-20 DIAGNOSIS — Z99.2 ESRD (END STAGE RENAL DISEASE) ON DIALYSIS (HCC): Primary | ICD-10-CM

## 2019-08-20 PROCEDURE — 99024 POSTOP FOLLOW-UP VISIT: CPT | Performed by: SURGERY

## 2019-08-20 NOTE — PROGRESS NOTES
(6/25/13, 11/14); Cardiac catheterization (1/14); Hysterectomy; pr open skull supratent explore; ventral hernia repair (12/24/2016); other surgical history (02/22/2018); pr repair incisional hernia,reducible (N/A, 11/20/2018); Tunneled venous port placement (Right, 11/2019); Dialysis fistula creation (Right, 3/28/2019); Coronary angioplasty with stent; and shunt revision (Right, 7/18/2019). Social History:   reports that she has never smoked. She has never used smokeless tobacco. She reports that she does not drink alcohol or use drugs. Family History:  family history includes Colon Cancer in her father, maternal grandmother, and mother; Diabetes in her father and mother; Heart Disease in her brother and sister; High Blood Pressure in her brother, father, maternal aunt, sister, and sister; Kidney Cancer in her brother. Home Medications:  Current Outpatient Medications   Medication Sig Dispense Refill    warfarin (COUMADIN) 1 MG tablet Take 3 tablets by mouth daily 30 tablet 0    albuterol sulfate  (90 Base) MCG/ACT inhaler INHALE 2 PUFFS BY MOUTH EVERY 6 HOURS AS NEEDED FOR WHEEZING 1 Inhaler 3    traMADol (ULTRAM) 50 MG tablet Take 1 tablet by mouth every 8 hours as needed for Pain for up to 90 days. 75 tablet 2    omeprazole (PRILOSEC) 40 MG delayed release capsule TAKE 1 CAPSULE BY MOUTH  DAILY 90 capsule 3    insulin detemir (LEVEMIR FLEXTOUCH) 100 UNIT/ML injection pen Inject 30 Units into the skin 2 times daily      rosuvastatin (CRESTOR) 10 MG tablet Take 1 tablet by mouth daily Take 1 tablet by mouth  daily 90 tablet 3    traZODone (DESYREL) 100 MG tablet TAKE 1 TABLET BY MOUTH ONCE DAILY NIGHTLY  AS  NEEDED 90 tablet 3    fluticasone (FLONASE) 50 MCG/ACT nasal spray USE TWO SPRAY(S) IN EACH NOSTRIL ONCE DAILY 1 Bottle 5    pregabalin (LYRICA) 50 MG capsule Take 1 capsule by mouth 3 times daily for 180 days.  90 capsule 5    Diapers & Supplies MISC 1 each by Does not apply route 2 times daily 100 each 5    allopurinol (ZYLOPRIM) 100 MG tablet Take 1 tablet by mouth daily 90 tablet 3    Incontinence Supply Disposable (DEPEND ADJUSTABLE UNDERWEAR LG) MISC 1 each by Does not apply route as needed (for urine incompetence) 60 each 5    UNIFINE PENTIPS 31G X 5 MM MISC USE WITH INSULIN PENS FOUR TIMES DAILY 400 each 3    insulin lispro (HUMALOG KWIKPEN) 100 UNIT/ML pen Inject 0-12 Units into the skin 3 times daily (before meals) 5 pen 5    lidocaine-prilocaine (EMLA) 2.5-2.5 % cream Apply topically as needed.  1 Tube 5    promethazine (PHENERGAN) 25 MG tablet Take 1 tablet by mouth every 8 hours as needed for Nausea 30 tablet 2    calcium acetate (PHOSLO) 667 MG capsule Take 2 capsules by mouth 3 times daily (with meals) 60 capsule 3    nebivolol (BYSTOLIC) 2.5 MG tablet Take 1 tablet by mouth 2 times daily 30 tablet 3    docusate sodium (COLACE) 100 MG capsule Take 1 capsule by mouth 2 times daily (Patient taking differently: Take 100 mg by mouth 2 times daily as needed ) 60 capsule 0    albuterol sulfate HFA (PROAIR HFA) 108 (90 Base) MCG/ACT inhaler Inhale 2 puffs into the lungs every 6 hours as needed for Wheezing 1 Inhaler 11    fluticasone-salmeterol (ADVAIR HFA) 230-21 MCG/ACT inhaler Inhale 1 puff into the lungs 2 times daily 1 Inhaler 11    ipratropium-albuterol (DUONEB) 0.5-2.5 (3) MG/3ML SOLN nebulizer solution Inhale 3 mLs into the lungs every 4 hours DX:COPD J44.9 963 mL 11    TRULICITY 8.89 AV/3.0JE SOPN INJECT ONE  SYRINGE SUBCUTANEOUSLY ONCE A WEEK 15 pen 3    RELION PEN NEEDLE 31G/8MM 31G X 8 MM MISC USE  THREE TIMES DAILY AS DIRECTED 100 each 5    nitroGLYCERIN (NITROSTAT) 0.4 MG SL tablet DISSOLVE 1 TABLET UNDER THE TONGUE EVERY 5 MINUTES AS  NEEDED ; MAXIMUM OF 3  TABLETS IN 15 MINUTES 75 tablet 1    ONE TOUCH ULTRA TEST strip USE TO TEST 3-4 TIMES DAILY DUE TO FLUCTUATING SUGAR 100 strip 5    ASSURE COMFORT LANCETS 30G MISC USE TO TEST BLOOD GLUCOSE THREE TIMES

## 2019-08-21 ENCOUNTER — CARE COORDINATION (OUTPATIENT)
Dept: CASE MANAGEMENT | Age: 64
End: 2019-08-21

## 2019-08-21 ENCOUNTER — TELEPHONE (OUTPATIENT)
Dept: FAMILY MEDICINE CLINIC | Age: 64
End: 2019-08-21

## 2019-08-21 NOTE — PROGRESS NOTES
Name_______________________________________Printed:____________________  Date and time of surgery________8/22/19 0900 per office________________Arrival Time:____0730 main per office____________   1. Do not eat or drink anything after 12 midnight (or____hours) prior to surgery. This includes no water, chewing gum or mints. Endoscopy patients follow your doctors bowel prep instructions,which may include taking part of prep after midnight. 2. Take the following pills with a small sip of water on the morning of surgery__________bystolic, inhalers_________________________________________                  Do not take blood pressure medications ending in pril or sartan the ysabel prior to surgery or the morning of surgery_   3. Aspirin, Ibuprofen, Advil, Naproxen, Vitamin E and other Anti-inflammatory products should be stopped for 5 days before surgery or as directed by your physician. 4. Check with your Doctor regarding stopping Plavix, Coumadin,Eliquis, Lovenox,Effient,Pradaxa,Xarelto, Fragmin or other blood thinners and follow their instructions. 5. Do not smoke, and do not drink any alcoholic beverages 24 hours prior to surgery. This includes NA Beer. Refrain from the usage of any recreational drugs. 6. You may brush your teeth and gargle the morning of surgery. DO NOT SWALLOW WATER   7. You MUST make arrangements for a responsible adult to stay on site while you are here and take you home after your surgery. You will not be allowed to leave alone or drive yourself home. It is strongly suggested someone stay with you the first 24 hrs. Your surgery will be cancelled if you do not have a ride home. 8. A parent/legal guardian must accompany a child scheduled for surgery and plan to stay at the hospital until the child is discharged. Please do not bring other children with you.    9. Please wear simple, loose fitting clothing to the hospital.  Do not bring valuables (money, credit cards, checkbooks, etc.) Do not wear any makeup (including no eye makeup) or nail polish on your fingers or toes. 10. DO NOT wear any jewelry or piercings on day of surgery. All body piercing jewelry must be removed. 11. If you have ___dentures, they will be removed before going to the OR; we will provide you a container. If you wear ___contact lenses or ___glasses, they will be removed; please bring a case for them. 12. Please see your family doctor/pediatrician for a history & physical and/or concerning medications. Bring any test results/reports from your physician's office. PCP________x__________Phone___________H&P Appt. Date________             13 If you  have a Living Will and Durable Power of  for Healthcare, please bring in a copy. 15. Notify your Surgeon if you develop any illness between now and surgery  time, cough, cold, fever, sore throat, nausea, vomiting, etc.  Please notify your surgeon if you experience dizziness, shortness of breath or blurred vision between now & the time of your surgery             15. DO NOT shave your operative site 96 hours prior to surgery. For face & neck surgery, men may use an electric razor 48 hours prior to surgery. 16. Shower the night before surgery with ___Antibacterial soap ___Hibiclens             17. To provide excellent care visitors will be limited to one in the room at any given time. 18.  Please bring picture ID and insurance card. 19.  Visit our web site for additional information:  [email or web address]              20.During flu season no children under the age of 15 are permitted in the hospital for the safety of all patients.                               21. If you take a long acting insulin in the evening only  take half of your usual  dose the night  before your procedure              22. If you use a c-pap please bring DOS if staying overnight,             23.For your convenience [facility]

## 2019-08-21 NOTE — TELEPHONE ENCOUNTER
0 units: 120-149 mg/dL  1 units: 150-199 mg/dL  2 units: 200-249 mg/dL  3 units: 250-299 mg/dL  4 units: 300-349 mg/dL  5 units: 350-400 mg/dL  6 units: 400 or greater  Checking pre meal glucose

## 2019-08-21 NOTE — CARE COORDINATION
Raj 45 Transitions Follow Up Call    2019    Patient: Ayan Sen  Patient : 1955   MRN: [id number]  Reason for Admission: SOB/COPD  Discharge Date: 19 RARS: Readmission Risk Score: 60           Care Transitions Subsequent and Final Call    Subsequent and Final Calls  Care Transitions Interventions  Other Interventions: Follow Up: Follow up outreach call attempt, no answer, second contact number was busy. CTC left  on home line with contact information and request for return call. CTC will continue with outreach call attempts. No future appointments.     Saleem Sheldon RN

## 2019-08-22 ENCOUNTER — ANESTHESIA (OUTPATIENT)
Dept: OPERATING ROOM | Age: 64
End: 2019-08-22
Payer: MEDICARE

## 2019-08-22 ENCOUNTER — TELEPHONE (OUTPATIENT)
Dept: FAMILY MEDICINE CLINIC | Age: 64
End: 2019-08-22

## 2019-08-22 ENCOUNTER — ANESTHESIA EVENT (OUTPATIENT)
Dept: OPERATING ROOM | Age: 64
End: 2019-08-22
Payer: MEDICARE

## 2019-08-22 ENCOUNTER — HOSPITAL ENCOUNTER (OUTPATIENT)
Age: 64
Setting detail: OUTPATIENT SURGERY
Discharge: HOME | End: 2019-08-22
Attending: SURGERY | Admitting: SURGERY
Payer: MEDICARE

## 2019-08-22 VITALS
OXYGEN SATURATION: 95 % | TEMPERATURE: 97 F | SYSTOLIC BLOOD PRESSURE: 155 MMHG | HEIGHT: 63 IN | HEART RATE: 67 BPM | DIASTOLIC BLOOD PRESSURE: 86 MMHG | RESPIRATION RATE: 18 BRPM | WEIGHT: 293 LBS | BODY MASS INDEX: 51.91 KG/M2

## 2019-08-22 VITALS
OXYGEN SATURATION: 100 % | DIASTOLIC BLOOD PRESSURE: 91 MMHG | RESPIRATION RATE: 4 BRPM | SYSTOLIC BLOOD PRESSURE: 163 MMHG

## 2019-08-22 DIAGNOSIS — N18.6 ESRD (END STAGE RENAL DISEASE) ON DIALYSIS (HCC): Primary | ICD-10-CM

## 2019-08-22 DIAGNOSIS — Z99.2 ESRD (END STAGE RENAL DISEASE) ON DIALYSIS (HCC): Primary | ICD-10-CM

## 2019-08-22 LAB
ANION GAP SERPL CALCULATED.3IONS-SCNC: 14 MMOL/L (ref 3–16)
APTT: 32.1 SEC (ref 26–36)
BUN BLDV-MCNC: 27 MG/DL (ref 7–20)
CALCIUM SERPL-MCNC: 9.1 MG/DL (ref 8.3–10.6)
CHLORIDE BLD-SCNC: 98 MMOL/L (ref 99–110)
CO2: 25 MMOL/L (ref 21–32)
CREAT SERPL-MCNC: 7.5 MG/DL (ref 0.6–1.2)
GFR AFRICAN AMERICAN: 7
GFR NON-AFRICAN AMERICAN: 5
GLUCOSE BLD-MCNC: 266 MG/DL (ref 70–99)
GLUCOSE BLD-MCNC: 279 MG/DL (ref 70–99)
GLUCOSE BLD-MCNC: 293 MG/DL (ref 70–99)
INR BLD: 1.46 (ref 0.86–1.14)
PERFORMED ON: ABNORMAL
PERFORMED ON: ABNORMAL
POTASSIUM SERPL-SCNC: 4.6 MMOL/L (ref 3.5–5.1)
PROTHROMBIN TIME: 16.6 SEC (ref 9.8–13)
SODIUM BLD-SCNC: 137 MMOL/L (ref 136–145)

## 2019-08-22 PROCEDURE — 7100000011 HC PHASE II RECOVERY - ADDTL 15 MIN: Performed by: SURGERY

## 2019-08-22 PROCEDURE — 6370000000 HC RX 637 (ALT 250 FOR IP): Performed by: FAMILY MEDICINE

## 2019-08-22 PROCEDURE — 85610 PROTHROMBIN TIME: CPT

## 2019-08-22 PROCEDURE — 85730 THROMBOPLASTIN TIME PARTIAL: CPT

## 2019-08-22 PROCEDURE — 2709999900 HC NON-CHARGEABLE SUPPLY: Performed by: SURGERY

## 2019-08-22 PROCEDURE — 11045 DBRDMT SUBQ TISS EACH ADDL: CPT | Performed by: SURGERY

## 2019-08-22 PROCEDURE — 6360000002 HC RX W HCPCS: Performed by: FAMILY MEDICINE

## 2019-08-22 PROCEDURE — 80048 BASIC METABOLIC PNL TOTAL CA: CPT

## 2019-08-22 PROCEDURE — 6360000002 HC RX W HCPCS: Performed by: NURSE ANESTHETIST, CERTIFIED REGISTERED

## 2019-08-22 PROCEDURE — 3700000000 HC ANESTHESIA ATTENDED CARE: Performed by: SURGERY

## 2019-08-22 PROCEDURE — 2500000003 HC RX 250 WO HCPCS: Performed by: NURSE ANESTHETIST, CERTIFIED REGISTERED

## 2019-08-22 PROCEDURE — 7100000001 HC PACU RECOVERY - ADDTL 15 MIN: Performed by: SURGERY

## 2019-08-22 PROCEDURE — 6360000002 HC RX W HCPCS: Performed by: SURGERY

## 2019-08-22 PROCEDURE — 3600000002 HC SURGERY LEVEL 2 BASE: Performed by: SURGERY

## 2019-08-22 PROCEDURE — 3700000001 HC ADD 15 MINUTES (ANESTHESIA): Performed by: SURGERY

## 2019-08-22 PROCEDURE — 11042 DBRDMT SUBQ TIS 1ST 20SQCM/<: CPT | Performed by: SURGERY

## 2019-08-22 PROCEDURE — 3600000012 HC SURGERY LEVEL 2 ADDTL 15MIN: Performed by: SURGERY

## 2019-08-22 PROCEDURE — 7100000000 HC PACU RECOVERY - FIRST 15 MIN: Performed by: SURGERY

## 2019-08-22 PROCEDURE — 2580000003 HC RX 258: Performed by: SURGERY

## 2019-08-22 PROCEDURE — 2580000003 HC RX 258: Performed by: NURSE ANESTHETIST, CERTIFIED REGISTERED

## 2019-08-22 PROCEDURE — 7100000010 HC PHASE II RECOVERY - FIRST 15 MIN: Performed by: SURGERY

## 2019-08-22 RX ORDER — HYDROMORPHONE HCL 110MG/55ML
0.25 PATIENT CONTROLLED ANALGESIA SYRINGE INTRAVENOUS EVERY 5 MIN PRN
Status: DISCONTINUED | OUTPATIENT
Start: 2019-08-22 | End: 2019-08-22 | Stop reason: HOSPADM

## 2019-08-22 RX ORDER — LIDOCAINE HYDROCHLORIDE 20 MG/ML
INJECTION, SOLUTION EPIDURAL; INFILTRATION; INTRACAUDAL; PERINEURAL PRN
Status: DISCONTINUED | OUTPATIENT
Start: 2019-08-22 | End: 2019-08-22 | Stop reason: SDUPTHER

## 2019-08-22 RX ORDER — SUCCINYLCHOLINE/SOD CL,ISO/PF 200MG/10ML
SYRINGE (ML) INTRAVENOUS PRN
Status: DISCONTINUED | OUTPATIENT
Start: 2019-08-22 | End: 2019-08-22 | Stop reason: SDUPTHER

## 2019-08-22 RX ORDER — DEXTROSE MONOHYDRATE 50 MG/ML
100 INJECTION, SOLUTION INTRAVENOUS PRN
Status: DISCONTINUED | OUTPATIENT
Start: 2019-08-22 | End: 2019-08-22 | Stop reason: HOSPADM

## 2019-08-22 RX ORDER — DIPHENHYDRAMINE HYDROCHLORIDE 50 MG/ML
12.5 INJECTION INTRAMUSCULAR; INTRAVENOUS
Status: DISCONTINUED | OUTPATIENT
Start: 2019-08-22 | End: 2019-08-22 | Stop reason: HOSPADM

## 2019-08-22 RX ORDER — HYDROMORPHONE HCL 110MG/55ML
0.5 PATIENT CONTROLLED ANALGESIA SYRINGE INTRAVENOUS EVERY 5 MIN PRN
Status: DISCONTINUED | OUTPATIENT
Start: 2019-08-22 | End: 2019-08-22 | Stop reason: HOSPADM

## 2019-08-22 RX ORDER — SODIUM CHLORIDE 0.9 % (FLUSH) 0.9 %
10 SYRINGE (ML) INJECTION EVERY 12 HOURS SCHEDULED
Status: DISCONTINUED | OUTPATIENT
Start: 2019-08-22 | End: 2019-08-22 | Stop reason: HOSPADM

## 2019-08-22 RX ORDER — PROPOFOL 10 MG/ML
INJECTION, EMULSION INTRAVENOUS PRN
Status: DISCONTINUED | OUTPATIENT
Start: 2019-08-22 | End: 2019-08-22 | Stop reason: SDUPTHER

## 2019-08-22 RX ORDER — DEXTROSE MONOHYDRATE 25 G/50ML
12.5 INJECTION, SOLUTION INTRAVENOUS PRN
Status: DISCONTINUED | OUTPATIENT
Start: 2019-08-22 | End: 2019-08-22 | Stop reason: HOSPADM

## 2019-08-22 RX ORDER — DIPHENHYDRAMINE HYDROCHLORIDE 50 MG/ML
INJECTION INTRAMUSCULAR; INTRAVENOUS PRN
Status: DISCONTINUED | OUTPATIENT
Start: 2019-08-22 | End: 2019-08-22 | Stop reason: SDUPTHER

## 2019-08-22 RX ORDER — NICOTINE POLACRILEX 4 MG
15 LOZENGE BUCCAL PRN
Status: DISCONTINUED | OUTPATIENT
Start: 2019-08-22 | End: 2019-08-22 | Stop reason: HOSPADM

## 2019-08-22 RX ORDER — LABETALOL HYDROCHLORIDE 5 MG/ML
5 INJECTION, SOLUTION INTRAVENOUS EVERY 10 MIN PRN
Status: DISCONTINUED | OUTPATIENT
Start: 2019-08-22 | End: 2019-08-22 | Stop reason: HOSPADM

## 2019-08-22 RX ORDER — PHENYLEPHRINE HCL IN 0.9% NACL 1 MG/10 ML
SYRINGE (ML) INTRAVENOUS PRN
Status: DISCONTINUED | OUTPATIENT
Start: 2019-08-22 | End: 2019-08-22 | Stop reason: SDUPTHER

## 2019-08-22 RX ORDER — SODIUM CHLORIDE 9 MG/ML
INJECTION, SOLUTION INTRAVENOUS CONTINUOUS
Status: DISCONTINUED | OUTPATIENT
Start: 2019-08-22 | End: 2019-08-22 | Stop reason: HOSPADM

## 2019-08-22 RX ORDER — SODIUM CHLORIDE 0.9 % (FLUSH) 0.9 %
10 SYRINGE (ML) INJECTION PRN
Status: DISCONTINUED | OUTPATIENT
Start: 2019-08-22 | End: 2019-08-22 | Stop reason: HOSPADM

## 2019-08-22 RX ORDER — HYDROMORPHONE HCL 110MG/55ML
PATIENT CONTROLLED ANALGESIA SYRINGE INTRAVENOUS PRN
Status: DISCONTINUED | OUTPATIENT
Start: 2019-08-22 | End: 2019-08-22 | Stop reason: SDUPTHER

## 2019-08-22 RX ORDER — TRAMADOL HYDROCHLORIDE 50 MG/1
50 TABLET ORAL EVERY 6 HOURS PRN
Qty: 20 TABLET | Refills: 0 | Status: SHIPPED | OUTPATIENT
Start: 2019-08-22 | End: 2019-08-27

## 2019-08-22 RX ORDER — HYDROCODONE BITARTRATE AND ACETAMINOPHEN 5; 325 MG/1; MG/1
1 TABLET ORAL EVERY 6 HOURS PRN
Qty: 20 TABLET | Refills: 0 | Status: SHIPPED | OUTPATIENT
Start: 2019-08-22 | End: 2019-08-27

## 2019-08-22 RX ORDER — SODIUM CHLORIDE 9 MG/ML
INJECTION, SOLUTION INTRAVENOUS CONTINUOUS PRN
Status: DISCONTINUED | OUTPATIENT
Start: 2019-08-22 | End: 2019-08-22 | Stop reason: SDUPTHER

## 2019-08-22 RX ORDER — DEXAMETHASONE SODIUM PHOSPHATE 4 MG/ML
INJECTION, SOLUTION INTRA-ARTICULAR; INTRALESIONAL; INTRAMUSCULAR; INTRAVENOUS; SOFT TISSUE PRN
Status: DISCONTINUED | OUTPATIENT
Start: 2019-08-22 | End: 2019-08-22 | Stop reason: SDUPTHER

## 2019-08-22 RX ORDER — PROMETHAZINE HYDROCHLORIDE 25 MG/ML
6.25 INJECTION, SOLUTION INTRAMUSCULAR; INTRAVENOUS PRN
Status: DISCONTINUED | OUTPATIENT
Start: 2019-08-22 | End: 2019-08-22 | Stop reason: HOSPADM

## 2019-08-22 RX ORDER — ONDANSETRON 2 MG/ML
INJECTION INTRAMUSCULAR; INTRAVENOUS PRN
Status: DISCONTINUED | OUTPATIENT
Start: 2019-08-22 | End: 2019-08-22 | Stop reason: SDUPTHER

## 2019-08-22 RX ADMIN — HYDROMORPHONE HYDROCHLORIDE 0.5 MG: 2 INJECTION, SOLUTION INTRAMUSCULAR; INTRAVENOUS; SUBCUTANEOUS at 09:07

## 2019-08-22 RX ADMIN — INSULIN HUMAN 10 UNITS: 100 INJECTION, SOLUTION PARENTERAL at 08:54

## 2019-08-22 RX ADMIN — HYDROMORPHONE HYDROCHLORIDE 0.5 MG: 2 INJECTION INTRAMUSCULAR; INTRAVENOUS; SUBCUTANEOUS at 10:27

## 2019-08-22 RX ADMIN — Medication 100 MCG: at 09:36

## 2019-08-22 RX ADMIN — PROPOFOL 160 MG: 10 INJECTION, EMULSION INTRAVENOUS at 09:11

## 2019-08-22 RX ADMIN — DEXTROSE MONOHYDRATE 3 G: 50 INJECTION, SOLUTION INTRAVENOUS at 08:58

## 2019-08-22 RX ADMIN — ONDANSETRON 4 MG: 2 INJECTION INTRAMUSCULAR; INTRAVENOUS at 09:11

## 2019-08-22 RX ADMIN — Medication 100 MCG: at 09:19

## 2019-08-22 RX ADMIN — HYDROMORPHONE HYDROCHLORIDE 0.5 MG: 2 INJECTION INTRAMUSCULAR; INTRAVENOUS; SUBCUTANEOUS at 10:11

## 2019-08-22 RX ADMIN — HYDROMORPHONE HYDROCHLORIDE 0.5 MG: 2 INJECTION INTRAMUSCULAR; INTRAVENOUS; SUBCUTANEOUS at 10:19

## 2019-08-22 RX ADMIN — SODIUM CHLORIDE: 9 INJECTION, SOLUTION INTRAVENOUS at 09:01

## 2019-08-22 RX ADMIN — INSULIN HUMAN 20 UNITS: 100 INJECTION, SOLUTION PARENTERAL at 10:23

## 2019-08-22 RX ADMIN — DIPHENHYDRAMINE HYDROCHLORIDE 12.5 MG: 50 INJECTION, SOLUTION INTRAMUSCULAR; INTRAVENOUS at 09:10

## 2019-08-22 RX ADMIN — LIDOCAINE HYDROCHLORIDE 100 MG: 20 INJECTION, SOLUTION EPIDURAL; INFILTRATION; INTRACAUDAL; PERINEURAL at 09:11

## 2019-08-22 RX ADMIN — DEXAMETHASONE SODIUM PHOSPHATE 10 MG: 4 INJECTION, SOLUTION INTRAMUSCULAR; INTRAVENOUS at 09:11

## 2019-08-22 RX ADMIN — Medication 160 MG: at 09:11

## 2019-08-22 ASSESSMENT — PULMONARY FUNCTION TESTS
PIF_VALUE: 38
PIF_VALUE: 0
PIF_VALUE: 30
PIF_VALUE: 30
PIF_VALUE: 38
PIF_VALUE: 2
PIF_VALUE: 36
PIF_VALUE: 3
PIF_VALUE: 2
PIF_VALUE: 38
PIF_VALUE: 38
PIF_VALUE: 35
PIF_VALUE: 41
PIF_VALUE: 21
PIF_VALUE: 30
PIF_VALUE: 38
PIF_VALUE: 0
PIF_VALUE: 2
PIF_VALUE: 20
PIF_VALUE: 18
PIF_VALUE: 39
PIF_VALUE: 2
PIF_VALUE: 17
PIF_VALUE: 30
PIF_VALUE: 35
PIF_VALUE: 1
PIF_VALUE: 38
PIF_VALUE: 1
PIF_VALUE: 38
PIF_VALUE: 24
PIF_VALUE: 3
PIF_VALUE: 4
PIF_VALUE: 4
PIF_VALUE: 30
PIF_VALUE: 30
PIF_VALUE: 38
PIF_VALUE: 1
PIF_VALUE: 38
PIF_VALUE: 1
PIF_VALUE: 2
PIF_VALUE: 38
PIF_VALUE: 38
PIF_VALUE: 35
PIF_VALUE: 3
PIF_VALUE: 2
PIF_VALUE: 1
PIF_VALUE: 18
PIF_VALUE: 1
PIF_VALUE: 22
PIF_VALUE: 2
PIF_VALUE: 38
PIF_VALUE: 33

## 2019-08-22 ASSESSMENT — PAIN SCALES - GENERAL
PAINLEVEL_OUTOF10: 10
PAINLEVEL_OUTOF10: 0
PAINLEVEL_OUTOF10: 10
PAINLEVEL_OUTOF10: 7

## 2019-08-22 ASSESSMENT — PAIN DESCRIPTION - ORIENTATION: ORIENTATION: RIGHT

## 2019-08-22 ASSESSMENT — PAIN DESCRIPTION - PAIN TYPE
TYPE: SURGICAL PAIN
TYPE: SURGICAL PAIN

## 2019-08-22 ASSESSMENT — ENCOUNTER SYMPTOMS: SHORTNESS OF BREATH: 1

## 2019-08-22 ASSESSMENT — PAIN - FUNCTIONAL ASSESSMENT: PAIN_FUNCTIONAL_ASSESSMENT: 0-10

## 2019-08-22 ASSESSMENT — PAIN DESCRIPTION - LOCATION
LOCATION: ARM
LOCATION: ARM

## 2019-08-22 ASSESSMENT — COPD QUESTIONNAIRES: CAT_SEVERITY: MODERATE

## 2019-08-22 ASSESSMENT — LIFESTYLE VARIABLES: SMOKING_STATUS: 0

## 2019-08-22 NOTE — ANESTHESIA PRE PROCEDURE
urine incompetence) 3/16/19   JURGEN Roth CNP   UNIFINE PENTIPS 31G X 5 MM MISC USE WITH INSULIN PENS FOUR TIMES DAILY 3/8/19   JURGEN Roth CNP   insulin lispro (HUMALOG KWIKPEN) 100 UNIT/ML pen Inject 0-12 Units into the skin 3 times daily (before meals) 3/7/19   JURGEN Roth CNP   lidocaine-prilocaine (EMLA) 2.5-2.5 % cream Apply topically as needed.  3/5/19   JURGEN Roth CNP   promethazine (PHENERGAN) 25 MG tablet Take 1 tablet by mouth every 8 hours as needed for Nausea 1/30/19   JURGEN Roth CNP   calcium acetate (PHOSLO) 667 MG capsule Take 2 capsules by mouth 3 times daily (with meals) 1/5/19   Mir Mayo MD   nebivolol (BYSTOLIC) 2.5 MG tablet Take 1 tablet by mouth 2 times daily 1/5/19   Mir Mayo MD   docusate sodium (COLACE) 100 MG capsule Take 1 capsule by mouth 2 times daily  Patient taking differently: Take 100 mg by mouth 2 times daily as needed  11/21/18   Sruthi Lozano MD   albuterol sulfate HFA (PROAIR HFA) 108 (90 Base) MCG/ACT inhaler Inhale 2 puffs into the lungs every 6 hours as needed for Wheezing 9/25/18   Kimberly Mann MD   fluticasone-salmeterol (ADVAIR HFA) 230-21 MCG/ACT inhaler Inhale 1 puff into the lungs 2 times daily 9/25/18   Kimberly Mann MD   ipratropium-albuterol (DUONEB) 0.5-2.5 (3) MG/3ML SOLN nebulizer solution Inhale 3 mLs into the lungs every 4 hours DX:COPD J44.9 9/25/18   Kimberly Mann MD   TRULICITY 5.74 MU/7.7GJ SOPN INJECT ONE  SYRINGE SUBCUTANEOUSLY ONCE A WEEK 7/31/18   JURGEN Roth CNP   RELION PEN NEEDLE 31G/8MM 31G X 8 MM MISC USE  THREE TIMES DAILY AS DIRECTED 4/17/18   Kyara Brochadeline, JURGEN - CNP   nitroGLYCERIN (NITROSTAT) 0.4 MG SL tablet DISSOLVE 1 TABLET UNDER THE TONGUE EVERY 5 MINUTES AS  NEEDED ; MAXIMUM OF 3  TABLETS IN 15 MINUTES 4/11/18   Kyara Moffettchadeline, JURGEN - CNP   ONE TOUCH ULTRA TEST strip USE TO TEST 3-4 TIMES DAILY DUE TO FLUCTUATING SUGAR 11/14/17   [facility] JURGEN Cameron CNP   ASSURE COMFORT LANCETS 30G MISC USE TO TEST BLOOD GLUCOSE THREE TIMES DAILY 8/15/17   Olivia Short, APRN - CNP   Alcohol Swabs (ALCOHOL PREP) 70 % PADS USE TO TEST BLOOD GLUCOSE THREE TIMES DAILY 8/15/17   Olivia JURGEN Carl CNP   Blood Glucose Monitoring Suppl (ACCU-CHEK KENNETH SMARTVIEW) w/Device KIT USE TO TEST BLOOD GLUCOSE 8/15/17   Olivia ShortJURGEN CNP   BiPAP Machine MISC by Does not apply route nightly    Historical Provider, MD   Spacer/Aero-Holding Chambers VENANCIO 1 Device by Does not apply route daily as needed 3/27/16   JURGEN Dey CNP   OXYGEN Inhale 2 L into the lungs daily as needed At night prn    Historical Provider, MD       Current medications:    No current facility-administered medications for this visit. No current outpatient medications on file. Facility-Administered Medications Ordered in Other Visits   Medication Dose Route Frequency Provider Last Rate Last Dose    ceFAZolin (ANCEF) 3 g in dextrose 5 % 100 mL IVPB  3 g Intravenous Once Mega Hooper MD           Allergies: Allergies   Allergen Reactions    Codeine Nausea Only    Fentanyl Itching    Morphine      CHEST PAIN    Hydrocodone-Acetaminophen Itching and Rash    Percocet [Oxycodone-Acetaminophen] Itching    Procardia [Nifedipine] Nausea And Vomiting     Headache  Takes norvasc at home 12/22/15    Vicodin [Hydrocodone-Acetaminophen] Rash       Problem List:    Patient Active Problem List   Diagnosis Code    Moderate asthma with exacerbation J45. 901    Colon polyps K63.5    Microalbuminuria R80.9    Venous stasis of lower extremity I87.8    Obstructive sleep apnea on CPAP G47.33, Z99.89    Chronic diastolic CHF (congestive heart failure) ([facility]) I50.32    Renal osteodystrophy N25.0    Uncontrolled type 2 diabetes with renal manifestation ([facility]) E11.29, E11.65    History of pulmonary embolism Z86.711    Anemia of chronic kidney failure ([facility]) N18.9, D63.1    Primary osteoarthritis of both knees M17.0    Essential hypertension, malignant I10    Restrictive lung disease J98.4    Gastroesophageal reflux disease K21.9    LVH (left ventricular hypertrophy) I51.7    Dyspnea and respiratory abnormalities R06.00, R06.89    Acute on chronic respiratory failure with hypoxia and hypercapnia (HCC) J96.21, J96.22    Obesity hypoventilation syndrome (HCC) E66.2    Morbid obesity due to excess calories ([facility]) E66.01    COPD, moderate (HCC) J44.9    Gout M10.9    Warfarin-induced coagulopathy ([facility]) D68.32, T45.515A    Chronic hypoxemic respiratory failure (HCC) J96.11    Constipation K59.00    Diabetes education, encounter for Z71.89    Primary osteoarthritis of left hip M16.12    ESRD (end stage renal disease) on dialysis ([facility]) N18.6, Z99.2    Chronic pain syndrome G89.4    Chest pain R07.9    Recurrent ventral hernia K43.2    Hypoglycemia E16.2    Diabetic polyneuropathy associated with type 2 diabetes mellitus ([address] E11.42    Infection of arteriovenous graft for hemodialysis ([address] T82. 7XXA    Essential hypertension I10    Dialysis patient ([address] Z99.2    Postoperative pain D96.90    Other complication of arteriovenous dialysis fistula ([facility]) T82.898A    Weight loss counseling, encounter for Z71.3    Respiratory failure ([address] J96.90    COPD exacerbation ([address] J44.1       Past Medical History:        Diagnosis Date    Abdominal pain 8/20/2018    Acute on chronic congestive heart failure ([address] 6/26/2014    Asthma     Cervical cancer ([address]     Chest pain 5/16/2018    CHF (congestive heart failure) ([facility])     Chronic kidney disease, stage IV (severe) ([facility])     Dr Mitchel Saleh    Chronic pain syndrome 3/27/2018    Chronic respiratory failure with hypoxia ([facility])     CKD (chronic kidney disease) stage 4, GFR 15-29 ml/min ([facility]) 6/30/2017    Colon polyps     COPD (chronic obstructive pulmonary disease) ([address]     Degenerative disc disease at L5-S1 level 6/18/2013

## 2019-08-23 ENCOUNTER — CARE COORDINATION (OUTPATIENT)
Dept: CASE MANAGEMENT | Age: 64
End: 2019-08-23

## 2019-08-23 ENCOUNTER — TELEPHONE (OUTPATIENT)
Dept: FAMILY MEDICINE CLINIC | Age: 64
End: 2019-08-23

## 2019-08-23 NOTE — CARE COORDINATION
Raj 45 Transitions Follow Up Call    2019    Patient: Kamryn Ross  Patient : 1955   MRN: [id number]  Reason for Admission:  Discharge Date: 19 RARS: Readmission Risk Score: 60         Spoke with: Darian Landrum Transitions Subsequent and Final Call    Subsequent and Final Calls  Do you have any ongoing symptoms?:  No  Have your medications changed?:  No  Do you have any questions related to your medications?:  No  Do you currently have any active services?:  Yes  Are you currently active with any services?:  Home Health  Do you have any needs or concerns that I can assist you with?:  No  Identified Barriers:  None  Care Transitions Interventions  No Identified Needs  Other Interventions:          Pt states doing well, no issues or concerns. Had a procedure done yesterday, Right arm excisional debridement of skin and subcutaneous tissue, HC nurse came today to check site, looked good. No need for further f/u CTC calls        Follow Up  No future appointments.     Mart Londono RN

## 2019-08-26 ENCOUNTER — TELEPHONE (OUTPATIENT)
Dept: VASCULAR SURGERY | Age: 64
End: 2019-08-26

## 2019-08-26 ENCOUNTER — TELEPHONE (OUTPATIENT)
Dept: FAMILY MEDICINE CLINIC | Age: 64
End: 2019-08-26

## 2019-09-03 ENCOUNTER — TELEPHONE (OUTPATIENT)
Dept: FAMILY MEDICINE CLINIC | Age: 64
End: 2019-09-03

## 2019-09-03 ENCOUNTER — TELEPHONE (OUTPATIENT)
Dept: VASCULAR SURGERY | Age: 64
End: 2019-09-03

## 2019-09-03 NOTE — TELEPHONE ENCOUNTER
Keyonna with [employer] called in to let Pasha German know that when patient takes the tramadol RX, she experiences involuntary movements primarily in her arms. Keyonna said she looked up that this could be a side affect. Patient states the tramadol also is not helping with the pain from her procedure with Dr. Jh Figueredo. Keyonna requested a callback to her. Please advise.

## 2019-09-06 ENCOUNTER — TELEPHONE (OUTPATIENT)
Dept: ADMINISTRATIVE | Age: 64
End: 2019-09-06

## 2019-09-06 RX ORDER — LIDOCAINE AND PRILOCAINE 25; 25 MG/G; MG/G
CREAM TOPICAL
Qty: 3 TUBE | Refills: 5 | Status: ON HOLD | OUTPATIENT
Start: 2019-09-06 | End: 2020-02-05 | Stop reason: HOSPADM

## 2019-09-09 ENCOUNTER — HOSPITAL ENCOUNTER (EMERGENCY)
Age: 64
Discharge: HOME | End: 2019-09-09
Attending: EMERGENCY MEDICINE
Payer: MEDICARE

## 2019-09-09 VITALS
TEMPERATURE: 98.2 F | RESPIRATION RATE: 16 BRPM | HEART RATE: 95 BPM | SYSTOLIC BLOOD PRESSURE: 147 MMHG | OXYGEN SATURATION: 98 % | DIASTOLIC BLOOD PRESSURE: 71 MMHG

## 2019-09-09 DIAGNOSIS — R60.9 EDEMA, UNSPECIFIED TYPE: Primary | ICD-10-CM

## 2019-09-09 DIAGNOSIS — N18.6 ESRD (END STAGE RENAL DISEASE) (HCC): ICD-10-CM

## 2019-09-09 LAB
A/G RATIO: 0.9 (ref 1.1–2.2)
ALBUMIN SERPL-MCNC: 3.6 G/DL (ref 3.4–5)
ALP BLD-CCNC: 93 U/L (ref 40–129)
ALT SERPL-CCNC: 10 U/L (ref 10–40)
ANION GAP SERPL CALCULATED.3IONS-SCNC: 14 MMOL/L (ref 3–16)
APTT: 34.2 SEC (ref 26–36)
AST SERPL-CCNC: 12 U/L (ref 15–37)
BASOPHILS ABSOLUTE: 0.1 K/UL (ref 0–0.2)
BASOPHILS RELATIVE PERCENT: 1 %
BILIRUB SERPL-MCNC: <0.2 MG/DL (ref 0–1)
BUN BLDV-MCNC: 25 MG/DL (ref 7–20)
CALCIUM SERPL-MCNC: 9.6 MG/DL (ref 8.3–10.6)
CHLORIDE BLD-SCNC: 98 MMOL/L (ref 99–110)
CO2: 26 MMOL/L (ref 21–32)
CREAT SERPL-MCNC: 7.3 MG/DL (ref 0.6–1.2)
EOSINOPHILS ABSOLUTE: 0.1 K/UL (ref 0–0.6)
EOSINOPHILS RELATIVE PERCENT: 1.1 %
GFR AFRICAN AMERICAN: 7
GFR NON-AFRICAN AMERICAN: 6
GLOBULIN: 3.8 G/DL
GLUCOSE BLD-MCNC: 122 MG/DL (ref 70–99)
HCT VFR BLD CALC: 35.3 % (ref 36–48)
HEMOGLOBIN: 11.5 G/DL (ref 12–16)
INR BLD: 1.66 (ref 0.86–1.14)
LYMPHOCYTES ABSOLUTE: 1.3 K/UL (ref 1–5.1)
LYMPHOCYTES RELATIVE PERCENT: 13.4 %
MCH RBC QN AUTO: 34.9 PG (ref 26–34)
MCHC RBC AUTO-ENTMCNC: 32.6 G/DL (ref 31–36)
MCV RBC AUTO: 106.9 FL (ref 80–100)
MONOCYTES ABSOLUTE: 0.9 K/UL (ref 0–1.3)
MONOCYTES RELATIVE PERCENT: 9.2 %
NEUTROPHILS ABSOLUTE: 7.1 K/UL (ref 1.7–7.7)
NEUTROPHILS RELATIVE PERCENT: 75.3 %
PDW BLD-RTO: 18.7 % (ref 12.4–15.4)
PLATELET # BLD: 204 K/UL (ref 135–450)
PMV BLD AUTO: 9 FL (ref 5–10.5)
POTASSIUM SERPL-SCNC: 4.6 MMOL/L (ref 3.5–5.1)
PROTHROMBIN TIME: 18.9 SEC (ref 9.8–13)
RBC # BLD: 3.3 M/UL (ref 4–5.2)
SODIUM BLD-SCNC: 138 MMOL/L (ref 136–145)
TOTAL PROTEIN: 7.4 G/DL (ref 6.4–8.2)
WBC # BLD: 9.5 K/UL (ref 4–11)

## 2019-09-09 PROCEDURE — 99283 EMERGENCY DEPT VISIT LOW MDM: CPT

## 2019-09-09 PROCEDURE — 85025 COMPLETE CBC W/AUTO DIFF WBC: CPT

## 2019-09-09 PROCEDURE — 80053 COMPREHEN METABOLIC PANEL: CPT

## 2019-09-09 PROCEDURE — 93971 EXTREMITY STUDY: CPT

## 2019-09-09 PROCEDURE — 85610 PROTHROMBIN TIME: CPT

## 2019-09-09 PROCEDURE — 96372 THER/PROPH/DIAG INJ SC/IM: CPT

## 2019-09-09 PROCEDURE — 85730 THROMBOPLASTIN TIME PARTIAL: CPT

## 2019-09-09 PROCEDURE — 6360000002 HC RX W HCPCS: Performed by: EMERGENCY MEDICINE

## 2019-09-09 RX ADMIN — ENOXAPARIN SODIUM 40 MG: 40 INJECTION SUBCUTANEOUS at 18:37

## 2019-09-09 ASSESSMENT — ENCOUNTER SYMPTOMS
BACK PAIN: 0
RESPIRATORY NEGATIVE: 1
ABDOMINAL PAIN: 0
VOMITING: 0
SHORTNESS OF BREATH: 0
COLOR CHANGE: 0
NAUSEA: 0
COUGH: 0

## 2019-09-09 NOTE — ED PROVIDER NOTES
level 4, 10 or more for level 5)     Review of Systems   Constitutional: Negative for activity change, appetite change, chills and fever. Respiratory: Negative. Negative for cough and shortness of breath. Cardiovascular: Positive for leg swelling. Negative for chest pain and palpitations. Gastrointestinal: Negative for abdominal pain, nausea and vomiting. Genitourinary: Negative for difficulty urinating and dysuria. Musculoskeletal: Positive for joint swelling and myalgias. Negative for arthralgias, back pain, gait problem, neck pain and neck stiffness. Skin: Negative for color change, pallor, rash and wound. Neurological: Negative for dizziness, weakness, light-headedness and numbness. Positives and Pertinent negatives as per HPI. Except as noted abovein the ROS, all other systems were reviewed and negative.        PAST MEDICAL HISTORY     Past Medical History:   Diagnosis Date    Abdominal pain 8/20/2018    Acute on chronic congestive heart failure ([address] 6/26/2014    Asthma     [facility]     Chest pain 5/16/2018    CHF (congestive heart failure) ([facility])     Chronic kidney disease, stage IV (severe) ([facility])     Dr David Watson Chronic pain syndrome 3/27/2018    Chronic respiratory failure with hypoxia ([facility])     CKD (chronic kidney disease) stage 4, GFR 15-29 ml/min ([facility]) 6/30/2017    Colon polyps     COPD (chronic obstructive pulmonary disease) ([address]     Degenerative disc disease at L5-S1 level 6/18/2013     CT    Diabetes mellitus, insulin dependent (IDDM), uncontrolled ([address]     Diabetic polyneuropathy associated with type 2 diabetes mellitus ([address] 3/26/2019    Elevated troponin 9/9/2017    ESRD (end stage renal disease) on dialysis ([address] 02/14/2018    M[facility]    GERD (gastroesophageal reflux disease)     Gout     History of blood transfusion     History of cervical cancer     Hyperlipidemia     Hypertension     Incarcerated ventral hernia     LVH 95   Resp: 18 16   Temp: 98.2 °F (36.8 °C)    TempSrc: Oral    SpO2: 98% 98%       Patient was given thefollowing medications:  Medications   enoxaparin (LOVENOX) injection 40 mg (40 mg Subcutaneous Given 9/9/19 5877)       Patient is a 49-year-old female who presents to the ED implant of right leg pain. Pain and swelling to the right calf. Patient states concern for blood clot. Has been ongoing for numerous months/weeks. Patient is anticoagulated on Coumadin. Unsure of INR given the fact that she has it managed at dialysis. CBC showed hemoglobin 11.5. White count and platelets normal.  CMP showed a creatinine of 7.3. GFR of 7. BUN 25. Appears consistent with patient's history of end-stage renal disease on hemodialysis. INR 1.66 and subtherapeutic. PTT 34.2. Patient bridged with Lovenox here in the ED. Ultrasound obtained and unfortunately very limited but no obvious DVT noted. History of end-stage renal disease with associated edema to the right calf. Patient is on anticoagulation already with warfarin. Bridge with Lovenox here in the ED instructed to continue warfarin as prescribed. Follow-up with PCP. Return to ED for any worsening symptoms. Do not believe further anticoagulation indicated at this time. Low suspicion for acute fracture, dislocation, tendon involvement, nerve involvement, vascular compromise, compartments, cellulitis abscess, septic arthritis, gout, DVT, arterial occlusion, Baker's cyst or other emergent etiology at this time. FINAL IMPRESSION      1. Edema, unspecified type    2. ESRD (end stage renal disease) [facility])          DISPOSITION/PLAN   DISPOSITION Decision To Discharge 09/09/2019 06:31:22 PM      PATIENT REFERREDTO:  JURGEN Saul - CNP  [address] [address]  [phone]    Call today        DISCHARGE MEDICATIONS:  Discharge Medication List as of 9/9/2019  6:44 PM          DISCONTINUED MEDICATIONS:  Discharge Medication List as of 9/9/2019  6:44 PM                 (Please note that portions ofthis note were completed with a voice recognition program.  Efforts were made to edit the dictations but occasionally words are mis-transcribed.)    TAPAN Rosa (electronically signed)          TAPAN Knowles  09/10/19 7752

## 2019-09-09 NOTE — ED PROVIDER NOTES
I independently performed a history and physical on Shahab Starks. All diagnostic, treatment, and disposition decisions were made by myself in conjunction with the advanced practice provider. Briefly, this is a 59 y.o. female here for 6mo of pain and swelling to right calf. On Warfarin, but she's not sure of last INR. Hx of ESRD. On exam, calf is slightly swollen; edematous bilaterally which precludes good exam; no palpable cord. .        MDM  US performed; non-visualization of calf vein. On anticoagulation regardless. Checking INR to ensure theraputic level. INR subtheraputic. Gave lovenox bridge. Instructed to continue coumadin, f/u with PCP. Patient Referrals:  Marvel Barger, APRN - CNP  [address] [address] [phone]    Call today        Discharge Medications:  New Prescriptions    No medications on file       FINAL IMPRESSION  1. Edema, unspecified type    2. [facility]        Blood pressure (!) 147/71, pulse 95, temperature 98.2 °F (36.8 °C), temperature source Oral, resp. rate 16, last menstrual period 11/01/1996, SpO2 98 %, not currently breastfeeding. For further details of Wayne Arceo emergency department encounter, please see documentation by advanced practice provider, Akilah.      Petty Nuno MD  09/09/19 [id number]

## 2019-09-10 ENCOUNTER — OFFICE VISIT (OUTPATIENT)
Dept: VASCULAR SURGERY | Age: 64
End: 2019-09-10

## 2019-09-10 VITALS
BODY MASS INDEX: 51.91 KG/M2 | WEIGHT: 293 LBS | DIASTOLIC BLOOD PRESSURE: 74 MMHG | HEIGHT: 63 IN | SYSTOLIC BLOOD PRESSURE: 130 MMHG

## 2019-09-10 DIAGNOSIS — N18.6 ESRD (END STAGE RENAL DISEASE) ON DIALYSIS (HCC): Primary | ICD-10-CM

## 2019-09-10 DIAGNOSIS — Z99.2 ESRD (END STAGE RENAL DISEASE) ON DIALYSIS (HCC): Primary | ICD-10-CM

## 2019-09-10 PROCEDURE — 99024 POSTOP FOLLOW-UP VISIT: CPT | Performed by: SURGERY

## 2019-09-10 NOTE — PROGRESS NOTES
[facility])   Vascular Surgery Followup    Referring Provider:  JURGEN Gipson CNP     Chief Complaint   Patient presents with    Post-Op Check        History of Present Illness:   60-year-old female who underwent right arm excisional debridement of skin and subcutaneous tissue on August 22, 2019 here today for postoperative visit. She states her right arm is feeling much better. Does state that she's had some discomfort in her right leg for the last 2-3 months. Isac and worse recently. Her venous duplex was indeterminate but did not show any conclusive deep vein thrombosis. She does also complain of a small ulceration on the top of her right foot for the last 2-3 weeks. She is unable to describe any claudication or otherwise rest pain.     Past Medical History:   has a past medical history of Abdominal pain, Acute on chronic congestive heart failure ([address] Asthma, Cervical cancer ([address] Chest pain, CHF (congestive heart failure) ([address] Chronic kidney disease, stage IV (severe) ([facility]), Chronic pain syndrome, Chronic respiratory failure with hypoxia ([address] CKD (chronic kidney disease) stage 4, GFR 15-29 ml/min ([facility]), Colon polyps, COPD (chronic obstructive pulmonary disease) ([address] Degenerative disc disease at L5-S1 level, Diabetes mellitus, insulin dependent (IDDM), uncontrolled ([address] Diabetic polyneuropathy associated with type 2 diabetes mellitus ([address] Elevated troponin, ESRD (end stage renal disease) on dialysis ([address] GERD (gastroesophageal reflux disease), Gout, History of blood transfusion, History of cervical cancer, Hyperlipidemia, Hypertension, Incarcerated ventral hernia, LVH (left ventricular hypertrophy), Morbid obesity ([address] Mucus plugging of bronchi, Obstructive sleep apnea on CPAP, Pneumonia, Psychiatric problem, Pulmonary embolism ([address] Type 2 diabetes mellitus with diabetic neuropathy, with long-term current use of insulin ([address] Urinary incontinence in female, and Venous stasis of lower extremity. Surgical History:   has a past surgical history that includes jennifer and bso (cervix removed) (10/96); Knee arthroscopy (Right, 1999); doppler echocardiography (2/21/09); Colonoscopy (2010); Upper gastrointestinal endoscopy (6/25/13); Colonoscopy (6/25/13, 11/14); Cardiac catheterization (1/14); Hysterectomy; pr open skull supratent explore; ventral hernia repair (12/24/2016); other surgical history (02/22/2018); pr repair incisional hernia,reducible (N/A, 11/20/2018); Tunneled venous port placement (Right, 11/2019); Dialysis fistula creation (Right, 3/28/2019); Coronary angioplasty with stent; shunt revision (Right, 7/18/2019); and incision and drainage (Right, 8/22/2019). Social History:   reports that she has never smoked. She has never used smokeless tobacco. She reports that she does not drink alcohol or use drugs. Family History:  family history includes Colon Cancer in her father, maternal grandmother, and mother; Diabetes in her father and mother; Heart Disease in her brother and sister; High Blood Pressure in her brother, father, maternal aunt, sister, and sister; Kidney Cancer in her brother. Home Medications:  Current Outpatient Medications   Medication Sig Dispense Refill    lidocaine-prilocaine (EMLA) 2.5-2.5 % cream Apply topically as needed.  3 Tube 5    warfarin (COUMADIN) 1 MG tablet Take 3 tablets by mouth daily 30 tablet 0    albuterol sulfate  (90 Base) MCG/ACT inhaler INHALE 2 PUFFS BY MOUTH EVERY 6 HOURS AS NEEDED FOR WHEEZING 1 Inhaler 3    omeprazole (PRILOSEC) 40 MG delayed release capsule TAKE 1 CAPSULE BY MOUTH  DAILY 90 capsule 3    insulin detemir (LEVEMIR FLEXTOUCH) 100 UNIT/ML injection pen Inject 30 Units into the skin 2 times daily      rosuvastatin (CRESTOR) 10 MG tablet Take 1 tablet by mouth daily Take 1 tablet by mouth  daily 90 tablet 3    traZODone (DESYREL) 100 MG tablet TAKE 1 TABLET BY MOUTH ONCE DAILY NIGHTLY  AS  NEEDED 90 tablet 3 75 tablet 1    ONE TOUCH ULTRA TEST strip USE TO TEST 3-4 TIMES DAILY DUE TO FLUCTUATING SUGAR 100 strip 5    ASSURE COMFORT LANCETS 30G MISC USE TO TEST BLOOD GLUCOSE THREE TIMES DAILY 300 each 3    Alcohol Swabs (ALCOHOL PREP) 70 % PADS USE TO TEST BLOOD GLUCOSE THREE TIMES DAILY 300 each 3    Blood Glucose Monitoring Suppl (ACCU-CHEK KENNETH SMARTVIEW) w/Device KIT USE TO TEST BLOOD GLUCOSE 1 kit 0    BiPAP Machine MISC by Does not apply route nightly      Spacer/Aero-Holding Chambers VENANCIO 1 Device by Does not apply route daily as needed 1 Device 0    OXYGEN Inhale 2 L into the lungs daily as needed At night prn       No current facility-administered medications for this visit. Allergies:  Codeine; Fentanyl; Morphine; Hydrocodone-acetaminophen; Percocet [oxycodone-acetaminophen]; Procardia [nifedipine]; and Vicodin [hydrocodone-acetaminophen]     Review of Systems:   As above      Physical Examination:    There were no vitals filed for this visit. General appearance: alert, appears stated age, cooperative and no distress  Right arm incision well-healed and approximated. No erythema, drainage. Soft. No appreciable fluid collection. She does have some mild tenderness in her right medial thigh and proximal calf. No appreciable cords. Does have biphasic, distal signals. Right   Very technically limited study due to body habitus and large pannus.       Within the limits of this study and in the visualized veins there is no   evidence of deep vein or superficial vein thrombosis involving the right lower   extremity and the left common femoral vein. There was limited visualization of the right common femoral vein, femoral   vein, deep femoral vein, mid to distal PTV's, and left common femoral vein. The GSV junction to zone 5, proximal to mid PTV's, and peroneal veins were not   visualized.          Assessment:     Patient Active Problem List   Diagnosis    Moderate asthma with

## 2019-09-12 ENCOUNTER — OFFICE VISIT (OUTPATIENT)
Dept: FAMILY MEDICINE CLINIC | Age: 64
End: 2019-09-12
Payer: MEDICARE

## 2019-09-12 VITALS
OXYGEN SATURATION: 98 % | BODY MASS INDEX: 57.71 KG/M2 | DIASTOLIC BLOOD PRESSURE: 68 MMHG | SYSTOLIC BLOOD PRESSURE: 128 MMHG | WEIGHT: 293 LBS | HEART RATE: 80 BPM

## 2019-09-12 DIAGNOSIS — M79.671 FOOT PAIN, RIGHT: ICD-10-CM

## 2019-09-12 DIAGNOSIS — L98.9 FOOT LESION: Primary | ICD-10-CM

## 2019-09-12 DIAGNOSIS — F41.9 ANXIETY AND DEPRESSION: ICD-10-CM

## 2019-09-12 DIAGNOSIS — F32.A ANXIETY AND DEPRESSION: ICD-10-CM

## 2019-09-12 DIAGNOSIS — T82.898S OTHER COMPLICATION OF ARTERIOVENOUS DIALYSIS FISTULA, SEQUELA: ICD-10-CM

## 2019-09-12 DIAGNOSIS — I77.6 VASCULITIS (HCC): ICD-10-CM

## 2019-09-12 PROCEDURE — 1036F TOBACCO NON-USER: CPT | Performed by: NURSE PRACTITIONER

## 2019-09-12 PROCEDURE — G8417 CALC BMI ABV UP PARAM F/U: HCPCS | Performed by: NURSE PRACTITIONER

## 2019-09-12 PROCEDURE — 3017F COLORECTAL CA SCREEN DOC REV: CPT | Performed by: NURSE PRACTITIONER

## 2019-09-12 PROCEDURE — G8598 ASA/ANTIPLAT THER USED: HCPCS | Performed by: NURSE PRACTITIONER

## 2019-09-12 PROCEDURE — 99214 OFFICE O/P EST MOD 30 MIN: CPT | Performed by: NURSE PRACTITIONER

## 2019-09-12 PROCEDURE — G8427 DOCREV CUR MEDS BY ELIG CLIN: HCPCS | Performed by: NURSE PRACTITIONER

## 2019-09-12 RX ORDER — WARFARIN SODIUM 5 MG/1
TABLET ORAL
Refills: 0 | COMMUNITY
Start: 2019-08-30 | End: 2019-09-12 | Stop reason: SDUPTHER

## 2019-09-12 RX ORDER — ESCITALOPRAM OXALATE 10 MG/1
10 TABLET ORAL DAILY
Qty: 30 TABLET | Refills: 5 | Status: SHIPPED | OUTPATIENT
Start: 2019-09-12 | End: 2020-03-10

## 2019-09-12 RX ORDER — CLINDAMYCIN HYDROCHLORIDE 300 MG/1
300 CAPSULE ORAL 3 TIMES DAILY
Qty: 30 CAPSULE | Refills: 0 | Status: SHIPPED | OUTPATIENT
Start: 2019-09-12 | End: 2019-09-22

## 2019-09-12 RX ORDER — WARFARIN SODIUM 5 MG/1
TABLET ORAL
Qty: 30 TABLET | Refills: 5 | Status: ON HOLD | OUTPATIENT
Start: 2019-09-12 | End: 2019-11-01 | Stop reason: HOSPADM

## 2019-09-12 RX ORDER — LORAZEPAM 1 MG/1
1 TABLET ORAL 2 TIMES DAILY PRN
Qty: 45 TABLET | Refills: 0 | Status: SHIPPED | OUTPATIENT
Start: 2019-09-12 | End: 2019-10-12

## 2019-09-12 ASSESSMENT — ENCOUNTER SYMPTOMS: SHORTNESS OF BREATH: 0

## 2019-09-12 NOTE — PROGRESS NOTES
SUBJECTIVE:  Pt is a of 59 y.o. female comes in today with   Chief Complaint   Patient presents with    Sore     R foot x 2wks       Patient presenting today for evaluation of painful sore to right foot. Present for approx 2 weeks. Denies injury. Started with small black spot near base of 5th toe. Now larger in size, 2 more lesion present, (+) redness and increased pain. Home health nurse evaluated it and concern for vasculitis. Pt denies fevers. (+) chronic fatigue and malaise which seems worse. Pain is worsening. States unable to take Tramadol d/t itching and developing involuntary \"jerky\" mov'ts of extremities. Stopped taking and all symptoms resolved. 2 weeks ago right dialysis fistula was debrided by Dr Nathan Leyva. States infection is improving, home health still checking on her. Pain improving. States numbness in right hand seems to be worsening again. Still using graft in left for dialysis. Sees Dr Nathan Leyva beginning of Oct    Also c/o worsening anxiety and depression. States normally a very happy, positive person. Lately with all the complications from dialysis and and procedures lately- she is feeling sad, overwhelmed, isolating self and anxious. No panic attacks. Seems to be worsening not improving  Lorazepam in past with improvement. No improvement with hydralazine. No SSRI or SNRI in past. Denies SI. Prior to Visit Medications    Medication Sig Taking? Authorizing Provider   lidocaine-prilocaine (EMLA) 2.5-2.5 % cream Apply topically as needed.  Yes JURGEN Enriquez CNP   warfarin (COUMADIN) 1 MG tablet Take 3 tablets by mouth daily Yes Roslyn Jaime MD   albuterol sulfate  (90 Base) MCG/ACT inhaler INHALE 2 PUFFS BY MOUTH EVERY 6 HOURS AS NEEDED FOR WHEEZING Yes Robbin Fuentes MD   omeprazole (PRILOSEC) 40 MG delayed release capsule TAKE 1 CAPSULE BY MOUTH  DAILY Yes JURGEN Enriquez CNP   insulin detemir (LEVEMIR FLEXTOUCH) 100 UNIT/ML injection pen Inject 30 Units into the skin 2 times daily Yes Mir Jacobo MD   rosuvastatin (CRESTOR) 10 MG tablet Take 1 tablet by mouth daily Take 1 tablet by mouth  daily Yes JURGEN Mills CNP   traZODone (DESYREL) 100 MG tablet TAKE 1 TABLET BY MOUTH ONCE DAILY NIGHTLY  AS  NEEDED Yes JURGEN Mills CNP   fluticasone (FLONASE) 50 MCG/ACT nasal spray USE TWO SPRAY(S) IN EACH NOSTRIL ONCE DAILY Yes JURGEN Mills CNP   pregabalin (LYRICA) 50 MG capsule Take 1 capsule by mouth 3 times daily for 180 days.  Yes JURGEN Mills CNP   Diapers & Supplies MISC 1 each by Does not apply route 2 times daily Yes JURGEN Mills CNP   allopurinol (ZYLOPRIM) 100 MG tablet Take 1 tablet by mouth daily Yes JURGEN Mills CNP   Incontinence Supply Disposable (DEPEND ADJUSTABLE UNDERWEAR LG) MISC 1 each by Does not apply route as needed (for urine incompetence) Yes JURGEN Mills CNP   UNIFINE PENTIPS 31G X 5 MM MISC USE WITH INSULIN PENS FOUR TIMES DAILY Yes JURGEN Mills CNP   insulin lispro (HUMALOG KWIKPEN) 100 UNIT/ML pen Inject 0-12 Units into the skin 3 times daily (before meals) Yes JURGEN Mills CNP   promethazine (PHENERGAN) 25 MG tablet Take 1 tablet by mouth every 8 hours as needed for Nausea Yes JURGEN Mills CNP   calcium acetate (PHOSLO) 667 MG capsule Take 2 capsules by mouth 3 times daily (with meals) Yes Dimitrios Timmons MD   nebivolol (BYSTOLIC) 2.5 MG tablet Take 1 tablet by mouth 2 times daily Yes Dimitrios Timmons MD   docusate sodium (COLACE) 100 MG capsule Take 1 capsule by mouth 2 times daily  Patient taking differently: Take 100 mg by mouth 2 times daily as needed  Yes Ramsey Gilbert MD   albuterol sulfate HFA (PROAIR HFA) 108 (90 Base) MCG/ACT inhaler Inhale 2 puffs into the lungs every 6 hours as needed for Wheezing Yes Delmi Sanchez MD   fluticasone-salmeterol (ADVAIR HFA) 230-21 MCG/ACT inhaler Inhale 1 puff into the lungs 2 times daily Yes patient is nervous/anxious. Depressed           Physical Exam   Constitutional: She is oriented to person, place, and time. Morbidly obese AA female   Cardiovascular: Normal rate, regular rhythm and normal heart sounds. No murmur heard. Pulmonary/Chest: Effort normal and breath sounds normal.   Musculoskeletal:        Feet:    Neurological: She is alert and oriented to person, place, and time. Skin:   Healing surgical incision to right upper medial arm. No erythema or swelling. No drainage. No warmth   Psychiatric: She has a normal mood and affect. Her behavior is normal. Judgment and thought content normal.   Vitals reviewed. Vitals:    09/12/19 1508   BP: 128/68   Pulse: 80   SpO2: 98%   Weight: (!) 325 lb 12.8 oz (147.8 kg)             ASSESSMENT:  1. Foot lesion    2. Foot pain, right    3. Vasculitis ([address]    4. Anxiety and depression    5. Other complication of arteriovenous dialysis fistula, sequela        PLAN:  1. Foot lesion  New problem  ?etiology  Will treat empirically for cellulitis with Clinda  Arterial doppler to r/o vascular lesion  Referral to podiatry: Dr Boykin Sandhoff or Dr Soy Mcmullen    2. Foot pain, right  New problem  See #1    3. Vasculitis ([address]  -     Ultrasound doppler arterial leg right; Future    4. Anxiety and depression  Refilled today. Advised to use sparingly for severe anxiety only, and short term use. Discussed potential for dependence and tolerance with pt. She verbalized understanding. Last Rx was written in 2016. -     LORazepam (ATIVAN) 1 MG tablet; Take 1 tablet by mouth 2 times daily as needed for Anxiety for up to 30 days. New start-     escitalopram (LEXAPRO) 10 MG tablet; Take 1 tablet by mouth daily  I've explained to her that drugs of the SSRI class can have side effects such as weight gain, sexual dysfunction, insomnia, headache, nausea. These medications are generally effective at alleviating symptoms of anxiety and/or depression.  Let me know if significant

## 2019-09-16 ENCOUNTER — TELEPHONE (OUTPATIENT)
Dept: FAMILY MEDICINE CLINIC | Age: 64
End: 2019-09-16

## 2019-09-16 NOTE — TELEPHONE ENCOUNTER
Fax received from [facility] regarding discontinuation of anticoagulant for endoscopy. Scanned and forwarded to [employer] for completion.

## 2019-09-17 ENCOUNTER — HOSPITAL ENCOUNTER (OUTPATIENT)
Dept: GENERAL RADIOLOGY | Age: 64
Discharge: HOME | End: 2019-09-17
Payer: MEDICARE

## 2019-09-17 ENCOUNTER — TELEPHONE (OUTPATIENT)
Dept: FAMILY MEDICINE CLINIC | Age: 64
End: 2019-09-17

## 2019-09-17 ENCOUNTER — HOSPITAL ENCOUNTER (OUTPATIENT)
Age: 64
Discharge: HOME | End: 2019-09-17
Payer: MEDICARE

## 2019-09-17 DIAGNOSIS — R05.9 COUGH: Primary | ICD-10-CM

## 2019-09-17 DIAGNOSIS — R05.9 COUGH: ICD-10-CM

## 2019-09-17 PROCEDURE — 71046 X-RAY EXAM CHEST 2 VIEWS: CPT

## 2019-09-20 ENCOUNTER — APPOINTMENT (OUTPATIENT)
Dept: GENERAL RADIOLOGY | Age: 64
End: 2019-09-20
Payer: MEDICARE

## 2019-09-20 ENCOUNTER — HOSPITAL ENCOUNTER (EMERGENCY)
Age: 64
Discharge: HOME | End: 2019-09-20
Attending: EMERGENCY MEDICINE
Payer: MEDICARE

## 2019-09-20 VITALS
DIASTOLIC BLOOD PRESSURE: 69 MMHG | BODY MASS INDEX: 51.91 KG/M2 | WEIGHT: 293 LBS | OXYGEN SATURATION: 98 % | TEMPERATURE: 98.4 F | HEART RATE: 92 BPM | SYSTOLIC BLOOD PRESSURE: 120 MMHG | HEIGHT: 63 IN | RESPIRATION RATE: 22 BRPM

## 2019-09-20 DIAGNOSIS — Z79.01 ANTICOAGULATED ON WARFARIN: ICD-10-CM

## 2019-09-20 DIAGNOSIS — J44.1 COPD EXACERBATION (HCC): Primary | ICD-10-CM

## 2019-09-20 PROCEDURE — 99284 EMERGENCY DEPT VISIT MOD MDM: CPT

## 2019-09-20 PROCEDURE — 6360000002 HC RX W HCPCS: Performed by: EMERGENCY MEDICINE

## 2019-09-20 PROCEDURE — 94760 N-INVAS EAR/PLS OXIMETRY 1: CPT

## 2019-09-20 PROCEDURE — 6370000000 HC RX 637 (ALT 250 FOR IP): Performed by: PHYSICIAN ASSISTANT

## 2019-09-20 PROCEDURE — 6360000002 HC RX W HCPCS: Performed by: PHYSICIAN ASSISTANT

## 2019-09-20 PROCEDURE — 94640 AIRWAY INHALATION TREATMENT: CPT

## 2019-09-20 PROCEDURE — 93005 ELECTROCARDIOGRAM TRACING: CPT | Performed by: EMERGENCY MEDICINE

## 2019-09-20 PROCEDURE — 71046 X-RAY EXAM CHEST 2 VIEWS: CPT

## 2019-09-20 RX ORDER — PREDNISONE 20 MG/1
60 TABLET ORAL ONCE
Status: COMPLETED | OUTPATIENT
Start: 2019-09-20 | End: 2019-09-20

## 2019-09-20 RX ORDER — DOXYCYCLINE 100 MG/1
100 TABLET ORAL 2 TIMES DAILY
Qty: 20 TABLET | Refills: 0 | Status: SHIPPED | OUTPATIENT
Start: 2019-09-20 | End: 2019-09-30

## 2019-09-20 RX ORDER — PREDNISONE 10 MG/1
60 TABLET ORAL DAILY
Qty: 30 TABLET | Refills: 0 | Status: SHIPPED | OUTPATIENT
Start: 2019-09-20 | End: 2019-09-25 | Stop reason: ALTCHOICE

## 2019-09-20 RX ORDER — ALBUTEROL SULFATE 2.5 MG/3ML
5 SOLUTION RESPIRATORY (INHALATION) ONCE
Status: COMPLETED | OUTPATIENT
Start: 2019-09-20 | End: 2019-09-20

## 2019-09-20 RX ORDER — IPRATROPIUM BROMIDE AND ALBUTEROL SULFATE 2.5; .5 MG/3ML; MG/3ML
1 SOLUTION RESPIRATORY (INHALATION) ONCE
Status: COMPLETED | OUTPATIENT
Start: 2019-09-20 | End: 2019-09-20

## 2019-09-20 RX ADMIN — ALBUTEROL SULFATE 5 MG: 2.5 SOLUTION RESPIRATORY (INHALATION) at 10:53

## 2019-09-20 RX ADMIN — IPRATROPIUM BROMIDE AND ALBUTEROL SULFATE 1 AMPULE: .5; 3 SOLUTION RESPIRATORY (INHALATION) at 10:50

## 2019-09-20 RX ADMIN — PREDNISONE 60 MG: 20 TABLET ORAL at 11:08

## 2019-09-20 RX ADMIN — ALBUTEROL SULFATE 5 MG: 2.5 SOLUTION RESPIRATORY (INHALATION) at 12:14

## 2019-09-20 ASSESSMENT — ENCOUNTER SYMPTOMS
VOMITING: 0
DIARRHEA: 0
ABDOMINAL PAIN: 0
NAUSEA: 0
WHEEZING: 1
STRIDOR: 0
COUGH: 1
RHINORRHEA: 0
SHORTNESS OF BREATH: 1

## 2019-09-20 NOTE — ED NOTES
Bed: 09  Expected date:   Expected time:   Means of arrival: [facility]  Comments:  DIRTY; [address]  09/20/19 1006

## 2019-09-20 NOTE — ED PROVIDER NOTES
promethazine (PHENERGAN) 25 MG tablet Take 1 tablet by mouth every 8 hours as needed for Nausea, Disp-30 tablet, R-2Normal      calcium acetate (PHOSLO) 667 MG capsule Take 2 capsules by mouth 3 times daily (with meals), Disp-60 capsule, R-3Normal      nebivolol (BYSTOLIC) 2.5 MG tablet Take 1 tablet by mouth 2 times daily, Disp-30 tablet, R-3Normal      docusate sodium (COLACE) 100 MG capsule Take 1 capsule by mouth 2 times daily, Disp-60 capsule, R-0Print      !! albuterol sulfate HFA (PROAIR HFA) 108 (90 Base) MCG/ACT inhaler Inhale 2 puffs into the lungs every 6 hours as needed for Wheezing, Disp-1 Inhaler, R-11Normal      fluticasone-salmeterol (ADVAIR HFA) 230-21 MCG/ACT inhaler Inhale 1 puff into the lungs 2 times daily, Disp-1 Inhaler, R-11Normal      ipratropium-albuterol (DUONEB) 0.5-2.5 (3) MG/3ML SOLN nebulizer solution Inhale 3 mLs into the lungs every 4 hours DX:COPD J44.9, Disp-360 mL, [id number]      TRULICITY 1.25 SN/5.5GQ SOPN INJECT ONE  SYRINGE SUBCUTANEOUSLY ONCE A WEEK, Disp-15 pen, R-3Please consider 90 day supplies to promote better adherenceNormal      !! RELION PEN NEEDLE 31G/8MM 31G X 8 MM MISC Disp-100 each, R-5, Normal      nitroGLYCERIN (NITROSTAT) 0.4 MG SL tablet DISSOLVE 1 TABLET UNDER THE TONGUE EVERY 5 MINUTES AS  NEEDED ; MAXIMUM OF 3  TABLETS IN 15 MINUTES, Disp-75 tablet, R-1Normal      ONE TOUCH ULTRA TEST strip USE TO TEST 3-4 TIMES DAILY DUE TO FLUCTUATING SUGAR, Disp-100 strip, R-5Please consider 90 day supplies to promote better adherenceNormal      ASSURE COMFORT LANCETS 30G MISC USE TO TEST BLOOD GLUCOSE THREE TIMES DAILY, Disp-300 each, R-3Normal      Alcohol Swabs (ALCOHOL PREP) 70 % PADS Disp-300 each, R-3, Normal      Blood Glucose Monitoring Suppl (ACCU-CHEK KENNETH SMARTVIEW) w/Device KIT Disp-1 kit, R-0, Normal      BiPAP Machine MISC NIGHTLY, Until Discontinued, Historical Med      Spacer/Aero-Holding Chambers VENANCIO DAILY PRN Starting 3/27/2016, Until Discontinued, Disp-1 Device, R-0, Print      OXYGEN Inhale 2 L into the lungs daily as needed At night prnHistorical Med       !! - Potential duplicate medications found. Please discuss with provider. ALLERGIES     Codeine; Fentanyl; Morphine; Tramadol hcl; Hydrocodone-acetaminophen; Percocet [oxycodone-acetaminophen];  Procardia [nifedipine]; and Vicodin [hydrocodone-acetaminophen]    FAMILYHISTORY       Family History   Problem Relation Age of Onset    Colon Cancer Father     Diabetes Father     High Blood Pressure Father     Colon Cancer Mother     Diabetes Mother     Colon Cancer Maternal Grandmother     Kidney Cancer Brother     Heart Disease Brother          age 54    High Blood Pressure Brother     High Blood Pressure Sister     High Blood Pressure Maternal Aunt     High Blood Pressure Sister     Heart Disease Sister           SOCIAL HISTORY       Social History     Socioeconomic History    Marital status:      Spouse name: Anabell Mejía Number of children: 1    Years of education: None    Highest education level: None   Occupational History    Occupation: NA   Social Needs    Financial resource strain: None    Food insecurity:     Worry: None     Inability: None    Transportation needs:     Medical: None     Non-medical: None   Tobacco Use    Smoking status: Never Smoker    Smokeless tobacco: Never Used   Substance and Sexual Activity    Alcohol use: No     Alcohol/week: 0.0 standard drinks    Drug use: No    Sexual activity: None   Lifestyle    Physical activity:     Days per week: None     Minutes per session: None    Stress: None   Relationships    Social connections:     Talks on phone: None     Gets together: None     Attends [employer] service: None     Active member of club or organization: None     Attends meetings of clubs or organizations: None     Relationship status: None    Intimate partner violence:     Fear of current or ex partner: None     Emotionally abused: andpreliminarily interpreted by the  ED Provider with the below findings:        Interpretation perthe Radiologist below, if available at the time of this note:    XR CHEST STANDARD (2 VW)   Final Result   1. No acute cardiopulmonary disease. Xr Chest Standard (2 Vw)    Result Date: 9/17/2019  EXAMINATION: TWO XRAY VIEWS OF THE CHEST 9/17/2019 6:08 pm COMPARISON: 08/06/2019 HISTORY: ORDERING SYSTEM PROVIDED HISTORY: Cough TECHNOLOGIST PROVIDED HISTORY: Reason for exam:->cough Reason for Exam: Patient c/o chest pain/SOB x 3 days. Acuity: Acute Type of Exam: Initial FINDINGS: Right-sided central venous catheter remains in place. The lungs are without acute focal process. There is no effusion or pneumothorax. The cardiomediastinal silhouette is stable. The osseous structures are stable. No acute process. PROCEDURES   Unless otherwise noted below, none     Procedures    CRITICAL CARE TIME   N/A    CONSULTS:  None      EMERGENCY DEPARTMENT COURSE and DIFFERENTIAL DIAGNOSIS/MDM:   Vitals:    Vitals:    09/20/19 1300 09/20/19 1315 09/20/19 1330 09/20/19 1523   BP: 137/68 127/74 122/61 120/69   Pulse: 116 115 114 92   Resp: 21 19 22 22   Temp:       TempSrc:       SpO2:    98%   Weight:       Height:           Patient was given thefollowing medications:  Medications   ipratropium-albuterol (DUONEB) nebulizer solution 1 ampule (1 ampule Inhalation Given 9/20/19 1050)   albuterol (PROVENTIL) nebulizer solution 5 mg (5 mg Nebulization Given 9/20/19 1053)   predniSONE (DELTASONE) tablet 60 mg (60 mg Oral Given 9/20/19 1108)   albuterol (PROVENTIL) nebulizer solution 5 mg (5 mg Nebulization Given 9/20/19 1214)     The patient presents to the emergency department today for evaluation for a cough and shortness of breath. The patient has a history of COPD, CHF, end-stage renal disease on dialysis.   The patient states that she started to experiencing some shortness of breath, and some wheezing last night, she states that she used her inhalers at home. Patient states that after dialysis today she continued to have shortness of breath, which prompted her visit to the ED today. She did receive a breathing treatment in route, and she states that she is overall feeling better. Patient states that she continues to have some wheezing. Patient states that she has had a cough which is productive of sputum over the past 2 to 3 days, she denies any hemoptysis. She denies any chest pain. She denies any abdominal pain. No nausea, vomiting or diarrhea. No fever or chills. She is no other complaints at this time  On physical exam, he she does have diminished aeration and wheezing throughout all lung fields. Chest x-ray shows no acute process. Patient initially declined blood work. She was given breathing treatments as well as prednisone in the ED. Upon reevaluation she states that she is feeling better. She continues to have some wheezing although she is improved. Shared medical decision making with the patient, she states that she would prefer to go home, she does not want to be admitted to the hospital.  She does understand that if she goes home she could get worse. Patient was able to ambulate in the emergency room she was not hypoxic, and I believe that she can be managed as an outpatient. She is given a prescription for prednisone as well as doxycycline as she does have a history of COPD and is at risk for infection. She is to obtain follow-up with her primary care physician within 2 to 3 days for reevaluation. She is to return to the ED for any new or worsening symptoms. Patient voiced understanding is agreeable plan. Stable for discharge. My suspicion is low at this time for pleural effusion, pneumothorax, acute respiratory distress, acute failure, acute respiratory failure with hypoxia or other emergent etiology    FINAL IMPRESSION      1. COPD exacerbation (HCC)    2.  Anticoagulated on warfarin

## 2019-09-20 NOTE — ED PROVIDER NOTES
in female, and Venous stasis of lower extremity. She has a past surgical history that includes jennifer and bso (cervix removed) (10/96); Knee arthroscopy (Right, 1999); doppler echocardiography (2/21/09); Colonoscopy (2010); Upper gastrointestinal endoscopy (6/25/13); Colonoscopy (6/25/13, 11/14); Cardiac catheterization (1/14); Hysterectomy; pr open skull supratent explore; ventral hernia repair (12/24/2016); other surgical history (02/22/2018); pr repair incisional hernia,reducible (N/A, 11/20/2018); Tunneled venous port placement (Right, 11/2019); Dialysis fistula creation (Right, 3/28/2019); Coronary angioplasty with stent; shunt revision (Right, 7/18/2019); and incision and drainage (Right, 8/22/2019). No current facility-administered medications on file prior to encounter. Current Outpatient Medications on File Prior to Encounter   Medication Sig Dispense Refill    warfarin (COUMADIN) 5 MG tablet TAKE 1 TABLET BY MOUTH ONCE DAILY 30 tablet 5    clindamycin (CLEOCIN) 300 MG capsule Take 1 capsule by mouth 3 times daily for 10 days 30 capsule 0    LORazepam (ATIVAN) 1 MG tablet Take 1 tablet by mouth 2 times daily as needed for Anxiety for up to 30 days. 45 tablet 0    escitalopram (LEXAPRO) 10 MG tablet Take 1 tablet by mouth daily 30 tablet 5    lidocaine-prilocaine (EMLA) 2.5-2.5 % cream Apply topically as needed.  3 Tube 5    warfarin (COUMADIN) 1 MG tablet Take 3 tablets by mouth daily 30 tablet 0    albuterol sulfate  (90 Base) MCG/ACT inhaler INHALE 2 PUFFS BY MOUTH EVERY 6 HOURS AS NEEDED FOR WHEEZING 1 Inhaler 3    omeprazole (PRILOSEC) 40 MG delayed release capsule TAKE 1 CAPSULE BY MOUTH  DAILY 90 capsule 3    insulin detemir (LEVEMIR FLEXTOUCH) 100 UNIT/ML injection pen Inject 30 Units into the skin 2 times daily      rosuvastatin (CRESTOR) 10 MG tablet Take 1 tablet by mouth daily Take 1 tablet by mouth  daily 90 tablet 3    traZODone (DESYREL) 100 MG tablet TAKE 1 TABLET BY MOUTH ONCE DAILY NIGHTLY  AS  NEEDED 90 tablet 3    fluticasone (FLONASE) 50 MCG/ACT nasal spray USE TWO SPRAY(S) IN EACH NOSTRIL ONCE DAILY 1 Bottle 5    pregabalin (LYRICA) 50 MG capsule Take 1 capsule by mouth 3 times daily for 180 days.  90 capsule 5    Diapers & Supplies MISC 1 each by Does not apply route 2 times daily 100 each 5    allopurinol (ZYLOPRIM) 100 MG tablet Take 1 tablet by mouth daily 90 tablet 3    Incontinence Supply Disposable (DEPEND ADJUSTABLE UNDERWEAR LG) MISC 1 each by Does not apply route as needed (for urine incompetence) 60 each 5    UNIFINE PENTIPS 31G X 5 MM MISC USE WITH INSULIN PENS FOUR TIMES DAILY 400 each 3    insulin lispro (HUMALOG KWIKPEN) 100 UNIT/ML pen Inject 0-12 Units into the skin 3 times daily (before meals) 5 pen 5    promethazine (PHENERGAN) 25 MG tablet Take 1 tablet by mouth every 8 hours as needed for Nausea 30 tablet 2    calcium acetate (PHOSLO) 667 MG capsule Take 2 capsules by mouth 3 times daily (with meals) 60 capsule 3    nebivolol (BYSTOLIC) 2.5 MG tablet Take 1 tablet by mouth 2 times daily 30 tablet 3    docusate sodium (COLACE) 100 MG capsule Take 1 capsule by mouth 2 times daily (Patient taking differently: Take 100 mg by mouth 2 times daily as needed ) 60 capsule 0    albuterol sulfate HFA (PROAIR HFA) 108 (90 Base) MCG/ACT inhaler Inhale 2 puffs into the lungs every 6 hours as needed for Wheezing 1 Inhaler 11    fluticasone-salmeterol (ADVAIR HFA) 230-21 MCG/ACT inhaler Inhale 1 puff into the lungs 2 times daily 1 Inhaler 11    ipratropium-albuterol (DUONEB) 0.5-2.5 (3) MG/3ML SOLN nebulizer solution Inhale 3 mLs into the lungs every 4 hours DX:COPD J44.9 117 mL 11    TRULICITY 8.24 VC/5.8NW SOPN INJECT ONE  SYRINGE SUBCUTANEOUSLY ONCE A WEEK 15 pen 3    RELION PEN NEEDLE 31G/8MM 31G X 8 MM MISC USE  THREE TIMES DAILY AS DIRECTED 100 each 5    nitroGLYCERIN (NITROSTAT) 0.4 MG SL tablet DISSOLVE 1 TABLET UNDER THE TONGUE EVERY 5 MINUTES

## 2019-09-21 LAB
EKG ATRIAL RATE: 98 BPM
EKG DIAGNOSIS: NORMAL
EKG P AXIS: 46 DEGREES
EKG P-R INTERVAL: 178 MS
EKG Q-T INTERVAL: 364 MS
EKG QRS DURATION: 78 MS
EKG QTC CALCULATION (BAZETT): 464 MS
EKG R AXIS: -27 DEGREES
EKG T AXIS: 100 DEGREES
EKG VENTRICULAR RATE: 98 BPM

## 2019-09-21 PROCEDURE — 93010 ELECTROCARDIOGRAM REPORT: CPT | Performed by: INTERNAL MEDICINE

## 2019-09-24 ENCOUNTER — ANESTHESIA EVENT (OUTPATIENT)
Dept: ENDOSCOPY | Age: 64
End: 2019-09-24
Payer: MEDICARE

## 2019-09-24 ENCOUNTER — TELEPHONE (OUTPATIENT)
Dept: FAMILY MEDICINE CLINIC | Age: 64
End: 2019-09-24

## 2019-09-24 ENCOUNTER — HOSPITAL ENCOUNTER (OUTPATIENT)
Dept: VASCULAR LAB | Age: 64
Discharge: HOME | DRG: 190 | End: 2019-09-24
Payer: MEDICARE

## 2019-09-24 DIAGNOSIS — L98.9 FOOT LESION: ICD-10-CM

## 2019-09-24 DIAGNOSIS — I77.6 VASCULITIS (HCC): Primary | ICD-10-CM

## 2019-09-24 DIAGNOSIS — I77.6 VASCULITIS (HCC): ICD-10-CM

## 2019-09-24 DIAGNOSIS — M79.671 FOOT PAIN, RIGHT: ICD-10-CM

## 2019-09-24 PROCEDURE — 93926 LOWER EXTREMITY STUDY: CPT

## 2019-09-24 NOTE — TELEPHONE ENCOUNTER
Shelly Perez called with questions concerning the Ultrasound doppler arterial leg right    Can blood pressure be done in both arms? If so, Shelly Perez states they would do both legs. Please call back and advise.   Pt's appt for ultrasound is today @ 1

## 2019-09-24 NOTE — TELEPHONE ENCOUNTER
Sherwood Closs reviewed encounter, noted changing order to arterial duplex minus EVANS. States she will work on the order. Justice Loving with Vascular lab was informed and expressed understanding.

## 2019-09-25 ENCOUNTER — HOSPITAL ENCOUNTER (INPATIENT)
Age: 64
LOS: 3 days | Discharge: HOME HEALTH | DRG: 190 | End: 2019-09-28
Attending: EMERGENCY MEDICINE | Admitting: INTERNAL MEDICINE
Payer: MEDICARE

## 2019-09-25 ENCOUNTER — APPOINTMENT (OUTPATIENT)
Dept: GENERAL RADIOLOGY | Age: 64
DRG: 190 | End: 2019-09-25
Payer: MEDICARE

## 2019-09-25 DIAGNOSIS — J44.1 COPD EXACERBATION (HCC): Primary | ICD-10-CM

## 2019-09-25 DIAGNOSIS — Z99.2 HEMODIALYSIS PATIENT (HCC): ICD-10-CM

## 2019-09-25 DIAGNOSIS — R06.02 SHORTNESS OF BREATH: ICD-10-CM

## 2019-09-25 LAB
ANION GAP SERPL CALCULATED.3IONS-SCNC: 20 MMOL/L (ref 3–16)
BASOPHILS ABSOLUTE: 0 K/UL (ref 0–0.2)
BASOPHILS RELATIVE PERCENT: 0.5 %
BUN BLDV-MCNC: 67 MG/DL (ref 7–20)
CALCIUM SERPL-MCNC: 9.2 MG/DL (ref 8.3–10.6)
CHLORIDE BLD-SCNC: 97 MMOL/L (ref 99–110)
CO2: 21 MMOL/L (ref 21–32)
CREAT SERPL-MCNC: 10.2 MG/DL (ref 0.6–1.2)
EKG ATRIAL RATE: 90 BPM
EKG DIAGNOSIS: NORMAL
EKG P AXIS: 44 DEGREES
EKG P-R INTERVAL: 162 MS
EKG Q-T INTERVAL: 364 MS
EKG QRS DURATION: 90 MS
EKG QTC CALCULATION (BAZETT): 445 MS
EKG R AXIS: -32 DEGREES
EKG T AXIS: 81 DEGREES
EKG VENTRICULAR RATE: 90 BPM
EOSINOPHILS ABSOLUTE: 0 K/UL (ref 0–0.6)
EOSINOPHILS RELATIVE PERCENT: 0.4 %
GFR AFRICAN AMERICAN: 5
GFR NON-AFRICAN AMERICAN: 4
GLUCOSE BLD-MCNC: 188 MG/DL (ref 70–99)
GLUCOSE BLD-MCNC: 231 MG/DL (ref 70–99)
GLUCOSE BLD-MCNC: 370 MG/DL (ref 70–99)
GLUCOSE BLD-MCNC: 431 MG/DL (ref 70–99)
HCT VFR BLD CALC: 36.3 % (ref 36–48)
HEMOGLOBIN: 11.5 G/DL (ref 12–16)
INR BLD: 2.76 (ref 0.86–1.14)
LACTIC ACID, SEPSIS: 1.2 MMOL/L (ref 0.4–1.9)
LACTIC ACID, SEPSIS: 2.5 MMOL/L (ref 0.4–1.9)
LYMPHOCYTES ABSOLUTE: 1.6 K/UL (ref 1–5.1)
LYMPHOCYTES RELATIVE PERCENT: 18.2 %
MCH RBC QN AUTO: 33.8 PG (ref 26–34)
MCHC RBC AUTO-ENTMCNC: 31.5 G/DL (ref 31–36)
MCV RBC AUTO: 107.1 FL (ref 80–100)
MONOCYTES ABSOLUTE: 0.7 K/UL (ref 0–1.3)
MONOCYTES RELATIVE PERCENT: 7.4 %
NEUTROPHILS ABSOLUTE: 6.6 K/UL (ref 1.7–7.7)
NEUTROPHILS RELATIVE PERCENT: 73.5 %
PDW BLD-RTO: 18.3 % (ref 12.4–15.4)
PERFORMED ON: ABNORMAL
PLATELET # BLD: 195 K/UL (ref 135–450)
PMV BLD AUTO: 9 FL (ref 5–10.5)
POTASSIUM REFLEX MAGNESIUM: 4.5 MMOL/L (ref 3.5–5.1)
PRO-BNP: 3252 PG/ML (ref 0–124)
PROCALCITONIN: 0.45 NG/ML (ref 0–0.15)
PROTHROMBIN TIME: 31.5 SEC (ref 9.8–13)
RBC # BLD: 3.39 M/UL (ref 4–5.2)
SODIUM BLD-SCNC: 138 MMOL/L (ref 136–145)
TROPONIN: 0.05 NG/ML
WBC # BLD: 8.9 K/UL (ref 4–11)

## 2019-09-25 PROCEDURE — 6360000002 HC RX W HCPCS: Performed by: NURSE PRACTITIONER

## 2019-09-25 PROCEDURE — 84145 PROCALCITONIN (PCT): CPT

## 2019-09-25 PROCEDURE — 71046 X-RAY EXAM CHEST 2 VIEWS: CPT

## 2019-09-25 PROCEDURE — 96374 THER/PROPH/DIAG INJ IV PUSH: CPT

## 2019-09-25 PROCEDURE — 96375 TX/PRO/DX INJ NEW DRUG ADDON: CPT

## 2019-09-25 PROCEDURE — 87581 M.PNEUMON DNA AMP PROBE: CPT

## 2019-09-25 PROCEDURE — 85610 PROTHROMBIN TIME: CPT

## 2019-09-25 PROCEDURE — 6370000000 HC RX 637 (ALT 250 FOR IP): Performed by: NURSE PRACTITIONER

## 2019-09-25 PROCEDURE — 94640 AIRWAY INHALATION TREATMENT: CPT

## 2019-09-25 PROCEDURE — 1200000000 HC SEMI PRIVATE

## 2019-09-25 PROCEDURE — 93010 ELECTROCARDIOGRAM REPORT: CPT | Performed by: INTERNAL MEDICINE

## 2019-09-25 PROCEDURE — 87633 RESP VIRUS 12-25 TARGETS: CPT

## 2019-09-25 PROCEDURE — 6360000002 HC RX W HCPCS: Performed by: EMERGENCY MEDICINE

## 2019-09-25 PROCEDURE — 85025 COMPLETE CBC W/AUTO DIFF WBC: CPT

## 2019-09-25 PROCEDURE — 84484 ASSAY OF TROPONIN QUANT: CPT

## 2019-09-25 PROCEDURE — 90935 HEMODIALYSIS ONE EVALUATION: CPT

## 2019-09-25 PROCEDURE — 87040 BLOOD CULTURE FOR BACTERIA: CPT

## 2019-09-25 PROCEDURE — 6370000000 HC RX 637 (ALT 250 FOR IP): Performed by: INTERNAL MEDICINE

## 2019-09-25 PROCEDURE — 93005 ELECTROCARDIOGRAM TRACING: CPT | Performed by: NURSE PRACTITIONER

## 2019-09-25 PROCEDURE — 87486 CHLMYD PNEUM DNA AMP PROBE: CPT

## 2019-09-25 PROCEDURE — 80048 BASIC METABOLIC PNL TOTAL CA: CPT

## 2019-09-25 PROCEDURE — 94660 CPAP INITIATION&MGMT: CPT

## 2019-09-25 PROCEDURE — 5A1D70Z PERFORMANCE OF URINARY FILTRATION, INTERMITTENT, LESS THAN 6 HOURS PER DAY: ICD-10-PCS | Performed by: INTERNAL MEDICINE

## 2019-09-25 PROCEDURE — 83605 ASSAY OF LACTIC ACID: CPT

## 2019-09-25 PROCEDURE — 2580000003 HC RX 258: Performed by: INTERNAL MEDICINE

## 2019-09-25 PROCEDURE — 87798 DETECT AGENT NOS DNA AMP: CPT

## 2019-09-25 PROCEDURE — 6370000000 HC RX 637 (ALT 250 FOR IP): Performed by: EMERGENCY MEDICINE

## 2019-09-25 PROCEDURE — 99285 EMERGENCY DEPT VISIT HI MDM: CPT

## 2019-09-25 PROCEDURE — 2700000000 HC OXYGEN THERAPY PER DAY

## 2019-09-25 PROCEDURE — 83880 ASSAY OF NATRIURETIC PEPTIDE: CPT

## 2019-09-25 PROCEDURE — 99223 1ST HOSP IP/OBS HIGH 75: CPT | Performed by: INTERNAL MEDICINE

## 2019-09-25 PROCEDURE — 6360000002 HC RX W HCPCS: Performed by: INTERNAL MEDICINE

## 2019-09-25 PROCEDURE — 2500000003 HC RX 250 WO HCPCS: Performed by: EMERGENCY MEDICINE

## 2019-09-25 RX ORDER — KETOROLAC TROMETHAMINE 30 MG/ML
30 INJECTION, SOLUTION INTRAMUSCULAR; INTRAVENOUS EVERY 6 HOURS PRN
Status: DISCONTINUED | OUTPATIENT
Start: 2019-09-25 | End: 2019-09-28 | Stop reason: HOSPADM

## 2019-09-25 RX ORDER — ESCITALOPRAM OXALATE 10 MG/1
10 TABLET ORAL DAILY
Status: DISCONTINUED | OUTPATIENT
Start: 2019-09-25 | End: 2019-09-28 | Stop reason: HOSPADM

## 2019-09-25 RX ORDER — ALBUTEROL SULFATE 2.5 MG/3ML
5 SOLUTION RESPIRATORY (INHALATION) ONCE
Status: COMPLETED | OUTPATIENT
Start: 2019-09-25 | End: 2019-09-25

## 2019-09-25 RX ORDER — DEXTROSE MONOHYDRATE 25 G/50ML
12.5 INJECTION, SOLUTION INTRAVENOUS PRN
Status: DISCONTINUED | OUTPATIENT
Start: 2019-09-25 | End: 2019-09-28 | Stop reason: HOSPADM

## 2019-09-25 RX ORDER — FLUTICASONE PROPIONATE 50 MCG
2 SPRAY, SUSPENSION (ML) NASAL DAILY PRN
Status: DISCONTINUED | OUTPATIENT
Start: 2019-09-25 | End: 2019-09-28 | Stop reason: HOSPADM

## 2019-09-25 RX ORDER — PANTOPRAZOLE SODIUM 40 MG/1
40 TABLET, DELAYED RELEASE ORAL EVERY MORNING
Status: DISCONTINUED | OUTPATIENT
Start: 2019-09-26 | End: 2019-09-28 | Stop reason: HOSPADM

## 2019-09-25 RX ORDER — TRAZODONE HYDROCHLORIDE 100 MG/1
100 TABLET ORAL NIGHTLY PRN
COMMUNITY

## 2019-09-25 RX ORDER — IPRATROPIUM BROMIDE AND ALBUTEROL SULFATE 2.5; .5 MG/3ML; MG/3ML
1 SOLUTION RESPIRATORY (INHALATION) ONCE
Status: COMPLETED | OUTPATIENT
Start: 2019-09-25 | End: 2019-09-25

## 2019-09-25 RX ORDER — PROMETHAZINE HYDROCHLORIDE 25 MG/1
25 TABLET ORAL EVERY 8 HOURS PRN
Status: DISCONTINUED | OUTPATIENT
Start: 2019-09-25 | End: 2019-09-28 | Stop reason: HOSPADM

## 2019-09-25 RX ORDER — LIDOCAINE AND PRILOCAINE 25; 25 MG/G; MG/G
CREAM TOPICAL DAILY PRN
Status: DISCONTINUED | OUTPATIENT
Start: 2019-09-25 | End: 2019-09-28 | Stop reason: HOSPADM

## 2019-09-25 RX ORDER — SODIUM CHLORIDE 0.9 % (FLUSH) 0.9 %
10 SYRINGE (ML) INJECTION PRN
Status: DISCONTINUED | OUTPATIENT
Start: 2019-09-25 | End: 2019-09-28 | Stop reason: HOSPADM

## 2019-09-25 RX ORDER — TRAZODONE HYDROCHLORIDE 50 MG/1
100 TABLET ORAL NIGHTLY PRN
Status: DISCONTINUED | OUTPATIENT
Start: 2019-09-25 | End: 2019-09-28 | Stop reason: HOSPADM

## 2019-09-25 RX ORDER — NICOTINE POLACRILEX 4 MG
15 LOZENGE BUCCAL PRN
Status: DISCONTINUED | OUTPATIENT
Start: 2019-09-25 | End: 2019-09-28 | Stop reason: HOSPADM

## 2019-09-25 RX ORDER — IPRATROPIUM BROMIDE AND ALBUTEROL SULFATE 2.5; .5 MG/3ML; MG/3ML
1 SOLUTION RESPIRATORY (INHALATION)
Status: DISCONTINUED | OUTPATIENT
Start: 2019-09-25 | End: 2019-09-28 | Stop reason: HOSPADM

## 2019-09-25 RX ORDER — WARFARIN SODIUM 5 MG/1
5 TABLET ORAL DAILY
Status: DISCONTINUED | OUTPATIENT
Start: 2019-09-25 | End: 2019-09-26

## 2019-09-25 RX ORDER — DEXTROSE MONOHYDRATE 50 MG/ML
100 INJECTION, SOLUTION INTRAVENOUS PRN
Status: DISCONTINUED | OUTPATIENT
Start: 2019-09-25 | End: 2019-09-28 | Stop reason: HOSPADM

## 2019-09-25 RX ORDER — ALBUTEROL SULFATE 2.5 MG/3ML
2.5 SOLUTION RESPIRATORY (INHALATION) ONCE
Status: COMPLETED | OUTPATIENT
Start: 2019-09-25 | End: 2019-09-25

## 2019-09-25 RX ORDER — LORAZEPAM 1 MG/1
1 TABLET ORAL 2 TIMES DAILY PRN
Status: DISCONTINUED | OUTPATIENT
Start: 2019-09-25 | End: 2019-09-28 | Stop reason: HOSPADM

## 2019-09-25 RX ORDER — SODIUM CHLORIDE 0.9 % (FLUSH) 0.9 %
10 SYRINGE (ML) INJECTION EVERY 12 HOURS SCHEDULED
Status: DISCONTINUED | OUTPATIENT
Start: 2019-09-25 | End: 2019-09-28 | Stop reason: HOSPADM

## 2019-09-25 RX ORDER — HYDROMORPHONE HYDROCHLORIDE 1 MG/ML
0.5 INJECTION, SOLUTION INTRAMUSCULAR; INTRAVENOUS; SUBCUTANEOUS ONCE
Status: COMPLETED | OUTPATIENT
Start: 2019-09-25 | End: 2019-09-25

## 2019-09-25 RX ORDER — ONDANSETRON 2 MG/ML
4 INJECTION INTRAMUSCULAR; INTRAVENOUS EVERY 6 HOURS PRN
Status: DISCONTINUED | OUTPATIENT
Start: 2019-09-25 | End: 2019-09-28 | Stop reason: HOSPADM

## 2019-09-25 RX ORDER — ALBUTEROL SULFATE 90 UG/1
2 AEROSOL, METERED RESPIRATORY (INHALATION) EVERY 6 HOURS PRN
Status: DISCONTINUED | OUTPATIENT
Start: 2019-09-25 | End: 2019-09-28 | Stop reason: HOSPADM

## 2019-09-25 RX ORDER — METHYLPREDNISOLONE SODIUM SUCCINATE 40 MG/ML
40 INJECTION, POWDER, LYOPHILIZED, FOR SOLUTION INTRAMUSCULAR; INTRAVENOUS EVERY 8 HOURS
Status: DISCONTINUED | OUTPATIENT
Start: 2019-09-25 | End: 2019-09-28

## 2019-09-25 RX ORDER — METHYLPREDNISOLONE SODIUM SUCCINATE 125 MG/2ML
125 INJECTION, POWDER, LYOPHILIZED, FOR SOLUTION INTRAMUSCULAR; INTRAVENOUS ONCE
Status: COMPLETED | OUTPATIENT
Start: 2019-09-25 | End: 2019-09-25

## 2019-09-25 RX ORDER — ROSUVASTATIN CALCIUM 10 MG/1
10 TABLET, COATED ORAL DAILY
Status: DISCONTINUED | OUTPATIENT
Start: 2019-09-25 | End: 2019-09-28 | Stop reason: HOSPADM

## 2019-09-25 RX ORDER — ALLOPURINOL 100 MG/1
100 TABLET ORAL DAILY
Status: DISCONTINUED | OUTPATIENT
Start: 2019-09-25 | End: 2019-09-28 | Stop reason: HOSPADM

## 2019-09-25 RX ADMIN — TRAZODONE HYDROCHLORIDE 100 MG: 50 TABLET ORAL at 23:45

## 2019-09-25 RX ADMIN — IPRATROPIUM BROMIDE AND ALBUTEROL SULFATE 1 AMPULE: .5; 3 SOLUTION RESPIRATORY (INHALATION) at 20:34

## 2019-09-25 RX ADMIN — KETOROLAC TROMETHAMINE 30 MG: 30 INJECTION, SOLUTION INTRAMUSCULAR at 12:03

## 2019-09-25 RX ADMIN — IPRATROPIUM BROMIDE AND ALBUTEROL SULFATE 1 AMPULE: .5; 3 SOLUTION RESPIRATORY (INHALATION) at 12:41

## 2019-09-25 RX ADMIN — KETOROLAC TROMETHAMINE 30 MG: 30 INJECTION, SOLUTION INTRAMUSCULAR at 19:19

## 2019-09-25 RX ADMIN — ALBUTEROL SULFATE 5 MG: 2.5 SOLUTION RESPIRATORY (INHALATION) at 06:53

## 2019-09-25 RX ADMIN — IPRATROPIUM BROMIDE AND ALBUTEROL SULFATE 1 AMPULE: .5; 3 SOLUTION RESPIRATORY (INHALATION) at 08:15

## 2019-09-25 RX ADMIN — METHYLPREDNISOLONE SODIUM SUCCINATE 40 MG: 40 INJECTION, POWDER, FOR SOLUTION INTRAMUSCULAR; INTRAVENOUS at 23:55

## 2019-09-25 RX ADMIN — ALBUTEROL SULFATE 2.5 MG: 2.5 SOLUTION RESPIRATORY (INHALATION) at 08:15

## 2019-09-25 RX ADMIN — Medication 2 PUFF: at 20:34

## 2019-09-25 RX ADMIN — IPRATROPIUM BROMIDE AND ALBUTEROL SULFATE 1 AMPULE: .5; 3 SOLUTION RESPIRATORY (INHALATION) at 06:53

## 2019-09-25 RX ADMIN — HYDROMORPHONE HYDROCHLORIDE 0.5 MG: 1 INJECTION, SOLUTION INTRAMUSCULAR; INTRAVENOUS; SUBCUTANEOUS at 08:13

## 2019-09-25 RX ADMIN — LORAZEPAM 1 MG: 1 TABLET ORAL at 11:54

## 2019-09-25 RX ADMIN — METHYLPREDNISOLONE SODIUM SUCCINATE 125 MG: 125 INJECTION, POWDER, FOR SOLUTION INTRAMUSCULAR; INTRAVENOUS at 07:23

## 2019-09-25 RX ADMIN — CALCIUM ACETATE 1334 MG: 667 CAPSULE ORAL at 19:19

## 2019-09-25 RX ADMIN — METHYLPREDNISOLONE SODIUM SUCCINATE 40 MG: 40 INJECTION, POWDER, FOR SOLUTION INTRAMUSCULAR; INTRAVENOUS at 13:33

## 2019-09-25 RX ADMIN — INSULIN GLARGINE 30 UNITS: 100 INJECTION, SOLUTION SUBCUTANEOUS at 23:47

## 2019-09-25 RX ADMIN — Medication 1 G: at 11:54

## 2019-09-25 RX ADMIN — Medication 10 ML: at 21:25

## 2019-09-25 RX ADMIN — INSULIN LISPRO 6 UNITS: 100 INJECTION, SOLUTION INTRAVENOUS; SUBCUTANEOUS at 23:46

## 2019-09-25 RX ADMIN — ALLOPURINOL 100 MG: 100 TABLET ORAL at 11:54

## 2019-09-25 RX ADMIN — Medication 10 ML: at 11:59

## 2019-09-25 RX ADMIN — ONDANSETRON 4 MG: 2 INJECTION INTRAMUSCULAR; INTRAVENOUS at 11:54

## 2019-09-25 RX ADMIN — INSULIN LISPRO 10 UNITS: 100 INJECTION, SOLUTION INTRAVENOUS; SUBCUTANEOUS at 19:20

## 2019-09-25 RX ADMIN — ROSUVASTATIN CALCIUM 10 MG: 10 TABLET, FILM COATED ORAL at 11:54

## 2019-09-25 RX ADMIN — ESCITALOPRAM OXALATE 10 MG: 10 TABLET ORAL at 11:54

## 2019-09-25 RX ADMIN — LORAZEPAM 1 MG: 1 TABLET ORAL at 23:50

## 2019-09-25 RX ADMIN — WARFARIN SODIUM 5 MG: 5 TABLET ORAL at 19:19

## 2019-09-25 ASSESSMENT — PAIN SCALES - GENERAL
PAINLEVEL_OUTOF10: 8
PAINLEVEL_OUTOF10: 8
PAINLEVEL_OUTOF10: 4
PAINLEVEL_OUTOF10: 8
PAINLEVEL_OUTOF10: 8
PAINLEVEL_OUTOF10: 7
PAINLEVEL_OUTOF10: 8

## 2019-09-25 ASSESSMENT — ENCOUNTER SYMPTOMS
WHEEZING: 1
DIARRHEA: 0
SORE THROAT: 0
NAUSEA: 0
SHORTNESS OF BREATH: 1
RHINORRHEA: 0
BLOOD IN STOOL: 0
ABDOMINAL PAIN: 0
CONSTIPATION: 0
VOMITING: 0

## 2019-09-25 ASSESSMENT — PAIN DESCRIPTION - PAIN TYPE
TYPE: ACUTE PAIN

## 2019-09-25 ASSESSMENT — PAIN DESCRIPTION - LOCATION
LOCATION: CHEST

## 2019-09-25 ASSESSMENT — PAIN DESCRIPTION - PROGRESSION: CLINICAL_PROGRESSION: GRADUALLY IMPROVING

## 2019-09-25 NOTE — CONSULTS
violence:     Fear of current or ex partner: Not on file     Emotionally abused: Not on file     Physically abused: Not on file     Forced sexual activity: Not on file   Other Topics Concern    Not on file   Social History Narrative    Not on file       Family History:       Problem Relation Age of Onset    Colon Cancer Father     Diabetes Father     High Blood Pressure Father     Colon Cancer Mother     Diabetes Mother     Colon Cancer Maternal Grandmother     Kidney Cancer Brother     Heart Disease Brother          age 54    High Blood Pressure Brother     High Blood Pressure Sister     High Blood Pressure Maternal Aunt     High Blood Pressure Sister     Heart Disease Sister          REVIEW OF SYSTEMS:    A 12 point ROS is reviewed and is negative except as stated in HPI    PHYSICAL EXAM:    Vitals:    BP (!) 155/87   Pulse 76   Temp 98 °F (36.7 °C) (Oral)   Resp 9   Ht 5' 3\" (1.6 m)   Wt 226 lb (102.5 kg)   LMP 1996 (Exact Date)   SpO2 95%   BMI 40.03 kg/m²   No intake/output data recorded. No intake/output data recorded. Physical Exam:  General appearance - alert, well appearing, and in no distress  Mental status - alert, oriented to person, place, and time  HEENT: sclera anicteric, NC/AT  Neck - supple, trachea midline  Chest - + wheezes and rhonchi, symmetric air entry, no tachypnea   Heart - normal rate, regular rhythm, normal S1, S2 s  Abdomen - soft, nontender, nondistended, no masses or organomegaly  Neurological - alert, oriented, normal speech, no focal findings   Extremities - no pedal edema, no clubbing or cyanosis  Access: left upper arm AVG with increased pulse pressure.  Right upper arm AVG with good thrill      DATA:    CBC:   Lab Results   Component Value Date    WBC 8.9 2019    RBC 3.39 2019    HGB 11.5 2019    HCT 36.3 2019    .1 2019    MCH 33.8 2019    MCHC 31.5 2019    RDW 18.3 2019     09/25/2019    MPV 9.0 09/25/2019     BMP:    Lab Results   Component Value Date     09/25/2019    K 4.5 09/25/2019    CL 97 09/25/2019    CO2 21 09/25/2019    BUN 67 09/25/2019    LABALBU 3.6 09/09/2019    CREATININE 10.2 09/25/2019    CALCIUM 9.2 09/25/2019    GFRAA 5 09/25/2019    GFRAA 50 05/12/2013    LABGLOM 4 09/25/2019    GLUCOSE 188 09/25/2019       IMPRESSION/RECOMMENDATIONS:      ESRD: HD MWF at [address] for HD today  -challenge DW     FEN: K is 4.5  MA: modulate with HD    SOB: due to COPD/ashtam exacerbation  -on steroids and INH/nebulizer  -consult pulmonary     HTN: stable, challenge weight    Anemia: No GUANACO indicated    CHF: compensated    CKD-MBD: check phos    Thank you for allowing me to participate in the care of this patient. I will continue to follow along. Please call with questions.     Stacey Saravia MD

## 2019-09-25 NOTE — CONSULTS
Inhalation, Q6H PRN  allopurinol (ZYLOPRIM) tablet 100 mg, 100 mg, Oral, Daily  escitalopram (LEXAPRO) tablet 10 mg, 10 mg, Oral, Daily  fluticasone (FLONASE) 50 MCG/ACT nasal spray 2 spray, 2 spray, Each Nostril, Daily PRN  fluticasone-salmeterol (ADVAIR HFA) 230-21 MCG/ACT inhaler 1 puff, 1 puff, Inhalation, BID  lidocaine-prilocaine (EMLA) cream, , Topical, Daily PRN  LORazepam (ATIVAN) tablet 1 mg, 1 mg, Oral, BID PRN  [START ON 9/26/2019] pantoprazole (PROTONIX) tablet 40 mg, 40 mg, Oral, QAM  promethazine (PHENERGAN) tablet 25 mg, 25 mg, Oral, Q8H PRN  warfarin (COUMADIN) tablet 5 mg, 5 mg, Oral, Daily  sodium chloride flush 0.9 % injection 10 mL, 10 mL, Intravenous, 2 times per day  sodium chloride flush 0.9 % injection 10 mL, 10 mL, Intravenous, PRN  magnesium hydroxide (MILK OF MAGNESIA) 400 MG/5ML suspension 30 mL, 30 mL, Oral, Daily PRN  ondansetron (ZOFRAN) injection 4 mg, 4 mg, Intravenous, Q6H PRN  ipratropium-albuterol (DUONEB) nebulizer solution 1 ampule, 1 ampule, Inhalation, Q4H WA  cefTRIAXone (ROCEPHIN) 1 g in sterile water 10 mL IV syringe, 1 g, Intravenous, Q24H  methylPREDNISolone sodium (SOLU-MEDROL) injection 40 mg, 40 mg, Intravenous, Q8H  [START ON 9/26/2019] influenza quadrivalent split vaccine (FLUZONE;FLUARIX;FLULAVAL;AFLURIA) injection 0.5 mL, 0.5 mL, Intramuscular, Once  insulin glargine (LANTUS) injection pen 30 Units, 30 Units, Subcutaneous, Nightly  glucose (GLUTOSE) 40 % oral gel 15 g, 15 g, Oral, PRN  dextrose 50 % IV solution, 12.5 g, Intravenous, PRN  glucagon (rDNA) injection 1 mg, 1 mg, Intramuscular, PRN  dextrose 5 % solution, 100 mL/hr, Intravenous, PRN  ketorolac (TORADOL) injection 30 mg, 30 mg, Intravenous, Q6H PRN  insulin lispro (HUMALOG) injection pen 0-12 Units, 0-12 Units, Subcutaneous, TID WC  insulin lispro (HUMALOG) injection pen 0-6 Units, 0-6 Units, Subcutaneous, Nightly    Allergies   Allergen Reactions    Codeine Nausea Only    Fentanyl Itching    Morphine      CHEST PAIN    Tramadol Hcl Other (See Comments)     Causes pt to have involuntary movements    Hydrocodone-Acetaminophen Itching and Rash    Percocet [Oxycodone-Acetaminophen] Itching    Procardia [Nifedipine] Nausea And Vomiting     Headache  Takes norvasc at home 12/22/15    Vicodin [Hydrocodone-Acetaminophen] Rash       Social History:    TOBACCO:   reports that she has never smoked. She has never used smokeless tobacco.  ETOH:   reports that she does not drink alcohol. Patient currently lives independently  Environmental/chemical exposure: None known    Family History:       Problem Relation Age of Onset    Colon Cancer Father     Diabetes Father     High Blood Pressure Father     Colon Cancer Mother     Diabetes Mother     Colon Cancer Maternal Grandmother     Kidney Cancer Brother     Heart Disease Brother          age 54    High Blood Pressure Brother     High Blood Pressure Sister     High Blood Pressure Maternal Aunt     High Blood Pressure Sister     Heart Disease Sister      REVIEW OF SYSTEMS:    CONSTITUTIONAL:  negative for fevers, chills, diaphoresis, activity change, appetite change, fatigue, night sweats and unexpected weight change.    EYES:  negative for blurred vision, eye discharge, visual disturbance and icterus  HEENT:  negative for hearing loss, tinnitus, ear drainage, sinus pressure, nasal congestion, epistaxis and snoring  RESPIRATORY:  See HPI  CARDIOVASCULAR:  negative for chest pain, palpitations, exertional chest pressure/discomfort, edema, syncope  GASTROINTESTINAL:  negative for nausea, vomiting, diarrhea, constipation, blood in stool and abdominal pain  GENITOURINARY:  negative for frequency, dysuria, urinary incontinence, decreased urine volume, and hematuria  HEMATOLOGIC/LYMPHATIC:  negative for easy bruising, bleeding and lymphadenopathy  ALLERGIC/IMMUNOLOGIC:  negative for recurrent infections, angioedema, anaphylaxis and drug

## 2019-09-25 NOTE — H&P
lower extremity. has a past surgical history that includes jennifer and bso (cervix removed) (10/96); Knee arthroscopy (Right, 1999); doppler echocardiography (2/21/09); Colonoscopy (2010); Upper gastrointestinal endoscopy (6/25/13); Colonoscopy (6/25/13, 11/14); Cardiac catheterization (1/14); Hysterectomy; pr open skull supratent explore; ventral hernia repair (12/24/2016); other surgical history (02/22/2018); pr repair incisional hernia,reducible (N/A, 11/20/2018); Tunneled venous port placement (Right, 11/2019); Dialysis fistula creation (Right, 3/28/2019); Coronary angioplasty with stent; shunt revision (Right, 7/18/2019); and incision and drainage (Right, 8/22/2019). reports that she has never smoked. She has never used smokeless tobacco. She reports that she does not drink alcohol or use drugs. family history includes Colon Cancer in her father, maternal grandmother, and mother; Diabetes in her father and mother; Heart Disease in her brother and sister; High Blood Pressure in her brother, father, maternal aunt, sister, and sister; Kidney Cancer in her brother.     Review of Systems:   · Constitutional: No Fever or Weight Loss  · Eyes: No Decreased Vision  · ENT: No Headaches, Hearing Loss or Vertigo  · Cardiovascular: No chest pain, dyspnea on exertion, palpitations or loss of consciousness  · Respiratory: No cough or wheezing    · Gastrointestinal: No abdominal pain, appetite loss, blood in stools, constipation, diarrhea or heartburn  · Genitourinary: No dysuria, trouble voiding, or hematuria  · Musculoskeletal:  No gait disturbance, weakness or joint complaints  · Integumentary: No rash or pruritis  · Neurological: No TIA or stroke symptoms  · Psychiatric: No anxiety or depression  · Endocrine: No malaise, fatigue or temperature intolerance  · Hematologic/Lymphatic: No bleeding problems, blood clots or swollen lymph nodes  · Allergic/Immunologic: No nasal congestion or hives    Physical Examination:    BP (!) 132/57   Pulse 71   Temp 98 °F (36.7 °C) (Oral)   Resp 12   Ht 5' 3\" (1.6 m)   Wt 226 lb (102.5 kg)   LMP 11/01/1996 (Exact Date)   SpO2 97%   BMI 40.03 kg/m²    General Appearance:  Non-obese/Well Nourished  Respiratory:  · Resp Assessment: Bilateral expiratory wheezing no use of accessory muscle  · Resp Auscultation: Normal breath sounds without dullness  Cardiovascular:  · Auscultation: Normal  · Palpation: Normal    · Extremities: No Edema  · Carotid Arteries: Normal  · Abdominal Aorta: Normal   · Femoral Arteries: 2+ and equal  · Pedal Pulses: 2+ and equal   · Extremities: No Edema   Abdomen:  · No masses or tenderness  · Liver/Spleen: No Abnormalities Noted  Extremities:  ·  No Cyanosis or Clubbing  Neurological/Psychiatric:  · Oriented to time, place, and person  · Non-anxious    Assessment/Plan:    1 acute COPD exacerbation  We will start on Solu-Medrol DuoNeb and consult neurology  2 chronic pain syndrome resume home medication  3 morbid obesity chronic due to excess caloric intake  4 hypertension  Well controlled  5 diabetes resume home medication and placed on sliding scale  6 end-stage renal disease on dialysis  Plan  Solumedrol.     Pulmonary consult [facility]  Nephrology consult to continue dialysis       Osbaldo Carlos MD, 9/25/2019 10:14 AM

## 2019-09-25 NOTE — PROGRESS NOTES
Patient arrived to unit from ED in stable condition. VSS. Patient c/o pain in chest when breathing and coughing. Admission completed. Oriented to room and use of call light. Denies additional needs.

## 2019-09-25 NOTE — ED NOTES
No pain medication orders noted in admission orders.  Spoke with Cecelia Alvarez NP, ER provider who deffered pt's request for pain medication to [facility] [id number] Neri Gentile RN  09/25/19 8880

## 2019-09-25 NOTE — ED PROVIDER NOTES
Negative for visual disturbance. Respiratory: Positive for shortness of breath and wheezing. Cardiovascular: Negative for chest pain. Gastrointestinal: Negative for abdominal pain, blood in stool, constipation, diarrhea, nausea and vomiting. Genitourinary: Negative for dysuria, flank pain and hematuria. Skin: Negative for rash. Neurological: Negative for weakness and headaches. All other systems reviewed and are negative. Positives and Pertinent negatives as per HPI. Except as noted above in the ROS, all other systems were reviewed and negative.        PAST MEDICAL HISTORY     Past Medical History:   Diagnosis Date    Abdominal pain 8/20/2018    Acute on chronic congestive heart failure ([address] 6/26/2014    Asthma     Cervical cancer ([address]     Chest pain 5/16/2018    CHF (congestive heart failure) [facility])     Chronic kidney disease, stage IV (severe) ([facility])     Dr Jose Patel Chronic pain syndrome 3/27/2018    Chronic respiratory failure with hypoxia ([facility])     CKD (chronic kidney disease) stage 4, GFR 15-29 ml/min ([facility]) 6/30/2017    Colon polyps     COPD (chronic obstructive pulmonary disease) ([address]     Degenerative disc disease at L5-S1 level 6/18/2013     CT    Diabetes mellitus, insulin dependent (IDDM), uncontrolled ([address]     Diabetic polyneuropathy associated with type 2 diabetes mellitus ([address] 3/26/2019    Elevated troponin 9/9/2017    ESRD (end stage renal disease) on dialysis ([address] 02/14/2018    M-W-F JOSE Del Toro    GERD (gastroesophageal reflux disease)     Gout     History of blood transfusion     History of cervical cancer     Hyperlipidemia     Hypertension     Incarcerated ventral hernia     LVH (left ventricular hypertrophy)     Morbid obesity ([facility])     Mucus plugging of bronchi     Obstructive sleep apnea on CPAP     wears 2L O2 and BIPAP at night    Pneumonia     Psychiatric problem     breakdown long time ago but not current    Pulmonary embolism ([facility] 02/06/2013    Type 2 diabetes mellitus with diabetic neuropathy, with long-term current use of insulin ([facility] 9/12/2017    Urinary incontinence in female     Venous stasis of lower extremity          SURGICAL HISTORY       Past Surgical History:   Procedure Laterality Date    CARDIAC CATHETERIZATION  1/14    Grace; clean cors    COLONOSCOPY  2010    polyp removed; [facility]; repeat 5 years    COLONOSCOPY  6/25/13, 11/14    [facility]    CORONARY ANGIOPLASTY WITH STENT PLACEMENT      DIALYSIS FISTULA CREATION Right 3/28/2019    RIGHT BRACHIO CEPHALIC FISTULA CREATION performed by Delmer Opitz, MD at [address]  2/21/09    LVH with global hypokinesis; EF 55-60%    HYSTERECTOMY      INCISION AND DRAINAGE Right 8/22/2019    RIGHT ARM INCISION AND DEBRIDEMENT performed by Delmer Opitz, MD at [address] [facility] ARTHROSCOPY Right 1999    OTHER SURGICAL HISTORY  02/22/2018    LEFT brachial artery axillary vein AV graft     OH OPEN SKULL SUPRATENT EXPLORE      Craniotomy, 3/21/19 patient denies any brain surgery or craniotomy    OH REPAIR INCISIONAL HERNIA,REDUCIBLE N/A 11/20/2018    LAPAROSCOPIC VENTRAL HERNIA REPAIR WITH MESH performed by Taylor Govea MD at [address] Right 7/18/2019    RIGHT BRACHIAL ARTERY AXILLARY VEIN GRAFT AND LIGATION OF RIGHT BRACHIO-CEPHALIC FISTULA (20391, [address]164) performed by Delmer Opitz, MD at [place] [address]  10/96    TUNNELED VENOUS PORT PLACEMENT Right 11/2019    UPPER GASTROINTESTINAL ENDOSCOPY  6/25/13    VENTRAL HERNIA REPAIR  12/24/2016    Incarcerated ventral hernia repair with mesh         CURRENT MEDICATIONS       Previous Medications    ALBUTEROL SULFATE HFA (PROAIR HFA) 108 (90 BASE) MCG/ACT INHALER    Inhale 2 puffs into the lungs every 6 hours as needed for Wheezing    ALCOHOL SWABS (ALCOHOL PREP) 70 % PADS    USE TO TEST BLOOD GLUCOSE THREE TIMES DAILY    ALLOPURINOL (ZYLOPRIM) 100 MG TABLET Take 1 tablet by mouth daily    ASSURE COMFORT LANCETS 30G MISC    USE TO TEST BLOOD GLUCOSE THREE TIMES DAILY    BIPAP MACHINE MISC    by Does not apply route nightly    BLOOD GLUCOSE MONITORING SUPPL (ACCU-CHEK KENNETH SMARTVIEW) W/DEVICE KIT    USE TO TEST BLOOD GLUCOSE    CALCIUM ACETATE (PHOSLO) 667 MG CAPSULE    Take 2 capsules by mouth 3 times daily (with meals)    DIAPERS & SUPPLIES MISC    1 each by Does not apply route 2 times daily    DOXYCYCLINE MONOHYDRATE (ADOXA) 100 MG TABLET    Take 1 tablet by mouth 2 times daily for 10 days    ESCITALOPRAM (LEXAPRO) 10 MG TABLET    Take 1 tablet by mouth daily    FLUTICASONE (FLONASE) 50 MCG/ACT NASAL SPRAY    USE TWO SPRAY(S) IN EACH NOSTRIL ONCE DAILY    FLUTICASONE-SALMETEROL (ADVAIR HFA) 230-21 MCG/ACT INHALER    Inhale 1 puff into the lungs 2 times daily    INCONTINENCE SUPPLY DISPOSABLE (DEPEND ADJUSTABLE UNDERWEAR LG) MISC    1 each by Does not apply route as needed (for urine incompetence)    INSULIN DETEMIR (LEVEMIR FLEXTOUCH) 100 UNIT/ML INJECTION PEN    Inject 30 Units into the skin nightly     INSULIN LISPRO (HUMALOG KWIKPEN) 100 UNIT/ML PEN    Inject 0-12 Units into the skin 3 times daily (before meals)    LIDOCAINE-PRILOCAINE (EMLA) 2.5-2.5 % CREAM    Apply topically as needed. LORAZEPAM (ATIVAN) 1 MG TABLET    Take 1 tablet by mouth 2 times daily as needed for Anxiety for up to 30 days.     NITROGLYCERIN (NITROSTAT) 0.4 MG SL TABLET    DISSOLVE 1 TABLET UNDER THE TONGUE EVERY 5 MINUTES AS  NEEDED ; MAXIMUM OF 3  TABLETS IN 15 MINUTES    OMEPRAZOLE (PRILOSEC) 40 MG DELAYED RELEASE CAPSULE    TAKE 1 CAPSULE BY MOUTH  DAILY    ONE TOUCH ULTRA TEST STRIP    USE TO TEST 3-4 TIMES DAILY DUE TO FLUCTUATING SUGAR    OXYGEN    Inhale 2 L into the lungs daily as needed At night prn    PROMETHAZINE (PHENERGAN) 25 MG TABLET    Take 1 tablet by mouth every 8 hours as needed for Nausea    RELION PEN NEEDLE 31G/8MM 31G X 8 MM MISC    USE  THREE TIMES DAILY AS Performed at:  [facility]  [address], [address]   Phone [phone]   LACTATE, SEPSIS       All other labs werewithin normal range or not returned as of this dictation. EKG: All EKG's are interpreted by the Emergency Department Physician who either signs or Co-signs this chart in the absence of acardiologist.  Please see their note for interpretation of EKG. RADIOLOGY:   Interpretation per the Radiologist below, if available at the time of this note:    XR CHEST STANDARD (2 VW)   Preliminary Result   No acute cardiopulmonary disease. No results found. PROCEDURES   Unless otherwise noted below, none     Procedures    CRITICAL CARE TIME     n/a    CONSULTS:  IP CONSULT TO NEPHROLOGY  IP CONSULT TO HOSPITALIST      EMERGENCY DEPARTMENT COURSE and DIFFERENTIAL DIAGNOSIS/MDM:   Vitals:    Vitals:    09/25/19 0653 09/25/19 0730 09/25/19 0745 09/25/19 0800   BP:  139/76 (!) 145/76 (!) 132/57   Pulse:  70 90 71   Resp: 18 17 20 12   Temp:       TempSrc:       SpO2: 99% 96% 97%    Weight:       Height:           Anila Palacios was given the following medications:  Medications   albuterol (PROVENTIL) nebulizer solution 5 mg (5 mg Nebulization Given 9/25/19 0653)   ipratropium-albuterol (DUONEB) nebulizer solution 1 ampule (1 ampule Inhalation Given 9/25/19 0653)   methylPREDNISolone sodium (SOLU-MEDROL) injection 125 mg (125 mg Intravenous Given 9/25/19 0723)   ipratropium-albuterol (DUONEB) nebulizer solution 1 ampule (1 ampule Inhalation Given 9/25/19 0815)   albuterol (PROVENTIL) nebulizer solution 2.5 mg (2.5 mg Nebulization Given 9/25/19 0815)   HYDROmorphone HCl PF (DILAUDID) injection 0.5 mg (0.5 mg Intravenous Given 9/25/19 0813)       Anila Palacios was evaluated in the emergency department with concern for shortness of breath. Audible wheezing on my initial exam.  Given 3 backtrack respiratory treatments and IV steroids.   Laboratory

## 2019-09-26 LAB
BASOPHILS ABSOLUTE: 0 K/UL (ref 0–0.2)
BASOPHILS RELATIVE PERCENT: 0.4 %
EOSINOPHILS ABSOLUTE: 0 K/UL (ref 0–0.6)
EOSINOPHILS RELATIVE PERCENT: 0 %
GLUCOSE BLD-MCNC: 361 MG/DL (ref 70–99)
GLUCOSE BLD-MCNC: 363 MG/DL (ref 70–99)
GLUCOSE BLD-MCNC: 386 MG/DL (ref 70–99)
GLUCOSE BLD-MCNC: 417 MG/DL (ref 70–99)
GLUCOSE BLD-MCNC: 479 MG/DL (ref 70–99)
HCT VFR BLD CALC: 34.7 % (ref 36–48)
HEMOGLOBIN: 11.3 G/DL (ref 12–16)
INR BLD: 3.89 (ref 0.86–1.14)
LYMPHOCYTES ABSOLUTE: 0.6 K/UL (ref 1–5.1)
LYMPHOCYTES RELATIVE PERCENT: 6.7 %
MCH RBC QN AUTO: 34.3 PG (ref 26–34)
MCHC RBC AUTO-ENTMCNC: 32.7 G/DL (ref 31–36)
MCV RBC AUTO: 104.8 FL (ref 80–100)
MONOCYTES ABSOLUTE: 0.3 K/UL (ref 0–1.3)
MONOCYTES RELATIVE PERCENT: 3.6 %
NEUTROPHILS ABSOLUTE: 7.4 K/UL (ref 1.7–7.7)
NEUTROPHILS RELATIVE PERCENT: 89.3 %
PDW BLD-RTO: 17.6 % (ref 12.4–15.4)
PERFORMED ON: ABNORMAL
PLATELET # BLD: 178 K/UL (ref 135–450)
PMV BLD AUTO: 8.8 FL (ref 5–10.5)
PROTHROMBIN TIME: 44.3 SEC (ref 9.8–13)
RBC # BLD: 3.31 M/UL (ref 4–5.2)
REPORT: NORMAL
RESPIRATORY PANEL PCR: NORMAL
WBC # BLD: 8.3 K/UL (ref 4–11)

## 2019-09-26 PROCEDURE — 6360000002 HC RX W HCPCS: Performed by: INTERNAL MEDICINE

## 2019-09-26 PROCEDURE — 2580000003 HC RX 258: Performed by: INTERNAL MEDICINE

## 2019-09-26 PROCEDURE — 90686 IIV4 VACC NO PRSV 0.5 ML IM: CPT | Performed by: INTERNAL MEDICINE

## 2019-09-26 PROCEDURE — 94761 N-INVAS EAR/PLS OXIMETRY MLT: CPT

## 2019-09-26 PROCEDURE — 36591 DRAW BLOOD OFF VENOUS DEVICE: CPT

## 2019-09-26 PROCEDURE — 1200000000 HC SEMI PRIVATE

## 2019-09-26 PROCEDURE — 85610 PROTHROMBIN TIME: CPT

## 2019-09-26 PROCEDURE — 31720 CLEARANCE OF AIRWAYS: CPT

## 2019-09-26 PROCEDURE — G0008 ADMIN INFLUENZA VIRUS VAC: HCPCS | Performed by: INTERNAL MEDICINE

## 2019-09-26 PROCEDURE — 6370000000 HC RX 637 (ALT 250 FOR IP): Performed by: INTERNAL MEDICINE

## 2019-09-26 PROCEDURE — 36415 COLL VENOUS BLD VENIPUNCTURE: CPT

## 2019-09-26 PROCEDURE — 85025 COMPLETE CBC W/AUTO DIFF WBC: CPT

## 2019-09-26 PROCEDURE — 94640 AIRWAY INHALATION TREATMENT: CPT

## 2019-09-26 PROCEDURE — 99233 SBSQ HOSP IP/OBS HIGH 50: CPT | Performed by: INTERNAL MEDICINE

## 2019-09-26 RX ORDER — LOSARTAN POTASSIUM 25 MG/1
25 TABLET ORAL DAILY
Status: DISCONTINUED | OUTPATIENT
Start: 2019-09-26 | End: 2019-09-28 | Stop reason: HOSPADM

## 2019-09-26 RX ADMIN — Medication 10 ML: at 21:50

## 2019-09-26 RX ADMIN — Medication 1 G: at 13:22

## 2019-09-26 RX ADMIN — METHYLPREDNISOLONE SODIUM SUCCINATE 40 MG: 40 INJECTION, POWDER, FOR SOLUTION INTRAMUSCULAR; INTRAVENOUS at 15:14

## 2019-09-26 RX ADMIN — ROSUVASTATIN CALCIUM 10 MG: 10 TABLET, FILM COATED ORAL at 09:34

## 2019-09-26 RX ADMIN — INFLUENZA A VIRUS A/BRISBANE/02/2018 IVR-190 (H1N1) ANTIGEN (PROPIOLACTONE INACTIVATED), INFLUENZA A VIRUS A/KANSAS/14/2017 X-327 (H3N2) ANTIGEN (PROPIOLACTONE INACTIVATED), INFLUENZA B VIRUS B/MARYLAND/15/2016 ANTIGEN (PROPIOLACTONE INACTIVATED), INFLUENZA B VIRUS B/PHUKET/3073/2013 BVR-1B ANTIGEN (PROPIOLACTONE INACTIVATED) 0.5 ML: 15; 15; 15; 15 INJECTION, SUSPENSION INTRAMUSCULAR at 09:35

## 2019-09-26 RX ADMIN — IPRATROPIUM BROMIDE AND ALBUTEROL SULFATE 1 AMPULE: .5; 3 SOLUTION RESPIRATORY (INHALATION) at 08:30

## 2019-09-26 RX ADMIN — INSULIN LISPRO 18 UNITS: 100 INJECTION, SOLUTION INTRAVENOUS; SUBCUTANEOUS at 13:23

## 2019-09-26 RX ADMIN — PANTOPRAZOLE SODIUM 40 MG: 40 TABLET, DELAYED RELEASE ORAL at 09:34

## 2019-09-26 RX ADMIN — INSULIN LISPRO 18 UNITS: 100 INJECTION, SOLUTION INTRAVENOUS; SUBCUTANEOUS at 19:10

## 2019-09-26 RX ADMIN — INSULIN LISPRO 10 UNITS: 100 INJECTION, SOLUTION INTRAVENOUS; SUBCUTANEOUS at 09:36

## 2019-09-26 RX ADMIN — METHYLPREDNISOLONE SODIUM SUCCINATE 40 MG: 40 INJECTION, POWDER, FOR SOLUTION INTRAMUSCULAR; INTRAVENOUS at 23:34

## 2019-09-26 RX ADMIN — Medication 2 PUFF: at 19:51

## 2019-09-26 RX ADMIN — KETOROLAC TROMETHAMINE 30 MG: 30 INJECTION, SOLUTION INTRAMUSCULAR at 14:38

## 2019-09-26 RX ADMIN — Medication 10 ML: at 09:50

## 2019-09-26 RX ADMIN — Medication 2 PUFF: at 08:31

## 2019-09-26 RX ADMIN — ALLOPURINOL 100 MG: 100 TABLET ORAL at 09:34

## 2019-09-26 RX ADMIN — IPRATROPIUM BROMIDE AND ALBUTEROL SULFATE 1 AMPULE: .5; 3 SOLUTION RESPIRATORY (INHALATION) at 16:01

## 2019-09-26 RX ADMIN — KETOROLAC TROMETHAMINE 30 MG: 30 INJECTION, SOLUTION INTRAMUSCULAR at 01:56

## 2019-09-26 RX ADMIN — IPRATROPIUM BROMIDE AND ALBUTEROL SULFATE 1 AMPULE: .5; 3 SOLUTION RESPIRATORY (INHALATION) at 19:51

## 2019-09-26 RX ADMIN — INSULIN LISPRO 7 UNITS: 100 INJECTION, SOLUTION INTRAVENOUS; SUBCUTANEOUS at 21:46

## 2019-09-26 RX ADMIN — LOSARTAN POTASSIUM 25 MG: 25 TABLET ORAL at 10:55

## 2019-09-26 RX ADMIN — INSULIN LISPRO 5 UNITS: 100 INJECTION, SOLUTION INTRAVENOUS; SUBCUTANEOUS at 13:24

## 2019-09-26 RX ADMIN — INSULIN GLARGINE 30 UNITS: 100 INJECTION, SOLUTION SUBCUTANEOUS at 21:47

## 2019-09-26 RX ADMIN — IPRATROPIUM BROMIDE AND ALBUTEROL SULFATE 1 AMPULE: .5; 3 SOLUTION RESPIRATORY (INHALATION) at 12:15

## 2019-09-26 RX ADMIN — ESCITALOPRAM OXALATE 10 MG: 10 TABLET ORAL at 09:34

## 2019-09-26 RX ADMIN — CALCIUM ACETATE 1334 MG: 667 CAPSULE ORAL at 09:34

## 2019-09-26 RX ADMIN — INSULIN LISPRO 5 UNITS: 100 INJECTION, SOLUTION INTRAVENOUS; SUBCUTANEOUS at 19:10

## 2019-09-26 RX ADMIN — METHYLPREDNISOLONE SODIUM SUCCINATE 40 MG: 40 INJECTION, POWDER, FOR SOLUTION INTRAMUSCULAR; INTRAVENOUS at 08:40

## 2019-09-26 RX ADMIN — CALCIUM ACETATE 1334 MG: 667 CAPSULE ORAL at 19:09

## 2019-09-26 ASSESSMENT — PAIN SCALES - GENERAL
PAINLEVEL_OUTOF10: 7
PAINLEVEL_OUTOF10: 0
PAINLEVEL_OUTOF10: 0
PAINLEVEL_OUTOF10: 6

## 2019-09-26 NOTE — PROGRESS NOTES
Bedside report received from [facility]. Pt just returned from dialysis. Family at bedside. Pt BS to be checked and insulin given per first shift nurse for dinner. Call light within reach.   .Electronically signed by Tashi Castro RN on 9/25/2019 at 8:30 PM

## 2019-09-26 NOTE — PROGRESS NOTES
Results   Component Value Date    TSH 0.58 04/03/2017      DATA:   ECG: Sinus Rhythm       ASSESSMENT: :    1 acute COPD exacerbation  Started  On   on Solu-Medrol DuoNeb   Pulmonary  Evaluation  Appreciated       2 chronic pain syndrome resume home medication    3 morbid obesity chronic due to excess caloric intake    4 hypertension  Well controlled    5 diabetes resume home medication and placed on sliding scale    6 end-stage renal disease on dialysis  Plan  continue  Current RX

## 2019-09-26 NOTE — PROGRESS NOTES
[address] [facility] [phone] was active with this pt prior to admit. Discharge planner notified. Will follow.

## 2019-09-26 NOTE — PLAN OF CARE
Problem: Falls - Risk of:  Goal: Will remain free from falls  Description  Will remain free from falls  Outcome: Ongoing  Note:   Pt. Free from falls this shift. Fall precautions in place at all times. Call light always in reach. Pt. Donita Fermín and agreeable to contact for safety appropriately. Problem: OXYGENATION/RESPIRATORY FUNCTION  Goal: Patient will maintain patent airway  9/26/2019 1252 by Jovani Seaman RN  Outcome: Ongoing  Note:   Pt able to achieve and maintain a regular patent airway with scheduled breathing tx's, IV steroids, Oxygen support and pain management. Pt able to verbalize when respiratory intervention is needed. 9/26/2019 0229 by Lb Medel RN  Outcome: Ongoing     Problem: Pain:  Description  Pain management should include both nonpharmacologic and pharmacologic interventions. Goal: Control of acute pain  Description  Control of acute pain  Note:   Pain/discomfort being managed w/ PRN analgesics per MD orders. Pt. Able to express presence and absence of pain and rate pain appropriately using numerical scale.

## 2019-09-26 NOTE — DISCHARGE INSTR - COC
reflux disease K21.9    LVH (left ventricular hypertrophy) I51.7    Dyspnea and respiratory abnormalities R06.00, R06.89    Acute on chronic respiratory failure with hypoxia and hypercapnia (HCC) J96.21, J96.22    Obesity hypoventilation syndrome (HCC) E66.2    Morbid obesity due to excess calories (HCC) E66.01    COPD, moderate (HCC) J44.9    Gout M10.9    Warfarin-induced coagulopathy ([facility]) D68.32, T45.515A    Chronic hypoxemic respiratory failure (HCC) J96.11    Constipation K59.00    Diabetes education, encounter for Z71.89    Primary osteoarthritis of left hip M16.12    ESRD (end stage renal disease) on dialysis ([facility]) N18.6, Z99.2    Chronic pain syndrome G89.4    Chest pain R07.9    Recurrent ventral hernia K43.2    Hypoglycemia E16.2    Diabetic polyneuropathy associated with type 2 diabetes mellitus ([address] E11.42    Infection of arteriovenous graft for hemodialysis ([facility] T82. 7XXA    Essential hypertension I10    Dialysis patient ([address] Z99.2    Postoperative pain H34.40    Other complication of arteriovenous dialysis fistula ([facility]) T82.898A    Weight loss counseling, encounter for Z71.3    Respiratory failure ([facility] J96.90    COPD exacerbation ([facility]) J44.1    Pulmonary embolism ([facility]) I26.99       Isolation/Infection:   Isolation          No Isolation            Nurse Assessment:  Last Vital Signs: BP (!) 163/88   Pulse 94   Temp 97.6 °F (36.4 °C) (Oral)   Resp 18   Ht 5' 3\" (1.6 m)   Wt (!) 329 lb 4.8 oz (149.4 kg)   LMP 11/01/1996 (Exact Date)   SpO2 99%   BMI 58.33 kg/m²     Last documented pain score (0-10 scale): Pain Level: 6  Last Weight:   Wt Readings from Last 1 Encounters:   09/26/19 (!) 329 lb 4.8 oz (149.4 kg)     Mental Status:  {IP PT MENTAL STATUS:20030}    IV Access:  { RONALDO IV ACCESS:021940838}    Nursing Mobility/ADLs:  Walking   {P DME DYKL:288042780}  Transfer  {P DME SPGY:140055286}  Bathing  {CHP ALCON DE:557509227}  Dressing  {BRANDT DME

## 2019-09-27 ENCOUNTER — CARE COORDINATION (OUTPATIENT)
Dept: CASE MANAGEMENT | Age: 64
End: 2019-09-27

## 2019-09-27 LAB
ALBUMIN SERPL-MCNC: 3.4 G/DL (ref 3.4–5)
ANION GAP SERPL CALCULATED.3IONS-SCNC: 23 MMOL/L (ref 3–16)
BASOPHILS ABSOLUTE: 0 K/UL (ref 0–0.2)
BASOPHILS RELATIVE PERCENT: 0.3 %
BUN BLDV-MCNC: 80 MG/DL (ref 7–20)
CALCIUM SERPL-MCNC: 9.3 MG/DL (ref 8.3–10.6)
CHLORIDE BLD-SCNC: 92 MMOL/L (ref 99–110)
CO2: 19 MMOL/L (ref 21–32)
CREAT SERPL-MCNC: 9.4 MG/DL (ref 0.6–1.2)
EOSINOPHILS ABSOLUTE: 0 K/UL (ref 0–0.6)
EOSINOPHILS RELATIVE PERCENT: 0 %
GFR AFRICAN AMERICAN: 5
GFR NON-AFRICAN AMERICAN: 4
GLUCOSE BLD-MCNC: 203 MG/DL (ref 70–99)
GLUCOSE BLD-MCNC: 253 MG/DL (ref 70–99)
GLUCOSE BLD-MCNC: 278 MG/DL (ref 70–99)
GLUCOSE BLD-MCNC: 337 MG/DL (ref 70–99)
GLUCOSE BLD-MCNC: 348 MG/DL (ref 70–99)
HCT VFR BLD CALC: 34.1 % (ref 36–48)
HEMOGLOBIN: 11.3 G/DL (ref 12–16)
INR BLD: 4.15 (ref 0.86–1.14)
LYMPHOCYTES ABSOLUTE: 0.5 K/UL (ref 1–5.1)
LYMPHOCYTES RELATIVE PERCENT: 4.8 %
MCH RBC QN AUTO: 34.9 PG (ref 26–34)
MCHC RBC AUTO-ENTMCNC: 33.2 G/DL (ref 31–36)
MCV RBC AUTO: 105 FL (ref 80–100)
MONOCYTES ABSOLUTE: 0.5 K/UL (ref 0–1.3)
MONOCYTES RELATIVE PERCENT: 4.3 %
NEUTROPHILS ABSOLUTE: 9.8 K/UL (ref 1.7–7.7)
NEUTROPHILS RELATIVE PERCENT: 90.6 %
PDW BLD-RTO: 17.7 % (ref 12.4–15.4)
PERFORMED ON: ABNORMAL
PHOSPHORUS: 7.5 MG/DL (ref 2.5–4.9)
PLATELET # BLD: 180 K/UL (ref 135–450)
PMV BLD AUTO: 9.3 FL (ref 5–10.5)
POTASSIUM SERPL-SCNC: 5.2 MMOL/L (ref 3.5–5.1)
PROTHROMBIN TIME: 47.3 SEC (ref 9.8–13)
RBC # BLD: 3.25 M/UL (ref 4–5.2)
SODIUM BLD-SCNC: 134 MMOL/L (ref 136–145)
WBC # BLD: 10.9 K/UL (ref 4–11)

## 2019-09-27 PROCEDURE — 6370000000 HC RX 637 (ALT 250 FOR IP): Performed by: INTERNAL MEDICINE

## 2019-09-27 PROCEDURE — 6360000002 HC RX W HCPCS: Performed by: INTERNAL MEDICINE

## 2019-09-27 PROCEDURE — 90935 HEMODIALYSIS ONE EVALUATION: CPT

## 2019-09-27 PROCEDURE — 94660 CPAP INITIATION&MGMT: CPT

## 2019-09-27 PROCEDURE — 94760 N-INVAS EAR/PLS OXIMETRY 1: CPT

## 2019-09-27 PROCEDURE — 94640 AIRWAY INHALATION TREATMENT: CPT

## 2019-09-27 PROCEDURE — 2580000003 HC RX 258: Performed by: INTERNAL MEDICINE

## 2019-09-27 PROCEDURE — 2700000000 HC OXYGEN THERAPY PER DAY

## 2019-09-27 PROCEDURE — 1200000000 HC SEMI PRIVATE

## 2019-09-27 PROCEDURE — 85610 PROTHROMBIN TIME: CPT

## 2019-09-27 PROCEDURE — 80069 RENAL FUNCTION PANEL: CPT

## 2019-09-27 PROCEDURE — 85025 COMPLETE CBC W/AUTO DIFF WBC: CPT

## 2019-09-27 RX ORDER — GUAIFENESIN/DEXTROMETHORPHAN 100-10MG/5
10 SYRUP ORAL EVERY 6 HOURS PRN
Status: DISCONTINUED | OUTPATIENT
Start: 2019-09-27 | End: 2019-09-28 | Stop reason: HOSPADM

## 2019-09-27 RX ORDER — AMLODIPINE BESYLATE 5 MG/1
10 TABLET ORAL ONCE
Status: COMPLETED | OUTPATIENT
Start: 2019-09-27 | End: 2019-09-27

## 2019-09-27 RX ORDER — GUAIFENESIN/DEXTROMETHORPHAN 100-10MG/5
5 SYRUP ORAL EVERY 6 HOURS PRN
Status: DISCONTINUED | OUTPATIENT
Start: 2019-09-27 | End: 2019-09-27

## 2019-09-27 RX ADMIN — Medication 10 ML: at 20:19

## 2019-09-27 RX ADMIN — Medication 1 G: at 12:48

## 2019-09-27 RX ADMIN — LOSARTAN POTASSIUM 25 MG: 25 TABLET ORAL at 14:49

## 2019-09-27 RX ADMIN — AMLODIPINE BESYLATE 10 MG: 5 TABLET ORAL at 16:53

## 2019-09-27 RX ADMIN — INSULIN LISPRO 6 UNITS: 100 INJECTION, SOLUTION INTRAVENOUS; SUBCUTANEOUS at 21:09

## 2019-09-27 RX ADMIN — IPRATROPIUM BROMIDE AND ALBUTEROL SULFATE 1 AMPULE: .5; 3 SOLUTION RESPIRATORY (INHALATION) at 21:15

## 2019-09-27 RX ADMIN — KETOROLAC TROMETHAMINE 30 MG: 30 INJECTION, SOLUTION INTRAMUSCULAR at 06:50

## 2019-09-27 RX ADMIN — CALCIUM ACETATE 2001 MG: 667 CAPSULE ORAL at 18:28

## 2019-09-27 RX ADMIN — IPRATROPIUM BROMIDE AND ALBUTEROL SULFATE 1 AMPULE: .5; 3 SOLUTION RESPIRATORY (INHALATION) at 08:44

## 2019-09-27 RX ADMIN — METHYLPREDNISOLONE SODIUM SUCCINATE 40 MG: 40 INJECTION, POWDER, FOR SOLUTION INTRAMUSCULAR; INTRAVENOUS at 06:50

## 2019-09-27 RX ADMIN — METHYLPREDNISOLONE SODIUM SUCCINATE 40 MG: 40 INJECTION, POWDER, FOR SOLUTION INTRAMUSCULAR; INTRAVENOUS at 14:28

## 2019-09-27 RX ADMIN — INSULIN LISPRO 6 UNITS: 100 INJECTION, SOLUTION INTRAVENOUS; SUBCUTANEOUS at 14:29

## 2019-09-27 RX ADMIN — INSULIN LISPRO 9 UNITS: 100 INJECTION, SOLUTION INTRAVENOUS; SUBCUTANEOUS at 18:27

## 2019-09-27 RX ADMIN — GUAIFENESIN AND DEXTROMETHORPHAN 10 ML: 100; 10 SYRUP ORAL at 14:27

## 2019-09-27 RX ADMIN — IPRATROPIUM BROMIDE AND ALBUTEROL SULFATE 1 AMPULE: .5; 3 SOLUTION RESPIRATORY (INHALATION) at 14:55

## 2019-09-27 RX ADMIN — KETOROLAC TROMETHAMINE 30 MG: 30 INJECTION, SOLUTION INTRAMUSCULAR at 20:19

## 2019-09-27 RX ADMIN — CALCIUM ACETATE 2001 MG: 667 CAPSULE ORAL at 14:26

## 2019-09-27 RX ADMIN — Medication 2 PUFF: at 21:23

## 2019-09-27 ASSESSMENT — PAIN SCALES - GENERAL
PAINLEVEL_OUTOF10: 7
PAINLEVEL_OUTOF10: 0
PAINLEVEL_OUTOF10: 8
PAINLEVEL_OUTOF10: 0
PAINLEVEL_OUTOF10: 8
PAINLEVEL_OUTOF10: 4

## 2019-09-27 NOTE — PROGRESS NOTES
Nephrology Progress Note  [phone]   [email or web address]        Reason for Consult:  ESRD    HISTORY OF PRESENT ILLNESS:                This is a patient with significant past medical history of ESRD on HD MWF, COPD/ashtma, CHF, HTN, DM2, obesity who presented with SOB and wheezing. Pt states was seen on Friday and started on steroids with no relief. She denies any chest pain, fever/chills. She does report coughing. Subjective:  -pt seen and examined  -PMSHx and meds reviewed  -No family at bedside      Seen on dialysis  SOB recurrent    SO: neg chest pain/fever/chills      PHYSICAL EXAM:    Vitals:    BP (!) 167/99   Pulse 90   Temp 97.4 °F (36.3 °C) (Temporal)   Resp 18   Ht 5' 3\" (1.6 m)   Wt (!) 328 lb 7.8 oz (149 kg)   LMP 11/01/1996 (Exact Date)   SpO2 92%   BMI 58.19 kg/m²   I/O last 3 completed shifts: In: 480 [P.O.:480]  Out: -   No intake/output data recorded. Physical Exam:  General appearance - alert, well appearing, and in no distress  Mental status - alert, oriented to person, place, and time  HEENT: sclera anicteric, NC/AT  Neck - supple, trachea midline  Chest - + wheezes and rhonchi, symmetric air entry, no tachypnea   Heart - normal rate, regular rhythm, normal S1, S2 s  Abdomen - soft, nontender, nondistended, no masses or organomegaly  Neurological - alert, oriented, normal speech, no focal findings   Extremities - no pedal edema, no clubbing or cyanosis  Access: left upper arm AVG with increased pulse pressure.  Right upper arm AVG with good thrill      DATA:    CBC:   Lab Results   Component Value Date    WBC 10.9 09/27/2019    RBC 3.25 09/27/2019    HGB 11.3 09/27/2019    HCT 34.1 09/27/2019    .0 09/27/2019    MCH 34.9 09/27/2019    MCHC 33.2 09/27/2019    RDW 17.7 09/27/2019     09/27/2019    MPV 9.3 09/27/2019     BMP:    Lab Results   Component Value Date     09/27/2019    K 5.2 09/27/2019    K 4.5 09/25/2019    CL 92 09/27/2019    CO2

## 2019-09-27 NOTE — PROGRESS NOTES
Hospitalist  Progress Note       Alexx Prather is a 59 y.o. female   1955     SUBJECTIVE:   Patient  Seen  During  Dialysis   Still  Sob    Says  Not  Better      Just  Had  Breathing  treatment  OBJECTIVE:    Review of Systems:  General appearance: alert, appears stated age and cooperative  Skin: Skin color, texture, normal. No rashes or lesions  HEENT: No nose bleed, headache, vision problems  CV: C/O chest pain, tightness, pressure,   Respiratory: C/o no SOB, ISSA, Orthopnea, PND  GI: No abdominal pain, black stool, bloating  Limbs: No c/o edema, pain, swelling, intermittent claudication, joint pains  Neuro: No dizziness, lightheadedness, syncope, gait problems, memory problems  Psych: grossly normal. No SI/depression. Vitals:   Blood pressure (!) 167/99, pulse 90, temperature 97.4 °F (36.3 °C), temperature source Temporal, resp. rate 18, height 5' 3\" (1.6 m), weight (!) 328 lb 7.8 oz (149 kg), last menstrual period 11/01/1996, SpO2 92 %, not currently breastfeeding.     HEENT: AT, NC, PERRLA  Neck: No JVD  Heart: S1 S2 audible, no murmur   Lungs:   bilat  Exp  Wheezing     Abdomen: Nontender   Limbs: No edema   CNS: no focal deficit      Past Medical History:   Diagnosis Date    Abdominal pain 8/20/2018    Acute on chronic congestive heart failure ([address] 6/26/2014    Asthma     Cervical cancer ([address]     Chest pain 5/16/2018    CHF (congestive heart failure) (HCC)     Chronic kidney disease, stage IV (severe) [facility]     Dr Indira Mora    Chronic pain syndrome 3/27/2018    Chronic respiratory failure with hypoxia ([facility])     CKD (chronic kidney disease) stage 4, GFR 15-29 ml/min ([facility]) 6/30/2017    Colon polyps     COPD (chronic obstructive pulmonary disease) ([address]     Degenerative disc disease at L5-S1 level 6/18/2013     CT    Diabetes mellitus, insulin dependent (IDDM), uncontrolled ([address]     Diabetic polyneuropathy associated with type 2 diabetes mellitus ([address] 3/26/2019    Elevated troponin polyneuropathy associated with type 2 diabetes mellitus [facility]    Infection of arteriovenous graft for hemodialysis ([facility]    Essential hypertension    Dialysis patient ([facility]    Postoperative pain    Other complication of arteriovenous dialysis fistula (HCC)    Weight loss counseling, encounter for    Respiratory failure ([facility]    COPD exacerbation (HCC)    Pulmonary embolism (HCC)        Allergies   Allergen Reactions    Codeine Nausea Only    Fentanyl Itching    Morphine      CHEST PAIN    Tramadol Hcl Other (See Comments)     Causes pt to have involuntary movements    Hydrocodone-Acetaminophen Itching and Rash    Percocet [Oxycodone-Acetaminophen] Itching    Procardia [Nifedipine] Nausea And Vomiting     Headache  Takes norvasc at home 12/22/15    Vicodin [Hydrocodone-Acetaminophen] Rash        Current Inpatient Medications:    Current Facility-Administered Medications   Medication Dose Route Frequency Provider Last Rate Last Dose    warfarin (COUMADIN) daily dosing (placeholder)   Other RX Placeholder Mu Noland MD        insulin glargine (LANTUS) injection pen 40 Units  40 Units Subcutaneous Nightly Jayden Youssef MD        insulin lispro (HUMALOG) injection pen 8 Units  8 Units Subcutaneous TID  Jayden Youssef MD        guaiFENesin-dextromethorphan (ROBITUSSIN DM) 100-10 MG/5ML syrup 10 mL  10 mL Oral Q6H PRN Rahul Winters MD        losartan (COZAAR) tablet 25 mg  25 mg Oral Daily Muvasui MD Sarah   25 mg at 09/26/19 1055    insulin lispro (HUMALOG) injection pen 0-18 Units  0-18 Units Subcutaneous TID  Rahul Winters MD   18 Units at 09/26/19 1910    insulin lispro (HUMALOG) injection pen 0-9 Units  0-9 Units Subcutaneous Nightly Rahul Winters MD   7 Units at 09/26/19 2146    calcium acetate (PHOSLO) capsule 1,334 mg  1,334 mg Oral TID  Rahul Winters MD   1,334 mg at 09/26/19 1909    rosuvastatin (CRESTOR) tablet 10 mg  10 mg Oral Daily Monie Foy MD   10 mg at 09/26/19 0934    albuterol sulfate  (90 Base) MCG/ACT inhaler 2 puff  2 puff Inhalation Q6H PRN Monie Foy MD        allopurinol (ZYLOPRIM) tablet 100 mg  100 mg Oral Daily Monie Foy MD   100 mg at 09/26/19 0934    escitalopram (LEXAPRO) tablet 10 mg  10 mg Oral Daily Monie Foy MD   10 mg at 09/26/19 0934    fluticasone (FLONASE) 50 MCG/ACT nasal spray 2 spray  2 spray Each Nostril Daily PRN Monie Foy MD        lidocaine-prilocaine (EMLA) cream   Topical Daily PRN Monie Foy MD        LORazepam (ATIVAN) tablet 1 mg  1 mg Oral BID PRN Monie Foy MD   1 mg at 09/25/19 2350    pantoprazole (PROTONIX) tablet 40 mg  40 mg Oral QAM Monie Foy MD   40 mg at 09/26/19 0934    promethazine (PHENERGAN) tablet 25 mg  25 mg Oral Q8H PRN Monie Foy MD        sodium chloride flush 0.9 % injection 10 mL  10 mL Intravenous 2 times per day Monie Foy MD   10 mL at 09/26/19 2150    sodium chloride flush 0.9 % injection 10 mL  10 mL Intravenous PRN Monie Foy MD        magnesium hydroxide (MILK OF MAGNESIA) 400 MG/5ML suspension 30 mL  30 mL Oral Daily PRN Monie Foy MD        ondansetron (ZOFRAN) injection 4 mg  4 mg Intravenous Q6H PRN Monie Foy MD   4 mg at 09/25/19 1154    ipratropium-albuterol (DUONEB) nebulizer solution 1 ampule  1 ampule Inhalation Q4H WA Monie Foy MD   1 ampule at 09/27/19 0844    cefTRIAXone (ROCEPHIN) 1 g in sterile water 10 mL IV syringe  1 g Intravenous Q24H Monie Foy MD   1 g at 09/26/19 1322    methylPREDNISolone sodium (SOLU-MEDROL) injection 40 mg  40 mg Intravenous Q8H Jayden Mark MD   40 mg at 09/27/19 0650    glucose (GLUTOSE) 40 % oral gel 15 g  15 g Oral PRN Monie Foy MD        dextrose 50 % IV solution  12.5 g Intravenous PRN MD Radha Novoa

## 2019-09-27 NOTE — PROGRESS NOTES
Patient returned from dialysis in stable condition. VSS. Ordering lunch. Denies additional needs at this time. Call light in reach.

## 2019-09-28 VITALS
OXYGEN SATURATION: 98 % | TEMPERATURE: 98 F | BODY MASS INDEX: 51.91 KG/M2 | HEART RATE: 88 BPM | WEIGHT: 293 LBS | DIASTOLIC BLOOD PRESSURE: 73 MMHG | RESPIRATION RATE: 16 BRPM | HEIGHT: 63 IN | SYSTOLIC BLOOD PRESSURE: 165 MMHG

## 2019-09-28 LAB
ALBUMIN SERPL-MCNC: 3.6 G/DL (ref 3.4–5)
ANION GAP SERPL CALCULATED.3IONS-SCNC: 19 MMOL/L (ref 3–16)
BASOPHILS ABSOLUTE: 0 K/UL (ref 0–0.2)
BASOPHILS RELATIVE PERCENT: 0.2 %
BUN BLDV-MCNC: 69 MG/DL (ref 7–20)
CALCIUM SERPL-MCNC: 9.3 MG/DL (ref 8.3–10.6)
CHLORIDE BLD-SCNC: 93 MMOL/L (ref 99–110)
CO2: 25 MMOL/L (ref 21–32)
CREAT SERPL-MCNC: 7.2 MG/DL (ref 0.6–1.2)
EKG ATRIAL RATE: 88 BPM
EKG DIAGNOSIS: NORMAL
EKG P AXIS: 47 DEGREES
EKG P-R INTERVAL: 150 MS
EKG Q-T INTERVAL: 342 MS
EKG QRS DURATION: 80 MS
EKG QTC CALCULATION (BAZETT): 413 MS
EKG R AXIS: -41 DEGREES
EKG T AXIS: 76 DEGREES
EKG VENTRICULAR RATE: 88 BPM
EOSINOPHILS ABSOLUTE: 0 K/UL (ref 0–0.6)
EOSINOPHILS RELATIVE PERCENT: 0 %
GFR AFRICAN AMERICAN: 7
GFR NON-AFRICAN AMERICAN: 6
GLUCOSE BLD-MCNC: 272 MG/DL (ref 70–99)
GLUCOSE BLD-MCNC: 283 MG/DL (ref 70–99)
GLUCOSE BLD-MCNC: 296 MG/DL (ref 70–99)
HCT VFR BLD CALC: 35.4 % (ref 36–48)
HEMOGLOBIN: 11.3 G/DL (ref 12–16)
INR BLD: 2.93 (ref 0.86–1.14)
LYMPHOCYTES ABSOLUTE: 0.7 K/UL (ref 1–5.1)
LYMPHOCYTES RELATIVE PERCENT: 7 %
MCH RBC QN AUTO: 33.8 PG (ref 26–34)
MCHC RBC AUTO-ENTMCNC: 32 G/DL (ref 31–36)
MCV RBC AUTO: 105.5 FL (ref 80–100)
MONOCYTES ABSOLUTE: 0.5 K/UL (ref 0–1.3)
MONOCYTES RELATIVE PERCENT: 4.9 %
NEUTROPHILS ABSOLUTE: 9.4 K/UL (ref 1.7–7.7)
NEUTROPHILS RELATIVE PERCENT: 87.9 %
PDW BLD-RTO: 17.7 % (ref 12.4–15.4)
PERFORMED ON: ABNORMAL
PERFORMED ON: ABNORMAL
PHOSPHORUS: 6.3 MG/DL (ref 2.5–4.9)
PLATELET # BLD: 180 K/UL (ref 135–450)
PMV BLD AUTO: 9.2 FL (ref 5–10.5)
POTASSIUM SERPL-SCNC: 4.7 MMOL/L (ref 3.5–5.1)
PROTHROMBIN TIME: 33.4 SEC (ref 9.8–13)
RBC # BLD: 3.35 M/UL (ref 4–5.2)
SODIUM BLD-SCNC: 137 MMOL/L (ref 136–145)
WBC # BLD: 10.6 K/UL (ref 4–11)

## 2019-09-28 PROCEDURE — 6370000000 HC RX 637 (ALT 250 FOR IP): Performed by: INTERNAL MEDICINE

## 2019-09-28 PROCEDURE — 36591 DRAW BLOOD OFF VENOUS DEVICE: CPT

## 2019-09-28 PROCEDURE — 6360000002 HC RX W HCPCS: Performed by: INTERNAL MEDICINE

## 2019-09-28 PROCEDURE — 6370000000 HC RX 637 (ALT 250 FOR IP): Performed by: NURSE PRACTITIONER

## 2019-09-28 PROCEDURE — 94660 CPAP INITIATION&MGMT: CPT

## 2019-09-28 PROCEDURE — 80069 RENAL FUNCTION PANEL: CPT

## 2019-09-28 PROCEDURE — 93010 ELECTROCARDIOGRAM REPORT: CPT | Performed by: INTERNAL MEDICINE

## 2019-09-28 PROCEDURE — 85610 PROTHROMBIN TIME: CPT

## 2019-09-28 PROCEDURE — 94640 AIRWAY INHALATION TREATMENT: CPT

## 2019-09-28 PROCEDURE — 94760 N-INVAS EAR/PLS OXIMETRY 1: CPT

## 2019-09-28 PROCEDURE — 2580000003 HC RX 258: Performed by: INTERNAL MEDICINE

## 2019-09-28 PROCEDURE — 85025 COMPLETE CBC W/AUTO DIFF WBC: CPT

## 2019-09-28 PROCEDURE — 93005 ELECTROCARDIOGRAM TRACING: CPT | Performed by: HOSPITALIST

## 2019-09-28 RX ORDER — PREDNISONE 20 MG/1
40 TABLET ORAL DAILY
Qty: 8 TABLET | Refills: 0 | Status: SHIPPED | OUTPATIENT
Start: 2019-09-28 | End: 2019-10-02

## 2019-09-28 RX ORDER — WARFARIN SODIUM 2.5 MG/1
2.5 TABLET ORAL
Status: DISCONTINUED | OUTPATIENT
Start: 2019-09-28 | End: 2019-09-28 | Stop reason: HOSPADM

## 2019-09-28 RX ORDER — DOXYCYCLINE HYCLATE 100 MG
100 TABLET ORAL EVERY 12 HOURS SCHEDULED
Status: DISCONTINUED | OUTPATIENT
Start: 2019-09-28 | End: 2019-09-28

## 2019-09-28 RX ORDER — DOXYCYCLINE HYCLATE 100 MG
100 TABLET ORAL EVERY 12 HOURS SCHEDULED
Status: DISCONTINUED | OUTPATIENT
Start: 2019-09-28 | End: 2019-09-28 | Stop reason: HOSPADM

## 2019-09-28 RX ORDER — PREDNISONE 20 MG/1
40 TABLET ORAL DAILY
Status: DISCONTINUED | OUTPATIENT
Start: 2019-09-28 | End: 2019-09-28 | Stop reason: HOSPADM

## 2019-09-28 RX ORDER — LOSARTAN POTASSIUM 25 MG/1
25 TABLET ORAL DAILY
Qty: 20 TABLET | Refills: 0 | Status: SHIPPED | OUTPATIENT
Start: 2019-09-29 | End: 2019-10-08 | Stop reason: SINTOL

## 2019-09-28 RX ADMIN — INSULIN LISPRO 9 UNITS: 100 INJECTION, SOLUTION INTRAVENOUS; SUBCUTANEOUS at 13:31

## 2019-09-28 RX ADMIN — LOSARTAN POTASSIUM 25 MG: 25 TABLET ORAL at 09:18

## 2019-09-28 RX ADMIN — METHYLPREDNISOLONE SODIUM SUCCINATE 40 MG: 40 INJECTION, POWDER, FOR SOLUTION INTRAMUSCULAR; INTRAVENOUS at 09:18

## 2019-09-28 RX ADMIN — IPRATROPIUM BROMIDE AND ALBUTEROL SULFATE 1 AMPULE: .5; 3 SOLUTION RESPIRATORY (INHALATION) at 11:54

## 2019-09-28 RX ADMIN — INSULIN LISPRO 9 UNITS: 100 INJECTION, SOLUTION INTRAVENOUS; SUBCUTANEOUS at 09:19

## 2019-09-28 RX ADMIN — TRAZODONE HYDROCHLORIDE 100 MG: 50 TABLET ORAL at 01:18

## 2019-09-28 RX ADMIN — CALCIUM ACETATE 2001 MG: 667 CAPSULE ORAL at 13:31

## 2019-09-28 RX ADMIN — METHYLPREDNISOLONE SODIUM SUCCINATE 40 MG: 40 INJECTION, POWDER, FOR SOLUTION INTRAMUSCULAR; INTRAVENOUS at 00:13

## 2019-09-28 RX ADMIN — ALLOPURINOL 100 MG: 100 TABLET ORAL at 09:18

## 2019-09-28 RX ADMIN — LORAZEPAM 1 MG: 1 TABLET ORAL at 00:12

## 2019-09-28 RX ADMIN — ROSUVASTATIN CALCIUM 10 MG: 10 TABLET, FILM COATED ORAL at 09:17

## 2019-09-28 RX ADMIN — DOXYCYCLINE HYCLATE 100 MG: 100 TABLET, COATED ORAL at 13:31

## 2019-09-28 RX ADMIN — CALCIUM ACETATE 2001 MG: 667 CAPSULE ORAL at 09:23

## 2019-09-28 RX ADMIN — PANTOPRAZOLE SODIUM 40 MG: 40 TABLET, DELAYED RELEASE ORAL at 09:17

## 2019-09-28 RX ADMIN — Medication 10 ML: at 09:18

## 2019-09-28 RX ADMIN — ESCITALOPRAM OXALATE 10 MG: 10 TABLET ORAL at 09:18

## 2019-09-28 ASSESSMENT — PAIN SCALES - GENERAL: PAINLEVEL_OUTOF10: 8

## 2019-09-28 NOTE — DISCHARGE SUMMARY
Discharge Summary    Name:  Kina King /Age/Sex: 1955  (59 y.o. female)   MRN & CSN:  [id number] & [id number] Admission Date/Time: 2019  6:02 AM   Attending:  Ambar Bucio MD Discharging Physician: Ambar Bucio MD     Hospital Course: Kina King is a 59 y.o.  female  who presents with with history of advanced COPD with chronic respiratory failure on supplemental oxygen at home history of end-stage renal disease on hemodialysis, history of hypertension was admitted because of acute COPD exacerbation with acute on chronic respiratory failure, was placed on IV steroid IV antibiotic and breathing treatment, the patient has a history of thromboembolism and she is on chronic anticoagulation, chest x-ray showed no evidence of pneumonia  Patient symptom improved dramatically on IV steroid IV antibiotic  Patient switched to p.o. steroid chest is clear on examination at the time of discharge  Patient denies chest pain or shortness of breath hemodynamically stable  Added losartan 25 mg daily to better control blood pressure  Patient underwent hemodialysis yesterday to resume on hemodialysis as scheduled outpatient  INR today is therapeutic continue on home dose warfarin  Follow-up with the primary care in 1 week      Discharge diagnosis : COPD exacerbation         The patient expressed appropriate understanding of and agreement with the discharge recommendations, medications, and plan. Consults this admission:  IP CONSULT TO NEPHROLOGY  IP CONSULT TO HOSPITALIST  IP CONSULT TO PULMONOLOGY  PHARMACY TO DOSE WARFARIN    Discharge Instruction:   Follow up appointments:  Follow-up with your nephrologist as scheduled  Primary care physician:  within 1 weeks    Diet: Renal  Activity: As tolerated   disposition: Discharged to:   [x]Home, []HHC, []SNF, []Acute Rehab, []Hospice   Condition on discharge: Stable    Discharge Medications:      Idalmis Gamble   Home Medication Instructions (NITROSTAT) 0.4 MG SL tablet  DISSOLVE 1 TABLET UNDER THE TONGUE EVERY 5 MINUTES AS  NEEDED ; MAXIMUM OF 3  TABLETS IN 15 MINUTES             omeprazole (PRILOSEC) 40 MG delayed release capsule  TAKE 1 CAPSULE BY MOUTH  DAILY             ONE TOUCH ULTRA TEST strip  USE TO TEST 3-4 TIMES DAILY DUE TO FLUCTUATING SUGAR             OXYGEN  Inhale 2 L into the lungs daily as needed At night prn             predniSONE (DELTASONE) 20 MG tablet  Take 2 tablets by mouth daily for 4 days             promethazine (PHENERGAN) 25 MG tablet  Take 1 tablet by mouth every 8 hours as needed for Nausea             RELION PEN NEEDLE 31G/8MM 31G X 8 MM MISC  USE  THREE TIMES DAILY AS DIRECTED             rosuvastatin (CRESTOR) 10 MG tablet  Take 1 tablet by mouth daily Take 1 tablet by mouth  daily             Spacer/Aero-Holding Chambers VENANCIO  1 Device by Does not apply route daily as needed             traZODone (DESYREL) 100 MG tablet  Take 100 mg by mouth nightly as needed for Sleep             TRULICITY 8.94 HU/7.4ZN SOPN  INJECT ONE  SYRINGE SUBCUTANEOUSLY ONCE A WEEK             UNIFINE PENTIPS 31G X 5 MM MISC  USE WITH INSULIN PENS FOUR TIMES DAILY             warfarin (COUMADIN) 5 MG tablet  TAKE 1 TABLET BY MOUTH ONCE DAILY                 Objective Findings at Discharge:   BP (!) 159/91   Pulse 98   Temp 97.8 °F (36.6 °C) (Oral)   Resp 19   Ht 5' 3\" (1.6 m)   Wt (!) 323 lb 3.1 oz (146.6 kg)   LMP 11/01/1996 (Exact Date)   SpO2 94%   BMI 57.25 kg/m²        Physical examination:          GEN    Awake.  Alert , not in respiratory distress, not in pain  HEENT: PEERLA, , supple neck,   Chest: air entry equal bilaterally, no wheezing or crepitation, decreased breathing bilaterally  Heart: S1 and S2 heard, no murmur, no gallop or rub, regular rate  Abdomen: soft, ND , Nt, +BS  Extremities: no cyanosis, tenderness or erythema, peripheral pulses audible  Neurology: alert, oriented x3, able to move 4

## 2019-09-29 ENCOUNTER — CARE COORDINATION (OUTPATIENT)
Dept: CASE MANAGEMENT | Age: 64
End: 2019-09-29

## 2019-09-29 NOTE — CARE COORDINATION
difficulty emptying her bladder. Pt reports productive cough with clear sputum and chest congestion. Pt reports LBM was yesterday. Pt declined to review all meds with CTC but did review new, changed, and stopped meds. Pt declined assistance with scheduling HFU and states she will call to schedule. Pt will take all meds as prescribed and schedule / keep doctors appt. CTC provided education on s/s that require medical attention and when to seek medical attention. CTC advised Pt of use urgent care or physicians 24 hr access line if assistance is needed after hours or on the weekend. Pt denies any needs or concerns and is agreeable with additional calls.     Follow Up  Future Appointments   Date Time Provider Aziza Barraza   10/1/2019 11:45 AM Ileana Gonzalez MD FF VASC/ENDO [facility]   10/3/2019  2:45 PM JURGEN Song CNP PULM & CC [facility]   10/16/2019  1:30 PM JURGEN Du CNP SDALE FP [facility]       Yamilex Freitas RN

## 2019-09-30 ENCOUNTER — TELEPHONE (OUTPATIENT)
Dept: FAMILY MEDICINE CLINIC | Age: 64
End: 2019-09-30

## 2019-09-30 LAB
BLOOD CULTURE, ROUTINE: NORMAL
CULTURE, BLOOD 2: NORMAL

## 2019-10-01 ENCOUNTER — OFFICE VISIT (OUTPATIENT)
Dept: VASCULAR SURGERY | Age: 64
End: 2019-10-01

## 2019-10-01 ENCOUNTER — TELEPHONE (OUTPATIENT)
Dept: FAMILY MEDICINE CLINIC | Age: 64
End: 2019-10-01

## 2019-10-01 ENCOUNTER — CARE COORDINATION (OUTPATIENT)
Dept: CASE MANAGEMENT | Age: 64
End: 2019-10-01

## 2019-10-01 VITALS — HEIGHT: 63 IN | WEIGHT: 293 LBS | BODY MASS INDEX: 51.91 KG/M2

## 2019-10-01 DIAGNOSIS — Z99.2 ESRD (END STAGE RENAL DISEASE) ON DIALYSIS (HCC): Primary | ICD-10-CM

## 2019-10-01 DIAGNOSIS — N18.6 ESRD (END STAGE RENAL DISEASE) ON DIALYSIS (HCC): Primary | ICD-10-CM

## 2019-10-01 PROCEDURE — 99024 POSTOP FOLLOW-UP VISIT: CPT | Performed by: SURGERY

## 2019-10-02 ENCOUNTER — TELEPHONE (OUTPATIENT)
Dept: VASCULAR SURGERY | Age: 64
End: 2019-10-02

## 2019-10-03 ENCOUNTER — TELEPHONE (OUTPATIENT)
Dept: FAMILY MEDICINE CLINIC | Age: 64
End: 2019-10-03

## 2019-10-03 ENCOUNTER — OFFICE VISIT (OUTPATIENT)
Dept: PULMONOLOGY | Age: 64
End: 2019-10-03
Payer: MEDICARE

## 2019-10-03 ENCOUNTER — TELEPHONE (OUTPATIENT)
Dept: PULMONOLOGY | Age: 64
End: 2019-10-03

## 2019-10-03 VITALS
OXYGEN SATURATION: 99 % | SYSTOLIC BLOOD PRESSURE: 150 MMHG | BODY MASS INDEX: 57.25 KG/M2 | HEIGHT: 63 IN | DIASTOLIC BLOOD PRESSURE: 90 MMHG | HEART RATE: 87 BPM

## 2019-10-03 DIAGNOSIS — J96.11 CHRONIC RESPIRATORY FAILURE WITH HYPOXIA (HCC): Primary | ICD-10-CM

## 2019-10-03 DIAGNOSIS — J98.4 RESTRICTIVE LUNG DISEASE: ICD-10-CM

## 2019-10-03 DIAGNOSIS — R06.02 SOB (SHORTNESS OF BREATH): ICD-10-CM

## 2019-10-03 PROCEDURE — 3017F COLORECTAL CA SCREEN DOC REV: CPT | Performed by: NURSE PRACTITIONER

## 2019-10-03 PROCEDURE — G8428 CUR MEDS NOT DOCUMENT: HCPCS | Performed by: NURSE PRACTITIONER

## 2019-10-03 PROCEDURE — 99214 OFFICE O/P EST MOD 30 MIN: CPT | Performed by: NURSE PRACTITIONER

## 2019-10-03 PROCEDURE — G8417 CALC BMI ABV UP PARAM F/U: HCPCS | Performed by: NURSE PRACTITIONER

## 2019-10-03 PROCEDURE — 1111F DSCHRG MED/CURRENT MED MERGE: CPT | Performed by: NURSE PRACTITIONER

## 2019-10-03 PROCEDURE — G8598 ASA/ANTIPLAT THER USED: HCPCS | Performed by: NURSE PRACTITIONER

## 2019-10-03 PROCEDURE — 1036F TOBACCO NON-USER: CPT | Performed by: NURSE PRACTITIONER

## 2019-10-03 PROCEDURE — G8482 FLU IMMUNIZE ORDER/ADMIN: HCPCS | Performed by: NURSE PRACTITIONER

## 2019-10-03 RX ORDER — AZITHROMYCIN 250 MG/1
TABLET, FILM COATED ORAL
Qty: 15 TABLET | Refills: 2 | Status: SHIPPED | OUTPATIENT
Start: 2019-10-03 | End: 2019-11-13 | Stop reason: ALTCHOICE

## 2019-10-03 RX ORDER — FORMOTEROL FUMARATE 20 UG/2ML
20 SOLUTION RESPIRATORY (INHALATION) 2 TIMES DAILY
Qty: 120 ML | Refills: 5 | Status: SHIPPED | OUTPATIENT
Start: 2019-10-03

## 2019-10-03 RX ORDER — BUDESONIDE 0.5 MG/2ML
500 INHALANT ORAL 2 TIMES DAILY
Qty: 60 AMPULE | Refills: 3 | Status: SHIPPED | OUTPATIENT
Start: 2019-10-03 | End: 2020-10-02

## 2019-10-03 ASSESSMENT — ENCOUNTER SYMPTOMS
SHORTNESS OF BREATH: 1
CONSTIPATION: 0
ABDOMINAL PAIN: 0
COUGH: 1
COLOR CHANGE: 0

## 2019-10-07 ENCOUNTER — CARE COORDINATION (OUTPATIENT)
Dept: CASE MANAGEMENT | Age: 64
End: 2019-10-07

## 2019-10-08 ENCOUNTER — OFFICE VISIT (OUTPATIENT)
Dept: FAMILY MEDICINE CLINIC | Age: 64
End: 2019-10-08
Payer: MEDICARE

## 2019-10-08 ENCOUNTER — TELEPHONE (OUTPATIENT)
Dept: FAMILY MEDICINE CLINIC | Age: 64
End: 2019-10-08

## 2019-10-08 VITALS — WEIGHT: 293 LBS | BODY MASS INDEX: 57.32 KG/M2 | DIASTOLIC BLOOD PRESSURE: 88 MMHG | SYSTOLIC BLOOD PRESSURE: 142 MMHG

## 2019-10-08 DIAGNOSIS — E11.42 DIABETIC POLYNEUROPATHY ASSOCIATED WITH TYPE 2 DIABETES MELLITUS (HCC): ICD-10-CM

## 2019-10-08 DIAGNOSIS — Z99.2 DIALYSIS PATIENT (HCC): ICD-10-CM

## 2019-10-08 DIAGNOSIS — E66.01 MORBID OBESITY DUE TO EXCESS CALORIES (HCC): ICD-10-CM

## 2019-10-08 DIAGNOSIS — J44.1 COPD EXACERBATION (HCC): Primary | ICD-10-CM

## 2019-10-08 DIAGNOSIS — T82.898S OTHER COMPLICATION OF ARTERIOVENOUS DIALYSIS FISTULA, SEQUELA: ICD-10-CM

## 2019-10-08 DIAGNOSIS — J44.9 COPD, MODERATE (HCC): ICD-10-CM

## 2019-10-08 DIAGNOSIS — I10 ESSENTIAL HYPERTENSION: ICD-10-CM

## 2019-10-08 DIAGNOSIS — Z09 HOSPITAL DISCHARGE FOLLOW-UP: ICD-10-CM

## 2019-10-08 PROCEDURE — 99495 TRANSJ CARE MGMT MOD F2F 14D: CPT | Performed by: NURSE PRACTITIONER

## 2019-10-08 PROCEDURE — 1111F DSCHRG MED/CURRENT MED MERGE: CPT | Performed by: NURSE PRACTITIONER

## 2019-10-08 RX ORDER — PREGABALIN 50 MG/1
50 CAPSULE ORAL 3 TIMES DAILY
Qty: 90 CAPSULE | Refills: 5 | Status: SHIPPED | OUTPATIENT
Start: 2019-10-08 | End: 2020-04-05

## 2019-10-08 RX ORDER — NEBIVOLOL 2.5 MG/1
TABLET ORAL
Qty: 120 TABLET | Refills: 5 | Status: ON HOLD
Start: 2019-10-08 | End: 2020-02-05 | Stop reason: HOSPADM

## 2019-10-08 ASSESSMENT — ENCOUNTER SYMPTOMS
DIARRHEA: 0
ABDOMINAL PAIN: 0
NAUSEA: 0
SHORTNESS OF BREATH: 1
VOMITING: 0

## 2019-10-10 ENCOUNTER — ANESTHESIA (OUTPATIENT)
Dept: ENDOSCOPY | Age: 64
End: 2019-10-10
Payer: MEDICARE

## 2019-10-10 ENCOUNTER — HOSPITAL ENCOUNTER (OUTPATIENT)
Age: 64
Setting detail: OUTPATIENT SURGERY
Discharge: HOME | End: 2019-10-10
Attending: INTERNAL MEDICINE | Admitting: INTERNAL MEDICINE
Payer: MEDICARE

## 2019-10-10 VITALS
SYSTOLIC BLOOD PRESSURE: 140 MMHG | OXYGEN SATURATION: 100 % | TEMPERATURE: 97 F | RESPIRATION RATE: 16 BRPM | HEART RATE: 89 BPM | WEIGHT: 293 LBS | HEIGHT: 63 IN | DIASTOLIC BLOOD PRESSURE: 94 MMHG | BODY MASS INDEX: 51.91 KG/M2

## 2019-10-10 VITALS
DIASTOLIC BLOOD PRESSURE: 56 MMHG | OXYGEN SATURATION: 100 % | SYSTOLIC BLOOD PRESSURE: 112 MMHG | RESPIRATION RATE: 11 BRPM

## 2019-10-10 LAB
ANION GAP SERPL CALCULATED.3IONS-SCNC: 18 MMOL/L (ref 3–16)
BUN BLDV-MCNC: 33 MG/DL (ref 7–20)
CALCIUM SERPL-MCNC: 9.4 MG/DL (ref 8.3–10.6)
CHLORIDE BLD-SCNC: 101 MMOL/L (ref 99–110)
CO2: 23 MMOL/L (ref 21–32)
CREAT SERPL-MCNC: 12.2 MG/DL (ref 0.6–1.2)
GFR AFRICAN AMERICAN: 4
GFR NON-AFRICAN AMERICAN: 3
GLUCOSE BLD-MCNC: 73 MG/DL (ref 70–99)
GLUCOSE BLD-MCNC: 93 MG/DL (ref 70–99)
INR BLD: 1.26 (ref 0.86–1.14)
PERFORMED ON: NORMAL
POTASSIUM SERPL-SCNC: 3.3 MMOL/L (ref 3.5–5.1)
PROTHROMBIN TIME: 14.4 SEC (ref 9.8–13)
SODIUM BLD-SCNC: 142 MMOL/L (ref 136–145)

## 2019-10-10 PROCEDURE — 3700000001 HC ADD 15 MINUTES (ANESTHESIA): Performed by: INTERNAL MEDICINE

## 2019-10-10 PROCEDURE — 2500000003 HC RX 250 WO HCPCS: Performed by: NURSE ANESTHETIST, CERTIFIED REGISTERED

## 2019-10-10 PROCEDURE — 7100000010 HC PHASE II RECOVERY - FIRST 15 MIN: Performed by: INTERNAL MEDICINE

## 2019-10-10 PROCEDURE — 2580000003 HC RX 258: Performed by: NURSE ANESTHETIST, CERTIFIED REGISTERED

## 2019-10-10 PROCEDURE — 80048 BASIC METABOLIC PNL TOTAL CA: CPT

## 2019-10-10 PROCEDURE — 6360000002 HC RX W HCPCS: Performed by: NURSE ANESTHETIST, CERTIFIED REGISTERED

## 2019-10-10 PROCEDURE — 85610 PROTHROMBIN TIME: CPT

## 2019-10-10 PROCEDURE — 88305 TISSUE EXAM BY PATHOLOGIST: CPT

## 2019-10-10 PROCEDURE — 36415 COLL VENOUS BLD VENIPUNCTURE: CPT

## 2019-10-10 PROCEDURE — 3700000000 HC ANESTHESIA ATTENDED CARE: Performed by: INTERNAL MEDICINE

## 2019-10-10 PROCEDURE — 2580000003 HC RX 258: Performed by: ANESTHESIOLOGY

## 2019-10-10 PROCEDURE — 2709999900 HC NON-CHARGEABLE SUPPLY: Performed by: INTERNAL MEDICINE

## 2019-10-10 PROCEDURE — 7100000011 HC PHASE II RECOVERY - ADDTL 15 MIN: Performed by: INTERNAL MEDICINE

## 2019-10-10 PROCEDURE — 7100000001 HC PACU RECOVERY - ADDTL 15 MIN: Performed by: INTERNAL MEDICINE

## 2019-10-10 PROCEDURE — 3609010600 HC COLONOSCOPY POLYPECTOMY SNARE/COLD BIOPSY: Performed by: INTERNAL MEDICINE

## 2019-10-10 PROCEDURE — 7100000000 HC PACU RECOVERY - FIRST 15 MIN: Performed by: INTERNAL MEDICINE

## 2019-10-10 RX ORDER — DEXTROSE MONOHYDRATE 25 G/50ML
12.5 INJECTION, SOLUTION INTRAVENOUS PRN
Status: DISCONTINUED | OUTPATIENT
Start: 2019-10-10 | End: 2019-10-10 | Stop reason: HOSPADM

## 2019-10-10 RX ORDER — SODIUM CHLORIDE 0.9 % (FLUSH) 0.9 %
10 SYRINGE (ML) INJECTION PRN
Status: DISCONTINUED | OUTPATIENT
Start: 2019-10-10 | End: 2019-10-10 | Stop reason: HOSPADM

## 2019-10-10 RX ORDER — PROPOFOL 10 MG/ML
INJECTION, EMULSION INTRAVENOUS PRN
Status: DISCONTINUED | OUTPATIENT
Start: 2019-10-10 | End: 2019-10-10 | Stop reason: SDUPTHER

## 2019-10-10 RX ORDER — SODIUM CHLORIDE 9 MG/ML
INJECTION, SOLUTION INTRAVENOUS CONTINUOUS PRN
Status: DISCONTINUED | OUTPATIENT
Start: 2019-10-10 | End: 2019-10-10 | Stop reason: SDUPTHER

## 2019-10-10 RX ORDER — LIDOCAINE HYDROCHLORIDE 10 MG/ML
1 INJECTION, SOLUTION EPIDURAL; INFILTRATION; INTRACAUDAL; PERINEURAL
Status: DISCONTINUED | OUTPATIENT
Start: 2019-10-10 | End: 2019-10-10 | Stop reason: HOSPADM

## 2019-10-10 RX ORDER — LIDOCAINE HYDROCHLORIDE 20 MG/ML
INJECTION, SOLUTION INFILTRATION; PERINEURAL PRN
Status: DISCONTINUED | OUTPATIENT
Start: 2019-10-10 | End: 2019-10-10 | Stop reason: SDUPTHER

## 2019-10-10 RX ORDER — SODIUM CHLORIDE 9 MG/ML
INJECTION, SOLUTION INTRAVENOUS CONTINUOUS
Status: DISCONTINUED | OUTPATIENT
Start: 2019-10-10 | End: 2019-10-10 | Stop reason: HOSPADM

## 2019-10-10 RX ORDER — SODIUM CHLORIDE 0.9 % (FLUSH) 0.9 %
10 SYRINGE (ML) INJECTION EVERY 12 HOURS SCHEDULED
Status: DISCONTINUED | OUTPATIENT
Start: 2019-10-10 | End: 2019-10-10 | Stop reason: HOSPADM

## 2019-10-10 RX ADMIN — PROPOFOL 40 MG: 10 INJECTION, EMULSION INTRAVENOUS at 12:19

## 2019-10-10 RX ADMIN — LIDOCAINE HYDROCHLORIDE 100 MG: 20 INJECTION, SOLUTION INFILTRATION; PERINEURAL at 12:02

## 2019-10-10 RX ADMIN — PROPOFOL 20 MG: 10 INJECTION, EMULSION INTRAVENOUS at 12:15

## 2019-10-10 RX ADMIN — PROPOFOL 40 MG: 10 INJECTION, EMULSION INTRAVENOUS at 12:07

## 2019-10-10 RX ADMIN — PROPOFOL 40 MG: 10 INJECTION, EMULSION INTRAVENOUS at 12:10

## 2019-10-10 RX ADMIN — PROPOFOL 40 MG: 10 INJECTION, EMULSION INTRAVENOUS at 12:12

## 2019-10-10 RX ADMIN — DEXTROSE MONOHYDRATE 12.5 G: 25 INJECTION, SOLUTION INTRAVENOUS at 11:55

## 2019-10-10 RX ADMIN — SODIUM CHLORIDE: 9 INJECTION, SOLUTION INTRAVENOUS at 10:30

## 2019-10-10 RX ADMIN — SODIUM CHLORIDE: 9 INJECTION, SOLUTION INTRAVENOUS at 12:24

## 2019-10-10 RX ADMIN — PROPOFOL 60 MG: 10 INJECTION, EMULSION INTRAVENOUS at 12:03

## 2019-10-10 RX ADMIN — SODIUM CHLORIDE: 9 INJECTION, SOLUTION INTRAVENOUS at 11:56

## 2019-10-10 ASSESSMENT — LIFESTYLE VARIABLES: SMOKING_STATUS: 0

## 2019-10-10 ASSESSMENT — COPD QUESTIONNAIRES: CAT_SEVERITY: MODERATE

## 2019-10-10 ASSESSMENT — ENCOUNTER SYMPTOMS: SHORTNESS OF BREATH: 1

## 2019-10-11 ENCOUNTER — CARE COORDINATION (OUTPATIENT)
Dept: CASE MANAGEMENT | Age: 64
End: 2019-10-11

## 2019-10-19 ENCOUNTER — APPOINTMENT (OUTPATIENT)
Dept: GENERAL RADIOLOGY | Age: 64
End: 2019-10-19
Payer: MEDICARE

## 2019-10-19 ENCOUNTER — HOSPITAL ENCOUNTER (EMERGENCY)
Age: 64
Discharge: HOME | End: 2019-10-19
Attending: EMERGENCY MEDICINE
Payer: MEDICARE

## 2019-10-19 VITALS
SYSTOLIC BLOOD PRESSURE: 154 MMHG | OXYGEN SATURATION: 98 % | HEART RATE: 95 BPM | DIASTOLIC BLOOD PRESSURE: 87 MMHG | BODY MASS INDEX: 51.91 KG/M2 | WEIGHT: 293 LBS | TEMPERATURE: 97.9 F | HEIGHT: 63 IN | RESPIRATION RATE: 20 BRPM

## 2019-10-19 DIAGNOSIS — L98.499 SUPERFICIAL ULCER (HCC): ICD-10-CM

## 2019-10-19 DIAGNOSIS — Z99.2 ESRD ON HEMODIALYSIS (HCC): ICD-10-CM

## 2019-10-19 DIAGNOSIS — N18.6 ESRD ON HEMODIALYSIS (HCC): ICD-10-CM

## 2019-10-19 DIAGNOSIS — M79.671 RIGHT FOOT PAIN: Primary | ICD-10-CM

## 2019-10-19 PROCEDURE — 6360000002 HC RX W HCPCS: Performed by: EMERGENCY MEDICINE

## 2019-10-19 PROCEDURE — 73630 X-RAY EXAM OF FOOT: CPT

## 2019-10-19 PROCEDURE — 96372 THER/PROPH/DIAG INJ SC/IM: CPT

## 2019-10-19 PROCEDURE — 99283 EMERGENCY DEPT VISIT LOW MDM: CPT

## 2019-10-19 RX ORDER — CLINDAMYCIN HYDROCHLORIDE 300 MG/1
300 CAPSULE ORAL 4 TIMES DAILY
Qty: 28 CAPSULE | Refills: 0 | Status: SHIPPED | OUTPATIENT
Start: 2019-10-19 | End: 2019-10-26

## 2019-10-19 RX ORDER — HYDROMORPHONE HYDROCHLORIDE 1 MG/ML
1 INJECTION, SOLUTION INTRAMUSCULAR; INTRAVENOUS; SUBCUTANEOUS ONCE
Status: COMPLETED | OUTPATIENT
Start: 2019-10-19 | End: 2019-10-19

## 2019-10-19 RX ADMIN — HYDROMORPHONE HYDROCHLORIDE 1 MG: 1 INJECTION, SOLUTION INTRAMUSCULAR; INTRAVENOUS; SUBCUTANEOUS at 21:35

## 2019-10-19 ASSESSMENT — PAIN DESCRIPTION - ORIENTATION: ORIENTATION: RIGHT

## 2019-10-19 ASSESSMENT — PAIN SCALES - GENERAL
PAINLEVEL_OUTOF10: 10
PAINLEVEL_OUTOF10: 10

## 2019-10-19 ASSESSMENT — PAIN DESCRIPTION - LOCATION: LOCATION: FOOT

## 2019-10-19 ASSESSMENT — PAIN DESCRIPTION - PAIN TYPE: TYPE: ACUTE PAIN

## 2019-10-21 ENCOUNTER — TELEPHONE (OUTPATIENT)
Dept: FAMILY MEDICINE CLINIC | Age: 64
End: 2019-10-21

## 2019-10-21 ENCOUNTER — TELEPHONE (OUTPATIENT)
Dept: OTHER | Facility: CLINIC | Age: 64
End: 2019-10-21

## 2019-10-22 ENCOUNTER — TELEPHONE (OUTPATIENT)
Dept: FAMILY MEDICINE CLINIC | Age: 64
End: 2019-10-22

## 2019-10-22 ENCOUNTER — OFFICE VISIT (OUTPATIENT)
Dept: FAMILY MEDICINE CLINIC | Age: 64
End: 2019-10-22
Payer: MEDICARE

## 2019-10-22 VITALS — HEART RATE: 92 BPM | RESPIRATION RATE: 18 BRPM | WEIGHT: 293 LBS | BODY MASS INDEX: 57.75 KG/M2

## 2019-10-22 DIAGNOSIS — L97.519 DIABETIC ULCER OF RIGHT FOOT ASSOCIATED WITH TYPE 2 DIABETES MELLITUS, UNSPECIFIED PART OF FOOT, UNSPECIFIED ULCER STAGE (HCC): ICD-10-CM

## 2019-10-22 DIAGNOSIS — R53.1 GENERAL WEAKNESS: ICD-10-CM

## 2019-10-22 DIAGNOSIS — N18.6 END STAGE RENAL DISEASE (HCC): ICD-10-CM

## 2019-10-22 DIAGNOSIS — L98.499 SUPERFICIAL ULCER (HCC): ICD-10-CM

## 2019-10-22 DIAGNOSIS — Z99.2 DIALYSIS PATIENT (HCC): ICD-10-CM

## 2019-10-22 DIAGNOSIS — M79.671 RIGHT FOOT PAIN: Primary | ICD-10-CM

## 2019-10-22 DIAGNOSIS — E66.01 MORBID OBESITY DUE TO EXCESS CALORIES (HCC): ICD-10-CM

## 2019-10-22 DIAGNOSIS — M79.671 FOOT PAIN, RIGHT: Primary | ICD-10-CM

## 2019-10-22 DIAGNOSIS — E11.621 DIABETIC ULCER OF RIGHT FOOT ASSOCIATED WITH TYPE 2 DIABETES MELLITUS, UNSPECIFIED PART OF FOOT, UNSPECIFIED ULCER STAGE (HCC): ICD-10-CM

## 2019-10-22 DIAGNOSIS — Z09 HOSPITAL DISCHARGE FOLLOW-UP: ICD-10-CM

## 2019-10-22 PROCEDURE — G8417 CALC BMI ABV UP PARAM F/U: HCPCS | Performed by: NURSE PRACTITIONER

## 2019-10-22 PROCEDURE — G8427 DOCREV CUR MEDS BY ELIG CLIN: HCPCS | Performed by: NURSE PRACTITIONER

## 2019-10-22 PROCEDURE — 99214 OFFICE O/P EST MOD 30 MIN: CPT | Performed by: NURSE PRACTITIONER

## 2019-10-22 PROCEDURE — G8598 ASA/ANTIPLAT THER USED: HCPCS | Performed by: NURSE PRACTITIONER

## 2019-10-22 PROCEDURE — 3017F COLORECTAL CA SCREEN DOC REV: CPT | Performed by: NURSE PRACTITIONER

## 2019-10-22 PROCEDURE — G8482 FLU IMMUNIZE ORDER/ADMIN: HCPCS | Performed by: NURSE PRACTITIONER

## 2019-10-22 PROCEDURE — 1111F DSCHRG MED/CURRENT MED MERGE: CPT | Performed by: NURSE PRACTITIONER

## 2019-10-22 PROCEDURE — 1036F TOBACCO NON-USER: CPT | Performed by: NURSE PRACTITIONER

## 2019-10-22 RX ORDER — HYDROMORPHONE HYDROCHLORIDE 2 MG/1
2 TABLET ORAL EVERY 12 HOURS PRN
Qty: 20 TABLET | Refills: 0 | Status: ON HOLD | OUTPATIENT
Start: 2019-10-22 | End: 2019-11-01

## 2019-10-22 ASSESSMENT — ENCOUNTER SYMPTOMS
SHORTNESS OF BREATH: 1
COLOR CHANGE: 1

## 2019-10-25 ENCOUNTER — APPOINTMENT (OUTPATIENT)
Dept: GENERAL RADIOLOGY | Age: 64
DRG: 640 | End: 2019-10-25
Payer: MEDICARE

## 2019-10-25 ENCOUNTER — APPOINTMENT (OUTPATIENT)
Dept: CT IMAGING | Age: 64
DRG: 640 | End: 2019-10-25
Payer: MEDICARE

## 2019-10-25 ENCOUNTER — HOSPITAL ENCOUNTER (EMERGENCY)
Age: 64
Discharge: HOME | DRG: 640 | End: 2019-10-25
Attending: EMERGENCY MEDICINE
Payer: MEDICARE

## 2019-10-25 VITALS
TEMPERATURE: 99 F | RESPIRATION RATE: 17 BRPM | DIASTOLIC BLOOD PRESSURE: 66 MMHG | HEART RATE: 95 BPM | BODY MASS INDEX: 51.91 KG/M2 | SYSTOLIC BLOOD PRESSURE: 115 MMHG | WEIGHT: 293 LBS | HEIGHT: 63 IN | OXYGEN SATURATION: 95 %

## 2019-10-25 DIAGNOSIS — Z99.2 ESRD ON HEMODIALYSIS (HCC): ICD-10-CM

## 2019-10-25 DIAGNOSIS — E11.42 DIABETIC POLYNEUROPATHY ASSOCIATED WITH TYPE 2 DIABETES MELLITUS (HCC): ICD-10-CM

## 2019-10-25 DIAGNOSIS — N18.6 ESRD ON HEMODIALYSIS (HCC): ICD-10-CM

## 2019-10-25 DIAGNOSIS — R10.9 LEFT SIDED ABDOMINAL PAIN: Primary | ICD-10-CM

## 2019-10-25 LAB
A/G RATIO: 1 (ref 1.1–2.2)
ALBUMIN SERPL-MCNC: 3.6 G/DL (ref 3.4–5)
ALP BLD-CCNC: 89 U/L (ref 40–129)
ALT SERPL-CCNC: 21 U/L (ref 10–40)
ANION GAP SERPL CALCULATED.3IONS-SCNC: 16 MMOL/L (ref 3–16)
AST SERPL-CCNC: 19 U/L (ref 15–37)
BASOPHILS ABSOLUTE: 0 K/UL (ref 0–0.2)
BASOPHILS RELATIVE PERCENT: 0.6 %
BILIRUB SERPL-MCNC: 0.3 MG/DL (ref 0–1)
BUN BLDV-MCNC: 13 MG/DL (ref 7–20)
CALCIUM SERPL-MCNC: 9.7 MG/DL (ref 8.3–10.6)
CHLORIDE BLD-SCNC: 98 MMOL/L (ref 99–110)
CO2: 24 MMOL/L (ref 21–32)
CREAT SERPL-MCNC: 5.8 MG/DL (ref 0.6–1.2)
EOSINOPHILS ABSOLUTE: 0.1 K/UL (ref 0–0.6)
EOSINOPHILS RELATIVE PERCENT: 1.3 %
GFR AFRICAN AMERICAN: 9
GFR NON-AFRICAN AMERICAN: 7
GLOBULIN: 3.7 G/DL
GLUCOSE BLD-MCNC: 268 MG/DL (ref 70–99)
HCT VFR BLD CALC: 30.3 % (ref 36–48)
HEMOGLOBIN: 9.8 G/DL (ref 12–16)
INR BLD: 1.78 (ref 0.86–1.14)
LACTIC ACID: 2 MMOL/L (ref 0.4–2)
LACTIC ACID: 2.7 MMOL/L (ref 0.4–2)
LIPASE: 35 U/L (ref 13–60)
LYMPHOCYTES ABSOLUTE: 1.1 K/UL (ref 1–5.1)
LYMPHOCYTES RELATIVE PERCENT: 18.6 %
MCH RBC QN AUTO: 34.1 PG (ref 26–34)
MCHC RBC AUTO-ENTMCNC: 32.2 G/DL (ref 31–36)
MCV RBC AUTO: 105.8 FL (ref 80–100)
MONOCYTES ABSOLUTE: 0.7 K/UL (ref 0–1.3)
MONOCYTES RELATIVE PERCENT: 11.2 %
NEUTROPHILS ABSOLUTE: 4.1 K/UL (ref 1.7–7.7)
NEUTROPHILS RELATIVE PERCENT: 68.3 %
PDW BLD-RTO: 19.2 % (ref 12.4–15.4)
PLATELET # BLD: 239 K/UL (ref 135–450)
PMV BLD AUTO: 9 FL (ref 5–10.5)
POTASSIUM REFLEX MAGNESIUM: 4.4 MMOL/L (ref 3.5–5.1)
PROTHROMBIN TIME: 20.3 SEC (ref 9.8–13)
RBC # BLD: 2.87 M/UL (ref 4–5.2)
SODIUM BLD-SCNC: 138 MMOL/L (ref 136–145)
TOTAL PROTEIN: 7.3 G/DL (ref 6.4–8.2)
TROPONIN: 0.11 NG/ML
TROPONIN: 0.12 NG/ML
WBC # BLD: 6 K/UL (ref 4–11)

## 2019-10-25 PROCEDURE — 85610 PROTHROMBIN TIME: CPT

## 2019-10-25 PROCEDURE — 6360000002 HC RX W HCPCS: Performed by: PHYSICIAN ASSISTANT

## 2019-10-25 PROCEDURE — 84484 ASSAY OF TROPONIN QUANT: CPT

## 2019-10-25 PROCEDURE — 6370000000 HC RX 637 (ALT 250 FOR IP): Performed by: EMERGENCY MEDICINE

## 2019-10-25 PROCEDURE — 80053 COMPREHEN METABOLIC PANEL: CPT

## 2019-10-25 PROCEDURE — 96376 TX/PRO/DX INJ SAME DRUG ADON: CPT

## 2019-10-25 PROCEDURE — 87040 BLOOD CULTURE FOR BACTERIA: CPT

## 2019-10-25 PROCEDURE — 74176 CT ABD & PELVIS W/O CONTRAST: CPT

## 2019-10-25 PROCEDURE — 96374 THER/PROPH/DIAG INJ IV PUSH: CPT

## 2019-10-25 PROCEDURE — 36415 COLL VENOUS BLD VENIPUNCTURE: CPT

## 2019-10-25 PROCEDURE — 83690 ASSAY OF LIPASE: CPT

## 2019-10-25 PROCEDURE — 85025 COMPLETE CBC W/AUTO DIFF WBC: CPT

## 2019-10-25 PROCEDURE — 83605 ASSAY OF LACTIC ACID: CPT

## 2019-10-25 PROCEDURE — 96375 TX/PRO/DX INJ NEW DRUG ADDON: CPT

## 2019-10-25 PROCEDURE — 71045 X-RAY EXAM CHEST 1 VIEW: CPT

## 2019-10-25 PROCEDURE — 99285 EMERGENCY DEPT VISIT HI MDM: CPT

## 2019-10-25 PROCEDURE — 6360000002 HC RX W HCPCS: Performed by: EMERGENCY MEDICINE

## 2019-10-25 RX ORDER — CLINDAMYCIN HYDROCHLORIDE 150 MG/1
300 CAPSULE ORAL ONCE
Status: COMPLETED | OUTPATIENT
Start: 2019-10-25 | End: 2019-10-25

## 2019-10-25 RX ORDER — ONDANSETRON 2 MG/ML
4 INJECTION INTRAMUSCULAR; INTRAVENOUS ONCE
Status: COMPLETED | OUTPATIENT
Start: 2019-10-25 | End: 2019-10-25

## 2019-10-25 RX ORDER — AZITHROMYCIN 250 MG/1
250 TABLET, FILM COATED ORAL ONCE
Status: COMPLETED | OUTPATIENT
Start: 2019-10-25 | End: 2019-10-25

## 2019-10-25 RX ORDER — HYDROMORPHONE HYDROCHLORIDE 1 MG/ML
0.5 INJECTION, SOLUTION INTRAMUSCULAR; INTRAVENOUS; SUBCUTANEOUS ONCE
Status: COMPLETED | OUTPATIENT
Start: 2019-10-25 | End: 2019-10-25

## 2019-10-25 RX ADMIN — AZITHROMYCIN MONOHYDRATE 250 MG: 250 TABLET ORAL at 15:32

## 2019-10-25 RX ADMIN — HYDROMORPHONE HYDROCHLORIDE 0.5 MG: 1 INJECTION, SOLUTION INTRAMUSCULAR; INTRAVENOUS; SUBCUTANEOUS at 15:33

## 2019-10-25 RX ADMIN — CLINDAMYCIN HYDROCHLORIDE 300 MG: 150 CAPSULE ORAL at 15:32

## 2019-10-25 RX ADMIN — ONDANSETRON 4 MG: 2 INJECTION INTRAMUSCULAR; INTRAVENOUS at 12:24

## 2019-10-25 RX ADMIN — HYDROMORPHONE HYDROCHLORIDE 0.5 MG: 1 INJECTION, SOLUTION INTRAMUSCULAR; INTRAVENOUS; SUBCUTANEOUS at 12:24

## 2019-10-25 ASSESSMENT — PAIN DESCRIPTION - PAIN TYPE: TYPE: ACUTE PAIN

## 2019-10-25 ASSESSMENT — PAIN DESCRIPTION - LOCATION
LOCATION: ABDOMEN
LOCATION: ABDOMEN

## 2019-10-25 ASSESSMENT — PAIN SCALES - GENERAL
PAINLEVEL_OUTOF10: 8
PAINLEVEL_OUTOF10: 8
PAINLEVEL_OUTOF10: 5
PAINLEVEL_OUTOF10: 4

## 2019-10-25 ASSESSMENT — ENCOUNTER SYMPTOMS
COUGH: 0
NAUSEA: 0
RESPIRATORY NEGATIVE: 1
ABDOMINAL PAIN: 1
VOMITING: 0
COLOR CHANGE: 0
DIARRHEA: 0
CONSTIPATION: 0
BACK PAIN: 0
SHORTNESS OF BREATH: 0

## 2019-10-25 ASSESSMENT — PAIN DESCRIPTION - DESCRIPTORS
DESCRIPTORS: ACHING
DESCRIPTORS: ACHING

## 2019-10-25 ASSESSMENT — PAIN DESCRIPTION - PROGRESSION: CLINICAL_PROGRESSION: GRADUALLY IMPROVING

## 2019-10-25 ASSESSMENT — PAIN DESCRIPTION - FREQUENCY
FREQUENCY: CONTINUOUS
FREQUENCY: INTERMITTENT

## 2019-10-25 ASSESSMENT — PAIN DESCRIPTION - ONSET: ONSET: ON-GOING

## 2019-10-27 ENCOUNTER — HOSPITAL ENCOUNTER (INPATIENT)
Age: 64
LOS: 6 days | Discharge: HOME HEALTH | DRG: 640 | End: 2019-11-02
Attending: EMERGENCY MEDICINE | Admitting: INTERNAL MEDICINE
Payer: MEDICARE

## 2019-10-27 DIAGNOSIS — Z99.2 ESRD ON HEMODIALYSIS (HCC): ICD-10-CM

## 2019-10-27 DIAGNOSIS — R52 INTRACTABLE PAIN: ICD-10-CM

## 2019-10-27 DIAGNOSIS — H54.61 VISION LOSS OF RIGHT EYE: ICD-10-CM

## 2019-10-27 DIAGNOSIS — E83.59 CALCIPHYLAXIS: Primary | ICD-10-CM

## 2019-10-27 DIAGNOSIS — E11.621 DIABETIC ULCER OF RIGHT FOOT ASSOCIATED WITH TYPE 2 DIABETES MELLITUS, UNSPECIFIED PART OF FOOT, UNSPECIFIED ULCER STAGE (HCC): ICD-10-CM

## 2019-10-27 DIAGNOSIS — L97.519 DIABETIC ULCER OF RIGHT FOOT ASSOCIATED WITH TYPE 2 DIABETES MELLITUS, UNSPECIFIED PART OF FOOT, UNSPECIFIED ULCER STAGE (HCC): ICD-10-CM

## 2019-10-27 DIAGNOSIS — M79.671 RIGHT FOOT PAIN: ICD-10-CM

## 2019-10-27 DIAGNOSIS — Z79.01 ANTICOAGULATED ON COUMADIN: ICD-10-CM

## 2019-10-27 DIAGNOSIS — N18.6 ESRD ON HEMODIALYSIS (HCC): ICD-10-CM

## 2019-10-27 PROBLEM — M79.605 CHRONIC PAIN OF LEFT LOWER EXTREMITY: Status: ACTIVE | Noted: 2019-10-27

## 2019-10-27 PROBLEM — G89.29 CHRONIC PAIN OF LEFT LOWER EXTREMITY: Status: ACTIVE | Noted: 2019-10-27

## 2019-10-27 LAB
A/G RATIO: 1 (ref 1.1–2.2)
ALBUMIN SERPL-MCNC: 3.6 G/DL (ref 3.4–5)
ALP BLD-CCNC: 85 U/L (ref 40–129)
ALT SERPL-CCNC: 17 U/L (ref 10–40)
ANION GAP SERPL CALCULATED.3IONS-SCNC: 20 MMOL/L (ref 3–16)
AST SERPL-CCNC: 18 U/L (ref 15–37)
BASOPHILS ABSOLUTE: 0.1 K/UL (ref 0–0.2)
BASOPHILS RELATIVE PERCENT: 2.1 %
BILIRUB SERPL-MCNC: 0.3 MG/DL (ref 0–1)
BUN BLDV-MCNC: 33 MG/DL (ref 7–20)
CALCIUM SERPL-MCNC: 9.5 MG/DL (ref 8.3–10.6)
CHLORIDE BLD-SCNC: 96 MMOL/L (ref 99–110)
CO2: 21 MMOL/L (ref 21–32)
CREAT SERPL-MCNC: 10 MG/DL (ref 0.6–1.2)
EOSINOPHILS ABSOLUTE: 0.1 K/UL (ref 0–0.6)
EOSINOPHILS RELATIVE PERCENT: 1.3 %
GFR AFRICAN AMERICAN: 5
GFR NON-AFRICAN AMERICAN: 4
GLOBULIN: 3.7 G/DL
GLUCOSE BLD-MCNC: 141 MG/DL (ref 70–99)
GLUCOSE BLD-MCNC: 204 MG/DL (ref 70–99)
GLUCOSE BLD-MCNC: 224 MG/DL (ref 70–99)
GLUCOSE BLD-MCNC: 278 MG/DL (ref 70–99)
HCT VFR BLD CALC: 30.4 % (ref 36–48)
HEMOGLOBIN: 9.8 G/DL (ref 12–16)
INR BLD: 1.94 (ref 0.86–1.14)
LYMPHOCYTES ABSOLUTE: 1.3 K/UL (ref 1–5.1)
LYMPHOCYTES RELATIVE PERCENT: 19.2 %
MAGNESIUM: 2.3 MG/DL (ref 1.8–2.4)
MCH RBC QN AUTO: 34.5 PG (ref 26–34)
MCHC RBC AUTO-ENTMCNC: 32.1 G/DL (ref 31–36)
MCV RBC AUTO: 107.4 FL (ref 80–100)
MONOCYTES ABSOLUTE: 0.7 K/UL (ref 0–1.3)
MONOCYTES RELATIVE PERCENT: 10.4 %
NEUTROPHILS ABSOLUTE: 4.4 K/UL (ref 1.7–7.7)
NEUTROPHILS RELATIVE PERCENT: 67 %
PARATHYROID HORMONE INTACT: 723.8 PG/ML (ref 14–72)
PDW BLD-RTO: 19.9 % (ref 12.4–15.4)
PERFORMED ON: ABNORMAL
PHOSPHORUS: 6.5 MG/DL (ref 2.5–4.9)
PLATELET # BLD: 283 K/UL (ref 135–450)
PMV BLD AUTO: 8.6 FL (ref 5–10.5)
POTASSIUM SERPL-SCNC: 4.5 MMOL/L (ref 3.5–5.1)
PROTHROMBIN TIME: 22.1 SEC (ref 9.8–13)
RBC # BLD: 2.83 M/UL (ref 4–5.2)
SODIUM BLD-SCNC: 137 MMOL/L (ref 136–145)
TOTAL PROTEIN: 7.3 G/DL (ref 6.4–8.2)
WBC # BLD: 6.5 K/UL (ref 4–11)

## 2019-10-27 PROCEDURE — 83970 ASSAY OF PARATHORMONE: CPT

## 2019-10-27 PROCEDURE — 6360000002 HC RX W HCPCS: Performed by: INTERNAL MEDICINE

## 2019-10-27 PROCEDURE — 6370000000 HC RX 637 (ALT 250 FOR IP): Performed by: INTERNAL MEDICINE

## 2019-10-27 PROCEDURE — 99285 EMERGENCY DEPT VISIT HI MDM: CPT

## 2019-10-27 PROCEDURE — 85610 PROTHROMBIN TIME: CPT

## 2019-10-27 PROCEDURE — 6360000002 HC RX W HCPCS: Performed by: EMERGENCY MEDICINE

## 2019-10-27 PROCEDURE — 1200000000 HC SEMI PRIVATE

## 2019-10-27 PROCEDURE — 96374 THER/PROPH/DIAG INJ IV PUSH: CPT

## 2019-10-27 PROCEDURE — 94761 N-INVAS EAR/PLS OXIMETRY MLT: CPT

## 2019-10-27 PROCEDURE — 83735 ASSAY OF MAGNESIUM: CPT

## 2019-10-27 PROCEDURE — 2580000003 HC RX 258: Performed by: INTERNAL MEDICINE

## 2019-10-27 PROCEDURE — 80053 COMPREHEN METABOLIC PANEL: CPT

## 2019-10-27 PROCEDURE — 84100 ASSAY OF PHOSPHORUS: CPT

## 2019-10-27 PROCEDURE — 94640 AIRWAY INHALATION TREATMENT: CPT

## 2019-10-27 PROCEDURE — 6360000002 HC RX W HCPCS

## 2019-10-27 PROCEDURE — 85025 COMPLETE CBC W/AUTO DIFF WBC: CPT

## 2019-10-27 RX ORDER — FLUTICASONE PROPIONATE 50 MCG
1 SPRAY, SUSPENSION (ML) NASAL DAILY
Status: DISCONTINUED | OUTPATIENT
Start: 2019-10-27 | End: 2019-11-02 | Stop reason: HOSPADM

## 2019-10-27 RX ORDER — TRAZODONE HYDROCHLORIDE 50 MG/1
100 TABLET ORAL NIGHTLY PRN
Status: DISCONTINUED | OUTPATIENT
Start: 2019-10-27 | End: 2019-11-02 | Stop reason: HOSPADM

## 2019-10-27 RX ORDER — LIDOCAINE AND PRILOCAINE 25; 25 MG/G; MG/G
CREAM TOPICAL PRN
Status: DISCONTINUED | OUTPATIENT
Start: 2019-10-27 | End: 2019-11-02 | Stop reason: HOSPADM

## 2019-10-27 RX ORDER — SODIUM CHLORIDE 0.9 % (FLUSH) 0.9 %
10 SYRINGE (ML) INJECTION PRN
Status: DISCONTINUED | OUTPATIENT
Start: 2019-10-27 | End: 2019-11-02 | Stop reason: HOSPADM

## 2019-10-27 RX ORDER — ONDANSETRON 2 MG/ML
4 INJECTION INTRAMUSCULAR; INTRAVENOUS EVERY 6 HOURS PRN
Status: DISCONTINUED | OUTPATIENT
Start: 2019-10-27 | End: 2019-11-02 | Stop reason: HOSPADM

## 2019-10-27 RX ORDER — PANTOPRAZOLE SODIUM 40 MG/1
40 TABLET, DELAYED RELEASE ORAL
Status: DISCONTINUED | OUTPATIENT
Start: 2019-10-27 | End: 2019-11-02 | Stop reason: HOSPADM

## 2019-10-27 RX ORDER — FORMOTEROL FUMARATE 20 UG/2ML
SOLUTION RESPIRATORY (INHALATION)
Status: COMPLETED
Start: 2019-10-27 | End: 2019-10-27

## 2019-10-27 RX ORDER — HYDROMORPHONE HYDROCHLORIDE 2 MG/1
2 TABLET ORAL EVERY 4 HOURS PRN
Status: DISCONTINUED | OUTPATIENT
Start: 2019-10-27 | End: 2019-11-02 | Stop reason: HOSPADM

## 2019-10-27 RX ORDER — SODIUM CHLORIDE 0.9 % (FLUSH) 0.9 %
10 SYRINGE (ML) INJECTION EVERY 12 HOURS SCHEDULED
Status: DISCONTINUED | OUTPATIENT
Start: 2019-10-27 | End: 2019-11-02 | Stop reason: HOSPADM

## 2019-10-27 RX ORDER — BUDESONIDE 0.5 MG/2ML
500 INHALANT ORAL 2 TIMES DAILY
Status: DISCONTINUED | OUTPATIENT
Start: 2019-10-27 | End: 2019-11-02 | Stop reason: HOSPADM

## 2019-10-27 RX ORDER — POTASSIUM CHLORIDE 20 MEQ/1
40 TABLET, EXTENDED RELEASE ORAL PRN
Status: DISCONTINUED | OUTPATIENT
Start: 2019-10-27 | End: 2019-10-27

## 2019-10-27 RX ORDER — SEVELAMER CARBONATE 800 MG/1
1600 TABLET, FILM COATED ORAL
Status: DISCONTINUED | OUTPATIENT
Start: 2019-10-27 | End: 2019-11-02 | Stop reason: HOSPADM

## 2019-10-27 RX ORDER — ALBUTEROL SULFATE 90 UG/1
2 AEROSOL, METERED RESPIRATORY (INHALATION) EVERY 6 HOURS PRN
Status: DISCONTINUED | OUTPATIENT
Start: 2019-10-27 | End: 2019-11-02 | Stop reason: HOSPADM

## 2019-10-27 RX ORDER — NITROGLYCERIN 0.4 MG/1
0.4 TABLET SUBLINGUAL EVERY 5 MIN PRN
Status: DISCONTINUED | OUTPATIENT
Start: 2019-10-27 | End: 2019-11-02 | Stop reason: HOSPADM

## 2019-10-27 RX ORDER — HYDROMORPHONE HYDROCHLORIDE 1 MG/ML
1 INJECTION, SOLUTION INTRAMUSCULAR; INTRAVENOUS; SUBCUTANEOUS EVERY 4 HOURS PRN
Status: DISCONTINUED | OUTPATIENT
Start: 2019-10-27 | End: 2019-11-02 | Stop reason: HOSPADM

## 2019-10-27 RX ORDER — WARFARIN SODIUM 5 MG/1
1 TABLET ORAL DAILY
Status: DISCONTINUED | OUTPATIENT
Start: 2019-10-27 | End: 2019-10-27 | Stop reason: DRUGHIGH

## 2019-10-27 RX ORDER — DEXTROSE MONOHYDRATE 50 MG/ML
100 INJECTION, SOLUTION INTRAVENOUS PRN
Status: DISCONTINUED | OUTPATIENT
Start: 2019-10-27 | End: 2019-11-02 | Stop reason: HOSPADM

## 2019-10-27 RX ORDER — HYDROMORPHONE HYDROCHLORIDE 1 MG/ML
1 INJECTION, SOLUTION INTRAMUSCULAR; INTRAVENOUS; SUBCUTANEOUS EVERY 4 HOURS PRN
Status: COMPLETED | OUTPATIENT
Start: 2019-10-27 | End: 2019-10-27

## 2019-10-27 RX ORDER — INSULIN LISPRO 100 [IU]/ML
0-12 INJECTION, SOLUTION INTRAVENOUS; SUBCUTANEOUS
Status: DISCONTINUED | OUTPATIENT
Start: 2019-10-27 | End: 2019-11-02 | Stop reason: HOSPADM

## 2019-10-27 RX ORDER — ESCITALOPRAM OXALATE 10 MG/1
10 TABLET ORAL DAILY
Status: DISCONTINUED | OUTPATIENT
Start: 2019-10-27 | End: 2019-11-02 | Stop reason: HOSPADM

## 2019-10-27 RX ORDER — ALLOPURINOL 100 MG/1
100 TABLET ORAL DAILY
Status: DISCONTINUED | OUTPATIENT
Start: 2019-10-27 | End: 2019-11-02 | Stop reason: HOSPADM

## 2019-10-27 RX ORDER — POTASSIUM CHLORIDE 7.45 MG/ML
10 INJECTION INTRAVENOUS PRN
Status: DISCONTINUED | OUTPATIENT
Start: 2019-10-27 | End: 2019-11-02 | Stop reason: HOSPADM

## 2019-10-27 RX ORDER — INSULIN LISPRO 100 [IU]/ML
0-6 INJECTION, SOLUTION INTRAVENOUS; SUBCUTANEOUS NIGHTLY
Status: DISCONTINUED | OUTPATIENT
Start: 2019-10-27 | End: 2019-11-02 | Stop reason: HOSPADM

## 2019-10-27 RX ORDER — CINACALCET 30 MG/1
30 TABLET, FILM COATED ORAL NIGHTLY
Status: DISCONTINUED | OUTPATIENT
Start: 2019-10-27 | End: 2019-11-02 | Stop reason: HOSPADM

## 2019-10-27 RX ORDER — NICOTINE POLACRILEX 4 MG
15 LOZENGE BUCCAL PRN
Status: DISCONTINUED | OUTPATIENT
Start: 2019-10-27 | End: 2019-11-02 | Stop reason: HOSPADM

## 2019-10-27 RX ORDER — NEBIVOLOL 5 MG/1
2.5 TABLET ORAL DAILY
Status: DISCONTINUED | OUTPATIENT
Start: 2019-10-27 | End: 2019-11-02 | Stop reason: HOSPADM

## 2019-10-27 RX ORDER — WARFARIN SODIUM 5 MG/1
5 TABLET ORAL
Status: DISCONTINUED | OUTPATIENT
Start: 2019-10-28 | End: 2019-10-27

## 2019-10-27 RX ORDER — HYDROMORPHONE HYDROCHLORIDE 1 MG/ML
1 INJECTION, SOLUTION INTRAMUSCULAR; INTRAVENOUS; SUBCUTANEOUS
Status: DISCONTINUED | OUTPATIENT
Start: 2019-10-27 | End: 2019-10-27

## 2019-10-27 RX ORDER — PREGABALIN 50 MG/1
50 CAPSULE ORAL 3 TIMES DAILY
Status: DISCONTINUED | OUTPATIENT
Start: 2019-10-27 | End: 2019-11-02 | Stop reason: HOSPADM

## 2019-10-27 RX ORDER — ROSUVASTATIN CALCIUM 10 MG/1
10 TABLET, COATED ORAL DAILY
Status: DISCONTINUED | OUTPATIENT
Start: 2019-10-27 | End: 2019-11-02 | Stop reason: HOSPADM

## 2019-10-27 RX ORDER — DEXTROSE MONOHYDRATE 25 G/50ML
12.5 INJECTION, SOLUTION INTRAVENOUS PRN
Status: DISCONTINUED | OUTPATIENT
Start: 2019-10-27 | End: 2019-11-02 | Stop reason: HOSPADM

## 2019-10-27 RX ORDER — HYDROMORPHONE HYDROCHLORIDE 2 MG/1
2 TABLET ORAL EVERY 12 HOURS PRN
Status: DISCONTINUED | OUTPATIENT
Start: 2019-10-27 | End: 2019-10-27

## 2019-10-27 RX ORDER — PROMETHAZINE HYDROCHLORIDE 25 MG/1
25 TABLET ORAL EVERY 8 HOURS PRN
Status: DISCONTINUED | OUTPATIENT
Start: 2019-10-27 | End: 2019-11-02 | Stop reason: HOSPADM

## 2019-10-27 RX ORDER — MAGNESIUM SULFATE 1 G/100ML
1 INJECTION INTRAVENOUS PRN
Status: DISCONTINUED | OUTPATIENT
Start: 2019-10-27 | End: 2019-11-02 | Stop reason: HOSPADM

## 2019-10-27 RX ORDER — FORMOTEROL FUMARATE 20 UG/2ML
20 SOLUTION RESPIRATORY (INHALATION) 2 TIMES DAILY
Status: DISCONTINUED | OUTPATIENT
Start: 2019-10-27 | End: 2019-11-02 | Stop reason: HOSPADM

## 2019-10-27 RX ORDER — BUDESONIDE 0.5 MG/2ML
INHALANT ORAL
Status: COMPLETED
Start: 2019-10-27 | End: 2019-10-27

## 2019-10-27 RX ADMIN — ESCITALOPRAM OXALATE 10 MG: 10 TABLET ORAL at 09:43

## 2019-10-27 RX ADMIN — Medication 2 PUFF: at 08:11

## 2019-10-27 RX ADMIN — INSULIN LISPRO 4 UNITS: 100 INJECTION, SOLUTION INTRAVENOUS; SUBCUTANEOUS at 17:32

## 2019-10-27 RX ADMIN — Medication 10 ML: at 20:46

## 2019-10-27 RX ADMIN — INSULIN LISPRO 4 UNITS: 100 INJECTION, SOLUTION INTRAVENOUS; SUBCUTANEOUS at 13:32

## 2019-10-27 RX ADMIN — ALLOPURINOL 100 MG: 100 TABLET ORAL at 09:43

## 2019-10-27 RX ADMIN — INSULIN GLARGINE 30 UNITS: 100 INJECTION, SOLUTION SUBCUTANEOUS at 21:42

## 2019-10-27 RX ADMIN — NEBIVOLOL HYDROCHLORIDE 2.5 MG: 5 TABLET ORAL at 09:44

## 2019-10-27 RX ADMIN — HYDROMORPHONE HYDROCHLORIDE 2 MG: 2 TABLET ORAL at 09:57

## 2019-10-27 RX ADMIN — Medication 2 PUFF: at 19:38

## 2019-10-27 RX ADMIN — PREGABALIN 50 MG: 50 CAPSULE ORAL at 20:38

## 2019-10-27 RX ADMIN — PREGABALIN 50 MG: 50 CAPSULE ORAL at 09:57

## 2019-10-27 RX ADMIN — INSULIN LISPRO 3 UNITS: 100 INJECTION, SOLUTION INTRAVENOUS; SUBCUTANEOUS at 20:38

## 2019-10-27 RX ADMIN — Medication 10 ML: at 14:09

## 2019-10-27 RX ADMIN — PREGABALIN 50 MG: 50 CAPSULE ORAL at 14:10

## 2019-10-27 RX ADMIN — CALCIUM ACETATE 1334 MG: 667 CAPSULE ORAL at 09:49

## 2019-10-27 RX ADMIN — BUDESONIDE 500 MCG: 0.5 SUSPENSION RESPIRATORY (INHALATION) at 19:37

## 2019-10-27 RX ADMIN — FORMOTEROL FUMARATE DIHYDRATE 20 MCG: 20 SOLUTION RESPIRATORY (INHALATION) at 10:40

## 2019-10-27 RX ADMIN — ROSUVASTATIN CALCIUM 10 MG: 10 TABLET, FILM COATED ORAL at 09:43

## 2019-10-27 RX ADMIN — BUDESONIDE 500 MCG: 0.5 SUSPENSION RESPIRATORY (INHALATION) at 08:11

## 2019-10-27 RX ADMIN — PANTOPRAZOLE SODIUM 40 MG: 40 TABLET, DELAYED RELEASE ORAL at 06:36

## 2019-10-27 RX ADMIN — FORMOTEROL FUMARATE DIHYDRATE: 20 SOLUTION RESPIRATORY (INHALATION) at 10:40

## 2019-10-27 RX ADMIN — HYDROMORPHONE HYDROCHLORIDE 1 MG: 1 INJECTION, SOLUTION INTRAMUSCULAR; INTRAVENOUS; SUBCUTANEOUS at 16:24

## 2019-10-27 RX ADMIN — HYDROMORPHONE HYDROCHLORIDE 1 MG: 1 INJECTION, SOLUTION INTRAMUSCULAR; INTRAVENOUS; SUBCUTANEOUS at 06:36

## 2019-10-27 RX ADMIN — FORMOTEROL FUMARATE DIHYDRATE 20 MCG: 20 SOLUTION RESPIRATORY (INHALATION) at 19:37

## 2019-10-27 RX ADMIN — CALCIUM ACETATE 1334 MG: 667 CAPSULE ORAL at 12:52

## 2019-10-27 RX ADMIN — SEVELAMER CARBONATE 1600 MG: 800 TABLET, FILM COATED ORAL at 17:31

## 2019-10-27 RX ADMIN — HYDROMORPHONE HYDROCHLORIDE 1 MG: 1 INJECTION, SOLUTION INTRAMUSCULAR; INTRAVENOUS; SUBCUTANEOUS at 03:26

## 2019-10-27 RX ADMIN — CINACALCET HYDROCHLORIDE 30 MG: 30 TABLET, FILM COATED ORAL at 20:38

## 2019-10-27 ASSESSMENT — PAIN DESCRIPTION - FREQUENCY
FREQUENCY: CONTINUOUS
FREQUENCY: CONTINUOUS

## 2019-10-27 ASSESSMENT — PAIN DESCRIPTION - PAIN TYPE
TYPE: ACUTE PAIN
TYPE: ACUTE PAIN

## 2019-10-27 ASSESSMENT — PAIN SCALES - GENERAL
PAINLEVEL_OUTOF10: 8
PAINLEVEL_OUTOF10: 10
PAINLEVEL_OUTOF10: 9
PAINLEVEL_OUTOF10: 6
PAINLEVEL_OUTOF10: 9
PAINLEVEL_OUTOF10: 9

## 2019-10-27 ASSESSMENT — PAIN DESCRIPTION - PROGRESSION
CLINICAL_PROGRESSION: NOT CHANGED
CLINICAL_PROGRESSION: GRADUALLY WORSENING
CLINICAL_PROGRESSION: NOT CHANGED

## 2019-10-27 ASSESSMENT — ENCOUNTER SYMPTOMS
DIARRHEA: 0
ABDOMINAL PAIN: 0
NAUSEA: 0
SHORTNESS OF BREATH: 0
WHEEZING: 0
VOMITING: 0

## 2019-10-27 ASSESSMENT — PAIN DESCRIPTION - DESCRIPTORS
DESCRIPTORS: BURNING
DESCRIPTORS: BURNING

## 2019-10-27 ASSESSMENT — PAIN DESCRIPTION - ORIENTATION
ORIENTATION: LEFT
ORIENTATION: LEFT

## 2019-10-27 ASSESSMENT — PAIN DESCRIPTION - ONSET
ONSET: SUDDEN
ONSET: SUDDEN

## 2019-10-27 ASSESSMENT — PAIN DESCRIPTION - LOCATION
LOCATION: FLANK
LOCATION: FLANK

## 2019-10-28 ENCOUNTER — TELEPHONE (OUTPATIENT)
Dept: FAMILY MEDICINE CLINIC | Age: 64
End: 2019-10-28

## 2019-10-28 LAB
ANION GAP SERPL CALCULATED.3IONS-SCNC: 14 MMOL/L (ref 3–16)
BUN BLDV-MCNC: 44 MG/DL (ref 7–20)
CALCIUM SERPL-MCNC: 9.5 MG/DL (ref 8.3–10.6)
CHLORIDE BLD-SCNC: 96 MMOL/L (ref 99–110)
CO2: 23 MMOL/L (ref 21–32)
CREAT SERPL-MCNC: 12.4 MG/DL (ref 0.6–1.2)
GFR AFRICAN AMERICAN: 4
GFR NON-AFRICAN AMERICAN: 3
GLUCOSE BLD-MCNC: 180 MG/DL (ref 70–99)
GLUCOSE BLD-MCNC: 197 MG/DL (ref 70–99)
GLUCOSE BLD-MCNC: 197 MG/DL (ref 70–99)
GLUCOSE BLD-MCNC: 208 MG/DL (ref 70–99)
GLUCOSE BLD-MCNC: 259 MG/DL (ref 70–99)
HCT VFR BLD CALC: 27.3 % (ref 36–48)
HEMOGLOBIN: 8.9 G/DL (ref 12–16)
INR BLD: 2.46 (ref 0.86–1.14)
MCH RBC QN AUTO: 34.4 PG (ref 26–34)
MCHC RBC AUTO-ENTMCNC: 32.5 G/DL (ref 31–36)
MCV RBC AUTO: 106.1 FL (ref 80–100)
PDW BLD-RTO: 20 % (ref 12.4–15.4)
PERFORMED ON: ABNORMAL
PHOSPHORUS: 7.2 MG/DL (ref 2.5–4.9)
PLATELET # BLD: 254 K/UL (ref 135–450)
PMV BLD AUTO: 8.2 FL (ref 5–10.5)
POTASSIUM REFLEX MAGNESIUM: 5.9 MMOL/L (ref 3.5–5.1)
PROTHROMBIN TIME: 28 SEC (ref 9.8–13)
RBC # BLD: 2.58 M/UL (ref 4–5.2)
SODIUM BLD-SCNC: 133 MMOL/L (ref 136–145)
WBC # BLD: 8.2 K/UL (ref 4–11)

## 2019-10-28 PROCEDURE — 2580000003 HC RX 258: Performed by: INTERNAL MEDICINE

## 2019-10-28 PROCEDURE — 2500000003 HC RX 250 WO HCPCS: Performed by: INTERNAL MEDICINE

## 2019-10-28 PROCEDURE — 80048 BASIC METABOLIC PNL TOTAL CA: CPT

## 2019-10-28 PROCEDURE — 85027 COMPLETE CBC AUTOMATED: CPT

## 2019-10-28 PROCEDURE — 6360000002 HC RX W HCPCS: Performed by: INTERNAL MEDICINE

## 2019-10-28 PROCEDURE — 6370000000 HC RX 637 (ALT 250 FOR IP): Performed by: INTERNAL MEDICINE

## 2019-10-28 PROCEDURE — 85610 PROTHROMBIN TIME: CPT

## 2019-10-28 PROCEDURE — 94640 AIRWAY INHALATION TREATMENT: CPT

## 2019-10-28 PROCEDURE — 94761 N-INVAS EAR/PLS OXIMETRY MLT: CPT

## 2019-10-28 PROCEDURE — 1200000000 HC SEMI PRIVATE

## 2019-10-28 PROCEDURE — 90935 HEMODIALYSIS ONE EVALUATION: CPT

## 2019-10-28 PROCEDURE — 84100 ASSAY OF PHOSPHORUS: CPT

## 2019-10-28 RX ORDER — INSULIN LISPRO 100 [IU]/ML
INJECTION, SOLUTION INTRAVENOUS; SUBCUTANEOUS
Qty: 15 ML | Refills: 5 | Status: SHIPPED | OUTPATIENT
Start: 2019-10-28 | End: 2019-11-07

## 2019-10-28 RX ADMIN — BUDESONIDE 500 MCG: 0.5 SUSPENSION RESPIRATORY (INHALATION) at 20:41

## 2019-10-28 RX ADMIN — PREGABALIN 50 MG: 50 CAPSULE ORAL at 20:06

## 2019-10-28 RX ADMIN — PANTOPRAZOLE SODIUM 40 MG: 40 TABLET, DELAYED RELEASE ORAL at 08:02

## 2019-10-28 RX ADMIN — FORMOTEROL FUMARATE DIHYDRATE 20 MCG: 20 SOLUTION RESPIRATORY (INHALATION) at 20:41

## 2019-10-28 RX ADMIN — HYDROMORPHONE HYDROCHLORIDE 2 MG: 2 TABLET ORAL at 23:36

## 2019-10-28 RX ADMIN — HYDROMORPHONE HYDROCHLORIDE 2 MG: 2 TABLET ORAL at 10:26

## 2019-10-28 RX ADMIN — CINACALCET HYDROCHLORIDE 30 MG: 30 TABLET, FILM COATED ORAL at 20:06

## 2019-10-28 RX ADMIN — INSULIN GLARGINE 30 UNITS: 100 INJECTION, SOLUTION SUBCUTANEOUS at 20:08

## 2019-10-28 RX ADMIN — Medication 2 PUFF: at 08:13

## 2019-10-28 RX ADMIN — ONDANSETRON 4 MG: 2 INJECTION INTRAMUSCULAR; INTRAVENOUS at 07:55

## 2019-10-28 RX ADMIN — HYDROMORPHONE HYDROCHLORIDE 1 MG: 1 INJECTION, SOLUTION INTRAMUSCULAR; INTRAVENOUS; SUBCUTANEOUS at 00:23

## 2019-10-28 RX ADMIN — Medication 10 ML: at 20:07

## 2019-10-28 RX ADMIN — INSULIN LISPRO 4 UNITS: 100 INJECTION, SOLUTION INTRAVENOUS; SUBCUTANEOUS at 09:54

## 2019-10-28 RX ADMIN — Medication 2 PUFF: at 20:42

## 2019-10-28 RX ADMIN — INSULIN LISPRO 6 UNITS: 100 INJECTION, SOLUTION INTRAVENOUS; SUBCUTANEOUS at 12:45

## 2019-10-28 RX ADMIN — HYDROMORPHONE HYDROCHLORIDE 2 MG: 2 TABLET ORAL at 17:59

## 2019-10-28 RX ADMIN — HYDROMORPHONE HYDROCHLORIDE 1 MG: 1 INJECTION, SOLUTION INTRAMUSCULAR; INTRAVENOUS; SUBCUTANEOUS at 12:07

## 2019-10-28 RX ADMIN — HYDROMORPHONE HYDROCHLORIDE 1 MG: 1 INJECTION, SOLUTION INTRAMUSCULAR; INTRAVENOUS; SUBCUTANEOUS at 07:55

## 2019-10-28 RX ADMIN — INSULIN LISPRO 1 UNITS: 100 INJECTION, SOLUTION INTRAVENOUS; SUBCUTANEOUS at 20:08

## 2019-10-28 RX ADMIN — SODIUM THIOSULFATE 25 G: 250 INJECTION, SOLUTION INTRAVENOUS at 17:56

## 2019-10-28 RX ADMIN — FORMOTEROL FUMARATE DIHYDRATE 20 MCG: 20 SOLUTION RESPIRATORY (INHALATION) at 08:04

## 2019-10-28 RX ADMIN — BUDESONIDE 250 MCG: 0.5 SUSPENSION RESPIRATORY (INHALATION) at 08:04

## 2019-10-28 ASSESSMENT — PAIN SCALES - GENERAL
PAINLEVEL_OUTOF10: 0
PAINLEVEL_OUTOF10: 9
PAINLEVEL_OUTOF10: 10
PAINLEVEL_OUTOF10: 0
PAINLEVEL_OUTOF10: 9
PAINLEVEL_OUTOF10: 10
PAINLEVEL_OUTOF10: 0
PAINLEVEL_OUTOF10: 7

## 2019-10-28 ASSESSMENT — PAIN DESCRIPTION - PAIN TYPE
TYPE: ACUTE PAIN
TYPE: ACUTE PAIN

## 2019-10-28 ASSESSMENT — PAIN DESCRIPTION - FREQUENCY: FREQUENCY: CONTINUOUS

## 2019-10-28 ASSESSMENT — PAIN DESCRIPTION - ONSET: ONSET: SUDDEN

## 2019-10-28 ASSESSMENT — PAIN DESCRIPTION - ORIENTATION
ORIENTATION: RIGHT;LEFT
ORIENTATION: RIGHT;LEFT

## 2019-10-28 ASSESSMENT — PAIN DESCRIPTION - PROGRESSION
CLINICAL_PROGRESSION: NOT CHANGED

## 2019-10-28 ASSESSMENT — PAIN DESCRIPTION - DESCRIPTORS: DESCRIPTORS: CONSTANT;DISCOMFORT

## 2019-10-28 ASSESSMENT — PAIN - FUNCTIONAL ASSESSMENT: PAIN_FUNCTIONAL_ASSESSMENT: ACTIVITIES ARE NOT PREVENTED

## 2019-10-28 ASSESSMENT — PAIN DESCRIPTION - LOCATION
LOCATION: FLANK
LOCATION: BACK;FLANK

## 2019-10-29 ENCOUNTER — APPOINTMENT (OUTPATIENT)
Dept: MRI IMAGING | Age: 64
DRG: 640 | End: 2019-10-29
Payer: MEDICARE

## 2019-10-29 LAB
GLUCOSE BLD-MCNC: 145 MG/DL (ref 70–99)
GLUCOSE BLD-MCNC: 178 MG/DL (ref 70–99)
GLUCOSE BLD-MCNC: 179 MG/DL (ref 70–99)
GLUCOSE BLD-MCNC: 224 MG/DL (ref 70–99)
GLUCOSE BLD-MCNC: 263 MG/DL (ref 70–99)
HBV SURFACE AB TITR SER: 22.73 MIU/ML
HEPATITIS B SURFACE ANTIGEN INTERPRETATION: NORMAL
INR BLD: 2.07 (ref 0.86–1.14)
PERFORMED ON: ABNORMAL
PROTHROMBIN TIME: 23.6 SEC (ref 9.8–13)

## 2019-10-29 PROCEDURE — 6370000000 HC RX 637 (ALT 250 FOR IP): Performed by: INTERNAL MEDICINE

## 2019-10-29 PROCEDURE — 86706 HEP B SURFACE ANTIBODY: CPT

## 2019-10-29 PROCEDURE — 6360000002 HC RX W HCPCS: Performed by: INTERNAL MEDICINE

## 2019-10-29 PROCEDURE — 97530 THERAPEUTIC ACTIVITIES: CPT

## 2019-10-29 PROCEDURE — 99223 1ST HOSP IP/OBS HIGH 75: CPT | Performed by: PSYCHIATRY & NEUROLOGY

## 2019-10-29 PROCEDURE — 97165 OT EVAL LOW COMPLEX 30 MIN: CPT

## 2019-10-29 PROCEDURE — 94640 AIRWAY INHALATION TREATMENT: CPT

## 2019-10-29 PROCEDURE — 1200000000 HC SEMI PRIVATE

## 2019-10-29 PROCEDURE — 2580000003 HC RX 258: Performed by: INTERNAL MEDICINE

## 2019-10-29 PROCEDURE — 87340 HEPATITIS B SURFACE AG IA: CPT

## 2019-10-29 PROCEDURE — 97535 SELF CARE MNGMENT TRAINING: CPT

## 2019-10-29 PROCEDURE — 5A1D70Z PERFORMANCE OF URINARY FILTRATION, INTERMITTENT, LESS THAN 6 HOURS PER DAY: ICD-10-PCS | Performed by: INTERNAL MEDICINE

## 2019-10-29 PROCEDURE — 85610 PROTHROMBIN TIME: CPT

## 2019-10-29 PROCEDURE — 97162 PT EVAL MOD COMPLEX 30 MIN: CPT

## 2019-10-29 PROCEDURE — 94761 N-INVAS EAR/PLS OXIMETRY MLT: CPT

## 2019-10-29 RX ORDER — LORAZEPAM 1 MG/1
1 TABLET ORAL
Status: COMPLETED | OUTPATIENT
Start: 2019-10-29 | End: 2019-10-29

## 2019-10-29 RX ADMIN — INSULIN LISPRO 2 UNITS: 100 INJECTION, SOLUTION INTRAVENOUS; SUBCUTANEOUS at 10:28

## 2019-10-29 RX ADMIN — HYDROMORPHONE HYDROCHLORIDE 1 MG: 1 INJECTION, SOLUTION INTRAMUSCULAR; INTRAVENOUS; SUBCUTANEOUS at 13:48

## 2019-10-29 RX ADMIN — BUDESONIDE 500 MCG: 0.5 SUSPENSION RESPIRATORY (INHALATION) at 08:59

## 2019-10-29 RX ADMIN — INSULIN LISPRO 3 UNITS: 100 INJECTION, SOLUTION INTRAVENOUS; SUBCUTANEOUS at 20:51

## 2019-10-29 RX ADMIN — PREGABALIN 50 MG: 50 CAPSULE ORAL at 10:26

## 2019-10-29 RX ADMIN — INSULIN LISPRO 4 UNITS: 100 INJECTION, SOLUTION INTRAVENOUS; SUBCUTANEOUS at 19:49

## 2019-10-29 RX ADMIN — CINACALCET HYDROCHLORIDE 30 MG: 30 TABLET, FILM COATED ORAL at 22:46

## 2019-10-29 RX ADMIN — Medication 10 ML: at 10:34

## 2019-10-29 RX ADMIN — ROSUVASTATIN CALCIUM 10 MG: 10 TABLET, FILM COATED ORAL at 10:26

## 2019-10-29 RX ADMIN — HYDROMORPHONE HYDROCHLORIDE 1 MG: 1 INJECTION, SOLUTION INTRAMUSCULAR; INTRAVENOUS; SUBCUTANEOUS at 06:47

## 2019-10-29 RX ADMIN — Medication 2 PUFF: at 09:02

## 2019-10-29 RX ADMIN — FLUTICASONE PROPIONATE 1 SPRAY: 50 SPRAY, METERED NASAL at 10:26

## 2019-10-29 RX ADMIN — ESCITALOPRAM OXALATE 10 MG: 10 TABLET ORAL at 10:26

## 2019-10-29 RX ADMIN — PANTOPRAZOLE SODIUM 40 MG: 40 TABLET, DELAYED RELEASE ORAL at 10:26

## 2019-10-29 RX ADMIN — PREGABALIN 50 MG: 50 CAPSULE ORAL at 20:50

## 2019-10-29 RX ADMIN — SEVELAMER CARBONATE 1600 MG: 800 TABLET, FILM COATED ORAL at 14:51

## 2019-10-29 RX ADMIN — HYDROMORPHONE HYDROCHLORIDE 2 MG: 2 TABLET ORAL at 20:50

## 2019-10-29 RX ADMIN — INSULIN GLARGINE 30 UNITS: 100 INJECTION, SOLUTION SUBCUTANEOUS at 20:51

## 2019-10-29 RX ADMIN — PREGABALIN 50 MG: 50 CAPSULE ORAL at 13:48

## 2019-10-29 RX ADMIN — HYDROMORPHONE HYDROCHLORIDE 2 MG: 2 TABLET ORAL at 04:48

## 2019-10-29 RX ADMIN — Medication 10 ML: at 20:50

## 2019-10-29 RX ADMIN — ALLOPURINOL 100 MG: 100 TABLET ORAL at 10:26

## 2019-10-29 RX ADMIN — HYDROMORPHONE HYDROCHLORIDE 1 MG: 1 INJECTION, SOLUTION INTRAMUSCULAR; INTRAVENOUS; SUBCUTANEOUS at 01:01

## 2019-10-29 RX ADMIN — LORAZEPAM 1 MG: 1 TABLET ORAL at 16:34

## 2019-10-29 RX ADMIN — SEVELAMER CARBONATE 1600 MG: 800 TABLET, FILM COATED ORAL at 10:25

## 2019-10-29 RX ADMIN — FORMOTEROL FUMARATE DIHYDRATE 20 MCG: 20 SOLUTION RESPIRATORY (INHALATION) at 09:02

## 2019-10-29 ASSESSMENT — PAIN DESCRIPTION - ONSET: ONSET: SUDDEN

## 2019-10-29 ASSESSMENT — PAIN SCALES - GENERAL
PAINLEVEL_OUTOF10: 9
PAINLEVEL_OUTOF10: 0
PAINLEVEL_OUTOF10: 8
PAINLEVEL_OUTOF10: 9
PAINLEVEL_OUTOF10: 9
PAINLEVEL_OUTOF10: 0
PAINLEVEL_OUTOF10: 6
PAINLEVEL_OUTOF10: 0
PAINLEVEL_OUTOF10: 0
PAINLEVEL_OUTOF10: 8
PAINLEVEL_OUTOF10: 0
PAINLEVEL_OUTOF10: 6
PAINLEVEL_OUTOF10: 9
PAINLEVEL_OUTOF10: 8
PAINLEVEL_OUTOF10: 5

## 2019-10-29 ASSESSMENT — PAIN DESCRIPTION - PROGRESSION
CLINICAL_PROGRESSION: NOT CHANGED

## 2019-10-29 ASSESSMENT — PAIN DESCRIPTION - ORIENTATION: ORIENTATION: LEFT

## 2019-10-29 ASSESSMENT — PAIN DESCRIPTION - LOCATION: LOCATION: LEG

## 2019-10-29 ASSESSMENT — PAIN DESCRIPTION - DESCRIPTORS: DESCRIPTORS: CONSTANT;DISCOMFORT

## 2019-10-29 ASSESSMENT — PAIN - FUNCTIONAL ASSESSMENT: PAIN_FUNCTIONAL_ASSESSMENT: ACTIVITIES ARE NOT PREVENTED

## 2019-10-29 ASSESSMENT — PAIN DESCRIPTION - FREQUENCY: FREQUENCY: CONTINUOUS

## 2019-10-29 ASSESSMENT — PAIN DESCRIPTION - PAIN TYPE: TYPE: ACUTE PAIN

## 2019-10-30 LAB
ALBUMIN SERPL-MCNC: 3.4 G/DL (ref 3.4–5)
ANION GAP SERPL CALCULATED.3IONS-SCNC: 20 MMOL/L (ref 3–16)
BASOPHILS ABSOLUTE: 0 K/UL (ref 0–0.2)
BASOPHILS RELATIVE PERCENT: 0.5 %
BLOOD CULTURE, ROUTINE: NORMAL
BUN BLDV-MCNC: 36 MG/DL (ref 7–20)
CALCIUM SERPL-MCNC: 9.6 MG/DL (ref 8.3–10.6)
CHLORIDE BLD-SCNC: 91 MMOL/L (ref 99–110)
CO2: 23 MMOL/L (ref 21–32)
CREAT SERPL-MCNC: 10.2 MG/DL (ref 0.6–1.2)
CULTURE, BLOOD 2: NORMAL
EOSINOPHILS ABSOLUTE: 0.1 K/UL (ref 0–0.6)
EOSINOPHILS RELATIVE PERCENT: 1.1 %
GFR AFRICAN AMERICAN: 5
GFR NON-AFRICAN AMERICAN: 4
GLUCOSE BLD-MCNC: 145 MG/DL (ref 70–99)
GLUCOSE BLD-MCNC: 176 MG/DL (ref 70–99)
GLUCOSE BLD-MCNC: 191 MG/DL (ref 70–99)
HCT VFR BLD CALC: 27.1 % (ref 36–48)
HEMOGLOBIN: 8.9 G/DL (ref 12–16)
INR BLD: 1.84 (ref 0.86–1.14)
LYMPHOCYTES ABSOLUTE: 1.1 K/UL (ref 1–5.1)
LYMPHOCYTES RELATIVE PERCENT: 14.3 %
MCH RBC QN AUTO: 34.4 PG (ref 26–34)
MCHC RBC AUTO-ENTMCNC: 32.8 G/DL (ref 31–36)
MCV RBC AUTO: 104.9 FL (ref 80–100)
MONOCYTES ABSOLUTE: 0.8 K/UL (ref 0–1.3)
MONOCYTES RELATIVE PERCENT: 10.2 %
NEUTROPHILS ABSOLUTE: 5.9 K/UL (ref 1.7–7.7)
NEUTROPHILS RELATIVE PERCENT: 73.9 %
PDW BLD-RTO: 19.6 % (ref 12.4–15.4)
PERFORMED ON: ABNORMAL
PERFORMED ON: ABNORMAL
PHOSPHORUS: 6.3 MG/DL (ref 2.5–4.9)
PLATELET # BLD: 255 K/UL (ref 135–450)
PMV BLD AUTO: 9 FL (ref 5–10.5)
POTASSIUM SERPL-SCNC: 5.5 MMOL/L (ref 3.5–5.1)
PROTHROMBIN TIME: 21 SEC (ref 9.8–13)
RBC # BLD: 2.58 M/UL (ref 4–5.2)
SODIUM BLD-SCNC: 134 MMOL/L (ref 136–145)
WBC # BLD: 8 K/UL (ref 4–11)

## 2019-10-30 PROCEDURE — 2580000003 HC RX 258: Performed by: INTERNAL MEDICINE

## 2019-10-30 PROCEDURE — 6370000000 HC RX 637 (ALT 250 FOR IP): Performed by: INTERNAL MEDICINE

## 2019-10-30 PROCEDURE — 85025 COMPLETE CBC W/AUTO DIFF WBC: CPT

## 2019-10-30 PROCEDURE — 1200000000 HC SEMI PRIVATE

## 2019-10-30 PROCEDURE — 2500000003 HC RX 250 WO HCPCS: Performed by: INTERNAL MEDICINE

## 2019-10-30 PROCEDURE — 6360000002 HC RX W HCPCS: Performed by: INTERNAL MEDICINE

## 2019-10-30 PROCEDURE — 94640 AIRWAY INHALATION TREATMENT: CPT

## 2019-10-30 PROCEDURE — 85610 PROTHROMBIN TIME: CPT

## 2019-10-30 PROCEDURE — 94760 N-INVAS EAR/PLS OXIMETRY 1: CPT

## 2019-10-30 PROCEDURE — 80069 RENAL FUNCTION PANEL: CPT

## 2019-10-30 PROCEDURE — 90935 HEMODIALYSIS ONE EVALUATION: CPT

## 2019-10-30 PROCEDURE — 99233 SBSQ HOSP IP/OBS HIGH 50: CPT | Performed by: PSYCHIATRY & NEUROLOGY

## 2019-10-30 RX ORDER — HEPARIN SODIUM 1000 [USP'U]/ML
3000 INJECTION, SOLUTION INTRAVENOUS; SUBCUTANEOUS PRN
Status: DISCONTINUED | OUTPATIENT
Start: 2019-10-30 | End: 2019-11-02 | Stop reason: HOSPADM

## 2019-10-30 RX ADMIN — HYDROMORPHONE HYDROCHLORIDE 1 MG: 1 INJECTION, SOLUTION INTRAMUSCULAR; INTRAVENOUS; SUBCUTANEOUS at 21:17

## 2019-10-30 RX ADMIN — Medication 2 PUFF: at 20:24

## 2019-10-30 RX ADMIN — HYDROMORPHONE HYDROCHLORIDE 2 MG: 2 TABLET ORAL at 06:17

## 2019-10-30 RX ADMIN — HYDROMORPHONE HYDROCHLORIDE 1 MG: 1 INJECTION, SOLUTION INTRAMUSCULAR; INTRAVENOUS; SUBCUTANEOUS at 00:24

## 2019-10-30 RX ADMIN — HYDROMORPHONE HYDROCHLORIDE 1 MG: 1 INJECTION, SOLUTION INTRAMUSCULAR; INTRAVENOUS; SUBCUTANEOUS at 14:58

## 2019-10-30 RX ADMIN — Medication 10 ML: at 21:00

## 2019-10-30 RX ADMIN — HYDROMORPHONE HYDROCHLORIDE 2 MG: 2 TABLET ORAL at 18:55

## 2019-10-30 RX ADMIN — APIXABAN 2.5 MG: 5 TABLET, FILM COATED ORAL at 21:17

## 2019-10-30 RX ADMIN — APIXABAN 2.5 MG: 5 TABLET, FILM COATED ORAL at 14:58

## 2019-10-30 RX ADMIN — HYDROMORPHONE HYDROCHLORIDE 2 MG: 2 TABLET ORAL at 12:53

## 2019-10-30 RX ADMIN — HYDROMORPHONE HYDROCHLORIDE 1 MG: 1 INJECTION, SOLUTION INTRAMUSCULAR; INTRAVENOUS; SUBCUTANEOUS at 08:30

## 2019-10-30 RX ADMIN — INSULIN GLARGINE 30 UNITS: 100 INJECTION, SOLUTION SUBCUTANEOUS at 21:18

## 2019-10-30 RX ADMIN — CINACALCET HYDROCHLORIDE 30 MG: 30 TABLET, FILM COATED ORAL at 21:17

## 2019-10-30 RX ADMIN — PREGABALIN 50 MG: 50 CAPSULE ORAL at 21:16

## 2019-10-30 RX ADMIN — BUDESONIDE 500 MCG: 0.5 SUSPENSION RESPIRATORY (INHALATION) at 20:24

## 2019-10-30 RX ADMIN — INSULIN LISPRO 2 UNITS: 100 INJECTION, SOLUTION INTRAVENOUS; SUBCUTANEOUS at 18:55

## 2019-10-30 RX ADMIN — PREGABALIN 50 MG: 50 CAPSULE ORAL at 14:57

## 2019-10-30 RX ADMIN — SEVELAMER CARBONATE 1600 MG: 800 TABLET, FILM COATED ORAL at 18:55

## 2019-10-30 RX ADMIN — FORMOTEROL FUMARATE DIHYDRATE 20 MCG: 20 SOLUTION RESPIRATORY (INHALATION) at 20:24

## 2019-10-30 RX ADMIN — INSULIN LISPRO 1 UNITS: 100 INJECTION, SOLUTION INTRAVENOUS; SUBCUTANEOUS at 21:17

## 2019-10-30 RX ADMIN — SODIUM THIOSULFATE 25 G: 250 INJECTION, SOLUTION INTRAVENOUS at 11:13

## 2019-10-30 ASSESSMENT — PAIN DESCRIPTION - PROGRESSION
CLINICAL_PROGRESSION: NOT CHANGED

## 2019-10-30 ASSESSMENT — PAIN SCALES - GENERAL
PAINLEVEL_OUTOF10: 7
PAINLEVEL_OUTOF10: 0
PAINLEVEL_OUTOF10: 9
PAINLEVEL_OUTOF10: 7
PAINLEVEL_OUTOF10: 9
PAINLEVEL_OUTOF10: 7
PAINLEVEL_OUTOF10: 9
PAINLEVEL_OUTOF10: 10

## 2019-10-30 ASSESSMENT — PAIN DESCRIPTION - PAIN TYPE: TYPE: ACUTE PAIN

## 2019-10-31 LAB
GLUCOSE BLD-MCNC: 140 MG/DL (ref 70–99)
GLUCOSE BLD-MCNC: 148 MG/DL (ref 70–99)
GLUCOSE BLD-MCNC: 151 MG/DL (ref 70–99)
GLUCOSE BLD-MCNC: 231 MG/DL (ref 70–99)
GLUCOSE BLD-MCNC: 233 MG/DL (ref 70–99)
INR BLD: 1.89 (ref 0.86–1.14)
PERFORMED ON: ABNORMAL
PROTHROMBIN TIME: 21.6 SEC (ref 9.8–13)

## 2019-10-31 PROCEDURE — 99232 SBSQ HOSP IP/OBS MODERATE 35: CPT | Performed by: PSYCHIATRY & NEUROLOGY

## 2019-10-31 PROCEDURE — 6360000002 HC RX W HCPCS: Performed by: INTERNAL MEDICINE

## 2019-10-31 PROCEDURE — 6370000000 HC RX 637 (ALT 250 FOR IP): Performed by: INTERNAL MEDICINE

## 2019-10-31 PROCEDURE — 2580000003 HC RX 258: Performed by: INTERNAL MEDICINE

## 2019-10-31 PROCEDURE — 94761 N-INVAS EAR/PLS OXIMETRY MLT: CPT

## 2019-10-31 PROCEDURE — 1200000000 HC SEMI PRIVATE

## 2019-10-31 PROCEDURE — 94640 AIRWAY INHALATION TREATMENT: CPT

## 2019-10-31 PROCEDURE — 85610 PROTHROMBIN TIME: CPT

## 2019-10-31 RX ADMIN — INSULIN LISPRO 4 UNITS: 100 INJECTION, SOLUTION INTRAVENOUS; SUBCUTANEOUS at 18:20

## 2019-10-31 RX ADMIN — SEVELAMER CARBONATE 1600 MG: 800 TABLET, FILM COATED ORAL at 14:03

## 2019-10-31 RX ADMIN — Medication 10 ML: at 22:57

## 2019-10-31 RX ADMIN — ESCITALOPRAM OXALATE 10 MG: 10 TABLET ORAL at 09:37

## 2019-10-31 RX ADMIN — PREGABALIN 50 MG: 50 CAPSULE ORAL at 14:03

## 2019-10-31 RX ADMIN — APIXABAN 2.5 MG: 5 TABLET, FILM COATED ORAL at 09:37

## 2019-10-31 RX ADMIN — SEVELAMER CARBONATE 1600 MG: 800 TABLET, FILM COATED ORAL at 18:17

## 2019-10-31 RX ADMIN — BUDESONIDE 500 MCG: 0.5 SUSPENSION RESPIRATORY (INHALATION) at 07:49

## 2019-10-31 RX ADMIN — PANTOPRAZOLE SODIUM 40 MG: 40 TABLET, DELAYED RELEASE ORAL at 06:26

## 2019-10-31 RX ADMIN — HYDROMORPHONE HYDROCHLORIDE 1 MG: 1 INJECTION, SOLUTION INTRAMUSCULAR; INTRAVENOUS; SUBCUTANEOUS at 14:12

## 2019-10-31 RX ADMIN — BUDESONIDE 500 MCG: 0.5 SUSPENSION RESPIRATORY (INHALATION) at 20:51

## 2019-10-31 RX ADMIN — SEVELAMER CARBONATE 1600 MG: 800 TABLET, FILM COATED ORAL at 09:51

## 2019-10-31 RX ADMIN — ONDANSETRON 4 MG: 2 INJECTION INTRAMUSCULAR; INTRAVENOUS at 04:22

## 2019-10-31 RX ADMIN — HYDROMORPHONE HYDROCHLORIDE 1 MG: 1 INJECTION, SOLUTION INTRAMUSCULAR; INTRAVENOUS; SUBCUTANEOUS at 04:22

## 2019-10-31 RX ADMIN — INSULIN LISPRO 2 UNITS: 100 INJECTION, SOLUTION INTRAVENOUS; SUBCUTANEOUS at 22:48

## 2019-10-31 RX ADMIN — PREGABALIN 50 MG: 50 CAPSULE ORAL at 22:46

## 2019-10-31 RX ADMIN — FORMOTEROL FUMARATE DIHYDRATE 20 MCG: 20 SOLUTION RESPIRATORY (INHALATION) at 07:49

## 2019-10-31 RX ADMIN — HYDROMORPHONE HYDROCHLORIDE 1 MG: 1 INJECTION, SOLUTION INTRAMUSCULAR; INTRAVENOUS; SUBCUTANEOUS at 09:36

## 2019-10-31 RX ADMIN — HYDROMORPHONE HYDROCHLORIDE 1 MG: 1 INJECTION, SOLUTION INTRAMUSCULAR; INTRAVENOUS; SUBCUTANEOUS at 18:16

## 2019-10-31 RX ADMIN — INSULIN GLARGINE 30 UNITS: 100 INJECTION, SOLUTION SUBCUTANEOUS at 22:48

## 2019-10-31 RX ADMIN — ALLOPURINOL 100 MG: 100 TABLET ORAL at 09:37

## 2019-10-31 RX ADMIN — NEBIVOLOL HYDROCHLORIDE 2.5 MG: 5 TABLET ORAL at 09:37

## 2019-10-31 RX ADMIN — FLUTICASONE PROPIONATE 1 SPRAY: 50 SPRAY, METERED NASAL at 09:49

## 2019-10-31 RX ADMIN — Medication 2 PUFF: at 07:50

## 2019-10-31 RX ADMIN — Medication 2 PUFF: at 20:53

## 2019-10-31 RX ADMIN — CINACALCET HYDROCHLORIDE 30 MG: 30 TABLET, FILM COATED ORAL at 22:46

## 2019-10-31 RX ADMIN — PREGABALIN 50 MG: 50 CAPSULE ORAL at 09:51

## 2019-10-31 RX ADMIN — APIXABAN 2.5 MG: 5 TABLET, FILM COATED ORAL at 22:46

## 2019-10-31 RX ADMIN — Medication 10 ML: at 09:37

## 2019-10-31 RX ADMIN — FORMOTEROL FUMARATE DIHYDRATE 20 MCG: 20 SOLUTION RESPIRATORY (INHALATION) at 20:51

## 2019-10-31 RX ADMIN — HYDROMORPHONE HYDROCHLORIDE 2 MG: 2 TABLET ORAL at 22:46

## 2019-10-31 RX ADMIN — ROSUVASTATIN CALCIUM 10 MG: 10 TABLET, FILM COATED ORAL at 09:37

## 2019-10-31 ASSESSMENT — PAIN DESCRIPTION - PROGRESSION
CLINICAL_PROGRESSION: NOT CHANGED

## 2019-10-31 ASSESSMENT — PAIN SCALES - GENERAL
PAINLEVEL_OUTOF10: 10
PAINLEVEL_OUTOF10: 8
PAINLEVEL_OUTOF10: 9

## 2019-10-31 ASSESSMENT — PAIN DESCRIPTION - ORIENTATION: ORIENTATION: LEFT;RIGHT

## 2019-10-31 ASSESSMENT — PAIN DESCRIPTION - LOCATION: LOCATION: HIP

## 2019-10-31 ASSESSMENT — PAIN DESCRIPTION - PAIN TYPE: TYPE: ACUTE PAIN

## 2019-11-01 LAB
ANION GAP SERPL CALCULATED.3IONS-SCNC: 19 MMOL/L (ref 3–16)
BUN BLDV-MCNC: 34 MG/DL (ref 7–20)
CALCIUM SERPL-MCNC: 9.2 MG/DL (ref 8.3–10.6)
CHLORIDE BLD-SCNC: 95 MMOL/L (ref 99–110)
CO2: 22 MMOL/L (ref 21–32)
CREAT SERPL-MCNC: 9.1 MG/DL (ref 0.6–1.2)
GFR AFRICAN AMERICAN: 5
GFR NON-AFRICAN AMERICAN: 4
GLUCOSE BLD-MCNC: 153 MG/DL (ref 70–99)
GLUCOSE BLD-MCNC: 175 MG/DL (ref 70–99)
GLUCOSE BLD-MCNC: 182 MG/DL (ref 70–99)
GLUCOSE BLD-MCNC: 200 MG/DL (ref 70–99)
GLUCOSE BLD-MCNC: 216 MG/DL (ref 70–99)
HCT VFR BLD CALC: 27.3 % (ref 36–48)
HEMOGLOBIN: 8.7 G/DL (ref 12–16)
INR BLD: 1.61 (ref 0.86–1.14)
MCH RBC QN AUTO: 33.8 PG (ref 26–34)
MCHC RBC AUTO-ENTMCNC: 32 G/DL (ref 31–36)
MCV RBC AUTO: 105.8 FL (ref 80–100)
PDW BLD-RTO: 19.4 % (ref 12.4–15.4)
PERFORMED ON: ABNORMAL
PHOSPHORUS: 6.5 MG/DL (ref 2.5–4.9)
PLATELET # BLD: 248 K/UL (ref 135–450)
PMV BLD AUTO: 8.7 FL (ref 5–10.5)
POTASSIUM SERPL-SCNC: 5.2 MMOL/L (ref 3.5–5.1)
PROTHROMBIN TIME: 18.3 SEC (ref 9.8–13)
RBC # BLD: 2.58 M/UL (ref 4–5.2)
SODIUM BLD-SCNC: 136 MMOL/L (ref 136–145)
WBC # BLD: 9 K/UL (ref 4–11)

## 2019-11-01 PROCEDURE — 90935 HEMODIALYSIS ONE EVALUATION: CPT

## 2019-11-01 PROCEDURE — 2580000003 HC RX 258: Performed by: INTERNAL MEDICINE

## 2019-11-01 PROCEDURE — 84100 ASSAY OF PHOSPHORUS: CPT

## 2019-11-01 PROCEDURE — 6360000002 HC RX W HCPCS: Performed by: INTERNAL MEDICINE

## 2019-11-01 PROCEDURE — 6360000002 HC RX W HCPCS

## 2019-11-01 PROCEDURE — 6370000000 HC RX 637 (ALT 250 FOR IP): Performed by: INTERNAL MEDICINE

## 2019-11-01 PROCEDURE — 1200000000 HC SEMI PRIVATE

## 2019-11-01 PROCEDURE — 80048 BASIC METABOLIC PNL TOTAL CA: CPT

## 2019-11-01 PROCEDURE — 94760 N-INVAS EAR/PLS OXIMETRY 1: CPT

## 2019-11-01 PROCEDURE — 94640 AIRWAY INHALATION TREATMENT: CPT

## 2019-11-01 PROCEDURE — 2500000003 HC RX 250 WO HCPCS: Performed by: INTERNAL MEDICINE

## 2019-11-01 PROCEDURE — 85610 PROTHROMBIN TIME: CPT

## 2019-11-01 PROCEDURE — 85027 COMPLETE CBC AUTOMATED: CPT

## 2019-11-01 RX ORDER — SEVELAMER CARBONATE 800 MG/1
1600 TABLET, FILM COATED ORAL
Qty: 90 TABLET | Refills: 3 | Status: ON HOLD | OUTPATIENT
Start: 2019-11-01 | End: 2020-02-05 | Stop reason: HOSPADM

## 2019-11-01 RX ORDER — FORMOTEROL FUMARATE 20 UG/2ML
SOLUTION RESPIRATORY (INHALATION)
Status: DISPENSED
Start: 2019-11-01 | End: 2019-11-02

## 2019-11-01 RX ORDER — POVIDONE-IODINE 10 MG/G
OINTMENT TOPICAL PRN
Status: DISCONTINUED | OUTPATIENT
Start: 2019-11-01 | End: 2019-11-02 | Stop reason: HOSPADM

## 2019-11-01 RX ORDER — BUDESONIDE 0.5 MG/2ML
INHALANT ORAL
Status: COMPLETED
Start: 2019-11-01 | End: 2019-11-01

## 2019-11-01 RX ORDER — BUDESONIDE 0.5 MG/2ML
INHALANT ORAL
Status: DISPENSED
Start: 2019-11-01 | End: 2019-11-02

## 2019-11-01 RX ORDER — FORMOTEROL FUMARATE 20 UG/2ML
SOLUTION RESPIRATORY (INHALATION)
Status: COMPLETED
Start: 2019-11-01 | End: 2019-11-01

## 2019-11-01 RX ADMIN — PREGABALIN 50 MG: 50 CAPSULE ORAL at 08:13

## 2019-11-01 RX ADMIN — PANTOPRAZOLE SODIUM 40 MG: 40 TABLET, DELAYED RELEASE ORAL at 08:13

## 2019-11-01 RX ADMIN — HYDROMORPHONE HYDROCHLORIDE 1 MG: 1 INJECTION, SOLUTION INTRAMUSCULAR; INTRAVENOUS; SUBCUTANEOUS at 14:33

## 2019-11-01 RX ADMIN — ALLOPURINOL 100 MG: 100 TABLET ORAL at 08:14

## 2019-11-01 RX ADMIN — BUDESONIDE 500 MCG: 0.5 SUSPENSION RESPIRATORY (INHALATION) at 08:16

## 2019-11-01 RX ADMIN — ESCITALOPRAM OXALATE 10 MG: 10 TABLET ORAL at 08:14

## 2019-11-01 RX ADMIN — APIXABAN 2.5 MG: 5 TABLET, FILM COATED ORAL at 23:19

## 2019-11-01 RX ADMIN — FORMOTEROL FUMARATE DIHYDRATE 20 MCG: 20 SOLUTION RESPIRATORY (INHALATION) at 08:15

## 2019-11-01 RX ADMIN — APIXABAN 2.5 MG: 5 TABLET, FILM COATED ORAL at 08:14

## 2019-11-01 RX ADMIN — SEVELAMER CARBONATE 1600 MG: 800 TABLET, FILM COATED ORAL at 12:56

## 2019-11-01 RX ADMIN — CINACALCET HYDROCHLORIDE 30 MG: 30 TABLET, FILM COATED ORAL at 23:19

## 2019-11-01 RX ADMIN — INSULIN LISPRO 2 UNITS: 100 INJECTION, SOLUTION INTRAVENOUS; SUBCUTANEOUS at 08:24

## 2019-11-01 RX ADMIN — Medication 10 ML: at 08:20

## 2019-11-01 RX ADMIN — FORMOTEROL FUMARATE DIHYDRATE 20 MCG: 20 SOLUTION RESPIRATORY (INHALATION) at 20:34

## 2019-11-01 RX ADMIN — PREGABALIN 50 MG: 50 CAPSULE ORAL at 12:57

## 2019-11-01 RX ADMIN — Medication 2 PUFF: at 20:34

## 2019-11-01 RX ADMIN — HYDROMORPHONE HYDROCHLORIDE 1 MG: 1 INJECTION, SOLUTION INTRAMUSCULAR; INTRAVENOUS; SUBCUTANEOUS at 06:26

## 2019-11-01 RX ADMIN — Medication 2 PUFF: at 08:17

## 2019-11-01 RX ADMIN — HYDROMORPHONE HYDROCHLORIDE 1 MG: 1 INJECTION, SOLUTION INTRAMUSCULAR; INTRAVENOUS; SUBCUTANEOUS at 10:39

## 2019-11-01 RX ADMIN — HYDROMORPHONE HYDROCHLORIDE 1 MG: 1 INJECTION, SOLUTION INTRAMUSCULAR; INTRAVENOUS; SUBCUTANEOUS at 02:25

## 2019-11-01 RX ADMIN — SEVELAMER CARBONATE 1600 MG: 800 TABLET, FILM COATED ORAL at 08:13

## 2019-11-01 RX ADMIN — INSULIN GLARGINE 30 UNITS: 100 INJECTION, SOLUTION SUBCUTANEOUS at 23:17

## 2019-11-01 RX ADMIN — PREGABALIN 50 MG: 50 CAPSULE ORAL at 23:19

## 2019-11-01 RX ADMIN — BUDESONIDE 500 MCG: 0.5 SUSPENSION RESPIRATORY (INHALATION) at 20:34

## 2019-11-01 RX ADMIN — INSULIN LISPRO 1 UNITS: 100 INJECTION, SOLUTION INTRAVENOUS; SUBCUTANEOUS at 23:17

## 2019-11-01 RX ADMIN — SEVELAMER CARBONATE 1600 MG: 800 TABLET, FILM COATED ORAL at 17:00

## 2019-11-01 RX ADMIN — INSULIN LISPRO 4 UNITS: 100 INJECTION, SOLUTION INTRAVENOUS; SUBCUTANEOUS at 12:59

## 2019-11-01 RX ADMIN — HYDROMORPHONE HYDROCHLORIDE 1 MG: 1 INJECTION, SOLUTION INTRAMUSCULAR; INTRAVENOUS; SUBCUTANEOUS at 22:01

## 2019-11-01 RX ADMIN — ROSUVASTATIN CALCIUM 10 MG: 10 TABLET, FILM COATED ORAL at 08:14

## 2019-11-01 RX ADMIN — SODIUM THIOSULFATE 25 G: 250 INJECTION, SOLUTION INTRAVENOUS at 19:27

## 2019-11-01 ASSESSMENT — PAIN SCALES - GENERAL
PAINLEVEL_OUTOF10: 0
PAINLEVEL_OUTOF10: 0
PAINLEVEL_OUTOF10: 7
PAINLEVEL_OUTOF10: 0
PAINLEVEL_OUTOF10: 10
PAINLEVEL_OUTOF10: 9
PAINLEVEL_OUTOF10: 9

## 2019-11-01 ASSESSMENT — PAIN DESCRIPTION - PROGRESSION
CLINICAL_PROGRESSION: NOT CHANGED

## 2019-11-01 ASSESSMENT — PAIN DESCRIPTION - LOCATION: LOCATION: GENERALIZED

## 2019-11-02 ENCOUNTER — APPOINTMENT (OUTPATIENT)
Dept: CT IMAGING | Age: 64
DRG: 640 | End: 2019-11-02
Payer: MEDICARE

## 2019-11-02 ENCOUNTER — TELEPHONE (OUTPATIENT)
Dept: FAMILY MEDICINE CLINIC | Age: 64
End: 2019-11-02

## 2019-11-02 VITALS
HEART RATE: 89 BPM | DIASTOLIC BLOOD PRESSURE: 87 MMHG | OXYGEN SATURATION: 98 % | SYSTOLIC BLOOD PRESSURE: 125 MMHG | TEMPERATURE: 97.6 F | RESPIRATION RATE: 18 BRPM | BODY MASS INDEX: 51.91 KG/M2 | HEIGHT: 63 IN | WEIGHT: 293 LBS

## 2019-11-02 LAB
GLUCOSE BLD-MCNC: 140 MG/DL (ref 70–99)
GLUCOSE BLD-MCNC: 164 MG/DL (ref 70–99)
GLUCOSE BLD-MCNC: 171 MG/DL (ref 70–99)
INR BLD: 1.74 (ref 0.86–1.14)
PERFORMED ON: ABNORMAL
PROTHROMBIN TIME: 19.8 SEC (ref 9.8–13)

## 2019-11-02 PROCEDURE — 6370000000 HC RX 637 (ALT 250 FOR IP): Performed by: INTERNAL MEDICINE

## 2019-11-02 PROCEDURE — 94640 AIRWAY INHALATION TREATMENT: CPT

## 2019-11-02 PROCEDURE — 6360000002 HC RX W HCPCS: Performed by: INTERNAL MEDICINE

## 2019-11-02 PROCEDURE — 85610 PROTHROMBIN TIME: CPT

## 2019-11-02 PROCEDURE — 70450 CT HEAD/BRAIN W/O DYE: CPT

## 2019-11-02 PROCEDURE — 94761 N-INVAS EAR/PLS OXIMETRY MLT: CPT

## 2019-11-02 RX ORDER — HYDROMORPHONE HYDROCHLORIDE 2 MG/1
2 TABLET ORAL EVERY 12 HOURS PRN
Qty: 10 TABLET | Refills: 0 | Status: SHIPPED | OUTPATIENT
Start: 2019-11-02 | End: 2019-11-12

## 2019-11-02 RX ADMIN — HYDROMORPHONE HYDROCHLORIDE 1 MG: 1 INJECTION, SOLUTION INTRAMUSCULAR; INTRAVENOUS; SUBCUTANEOUS at 10:21

## 2019-11-02 RX ADMIN — HYDROMORPHONE HYDROCHLORIDE 1 MG: 1 INJECTION, SOLUTION INTRAMUSCULAR; INTRAVENOUS; SUBCUTANEOUS at 14:27

## 2019-11-02 RX ADMIN — INSULIN LISPRO 2 UNITS: 100 INJECTION, SOLUTION INTRAVENOUS; SUBCUTANEOUS at 10:18

## 2019-11-02 RX ADMIN — ONDANSETRON 4 MG: 2 INJECTION INTRAMUSCULAR; INTRAVENOUS at 05:29

## 2019-11-02 RX ADMIN — APIXABAN 2.5 MG: 5 TABLET, FILM COATED ORAL at 10:09

## 2019-11-02 RX ADMIN — PANTOPRAZOLE SODIUM 40 MG: 40 TABLET, DELAYED RELEASE ORAL at 05:29

## 2019-11-02 RX ADMIN — BUDESONIDE 500 MCG: 0.5 SUSPENSION RESPIRATORY (INHALATION) at 08:59

## 2019-11-02 RX ADMIN — PREGABALIN 50 MG: 50 CAPSULE ORAL at 14:27

## 2019-11-02 RX ADMIN — SEVELAMER CARBONATE 1600 MG: 800 TABLET, FILM COATED ORAL at 12:54

## 2019-11-02 RX ADMIN — ESCITALOPRAM OXALATE 10 MG: 10 TABLET ORAL at 10:08

## 2019-11-02 RX ADMIN — PREGABALIN 50 MG: 50 CAPSULE ORAL at 10:06

## 2019-11-02 RX ADMIN — NEBIVOLOL HYDROCHLORIDE 2.5 MG: 5 TABLET ORAL at 10:06

## 2019-11-02 RX ADMIN — FORMOTEROL FUMARATE DIHYDRATE 20 MCG: 20 SOLUTION RESPIRATORY (INHALATION) at 08:59

## 2019-11-02 RX ADMIN — ROSUVASTATIN CALCIUM 10 MG: 10 TABLET, FILM COATED ORAL at 10:05

## 2019-11-02 RX ADMIN — Medication 2 PUFF: at 08:59

## 2019-11-02 RX ADMIN — INSULIN LISPRO 2 UNITS: 100 INJECTION, SOLUTION INTRAVENOUS; SUBCUTANEOUS at 12:56

## 2019-11-02 RX ADMIN — HYDROMORPHONE HYDROCHLORIDE 1 MG: 1 INJECTION, SOLUTION INTRAMUSCULAR; INTRAVENOUS; SUBCUTANEOUS at 05:29

## 2019-11-02 RX ADMIN — ALLOPURINOL 100 MG: 100 TABLET ORAL at 10:07

## 2019-11-02 RX ADMIN — SEVELAMER CARBONATE 1600 MG: 800 TABLET, FILM COATED ORAL at 09:59

## 2019-11-02 ASSESSMENT — PAIN DESCRIPTION - PROGRESSION
CLINICAL_PROGRESSION: NOT CHANGED

## 2019-11-02 ASSESSMENT — PAIN SCALES - GENERAL
PAINLEVEL_OUTOF10: 9
PAINLEVEL_OUTOF10: 10
PAINLEVEL_OUTOF10: 10
PAINLEVEL_OUTOF10: 5
PAINLEVEL_OUTOF10: 5

## 2019-11-02 ASSESSMENT — PAIN DESCRIPTION - FREQUENCY
FREQUENCY: CONTINUOUS

## 2019-11-02 ASSESSMENT — PAIN DESCRIPTION - LOCATION
LOCATION: ABDOMEN

## 2019-11-02 ASSESSMENT — PAIN DESCRIPTION - ORIENTATION
ORIENTATION: LEFT;RIGHT
ORIENTATION: LEFT;RIGHT
ORIENTATION: RIGHT;LEFT

## 2019-11-02 ASSESSMENT — PAIN DESCRIPTION - DESCRIPTORS
DESCRIPTORS: CONSTANT
DESCRIPTORS: CONSTANT
DESCRIPTORS: CONSTANT;DISCOMFORT

## 2019-11-02 ASSESSMENT — PAIN DESCRIPTION - PAIN TYPE
TYPE: ACUTE PAIN

## 2019-11-02 ASSESSMENT — PAIN - FUNCTIONAL ASSESSMENT
PAIN_FUNCTIONAL_ASSESSMENT: ACTIVITIES ARE NOT PREVENTED

## 2019-11-03 ENCOUNTER — CARE COORDINATION (OUTPATIENT)
Dept: CASE MANAGEMENT | Age: 64
End: 2019-11-03

## 2019-11-03 DIAGNOSIS — I50.9 CONGESTIVE HEART FAILURE, UNSPECIFIED HF CHRONICITY, UNSPECIFIED HEART FAILURE TYPE (HCC): Primary | ICD-10-CM

## 2019-11-03 PROCEDURE — 1111F DSCHRG MED/CURRENT MED MERGE: CPT | Performed by: NURSE PRACTITIONER

## 2019-11-04 ENCOUNTER — TELEPHONE (OUTPATIENT)
Dept: FAMILY MEDICINE CLINIC | Age: 64
End: 2019-11-04

## 2019-11-05 ENCOUNTER — CARE COORDINATION (OUTPATIENT)
Dept: CARE COORDINATION | Age: 64
End: 2019-11-05

## 2019-11-05 ENCOUNTER — TELEPHONE (OUTPATIENT)
Dept: FAMILY MEDICINE CLINIC | Age: 64
End: 2019-11-05

## 2019-11-05 ENCOUNTER — OFFICE VISIT (OUTPATIENT)
Dept: FAMILY MEDICINE CLINIC | Age: 64
End: 2019-11-05
Payer: MEDICARE

## 2019-11-05 VITALS — OXYGEN SATURATION: 88 % | DIASTOLIC BLOOD PRESSURE: 88 MMHG | HEART RATE: 92 BPM | SYSTOLIC BLOOD PRESSURE: 140 MMHG

## 2019-11-05 DIAGNOSIS — Z99.2 DIALYSIS PATIENT (HCC): ICD-10-CM

## 2019-11-05 DIAGNOSIS — N25.0 RENAL OSTEODYSTROPHY: ICD-10-CM

## 2019-11-05 DIAGNOSIS — J44.9 COPD, MODERATE (HCC): ICD-10-CM

## 2019-11-05 DIAGNOSIS — M79.605 PAIN IN BOTH LOWER EXTREMITIES: ICD-10-CM

## 2019-11-05 DIAGNOSIS — Z09 HOSPITAL DISCHARGE FOLLOW-UP: ICD-10-CM

## 2019-11-05 DIAGNOSIS — M79.604 PAIN IN BOTH LOWER EXTREMITIES: ICD-10-CM

## 2019-11-05 DIAGNOSIS — H53.131 ACUTE LOSS OF VISION, RIGHT: Primary | ICD-10-CM

## 2019-11-05 PROCEDURE — 1111F DSCHRG MED/CURRENT MED MERGE: CPT | Performed by: NURSE PRACTITIONER

## 2019-11-05 PROCEDURE — 99215 OFFICE O/P EST HI 40 MIN: CPT | Performed by: NURSE PRACTITIONER

## 2019-11-05 RX ORDER — HYDROMORPHONE HYDROCHLORIDE 4 MG/1
4 TABLET ORAL EVERY 4 HOURS PRN
Qty: 60 TABLET | Refills: 0 | Status: ON HOLD | OUTPATIENT
Start: 2019-11-05 | End: 2019-11-26 | Stop reason: HOSPADM

## 2019-11-05 ASSESSMENT — ENCOUNTER SYMPTOMS
CHEST TIGHTNESS: 0
COUGH: 0
WHEEZING: 0
SHORTNESS OF BREATH: 1

## 2019-11-06 ENCOUNTER — TELEPHONE (OUTPATIENT)
Dept: FAMILY MEDICINE CLINIC | Age: 64
End: 2019-11-06

## 2019-11-06 ENCOUNTER — HOSPITAL ENCOUNTER (EMERGENCY)
Age: 64
Discharge: HOME | End: 2019-11-06
Attending: EMERGENCY MEDICINE
Payer: MEDICARE

## 2019-11-06 ENCOUNTER — TELEPHONE (OUTPATIENT)
Dept: OTHER | Facility: CLINIC | Age: 64
End: 2019-11-06

## 2019-11-06 VITALS
RESPIRATION RATE: 14 BRPM | OXYGEN SATURATION: 100 % | SYSTOLIC BLOOD PRESSURE: 131 MMHG | TEMPERATURE: 98.1 F | BODY MASS INDEX: 51.91 KG/M2 | WEIGHT: 293 LBS | DIASTOLIC BLOOD PRESSURE: 65 MMHG | HEART RATE: 72 BPM | HEIGHT: 63 IN

## 2019-11-06 VITALS
SYSTOLIC BLOOD PRESSURE: 131 MMHG | HEART RATE: 70 BPM | HEIGHT: 63 IN | DIASTOLIC BLOOD PRESSURE: 91 MMHG | BODY MASS INDEX: 51.91 KG/M2 | WEIGHT: 293 LBS | RESPIRATION RATE: 18 BRPM | OXYGEN SATURATION: 97 % | TEMPERATURE: 98 F

## 2019-11-06 DIAGNOSIS — M25.551 PAIN OF BOTH HIP JOINTS: ICD-10-CM

## 2019-11-06 DIAGNOSIS — F11.20 OPIATE DEPENDENCE, CONTINUOUS (HCC): Primary | ICD-10-CM

## 2019-11-06 DIAGNOSIS — Z99.2 ESRD ON DIALYSIS (HCC): ICD-10-CM

## 2019-11-06 DIAGNOSIS — N18.6 ESRD ON DIALYSIS (HCC): ICD-10-CM

## 2019-11-06 DIAGNOSIS — Z99.2 END-STAGE RENAL DISEASE ON HEMODIALYSIS (HCC): ICD-10-CM

## 2019-11-06 DIAGNOSIS — E83.59 CALCIPHYLAXIS: ICD-10-CM

## 2019-11-06 DIAGNOSIS — E83.59 CALCIPHYLAXIS: Primary | ICD-10-CM

## 2019-11-06 DIAGNOSIS — M25.552 PAIN OF BOTH HIP JOINTS: ICD-10-CM

## 2019-11-06 DIAGNOSIS — N18.6 END-STAGE RENAL DISEASE ON HEMODIALYSIS (HCC): ICD-10-CM

## 2019-11-06 DIAGNOSIS — G89.4 CHRONIC PAIN SYNDROME: ICD-10-CM

## 2019-11-06 PROCEDURE — 96372 THER/PROPH/DIAG INJ SC/IM: CPT

## 2019-11-06 PROCEDURE — 6360000002 HC RX W HCPCS: Performed by: PHYSICIAN ASSISTANT

## 2019-11-06 PROCEDURE — 99283 EMERGENCY DEPT VISIT LOW MDM: CPT

## 2019-11-06 PROCEDURE — 6370000000 HC RX 637 (ALT 250 FOR IP): Performed by: PHYSICIAN ASSISTANT

## 2019-11-06 RX ORDER — HYDROMORPHONE HYDROCHLORIDE 2 MG/1
4 TABLET ORAL ONCE
Status: COMPLETED | OUTPATIENT
Start: 2019-11-06 | End: 2019-11-06

## 2019-11-06 RX ORDER — HYDROMORPHONE HYDROCHLORIDE 2 MG/1
4 TABLET ORAL ONCE
Status: DISCONTINUED | OUTPATIENT
Start: 2019-11-06 | End: 2019-11-06

## 2019-11-06 RX ORDER — HYDROMORPHONE HCL 110MG/55ML
2 PATIENT CONTROLLED ANALGESIA SYRINGE INTRAVENOUS ONCE
Status: COMPLETED | OUTPATIENT
Start: 2019-11-06 | End: 2019-11-06

## 2019-11-06 RX ADMIN — HYDROMORPHONE HYDROCHLORIDE 4 MG: 2 TABLET ORAL at 15:42

## 2019-11-06 RX ADMIN — HYDROMORPHONE HYDROCHLORIDE 2 MG: 2 INJECTION INTRAMUSCULAR; INTRAVENOUS; SUBCUTANEOUS at 09:47

## 2019-11-06 ASSESSMENT — ENCOUNTER SYMPTOMS
SHORTNESS OF BREATH: 0
NAUSEA: 0
VOMITING: 0
VOMITING: 0
ABDOMINAL PAIN: 0
COUGH: 0
CHEST TIGHTNESS: 0
ABDOMINAL PAIN: 0
SHORTNESS OF BREATH: 0
NAUSEA: 0
DIARRHEA: 0
BACK PAIN: 1
RHINORRHEA: 0
DIARRHEA: 0

## 2019-11-06 ASSESSMENT — PAIN SCALES - GENERAL
PAINLEVEL_OUTOF10: 10
PAINLEVEL_OUTOF10: 5
PAINLEVEL_OUTOF10: 10

## 2019-11-06 ASSESSMENT — PAIN DESCRIPTION - PROGRESSION: CLINICAL_PROGRESSION: GRADUALLY IMPROVING

## 2019-11-06 ASSESSMENT — PAIN DESCRIPTION - ORIENTATION: ORIENTATION: LEFT;RIGHT;UPPER

## 2019-11-06 ASSESSMENT — PAIN DESCRIPTION - FREQUENCY: FREQUENCY: CONTINUOUS

## 2019-11-06 ASSESSMENT — PAIN DESCRIPTION - LOCATION: LOCATION: LEG

## 2019-11-06 ASSESSMENT — PAIN DESCRIPTION - ONSET: ONSET: ON-GOING

## 2019-11-07 ENCOUNTER — CARE COORDINATION (OUTPATIENT)
Dept: CASE MANAGEMENT | Age: 64
End: 2019-11-07

## 2019-11-07 DIAGNOSIS — E11.42 DIABETIC POLYNEUROPATHY ASSOCIATED WITH TYPE 2 DIABETES MELLITUS (HCC): ICD-10-CM

## 2019-11-07 RX ORDER — INSULIN LISPRO 100 [IU]/ML
INJECTION, SOLUTION INTRAVENOUS; SUBCUTANEOUS
Qty: 15 ML | Refills: 5 | Status: SHIPPED | OUTPATIENT
Start: 2019-11-07

## 2019-11-08 ENCOUNTER — TELEPHONE (OUTPATIENT)
Dept: FAMILY MEDICINE CLINIC | Age: 64
End: 2019-11-08

## 2019-11-11 ENCOUNTER — CARE COORDINATION (OUTPATIENT)
Dept: CASE MANAGEMENT | Age: 64
End: 2019-11-11

## 2019-11-11 ENCOUNTER — TELEPHONE (OUTPATIENT)
Dept: FAMILY MEDICINE CLINIC | Age: 64
End: 2019-11-11

## 2019-11-11 DIAGNOSIS — M79.605 PAIN IN BOTH LOWER EXTREMITIES: Primary | ICD-10-CM

## 2019-11-11 DIAGNOSIS — Z99.2 DIALYSIS PATIENT (HCC): ICD-10-CM

## 2019-11-11 DIAGNOSIS — M79.604 PAIN IN BOTH LOWER EXTREMITIES: Primary | ICD-10-CM

## 2019-11-12 ENCOUNTER — CARE COORDINATION (OUTPATIENT)
Dept: CASE MANAGEMENT | Age: 64
End: 2019-11-12

## 2019-11-13 ENCOUNTER — TELEPHONE (OUTPATIENT)
Dept: FAMILY MEDICINE CLINIC | Age: 64
End: 2019-11-13

## 2019-11-13 RX ORDER — AZITHROMYCIN 250 MG/1
250 TABLET, FILM COATED ORAL SEE ADMIN INSTRUCTIONS
Qty: 6 TABLET | Refills: 0 | Status: ON HOLD | OUTPATIENT
Start: 2019-11-13 | End: 2019-11-26 | Stop reason: HOSPADM

## 2019-11-14 ENCOUNTER — HOSPITAL ENCOUNTER (OUTPATIENT)
Dept: WOUND CARE | Age: 64
Discharge: HOME | End: 2019-11-14

## 2019-11-14 ENCOUNTER — TELEPHONE (OUTPATIENT)
Dept: FAMILY MEDICINE CLINIC | Age: 64
End: 2019-11-14

## 2019-11-15 ENCOUNTER — TELEPHONE (OUTPATIENT)
Dept: FAMILY MEDICINE CLINIC | Age: 64
End: 2019-11-15

## 2019-11-15 ENCOUNTER — HOSPITAL ENCOUNTER (OUTPATIENT)
Dept: MRI IMAGING | Age: 64
Discharge: HOME | End: 2019-11-15
Payer: MEDICARE

## 2019-11-15 ENCOUNTER — CARE COORDINATION (OUTPATIENT)
Dept: CASE MANAGEMENT | Age: 64
End: 2019-11-15

## 2019-11-15 ENCOUNTER — TELEPHONE (OUTPATIENT)
Dept: PAIN MANAGEMENT | Age: 64
End: 2019-11-15

## 2019-11-15 DIAGNOSIS — H53.131 ACUTE LOSS OF VISION, RIGHT: Primary | ICD-10-CM

## 2019-11-15 DIAGNOSIS — I63.542 CEREBRAL INFARCTION DUE TO UNSPECIFIED OCCLUSION OR STENOSIS OF LEFT CEREBELLAR ARTERY (HCC): ICD-10-CM

## 2019-11-15 DIAGNOSIS — H53.131 ACUTE LOSS OF VISION, RIGHT: ICD-10-CM

## 2019-11-15 PROCEDURE — 70551 MRI BRAIN STEM W/O DYE: CPT

## 2019-11-17 ENCOUNTER — HOSPITAL ENCOUNTER (OUTPATIENT)
Age: 64
Setting detail: OBSERVATION
Discharge: NURSING, REHAB OR LONG-TERM CARE | End: 2019-11-27
Attending: EMERGENCY MEDICINE | Admitting: FAMILY MEDICINE
Payer: MEDICARE

## 2019-11-17 ENCOUNTER — APPOINTMENT (OUTPATIENT)
Dept: GENERAL RADIOLOGY | Age: 64
End: 2019-11-17
Payer: MEDICARE

## 2019-11-17 DIAGNOSIS — H54.61 VISION LOSS OF RIGHT EYE: ICD-10-CM

## 2019-11-17 DIAGNOSIS — N18.6 ESRD ON HEMODIALYSIS (HCC): Primary | ICD-10-CM

## 2019-11-17 DIAGNOSIS — G89.4 CHRONIC PAIN SYNDROME: ICD-10-CM

## 2019-11-17 DIAGNOSIS — M79.604 PAIN IN BOTH LOWER EXTREMITIES: ICD-10-CM

## 2019-11-17 DIAGNOSIS — I63.9 CEREBROVASCULAR ACCIDENT (CVA), UNSPECIFIED MECHANISM (HCC): ICD-10-CM

## 2019-11-17 DIAGNOSIS — T14.8XXA OPEN WOUND: ICD-10-CM

## 2019-11-17 DIAGNOSIS — N25.0 RENAL OSTEODYSTROPHY: ICD-10-CM

## 2019-11-17 DIAGNOSIS — Z99.2 ESRD ON HEMODIALYSIS (HCC): Primary | ICD-10-CM

## 2019-11-17 DIAGNOSIS — E83.59 CALCIPHYLAXIS: ICD-10-CM

## 2019-11-17 DIAGNOSIS — D64.9 ANEMIA, UNSPECIFIED TYPE: ICD-10-CM

## 2019-11-17 DIAGNOSIS — M79.605 PAIN IN BOTH LOWER EXTREMITIES: ICD-10-CM

## 2019-11-17 LAB
A/G RATIO: 0.7 (ref 1.1–2.2)
ABO/RH: NORMAL
ABO/RH: NORMAL
ALBUMIN SERPL-MCNC: 2.9 G/DL (ref 3.4–5)
ALP BLD-CCNC: 112 U/L (ref 40–129)
ALT SERPL-CCNC: 17 U/L (ref 10–40)
ANION GAP SERPL CALCULATED.3IONS-SCNC: 23 MMOL/L (ref 3–16)
ANTIBODY SCREEN: NORMAL
AST SERPL-CCNC: 18 U/L (ref 15–37)
BASOPHILS ABSOLUTE: 0.1 K/UL (ref 0–0.2)
BASOPHILS RELATIVE PERCENT: 0.4 %
BILIRUB SERPL-MCNC: 0.3 MG/DL (ref 0–1)
BLOOD BANK DISPENSE STATUS: NORMAL
BLOOD BANK PRODUCT CODE: NORMAL
BPU ID: NORMAL
BUN BLDV-MCNC: 39 MG/DL (ref 7–20)
CALCIUM SERPL-MCNC: 10.4 MG/DL (ref 8.3–10.6)
CHLORIDE BLD-SCNC: 95 MMOL/L (ref 99–110)
CO2: 20 MMOL/L (ref 21–32)
CREAT SERPL-MCNC: 11.4 MG/DL (ref 0.6–1.2)
DESCRIPTION BLOOD BANK: NORMAL
EOSINOPHILS ABSOLUTE: 0.4 K/UL (ref 0–0.6)
EOSINOPHILS RELATIVE PERCENT: 2.3 %
GFR AFRICAN AMERICAN: 4
GFR NON-AFRICAN AMERICAN: 3
GLOBULIN: 4.1 G/DL
GLUCOSE BLD-MCNC: 128 MG/DL (ref 70–99)
GLUCOSE BLD-MCNC: 64 MG/DL (ref 70–99)
HCT VFR BLD CALC: 18.5 % (ref 36–48)
HCT VFR BLD CALC: 27 % (ref 36–48)
HEMOGLOBIN: 5.9 G/DL (ref 12–16)
HEMOGLOBIN: 8.7 G/DL (ref 12–16)
INR BLD: 1.5 (ref 0.86–1.14)
LACTIC ACID, SEPSIS: 1.5 MMOL/L (ref 0.4–1.9)
LYMPHOCYTES ABSOLUTE: 1.8 K/UL (ref 1–5.1)
LYMPHOCYTES RELATIVE PERCENT: 11.4 %
MCH RBC QN AUTO: 33 PG (ref 26–34)
MCHC RBC AUTO-ENTMCNC: 32.1 G/DL (ref 31–36)
MCV RBC AUTO: 102.8 FL (ref 80–100)
MONOCYTES ABSOLUTE: 1.4 K/UL (ref 0–1.3)
MONOCYTES RELATIVE PERCENT: 8.9 %
NEUTROPHILS ABSOLUTE: 12.2 K/UL (ref 1.7–7.7)
NEUTROPHILS RELATIVE PERCENT: 77 %
PDW BLD-RTO: 19.1 % (ref 12.4–15.4)
PERFORMED ON: ABNORMAL
PLATELET # BLD: 295 K/UL (ref 135–450)
PMV BLD AUTO: 8.5 FL (ref 5–10.5)
POTASSIUM REFLEX MAGNESIUM: 4.1 MMOL/L (ref 3.5–5.1)
PROTHROMBIN TIME: 17.1 SEC (ref 9.8–13)
RBC # BLD: 1.8 M/UL (ref 4–5.2)
SODIUM BLD-SCNC: 138 MMOL/L (ref 136–145)
TOTAL PROTEIN: 7 G/DL (ref 6.4–8.2)
WBC # BLD: 15.8 K/UL (ref 4–11)

## 2019-11-17 PROCEDURE — 94640 AIRWAY INHALATION TREATMENT: CPT

## 2019-11-17 PROCEDURE — 87040 BLOOD CULTURE FOR BACTERIA: CPT

## 2019-11-17 PROCEDURE — 6360000002 HC RX W HCPCS: Performed by: PHYSICIAN ASSISTANT

## 2019-11-17 PROCEDURE — G0378 HOSPITAL OBSERVATION PER HR: HCPCS

## 2019-11-17 PROCEDURE — P9016 RBC LEUKOCYTES REDUCED: HCPCS

## 2019-11-17 PROCEDURE — 6370000000 HC RX 637 (ALT 250 FOR IP): Performed by: FAMILY MEDICINE

## 2019-11-17 PROCEDURE — 96376 TX/PRO/DX INJ SAME DRUG ADON: CPT

## 2019-11-17 PROCEDURE — 96375 TX/PRO/DX INJ NEW DRUG ADDON: CPT

## 2019-11-17 PROCEDURE — 2060000000 HC ICU INTERMEDIATE R&B

## 2019-11-17 PROCEDURE — 2580000003 HC RX 258: Performed by: PHYSICIAN ASSISTANT

## 2019-11-17 PROCEDURE — 99284 EMERGENCY DEPT VISIT MOD MDM: CPT

## 2019-11-17 PROCEDURE — 36430 TRANSFUSION BLD/BLD COMPNT: CPT

## 2019-11-17 PROCEDURE — 83605 ASSAY OF LACTIC ACID: CPT

## 2019-11-17 PROCEDURE — 86900 BLOOD TYPING SEROLOGIC ABO: CPT

## 2019-11-17 PROCEDURE — 2580000003 HC RX 258: Performed by: FAMILY MEDICINE

## 2019-11-17 PROCEDURE — 86850 RBC ANTIBODY SCREEN: CPT

## 2019-11-17 PROCEDURE — 6370000000 HC RX 637 (ALT 250 FOR IP): Performed by: NURSE PRACTITIONER

## 2019-11-17 PROCEDURE — 96374 THER/PROPH/DIAG INJ IV PUSH: CPT

## 2019-11-17 PROCEDURE — 85025 COMPLETE CBC W/AUTO DIFF WBC: CPT

## 2019-11-17 PROCEDURE — 71045 X-RAY EXAM CHEST 1 VIEW: CPT

## 2019-11-17 PROCEDURE — 6360000002 HC RX W HCPCS: Performed by: FAMILY MEDICINE

## 2019-11-17 PROCEDURE — 85018 HEMOGLOBIN: CPT

## 2019-11-17 PROCEDURE — 86923 COMPATIBILITY TEST ELECTRIC: CPT

## 2019-11-17 PROCEDURE — 80053 COMPREHEN METABOLIC PANEL: CPT

## 2019-11-17 PROCEDURE — 86901 BLOOD TYPING SEROLOGIC RH(D): CPT

## 2019-11-17 PROCEDURE — 94760 N-INVAS EAR/PLS OXIMETRY 1: CPT

## 2019-11-17 PROCEDURE — 2700000000 HC OXYGEN THERAPY PER DAY

## 2019-11-17 PROCEDURE — 85014 HEMATOCRIT: CPT

## 2019-11-17 PROCEDURE — 85610 PROTHROMBIN TIME: CPT

## 2019-11-17 RX ORDER — NEBIVOLOL 5 MG/1
2.5 TABLET ORAL
Status: DISCONTINUED | OUTPATIENT
Start: 2019-11-19 | End: 2019-11-17

## 2019-11-17 RX ORDER — NEBIVOLOL 5 MG/1
2.5 TABLET ORAL
Status: DISCONTINUED | OUTPATIENT
Start: 2019-11-18 | End: 2019-11-27 | Stop reason: HOSPADM

## 2019-11-17 RX ORDER — BUDESONIDE 0.5 MG/2ML
500 INHALANT ORAL 2 TIMES DAILY
Status: DISCONTINUED | OUTPATIENT
Start: 2019-11-17 | End: 2019-11-27 | Stop reason: HOSPADM

## 2019-11-17 RX ORDER — ONDANSETRON 2 MG/ML
4 INJECTION INTRAMUSCULAR; INTRAVENOUS ONCE
Status: COMPLETED | OUTPATIENT
Start: 2019-11-17 | End: 2019-11-17

## 2019-11-17 RX ORDER — HYDROMORPHONE HYDROCHLORIDE 1 MG/ML
1 INJECTION, SOLUTION INTRAMUSCULAR; INTRAVENOUS; SUBCUTANEOUS ONCE
Status: COMPLETED | OUTPATIENT
Start: 2019-11-17 | End: 2019-11-17

## 2019-11-17 RX ORDER — NITROGLYCERIN 0.4 MG/1
0.4 TABLET SUBLINGUAL EVERY 5 MIN PRN
Status: DISCONTINUED | OUTPATIENT
Start: 2019-11-17 | End: 2019-11-27 | Stop reason: HOSPADM

## 2019-11-17 RX ORDER — ESCITALOPRAM OXALATE 10 MG/1
10 TABLET ORAL DAILY
Status: DISCONTINUED | OUTPATIENT
Start: 2019-11-18 | End: 2019-11-27 | Stop reason: HOSPADM

## 2019-11-17 RX ORDER — ONDANSETRON 2 MG/ML
4 INJECTION INTRAMUSCULAR; INTRAVENOUS EVERY 6 HOURS PRN
Status: DISCONTINUED | OUTPATIENT
Start: 2019-11-17 | End: 2019-11-27 | Stop reason: HOSPADM

## 2019-11-17 RX ORDER — NEBIVOLOL 5 MG/1
2.5 TABLET ORAL DAILY
Status: DISCONTINUED | OUTPATIENT
Start: 2019-11-18 | End: 2019-11-17

## 2019-11-17 RX ORDER — NICOTINE POLACRILEX 4 MG
15 LOZENGE BUCCAL PRN
Status: DISCONTINUED | OUTPATIENT
Start: 2019-11-17 | End: 2019-11-27 | Stop reason: HOSPADM

## 2019-11-17 RX ORDER — MAG HYDROX/ALUMINUM HYD/SIMETH 400-400-40
1 SUSPENSION, ORAL (FINAL DOSE FORM) ORAL ONCE
Status: DISCONTINUED | OUTPATIENT
Start: 2019-11-17 | End: 2019-11-27 | Stop reason: HOSPADM

## 2019-11-17 RX ORDER — SODIUM CHLORIDE 0.9 % (FLUSH) 0.9 %
10 SYRINGE (ML) INJECTION EVERY 12 HOURS SCHEDULED
Status: DISCONTINUED | OUTPATIENT
Start: 2019-11-17 | End: 2019-11-27 | Stop reason: HOSPADM

## 2019-11-17 RX ORDER — NEBIVOLOL 5 MG/1
5 TABLET ORAL
Status: DISCONTINUED | OUTPATIENT
Start: 2019-11-19 | End: 2019-11-17

## 2019-11-17 RX ORDER — OMEPRAZOLE 20 MG/1
40 CAPSULE, DELAYED RELEASE ORAL
Status: DISCONTINUED | OUTPATIENT
Start: 2019-11-18 | End: 2019-11-17 | Stop reason: RX

## 2019-11-17 RX ORDER — ALBUTEROL SULFATE 90 UG/1
2 AEROSOL, METERED RESPIRATORY (INHALATION) EVERY 6 HOURS PRN
Status: DISCONTINUED | OUTPATIENT
Start: 2019-11-17 | End: 2019-11-27 | Stop reason: HOSPADM

## 2019-11-17 RX ORDER — SODIUM CHLORIDE 0.9 % (FLUSH) 0.9 %
10 SYRINGE (ML) INJECTION PRN
Status: DISCONTINUED | OUTPATIENT
Start: 2019-11-17 | End: 2019-11-27 | Stop reason: HOSPADM

## 2019-11-17 RX ORDER — ROSUVASTATIN CALCIUM 10 MG/1
10 TABLET, COATED ORAL DAILY
Status: DISCONTINUED | OUTPATIENT
Start: 2019-11-18 | End: 2019-11-27 | Stop reason: HOSPADM

## 2019-11-17 RX ORDER — POLYETHYLENE GLYCOL 3350 17 G/17G
17 POWDER, FOR SOLUTION ORAL DAILY
Status: DISCONTINUED | OUTPATIENT
Start: 2019-11-18 | End: 2019-11-27 | Stop reason: HOSPADM

## 2019-11-17 RX ORDER — PREGABALIN 25 MG/1
50 CAPSULE ORAL 3 TIMES DAILY
Status: DISCONTINUED | OUTPATIENT
Start: 2019-11-17 | End: 2019-11-27 | Stop reason: HOSPADM

## 2019-11-17 RX ORDER — SEVELAMER CARBONATE 800 MG/1
1600 TABLET, FILM COATED ORAL
Status: DISCONTINUED | OUTPATIENT
Start: 2019-11-18 | End: 2019-11-27 | Stop reason: HOSPADM

## 2019-11-17 RX ORDER — DEXTROSE MONOHYDRATE 50 MG/ML
100 INJECTION, SOLUTION INTRAVENOUS PRN
Status: DISCONTINUED | OUTPATIENT
Start: 2019-11-17 | End: 2019-11-27 | Stop reason: HOSPADM

## 2019-11-17 RX ORDER — PANTOPRAZOLE SODIUM 40 MG/1
40 TABLET, DELAYED RELEASE ORAL
Status: DISCONTINUED | OUTPATIENT
Start: 2019-11-18 | End: 2019-11-21

## 2019-11-17 RX ORDER — NEBIVOLOL 5 MG/1
5 TABLET ORAL
Status: DISCONTINUED | OUTPATIENT
Start: 2019-11-19 | End: 2019-11-27 | Stop reason: HOSPADM

## 2019-11-17 RX ORDER — HYDROMORPHONE HYDROCHLORIDE 2 MG/1
4 TABLET ORAL EVERY 4 HOURS PRN
Status: DISCONTINUED | OUTPATIENT
Start: 2019-11-17 | End: 2019-11-18

## 2019-11-17 RX ORDER — 0.9 % SODIUM CHLORIDE 0.9 %
250 INTRAVENOUS SOLUTION INTRAVENOUS ONCE
Status: COMPLETED | OUTPATIENT
Start: 2019-11-17 | End: 2019-11-17

## 2019-11-17 RX ORDER — DEXTROSE MONOHYDRATE 25 G/50ML
12.5 INJECTION, SOLUTION INTRAVENOUS PRN
Status: DISCONTINUED | OUTPATIENT
Start: 2019-11-17 | End: 2019-11-27 | Stop reason: HOSPADM

## 2019-11-17 RX ORDER — TRAZODONE HYDROCHLORIDE 50 MG/1
100 TABLET ORAL NIGHTLY PRN
Status: DISCONTINUED | OUTPATIENT
Start: 2019-11-17 | End: 2019-11-27 | Stop reason: HOSPADM

## 2019-11-17 RX ORDER — POLYETHYLENE GLYCOL 3350 17 G/17G
17 POWDER, FOR SOLUTION ORAL DAILY
Status: DISCONTINUED | OUTPATIENT
Start: 2019-11-18 | End: 2019-11-17

## 2019-11-17 RX ORDER — ALLOPURINOL 100 MG/1
100 TABLET ORAL DAILY
Status: DISCONTINUED | OUTPATIENT
Start: 2019-11-18 | End: 2019-11-27 | Stop reason: HOSPADM

## 2019-11-17 RX ADMIN — POLYETHYLENE GLYCOL 3350 17 G: 17 POWDER, FOR SOLUTION ORAL at 23:58

## 2019-11-17 RX ADMIN — SODIUM CHLORIDE 250 ML: 9 INJECTION, SOLUTION INTRAVENOUS at 18:40

## 2019-11-17 RX ADMIN — BUDESONIDE 500 MCG: 0.5 SUSPENSION RESPIRATORY (INHALATION) at 21:15

## 2019-11-17 RX ADMIN — ONDANSETRON 4 MG: 2 INJECTION INTRAMUSCULAR; INTRAVENOUS at 15:37

## 2019-11-17 RX ADMIN — HYDROMORPHONE HYDROCHLORIDE 4 MG: 2 TABLET ORAL at 21:17

## 2019-11-17 RX ADMIN — INSULIN GLARGINE 20 UNITS: 100 INJECTION, SOLUTION SUBCUTANEOUS at 23:58

## 2019-11-17 RX ADMIN — Medication 10 ML: at 22:52

## 2019-11-17 RX ADMIN — TRAZODONE HYDROCHLORIDE 100 MG: 50 TABLET ORAL at 22:59

## 2019-11-17 RX ADMIN — HYDROMORPHONE HYDROCHLORIDE 1 MG: 1 INJECTION, SOLUTION INTRAMUSCULAR; INTRAVENOUS; SUBCUTANEOUS at 18:42

## 2019-11-17 RX ADMIN — PREGABALIN 50 MG: 25 CAPSULE ORAL at 21:17

## 2019-11-17 RX ADMIN — HYDROMORPHONE HYDROCHLORIDE 1 MG: 1 INJECTION, SOLUTION INTRAMUSCULAR; INTRAVENOUS; SUBCUTANEOUS at 15:37

## 2019-11-17 ASSESSMENT — ENCOUNTER SYMPTOMS
EYE DISCHARGE: 0
PHOTOPHOBIA: 0
CONSTIPATION: 0
BACK PAIN: 0
RESPIRATORY NEGATIVE: 1
NAUSEA: 0
ABDOMINAL PAIN: 0
VOMITING: 0
SHORTNESS OF BREATH: 0
DIARRHEA: 0
COUGH: 0
COLOR CHANGE: 1
EYE REDNESS: 0

## 2019-11-17 ASSESSMENT — PAIN SCALES - GENERAL
PAINLEVEL_OUTOF10: 4
PAINLEVEL_OUTOF10: 9
PAINLEVEL_OUTOF10: 9
PAINLEVEL_OUTOF10: 7
PAINLEVEL_OUTOF10: 7

## 2019-11-18 ENCOUNTER — TELEPHONE (OUTPATIENT)
Dept: FAMILY MEDICINE CLINIC | Age: 64
End: 2019-11-18

## 2019-11-18 LAB
ANION GAP SERPL CALCULATED.3IONS-SCNC: 19 MMOL/L (ref 3–16)
BUN BLDV-MCNC: 31 MG/DL (ref 7–20)
CALCIUM SERPL-MCNC: 9.4 MG/DL (ref 8.3–10.6)
CHLORIDE BLD-SCNC: 93 MMOL/L (ref 99–110)
CO2: 22 MMOL/L (ref 21–32)
CREAT SERPL-MCNC: 8.5 MG/DL (ref 0.6–1.2)
GFR AFRICAN AMERICAN: 6
GFR NON-AFRICAN AMERICAN: 5
GLUCOSE BLD-MCNC: 58 MG/DL (ref 70–99)
GLUCOSE BLD-MCNC: 81 MG/DL (ref 70–99)
GLUCOSE BLD-MCNC: 84 MG/DL (ref 70–99)
GLUCOSE BLD-MCNC: 85 MG/DL (ref 70–99)
GLUCOSE BLD-MCNC: 93 MG/DL (ref 70–99)
HCT VFR BLD CALC: 27.9 % (ref 36–48)
HCT VFR BLD CALC: 28.4 % (ref 36–48)
HEMOGLOBIN: 9.1 G/DL (ref 12–16)
IMMATURE RETIC FRACT: 0.68 (ref 0.21–0.37)
IRON SATURATION: 29 % (ref 15–50)
IRON: 36 UG/DL (ref 37–145)
MCH RBC QN AUTO: 31.9 PG (ref 26–34)
MCHC RBC AUTO-ENTMCNC: 32.2 G/DL (ref 31–36)
MCV RBC AUTO: 99.2 FL (ref 80–100)
PDW BLD-RTO: 21.1 % (ref 12.4–15.4)
PERFORMED ON: ABNORMAL
PERFORMED ON: NORMAL
PLATELET # BLD: 284 K/UL (ref 135–450)
PMV BLD AUTO: 8.6 FL (ref 5–10.5)
POTASSIUM SERPL-SCNC: 4.2 MMOL/L (ref 3.5–5.1)
RBC # BLD: 2.86 M/UL (ref 4–5.2)
RETICULOCYTE ABSOLUTE COUNT: 0.08 M/UL (ref 0.02–0.1)
RETICULOCYTE COUNT PCT: 2.86 % (ref 0.5–2.18)
SODIUM BLD-SCNC: 134 MMOL/L (ref 136–145)
TOTAL IRON BINDING CAPACITY: 126 UG/DL (ref 260–445)
WBC # BLD: 13.2 K/UL (ref 4–11)

## 2019-11-18 PROCEDURE — 94640 AIRWAY INHALATION TREATMENT: CPT

## 2019-11-18 PROCEDURE — 83550 IRON BINDING TEST: CPT

## 2019-11-18 PROCEDURE — 6370000000 HC RX 637 (ALT 250 FOR IP): Performed by: FAMILY MEDICINE

## 2019-11-18 PROCEDURE — 99219 PR INITIAL OBSERVATION CARE/DAY 50 MINUTES: CPT | Performed by: PSYCHIATRY & NEUROLOGY

## 2019-11-18 PROCEDURE — 6360000002 HC RX W HCPCS: Performed by: HOSPITALIST

## 2019-11-18 PROCEDURE — 85027 COMPLETE CBC AUTOMATED: CPT

## 2019-11-18 PROCEDURE — 83540 ASSAY OF IRON: CPT

## 2019-11-18 PROCEDURE — 96376 TX/PRO/DX INJ SAME DRUG ADON: CPT

## 2019-11-18 PROCEDURE — 80048 BASIC METABOLIC PNL TOTAL CA: CPT

## 2019-11-18 PROCEDURE — 2700000000 HC OXYGEN THERAPY PER DAY

## 2019-11-18 PROCEDURE — 97162 PT EVAL MOD COMPLEX 30 MIN: CPT

## 2019-11-18 PROCEDURE — 90935 HEMODIALYSIS ONE EVALUATION: CPT

## 2019-11-18 PROCEDURE — G0378 HOSPITAL OBSERVATION PER HR: HCPCS

## 2019-11-18 PROCEDURE — 97535 SELF CARE MNGMENT TRAINING: CPT

## 2019-11-18 PROCEDURE — 2580000003 HC RX 258: Performed by: FAMILY MEDICINE

## 2019-11-18 PROCEDURE — 6360000002 HC RX W HCPCS: Performed by: FAMILY MEDICINE

## 2019-11-18 PROCEDURE — 92526 ORAL FUNCTION THERAPY: CPT

## 2019-11-18 PROCEDURE — 2500000003 HC RX 250 WO HCPCS: Performed by: INTERNAL MEDICINE

## 2019-11-18 PROCEDURE — 92610 EVALUATE SWALLOWING FUNCTION: CPT

## 2019-11-18 PROCEDURE — 85045 AUTOMATED RETICULOCYTE COUNT: CPT

## 2019-11-18 PROCEDURE — 97530 THERAPEUTIC ACTIVITIES: CPT

## 2019-11-18 PROCEDURE — 92523 SPEECH SOUND LANG COMPREHEN: CPT

## 2019-11-18 PROCEDURE — 6370000000 HC RX 637 (ALT 250 FOR IP): Performed by: NURSE PRACTITIONER

## 2019-11-18 PROCEDURE — 97166 OT EVAL MOD COMPLEX 45 MIN: CPT

## 2019-11-18 PROCEDURE — 94761 N-INVAS EAR/PLS OXIMETRY MLT: CPT

## 2019-11-18 RX ORDER — LIDOCAINE HYDROCHLORIDE 10 MG/ML
INJECTION, SOLUTION EPIDURAL; INFILTRATION; INTRACAUDAL; PERINEURAL
Status: DISPENSED
Start: 2019-11-18 | End: 2019-11-19

## 2019-11-18 RX ORDER — LIDOCAINE HYDROCHLORIDE 20 MG/ML
5 INJECTION, SOLUTION INFILTRATION; PERINEURAL PRN
Status: DISCONTINUED | OUTPATIENT
Start: 2019-11-18 | End: 2019-11-22 | Stop reason: DRUGHIGH

## 2019-11-18 RX ORDER — HYDROMORPHONE HYDROCHLORIDE 1 MG/ML
1 INJECTION, SOLUTION INTRAMUSCULAR; INTRAVENOUS; SUBCUTANEOUS EVERY 4 HOURS PRN
Status: DISCONTINUED | OUTPATIENT
Start: 2019-11-18 | End: 2019-11-27 | Stop reason: HOSPADM

## 2019-11-18 RX ORDER — COLLAGENASE SANTYL 250 [ARB'U]/G
1 OINTMENT TOPICAL DAILY
Refills: 3 | Status: ON HOLD | COMMUNITY
Start: 2019-10-22 | End: 2019-11-20 | Stop reason: HOSPADM

## 2019-11-18 RX ADMIN — POLYETHYLENE GLYCOL 3350 17 G: 17 POWDER, FOR SOLUTION ORAL at 08:54

## 2019-11-18 RX ADMIN — SEVELAMER CARBONATE 1600 MG: 800 TABLET, FILM COATED ORAL at 08:54

## 2019-11-18 RX ADMIN — INSULIN GLARGINE 20 UNITS: 100 INJECTION, SOLUTION SUBCUTANEOUS at 20:55

## 2019-11-18 RX ADMIN — SEVELAMER CARBONATE 1600 MG: 800 TABLET, FILM COATED ORAL at 20:54

## 2019-11-18 RX ADMIN — PANTOPRAZOLE SODIUM 40 MG: 40 TABLET, DELAYED RELEASE ORAL at 06:30

## 2019-11-18 RX ADMIN — BUDESONIDE 500 MCG: 0.5 SUSPENSION RESPIRATORY (INHALATION) at 09:10

## 2019-11-18 RX ADMIN — SODIUM THIOSULFATE 25 G: 250 INJECTION, SOLUTION INTRAVENOUS at 21:01

## 2019-11-18 RX ADMIN — HYDROMORPHONE HYDROCHLORIDE 4 MG: 2 TABLET ORAL at 08:54

## 2019-11-18 RX ADMIN — HYDROMORPHONE HYDROCHLORIDE 1 MG: 1 INJECTION, SOLUTION INTRAMUSCULAR; INTRAVENOUS; SUBCUTANEOUS at 14:20

## 2019-11-18 RX ADMIN — ALLOPURINOL 100 MG: 100 TABLET ORAL at 08:55

## 2019-11-18 RX ADMIN — NEBIVOLOL HYDROCHLORIDE 2.5 MG: 5 TABLET ORAL at 08:55

## 2019-11-18 RX ADMIN — HYDROMORPHONE HYDROCHLORIDE 4 MG: 2 TABLET ORAL at 13:05

## 2019-11-18 RX ADMIN — PREGABALIN 50 MG: 25 CAPSULE ORAL at 20:53

## 2019-11-18 RX ADMIN — PREGABALIN 50 MG: 25 CAPSULE ORAL at 13:05

## 2019-11-18 RX ADMIN — PREGABALIN 50 MG: 25 CAPSULE ORAL at 08:55

## 2019-11-18 RX ADMIN — Medication 10 ML: at 20:55

## 2019-11-18 RX ADMIN — Medication 10 ML: at 08:53

## 2019-11-18 RX ADMIN — BUDESONIDE 500 MCG: 0.5 SUSPENSION RESPIRATORY (INHALATION) at 21:07

## 2019-11-18 RX ADMIN — HYDROMORPHONE HYDROCHLORIDE 4 MG: 2 TABLET ORAL at 01:47

## 2019-11-18 RX ADMIN — NEBIVOLOL HYDROCHLORIDE 2.5 MG: 5 TABLET ORAL at 20:54

## 2019-11-18 RX ADMIN — APIXABAN 2.5 MG: 5 TABLET, FILM COATED ORAL at 20:54

## 2019-11-18 RX ADMIN — Medication 10 ML: at 14:21

## 2019-11-18 RX ADMIN — ROSUVASTATIN CALCIUM 10 MG: 10 TABLET, FILM COATED ORAL at 20:53

## 2019-11-18 RX ADMIN — ESCITALOPRAM OXALATE 10 MG: 10 TABLET ORAL at 08:55

## 2019-11-18 ASSESSMENT — PAIN DESCRIPTION - ORIENTATION
ORIENTATION: RIGHT;LEFT

## 2019-11-18 ASSESSMENT — PAIN SCALES - GENERAL
PAINLEVEL_OUTOF10: 8
PAINLEVEL_OUTOF10: 9
PAINLEVEL_OUTOF10: 5
PAINLEVEL_OUTOF10: 10
PAINLEVEL_OUTOF10: 8
PAINLEVEL_OUTOF10: 9
PAINLEVEL_OUTOF10: 8
PAINLEVEL_OUTOF10: 9
PAINLEVEL_OUTOF10: 4
PAINLEVEL_OUTOF10: 7
PAINLEVEL_OUTOF10: 4
PAINLEVEL_OUTOF10: 9

## 2019-11-18 ASSESSMENT — PAIN DESCRIPTION - PAIN TYPE
TYPE: ACUTE PAIN

## 2019-11-18 ASSESSMENT — PAIN DESCRIPTION - LOCATION
LOCATION: BACK;LEG
LOCATION: LEG;BACK
LOCATION: BACK;LEG
LOCATION: BACK;LEG

## 2019-11-18 ASSESSMENT — PAIN DESCRIPTION - FREQUENCY: FREQUENCY: CONTINUOUS

## 2019-11-18 ASSESSMENT — PAIN DESCRIPTION - ONSET: ONSET: ON-GOING

## 2019-11-18 ASSESSMENT — PAIN DESCRIPTION - DESCRIPTORS: DESCRIPTORS: CONSTANT

## 2019-11-19 LAB
FERRITIN: 531.6 NG/ML (ref 15–150)
GLUCOSE BLD-MCNC: 101 MG/DL (ref 70–99)
GLUCOSE BLD-MCNC: 102 MG/DL (ref 70–99)
GLUCOSE BLD-MCNC: 117 MG/DL (ref 70–99)
GLUCOSE BLD-MCNC: 136 MG/DL (ref 70–99)
GLUCOSE BLD-MCNC: 171 MG/DL (ref 70–99)
HCT VFR BLD CALC: 19.8 % (ref 36–48)
HCT VFR BLD CALC: 28.4 % (ref 36–48)
HEMOGLOBIN: 6.3 G/DL (ref 12–16)
HEMOGLOBIN: 9.1 G/DL (ref 12–16)
MCH RBC QN AUTO: 32.1 PG (ref 26–34)
MCHC RBC AUTO-ENTMCNC: 31.8 G/DL (ref 31–36)
MCV RBC AUTO: 100.9 FL (ref 80–100)
PDW BLD-RTO: 20.7 % (ref 12.4–15.4)
PERFORMED ON: ABNORMAL
PLATELET # BLD: 222 K/UL (ref 135–450)
PMV BLD AUTO: 8 FL (ref 5–10.5)
RBC # BLD: 1.97 M/UL (ref 4–5.2)
WBC # BLD: 11.5 K/UL (ref 4–11)

## 2019-11-19 PROCEDURE — 85027 COMPLETE CBC AUTOMATED: CPT

## 2019-11-19 PROCEDURE — 85014 HEMATOCRIT: CPT

## 2019-11-19 PROCEDURE — APPSS60 APP SPLIT SHARED TIME 46-60 MINUTES: Performed by: NURSE PRACTITIONER

## 2019-11-19 PROCEDURE — 85018 HEMOGLOBIN: CPT

## 2019-11-19 PROCEDURE — 94640 AIRWAY INHALATION TREATMENT: CPT

## 2019-11-19 PROCEDURE — 96376 TX/PRO/DX INJ SAME DRUG ADON: CPT

## 2019-11-19 PROCEDURE — 99226 PR SBSQ OBSERVATION CARE/DAY 35 MINUTES: CPT | Performed by: PSYCHIATRY & NEUROLOGY

## 2019-11-19 PROCEDURE — 93880 EXTRACRANIAL BILAT STUDY: CPT

## 2019-11-19 PROCEDURE — 2700000000 HC OXYGEN THERAPY PER DAY

## 2019-11-19 PROCEDURE — 94760 N-INVAS EAR/PLS OXIMETRY 1: CPT

## 2019-11-19 PROCEDURE — 99221 1ST HOSP IP/OBS SF/LOW 40: CPT | Performed by: SURGERY

## 2019-11-19 PROCEDURE — 6370000000 HC RX 637 (ALT 250 FOR IP): Performed by: FAMILY MEDICINE

## 2019-11-19 PROCEDURE — APPNB30 APP NON BILLABLE TIME 0-30 MINS: Performed by: NURSE PRACTITIONER

## 2019-11-19 PROCEDURE — G0378 HOSPITAL OBSERVATION PER HR: HCPCS

## 2019-11-19 PROCEDURE — 2580000003 HC RX 258: Performed by: FAMILY MEDICINE

## 2019-11-19 PROCEDURE — 82728 ASSAY OF FERRITIN: CPT

## 2019-11-19 PROCEDURE — 6360000002 HC RX W HCPCS: Performed by: FAMILY MEDICINE

## 2019-11-19 PROCEDURE — 6360000002 HC RX W HCPCS: Performed by: HOSPITALIST

## 2019-11-19 PROCEDURE — 6370000000 HC RX 637 (ALT 250 FOR IP): Performed by: NURSE PRACTITIONER

## 2019-11-19 RX ADMIN — Medication 10 ML: at 22:08

## 2019-11-19 RX ADMIN — PREGABALIN 50 MG: 25 CAPSULE ORAL at 21:51

## 2019-11-19 RX ADMIN — BUDESONIDE 500 MCG: 0.5 SUSPENSION RESPIRATORY (INHALATION) at 19:50

## 2019-11-19 RX ADMIN — PREGABALIN 50 MG: 25 CAPSULE ORAL at 09:30

## 2019-11-19 RX ADMIN — ESCITALOPRAM OXALATE 10 MG: 10 TABLET ORAL at 09:30

## 2019-11-19 RX ADMIN — NEBIVOLOL HYDROCHLORIDE 5 MG: 5 TABLET ORAL at 09:30

## 2019-11-19 RX ADMIN — HYDROMORPHONE HYDROCHLORIDE 1 MG: 1 INJECTION, SOLUTION INTRAMUSCULAR; INTRAVENOUS; SUBCUTANEOUS at 08:27

## 2019-11-19 RX ADMIN — HYDROMORPHONE HYDROCHLORIDE 1 MG: 1 INJECTION, SOLUTION INTRAMUSCULAR; INTRAVENOUS; SUBCUTANEOUS at 21:52

## 2019-11-19 RX ADMIN — NEBIVOLOL HYDROCHLORIDE 5 MG: 5 TABLET ORAL at 21:52

## 2019-11-19 RX ADMIN — ALLOPURINOL 100 MG: 100 TABLET ORAL at 09:28

## 2019-11-19 RX ADMIN — HYDROMORPHONE HYDROCHLORIDE 1 MG: 1 INJECTION, SOLUTION INTRAMUSCULAR; INTRAVENOUS; SUBCUTANEOUS at 12:50

## 2019-11-19 RX ADMIN — PREGABALIN 50 MG: 25 CAPSULE ORAL at 13:14

## 2019-11-19 RX ADMIN — PANTOPRAZOLE SODIUM 40 MG: 40 TABLET, DELAYED RELEASE ORAL at 05:34

## 2019-11-19 RX ADMIN — SEVELAMER CARBONATE 1600 MG: 800 TABLET, FILM COATED ORAL at 17:16

## 2019-11-19 RX ADMIN — SEVELAMER CARBONATE 1600 MG: 800 TABLET, FILM COATED ORAL at 12:49

## 2019-11-19 RX ADMIN — ROSUVASTATIN CALCIUM 10 MG: 10 TABLET, FILM COATED ORAL at 21:51

## 2019-11-19 RX ADMIN — Medication 10 ML: at 09:32

## 2019-11-19 RX ADMIN — POLYETHYLENE GLYCOL 3350 17 G: 17 POWDER, FOR SOLUTION ORAL at 09:28

## 2019-11-19 RX ADMIN — TRAZODONE HYDROCHLORIDE 100 MG: 50 TABLET ORAL at 23:44

## 2019-11-19 RX ADMIN — INSULIN GLARGINE 20 UNITS: 100 INJECTION, SOLUTION SUBCUTANEOUS at 21:52

## 2019-11-19 RX ADMIN — APIXABAN 2.5 MG: 5 TABLET, FILM COATED ORAL at 09:29

## 2019-11-19 RX ADMIN — APIXABAN 2.5 MG: 5 TABLET, FILM COATED ORAL at 21:51

## 2019-11-19 RX ADMIN — HYDROMORPHONE HYDROCHLORIDE 1 MG: 1 INJECTION, SOLUTION INTRAMUSCULAR; INTRAVENOUS; SUBCUTANEOUS at 17:16

## 2019-11-19 RX ADMIN — SEVELAMER CARBONATE 1600 MG: 800 TABLET, FILM COATED ORAL at 09:30

## 2019-11-19 ASSESSMENT — PAIN SCALES - GENERAL
PAINLEVEL_OUTOF10: 10
PAINLEVEL_OUTOF10: 9
PAINLEVEL_OUTOF10: 8
PAINLEVEL_OUTOF10: 0
PAINLEVEL_OUTOF10: 10
PAINLEVEL_OUTOF10: 5
PAINLEVEL_OUTOF10: 5
PAINLEVEL_OUTOF10: 6

## 2019-11-20 LAB
ANION GAP SERPL CALCULATED.3IONS-SCNC: 28 MMOL/L (ref 3–16)
BUN BLDV-MCNC: 34 MG/DL (ref 7–20)
CALCIUM SERPL-MCNC: 9.9 MG/DL (ref 8.3–10.6)
CHLORIDE BLD-SCNC: 91 MMOL/L (ref 99–110)
CO2: 21 MMOL/L (ref 21–32)
CREAT SERPL-MCNC: 10.2 MG/DL (ref 0.6–1.2)
GFR AFRICAN AMERICAN: 5
GFR NON-AFRICAN AMERICAN: 4
GLUCOSE BLD-MCNC: 127 MG/DL (ref 70–99)
GLUCOSE BLD-MCNC: 169 MG/DL (ref 70–99)
GLUCOSE BLD-MCNC: 190 MG/DL (ref 70–99)
HCT VFR BLD CALC: 23.5 % (ref 36–48)
HEMOGLOBIN: 7.5 G/DL (ref 12–16)
MCH RBC QN AUTO: 32 PG (ref 26–34)
MCHC RBC AUTO-ENTMCNC: 32.1 G/DL (ref 31–36)
MCV RBC AUTO: 99.7 FL (ref 80–100)
OCCULT BLOOD DIAGNOSTIC: ABNORMAL
PDW BLD-RTO: 20.3 % (ref 12.4–15.4)
PERFORMED ON: ABNORMAL
PERFORMED ON: ABNORMAL
PLATELET # BLD: 244 K/UL (ref 135–450)
PMV BLD AUTO: 8.2 FL (ref 5–10.5)
POTASSIUM SERPL-SCNC: 4.4 MMOL/L (ref 3.5–5.1)
RBC # BLD: 2.36 M/UL (ref 4–5.2)
SODIUM BLD-SCNC: 140 MMOL/L (ref 136–145)
WBC # BLD: 12.4 K/UL (ref 4–11)

## 2019-11-20 PROCEDURE — 6360000002 HC RX W HCPCS: Performed by: HOSPITALIST

## 2019-11-20 PROCEDURE — 99231 SBSQ HOSP IP/OBS SF/LOW 25: CPT | Performed by: SURGERY

## 2019-11-20 PROCEDURE — 6360000002 HC RX W HCPCS: Performed by: FAMILY MEDICINE

## 2019-11-20 PROCEDURE — 2580000003 HC RX 258: Performed by: FAMILY MEDICINE

## 2019-11-20 PROCEDURE — 94640 AIRWAY INHALATION TREATMENT: CPT

## 2019-11-20 PROCEDURE — 2500000003 HC RX 250 WO HCPCS: Performed by: INTERNAL MEDICINE

## 2019-11-20 PROCEDURE — APPNB30 APP NON BILLABLE TIME 0-30 MINS: Performed by: NURSE PRACTITIONER

## 2019-11-20 PROCEDURE — 6370000000 HC RX 637 (ALT 250 FOR IP): Performed by: FAMILY MEDICINE

## 2019-11-20 PROCEDURE — G0378 HOSPITAL OBSERVATION PER HR: HCPCS

## 2019-11-20 PROCEDURE — G0328 FECAL BLOOD SCRN IMMUNOASSAY: HCPCS

## 2019-11-20 PROCEDURE — 2700000000 HC OXYGEN THERAPY PER DAY

## 2019-11-20 PROCEDURE — 6370000000 HC RX 637 (ALT 250 FOR IP): Performed by: NURSE PRACTITIONER

## 2019-11-20 PROCEDURE — 96376 TX/PRO/DX INJ SAME DRUG ADON: CPT

## 2019-11-20 PROCEDURE — 80048 BASIC METABOLIC PNL TOTAL CA: CPT

## 2019-11-20 PROCEDURE — 90935 HEMODIALYSIS ONE EVALUATION: CPT

## 2019-11-20 PROCEDURE — 85027 COMPLETE CBC AUTOMATED: CPT

## 2019-11-20 PROCEDURE — 94760 N-INVAS EAR/PLS OXIMETRY 1: CPT

## 2019-11-20 PROCEDURE — 6360000002 HC RX W HCPCS: Performed by: INTERNAL MEDICINE

## 2019-11-20 PROCEDURE — APPSS15 APP SPLIT SHARED TIME 0-15 MINUTES: Performed by: NURSE PRACTITIONER

## 2019-11-20 RX ORDER — HYDROMORPHONE HYDROCHLORIDE 1 MG/ML
1 INJECTION, SOLUTION INTRAMUSCULAR; INTRAVENOUS; SUBCUTANEOUS
Status: COMPLETED | OUTPATIENT
Start: 2019-11-20 | End: 2019-11-20

## 2019-11-20 RX ADMIN — NEBIVOLOL HYDROCHLORIDE 2.5 MG: 5 TABLET ORAL at 14:05

## 2019-11-20 RX ADMIN — POLYETHYLENE GLYCOL 3350 17 G: 17 POWDER, FOR SOLUTION ORAL at 14:01

## 2019-11-20 RX ADMIN — TRAZODONE HYDROCHLORIDE 100 MG: 50 TABLET ORAL at 22:29

## 2019-11-20 RX ADMIN — ROSUVASTATIN CALCIUM 10 MG: 10 TABLET, FILM COATED ORAL at 22:29

## 2019-11-20 RX ADMIN — HYDROMORPHONE HYDROCHLORIDE 1 MG: 1 INJECTION, SOLUTION INTRAMUSCULAR; INTRAVENOUS; SUBCUTANEOUS at 22:30

## 2019-11-20 RX ADMIN — APIXABAN 2.5 MG: 5 TABLET, FILM COATED ORAL at 14:04

## 2019-11-20 RX ADMIN — ESCITALOPRAM OXALATE 10 MG: 10 TABLET ORAL at 14:06

## 2019-11-20 RX ADMIN — SEVELAMER CARBONATE 1600 MG: 800 TABLET, FILM COATED ORAL at 14:05

## 2019-11-20 RX ADMIN — PREGABALIN 50 MG: 25 CAPSULE ORAL at 22:29

## 2019-11-20 RX ADMIN — APIXABAN 2.5 MG: 5 TABLET, FILM COATED ORAL at 22:30

## 2019-11-20 RX ADMIN — PREGABALIN 50 MG: 25 CAPSULE ORAL at 14:01

## 2019-11-20 RX ADMIN — HYDROMORPHONE HYDROCHLORIDE 1 MG: 1 INJECTION, SOLUTION INTRAMUSCULAR; INTRAVENOUS; SUBCUTANEOUS at 03:11

## 2019-11-20 RX ADMIN — INSULIN GLARGINE 20 UNITS: 100 INJECTION, SOLUTION SUBCUTANEOUS at 22:31

## 2019-11-20 RX ADMIN — HYDROMORPHONE HYDROCHLORIDE 1 MG: 1 INJECTION, SOLUTION INTRAMUSCULAR; INTRAVENOUS; SUBCUTANEOUS at 10:18

## 2019-11-20 RX ADMIN — BUDESONIDE 500 MCG: 0.5 SUSPENSION RESPIRATORY (INHALATION) at 20:27

## 2019-11-20 RX ADMIN — HYDROMORPHONE HYDROCHLORIDE 1 MG: 1 INJECTION, SOLUTION INTRAMUSCULAR; INTRAVENOUS; SUBCUTANEOUS at 18:11

## 2019-11-20 RX ADMIN — PANTOPRAZOLE SODIUM 40 MG: 40 TABLET, DELAYED RELEASE ORAL at 06:53

## 2019-11-20 RX ADMIN — Medication 10 ML: at 22:31

## 2019-11-20 RX ADMIN — HYDROMORPHONE HYDROCHLORIDE 1 MG: 1 INJECTION, SOLUTION INTRAMUSCULAR; INTRAVENOUS; SUBCUTANEOUS at 06:53

## 2019-11-20 RX ADMIN — ALLOPURINOL 100 MG: 100 TABLET ORAL at 14:06

## 2019-11-20 RX ADMIN — HYDROMORPHONE HYDROCHLORIDE 1 MG: 1 INJECTION, SOLUTION INTRAMUSCULAR; INTRAVENOUS; SUBCUTANEOUS at 14:01

## 2019-11-20 RX ADMIN — SODIUM THIOSULFATE 25 G: 250 INJECTION, SOLUTION INTRAVENOUS at 11:15

## 2019-11-20 ASSESSMENT — PAIN SCALES - GENERAL
PAINLEVEL_OUTOF10: 8
PAINLEVEL_OUTOF10: 8
PAINLEVEL_OUTOF10: 0
PAINLEVEL_OUTOF10: 8
PAINLEVEL_OUTOF10: 8
PAINLEVEL_OUTOF10: 9
PAINLEVEL_OUTOF10: 6
PAINLEVEL_OUTOF10: 8
PAINLEVEL_OUTOF10: 0

## 2019-11-20 ASSESSMENT — PAIN DESCRIPTION - LOCATION: LOCATION: GROIN

## 2019-11-20 ASSESSMENT — PAIN DESCRIPTION - PAIN TYPE: TYPE: ACUTE PAIN

## 2019-11-21 ENCOUNTER — ANESTHESIA (OUTPATIENT)
Dept: ENDOSCOPY | Age: 64
End: 2019-11-21
Payer: MEDICARE

## 2019-11-21 ENCOUNTER — ANESTHESIA EVENT (OUTPATIENT)
Dept: ENDOSCOPY | Age: 64
End: 2019-11-21
Payer: MEDICARE

## 2019-11-21 VITALS
SYSTOLIC BLOOD PRESSURE: 114 MMHG | OXYGEN SATURATION: 100 % | RESPIRATION RATE: 16 BRPM | DIASTOLIC BLOOD PRESSURE: 55 MMHG

## 2019-11-21 LAB
GLUCOSE BLD-MCNC: 138 MG/DL (ref 70–99)
GLUCOSE BLD-MCNC: 48 MG/DL (ref 70–99)
GLUCOSE BLD-MCNC: 56 MG/DL (ref 70–99)
GLUCOSE BLD-MCNC: 61 MG/DL (ref 70–99)
GLUCOSE BLD-MCNC: 63 MG/DL (ref 70–99)
GLUCOSE BLD-MCNC: 70 MG/DL (ref 70–99)
GLUCOSE BLD-MCNC: 73 MG/DL (ref 70–99)
GLUCOSE BLD-MCNC: 78 MG/DL (ref 70–99)
GLUCOSE BLD-MCNC: 87 MG/DL (ref 70–99)
HCT VFR BLD CALC: 23.7 % (ref 36–48)
HEMOGLOBIN: 7.7 G/DL (ref 12–16)
PERFORMED ON: ABNORMAL
PERFORMED ON: NORMAL

## 2019-11-21 PROCEDURE — 3609012400 HC EGD TRANSORAL BIOPSY SINGLE/MULTIPLE: Performed by: INTERNAL MEDICINE

## 2019-11-21 PROCEDURE — 97530 THERAPEUTIC ACTIVITIES: CPT

## 2019-11-21 PROCEDURE — 2580000003 HC RX 258: Performed by: FAMILY MEDICINE

## 2019-11-21 PROCEDURE — G0378 HOSPITAL OBSERVATION PER HR: HCPCS

## 2019-11-21 PROCEDURE — 97535 SELF CARE MNGMENT TRAINING: CPT

## 2019-11-21 PROCEDURE — 2700000000 HC OXYGEN THERAPY PER DAY

## 2019-11-21 PROCEDURE — 85014 HEMATOCRIT: CPT

## 2019-11-21 PROCEDURE — 3700000000 HC ANESTHESIA ATTENDED CARE: Performed by: INTERNAL MEDICINE

## 2019-11-21 PROCEDURE — 7100000001 HC PACU RECOVERY - ADDTL 15 MIN: Performed by: INTERNAL MEDICINE

## 2019-11-21 PROCEDURE — 6360000002 HC RX W HCPCS: Performed by: HOSPITALIST

## 2019-11-21 PROCEDURE — 6370000000 HC RX 637 (ALT 250 FOR IP): Performed by: FAMILY MEDICINE

## 2019-11-21 PROCEDURE — 36415 COLL VENOUS BLD VENIPUNCTURE: CPT

## 2019-11-21 PROCEDURE — 6370000000 HC RX 637 (ALT 250 FOR IP): Performed by: NURSE PRACTITIONER

## 2019-11-21 PROCEDURE — 6360000002 HC RX W HCPCS: Performed by: NURSE ANESTHETIST, CERTIFIED REGISTERED

## 2019-11-21 PROCEDURE — 94640 AIRWAY INHALATION TREATMENT: CPT

## 2019-11-21 PROCEDURE — 6370000000 HC RX 637 (ALT 250 FOR IP): Performed by: SURGERY

## 2019-11-21 PROCEDURE — 88305 TISSUE EXAM BY PATHOLOGIST: CPT

## 2019-11-21 PROCEDURE — 85018 HEMOGLOBIN: CPT

## 2019-11-21 PROCEDURE — 3700000001 HC ADD 15 MINUTES (ANESTHESIA): Performed by: INTERNAL MEDICINE

## 2019-11-21 PROCEDURE — 6370000000 HC RX 637 (ALT 250 FOR IP): Performed by: INTERNAL MEDICINE

## 2019-11-21 PROCEDURE — 2709999900 HC NON-CHARGEABLE SUPPLY: Performed by: INTERNAL MEDICINE

## 2019-11-21 PROCEDURE — 94761 N-INVAS EAR/PLS OXIMETRY MLT: CPT

## 2019-11-21 PROCEDURE — 6360000002 HC RX W HCPCS: Performed by: FAMILY MEDICINE

## 2019-11-21 PROCEDURE — 2580000003 HC RX 258: Performed by: NURSE ANESTHETIST, CERTIFIED REGISTERED

## 2019-11-21 PROCEDURE — 2500000003 HC RX 250 WO HCPCS: Performed by: NURSE ANESTHETIST, CERTIFIED REGISTERED

## 2019-11-21 PROCEDURE — 7100000000 HC PACU RECOVERY - FIRST 15 MIN: Performed by: INTERNAL MEDICINE

## 2019-11-21 RX ORDER — PROPOFOL 10 MG/ML
INJECTION, EMULSION INTRAVENOUS PRN
Status: DISCONTINUED | OUTPATIENT
Start: 2019-11-21 | End: 2019-11-21 | Stop reason: SDUPTHER

## 2019-11-21 RX ORDER — LIDOCAINE HYDROCHLORIDE 20 MG/ML
INJECTION, SOLUTION EPIDURAL; INFILTRATION; INTRACAUDAL; PERINEURAL PRN
Status: DISCONTINUED | OUTPATIENT
Start: 2019-11-21 | End: 2019-11-21 | Stop reason: SDUPTHER

## 2019-11-21 RX ORDER — PANTOPRAZOLE SODIUM 40 MG/1
40 TABLET, DELAYED RELEASE ORAL
Status: DISCONTINUED | OUTPATIENT
Start: 2019-11-21 | End: 2019-11-27 | Stop reason: HOSPADM

## 2019-11-21 RX ORDER — DEXTROSE MONOHYDRATE 25 G/50ML
12.5 INJECTION, SOLUTION INTRAVENOUS ONCE
Status: DISCONTINUED | OUTPATIENT
Start: 2019-11-21 | End: 2019-11-27 | Stop reason: HOSPADM

## 2019-11-21 RX ORDER — SODIUM CHLORIDE 9 MG/ML
INJECTION, SOLUTION INTRAVENOUS CONTINUOUS PRN
Status: DISCONTINUED | OUTPATIENT
Start: 2019-11-21 | End: 2019-11-21 | Stop reason: SDUPTHER

## 2019-11-21 RX ORDER — FLUCONAZOLE 100 MG/1
100 TABLET ORAL DAILY
Status: DISCONTINUED | OUTPATIENT
Start: 2019-11-22 | End: 2019-11-21 | Stop reason: DRUGHIGH

## 2019-11-21 RX ORDER — FLUCONAZOLE 100 MG/1
50 TABLET ORAL DAILY
Status: DISCONTINUED | OUTPATIENT
Start: 2019-11-22 | End: 2019-11-22

## 2019-11-21 RX ORDER — FLUCONAZOLE 100 MG/1
200 TABLET ORAL ONCE
Status: COMPLETED | OUTPATIENT
Start: 2019-11-21 | End: 2019-11-21

## 2019-11-21 RX ADMIN — BUDESONIDE 500 MCG: 0.5 SUSPENSION RESPIRATORY (INHALATION) at 08:38

## 2019-11-21 RX ADMIN — FLUCONAZOLE 200 MG: 100 TABLET ORAL at 18:26

## 2019-11-21 RX ADMIN — DEXTROSE MONOHYDRATE 12.5 G: 500 INJECTION PARENTERAL at 16:19

## 2019-11-21 RX ADMIN — PROPOFOL 10 MG: 10 INJECTION, EMULSION INTRAVENOUS at 15:16

## 2019-11-21 RX ADMIN — HYDROMORPHONE HYDROCHLORIDE 1 MG: 1 INJECTION, SOLUTION INTRAMUSCULAR; INTRAVENOUS; SUBCUTANEOUS at 17:32

## 2019-11-21 RX ADMIN — ALLOPURINOL 100 MG: 100 TABLET ORAL at 09:11

## 2019-11-21 RX ADMIN — PROPOFOL 10 MG: 10 INJECTION, EMULSION INTRAVENOUS at 15:08

## 2019-11-21 RX ADMIN — Medication 10 ML: at 21:15

## 2019-11-21 RX ADMIN — ESCITALOPRAM OXALATE 10 MG: 10 TABLET ORAL at 09:11

## 2019-11-21 RX ADMIN — PROPOFOL 30 MG: 10 INJECTION, EMULSION INTRAVENOUS at 15:06

## 2019-11-21 RX ADMIN — PANTOPRAZOLE SODIUM 40 MG: 40 TABLET, DELAYED RELEASE ORAL at 18:26

## 2019-11-21 RX ADMIN — ROSUVASTATIN CALCIUM 10 MG: 10 TABLET, FILM COATED ORAL at 21:14

## 2019-11-21 RX ADMIN — PROPOFOL 10 MG: 10 INJECTION, EMULSION INTRAVENOUS at 15:14

## 2019-11-21 RX ADMIN — SODIUM CHLORIDE: 9 INJECTION, SOLUTION INTRAVENOUS at 14:04

## 2019-11-21 RX ADMIN — PREGABALIN 50 MG: 25 CAPSULE ORAL at 21:14

## 2019-11-21 RX ADMIN — DEXTROSE MONOHYDRATE 12.5 G: 500 INJECTION PARENTERAL at 14:54

## 2019-11-21 RX ADMIN — HYDROMORPHONE HYDROCHLORIDE 1 MG: 1 INJECTION, SOLUTION INTRAMUSCULAR; INTRAVENOUS; SUBCUTANEOUS at 21:15

## 2019-11-21 RX ADMIN — TRAZODONE HYDROCHLORIDE 100 MG: 50 TABLET ORAL at 21:14

## 2019-11-21 RX ADMIN — SEVELAMER CARBONATE 1600 MG: 800 TABLET, FILM COATED ORAL at 18:25

## 2019-11-21 RX ADMIN — NEBIVOLOL HYDROCHLORIDE 5 MG: 5 TABLET ORAL at 21:15

## 2019-11-21 RX ADMIN — APIXABAN 2.5 MG: 5 TABLET, FILM COATED ORAL at 09:19

## 2019-11-21 RX ADMIN — INSULIN GLARGINE 20 UNITS: 100 INJECTION, SOLUTION SUBCUTANEOUS at 21:22

## 2019-11-21 RX ADMIN — PREGABALIN 50 MG: 25 CAPSULE ORAL at 09:11

## 2019-11-21 RX ADMIN — HYDROMORPHONE HYDROCHLORIDE 1 MG: 1 INJECTION, SOLUTION INTRAMUSCULAR; INTRAVENOUS; SUBCUTANEOUS at 13:17

## 2019-11-21 RX ADMIN — LIDOCAINE HYDROCHLORIDE 100 MG: 20 INJECTION, SOLUTION EPIDURAL; INFILTRATION; INTRACAUDAL; PERINEURAL at 15:06

## 2019-11-21 RX ADMIN — PROPOFOL 10 MG: 10 INJECTION, EMULSION INTRAVENOUS at 15:10

## 2019-11-21 RX ADMIN — APIXABAN 2.5 MG: 5 TABLET, FILM COATED ORAL at 21:14

## 2019-11-21 RX ADMIN — BUDESONIDE 500 MCG: 0.5 SUSPENSION RESPIRATORY (INHALATION) at 20:40

## 2019-11-21 RX ADMIN — Medication 10 ML: at 09:20

## 2019-11-21 RX ADMIN — PREGABALIN 50 MG: 25 CAPSULE ORAL at 16:30

## 2019-11-21 RX ADMIN — POLYETHYLENE GLYCOL 3350 17 G: 17 POWDER, FOR SOLUTION ORAL at 09:11

## 2019-11-21 RX ADMIN — HYDROMORPHONE HYDROCHLORIDE 1 MG: 1 INJECTION, SOLUTION INTRAMUSCULAR; INTRAVENOUS; SUBCUTANEOUS at 09:24

## 2019-11-21 RX ADMIN — COLLAGENASE SANTYL: 250 OINTMENT TOPICAL at 11:54

## 2019-11-21 RX ADMIN — PROPOFOL 10 MG: 10 INJECTION, EMULSION INTRAVENOUS at 15:12

## 2019-11-21 ASSESSMENT — PAIN SCALES - GENERAL
PAINLEVEL_OUTOF10: 9
PAINLEVEL_OUTOF10: 8
PAINLEVEL_OUTOF10: 9
PAINLEVEL_OUTOF10: 10
PAINLEVEL_OUTOF10: 10
PAINLEVEL_OUTOF10: 8

## 2019-11-21 ASSESSMENT — COPD QUESTIONNAIRES: CAT_SEVERITY: MODERATE

## 2019-11-21 ASSESSMENT — PAIN DESCRIPTION - ORIENTATION
ORIENTATION: RIGHT;LEFT
ORIENTATION: RIGHT;LEFT

## 2019-11-21 ASSESSMENT — PAIN DESCRIPTION - PAIN TYPE
TYPE: ACUTE PAIN
TYPE: ACUTE PAIN

## 2019-11-21 ASSESSMENT — ENCOUNTER SYMPTOMS: SHORTNESS OF BREATH: 1

## 2019-11-21 ASSESSMENT — LIFESTYLE VARIABLES: SMOKING_STATUS: 0

## 2019-11-21 ASSESSMENT — PAIN DESCRIPTION - LOCATION
LOCATION: GROIN

## 2019-11-22 LAB
ANION GAP SERPL CALCULATED.3IONS-SCNC: 15 MMOL/L (ref 3–16)
BLOOD CULTURE, ROUTINE: NORMAL
BUN BLDV-MCNC: 18 MG/DL (ref 7–20)
CALCIUM SERPL-MCNC: 6.8 MG/DL (ref 8.3–10.6)
CHLORIDE BLD-SCNC: 110 MMOL/L (ref 99–110)
CO2: 18 MMOL/L (ref 21–32)
CREAT SERPL-MCNC: 5.5 MG/DL (ref 0.6–1.2)
CULTURE, BLOOD 2: NORMAL
GFR AFRICAN AMERICAN: 9
GFR NON-AFRICAN AMERICAN: 8
GLUCOSE BLD-MCNC: 136 MG/DL (ref 70–99)
GLUCOSE BLD-MCNC: 181 MG/DL (ref 70–99)
GLUCOSE BLD-MCNC: 78 MG/DL (ref 70–99)
HCT VFR BLD CALC: 24.8 % (ref 36–48)
HCT VFR BLD CALC: ABNORMAL % (ref 36–48)
HEMOGLOBIN: 7.8 G/DL (ref 12–16)
HEMOGLOBIN: ABNORMAL G/DL (ref 12–16)
LACTIC ACID: 0.6 MMOL/L (ref 0.4–2)
MCH RBC QN AUTO: 31.6 PG (ref 26–34)
MCH RBC QN AUTO: ABNORMAL PG (ref 26–34)
MCHC RBC AUTO-ENTMCNC: 31.5 G/DL (ref 31–36)
MCHC RBC AUTO-ENTMCNC: ABNORMAL G/DL (ref 31–36)
MCV RBC AUTO: 100.4 FL (ref 80–100)
MCV RBC AUTO: ABNORMAL FL (ref 80–100)
PDW BLD-RTO: 20 % (ref 12.4–15.4)
PDW BLD-RTO: ABNORMAL % (ref 12.4–15.4)
PERFORMED ON: ABNORMAL
PERFORMED ON: ABNORMAL
PLATELET # BLD: 265 K/UL (ref 135–450)
PLATELET # BLD: ABNORMAL K/UL (ref 135–450)
PMV BLD AUTO: 8.5 FL (ref 5–10.5)
PMV BLD AUTO: ABNORMAL FL (ref 5–10.5)
POTASSIUM SERPL-SCNC: 3.6 MMOL/L (ref 3.5–5.1)
RBC # BLD: 2.47 M/UL (ref 4–5.2)
RBC # BLD: ABNORMAL M/UL (ref 4–5.2)
SODIUM BLD-SCNC: 143 MMOL/L (ref 136–145)
WBC # BLD: 11.5 K/UL (ref 4–11)
WBC # BLD: ABNORMAL K/UL (ref 4–11)

## 2019-11-22 PROCEDURE — 6360000002 HC RX W HCPCS: Performed by: HOSPITALIST

## 2019-11-22 PROCEDURE — 6370000000 HC RX 637 (ALT 250 FOR IP): Performed by: INTERNAL MEDICINE

## 2019-11-22 PROCEDURE — 2580000003 HC RX 258: Performed by: FAMILY MEDICINE

## 2019-11-22 PROCEDURE — 83605 ASSAY OF LACTIC ACID: CPT

## 2019-11-22 PROCEDURE — 94640 AIRWAY INHALATION TREATMENT: CPT

## 2019-11-22 PROCEDURE — 90935 HEMODIALYSIS ONE EVALUATION: CPT

## 2019-11-22 PROCEDURE — 6370000000 HC RX 637 (ALT 250 FOR IP): Performed by: FAMILY MEDICINE

## 2019-11-22 PROCEDURE — 80048 BASIC METABOLIC PNL TOTAL CA: CPT

## 2019-11-22 PROCEDURE — 96376 TX/PRO/DX INJ SAME DRUG ADON: CPT

## 2019-11-22 PROCEDURE — 6370000000 HC RX 637 (ALT 250 FOR IP): Performed by: NURSE PRACTITIONER

## 2019-11-22 PROCEDURE — G0378 HOSPITAL OBSERVATION PER HR: HCPCS

## 2019-11-22 PROCEDURE — 6360000002 HC RX W HCPCS: Performed by: FAMILY MEDICINE

## 2019-11-22 PROCEDURE — 2500000003 HC RX 250 WO HCPCS: Performed by: INTERNAL MEDICINE

## 2019-11-22 PROCEDURE — 36415 COLL VENOUS BLD VENIPUNCTURE: CPT

## 2019-11-22 PROCEDURE — 85027 COMPLETE CBC AUTOMATED: CPT

## 2019-11-22 RX ORDER — FLUCONAZOLE 100 MG/1
100 TABLET ORAL DAILY
Status: DISCONTINUED | OUTPATIENT
Start: 2019-11-22 | End: 2019-11-27 | Stop reason: HOSPADM

## 2019-11-22 RX ORDER — LIDOCAINE HYDROCHLORIDE 10 MG/ML
0.2 INJECTION, SOLUTION EPIDURAL; INFILTRATION; INTRACAUDAL; PERINEURAL PRN
Status: DISCONTINUED | OUTPATIENT
Start: 2019-11-22 | End: 2019-11-27 | Stop reason: HOSPADM

## 2019-11-22 RX ADMIN — SODIUM THIOSULFATE 25 G: 250 INJECTION, SOLUTION INTRAVENOUS at 09:04

## 2019-11-22 RX ADMIN — NEBIVOLOL HYDROCHLORIDE 2.5 MG: 5 TABLET ORAL at 12:09

## 2019-11-22 RX ADMIN — HYDROMORPHONE HYDROCHLORIDE 1 MG: 1 INJECTION, SOLUTION INTRAMUSCULAR; INTRAVENOUS; SUBCUTANEOUS at 16:30

## 2019-11-22 RX ADMIN — ROSUVASTATIN CALCIUM 10 MG: 10 TABLET, FILM COATED ORAL at 20:39

## 2019-11-22 RX ADMIN — HYDROMORPHONE HYDROCHLORIDE 1 MG: 1 INJECTION, SOLUTION INTRAMUSCULAR; INTRAVENOUS; SUBCUTANEOUS at 08:02

## 2019-11-22 RX ADMIN — FLUCONAZOLE 100 MG: 100 TABLET ORAL at 12:10

## 2019-11-22 RX ADMIN — ALLOPURINOL 100 MG: 100 TABLET ORAL at 12:08

## 2019-11-22 RX ADMIN — Medication 10 ML: at 20:48

## 2019-11-22 RX ADMIN — ESCITALOPRAM OXALATE 10 MG: 10 TABLET ORAL at 12:08

## 2019-11-22 RX ADMIN — NEBIVOLOL HYDROCHLORIDE 2.5 MG: 5 TABLET ORAL at 20:40

## 2019-11-22 RX ADMIN — BUDESONIDE 500 MCG: 0.5 SUSPENSION RESPIRATORY (INHALATION) at 20:53

## 2019-11-22 RX ADMIN — HYDROMORPHONE HYDROCHLORIDE 1 MG: 1 INJECTION, SOLUTION INTRAMUSCULAR; INTRAVENOUS; SUBCUTANEOUS at 05:07

## 2019-11-22 RX ADMIN — HYDROMORPHONE HYDROCHLORIDE 1 MG: 1 INJECTION, SOLUTION INTRAMUSCULAR; INTRAVENOUS; SUBCUTANEOUS at 07:02

## 2019-11-22 RX ADMIN — PANTOPRAZOLE SODIUM 40 MG: 40 TABLET, DELAYED RELEASE ORAL at 16:30

## 2019-11-22 RX ADMIN — Medication 10 ML: at 13:02

## 2019-11-22 RX ADMIN — HYDROMORPHONE HYDROCHLORIDE 1 MG: 1 INJECTION, SOLUTION INTRAMUSCULAR; INTRAVENOUS; SUBCUTANEOUS at 12:13

## 2019-11-22 RX ADMIN — Medication 10 ML: at 16:30

## 2019-11-22 RX ADMIN — HYDROMORPHONE HYDROCHLORIDE 1 MG: 1 INJECTION, SOLUTION INTRAMUSCULAR; INTRAVENOUS; SUBCUTANEOUS at 20:40

## 2019-11-22 RX ADMIN — INSULIN GLARGINE 20 UNITS: 100 INJECTION, SOLUTION SUBCUTANEOUS at 20:42

## 2019-11-22 RX ADMIN — PREGABALIN 50 MG: 25 CAPSULE ORAL at 12:09

## 2019-11-22 RX ADMIN — APIXABAN 2.5 MG: 5 TABLET, FILM COATED ORAL at 20:39

## 2019-11-22 RX ADMIN — APIXABAN 2.5 MG: 5 TABLET, FILM COATED ORAL at 12:09

## 2019-11-22 RX ADMIN — SEVELAMER CARBONATE 1600 MG: 800 TABLET, FILM COATED ORAL at 16:30

## 2019-11-22 RX ADMIN — SEVELAMER CARBONATE 1600 MG: 800 TABLET, FILM COATED ORAL at 12:09

## 2019-11-22 RX ADMIN — PREGABALIN 50 MG: 25 CAPSULE ORAL at 20:38

## 2019-11-22 RX ADMIN — PANTOPRAZOLE SODIUM 40 MG: 40 TABLET, DELAYED RELEASE ORAL at 12:08

## 2019-11-22 RX ADMIN — LIDOCAINE HYDROCHLORIDE 0.2 ML: 10 INJECTION, SOLUTION EPIDURAL; INFILTRATION; INTRACAUDAL; PERINEURAL at 08:41

## 2019-11-22 RX ADMIN — POLYETHYLENE GLYCOL 3350 17 G: 17 POWDER, FOR SOLUTION ORAL at 12:07

## 2019-11-22 ASSESSMENT — PAIN SCALES - GENERAL
PAINLEVEL_OUTOF10: 9
PAINLEVEL_OUTOF10: 8
PAINLEVEL_OUTOF10: 9
PAINLEVEL_OUTOF10: 8
PAINLEVEL_OUTOF10: 9
PAINLEVEL_OUTOF10: 0

## 2019-11-23 LAB
GLUCOSE BLD-MCNC: 145 MG/DL (ref 70–99)
GLUCOSE BLD-MCNC: 167 MG/DL (ref 70–99)
GLUCOSE BLD-MCNC: 169 MG/DL (ref 70–99)
GLUCOSE BLD-MCNC: 177 MG/DL (ref 70–99)
HCT VFR BLD CALC: 23.9 % (ref 36–48)
HEMOGLOBIN: 7.4 G/DL (ref 12–16)
PERFORMED ON: ABNORMAL

## 2019-11-23 PROCEDURE — G0378 HOSPITAL OBSERVATION PER HR: HCPCS

## 2019-11-23 PROCEDURE — 94761 N-INVAS EAR/PLS OXIMETRY MLT: CPT

## 2019-11-23 PROCEDURE — 6360000002 HC RX W HCPCS: Performed by: FAMILY MEDICINE

## 2019-11-23 PROCEDURE — 94640 AIRWAY INHALATION TREATMENT: CPT

## 2019-11-23 PROCEDURE — 85018 HEMOGLOBIN: CPT

## 2019-11-23 PROCEDURE — 6370000000 HC RX 637 (ALT 250 FOR IP): Performed by: FAMILY MEDICINE

## 2019-11-23 PROCEDURE — 2700000000 HC OXYGEN THERAPY PER DAY

## 2019-11-23 PROCEDURE — 2580000003 HC RX 258: Performed by: FAMILY MEDICINE

## 2019-11-23 PROCEDURE — 96376 TX/PRO/DX INJ SAME DRUG ADON: CPT

## 2019-11-23 PROCEDURE — 6360000002 HC RX W HCPCS: Performed by: HOSPITALIST

## 2019-11-23 PROCEDURE — 85014 HEMATOCRIT: CPT

## 2019-11-23 PROCEDURE — 6370000000 HC RX 637 (ALT 250 FOR IP): Performed by: INTERNAL MEDICINE

## 2019-11-23 PROCEDURE — 6370000000 HC RX 637 (ALT 250 FOR IP): Performed by: NURSE PRACTITIONER

## 2019-11-23 RX ADMIN — Medication 10 ML: at 21:48

## 2019-11-23 RX ADMIN — SEVELAMER CARBONATE 1600 MG: 800 TABLET, FILM COATED ORAL at 13:00

## 2019-11-23 RX ADMIN — INSULIN GLARGINE 20 UNITS: 100 INJECTION, SOLUTION SUBCUTANEOUS at 21:49

## 2019-11-23 RX ADMIN — BUDESONIDE 500 MCG: 0.5 SUSPENSION RESPIRATORY (INHALATION) at 20:34

## 2019-11-23 RX ADMIN — ROSUVASTATIN CALCIUM 10 MG: 10 TABLET, FILM COATED ORAL at 21:47

## 2019-11-23 RX ADMIN — Medication 10 ML: at 19:35

## 2019-11-23 RX ADMIN — HYDROMORPHONE HYDROCHLORIDE 1 MG: 1 INJECTION, SOLUTION INTRAMUSCULAR; INTRAVENOUS; SUBCUTANEOUS at 05:48

## 2019-11-23 RX ADMIN — HYDROMORPHONE HYDROCHLORIDE 1 MG: 1 INJECTION, SOLUTION INTRAMUSCULAR; INTRAVENOUS; SUBCUTANEOUS at 19:35

## 2019-11-23 RX ADMIN — PREGABALIN 50 MG: 25 CAPSULE ORAL at 09:02

## 2019-11-23 RX ADMIN — BUDESONIDE 500 MCG: 0.5 SUSPENSION RESPIRATORY (INHALATION) at 08:47

## 2019-11-23 RX ADMIN — APIXABAN 2.5 MG: 5 TABLET, FILM COATED ORAL at 09:01

## 2019-11-23 RX ADMIN — SEVELAMER CARBONATE 1600 MG: 800 TABLET, FILM COATED ORAL at 09:01

## 2019-11-23 RX ADMIN — POLYETHYLENE GLYCOL 3350 17 G: 17 POWDER, FOR SOLUTION ORAL at 09:02

## 2019-11-23 RX ADMIN — PANTOPRAZOLE SODIUM 40 MG: 40 TABLET, DELAYED RELEASE ORAL at 15:30

## 2019-11-23 RX ADMIN — FLUCONAZOLE 100 MG: 100 TABLET ORAL at 09:02

## 2019-11-23 RX ADMIN — NEBIVOLOL HYDROCHLORIDE 5 MG: 5 TABLET ORAL at 21:48

## 2019-11-23 RX ADMIN — HYDROMORPHONE HYDROCHLORIDE 1 MG: 1 INJECTION, SOLUTION INTRAMUSCULAR; INTRAVENOUS; SUBCUTANEOUS at 11:20

## 2019-11-23 RX ADMIN — PREGABALIN 50 MG: 25 CAPSULE ORAL at 21:48

## 2019-11-23 RX ADMIN — SEVELAMER CARBONATE 1600 MG: 800 TABLET, FILM COATED ORAL at 17:20

## 2019-11-23 RX ADMIN — ESCITALOPRAM OXALATE 10 MG: 10 TABLET ORAL at 09:01

## 2019-11-23 RX ADMIN — PANTOPRAZOLE SODIUM 40 MG: 40 TABLET, DELAYED RELEASE ORAL at 05:47

## 2019-11-23 RX ADMIN — PREGABALIN 50 MG: 25 CAPSULE ORAL at 14:40

## 2019-11-23 RX ADMIN — COLLAGENASE SANTYL: 250 OINTMENT TOPICAL at 13:00

## 2019-11-23 RX ADMIN — APIXABAN 2.5 MG: 5 TABLET, FILM COATED ORAL at 21:47

## 2019-11-23 RX ADMIN — NEBIVOLOL HYDROCHLORIDE 5 MG: 5 TABLET ORAL at 09:02

## 2019-11-23 RX ADMIN — ALLOPURINOL 100 MG: 100 TABLET ORAL at 09:01

## 2019-11-23 RX ADMIN — HYDROMORPHONE HYDROCHLORIDE 1 MG: 1 INJECTION, SOLUTION INTRAMUSCULAR; INTRAVENOUS; SUBCUTANEOUS at 15:30

## 2019-11-23 RX ADMIN — Medication 10 ML: at 15:30

## 2019-11-23 RX ADMIN — Medication 10 ML: at 11:21

## 2019-11-23 ASSESSMENT — PAIN DESCRIPTION - LOCATION
LOCATION: GROIN
LOCATION: GENERALIZED
LOCATION: GENERALIZED;GROIN;LEG
LOCATION: GENERALIZED
LOCATION: GENERALIZED;GROIN;LEG

## 2019-11-23 ASSESSMENT — PAIN SCALES - GENERAL
PAINLEVEL_OUTOF10: 8
PAINLEVEL_OUTOF10: 7
PAINLEVEL_OUTOF10: 9
PAINLEVEL_OUTOF10: 6
PAINLEVEL_OUTOF10: 9
PAINLEVEL_OUTOF10: 8

## 2019-11-23 ASSESSMENT — PAIN DESCRIPTION - ORIENTATION: ORIENTATION: RIGHT;LEFT

## 2019-11-23 ASSESSMENT — PAIN DESCRIPTION - PAIN TYPE
TYPE: CHRONIC PAIN

## 2019-11-23 ASSESSMENT — PAIN - FUNCTIONAL ASSESSMENT: PAIN_FUNCTIONAL_ASSESSMENT: PREVENTS OR INTERFERES SOME ACTIVE ACTIVITIES AND ADLS

## 2019-11-23 ASSESSMENT — PAIN DESCRIPTION - ONSET: ONSET: ON-GOING

## 2019-11-23 ASSESSMENT — PAIN DESCRIPTION - FREQUENCY: FREQUENCY: CONTINUOUS

## 2019-11-23 ASSESSMENT — PAIN DESCRIPTION - DESCRIPTORS: DESCRIPTORS: CONSTANT

## 2019-11-23 ASSESSMENT — PAIN DESCRIPTION - PROGRESSION
CLINICAL_PROGRESSION: NOT CHANGED
CLINICAL_PROGRESSION: NOT CHANGED

## 2019-11-24 LAB
ALBUMIN SERPL-MCNC: 2.5 G/DL (ref 3.4–5)
ANION GAP SERPL CALCULATED.3IONS-SCNC: 18 MMOL/L (ref 3–16)
BUN BLDV-MCNC: 27 MG/DL (ref 7–20)
CALCIUM SERPL-MCNC: 9.5 MG/DL (ref 8.3–10.6)
CHLORIDE BLD-SCNC: 94 MMOL/L (ref 99–110)
CO2: 26 MMOL/L (ref 21–32)
CREAT SERPL-MCNC: 7.9 MG/DL (ref 0.6–1.2)
GFR AFRICAN AMERICAN: 6
GFR NON-AFRICAN AMERICAN: 5
GLUCOSE BLD-MCNC: 116 MG/DL (ref 70–99)
GLUCOSE BLD-MCNC: 116 MG/DL (ref 70–99)
GLUCOSE BLD-MCNC: 120 MG/DL (ref 70–99)
GLUCOSE BLD-MCNC: 123 MG/DL (ref 70–99)
GLUCOSE BLD-MCNC: 139 MG/DL (ref 70–99)
GLUCOSE BLD-MCNC: 141 MG/DL (ref 70–99)
GLUCOSE BLD-MCNC: 148 MG/DL (ref 70–99)
HCT VFR BLD CALC: 24.7 % (ref 36–48)
HEMOGLOBIN: 7.6 G/DL (ref 12–16)
MCH RBC QN AUTO: 30.7 PG (ref 26–34)
MCHC RBC AUTO-ENTMCNC: 30.9 G/DL (ref 31–36)
MCV RBC AUTO: 99.3 FL (ref 80–100)
PDW BLD-RTO: 19.7 % (ref 12.4–15.4)
PERFORMED ON: ABNORMAL
PHOSPHORUS: 5.4 MG/DL (ref 2.5–4.9)
PLATELET # BLD: 295 K/UL (ref 135–450)
PMV BLD AUTO: 8.3 FL (ref 5–10.5)
POTASSIUM SERPL-SCNC: 5.4 MMOL/L (ref 3.5–5.1)
RBC # BLD: 2.49 M/UL (ref 4–5.2)
SODIUM BLD-SCNC: 138 MMOL/L (ref 136–145)
WBC # BLD: 12.9 K/UL (ref 4–11)

## 2019-11-24 PROCEDURE — 6360000002 HC RX W HCPCS: Performed by: HOSPITALIST

## 2019-11-24 PROCEDURE — 6360000002 HC RX W HCPCS: Performed by: FAMILY MEDICINE

## 2019-11-24 PROCEDURE — 6370000000 HC RX 637 (ALT 250 FOR IP): Performed by: NURSE PRACTITIONER

## 2019-11-24 PROCEDURE — G0378 HOSPITAL OBSERVATION PER HR: HCPCS

## 2019-11-24 PROCEDURE — 2500000003 HC RX 250 WO HCPCS: Performed by: INTERNAL MEDICINE

## 2019-11-24 PROCEDURE — 94761 N-INVAS EAR/PLS OXIMETRY MLT: CPT

## 2019-11-24 PROCEDURE — 80069 RENAL FUNCTION PANEL: CPT

## 2019-11-24 PROCEDURE — 90935 HEMODIALYSIS ONE EVALUATION: CPT

## 2019-11-24 PROCEDURE — 6370000000 HC RX 637 (ALT 250 FOR IP): Performed by: PHYSICIAN ASSISTANT

## 2019-11-24 PROCEDURE — 6360000002 HC RX W HCPCS: Performed by: INTERNAL MEDICINE

## 2019-11-24 PROCEDURE — 6370000000 HC RX 637 (ALT 250 FOR IP): Performed by: FAMILY MEDICINE

## 2019-11-24 PROCEDURE — 85027 COMPLETE CBC AUTOMATED: CPT

## 2019-11-24 PROCEDURE — 96376 TX/PRO/DX INJ SAME DRUG ADON: CPT

## 2019-11-24 PROCEDURE — 2580000003 HC RX 258: Performed by: INTERNAL MEDICINE

## 2019-11-24 PROCEDURE — 2700000000 HC OXYGEN THERAPY PER DAY

## 2019-11-24 PROCEDURE — 2580000003 HC RX 258: Performed by: FAMILY MEDICINE

## 2019-11-24 PROCEDURE — 94640 AIRWAY INHALATION TREATMENT: CPT

## 2019-11-24 PROCEDURE — 6370000000 HC RX 637 (ALT 250 FOR IP): Performed by: INTERNAL MEDICINE

## 2019-11-24 RX ORDER — HYDROMORPHONE HYDROCHLORIDE 1 MG/ML
1 INJECTION, SOLUTION INTRAMUSCULAR; INTRAVENOUS; SUBCUTANEOUS
Status: DISCONTINUED | OUTPATIENT
Start: 2019-11-24 | End: 2019-11-24

## 2019-11-24 RX ORDER — ACETAMINOPHEN 325 MG/1
650 TABLET ORAL EVERY 4 HOURS PRN
Status: DISCONTINUED | OUTPATIENT
Start: 2019-11-24 | End: 2019-11-27 | Stop reason: HOSPADM

## 2019-11-24 RX ORDER — HYDROMORPHONE HYDROCHLORIDE 1 MG/ML
1 INJECTION, SOLUTION INTRAMUSCULAR; INTRAVENOUS; SUBCUTANEOUS
Status: DISCONTINUED | OUTPATIENT
Start: 2019-11-24 | End: 2019-11-27 | Stop reason: HOSPADM

## 2019-11-24 RX ADMIN — APIXABAN 2.5 MG: 5 TABLET, FILM COATED ORAL at 22:34

## 2019-11-24 RX ADMIN — COLLAGENASE SANTYL: 250 OINTMENT TOPICAL at 13:09

## 2019-11-24 RX ADMIN — BUDESONIDE 500 MCG: 0.5 SUSPENSION RESPIRATORY (INHALATION) at 19:39

## 2019-11-24 RX ADMIN — SEVELAMER CARBONATE 1600 MG: 800 TABLET, FILM COATED ORAL at 16:33

## 2019-11-24 RX ADMIN — ROSUVASTATIN CALCIUM 10 MG: 10 TABLET, FILM COATED ORAL at 22:27

## 2019-11-24 RX ADMIN — ACETAMINOPHEN 650 MG: 325 TABLET, FILM COATED ORAL at 23:36

## 2019-11-24 RX ADMIN — HYDROMORPHONE HYDROCHLORIDE 1 MG: 1 INJECTION, SOLUTION INTRAMUSCULAR; INTRAVENOUS; SUBCUTANEOUS at 08:10

## 2019-11-24 RX ADMIN — PREGABALIN 50 MG: 25 CAPSULE ORAL at 13:07

## 2019-11-24 RX ADMIN — HYDROMORPHONE HYDROCHLORIDE 1 MG: 1 INJECTION, SOLUTION INTRAMUSCULAR; INTRAVENOUS; SUBCUTANEOUS at 06:04

## 2019-11-24 RX ADMIN — LIDOCAINE HYDROCHLORIDE 0.2 ML: 10 INJECTION, SOLUTION EPIDURAL; INFILTRATION; INTRACAUDAL; PERINEURAL at 06:54

## 2019-11-24 RX ADMIN — Medication 10 ML: at 06:04

## 2019-11-24 RX ADMIN — SEVELAMER CARBONATE 1600 MG: 800 TABLET, FILM COATED ORAL at 13:07

## 2019-11-24 RX ADMIN — ESCITALOPRAM OXALATE 10 MG: 10 TABLET ORAL at 13:08

## 2019-11-24 RX ADMIN — HYDROMORPHONE HYDROCHLORIDE 1 MG: 1 INJECTION, SOLUTION INTRAMUSCULAR; INTRAVENOUS; SUBCUTANEOUS at 13:06

## 2019-11-24 RX ADMIN — APIXABAN 2.5 MG: 5 TABLET, FILM COATED ORAL at 13:08

## 2019-11-24 RX ADMIN — INSULIN GLARGINE 20 UNITS: 100 INJECTION, SOLUTION SUBCUTANEOUS at 22:34

## 2019-11-24 RX ADMIN — NEBIVOLOL HYDROCHLORIDE 5 MG: 5 TABLET ORAL at 13:06

## 2019-11-24 RX ADMIN — PANTOPRAZOLE SODIUM 40 MG: 40 TABLET, DELAYED RELEASE ORAL at 16:24

## 2019-11-24 RX ADMIN — Medication 10 ML: at 13:08

## 2019-11-24 RX ADMIN — HYDROMORPHONE HYDROCHLORIDE 1 MG: 1 INJECTION, SOLUTION INTRAMUSCULAR; INTRAVENOUS; SUBCUTANEOUS at 09:45

## 2019-11-24 RX ADMIN — SODIUM THIOSULFATE 25 G: 250 INJECTION, SOLUTION INTRAVENOUS at 08:01

## 2019-11-24 RX ADMIN — DARBEPOETIN ALFA 100 MCG: 100 SOLUTION INTRAVENOUS; SUBCUTANEOUS at 09:39

## 2019-11-24 RX ADMIN — Medication 10 ML: at 21:52

## 2019-11-24 RX ADMIN — PANTOPRAZOLE SODIUM 40 MG: 40 TABLET, DELAYED RELEASE ORAL at 05:59

## 2019-11-24 RX ADMIN — POLYETHYLENE GLYCOL 3350 17 G: 17 POWDER, FOR SOLUTION ORAL at 16:23

## 2019-11-24 RX ADMIN — HYDROMORPHONE HYDROCHLORIDE 1 MG: 1 INJECTION, SOLUTION INTRAMUSCULAR; INTRAVENOUS; SUBCUTANEOUS at 17:28

## 2019-11-24 RX ADMIN — HYDROMORPHONE HYDROCHLORIDE 1 MG: 1 INJECTION, SOLUTION INTRAMUSCULAR; INTRAVENOUS; SUBCUTANEOUS at 02:12

## 2019-11-24 RX ADMIN — NEBIVOLOL HYDROCHLORIDE 5 MG: 5 TABLET ORAL at 22:28

## 2019-11-24 RX ADMIN — FLUCONAZOLE 100 MG: 100 TABLET ORAL at 13:08

## 2019-11-24 RX ADMIN — BUDESONIDE 500 MCG: 0.5 SUSPENSION RESPIRATORY (INHALATION) at 12:07

## 2019-11-24 RX ADMIN — HYDROMORPHONE HYDROCHLORIDE 1 MG: 1 INJECTION, SOLUTION INTRAMUSCULAR; INTRAVENOUS; SUBCUTANEOUS at 21:51

## 2019-11-24 RX ADMIN — ALLOPURINOL 100 MG: 100 TABLET ORAL at 13:07

## 2019-11-24 RX ADMIN — PREGABALIN 50 MG: 25 CAPSULE ORAL at 22:28

## 2019-11-24 ASSESSMENT — PAIN DESCRIPTION - PROGRESSION
CLINICAL_PROGRESSION: NOT CHANGED

## 2019-11-24 ASSESSMENT — PAIN SCALES - GENERAL
PAINLEVEL_OUTOF10: 8
PAINLEVEL_OUTOF10: 10
PAINLEVEL_OUTOF10: 10
PAINLEVEL_OUTOF10: 7
PAINLEVEL_OUTOF10: 0
PAINLEVEL_OUTOF10: 8
PAINLEVEL_OUTOF10: 10
PAINLEVEL_OUTOF10: 10
PAINLEVEL_OUTOF10: 7
PAINLEVEL_OUTOF10: 9
PAINLEVEL_OUTOF10: 8
PAINLEVEL_OUTOF10: 10

## 2019-11-25 LAB
ANION GAP SERPL CALCULATED.3IONS-SCNC: 16 MMOL/L (ref 3–16)
BUN BLDV-MCNC: 17 MG/DL (ref 7–20)
CALCIUM SERPL-MCNC: 9.4 MG/DL (ref 8.3–10.6)
CHLORIDE BLD-SCNC: 96 MMOL/L (ref 99–110)
CO2: 25 MMOL/L (ref 21–32)
CREAT SERPL-MCNC: 5.3 MG/DL (ref 0.6–1.2)
GFR AFRICAN AMERICAN: 10
GFR NON-AFRICAN AMERICAN: 8
GLUCOSE BLD-MCNC: 105 MG/DL (ref 70–99)
GLUCOSE BLD-MCNC: 130 MG/DL (ref 70–99)
GLUCOSE BLD-MCNC: 134 MG/DL (ref 70–99)
GLUCOSE BLD-MCNC: 138 MG/DL (ref 70–99)
GLUCOSE BLD-MCNC: 142 MG/DL (ref 70–99)
GLUCOSE BLD-MCNC: 169 MG/DL (ref 70–99)
HCT VFR BLD CALC: 24.6 % (ref 36–48)
HCT VFR BLD CALC: 24.9 % (ref 36–48)
HEMOGLOBIN: 7.6 G/DL (ref 12–16)
HEMOGLOBIN: 7.6 G/DL (ref 12–16)
MCH RBC QN AUTO: 31.2 PG (ref 26–34)
MCHC RBC AUTO-ENTMCNC: 31 G/DL (ref 31–36)
MCV RBC AUTO: 100.5 FL (ref 80–100)
PDW BLD-RTO: 19.5 % (ref 12.4–15.4)
PERFORMED ON: ABNORMAL
PLATELET # BLD: 306 K/UL (ref 135–450)
PMV BLD AUTO: 8.4 FL (ref 5–10.5)
POTASSIUM SERPL-SCNC: 4.6 MMOL/L (ref 3.5–5.1)
RBC # BLD: 2.45 M/UL (ref 4–5.2)
REASON FOR REJECTION: NORMAL
REJECTED TEST: NORMAL
SODIUM BLD-SCNC: 137 MMOL/L (ref 136–145)
WBC # BLD: 11.1 K/UL (ref 4–11)

## 2019-11-25 PROCEDURE — 6370000000 HC RX 637 (ALT 250 FOR IP): Performed by: FAMILY MEDICINE

## 2019-11-25 PROCEDURE — G0378 HOSPITAL OBSERVATION PER HR: HCPCS

## 2019-11-25 PROCEDURE — 94640 AIRWAY INHALATION TREATMENT: CPT

## 2019-11-25 PROCEDURE — 97530 THERAPEUTIC ACTIVITIES: CPT

## 2019-11-25 PROCEDURE — 6360000002 HC RX W HCPCS: Performed by: FAMILY MEDICINE

## 2019-11-25 PROCEDURE — 80048 BASIC METABOLIC PNL TOTAL CA: CPT

## 2019-11-25 PROCEDURE — 6370000000 HC RX 637 (ALT 250 FOR IP): Performed by: INTERNAL MEDICINE

## 2019-11-25 PROCEDURE — 97535 SELF CARE MNGMENT TRAINING: CPT

## 2019-11-25 PROCEDURE — 6360000002 HC RX W HCPCS: Performed by: HOSPITALIST

## 2019-11-25 PROCEDURE — 96376 TX/PRO/DX INJ SAME DRUG ADON: CPT

## 2019-11-25 PROCEDURE — 2700000000 HC OXYGEN THERAPY PER DAY

## 2019-11-25 PROCEDURE — 6370000000 HC RX 637 (ALT 250 FOR IP): Performed by: NURSE PRACTITIONER

## 2019-11-25 PROCEDURE — 85027 COMPLETE CBC AUTOMATED: CPT

## 2019-11-25 PROCEDURE — 85014 HEMATOCRIT: CPT

## 2019-11-25 PROCEDURE — 85018 HEMOGLOBIN: CPT

## 2019-11-25 PROCEDURE — 2580000003 HC RX 258: Performed by: FAMILY MEDICINE

## 2019-11-25 RX ORDER — LACTULOSE 10 G/15ML
20 SOLUTION ORAL 2 TIMES DAILY PRN
Status: DISCONTINUED | OUTPATIENT
Start: 2019-11-25 | End: 2019-11-27 | Stop reason: HOSPADM

## 2019-11-25 RX ADMIN — Medication 10 ML: at 09:17

## 2019-11-25 RX ADMIN — SEVELAMER CARBONATE 1600 MG: 800 TABLET, FILM COATED ORAL at 09:19

## 2019-11-25 RX ADMIN — BUDESONIDE 500 MCG: 0.5 SUSPENSION RESPIRATORY (INHALATION) at 09:49

## 2019-11-25 RX ADMIN — Medication 10 ML: at 20:39

## 2019-11-25 RX ADMIN — FLUCONAZOLE 100 MG: 100 TABLET ORAL at 09:18

## 2019-11-25 RX ADMIN — SEVELAMER CARBONATE 1600 MG: 800 TABLET, FILM COATED ORAL at 18:09

## 2019-11-25 RX ADMIN — SEVELAMER CARBONATE 1600 MG: 800 TABLET, FILM COATED ORAL at 12:33

## 2019-11-25 RX ADMIN — ONDANSETRON 4 MG: 2 INJECTION INTRAMUSCULAR; INTRAVENOUS at 18:10

## 2019-11-25 RX ADMIN — APIXABAN 2.5 MG: 5 TABLET, FILM COATED ORAL at 20:54

## 2019-11-25 RX ADMIN — PREGABALIN 50 MG: 25 CAPSULE ORAL at 12:32

## 2019-11-25 RX ADMIN — BUDESONIDE 500 MCG: 0.5 SUSPENSION RESPIRATORY (INHALATION) at 20:11

## 2019-11-25 RX ADMIN — HYDROMORPHONE HYDROCHLORIDE 1 MG: 1 INJECTION, SOLUTION INTRAMUSCULAR; INTRAVENOUS; SUBCUTANEOUS at 10:04

## 2019-11-25 RX ADMIN — Medication 10 ML: at 14:15

## 2019-11-25 RX ADMIN — PANTOPRAZOLE SODIUM 40 MG: 40 TABLET, DELAYED RELEASE ORAL at 06:07

## 2019-11-25 RX ADMIN — ONDANSETRON 4 MG: 2 INJECTION INTRAMUSCULAR; INTRAVENOUS at 10:03

## 2019-11-25 RX ADMIN — PANTOPRAZOLE SODIUM 40 MG: 40 TABLET, DELAYED RELEASE ORAL at 18:11

## 2019-11-25 RX ADMIN — INSULIN GLARGINE 20 UNITS: 100 INJECTION, SOLUTION SUBCUTANEOUS at 20:39

## 2019-11-25 RX ADMIN — ESCITALOPRAM OXALATE 10 MG: 10 TABLET ORAL at 09:17

## 2019-11-25 RX ADMIN — Medication 10 ML: at 18:10

## 2019-11-25 RX ADMIN — HYDROMORPHONE HYDROCHLORIDE 1 MG: 1 INJECTION, SOLUTION INTRAMUSCULAR; INTRAVENOUS; SUBCUTANEOUS at 18:10

## 2019-11-25 RX ADMIN — PREGABALIN 50 MG: 25 CAPSULE ORAL at 09:18

## 2019-11-25 RX ADMIN — ROSUVASTATIN CALCIUM 10 MG: 10 TABLET, FILM COATED ORAL at 20:54

## 2019-11-25 RX ADMIN — COLLAGENASE SANTYL: 250 OINTMENT TOPICAL at 09:20

## 2019-11-25 RX ADMIN — ALLOPURINOL 100 MG: 100 TABLET ORAL at 09:17

## 2019-11-25 RX ADMIN — APIXABAN 2.5 MG: 5 TABLET, FILM COATED ORAL at 09:17

## 2019-11-25 RX ADMIN — HYDROMORPHONE HYDROCHLORIDE 1 MG: 1 INJECTION, SOLUTION INTRAMUSCULAR; INTRAVENOUS; SUBCUTANEOUS at 14:15

## 2019-11-25 RX ADMIN — POLYETHYLENE GLYCOL 3350 17 G: 17 POWDER, FOR SOLUTION ORAL at 09:18

## 2019-11-25 RX ADMIN — PREGABALIN 50 MG: 25 CAPSULE ORAL at 20:54

## 2019-11-25 RX ADMIN — HYDROMORPHONE HYDROCHLORIDE 1 MG: 1 INJECTION, SOLUTION INTRAMUSCULAR; INTRAVENOUS; SUBCUTANEOUS at 06:07

## 2019-11-25 RX ADMIN — Medication 10 ML: at 10:04

## 2019-11-25 RX ADMIN — HYDROMORPHONE HYDROCHLORIDE 1 MG: 1 INJECTION, SOLUTION INTRAMUSCULAR; INTRAVENOUS; SUBCUTANEOUS at 01:57

## 2019-11-25 RX ADMIN — NEBIVOLOL HYDROCHLORIDE 2.5 MG: 5 TABLET ORAL at 09:17

## 2019-11-25 ASSESSMENT — PAIN SCALES - GENERAL
PAINLEVEL_OUTOF10: 10
PAINLEVEL_OUTOF10: 10
PAINLEVEL_OUTOF10: 8
PAINLEVEL_OUTOF10: 10
PAINLEVEL_OUTOF10: 0
PAINLEVEL_OUTOF10: 10
PAINLEVEL_OUTOF10: 10
PAINLEVEL_OUTOF10: 0
PAINLEVEL_OUTOF10: 10
PAINLEVEL_OUTOF10: 6
PAINLEVEL_OUTOF10: 0
PAINLEVEL_OUTOF10: 8

## 2019-11-25 ASSESSMENT — PAIN DESCRIPTION - PAIN TYPE
TYPE: CHRONIC PAIN

## 2019-11-25 ASSESSMENT — PAIN DESCRIPTION - LOCATION
LOCATION: FLANK
LOCATION: FLANK;GROIN

## 2019-11-25 ASSESSMENT — PAIN DESCRIPTION - PROGRESSION
CLINICAL_PROGRESSION: NOT CHANGED

## 2019-11-25 ASSESSMENT — PAIN DESCRIPTION - ORIENTATION
ORIENTATION: RIGHT;LEFT

## 2019-11-26 LAB
ALBUMIN SERPL-MCNC: 2.7 G/DL (ref 3.4–5)
ANION GAP SERPL CALCULATED.3IONS-SCNC: 14 MMOL/L (ref 3–16)
BUN BLDV-MCNC: 19 MG/DL (ref 7–20)
CALCIUM SERPL-MCNC: 9.4 MG/DL (ref 8.3–10.6)
CHLORIDE BLD-SCNC: 95 MMOL/L (ref 99–110)
CO2: 26 MMOL/L (ref 21–32)
CREAT SERPL-MCNC: 5.6 MG/DL (ref 0.6–1.2)
GFR AFRICAN AMERICAN: 9
GFR NON-AFRICAN AMERICAN: 8
GLUCOSE BLD-MCNC: 104 MG/DL (ref 70–99)
GLUCOSE BLD-MCNC: 112 MG/DL (ref 70–99)
GLUCOSE BLD-MCNC: 192 MG/DL (ref 70–99)
GLUCOSE BLD-MCNC: 87 MG/DL (ref 70–99)
HCT VFR BLD CALC: 26.7 % (ref 36–48)
HEMOGLOBIN: 8.3 G/DL (ref 12–16)
MCH RBC QN AUTO: 30.8 PG (ref 26–34)
MCHC RBC AUTO-ENTMCNC: 31.1 G/DL (ref 31–36)
MCV RBC AUTO: 99.1 FL (ref 80–100)
PDW BLD-RTO: 19.4 % (ref 12.4–15.4)
PERFORMED ON: ABNORMAL
PERFORMED ON: ABNORMAL
PERFORMED ON: NORMAL
PHOSPHORUS: 3.6 MG/DL (ref 2.5–4.9)
PLATELET # BLD: 320 K/UL (ref 135–450)
PMV BLD AUTO: 8.8 FL (ref 5–10.5)
POTASSIUM SERPL-SCNC: 4.6 MMOL/L (ref 3.5–5.1)
RBC # BLD: 2.69 M/UL (ref 4–5.2)
SODIUM BLD-SCNC: 135 MMOL/L (ref 136–145)
WBC # BLD: 11.8 K/UL (ref 4–11)

## 2019-11-26 PROCEDURE — 2500000003 HC RX 250 WO HCPCS: Performed by: INTERNAL MEDICINE

## 2019-11-26 PROCEDURE — 90935 HEMODIALYSIS ONE EVALUATION: CPT

## 2019-11-26 PROCEDURE — 94640 AIRWAY INHALATION TREATMENT: CPT

## 2019-11-26 PROCEDURE — 2580000003 HC RX 258: Performed by: FAMILY MEDICINE

## 2019-11-26 PROCEDURE — C9113 INJ PANTOPRAZOLE SODIUM, VIA: HCPCS | Performed by: HOSPITALIST

## 2019-11-26 PROCEDURE — 96376 TX/PRO/DX INJ SAME DRUG ADON: CPT

## 2019-11-26 PROCEDURE — 6360000002 HC RX W HCPCS: Performed by: HOSPITALIST

## 2019-11-26 PROCEDURE — 6360000002 HC RX W HCPCS: Performed by: FAMILY MEDICINE

## 2019-11-26 PROCEDURE — 6370000000 HC RX 637 (ALT 250 FOR IP): Performed by: NURSE PRACTITIONER

## 2019-11-26 PROCEDURE — 96375 TX/PRO/DX INJ NEW DRUG ADDON: CPT

## 2019-11-26 PROCEDURE — 85027 COMPLETE CBC AUTOMATED: CPT

## 2019-11-26 PROCEDURE — 6370000000 HC RX 637 (ALT 250 FOR IP): Performed by: FAMILY MEDICINE

## 2019-11-26 PROCEDURE — 2580000003 HC RX 258: Performed by: INTERNAL MEDICINE

## 2019-11-26 PROCEDURE — G0378 HOSPITAL OBSERVATION PER HR: HCPCS

## 2019-11-26 PROCEDURE — 80069 RENAL FUNCTION PANEL: CPT

## 2019-11-26 PROCEDURE — 6370000000 HC RX 637 (ALT 250 FOR IP): Performed by: INTERNAL MEDICINE

## 2019-11-26 RX ORDER — HYDROMORPHONE HYDROCHLORIDE 2 MG/1
2 TABLET ORAL EVERY 6 HOURS PRN
Status: DISCONTINUED | OUTPATIENT
Start: 2019-11-26 | End: 2019-11-27 | Stop reason: HOSPADM

## 2019-11-26 RX ORDER — HYDROMORPHONE HYDROCHLORIDE 1 MG/ML
1 INJECTION, SOLUTION INTRAMUSCULAR; INTRAVENOUS; SUBCUTANEOUS ONCE
Status: COMPLETED | OUTPATIENT
Start: 2019-11-26 | End: 2019-11-26

## 2019-11-26 RX ORDER — HYDROMORPHONE HYDROCHLORIDE 2 MG/1
2 TABLET ORAL EVERY 6 HOURS PRN
Qty: 8 TABLET | Refills: 0 | Status: SHIPPED | OUTPATIENT
Start: 2019-11-26 | End: 2019-11-27

## 2019-11-26 RX ORDER — PANTOPRAZOLE SODIUM 40 MG/10ML
40 INJECTION, POWDER, LYOPHILIZED, FOR SOLUTION INTRAVENOUS ONCE
Status: COMPLETED | OUTPATIENT
Start: 2019-11-26 | End: 2019-11-26

## 2019-11-26 RX ORDER — FLUCONAZOLE 100 MG/1
100 TABLET ORAL DAILY
Qty: 7 TABLET | Refills: 0 | Status: SHIPPED | OUTPATIENT
Start: 2019-11-27 | End: 2019-12-04

## 2019-11-26 RX ADMIN — Medication 10 ML: at 19:01

## 2019-11-26 RX ADMIN — HYDROMORPHONE HYDROCHLORIDE 1 MG: 1 INJECTION, SOLUTION INTRAMUSCULAR; INTRAVENOUS; SUBCUTANEOUS at 16:43

## 2019-11-26 RX ADMIN — SODIUM THIOSULFATE 25 G: 250 INJECTION, SOLUTION INTRAVENOUS at 12:00

## 2019-11-26 RX ADMIN — HYDROMORPHONE HYDROCHLORIDE 1 MG: 1 INJECTION, SOLUTION INTRAMUSCULAR; INTRAVENOUS; SUBCUTANEOUS at 08:44

## 2019-11-26 RX ADMIN — HYDROMORPHONE HYDROCHLORIDE 1 MG: 1 INJECTION, SOLUTION INTRAMUSCULAR; INTRAVENOUS; SUBCUTANEOUS at 00:05

## 2019-11-26 RX ADMIN — HYDROMORPHONE HYDROCHLORIDE 1 MG: 1 INJECTION, SOLUTION INTRAMUSCULAR; INTRAVENOUS; SUBCUTANEOUS at 21:07

## 2019-11-26 RX ADMIN — PREGABALIN 50 MG: 25 CAPSULE ORAL at 12:53

## 2019-11-26 RX ADMIN — HYDROMORPHONE HYDROCHLORIDE 1 MG: 1 INJECTION, SOLUTION INTRAMUSCULAR; INTRAVENOUS; SUBCUTANEOUS at 07:33

## 2019-11-26 RX ADMIN — POLYETHYLENE GLYCOL 3350 17 G: 17 POWDER, FOR SOLUTION ORAL at 12:52

## 2019-11-26 RX ADMIN — HYDROMORPHONE HYDROCHLORIDE 1 MG: 1 INJECTION, SOLUTION INTRAMUSCULAR; INTRAVENOUS; SUBCUTANEOUS at 19:01

## 2019-11-26 RX ADMIN — PANTOPRAZOLE SODIUM 40 MG: 40 TABLET, DELAYED RELEASE ORAL at 16:44

## 2019-11-26 RX ADMIN — SEVELAMER CARBONATE 1600 MG: 800 TABLET, FILM COATED ORAL at 16:43

## 2019-11-26 RX ADMIN — PANTOPRAZOLE SODIUM 40 MG: 40 TABLET, DELAYED RELEASE ORAL at 06:21

## 2019-11-26 RX ADMIN — SEVELAMER CARBONATE 1600 MG: 800 TABLET, FILM COATED ORAL at 12:53

## 2019-11-26 RX ADMIN — ONDANSETRON 4 MG: 2 INJECTION INTRAMUSCULAR; INTRAVENOUS at 08:44

## 2019-11-26 RX ADMIN — Medication 10 ML: at 12:51

## 2019-11-26 RX ADMIN — BUDESONIDE 500 MCG: 0.5 SUSPENSION RESPIRATORY (INHALATION) at 20:07

## 2019-11-26 RX ADMIN — ONDANSETRON 4 MG: 2 INJECTION INTRAMUSCULAR; INTRAVENOUS at 23:10

## 2019-11-26 RX ADMIN — Medication 10 ML: at 21:07

## 2019-11-26 RX ADMIN — PANTOPRAZOLE SODIUM 40 MG: 40 INJECTION, POWDER, FOR SOLUTION INTRAVENOUS at 19:01

## 2019-11-26 RX ADMIN — TRAZODONE HYDROCHLORIDE 100 MG: 50 TABLET ORAL at 23:10

## 2019-11-26 RX ADMIN — HYDROMORPHONE HYDROCHLORIDE 1 MG: 1 INJECTION, SOLUTION INTRAMUSCULAR; INTRAVENOUS; SUBCUTANEOUS at 12:51

## 2019-11-26 RX ADMIN — APIXABAN 2.5 MG: 5 TABLET, FILM COATED ORAL at 21:06

## 2019-11-26 RX ADMIN — Medication 10 ML: at 09:48

## 2019-11-26 RX ADMIN — HYDROMORPHONE HYDROCHLORIDE 1 MG: 1 INJECTION, SOLUTION INTRAMUSCULAR; INTRAVENOUS; SUBCUTANEOUS at 09:48

## 2019-11-26 RX ADMIN — FLUCONAZOLE 100 MG: 100 TABLET ORAL at 12:53

## 2019-11-26 RX ADMIN — ONDANSETRON 4 MG: 2 INJECTION INTRAMUSCULAR; INTRAVENOUS at 16:43

## 2019-11-26 RX ADMIN — Medication 10 ML: at 08:44

## 2019-11-26 RX ADMIN — ROSUVASTATIN CALCIUM 10 MG: 10 TABLET, FILM COATED ORAL at 21:06

## 2019-11-26 RX ADMIN — Medication 10 ML: at 10:45

## 2019-11-26 RX ADMIN — INSULIN GLARGINE 20 UNITS: 100 INJECTION, SOLUTION SUBCUTANEOUS at 23:39

## 2019-11-26 RX ADMIN — ALLOPURINOL 100 MG: 100 TABLET ORAL at 12:53

## 2019-11-26 RX ADMIN — NEBIVOLOL HYDROCHLORIDE 5 MG: 5 TABLET ORAL at 21:07

## 2019-11-26 RX ADMIN — COLLAGENASE SANTYL: 250 OINTMENT TOPICAL at 12:52

## 2019-11-26 RX ADMIN — PREGABALIN 50 MG: 25 CAPSULE ORAL at 21:06

## 2019-11-26 RX ADMIN — Medication 10 ML: at 16:44

## 2019-11-26 RX ADMIN — ESCITALOPRAM OXALATE 10 MG: 10 TABLET ORAL at 12:53

## 2019-11-26 RX ADMIN — HYDROMORPHONE HYDROCHLORIDE 1 MG: 1 INJECTION, SOLUTION INTRAMUSCULAR; INTRAVENOUS; SUBCUTANEOUS at 10:46

## 2019-11-26 RX ADMIN — APIXABAN 2.5 MG: 5 TABLET, FILM COATED ORAL at 12:52

## 2019-11-26 ASSESSMENT — PAIN SCALES - GENERAL
PAINLEVEL_OUTOF10: 8
PAINLEVEL_OUTOF10: 10
PAINLEVEL_OUTOF10: 8
PAINLEVEL_OUTOF10: 10
PAINLEVEL_OUTOF10: 10
PAINLEVEL_OUTOF10: 8
PAINLEVEL_OUTOF10: 8
PAINLEVEL_OUTOF10: 10
PAINLEVEL_OUTOF10: 10
PAINLEVEL_OUTOF10: 0
PAINLEVEL_OUTOF10: 8
PAINLEVEL_OUTOF10: 8

## 2019-11-26 ASSESSMENT — PAIN DESCRIPTION - ORIENTATION
ORIENTATION: RIGHT;LEFT
ORIENTATION: RIGHT;LEFT;MID;UPPER
ORIENTATION: RIGHT;LEFT
ORIENTATION: RIGHT;LEFT

## 2019-11-26 ASSESSMENT — PAIN DESCRIPTION - LOCATION
LOCATION: FLANK;GROIN
LOCATION: ABDOMEN;CHEST
LOCATION: FLANK;GROIN
LOCATION: ABDOMEN;CHEST

## 2019-11-26 ASSESSMENT — PAIN DESCRIPTION - ONSET
ONSET: ON-GOING
ONSET: ON-GOING

## 2019-11-26 ASSESSMENT — PAIN DESCRIPTION - PAIN TYPE
TYPE: CHRONIC PAIN
TYPE: ACUTE PAIN
TYPE: CHRONIC PAIN
TYPE: CHRONIC PAIN
TYPE: ACUTE PAIN

## 2019-11-26 ASSESSMENT — PAIN DESCRIPTION - FREQUENCY
FREQUENCY: CONTINUOUS
FREQUENCY: CONTINUOUS

## 2019-11-26 ASSESSMENT — PAIN DESCRIPTION - PROGRESSION
CLINICAL_PROGRESSION: NOT CHANGED
CLINICAL_PROGRESSION: NOT CHANGED

## 2019-11-26 ASSESSMENT — PAIN DESCRIPTION - DESCRIPTORS
DESCRIPTORS: ACHING
DESCRIPTORS: ACHING

## 2019-11-27 VITALS
SYSTOLIC BLOOD PRESSURE: 123 MMHG | HEART RATE: 89 BPM | BODY MASS INDEX: 51.91 KG/M2 | DIASTOLIC BLOOD PRESSURE: 74 MMHG | WEIGHT: 293 LBS | RESPIRATION RATE: 16 BRPM | HEIGHT: 63 IN | OXYGEN SATURATION: 95 % | TEMPERATURE: 99 F

## 2019-11-27 LAB
ANION GAP SERPL CALCULATED.3IONS-SCNC: 15 MMOL/L (ref 3–16)
BUN BLDV-MCNC: 23 MG/DL (ref 7–20)
CALCIUM SERPL-MCNC: 9.9 MG/DL (ref 8.3–10.6)
CHLORIDE BLD-SCNC: 92 MMOL/L (ref 99–110)
CO2: 27 MMOL/L (ref 21–32)
CREAT SERPL-MCNC: 5.4 MG/DL (ref 0.6–1.2)
GFR AFRICAN AMERICAN: 10
GFR NON-AFRICAN AMERICAN: 8
GLUCOSE BLD-MCNC: 79 MG/DL (ref 70–99)
GLUCOSE BLD-MCNC: 81 MG/DL (ref 70–99)
GLUCOSE BLD-MCNC: 82 MG/DL (ref 70–99)
HCT VFR BLD CALC: 24.1 % (ref 36–48)
HEMOGLOBIN: 7.5 G/DL (ref 12–16)
MCH RBC QN AUTO: 30.7 PG (ref 26–34)
MCHC RBC AUTO-ENTMCNC: 31 G/DL (ref 31–36)
MCV RBC AUTO: 99.1 FL (ref 80–100)
PDW BLD-RTO: 19.9 % (ref 12.4–15.4)
PERFORMED ON: NORMAL
PERFORMED ON: NORMAL
PLATELET # BLD: 336 K/UL (ref 135–450)
PMV BLD AUTO: 8.7 FL (ref 5–10.5)
POTASSIUM SERPL-SCNC: 4.6 MMOL/L (ref 3.5–5.1)
RBC # BLD: 2.43 M/UL (ref 4–5.2)
SODIUM BLD-SCNC: 134 MMOL/L (ref 136–145)
WBC # BLD: 13.7 K/UL (ref 4–11)

## 2019-11-27 PROCEDURE — 6370000000 HC RX 637 (ALT 250 FOR IP): Performed by: INTERNAL MEDICINE

## 2019-11-27 PROCEDURE — 2580000003 HC RX 258: Performed by: FAMILY MEDICINE

## 2019-11-27 PROCEDURE — 96376 TX/PRO/DX INJ SAME DRUG ADON: CPT

## 2019-11-27 PROCEDURE — 6370000000 HC RX 637 (ALT 250 FOR IP): Performed by: FAMILY MEDICINE

## 2019-11-27 PROCEDURE — 80048 BASIC METABOLIC PNL TOTAL CA: CPT

## 2019-11-27 PROCEDURE — 6360000002 HC RX W HCPCS: Performed by: FAMILY MEDICINE

## 2019-11-27 PROCEDURE — 94640 AIRWAY INHALATION TREATMENT: CPT

## 2019-11-27 PROCEDURE — 85027 COMPLETE CBC AUTOMATED: CPT

## 2019-11-27 PROCEDURE — 94761 N-INVAS EAR/PLS OXIMETRY MLT: CPT

## 2019-11-27 PROCEDURE — 6370000000 HC RX 637 (ALT 250 FOR IP): Performed by: HOSPITALIST

## 2019-11-27 PROCEDURE — G0378 HOSPITAL OBSERVATION PER HR: HCPCS

## 2019-11-27 PROCEDURE — 6360000002 HC RX W HCPCS: Performed by: HOSPITALIST

## 2019-11-27 RX ADMIN — COLLAGENASE SANTYL: 250 OINTMENT TOPICAL at 08:33

## 2019-11-27 RX ADMIN — HYDROMORPHONE HYDROCHLORIDE 2 MG: 2 TABLET ORAL at 12:31

## 2019-11-27 RX ADMIN — NEBIVOLOL HYDROCHLORIDE 2.5 MG: 5 TABLET ORAL at 08:31

## 2019-11-27 RX ADMIN — APIXABAN 2.5 MG: 5 TABLET, FILM COATED ORAL at 08:32

## 2019-11-27 RX ADMIN — BUDESONIDE 500 MCG: 0.5 SUSPENSION RESPIRATORY (INHALATION) at 08:30

## 2019-11-27 RX ADMIN — PANTOPRAZOLE SODIUM 40 MG: 40 TABLET, DELAYED RELEASE ORAL at 09:02

## 2019-11-27 RX ADMIN — HYDROMORPHONE HYDROCHLORIDE 1 MG: 1 INJECTION, SOLUTION INTRAMUSCULAR; INTRAVENOUS; SUBCUTANEOUS at 01:20

## 2019-11-27 RX ADMIN — PREGABALIN 50 MG: 25 CAPSULE ORAL at 08:32

## 2019-11-27 RX ADMIN — Medication 10 ML: at 09:01

## 2019-11-27 RX ADMIN — HYDROMORPHONE HYDROCHLORIDE 1 MG: 1 INJECTION, SOLUTION INTRAMUSCULAR; INTRAVENOUS; SUBCUTANEOUS at 08:31

## 2019-11-27 RX ADMIN — ESCITALOPRAM OXALATE 10 MG: 10 TABLET ORAL at 08:32

## 2019-11-27 RX ADMIN — SEVELAMER CARBONATE 1600 MG: 800 TABLET, FILM COATED ORAL at 08:31

## 2019-11-27 RX ADMIN — FLUCONAZOLE 100 MG: 100 TABLET ORAL at 08:32

## 2019-11-27 RX ADMIN — ALLOPURINOL 100 MG: 100 TABLET ORAL at 08:32

## 2019-11-27 ASSESSMENT — PAIN DESCRIPTION - PAIN TYPE: TYPE: ACUTE PAIN

## 2019-11-27 ASSESSMENT — PAIN SCALES - GENERAL
PAINLEVEL_OUTOF10: 3
PAINLEVEL_OUTOF10: 10
PAINLEVEL_OUTOF10: 7
PAINLEVEL_OUTOF10: 9
PAINLEVEL_OUTOF10: 10

## 2019-11-27 ASSESSMENT — PAIN DESCRIPTION - ONSET: ONSET: ON-GOING

## 2019-11-27 ASSESSMENT — PAIN DESCRIPTION - DESCRIPTORS: DESCRIPTORS: SHARP

## 2019-11-27 ASSESSMENT — PAIN DESCRIPTION - LOCATION: LOCATION: ABDOMEN

## 2019-11-27 ASSESSMENT — PAIN DESCRIPTION - FREQUENCY: FREQUENCY: CONTINUOUS

## 2019-11-27 ASSESSMENT — PAIN DESCRIPTION - ORIENTATION: ORIENTATION: LOWER

## 2019-11-27 ASSESSMENT — PAIN DESCRIPTION - PROGRESSION
CLINICAL_PROGRESSION: GRADUALLY WORSENING
CLINICAL_PROGRESSION: GRADUALLY IMPROVING

## 2019-11-29 ENCOUNTER — APPOINTMENT (OUTPATIENT)
Dept: GENERAL RADIOLOGY | Age: 64
End: 2019-11-29
Payer: MEDICARE

## 2019-11-29 ENCOUNTER — HOSPITAL ENCOUNTER (EMERGENCY)
Age: 64
Discharge: HOME | End: 2019-11-29
Attending: EMERGENCY MEDICINE
Payer: MEDICARE

## 2019-11-29 ENCOUNTER — APPOINTMENT (OUTPATIENT)
Dept: CT IMAGING | Age: 64
End: 2019-11-29
Payer: MEDICARE

## 2019-11-29 ENCOUNTER — TELEPHONE (OUTPATIENT)
Dept: OTHER | Facility: CLINIC | Age: 64
End: 2019-11-29

## 2019-11-29 VITALS
OXYGEN SATURATION: 100 % | DIASTOLIC BLOOD PRESSURE: 69 MMHG | BODY MASS INDEX: 51.91 KG/M2 | SYSTOLIC BLOOD PRESSURE: 134 MMHG | WEIGHT: 293 LBS | TEMPERATURE: 98 F | RESPIRATION RATE: 11 BRPM | HEART RATE: 76 BPM | HEIGHT: 63 IN

## 2019-11-29 DIAGNOSIS — M25.552 LEFT HIP PAIN: ICD-10-CM

## 2019-11-29 DIAGNOSIS — N18.6 STAGE 5 CHRONIC KIDNEY DISEASE ON CHRONIC DIALYSIS (HCC): ICD-10-CM

## 2019-11-29 DIAGNOSIS — S39.92XA INJURY OF BACK, INITIAL ENCOUNTER: ICD-10-CM

## 2019-11-29 DIAGNOSIS — Z99.2 STAGE 5 CHRONIC KIDNEY DISEASE ON CHRONIC DIALYSIS (HCC): ICD-10-CM
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
## 2019-11-29 DIAGNOSIS — S39.92XA INJURY OF LOW BACK, INITIAL ENCOUNTER: ICD-10-CM

## 2019-11-29 LAB
A/G RATIO: 0.5 (ref 1.1–2.2)
ACETAMINOPHEN LEVEL: 6 UG/ML (ref 10–30)
ALBUMIN SERPL-MCNC: 2.6 G/DL (ref 3.4–5)
ALP BLD-CCNC: 138 U/L (ref 40–129)
ALT SERPL-CCNC: 15 U/L (ref 10–40)
ANION GAP SERPL CALCULATED.3IONS-SCNC: 23 MMOL/L (ref 3–16)
AST SERPL-CCNC: 16 U/L (ref 15–37)
BASOPHILS ABSOLUTE: 0.1 K/UL (ref 0–0.2)
BASOPHILS RELATIVE PERCENT: 0.4 %
BETA-HYDROXYBUTYRATE: 0.5 MMOL/L (ref 0–0.27)
BILIRUB SERPL-MCNC: 0.3 MG/DL (ref 0–1)
BUN BLDV-MCNC: 24 MG/DL (ref 7–20)
CALCIUM SERPL-MCNC: 10.3 MG/DL (ref 8.3–10.6)
CHLORIDE BLD-SCNC: 90 MMOL/L (ref 99–110)
CO2: 23 MMOL/L (ref 21–32)
CREAT SERPL-MCNC: 6.7 MG/DL (ref 0.6–1.2)
EOSINOPHILS ABSOLUTE: 0 K/UL (ref 0–0.6)
EOSINOPHILS RELATIVE PERCENT: 0.4 %
ETHANOL: NORMAL MG/DL (ref 0–0.08)
GFR AFRICAN AMERICAN: 8
GFR NON-AFRICAN AMERICAN: 6
GLOBULIN: 4.8 G/DL
GLUCOSE BLD-MCNC: 197 MG/DL (ref 70–99)
HCT VFR BLD CALC: 24.8 % (ref 36–48)
HEMOGLOBIN: 7.7 G/DL (ref 12–16)
LACTIC ACID: 1.2 MMOL/L (ref 0.4–2)
LYMPHOCYTES ABSOLUTE: 0.8 K/UL (ref 1–5.1)
LYMPHOCYTES RELATIVE PERCENT: 5.5 %
MCH RBC QN AUTO: 30.4 PG (ref 26–34)
MCHC RBC AUTO-ENTMCNC: 31 G/DL (ref 31–36)
MCV RBC AUTO: 97.8 FL (ref 80–100)
MONOCYTES ABSOLUTE: 1.1 K/UL (ref 0–1.3)
MONOCYTES RELATIVE PERCENT: 7.9 %
NEUTROPHILS ABSOLUTE: 11.9 K/UL (ref 1.7–7.7)
NEUTROPHILS RELATIVE PERCENT: 85.8 %
PDW BLD-RTO: 19.5 % (ref 12.4–15.4)
PLATELET # BLD: 375 K/UL (ref 135–450)
PMV BLD AUTO: 8.9 FL (ref 5–10.5)
POTASSIUM SERPL-SCNC: 4.3 MMOL/L (ref 3.5–5.1)
RBC # BLD: 2.54 M/UL (ref 4–5.2)
SALICYLATE, SERUM: <0.3 MG/DL (ref 15–30)
SODIUM BLD-SCNC: 136 MMOL/L (ref 136–145)
TOTAL PROTEIN: 7.4 G/DL (ref 6.4–8.2)
WBC # BLD: 13.9 K/UL (ref 4–11)

## 2019-11-29 PROCEDURE — 80053 COMPREHEN METABOLIC PANEL: CPT

## 2019-11-29 PROCEDURE — 96374 THER/PROPH/DIAG INJ IV PUSH: CPT

## 2019-11-29 PROCEDURE — G0480 DRUG TEST DEF 1-7 CLASSES: HCPCS

## 2019-11-29 PROCEDURE — 93005 ELECTROCARDIOGRAM TRACING: CPT | Performed by: EMERGENCY MEDICINE

## 2019-11-29 PROCEDURE — 6360000002 HC RX W HCPCS: Performed by: EMERGENCY MEDICINE

## 2019-11-29 PROCEDURE — 83605 ASSAY OF LACTIC ACID: CPT

## 2019-11-29 PROCEDURE — 74176 CT ABD & PELVIS W/O CONTRAST: CPT

## 2019-11-29 PROCEDURE — 73502 X-RAY EXAM HIP UNI 2-3 VIEWS: CPT

## 2019-11-29 PROCEDURE — 72128 CT CHEST SPINE W/O DYE: CPT

## 2019-11-29 PROCEDURE — 82010 KETONE BODYS QUAN: CPT

## 2019-11-29 PROCEDURE — 85025 COMPLETE CBC W/AUTO DIFF WBC: CPT

## 2019-11-29 PROCEDURE — 72131 CT LUMBAR SPINE W/O DYE: CPT

## 2019-11-29 PROCEDURE — 99285 EMERGENCY DEPT VISIT HI MDM: CPT

## 2019-11-29 PROCEDURE — 72125 CT NECK SPINE W/O DYE: CPT

## 2019-11-29 RX ORDER — HYDROMORPHONE HYDROCHLORIDE 1 MG/ML
0.5 INJECTION, SOLUTION INTRAMUSCULAR; INTRAVENOUS; SUBCUTANEOUS
Status: DISCONTINUED | OUTPATIENT
Start: 2019-11-29 | End: 2019-11-29 | Stop reason: HOSPADM

## 2019-11-29 RX ADMIN — HYDROMORPHONE HYDROCHLORIDE 0.5 MG: 1 INJECTION, SOLUTION INTRAMUSCULAR; INTRAVENOUS; SUBCUTANEOUS at 08:53

## 2019-11-29 ASSESSMENT — PAIN DESCRIPTION - LOCATION
LOCATION: BACK
LOCATION: BACK;NECK

## 2019-11-29 ASSESSMENT — PAIN SCALES - GENERAL
PAINLEVEL_OUTOF10: 9
PAINLEVEL_OUTOF10: 2
PAINLEVEL_OUTOF10: 10

## 2019-11-29 ASSESSMENT — PAIN DESCRIPTION - PAIN TYPE
TYPE: ACUTE PAIN
TYPE: ACUTE PAIN

## 2019-11-29 ASSESSMENT — PAIN DESCRIPTION - PROGRESSION: CLINICAL_PROGRESSION: RAPIDLY IMPROVING

## 2019-11-29 ASSESSMENT — PAIN DESCRIPTION - ORIENTATION: ORIENTATION: LEFT

## 2019-11-30 LAB
EKG ATRIAL RATE: 85 BPM
EKG DIAGNOSIS: NORMAL
EKG P AXIS: 35 DEGREES
EKG P-R INTERVAL: 170 MS
EKG Q-T INTERVAL: 368 MS
EKG QRS DURATION: 82 MS
EKG QTC CALCULATION (BAZETT): 437 MS
EKG R AXIS: -25 DEGREES
EKG T AXIS: 73 DEGREES
EKG VENTRICULAR RATE: 85 BPM

## 2019-11-30 PROCEDURE — 93010 ELECTROCARDIOGRAM REPORT: CPT | Performed by: INTERNAL MEDICINE

## 2019-12-02 PROBLEM — E83.59 CALCIPHYLAXIS: Status: ACTIVE | Noted: 2019-12-02

## 2019-12-16 ENCOUNTER — APPOINTMENT (OUTPATIENT)
Dept: GENERAL RADIOLOGY | Age: 64
End: 2019-12-16
Payer: MEDICARE

## 2019-12-16 ENCOUNTER — HOSPITAL ENCOUNTER (EMERGENCY)
Age: 64
Discharge: HOME | End: 2019-12-16
Attending: EMERGENCY MEDICINE
Payer: MEDICARE

## 2019-12-16 VITALS
RESPIRATION RATE: 11 BRPM | TEMPERATURE: 98.2 F | HEART RATE: 91 BPM | SYSTOLIC BLOOD PRESSURE: 149 MMHG | BODY MASS INDEX: 51.91 KG/M2 | HEIGHT: 63 IN | DIASTOLIC BLOOD PRESSURE: 67 MMHG | OXYGEN SATURATION: 100 % | WEIGHT: 293 LBS

## 2019-12-16 DIAGNOSIS — Z99.2 END STAGE RENAL DISEASE ON DIALYSIS (HCC): ICD-10-CM

## 2019-12-16 DIAGNOSIS — N18.6 END STAGE RENAL DISEASE ON DIALYSIS (HCC): ICD-10-CM

## 2019-12-16 DIAGNOSIS — M79.605 BILATERAL LEG PAIN: ICD-10-CM

## 2019-12-16 DIAGNOSIS — R53.1 GENERAL WEAKNESS: Primary | ICD-10-CM

## 2019-12-16 DIAGNOSIS — D64.9 CHRONIC ANEMIA: ICD-10-CM

## 2019-12-16 DIAGNOSIS — M79.604 BILATERAL LEG PAIN: ICD-10-CM

## 2019-12-16 DIAGNOSIS — G89.29 OTHER CHRONIC PAIN: ICD-10-CM

## 2019-12-16 LAB
A/G RATIO: 0.6 (ref 1.1–2.2)
ALBUMIN SERPL-MCNC: 2.6 G/DL (ref 3.4–5)
ALP BLD-CCNC: 94 U/L (ref 40–129)
ALT SERPL-CCNC: 11 U/L (ref 10–40)
ANION GAP SERPL CALCULATED.3IONS-SCNC: 26 MMOL/L (ref 3–16)
AST SERPL-CCNC: 9 U/L (ref 15–37)
BASE EXCESS ARTERIAL: -1.6 MMOL/L (ref -3–3)
BASOPHILS ABSOLUTE: 0 K/UL (ref 0–0.2)
BASOPHILS RELATIVE PERCENT: 0.4 %
BILIRUB SERPL-MCNC: <0.2 MG/DL (ref 0–1)
BUN BLDV-MCNC: 39 MG/DL (ref 7–20)
CALCIUM SERPL-MCNC: 9.7 MG/DL (ref 8.3–10.6)
CARBOXYHEMOGLOBIN ARTERIAL: 1.1 % (ref 0–1.5)
CHLORIDE BLD-SCNC: 92 MMOL/L (ref 99–110)
CO2: 21 MMOL/L (ref 21–32)
CREAT SERPL-MCNC: 6.2 MG/DL (ref 0.6–1.2)
EKG ATRIAL RATE: 101 BPM
EKG DIAGNOSIS: NORMAL
EKG P AXIS: 32 DEGREES
EKG P-R INTERVAL: 192 MS
EKG Q-T INTERVAL: 342 MS
EKG QRS DURATION: 80 MS
EKG QTC CALCULATION (BAZETT): 443 MS
EKG R AXIS: -23 DEGREES
EKG T AXIS: 66 DEGREES
EKG VENTRICULAR RATE: 101 BPM
EOSINOPHILS ABSOLUTE: 0.1 K/UL (ref 0–0.6)
EOSINOPHILS RELATIVE PERCENT: 0.9 %
GFR AFRICAN AMERICAN: 8
GFR NON-AFRICAN AMERICAN: 7
GLOBULIN: 4.2 G/DL
GLUCOSE BLD-MCNC: 146 MG/DL (ref 70–99)
HCO3 ARTERIAL: 23.4 MMOL/L (ref 21–29)
HCT VFR BLD CALC: 23.6 % (ref 36–48)
HEMOGLOBIN, ART, EXTENDED: 7.3 G/DL (ref 12–16)
HEMOGLOBIN: 7.5 G/DL (ref 12–16)
LYMPHOCYTES ABSOLUTE: 1.1 K/UL (ref 1–5.1)
LYMPHOCYTES RELATIVE PERCENT: 10 %
MCH RBC QN AUTO: 30.4 PG (ref 26–34)
MCHC RBC AUTO-ENTMCNC: 31.7 G/DL (ref 31–36)
MCV RBC AUTO: 96.1 FL (ref 80–100)
METHEMOGLOBIN ARTERIAL: 0.9 %
MONOCYTES ABSOLUTE: 0.7 K/UL (ref 0–1.3)
MONOCYTES RELATIVE PERCENT: 6.2 %
NEUTROPHILS ABSOLUTE: 8.7 K/UL (ref 1.7–7.7)
NEUTROPHILS RELATIVE PERCENT: 82.5 %
O2 CONTENT ARTERIAL: 10 ML/DL
O2 SAT, ARTERIAL: 93.9 %
O2 THERAPY: ABNORMAL
PCO2 ARTERIAL: 39.9 MMHG (ref 35–45)
PDW BLD-RTO: 21.3 % (ref 12.4–15.4)
PH ARTERIAL: 7.38 (ref 7.35–7.45)
PLATELET # BLD: 238 K/UL (ref 135–450)
PMV BLD AUTO: 8.1 FL (ref 5–10.5)
PO2 ARTERIAL: 78.9 MMHG (ref 75–108)
POTASSIUM SERPL-SCNC: 3.9 MMOL/L (ref 3.5–5.1)
RBC # BLD: 2.45 M/UL (ref 4–5.2)
SODIUM BLD-SCNC: 139 MMOL/L (ref 136–145)
TCO2 ARTERIAL: 55.2 MMOL/L
TOTAL PROTEIN: 6.8 G/DL (ref 6.4–8.2)
WBC # BLD: 10.6 K/UL (ref 4–11)

## 2019-12-16 PROCEDURE — 71046 X-RAY EXAM CHEST 2 VIEWS: CPT

## 2019-12-16 PROCEDURE — 6370000000 HC RX 637 (ALT 250 FOR IP): Performed by: PHYSICIAN ASSISTANT

## 2019-12-16 PROCEDURE — 82803 BLOOD GASES ANY COMBINATION: CPT

## 2019-12-16 PROCEDURE — 99284 EMERGENCY DEPT VISIT MOD MDM: CPT

## 2019-12-16 PROCEDURE — 85025 COMPLETE CBC W/AUTO DIFF WBC: CPT

## 2019-12-16 PROCEDURE — 93005 ELECTROCARDIOGRAM TRACING: CPT | Performed by: PHYSICIAN ASSISTANT

## 2019-12-16 PROCEDURE — 80053 COMPREHEN METABOLIC PANEL: CPT

## 2019-12-16 PROCEDURE — 93010 ELECTROCARDIOGRAM REPORT: CPT | Performed by: INTERNAL MEDICINE

## 2019-12-16 PROCEDURE — 36600 WITHDRAWAL OF ARTERIAL BLOOD: CPT

## 2019-12-16 RX ORDER — HYDROMORPHONE HYDROCHLORIDE 2 MG/1
2 TABLET ORAL ONCE
Status: COMPLETED | OUTPATIENT
Start: 2019-12-16 | End: 2019-12-16

## 2019-12-16 RX ADMIN — HYDROMORPHONE HYDROCHLORIDE 2 MG: 2 TABLET ORAL at 11:05

## 2019-12-16 ASSESSMENT — PAIN SCALES - GENERAL
PAINLEVEL_OUTOF10: 10
PAINLEVEL_OUTOF10: 7
PAINLEVEL_OUTOF10: 8

## 2019-12-16 ASSESSMENT — PAIN DESCRIPTION - LOCATION: LOCATION: LEG

## 2019-12-16 ASSESSMENT — PAIN DESCRIPTION - PAIN TYPE: TYPE: ACUTE PAIN

## 2019-12-16 ASSESSMENT — PAIN DESCRIPTION - ORIENTATION: ORIENTATION: LEFT;RIGHT

## 2019-12-26 ENCOUNTER — APPOINTMENT (OUTPATIENT)
Dept: GENERAL RADIOLOGY | Age: 64
DRG: 463 | End: 2019-12-26
Payer: MEDICARE

## 2019-12-26 ENCOUNTER — ANESTHESIA EVENT (OUTPATIENT)
Dept: OPERATING ROOM | Age: 64
DRG: 463 | End: 2019-12-26
Payer: MEDICARE

## 2019-12-26 ENCOUNTER — HOSPITAL ENCOUNTER (INPATIENT)
Age: 64
LOS: 15 days | Discharge: NURSING, REHAB OR LONG-TERM CARE | DRG: 463 | End: 2020-01-10
Attending: EMERGENCY MEDICINE | Admitting: INTERNAL MEDICINE
Payer: MEDICARE

## 2019-12-26 ENCOUNTER — APPOINTMENT (OUTPATIENT)
Dept: CT IMAGING | Age: 64
DRG: 463 | End: 2019-12-26
Payer: MEDICARE

## 2019-12-26 ENCOUNTER — ANESTHESIA (OUTPATIENT)
Dept: OPERATING ROOM | Age: 64
DRG: 463 | End: 2019-12-26
Payer: MEDICARE

## 2019-12-26 VITALS
RESPIRATION RATE: 7 BRPM | OXYGEN SATURATION: 100 % | SYSTOLIC BLOOD PRESSURE: 130 MMHG | DIASTOLIC BLOOD PRESSURE: 61 MMHG

## 2019-12-26 PROBLEM — L03.90 CELLULITIS: Status: ACTIVE | Noted: 2019-12-26

## 2019-12-26 LAB
A/G RATIO: 0.7 (ref 1.1–2.2)
ALBUMIN SERPL-MCNC: 2.7 G/DL (ref 3.4–5)
ALP BLD-CCNC: 72 U/L (ref 40–129)
ALT SERPL-CCNC: 17 U/L (ref 10–40)
ANION GAP SERPL CALCULATED.3IONS-SCNC: 25 MMOL/L (ref 3–16)
AST SERPL-CCNC: 27 U/L (ref 15–37)
BASOPHILS ABSOLUTE: 0 K/UL (ref 0–0.2)
BASOPHILS RELATIVE PERCENT: 0.2 %
BILIRUB SERPL-MCNC: <0.2 MG/DL (ref 0–1)
BUN BLDV-MCNC: 38 MG/DL (ref 7–20)
CALCIUM SERPL-MCNC: 9.7 MG/DL (ref 8.3–10.6)
CHLORIDE BLD-SCNC: 93 MMOL/L (ref 99–110)
CO2: 21 MMOL/L (ref 21–32)
CREAT SERPL-MCNC: 7.4 MG/DL (ref 0.6–1.2)
EOSINOPHILS ABSOLUTE: 0.1 K/UL (ref 0–0.6)
EOSINOPHILS RELATIVE PERCENT: 1.1 %
GFR AFRICAN AMERICAN: 7
GFR NON-AFRICAN AMERICAN: 6
GLOBULIN: 4.1 G/DL
GLUCOSE BLD-MCNC: 105 MG/DL (ref 70–99)
GLUCOSE BLD-MCNC: 123 MG/DL (ref 70–99)
GLUCOSE BLD-MCNC: 130 MG/DL (ref 70–99)
GLUCOSE BLD-MCNC: 154 MG/DL (ref 70–99)
GLUCOSE BLD-MCNC: 41 MG/DL (ref 70–99)
GLUCOSE BLD-MCNC: 49 MG/DL (ref 70–99)
GLUCOSE BLD-MCNC: 72 MG/DL (ref 70–99)
GLUCOSE BLD-MCNC: 74 MG/DL (ref 70–99)
HCT VFR BLD CALC: 24.2 % (ref 36–48)
HEMOGLOBIN: 7.6 G/DL (ref 12–16)
LACTIC ACID, SEPSIS: 1.2 MMOL/L (ref 0.4–1.9)
LYMPHOCYTES ABSOLUTE: 1 K/UL (ref 1–5.1)
LYMPHOCYTES RELATIVE PERCENT: 11.3 %
MCH RBC QN AUTO: 30.2 PG (ref 26–34)
MCHC RBC AUTO-ENTMCNC: 31.5 G/DL (ref 31–36)
MCV RBC AUTO: 96 FL (ref 80–100)
MONOCYTES ABSOLUTE: 1 K/UL (ref 0–1.3)
MONOCYTES RELATIVE PERCENT: 10.9 %
NEUTROPHILS ABSOLUTE: 6.8 K/UL (ref 1.7–7.7)
NEUTROPHILS RELATIVE PERCENT: 76.5 %
PDW BLD-RTO: 21.2 % (ref 12.4–15.4)
PERFORMED ON: ABNORMAL
PERFORMED ON: NORMAL
PERFORMED ON: NORMAL
PLATELET # BLD: 267 K/UL (ref 135–450)
PMV BLD AUTO: 8.3 FL (ref 5–10.5)
POTASSIUM REFLEX MAGNESIUM: 4.5 MMOL/L (ref 3.5–5.1)
PRO-BNP: 5300 PG/ML (ref 0–124)
RBC # BLD: 2.52 M/UL (ref 4–5.2)
SEDIMENTATION RATE, ERYTHROCYTE: >130 MM/HR (ref 0–30)
SODIUM BLD-SCNC: 139 MMOL/L (ref 136–145)
TOTAL PROTEIN: 6.8 G/DL (ref 6.4–8.2)
WBC # BLD: 8.8 K/UL (ref 4–11)

## 2019-12-26 PROCEDURE — 6360000002 HC RX W HCPCS: Performed by: INTERNAL MEDICINE

## 2019-12-26 PROCEDURE — 3700000001 HC ADD 15 MINUTES (ANESTHESIA): Performed by: SURGERY

## 2019-12-26 PROCEDURE — 87205 SMEAR GRAM STAIN: CPT

## 2019-12-26 PROCEDURE — 83880 ASSAY OF NATRIURETIC PEPTIDE: CPT

## 2019-12-26 PROCEDURE — 6360000002 HC RX W HCPCS: Performed by: NURSE ANESTHETIST, CERTIFIED REGISTERED

## 2019-12-26 PROCEDURE — 2580000003 HC RX 258

## 2019-12-26 PROCEDURE — 6370000000 HC RX 637 (ALT 250 FOR IP): Performed by: NURSE PRACTITIONER

## 2019-12-26 PROCEDURE — 99222 1ST HOSP IP/OBS MODERATE 55: CPT | Performed by: INTERNAL MEDICINE

## 2019-12-26 PROCEDURE — 2500000003 HC RX 250 WO HCPCS: Performed by: NURSE ANESTHETIST, CERTIFIED REGISTERED

## 2019-12-26 PROCEDURE — 6360000002 HC RX W HCPCS: Performed by: EMERGENCY MEDICINE

## 2019-12-26 PROCEDURE — 6370000000 HC RX 637 (ALT 250 FOR IP): Performed by: INTERNAL MEDICINE

## 2019-12-26 PROCEDURE — 99222 1ST HOSP IP/OBS MODERATE 55: CPT | Performed by: SURGERY

## 2019-12-26 PROCEDURE — 99291 CRITICAL CARE FIRST HOUR: CPT

## 2019-12-26 PROCEDURE — 87070 CULTURE OTHR SPECIMN AEROBIC: CPT

## 2019-12-26 PROCEDURE — 87040 BLOOD CULTURE FOR BACTERIA: CPT

## 2019-12-26 PROCEDURE — 7100000001 HC PACU RECOVERY - ADDTL 15 MIN: Performed by: SURGERY

## 2019-12-26 PROCEDURE — 6370000000 HC RX 637 (ALT 250 FOR IP): Performed by: SURGERY

## 2019-12-26 PROCEDURE — 85025 COMPLETE CBC W/AUTO DIFF WBC: CPT

## 2019-12-26 PROCEDURE — 2580000003 HC RX 258: Performed by: NURSE ANESTHETIST, CERTIFIED REGISTERED

## 2019-12-26 PROCEDURE — 1200000000 HC SEMI PRIVATE

## 2019-12-26 PROCEDURE — 80053 COMPREHEN METABOLIC PANEL: CPT

## 2019-12-26 PROCEDURE — 2580000003 HC RX 258: Performed by: EMERGENCY MEDICINE

## 2019-12-26 PROCEDURE — 2580000003 HC RX 258: Performed by: SURGERY

## 2019-12-26 PROCEDURE — 0JB80ZZ EXCISION OF ABDOMEN SUBCUTANEOUS TISSUE AND FASCIA, OPEN APPROACH: ICD-10-PCS | Performed by: SURGERY

## 2019-12-26 PROCEDURE — 94640 AIRWAY INHALATION TREATMENT: CPT

## 2019-12-26 PROCEDURE — 3600000012 HC SURGERY LEVEL 2 ADDTL 15MIN: Performed by: SURGERY

## 2019-12-26 PROCEDURE — 87076 CULTURE ANAEROBE IDENT EACH: CPT

## 2019-12-26 PROCEDURE — 11045 DBRDMT SUBQ TISS EACH ADDL: CPT | Performed by: SURGERY

## 2019-12-26 PROCEDURE — 0JBL0ZZ EXCISION OF RIGHT UPPER LEG SUBCUTANEOUS TISSUE AND FASCIA, OPEN APPROACH: ICD-10-PCS | Performed by: SURGERY

## 2019-12-26 PROCEDURE — 2700000000 HC OXYGEN THERAPY PER DAY

## 2019-12-26 PROCEDURE — 2500000003 HC RX 250 WO HCPCS: Performed by: SURGERY

## 2019-12-26 PROCEDURE — 88312 SPECIAL STAINS GROUP 1: CPT

## 2019-12-26 PROCEDURE — 6360000002 HC RX W HCPCS: Performed by: PHYSICIAN ASSISTANT

## 2019-12-26 PROCEDURE — 3600000002 HC SURGERY LEVEL 2 BASE: Performed by: SURGERY

## 2019-12-26 PROCEDURE — 74176 CT ABD & PELVIS W/O CONTRAST: CPT

## 2019-12-26 PROCEDURE — 0JBM0ZZ EXCISION OF LEFT UPPER LEG SUBCUTANEOUS TISSUE AND FASCIA, OPEN APPROACH: ICD-10-PCS | Performed by: SURGERY

## 2019-12-26 PROCEDURE — 83605 ASSAY OF LACTIC ACID: CPT

## 2019-12-26 PROCEDURE — 87075 CULTR BACTERIA EXCEPT BLOOD: CPT

## 2019-12-26 PROCEDURE — 2709999900 HC NON-CHARGEABLE SUPPLY: Performed by: SURGERY

## 2019-12-26 PROCEDURE — 3600000004 HC SURGERY LEVEL 4 BASE: Performed by: SURGERY

## 2019-12-26 PROCEDURE — 11042 DBRDMT SUBQ TIS 1ST 20SQCM/<: CPT | Performed by: SURGERY

## 2019-12-26 PROCEDURE — 3700000000 HC ANESTHESIA ATTENDED CARE: Performed by: SURGERY

## 2019-12-26 PROCEDURE — 7100000000 HC PACU RECOVERY - FIRST 15 MIN: Performed by: SURGERY

## 2019-12-26 PROCEDURE — 71045 X-RAY EXAM CHEST 1 VIEW: CPT

## 2019-12-26 PROCEDURE — 85652 RBC SED RATE AUTOMATED: CPT

## 2019-12-26 PROCEDURE — 88307 TISSUE EXAM BY PATHOLOGIST: CPT

## 2019-12-26 PROCEDURE — 3600000014 HC SURGERY LEVEL 4 ADDTL 15MIN: Performed by: SURGERY

## 2019-12-26 PROCEDURE — 36415 COLL VENOUS BLD VENIPUNCTURE: CPT

## 2019-12-26 RX ORDER — INSULIN LISPRO 100 [IU]/ML
0-6 INJECTION, SOLUTION INTRAVENOUS; SUBCUTANEOUS
Status: DISCONTINUED | OUTPATIENT
Start: 2019-12-26 | End: 2020-01-10 | Stop reason: HOSPADM

## 2019-12-26 RX ORDER — NEBIVOLOL 5 MG/1
2.5 TABLET ORAL DAILY
Status: DISCONTINUED | OUTPATIENT
Start: 2019-12-26 | End: 2019-12-27

## 2019-12-26 RX ORDER — TRAZODONE HYDROCHLORIDE 50 MG/1
100 TABLET ORAL NIGHTLY PRN
Status: DISCONTINUED | OUTPATIENT
Start: 2019-12-26 | End: 2020-01-10 | Stop reason: HOSPADM

## 2019-12-26 RX ORDER — HYDROMORPHONE HYDROCHLORIDE 1 MG/ML
1 INJECTION, SOLUTION INTRAMUSCULAR; INTRAVENOUS; SUBCUTANEOUS
Status: DISCONTINUED | OUTPATIENT
Start: 2019-12-26 | End: 2019-12-30

## 2019-12-26 RX ORDER — DEXTROSE MONOHYDRATE 50 MG/ML
100 INJECTION, SOLUTION INTRAVENOUS PRN
Status: DISCONTINUED | OUTPATIENT
Start: 2019-12-26 | End: 2020-01-10 | Stop reason: HOSPADM

## 2019-12-26 RX ORDER — PHENYLEPHRINE HCL IN 0.9% NACL 1 MG/10 ML
SYRINGE (ML) INTRAVENOUS PRN
Status: DISCONTINUED | OUTPATIENT
Start: 2019-12-26 | End: 2019-12-26 | Stop reason: SDUPTHER

## 2019-12-26 RX ORDER — ESCITALOPRAM OXALATE 10 MG/1
10 TABLET ORAL DAILY
Status: DISCONTINUED | OUTPATIENT
Start: 2019-12-26 | End: 2020-01-10 | Stop reason: HOSPADM

## 2019-12-26 RX ORDER — SODIUM CHLORIDE 0.9 % (FLUSH) 0.9 %
10 SYRINGE (ML) INJECTION PRN
Status: DISCONTINUED | OUTPATIENT
Start: 2019-12-26 | End: 2019-12-27 | Stop reason: SDUPTHER

## 2019-12-26 RX ORDER — CLINDAMYCIN PHOSPHATE 600 MG/50ML
600 INJECTION INTRAVENOUS ONCE
Status: DISCONTINUED | OUTPATIENT
Start: 2019-12-26 | End: 2019-12-26

## 2019-12-26 RX ORDER — DEXTROSE MONOHYDRATE 25 G/50ML
12.5 INJECTION, SOLUTION INTRAVENOUS PRN
Status: DISCONTINUED | OUTPATIENT
Start: 2019-12-26 | End: 2020-01-10 | Stop reason: HOSPADM

## 2019-12-26 RX ORDER — ONDANSETRON 2 MG/ML
INJECTION INTRAMUSCULAR; INTRAVENOUS PRN
Status: DISCONTINUED | OUTPATIENT
Start: 2019-12-26 | End: 2019-12-26 | Stop reason: SDUPTHER

## 2019-12-26 RX ORDER — HYDROMORPHONE HCL 110MG/55ML
PATIENT CONTROLLED ANALGESIA SYRINGE INTRAVENOUS PRN
Status: DISCONTINUED | OUTPATIENT
Start: 2019-12-26 | End: 2019-12-26 | Stop reason: SDUPTHER

## 2019-12-26 RX ORDER — LIDOCAINE HYDROCHLORIDE 20 MG/ML
SOLUTION OROPHARYNGEAL
Status: ON HOLD | COMMUNITY
Start: 2019-11-11 | End: 2020-02-05 | Stop reason: HOSPADM

## 2019-12-26 RX ORDER — ONDANSETRON 2 MG/ML
4 INJECTION INTRAMUSCULAR; INTRAVENOUS ONCE
Status: COMPLETED | OUTPATIENT
Start: 2019-12-26 | End: 2019-12-26

## 2019-12-26 RX ORDER — SODIUM CHLORIDE, SODIUM LACTATE, POTASSIUM CHLORIDE, CALCIUM CHLORIDE 600; 310; 30; 20 MG/100ML; MG/100ML; MG/100ML; MG/100ML
250 INJECTION, SOLUTION INTRAVENOUS ONCE
Status: COMPLETED | OUTPATIENT
Start: 2019-12-26 | End: 2019-12-26

## 2019-12-26 RX ORDER — OXYCODONE HYDROCHLORIDE 5 MG/1
5 TABLET ORAL EVERY 4 HOURS PRN
Status: DISCONTINUED | OUTPATIENT
Start: 2019-12-26 | End: 2020-01-10 | Stop reason: HOSPADM

## 2019-12-26 RX ORDER — NICOTINE POLACRILEX 4 MG
15 LOZENGE BUCCAL PRN
Status: DISCONTINUED | OUTPATIENT
Start: 2019-12-26 | End: 2020-01-10 | Stop reason: HOSPADM

## 2019-12-26 RX ORDER — LANSOPRAZOLE 30 MG/1
30 TABLET, ORALLY DISINTEGRATING, DELAYED RELEASE ORAL
Status: DISCONTINUED | OUTPATIENT
Start: 2019-12-27 | End: 2020-01-08

## 2019-12-26 RX ORDER — HYDROMORPHONE HYDROCHLORIDE 2 MG/1
4 TABLET ORAL EVERY 6 HOURS
Status: DISCONTINUED | OUTPATIENT
Start: 2019-12-26 | End: 2020-01-10 | Stop reason: HOSPADM

## 2019-12-26 RX ORDER — INSULIN DETEMIR 100 [IU]/ML
INJECTION, SOLUTION SUBCUTANEOUS
Refills: 5 | Status: ON HOLD | COMMUNITY
Start: 2019-12-03 | End: 2019-12-26

## 2019-12-26 RX ORDER — HYDROMORPHONE HYDROCHLORIDE 2 MG/1
2 TABLET ORAL EVERY 12 HOURS PRN
Status: ON HOLD | COMMUNITY
End: 2020-01-10 | Stop reason: SDUPTHER

## 2019-12-26 RX ORDER — CIPROFLOXACIN 500 MG/1
500 TABLET, FILM COATED ORAL 2 TIMES DAILY
Status: DISCONTINUED | OUTPATIENT
Start: 2019-12-26 | End: 2019-12-26

## 2019-12-26 RX ORDER — BUDESONIDE 0.5 MG/2ML
500 INHALANT ORAL 2 TIMES DAILY
Status: DISCONTINUED | OUTPATIENT
Start: 2019-12-26 | End: 2019-12-29

## 2019-12-26 RX ORDER — DIPHENHYDRAMINE HYDROCHLORIDE 50 MG/ML
25 INJECTION INTRAMUSCULAR; INTRAVENOUS EVERY 6 HOURS PRN
Status: DISCONTINUED | OUTPATIENT
Start: 2019-12-26 | End: 2020-01-10 | Stop reason: HOSPADM

## 2019-12-26 RX ORDER — OXYCODONE HYDROCHLORIDE 5 MG/1
10 TABLET ORAL EVERY 4 HOURS PRN
Status: DISCONTINUED | OUTPATIENT
Start: 2019-12-26 | End: 2020-01-10 | Stop reason: HOSPADM

## 2019-12-26 RX ORDER — SODIUM CHLORIDE 0.9 % (FLUSH) 0.9 %
10 SYRINGE (ML) INJECTION EVERY 12 HOURS SCHEDULED
Status: DISCONTINUED | OUTPATIENT
Start: 2019-12-26 | End: 2019-12-27 | Stop reason: SDUPTHER

## 2019-12-26 RX ORDER — HYDROMORPHONE HCL 110MG/55ML
0.5 PATIENT CONTROLLED ANALGESIA SYRINGE INTRAVENOUS EVERY 5 MIN PRN
Status: DISCONTINUED | OUTPATIENT
Start: 2019-12-26 | End: 2019-12-26 | Stop reason: HOSPADM

## 2019-12-26 RX ORDER — ACETAMINOPHEN 325 MG/1
650 TABLET ORAL EVERY 4 HOURS PRN
Status: DISCONTINUED | OUTPATIENT
Start: 2019-12-26 | End: 2020-01-10 | Stop reason: HOSPADM

## 2019-12-26 RX ORDER — SODIUM CHLORIDE 0.9 % (FLUSH) 0.9 %
10 SYRINGE (ML) INJECTION EVERY 12 HOURS SCHEDULED
Status: DISCONTINUED | OUTPATIENT
Start: 2019-12-26 | End: 2020-01-10 | Stop reason: HOSPADM

## 2019-12-26 RX ORDER — FERROUS SULFATE 325(65) MG
325 TABLET ORAL
Status: DISCONTINUED | OUTPATIENT
Start: 2019-12-26 | End: 2019-12-27

## 2019-12-26 RX ORDER — MAGNESIUM HYDROXIDE 1200 MG/15ML
LIQUID ORAL CONTINUOUS PRN
Status: COMPLETED | OUTPATIENT
Start: 2019-12-26 | End: 2019-12-26

## 2019-12-26 RX ORDER — ALBUTEROL SULFATE 90 UG/1
2 AEROSOL, METERED RESPIRATORY (INHALATION) EVERY 6 HOURS PRN
Status: DISCONTINUED | OUTPATIENT
Start: 2019-12-26 | End: 2020-01-10 | Stop reason: HOSPADM

## 2019-12-26 RX ORDER — LIDOCAINE HYDROCHLORIDE 40 MG/ML
SOLUTION TOPICAL ONCE
Status: DISCONTINUED | OUTPATIENT
Start: 2019-12-26 | End: 2020-01-10 | Stop reason: HOSPADM

## 2019-12-26 RX ORDER — CONTAINER,EMPTY
1 EACH MISCELLANEOUS DAILY
Status: DISCONTINUED | OUTPATIENT
Start: 2019-12-26 | End: 2020-01-10 | Stop reason: HOSPADM

## 2019-12-26 RX ORDER — PROPOFOL 10 MG/ML
INJECTION, EMULSION INTRAVENOUS PRN
Status: DISCONTINUED | OUTPATIENT
Start: 2019-12-26 | End: 2019-12-26 | Stop reason: SDUPTHER

## 2019-12-26 RX ORDER — SODIUM CHLORIDE 0.9 % (FLUSH) 0.9 %
10 SYRINGE (ML) INJECTION PRN
Status: DISCONTINUED | OUTPATIENT
Start: 2019-12-26 | End: 2020-01-10 | Stop reason: HOSPADM

## 2019-12-26 RX ORDER — HYDROMORPHONE HYDROCHLORIDE 1 MG/ML
1 INJECTION, SOLUTION INTRAMUSCULAR; INTRAVENOUS; SUBCUTANEOUS EVERY 4 HOURS PRN
Status: DISCONTINUED | OUTPATIENT
Start: 2019-12-26 | End: 2019-12-27 | Stop reason: SDUPTHER

## 2019-12-26 RX ORDER — HYDROMORPHONE HYDROCHLORIDE 1 MG/ML
1 INJECTION, SOLUTION INTRAMUSCULAR; INTRAVENOUS; SUBCUTANEOUS ONCE
Status: COMPLETED | OUTPATIENT
Start: 2019-12-26 | End: 2019-12-26

## 2019-12-26 RX ORDER — HYDROMORPHONE HYDROCHLORIDE 4 MG/1
4 TABLET ORAL EVERY 6 HOURS
Status: ON HOLD | COMMUNITY
End: 2020-01-10 | Stop reason: SDUPTHER

## 2019-12-26 RX ORDER — SEVELAMER CARBONATE 800 MG/1
1600 TABLET, FILM COATED ORAL
Status: DISCONTINUED | OUTPATIENT
Start: 2019-12-26 | End: 2020-01-06

## 2019-12-26 RX ORDER — DEXTROSE MONOHYDRATE 25 G/50ML
25 INJECTION, SOLUTION INTRAVENOUS ONCE
Status: COMPLETED | OUTPATIENT
Start: 2019-12-26 | End: 2019-12-26

## 2019-12-26 RX ORDER — OXYCODONE HYDROCHLORIDE 5 MG/1
5 TABLET ORAL EVERY 6 HOURS PRN
Status: DISCONTINUED | OUTPATIENT
Start: 2019-12-26 | End: 2019-12-27 | Stop reason: SDUPTHER

## 2019-12-26 RX ORDER — HYDROMORPHONE HYDROCHLORIDE 1 MG/ML
0.5 INJECTION, SOLUTION INTRAMUSCULAR; INTRAVENOUS; SUBCUTANEOUS
Status: DISCONTINUED | OUTPATIENT
Start: 2019-12-26 | End: 2019-12-30

## 2019-12-26 RX ORDER — FERROUS SULFATE 325(65) MG
325 TABLET ORAL
COMMUNITY

## 2019-12-26 RX ORDER — SUCCINYLCHOLINE/SOD CL,ISO/PF 200MG/10ML
SYRINGE (ML) INTRAVENOUS PRN
Status: DISCONTINUED | OUTPATIENT
Start: 2019-12-26 | End: 2019-12-26 | Stop reason: SDUPTHER

## 2019-12-26 RX ORDER — LIDOCAINE HYDROCHLORIDE 20 MG/ML
INJECTION, SOLUTION EPIDURAL; INFILTRATION; INTRACAUDAL; PERINEURAL PRN
Status: DISCONTINUED | OUTPATIENT
Start: 2019-12-26 | End: 2019-12-26 | Stop reason: SDUPTHER

## 2019-12-26 RX ORDER — EPHEDRINE SULFATE/0.9% NACL/PF 50 MG/5 ML
SYRINGE (ML) INTRAVENOUS PRN
Status: DISCONTINUED | OUTPATIENT
Start: 2019-12-26 | End: 2019-12-26 | Stop reason: SDUPTHER

## 2019-12-26 RX ORDER — DOXYCYCLINE HYCLATE 100 MG/1
100 CAPSULE ORAL 2 TIMES DAILY
Status: ON HOLD | COMMUNITY
Start: 2019-12-23 | End: 2019-12-30 | Stop reason: HOSPADM

## 2019-12-26 RX ORDER — ONDANSETRON 2 MG/ML
4 INJECTION INTRAMUSCULAR; INTRAVENOUS EVERY 6 HOURS PRN
Status: DISCONTINUED | OUTPATIENT
Start: 2019-12-26 | End: 2019-12-26 | Stop reason: SDUPTHER

## 2019-12-26 RX ORDER — ACETAMINOPHEN 650 MG
TABLET, EXTENDED RELEASE ORAL
Status: COMPLETED | OUTPATIENT
Start: 2019-12-26 | End: 2019-12-26

## 2019-12-26 RX ORDER — CIPROFLOXACIN 500 MG/1
500 TABLET, FILM COATED ORAL 2 TIMES DAILY
Status: ON HOLD | COMMUNITY
End: 2019-12-30 | Stop reason: HOSPADM

## 2019-12-26 RX ORDER — LIDOCAINE HYDROCHLORIDE AND EPINEPHRINE 20; 5 MG/ML; UG/ML
20 INJECTION, SOLUTION EPIDURAL; INFILTRATION; INTRACAUDAL; PERINEURAL ONCE
Status: DISCONTINUED | OUTPATIENT
Start: 2019-12-26 | End: 2020-01-10 | Stop reason: HOSPADM

## 2019-12-26 RX ORDER — LABETALOL HYDROCHLORIDE 5 MG/ML
5 INJECTION, SOLUTION INTRAVENOUS EVERY 10 MIN PRN
Status: DISCONTINUED | OUTPATIENT
Start: 2019-12-26 | End: 2019-12-26 | Stop reason: HOSPADM

## 2019-12-26 RX ORDER — INSULIN LISPRO 100 [IU]/ML
0-3 INJECTION, SOLUTION INTRAVENOUS; SUBCUTANEOUS NIGHTLY
Status: DISCONTINUED | OUTPATIENT
Start: 2019-12-26 | End: 2020-01-10 | Stop reason: HOSPADM

## 2019-12-26 RX ORDER — DEXTROSE MONOHYDRATE 25 G/50ML
INJECTION, SOLUTION INTRAVENOUS
Status: COMPLETED
Start: 2019-12-26 | End: 2019-12-26

## 2019-12-26 RX ORDER — VASOPRESSIN 20 U/ML
INJECTION PARENTERAL PRN
Status: DISCONTINUED | OUTPATIENT
Start: 2019-12-26 | End: 2019-12-26 | Stop reason: SDUPTHER

## 2019-12-26 RX ORDER — MIDODRINE HYDROCHLORIDE 5 MG/1
5 TABLET ORAL ONCE
Status: COMPLETED | OUTPATIENT
Start: 2019-12-26 | End: 2019-12-26

## 2019-12-26 RX ORDER — HEPARIN SODIUM 5000 [USP'U]/ML
5000 INJECTION, SOLUTION INTRAVENOUS; SUBCUTANEOUS EVERY 8 HOURS SCHEDULED
Status: DISCONTINUED | OUTPATIENT
Start: 2019-12-26 | End: 2019-12-26

## 2019-12-26 RX ORDER — SODIUM CHLORIDE 9 MG/ML
INJECTION, SOLUTION INTRAVENOUS CONTINUOUS PRN
Status: DISCONTINUED | OUTPATIENT
Start: 2019-12-26 | End: 2019-12-26 | Stop reason: SDUPTHER

## 2019-12-26 RX ORDER — DEXAMETHASONE SODIUM PHOSPHATE 4 MG/ML
INJECTION, SOLUTION INTRA-ARTICULAR; INTRALESIONAL; INTRAMUSCULAR; INTRAVENOUS; SOFT TISSUE PRN
Status: DISCONTINUED | OUTPATIENT
Start: 2019-12-26 | End: 2019-12-26 | Stop reason: SDUPTHER

## 2019-12-26 RX ORDER — PROMETHAZINE HYDROCHLORIDE 25 MG/ML
6.25 INJECTION, SOLUTION INTRAMUSCULAR; INTRAVENOUS
Status: DISCONTINUED | OUTPATIENT
Start: 2019-12-26 | End: 2019-12-26 | Stop reason: HOSPADM

## 2019-12-26 RX ORDER — ALLOPURINOL 100 MG/1
100 TABLET ORAL DAILY
Status: DISCONTINUED | OUTPATIENT
Start: 2019-12-26 | End: 2020-01-10 | Stop reason: HOSPADM

## 2019-12-26 RX ORDER — PREGABALIN 25 MG/1
50 CAPSULE ORAL 3 TIMES DAILY
Status: DISCONTINUED | OUTPATIENT
Start: 2019-12-26 | End: 2020-01-10 | Stop reason: HOSPADM

## 2019-12-26 RX ORDER — DOXYCYCLINE HYCLATE 100 MG
100 TABLET ORAL 2 TIMES DAILY
Status: DISCONTINUED | OUTPATIENT
Start: 2019-12-26 | End: 2019-12-26

## 2019-12-26 RX ORDER — ONDANSETRON 2 MG/ML
4 INJECTION INTRAMUSCULAR; INTRAVENOUS EVERY 6 HOURS PRN
Status: DISCONTINUED | OUTPATIENT
Start: 2019-12-26 | End: 2020-01-10 | Stop reason: HOSPADM

## 2019-12-26 RX ORDER — ROSUVASTATIN CALCIUM 10 MG/1
10 TABLET, COATED ORAL DAILY
Status: DISCONTINUED | OUTPATIENT
Start: 2019-12-26 | End: 2020-01-10 | Stop reason: HOSPADM

## 2019-12-26 RX ADMIN — ALLOPURINOL 100 MG: 100 TABLET ORAL at 17:17

## 2019-12-26 RX ADMIN — Medication 10 MG: at 15:03

## 2019-12-26 RX ADMIN — Medication 200 MCG: at 14:44

## 2019-12-26 RX ADMIN — TAZOBACTAM SODIUM AND PIPERACILLIN SODIUM 3.38 G: 375; 3 INJECTION, SOLUTION INTRAVENOUS at 17:18

## 2019-12-26 RX ADMIN — SEVELAMER CARBONATE 1600 MG: 800 TABLET, FILM COATED ORAL at 17:17

## 2019-12-26 RX ADMIN — LIDOCAINE HYDROCHLORIDE 100 MG: 20 INJECTION, SOLUTION EPIDURAL; INFILTRATION; INTRACAUDAL; PERINEURAL at 14:34

## 2019-12-26 RX ADMIN — DEXTROSE MONOHYDRATE 25 G: 500 INJECTION PARENTERAL at 13:40

## 2019-12-26 RX ADMIN — Medication 1 EACH: at 16:42

## 2019-12-26 RX ADMIN — FERROUS SULFATE TAB 325 MG (65 MG ELEMENTAL FE) 325 MG: 325 (65 FE) TAB at 17:17

## 2019-12-26 RX ADMIN — HYDROMORPHONE HYDROCHLORIDE 0.5 MG: 2 INJECTION, SOLUTION INTRAMUSCULAR; INTRAVENOUS; SUBCUTANEOUS at 14:48

## 2019-12-26 RX ADMIN — Medication 100 MCG: at 15:01

## 2019-12-26 RX ADMIN — SODIUM CHLORIDE: 9 INJECTION, SOLUTION INTRAVENOUS at 14:07

## 2019-12-26 RX ADMIN — DEXTROSE MONOHYDRATE 25 G: 25 INJECTION, SOLUTION INTRAVENOUS at 13:40

## 2019-12-26 RX ADMIN — Medication 100 MCG: at 14:40

## 2019-12-26 RX ADMIN — ROSUVASTATIN CALCIUM 10 MG: 10 TABLET, FILM COATED ORAL at 17:17

## 2019-12-26 RX ADMIN — ONDANSETRON 4 MG: 2 INJECTION INTRAMUSCULAR; INTRAVENOUS at 14:42

## 2019-12-26 RX ADMIN — PREGABALIN 50 MG: 25 CAPSULE ORAL at 17:27

## 2019-12-26 RX ADMIN — Medication 10 MG: at 14:44

## 2019-12-26 RX ADMIN — DEXAMETHASONE SODIUM PHOSPHATE 4 MG: 4 INJECTION, SOLUTION INTRAMUSCULAR; INTRAVENOUS at 14:42

## 2019-12-26 RX ADMIN — VANCOMYCIN HYDROCHLORIDE 2000 MG: 10 INJECTION, POWDER, LYOPHILIZED, FOR SOLUTION INTRAVENOUS at 09:25

## 2019-12-26 RX ADMIN — HYDROMORPHONE HYDROCHLORIDE 0.5 MG: 2 INJECTION, SOLUTION INTRAMUSCULAR; INTRAVENOUS; SUBCUTANEOUS at 14:34

## 2019-12-26 RX ADMIN — PROPOFOL 100 MG: 10 INJECTION, EMULSION INTRAVENOUS at 14:35

## 2019-12-26 RX ADMIN — HYDROMORPHONE HYDROCHLORIDE 1 MG: 1 INJECTION, SOLUTION INTRAMUSCULAR; INTRAVENOUS; SUBCUTANEOUS at 08:16

## 2019-12-26 RX ADMIN — VASOPRESSIN 1 UNITS: 20 INJECTION INTRAVENOUS at 15:30

## 2019-12-26 RX ADMIN — Medication 10 MG: at 15:07

## 2019-12-26 RX ADMIN — SODIUM CHLORIDE, POTASSIUM CHLORIDE, SODIUM LACTATE AND CALCIUM CHLORIDE 250 ML: 600; 310; 30; 20 INJECTION, SOLUTION INTRAVENOUS at 10:07

## 2019-12-26 RX ADMIN — BUDESONIDE 500 MCG: 0.5 SUSPENSION RESPIRATORY (INHALATION) at 20:13

## 2019-12-26 RX ADMIN — HYDROMORPHONE HYDROCHLORIDE 1 MG: 1 INJECTION, SOLUTION INTRAMUSCULAR; INTRAVENOUS; SUBCUTANEOUS at 13:03

## 2019-12-26 RX ADMIN — APIXABAN 2.5 MG: 2.5 TABLET, FILM COATED ORAL at 23:36

## 2019-12-26 RX ADMIN — ESCITALOPRAM OXALATE 10 MG: 10 TABLET ORAL at 17:17

## 2019-12-26 RX ADMIN — LIDOCAINE HYDROCHLORIDE 100 MG: 20 INJECTION, SOLUTION EPIDURAL; INFILTRATION; INTRACAUDAL; PERINEURAL at 14:46

## 2019-12-26 RX ADMIN — ONDANSETRON 4 MG: 2 INJECTION INTRAMUSCULAR; INTRAVENOUS at 08:16

## 2019-12-26 RX ADMIN — Medication 10 MG: at 14:49

## 2019-12-26 RX ADMIN — Medication 10 MG: at 15:15

## 2019-12-26 RX ADMIN — Medication 100 MCG: at 14:42

## 2019-12-26 RX ADMIN — Medication 100 MCG: at 14:46

## 2019-12-26 RX ADMIN — Medication 100 MCG: at 14:53

## 2019-12-26 RX ADMIN — VASOPRESSIN 1 UNITS: 20 INJECTION INTRAVENOUS at 15:15

## 2019-12-26 RX ADMIN — Medication 140 MG: at 14:35

## 2019-12-26 RX ADMIN — MIDODRINE HYDROCHLORIDE 5 MG: 5 TABLET ORAL at 23:37

## 2019-12-26 ASSESSMENT — PULMONARY FUNCTION TESTS
PIF_VALUE: 1
PIF_VALUE: 2
PIF_VALUE: 28
PIF_VALUE: 1
PIF_VALUE: 3
PIF_VALUE: 1
PIF_VALUE: 18
PIF_VALUE: 4
PIF_VALUE: 23
PIF_VALUE: 1
PIF_VALUE: 21
PIF_VALUE: 6
PIF_VALUE: 3
PIF_VALUE: 30
PIF_VALUE: 19
PIF_VALUE: 32
PIF_VALUE: 2
PIF_VALUE: 27
PIF_VALUE: 1
PIF_VALUE: 26
PIF_VALUE: 1
PIF_VALUE: 0
PIF_VALUE: 28
PIF_VALUE: 27
PIF_VALUE: 3
PIF_VALUE: 26
PIF_VALUE: 21
PIF_VALUE: 27
PIF_VALUE: 21
PIF_VALUE: 36
PIF_VALUE: 27
PIF_VALUE: 23
PIF_VALUE: 24
PIF_VALUE: 4
PIF_VALUE: 29
PIF_VALUE: 28
PIF_VALUE: 19
PIF_VALUE: 31
PIF_VALUE: 28
PIF_VALUE: 27
PIF_VALUE: 2
PIF_VALUE: 29
PIF_VALUE: 26
PIF_VALUE: 2
PIF_VALUE: 18
PIF_VALUE: 1
PIF_VALUE: 26
PIF_VALUE: 25
PIF_VALUE: 19
PIF_VALUE: 20
PIF_VALUE: 20
PIF_VALUE: 1
PIF_VALUE: 27
PIF_VALUE: 21
PIF_VALUE: 24
PIF_VALUE: 28
PIF_VALUE: 22
PIF_VALUE: 31
PIF_VALUE: 29
PIF_VALUE: 27
PIF_VALUE: 30
PIF_VALUE: 20
PIF_VALUE: 5
PIF_VALUE: 0
PIF_VALUE: 4
PIF_VALUE: 28
PIF_VALUE: 22
PIF_VALUE: 3
PIF_VALUE: 1
PIF_VALUE: 2
PIF_VALUE: 26
PIF_VALUE: 26
PIF_VALUE: 31
PIF_VALUE: 36
PIF_VALUE: 1
PIF_VALUE: 2
PIF_VALUE: 3
PIF_VALUE: 1
PIF_VALUE: 22
PIF_VALUE: 35
PIF_VALUE: 28
PIF_VALUE: 41
PIF_VALUE: 1
PIF_VALUE: 19
PIF_VALUE: 29
PIF_VALUE: 36
PIF_VALUE: 25
PIF_VALUE: 35
PIF_VALUE: 1
PIF_VALUE: 20
PIF_VALUE: 27
PIF_VALUE: 28
PIF_VALUE: 19
PIF_VALUE: 2
PIF_VALUE: 26
PIF_VALUE: 27
PIF_VALUE: 1

## 2019-12-26 ASSESSMENT — PAIN DESCRIPTION - ORIENTATION
ORIENTATION: LEFT;RIGHT
ORIENTATION: RIGHT;LEFT
ORIENTATION: LEFT

## 2019-12-26 ASSESSMENT — PAIN SCALES - GENERAL
PAINLEVEL_OUTOF10: 9
PAINLEVEL_OUTOF10: 8
PAINLEVEL_OUTOF10: 0
PAINLEVEL_OUTOF10: 9
PAINLEVEL_OUTOF10: 9
PAINLEVEL_OUTOF10: 0
PAINLEVEL_OUTOF10: 10

## 2019-12-26 ASSESSMENT — ENCOUNTER SYMPTOMS
SORE THROAT: 0
COLOR CHANGE: 1
BACK PAIN: 0
CHEST TIGHTNESS: 0
COUGH: 0
ABDOMINAL PAIN: 0
WHEEZING: 0
DIARRHEA: 0
STRIDOR: 0
SHORTNESS OF BREATH: 1
NAUSEA: 0
VOMITING: 0

## 2019-12-26 ASSESSMENT — PAIN DESCRIPTION - LOCATION
LOCATION: ABDOMEN;HIP
LOCATION: ABDOMEN;LEG
LOCATION: LEG

## 2019-12-26 ASSESSMENT — PAIN DESCRIPTION - PAIN TYPE
TYPE: SURGICAL PAIN
TYPE: ACUTE PAIN
TYPE: CHRONIC PAIN

## 2019-12-26 ASSESSMENT — PAIN DESCRIPTION - ONSET: ONSET: ON-GOING

## 2019-12-26 ASSESSMENT — PAIN DESCRIPTION - DESCRIPTORS
DESCRIPTORS: PATIENT UNABLE TO DESCRIBE
DESCRIPTORS: ACHING

## 2019-12-26 ASSESSMENT — PAIN DESCRIPTION - FREQUENCY: FREQUENCY: CONTINUOUS

## 2019-12-26 ASSESSMENT — PAIN DESCRIPTION - PROGRESSION: CLINICAL_PROGRESSION: GRADUALLY IMPROVING

## 2019-12-26 NOTE — CONSULTS
Infectious Diseases Inpatient Consult Note      Reason for Consult:  Infected abdominal wound and Hip wounds concern for Calciphylaxis and ESRD on HD     Requesting Physician:  Erwin Doan     Primary Care Physician:  JURGEN Stephen CNP    History Obtained From:  Pt and Medical records      CHIEF COMPLAINT:     Chief Complaint   Patient presents with    Leg Pain     pt [facility] by [facility] for leg pain. sttaes she has wounds on bilat legs/inner thighs for months and gets [facility], but today the pain is worse. Abdominal non healing wound with drainage and hip wound     HISTORY OF PRESENT ILLNESS:  59 y.o. woman with ESRD on HD and has on going abdominal wounds for months non healing and now has Rt hip wound and concern for secondary infection with increased drainage. Her wounds are under the abdominal pannus there is a lot of moisture and breakdown and drainage and some odor from the drainage. She is also noted to have severe anemia hence admitted for further evaluation. She is seen by general surgery today mostly going for debridement possible biopsy as well.   Will consider antibiotic recommendations        Past Medical History:    Past Medical History:   Diagnosis Date    Abdominal pain 8/20/2018    Acute on chronic congestive heart failure ([address] 6/26/2014    Asthma     Calciphylaxis 12/2/2019    Cervical cancer ([address]     Chest pain 5/16/2018    CHF (congestive heart failure) (HCC)     Chronic kidney disease, stage IV (severe) ([facility])     Dr Luis Piña    Chronic pain syndrome 3/27/2018    Chronic respiratory failure with hypoxia ([facility])     CKD (chronic kidney disease) stage 4, GFR 15-29 ml/min ([facility]) 6/30/2017    Colon polyps     COPD (chronic obstructive pulmonary disease) ([address]     Degenerative disc disease at L5-S1 level 6/18/2013     CT    Diabetes mellitus, insulin dependent (IDDM), uncontrolled ([address]     Diabetic polyneuropathy associated with type 2 diabetes mellitus ([facility] 3/26/2019    Elevated troponin 9/9/2017    ESRD (end stage renal disease) on dialysis ([facility] 02/14/2018    M-W-F [facility] [place]    GERD (gastroesophageal reflux disease)     Gout     History of blood transfusion     History of cervical cancer     Hyperlipidemia     Hypertension     Incarcerated ventral hernia     LVH (left ventricular hypertrophy)     Morbid obesity (HCC)     Mucus plugging of bronchi     Obstructive sleep apnea on CPAP     wears 2L O2 and BIPAP at night    Pneumonia     Psychiatric problem     breakdown long time ago but not current    Pulmonary embolism ([facility] 02/06/2013    Type 2 diabetes mellitus with diabetic neuropathy, with long-term current use of insulin ([facility] 9/12/2017    Urinary incontinence in female     Venous stasis of lower extremity        Past Surgical History:    Past Surgical History:   Procedure Laterality Date    CARDIAC CATHETERIZATION  1/14    Grace; clean cors    COLONOSCOPY  2010    polyp removed; Sarah Chew; repeat 5 years    COLONOSCOPY  6/25/13, 11/14    Mena    COLONOSCOPY N/A 10/10/2019    COLONOSCOPY POLYPECTOMY SNARE/COLD BIOPSY performed by Camila Cohen MD at [address] Right 3/28/2019    RIGHT BRACHIO CEPHALIC FISTULA CREATION performed by Severa Maffucci, MD at [facility]  2/21/09    LVH with global hypokinesis; EF 55-60%    HYSTERECTOMY      INCISION AND DRAINAGE Right 8/22/2019    RIGHT ARM INCISION AND DEBRIDEMENT performed by Severa Maffucci, MD at [facility] [address] Right 1999    OTHER SURGICAL HISTORY  02/22/2018    LEFT brachial artery axillary vein AV graft     ND OPEN SKULL SUPRATENT EXPLORE      Craniotomy, 3/21/19 patient denies any brain surgery or craniotomy    ND REPAIR INCISIONAL HERNIA,REDUCIBLE N/A 11/20/2018    LAPAROSCOPIC VENTRAL HERNIA REPAIR WITH MESH performed by Jonas Barton MD at [address] [place] [address] SHUNT REVISION Right 7/18/2019    RIGHT BRACHIAL ARTERY AXILLARY VEIN GRAFT AND LIGATION OF RIGHT BRACHIO-CEPHALIC FISTULA (35157, [address]121) performed by Stephen Mcgill MD at [place] [address]  10/96    TUNNELED VENOUS PORT PLACEMENT Right 11/2019    UPPER GASTROINTESTINAL ENDOSCOPY  6/25/13    UPPER GASTROINTESTINAL ENDOSCOPY N/A 11/21/2019    EGD BIOPSY performed by Amy Montoya MD at [place] [address]  12/24/2016    Incarcerated ventral hernia repair with mesh       Current Medications:    No outpatient medications have been marked as taking for the 12/26/19 encounter [facility] Encounter). Allergies:  Codeine; Fentanyl; Morphine; Tramadol hcl; Hydrocodone-acetaminophen; Percocet [oxycodone-acetaminophen];  Procardia [nifedipine]; and Vicodin [hydrocodone-acetaminophen]    Immunizations :   Immunization History   Administered Date(s) Administered    Influenza Vaccine, unspecified formulation 09/18/2016    Influenza Virus Vaccine 11/01/2010, 10/25/2011, 09/10/2014, 09/18/2015, 10/17/2018, 10/17/2018    Influenza, Intradermal, Preservative free 09/19/2012, 10/16/2013    Influenza, Intradermal, Quadrivalent, Preservative Free 10/25/2017    Influenza, Quadv, IM, PF (6 mo and older Fluzone, Flulaval, Fluarix, and 3 yrs and older Afluria) 09/26/2019    Pneumococcal Conjugate 13-valent (Abcrolu01) 08/25/2016    Pneumococcal Conjugate 7-valent (Prevnar7) 11/01/2010    Pneumococcal Polysaccharide ([id number]) 09/18/2015, 09/27/2018    Tdap (Boostrix, Adacel) 05/31/2011    Zoster Live (Zostavax) 02/19/2015         Social History:    Social History     Tobacco Use    Smoking status: Never Smoker    Smokeless tobacco: Never Used   Substance Use Topics    Alcohol use: No     Alcohol/week: 0.0 standard drinks    Drug use: No     Social History     Tobacco Use   Smoking Status Never Smoker   Smokeless Tobacco Never Used      Family History   Problem Relation Age of Onset    Colon Cancer Father     Diabetes Father     High Blood Pressure Father     Colon Cancer Mother     Diabetes Mother     Colon Cancer Maternal Grandmother     Kidney Cancer Brother     Heart Disease Brother          age 54    High Blood Pressure Brother     High Blood Pressure Sister     High Blood Pressure Maternal Aunt     High Blood Pressure Sister     Heart Disease Sister        REVIEW OF SYSTEMS:    No fever / chills / sweats. No weight loss. No visual change, eye pain, eye discharge. No oral lesion, sore throat, dysphagia. Denies cough / sputum/Sob   Denies chest pain, palpitations/ dizziness  Denies nausea/ vomiting/abdominal pain/diarrhea. Denies dysuria or change in urinary function. Denies joint swelling or pain. No myalgia, arthralgia. No rashes, skin lesions   Denies focal weakness, sensory change or other neurologic symptoms  No lymph node swelling or tenderness.       Abdominal wound with drainage and local cellulitis with odor and right hip wound  PHYSICAL EXAM:      Vitals:    /80   Pulse 89   Temp 97.6 °F (36.4 °C) (Oral)   Resp 11   Ht 5' 3\" (1.6 m)   Wt (!) 326 lb (147.9 kg)   LMP 1996 (Exact Date)   SpO2 96%   BMI 57.75 kg/m²     General Appearance: alert,in some acute distress, +++ chronically ill-appearing woman, morbid obesity, pallor, no icterus skin changes from end-stage renal disease, mild tremor from uremia   skin: warm and dry, no rash or erythema  Head: normocephalic and atraumatic  Eyes: pupils equal, round, and reactive to light, conjunctivae normal  ENT: tympanic membrane, external ear and ear canal normal bilaterally, nose without deformity, nasal mucosa and turbinates normal without polyps  Neck: supple and non-tender without mass, no thyromegaly  no cervical lymphadenopathy  Pulmonary/Chest: clear to auscultation bilaterally- no wheezes, rales or rhonchi, normal air movement, no respiratory distress  Cardiovascular: normal rate, regular rhythm, normal S1 and S2, no murmurs, rubs, clicks, or gallops, no carotid bruits  Abdomen: soft, non-tender, non-distended, normal bowel sounds, no masses or organomegaly  Extremities: no cyanosis, clubbing or edema  Musculoskeletal: normal range of motion, no joint swelling, deformity or tenderness  Neurologic: reflexes normal and symmetric, no cranial nerve deficit  Psych:  Orientation, sensorium, mood normal  Lines:  IV  Large abdominal wall pannus  In the suprapubic area to open wound with a deeper tunneling drainage and some odor  Right hip wound black eschar with periwound skin dryness         DATA:    Lab Results   Component Value Date    WBC 8.8 12/26/2019    HGB 7.6 (L) 12/26/2019    HCT 24.2 (L) 12/26/2019    MCV 96.0 12/26/2019     12/26/2019     Lab Results   Component Value Date    CREATININE 7.4 (HH) 12/26/2019    BUN 38 (H) 12/26/2019     12/26/2019    K 4.5 12/26/2019    CL 93 (L) 12/26/2019    CO2 21 12/26/2019       Hepatic Function Panel:   Lab Results   Component Value Date    ALKPHOS 72 12/26/2019    ALT 17 12/26/2019    AST 27 12/26/2019    PROT 6.8 12/26/2019    PROT 6.6 03/05/2013    BILITOT <0.2 12/26/2019    BILIDIR 0.2 10/16/2014    IBILI 0.1 10/16/2014    LABALBU 2.7 12/26/2019     UA:  Lab Results   Component Value Date    COLORU Brown 11/25/2018    CLARITYU TURBID 11/25/2018    GLUCOSEU 100 11/25/2018    GLUCOSEU NEGATIVE 11/23/2010    BILIRUBINUR SMALL 11/25/2018    BILIRUBINUR neg 05/06/2015    BILIRUBINUR NEGATIVE 11/23/2010    KETUA TRACE 11/25/2018    SPECGRAV >=1.030 11/25/2018    BLOODU TRACE-INTACT 11/25/2018    PHUR 5.5 11/25/2018    PROTEINU >=300 11/25/2018    UROBILINOGEN 0.2 11/25/2018    NITRU POSITIVE 11/25/2018    LEUKOCYTESUR SMALL 11/25/2018    LABMICR YES 11/25/2018    URINETYPE Not Specified 11/25/2018      Urine Microscopic:   Lab Results   Component Value Date    LABCAST 1-3 Mixed Cells 07/09/2018    BACTERIA 3+ 11/25/2018    COMU see below concern for secondary infection based on the presentation drainage and odor. Unfortunately with calciphylaxis and history of ESRD  prognosis extremely poor      Labs, Microbiology, Radiology and pertinent results from current hospitalization and care every where were reviewed by me as a part of the consultation. PLAN :  1.Surgical debridement and Biopsy noted  2. IV Vancomycin levels  3. Cont IV Zosyn x 3.375 gm x Q 12 HRS  4. Unfortunately the calciphylaxis wounds are very difficult to heal and some times they do not and may be terminal   5. Usually the wounds are from the underlying ESRD process and if cx negative will be inclined to stop the abx     Discussed with patient/Family and Nursing     Thanks for allowing me to participate in your patient's care please call me with any questions or concerns.     Dr. Kena Solo MD  [address] [place] [address] Physician  Phone: [phone]   Fax : [phone]

## 2019-12-26 NOTE — CARE COORDINATION
Discharge Planning Assessment  RN discharge planner met with patient/(and family member) to discuss reason for admission, current living situation, and potential needs at the time of discharge    Demographics/Insurance verified Yes    Current type of dwelling: patient admitted from [facility], she is on skilled care at facility and the bed can be held, no HENS required (CM spoke to Sarah Hammond in admissions).    Prior to this, patient lived in an apartment with her huband    Patient from [facility]/ confirmed with: Samia/ admissions at [facility] arrangements: skilled nursing facility    Level of function/Support: requires assistance    PCP: Santos Francisco    Last Visit to PCP: unknown    DME: at home patient has oxygen, lift chair, cane, rollator walker, and shower chair    Active with any community resources/agencies/skilled home care: previously used [address] [facility]) and [facility] program    Medication compliance issues: not reported    Financial issues that could impact healthcare: not reported    Transportation at the time of discharge: [facility]    Tentative discharge plan: return to [address] [place] [address], BSN, CCM, RN  [facility]  [phone]

## 2019-12-26 NOTE — CONSULTS
[facility]     Consult                                                     Patient Name: Violet Seay  MRN: [id number]  YOB: 1955  Admission date: 12/26/2019  7:06 AM   Date of evaluation: 12/26/2019  Primary Care Physician: JURGEN Bejarano CNP  Requesting provider: Constance Watt PA-C  Reason for consult: Wound on abdomen and bilateral hips  History of Present Illness:    Ms. Gilberto Cronin is a 59 y.o. female who presents with diffuse pain primarily at wounds at the umbilicus and bilateral hips. She resides in a nursing home and is minimally ambulatory. She has ESRD on HD and has significant pain associated with the wounds that she says are present for weeks but with increasing discomfort. Denies fevers, chills, chest pain, dyspnea other than baseline, nausea, vomiting or other complaints. She's had multiple wounds diffusely on her body that had been diagnosed as calciphylaxis but without tissue diagnosis.  Additional medical conditions include CHF, COPD, diabetes, morbid obesity, history of CVA, history of pulmonary embolism on eliquis    Past Medical History:        Diagnosis Date    Abdominal pain 8/20/2018    Acute on chronic congestive heart failure ([address] 6/26/2014    Asthma     Calcinosis cutis     Calciphylaxis 12/2/2019    Cervical cancer ([address]     Chest pain 5/16/2018    CH[facility]congestive heart failure) ([facility])     Chronic kidney disease, stage IV (severe) ([facility])     Dr Mickie Stephens    Chronic pain syndrome 3/27/2018    Chronic respiratory failure with hypoxia ([facility])     CKD (chronic kidney disease) stage 4, GFR 15-29 ml/min ([facility]) 6/30/2017    Colon polyps     COPD (chronic obstructive pulmonary disease) ([address]     Degenerative disc disease at L5-S1 level 6/18/2013     CT    Diabetes mellitus, insulin dependent (IDDM), uncontrolled ([address]     Diabetic polyneuropathy associated with type 2 diabetes mellitus ([address] 3/26/2019    Elevated troponin 9/9/2017    ESRD (end stage renal disease) on dialysis [facility] 02/14/2018    M-W-F HAILEERASMO [place]    GERD (gastroesophageal reflux disease)     Gout     History of blood transfusion     History of cervical cancer     Hyperlipidemia     Hypertension     Incarcerated ventral hernia     LVH (left ventricular hypertrophy)     Morbid obesity (HCC)     Mucus plugging of bronchi     Obstructive sleep apnea on CPAP     wears 2L O2 and BIPAP at night    Pneumonia     Psychiatric problem     breakdown long time ago but not current    Pulmonary embolism ([facility] 02/06/2013    Type 2 diabetes mellitus with diabetic neuropathy, with long-term current use of insulin ([facility] 9/12/2017    Urinary incontinence in female     Venous stasis of lower extremity        Past Surgical History:        Procedure Laterality Date    CARDIAC CATHETERIZATION  1/14    Grace; clean cors    COLONOSCOPY  2010    polyp removed; Coral Butler; repeat 5 years    COLONOSCOPY  6/25/13, 11/14    Carmen    COLONOSCOPY N/A 10/10/2019    COLONOSCOPY POLYPECTOMY SNARE/COLD BIOPSY performed by Earl Zavala MD at [address] Right 3/28/2019    RIGHT BRACHIO CEPHALIC FISTULA CREATION performed by Tomas Lyons MD at [address] [facility]  2/21/09    LVH with global hypokinesis; EF 55-60%    HYSTERECTOMY      INCISION AND DRAINAGE Right 8/22/2019    RIGHT ARM INCISION AND DEBRIDEMENT performed by Tomas Lyons MD at [facility] Right 1999    OTHER SURGICAL HISTORY  02/22/2018    LEFT brachial artery axillary vein AV graft     DE OPEN SKULL SUPRATENT EXPLORE      Craniotomy, 3/21/19 patient denies any brain surgery or craniotomy    DE REPAIR INCISIONAL HERNIA,REDUCIBLE N/A 11/20/2018    LAPAROSCOPIC VENTRAL HERNIA REPAIR WITH MESH performed by Ritika Turner MD at [address] Right 7/18/2019    RIGHT BRACHIAL ARTERY AXILLARY VEIN GRAFT AND LIGATION on M, W, F. Take 5 mg BID Sun, Tues, Thurs, Sat 10/8/19   JURGEN Mac CNP   budesonide (PULMICORT) 0.5 MG/2ML nebulizer suspension Take 2 mLs by nebulization 2 times daily 10/3/19 10/2/20  JURGEN Reed CNP   formoterol (PERFOROMIST) 20 MCG/2ML nebulizer solution Take 2 mLs by nebulization 2 times daily 10/3/19   JURGEN Reed CNP   traZODone (DESYREL) 100 MG tablet Take 100 mg by mouth nightly as needed for Sleep    Historical Provider, MD   escitalopram (LEXAPRO) 10 MG tablet Take 1 tablet by mouth daily 9/12/19 3/10/20  JURGEN Mac CNP   lidocaine-prilocaine (EMLA) 2.5-2.5 % cream Apply topically as needed.  9/6/19   JURGEN Mac CNP   omeprazole (PRILOSEC) 40 MG delayed release capsule TAKE 1 CAPSULE BY MOUTH  DAILY 6/4/19   JURGEN Mac CNP   rosuvastatin (CRESTOR) 10 MG tablet Take 1 tablet by mouth daily Take 1 tablet by mouth  daily 5/11/19 5/10/20  JURGEN Mac CNP   fluticasone [facility]) 50 MCG/ACT nasal spray USE TWO SPRAY(S) IN EACH NOSTRIL ONCE DAILY 4/17/19   JURGEN Mac CNP   Diapers & Supplies MISC 1 each by Does not apply route 2 times daily 4/8/19   JURGEN Mac CNP   Incontinence Supply Disposable (DEPEND ADJUSTABLE UNDERWEAR LG) MISC 1 each by Does not apply route as needed (for urine incompetence) 3/16/19   JURGEN Mac CNP   UNIFINE PENTIPS 31G X 5 MM MISC USE WITH INSULIN PENS FOUR TIMES DAILY 3/8/19   JURGEN Mac CNP   promethazine (PHENERGAN) 25 MG tablet Take 1 tablet by mouth every 8 hours as needed for Nausea 1/30/19   JURGEN Mac CNP   calcium acetate (PHOSLO) 667 MG capsule Take 2 capsules by mouth 3 times daily (with meals) 1/5/19   Charles Sinclair MD   albuterol sulfate HFA (PROAIR HFA) 108 (90 Base) MCG/ACT inhaler Inhale 2 puffs into the lungs every 6 hours as needed for Wheezing 9/25/18   Parish Ceron MD   fluticasone-salmeterol (ADVAIR HFA) PELVIS WITHOUT CONTRAST 11/29/2019 9:18 am TECHNIQUE: CT of the abdomen and pelvis was performed without the administration of intravenous contrast. Multiplanar reformatted images are provided for review. Dose modulation, iterative reconstruction, and/or weight based adjustment of the mA/kV was utilized to reduce the radiation dose to as low as reasonably achievable. COMPARISON: None. HISTORY: ORDERING SYSTEM PROVIDED HISTORY: fall injury, with tenderness (diffuse and in flanks as well) TECHNOLOGIST PROVIDED HISTORY: Additional Contrast?->None Reason for exam:->fall injury, with tenderness (diffuse and in flanks as well) Reason for Exam: Fatigue (Pt. comes in today by [facility] from dialysis. Pt. received an hour and a half of her treatment and then began to fell weakness. Pt. was getting ready to leave and then she slid down to the floor. Pt. complaining of pain to her left back side.) Acuity: Unknown Type of Exam: Unknown FINDINGS: Lower Chest:  Visualized portion of the lower chest demonstrates no acute abnormality. Small sliding-type hiatal hernia. Organs: The liver and spleen, gallbladder, pancreas, and adrenal glands are normal.  The kidneys demonstrate cortical thinning bilaterally. Nonobstructing 2-3 mm calculus left renal inferior pole. GI/Bowel: No findings of small bowel obstruction. No acute bowel wall inflammatory changes. Pelvis: The bladder is decompressed. Rectum is normal. Peritoneum/Retroperitoneum: No intraperitoneal free air or free fluid. No evidence of mesenteric or retroperitoneal lymphadenopathy. Bones/Soft Tissues: No suspicious lytic or blastic osseous lesion. No acute posttraumatic abdominal or pelvic abnormality.      Xr Chest Standard (2 Vw)    Result Date: 12/16/2019  EXAMINATION: TWO XRAY VIEWS OF THE CHEST 12/16/2019 8:43 am COMPARISON: November 17, 2019 HISTORY: ORDERING SYSTEM PROVIDED HISTORY: general fatigue TECHNOLOGIST PROVIDED HISTORY: Reason for exam:->general iterative reconstruction, and/or weight based adjustment of the mA/kV was utilized to reduce the radiation dose to as low as reasonably achievable. COMPARISON: None. HISTORY: ORDERING SYSTEM PROVIDED HISTORY: fall injury TECHNOLOGIST PROVIDED HISTORY: Reason for exam:->fall injury Reason for Exam: Fatigue (Pt. comes in today by "[employer] from dialysis. Pt. received an hour and a half of her treatment and then began to fell weakness. Pt. was getting ready to leave and then she slid down to the floor. Pt. complaining of pain to her left back side.) Acuity: Unknown Type of Exam: Unknown FINDINGS: BONES/ALIGNMENT: There is normal alignment of the spine. The vertebral body heights are maintained. No osseous destructive lesion is seen. DEGENERATIVE CHANGES: There is mild disc space narrowing and endplate osteophyte formation at the mid and lower thoracic levels. SOFT TISSUES: No paraspinal mass is seen. Mild multilevel thoracic spondylosis. Ct Lumbar Spine Wo Contrast    Result Date: 11/29/2019  EXAMINATION: CT OF THE LUMBAR SPINE WITHOUT CONTRAST  11/29/2019 TECHNIQUE: CT of the lumbar spine was performed without the administration of intravenous contrast. Multiplanar reformatted images are provided for review. Dose modulation, iterative reconstruction, and/or weight based adjustment of the mA/kV was utilized to reduce the radiation dose to as low as reasonably achievable. COMPARISON: None HISTORY: ORDERING SYSTEM PROVIDED HISTORY: fall injury TECHNOLOGIST PROVIDED HISTORY: Reason for exam:->fall injury Reason for Exam: Fatigue (Pt. comes in today by "[employer] from dialysis. Pt. received an hour and a half of her treatment and then began to fell weakness. Pt. was getting ready to leave and then she slid down to the floor. Pt. complaining of pain to her left back side.) Acuity: Unknown Type of Exam: Unknown FINDINGS: BONES/ALIGNMENT: There is normal alignment of the spine.  The vertebral body heights are involving the bilateral hips. There is no evidence of AVN or erosion. The bilateral SI joints and pubic symphysis are normal in alignment. Bone mineral density appears decreased. 1. No acute osseous abnormality. 2. Mild osteoarthritis involving the bilateral hips.        Cultures:  Relevant cultures documented under results section     Assessment:  Patient Active Problem List   Diagnosis    Moderate asthma with exacerbation    Colon polyps    Microalbuminuria    Venous stasis of lower extremity    Obstructive sleep apnea on CPAP    Chronic diastolic CHF (congestive heart failure) ([address]    Renal osteodystrophy    Uncontrolled type 2 diabetes with renal manifestation (HCC)    History of pulmonary embolism    Anemia of chronic kidney failure (HCC)    Primary osteoarthritis of both knees    Essential hypertension, malignant    Restrictive lung disease    Gastroesophageal reflux disease    LVH (left ventricular hypertrophy)    Dyspnea and respiratory abnormalities    Acute on chronic respiratory failure with hypoxia and hypercapnia (HCC)    Obesity hypoventilation syndrome (HCC)    Morbid obesity due to excess calories (HCC)    COPD, moderate (HCC)    Gout    Warfarin-induced coagulopathy (HCC)    Chronic hypoxemic respiratory failure (HCC)    Constipation    DM (diabetes mellitus), secondary, uncontrolled, w/neurologic complic ([address]    Diabetes education, encounter for    Primary osteoarthritis of left hip    ESRD (end stage renal disease) on dialysis ([address]    Chronic pain syndrome    Chest pain    Recurrent ventral hernia    Hypoglycemia    ESRD on hemodialysis ([address]    Diabetic polyneuropathy associated with type 2 diabetes mellitus ([address]    Infection of arteriovenous graft for hemodialysis ([address]    Essential hypertension    Dialysis patient ([address]    Postoperative pain    Other complication of arteriovenous dialysis fistula ([address]    Weight loss counseling, encounter for   [facility] Respiratory failure (HCC)    COPD exacerbation (HCC)    Pulmonary embolism (HCC)    Chronic pain of left lower extremity    Anemia    C[facility]brovascular [facility] (CVA) [facility]    HTN (hypertension), benign    Vision loss of right eye    Open wound    Calciphylaxis    Cellulitis     Infected abdominal wound  Unstagable bilateral hip wounds, possible calciphylaxis and decubitus ulcers  ESRD on HD  CHF  COPD  Diabetes  Morbid obesity, BMI 57.9 this admission  CVA  Pulmonary embolism  Medical coagulopathy, Eliquis    Plan:  1. Abdominal wound is infected and needs debridement acutely. Bilateral hip wounds are possible calciphylaxis versus decubitus ulcers and are tender, possibly infected. These also need debridement for treatment as well as tissue diagnosis. Risks benefits and alternative treatments of debridement of these wounds were discussed. Details of procedures were discussed. All questions answered. The patient understood agreed and wished to proceed  2. Antibiotics, will follow cultures from debridement  3. Pain control minimize narcotics  4. We will need to increase ambulation, minimize pressure on decubitus ulcers and air mattress to prevent worsening of ulcers and wounds  5. Defer management of remainder of medical comorbidities to primary and consulting teams    This plan was discussed at length with the patient. She was understanding and in agreement with the plan  Thank you for the consult and allowing me to participate in the care of this patient. I look forward to following her this admission    Pop Minor MD, FACS  12/26/2019  1:26 PM

## 2019-12-26 NOTE — ED PROVIDER NOTES
[address] [facility]        Pt Name: Layla Street  MRN: [id number]  [place] 1955  Date of evaluation: 12/26/2019  Provider: Dari Dunlap PA-C  PCP: JURGEN Barillas CNP    This patient was seen and evaluated by the attending physician Tamica Lujan, [address]       Chief Complaint   Patient presents with    Leg Pain     pt presents from [facility] by squad for leg pain. sttaes she has wounds on bilat legs/inner thighs for months and gets daily wound care at [facility], but today the pain is worse. HISTORY OF PRESENT ILLNESS   (Location/Symptom, Timing/Onset, Context/Setting, Quality, Duration, Modifying Factors, Severity)  Note limiting factors. Layla Street is a 59 y.o. female presents the emergency department as a resident of [facility] with end-stage renal disease. Patient states that she is continuing to schneider difficulties as a pertains to wounds related to her calciphylaxis causing her pain and discomfort. Patient states that she was in the process of getting dialysis performed this morning and was only in a proximally 1 hour when she started having increasing pain and discomfort over the wounds on her right hip, abdominal wall, as well as her legs. Patient states that she had had her regular usual Dilaudid pain medication for this but unfortunately was unable to tolerate dialysis because of this. Patient states that she is brought to the emergency department for evaluation and treatment in regards to this. Patient states that she does get daily wound care. Because of the holiday she has not had anything done over the abdominal wound and she thinks that it probably had a dressing change some 2 or 3 days ago. She states that she just has all over pain and discomfort and has no focal discomfort at the present time.   She states she does have her regular and usual level of shortness of breath since she is not been able to be dialyzed. She denies that she is experiencing significant substernal chest pain at present. Patient states that she has not had difficulties as a pertains to fevers and or chills. She states that her current level of pain and discomfort is 9 out of 10 and with this she presents for evaluation and treatment. Nursing Notes were all reviewed and agreed with or any disagreements were addressed in the HPI. REVIEW OF SYSTEMS    (2-9 systems for level 4, 10 or more for level 5)     Review of Systems   Constitutional: Negative for activity change, chills and fever. HENT: Negative for ear pain and sore throat. Respiratory: Positive for shortness of breath. Negative for cough, chest tightness, wheezing and stridor. Cardiovascular: Negative for chest pain. Gastrointestinal: Negative for abdominal pain, diarrhea, nausea and vomiting. Genitourinary: Negative for dysuria and flank pain. Musculoskeletal: Positive for myalgias. Negative for back pain. Skin: Positive for color change and wound. Neurological: Negative for seizures, syncope, weakness, numbness and headaches. Positives and Pertinent negatives as per HPI. Except as noted above in the ROS, all other systems were reviewed and negative.        PAST MEDICAL HISTORY     Past Medical History:   Diagnosis Date    Abdominal pain 8/20/2018    Acute on chronic congestive heart failure ([facility] 6/26/2014    Asthma     Calciphylaxis 12/2/2019    Cervical cancer ([facility]     Chest pain 5/16/2018    CHF (congestive heart failure) (HCC)     Chronic kidney disease, stage IV (severe) ([facility])     Dr Cherylene Lobe    Chronic pain syndrome 3/27/2018    Chronic respiratory failure with hypoxia ([facility])     CKD (chronic kidney disease) stage 4, GFR 15-29 ml/min ([facility]) 6/30/2017    Colon polyps     COPD (chronic obstructive pulmonary disease) ([facility])     Degenerative disc disease at L5-S1 level 6/18/2013     CT    Diabetes mellitus, insulin dependent (IDDM), uncontrolled ([address]     Diabetic polyneuropathy associated with type 2 diabetes mellitus ([address] 3/26/2019    Elevated troponin 9/9/2017    ESRD (end stage renal disease) on dialysis ([address] 02/14/2018    M[facility]    GERD (gastroesophageal reflux disease)     Gout     History of blood transfusion     History of cervical cancer     Hyperlipidemia     Hypertension     Incarcerated ventral hernia     LVH (left ventricular hypertrophy)     Morbid obesity (HCC)     Mucus plugging of bronchi     Obstructive sleep apnea on CPAP     wears 2L O2 and BIPAP at night    Pneumonia     Psychiatric problem     breakdown long time ago but not current    Pulmonary embolism ([address] 02/06/2013    Type 2 diabetes mellitus with diabetic neuropathy, with long-term current use of insulin ([address] 9/12/2017    Urinary incontinence in female     Venous stasis of lower extremity          SURGICAL HISTORY     Past Surgical History:   Procedure Laterality Date    CARDIAC CATHETERIZATION  1/14    Grace; clean cors    COLONOSCOPY  2010    polyp removed; Alexei Glover; repeat 5 years    COLONOSCOPY  6/25/13, 11/14    Foley    COLONOSCOPY N/A 10/10/2019    COLONOSCOPY POLYPECTOMY SNARE/COLD BIOPSY performed by Chey Ware MD at [address] Right 3/28/2019    RIGHT BRACHIO CEPHALIC FISTULA CREATION performed by Tracey Levi MD at [address] [place] [address]  2/21/09    LVH with global hypokinesis; EF 55-60%    HYSTERECTOMY      INCISION AND DRAINAGE Right 8/22/2019    RIGHT ARM INCISION AND DEBRIDEMENT performed by Tracey Levi MD at [facility] [address] Right 1999    OTHER SURGICAL HISTORY  02/22/2018    LEFT brachial artery axillary vein AV graft     KS OPEN SKULL SUPRATENT EXPLORE      Craniotomy, 3/21/19 patient denies any brain surgery or craniotomy    KS REPAIR INCISIONAL HERNIA,REDUCIBLE N/A 11/20/2018    LAPAROSCOPIC VENTRAL HERNIA REPAIR WITH MESH performed by Ammy Smith MD at [address] Right 7/18/2019    RIGHT BRACHIAL ARTERY AXILLARY VEIN GRAFT AND LIGATION OF RIGHT BRACHIO-CEPHALIC FISTULA ([address]28, [address]) performed by Patrick Tavera MD at [place] [address]  10/96    TUNNELED VENOUS PORT PLACEMENT Right 11/2019    UPPER GASTROINTESTINAL ENDOSCOPY  6/25/13    UPPER GASTROINTESTINAL ENDOSCOPY N/A 11/21/2019    EGD BIOPSY performed by Octavio Rojas MD at [place] [address]  12/24/2016    Incarcerated ventral hernia repair with mesh         CURRENTMEDICATIONS       Previous Medications    ALBUTEROL SULFATE HFA (PROAIR HFA) 108 (90 BASE) MCG/ACT INHALER    Inhale 2 puffs into the lungs every 6 hours as needed for Wheezing    ALCOHOL SWABS (ALCOHOL PREP) 70 % PADS    USE TO TEST BLOOD GLUCOSE THREE TIMES DAILY    ALLOPURINOL (ZYLOPRIM) 100 MG TABLET    Take 1 tablet by mouth daily    APIXABAN (ELIQUIS) 2.5 MG TABS TABLET    Take 1 tablet by mouth 2 times daily    ASSURE COMFORT LANCETS 30G MISC    USE TO TEST BLOOD GLUCOSE THREE TIMES DAILY    BIPAP MACHINE MISC    by Does not apply route nightly    BLOOD GLUCOSE MONITORING SUPPL (ACCU-CHEK KENNETH SMARTVIEW) W/DEVICE KIT    USE TO TEST BLOOD GLUCOSE    BUDESONIDE (PULMICORT) 0.5 MG/2ML NEBULIZER SUSPENSION    Take 2 mLs by nebulization 2 times daily    CALCIUM ACETATE (PHOSLO) 667 MG CAPSULE    Take 2 capsules by mouth 3 times daily (with meals)    DIAPERS & SUPPLIES MISC    1 each by Does not apply route 2 times daily    ESCITALOPRAM (LEXAPRO) 10 MG TABLET    Take 1 tablet by mouth daily    FLUTICASONE (FLONASE) 50 MCG/ACT NASAL SPRAY    USE TWO SPRAY(S) IN EACH NOSTRIL ONCE DAILY    FLUTICASONE-SALMETEROL (ADVAIR HFA) 230-21 MCG/ACT INHALER    Inhale 1 puff into the lungs 2 times daily    FORMOTEROL (PERFOROMIST) 20 MCG/2ML NEBULIZER SOLUTION    Take 2 mLs by nebulization 2 times daily    HUMALOG KWIKPEN 100 UNIT/ML SOPN    Inject 0-12 Units into the skin 3 times daily (before meals)    INCONTINENCE SUPPLY DISPOSABLE (DEPEND ADJUSTABLE UNDERWEAR LG) MISC    1 each by Does not apply route as needed (for urine incompetence)    INSULIN GLARGINE (LANTUS) 100 UNIT/ML INJECTION PEN    Inject 20 Units into the skin nightly    LIDOCAINE-PRILOCAINE (EMLA) 2.5-2.5 % CREAM    Apply topically as needed. NEBIVOLOL (BYSTOLIC) 2.5 MG TABLET    Take 2.5 mg by mouth BID on M, W, F. Take 5 mg BID Sun, Tues, Thurs, Sat    NITROGLYCERIN (NITROSTAT) 0.4 MG SL TABLET    DISSOLVE 1 TABLET UNDER THE TONGUE EVERY 5 MINUTES AS  NEEDED ; MAXIMUM OF 3  TABLETS IN 15 MINUTES    OMEPRAZOLE (PRILOSEC) 40 MG DELAYED RELEASE CAPSULE    TAKE 1 CAPSULE BY MOUTH  DAILY    ONE TOUCH ULTRA TEST STRIP    USE TO TEST 3-4 TIMES DAILY DUE TO FLUCTUATING SUGAR    OXYGEN    Inhale 2 L into the lungs daily as needed At night prn    PREGABALIN (LYRICA) 50 MG CAPSULE    Take 1 capsule by mouth 3 times daily for 180 days. PROMETHAZINE (PHENERGAN) 25 MG TABLET    Take 1 tablet by mouth every 8 hours as needed for Nausea    RELION PEN NEEDLE 31G/8MM 31G X 8 MM MISC    USE  THREE TIMES DAILY AS DIRECTED    ROSUVASTATIN (CRESTOR) 10 MG TABLET    Take 1 tablet by mouth daily Take 1 tablet by mouth  daily    SEVELAMER (RENVELA) 800 MG TABLET    Take 2 tablets by mouth 3 times daily (with meals)    SPACER/AERO-HOLDING CHAMBERS VENANCIO    1 Device by Does not apply route daily as needed    TRAZODONE (DESYREL) 100 MG TABLET    Take 100 mg by mouth nightly as needed for Sleep    TRULICITY 2.03 KY/5.6FH SOPN    INJECT ONE  SYRINGE SUBCUTANEOUSLY ONCE A WEEK    UNIFINE PENTIPS 31G X 5 MM MISC    USE WITH INSULIN PENS FOUR TIMES DAILY         ALLERGIES     Codeine; Fentanyl; Morphine; Tramadol hcl; Hydrocodone-acetaminophen; Percocet [oxycodone-acetaminophen];  Procardia [nifedipine]; and Vicodin [hydrocodone-acetaminophen]    FAMILYHISTORY       Family History   Problem Relation Age of Onset    Colon Cancer Father     Diabetes Father     High Blood Pressure Father     Colon Cancer Mother     Diabetes Mother     Colon Cancer Maternal Grandmother     Kidney Cancer Brother     Heart Disease Brother          age 54    High Blood Pressure Brother     High Blood Pressure Sister     High Blood Pressure Maternal Aunt     High Blood Pressure Sister     Heart Disease Sister           SOCIAL HISTORY       Social History     Tobacco Use    Smoking status: Never Smoker    Smokeless tobacco: Never Used   Substance Use Topics    Alcohol use: No     Alcohol/week: 0.0 standard drinks    Drug use: No       SCREENINGS             PHYSICAL EXAM    (up to 7 for level 4, 8 or more for level 5)     ED Triage Vitals [19 0722]   BP Temp Temp Source Pulse Resp SpO2 Height Weight   (!) 131/92 97.1 °F (36.2 °C) Oral 93 10 96 % 5' 3\" (1.6 m) (!) 326 lb (147.9 kg)       Physical Exam  Vitals signs and nursing note reviewed. Constitutional:       General: She is not in acute distress. Appearance: Normal appearance. She is well-developed. She is not diaphoretic. HENT:      Head: Normocephalic and atraumatic. Right Ear: External ear normal.      Left Ear: External ear normal.   Eyes:      General: No scleral icterus. Right eye: No discharge. Left eye: No discharge. Conjunctiva/sclera: Conjunctivae normal.   Neck:      Musculoskeletal: Normal range of motion. Vascular: No JVD. Cardiovascular:      Rate and Rhythm: Normal rate and regular rhythm. Heart sounds: No murmur. No friction rub. No gallop. Pulmonary:      Effort: Pulmonary effort is normal. No accessory muscle usage or respiratory distress. Breath sounds: Normal breath sounds. No wheezing, rhonchi or rales. Abdominal:      General: There is no distension. Palpations: Abdomen is soft. Abdomen is not rigid. There is no mass. Tenderness: There is no tenderness.  There is no guarding or rebound. Skin:     General: Skin is warm and dry. Neurological:      Mental Status: She is alert and oriented to person, place, and time. GCS: GCS eye subscore is 4. GCS verbal subscore is 5. GCS motor subscore is 6. Cranial Nerves: No cranial nerve deficit. Sensory: No sensory deficit. Coordination: Coordination normal.   Psychiatric:         Behavior: Behavior normal.       Clinical image:          DIAGNOSTIC RESULTS   LABS:    Labs Reviewed   SEDIMENTATION RATE - Abnormal; Notable for the following components:       Result Value    Sed Rate >130 (*)     All other components within normal limits    Narrative:     Performed at:  [facility] [address],  [facility], [address]   Phone [phone]   CBC WITH AUTO DIFFERENTIAL - Abnormal; Notable for the following components:    RBC 2.52 (*)     Hemoglobin 7.6 (*)     Hematocrit 24.2 (*)     RDW 21.2 (*)     All other components within normal limits    Narrative:     Performed at:  [facility] [address],  [facility], [address]   Phone [phone]   COMPREHENSIVE METABOLIC PANEL W/ REFLEX TO MG FOR LOW K - Abnormal; Notable for the following components:    Chloride 93 (*)     Anion Gap 25 (*)     Glucose 123 (*)     BUN 38 (*)     CREATININE 7.4 (*)     GFR Non- 6 (*)     GFR  7 (*)     Alb 2.7 (*)     Albumin/Globulin Ratio 0.7 (*)     All other components within normal limits    Narrative:     CALL  [facility] tel. [phone],  Chemistry results called to and read back by SYMONE García, 12/26/2019  08:49, by [facility]  Performed at:  [facility] [address],  [facility], [address]   Phone [phone]   BRAIN NATRIURETIC PEPTIDE - Abnormal; Notable for the following components:    Pro-BNP 5,300 (*)     All other components within normal limits    Narrative:     CALL  [facility] tel. [phone],  Chemistry results called to and read back by SYMONE Sierra, 12/26/2019  08:49, by Gregg Eddy  Performed at:  [facility]  [address] [facility], [address] [facility]   Phone [phone]   CULTURE BLOOD #1   CULTURE BLOOD #2   LACTATE, SEPSIS    Narrative:     Performed at:  [facility]  [address] [facility], [address] [facility]   Phone [phone]       All other labs were within normal range or not returned as of this dictation. EKG: All EKG's are interpreted by the Emergency Department Physician in the absence of a cardiologist.  Please see their note for interpretation of EKG. RADIOLOGY:   Non-plain film images such as CT, Ultrasound and MRI are read by the radiologist. Plain radiographic images are visualized and preliminarily interpreted by the  ED Provider with the below findings:        Interpretation per the Radiologist below, if available at the time of this note:    CT ABDOMEN PELVIS WO CONTRAST Additional Contrast? None   Final Result   1. Incompletely imaged panniculitis; correlate with direct inspection. XR CHEST PORTABLE   Final Result   No acute process. PROCEDURES   Unless otherwise noted below, none     Procedures    CRITICAL CARE TIME   Because of the high probability of sudden clinical deterioration of the patients condition and to prevent further deterioration, my critical care time involved my full attention to the patients condition, and included chart data review, documentation, medication ordering, viewing the patients old records, reevaluation of the patient's cardiac, pulmonary, and neurological status. Reassessing vital signs. Consutlations with off service physician. Ordering, interpreting reviewing diagnostic testing. Therefore my critical care time was 32 minutes of direct attention to the patients condition and did not include time spent on procedures.       CONSULTS:  IP CONSULT TO NEPHROLOGY  IP CONSULT TO HOSPITALIST  IP CONSULT TO GENERAL SURGERY      EMERGENCY DEPARTMENT COURSE and DIFFERENTIAL DIAGNOSIS/MDM:   Vitals:    Vitals:    12/26/19 0940 12/26/19 1000 12/26/19 1009 12/26/19 1020   BP: (!) 84/44 (!) 79/45 (!) 91/44 (!) 104/50   Pulse: 87 82 83 89   Resp: 8 8 9 9   Temp:       TempSrc:       SpO2: 96%  (!) 81%    Weight:       Height:           Patient was given thefollowing medications:  Medications   clindamycin (CLEOCIN) 600 mg in dextrose 5 % 50 mL IVPB (has no administration in time range)   cefepime (MAXIPIME) 2 g IVPB minibag (has no administration in time range)   lidocaine (XYLOCAINE) 4 % external solution (has no administration in time range)   lidocaine-EPINEPHrine 2 percent-1:819310 injection 20 mL (has no administration in time range)   silver nitrate applicators applicator 1 each (has no administration in time range)   HYDROmorphone HCl PF (DILAUDID) injection 1 mg (1 mg Intravenous Given 12/26/19 0816)   ondansetron (ZOFRAN) injection 4 mg (4 mg Intravenous Given 12/26/19 0816)   vancomycin (VANCOCIN) 2,000 mg in dextrose 5 % 500 mL IVPB (2,000 mg Intravenous New Bag 12/26/19 0925)   lactated ringers infusion 250 mL (250 mLs Intravenous New Bag 12/26/19 1007)       The patient's detailed history of present illness is documented as above. Upon arrival to the emergency department the patient's vital signs are as documented. The patient is noted to be hemodynamically stable and afebrile. Physical examination findings are as above. IV access was obtained. Laboratory testing and work-up was initiated. I have taking care of this patient on previous occasions. In evaluation of her wounds today they are dramatically different. I have significant concerns in regards to her abdominal wounds and therefore a work-up was indicated.   Because of the concerns as the patient has end-stage renal disease in light of the clinical presentation antibiotics were initiated before any of her work-up had even returned for potential coverage for necrotizing fasciitis. CBC demonstrates no evidence of leukocytosis. There is mild anemia with a hemoglobin of 7.6 hematocrit of 24.2 which is chronic in nature. No evidence of thrombocytopenia or thrombocytosis. She did not receive her full dialysis session today and only got approximately 104 hours. Her BUN is 38 creatinine is 7.4 nonfasting glucose is 123. Electrolytes and LFTs are otherwise unremarkable. Troponin was deferred the patient who is not experiencing any chest pain and does have end-stage renal disease her proBNP is 5300. She is at her regular and usual baseline and not of any significant concern although mild fluid overload is noted. Lactic acidosis is not noted it with a lactic acid of 1.2. Her sed rate is markedly elevated greater than 130. Portable chest x-ray demonstrates no evidence of acute cardiopulmonary process. CT the abdomen and pelvis without contrast because of the renal insufficiency is incompletely imaged over the patient's panniculitis. There is evidence of thickening of the skin on the right pannus. There is mild to moderate subcutaneous inflammation and a small amount of subcutaneous emphysema with no drainable fluid collection. When the above-mentioned test result returned a telephone call was placed to general surgery as well but the patient has already been placed on Vancomycin, Cefepime and Clindamycin for this clinical concern. It is noted on recheck that the patient did have some mild hypoxia which I believe was likely secondary to mild acute narcosis. Her airway is able to be protected she was put on supplemental oxygen and was easily arousable. I  did not reverse this patient with low-dose Narcan as she has had longstanding difficulties with adequate pain relief. In addition to this she did have some mild hypotension which was treated with IV fluids as above the direction of the attending physician. From a medical standpoint Dr. Henry Aviles has accepted the patient for admission for ongoing care management. Telephone call from nephrology was received by Dr. Noel Longoria who knows this patient well. He will make arrangements for the patient to have her hemodialysis. Telephone call from general surgery and the case was discussed directly with Dr. sEtefanía Benitez. He sees no fascial involvement but will follow with the patient for ongoing care management and nothing is surgically eminent at this point for this patient. FINAL IMPRESSION      1. Abdominal wall cellulitis    2. ESRD on hemodialysis (HCC)    3. Calciphylaxis    4. Opiate dependence, continuous ([facility]          DISPOSITION/PLAN   DISPOSITION Admitted 12/26/2019 10:47:46 AM      PATIENT REFERREDTO:  Titi Jarquin, APRN - CNP  [address] [place] [phone]               (Please note that portions of this note were completed with a voice recognition program.  Efforts were made to edit the dictations but occasionally words are mis-transcribed.)    Manasa Bateman PA-C (electronically signed)            Jacqueline Yost PA-C  12/26/19 1126

## 2019-12-26 NOTE — ED NOTES
pt presents from [facility] by [facility] for leg pain. sttaes she has wounds on bilat legs/inner thighs for months and gets daily wound care at [facility], but today the pain is worse. Pt sttaes she was at HD today and had about an hour of treatment pta. Undressed pt with [place]. Noted wounds on pt inner and outer thighs and lower abd under abdominal fold. Removed dressing from abd. Foul odor noted with drainage on abd pad. Pt had pasking of gauze in wound saturated as well. Wound stage III. Has yellow center mostly with some surrounding necrotic tissue. Scabbing noted to right outer thigh. Pt A&O. Call light within reach. Bed in lowest position with appropriate side rails up. Will continue to monitor.       [place], RN  12/26/19 6820

## 2019-12-26 NOTE — H&P
as a pertains to fevers and or chills. She states that her current level of pain and discomfort is 9 out of 10 and with this she presents for evaluation and treatment. Patient is currently sedated with Dilaudid so the history is as per ER physician    Received  vancomycin, cefepime, clindamycin emergency room. General surgery was consulted. Patient only had 1 hour of hemodialysis today      REVIEW OF SYSTEMS:     Review Of Systems cannot be obtained as patient cannot give adequate history    Past Medical History:   has a past medical history of Abdominal pain, Acute on chronic congestive heart failure ([address] Asthma, Calcinosis cutis, Calciphylaxis, Cervical cancer ([address] Chest pain, CHF (congestive heart failure) ([address] Chronic kidney disease, stage IV (severe) ([facility]), Chronic pain syndrome, Chronic respiratory failure with hypoxia ([address] CKD (chronic kidney disease) stage 4, GFR 15-29 ml/min ([facility]), Colon polyps, COPD (chronic obstructive pulmonary disease) ([address] Degenerative disc disease at L5-S1 level, Diabetes mellitus, insulin dependent (IDDM), uncontrolled ([address] Diabetic polyneuropathy associated with type 2 diabetes mellitus ([address] Elevated troponin, ESRD (end stage renal disease) on dialysis ([address] GERD (gastroesophageal reflux disease), Gout, History of blood transfusion, History of cervical cancer, Hyperlipidemia, Hypertension, Incarcerated ventral hernia, LVH (left ventricular hypertrophy), Morbid obesity ([address] Mucus plugging of bronchi, Obstructive sleep apnea on CPAP, Pneumonia, Psychiatric problem, Pulmonary embolism ([address] Type 2 diabetes mellitus with diabetic neuropathy, with long-term current use of insulin ([address] Urinary incontinence in female, and Venous stasis of lower extremity. Past Surgical History:   has a past surgical history that includes jennifer and bso (cervix removed) (10/96); Knee arthroscopy (Right, 1999); doppler echocardiography (2/21/09); Colonoscopy (2010);  Upper daily as needed 1 Device 0    OXYGEN Inhale 2 L into the lungs daily as needed At night prn         Allergies: Allergies   Allergen Reactions    Codeine Nausea Only    Fentanyl Itching    Morphine      CHEST PAIN    Tramadol Hcl Other (See Comments)     Causes pt to have involuntary movements    Hydrocodone-Acetaminophen Itching and Rash    Percocet [Oxycodone-Acetaminophen] Itching    Procardia [Nifedipine] Nausea And Vomiting     Headache  Takes norvasc at home 12/22/15    Vicodin [Hydrocodone-Acetaminophen] Rash        Social History:  Patient Lives    reports that she has never smoked. She has never used smokeless tobacco. She reports that she does not drink alcohol or use drugs. Family History:  family history includes Colon Cancer in her father, maternal grandmother, and mother; Diabetes in her father and mother; Heart Disease in her brother and sister; High Blood Pressure in her brother, father, maternal aunt, sister, and sister; Kidney Cancer in her brother. ,     Physical Exam:  /80   Pulse 89   Temp 97.6 °F (36.4 °C) (Oral)   Resp 11   Ht 5' 3\" (1.6 m)   Wt (!) 326 lb (147.9 kg)   LMP 11/01/1996 (Exact Date)   SpO2 96%   BMI 57.75 kg/m²     General appearance:  Appears comfortable. Well nourished  Eyes: Sclera clear, pupils equal  ENT: Moist mucus membranes, no thrush. Trachea midline. Cardiovascular: Regular rhythm, normal S1, S2. No murmur, gallop, rub. No edema in lower extremities  Respiratory: Clear to auscultation bilaterally, no wheeze, good inspiratory effort  Gastrointestinal: Wound on the abdominal wall  Musculoskeletal: No cyanosis in digits, neck supple  Neurology:.  Sedated  Cannot give adequate history    Fistula in The left upper extremity  Labs:  CBC:   Lab Results   Component Value Date    WBC 8.8 12/26/2019    RBC 2.52 12/26/2019    HGB 7.6 12/26/2019    HCT 24.2 12/26/2019    MCV 96.0 12/26/2019    MCH 30.2 12/26/2019    MCHC 31.5 12/26/2019    RDW 21.2

## 2019-12-26 NOTE — ED NOTES
DR Grace aware of SBP 80's. Orders noted. Pt asymptomatic. Will continue to monitor.       Ghassan Madera RN  12/26/19 1000

## 2019-12-26 NOTE — PROGRESS NOTES
Patient arrived to [id number] by stretcher at approximately 1230. Patient is AOX4, VSS, patient c/o pain 9/10. Pain medication administered via order. See MAR for further information. Patient has open wound on abdomen, as well as wounds on bilateral thighs, inner thighs, and bilateral hips. Wound consult placed. Patient taken to surgery. Geo Lanier RN attempted to call the patient's , but got no answer. Spoke to sister Melissa, who patient gave permission to speak to, informed her of the situation.

## 2019-12-26 NOTE — PROGRESS NOTES
Clinical Pharmacy Note:    Pharmacy consulted by Dr. Manjinder Lyon to dose vancomycin for wound infections/cellulitis. Age: 59 y.o. Height: 160 cm  Weight: 178 kg  Estimated Creatinine Clearance: 11 mL/min (A) (based on SCr of 7.4 mg/dL [facility])). 2000 mg vancomycin given in ED. Will put placeholder on MAR and order a random level for tomorrow 0600. Pharmacy will continue to follow.      Carrie Riggs, PharmD, [place].   [id number]

## 2019-12-26 NOTE — CONSULTS
Nephrology Consult Note  [phone]   [email or web address]        Reason for Consult:  ESRD on HD     HISTORY OF PRESENT ILLNESS:      The patient is a 59 y.o. female with significant past medical history of T2DM , HTN , ESRD on maintenance HD  , GALE , Morbid obesity who presents with painful lesions abdominal wall and thigh- buttock area. She has recently been diagnosed on clinical grounds with Calciphylaxis , for this she gets Sodium Thiosulfate with dialysis .  Unfortunately due to severe pain in the lesions she has been to the dialysis unit infrequently   She denies Fever/ chest pain / dyspnea/ cough/ phlegm   She makes little urine    [facility] , extensive Ulcerated wound lower abdominal wall         Past Medical History:        Diagnosis Date    Abdominal pain 8/20/2018    Acute on chronic congestive heart failure ([address] 6/26/2014    Asthma     Calciphylaxis 12/2/2019    Cervical cancer ([address]     Chest pain 5/16/2018    CH[facility] (congestive [facility] failure) ([facility])     Chronic kidney disease, stage IV (severe) ([facility])     Dr Luis Piña    Chronic pain syndrome 3/27/2018    Chronic respiratory failure with hypoxia ([facility])     CKD (chronic kidney disease) stage 4, GFR 15-29 ml/min ([facility]) 6/30/2017    Colon polyps     COPD (chronic obstructive pulmonary disease) ([address]     Degenerative disc disease at L5-S1 level 6/18/2013     CT    Diabetes mellitus, insulin dependent (IDDM), uncontrolled ([address]     Diabetic polyneuropathy associated with type 2 diabetes mellitus ([address] 3/26/2019    Elevated troponin 9/9/2017    ESRD (end stage renal disease) on dialysis ([address] 02/14/2018    [facility]    GERD (gastroesophageal reflux disease)     Gout     History of blood transfusion     History of cervical cancer     Hyperlipidemia     Hypertension     Incarcerated ventral hernia     LVH (left ventricular hypertrophy)     Morbid obesity ([facility])     Mucus plugging of bronchi     Obstructive sleep apnea on CPAP     wears 2L O2 and BIPAP at night    Pneumonia     Psychiatric problem     breakdown long time ago but not current    Pulmonary embolism [facility] 02/06/2013    Type 2 diabetes mellitus with diabetic neuropathy, with long-term current use of insulin ([facility] 9/12/2017    Urinary incontinence in female     Venous stasis of lower extremity        Past Surgical History:        Procedure Laterality Date    CARDIAC CATHETERIZATION  1/14    [facility]ace; clean cors    COLONOSCOPY  2010    polyp removed; [facility] Lye; repeat 5 years    COLONOSCOPY  6/25/13, 11/14    [place]    COLONOSCOPY N/A 10/10/2019    COLONOSCOPY POLYPECTOMY SNARE/COLD BIOPSY performed by Ilir Espinoza MD at [address] Right 3/28/2019    RIGHT BRACHIO CEPHALIC FISTULA CREATION performed by Jose Esquivel MD at [address] [facility]  2/21/09    LVH with global hypokinesis; EF 55-60%    HYSTERECTOMY      INCISION AND DRAINAGE Right 8/22/2019    RIGHT ARM INCISION AND DEBRIDEMENT performed by Jose Esquivel MD at [address] [place] [address] Rd Right 1999    OTHER SURGICAL HISTORY  02/22/2018    LEFT brachial artery axillary vein AV graft     UT OPEN SKULL SUPRATENT EXPLORE      Craniotomy, 3/21/19 patient denies any brain surgery or craniotomy    UT REPAIR INCISIONAL HERNIA,REDUCIBLE N/A 11/20/2018    LAPAROSCOPIC VENTRAL HERNIA REPAIR WITH MESH performed by Dunia Harris MD at [address] [place] [address] Avenue Right 7/18/2019    RIGHT BRACHIAL ARTERY AXILLARY VEIN GRAFT AND LIGATION OF RIGHT BRACHIO-CEPHALIC FISTULA ([address], [id number]) performed by Jose Esquivel MD at [place] [address]  10/96    TUNNELED VENOUS PORT PLACEMENT Right 11/2019    UPPER GASTROINTESTINAL ENDOSCOPY  6/25/13    UPPER GASTROINTESTINAL ENDOSCOPY N/A 11/21/2019    EGD BIOPSY performed by Ryan Valdez MD at [facility] [address]  12/24/2016    Incarcerated ventral hernia recorded. No intake/output data recorded. Physical Exam:  Gen: alert, oriented x 3, In pain   PERRL , EOM +  CV: RRR no murmur/ rub   Lungs: CTA-B  Abd: S/NT +BS, wound lower abdominal wall   Ext: No edema, no cyanosis  Skin: Warm. Black purple plaques Upper lateral thighs and Buttock area   : No TTP over bladder, nondistended. Neuro: Alert and oriented x 3, nonfocal.  Joints: No erythema noted over joints. DATA:    CBC with Differential:    Lab Results   Component Value Date    WBC 8.8 12/26/2019    RBC 2.52 12/26/2019    HGB 7.6 12/26/2019    HCT 24.2 12/26/2019     12/26/2019    MCV 96.0 12/26/2019    MCH 30.2 12/26/2019    MCHC 31.5 12/26/2019    RDW 21.2 12/26/2019    SEGSPCT 77.6 05/12/2013    BANDSPCT 5 08/05/2019    METASPCT 1 01/12/2018    LYMPHOPCT 11.3 12/26/2019    MONOPCT 10.9 12/26/2019    EOSPCT 2.6 01/24/2012    BASOPCT 0.2 12/26/2019    MONOSABS 1.0 12/26/2019    LYMPHSABS 1.0 12/26/2019    EOSABS 0.1 12/26/2019    BASOSABS 0.0 12/26/2019    DIFFTYPE Auto 05/12/2013     CMP:    Lab Results   Component Value Date     12/26/2019    K 4.5 12/26/2019    CL 93 12/26/2019    CO2 21 12/26/2019    BUN 38 12/26/2019    CREATININE 7.4 12/26/2019    GFRAA 7 12/26/2019    GFRAA 50 05/12/2013    AGRATIO 0.7 12/26/2019    LABGLOM 6 12/26/2019    GLUCOSE 123 12/26/2019    PROT 6.8 12/26/2019    PROT 6.6 03/05/2013    LABALBU 2.7 12/26/2019    CALCIUM 9.7 12/26/2019    BILITOT <0.2 12/26/2019    ALKPHOS 72 12/26/2019    AST 27 12/26/2019    ALT 17 12/26/2019     Phosphorus:    Lab Results   Component Value Date    PHOS 3.6 11/26/2019     Uric Acid:    Lab Results   Component Value Date    LABURIC 2.8 05/17/2018     Warfarin PT/INR:  No components found for: PTPATWAR, PTINRWAR        IMPRESSION/RECOMMENDATIONS:      1. ESRD on HD   2. Obesity  3. Anemia of CKD   Target Hb  9-11  4. Renal osteodystrophy  5.  Calciphylaxis    For wound debridement , Biopsy to confirm calciphylaxis , antibiotics    K is normal , no evidence of fluid overload, for HD 12/27/19       Thank you for allowing me to participate in the care of this patient. I will continue to follow along. Please call with questions.     Bola Morales MD  12/26/2019  [facility]

## 2019-12-27 PROBLEM — J44.9 CHRONIC OBSTRUCTIVE PULMONARY DISEASE (HCC): Status: ACTIVE | Noted: 2019-08-03

## 2019-12-27 LAB
A/G RATIO: 0.7 (ref 1.1–2.2)
ABO/RH: NORMAL
ABO/RH: NORMAL
ALBUMIN SERPL-MCNC: 2.5 G/DL (ref 3.4–5)
ALP BLD-CCNC: 61 U/L (ref 40–129)
ALT SERPL-CCNC: 14 U/L (ref 10–40)
ANION GAP SERPL CALCULATED.3IONS-SCNC: 26 MMOL/L (ref 3–16)
ANTIBODY SCREEN: NORMAL
AST SERPL-CCNC: 19 U/L (ref 15–37)
BASOPHILS ABSOLUTE: 0.1 K/UL (ref 0–0.2)
BASOPHILS RELATIVE PERCENT: 0.8 %
BILIRUB SERPL-MCNC: 0.3 MG/DL (ref 0–1)
BLOOD BANK DISPENSE STATUS: NORMAL
BLOOD BANK DISPENSE STATUS: NORMAL
BLOOD BANK PRODUCT CODE: NORMAL
BLOOD BANK PRODUCT CODE: NORMAL
BPU ID: NORMAL
BPU ID: NORMAL
BUN BLDV-MCNC: 45 MG/DL (ref 7–20)
CALCIUM SERPL-MCNC: 9.4 MG/DL (ref 8.3–10.6)
CHLORIDE BLD-SCNC: 93 MMOL/L (ref 99–110)
CO2: 20 MMOL/L (ref 21–32)
CREAT SERPL-MCNC: 9.2 MG/DL (ref 0.6–1.2)
DESCRIPTION BLOOD BANK: NORMAL
DESCRIPTION BLOOD BANK: NORMAL
EOSINOPHILS ABSOLUTE: 0 K/UL (ref 0–0.6)
EOSINOPHILS RELATIVE PERCENT: 0.1 %
GFR AFRICAN AMERICAN: 5
GFR NON-AFRICAN AMERICAN: 4
GLOBULIN: 3.5 G/DL
GLUCOSE BLD-MCNC: 103 MG/DL (ref 70–99)
GLUCOSE BLD-MCNC: 120 MG/DL (ref 70–99)
GLUCOSE BLD-MCNC: 93 MG/DL (ref 70–99)
GLUCOSE BLD-MCNC: 99 MG/DL (ref 70–99)
HCT VFR BLD CALC: 21.1 % (ref 36–48)
HCT VFR BLD CALC: 25.5 % (ref 36–48)
HEMOGLOBIN: 6.6 G/DL (ref 12–16)
HEMOGLOBIN: 8 G/DL (ref 12–16)
LYMPHOCYTES ABSOLUTE: 1 K/UL (ref 1–5.1)
LYMPHOCYTES RELATIVE PERCENT: 8.3 %
MCH RBC QN AUTO: 30.2 PG (ref 26–34)
MCHC RBC AUTO-ENTMCNC: 31.2 G/DL (ref 31–36)
MCV RBC AUTO: 96.5 FL (ref 80–100)
MONOCYTES ABSOLUTE: 0.9 K/UL (ref 0–1.3)
MONOCYTES RELATIVE PERCENT: 7.5 %
NEUTROPHILS ABSOLUTE: 10.3 K/UL (ref 1.7–7.7)
NEUTROPHILS RELATIVE PERCENT: 83.3 %
PDW BLD-RTO: 20.6 % (ref 12.4–15.4)
PERFORMED ON: ABNORMAL
PERFORMED ON: NORMAL
PERFORMED ON: NORMAL
PLATELET # BLD: 258 K/UL (ref 135–450)
PMV BLD AUTO: 8.5 FL (ref 5–10.5)
POTASSIUM REFLEX MAGNESIUM: 5.6 MMOL/L (ref 3.5–5.1)
RBC # BLD: 2.19 M/UL (ref 4–5.2)
SODIUM BLD-SCNC: 139 MMOL/L (ref 136–145)
TOTAL PROTEIN: 6 G/DL (ref 6.4–8.2)
VANCOMYCIN RANDOM: 19.1 UG/ML
WBC # BLD: 12.4 K/UL (ref 4–11)

## 2019-12-27 PROCEDURE — 94761 N-INVAS EAR/PLS OXIMETRY MLT: CPT

## 2019-12-27 PROCEDURE — P9016 RBC LEUKOCYTES REDUCED: HCPCS

## 2019-12-27 PROCEDURE — 2580000003 HC RX 258: Performed by: INTERNAL MEDICINE

## 2019-12-27 PROCEDURE — 99233 SBSQ HOSP IP/OBS HIGH 50: CPT | Performed by: INTERNAL MEDICINE

## 2019-12-27 PROCEDURE — 6370000000 HC RX 637 (ALT 250 FOR IP): Performed by: SURGERY

## 2019-12-27 PROCEDURE — 86850 RBC ANTIBODY SCREEN: CPT

## 2019-12-27 PROCEDURE — P9047 ALBUMIN (HUMAN), 25%, 50ML: HCPCS | Performed by: INTERNAL MEDICINE

## 2019-12-27 PROCEDURE — 86901 BLOOD TYPING SEROLOGIC RH(D): CPT

## 2019-12-27 PROCEDURE — 1200000000 HC SEMI PRIVATE

## 2019-12-27 PROCEDURE — 5A1D70Z PERFORMANCE OF URINARY FILTRATION, INTERMITTENT, LESS THAN 6 HOURS PER DAY: ICD-10-PCS | Performed by: INTERNAL MEDICINE

## 2019-12-27 PROCEDURE — 85018 HEMOGLOBIN: CPT

## 2019-12-27 PROCEDURE — 6360000002 HC RX W HCPCS: Performed by: INTERNAL MEDICINE

## 2019-12-27 PROCEDURE — 6370000000 HC RX 637 (ALT 250 FOR IP): Performed by: INTERNAL MEDICINE

## 2019-12-27 PROCEDURE — 99231 SBSQ HOSP IP/OBS SF/LOW 25: CPT | Performed by: SURGERY

## 2019-12-27 PROCEDURE — 2500000003 HC RX 250 WO HCPCS: Performed by: INTERNAL MEDICINE

## 2019-12-27 PROCEDURE — 85025 COMPLETE CBC W/AUTO DIFF WBC: CPT

## 2019-12-27 PROCEDURE — 94640 AIRWAY INHALATION TREATMENT: CPT

## 2019-12-27 PROCEDURE — 86923 COMPATIBILITY TEST ELECTRIC: CPT

## 2019-12-27 PROCEDURE — 2700000000 HC OXYGEN THERAPY PER DAY

## 2019-12-27 PROCEDURE — 90935 HEMODIALYSIS ONE EVALUATION: CPT

## 2019-12-27 PROCEDURE — 85014 HEMATOCRIT: CPT

## 2019-12-27 PROCEDURE — 86900 BLOOD TYPING SEROLOGIC ABO: CPT

## 2019-12-27 PROCEDURE — 80053 COMPREHEN METABOLIC PANEL: CPT

## 2019-12-27 PROCEDURE — 6370000000 HC RX 637 (ALT 250 FOR IP): Performed by: NURSE PRACTITIONER

## 2019-12-27 PROCEDURE — 80202 ASSAY OF VANCOMYCIN: CPT

## 2019-12-27 PROCEDURE — 2580000003 HC RX 258: Performed by: SURGERY

## 2019-12-27 PROCEDURE — 36430 TRANSFUSION BLD/BLD COMPNT: CPT

## 2019-12-27 PROCEDURE — 2500000003 HC RX 250 WO HCPCS: Performed by: SURGERY

## 2019-12-27 RX ORDER — 0.9 % SODIUM CHLORIDE 0.9 %
250 INTRAVENOUS SOLUTION INTRAVENOUS ONCE
Status: DISCONTINUED | OUTPATIENT
Start: 2019-12-27 | End: 2020-01-10 | Stop reason: HOSPADM

## 2019-12-27 RX ORDER — MIDODRINE HYDROCHLORIDE 5 MG/1
TABLET ORAL
Status: DISPENSED
Start: 2019-12-27 | End: 2019-12-27

## 2019-12-27 RX ORDER — LIDOCAINE HYDROCHLORIDE 10 MG/ML
INJECTION, SOLUTION EPIDURAL; INFILTRATION; INTRACAUDAL; PERINEURAL
Status: DISPENSED
Start: 2019-12-27 | End: 2019-12-27

## 2019-12-27 RX ORDER — POLYETHYLENE GLYCOL 3350 17 G/17G
17 POWDER, FOR SOLUTION ORAL DAILY
Status: DISCONTINUED | OUTPATIENT
Start: 2019-12-28 | End: 2019-12-27

## 2019-12-27 RX ORDER — ALBUMIN (HUMAN) 12.5 G/50ML
12.5 SOLUTION INTRAVENOUS PRN
Status: DISCONTINUED | OUTPATIENT
Start: 2019-12-27 | End: 2020-01-10 | Stop reason: HOSPADM

## 2019-12-27 RX ORDER — VANCOMYCIN HYDROCHLORIDE 1 G/200ML
10 INJECTION, SOLUTION INTRAVENOUS ONCE
Status: COMPLETED | OUTPATIENT
Start: 2019-12-27 | End: 2019-12-27

## 2019-12-27 RX ORDER — POLYETHYLENE GLYCOL 3350 17 G/17G
17 POWDER, FOR SOLUTION ORAL DAILY PRN
Status: DISCONTINUED | OUTPATIENT
Start: 2019-12-27 | End: 2020-01-08

## 2019-12-27 RX ORDER — ALBUMIN (HUMAN) 12.5 G/50ML
SOLUTION INTRAVENOUS
Status: DISPENSED
Start: 2019-12-27 | End: 2019-12-27

## 2019-12-27 RX ORDER — LIDOCAINE HYDROCHLORIDE 10 MG/ML
2 INJECTION, SOLUTION EPIDURAL; INFILTRATION; INTRACAUDAL; PERINEURAL PRN
Status: DISCONTINUED | OUTPATIENT
Start: 2019-12-27 | End: 2020-01-10 | Stop reason: HOSPADM

## 2019-12-27 RX ORDER — MIDODRINE HYDROCHLORIDE 5 MG/1
10 TABLET ORAL PRN
Status: DISCONTINUED | OUTPATIENT
Start: 2019-12-27 | End: 2020-01-10 | Stop reason: HOSPADM

## 2019-12-27 RX ADMIN — TAZOBACTAM SODIUM AND PIPERACILLIN SODIUM 3.38 G: 375; 3 INJECTION, SOLUTION INTRAVENOUS at 13:38

## 2019-12-27 RX ADMIN — PREGABALIN 50 MG: 25 CAPSULE ORAL at 20:07

## 2019-12-27 RX ADMIN — SEVELAMER CARBONATE 1600 MG: 800 TABLET, FILM COATED ORAL at 15:53

## 2019-12-27 RX ADMIN — LIDOCAINE HYDROCHLORIDE 2 ML: 10 INJECTION, SOLUTION EPIDURAL; INFILTRATION; INTRACAUDAL; PERINEURAL at 11:44

## 2019-12-27 RX ADMIN — APIXABAN 2.5 MG: 2.5 TABLET, FILM COATED ORAL at 13:13

## 2019-12-27 RX ADMIN — MIDODRINE HYDROCHLORIDE 10 MG: 5 TABLET ORAL at 08:30

## 2019-12-27 RX ADMIN — PREGABALIN 50 MG: 25 CAPSULE ORAL at 18:18

## 2019-12-27 RX ADMIN — VANCOMYCIN HYDROCHLORIDE 1000 MG: 1 INJECTION, SOLUTION INTRAVENOUS at 11:41

## 2019-12-27 RX ADMIN — OXYCODONE HYDROCHLORIDE 5 MG: 5 TABLET ORAL at 18:28

## 2019-12-27 RX ADMIN — Medication 10 ML: at 16:18

## 2019-12-27 RX ADMIN — ROSUVASTATIN CALCIUM 10 MG: 10 TABLET, FILM COATED ORAL at 13:12

## 2019-12-27 RX ADMIN — HYDROMORPHONE HYDROCHLORIDE 4 MG: 2 TABLET ORAL at 13:30

## 2019-12-27 RX ADMIN — APIXABAN 2.5 MG: 2.5 TABLET, FILM COATED ORAL at 20:08

## 2019-12-27 RX ADMIN — SEVELAMER CARBONATE 1600 MG: 800 TABLET, FILM COATED ORAL at 13:12

## 2019-12-27 RX ADMIN — Medication 10 ML: at 20:07

## 2019-12-27 RX ADMIN — POLYETHYLENE GLYCOL 3350 17 G: 17 POWDER, FOR SOLUTION ORAL at 23:25

## 2019-12-27 RX ADMIN — ESCITALOPRAM OXALATE 10 MG: 10 TABLET ORAL at 13:12

## 2019-12-27 RX ADMIN — TAZOBACTAM SODIUM AND PIPERACILLIN SODIUM 3.38 G: 375; 3 INJECTION, SOLUTION INTRAVENOUS at 03:26

## 2019-12-27 RX ADMIN — Medication 1 EACH: at 10:11

## 2019-12-27 RX ADMIN — ACETAMINOPHEN 650 MG: 325 TABLET, FILM COATED ORAL at 20:07

## 2019-12-27 RX ADMIN — BUDESONIDE 500 MCG: 0.5 SUSPENSION RESPIRATORY (INHALATION) at 08:52

## 2019-12-27 RX ADMIN — LANSOPRAZOLE 30 MG: 30 TABLET, ORALLY DISINTEGRATING ORAL at 13:13

## 2019-12-27 RX ADMIN — HYDROMORPHONE HYDROCHLORIDE 4 MG: 2 TABLET ORAL at 21:23

## 2019-12-27 RX ADMIN — SODIUM THIOSULFATE 25 G: 250 INJECTION, SOLUTION INTRAVENOUS at 11:42

## 2019-12-27 RX ADMIN — ALLOPURINOL 100 MG: 100 TABLET ORAL at 13:12

## 2019-12-27 RX ADMIN — ALBUMIN (HUMAN) 12.5 G: 0.25 INJECTION, SOLUTION INTRAVENOUS at 08:30

## 2019-12-27 ASSESSMENT — PAIN DESCRIPTION - PAIN TYPE
TYPE: CHRONIC PAIN
TYPE: SURGICAL PAIN

## 2019-12-27 ASSESSMENT — PAIN SCALES - GENERAL
PAINLEVEL_OUTOF10: 6
PAINLEVEL_OUTOF10: 0
PAINLEVEL_OUTOF10: 8
PAINLEVEL_OUTOF10: 6
PAINLEVEL_OUTOF10: 8
PAINLEVEL_OUTOF10: 10
PAINLEVEL_OUTOF10: 0

## 2019-12-27 ASSESSMENT — PAIN DESCRIPTION - ORIENTATION: ORIENTATION: RIGHT;LEFT

## 2019-12-27 ASSESSMENT — PAIN DESCRIPTION - LOCATION
LOCATION: ABDOMEN;HIP
LOCATION: ABDOMEN;HIP

## 2019-12-27 NOTE — PLAN OF CARE
Nutrition Problem: Increased nutrient needs  Intervention: Food and/or Nutrient Delivery: Continue current diet, Start ONS  Nutritional Goals: PO/supp intake greater than 50% at all meals.

## 2019-12-27 NOTE — PROGRESS NOTES
Q12H  vancomycin (VANCOCIN) intermittent dosing (placeholder), RX Placeholder  apixaban (ELIQUIS) tablet 2.5 mg, BID  glucose (GLUTOSE) 40 % oral gel 15 g, PRN  dextrose 50 % IV solution, PRN  glucagon (rDNA) injection 1 mg, PRN  dextrose 5 % solution, PRN  HYDROmorphone (DILAUDID) tablet 4 mg, Q6H  ferrous sulfate tablet 325 mg, TID WC  ondansetron (ZOFRAN) injection 4 mg, Q6H PRN  HYDROmorphone HCl PF (DILAUDID) injection 0.5 mg, Q2H PRN    Or  HYDROmorphone HCl PF (DILAUDID) injection 1 mg, Q2H PRN  oxyCODONE (ROXICODONE) immediate release tablet 5 mg, Q4H PRN    Or  oxyCODONE (ROXICODONE) immediate release tablet 10 mg, Q4H PRN  acetaminophen (TYLENOL) tablet 650 mg, Q4H PRN  diphenhydrAMINE (BENADRYL) injection 25 mg, Q6H PRN  Patient has home medications in pharmacy; please retrieve when patient is discharged. , Daily        Objective:  BP (!) 97/52   Pulse 80   Temp 97.1 °F (36.2 °C)   Resp 20   Ht 5' 3\" (1.6 m)   Wt (!) 315 lb 4.1 oz (143 kg)   LMP 11/01/1996 (Exact Date)   SpO2 99%   BMI 55.85 kg/m²     Intake/Output Summary (Last 24 hours) at 12/27/2019 1152  Last data filed at 12/27/2019 1112  Gross per 24 hour   Intake 920 ml   Output 50 ml   Net 870 ml      Wt Readings from Last 3 Encounters:   12/27/19 (!) 315 lb 4.1 oz (143 kg)   12/16/19 (!) 326 lb (147.9 kg)   11/29/19 (!) 326 lb (147.9 kg)       General appearance:  Appears comfortable  Eyes: Sclera clear. Pupils equal.  ENT: Moist oral mucosa. Trachea midline, no adenopathy. Cardiovascular: Regular rhythm, normal S1, S2. No murmur. No edema in lower extremities  Respiratory: Not using accessory muscles. Good inspiratory effort. Clear to auscultation bilaterally, no wheeze or crackles. GI: Tenderness at the site of surgery  Musculoskeletal: No cyanosis in digits, neck supple  Neurology: CN 2-12 grossly intact. No speech or motor deficits  Psych: Normal affect.  Alert and oriented in time, place and person  Skin: Warm, dry, normal turgor  Extremity exam shows brisk capillary refill. Peripheral pulses are palpable in lower extremities     Labs and Tests:  CBC:   Recent Labs     12/26/19 0823 12/27/19  0700   WBC 8.8 12.4*   HGB 7.6* 6.6*    258     BMP:    Recent Labs     12/26/19 0823 12/27/19  0700    139   K 4.5 5.6*   CL 93* 93*   CO2 21 20*   BUN 38* 45*   CREATININE 7.4* 9.2*   GLUCOSE 123* 120*     Hepatic:   Recent Labs     12/26/19  0823 12/27/19  0700   AST 27 19   ALT 17 14   BILITOT <0.2 0.3   ALKPHOS 72 61     CT ABDOMEN PELVIS WO CONTRAST Additional Contrast? None   Final Result   1. Incompletely imaged panniculitis; correlate with direct inspection. XR CHEST PORTABLE   Final Result   No acute process. Problem List  Active Problems:    Cellulitis  Resolved Problems:    * No resolved hospital problems. *       Assessment & Plan:     Wound infection of the abdominal wall calciphylaxis discussed with surgeon this was definitely infected currently on broad-spectrum antibiotic with vancomycin and Zosyn cultures pending dressing changes tomorrow as per surgery wound on the hip was not infected. Pain management    Postop anemia hemoglobin 6.6 which is expected 2 units of packed red blood cell transfusion    History of deep venous  thrombosis on Eliquis continue    Hypertension controlled    ESRD on hemodialysis    Hyperglycemia controlled    Diet: DIET RENAL;  Code:Full Code  DVT PPX eliquis  Disposition pending.        Logan Hyatt MD   12/27/2019 11:52 AM

## 2019-12-27 NOTE — PROGRESS NOTES
Infectious Diseases   Progress Note      Admission Date: 12/26/2019  Hospital Day: Hospital Day: 2   Attending: Rita Blanchard MD  Date of service: 12/27/2019     Chief complaint/ Reason for consult: The patient was seen today for the following:    · Open abdominal wound, concerning for calciphylaxis  · Open bilateral hip decubitus wounds with skin necrosis  · ESRD on hemodialysis  · History of stroke  · Severe anemia    Microbiology:        I have reviewed allavailable micro lab data and cultures    · Blood culture (2/2) - collected on 12/26/2019: Negative  · Abdominal wound surgical culture  - collected on 12/26/2019: In process      Antibiotics and immunizations:       Current antibiotics: All antibiotics and their doses were reviewed by me    Recent Abx Admin                   piperacillin-tazobactam (ZOSYN) 3.375 g in dextrose 50 mL IVPB extended infusion (premix) (g) 3.375 g New Bag 12/27/19 1338     3.375 g New Bag  0326     3.375 g New Bag 12/26/19 1718    vancomycin (VANCOCIN) 1000 mg in dextrose 5% 200 mL IVPB (mg) 1,000 mg New Bag 12/27/19 1141                  Immunization History: All immunization history was reviewed by me today. Immunization History   Administered Date(s) Administered    Influenza Vaccine, unspecified formulation 09/18/2016    Influenza Virus Vaccine 11/01/2010, 10/25/2011, 09/10/2014, 09/18/2015, 10/17/2018, 10/17/2018    Influenza, Intradermal, Preservative free 09/19/2012, 10/16/2013    Influenza, Intradermal, Quadrivalent, Preservative Free 10/25/2017    Influenza, Quadv, IM, PF (6 mo and older Fluzone, Flulaval, Fluarix, and 3 yrs and older Afluria) 09/26/2019    Pneumococcal Conjugate 13-valent ([id number]) 08/25/2016    Pneumococcal Conjugate 7-valent (Prevnar7) 11/01/2010    Pneumococcal Polysaccharide ([id number]) 09/18/2015, 09/27/2018    Tdap (Boostrix, Adacel) 05/31/2011    Zoster Live (Zostavax) 02/19/2015       Known drug allergies:      All allergies were reviewed and updated    Allergies   Allergen Reactions    Codeine Nausea Only    Fentanyl Itching    Morphine      CHEST PAIN    Tramadol Hcl Other (See Comments)     Causes pt to have involuntary movements    Hydrocodone-Acetaminophen Itching and Rash    Percocet [Oxycodone-Acetaminophen] Itching    Procardia [Nifedipine] Nausea And Vomiting     Headache  Takes norvasc at home 12/22/15    Vicodin [Hydrocodone-Acetaminophen] Rash       Social history:     Social History:  All social andepidemiologic history was reviewed and updated by me today as needed. · Tobacco use:   reports that she has never smoked. She has never used smokeless tobacco.  · Alcohol use:   reports no history of alcohol use. · Currently lives in: [address] [place] [address]  ·  reports no history of drug use.        Assessment:     The patient is a 59 y.o. old female who  has a past medical history of Abdominal pain (8/20/2018), Acute on chronic congestive heart failure ([address] (6/26/2014), Asthma, Calcinosis cutis, Calciphylaxis (12/2/2019), Cervical cancer ([address] Chest pain (5/16/2018), CHF (congestive heart failure) ([address] Chronic kidney disease, stage IV (severe) ([facility]), Chronic pain syndrome (3/27/2018), Chronic respiratory failure with hypoxia ([address] CKD (chronic kidney disease) stage 4, GFR 15-29 ml/min ([facility]) (6/30/2017), Colon polyps, COPD (chronic obstructive pulmonary disease) ([address] Degenerative disc disease at L5-S1 level (6/18/2013 ), Diabetes mellitus, insulin dependent (IDDM), uncontrolled ([address] Diabetic polyneuropathy associated with type 2 diabetes mellitus ([address] (3/26/2019), Elevated troponin (9/9/2017), ESRD (end stage renal disease) on dialysis ([address] (02/14/2018), GERD (gastroesophageal reflux disease), Gout, History of blood transfusion, History of cervical cancer, Hyperlipidemia, Hypertension, Incarcerated ventral hernia, LVH (left ventricular hypertrophy), Morbid obesity ([facility]), Mucus plugging of bronchi, Obstructive 10/10/2019    COLONOSCOPY POLYPECTOMY SNARE/COLD BIOPSY performed by Zakiya Torres MD at [address] Right 3/28/2019    RIGHT BRACHIO CEPHALIC FISTULA CREATION performed by Tiarra Stewart MD at [address]  09    LVH with global hypokinesis; EF 55-60%    HYSTERECTOMY      INCISION AND DRAINAGE Right 2019    RIGHT ARM INCISION AND DEBRIDEMENT performed by Tiarra Stewart MD at [address] ARTHROSCOPY Right     OTHER SURGICAL HISTORY  2018    LEFT brachial artery axillary vein AV graft     [place] OPEN SKULL SUPRATENT EXPLORE      Craniotomy, 3/21/19 patient denies any brain surgery or craniotomy    [place] REPAIR INCISIONAL HERNIA,REDUCIBLE N/A 2018    LAPAROSCOPIC VENTRAL HERNIA REPAIR WITH MESH performed by Cresencio Schuler MD at [address] Right 2019    RIGHT BRACHIAL ARTERY AXILLARY VEIN GRAFT AND LIGATION OF RIGHT BRACHIO-CEPHALIC FISTULA ([address], [address]677) performed by Tiarra Stewart MD at [place] [address]  10/96    TUNNELED VENOUS PORT PLACEMENT Right 2019    UPPER GASTROINTESTINAL ENDOSCOPY  13    UPPER GASTROINTESTINAL ENDOSCOPY N/A 2019    EGD BIOPSY performed by Denita Cronin MD at [place] [address]  2016    Incarcerated ventral hernia repair with mesh       Family History: All family history was reviewed today.         Problem Relation Age of Onset    Colon Cancer Father     Diabetes Father     High Blood Pressure Father     Colon Cancer Mother     Diabetes Mother     Colon Cancer Maternal Grandmother     Kidney Cancer Brother     Heart Disease Brother          age 54    High Blood Pressure Brother     High Blood Pressure Sister     High Blood Pressure Maternal Aunt     High Blood Pressure Sister     Heart Disease Sister        Objective:       PHYSICAL EXAM:      Vitals:   Vitals:    19 1201 19 1218 12/27/19 1303 12/27/19 1315   BP: (!) 79/58  (!) 92/57 (!) 150/103   Pulse: 116  91    Resp: 20 18 18    Temp: 97.2 °F (36.2 °C)  97.8 °F (36.6 °C)    TempSrc:   Oral    SpO2:  94% 100%    Weight:       Height:           Physical Exam  Vitals signs and nursing note reviewed. Constitutional:       General: She is not in acute distress. Appearance: She is well-developed. She is not diaphoretic. Comments: Morbidly obese   HENT:      Head: Normocephalic. Right Ear: External ear normal.      Left Ear: External ear normal.      Nose: Nose normal.   Eyes:      General: No scleral icterus. Right eye: No discharge. Left eye: No discharge. Conjunctiva/sclera: Conjunctivae normal.      Pupils: Pupils are equal, round, and reactive to light. Neck:      Musculoskeletal: Normal range of motion and neck supple. Cardiovascular:      Rate and Rhythm: Normal rate and regular rhythm. Heart sounds: No murmur. No friction rub. Pulmonary:      Effort: Pulmonary effort is normal.      Breath sounds: No stridor. No wheezing or rales. Chest:      Chest wall: No tenderness. Abdominal:      Palpations: Abdomen is soft. There is no mass. Tenderness: There is no tenderness. There is no guarding or rebound. Comments: Abdominal wound and surgical dressing noted at the site of surgical debridement of the abdominal wound. Musculoskeletal:         General: No tenderness. Lymphadenopathy:      Cervical: No cervical adenopathy. Skin:     General: Skin is warm and dry. Findings: No erythema or rash. Neurological:      Mental Status: She is alert and oriented to person, place, and time. Motor: No abnormal muscle tone. Psychiatric:         Judgment: Judgment normal.           Lines: All vascular access sites are healthy with no local erythema, discharge or tenderness. Intake and output:    I/O last 3 completed shifts: In: 620 [P.O.:120;  I.V.:500]  Out: 50 [Blood:50]    Lab Data:   All available labs and old records have been reviewed by me. CBC:  Recent Labs     12/26/19  0823 12/27/19  0700   WBC 8.8 12.4*   RBC 2.52* 2.19*   HGB 7.6* 6.6*   HCT 24.2* 21.1*    258   MCV 96.0 96.5   MCH 30.2 30.2   MCHC 31.5 31.2   RDW 21.2* 20.6*        BMP:  Recent Labs     12/26/19  0823 12/27/19  0700    139   K 4.5 5.6*   CL 93* 93*   CO2 21 20*   BUN 38* 45*   CREATININE 7.4* 9.2*   CALCIUM 9.7 9.4   GLUCOSE 123* 120*        Hepatic Function Panel:   Lab Results   Component Value Date    ALKPHOS 61 12/27/2019    ALT 14 12/27/2019    AST 19 12/27/2019    PROT 6.0 12/27/2019    PROT 6.6 03/05/2013    BILITOT 0.3 12/27/2019    BILIDIR 0.2 10/16/2014    IBILI 0.1 10/16/2014    LABALBU 2.5 12/27/2019       CPK:   Lab Results   Component Value Date    CKTOTAL 129 07/11/2017     ESR:   Lab Results   Component Value Date    SEDRATE >130 (H) 12/26/2019     CRP: No results found for: CRP        Imaging: All pertinent images and reports for the current visit were reviewed by me during this visit. CT ABDOMEN PELVIS WO CONTRAST Additional Contrast? None   Final Result   1. Incompletely imaged panniculitis; correlate with direct inspection. XR CHEST PORTABLE   Final Result   No acute process. Medications: All current and past medications were reviewed.      lidocaine PF        midodrine        albumin human        sodium chloride  250 mL Intravenous Once    sodium thiosulfate IVPB  25 g Intravenous Q MWF    lidocaine   Topical Once    lidocaine-EPINEPHrine  20 mL Intradermal Once    silver nitrate applicators  1 each Topical Once    allopurinol  100 mg Oral Daily    budesonide  500 mcg Nebulization BID    escitalopram  10 mg Oral Daily    insulin glargine  10 Units Subcutaneous Nightly    lansoprazole  30 mg Oral QAM AC    pregabalin  50 mg Oral TID    rosuvastatin  10 mg Oral Daily    sevelamer  1,600 mg Oral TID WC    insulin lispro 0-6 Units Subcutaneous TID WC    insulin lispro  0-3 Units Subcutaneous Nightly    sodium chloride flush  10 mL Intravenous 2 times per day    piperacillin-tazobactam  3.375 g Intravenous Q12H    vancomycin (VANCOCIN) intermittent dosing (placeholder)   Other RX Placeholder    apixaban  2.5 mg Oral BID    HYDROmorphone  4 mg Oral Q6H    Empty 3-in-1 Mixing Container  1 each Does not apply Daily        dextrose         midodrine, lidocaine PF, albumin human, albuterol sulfate HFA, traZODone, sodium chloride flush, magnesium hydroxide, glucose, dextrose, glucagon (rDNA), dextrose, ondansetron, HYDROmorphone **OR** HYDROmorphone, oxyCODONE **OR** oxyCODONE, acetaminophen, diphenhydrAMINE      Problem list:       Patient Active Problem List   Diagnosis Code    Moderate asthma with exacerbation J45. 901    Colon polyps K63.5    Microalbuminuria R80.9    Venous stasis of lower extremity I87.8    Obstructive sleep apnea on CPAP G47.33, Z99.89    Chronic diastolic CHF (congestive heart failure) (HCC) I50.32    Renal osteodystrophy N25.0    Poorly controlled type 2 diabetes mellitus (HCC) E11.65    History of pulmonary embolism Z86.711    Anemia of chronic kidney failure (HCC) N18.9, D63.1    Primary osteoarthritis of both knees M17.0    Essential hypertension, malignant I10    Restrictive lung disease J98.4    Gastroesophageal reflux disease K21.9    LVH (left ventricular hypertrophy) I51.7    Dyspnea and respiratory abnormalities R06.00, R06.89    Acute on chronic respiratory failure with hypoxia and hypercapnia (HCC) J96.21, J96.22    Obesity hypoventilation syndrome (HCC) E66.2    Morbid obesity due to excess calories ([facility]) E66.01    COPD, moderate (HCC) J44.9    Gout M10.9    Warfarin-induced coagulopathy (HCC) D68.32, T45.515A    Chronic hypoxemic respiratory failure ([facility]) J96.11    Constipation K59.00    DM (diabetes mellitus), secondary, uncontrolled, w/neurologic complic ([facility]

## 2019-12-27 NOTE — PROGRESS NOTES
Nutrition Assessment    Type and Reason for Visit: Initial, Positive Nutrition Screen    Nutrition Recommendations:   Continue current diet  Begin Nepro TID    Nutrition Assessment: Pt is at risk for nutrition compromise AEB increased protein needs for wound healing. Pt states appetite is poor with 25% intake. Wt stable per EMR. RD encouraged protein intake at each meal to aid in wound healing. Pt reports enjoying Nepro supplements. Will send Nepro TID to increase protein and energy intake. Malnutrition Assessment:  · Malnutrition Status: No malnutrition    Nutrition Risk Level: Moderate    Nutrient Needs:  · Estimated Daily Total Kcal: 1194-9442  · Estimated Daily Protein (g): 78-88  · Estimated Daily Total Fluid (ml/day): per nephrology    Nutrition Diagnosis:   · Problem: Increased nutrient needs  · Etiology: related to Increased demand for energy/nutrients     Signs and symptoms:  as evidenced by Presence of wounds    Objective Information:  · Nutrition-Focused Physical Findings: hypoactive BS. BLE +1, pitting. · Wound Type: (non-healing/infected incision)  · Current Nutrition Therapies:  · Oral Diet Orders: Renal   · Oral Diet intake: 1-25%, 26-50%  · Oral Nutrition Supplement (ONS) Orders: None  · Anthropometric Measures:  · Ht: 5' 3\" (160 cm)   · Current Body Wt: 315 lb (142.9 kg)  · Ideal Body Wt: 115 lb (52.2 kg)   · BMI Classification: BMI > or equal to 40.0 Obese Class III    Nutrition Interventions:   Continue current diet, Start ONS  Continued Inpatient Monitoring, Education Not Indicated    Nutrition Evaluation:   · Evaluation: Goals set   · Goals: PO/supp intake greater than 50% at all meals.      · Monitoring: Meal Intake, Supplement Intake      Electronically signed by Kennedy Madera RD, LD on 12/27/19 at 2:15 PM    Contact Number: [phone]

## 2019-12-27 NOTE — PROGRESS NOTES
Patient remains in dialysis and is currently receiving ordered blood transfusion. This RN signed off with dialysis RN on the blood product.

## 2019-12-27 NOTE — PROGRESS NOTES
[facility]        Post-op Note    Pt Name: Lobito Danielson  MRN: [id number]  YOB: 1955  Date of evaluation: 2019    Post-op Day #1    Subjective:    No acute events overnight  Pain improving    Vital Signs:  Patient Vitals for the past 24 hrs:   BP Temp Temp src Pulse Resp SpO2 Weight   19 0755 (!) 162/65 97.2 °F (36.2 °C) -- 84 20 -- (!) 315 lb 4.1 oz (143 kg)   19 0445 (!) 86/52 98 °F (36.7 °C) Oral 80 16 99 % (!) 315 lb 6.4 oz (143.1 kg)   19 0315 (!) 88/54 -- -- 89 -- -- --   19 0145 (!) 78/52 -- -- 81 -- -- --   19 2245 (!) 82/45 -- -- 83 16 -- --   19 -- -- -- -- 16 99 % --   19 (!) 74/46 97.4 °F (36.3 °C) Oral 80 16 99 % --   19 1627 93/66 -- -- 87 16 99 % --   19 1605 (!) 101/42 -- -- -- -- 100 % --   19 1600 (!) 102/38 97.3 °F (36.3 °C) Temporal 86 20 100 % --   19 1555 (!) 97/24 -- -- 86 20 100 % --   19 1550 99/69 -- -- 90 20 100 % --   19 1547 (!) 75/39 96.9 °F (36.1 °C) Temporal 90 20 100 % --   19 1343 (!) 86/44 96.8 °F (36 °C) Temporal 88 14 100 % --   19 1215 130/80 97.6 °F (36.4 °C) Oral 89 11 -- --   19 1140 (!) 116/57 -- -- 91 10 96 % --   19 1120 102/63 -- -- 83 8 -- --   19 1100 (!) 97/50 -- -- 81 9 -- --      TEMPERATURE HISTORY 24H: Temp (24hrs), Av.3 °F (36.3 °C), Min:96.8 °F (36 °C), Max:98 °F (36.7 °C)    BLOOD PRESSURE HISTORY: Systolic (75SQF), YRJ:828 , Min:60 , HNF:787    Diastolic (98WNV), ZTQ:53, Min:24, Max:92      Intake/Output:    I/O last 3 completed shifts: In: 620 [P.O.:120; I.V.:500]  Out: 50 [Blood:50]  No intake/output data recorded.   Drain/tube Output:           Physical Exam:  General: awake, alert, oriented to  person, place, time  Abdomen: soft, non-distended, appropriate incisional tenderness, dressings clean dry and intact  Skin/Wound: did not change dressings as patient was in dialysis, will change tomorrow, dressings were clean and not saturated    Labs:  CBC:    Recent Labs     12/26/19  0823 12/27/19  0700   WBC 8.8 12.4*   HGB 7.6* 6.6*   HCT 24.2* 21.1*    258     BMP:    Recent Labs     12/26/19  0823 12/27/19  0700    139   K 4.5 5.6*   CL 93* 93*   CO2 21 20*   BUN 38* 45*   CREATININE 7.4* 9.2*   GLUCOSE 123* 120*     Hepatic:   Recent Labs     12/26/19  0823 12/27/19  0700   AST 27 19   ALT 17 14   BILITOT <0.2 0.3   ALKPHOS 72 61     Amylase: No results for input(s): AMYLASE in the last 72 hours. Lipase: No results for input(s): LIPASE in the last 72 hours. Mag:    No results for input(s): MG in the last 72 hours. Phos:   No results for input(s): PHOS in the last 72 hours. Coags: No results for input(s): INR, APTT in the last 72 hours. Cultures:  Anaerobic culture  No results for input(s): LABANAE in the last 72 hours. Blood culture  Recent Labs     12/26/19  0752   BC No Growth to date. Any change in status will be called. Blood culture 2  Recent Labs     12/26/19  0752   BLOODCULT2 No Growth to date. Any change in status will be called. Body fluid culture  Recent Labs     12/26/19  0752   BLOODCULT2 No Growth to date. Any change in status will be called. Surgical culture  No results for input(s): CXSURG in the last 72 hours. Fecal occult  No results for input(s): OCCULTBLDFEC in the last 72 hours. Gram stain  Recent Labs     12/26/19  1501   LABGRAM No WBCs or organisms seen       Stool culture 1  No results for input(s): CXST in the last 72 hours. Stool culture 2  No results for input(s): STOOLCULT2 in the last 72 hours. Urine culture  No results for input(s): LABURIN in the last 72 hours. Wound abscess  No results for input(s): WNDABS in the last 72 hours. C Diff   No results for input(s): CDIFFTOXAB in the last 72 hours.       Pathology:   pending    Imaging:  I have personally reviewed the following films:    Ct Abdomen Pelvis Wo Contrast Additional Contrast? None    Result Date: 12/26/2019  EXAMINATION: CT OF THE ABDOMEN AND PELVIS WITHOUT CONTRAST 12/26/2019 8:46 am TECHNIQUE: CT of the abdomen and pelvis was performed without the administration of intravenous contrast. Multiplanar reformatted images are provided for review. Dose modulation, iterative reconstruction, and/or weight based adjustment of the mA/kV was utilized to reduce the radiation dose to as low as reasonably achievable. COMPARISON: 11/29/2019 HISTORY: ORDERING SYSTEM PROVIDED HISTORY: abdominal wall wound TECHNOLOGIST PROVIDED HISTORY: Reason for exam:->abdominal wall wound Additional Contrast?->None Reason for Exam: abdominal wall wound Acuity: Unknown Type of Exam: Unknown FINDINGS: Lower Chest: There is bibasilar scarring. A central venous catheter terminates at the cavoatrial junction. A small hiatal hernia is noted. Organs: The unenhanced liver, spleen, pancreas, adrenal glands and kidneys are unremarkable. There is bilateral cortical renal atrophy and scarring. There is no hydronephrosis or obstructive uropathy. GI/Bowel: There is no bowel obstruction. The appendix is within normal limits. Stool is present throughout the colon. Pelvis: Status post hysterectomy. Peritoneum/Retroperitoneum: There is no evidence of free fluid or adenopathy. Bones/Soft Tissues: Degenerative changes involve the thoracolumbar spine, bilateral hips and sacroiliac joints. There is new incompletely imaged moderate skin thickening involving the right pannus with mild-to-moderate subcutaneous inflammation and small amount of subcutaneous emphysema. There is no drainable fluid collection. 1. Incompletely imaged panniculitis; correlate with direct inspection.      Ct Abdomen Pelvis Wo Contrast Additional Contrast? None    Result Date: 12/1/2019  EXAMINATION: CT OF THE ABDOMEN AND PELVIS WITHOUT CONTRAST 11/29/2019 9:18 am TECHNIQUE: CT of the abdomen and pelvis was performed without the administration of intravenous contrast. Multiplanar reformatted images are provided for review. Dose modulation, iterative reconstruction, and/or weight based adjustment of the mA/kV was utilized to reduce the radiation dose to as low as reasonably achievable. COMPARISON: None. HISTORY: ORDERING SYSTEM PROVIDED HISTORY: fall injury, with tenderness (diffuse and in flanks as well) TECHNOLOGIST PROVIDED HISTORY: Additional Contrast?->None Reason for exam:->fall injury, with tenderness (diffuse and in flanks as well) Reason for Exam: Fatigue (Pt. comes in today by [facility] from dialysis. Pt. received an hour and a half of her treatment and then began to fell weakness. Pt. was getting ready to leave and then she slid down to the floor. Pt. complaining of pain to her left back side.) Acuity: Unknown Type of Exam: Unknown FINDINGS: Lower Chest:  Visualized portion of the lower chest demonstrates no acute abnormality. Small sliding-type hiatal hernia. Organs: The liver and spleen, gallbladder, pancreas, and adrenal glands are normal.  The kidneys demonstrate cortical thinning bilaterally. Nonobstructing 2-3 mm calculus left renal inferior pole. GI/Bowel: No findings of small bowel obstruction. No acute bowel wall inflammatory changes. Pelvis: The bladder is decompressed. Rectum is normal. Peritoneum/Retroperitoneum: No intraperitoneal free air or free fluid. No evidence of mesenteric or retroperitoneal lymphadenopathy. Bones/Soft Tissues: No suspicious lytic or blastic osseous lesion. No acute posttraumatic abdominal or pelvic abnormality.      Xr Chest Standard (2 Vw)    Result Date: 12/16/2019  EXAMINATION: TWO XRAY VIEWS OF THE CHEST 12/16/2019 8:43 am COMPARISON: November 17, 2019 HISTORY: ORDERING SYSTEM PROVIDED HISTORY: general fatigue TECHNOLOGIST PROVIDED HISTORY: Reason for exam:->general fatigue FINDINGS: A right-sided MediPort catheter is in place and in good utilized to reduce the radiation dose to as low as reasonably achievable. COMPARISON: None. HISTORY: ORDERING SYSTEM PROVIDED HISTORY: fall injury TECHNOLOGIST PROVIDED HISTORY: Reason for exam:->fall injury Reason for Exam: Fatigue (Pt. comes in today by [facility] from dialysis. Pt. received an hour and a half of her treatment and then began to fell weakness. Pt. was getting ready to leave and then she slid down to the floor. Pt. complaining of pain to her left back side.) Acuity: Unknown Type of Exam: Unknown FINDINGS: BONES/ALIGNMENT: There is normal alignment of the spine. The vertebral body heights are maintained. No osseous destructive lesion is seen. DEGENERATIVE CHANGES: There is mild disc space narrowing and endplate osteophyte formation at the mid and lower thoracic levels. SOFT TISSUES: No paraspinal mass is seen. Mild multilevel thoracic spondylosis. Ct Lumbar Spine Wo Contrast    Result Date: 11/29/2019  EXAMINATION: CT OF THE LUMBAR SPINE WITHOUT CONTRAST  11/29/2019 TECHNIQUE: CT of the lumbar spine was performed without the administration of intravenous contrast. Multiplanar reformatted images are provided for review. Dose modulation, iterative reconstruction, and/or weight based adjustment of the mA/kV was utilized to reduce the radiation dose to as low as reasonably achievable. COMPARISON: None HISTORY: ORDERING SYSTEM PROVIDED HISTORY: fall injury TECHNOLOGIST PROVIDED HISTORY: Reason for exam:->fall injury Reason for Exam: Fatigue (Pt. comes in today by [facility] from dialysis. Pt. received an hour and a half of her treatment and then began to fell weakness. Pt. was getting ready to leave and then she slid down to the floor. Pt. complaining of pain to her left back side.) Acuity: Unknown Type of Exam: Unknown FINDINGS: BONES/ALIGNMENT: There is normal alignment of the spine. The vertebral body heights are maintained. No osseous destructive lesion is seen.  DEGENERATIVE CHANGES: There is severe disc space narrowing and endplate osteophyte formation at the L5/S1 level along with a moderate size central calcified disc extrusion/herniation which indents the anterior aspect of the thecal sac resulting in severe central canal and bilateral neural foraminal encroachment. SOFT TISSUES/RETROPERITONEUM: No paraspinal mass is seen. 1. No acute abnormality. 2. Advanced L5/S1 spondylosis with associated central canal and neural foraminal stenosis. Xr Chest Portable    Result Date: 12/26/2019  EXAMINATION: ONE XRAY VIEW OF THE CHEST 12/26/2019 7:52 am COMPARISON: 12/16/2019 HISTORY: ORDERING SYSTEM PROVIDED HISTORY: sob TECHNOLOGIST PROVIDED HISTORY: Reason for exam:->sob Reason for Exam: Leg Pain (pt presents from [facility] by squad for leg pain. sttaes she has wounds on bilat legs/inner thighs for months and gets daily wound care at [facility], but today the pain is worse. ) Acuity: Acute Type of Exam: Initial FINDINGS: The lungs are without acute focal process. Right IJ Port-A-Cath is unchanged in position. There is no effusion or pneumothorax. The cardiomediastinal silhouette is stable. The osseous structures are stable. No acute process. Xr Hip 2-3 Vw W Pelvis Left    Result Date: 11/29/2019  EXAMINATION: ONE XRAY VIEW OF THE PELVIS AND TWO XRAY VIEWS LEFT HIP 11/29/2019 8:34 am COMPARISON: None HISTORY: ORDERING SYSTEM PROVIDED HISTORY: fall/injury TECHNOLOGIST PROVIDED HISTORY: Reason for exam:->fall/injury Reason for Exam: Fatigue (Pt. comes in today by [facility] from dialysis. Pt. received an hour and a half of her treatment and then began to fell weakness. Pt. was getting ready to leave and then she slid down to the floor. Pt. complaining of pain to her left back side.) Acuity: Unknown Type of Exam: Unknown FINDINGS: No acute fracture or dislocation is identified. Mild osteoarthritis is present involving the bilateral hips. There is no evidence of AVN or erosion.  The bilateral SI joints and pubic symphysis are normal in alignment. Bone mineral density appears decreased. 1. No acute osseous abnormality. 2. Mild osteoarthritis involving the bilateral hips.          Scheduled Meds:   lidocaine PF        midodrine        albumin human        vancomycin  10 mg/kg (Adjusted) Intravenous Once    sodium chloride  250 mL Intravenous Once    sodium thiosulfate IVPB  25 g Intravenous Q MWF    lidocaine   Topical Once    lidocaine-EPINEPHrine  20 mL Intradermal Once    silver nitrate applicators  1 each Topical Once    allopurinol  100 mg Oral Daily    budesonide  500 mcg Nebulization BID    escitalopram  10 mg Oral Daily    insulin glargine  10 Units Subcutaneous Nightly    lansoprazole  30 mg Oral QAM AC    pregabalin  50 mg Oral TID    rosuvastatin  10 mg Oral Daily    sevelamer  1,600 mg Oral TID WC    insulin lispro  0-6 Units Subcutaneous TID WC    insulin lispro  0-3 Units Subcutaneous Nightly    sodium chloride flush  10 mL Intravenous 2 times per day    piperacillin-tazobactam  3.375 g Intravenous Q12H    vancomycin (VANCOCIN) intermittent dosing (placeholder)   Other RX Placeholder    apixaban  2.5 mg Oral BID    HYDROmorphone  4 mg Oral Q6H    ferrous sulfate  325 mg Oral TID WC    Empty 3-in-1 Mixing Container  1 each Does not apply Daily     Continuous Infusions:   dextrose       PRN Meds:.midodrine, lidocaine PF, albumin human, albuterol sulfate HFA, traZODone, sodium chloride flush, magnesium hydroxide, glucose, dextrose, glucagon (rDNA), dextrose, ondansetron, HYDROmorphone **OR** HYDROmorphone, oxyCODONE **OR** oxyCODONE, acetaminophen, diphenhydrAMINE      Assessment:  Patient Active Problem List   Diagnosis    Moderate asthma with exacerbation    Colon polyps    Microalbuminuria    Venous stasis of lower extremity    Obstructive sleep apnea on CPAP    Chronic diastolic CHF (congestive heart failure) (HCC)    Renal osteodystrophy    Uncontrolled type 2 diabetes with renal manifestation (HCC)    History of pulmonary embolism    Anemia of chronic kidney failure (HCC)    Primary osteoarthritis of both knees    Essential hypertension, malignant    Restrictive lung disease    Gastroesophageal reflux disease    LVH (left ventricular hypertrophy)    Dyspnea and respiratory abnormalities    Acute on chronic respiratory failure with hypoxia and hypercapnia (HCC)    Obesity hypoventilation syndrome (HCC)    Morbid obesity due to excess calories (HCC)    COPD, moderate (HCC)    Gout    Warfarin-induced coagulopathy (HCC)    Chronic hypoxemic respiratory failure (HCC)    Constipation    DM (diabetes mellitus), secondary, uncontrolled, w/neurologic complic ([address]    Diabetes education, encounter for    Primary osteoarthritis of left hip    ESRD (end stage renal disease) on dialysis ([address]    Chronic pain syndrome    Chest pain    Recurrent ventral hernia    Hypoglycemia    ESRD on hemodialysis ([address]    Diabetic polyneuropathy associated with type 2 diabetes mellitus ([address]    Infection of arteriovenous graft for hemodialysis ([address]    Essential hypertension    Dialysis patient ([address]    Postoperative pain    Other complication of arteriovenous dialysis fistula (HCC)    Weight loss counseling, encounter for    Respiratory failure ([address]    COPD exacerbation ([address]    Pulmonary embolism (HCC)    Chronic pain of left lower extremity    Anemia    Cerebrovascular accident (CVA) ([address]    HTN (hypertension), benign    Vision loss of right eye    Open wound    Calciphylaxis    Cellulitis     OR Date 12/26/19, Sharp excisional debridement of skin, subcutaneous tissue of open infected abdominal wound to final wound dimensions 16x7cm (112 sq cm), Sharp excisional debridement of skin, subcutaneous tissue of stage 3 right hip decubitus ulcer to final wound dimensions 11.5x6cm (69 sq cm), Sharp excisional debridement of skin, subcutaneous tissue of stage 3 left hip decubitus ulcer to final wound dimensions of [id number] (286 sq cm), Total debridement: 467 sq cm  Bilateral hip stage 3 decubitus ulcers  Open abdominal wound, infected  ESRD on HD  CHF  COPD  Diabetes  Morbid obesity, BMI 58.4 this admission  CVA  Pulmonary embolism  Medical coagulopathy, Eliquis     Plan:  1. Will leave dressing today, change tomorrow, anticipate daily dressing changes and eventual wound vac placement  2. Pathology pending regarding calciphylaxis  3. Antibiotics per ID, follow OR culture  4. May continue anticoagulation from surgical standpoint  5. Pain control  6. Defer management of remainder of medical comorbodities to primary and consulting     Pop Garcia MD, FACS  12/27/2019  10:39 AM

## 2019-12-27 NOTE — PROGRESS NOTES
Reinforced patient's dressings multiple times. Moderate amount of serosanguinous drainage from abdomen and L hip wounds.

## 2019-12-27 NOTE — CARE COORDINATION
Patient had wounds debrided by Dr Milagros Renae. Note from doctor not to change dressings, may reinforce. Discussed case with nurse Ronaldo Rene. Wound care will follow.  Leonid Walker RN

## 2019-12-27 NOTE — PROGRESS NOTES
Clinical Pharmacy Note:    Pharmacy consulted by Dr. Gaston Newby to dose vancomycin for wound infection/cellulitis. Age: 59 y.o. Height: 63 in  Weight: 143 kg  Patient is ESRD on HD    Ordered vancomycin 1000mg IV once to be given after HD today. Random level to be checked prior to next HD session. Pharmacy will continue to follow.      David Schilling, PharmD  12/27/2019 8:52 AM

## 2019-12-27 NOTE — PROGRESS NOTES
Nephrology Progress Note  [phone]  [phone]   [email or web address]        Reason for Consult:  ESRD on HD     HISTORY OF PRESENT ILLNESS:      The patient is a 59 y.o. female with significant past medical history of T2DM , HTN , ESRD on maintenance HD  , GALE , Morbid obesity who presents with painful lesions abdominal wall and thigh- buttock area. She has recently been diagnosed on clinical grounds with Calciphylaxis , for this she gets Sodium Thiosulfate with dialysis . Unfortunately due to severe pain in the lesions she has been to the dialysis unit infrequently   She denies Fever/ chest pain / dyspnea/ cough/ phlegm   She makes little urine    Underwent debridement of wounds by Dr Milagros Renae on 12/26/19  On IV Vancomycin and Zosyn       PHYSICAL EXAM:    Vitals:    BP (!) 86/52   Pulse 80   Temp 98 °F (36.7 °C) (Oral)   Resp 16   Ht 5' 3\" (1.6 m)   Wt (!) 315 lb 6.4 oz (143.1 kg)   LMP 11/01/1996 (Exact Date)   SpO2 99%   BMI 55.87 kg/m²   I/O last 3 completed shifts: In: 620 [P.O.:120; I.V.:500]  Out: 50 [Blood:50]  No intake/output data recorded. Physical Exam:  Gen: alert, oriented x 3,  Seen on HD    PERRL , EOM +  CV: RRR no murmur/ rub   Lungs: CTA-B  Abd: S/NT +BS, Dressing  lower abdominal wall   Ext: No edema, no cyanosis  Skin: Warm. Dressing upper lateral thighs and Buttock area   : No TTP over bladder, nondistended. Neuro: Alert and oriented x 3, nonfocal.  Joints: No erythema noted over joints.     DATA:    CBC with Differential:    Lab Results   Component Value Date    WBC 12.4 12/27/2019    RBC 2.19 12/27/2019    HGB 6.6 12/27/2019    HCT 21.1 12/27/2019     12/27/2019    MCV 96.5 12/27/2019    MCH 30.2 12/27/2019    MCHC 31.2 12/27/2019    RDW 20.6 12/27/2019    SEGSPCT 77.6 05/12/2013    BANDSPCT 5 08/05/2019    METASPCT 1 01/12/2018    LYMPHOPCT 8.3 12/27/2019    MONOPCT 7.5 12/27/2019    EOSPCT 2.6 01/24/2012    BASOPCT 0.8 12/27/2019    MONOSABS 0.9 12/27/2019    LYMPHSABS

## 2019-12-27 NOTE — PROGRESS NOTES
Pt.'s BP continue to be 80's/40's-50's. Pt. Was given 5mg of midodrine with no change. Pt. Wakes up to voice. Pt. Denies dizziness, SOB, or chest pain. Pain meds on hold for now until SBP>100. Dr. Bassam Pride notified of low BP's.   She reports she will pass it on to the oncoming nephrologist.

## 2019-12-28 LAB
A/G RATIO: 0.7 (ref 1.1–2.2)
ALBUMIN SERPL-MCNC: 2.4 G/DL (ref 3.4–5)
ALP BLD-CCNC: 59 U/L (ref 40–129)
ALT SERPL-CCNC: 14 U/L (ref 10–40)
ANAEROBIC CULTURE: ABNORMAL
ANION GAP SERPL CALCULATED.3IONS-SCNC: 15 MMOL/L (ref 3–16)
AST SERPL-CCNC: 19 U/L (ref 15–37)
BASOPHILS ABSOLUTE: 0 K/UL (ref 0–0.2)
BASOPHILS RELATIVE PERCENT: 0.3 %
BILIRUB SERPL-MCNC: 0.3 MG/DL (ref 0–1)
BUN BLDV-MCNC: 19 MG/DL (ref 7–20)
CALCIUM SERPL-MCNC: 9.1 MG/DL (ref 8.3–10.6)
CHLORIDE BLD-SCNC: 99 MMOL/L (ref 99–110)
CO2: 24 MMOL/L (ref 21–32)
CREAT SERPL-MCNC: 4.9 MG/DL (ref 0.6–1.2)
CULTURE SURGICAL: ABNORMAL
EOSINOPHILS ABSOLUTE: 0.1 K/UL (ref 0–0.6)
EOSINOPHILS RELATIVE PERCENT: 0.5 %
GFR AFRICAN AMERICAN: 11
GFR NON-AFRICAN AMERICAN: 9
GLOBULIN: 3.5 G/DL
GLUCOSE BLD-MCNC: 105 MG/DL (ref 70–99)
GLUCOSE BLD-MCNC: 109 MG/DL (ref 70–99)
GLUCOSE BLD-MCNC: 137 MG/DL (ref 70–99)
GLUCOSE BLD-MCNC: 61 MG/DL (ref 70–99)
GLUCOSE BLD-MCNC: 97 MG/DL (ref 70–99)
GLUCOSE BLD-MCNC: 97 MG/DL (ref 70–99)
GRAM STAIN RESULT: ABNORMAL
HCT VFR BLD CALC: 27 % (ref 36–48)
HEMOGLOBIN: 8.7 G/DL (ref 12–16)
LYMPHOCYTES ABSOLUTE: 1.1 K/UL (ref 1–5.1)
LYMPHOCYTES RELATIVE PERCENT: 10.9 %
MCH RBC QN AUTO: 30.1 PG (ref 26–34)
MCHC RBC AUTO-ENTMCNC: 32.1 G/DL (ref 31–36)
MCV RBC AUTO: 93.6 FL (ref 80–100)
MONOCYTES ABSOLUTE: 0.9 K/UL (ref 0–1.3)
MONOCYTES RELATIVE PERCENT: 9.4 %
NEUTROPHILS ABSOLUTE: 7.8 K/UL (ref 1.7–7.7)
NEUTROPHILS RELATIVE PERCENT: 78.9 %
ORGANISM: ABNORMAL
ORGANISM: ABNORMAL
PDW BLD-RTO: 20.6 % (ref 12.4–15.4)
PERFORMED ON: ABNORMAL
PERFORMED ON: NORMAL
PERFORMED ON: NORMAL
PLATELET # BLD: 215 K/UL (ref 135–450)
PMV BLD AUTO: 8.1 FL (ref 5–10.5)
POTASSIUM SERPL-SCNC: 4 MMOL/L (ref 3.5–5.1)
RBC # BLD: 2.88 M/UL (ref 4–5.2)
SODIUM BLD-SCNC: 138 MMOL/L (ref 136–145)
TOTAL PROTEIN: 5.9 G/DL (ref 6.4–8.2)
WBC # BLD: 9.9 K/UL (ref 4–11)

## 2019-12-28 PROCEDURE — 6370000000 HC RX 637 (ALT 250 FOR IP): Performed by: SURGERY

## 2019-12-28 PROCEDURE — 2500000003 HC RX 250 WO HCPCS: Performed by: SURGERY

## 2019-12-28 PROCEDURE — 94640 AIRWAY INHALATION TREATMENT: CPT

## 2019-12-28 PROCEDURE — 94761 N-INVAS EAR/PLS OXIMETRY MLT: CPT

## 2019-12-28 PROCEDURE — 6370000000 HC RX 637 (ALT 250 FOR IP): Performed by: INTERNAL MEDICINE

## 2019-12-28 PROCEDURE — 99231 SBSQ HOSP IP/OBS SF/LOW 25: CPT | Performed by: SURGERY

## 2019-12-28 PROCEDURE — 6360000002 HC RX W HCPCS: Performed by: SURGERY

## 2019-12-28 PROCEDURE — 6360000002 HC RX W HCPCS

## 2019-12-28 PROCEDURE — 6370000000 HC RX 637 (ALT 250 FOR IP): Performed by: NURSE PRACTITIONER

## 2019-12-28 PROCEDURE — 85025 COMPLETE CBC W/AUTO DIFF WBC: CPT

## 2019-12-28 PROCEDURE — 2700000000 HC OXYGEN THERAPY PER DAY

## 2019-12-28 PROCEDURE — 80053 COMPREHEN METABOLIC PANEL: CPT

## 2019-12-28 PROCEDURE — 6360000002 HC RX W HCPCS: Performed by: INTERNAL MEDICINE

## 2019-12-28 PROCEDURE — 1200000000 HC SEMI PRIVATE

## 2019-12-28 PROCEDURE — 2580000003 HC RX 258: Performed by: SURGERY

## 2019-12-28 RX ORDER — BUDESONIDE 0.5 MG/2ML
INHALANT ORAL
Status: COMPLETED
Start: 2019-12-28 | End: 2019-12-28

## 2019-12-28 RX ADMIN — OXYCODONE 10 MG: 5 TABLET ORAL at 14:52

## 2019-12-28 RX ADMIN — SEVELAMER CARBONATE 1600 MG: 800 TABLET, FILM COATED ORAL at 11:33

## 2019-12-28 RX ADMIN — APIXABAN 2.5 MG: 2.5 TABLET, FILM COATED ORAL at 08:38

## 2019-12-28 RX ADMIN — HYDROMORPHONE HYDROCHLORIDE 4 MG: 2 TABLET ORAL at 05:49

## 2019-12-28 RX ADMIN — Medication 10 ML: at 08:39

## 2019-12-28 RX ADMIN — SEVELAMER CARBONATE 1600 MG: 800 TABLET, FILM COATED ORAL at 18:35

## 2019-12-28 RX ADMIN — LANSOPRAZOLE 30 MG: 30 TABLET, ORALLY DISINTEGRATING ORAL at 05:50

## 2019-12-28 RX ADMIN — SEVELAMER CARBONATE 1600 MG: 800 TABLET, FILM COATED ORAL at 08:38

## 2019-12-28 RX ADMIN — HYDROMORPHONE HYDROCHLORIDE 1 MG: 1 INJECTION, SOLUTION INTRAMUSCULAR; INTRAVENOUS; SUBCUTANEOUS at 00:01

## 2019-12-28 RX ADMIN — PREGABALIN 50 MG: 25 CAPSULE ORAL at 23:01

## 2019-12-28 RX ADMIN — PREGABALIN 50 MG: 25 CAPSULE ORAL at 13:38

## 2019-12-28 RX ADMIN — HYDROMORPHONE HYDROCHLORIDE 4 MG: 2 TABLET ORAL at 18:22

## 2019-12-28 RX ADMIN — BUDESONIDE 500 MCG: 0.5 SUSPENSION RESPIRATORY (INHALATION) at 09:15

## 2019-12-28 RX ADMIN — PREGABALIN 50 MG: 25 CAPSULE ORAL at 08:38

## 2019-12-28 RX ADMIN — HYDROMORPHONE HYDROCHLORIDE 4 MG: 2 TABLET ORAL at 11:33

## 2019-12-28 RX ADMIN — HYDROMORPHONE HYDROCHLORIDE 1 MG: 1 INJECTION, SOLUTION INTRAMUSCULAR; INTRAVENOUS; SUBCUTANEOUS at 13:38

## 2019-12-28 RX ADMIN — ALLOPURINOL 100 MG: 100 TABLET ORAL at 08:38

## 2019-12-28 RX ADMIN — TAZOBACTAM SODIUM AND PIPERACILLIN SODIUM 3.38 G: 375; 3 INJECTION, SOLUTION INTRAVENOUS at 13:40

## 2019-12-28 RX ADMIN — APIXABAN 2.5 MG: 2.5 TABLET, FILM COATED ORAL at 23:01

## 2019-12-28 RX ADMIN — BUDESONIDE 500 MCG: 0.5 SUSPENSION RESPIRATORY (INHALATION) at 21:29

## 2019-12-28 RX ADMIN — ESCITALOPRAM OXALATE 10 MG: 10 TABLET ORAL at 08:38

## 2019-12-28 RX ADMIN — HYDROMORPHONE HYDROCHLORIDE 1 MG: 1 INJECTION, SOLUTION INTRAMUSCULAR; INTRAVENOUS; SUBCUTANEOUS at 15:57

## 2019-12-28 RX ADMIN — TAZOBACTAM SODIUM AND PIPERACILLIN SODIUM 3.38 G: 375; 3 INJECTION, SOLUTION INTRAVENOUS at 02:49

## 2019-12-28 RX ADMIN — ROSUVASTATIN CALCIUM 10 MG: 10 TABLET, FILM COATED ORAL at 08:38

## 2019-12-28 RX ADMIN — MAGNESIUM HYDROXIDE 30 ML: 400 SUSPENSION ORAL at 23:02

## 2019-12-28 ASSESSMENT — ENCOUNTER SYMPTOMS
STRIDOR: 0
DIARRHEA: 0
CHEST TIGHTNESS: 0
TROUBLE SWALLOWING: 0
NAUSEA: 0
RHINORRHEA: 0
PHOTOPHOBIA: 0
APNEA: 0
EYE REDNESS: 0
COUGH: 0
EYE DISCHARGE: 0
SHORTNESS OF BREATH: 0
COLOR CHANGE: 0
BLOOD IN STOOL: 0
CHOKING: 0
FACIAL SWELLING: 0
ABDOMINAL PAIN: 0

## 2019-12-28 ASSESSMENT — PAIN SCALES - GENERAL
PAINLEVEL_OUTOF10: 8
PAINLEVEL_OUTOF10: 10
PAINLEVEL_OUTOF10: 10
PAINLEVEL_OUTOF10: 9
PAINLEVEL_OUTOF10: 9
PAINLEVEL_OUTOF10: 10
PAINLEVEL_OUTOF10: 10

## 2019-12-28 NOTE — PROGRESS NOTES
Shift assessment complete, VSS, pt alert and oriented x4. Pt agrees to get out of bed to sit in chair, but not yet this morning.

## 2019-12-28 NOTE — PROGRESS NOTES
Placeholder  apixaban (ELIQUIS) tablet 2.5 mg, BID  glucose (GLUTOSE) 40 % oral gel 15 g, PRN  dextrose 50 % IV solution, PRN  glucagon (rDNA) injection 1 mg, PRN  dextrose 5 % solution, PRN  HYDROmorphone (DILAUDID) tablet 4 mg, Q6H  ondansetron (ZOFRAN) injection 4 mg, Q6H PRN  HYDROmorphone HCl PF (DILAUDID) injection 0.5 mg, Q2H PRN    Or  HYDROmorphone HCl PF (DILAUDID) injection 1 mg, Q2H PRN  oxyCODONE (ROXICODONE) immediate release tablet 5 mg, Q4H PRN    Or  oxyCODONE (ROXICODONE) immediate release tablet 10 mg, Q4H PRN  acetaminophen (TYLENOL) tablet 650 mg, Q4H PRN  diphenhydrAMINE (BENADRYL) injection 25 mg, Q6H PRN  Patient has home medications in pharmacy; please retrieve when patient is discharged. , Daily        Objective:  BP (!) 113/56   Pulse 76   Temp 97.6 °F (36.4 °C) (Oral)   Resp 16   Ht 5' 3\" (1.6 m)   Wt (!) 317 lb 10.9 oz (144.1 kg)   LMP 11/01/1996 (Exact Date)   SpO2 96%   BMI 56.28 kg/m²     Intake/Output Summary (Last 24 hours) at 12/28/2019 1051  Last data filed at 12/27/2019 1315  Gross per 24 hour   Intake 2905 ml   Output 1214 ml   Net 1691 ml      Wt Readings from Last 3 Encounters:   12/27/19 (!) 317 lb 10.9 oz (144.1 kg)   12/16/19 (!) 326 lb (147.9 kg)   11/29/19 (!) 326 lb (147.9 kg)       General appearance:  Appears comfortable  Eyes: Sclera clear. Pupils equal.  ENT: Moist oral mucosa. Trachea midline, no adenopathy. Cardiovascular: Regular rhythm, normal S1, S2. No murmur. No edema in lower extremities  Respiratory: Not using accessory muscles. Good inspiratory effort. Clear to auscultation bilaterally, no wheeze or crackles. GI: Tenderness at the site of surgery  Musculoskeletal: No cyanosis in digits, neck supple  Neurology: CN 2-12 grossly intact. No speech or motor deficits  Psych: Normal affect. Alert and oriented in time, place and person  Skin: Warm, dry, normal turgor  Extremity exam shows brisk capillary refill.   Peripheral pulses are palpable in lower extremities     Labs and Tests:  CBC:   Recent Labs     12/26/19  0823 12/27/19  0700 12/27/19  1555 12/28/19  0706   WBC 8.8 12.4*  --  9.9   HGB 7.6* 6.6* 8.0* 8.7*    258  --  215     BMP:    Recent Labs     12/26/19  0823 12/27/19  0700 12/28/19  0706    139 138   K 4.5 5.6* 4.0   CL 93* 93* 99   CO2 21 20* 24   BUN 38* 45* 19   CREATININE 7.4* 9.2* 4.9*   GLUCOSE 123* 120* 109*     Hepatic:   Recent Labs     12/26/19  0823 12/27/19  0700 12/28/19  0706   AST 27 19 19   ALT 17 14 14   BILITOT <0.2 0.3 0.3   ALKPHOS 72 61 59     CT ABDOMEN PELVIS WO CONTRAST Additional Contrast? None   Final Result   1. Incompletely imaged panniculitis; correlate with direct inspection. XR CHEST PORTABLE   Final Result   No acute process. Problem List  Active Problems:    Poorly controlled type 2 diabetes mellitus (HCC)    Chronic obstructive pulmonary disease (HCC)    Severe anemia    Cellulitis    Abdominal wall cellulitis    Opiate dependence, continuous (HCC)    History of arterial ischemic stroke  Resolved Problems:    * No resolved hospital problems. *       Assessment & Plan:     Wound infection of the abdominal wall calciphylaxis discussed with surgeon this was definitely infected currently on broad-spectrum antibiotic with vancomycin and Zosyn cultures pending dressing changes  as per surgery wound on the hip was not infected. Pain management    Postop anemia hemoglobin 6.6. Received  2 units of packed red blood cell transfusion. Hb 8.7    History of deep venous  thrombosis on Eliquis continue    Hypertension controlled    ESRD on hemodialysis    Hyperglycemia controlled    Diet: DIET RENAL;  Dietary Nutrition Supplements: Renal Oral Supplement  Code:Full Code  DVT PPX eliquis  Disposition pending.        Yann Redmond MD   12/28/2019 10:51 AM

## 2019-12-28 NOTE — PROGRESS NOTES
Nephrology Progress Note  [phone]   [email or web address]        Reason for Consult:  ESRD on HD     HISTORY OF PRESENT ILLNESS:      The patient is a 59 y.o. female with significant past medical history of T2DM , HTN , ESRD on maintenance HD  , GALE , Morbid obesity who presents with painful lesions abdominal wall and thigh- buttock area. She has recently been diagnosed on clinical grounds with Calciphylaxis , for this she gets Sodium Thiosulfate with dialysis . Unfortunately due to severe pain in the lesions she has been to the dialysis unit infrequently   She denies Fever/ chest pain / dyspnea/ cough/ phlegm   She makes little urine    HPI:  Breathing comfortably. No CP.  ROS:  In bed  PMFSH:  medications reviewed. PHYSICAL EXAM:    Vitals:    /81   Pulse 101   Temp 98.1 °F (36.7 °C) (Oral)   Resp 16   Ht 5' 3\" (1.6 m)   Wt (!) 317 lb 10.9 oz (144.1 kg)   LMP 11/01/1996 (Exact Date)   SpO2 94%   BMI 56.28 kg/m²   I/O last 3 completed shifts: In: 2905 [P.O.:240; Blood:1115]  Out: 8708   No intake/output data recorded. Physical Exam:  Gen: alert, oriented x 3,  Seen on HD    PERRL , EOM +  CV: RRR no murmur/ rub   Lungs: CTA-B  Abd: S/NT +BS, Dressing  lower abdominal wall   Ext: No edema, no cyanosis  Skin: Warm. Dressing upper lateral thighs and Buttock area   : No TTP over bladder, nondistended. Neuro: Alert and oriented x 3, nonfocal.  Joints: No erythema noted over joints.     DATA:    CBC with Differential:    Lab Results   Component Value Date    WBC 9.9 12/28/2019    RBC 2.88 12/28/2019    HGB 8.7 12/28/2019    HCT 27.0 12/28/2019     12/28/2019    MCV 93.6 12/28/2019    MCH 30.1 12/28/2019    MCHC 32.1 12/28/2019    RDW 20.6 12/28/2019    SEGSPCT 77.6 05/12/2013    BANDSPCT 5 08/05/2019    METASPCT 1 01/12/2018    LYMPHOPCT 10.9 12/28/2019    MONOPCT 9.4 12/28/2019    EOSPCT 2.6 01/24/2012    BASOPCT 0.3 12/28/2019    MONOSABS 0.9 12/28/2019    LYMPHSABS 1.1 12/28/2019    EOSABS 0.1 12/28/2019    BASOSABS 0.0 12/28/2019    DIFFTYPE Auto 05/12/2013     CMP:    Lab Results   Component Value Date     12/28/2019    K 4.0 12/28/2019    K 5.6 12/27/2019    CL 99 12/28/2019    CO2 24 12/28/2019    BUN 19 12/28/2019    CREATININE 4.9 12/28/2019    GFRAA 11 12/28/2019    GFRAA 50 05/12/2013    AGRATIO 0.7 12/28/2019    LABGLOM 9 12/28/2019    GLUCOSE 109 12/28/2019    PROT 5.9 12/28/2019    PROT 6.6 03/05/2013    LABALBU 2.4 12/28/2019    CALCIUM 9.1 12/28/2019    BILITOT 0.3 12/28/2019    ALKPHOS 59 12/28/2019    AST 19 12/28/2019    ALT 14 12/28/2019     Phosphorus:    Lab Results   Component Value Date    PHOS 3.6 11/26/2019     Uric Acid:    Lab Results   Component Value Date    LABURIC 2.8 05/17/2018     Warfarin PT/INR:  No components found for: PTPATWAR, PTINRWAR        IMPRESSION/RECOMMENDATIONS:      1. ESRD on HD    MWF at [address]   2. Obesity  3. Anemia of CKD   Target Hb  9-11   Hb 6.6    Transfuse- 2 units   4. Renal osteodystrophy  5. Calciphylaxis   NaThiosulfate with dialysis   6. non-compliance   importance of going to dialysis reinforced          Thank you for allowing me to participate in the care of this patient. I will continue to follow along. Please call with questions.     Vicki Eller MD  12/28/2019  [facility]

## 2019-12-28 NOTE — PROGRESS NOTES
BMP:    Recent Labs     12/26/19  0823 12/27/19  0700 12/28/19  0706    139 138   K 4.5 5.6* 4.0   CL 93* 93* 99   CO2 21 20* 24   BUN 38* 45* 19   CREATININE 7.4* 9.2* 4.9*   GLUCOSE 123* 120* 109*     Hepatic:   Recent Labs     12/26/19  0823 12/27/19  0700 12/28/19  0706   AST 27 19 19   ALT 17 14 14   BILITOT <0.2 0.3 0.3   ALKPHOS 72 61 59     Amylase: No results for input(s): AMYLASE in the last 72 hours. Lipase: No results for input(s): LIPASE in the last 72 hours. Mag:    No results for input(s): MG in the last 72 hours. Phos:   No results for input(s): PHOS in the last 72 hours. Coags: No results for input(s): INR, APTT in the last 72 hours. Cultures:  Anaerobic culture  Recent Labs     12/26/19  1501   LABANAE Further report to follow       Blood culture  Recent Labs     12/26/19  0752   BC No Growth to date. Any change in status will be called. Blood culture 2  Recent Labs     12/26/19  0752   BLOODCULT2 No Growth to date. Any change in status will be called. Body fluid culture  Recent Labs     12/26/19  0752   BLOODCULT2 No Growth to date. Any change in status will be called. Surgical culture  Recent Labs     12/26/19  1501   CXSURG Growth too young. further report to follow       Fecal occult  No results for input(s): OCCULTBLDFEC in the last 72 hours. Gram stain  Recent Labs     12/26/19  1501   LABGRAM No WBCs or organisms seen       Stool culture 1  No results for input(s): CXST in the last 72 hours. Stool culture 2  No results for input(s): STOOLCULT2 in the last 72 hours. Urine culture  No results for input(s): LABURIN in the last 72 hours. Wound abscess  No results for input(s): WNDABS in the last 72 hours. C Diff   No results for input(s): CDIFFTOXAB in the last 72 hours.       Pathology:   pending    Imaging:  I have personally reviewed the following films:    Ct Abdomen Pelvis Wo Contrast Additional Contrast? None    Result Date: The cardiac silhouette is normal.  There are no bony abnormalities. No acute cardiopulmonary process. Ct Cervical Spine Wo Contrast    Result Date: 11/29/2019  EXAMINATION: CT OF THE CERVICAL SPINE WITHOUT CONTRAST 11/29/2019 9:18 am: TECHNIQUE: CT of the cervical spine was performed without the administration of intravenous contrast. Multiplanar reformatted images are provided for review. Dose modulation, iterative reconstruction, and/or weight based adjustment of the mA/kV was utilized to reduce the radiation dose to as low as reasonably achievable. COMPARISON: None available. HISTORY: ORDERING SYSTEM PROVIDED HISTORY: fall, injury TECHNOLOGIST PROVIDED HISTORY: Reason for exam:->fall, injury Reason for Exam: Fatigue (Pt. comes in today by [facility] from dialysis. Pt. received an hour and a half of her treatment and then began to fell weakness. Pt. was getting ready to leave and then she slid down to the floor. Pt. complaining of pain to her left back side.) Acuity: Unknown Type of Exam: Unknown FINDINGS: BONES/ALIGNMENT: Quantum mottle artifact degrades image quality. There is no acute fracture. The 7 cervical vertebral bodies are in anatomic alignment. There straightening of the normal cervical lordosis. The craniocervical junction is intact. DEGENERATIVE CHANGES: There is mild-to-moderate multilevel spondylosis. SOFT TISSUES: There is no prevertebral soft tissue swelling. Right port is in situ. 1.  No acute fracture. Ct Thoracic Spine Wo Contrast    Result Date: 11/29/2019  EXAMINATION: CT OF THE THORACIC SPINE WITHOUT CONTRAST  11/29/2019 9:18 am: TECHNIQUE: CT of the thoracic spine was performed without the administration of intravenous contrast. Multiplanar reformatted images are provided for review. Dose modulation, iterative reconstruction, and/or weight based adjustment of the mA/kV was utilized to reduce the radiation dose to as low as reasonably achievable. COMPARISON: None. Total debridement: 467 sq cm  Bilateral hip stage 3 decubitus ulcers  Open abdominal wound, infected  ESRD on HD  CHF  COPD  Diabetes  Morbid obesity, BMI 58.4 this admission  CVA  Pulmonary embolism  Medical coagulopathy, Eliquis     Plan:  1. Wound bases are clean and did not need debridement, are ready for wound VAC placement  2. Pathology pending regarding calciphylaxis  3. Antibiotics per ID  4. May continue anticoagulation from surgical standpoint  5. Pain control  6. Defer management of remainder of medical comorbodities to primary and consulting   7. Discharge planning, likely will need skilled facility after discharge given patient's complexity    Pop Minor MD, FACS  12/28/2019  12:22 PM

## 2019-12-29 LAB
ANION GAP SERPL CALCULATED.3IONS-SCNC: 18 MMOL/L (ref 3–16)
BASOPHILS ABSOLUTE: 0 K/UL (ref 0–0.2)
BASOPHILS RELATIVE PERCENT: 0.4 %
BUN BLDV-MCNC: 23 MG/DL (ref 7–20)
CALCIUM SERPL-MCNC: 8.9 MG/DL (ref 8.3–10.6)
CHLORIDE BLD-SCNC: 98 MMOL/L (ref 99–110)
CO2: 24 MMOL/L (ref 21–32)
CREAT SERPL-MCNC: 6.8 MG/DL (ref 0.6–1.2)
EOSINOPHILS ABSOLUTE: 0.1 K/UL (ref 0–0.6)
EOSINOPHILS RELATIVE PERCENT: 1.5 %
GFR AFRICAN AMERICAN: 7
GFR NON-AFRICAN AMERICAN: 6
GLUCOSE BLD-MCNC: 91 MG/DL (ref 70–99)
GLUCOSE BLD-MCNC: 93 MG/DL (ref 70–99)
GLUCOSE BLD-MCNC: 99 MG/DL (ref 70–99)
HCT VFR BLD CALC: 27.3 % (ref 36–48)
HEMOGLOBIN: 8.9 G/DL (ref 12–16)
LYMPHOCYTES ABSOLUTE: 1.2 K/UL (ref 1–5.1)
LYMPHOCYTES RELATIVE PERCENT: 13.2 %
MCH RBC QN AUTO: 30.6 PG (ref 26–34)
MCHC RBC AUTO-ENTMCNC: 32.6 G/DL (ref 31–36)
MCV RBC AUTO: 93.7 FL (ref 80–100)
MONOCYTES ABSOLUTE: 0.9 K/UL (ref 0–1.3)
MONOCYTES RELATIVE PERCENT: 9.7 %
NEUTROPHILS ABSOLUTE: 7 K/UL (ref 1.7–7.7)
NEUTROPHILS RELATIVE PERCENT: 75.2 %
PDW BLD-RTO: 20.5 % (ref 12.4–15.4)
PERFORMED ON: NORMAL
PERFORMED ON: NORMAL
PLATELET # BLD: 207 K/UL (ref 135–450)
PMV BLD AUTO: 8.6 FL (ref 5–10.5)
POTASSIUM SERPL-SCNC: 4.3 MMOL/L (ref 3.5–5.1)
RBC # BLD: 2.91 M/UL (ref 4–5.2)
SODIUM BLD-SCNC: 140 MMOL/L (ref 136–145)
WBC # BLD: 9.4 K/UL (ref 4–11)

## 2019-12-29 PROCEDURE — 1200000000 HC SEMI PRIVATE

## 2019-12-29 PROCEDURE — 6360000002 HC RX W HCPCS: Performed by: SURGERY

## 2019-12-29 PROCEDURE — 87641 MR-STAPH DNA AMP PROBE: CPT

## 2019-12-29 PROCEDURE — 94640 AIRWAY INHALATION TREATMENT: CPT

## 2019-12-29 PROCEDURE — 6360000002 HC RX W HCPCS: Performed by: INTERNAL MEDICINE

## 2019-12-29 PROCEDURE — 6370000000 HC RX 637 (ALT 250 FOR IP): Performed by: SURGERY

## 2019-12-29 PROCEDURE — 6370000000 HC RX 637 (ALT 250 FOR IP): Performed by: NURSE PRACTITIONER

## 2019-12-29 PROCEDURE — 85025 COMPLETE CBC W/AUTO DIFF WBC: CPT

## 2019-12-29 PROCEDURE — 2580000003 HC RX 258: Performed by: SURGERY

## 2019-12-29 PROCEDURE — 2500000003 HC RX 250 WO HCPCS: Performed by: INTERNAL MEDICINE

## 2019-12-29 PROCEDURE — 90935 HEMODIALYSIS ONE EVALUATION: CPT

## 2019-12-29 PROCEDURE — 2580000003 HC RX 258: Performed by: INTERNAL MEDICINE

## 2019-12-29 PROCEDURE — 80048 BASIC METABOLIC PNL TOTAL CA: CPT

## 2019-12-29 PROCEDURE — 2500000003 HC RX 250 WO HCPCS: Performed by: SURGERY

## 2019-12-29 PROCEDURE — 6370000000 HC RX 637 (ALT 250 FOR IP): Performed by: INTERNAL MEDICINE

## 2019-12-29 PROCEDURE — 94761 N-INVAS EAR/PLS OXIMETRY MLT: CPT

## 2019-12-29 RX ORDER — BUDESONIDE 0.5 MG/2ML
500 INHALANT ORAL 2 TIMES DAILY
Status: DISCONTINUED | OUTPATIENT
Start: 2019-12-29 | End: 2020-01-10 | Stop reason: HOSPADM

## 2019-12-29 RX ORDER — VANCOMYCIN HYDROCHLORIDE 1 G/200ML
1000 INJECTION, SOLUTION INTRAVENOUS
Status: COMPLETED | OUTPATIENT
Start: 2019-12-29 | End: 2019-12-29

## 2019-12-29 RX ADMIN — Medication 10 ML: at 21:49

## 2019-12-29 RX ADMIN — APIXABAN 2.5 MG: 2.5 TABLET, FILM COATED ORAL at 21:48

## 2019-12-29 RX ADMIN — TAZOBACTAM SODIUM AND PIPERACILLIN SODIUM 3.38 G: 375; 3 INJECTION, SOLUTION INTRAVENOUS at 14:03

## 2019-12-29 RX ADMIN — PREGABALIN 50 MG: 25 CAPSULE ORAL at 14:03

## 2019-12-29 RX ADMIN — HYDROMORPHONE HYDROCHLORIDE 4 MG: 2 TABLET ORAL at 16:30

## 2019-12-29 RX ADMIN — TAZOBACTAM SODIUM AND PIPERACILLIN SODIUM 3.38 G: 375; 3 INJECTION, SOLUTION INTRAVENOUS at 01:08

## 2019-12-29 RX ADMIN — SEVELAMER CARBONATE 1600 MG: 800 TABLET, FILM COATED ORAL at 17:48

## 2019-12-29 RX ADMIN — LANSOPRAZOLE 30 MG: 30 TABLET, ORALLY DISINTEGRATING ORAL at 06:04

## 2019-12-29 RX ADMIN — HYDROMORPHONE HYDROCHLORIDE 4 MG: 2 TABLET ORAL at 22:32

## 2019-12-29 RX ADMIN — VANCOMYCIN HYDROCHLORIDE 1000 MG: 1 INJECTION, SOLUTION INTRAVENOUS at 17:48

## 2019-12-29 RX ADMIN — BUDESONIDE 500 MCG: 0.5 SUSPENSION RESPIRATORY (INHALATION) at 06:53

## 2019-12-29 RX ADMIN — ALLOPURINOL 100 MG: 100 TABLET ORAL at 14:03

## 2019-12-29 RX ADMIN — OXYCODONE HYDROCHLORIDE 5 MG: 5 TABLET ORAL at 21:53

## 2019-12-29 RX ADMIN — SEVELAMER CARBONATE 1600 MG: 800 TABLET, FILM COATED ORAL at 14:03

## 2019-12-29 RX ADMIN — HYDROMORPHONE HYDROCHLORIDE 4 MG: 2 TABLET ORAL at 11:50

## 2019-12-29 RX ADMIN — HYDROMORPHONE HYDROCHLORIDE 4 MG: 2 TABLET ORAL at 06:04

## 2019-12-29 RX ADMIN — APIXABAN 2.5 MG: 2.5 TABLET, FILM COATED ORAL at 14:03

## 2019-12-29 RX ADMIN — PREGABALIN 50 MG: 25 CAPSULE ORAL at 21:48

## 2019-12-29 RX ADMIN — HYDROMORPHONE HYDROCHLORIDE 1 MG: 1 INJECTION, SOLUTION INTRAMUSCULAR; INTRAVENOUS; SUBCUTANEOUS at 15:54

## 2019-12-29 RX ADMIN — SODIUM THIOSULFATE 25 G: 250 INJECTION, SOLUTION INTRAVENOUS at 12:21

## 2019-12-29 RX ADMIN — ESCITALOPRAM OXALATE 10 MG: 10 TABLET ORAL at 14:03

## 2019-12-29 RX ADMIN — Medication 1 EACH: at 14:16

## 2019-12-29 RX ADMIN — TRAZODONE HYDROCHLORIDE 100 MG: 50 TABLET ORAL at 21:52

## 2019-12-29 RX ADMIN — ROSUVASTATIN CALCIUM 10 MG: 10 TABLET, FILM COATED ORAL at 14:03

## 2019-12-29 RX ADMIN — Medication 10 ML: at 01:10

## 2019-12-29 RX ADMIN — HYDROMORPHONE HYDROCHLORIDE 4 MG: 2 TABLET ORAL at 01:09

## 2019-12-29 ASSESSMENT — PAIN SCALES - GENERAL
PAINLEVEL_OUTOF10: 0
PAINLEVEL_OUTOF10: 7
PAINLEVEL_OUTOF10: 8
PAINLEVEL_OUTOF10: 8
PAINLEVEL_OUTOF10: 9
PAINLEVEL_OUTOF10: 5
PAINLEVEL_OUTOF10: 9
PAINLEVEL_OUTOF10: 0
PAINLEVEL_OUTOF10: 8

## 2019-12-29 NOTE — PROGRESS NOTES
Nephrology Progress Note  [phone]   [email or web address]        Reason for Consult:  ESRD on HD     HISTORY OF PRESENT ILLNESS:      The patient is a 59 y.o. female with significant past medical history of T2DM , HTN , ESRD on maintenance HD  , GALE , Morbid obesity who presents with painful lesions abdominal wall and thigh- buttock area. She has recently been diagnosed on clinical grounds with Calciphylaxis , for this she gets Sodium Thiosulfate with dialysis . Unfortunately due to severe pain in the lesions she has been to the dialysis unit infrequently   She denies Fever/ chest pain / dyspnea/ cough/ phlegm   She makes little urine    HPI:  Breathing comfortably. No CP. Patient seen on dialysis. ROS:  In bed  [address]:  medications reviewed. PHYSICAL EXAM:    Vitals:    /74   Pulse 73   Temp 98.4 °F (36.9 °C)   Resp 18   Ht 5' 3\" (1.6 m)   Wt (!) 326 lb 4.5 oz (148 kg)   LMP 11/01/1996 (Exact Date)   SpO2 96%   BMI 57.80 kg/m²   I/O last 3 completed shifts: In: 48 [IV Piggyback:50]  Out: -   No intake/output data recorded. Physical Exam:  Gen: alert, oriented x 3,  Seen on HD    PERRL , EOM +  CV: RRR no murmur/ rub   Lungs: CTA-B  Abd: S/NT +BS, Dressing  lower abdominal wall   Ext: No edema, no cyanosis  Skin: Warm. Dressing upper lateral thighs and Buttock area   : No TTP over bladder, nondistended. Neuro: Alert and oriented x 3, nonfocal.  Joints: No erythema noted over joints.     DATA:    CBC with Differential:    Lab Results   Component Value Date    WBC 9.4 12/29/2019    RBC 2.91 12/29/2019    HGB 8.9 12/29/2019    HCT 27.3 12/29/2019     12/29/2019    MCV 93.7 12/29/2019    MCH 30.6 12/29/2019    MCHC 32.6 12/29/2019    RDW 20.5 12/29/2019    SEGSPCT 77.6 05/12/2013    BANDSPCT 5 08/05/2019    METASPCT 1 01/12/2018    LYMPHOPCT 13.2 12/29/2019    MONOPCT 9.7 12/29/2019    EOSPCT 2.6 01/24/2012    BASOPCT 0.4 12/29/2019    MONOSABS 0.9 12/29/2019    LYMPHSABS 1.2 12/29/2019    EOSABS 0.1 12/29/2019    BASOSABS 0.0 12/29/2019    DIFFTYPE Auto 05/12/2013     CMP:    Lab Results   Component Value Date     12/29/2019    K 4.3 12/29/2019    K 5.6 12/27/2019    CL 98 12/29/2019    CO2 24 12/29/2019    BUN 23 12/29/2019    CREATININE 6.8 12/29/2019    GFRAA 7 12/29/2019    GFRAA 50 05/12/2013    AGRATIO 0.7 12/28/2019    LABGLOM 6 12/29/2019    GLUCOSE 99 12/29/2019    PROT 5.9 12/28/2019    PROT 6.6 03/05/2013    LABALBU 2.4 12/28/2019    CALCIUM 8.9 12/29/2019    BILITOT 0.3 12/28/2019    ALKPHOS 59 12/28/2019    AST 19 12/28/2019    ALT 14 12/28/2019     Phosphorus:    Lab Results   Component Value Date    PHOS 3.6 11/26/2019     Uric Acid:    Lab Results   Component Value Date    LABURIC 2.8 05/17/2018     Warfarin PT/INR:  No components found for: PTPATWAR, PTINRWAR        IMPRESSION/RECOMMENDATIONS:      1. ESRD on HD    MWF at [address]   2. Obesity  3. Anemia of CKD   Target Hb  9-11   Hb 6.6    Transfuse- 2 units   4. Renal osteodystrophy  5. Calciphylaxis   NaThiosulfate with dialysis   6. non-compliance   importance of going to dialysis reinforced          Thank you for allowing me to participate in the care of this patient. I will continue to follow along. Please call with questions.     Charles Gonzalez MD  12/29/2019  [facility]

## 2019-12-30 LAB
ANION GAP SERPL CALCULATED.3IONS-SCNC: 11 MMOL/L (ref 3–16)
BASOPHILS ABSOLUTE: 0 K/UL (ref 0–0.2)
BASOPHILS RELATIVE PERCENT: 0.6 %
BLOOD CULTURE, ROUTINE: NORMAL
BUN BLDV-MCNC: 15 MG/DL (ref 7–20)
CALCIUM SERPL-MCNC: 8.4 MG/DL (ref 8.3–10.6)
CHLORIDE BLD-SCNC: 95 MMOL/L (ref 99–110)
CO2: 32 MMOL/L (ref 21–32)
CREAT SERPL-MCNC: 4.6 MG/DL (ref 0.6–1.2)
CULTURE, BLOOD 2: NORMAL
EOSINOPHILS ABSOLUTE: 0.1 K/UL (ref 0–0.6)
EOSINOPHILS RELATIVE PERCENT: 1.6 %
GFR AFRICAN AMERICAN: 12
GFR NON-AFRICAN AMERICAN: 10
GLUCOSE BLD-MCNC: 105 MG/DL (ref 70–99)
GLUCOSE BLD-MCNC: 111 MG/DL (ref 70–99)
GLUCOSE BLD-MCNC: 117 MG/DL (ref 70–99)
GLUCOSE BLD-MCNC: 125 MG/DL (ref 70–99)
GLUCOSE BLD-MCNC: 195 MG/DL (ref 70–99)
HCT VFR BLD CALC: 27.4 % (ref 36–48)
HEMOGLOBIN: 8.9 G/DL (ref 12–16)
LYMPHOCYTES ABSOLUTE: 1.1 K/UL (ref 1–5.1)
LYMPHOCYTES RELATIVE PERCENT: 13.8 %
MCH RBC QN AUTO: 30.3 PG (ref 26–34)
MCHC RBC AUTO-ENTMCNC: 32.5 G/DL (ref 31–36)
MCV RBC AUTO: 93.3 FL (ref 80–100)
MONOCYTES ABSOLUTE: 1 K/UL (ref 0–1.3)
MONOCYTES RELATIVE PERCENT: 12.2 %
MRSA SCREEN RT-PCR: NORMAL
NEUTROPHILS ABSOLUTE: 5.9 K/UL (ref 1.7–7.7)
NEUTROPHILS RELATIVE PERCENT: 71.8 %
PDW BLD-RTO: 20.1 % (ref 12.4–15.4)
PERFORMED ON: ABNORMAL
PLATELET # BLD: 194 K/UL (ref 135–450)
PMV BLD AUTO: 8 FL (ref 5–10.5)
POTASSIUM SERPL-SCNC: 3.7 MMOL/L (ref 3.5–5.1)
RBC # BLD: 2.94 M/UL (ref 4–5.2)
SODIUM BLD-SCNC: 138 MMOL/L (ref 136–145)
VANCOMYCIN RANDOM: 30 UG/ML
WBC # BLD: 8.3 K/UL (ref 4–11)

## 2019-12-30 PROCEDURE — 97530 THERAPEUTIC ACTIVITIES: CPT

## 2019-12-30 PROCEDURE — 2580000003 HC RX 258: Performed by: SURGERY

## 2019-12-30 PROCEDURE — 99233 SBSQ HOSP IP/OBS HIGH 50: CPT | Performed by: INTERNAL MEDICINE

## 2019-12-30 PROCEDURE — 94640 AIRWAY INHALATION TREATMENT: CPT

## 2019-12-30 PROCEDURE — 2500000003 HC RX 250 WO HCPCS: Performed by: SURGERY

## 2019-12-30 PROCEDURE — 97162 PT EVAL MOD COMPLEX 30 MIN: CPT

## 2019-12-30 PROCEDURE — 80202 ASSAY OF VANCOMYCIN: CPT

## 2019-12-30 PROCEDURE — 6370000000 HC RX 637 (ALT 250 FOR IP): Performed by: SURGERY

## 2019-12-30 PROCEDURE — 85025 COMPLETE CBC W/AUTO DIFF WBC: CPT

## 2019-12-30 PROCEDURE — 99231 SBSQ HOSP IP/OBS SF/LOW 25: CPT | Performed by: SURGERY

## 2019-12-30 PROCEDURE — 97166 OT EVAL MOD COMPLEX 45 MIN: CPT

## 2019-12-30 PROCEDURE — 80048 BASIC METABOLIC PNL TOTAL CA: CPT

## 2019-12-30 PROCEDURE — 6370000000 HC RX 637 (ALT 250 FOR IP): Performed by: NURSE PRACTITIONER

## 2019-12-30 PROCEDURE — 6370000000 HC RX 637 (ALT 250 FOR IP): Performed by: INTERNAL MEDICINE

## 2019-12-30 PROCEDURE — 94761 N-INVAS EAR/PLS OXIMETRY MLT: CPT

## 2019-12-30 PROCEDURE — 6360000002 HC RX W HCPCS: Performed by: INTERNAL MEDICINE

## 2019-12-30 PROCEDURE — 2700000000 HC OXYGEN THERAPY PER DAY

## 2019-12-30 PROCEDURE — 1200000000 HC SEMI PRIVATE

## 2019-12-30 RX ORDER — METRONIDAZOLE 500 MG/1
500 TABLET ORAL EVERY 8 HOURS SCHEDULED
Qty: 30 TABLET | Refills: 0 | Status: SHIPPED | OUTPATIENT
Start: 2019-12-30 | End: 2020-01-10 | Stop reason: HOSPADM

## 2019-12-30 RX ORDER — METRONIDAZOLE 250 MG/1
500 TABLET ORAL EVERY 8 HOURS SCHEDULED
Status: DISCONTINUED | OUTPATIENT
Start: 2019-12-30 | End: 2020-01-09

## 2019-12-30 RX ADMIN — BUDESONIDE 500 MCG: 0.5 SUSPENSION RESPIRATORY (INHALATION) at 19:43

## 2019-12-30 RX ADMIN — OXYCODONE 10 MG: 5 TABLET ORAL at 19:17

## 2019-12-30 RX ADMIN — HYDROMORPHONE HYDROCHLORIDE 4 MG: 2 TABLET ORAL at 22:21

## 2019-12-30 RX ADMIN — HYDROMORPHONE HYDROCHLORIDE 4 MG: 2 TABLET ORAL at 04:39

## 2019-12-30 RX ADMIN — INSULIN LISPRO 1 UNITS: 100 INJECTION, SOLUTION INTRAVENOUS; SUBCUTANEOUS at 13:40

## 2019-12-30 RX ADMIN — PREGABALIN 50 MG: 25 CAPSULE ORAL at 10:14

## 2019-12-30 RX ADMIN — HYDROMORPHONE HYDROCHLORIDE 4 MG: 2 TABLET ORAL at 10:14

## 2019-12-30 RX ADMIN — METRONIDAZOLE 500 MG: 250 TABLET, FILM COATED ORAL at 21:28

## 2019-12-30 RX ADMIN — PREGABALIN 50 MG: 25 CAPSULE ORAL at 21:28

## 2019-12-30 RX ADMIN — ESCITALOPRAM OXALATE 10 MG: 10 TABLET ORAL at 10:15

## 2019-12-30 RX ADMIN — ALLOPURINOL 100 MG: 100 TABLET ORAL at 10:15

## 2019-12-30 RX ADMIN — METRONIDAZOLE 500 MG: 250 TABLET, FILM COATED ORAL at 13:17

## 2019-12-30 RX ADMIN — PREGABALIN 50 MG: 25 CAPSULE ORAL at 13:18

## 2019-12-30 RX ADMIN — SEVELAMER CARBONATE 1600 MG: 800 TABLET, FILM COATED ORAL at 17:07

## 2019-12-30 RX ADMIN — APIXABAN 2.5 MG: 2.5 TABLET, FILM COATED ORAL at 10:15

## 2019-12-30 RX ADMIN — HYDROMORPHONE HYDROCHLORIDE 4 MG: 2 TABLET ORAL at 17:07

## 2019-12-30 RX ADMIN — BUDESONIDE 500 MCG: 0.5 SUSPENSION RESPIRATORY (INHALATION) at 11:50

## 2019-12-30 RX ADMIN — LANSOPRAZOLE 30 MG: 30 TABLET, ORALLY DISINTEGRATING ORAL at 06:08

## 2019-12-30 RX ADMIN — APIXABAN 2.5 MG: 2.5 TABLET, FILM COATED ORAL at 21:28

## 2019-12-30 RX ADMIN — Medication 10 ML: at 21:28

## 2019-12-30 RX ADMIN — SEVELAMER CARBONATE 1600 MG: 800 TABLET, FILM COATED ORAL at 10:14

## 2019-12-30 RX ADMIN — Medication 10 ML: at 10:15

## 2019-12-30 RX ADMIN — OXYCODONE 10 MG: 5 TABLET ORAL at 06:08

## 2019-12-30 RX ADMIN — TAZOBACTAM SODIUM AND PIPERACILLIN SODIUM 3.38 G: 375; 3 INJECTION, SOLUTION INTRAVENOUS at 02:03

## 2019-12-30 RX ADMIN — ROSUVASTATIN CALCIUM 10 MG: 10 TABLET, FILM COATED ORAL at 10:15

## 2019-12-30 ASSESSMENT — ENCOUNTER SYMPTOMS
PHOTOPHOBIA: 0
STRIDOR: 0
TROUBLE SWALLOWING: 0
DIARRHEA: 0
COLOR CHANGE: 0
RHINORRHEA: 0
EYE DISCHARGE: 0
FACIAL SWELLING: 0
NAUSEA: 0
APNEA: 0
CHOKING: 0
COUGH: 0
ABDOMINAL PAIN: 0
SHORTNESS OF BREATH: 0
CHEST TIGHTNESS: 0
EYE REDNESS: 0
BLOOD IN STOOL: 0

## 2019-12-30 ASSESSMENT — PAIN SCALES - GENERAL
PAINLEVEL_OUTOF10: 9
PAINLEVEL_OUTOF10: 9
PAINLEVEL_OUTOF10: 8
PAINLEVEL_OUTOF10: 8
PAINLEVEL_OUTOF10: 10
PAINLEVEL_OUTOF10: 8
PAINLEVEL_OUTOF10: 10
PAINLEVEL_OUTOF10: 8
PAINLEVEL_OUTOF10: 7

## 2019-12-30 ASSESSMENT — PAIN DESCRIPTION - LOCATION
LOCATION: ABDOMEN;HIP
LOCATION: ABDOMEN

## 2019-12-30 ASSESSMENT — PAIN DESCRIPTION - ORIENTATION
ORIENTATION: LOWER
ORIENTATION: RIGHT;LEFT

## 2019-12-30 ASSESSMENT — PAIN DESCRIPTION - DESCRIPTORS: DESCRIPTORS: SHARP

## 2019-12-30 ASSESSMENT — PAIN DESCRIPTION - PAIN TYPE
TYPE: SURGICAL PAIN
TYPE: CHRONIC PAIN

## 2019-12-30 NOTE — PROGRESS NOTES
40 % oral gel 15 g, PRN  dextrose 50 % IV solution, PRN  glucagon (rDNA) injection 1 mg, PRN  dextrose 5 % solution, PRN  HYDROmorphone (DILAUDID) tablet 4 mg, Q6H  ondansetron (ZOFRAN) injection 4 mg, Q6H PRN  HYDROmorphone HCl PF (DILAUDID) injection 0.5 mg, Q2H PRN    Or  HYDROmorphone HCl PF (DILAUDID) injection 1 mg, Q2H PRN  oxyCODONE (ROXICODONE) immediate release tablet 5 mg, Q4H PRN    Or  oxyCODONE (ROXICODONE) immediate release tablet 10 mg, Q4H PRN  acetaminophen (TYLENOL) tablet 650 mg, Q4H PRN  diphenhydrAMINE (BENADRYL) injection 25 mg, Q6H PRN  Patient has home medications in pharmacy; please retrieve when patient is discharged. , Daily        Objective:  BP (!) 93/55   Pulse 80   Temp 96.8 °F (36 °C) (Temporal)   Resp 18   Ht 5' 3\" (1.6 m)   Wt (!) 331 lb 9.6 oz (150.4 kg)   LMP 11/01/1996 (Exact Date)   SpO2 99%   BMI 58.74 kg/m²     Intake/Output Summary (Last 24 hours) at 12/30/2019 1027  Last data filed at 12/30/2019 0615  Gross per 24 hour   Intake 840 ml   Output 1600 ml   Net -760 ml      Wt Readings from Last 3 Encounters:   12/30/19 (!) 331 lb 9.6 oz (150.4 kg)   12/16/19 (!) 326 lb (147.9 kg)   11/29/19 (!) 326 lb (147.9 kg)       General appearance:  Appears comfortable  Eyes: Sclera clear. Pupils equal.  ENT: Moist oral mucosa. Trachea midline, no adenopathy. Cardiovascular: Regular rhythm, normal S1, S2. No murmur. No edema in lower extremities  Respiratory: Not using accessory muscles. Good inspiratory effort. Clear to auscultation bilaterally, no wheeze or crackles. GI: Tenderness at the site of surgery  Musculoskeletal: No cyanosis in digits, neck supple  Neurology: CN 2-12 grossly intact. No speech or motor deficits  Psych: Normal affect. Alert and oriented in time, place and person  Skin: Warm, dry, normal turgor  Extremity exam shows brisk capillary refill.   Peripheral pulses are palpable in lower extremities     Labs and Tests:  CBC:   Recent Labs     12/28/19  0706

## 2019-12-30 NOTE — PROGRESS NOTES
failure) ([address] Chronic kidney disease, stage IV (severe) ([facility]), Chronic pain syndrome, Chronic respiratory failure with hypoxia ([address] CKD (chronic kidney disease) stage 4, GFR 15-29 ml/min ([facility]), Colon polyps, COPD (chronic obstructive pulmonary disease) ([facility]), Degenerative disc disease at L5-S1 level, Diabetes mellitus, insulin dependent (IDDM), uncontrolled ([address] Diabetic polyneuropathy associated with type 2 diabetes mellitus ([address] Elevated troponin, ESRD (end stage renal disease) on dialysis ([address] GERD (gastroesophageal reflux disease), Gout, History of blood transfusion, History of cervical cancer, Hyperlipidemia, Hypertension, Incarcerated ventral hernia, LVH (left ventricular hypertrophy), Morbid obesity ([address] Mucus plugging of bronchi, Obstructive sleep apnea on CPAP, Pneumonia, Psychiatric problem, Pulmonary embolism ([address] Type 2 diabetes mellitus with diabetic neuropathy, with long-term current use of insulin ([address] Urinary incontinence in female, and Venous stasis of lower extremity. has a past surgical history that includes jennifer and bso (cervix removed) (10/96); Knee arthroscopy (Right, 1999); doppler echocardiography (2/21/09); Colonoscopy (2010); Upper gastrointestinal endoscopy (6/25/13); Colonoscopy (6/25/13, 11/14); Cardiac catheterization (1/14); Hysterectomy; pr open skull supratent explore; ventral hernia repair (12/24/2016); other surgical history (02/22/2018); pr repair incisional hernia,reducible (N/A, 11/20/2018); Tunneled venous port placement (Right, 11/2019); Dialysis fistula creation (Right, 3/28/2019); shunt revision (Right, 7/18/2019); incision and drainage (Right, 8/22/2019); Colonoscopy (N/A, 10/10/2019); Upper gastrointestinal endoscopy (N/A, 11/21/2019); and Abdomen surgery (N/A, 12/26/2019).     Restrictions  Restrictions/Precautions  Restrictions/Precautions: Fall Risk, General Precautions(high fall risk, renal diet, 3 wound vacs)  Required Braces or Orthoses?: No  Position Activity Restriction  Other position/activity restrictions: Violet Gan is a 59 y.o. female presents the emergency department as a resident of [facility] with end-stage renal disease. Patient states that she is continuing to schneider difficulties as a pertains to wounds related to her calciphylaxis causing her pain and discomfort. Patient states that she was in the process of getting dialysis performed this morning and was only in a proximally 1 hour when she started having increasing pain and discomfort over the wounds on her right hip, abdominal wall, as well as her legs. I and D of wounds performed 12/26  Vision/Hearing  Vision: Impaired  Vision Exceptions: Wears glasses for reading  Hearing: Within functional limits     Subjective  General  Chart Reviewed: Yes  Family / Caregiver Present: No  Diagnosis: cellulitis  Follows Commands: Within Functional Limits  General Comment  Comments: supine in bed upon arrival  Subjective  Subjective: Alseep upon arrival. Reported 8/10 pain in abdomen and B hip wounds. Reported she did not need pain medication at this time (pain meds given 2 hours prior). Agreeable to therapy with encouragement.  Stating \"I don't feel like it\" and \"it will cause me too mcuh pain\"  Pain Screening  Patient Currently in Pain: Yes  Pain Assessment  Pain Assessment: 0-10  Pain Level: 8  Vital Signs  Patient Currently in Pain: Yes       Orientation     Social/Functional History  Social/Functional History  Lives With: Spouse(and daughter)  Type of Home: Apartment  Home Layout: One level  Home Access: Stairs to enter without rails  Entrance Stairs - Number of Steps: 16 [address]  Bathroom Shower/Tub: Tub/Shower unit  Bathroom Toilet: Standard  Bathroom Equipment: Grab bars in shower, Shower chair  Home Equipment: Cane  ADL Assistance: Independent  Homemaking Responsibilities: No(spouse)  Ambulation Assistance: Independent(with SPC)  Active : No  Patient's  Info:  drives; pt reports Minutes       Martín Santillan, PT    Thanks, Martín Santillan, PT, DPT [id number]

## 2019-12-30 NOTE — PROGRESS NOTES
Pharmacy dosing vanco:    Random vanco level this am is 30. No dose today. Next HD session will be tomorrow am (due to holiday schedule) so will order new random vanco level for 12/31 with am labs for further dosing. Will continue to monitor daily and adjust as needed. Thank you for allowing us to participate in the care of this patient.     Amina Whitney, PharmD, BCPS   x [phone]

## 2019-12-30 NOTE — PLAN OF CARE
Problem: Falls - Risk of:  Goal: Will remain free from falls  Description  Will remain free from falls  Outcome: Ongoing     Problem: Risk for Impaired Skin Integrity  Goal: Tissue integrity - skin and mucous membranes  Description  Structural intactness and normal physiological function of skin and  mucous membranes. Outcome: Ongoing  Note:   3 wound vac in place. General surgery follows  Bariatric bed in place. Pt encouraged to turn self. Foam dressings and moisture barrier cream applied to arlene area and inner thigh wounds. HOB<20 degree  Foot of bed elevated.      Problem: Nutrition  Goal: Optimal nutrition therapy  Outcome: Ongoing

## 2019-12-30 NOTE — PROGRESS NOTES
BILITOT 0.3   ALKPHOS 59     Amylase: No results for input(s): AMYLASE in the last 72 hours. Lipase: No results for input(s): LIPASE in the last 72 hours. Mag:    No results for input(s): MG in the last 72 hours. Phos:   No results for input(s): PHOS in the last 72 hours. Coags: No results for input(s): INR, APTT in the last 72 hours. Cultures:  Anaerobic culture  No results for input(s): LABANAE in the last 72 hours. Blood culture  No results for input(s): BC in the last 72 hours. Blood culture 2  No results for input(s): BLOODCULT2 in the last 72 hours. Body fluid culture  No results for input(s): BLOODCULT2 in the last 72 hours. Surgical culture  No results for input(s): CXSURG in the last 72 hours. Fecal occult  No results for input(s): OCCULTBLDFEC in the last 72 hours. Gram stain  No results for input(s): LABGRAM in the last 72 hours. Stool culture 1  No results for input(s): CXST in the last 72 hours. Stool culture 2  No results for input(s): STOOLCULT2 in the last 72 hours. Urine culture  No results for input(s): LABURIN in the last 72 hours. Wound abscess  No results for input(s): WNDABS in the last 72 hours. C Diff   No results for input(s): CDIFFTOXAB in the last 72 hours. Pathology:   pending    Imaging:  I have personally reviewed the following films:    Ct Abdomen Pelvis Wo Contrast Additional Contrast? None    Result Date: 12/26/2019  EXAMINATION: CT OF THE ABDOMEN AND PELVIS WITHOUT CONTRAST 12/26/2019 8:46 am TECHNIQUE: CT of the abdomen and pelvis was performed without the administration of intravenous contrast. Multiplanar reformatted images are provided for review. Dose modulation, iterative reconstruction, and/or weight based adjustment of the mA/kV was utilized to reduce the radiation dose to as low as reasonably achievable.  COMPARISON: 11/29/2019 HISTORY: ORDERING SYSTEM PROVIDED HISTORY: abdominal wall wound TECHNOLOGIST PROVIDED HISTORY: No osseous destructive lesion is seen. DEGENERATIVE CHANGES: There is mild disc space narrowing and endplate osteophyte formation at the mid and lower thoracic levels. SOFT TISSUES: No paraspinal mass is seen. Mild multilevel thoracic spondylosis. Ct Lumbar Spine Wo Contrast    Result Date: 11/29/2019  EXAMINATION: CT OF THE LUMBAR SPINE WITHOUT CONTRAST  11/29/2019 TECHNIQUE: CT of the lumbar spine was performed without the administration of intravenous contrast. Multiplanar reformatted images are provided for review. Dose modulation, iterative reconstruction, and/or weight based adjustment of the mA/kV was utilized to reduce the radiation dose to as low as reasonably achievable. COMPARISON: None HISTORY: ORDERING SYSTEM PROVIDED HISTORY: fall injury TECHNOLOGIST PROVIDED HISTORY: Reason for exam:->fall injury Reason for Exam: Fatigue (Pt. comes in today by [facility] from dialysis. Pt. received an hour and a half of her treatment and then began to fell weakness. Pt. was getting ready to leave and then she slid down to the floor. Pt. complaining of pain to her left back side.) Acuity: Unknown Type of Exam: Unknown FINDINGS: BONES/ALIGNMENT: There is normal alignment of the spine. The vertebral body heights are maintained. No osseous destructive lesion is seen. DEGENERATIVE CHANGES: There is severe disc space narrowing and endplate osteophyte formation at the L5/S1 level along with a moderate size central calcified disc extrusion/herniation which indents the anterior aspect of the thecal sac resulting in severe central canal and bilateral neural foraminal encroachment. SOFT TISSUES/RETROPERITONEUM: No paraspinal mass is seen. 1. No acute abnormality. 2. Advanced L5/S1 spondylosis with associated central canal and neural foraminal stenosis.      Xr Chest Portable    Result Date: 12/26/2019  EXAMINATION: ONE XRAY VIEW OF THE CHEST 12/26/2019 7:52 am COMPARISON: 12/16/2019 HISTORY: [address] PROVIDED HISTORY: sob TECHNOLOGIST PROVIDED HISTORY: Reason for exam:->sob Reason for Exam: Leg Pain ([facility] sttaes she has wounds on bilat legs/inner thighs for months and gets daily wound care at [facility], but today the pain is worse. ) Acuity: Acute Type of Exam: Initial FINDINGS: The lungs are without acute focal process. Right IJ Port-A-Cath is unchanged in position. There is no effusion or pneumothorax. The cardiomediastinal silhouette is stable. The osseous structures are stable. No acute process. Xr Hip 2-3 Vw W Pelvis Left    Result Date: 11/29/2019  EXAMINATION: ONE XRAY VIEW OF THE PELVIS AND TWO XRAY VIEWS LEFT HIP 11/29/2019 8:34 am COMPARISON: None HISTORY: ORDERING SYSTEM PROVIDED HISTORY: fall/injury TECHNOLOGIST PROVIDED HISTORY: Reason for exam:->fall/injury Reason for Exam: Fatigue (Pt. comes in today by [facility] from dialysis. Pt. received an hour and a half of her treatment and then began to fell weakness. Pt. was getting ready to leave and then she slid down to the floor. Pt. complaining of pain to her left back side.) Acuity: Unknown Type of Exam: Unknown FINDINGS: No acute fracture or dislocation is identified. Mild osteoarthritis is present involving the bilateral hips. There is no evidence of AVN or erosion. The bilateral SI joints and pubic symphysis are normal in alignment. Bone mineral density appears decreased. 1. No acute osseous abnormality. 2. Mild osteoarthritis involving the bilateral hips.          Scheduled Meds:   budesonide  500 mcg Nebulization BID    sodium chloride  250 mL Intravenous Once    sodium thiosulfate IVPB  25 g Intravenous Q MWF    lidocaine   Topical Once    lidocaine-EPINEPHrine  20 mL Intradermal Once    silver nitrate applicators  1 each Topical Once    allopurinol  100 mg Oral Daily    escitalopram  10 mg Oral Daily    lansoprazole  30 mg Oral QAM AC    pregabalin  50 mg Oral TID    rosuvastatin  10 mg Oral Daily    sevelamer  1,600 mg Oral TID WC    insulin lispro  0-6 Units Subcutaneous TID WC    insulin lispro  0-3 Units Subcutaneous Nightly    sodium chloride flush  10 mL Intravenous 2 times per day    piperacillin-tazobactam  3.375 g Intravenous Q12H    vancomycin (VANCOCIN) intermittent dosing (placeholder)   Other RX Placeholder    apixaban  2.5 mg Oral BID    HYDROmorphone  4 mg Oral Q6H    Empty 3-in-1 Mixing Container  1 each Does not apply Daily     Continuous Infusions:   dextrose       PRN Meds:.midodrine, lidocaine PF, albumin human, polyethylene glycol, albuterol sulfate HFA, traZODone, sodium chloride flush, magnesium hydroxide, glucose, dextrose, glucagon (rDNA), dextrose, ondansetron, HYDROmorphone **OR** HYDROmorphone, oxyCODONE **OR** oxyCODONE, acetaminophen, diphenhydrAMINE      Assessment:  Patient Active Problem List   Diagnosis    Moderate asthma with exacerbation    Colon polyps    Microalbuminuria    Venous stasis of lower extremity    Obstructive sleep apnea on CPAP    Chronic diastolic CHF (congestive heart failure) (HCC)    Renal osteodystrophy    Poorly controlled type 2 diabetes mellitus (HCC)    History of pulmonary embolism    Anemia of chronic kidney failure (HCC)    Primary osteoarthritis of both knees    Essential hypertension, malignant    Restrictive lung disease    Gastroesophageal reflux disease    LVH (left ventricular hypertrophy)    Dyspnea and respiratory abnormalities    Acute on chronic respiratory failure with hypoxia and hypercapnia (HCC)    Obesity hypoventilation syndrome ([address]    Morbid obesity due to excess calories (HCC)    COPD, moderate (HCC)    Gout    Warfarin-induced coagulopathy (HCC)    Chronic hypoxemic respiratory failure (HCC)    Constipation    DM (diabetes mellitus), secondary, uncontrolled, w/neurologic complic ([address]    Diabetes education, encounter for    Primary osteoarthritis of left hip  ESRD (end stage renal disease) [facility] dialysis ([facility]    Chronic pain syndrome    Chest pain    Recurrent ventral hernia    Hypoglycemia    ESRD on hemodialysis ([facility]    Diabetic polyneuropathy associated with type 2 diabetes mellitus ([facility]    Infection of arteriovenous graft for hemodialysis ([facility]    Essential hypertension    Dialysis patient ([facility]    Postoperative pain    Other complication of arteriovenous dialysis fistula (HCC)    Weight loss counseling, encounter for    Respiratory failure ([facility]    Chronic obstructive pulmonary disease ([facility]    Pulmonary embolism (HCC)    Chronic pain of left lower extremity    Severe anemia    Cerebrovascular accident (CVA) ([facility]    HTN (hypertension), benign    Vision loss of right eye    Open wound    Calciphylaxis    Cellulitis    Abdominal wall cellulitis    Opiate dependence, continuous ([facility]    History of arterial ischemic stroke     OR Date 12/26/19, Sharp excisional debridement of skin, subcutaneous tissue of open infected abdominal wound to final wound dimensions 16x7cm (112 sq cm), Sharp excisional debridement of skin, subcutaneous tissue of stage 3 right hip decubitus ulcer to final wound dimensions 11.5x6cm (69 sq cm), Sharp excisional debridement of skin, subcutaneous tissue of stage 3 left hip decubitus ulcer to final wound dimensions of 35c42kt (286 sq cm), Total debridement: 467 sq cm  Bilateral hip stage 3 decubitus ulcers  Open abdominal wound, infected  ESRD on HD  CHF  COPD  Diabetes  Morbid obesity, BMI 58.4 this admission  CVA  Pulmonary embolism  Medical coagulopathy, Eliquis     Plan:  1. Continue wound vac, change 3 times per week, will follow in wound clinic weekly after discharge  2. Pathology pending regarding calciphylaxis  3. Antibiotics per ID  4. May continue anticoagulation from surgical standpoint  5. Pain control, has chronic pain, minimize narcotics as able  6.  Defer management of remainder of medical comorbodities to primary and consulting   7. Discharge planning, likely will need skilled facility after discharge given patient's complexity, may discharge from surgical standpoint when medically stable    Pop Mccrary MD, FACS  12/30/2019  8:44 AM

## 2019-12-30 NOTE — CARE COORDINATION
Per Yane Vega, pt does not meet criteria. SW left message for Nemesio Pat in admissions, reporting this.     Ben Segovia MSW, [address]

## 2019-12-30 NOTE — PROGRESS NOTES
Occupational Therapy   Occupational Therapy Initial Assessment  Date: 2019   Patient Name: Magrie Lopez  MRN: [id number]     : 1955    Date of Service: 2019    Discharge Recommendations: Margie Lopez scored a 12/24 on the AM-PAC ADL Inpatient form. Current research shows that an AM-PAC score of 17 or less is typically not associated with a discharge to the patient's home setting. Based on the patients AM-PAC score and their current ADL deficits, it is recommended that the patient have 3-5 sessions per week of Occupational Therapy at d/c to increase the patients independence. OT Equipment Recommendations  Equipment Needed: No  Other: TBD by next level of care. Assessment   Performance deficits / Impairments: Decreased functional mobility ; Decreased ADL status; Decreased endurance;Decreased sensation  Assessment: Pt is not at her baseline level of occupational function, based on the above deficits associated with cellulitis. Pt would benefit from continued skilled acute OT services to address the above deficits. Treatment Diagnosis: Decreased ADL status, fuctional mobility, endurance and sensation associated with cellulits  Prognosis: Good  Decision Making: Medium Complexity  History: Pt 60 yo, lives w/family in apt w/[address]. Pt has been at [facility] for 6 wks after previous hospitalization. Reports assist w/ADLs, ambulating short distances w/therapy at SNF. PMH: DM2, morbid obesity, incarcerated ventral hernia, cervical Ca, ESRD, HD, COPD, CKD, chronic respiratory failure, multiple additional medical issues.   Exam: ROM, MMT, 6 clicks, 4 performance deficits/impairments, evolving presentation  Assistance / Modification: ROM, MMT, 6 clicks, Max A of 2 for bed mobility  Patient Education: OT eval, POC, importance of changing position, sitting up on EOB  Barriers to Learning: Physical  REQUIRES OT FOLLOW UP: Yes  Activity Tolerance  Activity Tolerance: Patient limited by pain  Safety Devices  Safety Devices in place: Yes  Type of devices: Call light within reach; Left in bed;Gait belt;Nurse notified(Air bed without bed alarm; RN aware.)           Patient Diagnosis(es): The primary encounter diagnosis was Abdominal wall cellulitis. Diagnoses of ESRD on hemodialysis ([address] Calciphylaxis, and Opiate dependence, continuous ([address] were also pertinent to this visit. has a past medical history of Abdominal pain, Acute on chronic congestive heart failure ([address] Asthma, Calcinosis cutis, Calciphylaxis, Cervical cancer ([facility]), Chest pain, CHF (congestive heart failure) ([address] Chronic kidney disease, stage IV (severe) ([facility]), Chronic pain syndrome, Chronic respiratory failure with hypoxia ([address] CKD (chronic kidney disease) stage 4, GFR 15-29 ml/min ([facility]), Colon polyps, COPD (chronic obstructive pulmonary disease) ([address] Degenerative disc disease at L5-S1 level, Diabetes mellitus, insulin dependent (IDDM), uncontrolled ([address] Diabetic polyneuropathy associated with type 2 diabetes mellitus ([address] Elevated troponin, ESRD (end stage renal disease) on dialysis ([address] GERD (gastroesophageal reflux disease), Gout, History of blood transfusion, History of cervical cancer, Hyperlipidemia, Hypertension, Incarcerated ventral hernia, LVH (left ventricular hypertrophy), Morbid obesity ([address] Mucus plugging of bronchi, Obstructive sleep apnea on CPAP, Pneumonia, Psychiatric problem, Pulmonary embolism ([address] Type 2 diabetes mellitus with diabetic neuropathy, with long-term current use of insulin ([address] Urinary incontinence in female, and Venous stasis of lower extremity. has a past surgical history that includes jennifer and bso (cervix removed) (10/96); Knee arthroscopy (Right, 1999); doppler echocardiography (2/21/09); Colonoscopy (2010); Upper gastrointestinal endoscopy (6/25/13); Colonoscopy (6/25/13, 11/14); Cardiac catheterization (1/14);  Hysterectomy; pr open skull supratent explore; ventral hernia repair (12/24/2016); other surgical history (02/22/2018); pr repair incisional hernia,reducible (N/A, 11/20/2018); Tunneled venous port placement (Right, 11/2019); Dialysis fistula creation (Right, 3/28/2019); shunt revision (Right, 7/18/2019); incision and drainage (Right, 8/22/2019); Colonoscopy (N/A, 10/10/2019); Upper gastrointestinal endoscopy (N/A, 11/21/2019); and Abdomen surgery (N/A, 12/26/2019). Treatment Diagnosis: Decreased ADL status, fuctional mobility, endurance and sensation associated with cellulits      Restrictions  Restrictions/Precautions  Restrictions/Precautions: Fall Risk, General Precautions(high fall risk, renal diet, 3 wound vacs)  Required Braces or Orthoses?: No  Position Activity Restriction  Other position/activity restrictions: Hardy Cardona is a 59 y.o. female presents the emergency department as a resident of [facility] with end-stage renal disease. Patient states that she is continuing to schneider difficulties as a pertains to wounds related to her calciphylaxis causing her pain and discomfort. Patient states that she was in the process of getting dialysis performed this morning and was only in a proximally 1 hour when she started having increasing pain and discomfort over the wounds on her right hip, abdominal wall, as well as her legs. I and D of wounds performed 12/26    Subjective   General  Chart Reviewed: Yes  Family / Caregiver Present: No  Referring Practitioner: Claudeen Nap, MD, for d/c planning  Diagnosis: Cellulitis  Subjective  Subjective: Pt supine in bed, resistant to OT/PT eval, despite max encouragement. Pt reported feeling \"tired. \" \" I tried this the other day, and it was too painful. I just can't do it today. \"  Patient Currently in Pain: Yes  Pain Assessment  Pain Assessment: 0-10  Pain Level: 8  Pain Type: Chronic pain  Pain Location: Abdomen; Hip  Pain Orientation: Right;Left  Pre Treatment Pain Screening  Intervention List: Patient able to continue with

## 2019-12-30 NOTE — PROGRESS NOTES
Infectious Diseases   Progress Note      Admission Date: 12/26/2019  Hospital Day: Hospital Day: 5   Attending: Claudeen Nap, MD  Date of service: 12/30/2019     Chief complaint/ Reason for consult: The patient was seen today for the following:    · Open abdominal wound, concerning for calciphylaxis  · Open bilateral hip decubitus wounds with skin necrosis  · ESRD on hemodialysis  · History of stroke  · Severe anemia    Microbiology:        I have reviewed allavailable micro lab data and cultures    · Blood culture (2/2) - collected on 12/26/2019: Negative  Abdominal wound surgical culture  - collected on 12/26/2019: Diphtheroids, Bacteroides    Antibiotics and immunizations:       Current antibiotics: All antibiotics and their doses were reviewed by me    Recent Abx Admin                   piperacillin-tazobactam (ZOSYN) 3.375 g in dextrose 50 mL IVPB extended infusion (premix) (g) 3.375 g New Bag 12/30/19 0203     3.375 g New Bag 12/29/19 1403    vancomycin (VANCOCIN) 1000 mg in dextrose 5% 200 mL IVPB (mg) 1,000 mg New Bag 12/29/19 1748                  Immunization History: All immunization history was reviewed by me today. Immunization History   Administered Date(s) Administered    Influenza Vaccine, unspecified formulation 09/18/2016    Influenza Virus Vaccine 11/01/2010, 10/25/2011, 09/10/2014, 09/18/2015, 10/17/2018, 10/17/2018    Influenza, Intradermal, Preservative free 09/19/2012, 10/16/2013    Influenza, Intradermal, Quadrivalent, Preservative Free 10/25/2017    Influenza, Quadv, IM, PF (6 mo and older Fluzone, Flulaval, Fluarix, and 3 yrs and older Afluria) 09/26/2019    Pneumococcal Conjugate 13-valent ([id number]) 08/25/2016    Pneumococcal Conjugate 7-valent (Prevnar7) 11/01/2010    Pneumococcal Polysaccharide ([id number]) 09/18/2015, 09/27/2018    Tdap (Boostrix, Adacel) 05/31/2011    Zoster Live (Zostavax) 02/19/2015       Known drug allergies:      All allergies were reviewed CPAP, Pneumonia, Psychiatric problem, Pulmonary embolism ([facility] (02/06/2013), Type 2 diabetes mellitus with diabetic neuropathy, with long-term current use of insulin ([facility] (9/12/2017), Urinary incontinence in female, and Venous stasis of lower extremity. with following problems:    · Open abdominal wound, concerning for calciphylaxis -cultures growing Bacteroides  · Open bilateral hip decubitus wounds with skin necrosis  · ESRD on hemodialysis  · History of stroke  · Severe anemia  · History of pulmonary embolism  · COPD  · Poorly controlled type 2 diabetes mellitus-needs good glycemic control  · Diabetic nephropathy  · Obesity Class 3 due to excess calorie intake : Body mass index is 58.74 kg/m². · GERD      Discussion:      Blood cultures from 12/26/2019 remain negative. Abdominal wound culture from 12/26/2019 has a light growth of diphtheroids and Bacteroides. She has been receiving IV vancomycin and IV Zosyn at hemodialysis doses. Vancomycin random level is 30 today    She is immune to hepatitis B    Plan:     Diagnostic Workup:    · Continue to follow  fever curve, WBC count and blood cultures  · Follow up on abdominal wound cultures  · Follow-up on surgical path of abdominal biopsy tissue    Antimicrobials:    · No evidence of MRSA. Further IV vancomycin will not be repeated we will stop it today  · We will stop IV Zosyn today  · Will order p.o. Flagyl 500 mg every 8 hours  · If the patient were to get discharged, I will recommend a 10-day course of Flagyl  · Discussed the importance of antibiotic compliance  · No alcohol while on Flagyl  · Maintain good glycemic control with abdominal wound care  · DVT prophylaxis  · Discussed all above with patient and RN      Drug Monitoring:    · Continue monitoring for antibiotic toxicity as follows: CMP  · Continue to watch for following: new or worsening fever, new hypotension, hives, lip swelling and redness or purulence at vascular access sites.      I/v labs and old records have been reviewed by me. CBC:  Recent Labs     12/28/19  0706 12/29/19  0540 12/30/19  0610   WBC 9.9 9.4 8.3   RBC 2.88* 2.91* 2.94*   HGB 8.7* 8.9* 8.9*   HCT 27.0* 27.3* 27.4*    207 194   MCV 93.6 93.7 93.3   MCH 30.1 30.6 30.3   MCHC 32.1 32.6 32.5   RDW 20.6* 20.5* 20.1*        BMP:  Recent Labs     12/28/19  0706 12/29/19  0540 12/30/19  0610    140 138   K 4.0 4.3 3.7   CL 99 98* 95*   CO2 24 24 32   BUN 19 23* 15   CREATININE 4.9* 6.8* 4.6*   CALCIUM 9.1 8.9 8.4   GLUCOSE 109* 99 105*        Hepatic Function Panel:   Lab Results   Component Value Date    ALKPHOS 59 12/28/2019    ALT 14 12/28/2019    AST 19 12/28/2019    PROT 5.9 12/28/2019    PROT 6.6 03/05/2013    BILITOT 0.3 12/28/2019    BILIDIR 0.2 10/16/2014    IBILI 0.1 10/16/2014    LABALBU 2.4 12/28/2019       CPK:   Lab Results   Component Value Date    CKTOTAL 129 07/11/2017     ESR:   Lab Results   Component Value Date    SEDRATE >130 (H) 12/26/2019     CRP: No results found for: CRP        Imaging: All pertinent images and reports for the current visit were reviewed by me during this visit. CT ABDOMEN PELVIS WO CONTRAST Additional Contrast? None   Final Result   1. Incompletely imaged panniculitis; correlate with direct inspection. XR CHEST PORTABLE   Final Result   No acute process. Medications: All current and past medications were reviewed.      metroNIDAZOLE  500 mg Oral 3 times per day    budesonide  500 mcg Nebulization BID    sodium chloride  250 mL Intravenous Once    sodium thiosulfate IVPB  25 g Intravenous Q MWF    lidocaine   Topical Once    lidocaine-EPINEPHrine  20 mL Intradermal Once    silver nitrate applicators  1 each Topical Once    allopurinol  100 mg Oral Daily    escitalopram  10 mg Oral Daily    lansoprazole  30 mg Oral QAM AC    pregabalin  50 mg Oral TID    rosuvastatin  10 mg Oral Daily    sevelamer  1,600 mg Oral TID WC    insulin lispro  0-6

## 2019-12-30 NOTE — DISCHARGE INSTR - COC
Venous stasis of lower extremity I87.8    Obstructive sleep apnea on CPAP G47.33, Z99.89    Chronic diastolic CHF (congestive heart failure) ([facility]) I50.32    Renal osteodystrophy N25.0    Poorly controlled type 2 diabetes mellitus ([facility]) E11.65    History of pulmonary embolism Z86.711    Anemia of chronic kidney failure ([facility]) N18.9, D63.1    Primary osteoarthritis of both knees M17.0    Essential hypertension, malignant I10    Restrictive lung disease J98.4    Gastroesophageal reflux disease K21.9    LVH (left ventricular hypertrophy) I51.7    Dyspnea and respiratory abnormalities R06.00, R06.89    Acute on chronic respiratory failure with hypoxia and hypercapnia ([facility]) J96.21, J96.22    Obesity hypoventilation syndrome ([facility]) E66.2    Morbid obesity due to excess calories ([facility]) E66.01    COPD, moderate ([facility]) J44.9    Gout M10.9    Warfarin-induced coagulopathy ([facility]) D68.32, T45.515A    Chronic hypoxemic respiratory failure ([facility]) J96.11    Constipation K59.00    DM (diabetes mellitus), secondary, uncontrolled, w/neurologic complic ([facility]) R23.53, T71.36    Diabetes education, encounter for Z71.89    Primary osteoarthritis of left hip M16.12    ESRD (end stage renal disease) on dialysis ([facility]) N18.6, Z99.2    Chronic pain syndrome G89.4    Chest pain R07.9    Recurrent ventral hernia K43.2    Hypoglycemia E16.2    ESRD on hemodialysis ([facility]) N18.6, Z99.2    Diabetic polyneuropathy associated with type 2 diabetes mellitus ([address] E11.42    Infection of arteriovenous graft for hemodialysis ([facility] T82. 7XXA    Essential hypertension I10    Dialysis patient ([address] Z99.2    Postoperative pain U80.96    Other complication of arteriovenous dialysis fistula ([facility]) T82.898A    Weight loss counseling, encounter for Z71.3    Respiratory failure ([facility] J96.90    Chronic obstructive pulmonary disease ([facility]) J44.9    Pulmonary embolism ([facility]) I26.99    Chronic pain of left lower extremity M79.605, G89.29    Severe anemia D64.9    Cerebrovascular accident (CVA) ([facility] I63.9    HTN (hypertension), benign I10    Vision loss of right eye H54.61    Open wound T14. 8XXA    Calciphylaxis E83.59    Cellulitis L03.90    Abdominal wall cellulitis L03.311    Opiate dependence, continuous (HCC) F11.20    History of arterial ischemic stroke Z86.73       Isolation/Infection:   Isolation          No Isolation        Patient Infection Status     None to display          Nurse Assessment:  Last Vital Signs: BP (!) 93/55   Pulse 80   Temp 96.8 °F (36 °C) (Temporal)   Resp 18   Ht 5' 3\" (1.6 m)   Wt (!) 331 lb 9.6 oz (150.4 kg)   LMP 11/01/1996 (Exact Date)   SpO2 99%   BMI 58.74 kg/m²     Last documented pain score (0-10 scale): Pain Level: 8  Last Weight:   Wt Readings from Last 1 Encounters:   12/30/19 (!) 331 lb 9.6 oz (150.4 kg)     Mental Status:  oriented and alert    IV Access:  - Dialysis Catheter  - site  left and Upper arm fistula, insertion date: n/a  - Port-a-cath    Nursing Mobility/ADLs:  Walking   Assisted  Transfer  Assisted  Bathing  Assisted  Dressing  Assisted  Toileting  Assisted  Feeding  Independent  Med Admin  Independent  Med Delivery   whole    Wound Care Documentation and Therapy:  Negative Pressure Wound Therapy Hip Left (Active)   Wound Type Pressure ulcer: Stage III 12/30/2019  4:46 AM   Dressing Type Black foam 12/30/2019  4:46 AM   Number of pieces used 1 12/28/2019  8:00 AM   Target Pressure (mmHg) 125 12/30/2019  4:46 AM   Dressing Status Clean;Dry; Intact 12/30/2019  4:46 AM   Number of days: 1       Negative Pressure Wound Therapy Abdomen Lower;Medial (Active)   Wound Type Pressure ulcer: Stage III 12/30/2019  4:46 AM   Dressing Type Black foam 12/30/2019  4:46 AM   Number of pieces used 2 12/28/2019  8:00 AM   Target Pressure (mmHg) 125 12/30/2019  4:46 AM   Dressing Status Clean;Dry; Intact 12/30/2019  4:46 AM   Number of days: 1       Negative Pressure Wound Therapy Hip Left (Active)

## 2019-12-31 LAB
ANION GAP SERPL CALCULATED.3IONS-SCNC: 14 MMOL/L (ref 3–16)
BASOPHILS ABSOLUTE: 0.1 K/UL (ref 0–0.2)
BASOPHILS RELATIVE PERCENT: 0.6 %
BUN BLDV-MCNC: 21 MG/DL (ref 7–20)
CALCIUM SERPL-MCNC: 9 MG/DL (ref 8.3–10.6)
CHLORIDE BLD-SCNC: 93 MMOL/L (ref 99–110)
CO2: 29 MMOL/L (ref 21–32)
CREAT SERPL-MCNC: 6 MG/DL (ref 0.6–1.2)
EOSINOPHILS ABSOLUTE: 0.2 K/UL (ref 0–0.6)
EOSINOPHILS RELATIVE PERCENT: 2.2 %
GFR AFRICAN AMERICAN: 9
GFR NON-AFRICAN AMERICAN: 7
GLUCOSE BLD-MCNC: 104 MG/DL (ref 70–99)
GLUCOSE BLD-MCNC: 110 MG/DL (ref 70–99)
GLUCOSE BLD-MCNC: 121 MG/DL (ref 70–99)
GLUCOSE BLD-MCNC: 161 MG/DL (ref 70–99)
HCT VFR BLD CALC: 29.6 % (ref 36–48)
HEMOGLOBIN: 9.5 G/DL (ref 12–16)
LYMPHOCYTES ABSOLUTE: 1.2 K/UL (ref 1–5.1)
LYMPHOCYTES RELATIVE PERCENT: 13 %
MCH RBC QN AUTO: 30.1 PG (ref 26–34)
MCHC RBC AUTO-ENTMCNC: 32.2 G/DL (ref 31–36)
MCV RBC AUTO: 93.6 FL (ref 80–100)
MONOCYTES ABSOLUTE: 1 K/UL (ref 0–1.3)
MONOCYTES RELATIVE PERCENT: 10.8 %
NEUTROPHILS ABSOLUTE: 7 K/UL (ref 1.7–7.7)
NEUTROPHILS RELATIVE PERCENT: 73.4 %
PDW BLD-RTO: 20.4 % (ref 12.4–15.4)
PERFORMED ON: ABNORMAL
PLATELET # BLD: 208 K/UL (ref 135–450)
PMV BLD AUTO: 8.8 FL (ref 5–10.5)
POTASSIUM SERPL-SCNC: 3.9 MMOL/L (ref 3.5–5.1)
RBC # BLD: 3.16 M/UL (ref 4–5.2)
SODIUM BLD-SCNC: 136 MMOL/L (ref 136–145)
WBC # BLD: 9.5 K/UL (ref 4–11)

## 2019-12-31 PROCEDURE — 6370000000 HC RX 637 (ALT 250 FOR IP): Performed by: INTERNAL MEDICINE

## 2019-12-31 PROCEDURE — 85025 COMPLETE CBC W/AUTO DIFF WBC: CPT

## 2019-12-31 PROCEDURE — 80048 BASIC METABOLIC PNL TOTAL CA: CPT

## 2019-12-31 PROCEDURE — 1200000000 HC SEMI PRIVATE

## 2019-12-31 PROCEDURE — 2500000003 HC RX 250 WO HCPCS: Performed by: INTERNAL MEDICINE

## 2019-12-31 PROCEDURE — 99231 SBSQ HOSP IP/OBS SF/LOW 25: CPT | Performed by: INTERNAL MEDICINE

## 2019-12-31 PROCEDURE — 6370000000 HC RX 637 (ALT 250 FOR IP): Performed by: NURSE PRACTITIONER

## 2019-12-31 PROCEDURE — 6360000002 HC RX W HCPCS: Performed by: INTERNAL MEDICINE

## 2019-12-31 PROCEDURE — 6370000000 HC RX 637 (ALT 250 FOR IP): Performed by: SURGERY

## 2019-12-31 PROCEDURE — 94761 N-INVAS EAR/PLS OXIMETRY MLT: CPT

## 2019-12-31 PROCEDURE — 2700000000 HC OXYGEN THERAPY PER DAY

## 2019-12-31 PROCEDURE — 2580000003 HC RX 258: Performed by: SURGERY

## 2019-12-31 PROCEDURE — 99231 SBSQ HOSP IP/OBS SF/LOW 25: CPT | Performed by: SURGERY

## 2019-12-31 PROCEDURE — 90935 HEMODIALYSIS ONE EVALUATION: CPT

## 2019-12-31 PROCEDURE — 94640 AIRWAY INHALATION TREATMENT: CPT

## 2019-12-31 RX ORDER — ALBUMIN (HUMAN) 12.5 G/50ML
SOLUTION INTRAVENOUS
Status: DISCONTINUED
Start: 2019-12-31 | End: 2019-12-31 | Stop reason: WASHOUT

## 2019-12-31 RX ADMIN — ALLOPURINOL 100 MG: 100 TABLET ORAL at 11:46

## 2019-12-31 RX ADMIN — HYDROMORPHONE HYDROCHLORIDE 4 MG: 2 TABLET ORAL at 07:15

## 2019-12-31 RX ADMIN — DARBEPOETIN ALFA 100 MCG: 100 SOLUTION INTRAVENOUS; SUBCUTANEOUS at 08:16

## 2019-12-31 RX ADMIN — APIXABAN 2.5 MG: 2.5 TABLET, FILM COATED ORAL at 20:55

## 2019-12-31 RX ADMIN — SEVELAMER CARBONATE 1600 MG: 800 TABLET, FILM COATED ORAL at 17:12

## 2019-12-31 RX ADMIN — PREGABALIN 50 MG: 25 CAPSULE ORAL at 11:45

## 2019-12-31 RX ADMIN — APIXABAN 2.5 MG: 2.5 TABLET, FILM COATED ORAL at 11:46

## 2019-12-31 RX ADMIN — Medication 10 ML: at 20:56

## 2019-12-31 RX ADMIN — ESCITALOPRAM OXALATE 10 MG: 10 TABLET ORAL at 11:45

## 2019-12-31 RX ADMIN — INSULIN LISPRO 1 UNITS: 100 INJECTION, SOLUTION INTRAVENOUS; SUBCUTANEOUS at 21:09

## 2019-12-31 RX ADMIN — METRONIDAZOLE 500 MG: 250 TABLET, FILM COATED ORAL at 20:55

## 2019-12-31 RX ADMIN — PREGABALIN 50 MG: 25 CAPSULE ORAL at 13:30

## 2019-12-31 RX ADMIN — LIDOCAINE HYDROCHLORIDE 0.2 ML: 10 INJECTION, SOLUTION EPIDURAL; INFILTRATION; INTRACAUDAL; PERINEURAL at 06:24

## 2019-12-31 RX ADMIN — Medication 10 ML: at 11:47

## 2019-12-31 RX ADMIN — METRONIDAZOLE 500 MG: 250 TABLET, FILM COATED ORAL at 13:30

## 2019-12-31 RX ADMIN — METRONIDAZOLE 500 MG: 250 TABLET, FILM COATED ORAL at 11:45

## 2019-12-31 RX ADMIN — OXYCODONE 10 MG: 5 TABLET ORAL at 20:56

## 2019-12-31 RX ADMIN — HYDROMORPHONE HYDROCHLORIDE 4 MG: 2 TABLET ORAL at 17:12

## 2019-12-31 RX ADMIN — SEVELAMER CARBONATE 1600 MG: 800 TABLET, FILM COATED ORAL at 11:45

## 2019-12-31 RX ADMIN — HYDROMORPHONE HYDROCHLORIDE 4 MG: 2 TABLET ORAL at 11:46

## 2019-12-31 RX ADMIN — PREGABALIN 50 MG: 25 CAPSULE ORAL at 20:55

## 2019-12-31 RX ADMIN — MIDODRINE HYDROCHLORIDE 10 MG: 5 TABLET ORAL at 07:18

## 2019-12-31 RX ADMIN — BUDESONIDE 500 MCG: 0.5 SUSPENSION RESPIRATORY (INHALATION) at 20:25

## 2019-12-31 RX ADMIN — LANSOPRAZOLE 30 MG: 30 TABLET, ORALLY DISINTEGRATING ORAL at 11:47

## 2019-12-31 RX ADMIN — ROSUVASTATIN CALCIUM 10 MG: 10 TABLET, FILM COATED ORAL at 11:46

## 2019-12-31 ASSESSMENT — PAIN DESCRIPTION - ORIENTATION
ORIENTATION: RIGHT;LEFT
ORIENTATION: LOWER
ORIENTATION: RIGHT;LEFT
ORIENTATION: LOWER

## 2019-12-31 ASSESSMENT — PAIN SCALES - GENERAL
PAINLEVEL_OUTOF10: 8
PAINLEVEL_OUTOF10: 10
PAINLEVEL_OUTOF10: 8
PAINLEVEL_OUTOF10: 8

## 2019-12-31 ASSESSMENT — PAIN DESCRIPTION - DESCRIPTORS
DESCRIPTORS: SHARP

## 2019-12-31 ASSESSMENT — PAIN DESCRIPTION - LOCATION
LOCATION: GROIN;ABDOMEN
LOCATION: GROIN;ABDOMEN
LOCATION: ABDOMEN
LOCATION: ABDOMEN

## 2019-12-31 ASSESSMENT — PAIN DESCRIPTION - PAIN TYPE
TYPE: ACUTE PAIN
TYPE: SURGICAL PAIN
TYPE: ACUTE PAIN
TYPE: SURGICAL PAIN

## 2019-12-31 NOTE — CARE COORDINATION
The Plan for Transition of Care is related to the following treatment goals: improved functional mobility, wound management, adherence to dialysis    The Patient and/or patient representative was provided with a choice of provider and agrees   with the discharge plan. [x] Yes [] No    Freedom of choice list was provided with basic dialogue that supports the patient's individualized plan of care/goals, treatment preferences and shares the quality data associated with the providers. [x] Yes [] No    CM provided list of in network facilities to patient, discussed CMS reviews. CM will follow up with patient regarding her preferences. Chart reviewed for discharge planning. Barrier(s) to discharge- difficulty with finding a skilled nursing facility in network that can accept 3 wound vacs  Tentative discharge plan- skilled nursing facility  Tentative discharge date- when safe transition of care can occur and patient has an authorization through her insurance. *Case management will continue to follow progress and update discharge plan as needed.     Mina Graham, BSN, CCM, RN  [facility]  [phone]

## 2019-12-31 NOTE — PROGRESS NOTES
Hcl Other (See Comments)     Causes pt to have involuntary movements    Hydrocodone-Acetaminophen Itching and Rash    Percocet [Oxycodone-Acetaminophen] Itching    Procardia [Nifedipine] Nausea And Vomiting     Headache  Takes norvasc at home 12/22/15    Vicodin [Hydrocodone-Acetaminophen] Rash       Social history:     Social History:  All social andepidemiologic history was reviewed and updated by me today as needed. · Tobacco use:   reports that she has never smoked. She has never used smokeless tobacco.  · Alcohol use:   reports no history of alcohol use. · Currently lives in: [address] [place] [address]  ·  reports no history of drug use.        Assessment:     The patient is a 59 y.o. old female who  has a past medical history of Abdominal pain (8/20/2018), Acute on chronic congestive heart failure ([address] (6/26/2014), Asthma, Calcinosis cutis, Calciphylaxis (12/2/2019), Cervical cancer ([address] Chest pain (5/16/2018), CHF (congestive heart failure) ([address] Chronic kidney disease, stage IV (severe) ([facility]), Chronic pain syndrome (3/27/2018), Chronic respiratory failure with hypoxia ([address] CKD (chronic kidney disease) stage 4, GFR 15-29 ml/min ([facility]) (6/30/2017), Colon polyps, COPD (chronic obstructive pulmonary disease) ([address] Degenerative disc disease at L5-S1 level (6/18/2013 ), Diabetes mellitus, insulin dependent (IDDM), uncontrolled ([address] Diabetic polyneuropathy associated with type 2 diabetes mellitus ([address] (3/26/2019), Elevated troponin (9/9/2017), ESRD (end stage renal disease) on dialysis ([address] (02/14/2018), GERD (gastroesophageal reflux disease), Gout, History of blood transfusion, History of cervical cancer, Hyperlipidemia, Hypertension, Incarcerated ventral hernia, LVH (left ventricular hypertrophy), Morbid obesity ([facility]), Mucus plugging of bronchi, Obstructive sleep apnea on CPAP, Pneumonia, Psychiatric problem, Pulmonary embolism ([address] (02/06/2013), Type 2 diabetes mellitus with diabetic neuropathy, with through fever etc.  · Discussed patient's condition and what to expect. All of the patient's questions were addressed in a satisfactory manner and patient verbalized understanding all instructions. Thanks for allowing me to participate in your patient's care. I will sign off today, but will be available to answer any further questions or concerns that may arise during patient's stay in the hospital.          Subjective: Interval history: Patient was seen and examined at bedside. Interval history was obtained. She is oral Flagyl and tolerating it okay. No diarrhea. No fever. REVIEW OF SYSTEMS:     Review of Systems   Constitutional: Positive for fatigue. Negative for chills, diaphoresis and unexpected weight change. HENT: Negative for congestion, ear discharge, ear pain, facial swelling, hearing loss, rhinorrhea and trouble swallowing. Eyes: Negative for photophobia, discharge, redness and visual disturbance. Respiratory: Negative for apnea, cough, choking, chest tightness, shortness of breath and stridor. Cardiovascular: Negative for chest pain and palpitations. Gastrointestinal: Negative for abdominal pain, blood in stool, diarrhea and nausea. Endocrine: Negative for polydipsia, polyphagia and polyuria. Genitourinary: Negative for difficulty urinating, dysuria, frequency, hematuria, menstrual problem and vaginal discharge. Musculoskeletal: Negative for arthralgias, joint swelling, myalgias and neck stiffness. Skin: Negative for color change and rash. Allergic/Immunologic: Negative for immunocompromised state. Neurological: Negative for dizziness, seizures, speech difficulty, light-headedness and headaches. Hematological: Negative for adenopathy. Psychiatric/Behavioral: Negative for agitation, hallucinations and suicidal ideas. Past Medical History: All past medical history reviewed today.     Past Medical History:   Diagnosis Date    Abdominal pain 8/20/2018    Acute on chronic congestive heart failure (HCC) 6/26/2014    Asthma     Calcinosis cutis     Calciphylaxis 12/2/2019    Cervical cancer ([address]     Chest pain 5/16/2018    CHF (congestive heart failure) ([facility])     Chronic kidney disease, stage IV (severe) ([facility])     Dr Marjan Hinson Chronic pain syndrome 3/27/2018    Chronic respiratory failure with hypoxia ([facility])     CKD (chronic kidney disease) stage 4, GFR 15-29 ml/min ([facility]) 6/30/2017    Colon polyps     COPD (chronic obstructive pulmonary disease) ([address]     Degenerative disc disease at L5-S1 level 6/18/2013     CT    Diabetes mellitus, insulin dependent (IDDM), uncontrolled ([address]     Diabetic polyneuropathy associated with type 2 diabetes mellitus ([address] 3/26/2019    Elevated troponin 9/9/2017    ESRD (end stage renal disease) on dialysis ([address] 02/14/2018    M-W-F JOSE Grewal    GERD (gastroesophageal reflux disease)     Gout     History of blood transfusion     History of cervical cancer     Hyperlipidemia     Hypertension     Incarcerated ventral hernia     LVH (left ventricular hypertrophy)     Morbid obesity ([facility])     Mucus plugging of bronchi     Obstructive sleep apnea on CPAP     wears 2L O2 and BIPAP at night    Pneumonia     Psychiatric problem     breakdown long time ago but not current    Pulmonary embolism ([address] 02/06/2013    Type 2 diabetes mellitus with diabetic neuropathy, with long-term current use of insulin ([address] 9/12/2017    Urinary incontinence in female     Venous stasis of lower extremity        Past Surgical History: All past surgical history was reviewed today.     Past Surgical History:   Procedure Laterality Date    ABDOMEN SURGERY N/A 12/26/2019    INCISION AND DRAINAGE OF ABDOMINAL WOUND AND BILATERAL HIP WOUNDS performed by Lolita Oviedo MD at [facility]  1/14    Grace; clean cors    COLONOSCOPY  2010    polyp removed; Dierdre Bur; repeat 5 years    COLONOSCOPY  6/25/13, 11/14 12/31/19 0201 12/31/19 0625 12/31/19 1026 12/31/19 1130   BP: 117/66 109/72 139/84 (!) 111/56   Pulse: 84 81 92 80   Resp: 18 18 18 18   Temp: 96.5 °F (35.8 °C) 98.6 °F (37 °C) 97.4 °F (36.3 °C) 96.2 °F (35.7 °C)   TempSrc: Temporal   Temporal   SpO2: 100%   100%   Weight:  297 lb 9.9 oz (135 kg) 297 lb 2.9 oz (134.8 kg)    Height:           Physical Exam  Vitals signs and nursing note reviewed. Constitutional:       General: She is not in acute distress. Appearance: She is well-developed. She is not diaphoretic. HENT:      Head: Normocephalic. Right Ear: External ear normal.      Left Ear: External ear normal.      Nose: Nose normal.   Eyes:      General: No scleral icterus. Right eye: No discharge. Left eye: No discharge. Conjunctiva/sclera: Conjunctivae normal.      Pupils: Pupils are equal, round, and reactive to light. Neck:      Musculoskeletal: Normal range of motion and neck supple. Cardiovascular:      Rate and Rhythm: Normal rate and regular rhythm. Heart sounds: No murmur. No friction rub. Pulmonary:      Effort: Pulmonary effort is normal.      Breath sounds: No stridor. No wheezing or rales. Chest:      Chest wall: No tenderness. Abdominal:      Palpations: Abdomen is soft. There is no mass. Tenderness: There is no tenderness. There is no guarding or rebound. Comments: Abdominal wound okay. Musculoskeletal:         General: No tenderness. Lymphadenopathy:      Cervical: No cervical adenopathy. Skin:     General: Skin is warm and dry. Findings: No erythema or rash. Neurological:      Mental Status: She is alert and oriented to person, place, and time. Motor: No abnormal muscle tone. Psychiatric:         Judgment: Judgment normal.           Lines: All vascular access sites are healthy with no local erythema, discharge or tenderness. Intake and output:    No intake/output data recorded.     Lab Data:   All available labs and old records have been reviewed by me. CBC:  Recent Labs     12/29/19  0540 12/30/19  0610 12/31/19  0630   WBC 9.4 8.3 9.5   RBC 2.91* 2.94* 3.16*   HGB 8.9* 8.9* 9.5*   HCT 27.3* 27.4* 29.6*    194 208   MCV 93.7 93.3 93.6   MCH 30.6 30.3 30.1   MCHC 32.6 32.5 32.2   RDW 20.5* 20.1* 20.4*        BMP:  Recent Labs     12/29/19  0540 12/30/19  0610 12/31/19  0630    138 136   K 4.3 3.7 3.9   CL 98* 95* 93*   CO2 24 32 29   BUN 23* 15 21*   CREATININE 6.8* 4.6* 6.0*   CALCIUM 8.9 8.4 9.0   GLUCOSE 99 105* 121*        Hepatic Function Panel:   Lab Results   Component Value Date    ALKPHOS 59 12/28/2019    ALT 14 12/28/2019    AST 19 12/28/2019    PROT 5.9 12/28/2019    PROT 6.6 03/05/2013    BILITOT 0.3 12/28/2019    BILIDIR 0.2 10/16/2014    IBILI 0.1 10/16/2014    LABALBU 2.4 12/28/2019       CPK:   Lab Results   Component Value Date    CKTOTAL 129 07/11/2017     ESR:   Lab Results   Component Value Date    SEDRATE >130 (H) 12/26/2019     CRP: No results found for: CRP        Imaging: All pertinent images and reports for the current visit were reviewed by me during this visit. CT ABDOMEN PELVIS WO CONTRAST Additional Contrast? None   Final Result   1. Incompletely imaged panniculitis; correlate with direct inspection. XR CHEST PORTABLE   Final Result   No acute process. Medications: All current and past medications were reviewed.      darbepoetin brii-polysorbate  100 mcg Intravenous Weekly    metroNIDAZOLE  500 mg Oral 3 times per day    budesonide  500 mcg Nebulization BID    sodium chloride  250 mL Intravenous Once    sodium thiosulfate IVPB  25 g Intravenous Q MWF    lidocaine   Topical Once    lidocaine-EPINEPHrine  20 mL Intradermal Once    silver nitrate applicators  1 each Topical Once    allopurinol  100 mg Oral Daily    escitalopram  10 mg Oral Daily    lansoprazole  30 mg Oral QAM AC    pregabalin  50 mg Oral TID    rosuvastatin  10 mg Oral Daily    sevelamer  1,600 mg Oral TID WC    insulin lispro  0-6 Units Subcutaneous TID WC    insulin lispro  0-3 Units Subcutaneous Nightly    sodium chloride flush  10 mL Intravenous 2 times per day    apixaban  2.5 mg Oral BID    HYDROmorphone  4 mg Oral Q6H    Empty 3-in-1 Mixing Container  1 each Does not apply Daily        dextrose         midodrine, lidocaine PF, albumin human, polyethylene glycol, albuterol sulfate HFA, traZODone, sodium chloride flush, magnesium hydroxide, glucose, dextrose, glucagon (rDNA), dextrose, ondansetron, oxyCODONE **OR** oxyCODONE, acetaminophen, diphenhydrAMINE      Problem list:       Patient Active Problem List   Diagnosis Code    Moderate asthma with exacerbation J45. 901    Colon polyps K63.5    Microalbuminuria R80.9    Venous stasis of lower extremity I87.8    Obstructive sleep apnea on CPAP G47.33, Z99.89    Chronic diastolic CHF (congestive heart failure) (HCC) I50.32    Renal osteodystrophy N25.0    Poorly controlled type 2 diabetes mellitus (HCC) E11.65    History of pulmonary embolism Z86.711    Anemia of chronic kidney failure (HCC) N18.9, D63.1    Primary osteoarthritis of both knees M17.0    Essential hypertension, malignant I10    Restrictive lung disease J98.4    Gastroesophageal reflux disease K21.9    LVH (left ventricular hypertrophy) I51.7    Dyspnea and respiratory abnormalities R06.00, R06.89    Acute on chronic respiratory failure with hypoxia and hypercapnia (HCC) J96.21, J96.22    Obesity hypoventilation syndrome (HCC) E66.2    Morbid obesity due to excess calories (HCC) E66.01    COPD, moderate (HCC) J44.9    Gout M10.9    Warfarin-induced coagulopathy (HCC) D68.32, T45.515A    Chronic hypoxemic respiratory failure (HCC) J96.11    Constipation K59.00    DM (diabetes mellitus), secondary, uncontrolled, w/neurologic complic (HCC) D55.95, K35.84    Diabetes education, encounter for Z71.89    Primary osteoarthritis of left hip M16.12

## 2019-12-31 NOTE — PLAN OF CARE
Problem: Falls - Risk of:  Goal: Will remain free from falls  Description  Will remain free from falls  Outcome: Ongoing     Problem: Risk for Impaired Skin Integrity  Goal: Tissue integrity - skin and mucous membranes  Description  Structural intactness and normal physiological function of skin and  mucous membranes.   Outcome: Ongoing     Problem: Pain:  Goal: Pain level will decrease  Description  Pain level will decrease  Outcome: Ongoing

## 2019-12-31 NOTE — CARE COORDINATION
[facility] Wound Ostomy Continence Nurse  Follow-up Progress Note       NAME:  [address] [place] [address] RECORD NUMBER:  [id number]  AGE:  59 y.o. GENDER:  female  :  1955  TODAY'S DATE:  2019    Subjective:   Wound Identification:  Wound Type:   Contributing Factors: diabetes, HD patient, calciphylaxis        Patient Goal of Care:  [x] Wound Healing  [] Odor Control  [] Palliative Care  [] Pain Control   [] Other:     Objective:    /66   Pulse 84   Temp 96.5 °F (35.8 °C) (Temporal)   Resp 18   Ht 5' 3\" (1.6 m)   Wt (!) 331 lb 9.6 oz (150.4 kg)   LMP 1996 (Exact Date)   SpO2 100%   BMI 58.74 kg/m²   Fan Risk Score: Fan Scale Score: 16  Assessment:   Measurements:  Negative Pressure Wound Therapy Hip Left (Active)   Wound Type Pressure ulcer: Stage III 2019 11:56 PM   Dressing Type Black foam 2019 11:56 PM   Number of pieces used 1 2019  8:00 AM   Target Pressure (mmHg) 125 2019  4:46 AM   Dressing Status Clean;Dry; Intact 2019 11:56 PM   Number of days: 2       Negative Pressure Wound Therapy Abdomen Lower;Medial (Active)   Wound Type Pressure ulcer: Stage III 2019 11:56 PM   Dressing Type Black foam 2019 11:56 PM   Number of pieces used 2 2019  8:00 AM   Target Pressure (mmHg) 125 2019  4:46 AM   Dressing Status Clean;Dry; Intact 2019 11:56 PM   Number of days: 2       Negative Pressure Wound Therapy Hip Left (Active)   Wound Type Pressure ulcer: Stage III 2019 11:56 PM   Dressing Type Black foam 2019 11:56 PM   Number of pieces used 5 2019  8:00 AM   Target Pressure (mmHg) 125 2019  4:46 AM   Dressing Status Clean;Dry; Intact 2019 11:56 PM   Number of days: 2       Wound 10/27/19 Foot Anterior; Left (Active)   Wound Diabetic 2019  9:25 AM   Dressing Status Clean;Dry; Intact 2019 12:31 PM   Dressing/Treatment Foam 2019 12:40 PM   Wound Cleansed Rinsed/Irrigated with saline 11/20/2019 10:14 PM   Number of days: 64       Wound 11/17/19 Breast Upper diabetic ulcer (Active)   Wound Pressure Stage  2 11/24/2019  9:51 PM   Dressing Status Clean;Dry; Intact 12/30/2019  4:46 AM   Dressing Changed Changed/New 11/27/2019 12:31 PM   Dressing/Treatment Silicone border;Collagen 11/26/2019 12:40 PM   Wound Cleansed Other (Comment) 11/27/2019  1:20 AM   Number of days: 44       Wound 11/24/19 Groin Anterior;Left;Right red sore (Active)   Wound Pressure Stage  2 11/24/2019  9:51 PM   Dressing Status Changed 11/24/2019  3:00 PM   Dressing Changed Changed/New 11/24/2019  3:00 PM   Dressing/Treatment Open to air;Site care; Other (comment) 11/26/2019 12:40 PM   Number of days: 36       Response to treatment:  Well tolerated by patient. Pain Assessment:  Severity:  0 / 10  Quality of pain: aching  Wound Pain Timing/Severity: none  Premedicated: No  Plan:   Plan of Care:  Incision 12/26/19 Abdomen Lower;Medial-Dressing/Treatment: Moist to dry(Betadine )  Incision 12/26/19 Hip Lateral;Right-Dressing/Treatment: Other (comment)(kerlix, ABD, tape. )  Incision 12/26/19 Hip Left;Lateral-Dressing/Treatment: Moist to dry(Betadine)    Specialty Bed Required : Yes   [x] Low Air Loss   [x] Pressure Redistribution  [] Fluid Immersion  [x] Bariatric  [] Total Pressure Relief  [] Other:     Current Diet: DIET RENAL;  Dietary Nutrition Supplements: Renal Oral Supplement  Dietary Nutrition Supplements: Wound Healing Oral Supplement  Dietician consult:  Yes    Discharge Plan:  Placement for patient upon discharge: skilled nursing   Patient appropriate for Outpatient [address]: Yes    Referrals:  [x]   [] [facility]  [] Supplies  [] Other    Patient/Caregiver Teaching:  Level of patient/caregiver understanding able to:   [] Indicates understanding       [x] Needs reinforcement  [] Unsuccessful      [] Verbal Understanding  [] Demonstrated understanding       [] No

## 2019-12-31 NOTE — PROGRESS NOTES
[facility] PROGRESS NOTE    12/31/2019 12:17 PM        Name: Gideon Stein . Admitted: 12/26/2019  Primary Care Provider: JURGEN Bowers CNP (Tel: [phone])        Subjective: Status post units of packed red blood transfusion .   Had hemodialysis today complains of pain at the site of surgery no other complaints ambulating well tolerating diet  Wound VAC placed    Reviewed interval ancillary notes    Current Medications  darbepoetin brii-polysorbate (ARANESP) injection 100 mcg, Weekly  metroNIDAZOLE (FLAGYL) tablet 500 mg, 3 times per day  budesonide (PULMICORT) nebulizer suspension 500 mcg, BID  midodrine (PROAMATINE) tablet 10 mg, PRN  lidocaine PF 1 % injection 2 mL, PRN  albumin human 25 % IV solution 12.5 g, PRN  0.9 % sodium chloride bolus, Once  sodium thiosulfate 25 g in sodium chloride 0.9 % 150 mL IVPB, Q MWF  polyethylene glycol (GLYCOLAX) packet 17 g, Daily PRN  lidocaine (XYLOCAINE) 4 % external solution, Once  lidocaine-EPINEPHrine 2 percent-1:[id number] injection 20 mL, Once  silver nitrate applicators applicator 1 each, Once  albuterol sulfate  (90 Base) MCG/ACT inhaler 2 puff, Q6H PRN  allopurinol (ZYLOPRIM) tablet 100 mg, Daily  escitalopram (LEXAPRO) tablet 10 mg, Daily  lansoprazole (PREVACID SOLUTAB) disintegrating tablet 30 mg, QAM AC  pregabalin (LYRICA) capsule 50 mg, TID  rosuvastatin (CRESTOR) tablet 10 mg, Daily  sevelamer (RENVELA) tablet 1,600 mg, TID WC  traZODone (DESYREL) tablet 100 mg, Nightly PRN  insulin lispro (1 Unit Dial) 0-6 Units, TID WC  insulin lispro (1 Unit Dial) 0-3 Units, Nightly  sodium chloride flush 0.9 % injection 10 mL, 2 times per day  sodium chloride flush 0.9 % injection 10 mL, PRN  magnesium hydroxide (MILK OF MAGNESIA) 400 MG/5ML suspension 30 mL, Daily PRN  apixaban (ELIQUIS) tablet 2.5 mg, BID  glucose (GLUTOSE) 40 % oral gel 15 g, PRN  dextrose

## 2019-12-31 NOTE — PROGRESS NOTES
[place] [facility]        Post-op Note    Pt Name: Ranjith Kelley  MRN: [id number]  YOB: 1955  Date of evaluation: 2019    Post-op Day #5    Subjective:    No acute events overnight  Pain controlled    Vital Signs:  Patient Vitals for the past 24 hrs:   BP Temp Temp src Pulse Resp SpO2 Weight   19 0625 109/72 98.6 °F (37 °C) -- 81 18 -- 297 lb 9.9 oz (135 kg)   19 0201 117/66 96.5 °F (35.8 °C) Temporal 84 18 100 % --   19 2214 (!) 142/78 96.5 °F (35.8 °C) Temporal 84 20 100 % --   19 1943 -- -- -- -- 18 99 % --   19 1600 123/65 97.9 °F (36.6 °C) Oral 75 18 -- --   19 1152 105/66 96.8 °F (36 °C) Temporal 74 18 99 % --   19 0957 -- -- -- 80 -- -- --      TEMPERATURE HISTORY 24H: Temp (24hrs), Av.3 °F (36.3 °C), Min:96.5 °F (35.8 °C), Max:98.6 °F (37 °C)    BLOOD PRESSURE HISTORY: Systolic (28XIQ), AXK:413 , Min:93 , QEE:179    Diastolic (13MBP), TXS:27, Min:55, Max:78      Intake/Output:    No intake/output data recorded. No intake/output data recorded. Drain/tube Output:           Physical Exam:  General: awake, alert, oriented to  person, place, time  Abdomen: soft, non-distended, appropriate incisional tenderness, dressings clean dry and intact  Skin/Wound: Abdominal and bilateral hip wounds with wound vac in place, good seal    Labs:  CBC:    Recent Labs     19  0540 19  0610 19  0630   WBC 9.4 8.3 9.5   HGB 8.9* 8.9* 9.5*   HCT 27.3* 27.4* 29.6*    194 208     BMP:    Recent Labs     19  0540 19  0610 19  0630    138 136   K 4.3 3.7 3.9   CL 98* 95* 93*   CO2 24 32 29   BUN 23* 15 21*   CREATININE 6.8* 4.6* 6.0*   GLUCOSE 99 105* 121*     Hepatic:   No results for input(s): AST, ALT, ALB, BILITOT, ALKPHOS in the last 72 hours. Amylase: No results for input(s): AMYLASE in the last 72 hours. Lipase: No results for input(s): LIPASE in the last 72 hours.   Mag: miguel angel she has wounds on bilat legs/inner thighs for months and gets daily wound care at [facility], but today the pain is worse. ) Acuity: Acute Type of Exam: Initial FINDINGS: The lungs are without acute focal process. Right IJ Port-A-Cath is unchanged in position. There is no effusion or pneumothorax. The cardiomediastinal silhouette is stable. The osseous structures are stable. No acute process. Xr Hip 2-3 Vw W Pelvis Left    Result Date: 11/29/2019  EXAMINATION: ONE XRAY VIEW OF THE PELVIS AND TWO XRAY VIEWS LEFT HIP 11/29/2019 8:34 am COMPARISON: None HISTORY: ORDERING SYSTEM PROVIDED HISTORY: fall/injury TECHNOLOGIST PROVIDED HISTORY: Reason for exam:->fall/injury Reason for Exam: Fatigue (Pt. comes in today by [facility] from dialysis. Pt. received an hour and a half of her treatment and then began to fell weakness. Pt. was getting ready to leave and then she slid down to the floor. Pt. complaining of pain to her left back side.) Acuity: Unknown Type of Exam: Unknown FINDINGS: No acute fracture or dislocation is identified. Mild osteoarthritis is present involving the bilateral hips. There is no evidence of AVN or erosion. The bilateral SI joints and pubic symphysis are normal in alignment. Bone mineral density appears decreased. 1. No acute osseous abnormality. 2. Mild osteoarthritis involving the bilateral hips.          Scheduled Meds:   darbepoetin brii-polysorbate  100 mcg Intravenous Weekly    metroNIDAZOLE  500 mg Oral 3 times per day    budesonide  500 mcg Nebulization BID    sodium chloride  250 mL Intravenous Once    sodium thiosulfate IVPB  25 g Intravenous Q MWF    lidocaine   Topical Once    lidocaine-EPINEPHrine  20 mL Intradermal Once    silver nitrate applicators  1 each Topical Once    allopurinol  100 mg Oral Daily    escitalopram  10 mg Oral Daily    lansoprazole  30 mg Oral QAM AC    pregabalin  50 mg Oral TID    rosuvastatin  10 mg Oral Daily    sevelamer  1,600 mg diabetes mellitus ([facility]    Infection of arteriovenous graft for hemodialysis ([facility]    Essential hypertension    Dialysis patient ([facility]    Postoperative pain    Other complication of arteriovenous dialysis fistula (HCC)    Weight loss counseling, encounter for    Respiratory failure ([facility]    Chronic obstructive pulmonary disease ([facility]    Pulmonary embolism (HCC)    Chronic pain of left lower extremity    Severe anemia    Cerebrovascular accident (CVA) ([facility]    HTN (hypertension), benign    Vision loss of right eye    Open wound    Calciphylaxis    Cellulitis    Abdominal wall cellulitis    Opiate dependence, continuous ([facility]    History of arterial ischemic stroke    Anaerobic cellulitis ([facility]     OR Date 12/26/19, Neto Meline excisional debridement of skin, subcutaneous tissue of open infected abdominal wound to final wound dimensions 16x7cm (112 sq cm), Sharp excisional debridement of skin, subcutaneous tissue of stage 3 right hip decubitus ulcer to final wound dimensions 11.5x6cm (69 sq cm), Sharp excisional debridement of skin, subcutaneous tissue of stage 3 left hip decubitus ulcer to final wound dimensions of [id number] (286 sq cm), Total debridement: 467 sq cm  Bilateral hip stage 3 decubitus ulcers  Open abdominal wound, infected  ESRD on HD  CHF  COPD  Diabetes  Morbid obesity, BMI 58.4 this admission  CVA  Pulmonary embolism  Medical coagulopathy, Eliquis     Plan:  1. Continue wound vac, change 3 times per week, will follow in wound clinic weekly after discharge  2. Pathology pending regarding calciphylaxis  3. Antibiotics per ID  4. May continue anticoagulation from surgical standpoint  5. Pain control, has chronic pain, minimize narcotics as able  6. Defer management of remainder of medical comorbodities to primary and consulting   7.   Discharge planning, likely will need skilled facility after discharge given patient's complexity, may discharge from surgical standpoint when medically stable, will follow peripherally, please call with questions    Samina Ulloa.  Susan Mack MD, FACS  12/31/2019  8:58 AM

## 2019-12-31 NOTE — PROGRESS NOTES
Assessment and med pass complete. Meds taken whole in applesauce. Resting in bed watching TV, wound vacs intact.  at bedside. Got warm blanket per request. Denies other needs, call light in reach, bed alarm engaged.

## 2019-12-31 NOTE — PROGRESS NOTES
Nephrology Progress Note  [phone]  [phone]   [email or web address]        Reason for Consult:  ESRD on HD     HISTORY OF PRESENT ILLNESS:      The patient is a 59 y.o. female with significant past medical history of T2DM , HTN , ESRD on maintenance HD  , GALE , Morbid obesity who presents with painful lesions abdominal wall and thigh- buttock area. She has recently been diagnosed on clinical grounds with Calciphylaxis , for this she gets Sodium Thiosulfate with dialysis . Unfortunately due to severe pain in the lesions she has been to the dialysis unit infrequently   She denies Fever/ chest pain / dyspnea/ cough/ phlegm   She makes little urine    Subjective:  -pt seen and examined  -PMSHx and meds reviewed  -family at bedside    Seen on dialysis  BP is variable  C/o pain      ROS; neg chest pain/SOB/pos abdominal wound pain      PHYSICAL EXAM:    Vitals:    /66   Pulse 84   Temp 96.5 °F (35.8 °C) (Temporal)   Resp 18   Ht 5' 3\" (1.6 m)   Wt (!) 331 lb 9.6 oz (150.4 kg)   LMP 11/01/1996 (Exact Date)   SpO2 100%   BMI 58.74 kg/m²   No intake/output data recorded. No intake/output data recorded. Physical Exam:  Gen: alert, oriented x 3,  Seen on HD    CV: RRR no murmur/ rub   Lungs: CTA-B, no resp distress  Abd: S/NT +BS, Dressing  lower abdominal wall   -wound vac intact  Ext: No edema, no cyanosis  Skin: Warm. Dressing upper lateral thighs and Buttock area   : No TTP over bladder, nondistended. Neuro: Alert and oriented x 3, nonfocal.  Joints: No erythema noted over joints.     DATA:    CBC with Differential:    Lab Results   Component Value Date    WBC 9.5 12/31/2019    RBC 3.16 12/31/2019    HGB 9.5 12/31/2019    HCT 29.6 12/31/2019     12/31/2019    MCV 93.6 12/31/2019    MCH 30.1 12/31/2019    MCHC 32.2 12/31/2019    RDW 20.4 12/31/2019    SEGSPCT 77.6 05/12/2013    BANDSPCT 5 08/05/2019    METASPCT 1 01/12/2018    LYMPHOPCT 13.0 12/31/2019    MONOPCT 10.8 12/31/2019    EOSPCT 2.6 01/24/2012    BASOPCT 0.6 12/31/2019    MONOSABS 1.0 12/31/2019    LYMPHSABS 1.2 12/31/2019    EOSABS 0.2 12/31/2019    BASOSABS 0.1 12/31/2019    DIFFTYPE Auto 05/12/2013     CMP:    Lab Results   Component Value Date     12/30/2019    K 3.7 12/30/2019    K 5.6 12/27/2019    CL 95 12/30/2019    CO2 32 12/30/2019    BUN 15 12/30/2019    CREATININE 4.6 12/30/2019    GFRAA 12 12/30/2019    GFRAA 50 05/12/2013    AGRATIO 0.7 12/28/2019    LABGLOM 10 12/30/2019    GLUCOSE 105 12/30/2019    PROT 5.9 12/28/2019    PROT 6.6 03/05/2013    LABALBU 2.4 12/28/2019    CALCIUM 8.4 12/30/2019    BILITOT 0.3 12/28/2019    ALKPHOS 59 12/28/2019    AST 19 12/28/2019    ALT 14 12/28/2019     Phosphorus:    Lab Results   Component Value Date    PHOS 3.6 11/26/2019     Uric Acid:    Lab Results   Component Value Date    LABURIC 2.8 05/17/2018     Warfarin PT/INR:  No components found for: PTPATWAR, PTINRWAR        IMPRESSION/RECOMMENDATIONS:      ESRD on HD   MWF at [address]   Seen on dialysis, pre wt is 135kg, last post weight is 147kg-? Accuracy  -hypotensive, given midodrine and albumin  -plan for minimal UF today due to hypotension    Calciphylaxis: complicated by open wound and infection  -NaThiosulfate with dialysis    Wound infection: per ID, completed vancomycin/zosyn, plan for flagyl PO for 10 days    Anemia of CKD: had 2 units PRBC   -add aranesp, no IV iron    Renal osteodystrophy: continue Renvela     DM2: on insulin      Thank you for allowing me to participate in the care of this patient. I will continue to follow along. Please call with questions.     Ismael Schofield MD  12/31/2019  [facility]

## 2019-12-31 NOTE — CARE COORDINATION
CM spoke with patient's  regarding placement options. Reviewed in network listing and CMS reviews with spouse. CM requested that he review this list and provide his top three choices for referrals. CM will continue to follow to facilitate dischrge needs.     TORIBIO WinchesterN, CCM, RN  [facility]  [phone]

## 2019-12-31 NOTE — CARE COORDINATION
Patient has wound care consult. Discussed case with nurse, Eusebio Mckeon. Patient currently has negative pressure dressings to three sites. No new orders at this time.  Carrie Jordan RN

## 2019-12-31 NOTE — PLAN OF CARE
Problem: Falls - Risk of:  Goal: Will remain free from falls  Description  Will remain free from falls  Outcome: Ongoing  Note:   Pt is wearing the fall bracelet, S.A.F.E. sign is posted outside door, and yellow blanket is on the bed. Pt informed of fall risks, verbalizes understanding, and agrees to ask for help to ambulate. Will monitor. Problem: Risk for Impaired Skin Integrity  Goal: Tissue integrity - skin and mucous membranes  Description  Structural intactness and normal physiological function of skin and  mucous membranes. 12/31/2019 1056 by Bulmaro Cardoso RN  Outcome: Ongoing  Note:   Pt with wound vacs to bilateral hips and abdominal pannus. General surgery following pt. Dressing changes done MWF. Turning pt q2h and PRN. Incontinent care being done PRN. Pt on specialty bariatric mattress. Heels elevated off of bed. Chair cushion being used when up in chair. Will monitor. Problem: Discharge Planning:  Goal: Discharged to appropriate level of care  Description  Discharged to appropriate level of care  Note:   Checking blood sugars ACHS. Supplemental insulin being given PRN per MAR. Will monitor.

## 2020-01-01 LAB
ANION GAP SERPL CALCULATED.3IONS-SCNC: 10 MMOL/L (ref 3–16)
BASOPHILS ABSOLUTE: 0.1 K/UL (ref 0–0.2)
BASOPHILS RELATIVE PERCENT: 0.7 %
BUN BLDV-MCNC: 10 MG/DL (ref 7–20)
CALCIUM SERPL-MCNC: 8.5 MG/DL (ref 8.3–10.6)
CHLORIDE BLD-SCNC: 97 MMOL/L (ref 99–110)
CO2: 30 MMOL/L (ref 21–32)
CREAT SERPL-MCNC: 4.1 MG/DL (ref 0.6–1.2)
EOSINOPHILS ABSOLUTE: 0.2 K/UL (ref 0–0.6)
EOSINOPHILS RELATIVE PERCENT: 1.9 %
GFR AFRICAN AMERICAN: 13
GFR NON-AFRICAN AMERICAN: 11
GLUCOSE BLD-MCNC: 103 MG/DL (ref 70–99)
GLUCOSE BLD-MCNC: 122 MG/DL (ref 70–99)
GLUCOSE BLD-MCNC: 139 MG/DL (ref 70–99)
GLUCOSE BLD-MCNC: 91 MG/DL (ref 70–99)
GLUCOSE BLD-MCNC: 94 MG/DL (ref 70–99)
HCT VFR BLD CALC: 28.5 % (ref 36–48)
HEMOGLOBIN: 9 G/DL (ref 12–16)
LYMPHOCYTES ABSOLUTE: 1.2 K/UL (ref 1–5.1)
LYMPHOCYTES RELATIVE PERCENT: 14.7 %
MCH RBC QN AUTO: 29.9 PG (ref 26–34)
MCHC RBC AUTO-ENTMCNC: 31.7 G/DL (ref 31–36)
MCV RBC AUTO: 94.3 FL (ref 80–100)
MONOCYTES ABSOLUTE: 1 K/UL (ref 0–1.3)
MONOCYTES RELATIVE PERCENT: 12.1 %
NEUTROPHILS ABSOLUTE: 6 K/UL (ref 1.7–7.7)
NEUTROPHILS RELATIVE PERCENT: 70.6 %
PDW BLD-RTO: 20.5 % (ref 12.4–15.4)
PERFORMED ON: ABNORMAL
PERFORMED ON: NORMAL
PLATELET # BLD: 186 K/UL (ref 135–450)
PMV BLD AUTO: 8.3 FL (ref 5–10.5)
POTASSIUM SERPL-SCNC: 4.3 MMOL/L (ref 3.5–5.1)
RBC # BLD: 3.02 M/UL (ref 4–5.2)
SODIUM BLD-SCNC: 137 MMOL/L (ref 136–145)
WBC # BLD: 8.4 K/UL (ref 4–11)

## 2020-01-01 PROCEDURE — 94761 N-INVAS EAR/PLS OXIMETRY MLT: CPT

## 2020-01-01 PROCEDURE — 6370000000 HC RX 637 (ALT 250 FOR IP): Performed by: SURGERY

## 2020-01-01 PROCEDURE — 6370000000 HC RX 637 (ALT 250 FOR IP): Performed by: INTERNAL MEDICINE

## 2020-01-01 PROCEDURE — 85025 COMPLETE CBC W/AUTO DIFF WBC: CPT

## 2020-01-01 PROCEDURE — 2700000000 HC OXYGEN THERAPY PER DAY

## 2020-01-01 PROCEDURE — 80048 BASIC METABOLIC PNL TOTAL CA: CPT

## 2020-01-01 PROCEDURE — 2580000003 HC RX 258: Performed by: SURGERY

## 2020-01-01 PROCEDURE — 6360000002 HC RX W HCPCS: Performed by: INTERNAL MEDICINE

## 2020-01-01 PROCEDURE — 1200000000 HC SEMI PRIVATE

## 2020-01-01 PROCEDURE — 6370000000 HC RX 637 (ALT 250 FOR IP): Performed by: NURSE PRACTITIONER

## 2020-01-01 PROCEDURE — 94640 AIRWAY INHALATION TREATMENT: CPT

## 2020-01-01 RX ADMIN — ROSUVASTATIN CALCIUM 10 MG: 10 TABLET, FILM COATED ORAL at 08:29

## 2020-01-01 RX ADMIN — Medication 10 ML: at 21:11

## 2020-01-01 RX ADMIN — APIXABAN 2.5 MG: 2.5 TABLET, FILM COATED ORAL at 21:11

## 2020-01-01 RX ADMIN — HYDROMORPHONE HYDROCHLORIDE 4 MG: 2 TABLET ORAL at 16:38

## 2020-01-01 RX ADMIN — APIXABAN 2.5 MG: 2.5 TABLET, FILM COATED ORAL at 08:29

## 2020-01-01 RX ADMIN — PREGABALIN 50 MG: 25 CAPSULE ORAL at 08:29

## 2020-01-01 RX ADMIN — METRONIDAZOLE 500 MG: 250 TABLET, FILM COATED ORAL at 06:18

## 2020-01-01 RX ADMIN — Medication 10 ML: at 08:30

## 2020-01-01 RX ADMIN — ALLOPURINOL 100 MG: 100 TABLET ORAL at 08:29

## 2020-01-01 RX ADMIN — PREGABALIN 50 MG: 25 CAPSULE ORAL at 21:11

## 2020-01-01 RX ADMIN — METRONIDAZOLE 500 MG: 250 TABLET, FILM COATED ORAL at 21:11

## 2020-01-01 RX ADMIN — BUDESONIDE 500 MCG: 0.5 SUSPENSION RESPIRATORY (INHALATION) at 08:46

## 2020-01-01 RX ADMIN — OXYCODONE 10 MG: 5 TABLET ORAL at 14:14

## 2020-01-01 RX ADMIN — SEVELAMER CARBONATE 1600 MG: 800 TABLET, FILM COATED ORAL at 16:38

## 2020-01-01 RX ADMIN — Medication 1 EACH: at 09:51

## 2020-01-01 RX ADMIN — HYDROMORPHONE HYDROCHLORIDE 4 MG: 2 TABLET ORAL at 11:57

## 2020-01-01 RX ADMIN — LANSOPRAZOLE 30 MG: 30 TABLET, ORALLY DISINTEGRATING ORAL at 06:18

## 2020-01-01 RX ADMIN — PREGABALIN 50 MG: 25 CAPSULE ORAL at 14:14

## 2020-01-01 RX ADMIN — SEVELAMER CARBONATE 1600 MG: 800 TABLET, FILM COATED ORAL at 11:57

## 2020-01-01 RX ADMIN — HYDROMORPHONE HYDROCHLORIDE 4 MG: 2 TABLET ORAL at 06:18

## 2020-01-01 RX ADMIN — METRONIDAZOLE 500 MG: 250 TABLET, FILM COATED ORAL at 14:14

## 2020-01-01 RX ADMIN — HYDROMORPHONE HYDROCHLORIDE 4 MG: 2 TABLET ORAL at 00:11

## 2020-01-01 RX ADMIN — SEVELAMER CARBONATE 1600 MG: 800 TABLET, FILM COATED ORAL at 08:29

## 2020-01-01 RX ADMIN — ESCITALOPRAM OXALATE 10 MG: 10 TABLET ORAL at 08:29

## 2020-01-01 RX ADMIN — BUDESONIDE 500 MCG: 0.5 SUSPENSION RESPIRATORY (INHALATION) at 20:19

## 2020-01-01 ASSESSMENT — PAIN DESCRIPTION - LOCATION
LOCATION: ABDOMEN
LOCATION: HIP

## 2020-01-01 ASSESSMENT — PAIN SCALES - GENERAL
PAINLEVEL_OUTOF10: 7
PAINLEVEL_OUTOF10: 9
PAINLEVEL_OUTOF10: 6
PAINLEVEL_OUTOF10: 7
PAINLEVEL_OUTOF10: 9
PAINLEVEL_OUTOF10: 7

## 2020-01-01 ASSESSMENT — PAIN DESCRIPTION - ORIENTATION
ORIENTATION: RIGHT
ORIENTATION: LOWER

## 2020-01-01 ASSESSMENT — PAIN DESCRIPTION - DESCRIPTORS
DESCRIPTORS: SHARP
DESCRIPTORS: SHARP

## 2020-01-01 ASSESSMENT — PAIN DESCRIPTION - PAIN TYPE
TYPE: SURGICAL PAIN
TYPE: SURGICAL PAIN

## 2020-01-01 NOTE — PROGRESS NOTES
12/30/19  0610 12/31/19  0630 01/01/20  0610    136 137   K 3.7 3.9 4.3   CL 95* 93* 97*   CO2 32 29 30   BUN 15 21* 10   CREATININE 4.6* 6.0* 4.1*   GLUCOSE 105* 121* 94     Hepatic:   No results for input(s): AST, ALT, ALB, BILITOT, ALKPHOS in the last 72 hours. CT ABDOMEN PELVIS WO CONTRAST Additional Contrast? None   Final Result   1. Incompletely imaged panniculitis; correlate with direct inspection. XR CHEST PORTABLE   Final Result   No acute process. Problem List  Active Problems:    Poorly controlled type 2 diabetes mellitus (HCC)    Chronic obstructive pulmonary disease (HCC)    Severe anemia    Cellulitis    Abdominal wall cellulitis    Opiate dependence, continuous (HCC)    History of arterial ischemic stroke    Anaerobic cellulitis ([facility]  Resolved Problems:    * No resolved hospital problems. *       Assessment & Plan:     Wound infection of the abdominal wall calciphylaxis discussed with surgeon this was definitely infected . Received  broad-spectrum antibiotic with vancomycin and Zosyn cultures positive for diphtheroids and bacteroids dressing changes  as per surgery wound on the hip was not infected. Wound VAC in place antibiotic changed to oral Flagyl    Pain management    Postop anemia hemoglobin 6.6. Received  2 units of packed red blood cell transfusion.  Hb 9.5    History of deep venous  thrombosis on Eliquis continue    Hypertension controlled    ESRD on hemodialysis had HD yesterday    Hyperglycemia controlled    Calciphylaxis: complicated by open wound and infection  -Sodium Thiosulfate with dialysis    Awaiting placement to ECF patient cannot go home has 3 wound VAC in place    Diet: DIET RENAL;  Dietary Nutrition Supplements: Renal Oral Supplement  Dietary Nutrition Supplements: Wound Healing Oral Supplement  Code:Full Code  DVT PPX eliquis  Disposition ECF       Candis Koyanagi, MD   1/1/2020 12:51 PM

## 2020-01-01 NOTE — PROGRESS NOTES
Assessment completed. Please see flowsheets. Pt resting quietly in bed. No needs stated at this time. Wound Vac dressings changed today. No further needs stated at this time. Call light within reach.  Will continue to monitor

## 2020-01-02 LAB
ANION GAP SERPL CALCULATED.3IONS-SCNC: 10 MMOL/L (ref 3–16)
BASOPHILS ABSOLUTE: 0.1 K/UL (ref 0–0.2)
BASOPHILS RELATIVE PERCENT: 1 %
BUN BLDV-MCNC: 15 MG/DL (ref 7–20)
CALCIUM SERPL-MCNC: 9.2 MG/DL (ref 8.3–10.6)
CHLORIDE BLD-SCNC: 98 MMOL/L (ref 99–110)
CO2: 31 MMOL/L (ref 21–32)
CREAT SERPL-MCNC: 6 MG/DL (ref 0.6–1.2)
EOSINOPHILS ABSOLUTE: 0.1 K/UL (ref 0–0.6)
EOSINOPHILS RELATIVE PERCENT: 1.5 %
GFR AFRICAN AMERICAN: 9
GFR NON-AFRICAN AMERICAN: 7
GLUCOSE BLD-MCNC: 119 MG/DL (ref 70–99)
GLUCOSE BLD-MCNC: 152 MG/DL (ref 70–99)
GLUCOSE BLD-MCNC: 152 MG/DL (ref 70–99)
GLUCOSE BLD-MCNC: 87 MG/DL (ref 70–99)
GLUCOSE BLD-MCNC: 93 MG/DL (ref 70–99)
HCT VFR BLD CALC: 28.9 % (ref 36–48)
HEMOGLOBIN: 9.2 G/DL (ref 12–16)
LYMPHOCYTES ABSOLUTE: 1.3 K/UL (ref 1–5.1)
LYMPHOCYTES RELATIVE PERCENT: 12.8 %
MCH RBC QN AUTO: 29.9 PG (ref 26–34)
MCHC RBC AUTO-ENTMCNC: 31.9 G/DL (ref 31–36)
MCV RBC AUTO: 94 FL (ref 80–100)
MONOCYTES ABSOLUTE: 1 K/UL (ref 0–1.3)
MONOCYTES RELATIVE PERCENT: 9.9 %
NEUTROPHILS ABSOLUTE: 7.3 K/UL (ref 1.7–7.7)
NEUTROPHILS RELATIVE PERCENT: 74.8 %
PDW BLD-RTO: 19.7 % (ref 12.4–15.4)
PERFORMED ON: ABNORMAL
PERFORMED ON: NORMAL
PLATELET # BLD: 171 K/UL (ref 135–450)
PMV BLD AUTO: 8.5 FL (ref 5–10.5)
POTASSIUM SERPL-SCNC: 4.3 MMOL/L (ref 3.5–5.1)
RBC # BLD: 3.08 M/UL (ref 4–5.2)
SODIUM BLD-SCNC: 139 MMOL/L (ref 136–145)
WBC # BLD: 9.8 K/UL (ref 4–11)

## 2020-01-02 PROCEDURE — 6370000000 HC RX 637 (ALT 250 FOR IP): Performed by: SURGERY

## 2020-01-02 PROCEDURE — 6370000000 HC RX 637 (ALT 250 FOR IP): Performed by: INTERNAL MEDICINE

## 2020-01-02 PROCEDURE — 6370000000 HC RX 637 (ALT 250 FOR IP): Performed by: NURSE PRACTITIONER

## 2020-01-02 PROCEDURE — 97530 THERAPEUTIC ACTIVITIES: CPT

## 2020-01-02 PROCEDURE — 1200000000 HC SEMI PRIVATE

## 2020-01-02 PROCEDURE — 6360000002 HC RX W HCPCS: Performed by: INTERNAL MEDICINE

## 2020-01-02 PROCEDURE — 2580000003 HC RX 258: Performed by: SURGERY

## 2020-01-02 PROCEDURE — 80048 BASIC METABOLIC PNL TOTAL CA: CPT

## 2020-01-02 PROCEDURE — 94761 N-INVAS EAR/PLS OXIMETRY MLT: CPT

## 2020-01-02 PROCEDURE — 94640 AIRWAY INHALATION TREATMENT: CPT

## 2020-01-02 PROCEDURE — 2700000000 HC OXYGEN THERAPY PER DAY

## 2020-01-02 PROCEDURE — 85025 COMPLETE CBC W/AUTO DIFF WBC: CPT

## 2020-01-02 RX ADMIN — Medication 10 ML: at 08:12

## 2020-01-02 RX ADMIN — SEVELAMER CARBONATE 1600 MG: 800 TABLET, FILM COATED ORAL at 16:49

## 2020-01-02 RX ADMIN — PREGABALIN 50 MG: 25 CAPSULE ORAL at 08:10

## 2020-01-02 RX ADMIN — LANSOPRAZOLE 30 MG: 30 TABLET, ORALLY DISINTEGRATING ORAL at 05:26

## 2020-01-02 RX ADMIN — BUDESONIDE 500 MCG: 0.5 SUSPENSION RESPIRATORY (INHALATION) at 20:37

## 2020-01-02 RX ADMIN — INSULIN LISPRO 1 UNITS: 100 INJECTION, SOLUTION INTRAVENOUS; SUBCUTANEOUS at 16:50

## 2020-01-02 RX ADMIN — HYDROMORPHONE HYDROCHLORIDE 4 MG: 2 TABLET ORAL at 11:15

## 2020-01-02 RX ADMIN — ESCITALOPRAM OXALATE 10 MG: 10 TABLET ORAL at 08:11

## 2020-01-02 RX ADMIN — PREGABALIN 50 MG: 25 CAPSULE ORAL at 13:47

## 2020-01-02 RX ADMIN — HYDROMORPHONE HYDROCHLORIDE 4 MG: 2 TABLET ORAL at 05:25

## 2020-01-02 RX ADMIN — HYDROMORPHONE HYDROCHLORIDE 4 MG: 2 TABLET ORAL at 16:50

## 2020-01-02 RX ADMIN — ROSUVASTATIN CALCIUM 10 MG: 10 TABLET, FILM COATED ORAL at 08:10

## 2020-01-02 RX ADMIN — METRONIDAZOLE 500 MG: 250 TABLET, FILM COATED ORAL at 05:25

## 2020-01-02 RX ADMIN — SEVELAMER CARBONATE 1600 MG: 800 TABLET, FILM COATED ORAL at 11:15

## 2020-01-02 RX ADMIN — METRONIDAZOLE 500 MG: 250 TABLET, FILM COATED ORAL at 13:47

## 2020-01-02 RX ADMIN — ALLOPURINOL 100 MG: 100 TABLET ORAL at 08:10

## 2020-01-02 RX ADMIN — Medication 10 ML: at 21:22

## 2020-01-02 RX ADMIN — METRONIDAZOLE 500 MG: 250 TABLET, FILM COATED ORAL at 21:22

## 2020-01-02 RX ADMIN — BUDESONIDE 500 MCG: 0.5 SUSPENSION RESPIRATORY (INHALATION) at 09:19

## 2020-01-02 RX ADMIN — HYDROMORPHONE HYDROCHLORIDE 4 MG: 2 TABLET ORAL at 00:09

## 2020-01-02 RX ADMIN — PREGABALIN 50 MG: 25 CAPSULE ORAL at 21:22

## 2020-01-02 RX ADMIN — OXYCODONE 10 MG: 5 TABLET ORAL at 19:09

## 2020-01-02 RX ADMIN — APIXABAN 2.5 MG: 2.5 TABLET, FILM COATED ORAL at 08:10

## 2020-01-02 RX ADMIN — SEVELAMER CARBONATE 1600 MG: 800 TABLET, FILM COATED ORAL at 08:10

## 2020-01-02 RX ADMIN — HYDROMORPHONE HYDROCHLORIDE 4 MG: 2 TABLET ORAL at 22:07

## 2020-01-02 RX ADMIN — INSULIN LISPRO 1 UNITS: 100 INJECTION, SOLUTION INTRAVENOUS; SUBCUTANEOUS at 12:00

## 2020-01-02 RX ADMIN — APIXABAN 2.5 MG: 2.5 TABLET, FILM COATED ORAL at 21:22

## 2020-01-02 ASSESSMENT — PAIN SCALES - GENERAL
PAINLEVEL_OUTOF10: 7
PAINLEVEL_OUTOF10: 5
PAINLEVEL_OUTOF10: 6
PAINLEVEL_OUTOF10: 8
PAINLEVEL_OUTOF10: 7
PAINLEVEL_OUTOF10: 7

## 2020-01-02 NOTE — PROGRESS NOTES
Nephrology Progress Note  [phone]  [phone]   [email or web address]        Reason for Consult:  ESRD on HD     HISTORY OF PRESENT ILLNESS:      The patient is a 59 y.o. female with significant past medical history of T2DM , HTN , ESRD on maintenance HD  , GALE , Morbid obesity who presents with painful lesions abdominal wall and thigh- buttock area. She has recently been diagnosed on clinical grounds with Calciphylaxis , for this she gets Sodium Thiosulfate with dialysis . Unfortunately due to severe pain in the lesions she has been to the dialysis unit infrequently   She denies Fever/ chest pain / dyspnea/ cough/ phlegm   She makes little urine    In pain , needing narcotics       PHYSICAL EXAM:    Vitals:    /83   Pulse 93   Temp 96.9 °F (36.1 °C) (Temporal)   Resp 16   Ht 5' 3\" (1.6 m)   Wt 295 lb 12.8 oz (134.2 kg)   LMP 11/01/1996 (Exact Date)   SpO2 94%   BMI 52.40 kg/m²   No intake/output data recorded. I/O this shift:  In: 120 [P.O.:120]  Out: -     Physical Exam:  Gen: alert, oriented x 3, In pain   PERRL , EOM +  CV: RRR no murmur/ rub   Lungs: CTA-B  Abd: S/NT +BS, wound lower abdominal wall   Ext: No edema, no cyanosis  Skin: Warm. Dressing : Lower abdomen  Upper lateral thighs and Buttock area   : No TTP over bladder, nondistended. Neuro: Alert and oriented x 3, nonfocal.  Joints: No erythema noted over joints.     DATA:    CBC with Differential:    Lab Results   Component Value Date    WBC 9.8 01/02/2020    RBC 3.08 01/02/2020    HGB 9.2 01/02/2020    HCT 28.9 01/02/2020     01/02/2020    MCV 94.0 01/02/2020    MCH 29.9 01/02/2020    MCHC 31.9 01/02/2020    RDW 19.7 01/02/2020    SEGSPCT 77.6 05/12/2013    BANDSPCT 5 08/05/2019    METASPCT 1 01/12/2018    LYMPHOPCT 12.8 01/02/2020    MONOPCT 9.9 01/02/2020    EOSPCT 2.6 01/24/2012    BASOPCT 1.0 01/02/2020    MONOSABS 1.0 01/02/2020    LYMPHSABS 1.3 01/02/2020    EOSABS 0.1 01/02/2020    BASOSABS 0.1 01/02/2020    DIFFTYPE

## 2020-01-02 NOTE — PLAN OF CARE
Nutrition Problem: Increased nutrient needs  Intervention: Food and/or Nutrient Delivery: Continue current diet, Continue current ONS  Nutritional Goals: PO/supp intake greater than 50% at all meals.

## 2020-01-02 NOTE — PROGRESS NOTES
chronic congestive heart failure ([address] Asthma, Calcinosis cutis, Calciphylaxis, Cervical cancer ([facility]), Chest pain, CHF (congestive heart failure) ([address] Chronic kidney disease, stage IV (severe) ([facility]), Chronic pain syndrome, Chronic respiratory failure with hypoxia ([address] CKD (chronic kidney disease) stage 4, GFR 15-29 ml/min ([facility]), Colon polyps, COPD (chronic obstructive pulmonary disease) ([facility]), Degenerative disc disease at L5-S1 level, Diabetes mellitus, insulin dependent (IDDM), uncontrolled ([address] Diabetic polyneuropathy associated with type 2 diabetes mellitus ([address] Elevated troponin, ESRD (end stage renal disease) on dialysis ([address] GERD (gastroesophageal reflux disease), Gout, History of blood transfusion, History of cervical cancer, Hyperlipidemia, Hypertension, Incarcerated ventral hernia, LVH (left ventricular hypertrophy), Morbid obesity ([address] Mucus plugging of bronchi, Obstructive sleep apnea on CPAP, Pneumonia, Psychiatric problem, Pulmonary embolism ([address] Type 2 diabetes mellitus with diabetic neuropathy, with long-term current use of insulin ([address] Urinary incontinence in female, and Venous stasis of lower extremity. has a past surgical history that includes jennifer and bso (cervix removed) (10/96); Knee arthroscopy (Right, 1999); doppler echocardiography (2/21/09); Colonoscopy (2010); Upper gastrointestinal endoscopy (6/25/13); Colonoscopy (6/25/13, 11/14); Cardiac catheterization (1/14); Hysterectomy; pr open skull supratent explore; ventral hernia repair (12/24/2016); other surgical history (02/22/2018); pr repair incisional hernia,reducible (N/A, 11/20/2018); Tunneled venous port placement (Right, 11/2019); Dialysis fistula creation (Right, 3/28/2019); shunt revision (Right, 7/18/2019); incision and drainage (Right, 8/22/2019); Colonoscopy (N/A, 10/10/2019);  Upper gastrointestinal endoscopy (N/A, 11/21/2019); and Abdomen surgery (N/A,

## 2020-01-02 NOTE — PROGRESS NOTES
oral gel 15 g, PRN  dextrose 50 % IV solution, PRN  glucagon (rDNA) injection 1 mg, PRN  dextrose 5 % solution, PRN  HYDROmorphone (DILAUDID) tablet 4 mg, Q6H  ondansetron (ZOFRAN) injection 4 mg, Q6H PRN  oxyCODONE (ROXICODONE) immediate release tablet 5 mg, Q4H PRN    Or  oxyCODONE (ROXICODONE) immediate release tablet 10 mg, Q4H PRN  acetaminophen (TYLENOL) tablet 650 mg, Q4H PRN  diphenhydrAMINE (BENADRYL) injection 25 mg, Q6H PRN  Patient has home medications in pharmacy; please retrieve when patient is discharged. , Daily        Objective:  /83   Pulse 93   Temp 96.9 °F (36.1 °C) (Temporal)   Resp 16   Ht 5' 3\" (1.6 m)   Wt 295 lb 12.8 oz (134.2 kg)   LMP 11/01/1996 (Exact Date)   SpO2 94%   BMI 52.40 kg/m²     Intake/Output Summary (Last 24 hours) at 1/2/2020 1224  Last data filed at 1/2/2020 1246  Gross per 24 hour   Intake 120 ml   Output --   Net 120 ml      Wt Readings from Last 3 Encounters:   01/02/20 295 lb 12.8 oz (134.2 kg)   12/16/19 (!) 326 lb (147.9 kg)   11/29/19 (!) 326 lb (147.9 kg)       General appearance:  Appears comfortable  Eyes: Sclera clear. Pupils equal.  ENT: Moist oral mucosa. Trachea midline, no adenopathy. Cardiovascular: Regular rhythm, normal S1, S2. No murmur. No edema in lower extremities  Respiratory: Not using accessory muscles. Good inspiratory effort. Clear to auscultation bilaterally, no wheeze or crackles. GI: Tenderness at the site of surgery , wound vac in place  Musculoskeletal: No cyanosis in digits, neck supple  Neurology: CN 2-12 grossly intact. No speech or motor deficits  Psych: Normal affect. Alert and oriented in time, place and person  Skin: Warm, dry, normal turgor  Extremity exam shows brisk capillary refill.   Peripheral pulses are palpable in lower extremities     Labs and Tests:  CBC:   Recent Labs     12/31/19  0630 01/01/20  0610 01/02/20  0540   WBC 9.5 8.4 9.8   HGB 9.5* 9.0* 9.2*    186 171     BMP:    Recent Labs 12/31/19  0630 01/01/20  0610 01/02/20  0540    137 139   K 3.9 4.3 4.3   CL 93* 97* 98*   CO2 29 30 31   BUN 21* 10 15   CREATININE 6.0* 4.1* 6.0*   GLUCOSE 121* 94 93     Hepatic:   No results for input(s): AST, ALT, ALB, BILITOT, ALKPHOS in the last 72 hours. CT ABDOMEN PELVIS WO CONTRAST Additional Contrast? None   Final Result   1. Incompletely imaged panniculitis; correlate with direct inspection. XR CHEST PORTABLE   Final Result   No acute process. Problem List  Active Problems:    Poorly controlled type 2 diabetes mellitus (HCC)    Chronic obstructive pulmonary disease (HCC)    Severe anemia    Cellulitis    Abdominal wall cellulitis    Opiate dependence, continuous (HCC)    History of arterial ischemic stroke    Anaerobic cellulitis ([facility]  Resolved Problems:    * No resolved hospital problems. *       Assessment & Plan:     Wound infection of the abdominal wall calciphylaxis discussed with surgeon this was definitely infected . Received  broad-spectrum antibiotic with vancomycin and Zosyn cultures positive for diphtheroids and bacteroids dressing changes  as per surgery wound on the hip was not infected. Wound VAC in place antibiotic changed to oral Flagyl    Pain management    Postop anemia hemoglobin 6.6. Received  2 units of packed red blood cell transfusion.  Hb 9.2    History of deep venous  thrombosis on Eliquis continue    Hypertension controlled    ESRD on hemodialysis had HD yesterday    Hyperglycemia controlled    Calciphylaxis: complicated by open wound and infection  -Sodium Thiosulfate with dialysis    Awaiting placement to ECF patient cannot go home has 3 wound VAC in place    Diet: DIET RENAL;  Dietary Nutrition Supplements: Renal Oral Supplement  Dietary Nutrition Supplements: Wound Healing Oral Supplement  Code:Full Code  DVT PPX eliquis  Disposition ECF       Jessica Lopez MD   1/2/2020 12:24 PM

## 2020-01-02 NOTE — CARE COORDINATION
CM spoke with patient's son regarding possible placement with [facility]. He states he will discuss with his Aunt. CM provided direct call back number.     TORIBIO YarbroughN, CCM, RN  [facility]  [phone]

## 2020-01-02 NOTE — PLAN OF CARE
Problem: Falls - Risk of:  Goal: Will remain free from falls  1/2/2020 0112 by Tex Valencia RN  Outcome: Ongoing  1/2/2020 0042 by Tex Valencia RN  Outcome: Ongoing  1/1/2020 1812 by Harjeet Alberto RN  Outcome: Ongoing  Note:   Fall precautions in place, bed alarm on, nonskid foot wear applied, bed in lowest position, and call light within reach. Will continue to monitor.           Goal: Absence of physical injury  1/2/2020 0112 by Tex Valencia RN  Outcome: Ongoing  1/2/2020 0042 by Tex Valencia RN  Outcome: Ongoing     Problem: Risk for Impaired Skin Integrity  Goal: Tissue integrity - skin and mucous membranes  1/2/2020 0112 by Tex Valencia RN  Outcome: Ongoing  1/2/2020 0042 by Tex Valencia RN  Outcome: Ongoing     Problem: Nutrition  Goal: Optimal nutrition therapy  1/2/2020 0112 by Tex Valencia RN  Outcome: Ongoing  1/2/2020 0042 by Tex Valencia RN  Outcome: Ongoing     Problem: Pain:  Goal: Pain level will decrease  1/2/2020 0112 by Tex Valencia RN  Outcome: Ongoing  1/2/2020 0042 by Tex Valencia RN  Outcome: Ongoing  Goal: Control of acute pain  1/2/2020 0112 by Tex Valencia RN  Outcome: Ongoing  1/2/2020 0042 by Tex Valencia RN  Outcome: Ongoing  Goal: Control of chronic pain  1/2/2020 0112 by Tex Valencia RN  Outcome: Ongoing  1/2/2020 0042 by Tex Valencia RN  Outcome: Ongoing     Problem: Discharge Planning:  Goal: Discharged to appropriate level of care  1/2/2020 0112 by Tex Valencia RN  Outcome: Ongoing  1/2/2020 0042 by Tex Valencia RN  Outcome: Ongoing     Problem: Serum Glucose Level - Abnormal:  Goal: Ability to maintain appropriate glucose levels will improve  1/2/2020 0112 by Tex Valencia RN  Outcome: Ongoing  1/2/2020 0042 by Tex Valencia RN  Outcome: Ongoing     Problem: Sensory Perception - Impaired:  Goal: Ability to maintain a stable neurologic state will improve  1/2/2020 0112 by Pastor Fabian, RN  Outcome: Ongoing  1/2/2020 0042 by Pastor Fabian, RN  Outcome: Ongoing

## 2020-01-02 NOTE — PROGRESS NOTES
Evening medications given, assessment completed, patient denies pain or any unmet needs. The care plan and education has been reviewed and mutually agreed upon with the patient. Resting in bed, watching television, call light within reach, will continue to monitor.

## 2020-01-02 NOTE — PROGRESS NOTES
Nutrition Assessment    Type and Reason for Visit: Reassess    Nutrition Recommendations:   1. Continue to encourage PO and supplement intake. Nutrition Assessment: Patient reports her appetite is not good. Drinking Nepro. She reports she isn't sure if she likes the [facility] because she only tried it one time. Patient with some weight fluctuations, but most likely related to fluid loss. Patient remains nutritionally compromised due to wounds. Malnutrition Assessment:  · Malnutrition Status: No malnutrition    Nutrition Risk Level: Moderate    Nutrient Needs:  · Estimated Daily Total Kcal: 7785-7987  · Estimated Daily Protein (g): 78-88  · Estimated Daily Total Fluid (ml/day): per nephrology    Nutrition Diagnosis:   · Problem: Increased nutrient needs  · Etiology: related to Increased demand for energy/nutrients     Signs and symptoms:  as evidenced by Presence of wounds    Objective Information:  · Nutrition-Focused Physical Findings: +1 pitting edema BLE  · Wound Type: (non-healing/infected incision)  · Current Nutrition Therapies:  · Oral Diet Orders: Renal   · Oral Diet intake: 26-50%, 51-75%  · Oral Nutrition Supplement (ONS) Orders: Renal Oral Supplement, Wound Healing Oral Supplement  · ONS intake: Unable to assess  · Anthropometric Measures:  · Ht: 5' 3\" (160 cm)   · Current Body Wt: 295 lb (133.8 kg)  · Ideal Body Wt: 115 lb (52.2 kg)   · BMI Classification: BMI > or equal to 40.0 Obese Class III    Nutrition Interventions:   Continue current diet, Continue current ONS  Continued Inpatient Monitoring, Education Not Indicated    Nutrition Evaluation:   · Evaluation: Progressing toward goals   · Goals: PO/supp intake greater than 50% at all meals. · Monitoring: Meal Intake, Supplement Intake, Weight, Pertinent Labs      Electronically signed by Aníbal Davalos.  Junie, [address] [place] [address] on 1/2/20 at 3:22 PM    Contact Number: [phone]

## 2020-01-02 NOTE — PROGRESS NOTES
Physical Therapy  Facility/Department: [facility] 3A NURSING  Daily Treatment Note  NAME: Kelsey Falk  : 1955  MRN: [id number]    Date of Service: 2020    Discharge Recommendations: Kelsey Falk scored a 6/24 on the AM-PAC short mobility form. Current research shows that an AM-PAC score of 17 or less is typically not associated with a discharge to the patient's home setting. Based on the patients AM-PAC score and their current functional mobility deficits, it is recommended that the patient have 3-5 sessions per week of Physical Therapy at d/c to increase the patients independence. PT Equipment Recommendations  Equipment Needed: No    Assessment   Body structures, Functions, Activity limitations: Decreased functional mobility ; Decreased endurance;Decreased ADL status; Decreased strength;Decreased balance;Decreased safe awareness  Assessment: Patient not at baseline function and would benefit from skilled PT to address above deficits and facilitate return to baseline function. Pt more alert and willing to participate this date. Assist of 2 therapist for all functional mobility required. Treatment Diagnosis: decreased functional mobility, weakness, impaired gait  Prognosis: Fair  Decision Making: Medium Complexity  Clinical Presentation: evolving  PT Education: Goals;PT Role;Plan of Care;Transfer Training  Patient Education: d/c recommendations, importance of attempting mobility during acute stay  Barriers to Learning: none  REQUIRES PT FOLLOW UP: Yes  Activity Tolerance  Activity Tolerance: Patient Tolerated treatment well;Patient limited by endurance  Activity Tolerance: Improved tolerance this date requiring increased time     Patient Diagnosis(es): The primary encounter diagnosis was Abdominal wall cellulitis. Diagnoses of ESRD on hemodialysis ([address] Calciphylaxis, and Opiate dependence, continuous ([address] were also pertinent to this visit.      has a past medical history of Abdominal pain, Acute on chronic congestive heart failure ([address] Asthma, Calcinosis cutis, Calciphylaxis, Cervical cancer ([facility]), Chest pain, CHF (congestive heart failure) ([address] Chronic kidney disease, stage IV (severe) ([facility]), Chronic pain syndrome, Chronic respiratory failure with hypoxia ([address] CKD (chronic kidney disease) stage 4, GFR 15-29 ml/min ([facility]), Colon polyps, COPD (chronic obstructive pulmonary disease) ([facility]), Degenerative disc disease at L5-S1 level, Diabetes mellitus, insulin dependent (IDDM), uncontrolled ([address] Diabetic polyneuropathy associated with type 2 diabetes mellitus ([address] Elevated troponin, ESRD (end stage renal disease) on dialysis ([address] GERD (gastroesophageal reflux disease), Gout, History of blood transfusion, History of cervical cancer, Hyperlipidemia, Hypertension, Incarcerated ventral hernia, LVH (left ventricular hypertrophy), Morbid obesity ([address] Mucus plugging of bronchi, Obstructive sleep apnea on CPAP, Pneumonia, Psychiatric problem, Pulmonary embolism ([address] Type 2 diabetes mellitus with diabetic neuropathy, with long-term current use of insulin ([address] Urinary incontinence in female, and Venous stasis of lower extremity. has a past surgical history that includes jennifer and bso (cervix removed) (10/96); Knee arthroscopy (Right, 1999); doppler echocardiography (2/21/09); Colonoscopy (2010); Upper gastrointestinal endoscopy (6/25/13); Colonoscopy (6/25/13, 11/14); Cardiac catheterization (1/14); Hysterectomy; pr open skull supratent explore; ventral hernia repair (12/24/2016); other surgical history (02/22/2018); pr repair incisional hernia,reducible (N/A, 11/20/2018); Tunneled venous port placement (Right, 11/2019); Dialysis fistula creation (Right, 3/28/2019); shunt revision (Right, 7/18/2019); incision and drainage (Right, 8/22/2019); Colonoscopy (N/A, 10/10/2019);  Upper gastrointestinal endoscopy (N/A, 11/21/2019); and Abdomen surgery (N/A, 12/26/2019). Restrictions  Restrictions/Precautions  Restrictions/Precautions: Fall Risk, General Precautions(high fall risk, renal diet, 3 wound vacs)  Required Braces or Orthoses?: No  Position Activity Restriction  Other position/activity restrictions: Connie Ferreira is a 59 y.o. female presents the emergency department as a resident of [facility] with end-stage renal disease. Patient states that she is continuing to schneider difficulties as a pertains to wounds related to her calciphylaxis causing her pain and discomfort. Patient states that she was in the process of getting dialysis performed this morning and was only in a proximally 1 hour when she started having increasing pain and discomfort over the wounds on her right hip, abdominal wall, as well as her legs. I and D of wounds performed 12/26  Subjective   General  Chart Reviewed: Yes  Family / Caregiver Present: No  Subjective  Subjective: Pt agreeable to PT. Discomfort at wound sites. RN aware. Pain meds were recently given. General Comment  Comments: supine in bed upon arrival. 3 wound vacs attached. Orientation  Orientation  Overall Orientation Status: Within Functional Limits  Cognition      Objective   Bed mobility  Supine to Sit: Contact guard assistance  Scooting: Contact guard assistance  Comment: increased time to complete; use of bed rail  Transfers  Stand Pivot Transfers: 2 Person Assistance;Dependent/Total(with RW Min A of 2)  Comment: Unsuccessful for initial Stand<>sit. Increased time for line management and to gather self after standing.   Ambulation  Ambulation?: Yes  Ambulation 1  Surface: level tile  Device: Rolling Walker  Other Apparatus: O2(2L; 3 wound vacs)  Assistance: Dependent/Total;2 Person assistance(Min A of 2)  Quality of Gait: Bilateral sway  Gait Deviations: Slow Kelsie;Decreased step height;Shuffles  Distance: 5 turning steps  Comments: increased time required     Balance  Posture: Fair  Sitting -

## 2020-01-03 LAB
ALBUMIN SERPL-MCNC: 2.4 G/DL (ref 3.4–5)
ANION GAP SERPL CALCULATED.3IONS-SCNC: 13 MMOL/L (ref 3–16)
BASOPHILS ABSOLUTE: 0.1 K/UL (ref 0–0.2)
BASOPHILS RELATIVE PERCENT: 1 %
BUN BLDV-MCNC: 26 MG/DL (ref 7–20)
CALCIUM SERPL-MCNC: 9 MG/DL (ref 8.3–10.6)
CHLORIDE BLD-SCNC: 96 MMOL/L (ref 99–110)
CO2: 29 MMOL/L (ref 21–32)
CREAT SERPL-MCNC: 8 MG/DL (ref 0.6–1.2)
EOSINOPHILS ABSOLUTE: 0.2 K/UL (ref 0–0.6)
EOSINOPHILS RELATIVE PERCENT: 1.6 %
GFR AFRICAN AMERICAN: 6
GFR NON-AFRICAN AMERICAN: 5
GLUCOSE BLD-MCNC: 121 MG/DL (ref 70–99)
GLUCOSE BLD-MCNC: 127 MG/DL (ref 70–99)
GLUCOSE BLD-MCNC: 143 MG/DL (ref 70–99)
GLUCOSE BLD-MCNC: 227 MG/DL (ref 70–99)
GLUCOSE BLD-MCNC: 89 MG/DL (ref 70–99)
GLUCOSE BLD-MCNC: 93 MG/DL (ref 70–99)
HCT VFR BLD CALC: 29 % (ref 36–48)
HEMOGLOBIN: 9.2 G/DL (ref 12–16)
LYMPHOCYTES ABSOLUTE: 1.3 K/UL (ref 1–5.1)
LYMPHOCYTES RELATIVE PERCENT: 12.8 %
MCH RBC QN AUTO: 29.5 PG (ref 26–34)
MCHC RBC AUTO-ENTMCNC: 31.7 G/DL (ref 31–36)
MCV RBC AUTO: 93.3 FL (ref 80–100)
MONOCYTES ABSOLUTE: 1 K/UL (ref 0–1.3)
MONOCYTES RELATIVE PERCENT: 10.6 %
NEUTROPHILS ABSOLUTE: 7.2 K/UL (ref 1.7–7.7)
NEUTROPHILS RELATIVE PERCENT: 74 %
PARATHYROID HORMONE INTACT: 173.5 PG/ML (ref 14–72)
PDW BLD-RTO: 20.3 % (ref 12.4–15.4)
PERFORMED ON: ABNORMAL
PERFORMED ON: NORMAL
PERFORMED ON: NORMAL
PHOSPHORUS: 3.1 MG/DL (ref 2.5–4.9)
PLATELET # BLD: 182 K/UL (ref 135–450)
PMV BLD AUTO: 9 FL (ref 5–10.5)
POTASSIUM SERPL-SCNC: 4 MMOL/L (ref 3.5–5.1)
RBC # BLD: 3.11 M/UL (ref 4–5.2)
SODIUM BLD-SCNC: 138 MMOL/L (ref 136–145)
WBC # BLD: 9.8 K/UL (ref 4–11)

## 2020-01-03 PROCEDURE — 90935 HEMODIALYSIS ONE EVALUATION: CPT

## 2020-01-03 PROCEDURE — 80069 RENAL FUNCTION PANEL: CPT

## 2020-01-03 PROCEDURE — 6370000000 HC RX 637 (ALT 250 FOR IP): Performed by: INTERNAL MEDICINE

## 2020-01-03 PROCEDURE — 1200000000 HC SEMI PRIVATE

## 2020-01-03 PROCEDURE — 2500000003 HC RX 250 WO HCPCS: Performed by: INTERNAL MEDICINE

## 2020-01-03 PROCEDURE — 6370000000 HC RX 637 (ALT 250 FOR IP): Performed by: NURSE PRACTITIONER

## 2020-01-03 PROCEDURE — 94640 AIRWAY INHALATION TREATMENT: CPT

## 2020-01-03 PROCEDURE — 6370000000 HC RX 637 (ALT 250 FOR IP): Performed by: SURGERY

## 2020-01-03 PROCEDURE — 2580000003 HC RX 258: Performed by: INTERNAL MEDICINE

## 2020-01-03 PROCEDURE — 2580000003 HC RX 258: Performed by: SURGERY

## 2020-01-03 PROCEDURE — 6360000002 HC RX W HCPCS: Performed by: INTERNAL MEDICINE

## 2020-01-03 PROCEDURE — 6360000002 HC RX W HCPCS: Performed by: SURGERY

## 2020-01-03 PROCEDURE — 85025 COMPLETE CBC W/AUTO DIFF WBC: CPT

## 2020-01-03 PROCEDURE — 83970 ASSAY OF PARATHORMONE: CPT

## 2020-01-03 RX ORDER — OXYCODONE HYDROCHLORIDE 5 MG/1
10 TABLET ORAL ONCE
Status: DISCONTINUED | OUTPATIENT
Start: 2020-01-03 | End: 2020-01-10 | Stop reason: HOSPADM

## 2020-01-03 RX ADMIN — ALLOPURINOL 100 MG: 100 TABLET ORAL at 13:12

## 2020-01-03 RX ADMIN — PREGABALIN 50 MG: 25 CAPSULE ORAL at 12:52

## 2020-01-03 RX ADMIN — HYDROMORPHONE HYDROCHLORIDE 4 MG: 2 TABLET ORAL at 16:33

## 2020-01-03 RX ADMIN — SODIUM THIOSULFATE 25 G: 250 INJECTION, SOLUTION INTRAVENOUS at 23:13

## 2020-01-03 RX ADMIN — Medication 10 ML: at 22:13

## 2020-01-03 RX ADMIN — SEVELAMER CARBONATE 1600 MG: 800 TABLET, FILM COATED ORAL at 12:52

## 2020-01-03 RX ADMIN — ONDANSETRON 4 MG: 2 INJECTION INTRAMUSCULAR; INTRAVENOUS at 13:18

## 2020-01-03 RX ADMIN — SEVELAMER CARBONATE 1600 MG: 800 TABLET, FILM COATED ORAL at 16:33

## 2020-01-03 RX ADMIN — PREGABALIN 50 MG: 25 CAPSULE ORAL at 16:32

## 2020-01-03 RX ADMIN — APIXABAN 2.5 MG: 2.5 TABLET, FILM COATED ORAL at 22:00

## 2020-01-03 RX ADMIN — HYDROMORPHONE HYDROCHLORIDE 4 MG: 2 TABLET ORAL at 22:00

## 2020-01-03 RX ADMIN — METRONIDAZOLE 500 MG: 250 TABLET, FILM COATED ORAL at 22:00

## 2020-01-03 RX ADMIN — INSULIN LISPRO 1 UNITS: 100 INJECTION, SOLUTION INTRAVENOUS; SUBCUTANEOUS at 21:59

## 2020-01-03 RX ADMIN — OXYCODONE 10 MG: 5 TABLET ORAL at 19:32

## 2020-01-03 RX ADMIN — METRONIDAZOLE 500 MG: 250 TABLET, FILM COATED ORAL at 12:52

## 2020-01-03 RX ADMIN — OXYCODONE 10 MG: 5 TABLET ORAL at 13:47

## 2020-01-03 RX ADMIN — HYDROMORPHONE HYDROCHLORIDE 4 MG: 2 TABLET ORAL at 11:22

## 2020-01-03 RX ADMIN — ROSUVASTATIN CALCIUM 10 MG: 10 TABLET, FILM COATED ORAL at 12:52

## 2020-01-03 RX ADMIN — BUDESONIDE 500 MCG: 0.5 SUSPENSION RESPIRATORY (INHALATION) at 21:12

## 2020-01-03 RX ADMIN — ESCITALOPRAM OXALATE 10 MG: 10 TABLET ORAL at 12:53

## 2020-01-03 RX ADMIN — PREGABALIN 50 MG: 25 CAPSULE ORAL at 22:00

## 2020-01-03 RX ADMIN — APIXABAN 2.5 MG: 2.5 TABLET, FILM COATED ORAL at 12:52

## 2020-01-03 RX ADMIN — Medication 10 ML: at 12:53

## 2020-01-03 RX ADMIN — HYDROMORPHONE HYDROCHLORIDE 4 MG: 2 TABLET ORAL at 05:58

## 2020-01-03 ASSESSMENT — PAIN SCALES - GENERAL
PAINLEVEL_OUTOF10: 8
PAINLEVEL_OUTOF10: 7
PAINLEVEL_OUTOF10: 8
PAINLEVEL_OUTOF10: 7
PAINLEVEL_OUTOF10: 9
PAINLEVEL_OUTOF10: 7

## 2020-01-03 ASSESSMENT — PAIN DESCRIPTION - ORIENTATION: ORIENTATION: LEFT

## 2020-01-03 ASSESSMENT — PAIN DESCRIPTION - PAIN TYPE: TYPE: ACUTE PAIN

## 2020-01-03 ASSESSMENT — PAIN DESCRIPTION - LOCATION: LOCATION: ABDOMEN

## 2020-01-03 NOTE — PROGRESS NOTES
Nephrology Progress Note  [phone]  [phone]   [email or web address].        Reason for Consult:  ESRD on HD     HISTORY OF PRESENT ILLNESS:      The patient is a 59 y.o. female with significant past medical history of T2DM , HTN , ESRD on maintenance HD  , GALE , Morbid obesity who presents with painful lesions abdominal wall and thigh- buttock area. She has recently been diagnosed on clinical grounds with Calciphylaxis , for this she gets Sodium Thiosulfate with dialysis . Unfortunately due to severe pain in the lesions she has been to the dialysis unit infrequently   She denies Fever/ chest pain / dyspnea/ cough/ phlegm   She makes little urine    In pain , needing narcotics   Seen at end of HD , will get Sodium thiosulfate next       PHYSICAL EXAM:    Vitals:    /79   Pulse 90   Temp 98.8 °F (37.1 °C)   Resp 18   Ht 5' 3\" (1.6 m)   Wt 274 lb 14.6 oz (124.7 kg)   LMP 11/01/1996 (Exact Date)   SpO2 93%   BMI 48.70 kg/m²   I/O last 3 completed shifts: In: 120 [P.O.:120]  Out: -   No intake/output data recorded. Physical Exam:  Gen: alert, oriented x 3, In pain   PERRL , EOM +  CV: RRR no murmur/ rub   Lungs: CTA-B  Abd: S/NT +BS, wound lower abdominal wall   Ext: No edema, no cyanosis  Skin: Warm. Dressing : Lower abdomen  Upper lateral thighs and Buttock area - suction drainage   : No TTP over bladder, nondistended. Neuro: Alert and oriented x 3, nonfocal.  Joints: No erythema noted over joints.     DATA:    CBC with Differential:    Lab Results   Component Value Date    WBC 9.8 01/03/2020    RBC 3.11 01/03/2020    HGB 9.2 01/03/2020    HCT 29.0 01/03/2020     01/03/2020    MCV 93.3 01/03/2020    MCH 29.5 01/03/2020    MCHC 31.7 01/03/2020    RDW 20.3 01/03/2020    SEGSPCT 77.6 05/12/2013    BANDSPCT 5 08/05/2019    METASPCT 1 01/12/2018    LYMPHOPCT 12.8 01/03/2020    MONOPCT 10.6 01/03/2020    EOSPCT 2.6 01/24/2012    BASOPCT 1.0 01/03/2020    MONOSABS 1.0 01/03/2020    LYMPHSABS 1.3 01/03/2020    EOSABS 0.2 01/03/2020    BASOSABS 0.1 01/03/2020    DIFFTYPE Auto 05/12/2013     CMP:    Lab Results   Component Value Date     01/03/2020    K 4.0 01/03/2020    K 5.6 12/27/2019    CL 96 01/03/2020    CO2 29 01/03/2020    BUN 26 01/03/2020    CREATININE 8.0 01/03/2020    GFRAA 6 01/03/2020    GFRAA 50 05/12/2013    AGRATIO 0.7 12/28/2019    LABGLOM 5 01/03/2020    GLUCOSE 121 01/03/2020    PROT 5.9 12/28/2019    PROT 6.6 03/05/2013    LABALBU 2.4 01/03/2020    CALCIUM 9.0 01/03/2020    BILITOT 0.3 12/28/2019    ALKPHOS 59 12/28/2019    AST 19 12/28/2019    ALT 14 12/28/2019     Phosphorus:    Lab Results   Component Value Date    PHOS 3.1 01/03/2020     Uric Acid:    Lab Results   Component Value Date    LABURIC 2.8 05/17/2018     Warfarin PT/INR:  No components found for: PTPATWAR, PTINRWAR        IMPRESSION/RECOMMENDATIONS:      1. ESRD on HD    MWF at [facility]   2. Obesity  3. Anemia of CKD   Target Hb  9-11  4. Renal osteodystrophy   Phos 3.1 , Ca 9 , iPTH 173   5. Calciphylaxis   Biopsy confirmed    On Sodium Thiosulfate          Post  wound debridement , Biopsy confirmed calciphylaxis , antibiotics          Thank you for allowing me to participate in the care of this patient. I will continue to follow along. Please call with questions.     Carolyn Loyola MD  1/3/2020  [facility]

## 2020-01-03 NOTE — PLAN OF CARE
Problem: Falls - Risk of:  Goal: Will remain free from falls  Outcome: Ongoing  Goal: Absence of physical injury  Outcome: Ongoing     Problem: Risk for Impaired Skin Integrity  Goal: Tissue integrity - skin and mucous membranes  Outcome: Ongoing     Problem: Nutrition  Goal: Optimal nutrition therapy  1/2/2020 1939 by Natalia Hill RN  Outcome: Ongoing  1/2/2020 1519 by Ronald Escudero. ASIF Zaman, LD  Outcome: Ongoing  Note:   Nutrition Problem: Increased nutrient needs  Intervention: Food and/or Nutrient Delivery: Continue current diet, Continue current ONS  Nutritional Goals: PO/supp intake greater than 50% at all meals.         Problem: Pain:  Goal: Pain level will decrease  Outcome: Ongoing  Goal: Control of acute pain  Outcome: Ongoing  Goal: Control of chronic pain  Outcome: Ongoing     Problem: Discharge Planning:  Goal: Discharged to appropriate level of care  Outcome: Ongoing     Problem: Serum Glucose Level - Abnormal:  Goal: Ability to maintain appropriate glucose levels will improve  Outcome: Ongoing     Problem: Sensory Perception - Impaired:  Goal: Ability to maintain a stable neurologic state will improve  Outcome: Ongoing

## 2020-01-03 NOTE — PROGRESS NOTES
Left and right hip dressings are now wet to dry dressings, due to low wound vac output and black/brown/ wound bed. Dr. Marroquin Reef informed.

## 2020-01-03 NOTE — PROGRESS NOTES
Wound vac dressings changed with charge RN. Dr. Estefanía Benitez paged d/t L and R hip wounds having minimal pink tissue, low wound  Vac output, and increased patient pain.

## 2020-01-03 NOTE — PROGRESS NOTES
Hospitalist Progress Note      PCP: JURGEN Mejia - CNP    Date of Admission: 12/26/2019    Chief Complaint: Leg pain      Subjective:   Diffuse abd and leg pain with some improvement. Denies dyspnea. In HD in am    Medications:  Reviewed    Infusion Medications    dextrose       Scheduled Medications    darbepoetin brii-polysorbate  100 mcg Intravenous Weekly    metroNIDAZOLE  500 mg Oral 3 times per day    budesonide  500 mcg Nebulization BID    sodium chloride  250 mL Intravenous Once    sodium thiosulfate IVPB  25 g Intravenous Q MWF    lidocaine   Topical Once    lidocaine-EPINEPHrine  20 mL Intradermal Once    silver nitrate applicators  1 each Topical Once    allopurinol  100 mg Oral Daily    escitalopram  10 mg Oral Daily    lansoprazole  30 mg Oral QAM AC    pregabalin  50 mg Oral TID    rosuvastatin  10 mg Oral Daily    sevelamer  1,600 mg Oral TID WC    insulin lispro  0-6 Units Subcutaneous TID WC    insulin lispro  0-3 Units Subcutaneous Nightly    sodium chloride flush  10 mL Intravenous 2 times per day    apixaban  2.5 mg Oral BID    HYDROmorphone  4 mg Oral Q6H    Empty 3-in-1 Mixing Container  1 each Does not apply Daily     PRN Meds: midodrine, lidocaine PF, albumin human, polyethylene glycol, albuterol sulfate HFA, traZODone, sodium chloride flush, magnesium hydroxide, glucose, dextrose, glucagon (rDNA), dextrose, ondansetron, oxyCODONE **OR** oxyCODONE, acetaminophen, diphenhydrAMINE      Intake/Output Summary (Last 24 hours) at 1/3/2020 1251  Last data filed at 1/3/2020 1037  Gross per 24 hour   Intake 400 ml   Output 1700 ml   Net -1300 ml       Exam:    /69   Pulse 83   Temp 98.4 °F (36.9 °C)   Resp 18   Ht 5' 3\" (1.6 m)   Wt 272 lb 7.8 oz (123.6 kg)   LMP 11/01/1996 (Exact Date)   SpO2 93%   BMI 48.27 kg/m²     Gen/overall appearance: Not in acute distress. Alert.   Head: Normocephalic, atraumatic  Eyes: EOMI, no scleral icterus  CVS: regular

## 2020-01-04 LAB
ALBUMIN SERPL-MCNC: 2.2 G/DL (ref 3.4–5)
ANION GAP SERPL CALCULATED.3IONS-SCNC: 22 MMOL/L (ref 3–16)
BUN BLDV-MCNC: 12 MG/DL (ref 7–20)
CALCIUM SERPL-MCNC: 9.2 MG/DL (ref 8.3–10.6)
CHLORIDE BLD-SCNC: 93 MMOL/L (ref 99–110)
CO2: 25 MMOL/L (ref 21–32)
CREAT SERPL-MCNC: 4.6 MG/DL (ref 0.6–1.2)
GFR AFRICAN AMERICAN: 12
GFR NON-AFRICAN AMERICAN: 10
GLUCOSE BLD-MCNC: 111 MG/DL (ref 70–99)
GLUCOSE BLD-MCNC: 147 MG/DL (ref 70–99)
GLUCOSE BLD-MCNC: 166 MG/DL (ref 70–99)
GLUCOSE BLD-MCNC: 90 MG/DL (ref 70–99)
GLUCOSE BLD-MCNC: 98 MG/DL (ref 70–99)
HCT VFR BLD CALC: 29.1 % (ref 36–48)
HEMOGLOBIN: 9.2 G/DL (ref 12–16)
MCH RBC QN AUTO: 29.4 PG (ref 26–34)
MCHC RBC AUTO-ENTMCNC: 31.8 G/DL (ref 31–36)
MCV RBC AUTO: 92.5 FL (ref 80–100)
PDW BLD-RTO: 19.5 % (ref 12.4–15.4)
PERFORMED ON: ABNORMAL
PERFORMED ON: NORMAL
PHOSPHORUS: 1.3 MG/DL (ref 2.5–4.9)
PLATELET # BLD: 189 K/UL (ref 135–450)
PMV BLD AUTO: 8.8 FL (ref 5–10.5)
POTASSIUM SERPL-SCNC: 4.3 MMOL/L (ref 3.5–5.1)
RBC # BLD: 3.14 M/UL (ref 4–5.2)
SODIUM BLD-SCNC: 140 MMOL/L (ref 136–145)
WBC # BLD: 11.5 K/UL (ref 4–11)

## 2020-01-04 PROCEDURE — 94640 AIRWAY INHALATION TREATMENT: CPT

## 2020-01-04 PROCEDURE — 2700000000 HC OXYGEN THERAPY PER DAY

## 2020-01-04 PROCEDURE — 1200000000 HC SEMI PRIVATE

## 2020-01-04 PROCEDURE — 6370000000 HC RX 637 (ALT 250 FOR IP): Performed by: SURGERY

## 2020-01-04 PROCEDURE — 6360000002 HC RX W HCPCS: Performed by: INTERNAL MEDICINE

## 2020-01-04 PROCEDURE — 6360000002 HC RX W HCPCS: Performed by: SURGERY

## 2020-01-04 PROCEDURE — 6370000000 HC RX 637 (ALT 250 FOR IP): Performed by: NURSE PRACTITIONER

## 2020-01-04 PROCEDURE — 2580000003 HC RX 258: Performed by: SURGERY

## 2020-01-04 PROCEDURE — 99231 SBSQ HOSP IP/OBS SF/LOW 25: CPT | Performed by: SURGERY

## 2020-01-04 PROCEDURE — APPSS15 APP SPLIT SHARED TIME 0-15 MINUTES: Performed by: NURSE PRACTITIONER

## 2020-01-04 PROCEDURE — 6370000000 HC RX 637 (ALT 250 FOR IP): Performed by: INTERNAL MEDICINE

## 2020-01-04 PROCEDURE — 94761 N-INVAS EAR/PLS OXIMETRY MLT: CPT

## 2020-01-04 PROCEDURE — 80069 RENAL FUNCTION PANEL: CPT

## 2020-01-04 PROCEDURE — 85027 COMPLETE CBC AUTOMATED: CPT

## 2020-01-04 PROCEDURE — APPNB30 APP NON BILLABLE TIME 0-30 MINS: Performed by: NURSE PRACTITIONER

## 2020-01-04 RX ADMIN — COLLAGENASE SANTYL: 250 OINTMENT TOPICAL at 15:29

## 2020-01-04 RX ADMIN — Medication 10 ML: at 21:13

## 2020-01-04 RX ADMIN — ALLOPURINOL 100 MG: 100 TABLET ORAL at 08:34

## 2020-01-04 RX ADMIN — BUDESONIDE 500 MCG: 0.5 SUSPENSION RESPIRATORY (INHALATION) at 19:56

## 2020-01-04 RX ADMIN — METRONIDAZOLE 500 MG: 250 TABLET, FILM COATED ORAL at 14:46

## 2020-01-04 RX ADMIN — ESCITALOPRAM OXALATE 10 MG: 10 TABLET ORAL at 08:34

## 2020-01-04 RX ADMIN — ROSUVASTATIN CALCIUM 10 MG: 10 TABLET, FILM COATED ORAL at 08:34

## 2020-01-04 RX ADMIN — METRONIDAZOLE 500 MG: 250 TABLET, FILM COATED ORAL at 05:06

## 2020-01-04 RX ADMIN — SEVELAMER CARBONATE 1600 MG: 800 TABLET, FILM COATED ORAL at 10:58

## 2020-01-04 RX ADMIN — SEVELAMER CARBONATE 1600 MG: 800 TABLET, FILM COATED ORAL at 18:20

## 2020-01-04 RX ADMIN — LANSOPRAZOLE 30 MG: 30 TABLET, ORALLY DISINTEGRATING ORAL at 05:06

## 2020-01-04 RX ADMIN — APIXABAN 2.5 MG: 2.5 TABLET, FILM COATED ORAL at 21:09

## 2020-01-04 RX ADMIN — PREGABALIN 50 MG: 25 CAPSULE ORAL at 08:34

## 2020-01-04 RX ADMIN — OXYCODONE 10 MG: 5 TABLET ORAL at 12:54

## 2020-01-04 RX ADMIN — APIXABAN 2.5 MG: 2.5 TABLET, FILM COATED ORAL at 08:34

## 2020-01-04 RX ADMIN — BUDESONIDE 500 MCG: 0.5 SUSPENSION RESPIRATORY (INHALATION) at 08:49

## 2020-01-04 RX ADMIN — PREGABALIN 50 MG: 25 CAPSULE ORAL at 14:45

## 2020-01-04 RX ADMIN — ONDANSETRON 4 MG: 2 INJECTION INTRAMUSCULAR; INTRAVENOUS at 18:30

## 2020-01-04 RX ADMIN — HYDROMORPHONE HYDROCHLORIDE 4 MG: 2 TABLET ORAL at 10:58

## 2020-01-04 RX ADMIN — SEVELAMER CARBONATE 1600 MG: 800 TABLET, FILM COATED ORAL at 08:34

## 2020-01-04 RX ADMIN — PREGABALIN 50 MG: 25 CAPSULE ORAL at 21:09

## 2020-01-04 RX ADMIN — HYDROMORPHONE HYDROCHLORIDE 4 MG: 2 TABLET ORAL at 23:26

## 2020-01-04 RX ADMIN — INSULIN LISPRO 1 UNITS: 100 INJECTION, SOLUTION INTRAVENOUS; SUBCUTANEOUS at 12:01

## 2020-01-04 RX ADMIN — Medication 10 ML: at 08:35

## 2020-01-04 RX ADMIN — HYDROMORPHONE HYDROCHLORIDE 4 MG: 2 TABLET ORAL at 05:06

## 2020-01-04 RX ADMIN — HYDROMORPHONE HYDROCHLORIDE 4 MG: 2 TABLET ORAL at 18:20

## 2020-01-04 RX ADMIN — INSULIN LISPRO 1 UNITS: 100 INJECTION, SOLUTION INTRAVENOUS; SUBCUTANEOUS at 21:10

## 2020-01-04 RX ADMIN — METRONIDAZOLE 500 MG: 250 TABLET, FILM COATED ORAL at 21:10

## 2020-01-04 RX ADMIN — ONDANSETRON 4 MG: 2 INJECTION INTRAMUSCULAR; INTRAVENOUS at 12:55

## 2020-01-04 ASSESSMENT — PAIN SCALES - GENERAL
PAINLEVEL_OUTOF10: 5
PAINLEVEL_OUTOF10: 0
PAINLEVEL_OUTOF10: 9
PAINLEVEL_OUTOF10: 8
PAINLEVEL_OUTOF10: 0
PAINLEVEL_OUTOF10: 5
PAINLEVEL_OUTOF10: 8
PAINLEVEL_OUTOF10: 5
PAINLEVEL_OUTOF10: 7
PAINLEVEL_OUTOF10: 7
PAINLEVEL_OUTOF10: 9

## 2020-01-04 ASSESSMENT — PAIN DESCRIPTION - PAIN TYPE: TYPE: ACUTE PAIN

## 2020-01-04 ASSESSMENT — PAIN DESCRIPTION - ORIENTATION: ORIENTATION: LEFT;RIGHT

## 2020-01-04 ASSESSMENT — PAIN DESCRIPTION - LOCATION: LOCATION: GROIN;HIP

## 2020-01-04 NOTE — PLAN OF CARE
Problem: Risk for Impaired Skin Integrity  Goal: Tissue integrity - skin and mucous membranes  Description  Structural intactness and normal physiological function of skin and  mucous membranes. Intervention: PRESSURE ULCER PREVENTION  Note:   Pt. With history of Pressure ulcers. Pt. On bariatric bed, and encouraged to turn self for comfort and ulcer prevention.       Problem: Nutrition  Goal: Optimal nutrition therapy  1/4/2020 1014 by Trevon Del Cid RN  Note:   Tana yuan provided     Problem: Pain:  Intervention: Opioid analgesia side-effects  Note:   Educated on pain medication side effects     Problem: Discharge Planning:  Intervention: Assess knowledge level of healthcare  Note:   Awaiting pre-cert to Chela Basurto     Problem: Serum Glucose Level - Abnormal:  Intervention: Glucose management  Note:   Will continue to monitor

## 2020-01-04 NOTE — PROGRESS NOTES
following  Monitor lytes     History of deep venous thrombosis; on Eliquis     PMH of obesity, AOCD, Hypertension    Diet: DIET RENAL;  Dietary Nutrition Supplements: Renal Oral Supplement  Dietary Nutrition Supplements: Wound Healing Oral Supplement  Code Status: Full Code    Dispo - LTAC vs SNF    Pancho Covarrubias MD

## 2020-01-04 NOTE — PLAN OF CARE
Problem: Falls - Risk of:  Goal: Will remain free from falls  1/3/2020 2235 by Shereen Reyes RN  Outcome: Ongoing  1/3/2020 1750 by Armani Gonzales RN  Outcome: Ongoing  Note:   Bed in low position, wheels locked. Call light within reach. Bedside table within reach. Non-skid socks on.    Goal: Absence of physical injury  Outcome: Ongoing     Problem: Risk for Impaired Skin Integrity  Goal: Tissue integrity - skin and mucous membranes  Outcome: Ongoing     Problem: Nutrition  Goal: Optimal nutrition therapy  Outcome: Ongoing

## 2020-01-04 NOTE — PROGRESS NOTES
necrosis and septal calcifications. COMMENT: .  Specimens A-C similar morphologic features.  Ulceration with acute and  chronic inflammation, fat necrosis and calcium deposition can be seen in  the setting of calciphylaxis. AFB and PAS stains are performed and  interpreted as negative for mycobacterium and fungal elements  respectively.  A tissue Gram stain is performed with the interpretation  to be issued in a separate report. Ginny Bigness correlation is essential.    ADDENDUM:  Tissue Gram stain performed on specimens A-C highlight  superficial Gram positive cocci in clusters.  A deep bacterial infection  is not identified.     Joel Conroy MD  (Electronic Signature)  01/03/2020    Imaging:  I have personally reviewed the following films:    No new images    Scheduled Meds:   oxyCODONE  10 mg Oral Once    darbepoetin brii-polysorbate  100 mcg Intravenous Weekly    metroNIDAZOLE  500 mg Oral 3 times per day    budesonide  500 mcg Nebulization BID    sodium chloride  250 mL Intravenous Once    sodium thiosulfate IVPB  25 g Intravenous Q MWF    lidocaine   Topical Once    lidocaine-EPINEPHrine  20 mL Intradermal Once    silver nitrate applicators  1 each Topical Once    allopurinol  100 mg Oral Daily    escitalopram  10 mg Oral Daily    lansoprazole  30 mg Oral QAM AC    pregabalin  50 mg Oral TID    rosuvastatin  10 mg Oral Daily    sevelamer  1,600 mg Oral TID WC    insulin lispro  0-6 Units Subcutaneous TID WC    insulin lispro  0-3 Units Subcutaneous Nightly    sodium chloride flush  10 mL Intravenous 2 times per day    apixaban  2.5 mg Oral BID    HYDROmorphone  4 mg Oral Q6H    Empty 3-in-1 Mixing Container  1 each Does not apply Daily     Continuous Infusions:   dextrose       PRN Meds:.midodrine, lidocaine PF, albumin human, polyethylene glycol, albuterol sulfate HFA, traZODone, sodium chloride flush, magnesium hydroxide, glucose, dextrose, glucagon (rDNA), dextrose, ondansetron, oxyCODONE **OR** oxyCODONE, acetaminophen, diphenhydrAMINE      Assessment:  OR Date 12/26/2019, Sharp excisional debridement of skin, subcutaneous tissue of open infected abdominal wound to final wound dimensions 16x7cm (112 sq cm), Sharp excisional debridement of skin, subcutaneous tissue of stage 3 right hip decubitus ulcer to final wound dimensions 11.5x6cm (69 sq cm), Sharp excisional debridement of skin, subcutaneous tissue of stage 3 left hip decubitus ulcer to final wound dimensions of 05x45qr (286 sq cm), Total debridement: 467 sq cm  Bilateral hip stage 3 decubitus ulcers  Open abdominal wound, infected  ESRD on HD  CHF  COPD  Diabetes  Morbid obesity, BMI 58.4 this admission  CVA  Pulmonary embolism  Medical coagulopathy, Eliquis      Plan:  1. Local wound care: Wound VAC to abdominal wound; Santyl wet-to-dry dressings to bilateral hip wounds daily and PRN--bilateral hip wounds are clean, no further debridement or intervention necessary at this time  2. Diet as tolerated  3. Antibiotics per ID  4. Activity as tolerated  5. PRN analgesics and antiemetics--minimizing narcotics as tolerated  6. DVT prophylaxis, defer to primary team; okay for anticoagulation  7. Management of medical comorbid etiologies per primary team and consulting services  8. Disposition: Okay for discharge; will need to follow with Dr. Abbie Clarke weekly in the Wound Clinic    EDUCATION:  Educated patient on plan of care and disease process--all questions answered. Violet Gan is stable from surgical standpoint. Will follow as needed. Please call with questions or concerns. Thank you for allowing us to participate in [facility]. Plans discussed with patient and nursing. Reviewed and discussed with Dr. Abbie Clarke. Signed:  JURGEN Eaton - CNP  1/4/2020 12:34 PM     I have personally performed a face to face diagnostic evaluation on this patient.   I have interviewed and examined the patient and I agree with the assessment above. In summary, my findings and plan are the following:   Ms. Martha Anders is a 59 y.o. female who presents with   OR Date 12/26/2019, Fidel Hodges excisional debridement of skin, subcutaneous tissue of open infected abdominal wound to final wound dimensions 16x7cm (112 sq cm), Sharp excisional debridement of skin, subcutaneous tissue of stage 3 right hip decubitus ulcer to final wound dimensions 11.5x6cm (69 sq cm), Sharp excisional debridement of skin, subcutaneous tissue of stage 3 left hip decubitus ulcer to final wound dimensions of 33n47nf (286 sq cm), Total debridement: 467 sq cm  Bilateral hip stage 3 decubitus ulcers  Open abdominal wound, infected  ESRD on HD  CHF  COPD  Diabetes  Morbid obesity, BMI 58.4 this admission  CVA  Pulmonary embolism  Medical coagulopathy, Eliquis    Plan:  1. Continue local wound care with wound VAC to midline wound and Santyl and wet-to-dry dressings to bilateral hip wounds, hip wounds do not appear infected and do not need debridement  2. Antibiotics per ID  3. Pain control, suspect chronic pain syndrome likely contributing to patient's pain, may also consider anxiolytic per primary team  4. Defer management of remainder of medical comorbidities to primary consulting teams  5. We will follow peripherally, no further acute general surgery issues, continue local wound care and will follow with me electively in wound clinic weekly after discharge    Georjean Osgood B. Eyvonne Santee MD, FACS  1/4/2020  3:16 PM

## 2020-01-04 NOTE — PROGRESS NOTES
Nephrology Progress Note  [phone]  [phone]   [email or web address].        Reason for Consult:  ESRD on HD     HISTORY OF PRESENT ILLNESS:      The patient is a 59 y.o. female with significant past medical history of T2DM , HTN , ESRD on maintenance HD  , GALE , Morbid obesity who presents with painful lesions abdominal wall and thigh- buttock area. She has recently been diagnosed on clinical grounds with Calciphylaxis , for this she gets Sodium Thiosulfate with dialysis . Unfortunately due to severe pain in the lesions she has been to the dialysis unit infrequently . PHYSICAL EXAM:    Vitals:    BP 99/68   Pulse 88   Temp 97.4 °F (36.3 °C) (Temporal)   Resp 16   Ht 5' 3\" (1.6 m)   Wt 272 lb (123.4 kg)   LMP 11/01/1996 (Exact Date)   SpO2 98%   BMI 48.18 kg/m²   I/O last 3 completed shifts: In: 0 [P.O.:480]  Out: 1750 [Drains:50]  I/O this shift:  In: 120 [P.O.:120]  Out: -     Physical Exam:  Gen: alert, oriented x 3, In pain   PERRL , EOM +  CV: RRR no murmur/ rub   Lungs: CTA-B  Abd: S/NT +BS, wound lower abdominal wall   Ext: No edema, no cyanosis  Skin: Warm.   Dressing : Lower abdomen  Upper lateral thighs and Buttock area - suction drainage   Neuro: Alert and oriented x 3, nonfocal.      DATA:    CBC with Differential:    Lab Results   Component Value Date    WBC 11.5 01/04/2020    RBC 3.14 01/04/2020    HGB 9.2 01/04/2020    HCT 29.1 01/04/2020     01/04/2020    MCV 92.5 01/04/2020    MCH 29.4 01/04/2020    MCHC 31.8 01/04/2020    RDW 19.5 01/04/2020    SEGSPCT 77.6 05/12/2013    BANDSPCT 5 08/05/2019    METASPCT 1 01/12/2018    LYMPHOPCT 12.8 01/03/2020    MONOPCT 10.6 01/03/2020    EOSPCT 2.6 01/24/2012    BASOPCT 1.0 01/03/2020    MONOSABS 1.0 01/03/2020    LYMPHSABS 1.3 01/03/2020    EOSABS 0.2 01/03/2020    BASOSABS 0.1 01/03/2020    DIFFTYPE Auto 05/12/2013     CMP:    Lab Results   Component Value Date     01/04/2020    K 4.3 01/04/2020    K 5.6 12/27/2019    CL 93 01/04/2020    CO2 25 01/04/2020    BUN 12 01/04/2020    CREATININE 4.6 01/04/2020    GFRAA 12 01/04/2020    GFRAA 50 05/12/2013    AGRATIO 0.7 12/28/2019    LABGLOM 10 01/04/2020    GLUCOSE 98 01/04/2020    PROT 5.9 12/28/2019    PROT 6.6 03/05/2013    LABALBU 2.2 01/04/2020    CALCIUM 9.2 01/04/2020    BILITOT 0.3 12/28/2019    ALKPHOS 59 12/28/2019    AST 19 12/28/2019    ALT 14 12/28/2019     Phosphorus:    Lab Results   Component Value Date    PHOS 1.3 01/04/2020     Uric Acid:    Lab Results   Component Value Date    LABURIC 2.8 05/17/2018     Warfarin PT/INR:  No components found for: PTPATWAR, PTINRWAR        IMPRESSION/RECOMMENDATIONS:      1. ESRD on HD    MWF at [facility]   2. Obesity  3. Anemia of CKD   Target Hb  9-11  4. Renal osteodystrophy   Phos 3.1 , Ca 9 , iPTH 173   5. Calciphylaxis   Biopsy confirmed    On Sodium Thiosulfate              Thank you for allowing me to participate in the care of this patient. I will continue to follow along. Please call with questions.     Diego Seay MD  1/4/2020  [facility]

## 2020-01-05 LAB
ALBUMIN SERPL-MCNC: 2.3 G/DL (ref 3.4–5)
ANION GAP SERPL CALCULATED.3IONS-SCNC: 21 MMOL/L (ref 3–16)
BASOPHILS ABSOLUTE: 0.1 K/UL (ref 0–0.2)
BASOPHILS RELATIVE PERCENT: 0.6 %
BUN BLDV-MCNC: 18 MG/DL (ref 7–20)
CALCIUM SERPL-MCNC: 9.6 MG/DL (ref 8.3–10.6)
CHLORIDE BLD-SCNC: 92 MMOL/L (ref 99–110)
CO2: 25 MMOL/L (ref 21–32)
CREAT SERPL-MCNC: 6.4 MG/DL (ref 0.6–1.2)
EOSINOPHILS ABSOLUTE: 0.1 K/UL (ref 0–0.6)
EOSINOPHILS RELATIVE PERCENT: 1.1 %
GFR AFRICAN AMERICAN: 8
GFR NON-AFRICAN AMERICAN: 7
GLUCOSE BLD-MCNC: 112 MG/DL (ref 70–99)
GLUCOSE BLD-MCNC: 118 MG/DL (ref 70–99)
GLUCOSE BLD-MCNC: 122 MG/DL (ref 70–99)
GLUCOSE BLD-MCNC: 142 MG/DL (ref 70–99)
GLUCOSE BLD-MCNC: 152 MG/DL (ref 70–99)
GLUCOSE BLD-MCNC: 176 MG/DL (ref 70–99)
HCT VFR BLD CALC: 28.5 % (ref 36–48)
HEMOGLOBIN: 9.2 G/DL (ref 12–16)
LYMPHOCYTES ABSOLUTE: 1.3 K/UL (ref 1–5.1)
LYMPHOCYTES RELATIVE PERCENT: 15.1 %
MCH RBC QN AUTO: 29.6 PG (ref 26–34)
MCHC RBC AUTO-ENTMCNC: 32.4 G/DL (ref 31–36)
MCV RBC AUTO: 91.5 FL (ref 80–100)
MONOCYTES ABSOLUTE: 1.2 K/UL (ref 0–1.3)
MONOCYTES RELATIVE PERCENT: 13.6 %
NEUTROPHILS ABSOLUTE: 6 K/UL (ref 1.7–7.7)
NEUTROPHILS RELATIVE PERCENT: 69.6 %
PDW BLD-RTO: 19.9 % (ref 12.4–15.4)
PERFORMED ON: ABNORMAL
PHOSPHORUS: 1.7 MG/DL (ref 2.5–4.9)
PLATELET # BLD: 208 K/UL (ref 135–450)
PMV BLD AUTO: 8.4 FL (ref 5–10.5)
POTASSIUM SERPL-SCNC: 4.1 MMOL/L (ref 3.5–5.1)
RBC # BLD: 3.11 M/UL (ref 4–5.2)
SODIUM BLD-SCNC: 138 MMOL/L (ref 136–145)
WBC # BLD: 8.6 K/UL (ref 4–11)

## 2020-01-05 PROCEDURE — 6360000002 HC RX W HCPCS: Performed by: SURGERY

## 2020-01-05 PROCEDURE — 1200000000 HC SEMI PRIVATE

## 2020-01-05 PROCEDURE — 6370000000 HC RX 637 (ALT 250 FOR IP): Performed by: INTERNAL MEDICINE

## 2020-01-05 PROCEDURE — 6360000002 HC RX W HCPCS: Performed by: INTERNAL MEDICINE

## 2020-01-05 PROCEDURE — 6370000000 HC RX 637 (ALT 250 FOR IP): Performed by: SURGERY

## 2020-01-05 PROCEDURE — 2580000003 HC RX 258: Performed by: SURGERY

## 2020-01-05 PROCEDURE — 85025 COMPLETE CBC W/AUTO DIFF WBC: CPT

## 2020-01-05 PROCEDURE — 94640 AIRWAY INHALATION TREATMENT: CPT

## 2020-01-05 PROCEDURE — 6370000000 HC RX 637 (ALT 250 FOR IP): Performed by: NURSE PRACTITIONER

## 2020-01-05 PROCEDURE — 80069 RENAL FUNCTION PANEL: CPT

## 2020-01-05 RX ORDER — CHOLECALCIFEROL (VITAMIN D3) 10 MCG
1 TABLET ORAL DAILY
Status: DISCONTINUED | OUTPATIENT
Start: 2020-01-05 | End: 2020-01-10 | Stop reason: HOSPADM

## 2020-01-05 RX ADMIN — OXYCODONE 10 MG: 5 TABLET ORAL at 20:40

## 2020-01-05 RX ADMIN — PREGABALIN 50 MG: 25 CAPSULE ORAL at 13:52

## 2020-01-05 RX ADMIN — METRONIDAZOLE 500 MG: 250 TABLET, FILM COATED ORAL at 13:52

## 2020-01-05 RX ADMIN — HYDROMORPHONE HYDROCHLORIDE 4 MG: 2 TABLET ORAL at 05:02

## 2020-01-05 RX ADMIN — BUDESONIDE 500 MCG: 0.5 SUSPENSION RESPIRATORY (INHALATION) at 19:44

## 2020-01-05 RX ADMIN — OXYCODONE 10 MG: 5 TABLET ORAL at 13:52

## 2020-01-05 RX ADMIN — ESCITALOPRAM OXALATE 10 MG: 10 TABLET ORAL at 08:58

## 2020-01-05 RX ADMIN — NEPHROCAP 1 MG: 1 CAP ORAL at 11:29

## 2020-01-05 RX ADMIN — LANSOPRAZOLE 30 MG: 30 TABLET, ORALLY DISINTEGRATING ORAL at 06:04

## 2020-01-05 RX ADMIN — SEVELAMER CARBONATE 1600 MG: 800 TABLET, FILM COATED ORAL at 08:57

## 2020-01-05 RX ADMIN — PREGABALIN 50 MG: 25 CAPSULE ORAL at 08:58

## 2020-01-05 RX ADMIN — Medication 10 ML: at 22:04

## 2020-01-05 RX ADMIN — PREGABALIN 50 MG: 25 CAPSULE ORAL at 22:03

## 2020-01-05 RX ADMIN — COLLAGENASE SANTYL: 250 OINTMENT TOPICAL at 08:58

## 2020-01-05 RX ADMIN — HYDROMORPHONE HYDROCHLORIDE 4 MG: 2 TABLET ORAL at 11:26

## 2020-01-05 RX ADMIN — ONDANSETRON 4 MG: 2 INJECTION INTRAMUSCULAR; INTRAVENOUS at 16:02

## 2020-01-05 RX ADMIN — HYDROMORPHONE HYDROCHLORIDE 4 MG: 2 TABLET ORAL at 16:02

## 2020-01-05 RX ADMIN — HYDROMORPHONE HYDROCHLORIDE 4 MG: 2 TABLET ORAL at 23:04

## 2020-01-05 RX ADMIN — MAGNESIUM HYDROXIDE 30 ML: 400 SUSPENSION ORAL at 23:03

## 2020-01-05 RX ADMIN — ROSUVASTATIN CALCIUM 10 MG: 10 TABLET, FILM COATED ORAL at 08:58

## 2020-01-05 RX ADMIN — METRONIDAZOLE 500 MG: 250 TABLET, FILM COATED ORAL at 22:04

## 2020-01-05 RX ADMIN — ALLOPURINOL 100 MG: 100 TABLET ORAL at 08:58

## 2020-01-05 RX ADMIN — INSULIN LISPRO 1 UNITS: 100 INJECTION, SOLUTION INTRAVENOUS; SUBCUTANEOUS at 12:35

## 2020-01-05 RX ADMIN — SEVELAMER CARBONATE 1600 MG: 800 TABLET, FILM COATED ORAL at 16:02

## 2020-01-05 RX ADMIN — APIXABAN 2.5 MG: 2.5 TABLET, FILM COATED ORAL at 08:58

## 2020-01-05 RX ADMIN — Medication 10 ML: at 08:59

## 2020-01-05 RX ADMIN — SEVELAMER CARBONATE 1600 MG: 800 TABLET, FILM COATED ORAL at 11:27

## 2020-01-05 RX ADMIN — INSULIN LISPRO 1 UNITS: 100 INJECTION, SOLUTION INTRAVENOUS; SUBCUTANEOUS at 17:16

## 2020-01-05 RX ADMIN — APIXABAN 2.5 MG: 2.5 TABLET, FILM COATED ORAL at 22:03

## 2020-01-05 RX ADMIN — METRONIDAZOLE 500 MG: 250 TABLET, FILM COATED ORAL at 06:04

## 2020-01-05 ASSESSMENT — PAIN SCALES - GENERAL
PAINLEVEL_OUTOF10: 5
PAINLEVEL_OUTOF10: 5
PAINLEVEL_OUTOF10: 9
PAINLEVEL_OUTOF10: 5
PAINLEVEL_OUTOF10: 8
PAINLEVEL_OUTOF10: 5
PAINLEVEL_OUTOF10: 8
PAINLEVEL_OUTOF10: 9
PAINLEVEL_OUTOF10: 5

## 2020-01-05 ASSESSMENT — PAIN DESCRIPTION - LOCATION
LOCATION: ABDOMEN;HIP
LOCATION: ABDOMEN;HIP
LOCATION: ABDOMEN

## 2020-01-05 ASSESSMENT — PAIN DESCRIPTION - ORIENTATION
ORIENTATION: LEFT;RIGHT
ORIENTATION: RIGHT;LEFT

## 2020-01-05 ASSESSMENT — PAIN DESCRIPTION - PAIN TYPE
TYPE: ACUTE PAIN

## 2020-01-05 NOTE — PROGRESS NOTES
BMI 48.41 kg/m²     Gen/overall appearance: Not in acute distress. Alert. Head: Normocephalic, atraumatic  Eyes: EOMI, no scleral icterus  CVS: regular rate and rhythm, Normal S1S2  Pulm: diminished, Clear to auscultation bilaterally. No crackles/wheezes  Gastrointestinal: Soft, nontender, abd wound vac, obese, no guarding or rebound  Extremities: No edema. No erythema or warmth  Neuro: No gross focal deficits noted  Skin: Warm, dry    Labs:   Recent Labs     01/03/20  0615 01/04/20  0500 01/05/20  0607   WBC 9.8 11.5* 8.6   HGB 9.2* 9.2* 9.2*   HCT 29.0* 29.1* 28.5*    189 208     Recent Labs     01/03/20 0615 01/04/20  0500 01/05/20  0607    140 138   K 4.0 4.3 4.1   CL 96* 93* 92*   CO2 29 25 25   BUN 26* 12 18   CREATININE 8.0* 4.6* 6.4*   CALCIUM 9.0 9.2 9.6   PHOS 3.1 1.3* 1.7*     No results for input(s): AST, ALT, BILIDIR, BILITOT, ALKPHOS in the last 72 hours. No results for input(s): INR in the last 72 hours. No results for input(s): Monda Saupe in the last 72 hours. Assessment/Plan:    Active Hospital Problems    Diagnosis Date Noted    Anaerobic cellulitis ([facility] [A48.0]     Abdominal wall cellulitis [O20.474]     Opiate dependence, continuous ([facility] [F11.20]     History of arterial ischemic stroke [Z86.73]     Cellulitis [L03.90] 12/26/2019    Severe anemia [D64.9] 11/17/2019    Chronic obstructive pulmonary disease ([facility] [J44.9] 08/03/2019    Poorly controlled type 2 diabetes mellitus ([facility] [E11.65] 09/05/2014     Wound infection of the abdominal wall. Cultures positive for diphtheroids and bacteroids   S/p broad-spectrum antibiotic with vancomycin and Zosyn; now transitioned to po cipro   Wound VAC care  Glycemic control  GS on board    Calciphylaxis; biopsy confirmed: complicated by open wound and infection. Chronic pain associated with this  Pain management  On sodium thiosulfate  Nephro following     Postop anemia hemoglobin 6.6.  Received  2 units of packed red

## 2020-01-05 NOTE — PROGRESS NOTES
Nephrology Progress Note  [phone]   [email or web address]        Reason for Consult:  ESRD on HD     HISTORY OF PRESENT ILLNESS:      The patient is a 59 y.o. female with significant past medical history of T2DM , HTN , ESRD on maintenance HD  , GALE , Morbid obesity who presents with painful lesions abdominal wall and thigh- buttock area. She has recently been diagnosed on clinical grounds with Calciphylaxis , for this she gets Sodium Thiosulfate with dialysis . Unfortunately due to severe pain in the lesions she has been to the dialysis unit infrequently . Pain under control, One wound vac now   PHYSICAL EXAM:    Vitals:    /78   Pulse 85   Temp 97.3 °F (36.3 °C) (Temporal)   Resp 18   Ht 5' 3\" (1.6 m)   Wt 273 lb 4.8 oz (124 kg)   LMP 11/01/1996 (Exact Date)   SpO2 97%   BMI 48.41 kg/m²   I/O last 3 completed shifts: In: 480 [P.O.:480]  Out: -   No intake/output data recorded. Physical Exam:  Gen: alert, oriented x 3, In pain   PERRL , EOM +  CV: RRR no murmur/ rub   Lungs: CTA-B  Abd: S/NT +BS, wound lower abdominal wall   Ext: No edema, no cyanosis  Skin: Warm.   Dressing : Lower abdomen  Upper lateral thighs and Buttock area   Neuro: Alert and oriented x 3, nonfocal.      DATA:    CBC with Differential:    Lab Results   Component Value Date    WBC 8.6 01/05/2020    RBC 3.11 01/05/2020    HGB 9.2 01/05/2020    HCT 28.5 01/05/2020     01/05/2020    MCV 91.5 01/05/2020    MCH 29.6 01/05/2020    MCHC 32.4 01/05/2020    RDW 19.9 01/05/2020    SEGSPCT 77.6 05/12/2013    BANDSPCT 5 08/05/2019    METASPCT 1 01/12/2018    LYMPHOPCT 15.1 01/05/2020    MONOPCT 13.6 01/05/2020    EOSPCT 2.6 01/24/2012    BASOPCT 0.6 01/05/2020    MONOSABS 1.2 01/05/2020    LYMPHSABS 1.3 01/05/2020    EOSABS 0.1 01/05/2020    BASOSABS 0.1 01/05/2020    DIFFTYPE Auto 05/12/2013     CMP:    Lab Results   Component Value Date     01/05/2020    K 4.1 01/05/2020    K 5.6 12/27/2019    CL 92 01/05/2020    CO2 25 01/05/2020    BUN 18 01/05/2020    CREATININE 6.4 01/05/2020    GFRAA 8 01/05/2020    GFRAA 50 05/12/2013    AGRATIO 0.7 12/28/2019    LABGLOM 7 01/05/2020    GLUCOSE 112 01/05/2020    PROT 5.9 12/28/2019    PROT 6.6 03/05/2013    LABALBU 2.3 01/05/2020    CALCIUM 9.6 01/05/2020    BILITOT 0.3 12/28/2019    ALKPHOS 59 12/28/2019    AST 19 12/28/2019    ALT 14 12/28/2019     Phosphorus:    Lab Results   Component Value Date    PHOS 1.7 01/05/2020     Uric Acid:    Lab Results   Component Value Date    LABURIC 2.8 05/17/2018     Warfarin PT/INR:  No components found for: PTPATWAR, PTINRWAR        IMPRESSION/RECOMMENDATIONS:      1. ESRD on HD    MWF at [facility]   2. Obesity  3. Anemia of CKD   Target Hb  9-11  4. Renal osteodystrophy   Phos 3.1 , Ca 9 , iPTH 173   5. Calciphylaxis   Biopsy confirmed    On Sodium Thiosulfate, no acidosis, On HCO3 40 in dialysate               Thank you for allowing me to participate in the care of this patient. I will continue to follow along. Please call with questions.     Adele Velarde MD  1/5/2020  [facility]

## 2020-01-06 LAB
ALBUMIN SERPL-MCNC: 2.3 G/DL (ref 3.4–5)
ANION GAP SERPL CALCULATED.3IONS-SCNC: 19 MMOL/L (ref 3–16)
BASOPHILS ABSOLUTE: 0 K/UL (ref 0–0.2)
BASOPHILS RELATIVE PERCENT: 0.5 %
BUN BLDV-MCNC: 23 MG/DL (ref 7–20)
CALCIUM SERPL-MCNC: 9.9 MG/DL (ref 8.3–10.6)
CHLORIDE BLD-SCNC: 92 MMOL/L (ref 99–110)
CO2: 26 MMOL/L (ref 21–32)
CREAT SERPL-MCNC: 7.7 MG/DL (ref 0.6–1.2)
EOSINOPHILS ABSOLUTE: 0.2 K/UL (ref 0–0.6)
EOSINOPHILS RELATIVE PERCENT: 1.7 %
GFR AFRICAN AMERICAN: 6
GFR NON-AFRICAN AMERICAN: 5
GLUCOSE BLD-MCNC: 104 MG/DL (ref 70–99)
GLUCOSE BLD-MCNC: 111 MG/DL (ref 70–99)
GLUCOSE BLD-MCNC: 116 MG/DL (ref 70–99)
GLUCOSE BLD-MCNC: 130 MG/DL (ref 70–99)
GLUCOSE BLD-MCNC: 177 MG/DL (ref 70–99)
HCT VFR BLD CALC: 28.9 % (ref 36–48)
HEMOGLOBIN: 9.4 G/DL (ref 12–16)
LYMPHOCYTES ABSOLUTE: 2 K/UL (ref 1–5.1)
LYMPHOCYTES RELATIVE PERCENT: 20 %
MCH RBC QN AUTO: 29.7 PG (ref 26–34)
MCHC RBC AUTO-ENTMCNC: 32.6 G/DL (ref 31–36)
MCV RBC AUTO: 91.2 FL (ref 80–100)
MONOCYTES ABSOLUTE: 1.2 K/UL (ref 0–1.3)
MONOCYTES RELATIVE PERCENT: 12.6 %
NEUTROPHILS ABSOLUTE: 6.4 K/UL (ref 1.7–7.7)
NEUTROPHILS RELATIVE PERCENT: 65.2 %
PDW BLD-RTO: 19.3 % (ref 12.4–15.4)
PERFORMED ON: ABNORMAL
PHOSPHORUS: 1.9 MG/DL (ref 2.5–4.9)
PLATELET # BLD: 224 K/UL (ref 135–450)
PMV BLD AUTO: 8.5 FL (ref 5–10.5)
POTASSIUM SERPL-SCNC: 4.3 MMOL/L (ref 3.5–5.1)
RBC # BLD: 3.16 M/UL (ref 4–5.2)
SODIUM BLD-SCNC: 137 MMOL/L (ref 136–145)
WBC # BLD: 9.8 K/UL (ref 4–11)

## 2020-01-06 PROCEDURE — 1200000000 HC SEMI PRIVATE

## 2020-01-06 PROCEDURE — 80069 RENAL FUNCTION PANEL: CPT

## 2020-01-06 PROCEDURE — 6360000002 HC RX W HCPCS: Performed by: INTERNAL MEDICINE

## 2020-01-06 PROCEDURE — 85025 COMPLETE CBC W/AUTO DIFF WBC: CPT

## 2020-01-06 PROCEDURE — 2500000003 HC RX 250 WO HCPCS: Performed by: INTERNAL MEDICINE

## 2020-01-06 PROCEDURE — 94640 AIRWAY INHALATION TREATMENT: CPT

## 2020-01-06 PROCEDURE — 6370000000 HC RX 637 (ALT 250 FOR IP): Performed by: NURSE PRACTITIONER

## 2020-01-06 PROCEDURE — 90935 HEMODIALYSIS ONE EVALUATION: CPT

## 2020-01-06 PROCEDURE — 6370000000 HC RX 637 (ALT 250 FOR IP): Performed by: INTERNAL MEDICINE

## 2020-01-06 PROCEDURE — 6370000000 HC RX 637 (ALT 250 FOR IP): Performed by: SURGERY

## 2020-01-06 PROCEDURE — 2580000003 HC RX 258: Performed by: SURGERY

## 2020-01-06 PROCEDURE — 94761 N-INVAS EAR/PLS OXIMETRY MLT: CPT

## 2020-01-06 PROCEDURE — 2580000003 HC RX 258: Performed by: INTERNAL MEDICINE

## 2020-01-06 RX ADMIN — OXYCODONE 10 MG: 5 TABLET ORAL at 00:56

## 2020-01-06 RX ADMIN — Medication 10 ML: at 21:08

## 2020-01-06 RX ADMIN — PREGABALIN 50 MG: 25 CAPSULE ORAL at 21:08

## 2020-01-06 RX ADMIN — APIXABAN 2.5 MG: 2.5 TABLET, FILM COATED ORAL at 11:12

## 2020-01-06 RX ADMIN — BUDESONIDE 500 MCG: 0.5 SUSPENSION RESPIRATORY (INHALATION) at 11:26

## 2020-01-06 RX ADMIN — MIDODRINE HYDROCHLORIDE 10 MG: 5 TABLET ORAL at 06:37

## 2020-01-06 RX ADMIN — METRONIDAZOLE 500 MG: 250 TABLET, FILM COATED ORAL at 13:46

## 2020-01-06 RX ADMIN — OXYCODONE 10 MG: 5 TABLET ORAL at 21:07

## 2020-01-06 RX ADMIN — OXYCODONE 10 MG: 5 TABLET ORAL at 08:38

## 2020-01-06 RX ADMIN — ALLOPURINOL 100 MG: 100 TABLET ORAL at 11:12

## 2020-01-06 RX ADMIN — LANSOPRAZOLE 30 MG: 30 TABLET, ORALLY DISINTEGRATING ORAL at 11:19

## 2020-01-06 RX ADMIN — APIXABAN 2.5 MG: 2.5 TABLET, FILM COATED ORAL at 21:07

## 2020-01-06 RX ADMIN — Medication 10 ML: at 11:13

## 2020-01-06 RX ADMIN — ROSUVASTATIN CALCIUM 10 MG: 10 TABLET, FILM COATED ORAL at 11:13

## 2020-01-06 RX ADMIN — INSULIN LISPRO 1 UNITS: 100 INJECTION, SOLUTION INTRAVENOUS; SUBCUTANEOUS at 16:20

## 2020-01-06 RX ADMIN — HYDROMORPHONE HYDROCHLORIDE 4 MG: 2 TABLET ORAL at 05:34

## 2020-01-06 RX ADMIN — SODIUM THIOSULFATE 25 G: 250 INJECTION, SOLUTION INTRAVENOUS at 09:44

## 2020-01-06 RX ADMIN — Medication 2 PUFF: at 11:29

## 2020-01-06 RX ADMIN — NEPHROCAP 1 MG: 1 CAP ORAL at 11:13

## 2020-01-06 RX ADMIN — PREGABALIN 50 MG: 25 CAPSULE ORAL at 11:13

## 2020-01-06 RX ADMIN — HYDROMORPHONE HYDROCHLORIDE 4 MG: 2 TABLET ORAL at 11:13

## 2020-01-06 RX ADMIN — HYDROMORPHONE HYDROCHLORIDE 4 MG: 2 TABLET ORAL at 23:07

## 2020-01-06 RX ADMIN — HYDROMORPHONE HYDROCHLORIDE 4 MG: 2 TABLET ORAL at 16:20

## 2020-01-06 RX ADMIN — ESCITALOPRAM OXALATE 10 MG: 10 TABLET ORAL at 11:13

## 2020-01-06 RX ADMIN — PREGABALIN 50 MG: 25 CAPSULE ORAL at 13:46

## 2020-01-06 RX ADMIN — BUDESONIDE 500 MCG: 0.5 SUSPENSION RESPIRATORY (INHALATION) at 22:11

## 2020-01-06 RX ADMIN — DARBEPOETIN ALFA 100 MCG: 100 SOLUTION INTRAVENOUS; SUBCUTANEOUS at 09:45

## 2020-01-06 RX ADMIN — METRONIDAZOLE 500 MG: 250 TABLET, FILM COATED ORAL at 21:08

## 2020-01-06 RX ADMIN — METRONIDAZOLE 500 MG: 250 TABLET, FILM COATED ORAL at 05:33

## 2020-01-06 ASSESSMENT — PAIN SCALES - GENERAL
PAINLEVEL_OUTOF10: 8
PAINLEVEL_OUTOF10: 7
PAINLEVEL_OUTOF10: 10
PAINLEVEL_OUTOF10: 8
PAINLEVEL_OUTOF10: 9
PAINLEVEL_OUTOF10: 9
PAINLEVEL_OUTOF10: 10

## 2020-01-06 ASSESSMENT — PAIN DESCRIPTION - ONSET: ONSET: ON-GOING

## 2020-01-06 ASSESSMENT — PAIN DESCRIPTION - LOCATION: LOCATION: HIP

## 2020-01-06 ASSESSMENT — PAIN DESCRIPTION - PAIN TYPE: TYPE: CHRONIC PAIN

## 2020-01-06 ASSESSMENT — PAIN DESCRIPTION - ORIENTATION: ORIENTATION: LEFT

## 2020-01-06 ASSESSMENT — PAIN DESCRIPTION - DESCRIPTORS: DESCRIPTORS: ACHING;DISCOMFORT

## 2020-01-06 ASSESSMENT — PAIN DESCRIPTION - FREQUENCY: FREQUENCY: INTERMITTENT

## 2020-01-06 NOTE — PROGRESS NOTES
BUN 23 01/06/2020    CREATININE 7.7 01/06/2020    GFRAA 6 01/06/2020    GFRAA 50 05/12/2013    AGRATIO 0.7 12/28/2019    LABGLOM 5 01/06/2020    GLUCOSE 130 01/06/2020    PROT 5.9 12/28/2019    PROT 6.6 03/05/2013    LABALBU 2.3 01/06/2020    CALCIUM 9.9 01/06/2020    BILITOT 0.3 12/28/2019    ALKPHOS 59 12/28/2019    AST 19 12/28/2019    ALT 14 12/28/2019     Phosphorus:    Lab Results   Component Value Date    PHOS 1.9 01/06/2020     Uric Acid:    Lab Results   Component Value Date    LABURIC 2.8 05/17/2018     Warfarin PT/INR:  No components found for: PTPATWAR, PTINRWAR        IMPRESSION/RECOMMENDATIONS:      1. ESRD on HD    MWF at [facility]   2. Obesity  3. Anemia of CKD   Target Hb  9-11  4. Renal osteodystrophy   Phos  Low at 1.9 , Ca 9.9  , iPTH 173    Stop Binder   5. Calciphylaxis   Biopsy confirmed    On Sodium Thiosulfate, no acidosis, On HCO3 40 in dialysate               Thank you for allowing me to participate in the care of this patient. I will continue to follow along. Please call with questions.     More Gaona MD  1/6/2020  [facility]

## 2020-01-06 NOTE — PLAN OF CARE
Problem: Falls - Risk of:  Goal: Will remain free from falls  Description  Will remain free from falls  1/6/2020 1459 by Jovani Samano RN  Outcome: Ongoing  Note:   Non-skid socks on. Bed in lowest position, wheels locked. Call light within reach. Bedside table within reach. Bed alarm on. Patient reminded to use call light when in need of assistance.

## 2020-01-06 NOTE — PROGRESS NOTES
Patient returned from dialysis. VSS, AOX4. C/o pain, administered pain medication that was scheduled per order. See MAR for further information.

## 2020-01-06 NOTE — PROGRESS NOTES
Hospitalist Progress Note      PCP: JURGEN Morfin - CNP    Date of Admission: 12/26/2019    Chief Complaint: Leg pain      Subjective:   Diffuse abd and leg pain improved  Denies fever, chills, n/v.  Otherwise denies new acute complaints.     Medications:  Reviewed    Infusion Medications    dextrose       Scheduled Medications    b complex-C-folic acid  1 capsule Oral Daily    collagenase   Topical Daily    oxyCODONE  10 mg Oral Once    darbepoetin brii-polysorbate  100 mcg Intravenous Weekly    metroNIDAZOLE  500 mg Oral 3 times per day    budesonide  500 mcg Nebulization BID    sodium chloride  250 mL Intravenous Once    sodium thiosulfate IVPB  25 g Intravenous Q MWF    lidocaine   Topical Once    lidocaine-EPINEPHrine  20 mL Intradermal Once    silver nitrate applicators  1 each Topical Once    allopurinol  100 mg Oral Daily    escitalopram  10 mg Oral Daily    lansoprazole  30 mg Oral QAM AC    pregabalin  50 mg Oral TID    rosuvastatin  10 mg Oral Daily    insulin lispro  0-6 Units Subcutaneous TID WC    insulin lispro  0-3 Units Subcutaneous Nightly    sodium chloride flush  10 mL Intravenous 2 times per day    apixaban  2.5 mg Oral BID    HYDROmorphone  4 mg Oral Q6H    Empty 3-in-1 Mixing Container  1 each Does not apply Daily     PRN Meds: midodrine, lidocaine PF, albumin human, polyethylene glycol, albuterol sulfate HFA, traZODone, sodium chloride flush, magnesium hydroxide, glucose, dextrose, glucagon (rDNA), dextrose, ondansetron, oxyCODONE **OR** oxyCODONE, acetaminophen, diphenhydrAMINE      Intake/Output Summary (Last 24 hours) at 1/6/2020 1304  Last data filed at 1/6/2020 0045  Gross per 24 hour   Intake 370 ml   Output --   Net 370 ml       Exam:    /84   Pulse 95   Temp 97.3 °F (36.3 °C) (Temporal)   Resp 18   Ht 5' 3\" (1.6 m)   Wt (!) 309 lb 4.9 oz (140.3 kg)   LMP 11/01/1996 (Exact Date)   SpO2 95%   BMI 54.79 kg/m²     Gen/overall appearance: Not in acute distress. Alert. Head: Normocephalic, atraumatic  Eyes: EOMI, no scleral icterus  CVS: regular rate and rhythm, Normal S1S2  Pulm: diminished, Clear to auscultation bilaterally. No crackles/wheezes  Gastrointestinal: Soft, nontender, abd wound vac, obese, no guarding or rebound  Extremities: No edema. No erythema or warmth  Neuro: No gross focal deficits noted  Skin: Warm, dry    Labs:   Recent Labs     01/04/20  0500 01/05/20  0607 01/06/20  0545   WBC 11.5* 8.6 9.8   HGB 9.2* 9.2* 9.4*   HCT 29.1* 28.5* 28.9*    208 224     Recent Labs     01/04/20  0500 01/05/20  0607 01/06/20  0545    138 137   K 4.3 4.1 4.3   CL 93* 92* 92*   CO2 25 25 26   BUN 12 18 23*   CREATININE 4.6* 6.4* 7.7*   CALCIUM 9.2 9.6 9.9   PHOS 1.3* 1.7* 1.9*     No results for input(s): AST, ALT, BILIDIR, BILITOT, ALKPHOS in the last 72 hours. No results for input(s): INR in the last 72 hours. No results for input(s): Pedro Josesito in the last 72 hours. Assessment/Plan:    Active Hospital Problems    Diagnosis Date Noted    Anaerobic cellulitis ([facility] [A48.0]     Abdominal wall cellulitis [T96.210]     Opiate dependence, continuous ([facility] [facility] [F11.20]     History of arterial ischemic stroke [Z86.73]     Cellulitis [L03.90] 12/26/2019    Severe anemia [D64.9] 11/17/2019    Chronic obstructive pulmonary disease ([facility] [J44.9] 08/03/2019    Poorly controlled type 2 diabetes mellitus ([facility] [facility] [E11.65] 09/05/2014     Wound infection of the abdominal wall. Cultures positive for diphtheroids and bacteroids   S/p broad-spectrum antibiotic with vancomycin and Zosyn; now transitioned to po cipro   Wound VAC care  Glycemic control  GS on board    Calciphylaxis; biopsy confirmed: complicated by open wound and infection. Chronic pain associated with this  Pain management  On sodium thiosulfate  Nephro following     Postop anemia hemoglobin 6.6. Received  2 units of packed red blood cell transfusion.   Serial Hgb stable  Trend

## 2020-01-07 LAB
ALBUMIN SERPL-MCNC: 2.5 G/DL (ref 3.4–5)
ANION GAP SERPL CALCULATED.3IONS-SCNC: 14 MMOL/L (ref 3–16)
BASOPHILS ABSOLUTE: 0.1 K/UL (ref 0–0.2)
BASOPHILS RELATIVE PERCENT: 0.5 %
BUN BLDV-MCNC: 13 MG/DL (ref 7–20)
CALCIUM SERPL-MCNC: 9.2 MG/DL (ref 8.3–10.6)
CHLORIDE BLD-SCNC: 95 MMOL/L (ref 99–110)
CO2: 31 MMOL/L (ref 21–32)
CREAT SERPL-MCNC: 4.8 MG/DL (ref 0.6–1.2)
EOSINOPHILS ABSOLUTE: 0.1 K/UL (ref 0–0.6)
EOSINOPHILS RELATIVE PERCENT: 0.8 %
GFR AFRICAN AMERICAN: 11
GFR NON-AFRICAN AMERICAN: 9
GLUCOSE BLD-MCNC: 119 MG/DL (ref 70–99)
GLUCOSE BLD-MCNC: 121 MG/DL (ref 70–99)
GLUCOSE BLD-MCNC: 126 MG/DL (ref 70–99)
GLUCOSE BLD-MCNC: 183 MG/DL (ref 70–99)
GLUCOSE BLD-MCNC: 212 MG/DL (ref 70–99)
HCT VFR BLD CALC: 29 % (ref 36–48)
HEMOGLOBIN: 9.2 G/DL (ref 12–16)
LYMPHOCYTES ABSOLUTE: 1.8 K/UL (ref 1–5.1)
LYMPHOCYTES RELATIVE PERCENT: 18.2 %
MCH RBC QN AUTO: 29.4 PG (ref 26–34)
MCHC RBC AUTO-ENTMCNC: 31.7 G/DL (ref 31–36)
MCV RBC AUTO: 92.7 FL (ref 80–100)
MONOCYTES ABSOLUTE: 1.3 K/UL (ref 0–1.3)
MONOCYTES RELATIVE PERCENT: 13.3 %
NEUTROPHILS ABSOLUTE: 6.5 K/UL (ref 1.7–7.7)
NEUTROPHILS RELATIVE PERCENT: 67.2 %
PDW BLD-RTO: 19.7 % (ref 12.4–15.4)
PERFORMED ON: ABNORMAL
PHOSPHORUS: 2.5 MG/DL (ref 2.5–4.9)
PLATELET # BLD: 213 K/UL (ref 135–450)
PMV BLD AUTO: 8.9 FL (ref 5–10.5)
POTASSIUM SERPL-SCNC: 4 MMOL/L (ref 3.5–5.1)
RBC # BLD: 3.13 M/UL (ref 4–5.2)
SODIUM BLD-SCNC: 140 MMOL/L (ref 136–145)
WBC # BLD: 9.7 K/UL (ref 4–11)

## 2020-01-07 PROCEDURE — 85025 COMPLETE CBC W/AUTO DIFF WBC: CPT

## 2020-01-07 PROCEDURE — 6370000000 HC RX 637 (ALT 250 FOR IP): Performed by: INTERNAL MEDICINE

## 2020-01-07 PROCEDURE — 94760 N-INVAS EAR/PLS OXIMETRY 1: CPT

## 2020-01-07 PROCEDURE — 6360000002 HC RX W HCPCS: Performed by: INTERNAL MEDICINE

## 2020-01-07 PROCEDURE — 6370000000 HC RX 637 (ALT 250 FOR IP): Performed by: SURGERY

## 2020-01-07 PROCEDURE — 6370000000 HC RX 637 (ALT 250 FOR IP): Performed by: NURSE PRACTITIONER

## 2020-01-07 PROCEDURE — 1200000000 HC SEMI PRIVATE

## 2020-01-07 PROCEDURE — 2580000003 HC RX 258: Performed by: SURGERY

## 2020-01-07 PROCEDURE — 80069 RENAL FUNCTION PANEL: CPT

## 2020-01-07 PROCEDURE — 94640 AIRWAY INHALATION TREATMENT: CPT

## 2020-01-07 RX ADMIN — PREGABALIN 50 MG: 25 CAPSULE ORAL at 15:09

## 2020-01-07 RX ADMIN — OXYCODONE 10 MG: 5 TABLET ORAL at 04:49

## 2020-01-07 RX ADMIN — NEPHROCAP 1 MG: 1 CAP ORAL at 09:15

## 2020-01-07 RX ADMIN — PREGABALIN 50 MG: 25 CAPSULE ORAL at 09:16

## 2020-01-07 RX ADMIN — OXYCODONE 10 MG: 5 TABLET ORAL at 21:11

## 2020-01-07 RX ADMIN — METRONIDAZOLE 500 MG: 250 TABLET, FILM COATED ORAL at 21:10

## 2020-01-07 RX ADMIN — METRONIDAZOLE 500 MG: 250 TABLET, FILM COATED ORAL at 05:22

## 2020-01-07 RX ADMIN — HYDROMORPHONE HYDROCHLORIDE 4 MG: 2 TABLET ORAL at 23:05

## 2020-01-07 RX ADMIN — ALLOPURINOL 100 MG: 100 TABLET ORAL at 09:15

## 2020-01-07 RX ADMIN — METRONIDAZOLE 500 MG: 250 TABLET, FILM COATED ORAL at 15:10

## 2020-01-07 RX ADMIN — COLLAGENASE SANTYL: 250 OINTMENT TOPICAL at 09:17

## 2020-01-07 RX ADMIN — INSULIN LISPRO 1 UNITS: 100 INJECTION, SOLUTION INTRAVENOUS; SUBCUTANEOUS at 21:11

## 2020-01-07 RX ADMIN — APIXABAN 2.5 MG: 2.5 TABLET, FILM COATED ORAL at 09:15

## 2020-01-07 RX ADMIN — HYDROMORPHONE HYDROCHLORIDE 4 MG: 2 TABLET ORAL at 16:59

## 2020-01-07 RX ADMIN — BUDESONIDE 500 MCG: 0.5 SUSPENSION RESPIRATORY (INHALATION) at 11:39

## 2020-01-07 RX ADMIN — Medication 10 ML: at 09:16

## 2020-01-07 RX ADMIN — ESCITALOPRAM OXALATE 10 MG: 10 TABLET ORAL at 09:15

## 2020-01-07 RX ADMIN — HYDROMORPHONE HYDROCHLORIDE 4 MG: 2 TABLET ORAL at 05:22

## 2020-01-07 RX ADMIN — LANSOPRAZOLE 30 MG: 30 TABLET, ORALLY DISINTEGRATING ORAL at 05:22

## 2020-01-07 RX ADMIN — ROSUVASTATIN CALCIUM 10 MG: 10 TABLET, FILM COATED ORAL at 09:15

## 2020-01-07 RX ADMIN — HYDROMORPHONE HYDROCHLORIDE 4 MG: 2 TABLET ORAL at 12:53

## 2020-01-07 RX ADMIN — PREGABALIN 50 MG: 25 CAPSULE ORAL at 21:10

## 2020-01-07 RX ADMIN — OXYCODONE 10 MG: 5 TABLET ORAL at 09:15

## 2020-01-07 RX ADMIN — APIXABAN 2.5 MG: 2.5 TABLET, FILM COATED ORAL at 21:10

## 2020-01-07 RX ADMIN — Medication 10 ML: at 21:11

## 2020-01-07 RX ADMIN — BUDESONIDE 500 MCG: 0.5 SUSPENSION RESPIRATORY (INHALATION) at 20:37

## 2020-01-07 ASSESSMENT — PAIN DESCRIPTION - PAIN TYPE
TYPE: CHRONIC PAIN

## 2020-01-07 ASSESSMENT — PAIN DESCRIPTION - DESCRIPTORS
DESCRIPTORS: ACHING;DISCOMFORT
DESCRIPTORS: ACHING;DISCOMFORT

## 2020-01-07 ASSESSMENT — PAIN DESCRIPTION - ORIENTATION
ORIENTATION: LEFT
ORIENTATION: RIGHT;LEFT
ORIENTATION: RIGHT;LEFT

## 2020-01-07 ASSESSMENT — PAIN SCALES - GENERAL
PAINLEVEL_OUTOF10: 0
PAINLEVEL_OUTOF10: 7
PAINLEVEL_OUTOF10: 8
PAINLEVEL_OUTOF10: 7
PAINLEVEL_OUTOF10: 7
PAINLEVEL_OUTOF10: 8

## 2020-01-07 ASSESSMENT — PAIN DESCRIPTION - LOCATION
LOCATION: HIP
LOCATION: LEG
LOCATION: HIP

## 2020-01-07 ASSESSMENT — PAIN DESCRIPTION - FREQUENCY: FREQUENCY: INTERMITTENT

## 2020-01-07 NOTE — PROGRESS NOTES
01/07/2020    BUN 13 01/07/2020    CREATININE 4.8 01/07/2020    GFRAA 11 01/07/2020    GFRAA 50 05/12/2013    AGRATIO 0.7 12/28/2019    LABGLOM 9 01/07/2020    GLUCOSE 119 01/07/2020    PROT 5.9 12/28/2019    PROT 6.6 03/05/2013    LABALBU 2.5 01/07/2020    CALCIUM 9.2 01/07/2020    BILITOT 0.3 12/28/2019    ALKPHOS 59 12/28/2019    AST 19 12/28/2019    ALT 14 12/28/2019     Phosphorus:    Lab Results   Component Value Date    PHOS 2.5 01/07/2020     Uric Acid:    Lab Results   Component Value Date    LABURIC 2.8 05/17/2018     Warfarin PT/INR:  No components found for: PTPATWAR, PTINRWAR        IMPRESSION/RECOMMENDATIONS:      1. ESRD on HD    MWF at [facility]   2. Obesity  3. Anemia of CKD   Target Hb  9-11  4. Renal osteodystrophy   Phos  Low at 1.9 , Ca 9.9  , iPTH 173    Stop Binder   5. Calciphylaxis   Biopsy confirmed    On Sodium Thiosulfate, no acidosis, On HCO3 40 in dialysate               Thank you for allowing me to participate in the care of this patient. I will continue to follow along. Please call with questions.     Adele Velarde MD  1/7/2020  [facility]

## 2020-01-07 NOTE — CARE COORDINATION
Received call from Michael Skelton at [facility] and she states that they are unable to take pt due to the size of her wounds requiring wet to dry and santyl. States the cost of the amount of santyl would be to much for them. Also the pt is a 2 person assist and the cost of a stretcher transport to and from dialysis they are unable to do. Called pt's spouse Rayne Mercer ([phone]) and explained and he wants me to send a referral to [facility]. He states he will call me later with another choice after he speaks to a family member.      Bryan Schaefer, RN, BSN  [phone]

## 2020-01-07 NOTE — PROGRESS NOTES
Hospitalist Progress Note      PCP: JURGEN Bradley CNP    Date of Admission: 12/26/2019    Chief Complaint: Leg pain      Subjective:   Pain is well controlled in abd and legs. Denies fever, chills, n/v.  Otherwise denies new acute complaints.     Medications:  Reviewed    Infusion Medications    dextrose       Scheduled Medications    b complex-C-folic acid  1 capsule Oral Daily    collagenase   Topical Daily    oxyCODONE  10 mg Oral Once    darbepoetin brii-polysorbate  100 mcg Intravenous Weekly    metroNIDAZOLE  500 mg Oral 3 times per day    budesonide  500 mcg Nebulization BID    sodium chloride  250 mL Intravenous Once    sodium thiosulfate IVPB  25 g Intravenous Q MWF    lidocaine   Topical Once    lidocaine-EPINEPHrine  20 mL Intradermal Once    silver nitrate applicators  1 each Topical Once    allopurinol  100 mg Oral Daily    escitalopram  10 mg Oral Daily    lansoprazole  30 mg Oral QAM AC    pregabalin  50 mg Oral TID    rosuvastatin  10 mg Oral Daily    insulin lispro  0-6 Units Subcutaneous TID WC    insulin lispro  0-3 Units Subcutaneous Nightly    sodium chloride flush  10 mL Intravenous 2 times per day    apixaban  2.5 mg Oral BID    HYDROmorphone  4 mg Oral Q6H    Empty 3-in-1 Mixing Container  1 each Does not apply Daily     PRN Meds: midodrine, lidocaine PF, albumin human, polyethylene glycol, albuterol sulfate HFA, traZODone, sodium chloride flush, magnesium hydroxide, glucose, dextrose, glucagon (rDNA), dextrose, ondansetron, oxyCODONE **OR** oxyCODONE, acetaminophen, diphenhydrAMINE      Intake/Output Summary (Last 24 hours) at 1/7/2020 1413  Last data filed at 1/7/2020 0907  Gross per 24 hour   Intake 600 ml   Output --   Net 600 ml       Exam:    /68   Pulse 80   Temp 98.5 °F (36.9 °C) (Oral)   Resp 18   Ht 5' 3\" (1.6 m)   Wt (!) 309 lb 4.9 oz (140.3 kg)   LMP 11/01/1996 (Exact Date)   SpO2 96%   BMI 54.79 kg/m²     Gen/overall appearance: Not in acute distress. Alert. Head: Normocephalic, atraumatic  Eyes: EOMI, no scleral icterus  CVS: regular rate and rhythm, Normal S1S2  Pulm: diminished, Clear to auscultation bilaterally. No crackles/wheezes  Gastrointestinal: Soft, nontender, abd wound vac, obese, no guarding or rebound  Extremities: No edema. No erythema or warmth  Neuro: No gross focal deficits noted  Skin: Warm, dry    Labs:   Recent Labs     01/05/20  0607 01/06/20  0545 01/07/20  0529   WBC 8.6 9.8 9.7   HGB 9.2* 9.4* 9.2*   HCT 28.5* 28.9* 29.0*    224 213     Recent Labs     01/05/20  0607 01/06/20  0545 01/07/20  0529    137 140   K 4.1 4.3 4.0   CL 92* 92* 95*   CO2 25 26 31   BUN 18 23* 13   CREATININE 6.4* 7.7* 4.8*   CALCIUM 9.6 9.9 9.2   PHOS 1.7* 1.9* 2.5     No results for input(s): AST, ALT, BILIDIR, BILITOT, ALKPHOS in the last 72 hours. No results for input(s): INR in the last 72 hours. No results for input(s): Florene Mikhail in the last 72 hours. Assessment/Plan:    Active Hospital Problems    Diagnosis Date Noted    Anaerobic cellulitis ([facility] [A48.0]     Abdominal wall cellulitis [U12.908]     Opiate dependence, continuous ([facility] [F11.20]     History of arterial ischemic stroke [Z86.73]     Cellulitis [L03.90] 12/26/2019    Severe anemia [D64.9] 11/17/2019    Chronic obstructive pulmonary disease ([facility] [J44.9] 08/03/2019    Poorly controlled type 2 diabetes mellitus ([facility] [E11.65] 09/05/2014     Wound infection of the abdominal wall. Cultures positive for diphtheroids and bacteroids   S/p broad-spectrum antibiotic with vancomycin and Zosyn; now transitioned to po cipro   Wound VAC care  Glycemic control  GS on board    Calciphylaxis; biopsy confirmed: complicated by open wound and infection. Chronic pain associated with this  Pain management  On sodium thiosulfate  Nephro following     Postop anemia hemoglobin 6.6. Received  2 units of packed red blood cell transfusion.   Serial Hgb stable  Trend H&H    ESRD on hemodialysis  Nephro following  Monitor lytes     History of deep venous thrombosis; on Eliquis     PMH of obesity, AOCD, Hypertension    Diet: DIET RENAL;  Dietary Nutrition Supplements: Renal Oral Supplement  Code Status: Full Code    Dispo - Declined LTAC, pending SNF    Marly Jamison MD

## 2020-01-07 NOTE — CARE COORDINATION
[address] [facility] [address] and left message inquiring if they are able to take pt. They sent a rep out yesterday who saw pt. Awaiting call back.   David Luis RN, BSN  [phone]

## 2020-01-08 LAB
BASOPHILS ABSOLUTE: 0 K/UL (ref 0–0.2)
BASOPHILS RELATIVE PERCENT: 0.5 %
EOSINOPHILS ABSOLUTE: 0.1 K/UL (ref 0–0.6)
EOSINOPHILS RELATIVE PERCENT: 1.1 %
GLUCOSE BLD-MCNC: 148 MG/DL (ref 70–99)
GLUCOSE BLD-MCNC: 154 MG/DL (ref 70–99)
GLUCOSE BLD-MCNC: 77 MG/DL (ref 70–99)
GLUCOSE BLD-MCNC: 80 MG/DL (ref 70–99)
HCT VFR BLD CALC: 27.6 % (ref 36–48)
HCT VFR BLD CALC: 30.4 % (ref 36–48)
HEMOGLOBIN: 8.9 G/DL (ref 12–16)
HEMOGLOBIN: 9.5 G/DL (ref 12–16)
LYMPHOCYTES ABSOLUTE: 1.6 K/UL (ref 1–5.1)
LYMPHOCYTES RELATIVE PERCENT: 16.1 %
MCH RBC QN AUTO: 30.1 PG (ref 26–34)
MCHC RBC AUTO-ENTMCNC: 32.2 G/DL (ref 31–36)
MCV RBC AUTO: 93.4 FL (ref 80–100)
MONOCYTES ABSOLUTE: 1 K/UL (ref 0–1.3)
MONOCYTES RELATIVE PERCENT: 9.7 %
NEUTROPHILS ABSOLUTE: 7.4 K/UL (ref 1.7–7.7)
NEUTROPHILS RELATIVE PERCENT: 72.6 %
PDW BLD-RTO: 19.6 % (ref 12.4–15.4)
PERFORMED ON: ABNORMAL
PERFORMED ON: ABNORMAL
PERFORMED ON: NORMAL
PERFORMED ON: NORMAL
PLATELET # BLD: 197 K/UL (ref 135–450)
PMV BLD AUTO: 8.9 FL (ref 5–10.5)
RBC # BLD: 2.96 M/UL (ref 4–5.2)
WBC # BLD: 10.2 K/UL (ref 4–11)

## 2020-01-08 PROCEDURE — 94640 AIRWAY INHALATION TREATMENT: CPT

## 2020-01-08 PROCEDURE — 97530 THERAPEUTIC ACTIVITIES: CPT

## 2020-01-08 PROCEDURE — 85018 HEMOGLOBIN: CPT

## 2020-01-08 PROCEDURE — 2580000003 HC RX 258: Performed by: INTERNAL MEDICINE

## 2020-01-08 PROCEDURE — 1200000000 HC SEMI PRIVATE

## 2020-01-08 PROCEDURE — 6360000002 HC RX W HCPCS: Performed by: INTERNAL MEDICINE

## 2020-01-08 PROCEDURE — 6370000000 HC RX 637 (ALT 250 FOR IP): Performed by: INTERNAL MEDICINE

## 2020-01-08 PROCEDURE — 85014 HEMATOCRIT: CPT

## 2020-01-08 PROCEDURE — 2580000003 HC RX 258: Performed by: SURGERY

## 2020-01-08 PROCEDURE — 6370000000 HC RX 637 (ALT 250 FOR IP): Performed by: SURGERY

## 2020-01-08 PROCEDURE — 85025 COMPLETE CBC W/AUTO DIFF WBC: CPT

## 2020-01-08 PROCEDURE — 90935 HEMODIALYSIS ONE EVALUATION: CPT

## 2020-01-08 PROCEDURE — 2500000003 HC RX 250 WO HCPCS: Performed by: INTERNAL MEDICINE

## 2020-01-08 PROCEDURE — 6360000002 HC RX W HCPCS: Performed by: SURGERY

## 2020-01-08 PROCEDURE — 6370000000 HC RX 637 (ALT 250 FOR IP): Performed by: NURSE PRACTITIONER

## 2020-01-08 RX ORDER — POLYETHYLENE GLYCOL 3350 17 G/17G
17 POWDER, FOR SOLUTION ORAL DAILY
Status: DISCONTINUED | OUTPATIENT
Start: 2020-01-08 | End: 2020-01-10 | Stop reason: HOSPADM

## 2020-01-08 RX ORDER — PANTOPRAZOLE SODIUM 40 MG/10ML
40 INJECTION, POWDER, LYOPHILIZED, FOR SOLUTION INTRAVENOUS DAILY
Status: DISCONTINUED | OUTPATIENT
Start: 2020-01-09 | End: 2020-01-10 | Stop reason: HOSPADM

## 2020-01-08 RX ORDER — POLYETHYLENE GLYCOL 3350 17 G/17G
17 POWDER, FOR SOLUTION ORAL DAILY PRN
Status: DISCONTINUED | OUTPATIENT
Start: 2020-01-11 | End: 2020-01-10 | Stop reason: HOSPADM

## 2020-01-08 RX ADMIN — INSULIN LISPRO 1 UNITS: 100 INJECTION, SOLUTION INTRAVENOUS; SUBCUTANEOUS at 17:09

## 2020-01-08 RX ADMIN — ESCITALOPRAM OXALATE 10 MG: 10 TABLET ORAL at 11:28

## 2020-01-08 RX ADMIN — ALLOPURINOL 100 MG: 100 TABLET ORAL at 11:28

## 2020-01-08 RX ADMIN — HYDROMORPHONE HYDROCHLORIDE 4 MG: 2 TABLET ORAL at 23:15

## 2020-01-08 RX ADMIN — NEPHROCAP 1 MG: 1 CAP ORAL at 11:28

## 2020-01-08 RX ADMIN — APIXABAN 2.5 MG: 2.5 TABLET, FILM COATED ORAL at 11:26

## 2020-01-08 RX ADMIN — METRONIDAZOLE 500 MG: 250 TABLET, FILM COATED ORAL at 05:21

## 2020-01-08 RX ADMIN — MIDODRINE HYDROCHLORIDE 10 MG: 5 TABLET ORAL at 08:33

## 2020-01-08 RX ADMIN — HYDROMORPHONE HYDROCHLORIDE 4 MG: 2 TABLET ORAL at 11:26

## 2020-01-08 RX ADMIN — OXYCODONE 10 MG: 5 TABLET ORAL at 06:38

## 2020-01-08 RX ADMIN — Medication 10 ML: at 21:07

## 2020-01-08 RX ADMIN — OXYCODONE 10 MG: 5 TABLET ORAL at 19:40

## 2020-01-08 RX ADMIN — METRONIDAZOLE 500 MG: 250 TABLET, FILM COATED ORAL at 15:39

## 2020-01-08 RX ADMIN — Medication 10 ML: at 12:51

## 2020-01-08 RX ADMIN — LANSOPRAZOLE 30 MG: 30 TABLET, ORALLY DISINTEGRATING ORAL at 05:21

## 2020-01-08 RX ADMIN — METRONIDAZOLE 500 MG: 250 TABLET, FILM COATED ORAL at 21:06

## 2020-01-08 RX ADMIN — INSULIN LISPRO 1 UNITS: 100 INJECTION, SOLUTION INTRAVENOUS; SUBCUTANEOUS at 21:13

## 2020-01-08 RX ADMIN — OXYCODONE 10 MG: 5 TABLET ORAL at 12:45

## 2020-01-08 RX ADMIN — ALTEPLASE 2 MG: 2.2 INJECTION, POWDER, LYOPHILIZED, FOR SOLUTION INTRAVENOUS at 06:03

## 2020-01-08 RX ADMIN — COLLAGENASE SANTYL: 250 OINTMENT TOPICAL at 17:12

## 2020-01-08 RX ADMIN — POLYETHYLENE GLYCOL 3350 17 G: 17 POWDER, FOR SOLUTION ORAL at 21:06

## 2020-01-08 RX ADMIN — PREGABALIN 50 MG: 25 CAPSULE ORAL at 11:28

## 2020-01-08 RX ADMIN — HYDROMORPHONE HYDROCHLORIDE 4 MG: 2 TABLET ORAL at 05:21

## 2020-01-08 RX ADMIN — SODIUM THIOSULFATE 25 G: 250 INJECTION, SOLUTION INTRAVENOUS at 12:50

## 2020-01-08 RX ADMIN — BUDESONIDE 500 MCG: 0.5 SUSPENSION RESPIRATORY (INHALATION) at 19:28

## 2020-01-08 RX ADMIN — ROSUVASTATIN CALCIUM 10 MG: 10 TABLET, FILM COATED ORAL at 11:28

## 2020-01-08 RX ADMIN — ONDANSETRON 4 MG: 2 INJECTION INTRAMUSCULAR; INTRAVENOUS at 21:06

## 2020-01-08 RX ADMIN — PREGABALIN 50 MG: 25 CAPSULE ORAL at 21:06

## 2020-01-08 RX ADMIN — HYDROMORPHONE HYDROCHLORIDE 4 MG: 2 TABLET ORAL at 17:08

## 2020-01-08 ASSESSMENT — PAIN SCALES - GENERAL
PAINLEVEL_OUTOF10: 7
PAINLEVEL_OUTOF10: 10
PAINLEVEL_OUTOF10: 10
PAINLEVEL_OUTOF10: 9
PAINLEVEL_OUTOF10: 7
PAINLEVEL_OUTOF10: 9
PAINLEVEL_OUTOF10: 10
PAINLEVEL_OUTOF10: 9
PAINLEVEL_OUTOF10: 6
PAINLEVEL_OUTOF10: 8
PAINLEVEL_OUTOF10: 0
PAINLEVEL_OUTOF10: 8
PAINLEVEL_OUTOF10: 10

## 2020-01-08 ASSESSMENT — PAIN DESCRIPTION - ORIENTATION
ORIENTATION: RIGHT;LEFT

## 2020-01-08 ASSESSMENT — PAIN DESCRIPTION - LOCATION
LOCATION: HIP

## 2020-01-08 ASSESSMENT — PAIN DESCRIPTION - PROGRESSION
CLINICAL_PROGRESSION: GRADUALLY IMPROVING
CLINICAL_PROGRESSION: GRADUALLY IMPROVING

## 2020-01-08 ASSESSMENT — PAIN DESCRIPTION - FREQUENCY: FREQUENCY: INTERMITTENT

## 2020-01-08 ASSESSMENT — PAIN DESCRIPTION - PAIN TYPE
TYPE: CHRONIC PAIN

## 2020-01-08 ASSESSMENT — PAIN DESCRIPTION - DESCRIPTORS
DESCRIPTORS: ACHING;DISCOMFORT
DESCRIPTORS: ACHING;DULL
DESCRIPTORS: ACHING;DISCOMFORT
DESCRIPTORS: ACHING;DISCOMFORT

## 2020-01-08 NOTE — PROGRESS NOTES
Barry Lim blood from pt port. Flushed port but felt resistance. MD made aware. Awaiting new orders. Will monitor.

## 2020-01-08 NOTE — CARE COORDINATION
Gissel from [facility] called this CM and states they do not have a bed available at this time but their sister [facility] may be able to take patient. This CM called Tosin Pizano the  and he stated that [facility] is \"too far. \" Asked him for a third choice for a facility and he states \"I don't have one and Dr Josh Byers said something about her staying there. \" Explained to  that pt cannot stay long term in the hospital and that is why a third choice for a facility is needed.  states \"I don't have time right now I have things to do. \" Asked if he would like my number to call me back later and he states \" I can see your number on my phone. \" I called Antolin at [facility] back and informed her  does not want [facility] and to let me know if a bed becomes available. CM will continue to follow for discharge planning.    Jesse Tenorio RN, BSN  [phone]

## 2020-01-08 NOTE — CARE COORDINATION
Raeann Gray from the [facility] was here and talked to pt and her . This CM will check in a little while to see if they made a decision on the [facility].   Elias Oro RN, BSN  [phone]

## 2020-01-08 NOTE — CARE COORDINATION
Spoke to patient and  who is in the room and they are open to going to the [place].  Camila Scott at the [facility] notified and reviewing chart and is coming in to see pt per pt request.   Norberto Coppola RN, BSN  [phone]

## 2020-01-08 NOTE — PROGRESS NOTES
Occupational Therapy  Facility/Department: [facility] 3A NURSING  Daily Treatment Note  NAME: Solo Nuno  : 1955  MRN: [id number]    Date of Service: 2020    Discharge Recommendations: Solo Nuno scored a  on the AM-PAC ADL Inpatient form. Current research shows that an AM-PAC score of 17 or less is typically not associated with a discharge to the patient's home setting. Based on the patients AM-PAC score and their current ADL deficits, it is recommended that the patient have 3-5 sessions per week of Occupational Therapy at d/c to increase the patients independence. OT Equipment Recommendations  Equipment Needed: No  Other: TBD by next level of care. Assessment   Performance deficits / Impairments: Decreased functional mobility ; Decreased ADL status; Decreased endurance;Decreased sensation  Assessment: pt continues to make progress with skilled OT services, stood Carl of 2 for 1.5 minutes x 2 with RW. pt would benefit from continued skilled OT services in order to return to PLOF   Treatment Diagnosis: Decreased ADL status, fuctional mobility, endurance and sensation associated with cellulits  Prognosis: Good  History: Pt 58 yo, lives w/family in apt w/[address]. Pt has been at [facility] for 6 wks after previous hospitalization. Reports assist w/ADLs, ambulating short distances w/therapy at SNF. PMH: DM2, morbid obesity, incarcerated ventral hernia, cervical Ca, ESRD, HD, COPD, CKD, chronic respiratory failure, multiple additional medical issues. Patient Education: OT eval, POC, importance of changing position, sitting up on EOB, transfer training  Barriers to Learning: Physical  REQUIRES OT FOLLOW UP: Yes  Activity Tolerance  Activity Tolerance: Patient Tolerated treatment well  Safety Devices  Safety Devices in place: Yes  Type of devices: All fall risk precautions in place;Call light within reach; Chair alarm in place;Gait belt;Patient at risk for falls; Left in chair;Nurse notified  Restraints  Initially in place: No         Patient Diagnosis(es): The primary encounter diagnosis was Abdominal wall cellulitis. Diagnoses of ESRD on hemodialysis ([address] Calciphylaxis, and Opiate dependence, continuous ([address] were also pertinent to this visit. has a past medical history of Abdominal pain, Acute on chronic congestive heart failure ([address] Asthma, Calcinosis cutis, Calciphylaxis, Cervical cancer ([facility]), Chest pain, CHF (congestive heart failure) ([address] Chronic kidney disease, stage IV (severe) ([facility]), Chronic pain syndrome, Chronic respiratory failure with hypoxia ([address] CKD (chronic kidney disease) stage 4, GFR 15-29 ml/min ([facility]), Colon polyps, COPD (chronic obstructive pulmonary disease) ([address] Degenerative disc disease at L5-S1 level, Diabetes mellitus, insulin dependent (IDDM), uncontrolled ([address] Diabetic polyneuropathy associated with type 2 diabetes mellitus ([address] Elevated troponin, ESRD (end stage renal disease) on dialysis ([address] GERD (gastroesophageal reflux disease), Gout, History of blood transfusion, History of cervical cancer, Hyperlipidemia, Hypertension, Incarcerated ventral hernia, LVH (left ventricular hypertrophy), Morbid obesity ([address] Mucus plugging of bronchi, Obstructive sleep apnea on CPAP, Pneumonia, Psychiatric problem, Pulmonary embolism ([address] Type 2 diabetes mellitus with diabetic neuropathy, with long-term current use of insulin ([address] Urinary incontinence in female, and Venous stasis of lower extremity. has a past surgical history that includes jennifer and bso (cervix removed) (10/96); Knee arthroscopy (Right, 1999); doppler echocardiography (2/21/09); Colonoscopy (2010); Upper gastrointestinal endoscopy (6/25/13); Colonoscopy (6/25/13, 11/14); Cardiac catheterization (1/14); Hysterectomy; pr open skull supratent explore; ventral hernia repair (12/24/2016); other surgical history (02/22/2018); pr repair incisional hernia,reducible (N/A, 11/20/2018);  Tunneled venous port placement (Right, 11/2019); Dialysis fistula creation (Right, 3/28/2019); shunt revision (Right, 7/18/2019); incision and drainage (Right, 8/22/2019); Colonoscopy (N/A, 10/10/2019); Upper gastrointestinal endoscopy (N/A, 11/21/2019); and Abdomen surgery (N/A, 12/26/2019). Restrictions  Restrictions/Precautions  Restrictions/Precautions: Fall Risk, General Precautions(high fall risk, renal diet, 3 wound vacs)  Required Braces or Orthoses?: No  Position Activity Restriction  Other position/activity restrictions: Dyan Vogt is a 59 y.o. female presents the emergency department as a resident of [facility] with end-stage renal disease. Patient states that she is continuing to schneider difficulties as a pertains to wounds related to her calciphylaxis causing her pain and discomfort. Patient states that she was in the process of getting dialysis performed this morning and was only in a proximally 1 hour when she started having increasing pain and discomfort over the wounds on her right hip, abdominal wall, as well as her legs.  I and D of wounds performed 12/26  Subjective   General  Chart Reviewed: Yes  Response to previous treatment: Patient with no complaints from previous session  Family / Caregiver Present: No  Referring Practitioner: Chandni Beaulieu MD, for d/c planning  Diagnosis: Cellulitis  Subjective  Subjective: pt up in recliner, denies pain, agreeable to OT cotx      Orientation  Orientation  Overall Orientation Status: Within Functional Limits  Objective    ADL  Additional Comments: pt completed prior to arrival         Balance  Sitting Balance: Stand by assistance  Standing Balance: Contact guard assistance  Standing Balance  Time: ~1.5 minutes x 2  Activity: static stance at rw  Comment: CGA of 2  Functional Mobility  Functional Mobility Comments: did not ambulate this date  Bed mobility  Comment: not addressed this date, in recliner at beginning and end of session   Transfers  Sit

## 2020-01-08 NOTE — PROGRESS NOTES
stage IV (severe) (HCC), Chronic pain syndrome, Chronic respiratory failure with hypoxia (HCC), CKD (chronic kidney disease) stage 4, GFR 15-29 ml/min (HCC), Colon polyps, COPD (chronic obstructive pulmonary disease) (HCC), Degenerative disc disease at L5-S1 level, Diabetes mellitus, insulin dependent (IDDM), uncontrolled ([address] Diabetic polyneuropathy associated with type 2 diabetes mellitus ([address] Elevated troponin, ESRD (end stage renal disease) on dialysis ([address] GERD (gastroesophageal reflux disease), Gout, History of blood transfusion, History of cervical cancer, Hyperlipidemia, Hypertension, Incarcerated ventral hernia, LVH (left ventricular hypertrophy), Morbid obesity ([address] Mucus plugging of bronchi, Obstructive sleep apnea on CPAP, Pneumonia, Psychiatric problem, Pulmonary embolism ([address] Type 2 diabetes mellitus with diabetic neuropathy, with long-term current use of insulin ([address] Urinary incontinence in female, and Venous stasis of lower extremity. has a past surgical history that includes jennifer and bso (cervix removed) (10/96); Knee arthroscopy (Right, 1999); doppler echocardiography (2/21/09); Colonoscopy (2010); Upper gastrointestinal endoscopy (6/25/13); Colonoscopy (6/25/13, 11/14); Cardiac catheterization (1/14); Hysterectomy; pr open skull supratent explore; ventral hernia repair (12/24/2016); other surgical history (02/22/2018); pr repair incisional hernia,reducible (N/A, 11/20/2018); Tunneled venous port placement (Right, 11/2019); Dialysis fistula creation (Right, 3/28/2019); shunt revision (Right, 7/18/2019); incision and drainage (Right, 8/22/2019); Colonoscopy (N/A, 10/10/2019); Upper gastrointestinal endoscopy (N/A, 11/21/2019); and Abdomen surgery (N/A, 12/26/2019).     Restrictions  Restrictions/Precautions  Restrictions/Precautions: Fall Risk, General Precautions(high fall risk, renal diet, 3 wound vacs)  Required Braces or Orthoses?: No  Position Activity Restriction  Other position/activity restrictions: Malcolm Soulier is a 59 y.o. female presents the emergency department as a resident of [facility] with end-stage renal disease. Patient states that she is continuing to schneider difficulties as a pertains to wounds related to her calciphylaxis causing her pain and discomfort. Patient states that she was in the process of getting dialysis performed this morning and was only in a proximally 1 hour when she started having increasing pain and discomfort over the wounds on her right hip, abdominal wall, as well as her legs. I and D of wounds performed 12/26  Subjective   General  Chart Reviewed: Yes  Family / Caregiver Present: No  Subjective  Subjective: fatigued from dialysis and getting cleaned up with nsg but agreeable to therapy   General Comment  Comments: sitting in chair at arrival, 1 wound vac attached. nsg aware of pain   Pain Screening  Patient Currently in Pain: Yes  Pain Assessment  Pain Assessment: 0-10  Pain Level: 7  Pain Type: Chronic pain  Vital Signs  Patient Currently in Pain: Yes       Orientation     Cognition      Objective   Bed mobility  Comment: not addressed  Transfers  Sit to Stand: 2 Person Assistance(min A of 2 x 2 reps from chair to RW)  Stand to sit: 2 Person Assistance(min A of 2)  Comment: pt able to stand ~1.5 minutes at RW with CGA  Ambulation  Ambulation?: No     Balance  Posture: Fair  Standing - Static: Good(with RW support)  Exercises  Hip Abduction: add sq x 10   Knee Long Arc Quad: x 10 B  Ankle Pumps: seated HR x 10 B                        G-Code     OutComes Score                                                     AM-PAC Score  AM-PAC Inpatient Mobility Raw Score : 13 (01/08/20 1612)  AM-PAC Inpatient T-Scale Score : 36.74 (01/08/20 1612)  Mobility Inpatient CMS 0-100% Score: 64.91 (01/08/20 1612)  Mobility Inpatient CMS G-Code Modifier : CL (01/08/20 1612)          Goals-- none met  Short term goals  Time Frame for Short term goals:  To be met prior to discharge  Short term goal 1: Bed mobility with max A  Short term goal 2: Sit EOB 3-5 minutes with min A  Short term goal 3: Sit to/from stand with max A  Short term goal 4: Bed to/from chair with max A  Patient Goals   Patient goals : did not state    Plan    Plan  Times per week: 3-5  Times per day: Daily  Current Treatment Recommendations: Strengthening, Balance Training, Functional Mobility Training, Gait Training, Transfer Training, Endurance Training, Safety Education & Training  Safety Devices  Type of devices:  All fall risk precautions in place, Call light within reach, Nurse notified, Chair alarm in place, Left in chair, Patient at risk for falls  Restraints  Initially in place: No     Therapy Time   Individual Concurrent Group Co-treatment   Time In       1427   Time Out       1450   Minutes       23   Timed Code Treatment Minutes: Leia 57, [id number]

## 2020-01-08 NOTE — CARE COORDINATION
This CM spoke to patient and  to see if they want her to go to the [facility]. Pt states \" I called my sister and she is going to tour the place tomorrow. \" Explained to pt that making a decision as soon as possible is necessary so that the process for insurance and acceptance can be started. Also discussed family possibly touring tonight because she is ready for discharge from the hospital. Patient verbalizes understanding. CM will continue to facilitate discharge.    Tara Arvizu RN, BSN  [phone]

## 2020-01-08 NOTE — CONSULTS
Gastroenterology Consult Note      Patient: Dyan Vogt  : 1955  Acct#:      Date:  2020    Subjective:       History of Present Illness  Patient is a 59 y.o.  female admitted with Cellulitis [L03.90]  Cellulitis [L03.90] who is seen in consult for hematochezia. H/o ESRD on HD, COPD, CHF, DM, PE, recent CVA. She is on eliquis. H/o calciphylaxis and wounds and chronic pain from this. Presented with pain at wounds at umbilicus and bilat hips and underwent debridement. Has wound vac in place. Overall pain improved since. Today had soft brown BM with about 2 tablespoons of BRB. No melena. Denies vomiting. Some nausea. had a colonoscopy 10/10/19 with diverticulosis and one polyp removed as below. We saw pt inpatient 19 for anemia. EGD then with gastric ulcers, erosive gastropathy, small HH.      Past Medical History:   Diagnosis Date    Abdominal pain 2018    Acute on chronic congestive heart failure (HCC) 2014    Asthma     Calcinosis cutis     Calciphylaxis 2019    Cervical cancer ([address]     Chest pain 2018    CHF (congestive heart failure) ([facility])     Chronic kidney disease, stage IV (severe) ([facility])     Dr Scott Zimmerman Chronic pain syndrome 3/27/2018    Chronic respiratory failure with hypoxia ([facility])     CKD (chronic kidney disease) stage 4, GFR 15-29 ml/min ([facility]) 2017    Colon polyps     COPD (chronic obstructive pulmonary disease) ([address]     Degenerative disc disease at L5-S1 level 2013     CT    Diabetes mellitus, insulin dependent (IDDM), uncontrolled ([address]     Diabetic polyneuropathy associated with type 2 diabetes mellitus ([address] 3/26/2019    Elevated troponin 2017    ESRD (end stage renal disease) on dialysis ([address] 2018    JU Galvin    GERD (gastroesophageal reflux disease)     Gout     History of blood transfusion     History of cervical cancer     Hyperlipidemia     Hypertension     Incarcerated ventral hernia     LVH (left ventricular hypertrophy)     Morbid obesity (HCC)     Mucus plugging of bronchi     Obstructive sleep apnea on CPAP     wears 2L O2 and BIPAP at night    Pneumonia     Psychiatric problem     breakdown long time ago but not current    Pulmonary embolism ([facility] 02/06/2013    Type 2 diabetes mellitus with diabetic neuropathy, with long-term current use of insulin ([facility] 9/12/2017    Urinary incontinence in female     Venous stasis of lower extremity       Past Surgical History:   Procedure Laterality Date    ABDOMEN SURGERY N/A 12/26/2019    INCISION AND DRAINAGE OF ABDOMINAL WOUND AND BILATERAL HIP WOUNDS performed by Ramesh Lizarraga MD at [facility]  1/14    Grace; clean cors    COLONOSCOPY  2010    polyp removed; Noemi Black; repeat 5 years    COLONOSCOPY  6/25/13, 11/14    Carmen    COLONOSCOPY N/A 10/10/2019    COLONOSCOPY POLYPECTOMY SNARE/COLD BIOPSY performed by Fredi Evans MD at [address] Right 3/28/2019    RIGHT BRACHIO CEPHALIC FISTULA CREATION performed by Tracey Osborne MD at [address] [place] [address]  2/21/09    LVH with global hypokinesis; EF 55-60%    HYSTERECTOMY      INCISION AND DRAINAGE Right 8/22/2019    RIGHT ARM INCISION AND DEBRIDEMENT performed by Tracey Osborne MD at [facility] [address] Right 1999    OTHER SURGICAL HISTORY  02/22/2018    LEFT brachial artery axillary vein AV graft     NY OPEN SKULL SUPRATENT EXPLORE      Craniotomy, 3/21/19 patient denies any brain surgery or craniotomy    [place] REPAIR INCISIONAL HERNIA,REDUCIBLE N/A 11/20/2018    LAPAROSCOPIC VENTRAL HERNIA REPAIR WITH MESH performed by Mckenna Ramírez MD at [address] Right 7/18/2019    RIGHT BRACHIAL ARTERY AXILLARY VEIN GRAFT AND LIGATION OF RIGHT BRACHIO-CEPHALIC FISTULA (17928, 66890) performed by Tracey Osborne MD at [place] [address]  10/96 TONGUE EVERY 5 MINUTES AS  NEEDED ; MAXIMUM OF 3  TABLETS IN 15 MINUTES 75 tablet 1    ONE TOUCH ULTRA TEST strip USE TO TEST 3-4 TIMES DAILY DUE TO FLUCTUATING SUGAR 100 strip 5    ASSURE COMFORT LANCETS 30G MISC USE TO TEST BLOOD GLUCOSE THREE TIMES DAILY 300 each 3    Alcohol Swabs (ALCOHOL PREP) 70 % PADS USE TO TEST BLOOD GLUCOSE THREE TIMES DAILY 300 each 3    Blood Glucose Monitoring Suppl (ACCU-CHEK KENNETH SMARTVIEW) w/Device KIT USE TO TEST BLOOD GLUCOSE 1 kit 0    BiPAP Machine MISC by Does not apply route nightly      Spacer/Aero-Holding Chambers VENANCIO 1 Device by Does not apply route daily as needed 1 Device 0    OXYGEN Inhale 2 L into the lungs daily as needed At night prn              Allergies  Allergies   Allergen Reactions    Codeine Nausea Only    Fentanyl Itching    Morphine      CHEST PAIN    Tramadol Hcl Other (See Comments)     Causes pt to have involuntary movements    Hydrocodone-Acetaminophen Itching and Rash    Percocet [Oxycodone-Acetaminophen] Itching    Procardia [Nifedipine] Nausea And Vomiting     Headache  Takes norvasc at home 12/22/15    Vicodin [Hydrocodone-Acetaminophen] Rash      Social   Social History     Tobacco Use    Smoking status: Never Smoker    Smokeless tobacco: Never Used   Substance Use Topics    Alcohol use: No     Alcohol/week: 0.0 standard drinks        Family History   Problem Relation Age of Onset    Colon Cancer Father     Diabetes Father     High Blood Pressure Father     Colon Cancer Mother     Diabetes Mother     Colon Cancer Maternal Grandmother     Kidney Cancer Brother     Heart Disease Brother          age 54    High Blood Pressure Brother     High Blood Pressure Sister     High Blood Pressure Maternal Aunt     High Blood Pressure Sister     Heart Disease Sister          Review of Systems  Constitutional: negative for fevers, chills, sweats    Ears, nose, mouth, throat, and face: negative for nasal congestion and sore throat   Respiratory: negative for cough and shortness of breath   Cardiovascular: negative for chest pain and dyspnea   Gastrointestinal: see hpi   Genitourinary:negative for dysuria and frequency   Integument/breast: negative for pruritus and rash   Hematologic/lymphatic: negative for lymphadenopathy and easy bruising   Musculoskeletal:negative for arthralgias and myalgias   Neurological: negative for dizziness and weakness         Physical Exam  Blood pressure 129/87, pulse 93, temperature 97.7 °F (36.5 °C), temperature source Oral, resp. rate 18, height 5' 3\" (1.6 m), weight (!) 302 lb 4 oz (137.1 kg), last menstrual period 11/01/1996, SpO2 93 %, not currently breastfeeding. General appearance: alert, cooperative, no distress, appears stated age  Eyes: Anicteric  Head: Normocephalic, without obvious abnormality  Lungs: clear to auscultation bilaterally, Normal Effort  Heart: regular rate and rhythm, normal S1 and S2, no murmurs or rubs  Abdomen: obese, soft, non-tender. Bowel sounds normal. No masses,  no organomegaly. Wound vac in place in lower abdomen  Extremities: atraumatic, no cyanosis or edema  Skin: warm and dry, no jaundice  Neuro: Grossly intact, A&OX3  Musculoskeletal: 5/5  strength BUE      Data Review:    Recent Labs     01/06/20  0545 01/07/20  0529 01/08/20  0625   WBC 9.8 9.7 10.2   HGB 9.4* 9.2* 8.9*   HCT 28.9* 29.0* 27.6*   MCV 91.2 92.7 93.4    213 197     Recent Labs     01/06/20  0545 01/07/20  0529    140   K 4.3 4.0   CL 92* 95*   CO2 26 31   PHOS 1.9* 2.5   BUN 23* 13   CREATININE 7.7* 4.8*     No results for input(s): AST, ALT, ALB, BILIDIR, BILITOT, ALKPHOS in the last 72 hours. No results for input(s): LIPASE, AMYLASE in the last 72 hours. No results for input(s): PROTIME, INR in the last 72 hours. No results for input(s): PTT in the last 72 hours. No results for input(s): OCCULTBLD in the last 72 hours.     Imaging Studies:                 CT-scan of abdomen and pelvis wo contrast 12/26/19  Impression   1. Incompletely imaged panniculitis; correlate with direct inspection.                          Assessment:     Active Problems:    Poorly controlled type 2 diabetes mellitus (HCC)    Chronic obstructive pulmonary disease (HCC)    Severe anemia    Cellulitis    Abdominal wall cellulitis    Opiate dependence, continuous (HCC)    History of arterial ischemic stroke    Anaerobic cellulitis ([facility]  Resolved Problems:    * No resolved hospital problems. *    BRBPR - brown stool with some BRBPR. Recent colonoscopy 10/2019. Could be anorectal bleeding. Did have diverticulosis. Recommendations:   - repeat h/h  - daily PPI  - monitor for further bleeding  - clear liquids   - Eliquis being held    Will d/w Dr Antoine Collins, PA-C  [address]    I have personally performed a face to face diagnostic evaluation on this patient. I have interviewed and examined the patient and I agree with the findings and recommended plan of care. In summary, my findings and plan are the following: episode small red blood with bm in pt with MMP, abd obese/soft/wound vac in place, I performed rectal with difficult visualization due to body habitus and patient pain from debridement areas including posterior thighs so discomfort with  buttocks folds so limited external visualization but no palpable mass lesions and tan stool on glove with no blood. Therefore suspect anorectal source blood rather than tic and pt just had colonoscopy oct 2019 with good prep so hold off repeat prep/repeat colon, cont current diet, will add scheduled miralax once daily for 3 days as pt states some harder stools recently, ok resume eliquis, if becomes recurrent issue may need flex sig but hold off for now.       Mackenzie Crawford MD  [address]

## 2020-01-08 NOTE — PROGRESS NOTES
Nephrology Progress Note  [phone]  [phone]   [email or web address]        Reason for Consult:  ESRD on HD     HISTORY OF PRESENT ILLNESS:      The patient is a 59 y.o. female with significant past medical history of T2DM , HTN , ESRD on maintenance HD  , GALE , Morbid obesity who presents with painful lesions abdominal wall and thigh- buttock area. She has recently been diagnosed on clinical grounds with Calciphylaxis , for this she gets Sodium Thiosulfate with dialysis . Unfortunately due to severe pain in the lesions she has been to the dialysis unit infrequently . Stable     PHYSICAL EXAM:    Vitals:    /68   Pulse 79   Temp 97.1 °F (36.2 °C)   Resp 18   Ht 5' 3\" (1.6 m)   Wt (!) 302 lb 4 oz (137.1 kg)   LMP 11/01/1996 (Exact Date)   SpO2 93%   BMI 53.54 kg/m²   I/O last 3 completed shifts: In: 960 [P.O.:960]  Out: -   I/O this shift: In: 400   Out: 1400     Physical Exam:  Gen: alert, oriented x 3  PERRL , EOM +  CV: RRR no murmur/ rub   Lungs: CTA-B  Abd: S/NT +BS, wound lower abdominal wall   Ext: No edema, no cyanosis  Skin: Warm.   Dressing : Lower abdomen  Upper lateral thighs and Buttock area   Neuro:  nonfocal.      DATA:    CBC with Differential:    Lab Results   Component Value Date    WBC 10.2 01/08/2020    RBC 2.96 01/08/2020    HGB 8.9 01/08/2020    HCT 27.6 01/08/2020     01/08/2020    MCV 93.4 01/08/2020    MCH 30.1 01/08/2020    MCHC 32.2 01/08/2020    RDW 19.6 01/08/2020    SEGSPCT 77.6 05/12/2013    BANDSPCT 5 08/05/2019    METASPCT 1 01/12/2018    LYMPHOPCT 16.1 01/08/2020    MONOPCT 9.7 01/08/2020    EOSPCT 2.6 01/24/2012    BASOPCT 0.5 01/08/2020    MONOSABS 1.0 01/08/2020    LYMPHSABS 1.6 01/08/2020    EOSABS 0.1 01/08/2020    BASOSABS 0.0 01/08/2020    DIFFTYPE Auto 05/12/2013     CMP:    Lab Results   Component Value Date     01/07/2020    K 4.0 01/07/2020    K 5.6 12/27/2019    CL 95 01/07/2020    CO2 31 01/07/2020    BUN 13 01/07/2020    CREATININE 4.8 01/07/2020    GFRAA 11 01/07/2020    GFRAA 50 05/12/2013    AGRATIO 0.7 12/28/2019    LABGLOM 9 01/07/2020    GLUCOSE 119 01/07/2020    PROT 5.9 12/28/2019    PROT 6.6 03/05/2013    LABALBU 2.5 01/07/2020    CALCIUM 9.2 01/07/2020    BILITOT 0.3 12/28/2019    ALKPHOS 59 12/28/2019    AST 19 12/28/2019    ALT 14 12/28/2019     Phosphorus:    Lab Results   Component Value Date    PHOS 2.5 01/07/2020     Uric Acid:    Lab Results   Component Value Date    LABURIC 2.8 05/17/2018     Warfarin PT/INR:  No components found for: PTPATWAR, PTINRWAR        IMPRESSION/RECOMMENDATIONS:      1. ESRD on HD    MWF at [facility]   2. Obesity  3. Anemia of CKD   Target Hb  9-11  4. Renal osteodystrophy   Phos  Low at 1.9 , Ca 9.9  , iPTH 173    Stop Binder   5. Calciphylaxis   Biopsy confirmed    On Sodium Thiosulfate, no acidosis, On HCO3 40 in dialysate               Thank you for allowing me to participate in the care of this patient. I will continue to follow along. Please call with questions.     Agapito Jasso MD  1/8/2020  [facility]

## 2020-01-08 NOTE — CARE COORDINATION
Chart reviewed for discharge planning. Barrier(s) to discharge- pt denied at [facility] d/t they can not meet needs. Pt choosing facility, [facility]   Tentative discharge plan- snf  Tentative discharge date-TBD    *Case management will continue to follow progress and update discharge plan as needed.     Karol Lopez RN, BSN  [phone]

## 2020-01-09 LAB
BASOPHILS ABSOLUTE: 0.1 K/UL (ref 0–0.2)
BASOPHILS RELATIVE PERCENT: 1.1 %
EOSINOPHILS ABSOLUTE: 0.1 K/UL (ref 0–0.6)
EOSINOPHILS RELATIVE PERCENT: 1 %
GLUCOSE BLD-MCNC: 112 MG/DL (ref 70–99)
GLUCOSE BLD-MCNC: 128 MG/DL (ref 70–99)
GLUCOSE BLD-MCNC: 141 MG/DL (ref 70–99)
GLUCOSE BLD-MCNC: 214 MG/DL (ref 70–99)
HCT VFR BLD CALC: 22.2 % (ref 36–48)
HCT VFR BLD CALC: 27.4 % (ref 36–48)
HCT VFR BLD CALC: 28.6 % (ref 36–48)
HEMOGLOBIN: 7.1 G/DL (ref 12–16)
HEMOGLOBIN: 8.8 G/DL (ref 12–16)
HEMOGLOBIN: 9.3 G/DL (ref 12–16)
LYMPHOCYTES ABSOLUTE: 1.7 K/UL (ref 1–5.1)
LYMPHOCYTES RELATIVE PERCENT: 15.5 %
MCH RBC QN AUTO: 29.7 PG (ref 26–34)
MCHC RBC AUTO-ENTMCNC: 32.4 G/DL (ref 31–36)
MCV RBC AUTO: 91.6 FL (ref 80–100)
MONOCYTES ABSOLUTE: 1.2 K/UL (ref 0–1.3)
MONOCYTES RELATIVE PERCENT: 11.1 %
NEUTROPHILS ABSOLUTE: 7.6 K/UL (ref 1.7–7.7)
NEUTROPHILS RELATIVE PERCENT: 71.3 %
PDW BLD-RTO: 19.3 % (ref 12.4–15.4)
PERFORMED ON: ABNORMAL
PLATELET # BLD: 221 K/UL (ref 135–450)
PMV BLD AUTO: 9.2 FL (ref 5–10.5)
RBC # BLD: 3.12 M/UL (ref 4–5.2)
WBC # BLD: 10.7 K/UL (ref 4–11)

## 2020-01-09 PROCEDURE — 94761 N-INVAS EAR/PLS OXIMETRY MLT: CPT

## 2020-01-09 PROCEDURE — 2580000003 HC RX 258: Performed by: SURGERY

## 2020-01-09 PROCEDURE — 6370000000 HC RX 637 (ALT 250 FOR IP): Performed by: INTERNAL MEDICINE

## 2020-01-09 PROCEDURE — 6360000002 HC RX W HCPCS: Performed by: INTERNAL MEDICINE

## 2020-01-09 PROCEDURE — 6360000002 HC RX W HCPCS

## 2020-01-09 PROCEDURE — 1200000000 HC SEMI PRIVATE

## 2020-01-09 PROCEDURE — C9113 INJ PANTOPRAZOLE SODIUM, VIA: HCPCS | Performed by: INTERNAL MEDICINE

## 2020-01-09 PROCEDURE — 6370000000 HC RX 637 (ALT 250 FOR IP): Performed by: NURSE PRACTITIONER

## 2020-01-09 PROCEDURE — 85014 HEMATOCRIT: CPT

## 2020-01-09 PROCEDURE — 6370000000 HC RX 637 (ALT 250 FOR IP): Performed by: SURGERY

## 2020-01-09 PROCEDURE — 85018 HEMOGLOBIN: CPT

## 2020-01-09 PROCEDURE — 94640 AIRWAY INHALATION TREATMENT: CPT

## 2020-01-09 PROCEDURE — 85025 COMPLETE CBC W/AUTO DIFF WBC: CPT

## 2020-01-09 RX ORDER — BUDESONIDE 0.5 MG/2ML
INHALANT ORAL
Status: COMPLETED
Start: 2020-01-09 | End: 2020-01-09

## 2020-01-09 RX ADMIN — NEPHROCAP 1 MG: 1 CAP ORAL at 08:29

## 2020-01-09 RX ADMIN — METRONIDAZOLE 500 MG: 250 TABLET, FILM COATED ORAL at 05:19

## 2020-01-09 RX ADMIN — OXYCODONE 10 MG: 5 TABLET ORAL at 21:12

## 2020-01-09 RX ADMIN — APIXABAN 2.5 MG: 2.5 TABLET, FILM COATED ORAL at 21:12

## 2020-01-09 RX ADMIN — BUDESONIDE 500 MCG: 0.5 SUSPENSION RESPIRATORY (INHALATION) at 20:16

## 2020-01-09 RX ADMIN — PREGABALIN 50 MG: 25 CAPSULE ORAL at 21:12

## 2020-01-09 RX ADMIN — Medication 10 ML: at 21:12

## 2020-01-09 RX ADMIN — PREGABALIN 50 MG: 25 CAPSULE ORAL at 08:28

## 2020-01-09 RX ADMIN — COLLAGENASE SANTYL: 250 OINTMENT TOPICAL at 14:17

## 2020-01-09 RX ADMIN — OXYCODONE 10 MG: 5 TABLET ORAL at 12:29

## 2020-01-09 RX ADMIN — HYDROMORPHONE HYDROCHLORIDE 4 MG: 2 TABLET ORAL at 18:11

## 2020-01-09 RX ADMIN — PREGABALIN 50 MG: 25 CAPSULE ORAL at 14:41

## 2020-01-09 RX ADMIN — OXYCODONE 10 MG: 5 TABLET ORAL at 08:28

## 2020-01-09 RX ADMIN — ALLOPURINOL 100 MG: 100 TABLET ORAL at 08:29

## 2020-01-09 RX ADMIN — HYDROMORPHONE HYDROCHLORIDE 4 MG: 2 TABLET ORAL at 23:49

## 2020-01-09 RX ADMIN — Medication 10 ML: at 08:29

## 2020-01-09 RX ADMIN — INSULIN LISPRO 1 UNITS: 100 INJECTION, SOLUTION INTRAVENOUS; SUBCUTANEOUS at 12:30

## 2020-01-09 RX ADMIN — INSULIN LISPRO 1 UNITS: 100 INJECTION, SOLUTION INTRAVENOUS; SUBCUTANEOUS at 21:14

## 2020-01-09 RX ADMIN — HYDROMORPHONE HYDROCHLORIDE 4 MG: 2 TABLET ORAL at 05:18

## 2020-01-09 RX ADMIN — HYDROMORPHONE HYDROCHLORIDE 4 MG: 2 TABLET ORAL at 11:55

## 2020-01-09 RX ADMIN — BUDESONIDE 500 MCG: 0.5 SUSPENSION RESPIRATORY (INHALATION) at 09:36

## 2020-01-09 RX ADMIN — ROSUVASTATIN CALCIUM 10 MG: 10 TABLET, FILM COATED ORAL at 08:29

## 2020-01-09 RX ADMIN — PANTOPRAZOLE SODIUM 40 MG: 40 INJECTION, POWDER, FOR SOLUTION INTRAVENOUS at 08:29

## 2020-01-09 RX ADMIN — ESCITALOPRAM OXALATE 10 MG: 10 TABLET ORAL at 08:29

## 2020-01-09 RX ADMIN — POLYETHYLENE GLYCOL 3350 17 G: 17 POWDER, FOR SOLUTION ORAL at 21:12

## 2020-01-09 ASSESSMENT — PAIN SCALES - GENERAL
PAINLEVEL_OUTOF10: 8
PAINLEVEL_OUTOF10: 10
PAINLEVEL_OUTOF10: 0
PAINLEVEL_OUTOF10: 0
PAINLEVEL_OUTOF10: 7
PAINLEVEL_OUTOF10: 0
PAINLEVEL_OUTOF10: 0
PAINLEVEL_OUTOF10: 8
PAINLEVEL_OUTOF10: 7
PAINLEVEL_OUTOF10: 8
PAINLEVEL_OUTOF10: 0
PAINLEVEL_OUTOF10: 0

## 2020-01-09 ASSESSMENT — PAIN DESCRIPTION - DESCRIPTORS
DESCRIPTORS: ACHING;DISCOMFORT
DESCRIPTORS: SHOOTING

## 2020-01-09 ASSESSMENT — PAIN DESCRIPTION - LOCATION
LOCATION: HIP
LOCATION: ABDOMEN;GROIN
LOCATION: GENERALIZED
LOCATION: GROIN
LOCATION: GENERALIZED
LOCATION: HIP

## 2020-01-09 ASSESSMENT — PAIN DESCRIPTION - PAIN TYPE
TYPE: CHRONIC PAIN

## 2020-01-09 ASSESSMENT — PAIN DESCRIPTION - ORIENTATION
ORIENTATION: RIGHT;LEFT

## 2020-01-09 ASSESSMENT — PAIN DESCRIPTION - FREQUENCY: FREQUENCY: INTERMITTENT

## 2020-01-09 NOTE — PLAN OF CARE
Problem: Pain:  Description  Pain management should include both nonpharmacologic and pharmacologic interventions. Goal: Control of chronic pain  Description  Control of chronic pain  Note:   Pain controlled on current regimen. Pt with multiple wounds. Wound vac to mid abdomen wound. Moist to dry and santyl on bilat hip wounds and breast wounds. Multiple wounds in buttocks area dressed with santyl and foam border. Problem: Falls - Risk of:  Goal: Will remain free from falls  Description  Will remain free from falls  Note:   Fall precautions in place, bed alarm on, nonskid foot wear applied, bed in lowest position, and call light within reach. Will continue to monitor.

## 2020-01-09 NOTE — CARE COORDINATION
Spoke to patient at length about going to the [facility] if she is accepted d/t pt states \"my family's going there now and I've seen it before and didn't like it. \" Explained to pt that options are limited that can provide for all her needs and that insurance will approve since she was denied at [facility] and [facility] could not take her back d/t unable to provide for her needs with her wounds and dialysis transportation. Encouraged pt to consider going if accepted then working with  at the facility if she is not happy there. Pt will consider. Also explained IMM form but pt states the \"come back when my vision is better. \" asked pt when she wanted me to return. She stated \" after awhile my  will be here. \" CM will continue to follow for discharge needs.    Bronwyn Santa RN, BSN  [phone]

## 2020-01-09 NOTE — OP NOTE
answered. The patient  understood, agreed, and wished to proceed. OPERATIVE PROCEDURE:  The patient was brought to the operative suite and  laid in the supine position. General anesthesia was induced and well  tolerated. The patient was then rolled, so her right hip and abdominal  wound could be prepped and draped in the usual sterile fashion. After  appropriate time-out was performed verifying operative site, procedure,  and patient, attention was turned to the right at first where cut and  cautery were used on the Bovie to excise around the clearly visible  demarcated wound. The eschar and the tissue were sent off for pathology  to rule out calciphylaxis. The wound did not appear to be infected. The wound base was controlled with hemostasis with cautery. Hemostasis  was verified and the wound was then packed with Betadine-soaked Kerlix  and ABD pads. This was secured with a tape and the final wound  dimensions on the right hip were 11.5 x 6 cm. Attention was then turned  to the abdominal wound where a similar procedure was performed. The  final wound dimensions were approximately 16 x 7 cm. Sharp excisional  debridement with Bovie was performed down to the healthy adipose tissue. Sample was sent for culture and the excised tissue was also sent to  pathology. Hemostasis was obtained with pressure and cautery. The  wound was then packed with Betadine-soaked Kerlix, ABD pads and tape. The patient was then rolled to her left. Hip was then exposed and  additional debridement was performed. Sharp excisional debridement with  the Bovie was performed. The final wound dimensions were 26 x 11 cm  with cautery. This was done into healthy subcutaneous tissue. The  specimen was excised in its entirety and passed off as specimen for  further analysis as well as evaluate for calciphylaxis. The wound base  were inspected for hemostasis, which was obtained with pressure and  cautery.   It was then packed with Betadine-soaked Kerlix, gauze, ABD  pads and tape. The patient tolerated the procedure well and was  transferred to the PACU in stable condition. SPECIMEN:  Three separate tissue specimens for pathology and abdominal  wound for culture. DRAINS:  None. COMPLICATIONS:  None. ESTIMATED BLOOD LOSS:  Less than 50 mL.         Misti Bhandari MD    D: 12/26/2019 16:07:14       T: 12/26/2019 22:31:24     [email or web address]  Job#: [id number]     Doc#: [id number]    CC:

## 2020-01-09 NOTE — PROGRESS NOTES
Hospitalist Progress Note      PCP: Christine Cullen, APRN - CNP    Date of Admission: 12/26/2019    Chief Complaint: Leg pain      Subjective:   No recurrent bloody stools. Denies abdominal pain, n/v, change in bowel habits, fever, chills. Intermittent pain in abd and hips. Otherwise denies new acute complaints.     Medications:  Reviewed    Infusion Medications    dextrose       Scheduled Medications    pantoprazole  40 mg Intravenous Daily    polyethylene glycol  17 g Oral Daily    b complex-C-folic acid  1 capsule Oral Daily    collagenase   Topical Daily    oxyCODONE  10 mg Oral Once    darbepoetin brii-polysorbate  100 mcg Intravenous Weekly    metroNIDAZOLE  500 mg Oral 3 times per day    budesonide  500 mcg Nebulization BID    sodium chloride  250 mL Intravenous Once    sodium thiosulfate IVPB  25 g Intravenous Q MWF    lidocaine   Topical Once    lidocaine-EPINEPHrine  20 mL Intradermal Once    silver nitrate applicators  1 each Topical Once    allopurinol  100 mg Oral Daily    escitalopram  10 mg Oral Daily    pregabalin  50 mg Oral TID    rosuvastatin  10 mg Oral Daily    insulin lispro  0-6 Units Subcutaneous TID WC    insulin lispro  0-3 Units Subcutaneous Nightly    sodium chloride flush  10 mL Intravenous 2 times per day    apixaban  2.5 mg Oral BID    HYDROmorphone  4 mg Oral Q6H    Empty 3-in-1 Mixing Container  1 each Does not apply Daily     PRN Meds: [START ON 1/11/2020] polyethylene glycol, midodrine, lidocaine PF, albumin human, albuterol sulfate HFA, traZODone, sodium chloride flush, magnesium hydroxide, glucose, dextrose, glucagon (rDNA), dextrose, ondansetron, oxyCODONE **OR** oxyCODONE, acetaminophen, diphenhydrAMINE    No intake or output data in the 24 hours ending 01/09/20 1253    Exam:    /67   Pulse 85   Temp 98.5 °F (36.9 °C) (Oral)   Resp 18   Ht 5' 3\" (1.6 m)   Wt (!) 302 lb 4 oz (137.1 kg)   LMP 11/01/1996 (Exact Date)   SpO2 98%   BMI 53.54 kg/m²     Gen/overall appearance: Not in acute distress. Alert. Head: Normocephalic, atraumatic  Eyes: EOMI, no scleral icterus  CVS: regular rate and rhythm, Normal S1S2  Pulm: diminished, Clear to auscultation bilaterally. No crackles/wheezes  Gastrointestinal: Soft, nontender, abd wound vac, obese, no guarding or rebound  Extremities: No edema. No erythema or warmth  Neuro: No gross focal deficits noted  Skin: Warm, dry    Labs:   Recent Labs     01/07/20  0529 01/08/20  0625 01/08/20  1535 01/09/20  0540 01/09/20  0735   WBC 9.7 10.2  --  10.7  --    HGB 9.2* 8.9* 9.5* 7.1*  9.3* 8.8*   HCT 29.0* 27.6* 30.4* 22.2*  28.6* 27.4*    197  --  221  --      Recent Labs     01/07/20  0529      K 4.0   CL 95*   CO2 31   BUN 13   CREATININE 4.8*   CALCIUM 9.2   PHOS 2.5     No results for input(s): AST, ALT, BILIDIR, BILITOT, ALKPHOS in the last 72 hours. No results for input(s): INR in the last 72 hours. No results for input(s): [facility] in the last 72 hours. Assessment/Plan:    Active Hospital Problems    Diagnosis Date Noted    Anaerobic cellulitis ([facility] [A48.0]     Abdominal wall cellulitis [K53.629]     Opiate dependence, continuous ([facility] [F11.20]     History of arterial ischemic stroke [Z86.73]     Cellulitis [L03.90] 12/26/2019    Severe anemia [D64.9] 11/17/2019    Chronic obstructive pulmonary disease ([address] [J44.9] 08/03/2019    Poorly controlled type 2 diabetes mellitus ([facility] [E11.65] 09/05/2014     Wound infection of the abdominal wall. Cultures positive for diphtheroids and bacteroids   S/p broad-spectrum antibiotic with vancomycin and Zosyn; now transitioned to po cipro   Wound VAC care  Glycemic control  GS on board    Calciphylaxis; biopsy confirmed: complicated by open wound and infection. Chronic pain associated with this  Pain management  On sodium thiosulfate  Nephro following    Hematochezia x1. Possibly anorectal bleeding per GI.   Recent CS on

## 2020-01-10 VITALS
SYSTOLIC BLOOD PRESSURE: 137 MMHG | BODY MASS INDEX: 51.91 KG/M2 | DIASTOLIC BLOOD PRESSURE: 78 MMHG | RESPIRATION RATE: 18 BRPM | HEIGHT: 63 IN | TEMPERATURE: 97 F | HEART RATE: 90 BPM | OXYGEN SATURATION: 99 % | WEIGHT: 293 LBS

## 2020-01-10 LAB
ANION GAP SERPL CALCULATED.3IONS-SCNC: 20 MMOL/L (ref 3–16)
BASOPHILS ABSOLUTE: 0 K/UL (ref 0–0.2)
BASOPHILS RELATIVE PERCENT: 0.6 %
BUN BLDV-MCNC: 13 MG/DL (ref 7–20)
CALCIUM SERPL-MCNC: 9.9 MG/DL (ref 8.3–10.6)
CHLORIDE BLD-SCNC: 94 MMOL/L (ref 99–110)
CO2: 28 MMOL/L (ref 21–32)
CREAT SERPL-MCNC: 6.1 MG/DL (ref 0.6–1.2)
EOSINOPHILS ABSOLUTE: 0.2 K/UL (ref 0–0.6)
EOSINOPHILS RELATIVE PERCENT: 2 %
GFR AFRICAN AMERICAN: 8
GFR NON-AFRICAN AMERICAN: 7
GLUCOSE BLD-MCNC: 117 MG/DL (ref 70–99)
GLUCOSE BLD-MCNC: 91 MG/DL (ref 70–99)
HCT VFR BLD CALC: 28.3 % (ref 36–48)
HEMOGLOBIN: 9.1 G/DL (ref 12–16)
LYMPHOCYTES ABSOLUTE: 1.6 K/UL (ref 1–5.1)
LYMPHOCYTES RELATIVE PERCENT: 18.3 %
MCH RBC QN AUTO: 29.5 PG (ref 26–34)
MCHC RBC AUTO-ENTMCNC: 32.2 G/DL (ref 31–36)
MCV RBC AUTO: 91.4 FL (ref 80–100)
MONOCYTES ABSOLUTE: 0.9 K/UL (ref 0–1.3)
MONOCYTES RELATIVE PERCENT: 10.4 %
NEUTROPHILS ABSOLUTE: 6 K/UL (ref 1.7–7.7)
NEUTROPHILS RELATIVE PERCENT: 68.7 %
PDW BLD-RTO: 19.4 % (ref 12.4–15.4)
PERFORMED ON: NORMAL
PLATELET # BLD: 238 K/UL (ref 135–450)
PMV BLD AUTO: 8.7 FL (ref 5–10.5)
POTASSIUM SERPL-SCNC: 4.4 MMOL/L (ref 3.5–5.1)
RBC # BLD: 3.1 M/UL (ref 4–5.2)
SODIUM BLD-SCNC: 142 MMOL/L (ref 136–145)
WBC # BLD: 8.7 K/UL (ref 4–11)

## 2020-01-10 PROCEDURE — 94640 AIRWAY INHALATION TREATMENT: CPT

## 2020-01-10 PROCEDURE — 6370000000 HC RX 637 (ALT 250 FOR IP): Performed by: INTERNAL MEDICINE

## 2020-01-10 PROCEDURE — 6370000000 HC RX 637 (ALT 250 FOR IP): Performed by: NURSE PRACTITIONER

## 2020-01-10 PROCEDURE — 90935 HEMODIALYSIS ONE EVALUATION: CPT

## 2020-01-10 PROCEDURE — 6360000002 HC RX W HCPCS: Performed by: INTERNAL MEDICINE

## 2020-01-10 PROCEDURE — 6360000002 HC RX W HCPCS: Performed by: SURGERY

## 2020-01-10 PROCEDURE — 85025 COMPLETE CBC W/AUTO DIFF WBC: CPT

## 2020-01-10 PROCEDURE — 2500000003 HC RX 250 WO HCPCS: Performed by: INTERNAL MEDICINE

## 2020-01-10 PROCEDURE — 2580000003 HC RX 258: Performed by: SURGERY

## 2020-01-10 PROCEDURE — C9113 INJ PANTOPRAZOLE SODIUM, VIA: HCPCS | Performed by: INTERNAL MEDICINE

## 2020-01-10 PROCEDURE — 80048 BASIC METABOLIC PNL TOTAL CA: CPT

## 2020-01-10 RX ORDER — HYDROMORPHONE HYDROCHLORIDE 2 MG/1
2 TABLET ORAL EVERY 12 HOURS PRN
Qty: 6 TABLET | Refills: 0 | Status: SHIPPED | OUTPATIENT
Start: 2020-01-10 | End: 2020-01-13

## 2020-01-10 RX ORDER — HYDROMORPHONE HYDROCHLORIDE 4 MG/1
4 TABLET ORAL EVERY 6 HOURS
Qty: 9 TABLET | Refills: 0 | Status: SHIPPED | OUTPATIENT
Start: 2020-01-10 | End: 2020-01-13

## 2020-01-10 RX ADMIN — SODIUM THIOSULFATE 25 G: 250 INJECTION, SOLUTION INTRAVENOUS at 10:45

## 2020-01-10 RX ADMIN — Medication 10 ML: at 12:40

## 2020-01-10 RX ADMIN — APIXABAN 2.5 MG: 2.5 TABLET, FILM COATED ORAL at 12:41

## 2020-01-10 RX ADMIN — BUDESONIDE 500 MCG: 0.5 SUSPENSION RESPIRATORY (INHALATION) at 12:45

## 2020-01-10 RX ADMIN — ROSUVASTATIN CALCIUM 10 MG: 10 TABLET, FILM COATED ORAL at 12:41

## 2020-01-10 RX ADMIN — HYDROMORPHONE HYDROCHLORIDE 4 MG: 2 TABLET ORAL at 12:40

## 2020-01-10 RX ADMIN — PANTOPRAZOLE SODIUM 40 MG: 40 INJECTION, POWDER, FOR SOLUTION INTRAVENOUS at 12:41

## 2020-01-10 RX ADMIN — NEPHROCAP 1 MG: 1 CAP ORAL at 12:41

## 2020-01-10 RX ADMIN — PREGABALIN 50 MG: 25 CAPSULE ORAL at 12:42

## 2020-01-10 RX ADMIN — ONDANSETRON 4 MG: 2 INJECTION INTRAMUSCULAR; INTRAVENOUS at 10:46

## 2020-01-10 RX ADMIN — MIDODRINE HYDROCHLORIDE 10 MG: 5 TABLET ORAL at 09:47

## 2020-01-10 RX ADMIN — HYDROMORPHONE HYDROCHLORIDE 4 MG: 2 TABLET ORAL at 05:56

## 2020-01-10 RX ADMIN — ALLOPURINOL 100 MG: 100 TABLET ORAL at 12:41

## 2020-01-10 RX ADMIN — ESCITALOPRAM OXALATE 10 MG: 10 TABLET ORAL at 12:42

## 2020-01-10 ASSESSMENT — PAIN SCALES - GENERAL
PAINLEVEL_OUTOF10: 8
PAINLEVEL_OUTOF10: 9
PAINLEVEL_OUTOF10: 0
PAINLEVEL_OUTOF10: 9
PAINLEVEL_OUTOF10: 5

## 2020-01-10 ASSESSMENT — PAIN DESCRIPTION - PAIN TYPE
TYPE: CHRONIC PAIN
TYPE: CHRONIC PAIN

## 2020-01-10 ASSESSMENT — PAIN DESCRIPTION - LOCATION: LOCATION: GROIN

## 2020-01-10 NOTE — PROGRESS NOTES
Nephrology Progress Note  [phone]   [email or web address]        Reason for Consult:  ESRD on HD     HISTORY OF PRESENT ILLNESS:      The patient is a 59 y.o. female with significant past medical history of T2DM , HTN , ESRD on maintenance HD  , GALE , Morbid obesity who presents with painful lesions abdominal wall and thigh- buttock area. She has recently been diagnosed on clinical grounds with Calciphylaxis , for this she gets Sodium Thiosulfate with dialysis . Unfortunately due to severe pain in the lesions she has been to the dialysis unit infrequently . Stable   Awaits placement   Seen on HD    PHYSICAL EXAM:    Vitals:    BP (!) 127/53   Pulse 94   Temp 96.9 °F (36.1 °C) (Temporal)   Resp 14   Ht 5' 3\" (1.6 m)   Wt (!) 311 lb 15.2 oz (141.5 kg)   LMP 11/01/1996 (Exact Date)   SpO2 95%   BMI 55.26 kg/m²   I/O last 3 completed shifts: In: 240 [P.O.:240]  Out: -   No intake/output data recorded. Physical Exam:  Gen: alert, oriented x 3  PERRL , EOM +  CV: RRR no murmur/ rub   Lungs: CTA-B  Abd: S/NT +BS, wound lower abdominal wall   Ext: No edema, no cyanosis  Skin: Warm.   Dressing : Lower abdomen  Upper lateral thighs and Buttock area   Neuro:  nonfocal.      DATA:    CBC with Differential:    Lab Results   Component Value Date    WBC 8.7 01/10/2020    RBC 3.10 01/10/2020    HGB 9.1 01/10/2020    HCT 28.3 01/10/2020     01/10/2020    MCV 91.4 01/10/2020    MCH 29.5 01/10/2020    MCHC 32.2 01/10/2020    RDW 19.4 01/10/2020    SEGSPCT 77.6 05/12/2013    BANDSPCT 5 08/05/2019    METASPCT 1 01/12/2018    LYMPHOPCT 18.3 01/10/2020    MONOPCT 10.4 01/10/2020    EOSPCT 2.6 01/24/2012    BASOPCT 0.6 01/10/2020    MONOSABS 0.9 01/10/2020    LYMPHSABS 1.6 01/10/2020    EOSABS 0.2 01/10/2020    BASOSABS 0.0 01/10/2020    DIFFTYPE Auto 05/12/2013     CMP:    Lab Results   Component Value Date     01/10/2020    K 4.4 01/10/2020    K 5.6 12/27/2019    CL 94 01/10/2020    CO2 28 01/10/2020    BUN 13 01/10/2020    CREATININE 6.1 01/10/2020    GFRAA 8 01/10/2020    GFRAA 50 05/12/2013    AGRATIO 0.7 12/28/2019    LABGLOM 7 01/10/2020    GLUCOSE 117 01/10/2020    PROT 5.9 12/28/2019    PROT 6.6 03/05/2013    LABALBU 2.5 01/07/2020    CALCIUM 9.9 01/10/2020    BILITOT 0.3 12/28/2019    ALKPHOS 59 12/28/2019    AST 19 12/28/2019    ALT 14 12/28/2019     Phosphorus:    Lab Results   Component Value Date    PHOS 2.5 01/07/2020     Uric Acid:    Lab Results   Component Value Date    LABURIC 2.8 05/17/2018     Warfarin PT/INR:  No components found for: PTPATWAR, PTINRWAR        IMPRESSION/RECOMMENDATIONS:      1. ESRD on HD    MWF at [facility]   2. Obesity  3. Anemia of CKD   Target Hb  9-11  4. Renal osteodystrophy   Phos  2.5, was 1.9  , Ca 9.9  , iPTH 173    Restart renagel once phos  > 3   5. Calciphylaxis   Biopsy confirmed    On Sodium Thiosulfate, no acidosis, On HCO3 40 in dialysate               Thank you for allowing me to participate in the care of this patient. I will continue to follow along. Please call with questions.     Simran Rodriguez MD  1/10/2020  [facility]

## 2020-01-10 NOTE — DISCHARGE SUMMARY
Hospital Medicine Discharge Summary    Patient ID: Adriana Lima      Patient's PCP: Amaury Porras, APRN - CNP    Admit Date: 12/26/2019     Discharge Date: 1/10/2020    Admitting Physician: Sabine Richey MD     Discharge Physician: Marly Jamison MD     Discharge Diagnoses and Hospital Course: Active Hospital Problems    Diagnosis Date Noted    Anaerobic cellulitis ([facility] [A48.0]     Abdominal wall cellulitis [W33.328]     Opiate dependence, continuous ([facility] [F11.20]     History of arterial ischemic stroke [Z86.73]     Cellulitis [L03.90] 12/26/2019    Severe anemia [D64.9] 11/17/2019    Chronic obstructive pulmonary disease ([facility] [J44.9] 08/03/2019    Poorly controlled type 2 diabetes mellitus ([facility] [E11.65] 09/05/2014     Wound infection of the abdominal wall. Cultures positive for diphtheroids and bacteroids   S/p broad-spectrum antibiotic course with vancomycin and Zosyn and transitioned to po cipro. Now d/sanjuana  Wound VAC care  Glycemic control  GS on board     Calciphylaxis; biopsy confirmed: complicated by open wound and infection. Chronic pain associated with this  Pain management  S/p sodium thiosulfate  Nephro following     Hematochezia x1. Possibly anorectal bleeding per GI. Recent CS on 10/19. Ok to resume eliquis per GI  Trend H&H  Monitor for bleeding     Postop anemia hemoglobin 6.6. Received  2 units of packed red blood cell transfusion. Serial Hgb stable  Serial H&H stable     ESRD on hemodialysis  Nephro following  Monitor lytes     History of deep venous thrombosis; on Eliquis     PMH of obesity, AOCD, Hypertension    The patient was seen and examined on day of discharge and this discharge summary is in conjunction with any daily progress note from day of discharge.       Exam:     /78   Pulse 90   Temp 97 °F (36.1 °C) (Temporal)   Resp 18   Ht 5' 3\" (1.6 m)   Wt (!) 308 lb 10.3 oz (140 kg)   LMP 11/01/1996 (Exact Date)   SpO2 99%   BMI 54.67 kg/m² Gen/overall appearance: Not in acute distress. Alert. Head: Normocephalic, atraumatic  Eyes: EOMI, no scleral icterus  CVS: regular rate and rhythm, Normal S1S2  Pulm: diminished, Clear to auscultation bilaterally. No crackles/wheezes  Gastrointestinal: Soft, nontender, abd wound vac, obese, no guarding or rebound  Extremities: No edema. No erythema or warmth  Neuro: No gross focal deficits noted  Skin: Warm, dry    Consults:     IP CONSULT TO NEPHROLOGY  IP CONSULT TO HOSPITALIST  IP CONSULT TO GENERAL SURGERY  IP CONSULT TO INFECTIOUS DISEASES  IP CONSULT TO NEPHROLOGY  IP CONSULT TO PHARMACY  IP CONSULT TO GI    Disposition:  SNF     Condition:  Stable    Discharge Instructions/Follow-up:   Pt to follow up with PCP within 1 week  Consultants as scheduled    Code Status:  Full Code    Activity: activity as tolerated    Diet: renal diet    Labs: For convenience and continuity at follow-up the following most recent labs are provided:      CBC:    Lab Results   Component Value Date    WBC 8.7 01/10/2020    HGB 9.1 01/10/2020    HCT 28.3 01/10/2020     01/10/2020       Renal:    Lab Results   Component Value Date     01/10/2020    K 4.4 01/10/2020    K 5.6 12/27/2019    CL 94 01/10/2020    CO2 28 01/10/2020    BUN 13 01/10/2020    CREATININE 6.1 01/10/2020    CALCIUM 9.9 01/10/2020    PHOS 2.5 01/07/2020       Discharge Medications:     Current Discharge Medication List           Details   !! HYDROmorphone (DILAUDID) 2 MG tablet Take 1 tablet by mouth every 12 hours as needed for Pain for up to 3 days. For breakthrough pain  Qty: 6 tablet, Refills: 0    Comments: Reduce doses taken as pain becomes manageable  Associated Diagnoses: Chronic pain syndrome      !! HYDROmorphone (DILAUDID) 4 MG tablet Take 1 tablet by mouth every 6 hours for 3 days.   Qty: 9 tablet, Refills: 0    Comments: Reduce doses taken as pain becomes manageable  Associated Diagnoses: Chronic pain syndrome       !! - Potential duplicate medications found. Please discuss with provider. Details   ferrous sulfate 325 (65 Fe) MG tablet Take 325 mg by mouth 3 times daily (with meals)      collagenase 250 UNIT/GM ointment Apply topically daily Apply topically daily. Left breat and abdominal area      insulin glargine (LANTUS) 100 UNIT/ML injection pen Inject 20 Units into the skin nightly  Qty: 5 pen, Refills: 3      HUMALOG KWIKPEN 100 UNIT/ML SOPN Inject 0-12 Units into the skin 3 times daily (before meals)  Qty: 15 mL, Refills: 5    Associated Diagnoses: Diabetic polyneuropathy associated with type 2 diabetes mellitus (HCC)      apixaban (ELIQUIS) 2.5 MG TABS tablet Take 1 tablet by mouth 2 times daily  Qty: 60 tablet, Refills: 1      sevelamer (RENVELA) 800 MG tablet Take 2 tablets by mouth 3 times daily (with meals)  Qty: 90 tablet, Refills: 3      pregabalin (LYRICA) 50 MG capsule Take 1 capsule by mouth 3 times daily for 180 days. Qty: 90 capsule, Refills: 5    Associated Diagnoses: Diabetic polyneuropathy associated with type 2 diabetes mellitus (HCC)      nebivolol (BYSTOLIC) 2.5 MG tablet Take 2.5 mg by mouth BID on M, W, F. Take 5 mg BID Sun, Tues, Thurs, Sat  Qty: 120 tablet, Refills: 5      budesonide (PULMICORT) 0.5 MG/2ML nebulizer suspension Take 2 mLs by nebulization 2 times daily  Qty: 60 ampule, Refills: 3      formoterol (PERFOROMIST) 20 MCG/2ML nebulizer solution Take 2 mLs by nebulization 2 times daily  Qty: 120 mL, Refills: 5      traZODone (DESYREL) 100 MG tablet Take 100 mg by mouth nightly as needed for Sleep      escitalopram (LEXAPRO) 10 MG tablet Take 1 tablet by mouth daily  Qty: 30 tablet, Refills: 5    Associated Diagnoses: Anxiety and depression      lidocaine-prilocaine (EMLA) 2.5-2.5 % cream Apply topically as needed.   Qty: 3 Tube, Refills: 5      omeprazole (PRILOSEC) 40 MG delayed release capsule TAKE 1 CAPSULE BY MOUTH  DAILY  Qty: 90 capsule, Refills: 3      rosuvastatin (CRESTOR) 10 MG tablet Take 1 tablet by mouth daily Take 1 tablet by mouth  daily  Qty: 90 tablet, Refills: 3    Associated Diagnoses: Diabetic polyneuropathy associated with type 2 diabetes mellitus (HCC)      fluticasone (FLONASE) 50 MCG/ACT nasal spray USE TWO SPRAY(S) IN EACH NOSTRIL ONCE DAILY  Qty: 1 Bottle, Refills: 5    Comments: Please consider 90 day supplies to promote better adherence      allopurinol (ZYLOPRIM) 100 MG tablet Take 1 tablet by mouth daily  Qty: 90 tablet, Refills: 3      fluticasone-salmeterol (ADVAIR HFA) 230-21 MCG/ACT inhaler Inhale 1 puff into the lungs 2 times daily  Qty: 1 Inhaler, Refills: 11      lidocaine viscous hcl (XYLOCAINE) 2 % SOLN solution       Diapers & Supplies MISC 1 each by Does not apply route 2 times daily  Qty: 100 each, Refills: 5      Incontinence Supply Disposable (DEPEND ADJUSTABLE UNDERWEAR LG) MISC 1 each by Does not apply route as needed (for urine incompetence)  Qty: 60 each, Refills: 5      !! UNIFINE PENTIPS 31G X 5 MM MISC USE WITH INSULIN PENS FOUR TIMES DAILY  Qty: 400 each, Refills: 3      promethazine (PHENERGAN) 25 MG tablet Take 1 tablet by mouth every 8 hours as needed for Nausea  Qty: 30 tablet, Refills: 2      calcium acetate (PHOSLO) 667 MG capsule Take 2 capsules by mouth 3 times daily (with meals)  Qty: 60 capsule, Refills: 3      albuterol sulfate HFA (PROAIR HFA) 108 (90 Base) MCG/ACT inhaler Inhale 2 puffs into the lungs every 6 hours as needed for Wheezing  Qty: 1 Inhaler, Refills: 11      TRULICITY 3.10 YP/9.5EA SOPN INJECT ONE  SYRINGE SUBCUTANEOUSLY ONCE A WEEK  Qty: 15 pen, Refills: 3    Comments: Please consider 90 day supplies to promote better adherence      !! RELION PEN NEEDLE 31G/8MM 31G X 8 MM MISC USE  THREE TIMES DAILY AS DIRECTED  Qty: 100 each, Refills: 5    Comments: Please consider 90 day supplies to promote better adherence      nitroGLYCERIN (NITROSTAT) 0.4 MG SL tablet DISSOLVE 1 TABLET UNDER THE TONGUE EVERY 5 MINUTES AS  NEEDED ; MAXIMUM

## 2020-01-10 NOTE — CARE COORDINATION
Faiza Willard at the [facility] and will be checking on authorization this morning and will call me back.   Antonio Ac RN, BSN  [phone]

## 2020-01-10 NOTE — PROGRESS NOTES
Abdomen wound vac removed from abd wound and moist to dry dressing applied to wound. Port de-accessed.

## 2020-01-10 NOTE — FLOWSHEET NOTE
01/08/20 0630 01/08/20 1030   Vital Signs   BP (!) 153/81 124/68   Temp 98.3 °F (36.8 °C) 97.1 °F (36.2 °C)   Pulse 83 79   Resp 18 18     Treatment time: 4 hours    Net UF: 1 L    Pre weight: 135.8 kg (bed scale)  Post weight: 137.1 kg (bed scale)  EDW: 147 (outpt clinic)    Access used: CAREY AVG  Access function: No problems    Medications or blood products given: Midodrine 10 mg po    Regular outpatient schedule: [facility]    Summary of response to treatment: Hypotension throughout tx, SBP 70's-80's. Midodrine 10 mg po given, SBP improved to 90's-100's. BP cuff needed to be adjusted to receive adequate BP. Copy of dialysis treatment record placed in chart, to be scanned into EMR. Report called to State Emily RN.
01/08/20 1422   Stool Assessment   Stool Appearance Soft   Stool Color Brown;Red   Stool Amount Medium   Unmeasured Output   Stool Occurrence 1   dr. Rohan Siddiqui notified.
01/10/20 0715 01/10/20 1130   Treatment   Time On 0723  --    Time Off  --  1123   Treatment Goal 2400  --    Observations & Evaluations   Level of Consciousness 0 0   Oriented X 3 3   Vital Signs   BP (!) 127/53 (!) 154/75   Temp 96.9 °F (36.1 °C) 97.2 °F (36.2 °C)   Pulse 94 80   Resp 14 16   Weight (!) 311 lb 15.2 oz (141.5 kg) (!) 308 lb 10.3 oz (140 kg)   Weight Method   (As reported by floor RN.) Bed scale   Percent Weight Change 0 0   Pain Assessment   Pain Assessment 0-10 0-10   Pain Level 0 5   Access   Needle Size Used 15  --    Technical Checks   RO Machine Log Sheet Completed Yes  --    Machine Alarm Self Test Completed  --    Machine Autotest Completed  --    Air Foam Detector Tested  --    Extracorporeal Circuit Tested for Integrity Yes  --    Treatment Initiation   Dialyze Hours 4  --    Treatment  Initiation Universal Precautions maintained;Lines secured to patient; Connections secured;Prime given;Venous Parameters set; Arterial Parameters set; Air foam detector engaged; Hemosafe Device; Saline line double clamped; Hemo-Safe Applied;Dialyzer;F180  --    During Hemodialysis Assessment   Blood Flow Rate (ml/min) 400 ml/min  --    Ultrafiltration Rate (ml/hr) 300 ml/hr  --    Arterial Pressure (mmHg) -50 mmHg  --    Venous Pressure (mmHg) 160  --    TMP 60  --      --    Access Visible Yes  --    Dialysis Bath   K+ (Potassium) 3  --    Ca+ (Calcium) 2.5  --    Na+ (Sodium) 138  --    HCO3 (Bicarb) 40  --    Hemodialysis Fistula/Graft Arteriovenous vein graft Left Arm   No Placement Date or Time found. Access Type: Arteriovenous vein graft  Orientation: Left  Access Location: Arm   Site Assessment Clean;Dry; Intact Clean;Dry; Intact   Thrill Present Present   Bruit Present Present   Access Interventions Aseptic Technique Aseptic Technique   Dressing Intervention Dressing reinforced Dressing reinforced   Dressing Status Clean;Dry; Intact Clean;Dry; Intact   Post-Hemodialysis Assessment   Post-Treatment
reinforced   Dressing Status Clean;Dry; Intact Clean;Dry; Intact   Post-Hemodialysis Assessment   Post-Treatment Procedures  --  Blood returned; Access bleeding time > 10 minutes   Machine Disinfection Process  --  Exterior Machine Disinfection   Rinseback Volume (ml)  --  300 ml   Total Liters Processed (l/min)  --  78.5 l/min   Dialyzer Clearance  --  Lightly streaked   Duration of Treatment (minutes)  --  240 minutes   Hemodialysis Intake (ml)  --  1550 ml   Hemodialysis Output (ml)  --  1214 ml   NET Removed (ml)  --  0 ml   Tolerated Treatment  --  Fair

## 2020-01-10 NOTE — PROGRESS NOTES
Nephrology Progress Note  [phone]  [phone]   [email or web address]        Reason for Consult:  ESRD on HD     HISTORY OF PRESENT ILLNESS:      The patient is a 59 y.o. female with significant past medical history of T2DM , HTN , ESRD on maintenance HD  , GALE , Morbid obesity who presents with painful lesions abdominal wall and thigh- buttock area. She has recently been diagnosed on clinical grounds with Calciphylaxis , for this she gets Sodium Thiosulfate with dialysis . Unfortunately due to severe pain in the lesions she has been to the dialysis unit infrequently . Stable   Seen on HD     Has placement at the [address] [place] [address]:    Vitals:    BP (!) 127/53   Pulse 94   Temp 96.9 °F (36.1 °C) (Temporal)   Resp 14   Ht 5' 3\" (1.6 m)   Wt (!) 311 lb 15.2 oz (141.5 kg)   LMP 11/01/1996 (Exact Date)   SpO2 95%   BMI 55.26 kg/m²   I/O last 3 completed shifts: In: 240 [P.O.:240]  Out: -   No intake/output data recorded. Physical Exam:  Gen: alert, oriented x 3  PERRL , EOM +  CV: RRR no murmur/ rub   Lungs: CTA-B  Abd: S/NT +BS, wound lower abdominal wall   Ext: No edema, no cyanosis  Skin: Warm.   Dressing : Lower abdomen  Upper lateral thighs and Buttock area Central vacuum   Neuro:  nonfocal.      DATA:    CBC with Differential:    Lab Results   Component Value Date    WBC 8.7 01/10/2020    RBC 3.10 01/10/2020    HGB 9.1 01/10/2020    HCT 28.3 01/10/2020     01/10/2020    MCV 91.4 01/10/2020    MCH 29.5 01/10/2020    MCHC 32.2 01/10/2020    RDW 19.4 01/10/2020    SEGSPCT 77.6 05/12/2013    BANDSPCT 5 08/05/2019    METASPCT 1 01/12/2018    LYMPHOPCT 18.3 01/10/2020    MONOPCT 10.4 01/10/2020    EOSPCT 2.6 01/24/2012    BASOPCT 0.6 01/10/2020    MONOSABS 0.9 01/10/2020    LYMPHSABS 1.6 01/10/2020    EOSABS 0.2 01/10/2020    BASOSABS 0.0 01/10/2020    DIFFTYPE Auto 05/12/2013     CMP:    Lab Results   Component Value Date     01/10/2020    K 4.4 01/10/2020    K 5.6 12/27/2019    CL 94 01/10/2020    CO2 28 01/10/2020    BUN 13 01/10/2020    CREATININE 6.1 01/10/2020    GFRAA 8 01/10/2020    GFRAA 50 05/12/2013    AGRATIO 0.7 12/28/2019    LABGLOM 7 01/10/2020    GLUCOSE 117 01/10/2020    PROT 5.9 12/28/2019    PROT 6.6 03/05/2013    LABALBU 2.5 01/07/2020    CALCIUM 9.9 01/10/2020    BILITOT 0.3 12/28/2019    ALKPHOS 59 12/28/2019    AST 19 12/28/2019    ALT 14 12/28/2019     Phosphorus:    Lab Results   Component Value Date    PHOS 2.5 01/07/2020     Uric Acid:    Lab Results   Component Value Date    LABURIC 2.8 05/17/2018     Warfarin PT/INR:  No components found for: PTPATWAR, PTINRWAR        IMPRESSION/RECOMMENDATIONS:      1. ESRD on HD    MWF at [facility]   2. Obesity  3. Anemia of CKD   Target Hb  9-11  4. Renal osteodystrophy   Phos  2.5, was 1.9  , Ca 9.9  , iPTH 173    Restart renagel once phos  > 3   5. Calciphylaxis   Biopsy confirmed    On Sodium Thiosulfate, no acidosis, On HCO3 40 in dialysate         Thank you for allowing me to participate in the care of this patient. I will continue to follow along. Please call with questions.     Loida Lozoya MD  1/10/2020  [facility]

## 2020-01-10 NOTE — PROGRESS NOTES
Shift assessment complete. Pt given PRN pain medications. Pt up in chair, resting quietly. No needs at this time. Call light within reach, non skid socks on, Will continue to monitor.

## 2020-01-13 ENCOUNTER — TELEPHONE (OUTPATIENT)
Dept: FAMILY MEDICINE CLINIC | Age: 65
End: 2020-01-13

## 2020-01-14 ENCOUNTER — TELEPHONE (OUTPATIENT)
Dept: OTHER | Age: 65
End: 2020-01-14

## 2020-01-14 NOTE — TELEPHONE ENCOUNTER
RN case manager at [facility] called to let you know that pt is seeing the house doctor, Dr. Anneliese Augustin.       If any questions please call SYMONE Ramsey [phone]  Thank you

## 2020-01-18 ENCOUNTER — HOSPITAL ENCOUNTER (EMERGENCY)
Age: 65
Discharge: HOME | End: 2020-01-18
Attending: EMERGENCY MEDICINE
Payer: MEDICARE

## 2020-01-18 VITALS
SYSTOLIC BLOOD PRESSURE: 102 MMHG | OXYGEN SATURATION: 89 % | TEMPERATURE: 97.3 F | HEIGHT: 63 IN | WEIGHT: 293 LBS | DIASTOLIC BLOOD PRESSURE: 26 MMHG | RESPIRATION RATE: 18 BRPM | BODY MASS INDEX: 51.91 KG/M2 | HEART RATE: 83 BPM

## 2020-01-18 LAB
ANION GAP SERPL CALCULATED.3IONS-SCNC: 19 MMOL/L (ref 3–16)
BASOPHILS ABSOLUTE: 0 K/UL (ref 0–0.2)
BASOPHILS RELATIVE PERCENT: 0.3 %
BUN BLDV-MCNC: 4 MG/DL (ref 7–20)
CALCIUM SERPL-MCNC: 8.6 MG/DL (ref 8.3–10.6)
CHLORIDE BLD-SCNC: 91 MMOL/L (ref 99–110)
CO2: 30 MMOL/L (ref 21–32)
CREAT SERPL-MCNC: 3.2 MG/DL (ref 0.6–1.2)
EOSINOPHILS ABSOLUTE: 0.1 K/UL (ref 0–0.6)
EOSINOPHILS RELATIVE PERCENT: 0.9 %
GFR AFRICAN AMERICAN: 18
GFR NON-AFRICAN AMERICAN: 15
GLUCOSE BLD-MCNC: 92 MG/DL (ref 70–99)
HCT VFR BLD CALC: 30.5 % (ref 36–48)
HEMOGLOBIN: 9.8 G/DL (ref 12–16)
LACTIC ACID: 1.5 MMOL/L (ref 0.4–2)
LYMPHOCYTES ABSOLUTE: 1.3 K/UL (ref 1–5.1)
LYMPHOCYTES RELATIVE PERCENT: 15.4 %
MCH RBC QN AUTO: 29.8 PG (ref 26–34)
MCHC RBC AUTO-ENTMCNC: 32.2 G/DL (ref 31–36)
MCV RBC AUTO: 92.5 FL (ref 80–100)
MONOCYTES ABSOLUTE: 0.7 K/UL (ref 0–1.3)
MONOCYTES RELATIVE PERCENT: 8.3 %
NEUTROPHILS ABSOLUTE: 6.6 K/UL (ref 1.7–7.7)
NEUTROPHILS RELATIVE PERCENT: 75.1 %
PDW BLD-RTO: 20.4 % (ref 12.4–15.4)
PLATELET # BLD: 203 K/UL (ref 135–450)
PMV BLD AUTO: 9.6 FL (ref 5–10.5)
POTASSIUM REFLEX MAGNESIUM: 3.7 MMOL/L (ref 3.5–5.1)
RBC # BLD: 3.3 M/UL (ref 4–5.2)
SODIUM BLD-SCNC: 140 MMOL/L (ref 136–145)
WBC # BLD: 8.8 K/UL (ref 4–11)

## 2020-01-18 PROCEDURE — 6370000000 HC RX 637 (ALT 250 FOR IP): Performed by: NURSE PRACTITIONER

## 2020-01-18 PROCEDURE — 99284 EMERGENCY DEPT VISIT MOD MDM: CPT

## 2020-01-18 PROCEDURE — 85025 COMPLETE CBC W/AUTO DIFF WBC: CPT

## 2020-01-18 PROCEDURE — 36415 COLL VENOUS BLD VENIPUNCTURE: CPT

## 2020-01-18 PROCEDURE — 80048 BASIC METABOLIC PNL TOTAL CA: CPT

## 2020-01-18 PROCEDURE — 87040 BLOOD CULTURE FOR BACTERIA: CPT

## 2020-01-18 PROCEDURE — 6360000002 HC RX W HCPCS: Performed by: NURSE PRACTITIONER

## 2020-01-18 PROCEDURE — 83605 ASSAY OF LACTIC ACID: CPT

## 2020-01-18 PROCEDURE — 96374 THER/PROPH/DIAG INJ IV PUSH: CPT

## 2020-01-18 RX ORDER — HYDROMORPHONE HYDROCHLORIDE 2 MG/1
2 TABLET ORAL EVERY 12 HOURS
Status: ON HOLD | COMMUNITY
End: 2020-02-05 | Stop reason: HOSPADM

## 2020-01-18 RX ORDER — HYDROMORPHONE HYDROCHLORIDE 2 MG/1
4 TABLET ORAL ONCE
Status: COMPLETED | OUTPATIENT
Start: 2020-01-18 | End: 2020-01-18

## 2020-01-18 RX ORDER — HYDROMORPHONE HYDROCHLORIDE 2 MG/1
2 TABLET ORAL EVERY 6 HOURS PRN
Status: ON HOLD | COMMUNITY
End: 2020-02-05 | Stop reason: HOSPADM

## 2020-01-18 RX ORDER — HYDROMORPHONE HYDROCHLORIDE 1 MG/ML
1 INJECTION, SOLUTION INTRAMUSCULAR; INTRAVENOUS; SUBCUTANEOUS ONCE
Status: COMPLETED | OUTPATIENT
Start: 2020-01-18 | End: 2020-01-18

## 2020-01-18 RX ORDER — ONDANSETRON 2 MG/ML
4 INJECTION INTRAMUSCULAR; INTRAVENOUS EVERY 30 MIN PRN
Status: DISCONTINUED | OUTPATIENT
Start: 2020-01-18 | End: 2020-01-18 | Stop reason: HOSPADM

## 2020-01-18 RX ADMIN — HYDROMORPHONE HYDROCHLORIDE 1 MG: 1 INJECTION, SOLUTION INTRAMUSCULAR; INTRAVENOUS; SUBCUTANEOUS at 12:10

## 2020-01-18 RX ADMIN — ONDANSETRON 4 MG: 2 INJECTION INTRAMUSCULAR; INTRAVENOUS at 13:43

## 2020-01-18 RX ADMIN — HYDROMORPHONE HYDROCHLORIDE 4 MG: 2 TABLET ORAL at 15:28

## 2020-01-18 ASSESSMENT — ENCOUNTER SYMPTOMS
RHINORRHEA: 0
CONSTIPATION: 0
SORE THROAT: 0
VOMITING: 0
SHORTNESS OF BREATH: 0
NAUSEA: 0
BLOOD IN STOOL: 0
ABDOMINAL PAIN: 0
DIARRHEA: 0

## 2020-01-18 ASSESSMENT — PAIN DESCRIPTION - LOCATION: LOCATION: ABDOMEN

## 2020-01-18 ASSESSMENT — PAIN SCALES - GENERAL
PAINLEVEL_OUTOF10: 10
PAINLEVEL_OUTOF10: 10

## 2020-01-18 ASSESSMENT — PAIN DESCRIPTION - ORIENTATION: ORIENTATION: LEFT

## 2020-01-18 NOTE — ED NOTES
Called into room by patient asking to make room warmer. Given warm blankets. Pt also asking for something for nausea. Lucas HANEY made aware.      Alayna Manjarrez RN  01/18/20 [id number]

## 2020-01-18 NOTE — ED NOTES
Spoke with pt's nurse @ [facility] at the [facility]. Pt's pain medication clarified and added to her medication list. Informed that patient would be coming back to facility after transportation sent up. Verbalized understanding.      Maya Sal RN  01/18/20 [id number]

## 2020-01-18 NOTE — ED NOTES
Dressings noted to left lower flank, buttock and right upper thigh. Left posterior thigh noted to have an open area. Bilateral buttock with noted open areas with bloody drainage noted. Wound vac dressing noted to lower abdomen lower abdomen. Left lower flank/hip dressing:  Wound approx 13 inches in length  Approx 5 inches in width at widest point. no drainage noted. NS wet to dry dressing applied. Buttocks:  Bilateral wounds cleansed with NS. Sacral dressing and mepilex border dressings applied. Left posterior thigh:  mepilex border dressing applied. Lower abdomen wound vac site:  Wound vac occlusive dressing noted to not be intact. Dressing removed. Wound approx 7 inches in length and 3 inches in width. Wet to dry dressing applied. Right upper thigh:  Dressing to right upper thigh wound approx 5 inches in length and 3 inches in width. Wet to dry dressing applied.            Mai Cleveland RN  01/18/20 [id number]

## 2020-01-18 NOTE — ED PROVIDER NOTES
Metabolic Panel w/ Reflex to MG   Result Value Ref Range    Sodium 140 136 - 145 mmol/L    Potassium reflex Magnesium 3.7 3.5 - 5.1 mmol/L    Chloride 91 (L) 99 - 110 mmol/L    CO2 30 21 - 32 mmol/L    Anion Gap 19 (H) 3 - 16    Glucose 92 70 - 99 mg/dL    BUN 4 (L) 7 - 20 mg/dL    CREATININE 3.2 (H) 0.6 - 1.2 mg/dL    GFR Non-African American 15 (A) >60    GFR  18 (A) >60    Calcium 8.6 8.3 - 10.6 mg/dL   Lactic Acid, Plasma   Result Value Ref Range    Lactic Acid 1.5 0.4 - 2.0 mmol/L     Ct Abdomen Pelvis Wo Contrast Additional Contrast? None    Result Date: 12/26/2019  EXAMINATION: CT OF THE ABDOMEN AND PELVIS WITHOUT CONTRAST 12/26/2019 8:46 am TECHNIQUE: CT of the abdomen and pelvis was performed without the administration of intravenous contrast. Multiplanar reformatted images are provided for review. Dose modulation, iterative reconstruction, and/or weight based adjustment of the mA/kV was utilized to reduce the radiation dose to as low as reasonably achievable. COMPARISON: 11/29/2019 HISTORY: ORDERING SYSTEM PROVIDED HISTORY: abdominal wall wound TECHNOLOGIST PROVIDED HISTORY: Reason for exam:->abdominal wall wound Additional Contrast?->None Reason for Exam: abdominal wall wound Acuity: Unknown Type of Exam: Unknown FINDINGS: Lower Chest: There is bibasilar scarring. A central venous catheter terminates at the cavoatrial junction. A small hiatal hernia is noted. Organs: The unenhanced liver, spleen, pancreas, adrenal glands and kidneys are unremarkable. There is bilateral cortical renal atrophy and scarring. There is no hydronephrosis or obstructive uropathy. GI/Bowel: There is no bowel obstruction. The appendix is within normal limits. Stool is present throughout the colon. Pelvis: Status post hysterectomy. Peritoneum/Retroperitoneum: There is no evidence of free fluid or adenopathy.  Bones/Soft Tissues: Degenerative changes involve the thoracolumbar spine, bilateral hips and sacroiliac

## 2020-01-18 NOTE — ED PROVIDER NOTES
of breath. Cardiovascular: Negative for chest pain. Gastrointestinal: Negative for abdominal pain, blood in stool, constipation, diarrhea, nausea and vomiting. Genitourinary: Negative for dysuria, flank pain and hematuria. Skin: Positive for wound. Negative for rash. Neurological: Negative for weakness and headaches. All other systems reviewed and are negative. Positives and Pertinent negatives as per HPI. Except as noted above in the ROS, all other systems were reviewed and negative.        PAST MEDICAL HISTORY     Past Medical History:   Diagnosis Date    Abdominal pain 8/20/2018    Acute on chronic congestive heart failure ([address] 6/26/2014    Asthma     Calcinosis cutis     Calciphylaxis 12/2/2019    Cervical cancer ([address]     Chest pain 5/16/2018    CH[facility] (congestive heart failure) ([facility])     Chronic kidney disease, stage IV (severe) ([facility])     Dr Juliet Fung    Chronic pain syndrome 3/27/2018    Chronic respiratory failure with hypoxia ([facility])     CKD (chronic kidney disease) stage 4, GFR 15-29 ml/min ([facility]) 6/30/2017    Colon polyps     COPD (chronic obstructive pulmonary disease) ([address]     Degenerative disc disease at L5-S1 level 6/18/2013     CT    Diabetes mellitus, insulin dependent (IDDM), uncontrolled ([address]     Diabetic polyneuropathy associated with type 2 diabetes mellitus ([address] 3/26/2019    Elevated troponin 9/9/2017    ESRD (end stage renal disease) on dialysis ([address] 02/14/2018    M-W-F JOSE Dickson    GERD (gastroesophageal reflux disease)     Gout     History of blood transfusion     History of cervical cancer     Hyperlipidemia     Hypertension     Incarcerated ventral hernia     LVH (left ventricular hypertrophy)     Morbid obesity ([facility])     Mucus plugging of bronchi     Obstructive sleep apnea on CPAP     wears 2L O2 and BIPAP at night    Pneumonia     Psychiatric problem     breakdown long time ago but not current    Pulmonary embolism ([address] 02/06/2013  Type 2 diabetes mellitus with diabetic neuropathy, with long-term current use of insulin ([address] 9/12/2017    Urinary incontinence in female     Venous stasis of lower extremity          SURGICAL HISTORY       Past Surgical History:   Procedure Laterality Date    ABDOMEN SURGERY N/A 12/26/2019    INCISION AND DRAINAGE OF ABDOMINAL WOUND AND BILATERAL HIP WOUNDS performed by Melvin Self MD at [facility]  1/14    [place]; clean cors    COLONOSCOPY  2010    polyp removed; [place]; repeat 5 years    COLONOSCOPY  6/25/13, 11/14    [place]    COLONOSCOPY N/A 10/10/2019    COLONOSCOPY POLYPECTOMY SNARE/COLD BIOPSY performed by Flaca Boucher MD at [address] Right 3/28/2019    RIGHT BRACHIO CEPHALIC FISTULA CREATION performed by Vineet Doss MD at [address] [facility]  2/21/09    LVH with global hypokinesis; EF 55-60%    HYSTERECTOMY      INCISION AND DRAINAGE Right 8/22/2019    RIGHT ARM INCISION AND DEBRIDEMENT performed by Vineet Doss MD at [facility] [address] Right 1999    OTHER SURGICAL HISTORY  02/22/2018    LEFT brachial artery axillary vein AV graft     NH OPEN SKULL SUPRATENT EXPLORE      Craniotomy, 3/21/19 patient denies any brain surgery or craniotomy    NH REPAIR INCISIONAL HERNIA,REDUCIBLE N/A 11/20/2018    LAPAROSCOPIC VENTRAL HERNIA REPAIR WITH MESH performed by Barbie Antonio MD at [address] [place] [address] Avenue Right 7/18/2019    RIGHT BRACHIAL ARTERY AXILLARY VEIN GRAFT AND LIGATION OF RIGHT BRACHIO-CEPHALIC FISTULA ([address], [id number]) performed by Vineet Doss MD at [place] [address]  10/96    TUNNELED VENOUS PORT PLACEMENT Right 11/2019    UPPER GASTROINTESTINAL ENDOSCOPY  6/25/13    UPPER GASTROINTESTINAL ENDOSCOPY N/A 11/21/2019    EGD BIOPSY performed by Kimberly Renae MD at [place] [address]  12/24/2016    Incarcerated ventral hernia repair with mesh         CURRENT MEDICATIONS       Previous Medications    ALBUTEROL SULFATE HFA (PROAIR HFA) 108 (90 BASE) MCG/ACT INHALER    Inhale 2 puffs into the lungs every 6 hours as needed for Wheezing    ALCOHOL SWABS (ALCOHOL PREP) 70 % PADS    USE TO TEST BLOOD GLUCOSE THREE TIMES DAILY    ALLOPURINOL (ZYLOPRIM) 100 MG TABLET    Take 1 tablet by mouth daily    APIXABAN (ELIQUIS) 2.5 MG TABS TABLET    Take 1 tablet by mouth 2 times daily    ASSURE COMFORT LANCETS 30G MISC    USE TO TEST BLOOD GLUCOSE THREE TIMES DAILY    BIPAP MACHINE MISC    by Does not apply route nightly    BLOOD GLUCOSE MONITORING SUPPL (ACCU-CHEK KENNETH SMARTVIEW) W/DEVICE KIT    USE TO TEST BLOOD GLUCOSE    BUDESONIDE (PULMICORT) 0.5 MG/2ML NEBULIZER SUSPENSION    Take 2 mLs by nebulization 2 times daily    CALCIUM ACETATE (PHOSLO) 667 MG CAPSULE    Take 2 capsules by mouth 3 times daily (with meals)    COLLAGENASE 250 UNIT/GM OINTMENT    Apply topically daily Apply topically daily.  Left breat and abdominal area    DIAPERS & SUPPLIES MISC    1 each by Does not apply route 2 times daily    ESCITALOPRAM (LEXAPRO) 10 MG TABLET    Take 1 tablet by mouth daily    FERROUS SULFATE 325 (65 FE) MG TABLET    Take 325 mg by mouth 3 times daily (with meals)    FLUTICASONE (FLONASE) 50 MCG/ACT NASAL SPRAY    USE TWO SPRAY(S) IN EACH NOSTRIL ONCE DAILY    FLUTICASONE-SALMETEROL (ADVAIR HFA) 230-21 MCG/ACT INHALER    Inhale 1 puff into the lungs 2 times daily    FORMOTEROL (PERFOROMIST) 20 MCG/2ML NEBULIZER SOLUTION    Take 2 mLs by nebulization 2 times daily    HUMALOG KWIKPEN 100 UNIT/ML SOPN    Inject 0-12 Units into the skin 3 times daily (before meals)    INCONTINENCE SUPPLY DISPOSABLE (DEPEND ADJUSTABLE UNDERWEAR LG) MISC    1 each by Does not apply route as needed (for urine incompetence)    INSULIN GLARGINE (LANTUS) 100 UNIT/ML INJECTION PEN    Inject 20 Units into the skin nightly    LIDOCAINE VISCOUS HCL (XYLOCAINE) 2 % SOLN SOLUTION LIDOCAINE-PRILOCAINE (EMLA) 2.5-2.5 % CREAM    Apply topically as needed. NEBIVOLOL (BYSTOLIC) 2.5 MG TABLET    Take 2.5 mg by mouth BID on M, , . Take 5 mg BID Sun, Tues, Thurs, Sat    NITROGLYCERIN (NITROSTAT) 0.4 MG SL TABLET    DISSOLVE 1 TABLET UNDER THE TONGUE EVERY 5 MINUTES AS  NEEDED ; MAXIMUM OF 3  TABLETS IN 15 MINUTES    OMEPRAZOLE (PRILOSEC) 40 MG DELAYED RELEASE CAPSULE    TAKE 1 CAPSULE BY MOUTH  DAILY    ONE TOUCH ULTRA TEST STRIP    USE TO TEST 3-4 TIMES DAILY DUE TO FLUCTUATING SUGAR    OXYGEN    Inhale 2 L into the lungs daily as needed At night prn    PREGABALIN (LYRICA) 50 MG CAPSULE    Take 1 capsule by mouth 3 times daily for 180 days. PROMETHAZINE (PHENERGAN) 25 MG TABLET    Take 1 tablet by mouth every 8 hours as needed for Nausea    RELION PEN NEEDLE 31G/8MM 31G X 8 MM MISC    USE  THREE TIMES DAILY AS DIRECTED    ROSUVASTATIN (CRESTOR) 10 MG TABLET    Take 1 tablet by mouth daily Take 1 tablet by mouth  daily    SEVELAMER (RENVELA) 800 MG TABLET    Take 2 tablets by mouth 3 times daily (with meals)    SPACER/AERO-HOLDING CHAMBERS VENANCIO    1 Device by Does not apply route daily as needed    TRAZODONE (DESYREL) 100 MG TABLET    Take 100 mg by mouth nightly as needed for Sleep    TRULICITY 0.81 SH/2.9JE SOPN    INJECT ONE  SYRINGE SUBCUTANEOUSLY ONCE A WEEK    UNIFINE PENTIPS 31G X 5 MM MISC    USE WITH INSULIN PENS FOUR TIMES DAILY         ALLERGIES     Codeine; Fentanyl; Morphine; Tramadol hcl; Hydrocodone-acetaminophen; Percocet [oxycodone-acetaminophen];  Procardia [nifedipine]; and Vicodin [hydrocodone-acetaminophen]    FAMILY HISTORY       Family History   Problem Relation Age of Onset    Colon Cancer Father     Diabetes Father     High Blood Pressure Father     Colon Cancer Mother     Diabetes Mother     Colon Cancer Maternal Grandmother     Kidney Cancer Brother     Heart Disease Brother          age 54    High Blood Pressure Brother     High Blood Pressure Sister  High Blood Pressure Maternal Aunt     High Blood Pressure Sister     Heart Disease Sister           SOCIAL HISTORY       Social History     Socioeconomic History    Marital status:      Spouse name: Keke Mitchell Number of children: 3    Years of education: None    Highest education level: None   Occupational History    Occupation: NA   Social Needs    Financial resource strain: None    Food insecurity:     Worry: None     Inability: None    Transportation needs:     Medical: None     Non-medical: None   Tobacco Use    Smoking status: Never Smoker    Smokeless tobacco: Never Used   Substance and Sexual Activity    Alcohol use: No     Alcohol/week: 0.0 standard drinks    Drug use: No    Sexual activity: None   Lifestyle    Physical activity:     Days per week: None     Minutes per session: None    Stress: None   Relationships    Social connections:     Talks on phone: None     Gets together: None     Attends Congregational service: None     Active member of club or organization: None     Attends meetings of clubs or organizations: None     Relationship status: None    Intimate partner violence:     Fear of current or ex partner: None     Emotionally abused: None     Physically abused: None     Forced sexual activity: None   Other Topics Concern    None   Social History Narrative    None       SCREENINGS             PHYSICAL EXAM  (up to 7 for level 4, 8 or more for level 5)     ED Triage Vitals [01/18/20 1134]   BP Temp Temp Source Pulse Resp SpO2 Height Weight   (!) 115/36 97.3 °F (36.3 °C) Oral 97 18 (!) 89 % 5' 3\" (1.6 m) (!) 326 lb (147.9 kg)       Physical Exam  Vitals signs and nursing note reviewed. Constitutional:       Appearance: She is well-developed. She is morbidly obese. She is not diaphoretic. Interventions: Nasal cannula in place. HENT:      Head: Normocephalic and atraumatic. Eyes:      General: No scleral icterus. Right eye: No discharge.          Left eye: No [place] [address] [address] [address], [address]   Phone [phone]       All other labs werewithin normal range or not returned as of this dictation. EKG: All EKG's are interpreted by the Emergency Department Physician who either signs or Co-signs this chart in the absence of acardiologist.  Please see their note for interpretation of EKG. RADIOLOGY:   Interpretation per the Radiologist below, if available at the time of this note:    No orders to display     No results found. PROCEDURES   Unless otherwise noted below, none     Procedures    CRITICAL CARE TIME     n/a    CONSULTS:  None      EMERGENCY DEPARTMENT COURSE and DIFFERENTIAL DIAGNOSIS/MDM:   Vitals:    Vitals:    01/18/20 1134 01/18/20 1315   BP: (!) 115/36 (!) 136/48   Pulse: 97 83   Resp: 18    Temp: 97.3 °F (36.3 °C)    TempSrc: Oral    SpO2: (!) 89%    Weight: (!) 326 lb (147.9 kg)    Height: 5' 3\" (1.6 m)        Margie Lopez was given the following medications:  Medications   ondansetron (ZOFRAN) injection 4 mg (4 mg Intravenous Given 1/18/20 1343)   HYDROmorphone HCl PF (DILAUDID) injection 1 mg (1 mg Intravenous Given 1/18/20 1210)       Margie Lopez was evaluated in the emergency department with concern for pain in her decubitus ulcers. She has a large decubitus ulcers static start in her left pannus and extend into her buttocks. These are chronic in nature and she does see wound care for it. The are more painful than usual.  She came to the ER after her dialysis treatment today. She did receive her full treatment. y treated with Dilaudid in the ER. Laboratory evaluation suggests no evidence of acute infection at this time. There is no surrounding cellulitis on my exam.  I feel outpatient management is warranted. I estimate there is LOW risk for severe abscess, cellulitis, sepsis, or necrotizing fasciitis. Margie Lopez is stable in the ER and safe to follow as an outpatient.   The patient is discharged on the following medications. They were counseled on how to take the newly prescribed medications:  New Prescriptions    No medications on file    . Instructed to follow-up with:  JURGEN Alatorre CNP  [address] [facility]  [phone]    Schedule an appointment as soon as possible for a visit in 3 days  For a recheck    Return to the ER for new or worsening symptoms. This plan was discussed with the patient and all family available in the room at discharge who are all in agreement with the plan. The patient tolerated their visit well. They were seen and evaluated by the ED attending physician listed on this chart who agreed with the assessment and plan. The patient and / or the family were informed of the results of any tests, a time was given to answer questions, a plan was proposed and they agreed with plan. FINAL IMPRESSION      1. Pain associated with wound    2.  Hemodialysis patient [facility])          DISPOSITION/PLAN   DISPOSITION Decision To Discharge 01/18/2020 02:18:16 PM        DISCONTINUED MEDICATIONS:  Discontinued Medications    No medications on file                (Please note that portions of this note were completed with a voice recognition program.  Efforts were made to edit the dictations but occasionally words are mis-transcribed.)    JURGEN Mejia CNP (electronically signed)        JURGEN Mejia CNP  01/18/20 9287

## 2020-01-18 NOTE — ED NOTES
Bed: 08  Expected date:   Expected time:   Means of arrival:   Comments:  Balaji Dennis RN  01/18/20 1125

## 2020-01-21 ENCOUNTER — TELEPHONE (OUTPATIENT)
Dept: WOUND CARE | Age: 65
End: 2020-01-21

## 2020-01-21 ENCOUNTER — HOSPITAL ENCOUNTER (OUTPATIENT)
Dept: WOUND CARE | Age: 65
Discharge: HOME | End: 2020-01-21

## 2020-01-21 NOTE — TELEPHONE ENCOUNTER
Called patient to inform of missed appointment. Patient did not answer, LMOM. Called patient's care coordinator Manolo Browning and left message on her VM as well.

## 2020-01-22 LAB — BLOOD CULTURE, ROUTINE: NORMAL

## 2020-01-26 ENCOUNTER — HOSPITAL ENCOUNTER (INPATIENT)
Age: 65
LOS: 8 days | Discharge: ANOTHER HOSPITAL | DRG: 570 | End: 2020-02-05
Attending: EMERGENCY MEDICINE | Admitting: HOSPITALIST
Payer: MEDICARE

## 2020-01-26 ENCOUNTER — APPOINTMENT (OUTPATIENT)
Dept: CT IMAGING | Age: 65
DRG: 570 | End: 2020-01-26
Payer: MEDICARE

## 2020-01-26 ENCOUNTER — APPOINTMENT (OUTPATIENT)
Dept: GENERAL RADIOLOGY | Age: 65
DRG: 570 | End: 2020-01-26
Payer: MEDICARE

## 2020-01-26 PROBLEM — L08.9 CHRONIC ABDOMINAL WOUND INFECTION, SEQUELA: Status: ACTIVE | Noted: 2020-01-26

## 2020-01-26 PROBLEM — S31.109S CHRONIC ABDOMINAL WOUND INFECTION, SEQUELA: Status: ACTIVE | Noted: 2020-01-26

## 2020-01-26 LAB
A/G RATIO: 0.5 (ref 1.1–2.2)
ALBUMIN SERPL-MCNC: 2.1 G/DL (ref 3.4–5)
ALP BLD-CCNC: 86 U/L (ref 40–129)
ALT SERPL-CCNC: 21 U/L (ref 10–40)
ANION GAP SERPL CALCULATED.3IONS-SCNC: 26 MMOL/L (ref 3–16)
AST SERPL-CCNC: 29 U/L (ref 15–37)
BASOPHILS ABSOLUTE: 0 K/UL (ref 0–0.2)
BASOPHILS RELATIVE PERCENT: 0.1 %
BILIRUB SERPL-MCNC: 0.4 MG/DL (ref 0–1)
BUN BLDV-MCNC: 25 MG/DL (ref 7–20)
CALCIUM SERPL-MCNC: 9.8 MG/DL (ref 8.3–10.6)
CHLORIDE BLD-SCNC: 91 MMOL/L (ref 99–110)
CO2: 21 MMOL/L (ref 21–32)
CREAT SERPL-MCNC: 6.1 MG/DL (ref 0.6–1.2)
EOSINOPHILS ABSOLUTE: 0 K/UL (ref 0–0.6)
EOSINOPHILS RELATIVE PERCENT: 0.3 %
GFR AFRICAN AMERICAN: 8
GFR NON-AFRICAN AMERICAN: 7
GLOBULIN: 4 G/DL
GLUCOSE BLD-MCNC: 151 MG/DL (ref 70–99)
HCT VFR BLD CALC: 32.3 % (ref 36–48)
HEMOGLOBIN: 10.2 G/DL (ref 12–16)
INR BLD: 1.91 (ref 0.86–1.14)
LYMPHOCYTES ABSOLUTE: 1.1 K/UL (ref 1–5.1)
LYMPHOCYTES RELATIVE PERCENT: 10.3 %
MCH RBC QN AUTO: 29.6 PG (ref 26–34)
MCHC RBC AUTO-ENTMCNC: 31.5 G/DL (ref 31–36)
MCV RBC AUTO: 94 FL (ref 80–100)
MONOCYTES ABSOLUTE: 0.7 K/UL (ref 0–1.3)
MONOCYTES RELATIVE PERCENT: 6.9 %
NEUTROPHILS ABSOLUTE: 8.8 K/UL (ref 1.7–7.7)
NEUTROPHILS RELATIVE PERCENT: 82.4 %
PDW BLD-RTO: 20.5 % (ref 12.4–15.4)
PLATELET # BLD: 186 K/UL (ref 135–450)
PMV BLD AUTO: 10 FL (ref 5–10.5)
POTASSIUM REFLEX MAGNESIUM: 4 MMOL/L (ref 3.5–5.1)
PRO-BNP: 3281 PG/ML (ref 0–124)
PROTHROMBIN TIME: 22.3 SEC (ref 10–13.2)
RBC # BLD: 3.43 M/UL (ref 4–5.2)
SODIUM BLD-SCNC: 138 MMOL/L (ref 136–145)
TOTAL PROTEIN: 6.1 G/DL (ref 6.4–8.2)
TROPONIN: 0.24 NG/ML
WBC # BLD: 10.7 K/UL (ref 4–11)

## 2020-01-26 PROCEDURE — 93005 ELECTROCARDIOGRAM TRACING: CPT | Performed by: EMERGENCY MEDICINE

## 2020-01-26 PROCEDURE — 87040 BLOOD CULTURE FOR BACTERIA: CPT

## 2020-01-26 PROCEDURE — 71045 X-RAY EXAM CHEST 1 VIEW: CPT

## 2020-01-26 PROCEDURE — G0378 HOSPITAL OBSERVATION PER HR: HCPCS

## 2020-01-26 PROCEDURE — 85025 COMPLETE CBC W/AUTO DIFF WBC: CPT

## 2020-01-26 PROCEDURE — 85610 PROTHROMBIN TIME: CPT

## 2020-01-26 PROCEDURE — 80053 COMPREHEN METABOLIC PANEL: CPT

## 2020-01-26 PROCEDURE — 84484 ASSAY OF TROPONIN QUANT: CPT

## 2020-01-26 PROCEDURE — 36415 COLL VENOUS BLD VENIPUNCTURE: CPT

## 2020-01-26 PROCEDURE — 2700000000 HC OXYGEN THERAPY PER DAY

## 2020-01-26 PROCEDURE — 74176 CT ABD & PELVIS W/O CONTRAST: CPT

## 2020-01-26 PROCEDURE — 83880 ASSAY OF NATRIURETIC PEPTIDE: CPT

## 2020-01-26 PROCEDURE — 99285 EMERGENCY DEPT VISIT HI MDM: CPT

## 2020-01-26 RX ORDER — ACETAMINOPHEN 325 MG/1
650 TABLET ORAL EVERY 6 HOURS PRN
Status: DISCONTINUED | OUTPATIENT
Start: 2020-01-26 | End: 2020-02-05 | Stop reason: HOSPADM

## 2020-01-26 RX ORDER — SODIUM CHLORIDE 0.9 % (FLUSH) 0.9 %
10 SYRINGE (ML) INJECTION EVERY 12 HOURS SCHEDULED
Status: DISCONTINUED | OUTPATIENT
Start: 2020-01-26 | End: 2020-02-05 | Stop reason: HOSPADM

## 2020-01-26 RX ORDER — ONDANSETRON 2 MG/ML
4 INJECTION INTRAMUSCULAR; INTRAVENOUS EVERY 6 HOURS PRN
Status: DISCONTINUED | OUTPATIENT
Start: 2020-01-26 | End: 2020-01-30 | Stop reason: SDUPTHER

## 2020-01-26 RX ORDER — SODIUM CHLORIDE 0.9 % (FLUSH) 0.9 %
10 SYRINGE (ML) INJECTION PRN
Status: DISCONTINUED | OUTPATIENT
Start: 2020-01-26 | End: 2020-02-05 | Stop reason: HOSPADM

## 2020-01-26 ASSESSMENT — ENCOUNTER SYMPTOMS
VOMITING: 0
COLOR CHANGE: 1
DIARRHEA: 0
COUGH: 0
ABDOMINAL PAIN: 0
CONSTIPATION: 0
SHORTNESS OF BREATH: 1
NAUSEA: 0

## 2020-01-26 ASSESSMENT — PAIN SCALES - GENERAL
PAINLEVEL_OUTOF10: 10
PAINLEVEL_OUTOF10: 4

## 2020-01-26 ASSESSMENT — PAIN DESCRIPTION - LOCATION: LOCATION: CHEST

## 2020-01-26 NOTE — ED PROVIDER NOTES
I independently evaluated and obtained a history and physical on [address]. All diagnostic, treatment, and disposition assistants were made to myself in conjunction the advanced practice provider. For further details of this patient's emergency department encounter, please see the advanced practice provider's documentation. History: Patient presents the emergency department via EMS from [facility] for chief complaint of chest pain. Also complains of shortness of breath. History of CHF, asthma, calciphylaxis, chronic chest pain, CHF, chronic kidney disease on hemodialysis, chronic pain syndrome, COPD. Patient reports symptoms over the past few days. Denies cough. Rates her pain at 10 out of 10, aching diffusely throughout her chest.  No headache, lightheadedness, dizziness, diaphoresis. No abdominal pain. No nausea or vomiting. Patient underwent surgical debridement of her wounds back in November. Has been at [facility] since. Wounds are in her lower abdomen, left flank, left buttock. Apparently yesterday had a bowel movement that soiled her bed, and per family at bedside, it took about 20 minutes to get her cleaned. They are concerned about possible infection of the wounds. She has had increased drainage today. Has been compliant with home medications. Physician Exam: Physical Exam  Vitals signs and nursing note reviewed. Constitutional:       General: She is not in acute distress. Appearance: She is well-developed. She is obese. HENT:      Head: Normocephalic and atraumatic. Eyes:      Conjunctiva/sclera: Conjunctivae normal.      Pupils: Pupils are equal, round, and reactive to light. Neck:      Musculoskeletal: Normal range of motion and neck supple. Cardiovascular:      Rate and Rhythm: Normal rate and regular rhythm. Pulses: Normal pulses. Heart sounds: Normal heart sounds. No murmur. No friction rub. No gallop.     Pulmonary: Effort: Pulmonary effort is normal. No respiratory distress. Abdominal:      General: Abdomen is flat. There is no distension. Palpations: Abdomen is soft. Comments: Chronic appearing open wounds noted to the lower abdomen, left flank, buttocks, with purulent drainage. No bleeding. Musculoskeletal: Normal range of motion. General: No deformity. Skin:     General: Skin is warm and dry. Neurological:      Mental Status: She is alert and oriented to person, place, and time. Cranial Nerves: No cranial nerve deficit. Motor: No abnormal muscle tone. Psychiatric:         Behavior: Behavior normal.         Thought Content: Thought content normal.         Judgment: Judgment normal.           EKG by my interpretation adversely effective due to tachycardia baseline interference. Appears to demonstrate normal sinus rhythm at 89 bpm.  Normal axis, normal intervals. No acute ST elevations or T wave inversions. Criteria present for old inferior and old anterior lateral infarct. Compared with prior EKG from December 16, 2019, no significant changes are noted. Diagnostics, Emergency Department course, and medical decision-making were discussed with the advanced practice provider, I have reviewed their documentation, and I agree. Patient's work-up significant for opponent elevated compared with prior ED visits for similar complaints. No acute injury pattern noted on EKG. No acute findings on chest x-ray. CBC appears normal.  Wounds appear potentially infected, although without surrounding cellulitis, and CT scan of the abdomen and pelvis without contrast does not show any discrete fluid collection. Based on troponin abnormalities as well as concern for poorly healing wounds possibly complicated by infection, decision was made to admit for further management as indicated. Hospitalist consulted for admission, patient was admitted to their service. Clinical Impression:  1.  Chest

## 2020-01-26 NOTE — ED PROVIDER NOTES
time.  Has been taking her Dilaudid at home for her pain but has not been helping. Is anticoagulated on Eliquis per report. Nursing Notes were all reviewed and agreed with or any disagreements were addressed in the HPI. REVIEW OF SYSTEMS    (2-9 systems for level 4, 10 or more for level 5)     Review of Systems   Constitutional: Negative for activity change, appetite change, chills and fever. Respiratory: Positive for shortness of breath. Negative for cough. Cardiovascular: Positive for chest pain. Gastrointestinal: Negative for abdominal pain, constipation, diarrhea, nausea and vomiting. Genitourinary: Negative for decreased urine volume, difficulty urinating, dysuria, flank pain, frequency, hematuria and urgency. Musculoskeletal: Negative for arthralgias, myalgias, neck pain and neck stiffness. Skin: Positive for color change and wound. Negative for pallor and rash. Neurological: Negative for dizziness, weakness, light-headedness, numbness and headaches. Positives and Pertinent negatives as per HPI. Except as noted above in the ROS, all other systems were reviewed and negative.        PAST MEDICAL HISTORY     Past Medical History:   Diagnosis Date    Abdominal pain 8/20/2018    Acute on chronic congestive heart failure ([address] 6/26/2014    Asthma     Calcinosis cutis     Calciphylaxis 12/2/2019    Cervical cancer ([address]     Chest pain 5/16/2018    CHF (congestive heart failure) ([facility])     Chronic kidney disease, stage IV (severe) ([facility])     Dr Kashif Cooley    Chronic pain syndrome 3/27/2018    Chronic respiratory failure with hypoxia ([facility])     CKD (chronic kidney disease) stage 4, GFR 15-29 ml/min ([facility]) 6/30/2017    Colon polyps     COPD (chronic obstructive pulmonary disease) ([address]     Degenerative disc disease at L5-S1 level 6/18/2013     CT    Diabetes mellitus, insulin dependent (IDDM), uncontrolled ([address]     Diabetic polyneuropathy associated with type 2 diabetes mellitus ([facility] 3/26/2019    Elevated troponin 9/9/2017    ESRD (end stage renal disease) on dialysis ([facility] 02/14/2018    M-W-F JOSE Crystal Brett    GERD (gastroesophageal reflux disease)     Gout     History of blood transfusion     History of cervical cancer     Hyperlipidemia     Hypertension     Incarcerated ventral hernia     LVH (left ventricular hypertrophy)     Morbid obesity (HCC)     Mucus plugging of bronchi     Obstructive sleep apnea on CPAP     wears 2L O2 and BIPAP at night    Pneumonia     Psychiatric problem     breakdown long time ago but not current    Pulmonary embolism ([facility] 02/06/2013    Type 2 diabetes mellitus with diabetic neuropathy, with long-term current use of insulin ([facility] 9/12/2017    Urinary incontinence in female     Venous stasis of lower extremity          SURGICAL HISTORY     Past Surgical History:   Procedure Laterality Date    ABDOMEN SURGERY N/A 12/26/2019    INCISION AND DRAINAGE OF ABDOMINAL WOUND AND BILATERAL HIP WOUNDS performed by Annalisa Ocasio MD at [address] [facility]  1/14    Grace; clean cors    COLONOSCOPY  2010    polyp removed; Kwabena Killian; repeat 5 years    COLONOSCOPY  6/25/13, 11/14    Carmen    COLONOSCOPY N/A 10/10/2019    COLONOSCOPY POLYPECTOMY SNARE/COLD BIOPSY performed by Olivia Gold MD at [address] Right 3/28/2019    RIGHT BRACHIO CEPHALIC FISTULA CREATION performed by Stef Valdez MD at [address]  2/21/09    LVH with global hypokinesis; EF 55-60%    HYSTERECTOMY      INCISION AND DRAINAGE Right 8/22/2019    RIGHT ARM INCISION AND DEBRIDEMENT performed by Stef Valdez MD at [facility] [address] Right 1999    OTHER SURGICAL HISTORY  02/22/2018    LEFT brachial artery axillary vein AV graft     PA OPEN SKULL SUPRATENT EXPLORE      Craniotomy, 3/21/19 patient denies any brain surgery or craniotomy   [facility] FLUTICASONE-SALMETEROL (ADVAIR HFA) 230-21 MCG/ACT INHALER    Inhale 1 puff into the lungs 2 times daily    FORMOTEROL (PERFOROMIST) 20 MCG/2ML NEBULIZER SOLUTION    Take 2 mLs by nebulization 2 times daily    HUMALOG KWIKPEN 100 UNIT/ML SOPN    Inject 0-12 Units into the skin 3 times daily (before meals)    HYDROMORPHONE (DILAUDID) 2 MG TABLET    Take 2 mg by mouth 3 times daily. HYDROMORPHONE (DILAUDID) 2 MG TABLET    Take 2 mg by mouth every 6 hours as needed for Pain. INCONTINENCE SUPPLY DISPOSABLE (DEPEND ADJUSTABLE UNDERWEAR LG) MISC    1 each by Does not apply route as needed (for urine incompetence)    INSULIN GLARGINE (LANTUS) 100 UNIT/ML INJECTION PEN    Inject 20 Units into the skin nightly    LIDOCAINE VISCOUS HCL (XYLOCAINE) 2 % SOLN SOLUTION        LIDOCAINE-PRILOCAINE (EMLA) 2.5-2.5 % CREAM    Apply topically as needed. NEBIVOLOL (BYSTOLIC) 2.5 MG TABLET    Take 2.5 mg by mouth BID on M, W, F. Take 5 mg BID Sun, Tues, Thurs, Sat    NITROGLYCERIN (NITROSTAT) 0.4 MG SL TABLET    DISSOLVE 1 TABLET UNDER THE TONGUE EVERY 5 MINUTES AS  NEEDED ; MAXIMUM OF 3  TABLETS IN 15 MINUTES    OMEPRAZOLE (PRILOSEC) 40 MG DELAYED RELEASE CAPSULE    TAKE 1 CAPSULE BY MOUTH  DAILY    ONE TOUCH ULTRA TEST STRIP    USE TO TEST 3-4 TIMES DAILY DUE TO FLUCTUATING SUGAR    OXYGEN    Inhale 2 L into the lungs daily as needed At night prn    PREGABALIN (LYRICA) 50 MG CAPSULE    Take 1 capsule by mouth 3 times daily for 180 days.     PROMETHAZINE (PHENERGAN) 25 MG TABLET    Take 1 tablet by mouth every 8 hours as needed for Nausea    RELION PEN NEEDLE 31G/8MM 31G X 8 MM MISC    USE  THREE TIMES DAILY AS DIRECTED    ROSUVASTATIN (CRESTOR) 10 MG TABLET    Take 1 tablet by mouth daily Take 1 tablet by mouth  daily    SEVELAMER (RENVELA) 800 MG TABLET    Take 2 tablets by mouth 3 times daily (with meals)    SPACER/AERO-HOLDING CHAMBERS VENANCIO    1 Device by Does not apply route daily as needed    TRAZODONE (DESYREL) 100 MG TABLET    Take 100 mg by mouth nightly as needed for Sleep    TRULICITY 6.06 TC/8.3OF SOPN    INJECT ONE  SYRINGE SUBCUTANEOUSLY ONCE A WEEK    UNIFINE PENTIPS 31G X 5 MM MISC    USE WITH INSULIN PENS FOUR TIMES DAILY         ALLERGIES     Codeine; Fentanyl; Morphine; Tramadol hcl; Hydrocodone-acetaminophen; Percocet [oxycodone-acetaminophen]; Procardia [nifedipine]; and Vicodin [hydrocodone-acetaminophen]    FAMILYHISTORY       Family History   Problem Relation Age of Onset    Colon Cancer Father     Diabetes Father     High Blood Pressure Father     Colon Cancer Mother     Diabetes Mother     Colon Cancer Maternal Grandmother     Kidney Cancer Brother     Heart Disease Brother          age 54    High Blood Pressure Brother     High Blood Pressure Sister     High Blood Pressure Maternal Aunt     High Blood Pressure Sister     Heart Disease Sister           SOCIAL HISTORY       Social History     Tobacco Use    Smoking status: Never Smoker    Smokeless tobacco: Never Used   Substance Use Topics    Alcohol use: No     Alcohol/week: 0.0 standard drinks    Drug use: No       SCREENINGS             PHYSICAL EXAM    (up to 7 for level 4, 8 or more for level 5)     ED Triage Vitals   BP Temp Temp src Pulse Resp SpO2 Height Weight   20 1355 -- -- 20 1355 20 1355 20 1430 20 1352 20 1352   (!) 51/14   84 22 100 % 5' 3\" (1.6 m) (!) 326 lb (147.9 kg)       Physical Exam  Constitutional:       General: She is not in acute distress. Appearance: Normal appearance. She is well-developed. She is not ill-appearing, toxic-appearing or diaphoretic. HENT:      Head: Normocephalic and atraumatic. Right Ear: External ear normal.      Left Ear: External ear normal.   Eyes:      General:         Right eye: No discharge. Left eye: No discharge. Neck:      Musculoskeletal: Normal range of motion and neck supple. No neck rigidity or muscular tenderness. not returned as of this dictation. EKG: All EKG's are interpreted by the Emergency Department Physician in the absence of a cardiologist.  Please see their note for interpretation of EKG. RADIOLOGY:   Non-plain film images such as CT, Ultrasound and MRI are read by the radiologist. Plain radiographic images are visualized and preliminarily interpreted by the  ED Provider with the below findings:        Interpretation per the Radiologist below, if available at the time of this note:    XR CHEST PORTABLE   Preliminary Result   Stable chronic cardiomegaly without acute airspace consolidation or CHF. CT ABDOMEN PELVIS WO CONTRAST Additional Contrast? None    (Results Pending)     Xr Chest Portable    Result Date: 1/26/2020  EXAMINATION: ONE XRAY VIEW OF THE CHEST 1/26/2020 2:38 pm COMPARISON: 12/26/2019, 12/16/2019 HISTORY: ORDERING SYSTEM PROVIDED HISTORY: sepsis? ?? TECHNOLOGIST PROVIDED HISTORY: Reason for exam:->sepsis? ?? Reason for Exam: Chest Pain (Patient arrived via [facility] squad from [facility] with c/o CP/SOB. Pt not on O2 at baseline. 324 ASA, 0.4 SL nitro administered en route. ) Acuity: Unknown Type of Exam: Unknown FINDINGS: A single frontal view of the chest is slightly limited secondary to patient lordotic position. There is no acute skeletal abnormality. There is a stable right-sided Port-A-Cath in proper position. The heart size is enlarged, though stable. The mediastinal contours are stable. There is stable chronic nonspecific elevation of the right hemidiaphragm. The lungs are grossly clear without evidence of acute airspace consolidation, pneumothorax, or pleural effusion. Stable chronic cardiomegaly without acute airspace consolidation or CHF.            PROCEDURES   Unless otherwise noted below, none     Procedures    CRITICAL CARE TIME   N/A    CONSULTS:  None      EMERGENCY DEPARTMENT COURSE and DIFFERENTIAL DIAGNOSIS/MDM:   Vitals:    Vitals:    01/26/20 1355 01/26/20 1430 01/26/20 1447 01/26/20 1500   BP: (!) 51/14 (!) 121/92 113/73 (!) 147/113   Pulse: 84 87 90 90   Resp: 22 24 21 20   SpO2:  100% 98%    Weight:       Height:           Patient was given thefollowing medications:  Medications - No data to display    Patient is a 60-year-old female who presents to the ED implant of chest pain. Patient well-known to us department has longstanding history of chronic chest pain. Patient also complained of shortness of breath. Upon arrival patient noted to have blood pressure documented of 51/14. Patient alert and talking to me upon exam.  Repeat blood pressure obtained and noted to be 121/92. Believe initial blood pressure most likely false at this time. Fluids were started empirically on patient given concern for potential hypotension at this time. CBC showed normal white count and platelets. Hemoglobin 10.2. CMP showed creatinine of 6.1, BUN 25 and GFR of 8. Anion gap 26. Glucose 151. Results appear consistent with patient's history of chronic kidney disease on hemodialysis. INR 1.91. Troponin 0.24. Opponent has been elevated in the past but this result appears more elevated than previously documented results recently. BNP 3200. Chest x-ray showed chronic cardiomegaly without airspace consolidation or CHF. EKG interpreted by attending physician. CT of the abdomen pelvis without contrast ordered given patient's history of end-stage renal disease on hemodialysis. Did obtain CT scan to evaluate for potential infection given wounds at this time. Patient currently pending CT scan at this time. Given end of shift will sign out to attending provider. Please see attending rider note for further disposition and treatment of patient. Anticipate potential admission for potential antibiotics given what appears to be potential infection to the surgical wounds at this time and also trending of troponin given elevated result from patient's previous documented baseline results. FINAL IMPRESSION    No diagnosis found. DISPOSITION/PLAN   DISPOSITION        PATIENT REFERREDTO:  No follow-up provider specified.     DISCHARGE MEDICATIONS:  New Prescriptions    No medications on file       DISCONTINUED MEDICATIONS:  Discontinued Medications    No medications on file              (Please note that portions of this note were completed with a voice recognition program.  Efforts were made to edit the dictations but occasionally words are mis-transcribed.)    TAPAN Monreal (electronically signed)          [place], PA  01/26/20 [phone]

## 2020-01-27 LAB
ANION GAP SERPL CALCULATED.3IONS-SCNC: 21 MMOL/L (ref 3–16)
BUN BLDV-MCNC: 30 MG/DL (ref 7–20)
CALCIUM SERPL-MCNC: 8.8 MG/DL (ref 8.3–10.6)
CHLORIDE BLD-SCNC: 93 MMOL/L (ref 99–110)
CO2: 26 MMOL/L (ref 21–32)
CREAT SERPL-MCNC: 6.8 MG/DL (ref 0.6–1.2)
EKG ATRIAL RATE: 500 BPM
EKG DIAGNOSIS: NORMAL
EKG Q-T INTERVAL: 342 MS
EKG QRS DURATION: 60 MS
EKG QTC CALCULATION (BAZETT): 416 MS
EKG R AXIS: -24 DEGREES
EKG T AXIS: 111 DEGREES
EKG VENTRICULAR RATE: 89 BPM
GFR AFRICAN AMERICAN: 7
GFR NON-AFRICAN AMERICAN: 6
GLUCOSE BLD-MCNC: 109 MG/DL (ref 70–99)
GLUCOSE BLD-MCNC: 123 MG/DL (ref 70–99)
GLUCOSE BLD-MCNC: 148 MG/DL (ref 70–99)
GLUCOSE BLD-MCNC: 96 MG/DL (ref 70–99)
GLUCOSE BLD-MCNC: 97 MG/DL (ref 70–99)
HCT VFR BLD CALC: 29.3 % (ref 36–48)
HEMOGLOBIN: 9.5 G/DL (ref 12–16)
MCH RBC QN AUTO: 29.6 PG (ref 26–34)
MCHC RBC AUTO-ENTMCNC: 32.2 G/DL (ref 31–36)
MCV RBC AUTO: 91.7 FL (ref 80–100)
PDW BLD-RTO: 20 % (ref 12.4–15.4)
PERFORMED ON: ABNORMAL
PERFORMED ON: ABNORMAL
PERFORMED ON: NORMAL
PERFORMED ON: NORMAL
PLATELET # BLD: 164 K/UL (ref 135–450)
PMV BLD AUTO: 10.3 FL (ref 5–10.5)
POTASSIUM SERPL-SCNC: 3.8 MMOL/L (ref 3.5–5.1)
RBC # BLD: 3.2 M/UL (ref 4–5.2)
SODIUM BLD-SCNC: 140 MMOL/L (ref 136–145)
TROPONIN: 0.2 NG/ML
WBC # BLD: 10.9 K/UL (ref 4–11)

## 2020-01-27 PROCEDURE — 6370000000 HC RX 637 (ALT 250 FOR IP): Performed by: NURSE PRACTITIONER

## 2020-01-27 PROCEDURE — 93010 ELECTROCARDIOGRAM REPORT: CPT | Performed by: INTERNAL MEDICINE

## 2020-01-27 PROCEDURE — 87186 SC STD MICRODIL/AGAR DIL: CPT

## 2020-01-27 PROCEDURE — 85027 COMPLETE CBC AUTOMATED: CPT

## 2020-01-27 PROCEDURE — 99221 1ST HOSP IP/OBS SF/LOW 40: CPT | Performed by: SURGERY

## 2020-01-27 PROCEDURE — 94640 AIRWAY INHALATION TREATMENT: CPT

## 2020-01-27 PROCEDURE — 6360000002 HC RX W HCPCS: Performed by: NURSE PRACTITIONER

## 2020-01-27 PROCEDURE — G0378 HOSPITAL OBSERVATION PER HR: HCPCS

## 2020-01-27 PROCEDURE — 2580000003 HC RX 258: Performed by: NURSE PRACTITIONER

## 2020-01-27 PROCEDURE — 2500000003 HC RX 250 WO HCPCS: Performed by: INTERNAL MEDICINE

## 2020-01-27 PROCEDURE — 94761 N-INVAS EAR/PLS OXIMETRY MLT: CPT

## 2020-01-27 PROCEDURE — 84484 ASSAY OF TROPONIN QUANT: CPT

## 2020-01-27 PROCEDURE — 87077 CULTURE AEROBIC IDENTIFY: CPT

## 2020-01-27 PROCEDURE — 80048 BASIC METABOLIC PNL TOTAL CA: CPT

## 2020-01-27 PROCEDURE — 87070 CULTURE OTHR SPECIMN AEROBIC: CPT

## 2020-01-27 PROCEDURE — 99223 1ST HOSP IP/OBS HIGH 75: CPT | Performed by: INTERNAL MEDICINE

## 2020-01-27 PROCEDURE — 87205 SMEAR GRAM STAIN: CPT

## 2020-01-27 PROCEDURE — 6370000000 HC RX 637 (ALT 250 FOR IP): Performed by: SURGERY

## 2020-01-27 RX ORDER — INSULIN LISPRO 100 [IU]/ML
0-6 INJECTION, SOLUTION INTRAVENOUS; SUBCUTANEOUS NIGHTLY
Status: DISCONTINUED | OUTPATIENT
Start: 2020-01-27 | End: 2020-02-05 | Stop reason: HOSPADM

## 2020-01-27 RX ORDER — DEXTROSE MONOHYDRATE 25 G/50ML
12.5 INJECTION, SOLUTION INTRAVENOUS PRN
Status: DISCONTINUED | OUTPATIENT
Start: 2020-01-27 | End: 2020-02-05 | Stop reason: HOSPADM

## 2020-01-27 RX ORDER — SEVELAMER CARBONATE 800 MG/1
1600 TABLET, FILM COATED ORAL
Status: DISCONTINUED | OUTPATIENT
Start: 2020-01-27 | End: 2020-01-28

## 2020-01-27 RX ORDER — DEXTROSE MONOHYDRATE 50 MG/ML
100 INJECTION, SOLUTION INTRAVENOUS PRN
Status: DISCONTINUED | OUTPATIENT
Start: 2020-01-27 | End: 2020-02-05 | Stop reason: HOSPADM

## 2020-01-27 RX ORDER — ESCITALOPRAM OXALATE 10 MG/1
10 TABLET ORAL DAILY
Status: DISCONTINUED | OUTPATIENT
Start: 2020-01-27 | End: 2020-02-05 | Stop reason: HOSPADM

## 2020-01-27 RX ORDER — FORMOTEROL FUMARATE 20 UG/2ML
20 SOLUTION RESPIRATORY (INHALATION) 2 TIMES DAILY
Status: DISCONTINUED | OUTPATIENT
Start: 2020-01-27 | End: 2020-02-05 | Stop reason: HOSPADM

## 2020-01-27 RX ORDER — LIDOCAINE HYDROCHLORIDE 40 MG/ML
SOLUTION TOPICAL ONCE
Status: DISCONTINUED | OUTPATIENT
Start: 2020-01-27 | End: 2020-02-05 | Stop reason: HOSPADM

## 2020-01-27 RX ORDER — BUDESONIDE 0.5 MG/2ML
500 INHALANT ORAL 2 TIMES DAILY
Status: DISCONTINUED | OUTPATIENT
Start: 2020-01-27 | End: 2020-02-05 | Stop reason: HOSPADM

## 2020-01-27 RX ORDER — TRAZODONE HYDROCHLORIDE 50 MG/1
100 TABLET ORAL NIGHTLY PRN
Status: DISCONTINUED | OUTPATIENT
Start: 2020-01-27 | End: 2020-02-05 | Stop reason: HOSPADM

## 2020-01-27 RX ORDER — NICOTINE POLACRILEX 4 MG
15 LOZENGE BUCCAL PRN
Status: DISCONTINUED | OUTPATIENT
Start: 2020-01-27 | End: 2020-02-05 | Stop reason: HOSPADM

## 2020-01-27 RX ORDER — FERROUS SULFATE 325(65) MG
325 TABLET ORAL
Status: DISCONTINUED | OUTPATIENT
Start: 2020-01-27 | End: 2020-02-05 | Stop reason: HOSPADM

## 2020-01-27 RX ORDER — HYDROMORPHONE HYDROCHLORIDE 2 MG/1
2 TABLET ORAL EVERY 6 HOURS PRN
Status: DISCONTINUED | OUTPATIENT
Start: 2020-01-27 | End: 2020-01-28

## 2020-01-27 RX ORDER — INSULIN LISPRO 100 [IU]/ML
0-12 INJECTION, SOLUTION INTRAVENOUS; SUBCUTANEOUS
Status: DISCONTINUED | OUTPATIENT
Start: 2020-01-27 | End: 2020-02-05 | Stop reason: HOSPADM

## 2020-01-27 RX ORDER — NEBIVOLOL 5 MG/1
5 TABLET ORAL
Status: DISCONTINUED | OUTPATIENT
Start: 2020-01-28 | End: 2020-01-28

## 2020-01-27 RX ORDER — PREGABALIN 25 MG/1
50 CAPSULE ORAL 3 TIMES DAILY
Status: DISCONTINUED | OUTPATIENT
Start: 2020-01-27 | End: 2020-02-05 | Stop reason: HOSPADM

## 2020-01-27 RX ORDER — ALBUTEROL SULFATE 90 UG/1
2 AEROSOL, METERED RESPIRATORY (INHALATION) EVERY 6 HOURS PRN
Status: DISCONTINUED | OUTPATIENT
Start: 2020-01-27 | End: 2020-01-29

## 2020-01-27 RX ORDER — NEBIVOLOL 5 MG/1
2.5 TABLET ORAL
Status: DISCONTINUED | OUTPATIENT
Start: 2020-01-27 | End: 2020-01-28

## 2020-01-27 RX ADMIN — COLLAGENASE SANTYL: 250 OINTMENT TOPICAL at 12:50

## 2020-01-27 RX ADMIN — ESCITALOPRAM OXALATE 10 MG: 10 TABLET ORAL at 10:18

## 2020-01-27 RX ADMIN — BUDESONIDE 500 MCG: 0.5 SUSPENSION RESPIRATORY (INHALATION) at 19:46

## 2020-01-27 RX ADMIN — Medication 2 PUFF: at 19:46

## 2020-01-27 RX ADMIN — PREGABALIN 50 MG: 25 CAPSULE ORAL at 22:07

## 2020-01-27 RX ADMIN — Medication 10 ML: at 00:50

## 2020-01-27 RX ADMIN — FERROUS SULFATE TAB 325 MG (65 MG ELEMENTAL FE) 325 MG: 325 (65 FE) TAB at 10:17

## 2020-01-27 RX ADMIN — INSULIN GLARGINE 20 UNITS: 100 INJECTION, SOLUTION SUBCUTANEOUS at 23:02

## 2020-01-27 RX ADMIN — FERROUS SULFATE TAB 325 MG (65 MG ELEMENTAL FE) 325 MG: 325 (65 FE) TAB at 18:05

## 2020-01-27 RX ADMIN — PREGABALIN 50 MG: 25 CAPSULE ORAL at 14:59

## 2020-01-27 RX ADMIN — PREGABALIN 50 MG: 25 CAPSULE ORAL at 10:18

## 2020-01-27 RX ADMIN — INSULIN LISPRO 2 UNITS: 100 INJECTION, SOLUTION INTRAVENOUS; SUBCUTANEOUS at 18:04

## 2020-01-27 RX ADMIN — FORMOTEROL FUMARATE DIHYDRATE 20 MCG: 20 SOLUTION RESPIRATORY (INHALATION) at 19:46

## 2020-01-27 RX ADMIN — APIXABAN 2.5 MG: 5 TABLET, FILM COATED ORAL at 00:49

## 2020-01-27 RX ADMIN — TRAZODONE HYDROCHLORIDE 100 MG: 50 TABLET ORAL at 00:49

## 2020-01-27 RX ADMIN — NEBIVOLOL HYDROCHLORIDE 2.5 MG: 5 TABLET ORAL at 22:07

## 2020-01-27 RX ADMIN — SODIUM THIOSULFATE 25 G: 250 INJECTION, SOLUTION INTRAVENOUS at 15:05

## 2020-01-27 RX ADMIN — FORMOTEROL FUMARATE DIHYDRATE 20 MCG: 20 SOLUTION RESPIRATORY (INHALATION) at 07:43

## 2020-01-27 RX ADMIN — APIXABAN 2.5 MG: 5 TABLET, FILM COATED ORAL at 22:07

## 2020-01-27 RX ADMIN — APIXABAN 2.5 MG: 5 TABLET, FILM COATED ORAL at 10:19

## 2020-01-27 RX ADMIN — Medication 10 ML: at 22:10

## 2020-01-27 RX ADMIN — BUDESONIDE 500 MCG: 0.5 SUSPENSION RESPIRATORY (INHALATION) at 07:43

## 2020-01-27 RX ADMIN — FERROUS SULFATE TAB 325 MG (65 MG ELEMENTAL FE) 325 MG: 325 (65 FE) TAB at 13:23

## 2020-01-27 RX ADMIN — HYDROMORPHONE HYDROCHLORIDE 2 MG: 2 TABLET ORAL at 00:49

## 2020-01-27 RX ADMIN — Medication 2 PUFF: at 07:43

## 2020-01-27 ASSESSMENT — PAIN SCALES - GENERAL
PAINLEVEL_OUTOF10: 8
PAINLEVEL_OUTOF10: 4
PAINLEVEL_OUTOF10: 8
PAINLEVEL_OUTOF10: 5
PAINLEVEL_OUTOF10: 10

## 2020-01-27 ASSESSMENT — PAIN DESCRIPTION - PAIN TYPE: TYPE: CHRONIC PAIN

## 2020-01-27 ASSESSMENT — PAIN DESCRIPTION - LOCATION: LOCATION: ABDOMEN

## 2020-01-27 NOTE — PROGRESS NOTES
Pt awake in bed. BP low and  Hospitalist paged. Will continue to monitor, lunch ordered and pt denies any needs.  Call light in reach and bed alarm engaged

## 2020-01-27 NOTE — CONSULTS
Colonoscopy (2010); Upper gastrointestinal endoscopy (6/25/13); Colonoscopy (6/25/13, 11/14); Cardiac catheterization (1/14); Hysterectomy; pr open skull supratent explore; ventral hernia repair (12/24/2016); other surgical history (02/22/2018); pr repair incisional hernia,reducible (N/A, 11/20/2018); Tunneled venous port placement (Right, 11/2019); Dialysis fistula creation (Right, 3/28/2019); shunt revision (Right, 7/18/2019); incision and drainage (Right, 8/22/2019); Colonoscopy (N/A, 10/10/2019); Upper gastrointestinal endoscopy (N/A, 11/21/2019); and Abdomen surgery (N/A, 12/26/2019). Social History:   reports that she has never smoked. She has never used smokeless tobacco. She reports that she does not drink alcohol or use drugs. Family History:  family history includes Colon Cancer in her father, maternal grandmother, and mother; Diabetes in her father and mother; Heart Disease in her brother and sister; High Blood Pressure in her brother, father, maternal aunt, sister, and sister; Kidney Cancer in her brother. Home Medications:  Were reviewed and are listed in nursing record. and/or listed below  Prior to Admission medications    Medication Sig Start Date End Date Taking? Authorizing Provider   HYDROmorphone (DILAUDID) 2 MG tablet Take 2 mg by mouth every 12 hours. Yes Historical Provider, MD   lidocaine viscous hcl (XYLOCAINE) 2 % SOLN solution  11/11/19  Yes Historical Provider, MD   ferrous sulfate 325 (65 Fe) MG tablet Take 325 mg by mouth 3 times daily (with meals)   Yes Historical Provider, MD   collagenase 250 UNIT/GM ointment Apply topically daily Apply topically daily.  Left breat and abdominal area   Yes Historical Provider, MD   insulin glargine (LANTUS) 100 UNIT/ML injection pen Inject 20 Units into the skin nightly 11/26/19  Yes Jaron De La Cruz MD   HUMALOG KWIKPEN 100 UNIT/ML SOPN Inject 0-12 Units into the skin 3 times daily (before meals) 11/7/19  Yes Mac Curtis, APRN - CNP apixaban (ELIQUIS) 2.5 MG TABS tablet Take 1 tablet by mouth 2 times daily 11/1/19  Yes Wendy Molina MD   sevelamer (RENVELA) 800 MG tablet Take 2 tablets by mouth 3 times daily (with meals) 11/1/19  Yes Wendy Molina MD   pregabalin (LYRICA) 50 MG capsule Take 1 capsule by mouth 3 times daily for 180 days. 10/8/19 4/5/20 Yes Zoie Score, APRN - CNP   nebivolol (BYSTOLIC) 2.5 MG tablet Take 2.5 mg by mouth BID on M, W, F. Take 5 mg BID Sun, Tues, Thurs, Sat 10/8/19  Yes Zoie Score, APRN - ALONDRA   formoterol (PERFOROMIST) 20 MCG/2ML nebulizer solution Take 2 mLs by nebulization 2 times daily 10/3/19  Yes JURGEN Rene CNP   traZODone (DESYREL) 100 MG tablet Take 100 mg by mouth nightly as needed for Sleep   Yes Historical Provider, MD   escitalopram (LEXAPRO) 10 MG tablet Take 1 tablet by mouth daily 9/12/19 3/10/20 Yes Zoie Score, APRN - ALONDRA   lidocaine-prilocaine (EMLA) 2.5-2.5 % cream Apply topically as needed.  9/6/19  Yes Zoie Score, APRN - CNP   omeprazole (PRILOSEC) 40 MG delayed release capsule TAKE 1 CAPSULE BY MOUTH  DAILY 6/4/19  Yes Zoie Score, APRN - CNP   rosuvastatin (CRESTOR) 10 MG tablet Take 1 tablet by mouth daily Take 1 tablet by mouth  daily 5/11/19 5/10/20 Yes Zoie Score, APRN - CNP   fluticasone (FLONASE) 50 MCG/ACT nasal spray USE TWO SPRAY(S) IN EACH NOSTRIL ONCE DAILY 4/17/19  Yes Zoie Score, APRN - CNP   allopurinol (ZYLOPRIM) 100 MG tablet Take 1 tablet by mouth daily 3/26/19 3/25/20 Yes Zoie Score, APRN - CNP   promethazine (PHENERGAN) 25 MG tablet Take 1 tablet by mouth every 8 hours as needed for Nausea 1/30/19  Yes Zoie Score, APRN - CNP   calcium acetate (PHOSLO) 667 MG capsule Take 2 capsules by mouth 3 times daily (with meals) 1/5/19  Yes Austin Gillette MD   albuterol sulfate HFA (PROAIR HFA) 108 (90 Base) MCG/ACT inhaler Inhale 2 puffs into the lungs every 6 hours as needed for Wheezing 9/25/18  Yes Sybil Arreguin, MD   fluticasone-salmeterol (ADVAIR HFA) 230-21 MCG/ACT inhaler Inhale 1 puff into the lungs 2 times daily 9/25/18  Yes Miguel Ángel Miller MD   TRULICITY 8.61 EV/7.9OY SOPN INJECT ONE  SYRINGE SUBCUTANEOUSLY ONCE A WEEK 7/31/18  Yes JURGEN De Oliveira CNP   nitroGLYCERIN (NITROSTAT) 0.4 MG SL tablet DISSOLVE 1 TABLET UNDER THE TONGUE EVERY 5 MINUTES AS  NEEDED ; MAXIMUM OF 3  TABLETS IN 15 MINUTES 4/11/18  Yes JURGEN De Oliveira CNP   OXYGEN Inhale 2 L into the lungs daily as needed At night prn   Yes Historical Provider, MD   HYDROmorphone (DILAUDID) 2 MG tablet Take 2 mg by mouth every 6 hours as needed for Pain.     Historical Provider, MD   budesonide (PULMICORT) 0.5 MG/2ML nebulizer suspension Take 2 mLs by nebulization 2 times daily 10/3/19 10/2/20  JURGEN Tamayo CNP   Diapers & Supplies MISC 1 each by Does not apply route 2 times daily 4/8/19   JURGEN De Oliveira CNP   Incontinence Supply Disposable (DEPEND ADJUSTABLE UNDERWEAR LG) MISC 1 each by Does not apply route as needed (for urine incompetence) 3/16/19   JURGEN De Oliveira CNP   UNIFINE PENTIPS 31G X 5 MM MISC USE WITH INSULIN PENS FOUR TIMES DAILY 3/8/19   JURGEN De Oliveira CNP   RELION PEN NEEDLE 31G/8MM 31G X 8 MM MISC USE  THREE TIMES DAILY AS DIRECTED 4/17/18   JURGEN De Oliveira CNP   ONE TOUCH ULTRA TEST strip USE TO TEST 3-4 TIMES DAILY DUE TO FLUCTUATING SUGAR 11/14/17   JURGEN De Oliveira CNP   ASSURE COMFORT LANCETS 30G MISC USE TO TEST BLOOD GLUCOSE THREE TIMES DAILY 8/15/17   JURGEN De Oliveira CNP   Alcohol Swabs (ALCOHOL PREP) 70 % PADS USE TO TEST BLOOD GLUCOSE THREE TIMES DAILY 8/15/17   JURGEN De Oliveira CNP   Blood Glucose Monitoring Suppl (ACCU-CHEK KENNETH SMARTVIEW) w/Device KIT USE TO TEST BLOOD GLUCOSE 8/15/17   JURGEN De Oliveira CNP   BiPAP Machine MISC by Does not apply route nightly    Historical Provider, MD   Spacer/Aero-Holding Chambers VENANCIO 1 Device by Does not apply route daily as needed 3/27/16   JURGEN Dey - CNP        Allergies:  Codeine; Fentanyl; Morphine; Tramadol hcl; Hydrocodone-acetaminophen; Percocet [oxycodone-acetaminophen]; Procardia [nifedipine]; and Vicodin [hydrocodone-acetaminophen]     Review of Systems:   A complete review of systems has been reviewed and updated today and is negative except as noted in the history of present illness. Physical Examination:    Vitals:    01/27/20 0900   BP: (!) 92/53   Pulse: 90   Resp: 18   Temp: 98.9 °F (37.2 °C)   SpO2: 93%    Weight: (!) 326 lb (147.9 kg)         General Appearance:  Alert, cooperative, no distress, appears stated age   Head:  Normocephalic, without obvious abnormality, atraumatic   Eyes:  EOMI, conjunctiva/corneas clear       Nose: Nares normal   Throat: Lips normal   Neck: Supple, symmetrical, trachea midline,  no carotid bruit or JVD       Lungs:   Clear to auscultation bilaterally, respirations unlabored   Chest Wall:  No tenderness or deformity   Heart:  Regular rate and rhythm, S1, S2 normal, no significant murmur, rub or gallop   Abdomen:   Soft, non-tender, bowel sounds active all four quadrants,  no masses, no organomegaly           Extremities: Extremities normal, atraumatic, no cyanosis.   Trace edema   Pulses: 1+ and symmetric   Skin: Skin color, texture, turgor normal, no rashes or lesions   Pysch: Normal mood and affect   Neurologic: Normal gross motor and sensory exam.         Labs  CBC:   Lab Results   Component Value Date    WBC 10.7 01/26/2020    RBC 3.43 01/26/2020    HGB 10.2 01/26/2020    HCT 32.3 01/26/2020    MCV 94.0 01/26/2020    RDW 20.5 01/26/2020     01/26/2020     CMP:    Lab Results   Component Value Date     01/27/2020    K 3.8 01/27/2020    K 4.0 01/26/2020    CL 93 01/27/2020    CO2 26 01/27/2020    BUN 30 01/27/2020    CREATININE 6.8 01/27/2020    GFRAA 7 01/27/2020    GFRAA 50 05/12/2013    AGRATIO 0.5 01/26/2020    LABGLOM 6 01/27/2020    GLUCOSE 123 01/27/2020    PROT 6.1 01/26/2020    PROT 6.6 03/05/2013    CALCIUM 8.8 01/27/2020    BILITOT 0.4 01/26/2020    ALKPHOS 86 01/26/2020    AST 29 01/26/2020    ALT 21 01/26/2020     LIPIDS: No components found for: TOTAL CHOLESTEROL,  HDL,  LDL,  TRIGLYCERIDES  PT/INR:  No results found for: PTINR  Lab Results   Component Value Date    CKTOTAL 129 07/11/2017    CKMB 1.9 04/19/2015    TROPONINI 0.20 (H) 01/27/2020       EKG:  I have reviewed EKG with the following interpretation:  Impression:    26-JAN-2020 14:08:02 [facility] ROUTINE RECORD  Poor data quality may adversely affect interpretation  Atrial fibrillation  Low voltage QRS  Inferior infarct , age undetermined  Possible Anterolateral infarct , age undetermined    Limited echo 1/3/2019:   Summary     Limited echo for pericardial effusion. Small pericardial effusion noted near right ventricle    Normal LV systolic function    Myoview stress test 3/8/2018:  Summary    Normal myocardial perfusion.    Borderline LV function with ejection fraction of 52%.    Low risk scan. Echo 12/26/2017:   Summary   -Very technically difficult exam due to morbid obesity.   -Global left ventricular function is normal with ejection fraction estimated   at 55 %. -Wall motion assessment was limited due to poor endocardial border   definition secondary to large body habitus.   -There is mild tricuspid regurgitation with RVSP estimated at 30 mmHg. -Mild circumferential pericardial effusion noted. There is no cardiac   evidence of tamponade. Assessment/Plan:  Principal Problem:    Chest pain  Plan: Atypical.  Stress test just under 2 years ago was negative for ischemia. She tolerated recent surgery well from an ischemic standpoint. No plan for further cardiac risk ratification currently. Recent echo with normal LV systolic function. Suggest full echo to ensure no valvular or regional wall motion abnormalities.     Active Problems:    SOB (shortness of

## 2020-01-27 NOTE — PROGRESS NOTES
Pt was found on floor and stated she slipped out of bed. Pt was in sitting position and denies hitting her head and denies and pain. Charge RN, Hospitalist and family made aware. Wounds redressed and vitals taken. BP low and MD made aware. Will continue to monitor, call light in reach and bed alarm engaged.

## 2020-01-27 NOTE — H&P
Hospital Medicine History & Physical      PCP: JURGEN Pacheco CNP    Date of Admission: 1/26/2020    Date of Service: Pt seen/examined on 1/26/2020 and  Placed in Observation. Chief Complaint:  Chest pain that is reproducible      History Of Present Illness:      72 y.o. female with PMHx of abdominal pain, chronic CHF, asthma, calciphylaxis, cervical cancer, chest pain, chronic kidney disease, Stage IV (HD- MWF), chronic respiratory failure with hypoxia, COPD, DM, polyneuropathy,HLD, HTN, PE, venous stasis who   presented to [facility] with above extensive history and recent admission in January for chronic wound who has been experiencing SOB associated with some reproducible chest pain, \"so bad I could hardly move. \"  She is on home oxygen now since her discharge and is at 4L with sats 98%. She denies dizziness or lightheadedness, nausea or vomiting or headaches with her chest pain. She denies any activity or exertion that caused the pain, although it appears musculoskeletal to me. She endorses decreased appetite. She states she has not been getting up at the NH either and states when she was here she was working with PT/OT and was at least getting out of bed. She states she is very unhappy with the care she is receiving at her [address], and  states he found her in her own stool where she laid for 30 mins after calling asking for help to get a bedpan. Given her history of CHF, HTN, HLD and DM and cardiac history, we will bring her in under Observation and have Cardiology see her. She follows with Dr. Saji Lin.        Past Medical History:          Diagnosis Date    Abdominal pain 8/20/2018    Acute on chronic congestive heart failure ([facility] 6/26/2014    Asthma     Calcinosis cutis     Calciphylaxis 12/2/2019    Cervical cancer ([facility]     Chest pain 5/16/2018    CHF (congestive heart failure) (HCC)     Chronic kidney disease, stage IV (severe) ([address]     Dr Simeon Blanca    Chronic pain syndrome 3/27/2018    Chronic respiratory failure with hypoxia (HCC)     CKD (chronic kidney disease) stage 4, GFR 15-29 ml/min ([facility]) 6/30/2017    Colon polyps     COPD (chronic obstructive pulmonary disease) ([address]     Degenerative disc disease at L5-S1 level 6/18/2013     CT    Diabetes mellitus, insulin dependent (IDDM), uncontrolled ([address]     Diabetic polyneuropathy associated with type 2 diabetes mellitus ([address] 3/26/2019    Elevated troponin 9/9/2017    ESRD (end stage renal disease) on dialysis ([address] 02/14/2018    MELVI-W-F JOSE Lamar    GERD (gastroesophageal reflux disease)     Gout     History of blood transfusion     History of cervical cancer     Hyperlipidemia     Hypertension     Incarcerated ventral hernia     LVH (left ventricular hypertrophy)     Morbid obesity ([facility])     Mucus plugging of bronchi     Obstructive sleep apnea on CPAP     wears 2L O2 and BIPAP at night    Pneumonia     Psychiatric problem     breakdown long time ago but not current    Pulmonary embolism ([address] 02/06/2013    Type 2 diabetes mellitus with diabetic neuropathy, with long-term current use of insulin ([address] 9/12/2017    Urinary incontinence in female     Venous stasis of lower extremity        Past Surgical History:          Procedure Laterality Date    ABDOMEN SURGERY N/A 12/26/2019    INCISION AND DRAINAGE OF ABDOMINAL WOUND AND BILATERAL HIP WOUNDS performed by Tanja Mejia MD at [facility]  1/14    Grace; clean cors    COLONOSCOPY  2010    polyp removed; Dea Hatch; repeat 5 years    COLONOSCOPY  6/25/13, 11/14    [place]    COLONOSCOPY N/A 10/10/2019    COLONOSCOPY POLYPECTOMY SNARE/COLD BIOPSY performed by Shanika Cartagena MD at [address] Right 3/28/2019    RIGHT BRACHIO CEPHALIC FISTULA CREATION performed by Corey Martinez MD at [address]  2/21/09    LVH with global hypokinesis; EF 55-60%    HYSTERECTOMY      INCISION AND DRAINAGE Right 8/22/2019    RIGHT ARM INCISION AND DEBRIDEMENT performed by Alyson Sosa MD at [address] ARTHROSCOPY Right 1999    OTHER SURGICAL HISTORY  02/22/2018    LEFT brachial artery axillary vein AV graft     [place] OPEN SKULL SUPRATENT EXPLORE      Craniotomy, 3/21/19 patient denies any brain surgery or craniotomy    [place] REPAIR INCISIONAL HERNIA,REDUCIBLE N/A 11/20/2018    LAPAROSCOPIC VENTRAL HERNIA REPAIR WITH MESH performed by Nidia Gamble MD at [address] [facility] Right 7/18/2019    RIGHT BRACHIAL ARTERY AXILLARY VEIN GRAFT AND LIGATION OF RIGHT BRACHIO-CEPHALIC FISTULA (02255, [address]256) performed by Alyson Sosa MD at [place] [address]  10/96    TUNNELED VENOUS PORT PLACEMENT Right 11/2019    UPPER GASTROINTESTINAL ENDOSCOPY  6/25/13    UPPER GASTROINTESTINAL ENDOSCOPY N/A 11/21/2019    EGD BIOPSY performed by Petros Torres MD at [place] [address]  12/24/2016    Incarcerated ventral hernia repair with mesh       Medications Prior to Admission:      Prior to Admission medications    Medication Sig Start Date End Date Taking? Authorizing Provider   HYDROmorphone (DILAUDID) 2 MG tablet Take 2 mg by mouth every 12 hours. Yes Historical Provider, MD   lidocaine viscous hcl (XYLOCAINE) 2 % SOLN solution  11/11/19  Yes Historical Provider, MD   ferrous sulfate 325 (65 Fe) MG tablet Take 325 mg by mouth 3 times daily (with meals)   Yes Historical Provider, MD   collagenase 250 UNIT/GM ointment Apply topically daily Apply topically daily.  Left breat and abdominal area   Yes Historical Provider, MD   insulin glargine (LANTUS) 100 UNIT/ML injection pen Inject 20 Units into the skin nightly 11/26/19  Yes Frankie Caldera MD   HUMALOG KWIKPEN 100 UNIT/ML SOPN Inject 0-12 Units into the skin 3 times daily (before meals) 11/7/19  Yes Theodora Clarke APRN - CNP   apixaban (ELIQUIS) 2.5 MG TABS tablet Take 1 tablet by mouth 2 times daily 11/1/19  Yes Amparo Gastelum MD   sevelamer (RENVELA) 800 MG tablet Take 2 tablets by mouth 3 times daily (with meals) 11/1/19  Yes Amparo Gastelum MD   pregabalin (LYRICA) 50 MG capsule Take 1 capsule by mouth 3 times daily for 180 days. 10/8/19 4/5/20 Yes JUGREN Delgado CNP   nebivolol (BYSTOLIC) 2.5 MG tablet Take 2.5 mg by mouth BID on M, W, F. Take 5 mg BID Sun, Tues, Thurs, Sat 10/8/19  Yes JURGEN Delgado CNP   formoterol (PERFOROMIST) 20 MCG/2ML nebulizer solution Take 2 mLs by nebulization 2 times daily 10/3/19  Yes JURGEN Bowman CNP   traZODone (DESYREL) 100 MG tablet Take 100 mg by mouth nightly as needed for Sleep   Yes Historical Provider, MD   escitalopram (LEXAPRO) 10 MG tablet Take 1 tablet by mouth daily 9/12/19 3/10/20 Yes JURGEN Delgado CNP   lidocaine-prilocaine (EMLA) 2.5-2.5 % cream Apply topically as needed.  9/6/19  Yes JURGEN Delgado CNP   omeprazole (PRILOSEC) 40 MG delayed release capsule TAKE 1 CAPSULE BY MOUTH  DAILY 6/4/19  Yes JURGEN Delgado CNP   rosuvastatin (CRESTOR) 10 MG tablet Take 1 tablet by mouth daily Take 1 tablet by mouth  daily 5/11/19 5/10/20 Yes JURGEN Delgado CNP   fluticasone (FLONASE) 50 MCG/ACT nasal spray USE TWO SPRAY(S) IN EACH NOSTRIL ONCE DAILY 4/17/19  Yes JURGEN Delgado CNP   allopurinol (ZYLOPRIM) 100 MG tablet Take 1 tablet by mouth daily 3/26/19 3/25/20 Yes JURGEN Delgado CNP   promethazine (PHENERGAN) 25 MG tablet Take 1 tablet by mouth every 8 hours as needed for Nausea 1/30/19  Yes JURGEN Delgado CNP   calcium acetate (PHOSLO) 667 MG capsule Take 2 capsules by mouth 3 times daily (with meals) 1/5/19  Yes Paris Shahid MD   albuterol sulfate HFA (PROAIR HFA) 108 (90 Base) MCG/ACT inhaler Inhale 2 puffs into the lungs every 6 hours as needed for Wheezing 9/25/18  Yes Nancy Gallo MD fluticasone-salmeterol (ADVAIR HFA) 230-21 MCG/ACT inhaler Inhale 1 puff into the lungs 2 times daily 9/25/18  Yes Kinga Huizar MD   TRULICITY 4.48 ZJ/4.9YJ SOPN INJECT ONE  SYRINGE SUBCUTANEOUSLY ONCE A WEEK 7/31/18  Yes JURGEN Harrison CNP   nitroGLYCERIN (NITROSTAT) 0.4 MG SL tablet DISSOLVE 1 TABLET UNDER THE TONGUE EVERY 5 MINUTES AS  NEEDED ; MAXIMUM OF 3  TABLETS IN 15 MINUTES 4/11/18  Yes JURGEN Harrison CNP   OXYGEN Inhale 2 L into the lungs daily as needed At night prn   Yes Historical Provider, MD   HYDROmorphone (DILAUDID) 2 MG tablet Take 2 mg by mouth every 6 hours as needed for Pain.     Historical Provider, MD   budesonide (PULMICORT) 0.5 MG/2ML nebulizer suspension Take 2 mLs by nebulization 2 times daily 10/3/19 10/2/20  JURGEN Gutierrez CNP   Diapers & Supplies MISC 1 each by Does not apply route 2 times daily 4/8/19   JURGEN Harrison CNP   Incontinence Supply Disposable (DEPEND ADJUSTABLE UNDERWEAR LG) MISC 1 each by Does not apply route as needed (for urine incompetence) 3/16/19   JURGEN Harrison CNP   UNIFINE PENTIPS 31G X 5 MM MISC USE WITH INSULIN PENS FOUR TIMES DAILY 3/8/19   JURGEN Harrison CNP   RELION PEN NEEDLE 31G/8MM 31G X 8 MM MISC USE  THREE TIMES DAILY AS DIRECTED 4/17/18   JURGEN Harrison CNP   ONE TOUCH ULTRA TEST strip USE TO TEST 3-4 TIMES DAILY DUE TO FLUCTUATING SUGAR 11/14/17   JURGEN Harrison CNP   ASSURE COMFORT LANCETS 30G MISC USE TO TEST BLOOD GLUCOSE THREE TIMES DAILY 8/15/17   JURGEN Harrison CNP   Alcohol Swabs (ALCOHOL PREP) 70 % PADS USE TO TEST BLOOD GLUCOSE THREE TIMES DAILY 8/15/17   JURGEN Harrison CNP   Blood Glucose Monitoring Suppl (ACCU-CHEK KENNETH SMARTVIEW) w/Device KIT USE TO TEST BLOOD GLUCOSE 8/15/17   JURGEN Harrison - CNP   BiPAP Machine MISC by Does not apply route nightly    Historical Provider, MD   Spacer/Aero-Holding Chambers VENANCIO 1 Device by Does not apply route daily as needed 3/27/16   JURGEN Dey - CNP       Allergies:  Codeine; Fentanyl; Morphine; Tramadol hcl; Hydrocodone-acetaminophen; Percocet [oxycodone-acetaminophen]; Procardia [nifedipine]; and Vicodin [hydrocodone-acetaminophen]    Social History:      The patient currently lives at [facility] [address]:   reports that she has never smoked. She has never used smokeless tobacco.  ETOH:   reports no history of alcohol use. Family History:      Reviewed in detail positive as follows: noted by NP and relevant        Problem Relation Age of Onset    Colon Cancer Father     Diabetes Father     High Blood Pressure Father     Colon Cancer Mother     Diabetes Mother     Colon Cancer Maternal Grandmother     Kidney Cancer Brother     Heart Disease Brother          age 54    High Blood Pressure Brother     High Blood Pressure Sister     High Blood Pressure Maternal Aunt     High Blood Pressure Sister     Heart Disease Sister        REVIEW OF SYSTEMS:   Pertinent positives as noted in the HPI. All other systems reviewed and negative. PHYSICAL EXAM PERFORMED:    BP (!) 119/95   Pulse 77   Temp 98.5 °F (36.9 °C) (Oral)   Resp 19   Ht 5' 3\" (1.6 m)   Wt (!) 326 lb (147.9 kg)   LMP 1996 (Exact Date)   SpO2 97%   BMI 57.75 kg/m²     General appearance: Morbidly obese, No apparent distress, appears stated age. Cooperative. HEENT:  Normocephalic, atraumatic. PERRLA. EOMi. Conjunctivae/corneas clear, no icterus, non-injected. Neck: Supple, with full range of motion. No jugular venous distention. Trachea midline. Respiratory:  NC 4L, Normal respiratory effort. Clear to auscultation, bilaterally without Rales/Wheezes/Rhonchi. Cardiovascular:  Anterior chest wall with reproducible tenderness on palpation in the left and mid-sternal region, Regular rate and rhythm without murmurs, rubs or gallops.   Abdomen: Soft, tender, non-distended, without rebound or guarding. Normal bowel sounds. Musculoskeletal:  No clubbing, cyanosis or edema bilaterally. Full range of motion without deformity. Skin: skin cloth in right groin/skin fold, Skin color, texture, turgor normal.  No rashes or lesions. Neurologic:  Neurovascularly intact without any focal sensory/motor deficits. Cranial nerves: II-XII intact, grossly intact. No facial asymmetry, tongue midline. Psychiatric:  Alert and oriented, thought content appropriate  Capillary Refill: Brisk,< 3 seconds   Peripheral Pulses: +2 palpable, equal bilaterally       Labs:     Recent Labs     01/26/20  1430   WBC 10.7   HGB 10.2*   HCT 32.3*        Recent Labs     01/26/20  1430      K 4.0   CL 91*   CO2 21   BUN 25*   CREATININE 6.1*   CALCIUM 9.8     Recent Labs     01/26/20  1430   AST 29   ALT 21   BILITOT 0.4   ALKPHOS 86     Recent Labs     01/26/20  1430   INR 1.91*     Recent Labs     01/26/20  1430   TROPONINI 0.24*       Urinalysis:      Lab Results   Component Value Date    NITRU POSITIVE 11/25/2018    WBCUA see below 11/25/2018    BACTERIA 3+ 11/25/2018    RBCUA see below 11/25/2018    BLOODU TRACE-INTACT 11/25/2018    SPECGRAV >=1.030 11/25/2018    GLUCOSEU 100 11/25/2018    [place] [place] [address] NEGATIVE 11/23/2010       Radiology:     CXR: I have reviewed the CXR with the following interpretation: stable, chronic cardiomegaly with acute airspace consolidation or CHF  EKG:  I have reviewed the EKG with the following interpretation: atrial fib rate 89, no ST elevation/ectopy    CT ABDOMEN PELVIS WO CONTRAST Additional Contrast? None   Preliminary Result   1. Improved skin thickening and infiltration of the subcutaneous fat in the   patient's pannus. There is continued breakdown of the skin within the folds   of the pannus with subcutaneous gas. No focal collection identified. 2. Otherwise unremarkable CT of the abdomen and pelvis. Nonobstructive left   renal calculus.          XR CHEST PORTABLE   Preliminary

## 2020-01-27 NOTE — PROGRESS NOTES
Wound dressings removed; purulent and green discharge noted. Wound cultures done on left hip wound. Dr. Jenna Ryan notified for further orders on wounds.

## 2020-01-27 NOTE — CARE COORDINATION
Spoke with Piyush Reyes at the [facility] and pt has been admitted from their skilled and will need a new precert to return since she has been here more than 24 hours. Will need therapy ordered.  Clyde Moreno, MSW, LSW

## 2020-01-27 NOTE — PROGRESS NOTES
Hospitalist Progress Note      PCP: JURGEN Harmon - CNP    Date of Admission: 1/26/2020    Chief Complaint: chest pain     Hospital Course:   72 y.o. female with PMHx of abdominal pain, chronic CHF, asthma, calciphylaxis, cervical cancer, chest pain, chronic kidney disease, Stage IV (HD- MWF), chronic respiratory failure with hypoxia, COPD, DM, polyneuropathy,HLD, HTN, PE, venous stasis who   presented to [facility] with above extensive history and recent admission in January for chronic wound who has been experiencing SOB associated with some reproducible chest pain, \"so bad I could hardly move. \"  She is on home oxygen now since her discharge and is at 4L with sats 98%. She denies dizziness or lightheadedness, nausea or vomiting or headaches with her chest pain. She denies any activity or exertion that caused the pain, although it appears musculoskeletal to me.     She endorses decreased appetite. She states she has not been getting up at the NH either and states when she was here she was working with PT/OT and was at least getting out of bed.      She states she is very unhappy with the care she is receiving at her [address], and  states he found her in her own stool where she laid for 30 mins after calling asking for help to get a bedpan.       Given her history of CHF, HTN, HLD and DM and cardiac history, we will bring her in under Observation and have Cardiology see her.   She follows with Dr. Janel Cason.        Subjective:   Doing well CP resolved  Now just pain from calciphylaxis      Medications:  Reviewed    Infusion Medications    dextrose       Scheduled Medications    budesonide  500 mcg Nebulization BID    apixaban  2.5 mg Oral BID    escitalopram  10 mg Oral Daily    ferrous sulfate  325 mg Oral TID     mometasone-formoterol  2 puff Inhalation BID    formoterol  20 mcg Nebulization BID    insulin glargine  20 Units Subcutaneous Nightly    turgor normal.  No rashes or lesions. Neurologic:  Neurovascularly intact without any focal sensory/motor deficits. Cranial nerves: II-XII intact, grossly intact. No facial asymmetry, tongue midline. Psychiatric:  Alert and oriented, thought content appropriate  Capillary Refill: Brisk,< 3 seconds   Peripheral Pulses: +2 palpable, equal bilaterally           Labs:   Recent Labs     01/26/20  1430 01/27/20  1356   WBC 10.7 10.9   HGB 10.2* 9.5*   HCT 32.3* 29.3*    164     Recent Labs     01/26/20  1430 01/27/20  0445    140   K 4.0 3.8   CL 91* 93*   CO2 21 26   BUN 25* 30*   CREATININE 6.1* 6.8*   CALCIUM 9.8 8.8     Recent Labs     01/26/20  1430   AST 29   ALT 21   BILITOT 0.4   ALKPHOS 86     Recent Labs     01/26/20  1430   INR 1.91*     Recent Labs     01/26/20  1430 01/27/20  0445   TROPONINI 0.24* 0.20*       Urinalysis:      Lab Results   Component Value Date    NITRU POSITIVE 11/25/2018    WBCUA see below 11/25/2018    BACTERIA 3+ 11/25/2018    RBCUA see below 11/25/2018    BLOODU TRACE-INTACT 11/25/2018    SPECGRAV >=1.030 11/25/2018    GLUCOSEU 100 11/25/2018    GLUCOSEU NEGATIVE 11/23/2010       Radiology:  CT ABDOMEN PELVIS WO CONTRAST Additional Contrast? None   Final Result   1. Improved skin thickening and infiltration of the subcutaneous fat in the   patient's pannus. There is continued breakdown of the skin within the folds   of the pannus with subcutaneous gas. No focal collection identified. 2. Otherwise unremarkable CT of the abdomen and pelvis. Nonobstructive left   renal calculus. XR CHEST PORTABLE   Final Result   Stable chronic cardiomegaly without acute airspace consolidation or CHF.                  Assessment/Plan:    Active Hospital Problems    Diagnosis    Chronic abdominal wound infection, sequela [S31.109S, L08.9]    Chest pain [R07.9]    ESRD (end stage renal disease) on dialysis ([facility] [N18.6, Z99.2]    Elevated troponin [R79.89]    Chronic diastolic CHF (congestive heart failure) ([facility]) [I50.32]    SOB (shortness of breath) [R06.02]       Chest pain  - atypical , seen by cards   - negative stress test 2 years ago and recent surgery where she did fine from cardiac stress standpoint.   - no additional work up needed. ESRD  - Nephro following.  - dialysis    Elevated trop  - due to ESRD  -non cardiac. Chronic abdominal wall wound (pannus)  - admitted December and + cx: diphtheroids and bacteroids  - completed antibiotics  - missed appointment yesterday outpatient  - CT abd/pelvis (ordered by ER): results above of chronic wound in pannus with continued breakdown of skin within folds with subcutaneous gas.    - consult General Surgery  for CT findings fo gas in subcut tissue  - Consult wound care ( was to follow with Dr. Dusty Michel weekly on discharge)  Likely due to calciphylaxis.     DM, type 2  - med-dose SSI  - POCT ac/hs  - Lantus       DVT Prophylaxis:   Diet: DIET CARDIAC;  Code Status: Full Code    PT/OT Eval Status: eval and treat     Dispo - ccc    Ale Sanchez MD

## 2020-01-27 NOTE — CARE COORDINATION
Per RN, sister Marianne Gomez is looking in to other facilities and will let SW know where to send referrals to, as she does not want to return to [facility].     Enrike Palmer MSW, [address]

## 2020-01-27 NOTE — PROGRESS NOTES
HD cancelled for today due to BP 79/52 per Dr. Amador Mackey. Sodium Thiosulfate to be given per Zara Boateng RN.

## 2020-01-27 NOTE — CONSULTS
[address] [facility]     Consult                                                     Patient Name: Kaylee Stanley  MRN: [id number]  YOB: 1955  Admission date: 1/26/2020  1:47 PM   Date of evaluation: 1/27/2020  Primary Care Physician: JURGEN Quach CNP  Requesting physician: Kalie Porras MD  Reason for consult: Wound on abdomen, hips, legs, buttocks  History of Present Illness:    Ms. Alberto Conrad is a 72 y.o. female who presents with chest pain primarily and dyspnea. She's known to me with extensive wounds that needed debridement on the abdomen, bilateral thighs on 12/26/2019 in the OR. Calciphylaxis confirmed by pathology, she is ESRD on HD. She has significant pain from the wounds but also significant diffuse pain unrelated to the wounds. Additional medical conditions include CHF, COPD, diabetes, morbid obesity, immobility, history of CVA, history of pulmonry embolism on anticoagulation.  Surgery is primarily consulted for the wound that are with signs of infection and necrosis      Past Medical History:        Diagnosis Date    Abdominal pain 8/20/2018    Acute on chronic congestive heart failure ([address] 6/26/2014    Asthma     Calcinosis cutis     Calciphylaxis 12/2/2019    Cervical cancer ([address]     Chest pain 5/16/2018    CHF (congestive heart failure) (HCC)     Chronic kidney disease, stage IV (severe) ([facility])     Dr Олег Herrera    Chronic pain syndrome 3/27/2018    Chronic respiratory failure with hypoxia ([facility])     CKD (chronic kidney disease) stage 4, GFR 15-29 ml/min ([facility]) 6/30/2017    Colon polyps     COPD (chronic obstructive pulmonary disease) ([address]     Degenerative disc disease at L5-S1 level 6/18/2013     CT    Diabetes mellitus, insulin dependent (IDDM), uncontrolled ([address]     Diabetic polyneuropathy associated with type 2 diabetes mellitus ([address] 3/26/2019    Elevated troponin 9/9/2017    ESRD (end stage renal disease) on dialysis ([address] 02/14/2018 [facility]F [facility]    GERD (gastroesophageal reflux disease)     Gout     History of blood transfusion     History of cervical cancer     Hyperlipidemia     Hypertension     Incarcerated ventral hernia     LVH (left ventricular hypertrophy)     Morbid obesity (HCC)     Mucus plugging of bronchi     Obstructive sleep apnea on CPAP     wears 2L O2 and BIPAP at night    Pneumonia     Psychiatric problem     breakdown long time ago but not current    Pulmonary embolism ([facility] 02/06/2013    Type 2 diabetes mellitus with diabetic neuropathy, with long-term current use of insulin ([facility] 9/12/2017    Urinary incontinence in female     Venous stasis of lower extremity        Past Surgical History:        Procedure Laterality Date    ABDOMEN SURGERY N/A 12/26/2019    INCISION AND DRAINAGE OF ABDOMINAL WOUND AND BILATERAL HIP WOUNDS performed by Jim Medrano MD at [address] [facility]  1/14    Grace; clean cors    COLONOSCOPY  2010    polyp removed; Zeferino Howard; repeat 5 years    COLONOSCOPY  6/25/13, 11/14    [place]    COLONOSCOPY N/A 10/10/2019    COLONOSCOPY POLYPECTOMY SNARE/COLD BIOPSY performed by Rich Layton MD at [address] Right 3/28/2019    RIGHT BRACHIO CEPHALIC FISTULA CREATION performed by Vega Rudd MD at [address] [facility]  2/21/09    LVH with global hypokinesis; EF 55-60%    HYSTERECTOMY      INCISION AND DRAINAGE Right 8/22/2019    RIGHT ARM INCISION AND DEBRIDEMENT performed by Vega Rudd MD at [address] [place] [address] Rd Right 1999    OTHER SURGICAL HISTORY  02/22/2018    LEFT brachial artery axillary vein AV graft     NJ OPEN SKULL SUPRATENT EXPLORE      Craniotomy, 3/21/19 patient denies any brain surgery or craniotomy    NJ REPAIR INCISIONAL HERNIA,REDUCIBLE N/A 11/20/2018    LAPAROSCOPIC VENTRAL HERNIA REPAIR WITH MESH performed by Kishor Velazquez MD at [address] Right 7/18/2019    RIGHT BRACHIAL ARTERY AXILLARY VEIN GRAFT AND LIGATION OF RIGHT BRACHIO-CEPHALIC FISTULA ([address], [id number]) performed by Doug Melvin MD at [place] [address]  10/96    TUNNELED VENOUS PORT PLACEMENT Right 11/2019    UPPER GASTROINTESTINAL ENDOSCOPY  6/25/13    UPPER GASTROINTESTINAL ENDOSCOPY N/A 11/21/2019    EGD BIOPSY performed by Reza Thakkar MD at [facility]  12/24/2016    Incarcerated ventral hernia repair with mesh       Scheduled Meds:   budesonide  500 mcg Nebulization BID    apixaban  2.5 mg Oral BID    escitalopram  10 mg Oral Daily    ferrous sulfate  325 mg Oral TID WC    mometasone-formoterol  2 puff Inhalation BID    formoterol  20 mcg Nebulization BID    insulin glargine  20 Units Subcutaneous Nightly    sevelamer  1,600 mg Oral TID WC    pregabalin  50 mg Oral TID    [START ON 1/28/2020] nebivolol  5 mg Oral 2 times per day on Sun Tue Thu Sat    nebivolol  2.5 mg Oral 2 times per day on Mon Wed Fri    insulin lispro  0-12 Units Subcutaneous TID WC    insulin lispro  0-6 Units Subcutaneous Nightly    collagenase   Topical Daily    sodium thiosulfate  25 g Intravenous Q MWF    sodium chloride flush  10 mL Intravenous 2 times per day     Continuous Infusions:   dextrose       PRN Meds:.albuterol sulfate HFA, traZODone, glucose, dextrose, glucagon (rDNA), dextrose, HYDROmorphone, perflutren lipid microspheres, sodium chloride flush, magnesium hydroxide, ondansetron, acetaminophen    Prior to Admission medications    Medication Sig Start Date End Date Taking? Authorizing Provider   HYDROmorphone (DILAUDID) 2 MG tablet Take 2 mg by mouth every 12 hours.     Yes Historical Provider, MD   lidocaine viscous hcl (XYLOCAINE) 2 % SOLN solution  11/11/19  Yes Historical Provider, MD   ferrous sulfate 325 (65 Fe) MG tablet Take 325 mg by mouth 3 times daily (with meals)   Yes Historical Provider, MD   collagenase 250 UNIT/GM ointment Apply topically daily Apply topically daily. Left breat and abdominal area   Yes Historical Provider, MD   insulin glargine (LANTUS) 100 UNIT/ML injection pen Inject 20 Units into the skin nightly 11/26/19  Yes Ej Giordano MD   HUMALOG KWIKPEN 100 UNIT/ML SOPN Inject 0-12 Units into the skin 3 times daily (before meals) 11/7/19  Yes JURGEN Harrison CNP   apixaban (ELIQUIS) 2.5 MG TABS tablet Take 1 tablet by mouth 2 times daily 11/1/19  Yes Stacy Cruz MD   sevelamer (RENVELA) 800 MG tablet Take 2 tablets by mouth 3 times daily (with meals) 11/1/19  Yes Stacy Cruz MD   pregabalin (LYRICA) 50 MG capsule Take 1 capsule by mouth 3 times daily for 180 days. 10/8/19 4/5/20 Yes JURGEN Harrison CNP   nebivolol (BYSTOLIC) 2.5 MG tablet Take 2.5 mg by mouth BID on M, W, F. Take 5 mg BID Sun, Tues, Thurs, Sat 10/8/19  Yes JUGREN Harrison CNP   formoterol (PERFOROMIST) 20 MCG/2ML nebulizer solution Take 2 mLs by nebulization 2 times daily 10/3/19  Yes JURGEN Gutierrez CNP   traZODone (DESYREL) 100 MG tablet Take 100 mg by mouth nightly as needed for Sleep   Yes Historical Provider, MD   escitalopram (LEXAPRO) 10 MG tablet Take 1 tablet by mouth daily 9/12/19 3/10/20 Yes JURGEN Harrison CNP   lidocaine-prilocaine (EMLA) 2.5-2.5 % cream Apply topically as needed.  9/6/19  Yes JURGEN Harrison CNP   omeprazole (PRILOSEC) 40 MG delayed release capsule TAKE 1 CAPSULE BY MOUTH  DAILY 6/4/19  Yes JURGEN Harrison CNP   rosuvastatin (CRESTOR) 10 MG tablet Take 1 tablet by mouth daily Take 1 tablet by mouth  daily 5/11/19 5/10/20 Yes JURGEN Harrison CNP   fluticasone (FLONASE) 50 MCG/ACT nasal spray USE TWO SPRAY(S) IN EACH NOSTRIL ONCE DAILY 4/17/19  Yes JURGEN Harrison CNP   allopurinol (ZYLOPRIM) 100 MG tablet Take 1 tablet by mouth daily 3/26/19 3/25/20 Yes JURGEN Harrison CNP   promethazine (PHENERGAN) 25 MG tablet Take 1 tablet by mouth every 8 hours as needed for Nausea 1/30/19  Yes JURGEN Elliott CNP   calcium acetate (PHOSLO) 667 MG capsule Take 2 capsules by mouth 3 times daily (with meals) 1/5/19  Yes Mackenzie Will MD   albuterol sulfate HFA (PROAIR HFA) 108 (90 Base) MCG/ACT inhaler Inhale 2 puffs into the lungs every 6 hours as needed for Wheezing 9/25/18  Yes Luis Freire MD   fluticasone-salmeterol (ADVAIR [facility]) 230-21 MCG/ACT inhaler Inhale 1 puff into the lungs 2 times daily 9/25/18  Yes Luis Freire MD   TRULICITY 9.75 YZ/1.5ME SOPN INJECT ONE  SYRINGE SUBCUTANEOUSLY ONCE A WEEK 7/31/18  Yes JURGEN Elliott CNP   nitroGLYCERIN (NITROSTAT) 0.4 MG SL tablet DISSOLVE 1 TABLET UNDER THE TONGUE EVERY 5 MINUTES AS  NEEDED ; MAXIMUM OF 3  TABLETS IN 15 MINUTES 4/11/18  Yes JURGEN Elliott CNP   OXYGEN Inhale 2 L into the lungs daily as needed At night prn   Yes Historical Provider, MD   HYDROmorphone (DILAUDID) 2 MG tablet Take 2 mg by mouth every 6 hours as needed for Pain.     Historical Provider, MD   budesonide (PULMICORT) 0.5 MG/2ML nebulizer suspension Take 2 mLs by nebulization 2 times daily 10/3/19 10/2/20  Feliz Radhika Gainespke, APRN - CNP   Diapers & Supplies MISC 1 each by Does not apply route 2 times daily 4/8/19   JURGEN Elliott CNP   Incontinence Supply Disposable (DEPEND ADJUSTABLE UNDERWEAR LG) MISC 1 each by Does not apply route as needed (for urine incompetence) 3/16/19   JURGEN Elliott CNP   UNIFINE PENTIPS 31G X 5 MM MISC USE WITH INSULIN PENS FOUR TIMES DAILY 3/8/19   JURGEN Elliott CNP   RELION PEN NEEDLE 31G/8MM 31G X 8 MM MISC USE  THREE TIMES DAILY AS DIRECTED 4/17/18   JURGEN Elliott CNP   ONE TOUCH ULTRA TEST strip USE TO TEST 3-4 TIMES DAILY DUE TO FLUCTUATING SUGAR 11/14/17   JURGEN Elliott CNP   ASSURE COMFORT LANCETS 30G MISC USE TO TEST BLOOD GLUCOSE THREE TIMES DAILY 8/15/17   JURGEN Elliott CNP   Alcohol Swabs (ALCOHOL PREP) 70 % PADS USE TO TEST BLOOD GLUCOSE THREE TIMES DAILY 8/15/17   JURGEN Quach CNP   Blood Glucose Monitoring Suppl (ACCU-CHEK KENNETH SMARTVIEW) w/Device KIT USE TO TEST BLOOD GLUCOSE 8/15/17   JURGEN Quach CNP   BiPAP Machine MISC by Does not apply route nightly    Historical Provider, MD   Spacer/Aero-Holding Chambers VENANCIO 1 Device by Does not apply route daily as needed 3/27/16   JURGEN Dey CNP        Allergies:  Codeine; Fentanyl; Morphine; Tramadol hcl; Hydrocodone-acetaminophen; Percocet [oxycodone-acetaminophen];  Procardia [nifedipine]; and Vicodin [hydrocodone-acetaminophen]    Social History:   Social History     Socioeconomic History    Marital status:      Spouse name: Alee Cotton Number of children: 3    Years of education: None    Highest education level: None   Occupational History    Occupation: NA   Social Needs    Financial resource strain: None    Food insecurity:     Worry: None     Inability: None    Transportation needs:     Medical: None     Non-medical: None   Tobacco Use    Smoking status: Never Smoker    Smokeless tobacco: Never Used   Substance and Sexual Activity    Alcohol use: No     Alcohol/week: 0.0 standard drinks    Drug use: No    Sexual activity: None   Lifestyle    Physical activity:     Days per week: None     Minutes per session: None    Stress: None   Relationships    Social connections:     Talks on phone: None     Gets together: None     Attends [employer] service: None     Active member of club or organization: None     Attends meetings of clubs or organizations: None     Relationship status: None    Intimate partner violence:     Fear of current or ex partner: None     Emotionally abused: None     Physically abused: None     Forced sexual activity: None   Other Topics Concern    None   Social History Narrative    None       Family History:    Family History   Problem Relation Age of Onset    Colon Cancer Father     Diabetes Father     High Blood Pressure Father     Colon Cancer Mother     Diabetes Mother     Colon Cancer Maternal Grandmother     Kidney Cancer Brother     Heart Disease Brother          age 54    High Blood Pressure Brother     High Blood Pressure Sister     High Blood Pressure Maternal Aunt     High Blood Pressure Sister     Heart Disease Sister        Review of Systems:  CONSTITUTIONAL:  Negative except as above  HEENT:  negative  RESPIRATORY:  negative  CARDIOVASCULAR:  negative  GASTROINTESTINAL:  negative except as above   GENITOURINARY:  negative  HEMATOLOGIC/LYMPHATIC:  negative  NEUROLOGICAL:  Negative      Vital Signs:  Patient Vitals for the past 24 hrs:   BP Temp Temp src Pulse Resp SpO2   20 1634 -- 97.9 °F (36.6 °C) Oral 90 18 92 %   20 1231 (!) 79/52 98.2 °F (36.8 °C) Oral 83 18 100 %   20 0900 (!) 92/53 98.9 °F (37.2 °C) Oral 90 18 93 %   20 0750 -- -- -- -- 20 95 %   20 0400 (!) 100/52 97.9 °F (36.6 °C) Axillary 90 20 95 %   20 0030 (!) 90/58 98.3 °F (36.8 °C) Axillary 84 20 98 %   20 2330 (!) 107/57 -- -- 91 -- --   20 2145 (!) 83/47 -- -- -- -- --   20 2030 (!) 73/52 97.3 °F (36.3 °C) Temporal 78 18 --   20 (!) 123/97 -- -- 77 15 --   20 1930 -- -- -- 76 20 --   20 1837 (!) 119/95 98.5 °F (36.9 °C) Oral 77 19 97 %      TEMPERATURE HISTORY 24H: Temp (24hrs), Av.1 °F (36.7 °C), Min:97.3 °F (36.3 °C), Max:98.9 °F (37.2 °C)    BLOOD PRESSURE HISTORY: Systolic (93ZSI), LQF:738 , Min:51 , ZVV:967    Diastolic (84KTD), AGN:34, Min:14, Max:113    Admission Weight: (!) 326 lb (147.9 kg)       Intake/Output Summary (Last 24 hours) at 2020 1705  Last data filed at 2020 0900  Gross per 24 hour   Intake 240 ml   Output --   Net 240 ml     Drain/tube Output:         Physical Exam:  CONSTITUTIONAL:  alert, no apparent distress   NEUROLOGIC:  Mental Status Exam:  Level of Alertness:   awake  Orientation: Oriented to person, place, time  EYES:  sclera clear  HENT:  normocepalic, without obvious abnormality  SKIN wounds:   Abdomen: exposed subcutaneous tissue with necrosis, no drainage, minimal cellulitis, needs debridement  Bilateral thighs with exposed subcutaneous tissue with necrosis, no drainage, minimal cellulitis with surrounding eschar that will need debridement  Bilateral buttocks and sacrum with stage 2 decubitus ulcers without exposed subcutaneous tissue that do not need debridement    Will attempt santyl overnight to soften eschars and attempt bedside debridement tomorrow. If does not tolerate well, will proceed with OR debridement    Labs:    CBC:    Recent Labs     01/26/20  1430 01/27/20  1356   WBC 10.7 10.9   HGB 10.2* 9.5*   HCT 32.3* 29.3*    164     BMP:    Recent Labs     01/26/20  1430 01/27/20  0445    140   K 4.0 3.8   CL 91* 93*   CO2 21 26   BUN 25* 30*   CREATININE 6.1* 6.8*   GLUCOSE 151* 123*     Hepatic:   Recent Labs     01/26/20  1430   AST 29   ALT 21   BILITOT 0.4   ALKPHOS 86     Amylase: No results for input(s): AMYLASE in the last 72 hours. Lipase: No results for input(s): LIPASE in the last 72 hours. Mag:    No results for input(s): MG in the last 72 hours. Phos:   No results for input(s): PHOS in the last 72 hours. Coags:   Recent Labs     01/26/20  1430   INR 1.91*       Imaging:  I have personally reviewed the following films:  Ct Abdomen Pelvis Wo Contrast Additional Contrast? None    Result Date: 1/27/2020  EXAMINATION: CT OF THE ABDOMEN AND PELVIS WITHOUT CONTRAST 1/26/2020 4:24 pm TECHNIQUE: CT of the abdomen and pelvis was performed without the administration of intravenous contrast. Multiplanar reformatted images are provided for review. Dose modulation, iterative reconstruction, and/or weight based adjustment of the mA/kV was utilized to reduce the radiation dose to as low as reasonably achievable. COMPARISON: 12/26/2019.  HISTORY: ORDERING SYSTEM PROVIDED HISTORY: wound abd - cellulitis? ?? TECHNOLOGIST PROVIDED HISTORY: If patient is on cardiac monitor and/or pulse ox, they may be taken off cardiac monitor and pulse ox, left on O2 if currently on. All monitors reattached when patient returns to room. Additional Contrast?->None Reason for exam:->wound abd - cellulitis? ?? FINDINGS: Lower Chest: No acute infiltrate at the lung bases. Small to moderate-sized hiatal hernia. Organs: The unenhanced liver, spleen, pancreas and right adrenal gland are unremarkable. Stable left adrenal adenoma. No acute biliary findings. The kidneys are atrophic. No ureteral calculi or significant hydronephrosis. Nonobstructive lower pole left renal calculus appears stable. GI/Bowel: No pericolonic inflammatory changes. The appendix is unremarkable. No small bowel distension. The stomach and duodenal C-loop are intact. Pelvis: No pelvic mass or free pelvic fluid. The uterus is surgically absent. Multiple calcified pelvic phleboliths. Minimal distention of the urinary bladder. Peritoneum/Retroperitoneum: The abdominal aorta is normal in caliber. Mild calcified atherosclerotic plaque. No retroperitoneal adenopathy or upper abdominal ascites. Bones/Soft Tissues: Laxity of the anterior abdominal wall musculature. There is incomplete visualization of a wide-mouth hernia defect containing bowel loops in the midline with no acute features. There is breakdown of the skin surface in the folds of the patient's pannus with subcutaneous gas. No focal subcutaneous fluid collection is identified. Interval improvement in the skin thickening anteriorly on the previous study. No acute osseous findings. 1. Improved skin thickening and infiltration of the subcutaneous fat in the patient's pannus. There is continued breakdown of the skin within the folds of the pannus with subcutaneous gas. No focal collection identified. 2. Otherwise unremarkable CT of the abdomen and pelvis. calories ([address]    COPD, moderate ([address]    Gout    Warfarin-induced coagulopathy (HCC)    Chronic hypoxemic respiratory failure (HCC)    Constipation    DM (diabetes mellitus), secondary, uncontrolled, w/neurologic complic (HCC)    Elevated troponin    Diabetes education, encounter for    Primary osteoarthritis of left hip    ESRD (end stage renal disease) on dialysis ([address]    Chronic pain syndrome    Chest pain    Recurrent ventral hernia    Hypoglycemia    ESRD on hemodialysis ([address]    Diabetic polyneuropathy associated with type 2 diabetes mellitus ([address]    Infection of arteriovenous graft for hemodialysis ([address]    Essential hypertension    Dialysis patient ([address]    Postoperative pain    Other complication of arteriovenous dialysis fistula (HCC)    Weight loss counseling, encounter for    Respiratory failure ([address]    Chronic obstructive pulmonary disease ([address]    Pulmonary embolism (HCC)    Chronic pain of left lower extremity    Severe anemia    Cerebrovascular accident (CVA) ([address]    HTN (hypertension), benign    Vision loss of right eye    Open wound    Calciphylaxis    Cellulitis    Abdominal wall cellulitis    Opiate dependence, continuous ([address]    History of arterial ischemic stroke    Anaerobic cellulitis (HCC)    Chronic abdominal wound infection, sequela     Bilateral stage 3 hip decubitus ulcers with infection  Open abdominal decubitus ulcer stage 3 with infection  Bilateral buttock and sacral stage 2 decubitus ulcers  Calciphylaxis  ESRD on HD  CHF  COPD  Diabetes  Morbid obesity, BMI 57.9 this admission  CVA  Pulmonary embolism  Medical coagulopathy, Eliquis    Plan:  1. Bilateral hip and abdominal ulcers need debridement. Will soften eschars overnight and attempt bedside debridement tomorrow. Anticipate will tolerate. Although patient has pain, she has diffuse pain and likely a chronic syndrome.  Discussed the risks of every debridement needing OR anesthesia and will attempt to minimize risk by bedside procedures. However if unable to tolerate, will need general anesthesia and operative debridement  2. May continue diet from surgical standpoint  3. May continue anticoagulation from surgical standpoint  4. Antibiotics per primary team, ID if necessary  5. Pain control, minimize narcotics  6. Defer management of remainder of medical comorbidities to primary and consulting teams    This plan was discussed at length with the patient. She was understanding and in agreement with the plan  Thank you for the consult and allowing me to participate in the care of this patient. I look forward to following her this admission    Pop Hatch MD, FACS  1/27/2020  5:05 PM

## 2020-01-27 NOTE — CONSULTS
neuropathy, with long-term current use of insulin ([facility] [address] 9/12/2017    Urinary incontinence in female     Venous stasis of lower extremity        Past Surgical History:        Procedure Laterality Date    ABDOMEN SURGERY N/A 12/26/2019    INCISION AND DRAINAGE OF ABDOMINAL WOUND AND BILATERAL HIP WOUNDS performed by Samantha Camacho MD at [facility]  1/14    Grace; clean cors    COLONOSCOPY  2010    polyp removed; Dylon Ruchi; repeat 5 years    COLONOSCOPY  6/25/13, 11/14    [place]    COLONOSCOPY N/A 10/10/2019    COLONOSCOPY POLYPECTOMY SNARE/COLD BIOPSY performed by Kingston Scheuermann, MD at [address] Right 3/28/2019    RIGHT BRACHIO CEPHALIC FISTULA CREATION performed by Chuy Burroughs MD at [address] [facility]  2/21/09    LVH with global hypokinesis; EF 55-60%    HYSTERECTOMY      INCISION AND DRAINAGE Right 8/22/2019    RIGHT ARM INCISION AND DEBRIDEMENT performed by Chuy Burroughs MD at [address] ARTHROSCOPY Right 1999    OTHER SURGICAL HISTORY  02/22/2018    LEFT brachial artery axillary vein AV graft     TN OPEN SKULL SUPRATENT EXPLORE      Craniotomy, 3/21/19 patient denies any brain surgery or craniotomy    TN REPAIR INCISIONAL HERNIA,REDUCIBLE N/A 11/20/2018    LAPAROSCOPIC VENTRAL HERNIA REPAIR WITH MESH performed by Rianna Eisenberg MD at [address] Right 7/18/2019    RIGHT BRACHIAL ARTERY AXILLARY VEIN GRAFT AND LIGATION OF RIGHT BRACHIO-CEPHALIC FISTULA ([address], [id number]) performed by Chuy Burroughs MD at [place] [address]  10/96    TUNNELED VENOUS PORT PLACEMENT Right 11/2019    UPPER GASTROINTESTINAL ENDOSCOPY  6/25/13    UPPER GASTROINTESTINAL ENDOSCOPY N/A 11/21/2019    EGD BIOPSY performed by Yola Spivey MD at [place] [address]  12/24/2016    Incarcerated ventral hernia repair with mesh       Current Medications:    No current facility-administered medications on file prior to encounter. Current Outpatient Medications on File Prior to Encounter   Medication Sig Dispense Refill    HYDROmorphone (DILAUDID) 2 MG tablet Take 2 mg by mouth every 12 hours.  lidocaine viscous hcl (XYLOCAINE) 2 % SOLN solution       ferrous sulfate 325 (65 Fe) MG tablet Take 325 mg by mouth 3 times daily (with meals)      collagenase 250 UNIT/GM ointment Apply topically daily Apply topically daily. Left breat and abdominal area      insulin glargine (LANTUS) 100 UNIT/ML injection pen Inject 20 Units into the skin nightly 5 pen 3    HUMALOG KWIKPEN 100 UNIT/ML SOPN Inject 0-12 Units into the skin 3 times daily (before meals) 15 mL 5    apixaban (ELIQUIS) 2.5 MG TABS tablet Take 1 tablet by mouth 2 times daily 60 tablet 1    sevelamer (RENVELA) 800 MG tablet Take 2 tablets by mouth 3 times daily (with meals) 90 tablet 3    pregabalin (LYRICA) 50 MG capsule Take 1 capsule by mouth 3 times daily for 180 days. 90 capsule 5    nebivolol (BYSTOLIC) 2.5 MG tablet Take 2.5 mg by mouth BID on M, W, F. Take 5 mg BID Sun, Tues, Thurs, Sat 120 tablet 5    formoterol (PERFOROMIST) 20 MCG/2ML nebulizer solution Take 2 mLs by nebulization 2 times daily 120 mL 5    traZODone (DESYREL) 100 MG tablet Take 100 mg by mouth nightly as needed for Sleep      escitalopram (LEXAPRO) 10 MG tablet Take 1 tablet by mouth daily 30 tablet 5    lidocaine-prilocaine (EMLA) 2.5-2.5 % cream Apply topically as needed.  3 Tube 5    omeprazole (PRILOSEC) 40 MG delayed release capsule TAKE 1 CAPSULE BY MOUTH  DAILY 90 capsule 3    rosuvastatin (CRESTOR) 10 MG tablet Take 1 tablet by mouth daily Take 1 tablet by mouth  daily 90 tablet 3    fluticasone (FLONASE) 50 MCG/ACT nasal spray USE TWO SPRAY(S) IN EACH NOSTRIL ONCE DAILY 1 Bottle 5    allopurinol (ZYLOPRIM) 100 MG tablet Take 1 tablet by mouth daily 90 tablet 3    promethazine (PHENERGAN) 25 MG tablet Take 1 tablet by mouth every 8 hours as needed for Nausea 30 tablet 2    calcium acetate (PHOSLO) 667 MG capsule Take 2 capsules by mouth 3 times daily (with meals) 60 capsule 3    albuterol sulfate HFA (PROAIR HFA) 108 (90 Base) MCG/ACT inhaler Inhale 2 puffs into the lungs every 6 hours as needed for Wheezing 1 Inhaler 11    fluticasone-salmeterol (ADVAIR HFA) 230-21 MCG/ACT inhaler Inhale 1 puff into the lungs 2 times daily 1 Inhaler 11    TRULICITY 3.48 LO/3.5QQ SOPN INJECT ONE  SYRINGE SUBCUTANEOUSLY ONCE A WEEK 15 pen 3    nitroGLYCERIN (NITROSTAT) 0.4 MG SL tablet DISSOLVE 1 TABLET UNDER THE TONGUE EVERY 5 MINUTES AS  NEEDED ; MAXIMUM OF 3  TABLETS IN 15 MINUTES 75 tablet 1    OXYGEN Inhale 2 L into the lungs daily as needed At night prn      HYDROmorphone (DILAUDID) 2 MG tablet Take 2 mg by mouth every 6 hours as needed for Pain.       budesonide (PULMICORT) 0.5 MG/2ML nebulizer suspension Take 2 mLs by nebulization 2 times daily 60 ampule 3    Diapers & Supplies MISC 1 each by Does not apply route 2 times daily 100 each 5    Incontinence Supply Disposable (DEPEND ADJUSTABLE UNDERWEAR LG) MISC 1 each by Does not apply route as needed (for urine incompetence) 60 each 5    UNIFINE PENTIPS 31G X 5 MM MISC USE WITH INSULIN PENS FOUR TIMES DAILY 400 each 3    RELION PEN NEEDLE 31G/8MM 31G X 8 MM MISC USE  THREE TIMES DAILY AS DIRECTED 100 each 5    ONE TOUCH ULTRA TEST strip USE TO TEST 3-4 TIMES DAILY DUE TO FLUCTUATING SUGAR 100 strip 5    ASSURE COMFORT LANCETS 30G MISC USE TO TEST BLOOD GLUCOSE THREE TIMES DAILY 300 each 3    Alcohol Swabs (ALCOHOL PREP) 70 % PADS USE TO TEST BLOOD GLUCOSE THREE TIMES DAILY 300 each 3    Blood Glucose Monitoring Suppl (ACCU-CHEK KENNETH SMARTVIEW) w/Device KIT USE TO TEST BLOOD GLUCOSE 1 kit 0    BiPAP Machine MISC by Does not apply route nightly      Spacer/Aero-Holding Chambers VENANCIO 1 Device by Does not apply route daily as needed 1 Device 0       Allergies:  Codeine; Fentanyl; Morphine; Tramadol hcl; Hydrocodone-acetaminophen; Percocet [oxycodone-acetaminophen];  Procardia [nifedipine]; and Vicodin [hydrocodone-acetaminophen]    Social History:    Social History     Socioeconomic History    Marital status:      Spouse name: Jalen Ferraro Number of children: 3    Years of education: Not on file    Highest education level: Not on file   Occupational History    Occupation: NA   Social Needs    Financial resource strain: Not on file    Food insecurity:     Worry: Not on file     Inability: Not on file   Wavii needs:     Medical: Not on file     Non-medical: Not on file   Tobacco Use    Smoking status: Never Smoker    Smokeless tobacco: Never Used   Substance and Sexual Activity    Alcohol use: No     Alcohol/week: 0.0 standard drinks    Drug use: No    Sexual activity: Not on file   Lifestyle    Physical activity:     Days per week: Not on file     Minutes per session: Not on file    Stress: Not on file   Relationships    Social connections:     Talks on phone: Not on file     Gets together: Not on file     Attends Tenriism service: Not on file     Active member of club or organization: Not on file     Attends meetings of clubs or organizations: Not on file     Relationship status: Not on file    Intimate partner violence:     Fear of current or ex partner: Not on file     Emotionally abused: Not on file     Physically abused: Not on file     Forced sexual activity: Not on file   Other Topics Concern    Not on file   Social History Narrative    Not on file       Family History:       Problem Relation Age of Onset    Colon Cancer Father     Diabetes Father     High Blood Pressure Father     Colon Cancer Mother     Diabetes Mother     Colon Cancer Maternal Grandmother     Kidney Cancer Brother     Heart Disease Brother          age 54    High Blood Pressure Brother     High Blood Pressure Sister     High Blood Pressure Maternal Aunt     High Blood Pressure Sister     Heart Disease Sister          REVIEW OF SYSTEMS:    10 Pt ROS done , + features in [place], rest negative      PHYSICAL EXAM:    Vitals:    BP (!) 100/52   Pulse 90   Temp 97.9 °F (36.6 °C) (Axillary)   Resp 20   Ht 5' 3\" (1.6 m)   Wt (!) 326 lb (147.9 kg)   LMP 11/01/1996 (Exact Date)   SpO2 95%   BMI 57.75 kg/m²   No intake/output data recorded. No intake/output data recorded. Physical Exam:  Gen:  alert, oriented x 3  Morbid obesity, in pain   PERRL , EOM +  Pallor +, No icterus  JVP not raised   CV: RRR no murmur or rub .   Lungs:B/ L air entry, Normal breath sounds   Abd: soft, bowel sounds + , No organomegaly   Ext: No edema, no cyanosis  Wounds Rt and left hip , lower ant abdominal wall  Left Upper arm AVG , Thrill +    DATA:    CBC with Differential:    Lab Results   Component Value Date    WBC 10.7 01/26/2020    RBC 3.43 01/26/2020    HGB 10.2 01/26/2020    HCT 32.3 01/26/2020     01/26/2020    MCV 94.0 01/26/2020    MCH 29.6 01/26/2020    MCHC 31.5 01/26/2020    RDW 20.5 01/26/2020    SEGSPCT 77.6 05/12/2013    BANDSPCT 5 08/05/2019    METASPCT 1 01/12/2018    LYMPHOPCT 10.3 01/26/2020    MONOPCT 6.9 01/26/2020    EOSPCT 2.6 01/24/2012    BASOPCT 0.1 01/26/2020    MONOSABS 0.7 01/26/2020    LYMPHSABS 1.1 01/26/2020    EOSABS 0.0 01/26/2020    BASOSABS 0.0 01/26/2020    DIFFTYPE Auto 05/12/2013     CMP:    Lab Results   Component Value Date     01/27/2020    K 3.8 01/27/2020    K 4.0 01/26/2020    CL 93 01/27/2020    CO2 26 01/27/2020    BUN 30 01/27/2020    CREATININE 6.8 01/27/2020    GFRAA 7 01/27/2020    GFRAA 50 05/12/2013    AGRATIO 0.5 01/26/2020    LABGLOM 6 01/27/2020    GLUCOSE 123 01/27/2020    PROT 6.1 01/26/2020    PROT 6.6 03/05/2013    LABALBU 2.1 01/26/2020    CALCIUM 8.8 01/27/2020    BILITOT 0.4 01/26/2020    ALKPHOS 86 01/26/2020    AST 29 01/26/2020    ALT 21 01/26/2020     Magnesium:    Lab Results   Component Value Date    MG 2.30 10/27/2019     Phosphorus:    Lab Results   Component Value Date    PHOS 2.5 01/07/2020     Uric Acid:    Lab Results   Component Value Date    LABURIC 2.8 05/17/2018     PT/INR:    Lab Results   Component Value Date    PROTIME 22.3 01/26/2020    PROTIME 22.8 09/08/2014    INR 1.91 01/26/2020     Troponin:    Lab Results   Component Value Date    TROPONINI 0.20 01/27/2020     U/A:    Lab Results   Component Value Date    NITRITE neg 05/06/2015    COLORU Brown 11/25/2018    PROTEINU >=300 11/25/2018    PHUR 5.5 11/25/2018    LABCAST 1-3 Mixed Cells 07/09/2018    WBCUA see below 11/25/2018    RBCUA see below 11/25/2018    MUCUS 3+ 06/18/2013    YEAST Present 08/20/2018    BACTERIA 3+ 11/25/2018    CLARITYU TURBID 11/25/2018    SPECGRAV >=1.030 11/25/2018    LEUKOCYTESUR SMALL 11/25/2018    UROBILINOGEN 0.2 11/25/2018    BILIRUBINUR SMALL 11/25/2018    BILIRUBINUR neg 05/06/2015    BILIRUBINUR NEGATIVE 11/23/2010    BLOODU TRACE-INTACT 11/25/2018    GLUCOSEU 100 11/25/2018    GLUCOSEU NEGATIVE 11/23/2010           IMPRESSION/RECOMMENDATIONS:      1. ESRD on HD    MWF at [facility]  2. Calciphylaxis   Continue Sodium Thiosulfate   3. Anemia of CKD   Target Hb 9-11   4. Renal osteodystrophy    Ca 8.8, Phos 2.5   5. Atrial Fibrillation  Rate comtrolled   6. Obesity     Will arrange for HD today   Also for Sodium thiosulfate 25 hrams at end of HD     Thank you for allowing me to participate in the care of this patient. I will continue to follow along. Please call with questions.     Anay Grider MD  1/27/2020  [facility]

## 2020-01-28 PROBLEM — T14.8XXA WOUND INFECTION: Status: ACTIVE | Noted: 2020-01-28

## 2020-01-28 PROBLEM — L08.9 WOUND INFECTION: Status: ACTIVE | Noted: 2020-01-28

## 2020-01-28 LAB
ALBUMIN SERPL-MCNC: 2 G/DL (ref 3.4–5)
ANION GAP SERPL CALCULATED.3IONS-SCNC: 37 MMOL/L (ref 3–16)
BUN BLDV-MCNC: 38 MG/DL (ref 7–20)
CALCIUM SERPL-MCNC: 9.6 MG/DL (ref 8.3–10.6)
CHLORIDE BLD-SCNC: 88 MMOL/L (ref 99–110)
CO2: 20 MMOL/L (ref 21–32)
CREAT SERPL-MCNC: 8.2 MG/DL (ref 0.6–1.2)
GFR AFRICAN AMERICAN: 6
GFR NON-AFRICAN AMERICAN: 5
GLUCOSE BLD-MCNC: 105 MG/DL (ref 70–99)
GLUCOSE BLD-MCNC: 119 MG/DL (ref 70–99)
GLUCOSE BLD-MCNC: 124 MG/DL (ref 70–99)
GLUCOSE BLD-MCNC: 129 MG/DL (ref 70–99)
GLUCOSE BLD-MCNC: 130 MG/DL (ref 70–99)
GLUCOSE BLD-MCNC: 76 MG/DL (ref 70–99)
GLUCOSE BLD-MCNC: 78 MG/DL (ref 70–99)
GLUCOSE BLD-MCNC: 82 MG/DL (ref 70–99)
HCT VFR BLD CALC: 27.1 % (ref 36–48)
HEMOGLOBIN: 8.6 G/DL (ref 12–16)
LV EF: 60 %
LVEF MODALITY: NORMAL
MCH RBC QN AUTO: 29 PG (ref 26–34)
MCHC RBC AUTO-ENTMCNC: 31.8 G/DL (ref 31–36)
MCV RBC AUTO: 91.1 FL (ref 80–100)
PDW BLD-RTO: 20.4 % (ref 12.4–15.4)
PERFORMED ON: ABNORMAL
PERFORMED ON: NORMAL
PHOSPHORUS: 2.4 MG/DL (ref 2.5–4.9)
PLATELET # BLD: 163 K/UL (ref 135–450)
PMV BLD AUTO: 9.8 FL (ref 5–10.5)
POTASSIUM SERPL-SCNC: 4.4 MMOL/L (ref 3.5–5.1)
RBC # BLD: 2.97 M/UL (ref 4–5.2)
SODIUM BLD-SCNC: 145 MMOL/L (ref 136–145)
WBC # BLD: 11.3 K/UL (ref 4–11)

## 2020-01-28 PROCEDURE — 2000000000 HC ICU R&B

## 2020-01-28 PROCEDURE — 6370000000 HC RX 637 (ALT 250 FOR IP): Performed by: NURSE PRACTITIONER

## 2020-01-28 PROCEDURE — 90935 HEMODIALYSIS ONE EVALUATION: CPT

## 2020-01-28 PROCEDURE — 2500000003 HC RX 250 WO HCPCS: Performed by: INTERNAL MEDICINE

## 2020-01-28 PROCEDURE — 6360000002 HC RX W HCPCS: Performed by: INTERNAL MEDICINE

## 2020-01-28 PROCEDURE — 2580000003 HC RX 258: Performed by: NURSE PRACTITIONER

## 2020-01-28 PROCEDURE — APPNB30 APP NON BILLABLE TIME 0-30 MINS: Performed by: NURSE PRACTITIONER

## 2020-01-28 PROCEDURE — 6370000000 HC RX 637 (ALT 250 FOR IP): Performed by: INTERNAL MEDICINE

## 2020-01-28 PROCEDURE — 11045 DBRDMT SUBQ TISS EACH ADDL: CPT | Performed by: SURGERY

## 2020-01-28 PROCEDURE — 11042 DBRDMT SUBQ TIS 1ST 20SQCM/<: CPT | Performed by: SURGERY

## 2020-01-28 PROCEDURE — 94761 N-INVAS EAR/PLS OXIMETRY MLT: CPT

## 2020-01-28 PROCEDURE — 6370000000 HC RX 637 (ALT 250 FOR IP): Performed by: HOSPITALIST

## 2020-01-28 PROCEDURE — 93306 TTE W/DOPPLER COMPLETE: CPT

## 2020-01-28 PROCEDURE — 94640 AIRWAY INHALATION TREATMENT: CPT

## 2020-01-28 PROCEDURE — 80069 RENAL FUNCTION PANEL: CPT

## 2020-01-28 PROCEDURE — 85027 COMPLETE CBC AUTOMATED: CPT

## 2020-01-28 PROCEDURE — 2700000000 HC OXYGEN THERAPY PER DAY

## 2020-01-28 PROCEDURE — 6360000002 HC RX W HCPCS: Performed by: NURSE PRACTITIONER

## 2020-01-28 PROCEDURE — 99232 SBSQ HOSP IP/OBS MODERATE 35: CPT | Performed by: NURSE PRACTITIONER

## 2020-01-28 PROCEDURE — 5A1D70Z PERFORMANCE OF URINARY FILTRATION, INTERMITTENT, LESS THAN 6 HOURS PER DAY: ICD-10-PCS | Performed by: INTERNAL MEDICINE

## 2020-01-28 RX ORDER — NEBIVOLOL 5 MG/1
2.5 TABLET ORAL
Status: DISCONTINUED | OUTPATIENT
Start: 2020-01-29 | End: 2020-02-05 | Stop reason: HOSPADM

## 2020-01-28 RX ORDER — SEVELAMER CARBONATE FOR ORAL SUSPENSION 800 MG/1
1.6 POWDER, FOR SUSPENSION ORAL
Status: DISCONTINUED | OUTPATIENT
Start: 2020-01-28 | End: 2020-02-05 | Stop reason: HOSPADM

## 2020-01-28 RX ORDER — HYDROMORPHONE HYDROCHLORIDE 2 MG/1
0.5 TABLET ORAL EVERY 8 HOURS PRN
Status: DISCONTINUED | OUTPATIENT
Start: 2020-01-28 | End: 2020-01-30

## 2020-01-28 RX ORDER — NEBIVOLOL 5 MG/1
5 TABLET ORAL
Status: DISCONTINUED | OUTPATIENT
Start: 2020-01-28 | End: 2020-02-05 | Stop reason: HOSPADM

## 2020-01-28 RX ADMIN — SEVELAMER CARBONATE 1.6 G: 800 POWDER, FOR SUSPENSION ORAL at 18:40

## 2020-01-28 RX ADMIN — BUDESONIDE 500 MCG: 0.5 SUSPENSION RESPIRATORY (INHALATION) at 20:09

## 2020-01-28 RX ADMIN — Medication 10 ML: at 23:17

## 2020-01-28 RX ADMIN — FERROUS SULFATE TAB 325 MG (65 MG ELEMENTAL FE) 325 MG: 325 (65 FE) TAB at 09:22

## 2020-01-28 RX ADMIN — Medication 1 G: at 12:54

## 2020-01-28 RX ADMIN — HYDROMORPHONE HYDROCHLORIDE 0.5 MG: 2 TABLET ORAL at 23:17

## 2020-01-28 RX ADMIN — ESCITALOPRAM OXALATE 10 MG: 10 TABLET ORAL at 09:27

## 2020-01-28 RX ADMIN — INSULIN GLARGINE 20 UNITS: 100 INJECTION, SOLUTION SUBCUTANEOUS at 21:53

## 2020-01-28 RX ADMIN — SEVELAMER CARBONATE 1600 MG: 800 TABLET, FILM COATED ORAL at 12:53

## 2020-01-28 RX ADMIN — PREGABALIN 50 MG: 25 CAPSULE ORAL at 15:02

## 2020-01-28 RX ADMIN — HYDROMORPHONE HYDROCHLORIDE 2 MG: 2 TABLET ORAL at 11:19

## 2020-01-28 RX ADMIN — SODIUM THIOSULFATE 25 G: 250 INJECTION, SOLUTION INTRAVENOUS at 21:58

## 2020-01-28 RX ADMIN — FORMOTEROL FUMARATE DIHYDRATE 20 MCG: 20 SOLUTION RESPIRATORY (INHALATION) at 20:09

## 2020-01-28 RX ADMIN — Medication 10 ML: at 09:30

## 2020-01-28 RX ADMIN — PREGABALIN 50 MG: 25 CAPSULE ORAL at 09:22

## 2020-01-28 RX ADMIN — FERROUS SULFATE TAB 325 MG (65 MG ELEMENTAL FE) 325 MG: 325 (65 FE) TAB at 17:33

## 2020-01-28 RX ADMIN — ACETAMINOPHEN 650 MG: 325 TABLET, FILM COATED ORAL at 09:40

## 2020-01-28 RX ADMIN — SEVELAMER CARBONATE 1600 MG: 800 TABLET, FILM COATED ORAL at 17:33

## 2020-01-28 RX ADMIN — PREGABALIN 50 MG: 25 CAPSULE ORAL at 23:16

## 2020-01-28 RX ADMIN — SEVELAMER CARBONATE 1600 MG: 800 TABLET, FILM COATED ORAL at 09:22

## 2020-01-28 RX ADMIN — FERROUS SULFATE TAB 325 MG (65 MG ELEMENTAL FE) 325 MG: 325 (65 FE) TAB at 12:53

## 2020-01-28 RX ADMIN — NEBIVOLOL HYDROCHLORIDE 5 MG: 5 TABLET ORAL at 23:17

## 2020-01-28 RX ADMIN — Medication 0.07 MCG/KG/MIN: at 20:11

## 2020-01-28 RX ADMIN — Medication 2 PUFF: at 20:09

## 2020-01-28 RX ADMIN — APIXABAN 2.5 MG: 5 TABLET, FILM COATED ORAL at 23:17

## 2020-01-28 ASSESSMENT — PAIN DESCRIPTION - PAIN TYPE
TYPE: ACUTE PAIN;CHRONIC PAIN
TYPE: CHRONIC PAIN
TYPE: ACUTE PAIN;CHRONIC PAIN

## 2020-01-28 ASSESSMENT — PAIN DESCRIPTION - ORIENTATION
ORIENTATION: RIGHT;LEFT

## 2020-01-28 ASSESSMENT — PAIN SCALES - GENERAL
PAINLEVEL_OUTOF10: 8
PAINLEVEL_OUTOF10: 8
PAINLEVEL_OUTOF10: 5
PAINLEVEL_OUTOF10: 8
PAINLEVEL_OUTOF10: 9
PAINLEVEL_OUTOF10: 8
PAINLEVEL_OUTOF10: 10
PAINLEVEL_OUTOF10: 8
PAINLEVEL_OUTOF10: 9
PAINLEVEL_OUTOF10: 8

## 2020-01-28 ASSESSMENT — PAIN DESCRIPTION - ONSET: ONSET: ON-GOING

## 2020-01-28 ASSESSMENT — PAIN DESCRIPTION - LOCATION: LOCATION: ABDOMEN;BUTTOCKS;HIP

## 2020-01-28 ASSESSMENT — PAIN DESCRIPTION - DESCRIPTORS
DESCRIPTORS: DISCOMFORT;SHOOTING
DESCRIPTORS: DISCOMFORT;SHARP;SHOOTING

## 2020-01-28 ASSESSMENT — PAIN DESCRIPTION - FREQUENCY
FREQUENCY: INTERMITTENT
FREQUENCY: INTERMITTENT

## 2020-01-28 NOTE — PROGRESS NOTES
Nephrology Progress  Note  [phone]  [phone]   [email or web address]        Reason for Consult:  ESRD on HD     HISTORY OF PRESENT ILLNESS:      The patient is a 72 y.o. female with significant past medical history Morbid obesity , T2DM , HTN , ESRD on HD  of  who presents with chest pain   She is undergoing treatment for calciphylaxis and is due for wound debridement 1/28/2020   At this time she has no chest pain/ fever  Wounds are foul smelling  Makes little urine - no dysuria       Running Low BP   In pain       PHYSICAL EXAM:    Vitals:    BP (!) 108/53   Pulse 84   Temp 97 °F (36.1 °C) (Temporal)   Resp 16   Ht 5' 3\" (1.6 m)   Wt (!) 326 lb (147.9 kg)   LMP 11/01/1996 (Exact Date)   SpO2 91%   BMI 57.75 kg/m²   I/O last 3 completed shifts: In: 250 [P.O.:240; I.V.:10]  Out: -   I/O this shift:  In: 360 [P.O.:360]  Out: -     Physical Exam:  Gen:  alert, oriented x 3  Morbid obesity, in pain   PERRL , EOM +  Pallor +, No icterus  JVP not raised   CV: RRR no murmur or rub .   Lungs:B/ L air entry, Normal breath sounds   Abd: soft, bowel sounds + , No organomegaly   Ext: No edema, no cyanosis  Wounds Rt and left hip , lower ant abdominal wall  Left Upper arm AVG , Thrill +    DATA:    CBC with Differential:    Lab Results   Component Value Date    WBC 11.3 01/28/2020    RBC 2.97 01/28/2020    HGB 8.6 01/28/2020    HCT 27.1 01/28/2020     01/28/2020    MCV 91.1 01/28/2020    MCH 29.0 01/28/2020    MCHC 31.8 01/28/2020    RDW 20.4 01/28/2020    SEGSPCT 77.6 05/12/2013    BANDSPCT 5 08/05/2019    METASPCT 1 01/12/2018    LYMPHOPCT 10.3 01/26/2020    MONOPCT 6.9 01/26/2020    EOSPCT 2.6 01/24/2012    BASOPCT 0.1 01/26/2020    MONOSABS 0.7 01/26/2020    LYMPHSABS 1.1 01/26/2020    EOSABS 0.0 01/26/2020    BASOSABS 0.0 01/26/2020    DIFFTYPE Auto 05/12/2013     CMP:    Lab Results   Component Value Date     01/28/2020    K 4.4 01/28/2020    K 4.0 01/26/2020    CL 88 01/28/2020    CO2 20 01/28/2020 BUN 38 01/28/2020    CREATININE 8.2 01/28/2020    GFRAA 6 01/28/2020    GFRAA 50 05/12/2013    AGRATIO 0.5 01/26/2020    LABGLOM 5 01/28/2020    GLUCOSE 105 01/28/2020    PROT 6.1 01/26/2020    PROT 6.6 03/05/2013    LABALBU 2.0 01/28/2020    CALCIUM 9.6 01/28/2020    BILITOT 0.4 01/26/2020    ALKPHOS 86 01/26/2020    AST 29 01/26/2020    ALT 21 01/26/2020     Magnesium:    Lab Results   Component Value Date    MG 2.30 10/27/2019     Phosphorus:    Lab Results   Component Value Date    PHOS 2.4 01/28/2020     Uric Acid:    Lab Results   Component Value Date    LABURIC 2.8 05/17/2018     PT/INR:    Lab Results   Component Value Date    PROTIME 22.3 01/26/2020    PROTIME 22.8 09/08/2014    INR 1.91 01/26/2020     Troponin:    Lab Results   Component Value Date    TROPONINI 0.20 01/27/2020     U/A:    Lab Results   Component Value Date    NITRITE neg 05/06/2015    COLORU Brown 11/25/2018    PROTEINU >=300 11/25/2018    PHUR 5.5 11/25/2018    LABCAST 1-3 Mixed Cells 07/09/2018    WBCUA see below 11/25/2018    RBCUA see below 11/25/2018    MUCUS 3+ 06/18/2013    YEAST Present 08/20/2018    BACTERIA 3+ 11/25/2018    CLARITYU TURBID 11/25/2018    SPECGRAV >=1.030 11/25/2018    LEUKOCYTESUR SMALL 11/25/2018    UROBILINOGEN 0.2 11/25/2018    BILIRUBINUR SMALL 11/25/2018    BILIRUBINUR neg 05/06/2015    BILIRUBINUR NEGATIVE 11/23/2010    BLOODU TRACE-INTACT 11/25/2018    GLUCOSEU 100 11/25/2018    GLUCOSEU NEGATIVE 11/23/2010           IMPRESSION/RECOMMENDATIONS:      1. ESRD on HD    MWF at [facility]  2. Calciphylaxis   Continue Sodium Thiosulfate   3. Anemia of CKD   Target Hb 9-11   4. Renal osteodystrophy    Ca 8.8, Phos 2.5   5. Atrial Fibrillation  ` Rate comtrolled   6. Obesity     Transfer to ICU for BP support while attempting dialysis         Thank you for allowing me to participate in the care of this patient. I will continue to follow along. Please call with questions.     Trevon Peck MD  1/28/2020  [facility]

## 2020-01-28 NOTE — PROGRESS NOTES
Patient being transferred to ICU bed 11. Report given to ICU RN.  at bedside.  Sister Vanessa Healy informed, per patient request.

## 2020-01-28 NOTE — CARE COORDINATION
Spoke to patient and asked her why she does not want to return to the [place] and she stated to this CM \"I didn't like the care. \" Asked patient to be more specific and she stated \" when I put on the light it takes them to long to answer. \" explained to pt that policy is for patients to return to the snf they came from and work with the  there for other placement. Patient verbalized understanding.    Huber Thomason RN, BSN  [phone]

## 2020-01-28 NOTE — PROGRESS NOTES
[facility]   Daily Progress Note      Admit Date:  1/26/2020    CC: chest pain   Subjective:    HPI:   Ms. Delano Matthews  is a 72 y.o. patient who presented to the hospital with complaints of chest pain. She describes several days of chest discomfort all over her chest.  It did resolve and she currently does not have chest discomfort. She denies shortness of breath to me. She has a complicated past medical history which includes recent debridement of abdominal decubiti. The patient was seen and examined. Notes, labs and recent testing reviewed. There were not complications over night. The patient is being seen for angina.       Objective:     BP (!) 111/48   Pulse 84   Temp 97 °F (36.1 °C) (Temporal)   Resp 16   Ht 5' 3\" (1.6 m)   Wt (!) 326 lb (147.9 kg)   LMP 11/01/1996 (Exact Date)   SpO2 91%   BMI 57.75 kg/m²      Intake/Output Summary (Last 24 hours) at 1/28/2020 3825  Last data filed at 1/27/2020 2210  Gross per 24 hour   Intake 10 ml   Output --   Net 10 ml       Physical Exam:  General:  Awake, alert, NAD  Skin:  Warm and dry  Neck:  JVD<8, no bruit  Chest:  Clear to auscultation, no wheezes/rhonchi/rales  Cardiovascular:  RRR S1S2  Abdomen:  Soft, nontender, +bowel sounds  Extremities:  edema    Medications:    budesonide  500 mcg Nebulization BID    apixaban  2.5 mg Oral BID    escitalopram  10 mg Oral Daily    ferrous sulfate  325 mg Oral TID WC    mometasone-formoterol  2 puff Inhalation BID    formoterol  20 mcg Nebulization BID    insulin glargine  20 Units Subcutaneous Nightly    sevelamer  1,600 mg Oral TID WC    pregabalin  50 mg Oral TID    nebivolol  5 mg Oral 2 times per day on Sun Tue Thu Sat    nebivolol  2.5 mg Oral 2 times per day on Mon Wed Fri    insulin lispro  0-12 Units Subcutaneous TID WC    insulin lispro  0-6 Units Subcutaneous Nightly    collagenase   Topical Daily    sodium thiosulfate  25 g Intravenous Q MWF    lidocaine   Topical Once    silver nitrate applicators  10 each Topical Once    sodium chloride flush  10 mL Intravenous 2 times per day      dextrose       albuterol sulfate HFA, traZODone, glucose, dextrose, glucagon (rDNA), dextrose, HYDROmorphone, perflutren lipid microspheres, sodium chloride flush, magnesium hydroxide, ondansetron, acetaminophen    Lab Data:  CBC:   Recent Labs     01/26/20  1430 01/27/20  1356 01/28/20  0600   WBC 10.7 10.9 11.3*   HGB 10.2* 9.5* 8.6*    164 163     BMP:    Recent Labs     01/26/20  1430 01/27/20  0445 01/28/20  0600    140 145   K 4.0 3.8 4.4   CO2 21 26 20*   BUN 25* 30* 38*   CREATININE 6.1* 6.8* 8.2*     LIVR:   Recent Labs     01/26/20  1430   AST 29   ALT 21     PT/INR:   Recent Labs     01/26/20  1430   PROT 6.1*   INR 1.91*     APTT: No results for input(s): APTT in the last 72 hours. BNP:  No results for input(s): BNP in the last 72 hours. CARDIAC ENZYMES:  Recent Labs     01/26/20  1430 01/27/20  0445   TROPONINI 0.24* 0.20*     FASTING LIPID PANEL:  Lab Results   Component Value Date    CHOL 117 06/27/2017    HDL 36 06/27/2017    HDL 60 09/30/2011    TRIG 144 06/27/2017       Diagnostics:    Limited echo 1/3/2019:   Summary     Limited echo for pericardial effusion. Small pericardial effusion noted near right ventricle     Normal LV systolic function     Myoview stress test 3/8/2018:  Summary    Normal myocardial perfusion. Borderline LV function with ejection fraction of 52%. Low risk scan. Echo 12/26/2017:   Summary   -Very technically difficult exam due to morbid obesity.   -Global left ventricular function is normal with ejection fraction estimated   at 55 %. -Wall motion assessment was limited due to poor endocardial border   definition secondary to large body habitus.   -There is mild tricuspid regurgitation with RVSP estimated at 30 mmHg. -Mild circumferential pericardial effusion noted.  There is no cardiac   evidence of wound    Calciphylaxis    Cellulitis    Abdominal wall cellulitis    Opiate dependence, continuous ([facility])    History of arterial ischemic stroke    Anaerobic cellulitis ([facility])    Chronic abdominal wound infection, sequela       Assessment/plan:      Chest pain  Plan: Atypical.  Stress test just under 2 years ago was negative for ischemia. She tolerated recent surgery well from an ischemic standpoint. No plan for further cardiac risk ratification currently. Active Problems:    SOB (shortness of breath)  Plan: Etiology unclear. Likely multifactorial.       Elevated troponin  Plan: Multifactorial-hypertensive heart disease, end-stage renal disease. Chronic diastolic CHF (congestive heart failure) ([facility])  Plan: Compensated. Dialysis dependent. ESRD (end stage renal disease) on dialysis [facility])  Plan: Chronic. Per nephrology. Chronic abdominal wound infection, sequela  Plan: Concern for infection. Per primary service. ECHO findings are stable, cardiac standpoint stable S/O, please call with any questions. Dispo:      Please see orders. Discussed with patient and nursing.         Signed:  Katheryn Elena, [address]  [phone]

## 2020-01-28 NOTE — PROGRESS NOTES
Spoke with Dr Niurka Bennett. Updated on plan of care, updated on latest vitals, awating nephro intervention recommendations.

## 2020-01-28 NOTE — CARE COORDINATION
Spoke to Justin Soares at the [place] about patient and he states that pt refused to go to her last 4 wound clinic appointments and was dropped by the clinic. He states they did have a wound doctor come in to see her. CM will continue to follow for discharge planning.      Andrea Fonseca RN, BSN  [phone]

## 2020-01-28 NOTE — PLAN OF CARE
Problem: Falls - Risk of:  Goal: Will remain free from falls  Description  Will remain free from falls  1/28/2020 1232 by Martín Cline RN  Outcome: Ongoing  Note:   Non-skid socks on. Bed in low position, wheels locked. Call light within reach. Bedside table within reach. Patient encouraged to use call light when needing assistance.

## 2020-01-28 NOTE — PROGRESS NOTES
Shift assessment completed and documented. Fall precautions in place, hourly rounding, call light and belongings in reach, bed in lowest position, wheels locked in place, side rails up x 2, walkways free of clutter and bed alarm activated. Reminded to use call light for assistance. Will continue to monitor.

## 2020-01-28 NOTE — CARE COORDINATION
This CM was told that pt's sister Deny Allred wanted to talk to me and might be interested in palliative or hospice care for pt. Deny Allred states that they will be up later today and talk to pt and then will let pt's RN know if that is the route that they want to take. She also states that they are not pleased with the [facility] and the way staff has talked to pt and cared for her and are looking for other places for pt to go. Explained that with all of pt's complex medical needs that facilities may be limited in being able to provide all that patient requires and it is policy to have pt return to facility and work with their  for other placement. She verbalized understanding. CM will continue to follow for discharge needs.     Abdifatah Ochoa RN, BSN  [phone]

## 2020-01-28 NOTE — CARE COORDINATION
Discharge Planning Assessment  Discharge Planning Assessment  RN/SW discharge planner met with patient/ (and family member) to discuss reason for admission, current living situation, and potential needs at the time of discharge    Demographics/Insurance verified [facility] Medicare    Current type of dwelling:    Patient from ECF/SW confirmed with:[facility] at the [facility] arrangements: Skilled nursing facility     Level of function/Support:dependent     PCP:Dr Xochitl Martínez     Last Visit to PCP:see every Wednesday     DME:[employer], wheelchair     Active with any community resources/agencies/skilled home care:none     Medication compliance issues:provided by staff     Financial issues that could impact healthcare. None         Tentative discharge plan:  Discussed and provided facilities of choice if transition to a skilled nursing facility is required at the time of discharge- pt currently resident at [facility]      Discussed with patient and/or family that on the day of discharge home tentative time of discharge will be between 10 AM and noon.     Transportation at the time of discharge:transportation service to snf      Jasmin Chadwick RN, BSN  [phone]

## 2020-01-28 NOTE — PROGRESS NOTES
including the removal of epidermis, dermis and subcutaneous tissue. Devitalized Tissue Debrided: fibrin, biofilm, slough and necrotic/eschar. Debrided down to bleeding tissue but due to patient discomfort all necrotic tissue was not removed, the majority was able to be debrided and will continue Santyl for chemical debridement till tomorrow    Pre Debridement Measurements:   Right distal hip: 10x8cm (80sq cm)  Right proximal hip: 5x4cm (20sq cm)  Left hip: 18h88bc (290 sq cm)  Abdomen: 13x6cm (78 sq cm)    Post  Debridement Measurements: same    Total Surface Area Debrided:  468 sq cm     Bleeding:  Minimal    Hemostasis Achieved:  by pressure    Procedural Pain:  3  / 10     Post Procedural Pain:  6 / 10     Response to treatment:  With complaints of pain. Post-procedure dressin. Will need daily debridements, with some pain did not tolerate complete debridement to healthy underlying tissue, will continue with santyl and attempt another bedside debridement tomorrow. If not able to get to healthy underlying tissue, may need operative debridement the following day  2. May continue diet from surgical standpoint  3. May continue anticoagulation from surgical standpoint  4. Antibiotics per primary team, ID may be necessary  5. Pain control, minimize narcotics  6. Defer management of remainder of medical comorbidities to primary and consulting teams    Pop Garcia MD, FACS  2020  12:18 PM

## 2020-01-29 LAB
ANION GAP SERPL CALCULATED.3IONS-SCNC: 29 MMOL/L (ref 3–16)
BASOPHILS ABSOLUTE: 0 K/UL (ref 0–0.2)
BASOPHILS RELATIVE PERCENT: 0.1 %
BUN BLDV-MCNC: 32 MG/DL (ref 7–20)
CALCIUM SERPL-MCNC: 9.2 MG/DL (ref 8.3–10.6)
CHLORIDE BLD-SCNC: 89 MMOL/L (ref 99–110)
CO2: 21 MMOL/L (ref 21–32)
CREAT SERPL-MCNC: 7.2 MG/DL (ref 0.6–1.2)
EOSINOPHILS ABSOLUTE: 0.1 K/UL (ref 0–0.6)
EOSINOPHILS RELATIVE PERCENT: 0.8 %
GFR AFRICAN AMERICAN: 7
GFR NON-AFRICAN AMERICAN: 6
GLUCOSE BLD-MCNC: 136 MG/DL (ref 70–99)
GLUCOSE BLD-MCNC: 152 MG/DL (ref 70–99)
GLUCOSE BLD-MCNC: 155 MG/DL (ref 70–99)
GLUCOSE BLD-MCNC: 243 MG/DL (ref 70–99)
HCT VFR BLD CALC: 30.1 % (ref 36–48)
HEMOGLOBIN: 9.6 G/DL (ref 12–16)
LYMPHOCYTES ABSOLUTE: 1.4 K/UL (ref 1–5.1)
LYMPHOCYTES RELATIVE PERCENT: 11.5 %
MCH RBC QN AUTO: 29 PG (ref 26–34)
MCHC RBC AUTO-ENTMCNC: 31.8 G/DL (ref 31–36)
MCV RBC AUTO: 91.2 FL (ref 80–100)
MONOCYTES ABSOLUTE: 1.2 K/UL (ref 0–1.3)
MONOCYTES RELATIVE PERCENT: 9.6 %
NEUTROPHILS ABSOLUTE: 9.8 K/UL (ref 1.7–7.7)
NEUTROPHILS RELATIVE PERCENT: 78 %
PDW BLD-RTO: 20.1 % (ref 12.4–15.4)
PERFORMED ON: ABNORMAL
PLATELET # BLD: 205 K/UL (ref 135–450)
PMV BLD AUTO: 10 FL (ref 5–10.5)
POTASSIUM SERPL-SCNC: 4.6 MMOL/L (ref 3.5–5.1)
RBC # BLD: 3.3 M/UL (ref 4–5.2)
SODIUM BLD-SCNC: 139 MMOL/L (ref 136–145)
WBC # BLD: 12.5 K/UL (ref 4–11)

## 2020-01-29 PROCEDURE — 2500000003 HC RX 250 WO HCPCS: Performed by: HOSPITALIST

## 2020-01-29 PROCEDURE — 6370000000 HC RX 637 (ALT 250 FOR IP): Performed by: HOSPITALIST

## 2020-01-29 PROCEDURE — 99231 SBSQ HOSP IP/OBS SF/LOW 25: CPT | Performed by: SURGERY

## 2020-01-29 PROCEDURE — 99223 1ST HOSP IP/OBS HIGH 75: CPT | Performed by: INTERNAL MEDICINE

## 2020-01-29 PROCEDURE — 80048 BASIC METABOLIC PNL TOTAL CA: CPT

## 2020-01-29 PROCEDURE — 2500000003 HC RX 250 WO HCPCS: Performed by: INTERNAL MEDICINE

## 2020-01-29 PROCEDURE — 6360000002 HC RX W HCPCS: Performed by: INTERNAL MEDICINE

## 2020-01-29 PROCEDURE — 99233 SBSQ HOSP IP/OBS HIGH 50: CPT | Performed by: INTERNAL MEDICINE

## 2020-01-29 PROCEDURE — 6370000000 HC RX 637 (ALT 250 FOR IP): Performed by: NURSE PRACTITIONER

## 2020-01-29 PROCEDURE — 6360000002 HC RX W HCPCS: Performed by: NURSE PRACTITIONER

## 2020-01-29 PROCEDURE — 94660 CPAP INITIATION&MGMT: CPT

## 2020-01-29 PROCEDURE — APPSS15 APP SPLIT SHARED TIME 0-15 MINUTES: Performed by: NURSE PRACTITIONER

## 2020-01-29 PROCEDURE — 2580000003 HC RX 258: Performed by: NURSE PRACTITIONER

## 2020-01-29 PROCEDURE — 90935 HEMODIALYSIS ONE EVALUATION: CPT

## 2020-01-29 PROCEDURE — 2000000000 HC ICU R&B

## 2020-01-29 PROCEDURE — 94640 AIRWAY INHALATION TREATMENT: CPT

## 2020-01-29 PROCEDURE — APPNB30 APP NON BILLABLE TIME 0-30 MINS: Performed by: NURSE PRACTITIONER

## 2020-01-29 PROCEDURE — 85025 COMPLETE CBC W/AUTO DIFF WBC: CPT

## 2020-01-29 RX ORDER — HYDROMORPHONE HYDROCHLORIDE 1 MG/ML
0.5 INJECTION, SOLUTION INTRAMUSCULAR; INTRAVENOUS; SUBCUTANEOUS EVERY 4 HOURS PRN
Status: DISCONTINUED | OUTPATIENT
Start: 2020-01-29 | End: 2020-01-30

## 2020-01-29 RX ORDER — IPRATROPIUM BROMIDE AND ALBUTEROL SULFATE 2.5; .5 MG/3ML; MG/3ML
1 SOLUTION RESPIRATORY (INHALATION)
Status: DISCONTINUED | OUTPATIENT
Start: 2020-01-29 | End: 2020-02-05 | Stop reason: HOSPADM

## 2020-01-29 RX ADMIN — INSULIN LISPRO 1 UNITS: 100 INJECTION, SOLUTION INTRAVENOUS; SUBCUTANEOUS at 21:33

## 2020-01-29 RX ADMIN — Medication 0.24 MCG/KG/MIN: at 22:54

## 2020-01-29 RX ADMIN — APIXABAN 2.5 MG: 5 TABLET, FILM COATED ORAL at 21:23

## 2020-01-29 RX ADMIN — BUDESONIDE 500 MCG: 0.5 SUSPENSION RESPIRATORY (INHALATION) at 20:36

## 2020-01-29 RX ADMIN — PREGABALIN 50 MG: 25 CAPSULE ORAL at 21:23

## 2020-01-29 RX ADMIN — COLLAGENASE SANTYL: 250 OINTMENT TOPICAL at 15:31

## 2020-01-29 RX ADMIN — ESCITALOPRAM OXALATE 10 MG: 10 TABLET ORAL at 12:37

## 2020-01-29 RX ADMIN — HYDROMORPHONE HYDROCHLORIDE 0.5 MG: 1 INJECTION, SOLUTION INTRAMUSCULAR; INTRAVENOUS; SUBCUTANEOUS at 16:30

## 2020-01-29 RX ADMIN — INSULIN GLARGINE 20 UNITS: 100 INJECTION, SOLUTION SUBCUTANEOUS at 21:33

## 2020-01-29 RX ADMIN — Medication 0.05 MCG/KG/MIN: at 02:08

## 2020-01-29 RX ADMIN — TAZOBACTAM SODIUM AND PIPERACILLIN SODIUM 3.38 G: 375; 3 INJECTION, SOLUTION INTRAVENOUS at 16:21

## 2020-01-29 RX ADMIN — APIXABAN 2.5 MG: 5 TABLET, FILM COATED ORAL at 12:37

## 2020-01-29 RX ADMIN — FORMOTEROL FUMARATE DIHYDRATE 20 MCG: 20 SOLUTION RESPIRATORY (INHALATION) at 20:36

## 2020-01-29 RX ADMIN — FERROUS SULFATE TAB 325 MG (65 MG ELEMENTAL FE) 325 MG: 325 (65 FE) TAB at 16:30

## 2020-01-29 RX ADMIN — INSULIN LISPRO 2 UNITS: 100 INJECTION, SOLUTION INTRAVENOUS; SUBCUTANEOUS at 09:34

## 2020-01-29 RX ADMIN — FERROUS SULFATE TAB 325 MG (65 MG ELEMENTAL FE) 325 MG: 325 (65 FE) TAB at 12:37

## 2020-01-29 RX ADMIN — HYDROMORPHONE HYDROCHLORIDE 0.5 MG: 1 INJECTION, SOLUTION INTRAMUSCULAR; INTRAVENOUS; SUBCUTANEOUS at 12:36

## 2020-01-29 RX ADMIN — HYDROMORPHONE HYDROCHLORIDE 0.5 MG: 2 TABLET ORAL at 10:36

## 2020-01-29 RX ADMIN — PREGABALIN 50 MG: 25 CAPSULE ORAL at 12:37

## 2020-01-29 RX ADMIN — HYDROMORPHONE HYDROCHLORIDE 0.5 MG: 2 TABLET ORAL at 21:23

## 2020-01-29 RX ADMIN — Medication 1 G: at 12:38

## 2020-01-29 RX ADMIN — Medication 10 ML: at 12:39

## 2020-01-29 ASSESSMENT — PAIN SCALES - GENERAL
PAINLEVEL_OUTOF10: 4
PAINLEVEL_OUTOF10: 4
PAINLEVEL_OUTOF10: 8
PAINLEVEL_OUTOF10: 8
PAINLEVEL_OUTOF10: 4
PAINLEVEL_OUTOF10: 8
PAINLEVEL_OUTOF10: 8
PAINLEVEL_OUTOF10: 9
PAINLEVEL_OUTOF10: 8
PAINLEVEL_OUTOF10: 3
PAINLEVEL_OUTOF10: 6
PAINLEVEL_OUTOF10: 8
PAINLEVEL_OUTOF10: 8
PAINLEVEL_OUTOF10: 5
PAINLEVEL_OUTOF10: 8
PAINLEVEL_OUTOF10: 7
PAINLEVEL_OUTOF10: 5
PAINLEVEL_OUTOF10: 5
PAINLEVEL_OUTOF10: 8
PAINLEVEL_OUTOF10: 4
PAINLEVEL_OUTOF10: 8
PAINLEVEL_OUTOF10: 5
PAINLEVEL_OUTOF10: 4
PAINLEVEL_OUTOF10: 8
PAINLEVEL_OUTOF10: 7
PAINLEVEL_OUTOF10: 9
PAINLEVEL_OUTOF10: 8

## 2020-01-29 NOTE — PROGRESS NOTES
Nephrology Progress  Note  [phone]   [email or web address]        Reason for Consult:  ESRD on HD     HISTORY OF PRESENT ILLNESS:      The patient is a 72 y.o. female with significant past medical history Morbid obesity , T2DM , HTN , ESRD on HD  of  who presents with chest pain   She is undergoing treatment for calciphylaxis and is due for wound debridement 1/28/2020   At this time she has no chest pain/ fever  Wounds are foul smelling  Makes little urine - no dysuria   Running Low BP   In pain   Doing poorly  Seen on HD     PHYSICAL EXAM:    Vitals:    BP 91/60   Pulse 89   Temp 97.3 °F (36.3 °C) (Temporal)   Resp 12   Ht 5' 3\" (1.6 m)   Wt 294 lb 15.6 oz (133.8 kg)   LMP 11/01/1996 (Exact Date)   SpO2 98%   BMI 52.25 kg/m²   I/O last 3 completed shifts: In: 1143.5 [P.O.:360; I.V.:83.5]  Out: 600   No intake/output data recorded. Physical Exam:  Gen:  alert, oriented x 3  Morbid obesity, in pain   PERRL , EOM +  Pallor +, No icterus  JVP not raised   CV: RRR no murmur or rub .   Lungs:B/ L air entry, Normal breath sounds   Abd: soft, bowel sounds + , No organomegaly   Ext: No edema, no cyanosis  Wounds Rt and left hip , lower ant abdominal wall  Left Upper arm AVG , Thrill +    DATA:    CBC with Differential:    Lab Results   Component Value Date    WBC 12.5 01/29/2020    RBC 3.30 01/29/2020    HGB 9.6 01/29/2020    HCT 30.1 01/29/2020     01/29/2020    MCV 91.2 01/29/2020    MCH 29.0 01/29/2020    MCHC 31.8 01/29/2020    RDW 20.1 01/29/2020    SEGSPCT 77.6 05/12/2013    BANDSPCT 5 08/05/2019    METASPCT 1 01/12/2018    LYMPHOPCT 11.5 01/29/2020    MONOPCT 9.6 01/29/2020    EOSPCT 2.6 01/24/2012    BASOPCT 0.1 01/29/2020    MONOSABS 1.2 01/29/2020    LYMPHSABS 1.4 01/29/2020    EOSABS 0.1 01/29/2020    BASOSABS 0.0 01/29/2020    DIFFTYPE Auto 05/12/2013     CMP:    Lab Results   Component Value Date     01/29/2020    K 4.6 01/29/2020    K 4.0 01/26/2020    CL 89 01/29/2020 CO2 21 01/29/2020    BUN 32 01/29/2020    CREATININE 7.2 01/29/2020    GFRAA 7 01/29/2020    GFRAA 50 05/12/2013    AGRATIO 0.5 01/26/2020    LABGLOM 6 01/29/2020    GLUCOSE 243 01/29/2020    PROT 6.1 01/26/2020    PROT 6.6 03/05/2013    LABALBU 2.0 01/28/2020    CALCIUM 9.2 01/29/2020    BILITOT 0.4 01/26/2020    ALKPHOS 86 01/26/2020    AST 29 01/26/2020    ALT 21 01/26/2020     Magnesium:    Lab Results   Component Value Date    MG 2.30 10/27/2019     Phosphorus:    Lab Results   Component Value Date    PHOS 2.4 01/28/2020     Uric Acid:    Lab Results   Component Value Date    LABURIC 2.8 05/17/2018     PT/INR:    Lab Results   Component Value Date    PROTIME 22.3 01/26/2020    PROTIME 22.8 09/08/2014    INR 1.91 01/26/2020     Troponin:    Lab Results   Component Value Date    TROPONINI 0.20 01/27/2020     U/A:    Lab Results   Component Value Date    NITRITE neg 05/06/2015    COLORU Brown 11/25/2018    PROTEINU >=300 11/25/2018    PHUR 5.5 11/25/2018    LABCAST 1-3 Mixed Cells 07/09/2018    WBCUA see below 11/25/2018    RBCUA see below 11/25/2018    MUCUS 3+ 06/18/2013    YEAST Present 08/20/2018    BACTERIA 3+ 11/25/2018    CLARITYU TURBID 11/25/2018    SPECGRAV >=1.030 11/25/2018    LEUKOCYTESUR SMALL 11/25/2018    UROBILINOGEN 0.2 11/25/2018    BILIRUBINUR SMALL 11/25/2018    BILIRUBINUR neg 05/06/2015    BILIRUBINUR NEGATIVE 11/23/2010    BLOODU TRACE-INTACT 11/25/2018    GLUCOSEU 100 11/25/2018    GLUCOSEU NEGATIVE 11/23/2010           IMPRESSION/RECOMMENDATIONS:      1. ESRD on HD    MWF at [facility]  2. Calciphylaxis   Continue Sodium Thiosulfate   3. Anemia of CKD   Target Hb 9-11   4. Renal osteodystrophy    Ca 8.8, Phos 2.4   5. Atrial Fibrillation  ` Rate comtrolled   6. Obesity     Prognosis : poor       Thank you for allowing me to participate in the care of this patient. I will continue to follow along. Please call with questions.     Juliette Conklin MD  1/29/2020  [facility]

## 2020-01-29 NOTE — PROGRESS NOTES
0400 assessment complete. Levophed running continuously per order. Titrating appropriately to a MAP of 60. BP better. Patient sleeping. I tried to pull out wedge from under her R side and she started crying out in pain and asked me to put it back. Refused to turn the other way. Slanted in bed, but does not want moved. No other acute changes since last assessment.

## 2020-01-29 NOTE — PROGRESS NOTES
Tolerated hemodialysis fair  Minimum ufr and levo gtt maintained bp  Ns boluses also given  Dr Alda Yoo here and dcd tx 27 minutes early  . 5uf with post weight 133.5kg   Post vs 110/93 88 16 98.3 temporal sites clotted heber walker to sites  Sodium thiosulfate not given since given 27th and 28th  Copy of tx sheet in chart to copy to emr

## 2020-01-29 NOTE — PROGRESS NOTES
01/29/20 0111   NIV Type   NIV Started/Stopped On   Equipment Type V60   Mode Bilevel   Mask Type Full face mask   Mask Size Medium   Settings/Measurements   IPAP 12 cmH20   CPAP/EPAP 6 cmH2O   Rate Ordered 10   Resp 14   FiO2  28 %   Vt Exhaled 409 mL   Minute Volume 8 Liters   Mask Leak (lpm) 10 lpm   Comfort Level Good   Using Accessory Muscles No   SpO2 100

## 2020-01-29 NOTE — PROGRESS NOTES
Hospitalist Progress Note      PCP: JURGEN Charlton CNP    Date of Admission: 1/26/2020    Chief Complaint: chest pain     Hospital Course:   72 y.o. female with PMHx of abdominal pain, chronic CHF, asthma, calciphylaxis, cervical cancer, chest pain, chronic kidney disease, Stage IV (HD- MWF), chronic respiratory failure with hypoxia, COPD, DM, polyneuropathy,HLD, HTN, PE, venous stasis who   presented to [facility] with above extensive history and recent admission in January for chronic wound who has been experiencing SOB associated with some reproducible chest pain, \"so bad I could hardly move. \"  She is on home oxygen now since her discharge and is at 4L with sats 98%. She denies dizziness or lightheadedness, nausea or vomiting or headaches with her chest pain. She denies any activity or exertion that caused the pain, although it appears musculoskeletal to me.     She endorses decreased appetite. She states she has not been getting up at the NH either and states when she was here she was working with PT/OT and was at least getting out of bed.      She states she is very unhappy with the care she is receiving at her [address], and  states he found her in her own stool where she laid for 30 mins after calling asking for help to get a bedpan.       Given her history of CHF, HTN, HLD and DM and cardiac history, we will bring her in under Observation and have Cardiology see her.   She follows with Dr. Lashonda Hinson.        Subjective:   Doing well CP resolved  Now just pain from calciphylaxis      Medications:  Reviewed    Infusion Medications    norepinephrine 0.15 mcg/kg/min (01/29/20 2604)    norepinephrine Stopped (01/28/20 9493)    dextrose       Scheduled Medications    piperacillin-tazobactam  3.375 g Intravenous Q12H    nebivolol  2.5 mg Oral 2 times per day on Mon Wed Fri    nebivolol  5 mg Oral 2 times per day on Sun Tue Thu Sat    sevelamer  1.6 g Oral TID     budesonide  500 mcg Nebulization BID    apixaban  2.5 mg Oral BID    escitalopram  10 mg Oral Daily    ferrous sulfate  325 mg Oral TID     formoterol  20 mcg Nebulization BID    insulin glargine  20 Units Subcutaneous Nightly    pregabalin  50 mg Oral TID    insulin lispro  0-12 Units Subcutaneous TID     insulin lispro  0-6 Units Subcutaneous Nightly    collagenase   Topical Daily    sodium thiosulfate  25 g Intravenous Q MWF    lidocaine   Topical Once    silver nitrate applicators  10 each Topical Once    sodium chloride flush  10 mL Intravenous 2 times per day     PRN Meds: HYDROmorphone, lip balm petroleum free, ipratropium-albuterol, HYDROmorphone, traZODone, glucose, dextrose, glucagon (rDNA), dextrose, perflutren lipid microspheres, sodium chloride flush, magnesium hydroxide, ondansetron, acetaminophen      Intake/Output Summary (Last 24 hours) at 1/29/2020 1827  Last data filed at 1/29/2020 0533  Gross per 24 hour   Intake 783.46 ml   Output 600 ml   Net 183.46 ml       Physical Exam Performed:    BP 95/68   Pulse 97   Temp 98 °F (36.7 °C) (Temporal)   Resp 19   Ht 5' 3\" (1.6 m)   Wt 294 lb 15.6 oz (133.8 kg)   LMP 11/01/1996 (Exact Date)   SpO2 100%   BMI 52.25 kg/m²     General appearance: Morbidly obese, No apparent distress, appears stated age. Cooperative. HEENT:  Normocephalic, atraumatic. PERRLA. EOMi. Conjunctivae/corneas clear, no icterus, non-injected. Neck: Supple, with full range of motion. No jugular venous distention. Trachea midline. Respiratory:  NC 4L, Normal respiratory effort. Clear to auscultation, bilaterally without Rales/Wheezes/Rhonchi. Cardiovascular:  Anterior chest wall with reproducible tenderness on palpation in the left and mid-sternal region, Regular rate and rhythm without murmurs, rubs or gallops. Abdomen: Soft, tender, non-distended, without rebound or guarding. Normal bowel sounds.   Musculoskeletal:  No clubbing, cyanosis or edema bilaterally. Full range of motion without deformity. Skin: skin cloth in right groin/skin fold, Skin color, texture, turgor normal.  No rashes or lesions. Neurologic:  Neurovascularly intact without any focal sensory/motor deficits. Cranial nerves: II-XII intact, grossly intact. No facial asymmetry, tongue midline. Psychiatric:  Alert and oriented, thought content appropriate  Capillary Refill: Brisk,< 3 seconds   Peripheral Pulses: +2 palpable, equal bilaterally           Labs:   Recent Labs     01/27/20  1356 01/28/20  0600 01/29/20  0508   WBC 10.9 11.3* 12.5*   HGB 9.5* 8.6* 9.6*   HCT 29.3* 27.1* 30.1*    163 205     Recent Labs     01/27/20  0445 01/28/20  0600 01/29/20  0508    145 139   K 3.8 4.4 4.6   CL 93* 88* 89*   CO2 26 20* 21   BUN 30* 38* 32*   CREATININE 6.8* 8.2* 7.2*   CALCIUM 8.8 9.6 9.2   PHOS  --  2.4*  --      No results for input(s): AST, ALT, BILIDIR, BILITOT, ALKPHOS in the last 72 hours. No results for input(s): INR in the last 72 hours. Recent Labs     01/27/20  0445   TROPONINI 0.20*       Urinalysis:      Lab Results   Component Value Date    NITRU POSITIVE 11/25/2018    WBCUA see below 11/25/2018    BACTERIA 3+ 11/25/2018    RBCUA see below 11/25/2018    BLOODU TRACE-INTACT 11/25/2018    SPECGRAV >=1.030 11/25/2018    GLUCOSEU 100 11/25/2018    GLUCOSEU NEGATIVE 11/23/2010       Radiology:  CT ABDOMEN PELVIS WO CONTRAST Additional Contrast? None   Final Result   1. Improved skin thickening and infiltration of the subcutaneous fat in the   patient's pannus. There is continued breakdown of the skin within the folds   of the pannus with subcutaneous gas. No focal collection identified. 2. Otherwise unremarkable CT of the abdomen and pelvis. Nonobstructive left   renal calculus. XR CHEST PORTABLE   Final Result   Stable chronic cardiomegaly without acute airspace consolidation or CHF.                  Assessment/Plan:    Active Hospital Problems    Diagnosis  Wound infection [T14. 8XXA, L08.9]    Chronic abdominal wound infection, sequela [S31.109S, L08.9]    Chest pain [R07.9]    ESRD (end stage renal disease) on dialysis ([facility] [address] [N18.6, Z99.2]    Elevated troponin [R79.89]    Chronic diastolic CHF (congestive heart failure) ([facility]) [I50.32]    SOB (shortness of breath) [R06.02]       Chest pain  - atypical , seen by cards   - negative stress test 2 years ago and recent surgery where she did fine from cardiac stress standpoint.   - no additional work up needed. ESRD  - Nephro following.  - dialysis    Elevated trop  - due to ESRD  -non cardiac. Chronic abdominal wall wound (pannus)  - admitted December and + cx: diphtheroids and bacteroids  - completed antibiotics  - missed appointment yesterday outpatient  - CT abd/pelvis (ordered by ER): results above of chronic wound in pannus with continued breakdown of skin within folds with subcutaneous gas. - consult General Surgery  for CT findings fo gas in subcut tissue  - Consult wound care ( was to follow with Dr. Sb Brown weekly on discharge)  Likely due to calciphylaxis.     DM, type 2  - med-dose SSI  - POCT ac/hs  - Lantus       DVT Prophylaxis:   Diet: DIET CARDIAC;   Diet NPO, After Midnight  Code Status: Full Code    PT/OT Eval Status: eval and treat     Dispo - [facility]    Marly Kenyon MD

## 2020-01-29 NOTE — PROGRESS NOTES
Dr Tanna Teran with RN regarding  POC. Dr Gianluca Haji will see patient in AM and plan to take pt to OR for debridement.

## 2020-01-29 NOTE — PROGRESS NOTES
Hospitalist Progress Note      PCP: JURGEN Rodriguez - CNP    Date of Admission: 1/26/2020    Chief Complaint: chest pain     Hospital Course:   72 y.o. female with PMHx of abdominal pain, chronic CHF, asthma, calciphylaxis, cervical cancer, chest pain, chronic kidney disease, Stage IV (HD- MWF), chronic respiratory failure with hypoxia, COPD, DM, polyneuropathy,HLD, HTN, PE, venous stasis who   presented to [facility] with above extensive history and recent admission in January for chronic wound who has been experiencing SOB associated with some reproducible chest pain, \"so bad I could hardly move. \"  She is on home oxygen now since her discharge and is at 4L with sats 98%. She denies dizziness or lightheadedness, nausea or vomiting or headaches with her chest pain. She denies any activity or exertion that caused the pain, although it appears musculoskeletal to me.     She endorses decreased appetite. She states she has not been getting up at the NH either and states when she was here she was working with PT/OT and was at least getting out of bed.      She states she is very unhappy with the care she is receiving at her [address], and  states he found her in her own stool where she laid for 30 mins after calling asking for help to get a bedpan.       Given her history of CHF, HTN, HLD and DM and cardiac history, we will bring her in under Observation and have Cardiology see her.   She follows with Dr. Neeru Smith.        Subjective:   Doing well CP resolved  Now just pain from calciphylaxis      Medications:  Reviewed    Infusion Medications    norepinephrine 0.15 mcg/kg/min (01/29/20 2260)    norepinephrine Stopped (01/28/20 1495)    dextrose       Scheduled Medications    piperacillin-tazobactam  3.375 g Intravenous Q12H    nebivolol  2.5 mg Oral 2 times per day on Mon Wed Fri    nebivolol  5 mg Oral 2 times per day on Sun Tue Thu Sat    sevelamer  1.6 g Oral TID     budesonide  500 mcg Nebulization BID    apixaban  2.5 mg Oral BID    escitalopram  10 mg Oral Daily    ferrous sulfate  325 mg Oral TID     formoterol  20 mcg Nebulization BID    insulin glargine  20 Units Subcutaneous Nightly    pregabalin  50 mg Oral TID    insulin lispro  0-12 Units Subcutaneous TID     insulin lispro  0-6 Units Subcutaneous Nightly    collagenase   Topical Daily    sodium thiosulfate  25 g Intravenous Q MWF    lidocaine   Topical Once    silver nitrate applicators  10 each Topical Once    sodium chloride flush  10 mL Intravenous 2 times per day     PRN Meds: HYDROmorphone, lip balm petroleum free, ipratropium-albuterol, HYDROmorphone, traZODone, glucose, dextrose, glucagon (rDNA), dextrose, perflutren lipid microspheres, sodium chloride flush, magnesium hydroxide, ondansetron, acetaminophen      Intake/Output Summary (Last 24 hours) at 1/29/2020 1827  Last data filed at 1/29/2020 0533  Gross per 24 hour   Intake 783.46 ml   Output 600 ml   Net 183.46 ml       Physical Exam Performed:    BP 95/68   Pulse 97   Temp 98 °F (36.7 °C) (Temporal)   Resp 19   Ht 5' 3\" (1.6 m)   Wt 294 lb 15.6 oz (133.8 kg)   LMP 11/01/1996 (Exact Date)   SpO2 100%   BMI 52.25 kg/m²     General appearance: Morbidly obese, No apparent distress, appears stated age. Cooperative. HEENT:  Normocephalic, atraumatic. PERRLA. EOMi. Conjunctivae/corneas clear, no icterus, non-injected. Neck: Supple, with full range of motion. No jugular venous distention. Trachea midline. Respiratory:  NC 4L, Normal respiratory effort. Clear to auscultation, bilaterally without Rales/Wheezes/Rhonchi. Cardiovascular:  Anterior chest wall with reproducible tenderness on palpation in the left and mid-sternal region, Regular rate and rhythm without murmurs, rubs or gallops. Abdomen: Soft, tender, non-distended, without rebound or guarding. Normal bowel sounds.   Musculoskeletal:  No clubbing, cyanosis or edema bilaterally. Full range of motion without deformity. Skin: skin cloth in right groin/skin fold, Skin color, texture, turgor normal.  No rashes or lesions. Neurologic:  Neurovascularly intact without any focal sensory/motor deficits. Cranial nerves: II-XII intact, grossly intact. No facial asymmetry, tongue midline. Psychiatric:  Alert and oriented, thought content appropriate  Capillary Refill: Brisk,< 3 seconds   Peripheral Pulses: +2 palpable, equal bilaterally           Labs:   Recent Labs     01/27/20  1356 01/28/20  0600 01/29/20  0508   WBC 10.9 11.3* 12.5*   HGB 9.5* 8.6* 9.6*   HCT 29.3* 27.1* 30.1*    163 205     Recent Labs     01/27/20  0445 01/28/20  0600 01/29/20  0508    145 139   K 3.8 4.4 4.6   CL 93* 88* 89*   CO2 26 20* 21   BUN 30* 38* 32*   CREATININE 6.8* 8.2* 7.2*   CALCIUM 8.8 9.6 9.2   PHOS  --  2.4*  --      No results for input(s): AST, ALT, BILIDIR, BILITOT, ALKPHOS in the last 72 hours. No results for input(s): INR in the last 72 hours. Recent Labs     01/27/20  0445   TROPONINI 0.20*       Urinalysis:      Lab Results   Component Value Date    NITRU POSITIVE 11/25/2018    WBCUA see below 11/25/2018    BACTERIA 3+ 11/25/2018    RBCUA see below 11/25/2018    BLOODU TRACE-INTACT 11/25/2018    SPECGRAV >=1.030 11/25/2018    GLUCOSEU 100 11/25/2018    GLUCOSEU NEGATIVE 11/23/2010       Radiology:  CT ABDOMEN PELVIS WO CONTRAST Additional Contrast? None   Final Result   1. Improved skin thickening and infiltration of the subcutaneous fat in the   patient's pannus. There is continued breakdown of the skin within the folds   of the pannus with subcutaneous gas. No focal collection identified. 2. Otherwise unremarkable CT of the abdomen and pelvis. Nonobstructive left   renal calculus. XR CHEST PORTABLE   Final Result   Stable chronic cardiomegaly without acute airspace consolidation or CHF.                  Assessment/Plan:    Active Hospital Problems    Diagnosis  Wound infection [T14. 8XXA, L08.9]    Chronic abdominal wound infection, sequela [S31.109S, L08.9]    Chest pain [R07.9]    ESRD (end stage renal disease) on dialysis ([facility] [address] [N18.6, Z99.2]    Elevated troponin [R79.89]    Chronic diastolic CHF (congestive heart failure) ([facility]) [I50.32]    SOB (shortness of breath) [R06.02]       Chest pain  - atypical , seen by cards   - negative stress test 2 years ago and recent surgery where she did fine from cardiac stress standpoint.   - no additional work up needed. ESRD  - Nephro following.  - dialysis    Elevated trop  - due to ESRD  -non cardiac. Chronic abdominal wall wound (pannus)  - admitted December and + cx: diphtheroids and bacteroids  - completed antibiotics  - missed appointment yesterday outpatient  - CT abd/pelvis (ordered by ER): results above of chronic wound in pannus with continued breakdown of skin within folds with subcutaneous gas. - consult General Surgery  for CT findings fo gas in subcut tissue  - Consult wound care ( was to follow with Dr. Eva Justice weekly on discharge)  Likely due to calciphylaxis.     DM, type 2  - med-dose SSI  - POCT ac/hs  - Lantus       DVT Prophylaxis:   Diet: DIET CARDIAC;   Diet NPO, After Midnight  Code Status: Full Code    PT/OT Eval Status: eval and treat     Dispo - [facility]    Stephanie Cho MD

## 2020-01-29 NOTE — PROGRESS NOTES
[facility] Daily Progress Note      Admit Date:  1/26/2020    Chief Complaint: Chest pain    Subjective:  Ms. Jorge Cummins denies chest discomfort or shortness of breath. Prior events noted. Transferred to ICU due to severe abdominal discomfort.     Objective:   BP 91/60   Pulse 89   Temp 97.3 °F (36.3 °C) (Temporal)   Resp 12   Ht 5' 3\" (1.6 m)   Wt 294 lb 15.6 oz (133.8 kg)   LMP 11/01/1996 (Exact Date)   SpO2 98%   BMI 52.25 kg/m²       Intake/Output Summary (Last 24 hours) at 1/29/2020 1735  Last data filed at 1/29/2020 0533  Gross per 24 hour   Intake 783.46 ml   Output 600 ml   Net 183.46 ml       TELEMETRY: Sinus     Physical Exam:  General:  Awake, alert, oriented x 3, NAD  Skin:  Warm and dry  Neck:  JVD is difficult to assess  Chest:  decreased air exchange bibasilar  Cardiovascular:  RRR S1S2, no S3, no significant murmur  Abdomen:  Soft, ND, NT, No HSM  Extremities: Trace edema    Medications:    piperacillin-tazobactam  3.375 g Intravenous Q12H    nebivolol  2.5 mg Oral 2 times per day on Mon Wed Fri    nebivolol  5 mg Oral 2 times per day on Sun Tue Thu Sat    sevelamer  1.6 g Oral TID WC    budesonide  500 mcg Nebulization BID    apixaban  2.5 mg Oral BID    escitalopram  10 mg Oral Daily    ferrous sulfate  325 mg Oral TID WC    formoterol  20 mcg Nebulization BID    insulin glargine  20 Units Subcutaneous Nightly    pregabalin  50 mg Oral TID    insulin lispro  0-12 Units Subcutaneous TID WC    insulin lispro  0-6 Units Subcutaneous Nightly    collagenase   Topical Daily    sodium thiosulfate  25 g Intravenous Q MWF    lidocaine   Topical Once    silver nitrate applicators  10 each Topical Once    sodium chloride flush  10 mL Intravenous 2 times per day      norepinephrine 0.15 mcg/kg/min (01/29/20 7190)    norepinephrine Stopped (01/28/20 1933)    dextrose       HYDROmorphone, lip balm petroleum free, ipratropium-albuterol, HYDROmorphone, traZODone, glucose, dextrose, glucagon (rDNA), dextrose, perflutren lipid microspheres, sodium chloride flush, magnesium hydroxide, ondansetron, acetaminophen    Lab Data:  CBC:   Recent Labs     01/27/20  1356 01/28/20  0600 01/29/20  0508   WBC 10.9 11.3* 12.5*   HGB 9.5* 8.6* 9.6*   HCT 29.3* 27.1* 30.1*   MCV 91.7 91.1 91.2    163 205     BMP:   Recent Labs     01/27/20  0445 01/28/20  0600 01/29/20  0508    145 139   K 3.8 4.4 4.6   CL 93* 88* 89*   CO2 26 20* 21   PHOS  --  2.4*  --    BUN 30* 38* 32*   CREATININE 6.8* 8.2* 7.2*     LIVER PROFILE:   No results for input(s): AST, ALT, LIPASE, BILIDIR, BILITOT, ALKPHOS in the last 72 hours. Invalid input(s): AMYLASE,  ALB  PT/INR:   No results for input(s): PROTIME, INR in the last 72 hours. APTT: No results for input(s): APTT in the last 72 hours. BNP:  No results for input(s): BNP in the last 72 hours. IMAGING:     Echo 1/28/2020:  Summary   -Very technically difficult study      -Normal left ventricle size, wall thickness, and systolic function with an estimated ejection fraction of 60%. No regional wall motion abnormalities   are seen. Normal diastolic function.   -The right ventricle is mildly enlarged.   -Trivial- mild tricuspid regurgitation. PASP = 27 mmHg, assuming a right atrial pressure of 3 mmHg. -There is an anterior clear space that is more likely pericardial fat pad than effusion. Limited echo 1/3/2019:   Summary     Limited echo for pericardial effusion.     Small pericardial effusion noted near right ventricle     Normal LV systolic function     Myoview stress test 3/8/2018:  Summary    Normal myocardial perfusion.    Borderline LV function with ejection fraction of 52%.    Low risk scan.      Echo 12/26/2017:  Desmond Hernandez   -Very technically difficult exam due to morbid obesity.   -Global left ventricular function is normal with ejection fraction estimated   at 55 %.    -Wall motion assessment was limited due to poor endocardial border definition secondary to large body habitus.   -There is mild tricuspid regurgitation with RVSP estimated at 30 mmHg.   -Mild circumferential pericardial effusion noted. There is no cardiac   evidence of tamponade. Assessment/Plan:  Principal Problem:    Chest pain  Plan: Atypical.  Stress test just under 2 years ago was negative for ischemia. She tolerated recent surgery well from an ischemic standpoint. No plan for further cardiac risk ratification currently. Recent echo with normal LV systolic function.       Active Problems:    SOB (shortness of breath)  Plan: Etiology unclear. Likely multifactorial.       Elevated troponin  Plan: Multifactorial-hypertensive heart disease, end-stage renal disease.       Chronic diastolic CHF (congestive heart failure) ([facility]  Plan: Compensated. Dialysis dependent.       ESRD (end stage renal disease) on dialysis [facility])  Plan: Chronic. Per nephrology.       Chronic abdominal wound infection, sequela  Plan: Concern for infection. Per primary service. Okay to proceed with planned surgical debridement from a cardiology standpoint.         Linda Garza MD 1/29/2020 5:35 PM

## 2020-01-29 NOTE — PROGRESS NOTES
MD WaddellServed: The patient has hx of GALE and reports wearing bipap at home. RT requesting order for bipap at night. Please place order if appropriate. Thanks!

## 2020-01-29 NOTE — CONSULTS
P Pulmonary and Critical Care   Consult Note      Reason for Consult: Hypotension, end-stage renal disease  Requesting Physician: Dr. Lulu Hughes  Subjective:   [facility] / hospitals:                The patient is a 72 y.o. female with significant past medical history of:      Diagnosis Date    Abdominal pain 8/20/2018    Acute on chronic congestive heart failure ([address] 6/26/2014    Asthma     Calcinosis cutis     Calciphylaxis 12/2/2019    Cervical cancer ([address]     Chest pain 5/16/2018    CHF (congestive heart failure) ([address]     Chronic kidney disease, stage IV (severe) ([facility])     Dr Regan Stacy Chronic pain syndrome 3/27/2018    Chronic respiratory failure with hypoxia ([facility])     CKD (chronic kidney disease) stage 4, GFR 15-29 ml/min ([facility]) 6/30/2017    Colon polyps     COPD (chronic obstructive pulmonary disease) ([address]     Degenerative disc disease at L5-S1 level 6/18/2013     CT    Diabetes mellitus, insulin dependent (IDDM), uncontrolled ([address]     Diabetic polyneuropathy associated with type 2 diabetes mellitus ([address] 3/26/2019    Elevated troponin 9/9/2017    ESRD (end stage renal disease) on dialysis ([address] 02/14/2018    M-W-ARTHUR Mcelroy    GERD (gastroesophageal reflux disease)     Gout     History of blood transfusion     History of cervical cancer     Hyperlipidemia     Hypertension     Incarcerated ventral hernia     LVH (left ventricular hypertrophy)     Morbid obesity ([facility])     Mucus plugging of bronchi     Obstructive sleep apnea on CPAP     wears 2L O2 and BIPAP at night    Pneumonia     Psychiatric problem     breakdown long time ago but not current    Pulmonary embolism ([address] 02/06/2013    Type 2 diabetes mellitus with diabetic neuropathy, with long-term current use of insulin ([address] 9/12/2017    Urinary incontinence in female     Venous stasis of lower extremity      Patient was admitted to the hospital 1/26/2019 with leg pain. She has multiple skin lesions.   She has end-stage renal disease. She is known to have COPD and obstructive sleep apnea. She has frequent hospitalizations. She has recently become more hypotensive. I saw her this morning in the ICU on BiPAP mask ventilation to support her breathing and norepinephrine to support blood pressure. She was also receiving hemodialysis at that time as well. She is arousable and will answer questions. She was not complaining of shortness of breath while wearing the BiPAP.       Past Surgical History:        Procedure Laterality Date    ABDOMEN SURGERY N/A 12/26/2019    INCISION AND DRAINAGE OF ABDOMINAL WOUND AND BILATERAL HIP WOUNDS performed by Wilson Quijano MD at [facility]  1/14    Grace; clean cors    COLONOSCOPY  2010    polyp removed; [facility]; repeat 5 years    COLONOSCOPY  6/25/13, 11/14    [facility]    COLONOSCOPY N/A 10/10/2019    COLONOSCOPY POLYPECTOMY SNARE/COLD BIOPSY performed by Dominique Castro MD at [address] Right 3/28/2019    RIGHT BRACHIO CEPHALIC FISTULA CREATION performed by Juno Brown MD at [address] [facility]  2/21/09    LVH with global hypokinesis; EF 55-60%    HYSTERECTOMY      INCISION AND DRAINAGE Right 8/22/2019    RIGHT ARM INCISION AND DEBRIDEMENT performed by Juno Brown MD at [address] ARTHROSCOPY Right 1999    OTHER SURGICAL HISTORY  02/22/2018    LEFT brachial artery axillary vein AV graft     MS OPEN SKULL SUPRATENT EXPLORE      Craniotomy, 3/21/19 patient denies any brain surgery or craniotomy    MS REPAIR INCISIONAL HERNIA,REDUCIBLE N/A 11/20/2018    LAPAROSCOPIC VENTRAL HERNIA REPAIR WITH MESH performed by Dina Christiansen MD at [address] Right 7/18/2019    RIGHT BRACHIAL ARTERY AXILLARY VEIN GRAFT AND LIGATION OF RIGHT BRACHIO-CEPHALIC FISTULA (09530, [address]84) performed by Juno Brown MD at [address] [facility]  10/96    TUNNELED VENOUS PORT PLACEMENT Right 11/2019    UPPER GASTROINTESTINAL ENDOSCOPY  6/25/13    UPPER GASTROINTESTINAL ENDOSCOPY N/A 11/21/2019    EGD BIOPSY performed by Freddie Vargas MD at [place] [address]  12/24/2016    Incarcerated ventral hernia repair with mesh     Current Medications:    Current Facility-Administered Medications: norepinephrine (LEVOPHED) 16 mg in dextrose 5% 250 mL infusion, 2 mcg/min, Intravenous, Continuous  cefTRIAXone (ROCEPHIN) 1 g in sterile water 10 mL IV syringe, 1 g, Intravenous, Q24H  nebivolol (BYSTOLIC) tablet 2.5 mg, 2.5 mg, Oral, 2 times per day on Mon Wed Fri  nebivolol (BYSTOLIC) tablet 5 mg, 5 mg, Oral, 2 times per day on Sun Tue Thu Sat  sevelamer (RENVELA) packet 1.6 g, 1.6 g, Oral, TID WC  norepinephrine (LEVOPHED) 16 mg in dextrose 5% 250 mL infusion, 0.07 mcg/kg/min, Intravenous, Continuous  HYDROmorphone (DILAUDID) tablet 0.5 mg, 0.5 mg, Oral, Q8H PRN  budesonide (PULMICORT) nebulizer suspension 500 mcg, 500 mcg, Nebulization, BID  albuterol sulfate  (90 Base) MCG/ACT inhaler 2 puff, 2 puff, Inhalation, Q6H PRN  apixaban (ELIQUIS) tablet 2.5 mg, 2.5 mg, Oral, BID  escitalopram (LEXAPRO) tablet 10 mg, 10 mg, Oral, Daily  ferrous sulfate tablet 325 mg, 325 mg, Oral, TID WC  mometasone-formoterol (DULERA) 200-5 MCG/ACT inhaler 2 puff, 2 puff, Inhalation, BID  formoterol (PERFOROMIST) nebulizer solution 20 mcg, 20 mcg, Nebulization, BID  insulin glargine (LANTUS) injection pen 20 Units, 20 Units, Subcutaneous, Nightly  traZODone (DESYREL) tablet 100 mg, 100 mg, Oral, Nightly PRN  pregabalin (LYRICA) capsule 50 mg, 50 mg, Oral, TID  insulin lispro (1 Unit Dial) 0-12 Units, 0-12 Units, Subcutaneous, TID WC  insulin lispro (1 Unit Dial) 0-6 Units, 0-6 Units, Subcutaneous, Nightly  glucose (GLUTOSE) 40 % oral gel 15 g, 15 g, Oral, PRN  dextrose 50 % IV solution, 12.5 g, Intravenous, PRN  glucagon (rDNA) injection 1 mg, 1 mg, Intramuscular, PRN  dextrose 5 % solution, 100 mL/hr, Intravenous, PRN  collagenase ointment, , Topical, Daily  sodium thiosulfate 25 % injection 25 g, 25 g, Intravenous, Q MWF  perflutren lipid microspheres (DEFINITY) injection 1.65 mg, 1.5 mL, Intravenous, ONCE PRN  lidocaine (XYLOCAINE) 4 % external solution, , Topical, Once  silver nitrate applicators applicator 10 each, 10 each, Topical, Once  sodium chloride flush 0.9 % injection 10 mL, 10 mL, Intravenous, 2 times per day  sodium chloride flush 0.9 % injection 10 mL, 10 mL, Intravenous, PRN  magnesium hydroxide (MILK OF MAGNESIA) 400 MG/5ML suspension 30 mL, 30 mL, Oral, Daily PRN  ondansetron (ZOFRAN) injection 4 mg, 4 mg, Intravenous, Q6H PRN  acetaminophen (TYLENOL) tablet 650 mg, 650 mg, Oral, Q6H PRN    Allergies   Allergen Reactions    Codeine Nausea Only    Fentanyl Itching    Morphine      CHEST PAIN    Tramadol Hcl Other (See Comments)     Causes pt to have involuntary movements    Hydrocodone-Acetaminophen Itching and Rash    Percocet [Oxycodone-Acetaminophen] Itching    Procardia [Nifedipine] Nausea And Vomiting     Headache  Takes norvasc at home 12/22/15    Vicodin [Hydrocodone-Acetaminophen] Rash       Social History:    TOBACCO:   reports that she has never smoked. She has never used smokeless tobacco.  ETOH:   reports no history of alcohol use.   Patient currently lives independently  Environmental/chemical exposure: None known    Family History:       Problem Relation Age of Onset    Colon Cancer Father     Diabetes Father     High Blood Pressure Father     Colon Cancer Mother     Diabetes Mother     Colon Cancer Maternal Grandmother     Kidney Cancer Brother     Heart Disease Brother          age 54    High Blood Pressure Brother     High Blood Pressure Sister     High Blood Pressure Maternal Aunt     High Blood Pressure Sister     Heart Disease Sister      REVIEW OF SYSTEMS:    CONSTITUTIONAL:  negative for fevers, chills, diaphoresis, activity change, appetite change, fatigue, night sweats and unexpected weight change. EYES:  negative for blurred vision, eye discharge, visual disturbance and icterus  HEENT:  negative for hearing loss, tinnitus, ear drainage, sinus pressure, nasal congestion, epistaxis and snoring  RESPIRATORY:  See HPI  CARDIOVASCULAR:  negative for chest pain, palpitations, exertional chest pressure/discomfort, edema, syncope  GASTROINTESTINAL:  negative for nausea, vomiting, diarrhea, constipation, blood in stool and abdominal pain  GENITOURINARY:  negative for frequency, dysuria, urinary incontinence, decreased urine volume, and hematuria  HEMATOLOGIC/LYMPHATIC:  negative for easy bruising, bleeding and lymphadenopathy  ALLERGIC/IMMUNOLOGIC:  negative for recurrent infections, angioedema, anaphylaxis and drug reactions  ENDOCRINE:  negative for weight changes and diabetic symptoms including polyuria, polydipsia and polyphagia  MUSCULOSKELETAL:  negative for  pain, joint swelling, decreased range of motion and muscle weakness  NEUROLOGICAL:  negative for headaches, slurred speech, unilateral weakness  PSYCHIATRIC/BEHAVIORAL: negative for hallucinations, behavioral problems, confusion and agitation. Objective:   PHYSICAL EXAM:      VITALS:  BP 91/60   Pulse 89   Temp 97.3 °F (36.3 °C) (Temporal)   Resp 12   Ht 5' 3\" (1.6 m)   Wt 294 lb 15.6 oz (133.8 kg)   LMP 11/01/1996 (Exact Date)   SpO2 98%   BMI 52.25 kg/m²      24HR INTAKE/OUTPUT:      Intake/Output Summary (Last 24 hours) at 1/29/2020 0915  Last data filed at 1/29/2020 0533  Gross per 24 hour   Intake 1143.46 ml   Output 600 ml   Net 543.46 ml     CONSTITUTIONAL:  awake, alert, cooperative, no apparent distress, and appears stated age  NECK:  Supple, symmetrical, trachea midline, no adenopathy, thyroid symmetric, not enlarged and no tenderness, skin normal  LUNGS:  no increased work of breathing and clear to auscultation.  No accessory muscle use  CARDIOVASCULAR: S1 and S2, no edema and no JVD  ABDOMEN: Massively obese normal bowel sounds, non-distended and no masses palpated, and no tenderness to palpation. No hepatospleenomegaly  LYMPHADENOPATHY:  no axillary or supraclavicular adenopathy. No cervical adnenopathy  PSYCHIATRIC: Oriented to person place and time. No obvious depression or anxiety. MUSCULOSKELETAL: No obvious misalignment or effusion of the joints. No clubbing, cyanosis of the digits. SKIN: Multiple wounds    DATA:    Old records have been reviewed    CBC:  Recent Labs     01/27/20  1356 01/28/20  0600 01/29/20  0508   WBC 10.9 11.3* 12.5*   RBC 3.20* 2.97* 3.30*   HGB 9.5* 8.6* 9.6*   HCT 29.3* 27.1* 30.1*    163 205   MCV 91.7 91.1 91.2   MCH 29.6 29.0 29.0   MCHC 32.2 31.8 31.8   RDW 20.0* 20.4* 20.1*      BMP:  Recent Labs     01/27/20  0445 01/28/20  0600 01/29/20  0508    145 139   K 3.8 4.4 4.6   CL 93* 88* 89*   CO2 26 20* 21   BUN 30* 38* 32*   CREATININE 6.8* 8.2* 7.2*   CALCIUM 8.8 9.6 9.2   GLUCOSE 123* 105* 243*      ABG:  No results for input(s): PHART, CTQ1KTU, PO2ART, AXC0ZIU, R8FLMUAS, BEART in the last 72 hours. Lab Results   Component Value Date    BNP 48 03/13/2014     Lab Results   Component Value Date    CKTOTAL 129 07/11/2017    CKMB 1.9 04/19/2015    TROPONINI 0.20 (H) 01/27/2020       Cultures:     Abx:    Radiology Review:  All pertinent images / reports were reviewed as a part of this visit. Assessment:     1. Hypotension requiring norepinephrine  · Difficult situation in which to assess her hemodynamic status  · She does have end-stage renal disease  · Blood pressure has been fluctuating  · Obesity and underlying vascular disease likely have an impact here as well  · Does not appear septic though she does have some wound infections     2.   Chronic hypoxemic and hypercapnic respiratory failure  · Typically using a combination of oxygen and BiPAP  · Appears near baseline  · Anterior chest exam is clear  · Chest

## 2020-01-29 NOTE — PLAN OF CARE
Problem: Falls - Risk of:  Goal: Will remain free from falls  Description  Will remain free from falls  1/29/2020 0114 by Villa Canavan, RN  Outcome: Ongoing  1/28/2020 1232 by Cesar Ramirez RN  Outcome: Ongoing  Note:   Non-skid socks on. Bed in low position, wheels locked. Call light within reach. Bedside table within reach. Patient encouraged to use call light when needing assistance. Goal: Absence of physical injury  Description  Absence of physical injury  Outcome: Ongoing     Problem: Risk for Impaired Skin Integrity  Goal: Tissue integrity - skin and mucous membranes  Description  Structural intactness and normal physiological function of skin and  mucous membranes.   Outcome: Ongoing     Problem: Pain:  Goal: Pain level will decrease  Description  Pain level will decrease  Outcome: Ongoing  Goal: Control of acute pain  Description  Control of acute pain  Outcome: Ongoing  Goal: Control of chronic pain  Description  Control of chronic pain  Outcome: Ongoing     Problem: OXYGENATION/RESPIRATORY FUNCTION  Goal: Patient will maintain patent airway  Outcome: Ongoing  Goal: Patient will achieve/maintain normal respiratory rate/effort  Description  Respiratory rate and effort will be within normal limits for the patient  Outcome: Ongoing     Problem: HEMODYNAMIC STATUS  Goal: Patient has stable vital signs and fluid balance  Outcome: Ongoing     Problem: FLUID AND ELECTROLYTE IMBALANCE  Goal: Fluid and electrolyte balance are achieved/maintained  Outcome: Ongoing     Problem: ACTIVITY INTOLERANCE/IMPAIRED MOBILITY  Goal: Mobility/activity is maintained at optimum level for patient  Outcome: Ongoing     Problem: Serum Glucose Level - Abnormal:  Goal: Ability to maintain appropriate glucose levels will improve  Description  Ability to maintain appropriate glucose levels will improve  Outcome: Ongoing

## 2020-01-29 NOTE — PROGRESS NOTES
Palliative Care:      Lucila Bolden from [facility] states family met with [facility][1] yesterday and consents have been signed. She is asking  to call [facility] at time of discharge. Melissa Roberts .                  ;bobby

## 2020-01-29 NOTE — PROGRESS NOTES
Introduced self to patient. Initial shift assessment completed, see complex assessment in doc flowsheet. Pt on HD. Pt requiring more levophed with HD. Pt drowsy but will arouse to answer questions. Questions answered. Call light within reach. Bed in low position with wheels locked. Encouraged to call for nurse as needed throughout the shift.

## 2020-01-29 NOTE — PROGRESS NOTES
Levophed ordered only for during dialysis. Dialysis nurse in room about to start. Levophed running. See MAR.

## 2020-01-29 NOTE — PROGRESS NOTES
[facility]        Progress Note    Patient Name: Sherrell Hay  MRN: [id number]  YOB: 1955  Date of Evaluation: 1/29/2020    Chief Complaint: Wounds    Subjective:  No acute events overnight; transferred to ICU for pressor support during dialysis  Continues to complain of significant pain associated with wounds  Met with Hospice but states that she is not ready to die  Resting in bed at this time      Vital Signs:  Patient Vitals for the past 24 hrs:   BP Temp Temp src Pulse Resp SpO2 Weight   01/29/20 0702 91/60 -- -- 89 12 98 % --   01/29/20 0646 100/86 -- -- 90 20 99 % --   01/29/20 0631 121/64 -- -- 88 13 99 % --   01/29/20 0616 113/65 -- -- 91 16 98 % --   01/29/20 0608 105/63 -- -- 88 10 98 % --   01/29/20 0605 (!) 84/47 -- -- 89 9 98 % --   01/29/20 0601 (!) 50/34 -- -- 93 12 98 % --   01/29/20 0500 (!) 92/45 -- -- 92 16 97 % --   01/29/20 0431 (!) 96/48 -- -- 88 9 98 % --   01/29/20 0416 (!) 98/50 -- -- 87 9 97 % --   01/29/20 0401 102/65 97.3 °F (36.3 °C) Temporal 88 11 97 % 294 lb 15.6 oz (133.8 kg)   01/29/20 0346 (!) 97/42 -- -- 87 12 98 % --   01/29/20 0334 -- -- -- -- 14 98 % --   01/29/20 0331 96/60 -- -- 86 10 98 % --   01/29/20 0316 (!) 98/56 -- -- 87 11 99 % --   01/29/20 0301 (!) 93/46 -- -- 89 13 99 % --   01/29/20 0246 (!) 94/46 -- -- 85 10 99 % --   01/29/20 0231 (!) 60/31 -- -- 81 11 99 % --   01/29/20 0217 (!) 60/30 -- -- 83 11 99 % --   01/29/20 0200 -- -- -- 86 19 100 % --   01/29/20 0155 (!) 61/20 -- -- -- 15 100 % --   01/29/20 0153 (!) 56/12 -- -- 88 25 100 % --   01/29/20 0143 110/77 -- -- 89 26 100 % --   01/29/20 0116 (!) 95/58 -- -- 87 14 100 % --   01/29/20 0111 -- -- -- -- 14 100 % --   01/29/20 0101 97/70 -- -- 90 20 99 % --   01/29/20 0054 105/76 -- -- 87 13 100 % --   01/29/20 0046 (!) 69/51 -- -- 88 9 100 % --   01/29/20 0043 (!) 85/57 -- -- 88 18 100 % --   01/29/20 0037 (!) 58/44 -- -- 89 12 100 % --   01/29/20 0032 (!) 88/48 -- -- 90 17 99 % --   20 0016 (!) 116/95 -- -- 89 23 98 % --   20 0001 97/73 -- -- -- 28 100 % --   20 0000 97/73 97.9 °F (36.6 °C) Temporal 90 21 100 % --   20 101/67 -- -- 91 22 99 % --   206 133/70 -- -- 94 10 100 % --   20 2315 114/70 97.8 °F (36.6 °C) -- 92 14 -- 295 lb 3.1 oz (133.9 kg)   20 112/70 -- -- 91 9 99 % --   20 104/61 -- -- 91 9 -- --   20 113/79 -- -- 91 10 100 % --   20 (!) 120/59 -- -- 84 18 -- --   20 (!) 121/57 -- -- 87 22 -- --   20 114/62 -- -- 88 15 98 % --   20 (!) 99/57 -- -- 85 20 -- --   20 (!) 111/43 -- -- 88 21 -- --   20 (!) 145/61 -- -- 90 13 -- --   20 (!) 127/97 -- -- 93 15 98 % --   20 135/67 -- -- 89 12 100 % --   20 137/66 -- -- 94 19 (!) 75 % --   20 (!) 104/37 97.8 °F (36.6 °C) -- 87 16 -- 294 lb 5 oz (133.5 kg)   20 (!) 135/47 -- -- 90 19 95 % --   20 (!) 143/57 -- -- 87 22 (!) 88 % --   20 -- -- -- -- 16 100 % --   20 -- -- -- -- 14 100 % --   20 -- -- -- -- 15 98 % --   20 101/63 -- -- 86 21 98 % --   20 1940 (!) 113/58 97.1 °F (36.2 °C) Temporal 87 9 99 % --   20 1901 (!) 132/107 -- -- 84 16 -- --   20 1730 102/88 -- -- 74 27 -- --   20 1724 (!) 92/59 -- -- 75 13 -- --   20 1702 (!) 107/56 98 °F (36.7 °C) Temporal 80 13 98 % --   20 1700 (!) 88/33 -- -- 78 13 98 % --   20 1646 (!) 101/31 -- -- 77 10 -- --   20 1616 94/60 -- -- 82 16 -- --   20 1545 (!) 125/59 98 °F (36.7 °C) Temporal 83 18 98 % --      TEMPERATURE HISTORY 24H: Temp (24hrs), Av.7 °F (36.5 °C), Min:97.1 °F (36.2 °C), Max:98 °F (36.7 °C)    BLOOD PRESSURE HISTORY: Systolic (37MAZ), OZH:004 , Min:50 , VXO:664    Diastolic (15GUW), CHH:35, Min:12, Max:107      Intake/Output:  I/O last 3 completed shifts: In: 1143.5 [P.O.:360; I.V.:83.5]  Out: 600   No intake/output data recorded. Drain/tube Output:       Physical Exam:  General: awake, alert, oriented to  person, place, time  Lungs: unlabored respirations  Skin/Wound: wound dressings are in place on right hip and abdomen, no apparent issues    Labs:  CBC:    Recent Labs     01/27/20  1356 01/28/20  0600 01/29/20  0508   WBC 10.9 11.3* 12.5*   HGB 9.5* 8.6* 9.6*   HCT 29.3* 27.1* 30.1*    163 205     BMP:    Recent Labs     01/27/20  0445 01/28/20  0600 01/29/20  0508    145 139   K 3.8 4.4 4.6   CL 93* 88* 89*   CO2 26 20* 21   BUN 30* 38* 32*   CREATININE 6.8* 8.2* 7.2*   GLUCOSE 123* 105* 243*     Hepatic:   Recent Labs     01/26/20  1430   AST 29   ALT 21   BILITOT 0.4   ALKPHOS 86     Amylase: No results for input(s): AMYLASE in the last 72 hours. Lipase: No results for input(s): LIPASE in the last 72 hours. Mag:    No results for input(s): MG in the last 72 hours. Phos:     Recent Labs     01/28/20  0600   PHOS 2.4*      Coags:   Recent Labs     01/26/20  1430   INR 1.91*       Cultures:  Anaerobic culture  No results for input(s): LABANAE in the last 72 hours. Blood culture  Recent Labs     01/26/20  1430   BC No Growth to date. Any change in status will be called. Blood culture 2  Recent Labs     01/26/20  1643   BLOODCULT2 No Growth to date. Any change in status will be called. Body fluid culture  Recent Labs     01/26/20  1643   BLOODCULT2 No Growth to date. Any change in status will be called. Surgical culture  No results for input(s): CXSURG in the last 72 hours. Fecal occult  No results for input(s): OCCULTBLDFEC in the last 72 hours. Gram stain  Recent Labs     01/27/20  1100   LABGRAM No WBC's seen  3+ Gram negative rods  1+ Gram positive cocci  1+ Gram positive rods         Stool culture 1  No results for input(s): CXST in the last 72 hours.     Stool culture 2  No results for input(s): STOOLCULT2 in the last 72 hours. Urine culture  No results for input(s): LABURIN in the last 72 hours. Wound abscess  Recent Labs     01/27/20  1100   WNDABS Further report to follow       Pathology:  No relevant pathology     Imaging:  I have personally reviewed the following films:    Ct Abdomen Pelvis Wo Contrast Additional Contrast? None    Result Date: 1/27/2020  EXAMINATION: CT OF THE ABDOMEN AND PELVIS WITHOUT CONTRAST 1/26/2020 4:24 pm TECHNIQUE: CT of the abdomen and pelvis was performed without the administration of intravenous contrast. Multiplanar reformatted images are provided for review. Dose modulation, iterative reconstruction, and/or weight based adjustment of the mA/kV was utilized to reduce the radiation dose to as low as reasonably achievable. COMPARISON: 12/26/2019. HISTORY: ORDERING SYSTEM PROVIDED HISTORY: wound abd - cellulitis? ?? TECHNOLOGIST PROVIDED HISTORY: If patient is on cardiac monitor and/or pulse ox, they may be taken off cardiac monitor and pulse ox, left on O2 if currently on. All monitors reattached when patient returns to room. Additional Contrast?->None Reason for exam:->wound abd - cellulitis? ?? FINDINGS: Lower Chest: No acute infiltrate at the lung bases. Small to moderate-sized hiatal hernia. Organs: The unenhanced liver, spleen, pancreas and right adrenal gland are unremarkable. Stable left adrenal adenoma. No acute biliary findings. The kidneys are atrophic. No ureteral calculi or significant hydronephrosis. Nonobstructive lower pole left renal calculus appears stable. GI/Bowel: No pericolonic inflammatory changes. The appendix is unremarkable. No small bowel distension. The stomach and duodenal C-loop are intact. Pelvis: No pelvic mass or free pelvic fluid. The uterus is surgically absent. Multiple calcified pelvic phleboliths. Minimal distention of the urinary bladder. Peritoneum/Retroperitoneum: The abdominal aorta is normal in caliber.   Mild calcified  nebivolol  2.5 mg Oral 2 times per day on Mon Wed Fri    nebivolol  5 mg Oral 2 times per day on Sun Tue Thu Sat    sevelamer  1.6 g Oral TID     budesonide  500 mcg Nebulization BID    apixaban  2.5 mg Oral BID    escitalopram  10 mg Oral Daily    ferrous sulfate  325 mg Oral TID     mometasone-formoterol  2 puff Inhalation BID    formoterol  20 mcg Nebulization BID    insulin glargine  20 Units Subcutaneous Nightly    pregabalin  50 mg Oral TID    insulin lispro  0-12 Units Subcutaneous TID     insulin lispro  0-6 Units Subcutaneous Nightly    collagenase   Topical Daily    sodium thiosulfate  25 g Intravenous Q MWF    lidocaine   Topical Once    silver nitrate applicators  10 each Topical Once    sodium chloride flush  10 mL Intravenous 2 times per day     Continuous Infusions:   norepinephrine 0.15 mcg/kg/min (01/29/20 8739)    norepinephrine Stopped (01/28/20 9296)    dextrose       PRN Meds:. HYDROmorphone, HYDROmorphone, albuterol sulfate HFA, traZODone, glucose, dextrose, glucagon (rDNA), dextrose, perflutren lipid microspheres, sodium chloride flush, magnesium hydroxide, ondansetron, acetaminophen      Assessment:  72 y.o. female admitted with   1. Chest pain, unspecified type    2. Elevated troponin    3. Chronic wound infection of abdomen, initial encounter        Bilateral stage 3 hip decubitus ulcers  Open abdominal decubitus ulcer stage 3      Plan:  1. Sharp debridement in OR for bilateral hip and abdominal wounds with Dr. Margarito Beckham  2. NPO at midnight  3. Antibiotics  4. Activity as tolerated  5. PRN analgesics and antiemetics--minimizing narcotics as tolerated  6. Anticoagulated with Eliquis  7. Management of medical comorbid etiologies per primary team and consulting services  8. Disposition: Patient met with Hospice but declined    EDUCATION:  Educated patient on plan of care and disease process--all questions answered. Plans discussed with patient and nursing.   Reviewed and discussed with Dr. Janneth Brandon. Signed:  Kaye Mark, APRN - CNP  1/29/2020 2:10 PM    I have personally performed a face to face diagnostic evaluation on this patient. I have interviewed and examined the patient and I agree with the assessment above. In summary, my findings and plan are the following:   Ms. Vicenta Walker is a 72 y.o. female who presents with   Bilateral stage 3 hip decubitus ulcers with infection  Open abdominal decubitus ulcer stage 3 with infection  Bilateral buttock and sacral stage 2 decubitus ulcers  Calciphylaxis  ESRD on HD  CHF  COPD  Diabetes  Morbid obesity, BMI 57.9 this admission  CVA  Pulmonary embolism  Medical coagulopathy, Eliquis    Plan:  1. Declined evaluation of the wounds as she didn't want dressings removed. Declined turning as well which will exacerbate decubitus ulcers. Wounds need additional debridement and patient will not tolerate another bedside debridement secondary to pain. Will proceed with operative debridement tomorrow. Risks, benefits, alternatives discussed with patient. She understands, agrees, wishes to proceed  2. Antibiotics per primary team  3. May continue anticoagulation from surgical standpoint  4. Pain control, minimize narcotics, suspect underlying chronic pain syndrome  5. Hospice/palliative care consulted, patient declines and wants to continue with all measures to treat underlying conditions  6. From surgical standpoint, do not recommend diet while on levophed  7. Defer management of remainder of medical comorbidities to primary and consulting teams    Pop Brandon MD, FACS  1/29/2020  4:35 PM

## 2020-01-29 NOTE — PROGRESS NOTES
Shift hand off and assessment complete. Patient is awake in bed talking with 4-5 family members. She is alert and oriented x4. Follows commands and demonstrates appropriate judgment, safety awareness, and attention/concentration. Single implanted venous port in R side of chest. L upper arm fistula. In NSR on 2 L NC. Dyspnea with exertion and at rest. +1 pitting edema in BLE. Multiple bad wounds. Many covered in dressings from debridement from earlier today. See LDA. Dialysis to be done later tonight. The blood pressure cuff has a hard time reading on her R upper and lower arm, and d/t the fistula in her L upper arm we cannot check it there. Lower legs being used for BP checks. Patient very hesitant to moving. Needed persuasion to get her to agree to repositioning. Educated on the importance of frequent turning in bed for the prevention of pressure ulcer formation.

## 2020-01-29 NOTE — CARE COORDINATION
Discharge Planning Assessment  RN/SW discharge planner met with patient/ (and family member) to discuss reason for admission, current living situation, and potential needs at the time of discharge    Demographics/Insurance verified Yes    Current type of dwelling:  Lives at [facility]    Patient from ECF/SW confirmed with:  Gabriela Garcia    Living arrangements: LTC    Level of function/Support:  dependent    PCP: per SNF    Last Visit to PCP: per SNF    DME: Per SNF    Active with any community resources/agencies/skilled home care: N/A    Medication compliance issues: no    Financial issues that could impact healthcare: no - currently has both Medicare and Medicaid      Tentative discharge plan:    Family had Hospice come in last evening to speak with patient about their services. Sister signed consents with [facility]. [facility] came nurse came this morning and spoke with patient who states she is not sure she wants to go with hospice. Hospice nurse states she will follow up tomorrow. I spoke with patient later in the day and she states she does not want hospice. States she does not want hospice following up with her. I spoke with [facility] with [facility] and let her know this. Spoke with patients sister Miles Mijares per her request.   Miles Mijares said they hadn't planned on telling the patient about hospice. They have no intention of stopping dialysis, aggressive treatment for wounds, or changing code status. Other issue is patient does not want to return to [facility]. I explained the protocol to them regarding long term placement. They requested a SNF list and information about [facility], which were both provided. Referral to [facility] is pending. Patient is currently admitted to the ICU on Levophed gtt.     Discussed and provided facilities of choice if transition to a skilled nursing facility is required at the time of discharge      Transportation at the time of discharge: EMS    Katie Chin RN BSN  Case Management  [phone]

## 2020-01-29 NOTE — FLOWSHEET NOTE
Treatment time: 2 hrs    Net UF: None    Pre weight: 133.5 kg (bed scale)  Post weight: 133.9 kg (bed scale)  EDW: 147 kg    Access used: CAREY AVG  Access function: Pt movement and decreased BFR from 400 to 350, secondary to increased arterial pressures. Readjusted needle and no further issues. Medications or blood products given: Sodium Thiosulfate 25 g IV    [facility] outpatient schedule: [facility]    Summary of response to treatment: Pt tolerated tx with no fluid removal with titrating levo qtt in place per ICU RN. No issues noted. Copy of dialysis treatment record placed in chart, to be scanned into EMR.       01/28/20 2045 01/28/20 2315   Treatment   Treatment Number 1  --    Time On  --  2056   Time Off  --  2258   Vital Signs   BP (!) 104/37 114/70   Weight 294 lb 5 oz (133.5 kg) 295 lb 3.1 oz (133.9 kg)   Weight Method Bed scale Bed scale   Dry Weight  --  324 lb 1.2 oz (147 kg)   Treatment Initiation   Dialyze Hours 2  --    Dialysis Bath   K+ (Potassium) 3  --    Ca+ (Calcium) 2.5  --    Na+ (Sodium) 138  --    HCO3 (Bicarb) 35  --    Hemodialysis Fistula/Graft Arteriovenous vein graft Left Arm   No Placement Date or Time found. Access Type: Arteriovenous vein graft  Orientation: Left  Access Location: Arm   Site Assessment Clean;Dry; Intact Clean;Dry; Intact   Thrill Present Present   Bruit Present Present   Access Interventions Aseptic Technique; Needles taped to patient Aseptic Technique   Dressing Intervention Dressing reinforced Dressing reinforced   Dressing Status Clean;Dry; Intact Clean;Dry; Intact   Post-Hemodialysis Assessment   Total Liters Processed (l/min)  --  33.9 l/min   Duration of Treatment (minutes)  --  120 minutes   NET Removed (ml)  --  -100 ml   Tolerated Treatment  --  Good

## 2020-01-29 NOTE — PROGRESS NOTES
Pt having intractable pain in wound area. Dr Luis Gomez increased pain medication. RN repositioned as pt would tolerate to assist.  Pt unable to tolerate turns.

## 2020-01-30 ENCOUNTER — ANESTHESIA (OUTPATIENT)
Dept: OPERATING ROOM | Age: 65
DRG: 570 | End: 2020-01-30
Payer: MEDICARE

## 2020-01-30 ENCOUNTER — ANESTHESIA EVENT (OUTPATIENT)
Dept: OPERATING ROOM | Age: 65
DRG: 570 | End: 2020-01-30
Payer: MEDICARE

## 2020-01-30 VITALS
OXYGEN SATURATION: 89 % | DIASTOLIC BLOOD PRESSURE: 81 MMHG | TEMPERATURE: 97.5 F | RESPIRATION RATE: 14 BRPM | SYSTOLIC BLOOD PRESSURE: 119 MMHG

## 2020-01-30 LAB
ANION GAP SERPL CALCULATED.3IONS-SCNC: 20 MMOL/L (ref 3–16)
BASOPHILS ABSOLUTE: 0 K/UL (ref 0–0.2)
BASOPHILS RELATIVE PERCENT: 0.2 %
BLOOD CULTURE, ROUTINE: NORMAL
BUN BLDV-MCNC: 20 MG/DL (ref 7–20)
CALCIUM SERPL-MCNC: 9.1 MG/DL (ref 8.3–10.6)
CHLORIDE BLD-SCNC: 95 MMOL/L (ref 99–110)
CO2: 24 MMOL/L (ref 21–32)
CREAT SERPL-MCNC: 4.9 MG/DL (ref 0.6–1.2)
CULTURE, BLOOD 2: NORMAL
EOSINOPHILS ABSOLUTE: 0.1 K/UL (ref 0–0.6)
EOSINOPHILS RELATIVE PERCENT: 1 %
GFR AFRICAN AMERICAN: 11
GFR NON-AFRICAN AMERICAN: 9
GLUCOSE BLD-MCNC: 174 MG/DL (ref 70–99)
GLUCOSE BLD-MCNC: 176 MG/DL (ref 70–99)
GLUCOSE BLD-MCNC: 198 MG/DL (ref 70–99)
GLUCOSE BLD-MCNC: 244 MG/DL (ref 70–99)
GLUCOSE BLD-MCNC: 310 MG/DL (ref 70–99)
HCT VFR BLD CALC: 30.7 % (ref 36–48)
HEMOGLOBIN: 9.7 G/DL (ref 12–16)
LYMPHOCYTES ABSOLUTE: 1.5 K/UL (ref 1–5.1)
LYMPHOCYTES RELATIVE PERCENT: 13.2 %
MCH RBC QN AUTO: 28.9 PG (ref 26–34)
MCHC RBC AUTO-ENTMCNC: 31.7 G/DL (ref 31–36)
MCV RBC AUTO: 91.1 FL (ref 80–100)
MONOCYTES ABSOLUTE: 1.2 K/UL (ref 0–1.3)
MONOCYTES RELATIVE PERCENT: 10.2 %
NEUTROPHILS ABSOLUTE: 8.7 K/UL (ref 1.7–7.7)
NEUTROPHILS RELATIVE PERCENT: 75.4 %
PDW BLD-RTO: 21 % (ref 12.4–15.4)
PERFORMED ON: ABNORMAL
PLATELET # BLD: 214 K/UL (ref 135–450)
PMV BLD AUTO: 9.6 FL (ref 5–10.5)
POTASSIUM SERPL-SCNC: 4.4 MMOL/L (ref 3.5–5.1)
RBC # BLD: 3.37 M/UL (ref 4–5.2)
SODIUM BLD-SCNC: 139 MMOL/L (ref 136–145)
WBC # BLD: 11.5 K/UL (ref 4–11)

## 2020-01-30 PROCEDURE — 0JBL0ZZ EXCISION OF RIGHT UPPER LEG SUBCUTANEOUS TISSUE AND FASCIA, OPEN APPROACH: ICD-10-PCS | Performed by: SURGERY

## 2020-01-30 PROCEDURE — 6370000000 HC RX 637 (ALT 250 FOR IP): Performed by: SURGERY

## 2020-01-30 PROCEDURE — 6360000002 HC RX W HCPCS: Performed by: SURGERY

## 2020-01-30 PROCEDURE — 2580000003 HC RX 258: Performed by: NURSE ANESTHETIST, CERTIFIED REGISTERED

## 2020-01-30 PROCEDURE — 2500000003 HC RX 250 WO HCPCS: Performed by: NURSE ANESTHETIST, CERTIFIED REGISTERED

## 2020-01-30 PROCEDURE — 0JBM0ZZ EXCISION OF LEFT UPPER LEG SUBCUTANEOUS TISSUE AND FASCIA, OPEN APPROACH: ICD-10-PCS | Performed by: SURGERY

## 2020-01-30 PROCEDURE — 6360000002 HC RX W HCPCS: Performed by: INTERNAL MEDICINE

## 2020-01-30 PROCEDURE — 80048 BASIC METABOLIC PNL TOTAL CA: CPT

## 2020-01-30 PROCEDURE — 2580000003 HC RX 258: Performed by: SURGERY

## 2020-01-30 PROCEDURE — 6370000000 HC RX 637 (ALT 250 FOR IP): Performed by: INTERNAL MEDICINE

## 2020-01-30 PROCEDURE — 85025 COMPLETE CBC W/AUTO DIFF WBC: CPT

## 2020-01-30 PROCEDURE — 94761 N-INVAS EAR/PLS OXIMETRY MLT: CPT

## 2020-01-30 PROCEDURE — 0JB80ZZ EXCISION OF ABDOMEN SUBCUTANEOUS TISSUE AND FASCIA, OPEN APPROACH: ICD-10-PCS | Performed by: SURGERY

## 2020-01-30 PROCEDURE — 3600000013 HC SURGERY LEVEL 3 ADDTL 15MIN: Performed by: SURGERY

## 2020-01-30 PROCEDURE — 94640 AIRWAY INHALATION TREATMENT: CPT

## 2020-01-30 PROCEDURE — 99233 SBSQ HOSP IP/OBS HIGH 50: CPT | Performed by: INTERNAL MEDICINE

## 2020-01-30 PROCEDURE — 11045 DBRDMT SUBQ TISS EACH ADDL: CPT | Performed by: SURGERY

## 2020-01-30 PROCEDURE — 11042 DBRDMT SUBQ TIS 1ST 20SQCM/<: CPT | Performed by: SURGERY

## 2020-01-30 PROCEDURE — 2500000003 HC RX 250 WO HCPCS: Performed by: SURGERY

## 2020-01-30 PROCEDURE — 6370000000 HC RX 637 (ALT 250 FOR IP): Performed by: NURSE PRACTITIONER

## 2020-01-30 PROCEDURE — 2000000000 HC ICU R&B

## 2020-01-30 PROCEDURE — 2580000003 HC RX 258: Performed by: NURSE PRACTITIONER

## 2020-01-30 PROCEDURE — 94760 N-INVAS EAR/PLS OXIMETRY 1: CPT

## 2020-01-30 PROCEDURE — 2709999900 HC NON-CHARGEABLE SUPPLY: Performed by: SURGERY

## 2020-01-30 PROCEDURE — 3600000012 HC SURGERY LEVEL 2 ADDTL 15MIN: Performed by: SURGERY

## 2020-01-30 PROCEDURE — 7100000001 HC PACU RECOVERY - ADDTL 15 MIN: Performed by: SURGERY

## 2020-01-30 PROCEDURE — 6360000002 HC RX W HCPCS: Performed by: ANESTHESIOLOGY

## 2020-01-30 PROCEDURE — 6360000002 HC RX W HCPCS: Performed by: NURSE ANESTHETIST, CERTIFIED REGISTERED

## 2020-01-30 PROCEDURE — 2500000003 HC RX 250 WO HCPCS: Performed by: INTERNAL MEDICINE

## 2020-01-30 PROCEDURE — 2500000003 HC RX 250 WO HCPCS: Performed by: HOSPITALIST

## 2020-01-30 PROCEDURE — 2700000000 HC OXYGEN THERAPY PER DAY

## 2020-01-30 PROCEDURE — 6360000002 HC RX W HCPCS: Performed by: NURSE PRACTITIONER

## 2020-01-30 PROCEDURE — 3700000000 HC ANESTHESIA ATTENDED CARE: Performed by: SURGERY

## 2020-01-30 PROCEDURE — 7100000000 HC PACU RECOVERY - FIRST 15 MIN: Performed by: SURGERY

## 2020-01-30 PROCEDURE — 3600000002 HC SURGERY LEVEL 2 BASE: Performed by: SURGERY

## 2020-01-30 PROCEDURE — 3600000003 HC SURGERY LEVEL 3 BASE: Performed by: SURGERY

## 2020-01-30 PROCEDURE — 3700000001 HC ADD 15 MINUTES (ANESTHESIA): Performed by: SURGERY

## 2020-01-30 RX ORDER — HYDROMORPHONE HCL 110MG/55ML
0.5 PATIENT CONTROLLED ANALGESIA SYRINGE INTRAVENOUS EVERY 5 MIN PRN
Status: DISCONTINUED | OUTPATIENT
Start: 2020-01-30 | End: 2020-01-30 | Stop reason: HOSPADM

## 2020-01-30 RX ORDER — SODIUM CHLORIDE 0.9 % (FLUSH) 0.9 %
10 SYRINGE (ML) INJECTION PRN
Status: DISCONTINUED | OUTPATIENT
Start: 2020-01-30 | End: 2020-02-05 | Stop reason: HOSPADM

## 2020-01-30 RX ORDER — OXYCODONE HYDROCHLORIDE AND ACETAMINOPHEN 5; 325 MG/1; MG/1
1 TABLET ORAL
Status: DISCONTINUED | OUTPATIENT
Start: 2020-01-30 | End: 2020-01-30 | Stop reason: HOSPADM

## 2020-01-30 RX ORDER — ONDANSETRON 2 MG/ML
4 INJECTION INTRAMUSCULAR; INTRAVENOUS EVERY 6 HOURS PRN
Status: DISCONTINUED | OUTPATIENT
Start: 2020-01-30 | End: 2020-02-05 | Stop reason: HOSPADM

## 2020-01-30 RX ORDER — ONDANSETRON 2 MG/ML
4 INJECTION INTRAMUSCULAR; INTRAVENOUS
Status: DISCONTINUED | OUTPATIENT
Start: 2020-01-30 | End: 2020-01-30 | Stop reason: HOSPADM

## 2020-01-30 RX ORDER — DEXAMETHASONE SODIUM PHOSPHATE 4 MG/ML
INJECTION, SOLUTION INTRA-ARTICULAR; INTRALESIONAL; INTRAMUSCULAR; INTRAVENOUS; SOFT TISSUE PRN
Status: DISCONTINUED | OUTPATIENT
Start: 2020-01-30 | End: 2020-01-30 | Stop reason: SDUPTHER

## 2020-01-30 RX ORDER — MAGNESIUM HYDROXIDE 1200 MG/15ML
LIQUID ORAL CONTINUOUS PRN
Status: COMPLETED | OUTPATIENT
Start: 2020-01-30 | End: 2020-01-30

## 2020-01-30 RX ORDER — HYDROMORPHONE HCL 110MG/55ML
PATIENT CONTROLLED ANALGESIA SYRINGE INTRAVENOUS PRN
Status: DISCONTINUED | OUTPATIENT
Start: 2020-01-30 | End: 2020-01-30 | Stop reason: SDUPTHER

## 2020-01-30 RX ORDER — HYDROMORPHONE HYDROCHLORIDE 1 MG/ML
0.5 INJECTION, SOLUTION INTRAMUSCULAR; INTRAVENOUS; SUBCUTANEOUS EVERY 4 HOURS PRN
Status: DISCONTINUED | OUTPATIENT
Start: 2020-01-30 | End: 2020-02-03

## 2020-01-30 RX ORDER — SUCCINYLCHOLINE/SOD CL,ISO/PF 200MG/10ML
SYRINGE (ML) INTRAVENOUS PRN
Status: DISCONTINUED | OUTPATIENT
Start: 2020-01-30 | End: 2020-01-30 | Stop reason: SDUPTHER

## 2020-01-30 RX ORDER — SODIUM CHLORIDE 0.9 % (FLUSH) 0.9 %
10 SYRINGE (ML) INJECTION EVERY 12 HOURS SCHEDULED
Status: DISCONTINUED | OUTPATIENT
Start: 2020-01-30 | End: 2020-02-05 | Stop reason: HOSPADM

## 2020-01-30 RX ORDER — HYDROMORPHONE HCL 110MG/55ML
0.25 PATIENT CONTROLLED ANALGESIA SYRINGE INTRAVENOUS EVERY 5 MIN PRN
Status: DISCONTINUED | OUTPATIENT
Start: 2020-01-30 | End: 2020-01-30 | Stop reason: HOSPADM

## 2020-01-30 RX ORDER — KETAMINE HCL IN NACL, ISO-OSM 100MG/10ML
SYRINGE (ML) INJECTION PRN
Status: DISCONTINUED | OUTPATIENT
Start: 2020-01-30 | End: 2020-01-30 | Stop reason: SDUPTHER

## 2020-01-30 RX ORDER — LABETALOL HYDROCHLORIDE 5 MG/ML
5 INJECTION, SOLUTION INTRAVENOUS EVERY 10 MIN PRN
Status: DISCONTINUED | OUTPATIENT
Start: 2020-01-30 | End: 2020-01-30 | Stop reason: HOSPADM

## 2020-01-30 RX ORDER — PHENYLEPHRINE HCL IN 0.9% NACL 1 MG/10 ML
SYRINGE (ML) INTRAVENOUS PRN
Status: DISCONTINUED | OUTPATIENT
Start: 2020-01-30 | End: 2020-01-30 | Stop reason: SDUPTHER

## 2020-01-30 RX ORDER — CALCIUM CARBONATE 200(500)MG
500 TABLET,CHEWABLE ORAL 3 TIMES DAILY PRN
Status: DISCONTINUED | OUTPATIENT
Start: 2020-01-30 | End: 2020-02-05 | Stop reason: HOSPADM

## 2020-01-30 RX ORDER — PANTOPRAZOLE SODIUM 40 MG/1
40 TABLET, DELAYED RELEASE ORAL
Status: DISCONTINUED | OUTPATIENT
Start: 2020-01-30 | End: 2020-02-05 | Stop reason: HOSPADM

## 2020-01-30 RX ORDER — ONDANSETRON 2 MG/ML
INJECTION INTRAMUSCULAR; INTRAVENOUS PRN
Status: DISCONTINUED | OUTPATIENT
Start: 2020-01-30 | End: 2020-01-30 | Stop reason: SDUPTHER

## 2020-01-30 RX ORDER — ACETAMINOPHEN 650 MG
TABLET, EXTENDED RELEASE ORAL
Status: COMPLETED | OUTPATIENT
Start: 2020-01-30 | End: 2020-01-30

## 2020-01-30 RX ORDER — SODIUM CHLORIDE 9 MG/ML
INJECTION, SOLUTION INTRAVENOUS CONTINUOUS PRN
Status: DISCONTINUED | OUTPATIENT
Start: 2020-01-30 | End: 2020-01-30 | Stop reason: SDUPTHER

## 2020-01-30 RX ORDER — LIDOCAINE HYDROCHLORIDE 20 MG/ML
INJECTION, SOLUTION EPIDURAL; INFILTRATION; INTRACAUDAL; PERINEURAL PRN
Status: DISCONTINUED | OUTPATIENT
Start: 2020-01-30 | End: 2020-01-30 | Stop reason: SDUPTHER

## 2020-01-30 RX ORDER — HYDRALAZINE HYDROCHLORIDE 20 MG/ML
5 INJECTION INTRAMUSCULAR; INTRAVENOUS EVERY 10 MIN PRN
Status: DISCONTINUED | OUTPATIENT
Start: 2020-01-30 | End: 2020-01-30 | Stop reason: HOSPADM

## 2020-01-30 RX ORDER — PROPOFOL 10 MG/ML
INJECTION, EMULSION INTRAVENOUS PRN
Status: DISCONTINUED | OUTPATIENT
Start: 2020-01-30 | End: 2020-01-30 | Stop reason: SDUPTHER

## 2020-01-30 RX ORDER — MEPERIDINE HYDROCHLORIDE 25 MG/ML
12.5 INJECTION INTRAMUSCULAR; INTRAVENOUS; SUBCUTANEOUS EVERY 5 MIN PRN
Status: DISCONTINUED | OUTPATIENT
Start: 2020-01-30 | End: 2020-01-30 | Stop reason: HOSPADM

## 2020-01-30 RX ADMIN — Medication 200 MCG: at 11:15

## 2020-01-30 RX ADMIN — Medication 10 MG: at 11:43

## 2020-01-30 RX ADMIN — Medication 100 MCG: at 10:50

## 2020-01-30 RX ADMIN — Medication 10 ML: at 04:39

## 2020-01-30 RX ADMIN — Medication 10 MG: at 11:05

## 2020-01-30 RX ADMIN — Medication 10 MG: at 11:00

## 2020-01-30 RX ADMIN — Medication 0.2 MCG/KG/MIN: at 06:00

## 2020-01-30 RX ADMIN — Medication 0.2 MCG/KG/MIN: at 08:33

## 2020-01-30 RX ADMIN — Medication 200 MCG: at 11:21

## 2020-01-30 RX ADMIN — HYDROMORPHONE HYDROCHLORIDE 0.5 MG: 1 INJECTION, SOLUTION INTRAMUSCULAR; INTRAVENOUS; SUBCUTANEOUS at 22:44

## 2020-01-30 RX ADMIN — FORMOTEROL FUMARATE DIHYDRATE 20 MCG: 20 SOLUTION RESPIRATORY (INHALATION) at 20:09

## 2020-01-30 RX ADMIN — DEXAMETHASONE SODIUM PHOSPHATE 8 MG: 4 INJECTION, SOLUTION INTRAMUSCULAR; INTRAVENOUS at 10:45

## 2020-01-30 RX ADMIN — INSULIN LISPRO 2 UNITS: 100 INJECTION, SOLUTION INTRAVENOUS; SUBCUTANEOUS at 08:29

## 2020-01-30 RX ADMIN — INSULIN LISPRO 8 UNITS: 100 INJECTION, SOLUTION INTRAVENOUS; SUBCUTANEOUS at 18:18

## 2020-01-30 RX ADMIN — HYDROMORPHONE HYDROCHLORIDE 0.5 MG: 1 INJECTION, SOLUTION INTRAMUSCULAR; INTRAVENOUS; SUBCUTANEOUS at 18:17

## 2020-01-30 RX ADMIN — TAZOBACTAM SODIUM AND PIPERACILLIN SODIUM 3.38 G: 375; 3 INJECTION, SOLUTION INTRAVENOUS at 04:19

## 2020-01-30 RX ADMIN — SEVELAMER CARBONATE 1.6 G: 800 POWDER, FOR SUSPENSION ORAL at 18:17

## 2020-01-30 RX ADMIN — Medication 0.1 MCG/KG/MIN: at 21:36

## 2020-01-30 RX ADMIN — FERROUS SULFATE TAB 325 MG (65 MG ELEMENTAL FE) 325 MG: 325 (65 FE) TAB at 18:17

## 2020-01-30 RX ADMIN — HYDROMORPHONE HYDROCHLORIDE 0.5 MG: 1 INJECTION, SOLUTION INTRAMUSCULAR; INTRAVENOUS; SUBCUTANEOUS at 14:15

## 2020-01-30 RX ADMIN — BUDESONIDE 500 MCG: 0.5 SUSPENSION RESPIRATORY (INHALATION) at 20:09

## 2020-01-30 RX ADMIN — ANTACID TABLETS 500 MG: 500 TABLET, CHEWABLE ORAL at 21:16

## 2020-01-30 RX ADMIN — HYDROMORPHONE HYDROCHLORIDE 0.2 MG: 2 INJECTION, SOLUTION INTRAMUSCULAR; INTRAVENOUS; SUBCUTANEOUS at 10:38

## 2020-01-30 RX ADMIN — FORMOTEROL FUMARATE DIHYDRATE 20 MCG: 20 SOLUTION RESPIRATORY (INHALATION) at 08:38

## 2020-01-30 RX ADMIN — HYDROMORPHONE HYDROCHLORIDE 0.5 MG: 1 INJECTION, SOLUTION INTRAMUSCULAR; INTRAVENOUS; SUBCUTANEOUS at 00:09

## 2020-01-30 RX ADMIN — BUDESONIDE 500 MCG: 0.5 SUSPENSION RESPIRATORY (INHALATION) at 08:38

## 2020-01-30 RX ADMIN — Medication 10 ML: at 08:29

## 2020-01-30 RX ADMIN — INSULIN LISPRO 2 UNITS: 100 INJECTION, SOLUTION INTRAVENOUS; SUBCUTANEOUS at 21:16

## 2020-01-30 RX ADMIN — ONDANSETRON 4 MG: 2 INJECTION INTRAMUSCULAR; INTRAVENOUS at 10:45

## 2020-01-30 RX ADMIN — PROPOFOL 80 MG: 10 INJECTION, EMULSION INTRAVENOUS at 10:38

## 2020-01-30 RX ADMIN — HYDROMORPHONE HYDROCHLORIDE 0.2 MG: 2 INJECTION, SOLUTION INTRAMUSCULAR; INTRAVENOUS; SUBCUTANEOUS at 12:04

## 2020-01-30 RX ADMIN — PREGABALIN 50 MG: 25 CAPSULE ORAL at 14:15

## 2020-01-30 RX ADMIN — Medication 120 MG: at 10:38

## 2020-01-30 RX ADMIN — HYDROMORPHONE HYDROCHLORIDE 0.2 MG: 2 INJECTION, SOLUTION INTRAMUSCULAR; INTRAVENOUS; SUBCUTANEOUS at 10:31

## 2020-01-30 RX ADMIN — APIXABAN 2.5 MG: 5 TABLET, FILM COATED ORAL at 21:16

## 2020-01-30 RX ADMIN — PREGABALIN 50 MG: 25 CAPSULE ORAL at 21:16

## 2020-01-30 RX ADMIN — SODIUM CHLORIDE: 9 INJECTION, SOLUTION INTRAVENOUS at 10:31

## 2020-01-30 RX ADMIN — TAZOBACTAM SODIUM AND PIPERACILLIN SODIUM 3.38 G: 375; 3 INJECTION, SOLUTION INTRAVENOUS at 14:14

## 2020-01-30 RX ADMIN — INSULIN GLARGINE 20 UNITS: 100 INJECTION, SOLUTION SUBCUTANEOUS at 21:16

## 2020-01-30 RX ADMIN — Medication 200 MCG: at 11:10

## 2020-01-30 RX ADMIN — HYDROMORPHONE HYDROCHLORIDE 0.5 MG: 2 INJECTION, SOLUTION INTRAMUSCULAR; INTRAVENOUS; SUBCUTANEOUS at 13:00

## 2020-01-30 RX ADMIN — HYDROMORPHONE HYDROCHLORIDE 0.5 MG: 1 INJECTION, SOLUTION INTRAMUSCULAR; INTRAVENOUS; SUBCUTANEOUS at 04:39

## 2020-01-30 RX ADMIN — Medication 10 ML: at 22:44

## 2020-01-30 RX ADMIN — LIDOCAINE HYDROCHLORIDE 60 MG: 20 INJECTION, SOLUTION EPIDURAL; INFILTRATION; INTRACAUDAL; PERINEURAL at 10:38

## 2020-01-30 ASSESSMENT — PAIN SCALES - GENERAL
PAINLEVEL_OUTOF10: 9
PAINLEVEL_OUTOF10: 8
PAINLEVEL_OUTOF10: 6
PAINLEVEL_OUTOF10: 8
PAINLEVEL_OUTOF10: 8
PAINLEVEL_OUTOF10: 0
PAINLEVEL_OUTOF10: 10
PAINLEVEL_OUTOF10: 8
PAINLEVEL_OUTOF10: 8
PAINLEVEL_OUTOF10: 7
PAINLEVEL_OUTOF10: 0
PAINLEVEL_OUTOF10: 9

## 2020-01-30 ASSESSMENT — PULMONARY FUNCTION TESTS
PIF_VALUE: 25
PIF_VALUE: 33
PIF_VALUE: 36
PIF_VALUE: 30
PIF_VALUE: 23
PIF_VALUE: 25
PIF_VALUE: 6
PIF_VALUE: 5
PIF_VALUE: 37
PIF_VALUE: 25
PIF_VALUE: 30
PIF_VALUE: 30
PIF_VALUE: 38
PIF_VALUE: 20
PIF_VALUE: 5
PIF_VALUE: 33
PIF_VALUE: 0
PIF_VALUE: 20
PIF_VALUE: 25
PIF_VALUE: 31
PIF_VALUE: 7
PIF_VALUE: 20
PIF_VALUE: 26
PIF_VALUE: 37
PIF_VALUE: 10
PIF_VALUE: 39
PIF_VALUE: 25
PIF_VALUE: 21
PIF_VALUE: 30
PIF_VALUE: 30
PIF_VALUE: 25
PIF_VALUE: 23
PIF_VALUE: 34
PIF_VALUE: 20
PIF_VALUE: 20
PIF_VALUE: 29
PIF_VALUE: 25
PIF_VALUE: 39
PIF_VALUE: 25
PIF_VALUE: 25
PIF_VALUE: 31
PIF_VALUE: 33
PIF_VALUE: 30
PIF_VALUE: 23
PIF_VALUE: 33
PIF_VALUE: 1
PIF_VALUE: 30
PIF_VALUE: 26
PIF_VALUE: 31
PIF_VALUE: 29
PIF_VALUE: 20
PIF_VALUE: 20
PIF_VALUE: 26
PIF_VALUE: 33
PIF_VALUE: 30
PIF_VALUE: 25
PIF_VALUE: 37
PIF_VALUE: 20
PIF_VALUE: 30
PIF_VALUE: 25
PIF_VALUE: 30
PIF_VALUE: 37
PIF_VALUE: 34
PIF_VALUE: 1
PIF_VALUE: 30
PIF_VALUE: 36
PIF_VALUE: 37
PIF_VALUE: 20
PIF_VALUE: 1
PIF_VALUE: 36
PIF_VALUE: 37
PIF_VALUE: 37
PIF_VALUE: 30
PIF_VALUE: 26
PIF_VALUE: 20
PIF_VALUE: 8
PIF_VALUE: 7
PIF_VALUE: 35
PIF_VALUE: 30
PIF_VALUE: 27
PIF_VALUE: 36
PIF_VALUE: 34
PIF_VALUE: 25
PIF_VALUE: 30
PIF_VALUE: 21
PIF_VALUE: 37
PIF_VALUE: 37
PIF_VALUE: 7
PIF_VALUE: 37
PIF_VALUE: 30
PIF_VALUE: 25

## 2020-01-30 NOTE — PROGRESS NOTES
Patient admitted to PACU via bed, arouses to stimuli, moves ext to command. respirations adeq on 6L o2 per simple mask spo2 99%. Skin warm and dry with good color. abd soft. drsg dry and intact. Patient denies pain at this time, will continue to monitor.

## 2020-01-30 NOTE — PROGRESS NOTES
Reassessment complete, vitals cycling. Levophed continues. Pt c/o pain 8/10. PRN dilaudid given, see MAR. Pt declines repositioning. Reinforced NPO status, cups removed from bedside. Pt denies needs. All light in reach.

## 2020-01-30 NOTE — PROGRESS NOTES
Pt returned to ICU 3911 from OR in bed, RN escort. VSS, levo continues. Surgical dressings clean/dry/intact. Pt on 2L of O2, put on room air, tolerating well. Pt c/o pain 9/10, PRN orders completely removed from [facility] Call to Dr. Eduardo Nicole, recorded what pt was previously on. PRN dilaudid and zofran given to pt, see MAR. Repositioned, no further needs at this time, bed alarm on, call light in reach, will continue to monitor.

## 2020-01-30 NOTE — ANESTHESIA PRE PROCEDURE
[facility] Department of Anesthesiology  Pre-Anesthesia Evaluation/Consultation       Name:  Ranjith Kelley  : 1955  Age:  72 y.o.                                            MRN:  [id number]  Date: 2020           Surgeon: Surgeon(s):  Mary Jo Adams MD    Procedure: Procedure(s):  INCISION AND DRAINAGE OF ABDOMINAL WOUND AND BILATERAL HIP WOUNDS     Allergies   Allergen Reactions    Codeine Nausea Only    Fentanyl Itching    Morphine      CHEST PAIN    Tramadol Hcl Other (See Comments)     Causes pt to have involuntary movements    Hydrocodone-Acetaminophen Itching and Rash    Percocet [Oxycodone-Acetaminophen] Itching    Procardia [Nifedipine] Nausea And Vomiting     Headache  Takes norvasc at home 12/22/15    Vicodin [Hydrocodone-Acetaminophen] Rash     Patient Active Problem List   Diagnosis    Moderate asthma with exacerbation    Colon polyps    Microalbuminuria    SOB (shortness of breath)    Venous stasis of lower extremity    Obstructive sleep apnea on CPAP    Chronic diastolic CHF (congestive heart failure) (HCC)    Renal osteodystrophy    Poorly controlled type 2 diabetes mellitus (HCC)    History of pulmonary embolism    Anemia of chronic kidney failure (HCC)    Primary osteoarthritis of both knees    Essential hypertension, malignant    Restrictive lung disease    Gastroesophageal reflux disease    LVH (left ventricular hypertrophy)    Dyspnea and respiratory abnormalities    Acute on chronic respiratory failure with hypoxia and hypercapnia (HCC)    Obesity hypoventilation syndrome ([address]    Morbid obesity due to excess calories (HCC)    COPD, moderate (HCC)    Gout    Warfarin-induced coagulopathy (HCC)    Chronic hypoxemic respiratory failure (HCC)    Constipation    DM (diabetes mellitus), secondary, uncontrolled, w/neurologic complic ([address]    Elevated troponin    Diabetes education, encounter for    Primary osteoarthritis of left hip    ESRD (end stage renal disease) on dialysis ([address]    Chronic pain syndrome    Chest pain    Recurrent ventral hernia    Hypoglycemia    ESRD on hemodialysis ([address]    Diabetic polyneuropathy associated with type 2 diabetes mellitus ([address]    Infection of arteriovenous graft for hemodialysis ([address]    Essential hypertension    Dialysis patient ([address]    Postoperative pain    Other complication of arteriovenous dialysis fistula (HCC)    Weight loss counseling, encounter for    Respiratory failure ([address]    Chronic obstructive pulmonary disease ([address]    Pulmonary embolism (HCC)    Chronic pain of left lower extremity    Severe anemia    Cerebrovascular accident (CVA) ([address]    HTN (hypertension), benign    Vision loss of right eye    Open wound    Calciphylaxis    Cellulitis    Abdominal wall cellulitis    Opiate dependence, continuous [facility]    History of arterial ischemic stroke    Anaerobic cellulitis ([facility])    Chronic abdominal wound infection, sequela    Wound infection     Past Medical History:   Diagnosis Date    Abdominal pain 8/20/2018    Acute on chronic congestive heart failure ([address] 6/26/2014    Asthma     Calcinosis cutis     Calciphylaxis 12/2/2019    Cervical cancer ([address]     Chest pain 5/16/2018    CHF (congestive heart failure) ([facility])     Chronic kidney disease, stage IV (severe) ([facility])     Dr Diane Bowles    Chronic pain syndrome 3/27/2018    Chronic respiratory failure with hypoxia ([facility])     CKD (chronic kidney disease) stage 4, GFR 15-29 ml/min ([facility]) 6/30/2017    Colon polyps     COPD (chronic obstructive pulmonary disease) ([address]     Degenerative disc disease at L5-S1 level 6/18/2013     CT    Diabetes mellitus, insulin dependent (IDDM), uncontrolled ([address]     Diabetic polyneuropathy associated with type 2 diabetes mellitus ([address] 3/26/2019    Elevated troponin 9/9/2017    ESRD (end stage renal disease) on dialysis ([address] 02/14/2018    JU LOPES (gastroesophageal reflux disease)     Gout     History of blood transfusion     History of cervical cancer     Hyperlipidemia     Hypertension     Incarcerated ventral hernia     LVH (left ventricular hypertrophy)     Morbid obesity (HCC)     Mucus plugging of bronchi     Obstructive sleep apnea on CPAP     wears 2L O2 and BIPAP at night    Pneumonia     Psychiatric problem     breakdown long time ago but not current    Pulmonary embolism ([facility] 02/06/2013    Type 2 diabetes mellitus with diabetic neuropathy, with long-term current use of insulin ([facility] 9/12/2017    Urinary incontinence in female     Venous stasis of lower extremity      Past Surgical History:   Procedure Laterality Date    ABDOMEN SURGERY N/A 12/26/2019    INCISION AND DRAINAGE OF ABDOMINAL WOUND AND BILATERAL HIP WOUNDS performed by Ratna Silva MD at [facility]  1/14    Grace; clean cors    COLONOSCOPY  2010    polyp removed; [place]; repeat 5 years    COLONOSCOPY  6/25/13, 11/14    [place]    COLONOSCOPY N/A 10/10/2019    COLONOSCOPY POLYPECTOMY SNARE/COLD BIOPSY performed by Madi Jeronimo MD at [address] Right 3/28/2019    RIGHT BRACHIO CEPHALIC FISTULA CREATION performed by Blanca Hughes MD at [address]  2/21/09    LVH with global hypokinesis; EF 55-60%    HYSTERECTOMY      INCISION AND DRAINAGE Right 8/22/2019    RIGHT ARM INCISION AND DEBRIDEMENT performed by Blanca Hughes MD at [facility] Right 1999    OTHER SURGICAL HISTORY  02/22/2018    LEFT brachial artery axillary vein AV graft     MD OPEN SKULL SUPRATENT EXPLORE      Craniotomy, 3/21/19 patient denies any brain surgery or craniotomy    MD REPAIR INCISIONAL HERNIA,REDUCIBLE N/A 11/20/2018    LAPAROSCOPIC VENTRAL HERNIA REPAIR WITH MESH performed by Godwin Kaplan MD at [address] Right 7/18/2019    RIGHT BRACHIAL ARTERY AXILLARY VEIN GRAFT AND LIGATION OF RIGHT BRACHIO-CEPHALIC FISTULA ([id number], [id number]) performed by Effie Gold MD at [place] [address]  10/96    TUNNELED VENOUS PORT PLACEMENT Right 11/2019    UPPER GASTROINTESTINAL ENDOSCOPY  6/25/13    UPPER GASTROINTESTINAL ENDOSCOPY N/A 11/21/2019    EGD BIOPSY performed by Ivette Zuñiga MD at [facility]  12/24/2016    Incarcerated ventral hernia repair with mesh     Social History     Tobacco Use    Smoking status: Never Smoker    Smokeless tobacco: Never Used   Substance Use Topics    Alcohol use: No     Alcohol/week: 0.0 standard drinks    Drug use: No     Medications  Current Facility-Administered Medications on File Prior to Visit   Medication Dose Route Frequency Provider Last Rate Last Dose    norepinephrine (LEVOPHED) 16 mg in dextrose 5% 250 mL infusion  2 mcg/min Intravenous Continuous Harmanarlen Tucker MD 25.1 mL/hr at 01/30/20 0600 0.2 mcg/kg/min at 01/30/20 0600    HYDROmorphone HCl PF (DILAUDID) injection 0.5 mg  0.5 mg Intravenous Q4H PRN Bijan Fonseca MD   0.5 mg at 01/30/20 0439    piperacillin-tazobactam (ZOSYN) 3.375 g in dextrose 50 mL IVPB extended infusion (premix)  3.375 g Intravenous Q12H Bijan Fonseca MD 12.5 mL/hr at 01/30/20 0419 3.375 g at 01/30/20 0419    lip balm petroleum free (PHYTOPLEX) stick   Topical PRN Bijan Fonseca MD        ipratropium-albuterol (DUONEB) nebulizer solution 1 ampule  1 ampule Inhalation Q2H PRN Manjula Alston MD        nebivolol (BYSTOLIC) tablet 2.5 mg  2.5 mg Oral 2 times per day on Mon Wed Fri Eusebio Sanderson MD   Stopped at 01/29/20 2059    nebivolol (BYSTOLIC) tablet 5 mg  5 mg Oral 2 times per day on Sun Tue Thu Sat Eusebio Sanderson MD   5 mg at 01/28/20 2317    sevelamer (RENVELA) packet 1.6 g  1.6 g Oral TID  Bijan Fonseca MD   1.6 g at 01/28/20 1840    norepinephrine (LEVOPHED) 16 mg in dextrose 5% 250 mL infusion  0.07 mcg/kg/min Intravenous Continuous Siddharth Mckenzie MD 30.5 mL/hr at 01/30/20 0249 0.22 mcg/kg/min at 01/30/20 0249    HYDROmorphone (DILAUDID) tablet 0.5 mg  0.5 mg Oral Q8H PRN Bessie Dawson MD   0.5 mg at 01/29/20 2123    budesonide (PULMICORT) nebulizer suspension 500 mcg  500 mcg Nebulization BID [facility], APRN - CNP   500 mcg at 01/29/20 2036    apixaban (ELIQUIS) tablet 2.5 mg  2.5 mg Oral BID Teddi Denver, APRN - CNP   2.5 mg at 01/29/20 2123    escitalopram (LEXAPRO) tablet 10 mg  10 mg Oral Daily Teddi Denver, APRN - CNP   10 mg at 01/29/20 1237    ferrous sulfate tablet 325 mg  325 mg Oral TID [facility], APR[facility] - CNP   325 mg at 01/29/20 1630    formoterol (PERFOROMIST) nebulizer solution 20 mcg  20 mcg Nebulization BID Teddi Denver, APRN - CNP   20 mcg at 01/29/20 2036    insulin glargine (LANTUS) injection pen 20 Units  20 Units Subcutaneous Nightly [facility], APRN - CNP   20 Units at 01/29/20 2133    traZODone (DESYREL) tablet 100 mg  100 mg Oral Nightly PRN Teddi Denver, APRN - CNP   100 mg at 01/27/20 0049    pregabalin (LYRICA) capsule 50 mg  50 mg Oral TID Teddi Denver, APRN - CNP   50 mg at 01/29/20 2123    insulin lispro (1 Unit Dial) 0-12 Units  0-12 Units Subcutaneous TID [facility], APRN - CNP   2 Units at 01/29/20 5306    insulin lispro (1 Unit Dial) 0-6 Units  0-6 Units Subcutaneous Nightly [facility], APRN - CNP   1 Units at 01/29/20 2133    glucose (GLUTOSE) 40 % oral gel 15 g  15 g Oral PRN [facility] [place], APRN - CNP        dextrose 50 % IV solution  12.5 g Intravenous PRN [facility], APRN - CNP        glucagon (rDNA) injection 1 mg  1 mg Intramuscular PRN Teddi Denver, APRN - CNP        dextrose 5 % solution  100 mL/hr Intravenous PRN Teddi Denver, APRN - CNP        collagenase ointment   Topical Daily Lele Marie MD        sodium thiosulfate 25 % injection 25 g  25 g Intravenous Q MWF Siddharth Mckenzie MD 25 g at 01/28/20 2158    perflutren lipid microspheres (DEFINITY) injection 1.65 mg  1.5 mL Intravenous ONCE PRN Tadeo Boo MD        lidocaine (XYLOCAINE) 4 % external solution   Topical Once Aubrie Pete MD        silver nitrate applicators applicator 10 each  10 each Topical Once Aubrie Pete MD        sodium chloride flush 0.9 % injection 10 mL  10 mL Intravenous 2 times per day Enoc Bound, APRN - CNP   10 mL at 01/29/20 1239    sodium chloride flush 0.9 % injection 10 mL  10 mL Intravenous PRN Enoc Bound, APRN - CNP   10 mL at 01/30/20 0439    magnesium hydroxide (MILK OF MAGNESIA) 400 MG/5ML suspension 30 mL  30 mL Oral Daily PRN Enoc Bound, APRN - CNP        ondansetron [facility]) injection 4 mg  4 mg Intravenous Q6H PRN Enoc Bound, APRN - CNP        acetaminophen (TYLENOL) tablet 650 mg  650 mg Oral Q6H PRN Enoc Bound, APRN - CNP   650 mg at 01/28/20 0940     Current Outpatient Medications on File Prior to Visit   Medication Sig Dispense Refill    HYDROmorphone (DILAUDID) 2 MG tablet Take 2 mg by mouth every 12 hours.  HYDROmorphone (DILAUDID) 2 MG tablet Take 2 mg by mouth every 6 hours as needed for Pain.  lidocaine viscous hcl (XYLOCAINE) 2 % SOLN solution       ferrous sulfate 325 (65 Fe) MG tablet Take 325 mg by mouth 3 times daily (with meals)      collagenase 250 UNIT/GM ointment Apply topically daily Apply topically daily. Left breat and abdominal area      insulin glargine (LANTUS) 100 UNIT/ML injection pen Inject 20 Units into the skin nightly 5 pen 3    HUMALOG KWIKPEN 100 UNIT/ML SOPN Inject 0-12 Units into the skin 3 times daily (before meals) 15 mL 5    apixaban (ELIQUIS) 2.5 MG TABS tablet Take 1 tablet by mouth 2 times daily 60 tablet 1    sevelamer (RENVELA) 800 MG tablet Take 2 tablets by mouth 3 times daily (with meals) 90 tablet 3    pregabalin (LYRICA) 50 MG capsule Take 1 capsule by mouth 3 times daily for 180 days.  80 per day on Sun Tue Thu Sat Jeannette Ivan MD   5 mg at 01/28/20 2317    sevelamer (RENVELA) packet 1.6 g  1.6 g Oral TID  Leonila Chavez MD   1.6 g at 01/28/20 1840    norepinephrine (LEVOPHED) 16 mg in dextrose 5% 250 mL infusion  0.07 mcg/kg/min Intravenous Continuous Jeannette Ivan MD 30.5 mL/hr at 01/30/20 0249 0.22 mcg/kg/min at 01/30/20 0249    HYDROmorphone (DILAUDID) tablet 0.5 mg  0.5 mg Oral Q8H PRN Maria Luisa Murphy MD   0.5 mg at 01/29/20 2123    budesonide (PULMICORT) nebulizer suspension 500 mcg  500 mcg Nebulization BID Gaetana Asp, APRN - CNP   500 mcg at 01/29/20 2036    apixaban (ELIQUIS) tablet 2.5 mg  2.5 mg Oral BID Gaetana Asp, APRN - CNP   2.5 mg at 01/29/20 2123    escitalopram (LEXAPRO) tablet 10 mg  10 mg Oral Daily Gaetana Asp, APRN - CNP   10 mg at 01/29/20 1237    ferrous sulfate tablet 325 mg  325 mg Oral TID  Gaetana Asp, APRN - CNP   325 mg at 01/29/20 1630    formoterol (PERFOROMIST) nebulizer solution 20 mcg  20 mcg Nebulization BID Gaetana Asp, APRN - CNP   20 mcg at 01/29/20 2036    insulin glargine (LANTUS) injection pen 20 Units  20 Units Subcutaneous Nightly Gaetana Asp, APRN - CNP   20 Units at 01/29/20 2133    traZODone (DESYREL) tablet 100 mg  100 mg Oral Nightly PRN Gaetana Asp, APRN - CNP   100 mg at 01/27/20 0049    pregabalin (LYRICA) capsule 50 mg  50 mg Oral TID Gaetana Asp, APRN - CNP   50 mg at 01/29/20 2123    insulin lispro (1 Unit Dial) 0-12 Units  0-12 Units Subcutaneous TID  Gaetana Asp, APRN - CNP   2 Units at 01/29/20 9577    insulin lispro (1 Unit Dial) 0-6 Units  0-6 Units Subcutaneous Nightly Gaetana Asp, APRN - CNP   1 Units at 01/29/20 9469    glucose (GLUTOSE) 40 % oral gel 15 g  15 g Oral PRN Gaetana Asp, APRN - CNP        dextrose 50 % IV solution  12.5 g Intravenous PRN Gaetana Asp, APRN - CNP        glucagon (rDNA) injection 1 mg  1 mg Intramuscular PRN Burnice Ganser Jcarlos, JURGEN - CNP        dextrose 5 % solution  100 mL/hr Intravenous PRN Baltazar Pata, APRN - CNP        collagenase ointment   Topical Daily Lolita Oviedo MD        sodium thiosulfate 25 % injection 25 g  25 g Intravenous Q MWF Loida Lozoya MD   25 g at 20 2158    perflutren lipid microspheres (DEFINITY) injection 1.65 mg  1.5 mL Intravenous ONCE PRN Gorge Willis MD        lidocaine (XYLOCAINE) 4 % external solution   Topical Once Lolita Oviedo MD        silver nitrate applicators applicator 10 each  10 each Topical Once Lolita Oviedo MD        sodium chloride flush 0.9 % injection 10 mL  10 mL Intravenous 2 times per day Baltazar Pata, APRN - CNP   10 mL at 20 1239    sodium chloride flush 0.9 % injection 10 mL  10 mL Intravenous PRN Baltazar Pata, APRN - CNP   10 mL at 20 0439    magnesium hydroxide (MILK OF MAGNESIA) 400 MG/5ML suspension 30 mL  30 mL Oral Daily PRN Baltazar Pata, APRN - CNP        ondansetron [facility]) injection 4 mg  4 mg Intravenous Q6H PRN Baltazar Pata, APRN - CNP        acetaminophen (TYLENOL) tablet 650 mg  650 mg Oral Q6H PRN Baltazar Pata, APRN - CNP   650 mg at 20 0940     Vital Signs (Current)   There were no vitals filed for this visit.   Vital Signs Statistics (for past 48 hrs)     Temp  Av.8 °F (36.6 °C)  Min: 97 °F (36.1 °C)   Min taken time: 20 0400  Max: 98.9 °F (37.2 °C)   Max taken time: 20 0004  Pulse  Av.3  Min: 79   Min taken time: 20 0700  Max: 99   Max taken time: 20 1500  Resp  Av.9  Min: 8   Min taken time: 20 0500  Max: 29   Max taken time: 20 0001  BP  Min: 50/34   Min taken time: 20 0601  Max: 145/61   Max taken time: 20 2116  MAP (mmHg)  Av.3  Min: 23   Min taken time: 20 0153  Max: 112   Max taken time: 20 1901  SpO2  Av %  Min: 75 %   Min taken time: 20 2046  Max: 100 %   Max taken time: 20 0700    BP

## 2020-01-30 NOTE — BRIEF OP NOTE
[facility] [facility] and Laparoscopic Surgery  Brief Operative Note  Pt Name: Manuel Noonan  CSN: [id number]  YOB: 1955    Date of Procedure: 1/30/2020    Pre-operative Diagnosis: Infected bilateral hip and abdominal stage 3 decubitus ulcers    Post-operative Diagnosis: same     Procedure: Sharp excisional debridement of skin, subcutaneous tissue of proximal right hip wound (13x8cm), distal right hip wound ([id number]), abdominal wound ([id number]), left hip wound ([id number])    Surgeon(s):  Chang Mccarthy MD     Anesthesia:  General anesthesia    Findings: Full note dictated    Estimated Blood Loss: less than 50  ml    Complications: None    Specimens: none  * No specimens in log *     Implants:  * No implants in log *    Drains: none   Negative Pressure Wound Therapy Hip Left (Active)   Wound Type Pressure ulcer: Stage III 1/3/2020  4:30 AM   Number of pieces used 1 12/28/2019  8:00 AM   Target Pressure (mmHg) 125 12/30/2019  4:46 AM   Dressing Status Changed;Dry; Intact; Clean 1/3/2020  4:02 PM   Dressing Changed Changed/New 1/3/2020  4:02 PM   Output (ml) 40 ml 1/3/2020  4:02 PM   Wound Assessment Yellow;Black;Dark edges;Brown 1/3/2020  4:02 PM       Negative Pressure Wound Therapy Abdomen Lower;Medial (Active)   Wound Type Other (Comment) 1/29/2020  4:00 PM   Dressing Status Old drainage 1/29/2020  4:00 PM   Output (ml) 10 ml 1/3/2020  4:02 PM   Wound Assessment Pink;Yellow 1/29/2020  4:00 PM       Negative Pressure Wound Therapy Hip Left (Active)   Wound Type Pressure ulcer: Stage III 1/3/2020  4:30 AM   Dressing Type Black foam 1/3/2020  4:30 AM   Number of pieces used 5 12/28/2019  8:00 AM   Target Pressure (mmHg) 125 12/30/2019  4:46 AM   Dressing Status Clean;Dry; Intact 1/3/2020  4:30 AM       Condition: stable     Disposition and Post-operative plan: PACU, ICU     Pop Nicole MD, FACS  1/30/2020  11:49 AM

## 2020-01-30 NOTE — PROGRESS NOTES
Assessment complete, vitals cycling. Levophed infusing for BP support, see MAR. BP fluctuates, Pt frequently moving arm BP is cycling on, even removing cuff @ times. Explained importance of keeping BP cuff on. Pt A/O x4. Consent for debridement signed & placed on chart. Pt c/o wound pain 9/10. PRN dilaudid and scheduled meds given, see MAR. Pt on 2L NC, lungs clear / diminished. + bowel sounds. Skin warm and dry. Pt refuses turning / repositioning or any wound assessment. Unsure what wounds pt has as she refuses to allow assessment. Dressing noted to abdomen and right hip. Right hip dressing w/ small amount of dark red drainage. Pt denies further needs, call light in reach. Safety precautions in place.

## 2020-01-30 NOTE — PROGRESS NOTES
Reassessment complete, see flow sheets. VSS, pt tolerating room air, pain improved. Refusing repositioning, educated pt on risk of skin breakdown. No acute changes, no further needs at this time, bed alarm on, call light in reach, will continue to monitor.

## 2020-01-30 NOTE — PROGRESS NOTES
Reassessment complete, vitals cycling. Weaning levophed as able, see MAR. AM labs drawn from port w/o difficulty. Pt c/o wound pain 9/10. PRN dilaudid given, see MAR. Pt declined repositioning. Denies needs at this time. Call light in reach.  Safety precautions in place

## 2020-01-30 NOTE — PROGRESS NOTES
Hospitalist Progress Note      PCP: Nino Celis APRN - CNP    Date of Admission: 1/26/2020    Chief Complaint: chest pain     Hospital Course:   72 y.o. female with PMHx of abdominal pain, chronic CHF, asthma, calciphylaxis, cervical cancer, chest pain, chronic kidney disease, Stage IV (HD- MWF), chronic respiratory failure with hypoxia, COPD, DM, polyneuropathy,HLD, HTN, PE, venous stasis who   presented to [facility] with above extensive history and recent admission in January for chronic wound who has been experiencing SOB associated with some reproducible chest pain, \"so bad I could hardly move. \"  She is on home oxygen now since her discharge and is at 4L with sats 98%. She denies dizziness or lightheadedness, nausea or vomiting or headaches with her chest pain. She denies any activity or exertion that caused the pain, although it appears musculoskeletal to me.     She endorses decreased appetite. She states she has not been getting up at the NH either and states when she was here she was working with PT/OT and was at least getting out of bed.      She states she is very unhappy with the care she is receiving at her [address], and  states he found her in her own stool where she laid for 30 mins after calling asking for help to get a bedpan.       Given her history of CHF, HTN, HLD and DM and cardiac history, we will bring her in under Observation and have Cardiology see her.   She follows with Dr. Brad Beltran.        Subjective:   Doing well CP resolved  Now just pain from calciphylaxis      Medications:  Reviewed    Infusion Medications    norepinephrine 0.18 mcg/kg/min (01/30/20 1528)    dextrose       Scheduled Medications    piperacillin-tazobactam  3.375 g Intravenous Q12H    nebivolol  2.5 mg Oral 2 times per day on Mon Wed Fri    nebivolol  5 mg Oral 2 times per day on Sun Tue Thu Sat    sevelamer  1.6 g Oral TID     budesonide  500 mcg Nebulization BID skin cloth in right groin/skin fold, Skin color, texture, turgor normal.  No rashes or lesions. Neurologic:  Neurovascularly intact without any focal sensory/motor deficits. Cranial nerves: II-XII intact, grossly intact. No facial asymmetry, tongue midline. Psychiatric:  Alert and oriented, thought content appropriate  Capillary Refill: Brisk,< 3 seconds   Peripheral Pulses: +2 palpable, equal bilaterally           Labs:   Recent Labs     01/28/20  0600 01/29/20  0508 01/30/20  0425   WBC 11.3* 12.5* 11.5*   HGB 8.6* 9.6* 9.7*   HCT 27.1* 30.1* 30.7*    205 214     Recent Labs     01/28/20  0600 01/29/20  0508 01/30/20  0425    139 139   K 4.4 4.6 4.4   CL 88* 89* 95*   CO2 20* 21 24   BUN 38* 32* 20   CREATININE 8.2* 7.2* 4.9*   CALCIUM 9.6 9.2 9.1   PHOS 2.4*  --   --      No results for input(s): AST, ALT, BILIDIR, BILITOT, ALKPHOS in the last 72 hours. No results for input(s): INR in the last 72 hours. No results for input(s): Ryder [place] in the last 72 hours. Urinalysis:      Lab Results   Component Value Date    NITRU POSITIVE 11/25/2018    WBCUA see below 11/25/2018    BACTERIA 3+ 11/25/2018    RBCUA see below 11/25/2018    BLOODU TRACE-INTACT 11/25/2018    SPECGRAV >=1.030 11/25/2018    GLUCOSEU 100 11/25/2018    GLUCOSEU NEGATIVE 11/23/2010       Radiology:  CT ABDOMEN PELVIS WO CONTRAST Additional Contrast? None   Final Result   1. Improved skin thickening and infiltration of the subcutaneous fat in the   patient's pannus. There is continued breakdown of the skin within the folds   of the pannus with subcutaneous gas. No focal collection identified. 2. Otherwise unremarkable CT of the abdomen and pelvis. Nonobstructive left   renal calculus. XR CHEST PORTABLE   Final Result   Stable chronic cardiomegaly without acute airspace consolidation or CHF. Assessment/Plan:    Active Hospital Problems    Diagnosis    Wound infection [T14. 8XXA, L08.9]    Chronic abdominal wound infection, sequela [S31.109S, L08.9]    Chest pain [R07.9]    ESRD (end stage renal disease) on dialysis ([facility] [address] [N18.6, Z99.2]    Elevated troponin [R79.89]    Chronic diastolic CHF (congestive heart failure) ([facility]) [I50.32]    SOB (shortness of breath) [R06.02]       Chest pain  - atypical , seen by cards   - negative stress test 2 years ago and recent surgery where she did fine from cardiac stress standpoint.   - no additional work up needed. ESRD  - Nephro following.  - dialysis    Elevated trop  - due to ESRD  -non cardiac. Chronic abdominal wall wound (pannus)  - admitted December and + cx: diphtheroids and bacteroids  - completed antibiotics  - missed appointment yesterday outpatient  - CT abd/pelvis (ordered by ER): results above of chronic wound in pannus with continued breakdown of skin within folds with subcutaneous gas. - consult General Surgery  for CT findings fo gas in subcut tissue  - Consult wound care ( was to follow with Dr. Andrew Benavides weekly on discharge)  Likely due to calciphylaxis. Wounds were debrided again today.       DM, type 2  - med-dose SSI  - POCT ac/hs  - Lantus       DVT Prophylaxis:   Diet: DIET CARDIAC;  Code Status: Full Code    PT/OT Eval Status: eval and treat     Dispo - we are working toward placement, [facility] given her issues.      Maria Luisa Omer MD

## 2020-01-30 NOTE — PROGRESS NOTES
01/30/2020    CREATININE 4.9 01/30/2020    GFRAA 11 01/30/2020    GFRAA 50 05/12/2013    AGRATIO 0.5 01/26/2020    LABGLOM 9 01/30/2020    GLUCOSE 198 01/30/2020    PROT 6.1 01/26/2020    PROT 6.6 03/05/2013    LABALBU 2.0 01/28/2020    CALCIUM 9.1 01/30/2020    BILITOT 0.4 01/26/2020    ALKPHOS 86 01/26/2020    AST 29 01/26/2020    ALT 21 01/26/2020     Magnesium:    Lab Results   Component Value Date    MG 2.30 10/27/2019     Phosphorus:    Lab Results   Component Value Date    PHOS 2.4 01/28/2020     Uric Acid:    Lab Results   Component Value Date    LABURIC 2.8 05/17/2018     PT/INR:    Lab Results   Component Value Date    PROTIME 22.3 01/26/2020    PROTIME 22.8 09/08/2014    INR 1.91 01/26/2020     Troponin:    Lab Results   Component Value Date    TROPONINI 0.20 01/27/2020     U/A:    Lab Results   Component Value Date    NITRITE neg 05/06/2015    COLORU Brown 11/25/2018    PROTEINU >=300 11/25/2018    PHUR 5.5 11/25/2018    LABCAST 1-3 Mixed Cells 07/09/2018    WBCUA see below 11/25/2018    RBCUA see below 11/25/2018    MUCUS 3+ 06/18/2013    YEAST Present 08/20/2018    BACTERIA 3+ 11/25/2018    CLARITYU TURBID 11/25/2018    SPECGRAV >=1.030 11/25/2018    LEUKOCYTESUR SMALL 11/25/2018    UROBILINOGEN 0.2 11/25/2018    BILIRUBINUR SMALL 11/25/2018    BILIRUBINUR neg 05/06/2015    BILIRUBINUR NEGATIVE 11/23/2010    BLOODU TRACE-INTACT 11/25/2018    GLUCOSEU 100 11/25/2018    GLUCOSEU NEGATIVE 11/23/2010           IMPRESSION/RECOMMENDATIONS:      1. ESRD on HD    MWF at [facility]  2. Calciphylaxis   Continue Sodium Thiosulfate   3. Anemia of CKD   Target Hb 9-11   4. Renal osteodystrophy    Ca 8.8, Phos 2.4   5. Atrial Fibrillation  ` Rate comtrolled   6. Obesity     Prognosis : poor       Thank you for allowing me to participate in the care of this patient. I will continue to follow along. Please call with questions.     Lillie Le MD  1/30/2020  [facility]

## 2020-01-30 NOTE — PROGRESS NOTES
[address] [facility]        Progress Note    Patient Name: Keke Martínez  MRN: [id number]  YOB: 1955  Date of Evaluation: 1/30/2020    Chief Complaint: Wounds    Subjective:  No acute events overnight  Still complaining of pain, diffusely but especially around wounds  Wide variability in blood pressure readings, still on levophed  Declined hospice yesterday and wants to proceed with all interventions    Vital Signs:  Patient Vitals for the past 24 hrs:   BP Temp Temp src Pulse Resp SpO2 Weight   01/30/20 0842 -- -- -- -- 18 (!) 90 % --   01/30/20 0800 (!) 106/57 -- -- 60 14 -- --   01/30/20 0700 (!) 101/56 -- -- 67 12 100 % --   01/30/20 0645 (!) 102/58 -- -- -- -- -- --   01/30/20 0630 90/60 -- -- -- -- -- --   01/30/20 0615 (!) 111/48 -- -- -- -- -- --   01/30/20 0600 (!) 103/53 -- -- -- -- -- --   01/30/20 0545 (!) 98/49 -- -- -- -- -- --   01/30/20 0530 (!) 93/49 -- -- -- -- -- --   01/30/20 0515 (!) 96/52 -- -- -- -- -- --   01/30/20 0500 (!) 94/46 -- -- 68 8 -- --   01/30/20 0445 (!) 80/47 -- -- -- -- -- --   01/30/20 0433 -- -- -- -- -- -- 298 lb 8.1 oz (135.4 kg)   01/30/20 0415 (!) 80/47 -- -- 82 16 -- --   01/30/20 0403 -- -- -- -- 11 100 % --   01/30/20 0400 (!) 99/59 97 °F (36.1 °C) Temporal 74 11 -- --   01/30/20 0345 109/67 -- -- 80 13 -- --   01/30/20 0330 100/63 -- -- 78 12 -- --   01/30/20 0315 110/73 -- -- 79 11 -- --   01/30/20 0300 106/62 -- -- 80 11 -- --   01/30/20 0245 (!) 127/59 -- -- 72 15 -- --   01/30/20 0230 (!) 127/48 -- -- 73 16 -- --   01/30/20 0215 123/69 -- -- 75 11 -- --   01/30/20 0200 (!) 119/56 -- -- 72 12 -- --   01/30/20 0145 (!) 120/46 -- -- 74 12 -- --   01/30/20 0130 (!) 119/56 -- -- 76 14 -- --   01/30/20 0115 123/71 -- -- 75 15 -- --   01/30/20 0100 (!) 106/52 -- -- 79 9 -- --   01/30/20 0045 -- -- -- 82 12 -- --   01/30/20 0030 (!) 89/48 -- -- 80 19 -- --   01/30/20 0015 (!) 67/45 -- -- 80 16 -- --   01/30/20 0004 Wilma Silva 102/46 98.9 °F (37.2 °C) Temporal 82 11 98 % --   20 0000 -- -- -- -- 14 97 % --   20 2330 (!) 105/53 -- -- 82 11 -- --   20 2315 -- -- -- 82 13 -- --   20 2300 (!) 80/49 -- -- 82 9 -- --   20 2245 (!) 85/33 -- -- 95 19 -- --   20 221 (!) 123/96 -- -- 86 13 -- --   20 93/78 -- -- 89 9 -- --   20 (!) 97/45 -- -- 83 10 -- --   20 118/61 -- -- 89 14 -- --   20 (!) 123/51 -- -- 83 12 -- --   20 (!) 113/50 98.7 °F (37.1 °C) Temporal 88 17 100 % --   20 -- -- -- 83 16 -- --   20 -- -- -- -- 18 98 % --   20 -- -- -- 89 18 -- --   20 2015 106/71 -- -- 90 14 -- --   20 (!) 111/48 -- -- 93 19 -- --   20 1945 116/68 -- -- 92 18 -- --   20 1800 95/68 -- -- -- -- -- --   20 1600 130/78 98 °F (36.7 °C) Temporal 97 19 100 % --   20 1500 (!) 135/97 -- -- 99 26 100 % --   20 1300 115/79 -- -- 91 21 96 % --   20 1200 115/79 97.6 °F (36.4 °C) Temporal 85 16 97 % --   20 1101 (!) 111/94 -- -- -- -- -- --   20 1100 (!) 111/94 -- -- 89 24 97 % --   20 1000 (!) 120/90 -- -- 90 21 96 % --      TEMPERATURE HISTORY 24H: Temp (24hrs), Av °F (36.7 °C), Min:97 °F (36.1 °C), Max:98.9 °F (37.2 °C)    BLOOD PRESSURE HISTORY: Systolic (52SYP), OMV:873 , Min:50 , GHM:638    Diastolic (30OBW), ITM:55, Min:12, Max:97      Intake/Output:  I/O last 3 completed shifts: In: 4985 [P.O.:450; I.V.:927]  Out: -   No intake/output data recorded.   Drain/tube Output:       Physical Exam:  General: awake, alert, oriented to  person, place, time  Skin/Wound: wound dressings are in place on right hip and abdomen, sites marked in preparation for surgery later today    Labs:  CBC:    Recent Labs     20  0600 20  0508 20  0425   WBC 11.3* 12.5* 11.5*   HGB 8.6* 9.6* 9.7*   HCT 27.1* 30.1* 30.7*    205 214     BMP:    Recent Labs 01/28/20  0600 01/29/20  0508 01/30/20  0425    139 139   K 4.4 4.6 4.4   CL 88* 89* 95*   CO2 20* 21 24   BUN 38* 32* 20   CREATININE 8.2* 7.2* 4.9*   GLUCOSE 105* 243* 198*     Hepatic:   No results for input(s): AST, ALT, ALB, BILITOT, ALKPHOS in the last 72 hours. Amylase: No results for input(s): AMYLASE in the last 72 hours. Lipase: No results for input(s): LIPASE in the last 72 hours. Mag:    No results for input(s): MG in the last 72 hours. Phos:     Recent Labs     01/28/20  0600   PHOS 2.4*      Coags:   No results for input(s): INR, APTT in the last 72 hours. Cultures:  Anaerobic culture  No results for input(s): LABANAE in the last 72 hours. Blood culture  No results for input(s): BC in the last 72 hours. Blood culture 2  No results for input(s): BLOODCULT2 in the last 72 hours. Body fluid culture  No results for input(s): BLOODCULT2 in the last 72 hours. Surgical culture  No results for input(s): CXSURG in the last 72 hours. Fecal occult  No results for input(s): OCCULTBLDFEC in the last 72 hours. Gram stain  Recent Labs     01/27/20  1100   LABGRAM No WBC's seen  3+ Gram negative rods  1+ Gram positive cocci  1+ Gram positive rods         Stool culture 1  No results for input(s): CXST in the last 72 hours. Stool culture 2  No results for input(s): STOOLCULT2 in the last 72 hours. Urine culture  No results for input(s): LABURIN in the last 72 hours. Wound abscess  Recent Labs     01/27/20  1100   WNDABS Further report to follow       Pathology:  No relevant pathology     Imaging:  I have personally reviewed the following films:    Ct Abdomen Pelvis Wo Contrast Additional Contrast? None    Result Date: 1/27/2020  EXAMINATION: CT OF THE ABDOMEN AND PELVIS WITHOUT CONTRAST 1/26/2020 4:24 pm TECHNIQUE: CT of the abdomen and pelvis was performed without the administration of intravenous contrast. Multiplanar reformatted images are provided for review.  Dose modulation, iterative reconstruction, and/or weight based adjustment of the mA/kV was utilized to reduce the radiation dose to as low as reasonably achievable. COMPARISON: 12/26/2019. HISTORY: ORDERING SYSTEM PROVIDED HISTORY: wound abd - cellulitis? ?? TECHNOLOGIST PROVIDED HISTORY: If patient is on cardiac monitor and/or pulse ox, they may be taken off cardiac monitor and pulse ox, left on O2 if currently on. All monitors reattached when patient returns to room. Additional Contrast?->None Reason for exam:->wound abd - cellulitis? ?? FINDINGS: Lower Chest: No acute infiltrate at the lung bases. Small to moderate-sized hiatal hernia. Organs: The unenhanced liver, spleen, pancreas and right adrenal gland are unremarkable. Stable left adrenal adenoma. No acute biliary findings. The kidneys are atrophic. No ureteral calculi or significant hydronephrosis. Nonobstructive lower pole left renal calculus appears stable. GI/Bowel: No pericolonic inflammatory changes. The appendix is unremarkable. No small bowel distension. The stomach and duodenal C-loop are intact. Pelvis: No pelvic mass or free pelvic fluid. The uterus is surgically absent. Multiple calcified pelvic phleboliths. Minimal distention of the urinary bladder. Peritoneum/Retroperitoneum: The abdominal aorta is normal in caliber. Mild calcified atherosclerotic plaque. No retroperitoneal adenopathy or upper abdominal ascites. Bones/Soft Tissues: Laxity of the anterior abdominal wall musculature. There is incomplete visualization of a wide-mouth hernia defect containing bowel loops in the midline with no acute features. There is breakdown of the skin surface in the folds of the patient's pannus with subcutaneous gas. No focal subcutaneous fluid collection is identified. Interval improvement in the skin thickening anteriorly on the previous study. No acute osseous findings.      1. Improved skin thickening and infiltration of the subcutaneous fat in the patient's pannus. There is continued breakdown of the skin within the folds of the pannus with subcutaneous gas. No focal collection identified. 2. Otherwise unremarkable CT of the abdomen and pelvis. Nonobstructive left renal calculus. Xr Chest Portable    Result Date: 1/27/2020  EXAMINATION: ONE XRAY VIEW OF THE CHEST 1/26/2020 2:38 pm COMPARISON: 12/26/2019, 12/16/2019 HISTORY: ORDERING SYSTEM PROVIDED HISTORY: sepsis? ?? TECHNOLOGIST PROVIDED HISTORY: Reason for exam:->sepsis? ?? Reason for Exam: Chest Pain (Patient arrived via [facility] with c/o CP/SOB. Pt not on O2 at baseline. 324 ASA, 0.4 SL nitro administered en route. ) Acuity: Unknown Type of Exam: Unknown FINDINGS: A single frontal view of the chest is slightly limited secondary to patient lordotic position. There is no acute skeletal abnormality. There is a stable right-sided Port-A-Cath in proper position. The heart size is enlarged, though stable. The mediastinal contours are stable. There is stable chronic nonspecific elevation of the right hemidiaphragm. The lungs are grossly clear, without evidence of acute airspace consolidation, pneumothorax, or pleural effusion. Stable chronic cardiomegaly without acute airspace consolidation or CHF.        Scheduled Meds:   piperacillin-tazobactam  3.375 g Intravenous Q12H    nebivolol  2.5 mg Oral 2 times per day on Mon Wed Fri    nebivolol  5 mg Oral 2 times per day on Sun Tue Thu Sat    sevelamer  1.6 g Oral TID WC    budesonide  500 mcg Nebulization BID    apixaban  2.5 mg Oral BID    escitalopram  10 mg Oral Daily    ferrous sulfate  325 mg Oral TID WC    formoterol  20 mcg Nebulization BID    insulin glargine  20 Units Subcutaneous Nightly    pregabalin  50 mg Oral TID    insulin lispro  0-12 Units Subcutaneous TID WC    insulin lispro  0-6 Units Subcutaneous Nightly    collagenase   Topical Daily    sodium thiosulfate  25 g Intravenous Q MWF    lidocaine   Topical Once  silver nitrate applicators  10 each Topical Once    sodium chloride flush  10 mL Intravenous 2 times per day     Continuous Infusions:   norepinephrine 0.22 mcg/kg/min (01/30/20 0249)    dextrose       PRN Meds:. HYDROmorphone, oxyCODONE-acetaminophen, ondansetron, labetalol, hydrALAZINE, meperidine, HYDROmorphone, lip balm petroleum free, ipratropium-albuterol, traZODone, glucose, dextrose, glucagon (rDNA), dextrose, perflutren lipid microspheres, sodium chloride flush, magnesium hydroxide, ondansetron, acetaminophen      Assessment:  72 y.o. female admitted with   1. Chest pain, unspecified type    2. Elevated troponin    3. Chronic wound infection of abdomen, initial encounter        Ms. John Pollack is a 72 y.o. female who presents with   Bilateral stage 3 hip decubitus ulcers with infection  Open abdominal decubitus ulcer stage 3 with infection  Bilateral buttock and sacral stage 2 decubitus ulcers  Calciphylaxis  ESRD on HD  CHF  COPD  Diabetes  Morbid obesity, BMI 57.9 this admission  CVA  Pulmonary embolism  Medical coagulopathy, Eliquis    Plan:  1. Discussed risks, benefits, alternatives of operative debridement later today of bilateral hip and abdominal wounds. Specifically discussed that she is likely to still have pain after surgery that will be related to the procedure itself. She is solely focused on pain control and anticipate this will be difficult to control as she likely has a chronic pain syndrome. Additionally discussed that with blood pressure requiring pressors and hypoxia, she may need postoperative mechanical ventilation. Details of the procedure were discussed. She understands, agrees, wishes to proceed  2. Antibiotics per primary team  3. May continue anticoagulation from surgical standpoint  4. Pain control, minimize narcotics, suspect underlying chronic pain syndrome  5.  Hospice/palliative care consulted, patient declines and wants to continue with all measures to treat underlying conditions  6. From surgical standpoint, do not recommend diet while on levophed  7. Defer management of remainder of medical comorbidities to primary and consulting teams    Pop Justice MD, FACS  1/30/2020  9:20 AM

## 2020-01-30 NOTE — CARE COORDINATION
Chart reviewed for discharge planning. Barrier(s) to discharge-   Wound vac, IV Dilaudid, Levophed gtt    Tentative discharge plan-   [facility]    Tentative discharge date- TBD    Patient remains in ICU - had surgical debridement earlier today. Continues on Levophed gtt, IV Zosyn, IV Dilaudid for pain management and 2 L O2. [facility] accept for admission but requires insurance precert. Spoke with patient and sister about this and they are considering. The plan would allow her to go to [place] [id number] and receive detailed care for her wounds for a few weeks before transferring to a SNF. She will be assessed by a doctor everyday. If she needs IV cardiac medications, [facility] can manage that. *Case management will continue to follow progress and update discharge plan as needed.     Bolivar Olivier RN BSN  Case Management  [phone]

## 2020-01-30 NOTE — PROGRESS NOTES
Pt arrived to pre op, placed on monitor, BP upon arrival 76/44. Next BP, 91/44. Pt on phone with family, no signs of distress. Pt requesting pain medication. States pain \"really hurts\" unable to give scale 1-10. Anesthesia made aware.

## 2020-01-30 NOTE — PROGRESS NOTES
P Pulmonary and Critical Care   Progress Note      Reason for Consult: Hypotension, end-stage renal disease  Requesting Physician: Dr. Thad Gonzalez:   [address] [facility]:                The patient is a 72 y.o. female with significant past medical history of:      Diagnosis Date    Abdominal pain 8/20/2018    Acute on chronic congestive heart failure ([address] 6/26/2014    Asthma     Calcinosis cutis     Calciphylaxis 12/2/2019    Cervical cancer ([address]     Chest pain 5/16/2018    CHF (congestive heart failure) ([facility])     Chronic kidney disease, stage IV (severe) ([facility])     Dr King Strong    Chronic pain syndrome 3/27/2018    Chronic respiratory failure with hypoxia ([facility])     CKD (chronic kidney disease) stage 4, GFR 15-29 ml/min ([facility]) 6/30/2017    Colon polyps     COPD (chronic obstructive pulmonary disease) ([address]     Degenerative disc disease at L5-S1 level 6/18/2013     CT    Diabetes mellitus, insulin dependent (IDDM), uncontrolled ([address]     Diabetic polyneuropathy associated with type 2 diabetes mellitus ([address] 3/26/2019    Elevated troponin 9/9/2017    ESRD (end stage renal disease) on dialysis ([address] 02/14/2018    MELVI-GERTRUDE-ARTHUR Del Toro    GERD (gastroesophageal reflux disease)     Gout     History of blood transfusion     History of cervical cancer     Hyperlipidemia     Hypertension     Incarcerated ventral hernia     LVH (left ventricular hypertrophy)     Morbid obesity ([facility])     Mucus plugging of bronchi     Obstructive sleep apnea on CPAP     wears 2L O2 and BIPAP at night    Pneumonia     Psychiatric problem     breakdown long time ago but not current    Pulmonary embolism ([address] 02/06/2013    Type 2 diabetes mellitus with diabetic neuropathy, with long-term current use of insulin ([address] 9/12/2017    Urinary incontinence in female     Venous stasis of lower extremity      She is alert and in no distress this AM. Used BiPAP last pm. Plan for surgical wound debridement later today. Discussed with Dr Su Saravia      Past Surgical History:        Procedure Laterality Date    ABDOMEN SURGERY N/A 12/26/2019    INCISION AND DRAINAGE OF ABDOMINAL WOUND AND BILATERAL HIP WOUNDS performed by Abdullahi Arce MD at [address]  1/14    Grace; clean cors    COLONOSCOPY  2010    polyp removed; Sindi Bell; repeat 5 years    COLONOSCOPY  6/25/13, 11/14    Sindi Solrosalina    COLONOSCOPY N/A 10/10/2019    COLONOSCOPY POLYPECTOMY SNARE/COLD BIOPSY performed by Cedrick Betancur MD at [address] Right 3/28/2019    RIGHT BRACHIO CEPHALIC FISTULA CREATION performed by Tesha Chakraborty MD at [address] [facility]  2/21/09    LVH with global hypokinesis; EF 55-60%    HYSTERECTOMY      INCISION AND DRAINAGE Right 8/22/2019    RIGHT ARM INCISION AND DEBRIDEMENT performed by Tesha Chakraborty MD at [address] ARTHROSCOPY Right 1999    OTHER SURGICAL HISTORY  02/22/2018    LEFT brachial artery axillary vein AV graft     TN OPEN SKULL SUPRATENT EXPLORE      Craniotomy, 3/21/19 patient denies any brain surgery or craniotomy    TN REPAIR INCISIONAL HERNIA,REDUCIBLE N/A 11/20/2018    LAPAROSCOPIC VENTRAL HERNIA REPAIR WITH MESH performed by Xiomy Yarbrough MD at [address] Right 7/18/2019    RIGHT BRACHIAL ARTERY AXILLARY VEIN GRAFT AND LIGATION OF RIGHT BRACHIO-CEPHALIC FISTULA ([address], [address]) performed by Tesha Chakraborty MD at [place] [address]  10/96    TUNNELED VENOUS PORT PLACEMENT Right 11/2019    UPPER GASTROINTESTINAL ENDOSCOPY  6/25/13    UPPER GASTROINTESTINAL ENDOSCOPY N/A 11/21/2019    EGD BIOPSY performed by Franck Gupta MD at [place] [address]  12/24/2016    Incarcerated ventral hernia repair with mesh     Current Medications:    Current Facility-Administered Medications: HYDROmorphone (DILAUDID) injection 0.5 mg, 0.5 mg, Intravenous, Q5 Min PRN  oxyCODONE-acetaminophen (PERCOCET) 5-325 MG per tablet 1 tablet, 1 tablet, Oral, Once PRN  ondansetron (ZOFRAN) injection 4 mg, 4 mg, Intravenous, Once PRN  labetalol (NORMODYNE;TRANDATE) injection 5 mg, 5 mg, Intravenous, Q10 Min PRN  hydrALAZINE (APRESOLINE) injection 5 mg, 5 mg, Intravenous, Q10 Min PRN  meperidine (DEMEROL) injection 12.5 mg, 12.5 mg, Intravenous, Q5 Min PRN  HYDROmorphone (DILAUDID) injection 0.25 mg, 0.25 mg, Intravenous, Q5 Min PRN  piperacillin-tazobactam (ZOSYN) 3.375 g in dextrose 50 mL IVPB extended infusion (premix), 3.375 g, Intravenous, Q12H  lip balm petroleum free (PHYTOPLEX) stick, , Topical, PRN  ipratropium-albuterol (DUONEB) nebulizer solution 1 ampule, 1 ampule, Inhalation, Q2H PRN  nebivolol (BYSTOLIC) tablet 2.5 mg, 2.5 mg, Oral, 2 times per day on Mon Wed Fri  nebivolol (BYSTOLIC) tablet 5 mg, 5 mg, Oral, 2 times per day on Sun Tue Thu Sat  sevelamer (RENVELA) packet 1.6 g, 1.6 g, Oral, TID WC  norepinephrine (LEVOPHED) 16 mg in dextrose 5% 250 mL infusion, 0.07 mcg/kg/min, Intravenous, Continuous  budesonide (PULMICORT) nebulizer suspension 500 mcg, 500 mcg, Nebulization, BID  apixaban (ELIQUIS) tablet 2.5 mg, 2.5 mg, Oral, BID  escitalopram (LEXAPRO) tablet 10 mg, 10 mg, Oral, Daily  ferrous sulfate tablet 325 mg, 325 mg, Oral, TID WC  formoterol (PERFOROMIST) nebulizer solution 20 mcg, 20 mcg, Nebulization, BID  insulin glargine (LANTUS) injection pen 20 Units, 20 Units, Subcutaneous, Nightly  traZODone (DESYREL) tablet 100 mg, 100 mg, Oral, Nightly PRN  pregabalin (LYRICA) capsule 50 mg, 50 mg, Oral, TID  insulin lispro (1 Unit Dial) 0-12 Units, 0-12 Units, Subcutaneous, TID WC  insulin lispro (1 Unit Dial) 0-6 Units, 0-6 Units, Subcutaneous, Nightly  glucose (GLUTOSE) 40 % oral gel 15 g, 15 g, Oral, PRN  dextrose 50 % IV solution, 12.5 g, Intravenous, PRN  glucagon (rDNA) injection 1 mg, 1 mg, Intramuscular, PRN  dextrose 5 % solution, 100 mL/hr, Intravenous, PRN  collagenase ointment, , Topical, Daily  sodium thiosulfate 25 % injection 25 g, 25 g, Intravenous, Q MWF  perflutren lipid microspheres (DEFINITY) injection 1.65 mg, 1.5 mL, Intravenous, ONCE PRN  lidocaine (XYLOCAINE) 4 % external solution, , Topical, Once  silver nitrate applicators applicator 10 each, 10 each, Topical, Once  sodium chloride flush 0.9 % injection 10 mL, 10 mL, Intravenous, 2 times per day  sodium chloride flush 0.9 % injection 10 mL, 10 mL, Intravenous, PRN  magnesium hydroxide (MILK OF MAGNESIA) 400 MG/5ML suspension 30 mL, 30 mL, Oral, Daily PRN  ondansetron (ZOFRAN) injection 4 mg, 4 mg, Intravenous, Q6H PRN  acetaminophen (TYLENOL) tablet 650 mg, 650 mg, Oral, Q6H PRN    Allergies   Allergen Reactions    Codeine Nausea Only    Fentanyl Itching    Morphine      CHEST PAIN    Tramadol Hcl Other (See Comments)     Causes pt to have involuntary movements    Hydrocodone-Acetaminophen Itching and Rash    Percocet [Oxycodone-Acetaminophen] Itching    Procardia [Nifedipine] Nausea And Vomiting     Headache  Takes norvasc at home 12/22/15    Vicodin [Hydrocodone-Acetaminophen] Rash       Social History:    TOBACCO:   reports that she has never smoked. She has never used smokeless tobacco.  ETOH:   reports no history of alcohol use.   Patient currently lives independently  Environmental/chemical exposure: None known    Family History:       Problem Relation Age of Onset    Colon Cancer Father     Diabetes Father     High Blood Pressure Father     Colon Cancer Mother     Diabetes Mother     Colon Cancer Maternal Grandmother     Kidney Cancer Brother     Heart Disease Brother          age 54    High Blood Pressure Brother     High Blood Pressure Sister     High Blood Pressure Maternal Aunt     High Blood Pressure Sister     Heart Disease Sister      REVIEW OF SYSTEMS:    CONSTITUTIONAL:  negative for fevers, chills, diaphoresis, activity change, appetite change, fatigue, night sweats and unexpected weight change. EYES:  negative for blurred vision, eye discharge, visual disturbance and icterus  HEENT:  negative for hearing loss, tinnitus, ear drainage, sinus pressure, nasal congestion, epistaxis and snoring  RESPIRATORY:  See HPI  CARDIOVASCULAR:  negative for chest pain, palpitations, exertional chest pressure/discomfort, edema, syncope  GASTROINTESTINAL:  negative for nausea, vomiting, diarrhea, constipation, blood in stool and abdominal pain  GENITOURINARY:  negative for frequency, dysuria, urinary incontinence, decreased urine volume, and hematuria  HEMATOLOGIC/LYMPHATIC:  negative for easy bruising, bleeding and lymphadenopathy  ALLERGIC/IMMUNOLOGIC:  negative for recurrent infections, angioedema, anaphylaxis and drug reactions  ENDOCRINE:  negative for weight changes and diabetic symptoms including polyuria, polydipsia and polyphagia  MUSCULOSKELETAL:  negative for  pain, joint swelling, decreased range of motion and muscle weakness  NEUROLOGICAL:  negative for headaches, slurred speech, unilateral weakness  PSYCHIATRIC/BEHAVIORAL: negative for hallucinations, behavioral problems, confusion and agitation. Objective:   PHYSICAL EXAM:      VITALS:  BP (!) 106/57   Pulse 60   Temp 97 °F (36.1 °C) (Temporal)   Resp 18   Ht 5' 3\" (1.6 m)   Wt 298 lb 8.1 oz (135.4 kg)   LMP 11/01/1996 (Exact Date)   SpO2 (!) 90%   BMI 52.88 kg/m²      24HR INTAKE/OUTPUT:      Intake/Output Summary (Last 24 hours) at 1/30/2020 2055  Last data filed at 1/30/2020 0601  Gross per 24 hour   Intake 1377 ml   Output --   Net 1377 ml     CONSTITUTIONAL:  awake, alert, cooperative, no apparent distress, and appears stated age  NECK:  Supple, symmetrical, trachea midline, no adenopathy, thyroid symmetric, not enlarged and no tenderness, skin normal  LUNGS:  no increased work of breathing and clear to auscultation.  No accessory muscle use  CARDIOVASCULAR: S1 and S2, no edema and no JVD  ABDOMEN: Massively obese normal bowel sounds, non-distended and no masses palpated, and no tenderness to palpation. No hepatospleenomegaly  LYMPHADENOPATHY:  no axillary or supraclavicular adenopathy. No cervical adnenopathy  PSYCHIATRIC: Oriented to person place and time. No obvious depression or anxiety. MUSCULOSKELETAL: No obvious misalignment or effusion of the joints. No clubbing, cyanosis of the digits. SKIN: Multiple wounds    DATA:    Old records have been reviewed    CBC:  Recent Labs     01/28/20  0600 01/29/20  0508 01/30/20  0425   WBC 11.3* 12.5* 11.5*   RBC 2.97* 3.30* 3.37*   HGB 8.6* 9.6* 9.7*   HCT 27.1* 30.1* 30.7*    205 214   MCV 91.1 91.2 91.1   MCH 29.0 29.0 28.9   MCHC 31.8 31.8 31.7   RDW 20.4* 20.1* 21.0*      BMP:  Recent Labs     01/28/20  0600 01/29/20  0508 01/30/20  0425    139 139   K 4.4 4.6 4.4   CL 88* 89* 95*   CO2 20* 21 24   BUN 38* 32* 20   CREATININE 8.2* 7.2* 4.9*   CALCIUM 9.6 9.2 9.1   GLUCOSE 105* 243* 198*      ABG:  No results for input(s): PHART, HZQ8RUO, PO2ART, UKI6PXM, G0KBBHAB, BEART in the last 72 hours. Lab Results   Component Value Date    BNP 48 03/13/2014     Lab Results   Component Value Date    CKTOTAL 129 07/11/2017    CKMB 1.9 04/19/2015    TROPONINI 0.20 (H) 01/27/2020       Cultures:     Abx:    Radiology Review:  All pertinent images / reports were reviewed as a part of this visit. Assessment:     1. Hypotension requiring norepinephrine  · Difficult situation in which to assess her hemodynamic status  · She does have end-stage renal disease  · Blood pressure has been fluctuating  · Obesity and underlying vascular disease likely have an impact here as well  · Does not appear septic though she does have some wound infections  · Remains on Levo at a low dose  · OK from my point of view for the OR     2.   Chronic hypoxemic and hypercapnic respiratory failure  · Typically using a combination of oxygen and BiPAP  · Appears near baseline  · Anterior chest exam is clear  · Chest x-ray from a couple of days ago was reviewed and is also grossly clear  · Continue BiPAP with sleep and as needed  · Continue supplemental oxygen to maintain saturations in the 90s    3.   COPD  · She is ordered Perforomist, Pulmicort and Dulera  · Stop Dulera  · Start PRN DuoNeb

## 2020-01-30 NOTE — PROGRESS NOTES
Shift assessment complete, see flow sheets. VSS, pt A/Ox4, 2L of O2 via nasal cannula, tolerating well. Levo gtt infusing for BP control. Holding meds for NPO d/t surgery, insulin given, see MAR. Pt refusing turn, refusing rolls to allow RN to assess wounds, coccyx, etc. Pt states she is comfortable and she is not gonna move. Dressing on right hip visualized, clean/dry/intact. No further needs at this time, bed alarm on, call light in reach, will continue to monitor.

## 2020-01-30 NOTE — ANESTHESIA POSTPROCEDURE EVALUATION
Department of Anesthesiology  Postprocedure Note    Patient: Violet Seay  MRN: [id number]  YOB: 1955  Date of evaluation: 1/30/2020  Time:  12:34 PM     Procedure Summary     Date:  01/30/20 Room / Location:  [facility] OR [address] [place] [address]    Anesthesia Start:  1031 Anesthesia Stop:  1218    Procedures:       INCISION AND DRAINAGE OF ABDOMEN WOUND (N/A Abdomen)      BILATERAL HIP WOUND INCISION AND DRAINAGE (Bilateral ) Diagnosis:  (BILATERAL HIP WOUNDS AND ABDOMINAL WOUND)    Surgeon:  Nola Deluca MD Responsible Provider:  Betty Augustin MD    Anesthesia Type:  general ASA Status:  4          Anesthesia Type: general    Petra Phase I: Petra Score: 8    Petra Phase II:      Last vitals: Reviewed and per EMR flowsheets.        Anesthesia Post Evaluation    Level of consciousness: awake  Complications: no

## 2020-01-31 LAB
ANION GAP SERPL CALCULATED.3IONS-SCNC: 21 MMOL/L (ref 3–16)
BASOPHILS ABSOLUTE: 0 K/UL (ref 0–0.2)
BASOPHILS RELATIVE PERCENT: 0.1 %
BUN BLDV-MCNC: 29 MG/DL (ref 7–20)
CALCIUM SERPL-MCNC: 9.1 MG/DL (ref 8.3–10.6)
CHLORIDE BLD-SCNC: 90 MMOL/L (ref 99–110)
CO2: 22 MMOL/L (ref 21–32)
CREAT SERPL-MCNC: 6.1 MG/DL (ref 0.6–1.2)
EOSINOPHILS ABSOLUTE: 0 K/UL (ref 0–0.6)
EOSINOPHILS RELATIVE PERCENT: 0 %
GFR AFRICAN AMERICAN: 8
GFR NON-AFRICAN AMERICAN: 7
GLUCOSE BLD-MCNC: 117 MG/DL (ref 70–99)
GLUCOSE BLD-MCNC: 151 MG/DL (ref 70–99)
GLUCOSE BLD-MCNC: 163 MG/DL (ref 70–99)
GLUCOSE BLD-MCNC: 230 MG/DL (ref 70–99)
GLUCOSE BLD-MCNC: 230 MG/DL (ref 70–99)
GLUCOSE BLD-MCNC: 49 MG/DL (ref 70–99)
GLUCOSE BLD-MCNC: 49 MG/DL (ref 70–99)
GLUCOSE BLD-MCNC: 54 MG/DL (ref 70–99)
GLUCOSE BLD-MCNC: 55 MG/DL (ref 70–99)
GLUCOSE BLD-MCNC: 93 MG/DL (ref 70–99)
GRAM STAIN RESULT: ABNORMAL
HCT VFR BLD CALC: 26.8 % (ref 36–48)
HEMOGLOBIN: 8.5 G/DL (ref 12–16)
LYMPHOCYTES ABSOLUTE: 1.2 K/UL (ref 1–5.1)
LYMPHOCYTES RELATIVE PERCENT: 7.5 %
MCH RBC QN AUTO: 28.8 PG (ref 26–34)
MCHC RBC AUTO-ENTMCNC: 31.6 G/DL (ref 31–36)
MCV RBC AUTO: 91 FL (ref 80–100)
MONOCYTES ABSOLUTE: 0.7 K/UL (ref 0–1.3)
MONOCYTES RELATIVE PERCENT: 4.3 %
NEUTROPHILS ABSOLUTE: 13.9 K/UL (ref 1.7–7.7)
NEUTROPHILS RELATIVE PERCENT: 88.1 %
ORGANISM: ABNORMAL
PDW BLD-RTO: 20.4 % (ref 12.4–15.4)
PERFORMED ON: ABNORMAL
PERFORMED ON: NORMAL
PLATELET # BLD: 207 K/UL (ref 135–450)
PMV BLD AUTO: 10.4 FL (ref 5–10.5)
POTASSIUM SERPL-SCNC: 5 MMOL/L (ref 3.5–5.1)
RBC # BLD: 2.94 M/UL (ref 4–5.2)
SODIUM BLD-SCNC: 133 MMOL/L (ref 136–145)
WBC # BLD: 15.8 K/UL (ref 4–11)
WOUND/ABSCESS: ABNORMAL
WOUND/ABSCESS: ABNORMAL

## 2020-01-31 PROCEDURE — 2580000003 HC RX 258: Performed by: SURGERY

## 2020-01-31 PROCEDURE — 2500000003 HC RX 250 WO HCPCS: Performed by: SURGERY

## 2020-01-31 PROCEDURE — 99231 SBSQ HOSP IP/OBS SF/LOW 25: CPT | Performed by: SURGERY

## 2020-01-31 PROCEDURE — 6370000000 HC RX 637 (ALT 250 FOR IP): Performed by: INTERNAL MEDICINE

## 2020-01-31 PROCEDURE — 6370000000 HC RX 637 (ALT 250 FOR IP): Performed by: SURGERY

## 2020-01-31 PROCEDURE — 94761 N-INVAS EAR/PLS OXIMETRY MLT: CPT

## 2020-01-31 PROCEDURE — 6360000002 HC RX W HCPCS: Performed by: SURGERY

## 2020-01-31 PROCEDURE — 80048 BASIC METABOLIC PNL TOTAL CA: CPT

## 2020-01-31 PROCEDURE — 85025 COMPLETE CBC W/AUTO DIFF WBC: CPT

## 2020-01-31 PROCEDURE — 94150 VITAL CAPACITY TEST: CPT

## 2020-01-31 PROCEDURE — 90935 HEMODIALYSIS ONE EVALUATION: CPT

## 2020-01-31 PROCEDURE — 6360000002 HC RX W HCPCS: Performed by: INTERNAL MEDICINE

## 2020-01-31 PROCEDURE — 94640 AIRWAY INHALATION TREATMENT: CPT

## 2020-01-31 PROCEDURE — 2000000000 HC ICU R&B

## 2020-01-31 PROCEDURE — 99233 SBSQ HOSP IP/OBS HIGH 50: CPT | Performed by: INTERNAL MEDICINE

## 2020-01-31 PROCEDURE — P9047 ALBUMIN (HUMAN), 25%, 50ML: HCPCS | Performed by: INTERNAL MEDICINE

## 2020-01-31 PROCEDURE — 2700000000 HC OXYGEN THERAPY PER DAY

## 2020-01-31 PROCEDURE — 94660 CPAP INITIATION&MGMT: CPT

## 2020-01-31 RX ORDER — INSULIN LISPRO 100 [IU]/ML
5 INJECTION, SOLUTION INTRAVENOUS; SUBCUTANEOUS
Status: DISCONTINUED | OUTPATIENT
Start: 2020-01-31 | End: 2020-02-01

## 2020-01-31 RX ORDER — ALBUMIN (HUMAN) 12.5 G/50ML
25 SOLUTION INTRAVENOUS ONCE
Status: COMPLETED | OUTPATIENT
Start: 2020-01-31 | End: 2020-01-31

## 2020-01-31 RX ADMIN — Medication 10 ML: at 20:46

## 2020-01-31 RX ADMIN — INSULIN LISPRO 2 UNITS: 100 INJECTION, SOLUTION INTRAVENOUS; SUBCUTANEOUS at 17:30

## 2020-01-31 RX ADMIN — HYDROMORPHONE HYDROCHLORIDE 0.5 MG: 1 INJECTION, SOLUTION INTRAMUSCULAR; INTRAVENOUS; SUBCUTANEOUS at 08:56

## 2020-01-31 RX ADMIN — INSULIN LISPRO 4 UNITS: 100 INJECTION, SOLUTION INTRAVENOUS; SUBCUTANEOUS at 08:43

## 2020-01-31 RX ADMIN — HYDROMORPHONE HYDROCHLORIDE 0.5 MG: 1 INJECTION, SOLUTION INTRAMUSCULAR; INTRAVENOUS; SUBCUTANEOUS at 17:38

## 2020-01-31 RX ADMIN — TAZOBACTAM SODIUM AND PIPERACILLIN SODIUM 3.38 G: 375; 3 INJECTION, SOLUTION INTRAVENOUS at 15:52

## 2020-01-31 RX ADMIN — ALBUMIN (HUMAN) 25 G: 0.25 INJECTION, SOLUTION INTRAVENOUS at 12:56

## 2020-01-31 RX ADMIN — SEVELAMER CARBONATE 1.6 G: 800 POWDER, FOR SUSPENSION ORAL at 17:30

## 2020-01-31 RX ADMIN — SODIUM THIOSULFATE 25 G: 250 INJECTION, SOLUTION INTRAVENOUS at 13:44

## 2020-01-31 RX ADMIN — APIXABAN 2.5 MG: 5 TABLET, FILM COATED ORAL at 20:45

## 2020-01-31 RX ADMIN — HYDROMORPHONE HYDROCHLORIDE 0.5 MG: 1 INJECTION, SOLUTION INTRAMUSCULAR; INTRAVENOUS; SUBCUTANEOUS at 13:14

## 2020-01-31 RX ADMIN — NEBIVOLOL HYDROCHLORIDE 2.5 MG: 5 TABLET ORAL at 21:21

## 2020-01-31 RX ADMIN — HYDROMORPHONE HYDROCHLORIDE 0.5 MG: 1 INJECTION, SOLUTION INTRAMUSCULAR; INTRAVENOUS; SUBCUTANEOUS at 03:40

## 2020-01-31 RX ADMIN — FERROUS SULFATE TAB 325 MG (65 MG ELEMENTAL FE) 325 MG: 325 (65 FE) TAB at 17:30

## 2020-01-31 RX ADMIN — Medication 10 ML: at 09:12

## 2020-01-31 RX ADMIN — PREGABALIN 50 MG: 25 CAPSULE ORAL at 20:45

## 2020-01-31 RX ADMIN — DEXTROSE 15 G: 15 GEL ORAL at 22:11

## 2020-01-31 RX ADMIN — BUDESONIDE 500 MCG: 0.5 SUSPENSION RESPIRATORY (INHALATION) at 08:36

## 2020-01-31 RX ADMIN — HYDROMORPHONE HYDROCHLORIDE 0.5 MG: 1 INJECTION, SOLUTION INTRAMUSCULAR; INTRAVENOUS; SUBCUTANEOUS at 21:28

## 2020-01-31 RX ADMIN — BUDESONIDE 500 MCG: 0.5 SUSPENSION RESPIRATORY (INHALATION) at 20:57

## 2020-01-31 RX ADMIN — ONDANSETRON 4 MG: 2 INJECTION INTRAMUSCULAR; INTRAVENOUS at 08:55

## 2020-01-31 RX ADMIN — TAZOBACTAM SODIUM AND PIPERACILLIN SODIUM 3.38 G: 375; 3 INJECTION, SOLUTION INTRAVENOUS at 03:37

## 2020-01-31 RX ADMIN — INSULIN LISPRO 5 UNITS: 100 INJECTION, SOLUTION INTRAVENOUS; SUBCUTANEOUS at 17:33

## 2020-01-31 RX ADMIN — DEXTROSE MONOHYDRATE 12.5 G: 500 INJECTION PARENTERAL at 22:28

## 2020-01-31 RX ADMIN — FORMOTEROL FUMARATE DIHYDRATE 20 MCG: 20 SOLUTION RESPIRATORY (INHALATION) at 08:36

## 2020-01-31 RX ADMIN — Medication 10 ML: at 20:48

## 2020-01-31 RX ADMIN — ONDANSETRON 4 MG: 2 INJECTION INTRAMUSCULAR; INTRAVENOUS at 19:54

## 2020-01-31 RX ADMIN — Medication 10 ML: at 08:50

## 2020-01-31 RX ADMIN — FORMOTEROL FUMARATE DIHYDRATE 20 MCG: 20 SOLUTION RESPIRATORY (INHALATION) at 20:57

## 2020-01-31 ASSESSMENT — PAIN SCALES - GENERAL
PAINLEVEL_OUTOF10: 10
PAINLEVEL_OUTOF10: 9
PAINLEVEL_OUTOF10: 8
PAINLEVEL_OUTOF10: 8
PAINLEVEL_OUTOF10: 7
PAINLEVEL_OUTOF10: 9
PAINLEVEL_OUTOF10: 7
PAINLEVEL_OUTOF10: 6
PAINLEVEL_OUTOF10: 7
PAINLEVEL_OUTOF10: 0
PAINLEVEL_OUTOF10: 9
PAINLEVEL_OUTOF10: 7

## 2020-01-31 NOTE — PROGRESS NOTES
Dr. Jewels Washington at bedside, changing wound dressings. Pt tolerated fairly well, partially bathed before pt refusing \"scrubbing\". Pt c/o pain, after dressing changes, no meds available at this time. Attempting to titrate down levo per Dr. Leda Li. No other changes, no further needs at this time, bed alarm on, call light in reach, will continue to monitor.

## 2020-01-31 NOTE — PROGRESS NOTES
P Pulmonary and Critical Care   Progress Note      Reason for Consult: Hypotension, end-stage renal disease  Requesting Physician: Dr. Merlin Sly:   [facility]:                The patient is a 72 y.o. female with significant past medical history of:      Diagnosis Date    Abdominal pain 8/20/2018    Acute on chronic congestive heart failure ([address] 6/26/2014    Asthma     Calcinosis cutis     Calciphylaxis 12/2/2019    Cervical cancer ([address]     Chest pain 5/16/2018    CHF (congestive heart failure) ([facility])     Chronic kidney disease, stage IV (severe) ([facility])     Dr Alicia Howell    Chronic pain syndrome 3/27/2018    Chronic respiratory failure with hypoxia ([facility])     CKD (chronic kidney disease) stage 4, GFR 15-29 ml/min ([facility]) 6/30/2017    Colon polyps     COPD (chronic obstructive pulmonary disease) ([address]     Degenerative disc disease at L5-S1 level 6/18/2013     CT    Diabetes mellitus, insulin dependent (IDDM), uncontrolled ([address]     Diabetic polyneuropathy associated with type 2 diabetes mellitus ([address] 3/26/2019    Elevated troponin 9/9/2017    ESRD (end stage renal disease) on dialysis ([address] 02/14/2018    M-W-F JOSE Samson    GERD (gastroesophageal reflux disease)     Gout     History of blood transfusion     History of cervical cancer     Hyperlipidemia     Hypertension     Incarcerated ventral hernia     LVH (left ventricular hypertrophy)     Morbid obesity ([facility])     Mucus plugging of bronchi     Obstructive sleep apnea on CPAP     wears 2L O2 and BIPAP at night    Pneumonia     Psychiatric problem     breakdown long time ago but not current    Pulmonary embolism ([address] 02/06/2013    Type 2 diabetes mellitus with diabetic neuropathy, with long-term current use of insulin ([address] 9/12/2017    Urinary incontinence in female     Venous stasis of lower extremity      Interval history: Did not wear her BiPAP last night.   She did go to surgery for debridement of her wounds and tolerated the procedure well.       Past Surgical History:        Procedure Laterality Date    ABDOMEN SURGERY N/A 12/26/2019    INCISION AND DRAINAGE OF ABDOMINAL WOUND AND BILATERAL HIP WOUNDS performed by Jose L Ludwig MD at [facility] N/A 1/30/2020    INCISION AND DRAINAGE OF ABDOMEN WOUND performed by Jose L Ludwig MD at [address]  1/14    Grace; clean cors    COLONOSCOPY  2010    polyp removed; [facility]; repeat 5 years    COLONOSCOPY  6/25/13, 11/14    Carmen    COLONOSCOPY N/A 10/10/2019    COLONOSCOPY POLYPECTOMY SNARE/COLD BIOPSY performed by Lenny Garland MD at [address] [facility] Right 3/28/2019    RIGHT BRACHIO CEPHALIC FISTULA CREATION performed by Rey Moss MD at [address] [facility]  2/21/09    LVH with global hypokinesis; EF 55-60%    HYSTERECTOMY      INCISION AND DRAINAGE Right 8/22/2019    RIGHT ARM INCISION AND DEBRIDEMENT performed by Rey Moss MD at [facility] [place] Bilateral 1/30/2020    BILATERAL HIP WOUND INCISION AND DRAINAGE performed by Jose L Ludwig MD at [address] [facility] ARTHROSCOPY Right 1999    OTHER SURGICAL HISTORY  02/22/2018    LEFT brachial artery axillary vein AV graft     DC OPEN SKULL SUPRATENT EXPLORE      Craniotomy, 3/21/19 patient denies any brain surgery or craniotomy    DC REPAIR INCISIONAL HERNIA,REDUCIBLE N/A 11/20/2018    LAPAROSCOPIC VENTRAL HERNIA REPAIR WITH MESH performed by Hodan Lopez MD at [address] Right 7/18/2019    RIGHT BRACHIAL ARTERY AXILLARY VEIN GRAFT AND LIGATION OF RIGHT BRACHIO-CEPHALIC FISTULA (04502, [id number]) performed by Rey Moss MD at [place] [address]  10/96    TUNNELED VENOUS PORT PLACEMENT Right 11/2019    UPPER GASTROINTESTINAL ENDOSCOPY  6/25/13    UPPER GASTROINTESTINAL ENDOSCOPY N/A 11/21/2019    EGD BIOPSY performed by Erick Min MD at [address] [facility] ENDOSCOPY    VENTRAL HERNIA REPAIR  12/24/2016    Incarcerated ventral hernia repair with mesh     Current Medications:    Current Facility-Administered Medications: sodium chloride flush 0.9 % injection 10 mL, 10 mL, Intravenous, 2 times per day  sodium chloride flush 0.9 % injection 10 mL, 10 mL, Intravenous, PRN  ondansetron (ZOFRAN) injection 4 mg, 4 mg, Intravenous, Q6H PRN  HYDROmorphone HCl PF (DILAUDID) injection 0.5 mg, 0.5 mg, Intravenous, Q4H PRN  pantoprazole (PROTONIX) tablet 40 mg, 40 mg, Oral, QAM AC  calcium carbonate (TUMS) chewable tablet 500 mg, 500 mg, Oral, TID PRN  piperacillin-tazobactam (ZOSYN) 3.375 g in dextrose 50 mL IVPB extended infusion (premix), 3.375 g, Intravenous, Q12H  lip balm petroleum free (PHYTOPLEX) stick, , Topical, PRN  ipratropium-albuterol (DUONEB) nebulizer solution 1 ampule, 1 ampule, Inhalation, Q2H PRN  nebivolol (BYSTOLIC) tablet 2.5 mg, 2.5 mg, Oral, 2 times per day on Mon Wed Fri  nebivolol (BYSTOLIC) tablet 5 mg, 5 mg, Oral, 2 times per day on Sun Tue Thu Sat  sevelamer (RENVELA) packet 1.6 g, 1.6 g, Oral, TID WC  norepinephrine (LEVOPHED) 16 mg in dextrose 5% 250 mL infusion, 0.07 mcg/kg/min, Intravenous, Continuous  budesonide (PULMICORT) nebulizer suspension 500 mcg, 500 mcg, Nebulization, BID  apixaban (ELIQUIS) tablet 2.5 mg, 2.5 mg, Oral, BID  escitalopram (LEXAPRO) tablet 10 mg, 10 mg, Oral, Daily  ferrous sulfate tablet 325 mg, 325 mg, Oral, TID WC  formoterol (PERFOROMIST) nebulizer solution 20 mcg, 20 mcg, Nebulization, BID  insulin glargine (LANTUS) injection pen 20 Units, 20 Units, Subcutaneous, Nightly  traZODone (DESYREL) tablet 100 mg, 100 mg, Oral, Nightly PRN  pregabalin (LYRICA) capsule 50 mg, 50 mg, Oral, TID  insulin lispro (1 Unit Dial) 0-12 Units, 0-12 Units, Subcutaneous, TID WC  insulin lispro (1 Unit Dial) 0-6 Units, 0-6 Units, Subcutaneous, Nightly  glucose (GLUTOSE) 40 % oral gel 15 g, 15 g, Oral, PRN  dextrose 50 % IV solution, 12.5 g, Intravenous, PRN  glucagon (rDNA) injection 1 mg, 1 mg, Intramuscular, PRN  dextrose 5 % solution, 100 mL/hr, Intravenous, PRN  collagenase ointment, , Topical, Daily  sodium thiosulfate 25 % injection 25 g, 25 g, Intravenous, Q MWF  perflutren lipid microspheres (DEFINITY) injection 1.65 mg, 1.5 mL, Intravenous, ONCE PRN  lidocaine (XYLOCAINE) 4 % external solution, , Topical, Once  silver nitrate applicators applicator 10 each, 10 each, Topical, Once  sodium chloride flush 0.9 % injection 10 mL, 10 mL, Intravenous, 2 times per day  sodium chloride flush 0.9 % injection 10 mL, 10 mL, Intravenous, PRN  magnesium hydroxide (MILK OF MAGNESIA) 400 MG/5ML suspension 30 mL, 30 mL, Oral, Daily PRN  acetaminophen (TYLENOL) tablet 650 mg, 650 mg, Oral, Q6H PRN    Allergies   Allergen Reactions    Codeine Nausea Only    Fentanyl Itching    Morphine      CHEST PAIN    Tramadol Hcl Other (See Comments)     Causes pt to have involuntary movements    Hydrocodone-Acetaminophen Itching and Rash    Percocet [Oxycodone-Acetaminophen] Itching    Procardia [Nifedipine] Nausea And Vomiting     Headache  Takes norvasc at home 12/22/15    Vicodin [Hydrocodone-Acetaminophen] Rash       Social History:    TOBACCO:   reports that she has never smoked. She has never used smokeless tobacco.  ETOH:   reports no history of alcohol use.   Patient currently lives independently  Environmental/chemical exposure: None known    Family History:       Problem Relation Age of Onset    Colon Cancer Father     Diabetes Father     High Blood Pressure Father     Colon Cancer Mother     Diabetes Mother     Colon Cancer Maternal Grandmother     Kidney Cancer Brother     Heart Disease Brother          age 54    High Blood Pressure Brother     High Blood Pressure Sister     High Blood Pressure Maternal Aunt     High Blood Pressure Sister     Heart Disease Sister      REVIEW OF SYSTEMS:    CONSTITUTIONAL:  negative for fevers, chills, diaphoresis, activity change, appetite change, fatigue, night sweats and unexpected weight change. EYES:  negative for blurred vision, eye discharge, visual disturbance and icterus  HEENT:  negative for hearing loss, tinnitus, ear drainage, sinus pressure, nasal congestion, epistaxis and snoring  RESPIRATORY:  See HPI  CARDIOVASCULAR:  negative for chest pain, palpitations, exertional chest pressure/discomfort, edema, syncope  GASTROINTESTINAL:  negative for nausea, vomiting, diarrhea, constipation, blood in stool and abdominal pain  GENITOURINARY:  negative for frequency, dysuria, urinary incontinence, decreased urine volume, and hematuria  HEMATOLOGIC/LYMPHATIC:  negative for easy bruising, bleeding and lymphadenopathy  ALLERGIC/IMMUNOLOGIC:  negative for recurrent infections, angioedema, anaphylaxis and drug reactions  ENDOCRINE:  negative for weight changes and diabetic symptoms including polyuria, polydipsia and polyphagia  MUSCULOSKELETAL:  negative for  pain, joint swelling, decreased range of motion and muscle weakness  NEUROLOGICAL:  negative for headaches, slurred speech, unilateral weakness  PSYCHIATRIC/BEHAVIORAL: negative for hallucinations, behavioral problems, confusion and agitation. Objective:   PHYSICAL EXAM:      VITALS:  BP 92/62   Pulse 70   Temp 97.4 °F (36.3 °C)   Resp 13   Ht 5' 3\" (1.6 m)   Wt 297 lb 9.9 oz (135 kg)   LMP 11/01/1996 (Exact Date)   SpO2 100%   BMI 52.72 kg/m²      24HR INTAKE/OUTPUT:      Intake/Output Summary (Last 24 hours) at 1/31/2020 7868  Last data filed at 1/31/2020 0555  Gross per 24 hour   Intake 2338 ml   Output 20 ml   Net 2318 ml     CONSTITUTIONAL:  awake, alert, cooperative, no apparent distress, and appears stated age  NECK:  Supple, symmetrical, trachea midline, no adenopathy, thyroid symmetric, not enlarged and no tenderness, skin normal  LUNGS:  no increased work of breathing and clear to auscultation.  No accessory muscle use  CARDIOVASCULAR: S1 and S2, no edema and no JVD  ABDOMEN: Massively obese normal bowel sounds, non-distended and no masses palpated, and no tenderness to palpation. No hepatospleenomegaly  LYMPHADENOPATHY:  no axillary or supraclavicular adenopathy. No cervical adnenopathy  PSYCHIATRIC: Oriented to person place and time. No obvious depression or anxiety. MUSCULOSKELETAL: No obvious misalignment or effusion of the joints. No clubbing, cyanosis of the digits. SKIN: Multiple wounds    DATA:    Old records have been reviewed    CBC:  Recent Labs     01/29/20  0508 01/30/20  0425 01/31/20  0347   WBC 12.5* 11.5* 15.8*   RBC 3.30* 3.37* 2.94*   HGB 9.6* 9.7* 8.5*   HCT 30.1* 30.7* 26.8*    214 207   MCV 91.2 91.1 91.0   MCH 29.0 28.9 28.8   MCHC 31.8 31.7 31.6   RDW 20.1* 21.0* 20.4*      BMP:  Recent Labs     01/29/20  0508 01/30/20 0425 01/31/20  0347    139 133*   K 4.6 4.4 5.0   CL 89* 95* 90*   CO2 21 24 22   BUN 32* 20 29*   CREATININE 7.2* 4.9* 6.1*   CALCIUM 9.2 9.1 9.1   GLUCOSE 243* 198* 230*      ABG:  No results for input(s): PHART, LPK4KFC, PO2ART, BFO1DBC, L1HPMAWX, BEART in the last 72 hours. Lab Results   Component Value Date    BNP 48 03/13/2014     Lab Results   Component Value Date    CKTOTAL 129 07/11/2017    CKMB 1.9 04/19/2015    TROPONINI 0.20 (H) 01/27/2020       Cultures:     Abx:    Radiology Review:  All pertinent images / reports were reviewed as a part of this visit. Assessment:     1. Hypotension requiring norepinephrine  · Difficult situation in which to assess her hemodynamic status  · She does have end-stage renal disease  · Remains on norepinephrine  · We will continue to use norepinephrine to support blood pressure during hemodialysis  · Try to get her off norepinephrine after hemodialysis. · We will treat more evidence of endorgan dysfunction and then her absolute blood pressure in that situation.     2.  Chronic hypoxemic and hypercapnic respiratory failure  · Typically using a combination of oxygen and BiPAP  · Appears near baseline  · Anterior chest exam is clear  · Chest x-ray from 1/26/2020 is grossly clear  · Encourage BiPAP with sleep and as needed  · Continue supplemental oxygen to maintain saturations in the 90s    3. COPD  · Continue bronchodilators    4.   Diabetes mellitus  · Blood sugar is in the 200s  · Increase Lantus to 25 units subcu  · Also give her prandial regular insulin  · Follow blood sugar

## 2020-01-31 NOTE — PROGRESS NOTES
[address] and Laparoscopic Surgery        Post-op Note    Pt Name: Erich Sanabria  MRN: [id number]  YOB: 1955  Date of evaluation: 1/31/2020    Subjective:    No acute events overnight  Pain severe, at baseline, she does say feels improved after debridement though    Vital Signs:  Patient Vitals for the past 24 hrs:   BP Temp Temp src Pulse Resp SpO2 Weight   01/31/20 1515 -- -- -- 64 16 -- --   01/31/20 1500 118/61 97.6 °F (36.4 °C) -- 64 14 100 % 288 lb 12.8 oz (131 kg)   01/31/20 1445 (!) 116/41 -- -- 64 17 100 % --   01/31/20 1430 118/68 -- -- 82 15 100 % --   01/31/20 1415 107/63 -- -- 80 18 100 % --   01/31/20 1400 125/68 -- -- 84 14 100 % --   01/31/20 1345 (!) 140/86 -- -- 61 14 100 % --   01/31/20 1330 125/80 -- -- 69 19 100 % --   01/31/20 1320 (!) 105/49 -- -- 51 19 100 % --   01/31/20 1315 89/72 -- -- 64 25 -- --   01/31/20 1300 (!) 90/58 -- -- 95 17 -- --   01/31/20 1245 101/67 -- -- 86 17 -- --   01/31/20 1230 (!) 85/37 -- -- 100 20 -- --   01/31/20 1215 92/70 -- -- 87 13 -- --   01/31/20 1200 91/68 98 °F (36.7 °C) Temporal 86 15 100 % --   01/31/20 1145 (!) 111/58 -- -- 81 13 -- --   01/31/20 1130 118/65 -- -- 78 16 -- --   01/31/20 1115 99/63 -- -- 98 18 -- --   01/31/20 1100 105/63 -- -- 93 17 -- --   01/31/20 1045 (!) 88/54 -- -- 83 15 100 % --   01/31/20 1030 98/76 -- -- 79 14 100 % --   01/31/20 1015 113/79 -- -- 75 12 100 % --   01/31/20 1000 108/74 -- -- 67 14 100 % --   01/31/20 0945 116/75 97.5 °F (36.4 °C) -- 73 16 100 % 289 lb 14.5 oz (131.5 kg)   01/31/20 0930 125/80 -- -- 68 14 100 % --   01/31/20 0915 101/64 -- -- 76 15 -- --   01/31/20 0900 91/77 -- -- 83 22 100 % --   01/31/20 0845 104/77 -- -- 75 19 100 % --   01/31/20 0830 95/60 -- -- 76 15 99 % --   01/31/20 0815 (!) 77/49 -- -- 77 12 95 % --   01/31/20 0800 92/62 97.4 °F (36.3 °C) (P) Temporal 70 13 100 % --   01/31/20 0745 (!) 84/52 -- -- 79 15 97 % --   01/31/20 0730 107/75 -- -- 77 18 -- -- 01/31/20 0715 101/70 -- -- 73 14 -- --   01/31/20 0700 103/63 -- -- 78 15 -- --   01/31/20 0645 94/72 -- -- 73 22 -- --   01/31/20 0630 (!) 77/48 -- -- 70 18 -- --   01/31/20 0615 91/60 -- -- 74 15 -- --   01/31/20 0600 (!) 86/54 -- -- 74 15 -- --   01/31/20 0555 93/66 -- -- 80 21 -- --   01/31/20 0545 -- -- -- -- -- -- 297 lb 9.9 oz (135 kg)   01/31/20 0530 98/64 -- -- 77 17 -- --   01/31/20 0515 104/72 -- -- 78 19 (!) 88 % --   01/31/20 0500 97/68 -- -- 77 16 -- --   01/31/20 0445 96/72 -- -- 72 15 -- --   01/31/20 0430 (!) 89/57 -- -- 75 22 -- --   01/31/20 0415 (!) 89/56 97.7 °F (36.5 °C) Temporal 76 13 97 % --   01/31/20 0400 90/63 -- -- 76 17 -- --   01/31/20 0345 (!) 97/57 -- -- 82 17 (!) 82 % --   01/31/20 0330 108/73 -- -- 79 16 95 % --   01/31/20 0315 98/68 -- -- 82 21 95 % --   01/31/20 0301 126/83 -- -- 79 19 95 % --   01/31/20 0300 126/83 -- -- 76 15 -- --   01/31/20 0245 127/69 -- -- 74 18 -- --   01/31/20 0230 95/70 -- -- 76 20 -- --   01/31/20 0215 (!) 79/64 -- -- 80 17 -- --   01/31/20 0210 -- -- -- -- -- 95 % --   01/31/20 0209 -- -- -- -- -- 94 % --   01/31/20 0202 110/75 -- -- 84 16 (!) 87 % --   01/31/20 0200 110/75 -- -- 84 20 -- --   01/31/20 0145 (!) 73/53 -- -- 86 19 90 % --   01/31/20 0103 99/60 -- -- 89 15 -- --   01/31/20 0045 (!) 129/109 -- -- 96 24 -- --   01/31/20 0030 93/78 -- -- 95 15 -- --   01/31/20 0021 (!) 80/43 -- -- 97 12 (!) 87 % --   01/31/20 0000 85/70 97.7 °F (36.5 °C) Temporal 94 20 -- --   01/30/20 2345 (!) 87/52 -- -- 98 22 -- --   01/30/20 2330 (!) 103/57 -- -- 100 13 -- --   01/30/20 2315 94/67 -- -- 90 21 -- --   01/30/20 2300 87/72 -- -- 96 16 -- --   01/30/20 2247 97/71 -- -- 99 18 (!) 85 % --   01/30/20 2245 (!) 68/43 -- -- 101 19 -- --   01/30/20 2216 99/63 -- -- 95 16 -- --   01/30/20 2215 99/63 -- -- 95 18 -- --   01/30/20 2200 101/81 -- -- 96 23 -- --   01/30/20 2148 96/61 -- -- 96 17 -- --   01/30/20 2133 96/74 -- -- 98 20 -- --   01/30/20 2115 106/70 -- -- 100 22 -- --   20 102/67 -- -- 97 21 -- --   20 (!) 95/58 97.3 °F (36.3 °C) Temporal 95 20 99 % --   20 112/67 -- -- 107 19 -- --   20 (!) 87/45 -- -- 100 24 -- --   20 -- -- -- -- 18 96 % --   20 110/79 -- -- 97 26 -- --   20 194 100/71 -- -- 108 (!) 33 -- --   20 1930 97/72 -- -- 105 24 -- --   20 1915 111/88 -- -- 103 22 -- --   20 190 113/76 -- -- 99 19 98 % --   20 1845 95/79 -- -- 98 17 95 % --   20 1830 81/64 -- -- 104 16 100 % --   20 1815 (!) 149/99 -- -- 67 12 92 % --   20 1800 130/83 -- -- 101 19 100 % --   20 1745 112/80 -- -- 98 22 94 % --   20 1730 (!) 124/51 -- -- 95 19 90 % --   20 1715 106/65 -- -- 93 20 96 % --   20 1700 (!) 141/75 -- -- 94 23 100 % --   20 1645 99/81 -- -- 94 22 96 % --   20 1630 (!) 95/55 -- -- 99 20 100 % --   20 1615 (!) 131/56 -- -- 98 16 98 % --   20 1600 87/68 98 °F (36.7 °C) Temporal 69 16 99 % --   20 1545 99/85 -- -- 98 19 96 % --      TEMPERATURE HISTORY 24H: Temp (24hrs), Av.7 °F (36.5 °C), Min:97.3 °F (36.3 °C), Max:98 °F (36.7 °C)    BLOOD PRESSURE HISTORY: Systolic (51FWN), KVM:341 , Min:54 , UYN:796    Diastolic (45JTS), MJB:11, Min:29, Max:109      Intake/Output:    I/O last 3 completed shifts: In: 2481 [P.O.:360; I.V.:421]  Out: 1500   No intake/output data recorded.   Drain/tube Output:           Physical Exam:  General: awake, alert, oriented to  person, place, time  Skin/Wound: bilateral hip and abdominal wounds are with clean base and without drainage or signs of infection, replaced with wet to dry dressing    Labs:  CBC:    Recent Labs     20  0508 20  0425 20  0347   WBC 12.5* 11.5* 15.8*   HGB 9.6* 9.7* 8.5*   HCT 30.1* 30.7* 26.8*    214 207     BMP:    Recent Labs     20  0508 20  0425 20  0347    139 133*   K 4.6 4.4 5.0   CL 89* 95* 90*   CO2 21 24 22   BUN 32* 20 29*   CREATININE 7.2* 4.9* 6.1*   GLUCOSE 243* 198* 230*     Hepatic: No results for input(s): AST, ALT, ALB, BILITOT, ALKPHOS in the last 72 hours. Amylase: No results for input(s): AMYLASE in the last 72 hours. Lipase: No results for input(s): LIPASE in the last 72 hours. Mag:    No results for input(s): MG in the last 72 hours. Phos:   No results for input(s): PHOS in the last 72 hours. Coags: No results for input(s): INR, APTT in the last 72 hours. Cultures:  Anaerobic culture  No results for input(s): LABANAE in the last 72 hours. Blood culture  No results for input(s): BC in the last 72 hours. Blood culture 2  No results for input(s): BLOODCULT2 in the last 72 hours. Body fluid culture  No results for input(s): BLOODCULT2 in the last 72 hours. Surgical culture  No results for input(s): CXSURG in the last 72 hours. Fecal occult  No results for input(s): OCCULTBLDFEC in the last 72 hours. Gram stain  No results for input(s): LABGRAM in the last 72 hours. Stool culture 1  No results for input(s): CXST in the last 72 hours. Stool culture 2  No results for input(s): STOOLCULT2 in the last 72 hours. Urine culture  No results for input(s): LABURIN in the last 72 hours. Wound abscess  No results for input(s): WNDABS in the last 72 hours. C Diff   No results for input(s): CDIFFTOXAB in the last 72 hours. Imaging:  I have personally reviewed the following films:    Ct Abdomen Pelvis Wo Contrast Additional Contrast? None    Result Date: 1/27/2020  EXAMINATION: CT OF THE ABDOMEN AND PELVIS WITHOUT CONTRAST 1/26/2020 4:24 pm TECHNIQUE: CT of the abdomen and pelvis was performed without the administration of intravenous contrast. Multiplanar reformatted images are provided for review. Dose modulation, iterative reconstruction, and/or weight based adjustment of the mA/kV was utilized to reduce the radiation dose to as low as reasonably achievable. COMPARISON: 12/26/2019. HISTORY: ORDERING SYSTEM PROVIDED HISTORY: wound abd - cellulitis? ?? TECHNOLOGIST PROVIDED HISTORY: If patient is on cardiac monitor and/or pulse ox, they may be taken off cardiac monitor and pulse ox, left on O2 if currently on. All monitors reattached when patient returns to room. Additional Contrast?->None Reason for exam:->wound abd - cellulitis? ?? FINDINGS: Lower Chest: No acute infiltrate at the lung bases. Small to moderate-sized hiatal hernia. Organs: The unenhanced liver, spleen, pancreas and right adrenal gland are unremarkable. Stable left adrenal adenoma. No acute biliary findings. The kidneys are atrophic. No ureteral calculi or significant hydronephrosis. Nonobstructive lower pole left renal calculus appears stable. GI/Bowel: No pericolonic inflammatory changes. The appendix is unremarkable. No small bowel distension. The stomach and duodenal C-loop are intact. Pelvis: No pelvic mass or free pelvic fluid. The uterus is surgically absent. Multiple calcified pelvic phleboliths. Minimal distention of the urinary bladder. Peritoneum/Retroperitoneum: The abdominal aorta is normal in caliber. Mild calcified atherosclerotic plaque. No retroperitoneal adenopathy or upper abdominal ascites. Bones/Soft Tissues: Laxity of the anterior abdominal wall musculature. There is incomplete visualization of a wide-mouth hernia defect containing bowel loops in the midline with no acute features. There is breakdown of the skin surface in the folds of the patient's pannus with subcutaneous gas. No focal subcutaneous fluid collection is identified. Interval improvement in the skin thickening anteriorly on the previous study. No acute osseous findings. 1. Improved skin thickening and infiltration of the subcutaneous fat in the patient's pannus. There is continued breakdown of the skin within the folds of the pannus with subcutaneous gas. No focal collection identified.  2. Otherwise unremarkable CT of the abdomen and pelvis. Nonobstructive left renal calculus. Xr Chest Portable    Result Date: 1/27/2020  EXAMINATION: ONE XRAY VIEW OF THE CHEST 1/26/2020 2:38 pm COMPARISON: 12/26/2019, 12/16/2019 HISTORY: ORDERING SYSTEM PROVIDED HISTORY: sepsis? ?? TECHNOLOGIST PROVIDED HISTORY: Reason for exam:->sepsis? ?? Reason for Exam: Chest Pain (Patient arrived via [facility] squad from [facility] with c/o CP/SOB. Pt not on O2 at baseline. 324 ASA, 0.4 SL nitro administered en route. ) Acuity: Unknown Type of Exam: Unknown FINDINGS: A single frontal view of the chest is slightly limited secondary to patient lordotic position. There is no acute skeletal abnormality. There is a stable right-sided Port-A-Cath in proper position. The heart size is enlarged, though stable. The mediastinal contours are stable. There is stable chronic nonspecific elevation of the right hemidiaphragm. The lungs are grossly clear, without evidence of acute airspace consolidation, pneumothorax, or pleural effusion. Stable chronic cardiomegaly without acute airspace consolidation or CHF.          Scheduled Meds:   insulin glargine  25 Units Subcutaneous Nightly    insulin lispro  5 Units Subcutaneous TID WC    sodium chloride flush  10 mL Intravenous 2 times per day    pantoprazole  40 mg Oral QAM AC    piperacillin-tazobactam  3.375 g Intravenous Q12H    nebivolol  2.5 mg Oral 2 times per day on Mon Wed Fri    nebivolol  5 mg Oral 2 times per day on Sun Tue Thu Sat    sevelamer  1.6 g Oral TID WC    budesonide  500 mcg Nebulization BID    apixaban  2.5 mg Oral BID    escitalopram  10 mg Oral Daily    ferrous sulfate  325 mg Oral TID WC    formoterol  20 mcg Nebulization BID    pregabalin  50 mg Oral TID    insulin lispro  0-12 Units Subcutaneous TID WC    insulin lispro  0-6 Units Subcutaneous Nightly    collagenase   Topical Daily    sodium thiosulfate  25 g Intravenous Q MWF    lidocaine   Topical PM

## 2020-01-31 NOTE — PROGRESS NOTES
Assessment complete, vitals cycling, levophed continues for BP. Pt resting in bed in bed, visitor @ bedside. Pt A/O x4, says her pain is better than it was, rates 8/10. C/o indigestion, normally takes Prilosec & hasn't been getting. MD paged, order for Protonix & PRN tums placed. PRN tums and scheduled meds given, see MAR. Pt on room air, denies SOB. Lungs clear / diminished. Skin warm and dry. SCDs on. Pt declines turning / repositioning. Dressing to abdomen clean, dry, intact. Betadine noted to gown on side pt is laying on but pt refuses turning for assessment of dressing to right hip. Dressing to left hip w/ scant amount of what appears to be betadine. Pt denies needs. Call light in reach.  Safety precautions in place

## 2020-01-31 NOTE — PROGRESS NOTES
Reassessment complete, see flow sheets. VSS, HD infusing, levo and HD nurse managing BP, inconsistent pressures r/t pt arm positioning. No acute changes, no further needs at this time, bed alarm on, call light in reach, will continue to monitor.

## 2020-01-31 NOTE — PROGRESS NOTES
Incentive Spirometry education and demonstration completed by Respiratory Therapy Yes      Response to education: Excellent     Teaching Time: 5 minutes    Minimum Predicted Vital Capacity - 524 mL. Patient's Actual Vital Capacity - 1800 mL. Turning over to Nursing for routine follow-up Yes.     Comments: IS goal met, IS turned to nursing    Electronically signed by Denise Mcarthur on 1/31/2020 at 8:57 AM

## 2020-01-31 NOTE — PROGRESS NOTES
Nephrology Progress  Note  [phone]  [phone]   [email or web address]        Reason for Consult:  ESRD on HD     HISTORY OF PRESENT ILLNESS:      The patient is a 72 y.o. female with significant past medical history Morbid obesity , T2DM , HTN , ESRD on HD  of  who presents with chest pain   She is undergoing treatment for calciphylaxis and is due for wound debridement 1/28/2020   At this time she has no chest pain/ fever  Wounds are foul smelling  Makes little urine - no dysuria     Seen on dialysis   Needs levophed to keep BP up     PHYSICAL EXAM:    Vitals:    BP 92/62   Pulse 70   Temp 97.4 °F (36.3 °C)   Resp 13   Ht 5' 3\" (1.6 m)   Wt 297 lb 9.9 oz (135 kg)   LMP 11/01/1996 (Exact Date)   SpO2 100%   BMI 52.72 kg/m²   I/O last 3 completed shifts: In: 2338 [P.O.:360; I.V.:1978]  Out: 20 [Blood:20]  No intake/output data recorded. Physical Exam:  Gen:  alert, oriented x 3  Morbid obesity, in pain   PERRL , EOM +  Pallor +, No icterus  JVP not raised   CV: RRR no murmur or rub .   Lungs:B/ L air entry, Normal breath sounds   Abd: soft, bowel sounds + , No organomegaly   Ext: No edema, no cyanosis  Wounds Rt and left hip , lower ant abdominal wall  Left Upper arm AVG , Thrill +    DATA:    CBC with Differential:    Lab Results   Component Value Date    WBC 15.8 01/31/2020    RBC 2.94 01/31/2020    HGB 8.5 01/31/2020    HCT 26.8 01/31/2020     01/31/2020    MCV 91.0 01/31/2020    MCH 28.8 01/31/2020    MCHC 31.6 01/31/2020    RDW 20.4 01/31/2020    SEGSPCT 77.6 05/12/2013    BANDSPCT 5 08/05/2019    METASPCT 1 01/12/2018    LYMPHOPCT 7.5 01/31/2020    MONOPCT 4.3 01/31/2020    EOSPCT 2.6 01/24/2012    BASOPCT 0.1 01/31/2020    MONOSABS 0.7 01/31/2020    LYMPHSABS 1.2 01/31/2020    EOSABS 0.0 01/31/2020    BASOSABS 0.0 01/31/2020    DIFFTYPE Auto 05/12/2013     CMP:    Lab Results   Component Value Date     01/31/2020    K 5.0 01/31/2020    K 4.0 01/26/2020    CL 90 01/31/2020    CO2 22 01/31/2020    BUN 29 01/31/2020    CREATININE 6.1 01/31/2020    GFRAA 8 01/31/2020    GFRAA 50 05/12/2013    AGRATIO 0.5 01/26/2020    LABGLOM 7 01/31/2020    GLUCOSE 230 01/31/2020    PROT 6.1 01/26/2020    PROT 6.6 03/05/2013    LABALBU 2.0 01/28/2020    CALCIUM 9.1 01/31/2020    BILITOT 0.4 01/26/2020    ALKPHOS 86 01/26/2020    AST 29 01/26/2020    ALT 21 01/26/2020     Magnesium:    Lab Results   Component Value Date    MG 2.30 10/27/2019     Phosphorus:    Lab Results   Component Value Date    PHOS 2.4 01/28/2020     Uric Acid:    Lab Results   Component Value Date    LABURIC 2.8 05/17/2018     PT/INR:    Lab Results   Component Value Date    PROTIME 22.3 01/26/2020    PROTIME 22.8 09/08/2014    INR 1.91 01/26/2020     Troponin:    Lab Results   Component Value Date    TROPONINI 0.20 01/27/2020     U/A:    Lab Results   Component Value Date    NITRITE neg 05/06/2015    COLORU Brown 11/25/2018    PROTEINU >=300 11/25/2018    PHUR 5.5 11/25/2018    LABCAST 1-3 Mixed Cells 07/09/2018    WBCUA see below 11/25/2018    RBCUA see below 11/25/2018    MUCUS 3+ 06/18/2013    YEAST Present 08/20/2018    BACTERIA 3+ 11/25/2018    CLARITYU TURBID 11/25/2018    SPECGRAV >=1.030 11/25/2018    LEUKOCYTESUR SMALL 11/25/2018    UROBILINOGEN 0.2 11/25/2018    BILIRUBINUR SMALL 11/25/2018    BILIRUBINUR neg 05/06/2015    BILIRUBINUR NEGATIVE 11/23/2010    BLOODU TRACE-INTACT 11/25/2018    GLUCOSEU 100 11/25/2018    GLUCOSEU NEGATIVE 11/23/2010           IMPRESSION/RECOMMENDATIONS:      1. ESRD on HD    MWF at [facility]  2. Calciphylaxis   Continue Sodium Thiosulfate   3. Anemia of CKD   Target Hb 9-11   4. Renal osteodystrophy    Ca 8.8, Phos 2.4   5. Atrial Fibrillation  ` Rate comtrolled   6. Obesity     Prognosis : poor       Thank you for allowing me to participate in the care of this patient. I will continue to follow along. Please call with questions.     Ike Bahena MD  1/31/2020  [facility]

## 2020-01-31 NOTE — PROGRESS NOTES
Reassessment complete, vitals obtained. Pt c/o wound pain 9/10. PRN dilaudid given, see MAR. Pt refuses repositioning. AM labs drawn from port. Pt denies further needs. Call light in reach.  Safety precautions in place

## 2020-01-31 NOTE — PROGRESS NOTES
Giving pt PRN zofran prior to HD treatment, pt vagaled down, HR into mid 30s, c/o lightheadedness. After 2 minutes, HR improved back to 70s, pt denies lightheadedness.

## 2020-01-31 NOTE — PLAN OF CARE
Nutrition Problem: Increased nutrient needs  Intervention: Food and/or Nutrient Delivery: Modify current diet, Start ONS  Nutritional Goals: po intake 50% or greater of meals and ONS provided

## 2020-01-31 NOTE — CARE COORDINATION
Chart reviewed for discharge planning. Barrier(s) to discharge-  Levophed, IV Dilaudid    Tentative discharge plan-to [facility] and then [address]    Tentative discharge date- early next week    Spoke with sister on telephone and family is agreeable to above plan. [facility] has initiated precert with [facility] Medicare. *Case management will continue to follow progress and update discharge plan as needed.     Marilia Samuels RN BSN  Case Management  [phone]

## 2020-01-31 NOTE — PROGRESS NOTES
Pt given PRN dilaudid for pain 10/10 r/t surgical pain. Pt vagal'd down to HR in 40s, similar occurrence to earlier's after receiving Zofran and dilaudid. Recovered to HR in 70s within 1 minute.

## 2020-01-31 NOTE — PROGRESS NOTES
Hospitalist Progress Note      PCP: Duran Caldera, JURGEN - CNP    Date of Admission: 1/26/2020    Chief Complaint: chest pain     Hospital Course:   72 y.o. female with PMHx of abdominal pain, chronic CHF, asthma, calciphylaxis, cervical cancer, chest pain, chronic kidney disease, Stage IV (HD- MWF), chronic respiratory failure with hypoxia, COPD, DM, polyneuropathy,HLD, HTN, PE, venous stasis who   presented to [facility] with above extensive history and recent admission in January for chronic wound who has been experiencing SOB associated with some reproducible chest pain, \"so bad I could hardly move. \"  She is on home oxygen now since her discharge and is at 4L with sats 98%. She denies dizziness or lightheadedness, nausea or vomiting or headaches with her chest pain. She denies any activity or exertion that caused the pain, although it appears musculoskeletal to me.     She endorses decreased appetite. She states she has not been getting up at the NH either and states when she was here she was working with PT/OT and was at least getting out of bed.      She states she is very unhappy with the care she is receiving at her [address], and  states he found her in her own stool where she laid for 30 mins after calling asking for help to get a bedpan.       Given her history of CHF, HTN, HLD and DM and cardiac history, we will bring her in under Observation and have Cardiology see her. She follows with Dr. Dominick Leroy.        Subjective:   Doing well CP resolved  Now just pain from calciphylaxis. Feels like torture.       Medications:  Reviewed    Infusion Medications    norepinephrine 0.12 mcg/kg/min (01/31/20 9778)    dextrose       Scheduled Medications    insulin glargine  25 Units Subcutaneous Nightly    insulin lispro  5 Units Subcutaneous TID WC    sodium chloride flush  10 mL Intravenous 2 times per day    pantoprazole  40 mg Oral QAM AC    piperacillin-tazobactam 3.375 g Intravenous Q12H    nebivolol  2.5 mg Oral 2 times per day on Mon Wed Fri    nebivolol  5 mg Oral 2 times per day on Sun Tue Thu Sat    sevelamer  1.6 g Oral TID     budesonide  500 mcg Nebulization BID    apixaban  2.5 mg Oral BID    escitalopram  10 mg Oral Daily    ferrous sulfate  325 mg Oral TID     formoterol  20 mcg Nebulization BID    pregabalin  50 mg Oral TID    insulin lispro  0-12 Units Subcutaneous TID WC    insulin lispro  0-6 Units Subcutaneous Nightly    collagenase   Topical Daily    sodium thiosulfate  25 g Intravenous Q MWF    lidocaine   Topical Once    silver nitrate applicators  10 each Topical Once    sodium chloride flush  10 mL Intravenous 2 times per day     PRN Meds: sodium chloride flush, ondansetron, HYDROmorphone, calcium carbonate, lip balm petroleum free, ipratropium-albuterol, traZODone, glucose, dextrose, glucagon (rDNA), dextrose, perflutren lipid microspheres, sodium chloride flush, magnesium hydroxide, acetaminophen      Intake/Output Summary (Last 24 hours) at 1/31/2020 1802  Last data filed at 1/31/2020 1600  Gross per 24 hour   Intake 2514 ml   Output 1500 ml   Net 1014 ml       Physical Exam Performed:    BP (!) 101/46   Pulse 102   Temp 97.9 °F (36.6 °C) (Temporal)   Resp 19   Ht 5' 3\" (1.6 m)   Wt 288 lb 12.8 oz (131 kg)   LMP 11/01/1996 (Exact Date)   SpO2 100%   BMI 51.16 kg/m²     General appearance: Morbidly obese, No apparent distress, appears stated age. Cooperative. HEENT:  Normocephalic, atraumatic. PERRLA. EOMi. Conjunctivae/corneas clear, no icterus, non-injected. Neck: Supple, with full range of motion. No jugular venous distention. Trachea midline. Respiratory:  NC 4L, Normal respiratory effort. Clear to auscultation, bilaterally without Rales/Wheezes/Rhonchi.   Cardiovascular:  Anterior chest wall with reproducible tenderness on palpation in the left and mid-sternal region, Regular rate and rhythm without murmurs, rubs or gallops. Abdomen: Soft, tender, non-distended, without rebound or guarding. Normal bowel sounds. Musculoskeletal:  No clubbing, cyanosis or edema bilaterally. Full range of motion without deformity. Skin: skin cloth in right groin/skin fold, Skin color, texture, turgor normal.  No rashes or lesions. Neurologic:  Neurovascularly intact without any focal sensory/motor deficits. Cranial nerves: II-XII intact, grossly intact. No facial asymmetry, tongue midline. Psychiatric:  Alert and oriented, thought content appropriate  Capillary Refill: Brisk,< 3 seconds   Peripheral Pulses: +2 palpable, equal bilaterally           Labs:   Recent Labs     01/29/20  0508 01/30/20  0425 01/31/20  0347   WBC 12.5* 11.5* 15.8*   HGB 9.6* 9.7* 8.5*   HCT 30.1* 30.7* 26.8*    214 207     Recent Labs     01/29/20  0508 01/30/20  0425 01/31/20  0347    139 133*   K 4.6 4.4 5.0   CL 89* 95* 90*   CO2 21 24 22   BUN 32* 20 29*   CREATININE 7.2* 4.9* 6.1*   CALCIUM 9.2 9.1 9.1     No results for input(s): AST, ALT, BILIDIR, BILITOT, ALKPHOS in the last 72 hours. No results for input(s): INR in the last 72 hours. No results for input(s): Jose Beery in the last 72 hours. Urinalysis:      Lab Results   Component Value Date    NITRU POSITIVE 11/25/2018    WBCUA see below 11/25/2018    BACTERIA 3+ 11/25/2018    RBCUA see below 11/25/2018    BLOODU TRACE-INTACT 11/25/2018    SPECGRAV >=1.030 11/25/2018    GLUCOSEU 100 11/25/2018    GLUCOSEU NEGATIVE 11/23/2010       Radiology:  CT ABDOMEN PELVIS WO CONTRAST Additional Contrast? None   Final Result   1. Improved skin thickening and infiltration of the subcutaneous fat in the   patient's pannus. There is continued breakdown of the skin within the folds   of the pannus with subcutaneous gas. No focal collection identified. 2. Otherwise unremarkable CT of the abdomen and pelvis. Nonobstructive left   renal calculus.          XR CHEST PORTABLE   Final Result Stable chronic cardiomegaly without acute airspace consolidation or CHF. Assessment/Plan:    Active Hospital Problems    Diagnosis    Wound infection [T14. 8XXA, L08.9]    Chronic abdominal wound infection, sequela [S31.109S, L08.9]    Chest pain [R07.9]    ESRD (end stage renal disease) on dialysis ([facility] [address] [N18.6, Z99.2]    Elevated troponin [R79.89]    Chronic diastolic CHF (congestive heart failure) ([facility]) [I50.32]    SOB (shortness of breath) [R06.02]       Chest pain  - atypical , seen by cards   - negative stress test 2 years ago and recent surgery where she did fine from cardiac stress standpoint.   - no additional work up needed. Sepsis  - due to wound infections  - she is on Levophed to keep her pressure up. ESRD  - Nephro following.  - dialysis    Elevated trop  - due to ESRD  -non cardiac. Chronic abdominal wall wound (pannus)  - admitted December and + cx: diphtheroids and bacteroids  - completed antibiotics  - missed appointment yesterday outpatient  - CT abd/pelvis (ordered by ER): results above of chronic wound in pannus with continued breakdown of skin within folds with subcutaneous gas. - consult General Surgery  for CT findings fo gas in subcut tissue. Dr. Brenda Turner has debrided the wounds on several days, they are improving and much less foul smelling.     - Consult wound care ( was to follow with Dr. Brenda Turner weekly on discharge)  Likely due to calciphylaxis. DM, type 2  - med-dose SSI  - POCT ac/hs  - Lantus       DVT Prophylaxis:   Diet: DIET CARDIAC; Carb Control: 4 carb choices (60 gms)/meal  Dietary Nutrition Supplements: Renal Oral Supplement  Code Status: Full Code    PT/OT Eval Status: eval and treat     Dispo - [facility] are working toward [facility] given [facility] issues.      Kellen Calvert MD

## 2020-01-31 NOTE — OP NOTE
[facility]                     [address] [facility], [address] [facility]                                OPERATIVE REPORT    PATIENT NAME: Indira Rodriguez                    :        1955  MED REC NO:   [id number]                          ROOM:       0525  ACCOUNT NO:   [de-identified]                           ADMIT DATE: 2020  PROVIDER:     Sheri Mckee MD    DATE OF PROCEDURE:  2020    PREOPERATIVE DIAGNOSES:  Infected bilateral hip and abdominal stage III  decubitus ulcers. POSTOPERATIVE DIAGNOSES:  Infected bilateral hip and abdominal stage III  decubitus ulcers. OPERATION PERFORMED:  Sharp excisional debridement of skin and  subcutaneous tissue of:  1. Right proximal hip wound 13 x 8 cm. 2.  Distal right hip wound 13 x 10 cm. 3.  Abdominal wound 24 x 17 cm. 4.  Left hip wound 34 x 12 cm. 5.  Total debrided surface area 1050 sq cm. SURGEON:  Sheri Mckee MD    ANESTHESIA:  General.    INDICATIONS:  This is a 71-year-old female who presents with multiple  infected decubitus ulcers that would need debridement. Due to her  severe pain and multiple comorbidities, she did not tolerate bedside  debridement. There were multiple eschars that need to be debrided and  signs of infection and as such she needs this performed in the operating  room. She also has multiple stage II decubitus ulcers of the sacrum and  bilateral buttocks that did not need debridement, but the left and right buttock and sacral wounds did need debridement and the details were discussed at length. Risks, benefits and alternative treatments were discussed. The patient understood, agreed, and wished to proceed. OPERATIVE PROCEDURE:  The patient was brought to the operating suite and  laid in supine position. General anesthesia was induced and well  tolerated.   The patient was then rolled on to her left side where her  abdomen and right leg and hip were prepped and draped in the usual  sterile fashion. After a proper timeout was performed verifying  operative site, procedure, and patient, attention was turned to the  abdominal wound first where cautery and cut mode were used to excise  healthy a rim of tissue around the wound and using cautery, the entire  wound was debrided into healthy underlying adipose tissue  circumferentially. The wound had already been extensively cultured and  biopsied at last debridement and cultured since admission as such no  additional tissue needed to be sent for analysis. The underlying tissue  was healthy with adequate bleeding and no signs of infection. Hemostasis was obtained with pressure and cautery and the abdominal  wound final dimensions were approximately 24 x 17 cm. Attention was  then turned to the right hip where there were two areas of wounds. The  distal hip wound was infected with purulent drainage and the healthy  remnant tissue around this was cut with the cautery Bovie device. The  final wound dimensions are 13 x 10 cm and using the cautery, the healthy  underlying adipose tissue was cauterized removing the infected tissue  down to healthy adipose tissue. The entirety of the infected tissue was  excised and only underlying healthy adipose tissue was visible. Hemostasis was obtained with pressure and cautery and this measured  approximately 13 x 10 cm. Just proximal to this were multiple eschars  that connected and were debrided into one wound approximately 13 x 8 cm. This was done with the cut mode of the Bovie and then cautery once the  subcutaneous tissue was entered. All of this tissue was excised in its  entirety to deep healthy underlying adipose tissue which was bleeding  with no signs of infection. This again measured 13 x 8 cm and all of  these wounds were then dressed after hemostasis was verified with  Betadine soaked gauze, Kerlix and ABD pads and tape.   The patient's left  hip wound was unable to be visualized in one prep and the patient had to  be repositioned exposing the left hip. The patient was then reprepped  and draped in the usual sterile fashion. Another timeout was performed  and attention was then turned to the left hip wound which was much  larger than the other wounds. Similar procedure was performed with the  cut mode where this was dissected into healthy subcutaneous tissue with  cautery and the entirety of the infected tissue was excised down to  healthy bleeding underlying adipose tissue with final wound dimensions  approximately 34 x 12 cm. Hemostasis was obtained with pressure and  cautery and the wound was irrigated as were the other wounds and then  packed with Betadine soaked Kerlix, gauze, tape and ABD pads. The  patient tolerated the procedure well and was transferred to PACU in  stable condition. SPECIMENS:  None. DRAINS:  None. COMPLICATIONS:  None. ESTIMATED BLOOD LOSS:  Less than 50 mL.         Mason Ortez MD    D: 01/30/2020 19:07:06       T: 01/31/2020 4:01:27     [email or web address]  Job#: [id number]     Doc#: [id number]    CC:

## 2020-02-01 LAB
ABO/RH: NORMAL
ABO/RH: NORMAL
ANION GAP SERPL CALCULATED.3IONS-SCNC: 13 MMOL/L (ref 3–16)
ANTIBODY SCREEN: NORMAL
BASOPHILS ABSOLUTE: 0 K/UL (ref 0–0.2)
BASOPHILS RELATIVE PERCENT: 0.3 %
BLOOD BANK DISPENSE STATUS: NORMAL
BLOOD BANK DISPENSE STATUS: NORMAL
BLOOD BANK PRODUCT CODE: NORMAL
BLOOD BANK PRODUCT CODE: NORMAL
BPU ID: NORMAL
BPU ID: NORMAL
BUN BLDV-MCNC: 14 MG/DL (ref 7–20)
CALCIUM SERPL-MCNC: 8.6 MG/DL (ref 8.3–10.6)
CHLORIDE BLD-SCNC: 98 MMOL/L (ref 99–110)
CO2: 30 MMOL/L (ref 21–32)
CREAT SERPL-MCNC: 3.6 MG/DL (ref 0.6–1.2)
DESCRIPTION BLOOD BANK: NORMAL
DESCRIPTION BLOOD BANK: NORMAL
EOSINOPHILS ABSOLUTE: 0 K/UL (ref 0–0.6)
EOSINOPHILS RELATIVE PERCENT: 0.1 %
GFR AFRICAN AMERICAN: 15
GFR NON-AFRICAN AMERICAN: 13
GLUCOSE BLD-MCNC: 117 MG/DL (ref 70–99)
GLUCOSE BLD-MCNC: 133 MG/DL (ref 70–99)
GLUCOSE BLD-MCNC: 60 MG/DL (ref 70–99)
GLUCOSE BLD-MCNC: 61 MG/DL (ref 70–99)
GLUCOSE BLD-MCNC: 71 MG/DL (ref 70–99)
GLUCOSE BLD-MCNC: 80 MG/DL (ref 70–99)
GLUCOSE BLD-MCNC: 95 MG/DL (ref 70–99)
HCT VFR BLD CALC: 19.5 % (ref 36–48)
HCT VFR BLD CALC: 28.2 % (ref 36–48)
HEMOGLOBIN: 6.2 G/DL (ref 12–16)
HEMOGLOBIN: 9.3 G/DL (ref 12–16)
LYMPHOCYTES ABSOLUTE: 1.1 K/UL (ref 1–5.1)
LYMPHOCYTES RELATIVE PERCENT: 9.4 %
MCH RBC QN AUTO: 28.6 PG (ref 26–34)
MCHC RBC AUTO-ENTMCNC: 31.6 G/DL (ref 31–36)
MCV RBC AUTO: 90.3 FL (ref 80–100)
MONOCYTES ABSOLUTE: 0.8 K/UL (ref 0–1.3)
MONOCYTES RELATIVE PERCENT: 6.4 %
NEUTROPHILS ABSOLUTE: 9.8 K/UL (ref 1.7–7.7)
NEUTROPHILS RELATIVE PERCENT: 83.8 %
PDW BLD-RTO: 20.5 % (ref 12.4–15.4)
PERFORMED ON: ABNORMAL
PERFORMED ON: NORMAL
PERFORMED ON: NORMAL
PLATELET # BLD: 145 K/UL (ref 135–450)
PMV BLD AUTO: 9.7 FL (ref 5–10.5)
POTASSIUM SERPL-SCNC: 3.4 MMOL/L (ref 3.5–5.1)
RBC # BLD: 2.16 M/UL (ref 4–5.2)
SODIUM BLD-SCNC: 141 MMOL/L (ref 136–145)
WBC # BLD: 11.7 K/UL (ref 4–11)

## 2020-02-01 PROCEDURE — 85014 HEMATOCRIT: CPT

## 2020-02-01 PROCEDURE — 2500000003 HC RX 250 WO HCPCS: Performed by: SURGERY

## 2020-02-01 PROCEDURE — 99232 SBSQ HOSP IP/OBS MODERATE 35: CPT | Performed by: INTERNAL MEDICINE

## 2020-02-01 PROCEDURE — 6360000002 HC RX W HCPCS: Performed by: SURGERY

## 2020-02-01 PROCEDURE — 85025 COMPLETE CBC W/AUTO DIFF WBC: CPT

## 2020-02-01 PROCEDURE — 86923 COMPATIBILITY TEST ELECTRIC: CPT

## 2020-02-01 PROCEDURE — 6370000000 HC RX 637 (ALT 250 FOR IP): Performed by: SURGERY

## 2020-02-01 PROCEDURE — 94761 N-INVAS EAR/PLS OXIMETRY MLT: CPT

## 2020-02-01 PROCEDURE — 36591 DRAW BLOOD OFF VENOUS DEVICE: CPT

## 2020-02-01 PROCEDURE — 6360000002 HC RX W HCPCS: Performed by: INTERNAL MEDICINE

## 2020-02-01 PROCEDURE — 2700000000 HC OXYGEN THERAPY PER DAY

## 2020-02-01 PROCEDURE — 86901 BLOOD TYPING SEROLOGIC RH(D): CPT

## 2020-02-01 PROCEDURE — 86900 BLOOD TYPING SEROLOGIC ABO: CPT

## 2020-02-01 PROCEDURE — 99024 POSTOP FOLLOW-UP VISIT: CPT | Performed by: SURGERY

## 2020-02-01 PROCEDURE — 2580000003 HC RX 258: Performed by: SURGERY

## 2020-02-01 PROCEDURE — 36415 COLL VENOUS BLD VENIPUNCTURE: CPT

## 2020-02-01 PROCEDURE — 86850 RBC ANTIBODY SCREEN: CPT

## 2020-02-01 PROCEDURE — P9016 RBC LEUKOCYTES REDUCED: HCPCS

## 2020-02-01 PROCEDURE — 2580000003 HC RX 258: Performed by: INTERNAL MEDICINE

## 2020-02-01 PROCEDURE — 6370000000 HC RX 637 (ALT 250 FOR IP): Performed by: INTERNAL MEDICINE

## 2020-02-01 PROCEDURE — 94640 AIRWAY INHALATION TREATMENT: CPT

## 2020-02-01 PROCEDURE — 85018 HEMOGLOBIN: CPT

## 2020-02-01 PROCEDURE — 80048 BASIC METABOLIC PNL TOTAL CA: CPT

## 2020-02-01 PROCEDURE — 36430 TRANSFUSION BLD/BLD COMPNT: CPT

## 2020-02-01 PROCEDURE — 1200000000 HC SEMI PRIVATE

## 2020-02-01 RX ORDER — SODIUM CHLORIDE 0.9 % (FLUSH) 0.9 %
10 SYRINGE (ML) INJECTION PRN
Status: DISCONTINUED | OUTPATIENT
Start: 2020-02-01 | End: 2020-02-05 | Stop reason: HOSPADM

## 2020-02-01 RX ORDER — POLYETHYLENE GLYCOL 3350 17 G/17G
17 POWDER, FOR SOLUTION ORAL DAILY PRN
Status: DISCONTINUED | OUTPATIENT
Start: 2020-02-01 | End: 2020-02-05 | Stop reason: HOSPADM

## 2020-02-01 RX ORDER — 0.9 % SODIUM CHLORIDE 0.9 %
20 INTRAVENOUS SOLUTION INTRAVENOUS ONCE
Status: COMPLETED | OUTPATIENT
Start: 2020-02-01 | End: 2020-02-01

## 2020-02-01 RX ORDER — SODIUM CHLORIDE 9 MG/ML
INJECTION, SOLUTION INTRAVENOUS
Status: DISPENSED
Start: 2020-02-01 | End: 2020-02-01

## 2020-02-01 RX ORDER — HYDROMORPHONE HYDROCHLORIDE 1 MG/ML
0.5 INJECTION, SOLUTION INTRAMUSCULAR; INTRAVENOUS; SUBCUTANEOUS ONCE
Status: COMPLETED | OUTPATIENT
Start: 2020-02-01 | End: 2020-02-01

## 2020-02-01 RX ADMIN — SODIUM CHLORIDE 20 ML: 9 INJECTION, SOLUTION INTRAVENOUS at 11:28

## 2020-02-01 RX ADMIN — HYDROMORPHONE HYDROCHLORIDE 0.5 MG: 1 INJECTION, SOLUTION INTRAMUSCULAR; INTRAVENOUS; SUBCUTANEOUS at 16:52

## 2020-02-01 RX ADMIN — SEVELAMER CARBONATE 1.6 G: 800 POWDER, FOR SUSPENSION ORAL at 11:26

## 2020-02-01 RX ADMIN — FERROUS SULFATE TAB 325 MG (65 MG ELEMENTAL FE) 325 MG: 325 (65 FE) TAB at 16:52

## 2020-02-01 RX ADMIN — TAZOBACTAM SODIUM AND PIPERACILLIN SODIUM 3.38 G: 375; 3 INJECTION, SOLUTION INTRAVENOUS at 03:48

## 2020-02-01 RX ADMIN — SEVELAMER CARBONATE 1.6 G: 800 POWDER, FOR SUSPENSION ORAL at 16:52

## 2020-02-01 RX ADMIN — PREGABALIN 50 MG: 25 CAPSULE ORAL at 16:52

## 2020-02-01 RX ADMIN — HYDROMORPHONE HYDROCHLORIDE 0.5 MG: 1 INJECTION, SOLUTION INTRAMUSCULAR; INTRAVENOUS; SUBCUTANEOUS at 11:25

## 2020-02-01 RX ADMIN — HYDROMORPHONE HYDROCHLORIDE 0.5 MG: 1 INJECTION, SOLUTION INTRAMUSCULAR; INTRAVENOUS; SUBCUTANEOUS at 09:49

## 2020-02-01 RX ADMIN — Medication 10 ML: at 21:02

## 2020-02-01 RX ADMIN — COLLAGENASE SANTYL: 250 OINTMENT TOPICAL at 11:27

## 2020-02-01 RX ADMIN — NEBIVOLOL HYDROCHLORIDE 5 MG: 5 TABLET ORAL at 21:14

## 2020-02-01 RX ADMIN — APIXABAN 2.5 MG: 5 TABLET, FILM COATED ORAL at 21:02

## 2020-02-01 RX ADMIN — ESCITALOPRAM OXALATE 10 MG: 10 TABLET ORAL at 09:50

## 2020-02-01 RX ADMIN — FORMOTEROL FUMARATE DIHYDRATE 20 MCG: 20 SOLUTION RESPIRATORY (INHALATION) at 08:39

## 2020-02-01 RX ADMIN — TAZOBACTAM SODIUM AND PIPERACILLIN SODIUM 3.38 G: 375; 3 INJECTION, SOLUTION INTRAVENOUS at 16:52

## 2020-02-01 RX ADMIN — PREGABALIN 50 MG: 25 CAPSULE ORAL at 21:03

## 2020-02-01 RX ADMIN — SEVELAMER CARBONATE 1.6 G: 800 POWDER, FOR SUSPENSION ORAL at 09:49

## 2020-02-01 RX ADMIN — PANTOPRAZOLE SODIUM 40 MG: 40 TABLET, DELAYED RELEASE ORAL at 06:19

## 2020-02-01 RX ADMIN — FORMOTEROL FUMARATE DIHYDRATE 20 MCG: 20 SOLUTION RESPIRATORY (INHALATION) at 19:44

## 2020-02-01 RX ADMIN — BUDESONIDE 500 MCG: 0.5 SUSPENSION RESPIRATORY (INHALATION) at 08:38

## 2020-02-01 RX ADMIN — HYDROMORPHONE HYDROCHLORIDE 0.5 MG: 1 INJECTION, SOLUTION INTRAMUSCULAR; INTRAVENOUS; SUBCUTANEOUS at 21:15

## 2020-02-01 RX ADMIN — DEXTROSE MONOHYDRATE 12.5 G: 500 INJECTION PARENTERAL at 13:11

## 2020-02-01 RX ADMIN — BUDESONIDE 500 MCG: 0.5 SUSPENSION RESPIRATORY (INHALATION) at 19:44

## 2020-02-01 RX ADMIN — FERROUS SULFATE TAB 325 MG (65 MG ELEMENTAL FE) 325 MG: 325 (65 FE) TAB at 11:26

## 2020-02-01 RX ADMIN — APIXABAN 2.5 MG: 5 TABLET, FILM COATED ORAL at 09:50

## 2020-02-01 RX ADMIN — FERROUS SULFATE TAB 325 MG (65 MG ELEMENTAL FE) 325 MG: 325 (65 FE) TAB at 09:50

## 2020-02-01 RX ADMIN — PREGABALIN 50 MG: 25 CAPSULE ORAL at 09:50

## 2020-02-01 ASSESSMENT — PAIN SCALES - GENERAL
PAINLEVEL_OUTOF10: 8
PAINLEVEL_OUTOF10: 8
PAINLEVEL_OUTOF10: 9
PAINLEVEL_OUTOF10: 8
PAINLEVEL_OUTOF10: 0
PAINLEVEL_OUTOF10: 6
PAINLEVEL_OUTOF10: 0
PAINLEVEL_OUTOF10: 7
PAINLEVEL_OUTOF10: 5
PAINLEVEL_OUTOF10: 6

## 2020-02-01 ASSESSMENT — PAIN DESCRIPTION - PAIN TYPE: TYPE: ACUTE PAIN

## 2020-02-01 ASSESSMENT — PAIN DESCRIPTION - DESCRIPTORS: DESCRIPTORS: DISCOMFORT

## 2020-02-01 ASSESSMENT — PAIN DESCRIPTION - LOCATION: LOCATION: ABDOMEN

## 2020-02-01 NOTE — PROGRESS NOTES
Reassessment completed. Pt covered for low BS overnight, no other acute issues, will continue to monitor.

## 2020-02-01 NOTE — PROGRESS NOTES
P Pulmonary and Critical Care   Progress Note      Reason for Consult: Hypotension, end-stage renal disease  Requesting Physician: Dr. Godinez Rater:   [address] [facility] / Hospitals in [place]:                The patient is a 72 y.o. female with significant past medical history of:      Diagnosis Date    Abdominal pain 8/20/2018    Acute on chronic congestive heart failure ([address] 6/26/2014    Asthma     Calcinosis cutis     Calciphylaxis 12/2/2019    Cervical cancer ([address]     Chest pain 5/16/2018    CHF (congestive heart failure) ([facility])     Chronic kidney disease, stage IV (severe) ([facility])     Dr Олег Herrera    Chronic pain syndrome 3/27/2018    Chronic respiratory failure with hypoxia ([facility])     CKD (chronic kidney disease) stage 4, GFR 15-29 ml/min ([facility]) 6/30/2017    Colon polyps     COPD (chronic obstructive pulmonary disease) ([address]     Degenerative disc disease at L5-S1 level 6/18/2013     CT    Diabetes mellitus, insulin dependent (IDDM), uncontrolled ([address]     Diabetic polyneuropathy associated with type 2 diabetes mellitus ([address] 3/26/2019    Elevated troponin 9/9/2017    ESRD (end stage renal disease) on dialysis ([address] 02/14/2018    M-W-ARTHUR Higgins    GERD (gastroesophageal reflux disease)     Gout     History of blood transfusion     History of cervical cancer     Hyperlipidemia     Hypertension     Incarcerated ventral hernia     LVH (left ventricular hypertrophy)     Morbid obesity ([facility])     Mucus plugging of bronchi     Obstructive sleep apnea on CPAP     wears 2L O2 and BIPAP at night    Pneumonia     Psychiatric problem     breakdown long time ago but not current    Pulmonary embolism ([address] 02/06/2013    Type 2 diabetes mellitus with diabetic neuropathy, with long-term current use of insulin ([address] 9/12/2017    Urinary incontinence in female     Venous stasis of lower extremity      Interval history: Patient states that she did wear BiPAP last night.   He has been tolerating this intermittently. Norepinephrine has been weaned off. Hemoglobin this morning is low at 6.2 and she will require transfusion.       Past Surgical History:        Procedure Laterality Date    ABDOMEN SURGERY N/A 12/26/2019    INCISION AND DRAINAGE OF ABDOMINAL WOUND AND BILATERAL HIP WOUNDS performed by Samantha Camacho MD at [facility] N/A 1/30/2020    INCISION AND DRAINAGE OF ABDOMEN WOUND performed by Samantha Camacho MD at [address]  1/14    Grace; clean cors    COLONOSCOPY  2010    polyp removed; Dylon Hopper; repeat 5 years    COLONOSCOPY  6/25/13, 11/14    [place]armen    COLONOSCOPY N/A 10/10/2019    COLONOSCOPY POLYPECTOMY SNARE/COLD BIOPSY performed by Kingston Scheuermann, MD at [address] Right 3/28/2019    RIGHT BRACHIO CEPHALIC FISTULA CREATION performed by Chuy Burroughs MD at [address]  2/21/09    LVH with global hypokinesis; EF 55-60%    HYSTERECTOMY      INCISION AND DRAINAGE Right 8/22/2019    RIGHT ARM INCISION AND DEBRIDEMENT performed by Chuy Burroughs MD at [facility] [address] Bilateral 1/30/2020    BILATERAL HIP WOUND INCISION AND DRAINAGE performed by Samantha Camacho MD at [address] ARTHROSCOPY Right 1999    OTHER SURGICAL HISTORY  02/22/2018    LEFT brachial artery axillary vein AV graft     OK OPEN SKULL SUPRATENT EXPLORE      Craniotomy, 3/21/19 patient denies any brain surgery or craniotomy    OK REPAIR INCISIONAL HERNIA,REDUCIBLE N/A 11/20/2018    LAPAROSCOPIC VENTRAL HERNIA REPAIR WITH MESH performed by Rianna Eisenberg MD at [address] Right 7/18/2019    RIGHT BRACHIAL ARTERY AXILLARY VEIN GRAFT AND LIGATION OF RIGHT BRACHIO-CEPHALIC FISTULA (58598, [id number]) performed by Chuy Burroughs MD at [place] [address]  10/96    TUNNELED VENOUS PORT PLACEMENT Right 11/2019    UPPER GASTROINTESTINAL ENDOSCOPY  6/25/13    UPPER BID  traZODone (DESYREL) tablet 100 mg, 100 mg, Oral, Nightly PRN  pregabalin (LYRICA) capsule 50 mg, 50 mg, Oral, TID  insulin lispro (1 Unit Dial) 0-12 Units, 0-12 Units, Subcutaneous, TID WC  insulin lispro (1 Unit Dial) 0-6 Units, 0-6 Units, Subcutaneous, Nightly  glucose (GLUTOSE) 40 % oral gel 15 g, 15 g, Oral, PRN  dextrose 50 % IV solution, 12.5 g, Intravenous, PRN  glucagon (rDNA) injection 1 mg, 1 mg, Intramuscular, PRN  dextrose 5 % solution, 100 mL/hr, Intravenous, PRN  collagenase ointment, , Topical, Daily  sodium thiosulfate 25 % injection 25 g, 25 g, Intravenous, Q MWF  perflutren lipid microspheres (DEFINITY) injection 1.65 mg, 1.5 mL, Intravenous, ONCE PRN  lidocaine (XYLOCAINE) 4 % external solution, , Topical, Once  silver nitrate applicators applicator 10 each, 10 each, Topical, Once  sodium chloride flush 0.9 % injection 10 mL, 10 mL, Intravenous, 2 times per day  sodium chloride flush 0.9 % injection 10 mL, 10 mL, Intravenous, PRN  magnesium hydroxide (MILK OF MAGNESIA) 400 MG/5ML suspension 30 mL, 30 mL, Oral, Daily PRN  acetaminophen (TYLENOL) tablet 650 mg, 650 mg, Oral, Q6H PRN    Allergies   Allergen Reactions    Codeine Nausea Only    Fentanyl Itching    Morphine      CHEST PAIN    Tramadol Hcl Other (See Comments)     Causes pt to have involuntary movements    Hydrocodone-Acetaminophen Itching and Rash    Percocet [Oxycodone-Acetaminophen] Itching    Procardia [Nifedipine] Nausea And Vomiting     Headache  Takes norvasc at home 12/22/15    Vicodin [Hydrocodone-Acetaminophen] Rash       Social History:    TOBACCO:   reports that she has never smoked. She has never used smokeless tobacco.  ETOH:   reports no history of alcohol use.   Patient currently lives independently  Environmental/chemical exposure: None known    Family History:       Problem Relation Age of Onset    Colon Cancer Father     Diabetes Father     High Blood Pressure Father     Colon Cancer Mother     Diabetes Mother     Colon Cancer Maternal Grandmother     Kidney Cancer Brother     Heart Disease Brother          age 54    High Blood Pressure Brother     High Blood Pressure Sister     High Blood Pressure Maternal Aunt     High Blood Pressure Sister     Heart Disease Sister      REVIEW OF SYSTEMS:    CONSTITUTIONAL:  negative for fevers, chills, diaphoresis, activity change, appetite change, fatigue, night sweats and unexpected weight change. EYES:  negative for blurred vision, eye discharge, visual disturbance and icterus  HEENT:  negative for hearing loss, tinnitus, ear drainage, sinus pressure, nasal congestion, epistaxis and snoring  RESPIRATORY:  See HPI  CARDIOVASCULAR:  negative for chest pain, palpitations, exertional chest pressure/discomfort, edema, syncope  GASTROINTESTINAL:  negative for nausea, vomiting, diarrhea, constipation, blood in stool and abdominal pain  GENITOURINARY:  negative for frequency, dysuria, urinary incontinence, decreased urine volume, and hematuria  HEMATOLOGIC/LYMPHATIC:  negative for easy bruising, bleeding and lymphadenopathy  ALLERGIC/IMMUNOLOGIC:  negative for recurrent infections, angioedema, anaphylaxis and drug reactions  ENDOCRINE:  negative for weight changes and diabetic symptoms including polyuria, polydipsia and polyphagia  MUSCULOSKELETAL:  negative for  pain, joint swelling, decreased range of motion and muscle weakness  NEUROLOGICAL:  negative for headaches, slurred speech, unilateral weakness  PSYCHIATRIC/BEHAVIORAL: negative for hallucinations, behavioral problems, confusion and agitation.      Objective:   PHYSICAL EXAM:      VITALS:  BP 85/71   Pulse 86   Temp 96.7 °F (35.9 °C) (Temporal)   Resp 16   Ht 5' 3\" (1.6 m)   Wt (!) 308 lb 13.8 oz (140.1 kg)   LMP 1996 (Exact Date)   SpO2 99%   BMI 54.71 kg/m²      24HR INTAKE/OUTPUT:      Intake/Output Summary (Last 24 hours) at 2020 0848  Last data filed at 2020 0600  Gross per 24 hour   Intake 2273 ml   Output 1500 ml   Net 773 ml     CONSTITUTIONAL:  awake, alert, cooperative, no apparent distress, and appears stated age  NECK:  Supple, symmetrical, trachea midline, no adenopathy, thyroid symmetric, not enlarged and no tenderness, skin normal  LUNGS:  no increased work of breathing and clear to auscultation. No accessory muscle use  CARDIOVASCULAR: S1 and S2, no edema and no JVD  ABDOMEN: Massively obese normal bowel sounds, non-distended and no masses palpated, and no tenderness to palpation. No hepatospleenomegaly  LYMPHADENOPATHY:  no axillary or supraclavicular adenopathy. No cervical adnenopathy  PSYCHIATRIC: Oriented to person place and time. No obvious depression or anxiety. MUSCULOSKELETAL: No obvious misalignment or effusion of the joints. No clubbing, cyanosis of the digits. SKIN: Multiple wounds    DATA:    Old records have been reviewed    CBC:  Recent Labs     01/30/20  0425 01/31/20  0347 02/01/20  0410   WBC 11.5* 15.8* 11.7*   RBC 3.37* 2.94* 2.16*   HGB 9.7* 8.5* 6.2*   HCT 30.7* 26.8* 19.5*    207 145   MCV 91.1 91.0 90.3   MCH 28.9 28.8 28.6   MCHC 31.7 31.6 31.6   RDW 21.0* 20.4* 20.5*      BMP:  Recent Labs     01/30/20  0425 01/31/20  0347 02/01/20  0410    133* 141   K 4.4 5.0 3.4*   CL 95* 90* 98*   CO2 24 22 30   BUN 20 29* 14   CREATININE 4.9* 6.1* 3.6*   CALCIUM 9.1 9.1 8.6   GLUCOSE 198* 230* 80      ABG:  No results for input(s): PHART, XGP8TKK, PO2ART, FPG3DIL, X9RBXEEV, BEART in the last 72 hours. Lab Results   Component Value Date    BNP 48 03/13/2014     Lab Results   Component Value Date    CKTOTAL 129 07/11/2017    CKMB 1.9 04/19/2015    TROPONINI 0.20 (H) 01/27/2020       Cultures:     Abx:    Radiology Review:  All pertinent images / reports were reviewed as a part of this visit. Assessment:     1. Hypotension requiring norepinephrine  · Norepinephrine is off  · Blood pressure about normal for her    2.   Chronic hypoxemic and

## 2020-02-01 NOTE — PLAN OF CARE
Problem: Falls - Risk of:  Goal: Will remain free from falls  Description  Will remain free from falls  Outcome: Ongoing  Goal: Absence of physical injury  Description  Absence of physical injury  Outcome: Ongoing     Problem: Risk for Impaired Skin Integrity  Goal: Tissue integrity - skin and mucous membranes  Description  Structural intactness and normal physiological function of skin and  mucous membranes.   Outcome: Ongoing     Problem: Pain:  Goal: Pain level will decrease  Description  Pain level will decrease  Outcome: Ongoing  Goal: Control of acute pain  Description  Control of acute pain  Outcome: Ongoing  Goal: Control of chronic pain  Description  Control of chronic pain  Outcome: Ongoing     Problem: OXYGENATION/RESPIRATORY FUNCTION  Goal: Patient will maintain patent airway  Outcome: Ongoing  Goal: Patient will achieve/maintain normal respiratory rate/effort  Description  Respiratory rate and effort will be within normal limits for the patient  Outcome: Ongoing     Problem: HEMODYNAMIC STATUS  Goal: Patient has stable vital signs and fluid balance  Outcome: Ongoing     Problem: FLUID AND ELECTROLYTE IMBALANCE  Goal: Fluid and electrolyte balance are achieved/maintained  Outcome: Ongoing     Problem: ACTIVITY INTOLERANCE/IMPAIRED MOBILITY  Goal: Mobility/activity is maintained at optimum level for patient  Outcome: Ongoing     Problem: Discharge Planning:  Goal: Discharged to appropriate level of care  Description  Discharged to appropriate level of care  Outcome: Ongoing     Problem: Serum Glucose Level - Abnormal:  Goal: Ability to maintain appropriate glucose levels will improve  Description  Ability to maintain appropriate glucose levels will improve  Outcome: Ongoing     Problem: Nutrition  Goal: Optimal nutrition therapy  1/31/2020 2102 by Shruti Shirley RN  Outcome: Ongoing  1/31/2020 1227 by Vielka Keller RD, LD  Outcome: Ongoing

## 2020-02-01 NOTE — PROGRESS NOTES
Pt assessment completed and documented- see doc flowsheet. Pt awaker and alert on 2L NC. Pt attempted to ambulate from bed to chair, was able to stand but too weak to transfer. Remains off levo gtt at this time. VSS, medications given per MAR. Updated on POC. White board updated. Denies further needs at this time. Will continue to monitor.

## 2020-02-01 NOTE — PROGRESS NOTES
Nephrology Progress  Note  [phone]  [phone]   [email or web address].        Reason for Consult:  ESRD on HD     HISTORY OF PRESENT ILLNESS:      The patient is a 72 y.o. female with significant past medical history Morbid obesity , T2DM , HTN , ESRD on HD  of  who presents with chest pain   She is undergoing treatment for calciphylaxis and is due for wound debridement 1/28/2020   At this time she has no chest pain/ fever  Wounds are foul smelling  Makes little urine - no dysuria     Pain is better,  by bedside     PHYSICAL EXAM:    Vitals:    BP (!) 117/54   Pulse 92   Temp 96.8 °F (36 °C) (Temporal)   Resp 17   Ht 5' 3\" (1.6 m)   Wt (!) 308 lb 13.8 oz (140.1 kg)   LMP 11/01/1996 (Exact Date)   SpO2 96%   BMI 54.71 kg/m²   I/O last 3 completed shifts: In: 2273 [P.O.:120; I.V.:453]  Out: 1500   I/O this shift: In: 18 [P.O.:250; Blood:320]  Out: -     Physical Exam:  Gen:  alert, oriented x 3  Morbid obesity, in pain   PERRL , EOM +  Pallor +, No icterus  JVP not raised   CV: RRR no murmur or rub .   Lungs:B/ L air entry, Normal breath sounds   Abd: soft, bowel sounds + , No organomegaly   Ext: No edema, no cyanosis  Wounds Rt and left hip , lower ant abdominal wall  Left Upper arm AVG , Thrill +    DATA:    CBC with Differential:    Lab Results   Component Value Date    WBC 11.7 02/01/2020    RBC 2.16 02/01/2020    HGB 6.2 02/01/2020    HCT 19.5 02/01/2020     02/01/2020    MCV 90.3 02/01/2020    MCH 28.6 02/01/2020    MCHC 31.6 02/01/2020    RDW 20.5 02/01/2020    SEGSPCT 77.6 05/12/2013    BANDSPCT 5 08/05/2019    METASPCT 1 01/12/2018    LYMPHOPCT 9.4 02/01/2020    MONOPCT 6.4 02/01/2020    EOSPCT 2.6 01/24/2012    BASOPCT 0.3 02/01/2020    MONOSABS 0.8 02/01/2020    LYMPHSABS 1.1 02/01/2020    EOSABS 0.0 02/01/2020    BASOSABS 0.0 02/01/2020    DIFFTYPE Auto 05/12/2013     CMP:    Lab Results   Component Value Date     02/01/2020    K 3.4 02/01/2020    K 4.0 01/26/2020    CL 98 02/01/2020    CO2 30 02/01/2020    BUN 14 02/01/2020    CREATININE 3.6 02/01/2020    GFRAA 15 02/01/2020    GFRAA 50 05/12/2013    AGRATIO 0.5 01/26/2020    LABGLOM 13 02/01/2020    GLUCOSE 80 02/01/2020    PROT 6.1 01/26/2020    PROT 6.6 03/05/2013    LABALBU 2.0 01/28/2020    CALCIUM 8.6 02/01/2020    BILITOT 0.4 01/26/2020    ALKPHOS 86 01/26/2020    AST 29 01/26/2020    ALT 21 01/26/2020     Magnesium:    Lab Results   Component Value Date    MG 2.30 10/27/2019     Phosphorus:    Lab Results   Component Value Date    PHOS 2.4 01/28/2020     Uric Acid:    Lab Results   Component Value Date    LABURIC 2.8 05/17/2018     PT/INR:    Lab Results   Component Value Date    PROTIME 22.3 01/26/2020    PROTIME 22.8 09/08/2014    INR 1.91 01/26/2020     Troponin:    Lab Results   Component Value Date    TROPONINI 0.20 01/27/2020     U/A:    Lab Results   Component Value Date    NITRITE neg 05/06/2015    COLORU Brown 11/25/2018    PROTEINU >=300 11/25/2018    PHUR 5.5 11/25/2018    LABCAST 1-3 Mixed Cells 07/09/2018    WBCUA see below 11/25/2018    RBCUA see below 11/25/2018    MUCUS 3+ 06/18/2013    YEAST Present 08/20/2018    BACTERIA 3+ 11/25/2018    CLARITYU TURBID 11/25/2018    SPECGRAV >=1.030 11/25/2018    LEUKOCYTESUR SMALL 11/25/2018    UROBILINOGEN 0.2 11/25/2018    BILIRUBINUR SMALL 11/25/2018    BILIRUBINUR neg 05/06/2015    BILIRUBINUR NEGATIVE 11/23/2010    BLOODU TRACE-INTACT 11/25/2018    GLUCOSEU 100 11/25/2018    GLUCOSEU NEGATIVE 11/23/2010           IMPRESSION/RECOMMENDATIONS:      1. ESRD on HD    MWF at [facility]  2. Calciphylaxis   Continue Sodium Thiosulfate   3. Anemia of CKD   Target Hb 9-11   4. Renal osteodystrophy    Ca 8.8, Phos 2.4   5. Atrial Fibrillation  ` Rate controlled   6. Obesity   7. Hypotension   Could be due to pain medication     Prognosis : poor       Thank you for allowing me to participate in the care of this patient. I will continue to follow along.   Please call with questions.     Luma Leon MD  2/1/2020  [facility]

## 2020-02-01 NOTE — PROGRESS NOTES
First unit of RBC's started. Pt tolerating well. No s/s of reaction. VSS. Will continue to monitor and assess.  Electronically signed by Roby Heredia RN on 2/1/2020 at 11:45 AM

## 2020-02-01 NOTE — PROGRESS NOTES
2nd unit of RBC's started. Pt tolerating well. VSS. No s/s of reaction at this time. Will continue to monitor and assess.  Electronically signed by Ryan Flores RN on 2/1/2020 at 2:08 PM

## 2020-02-01 NOTE — PROGRESS NOTES
Reassessment complete. No changes noted to previous assessment (see doc flowsheets). Her blood sugar was 60. Her lunch tray was next to her. Patient set up to eat lunch. Patient encouraged to eat, and then blood sugar will be rechecked in the next 15 minutes to reassess. Patient is currently asymptomatic. Will continue to monitor and assess.  Electronically signed by Ivana Angelo RN on 2/1/2020 at 12:51 PM

## 2020-02-01 NOTE — PROGRESS NOTES
Hospitalist Progress Note      PCP: JURGEN Morgan - CNP    Date of Admission: 1/26/2020    Chief Complaint: chest pain     Hospital Course:   72 y.o. female with PMHx of abdominal pain, chronic CHF, asthma, calciphylaxis, cervical cancer, chest pain, chronic kidney disease, Stage IV (HD- MWF), chronic respiratory failure with hypoxia, COPD, DM, polyneuropathy,HLD, HTN, PE, venous stasis who   presented to [facility] with above extensive history and recent admission in January for chronic wound who has been experiencing SOB associated with some reproducible chest pain, \"so bad I could hardly move. \"  She is on home oxygen now since her discharge and is at 4L with sats 98%. She denies dizziness or lightheadedness, nausea or vomiting or headaches with her chest pain. She denies any activity or exertion that caused the pain, although it appears musculoskeletal to me.     She endorses decreased appetite. She states she has not been getting up at the NH either and states when she was here she was working with PT/OT and was at least getting out of bed.      She states she is very unhappy with the care she is receiving at her [address], and  states he found her in her own stool where she laid for 30 mins after calling asking for help to get a bedpan.       Given her history of CHF, HTN, HLD and DM and cardiac history, we will bring her in under Observation and have Cardiology see her. She follows with Dr. Leona Parker.        Subjective:   Doing well. CP resolved  Now just pain from calciphylaxis. Feels like torture.  Asking for extra dose of iv dilaudid       Medications:  Reviewed    Infusion Medications    norepinephrine Stopped (01/31/20 1858)    dextrose       Scheduled Medications    sodium chloride  20 mL Intravenous Once    [Held by provider] insulin glargine  25 Units Subcutaneous Nightly    insulin lispro  5 Units Subcutaneous TID     sodium chloride flush  10 mL Intravenous 2 times per day    pantoprazole  40 mg Oral QAM AC    piperacillin-tazobactam  3.375 g Intravenous Q12H    nebivolol  2.5 mg Oral 2 times per day on Mon Wed Fri    nebivolol  5 mg Oral 2 times per day on Sun Tue Thu Sat    sevelamer  1.6 g Oral TID     budesonide  500 mcg Nebulization BID    apixaban  2.5 mg Oral BID    escitalopram  10 mg Oral Daily    ferrous sulfate  325 mg Oral TID WC    formoterol  20 mcg Nebulization BID    pregabalin  50 mg Oral TID    insulin lispro  0-12 Units Subcutaneous TID WC    insulin lispro  0-6 Units Subcutaneous Nightly    collagenase   Topical Daily    sodium thiosulfate  25 g Intravenous Q MWF    lidocaine   Topical Once    silver nitrate applicators  10 each Topical Once    sodium chloride flush  10 mL Intravenous 2 times per day     PRN Meds: sodium chloride flush, polyethylene glycol, sodium chloride flush, ondansetron, HYDROmorphone, calcium carbonate, lip balm petroleum free, ipratropium-albuterol, traZODone, glucose, dextrose, glucagon (rDNA), dextrose, perflutren lipid microspheres, sodium chloride flush, magnesium hydroxide, acetaminophen      Intake/Output Summary (Last 24 hours) at 2/1/2020 1305  Last data filed at 2/1/2020 0600  Gross per 24 hour   Intake 2273 ml   Output 1500 ml   Net 773 ml       Physical Exam Performed:    /62   Pulse 84   Temp 97.6 °F (36.4 °C) (Temporal)   Resp 12   Ht 5' 3\" (1.6 m)   Wt (!) 308 lb 13.8 oz (140.1 kg)   LMP 11/01/1996 (Exact Date)   SpO2 97%   BMI 54.71 kg/m²     General appearance: Morbidly obese, No apparent distress, appears stated age. Cooperative. HEENT:  Normocephalic, atraumatic. PERRLA. EOMi. Conjunctivae/corneas clear, no icterus, non-injected. Neck: Supple, with full range of motion. No jugular venous distention. Trachea midline. Respiratory:  NC 4L, Normal respiratory effort. Clear to auscultation, bilaterally without Rales/Wheezes/Rhonchi.   Cardiovascular:  Anterior chest wall with reproducible tenderness on palpation in the left and mid-sternal region, Regular rate and rhythm without murmurs, rubs or gallops. Abdomen: Soft, tender, non-distended, without rebound or guarding. Normal bowel sounds. Musculoskeletal:  No clubbing, cyanosis or edema bilaterally. Full range of motion without deformity. Skin: skin cloth in right groin/skin fold, Skin color, texture, turgor normal.  No rashes or lesions. Neurologic:  Neurovascularly intact without any focal sensory/motor deficits. Cranial nerves: II-XII intact, grossly intact. No facial asymmetry, tongue midline. Psychiatric:  Alert and oriented, thought content appropriate  Capillary Refill: Brisk,< 3 seconds   Peripheral Pulses: +2 palpable, equal bilaterally           Labs:   Recent Labs     01/30/20 0425 01/31/20 0347 02/01/20 0410   WBC 11.5* 15.8* 11.7*   HGB 9.7* 8.5* 6.2*   HCT 30.7* 26.8* 19.5*    207 145     Recent Labs     01/30/20 0425 01/31/20 0347 02/01/20  0410    133* 141   K 4.4 5.0 3.4*   CL 95* 90* 98*   CO2 24 22 30   BUN 20 29* 14   CREATININE 4.9* 6.1* 3.6*   CALCIUM 9.1 9.1 8.6     No results for input(s): AST, ALT, BILIDIR, BILITOT, ALKPHOS in the last 72 hours. No results for input(s): INR in the last 72 hours. No results for input(s): [place] in the last 72 hours. Urinalysis:      Lab Results   Component Value Date    NITRU POSITIVE 11/25/2018    WBCUA see below 11/25/2018    BACTERIA 3+ 11/25/2018    RBCUA see below 11/25/2018    BLOODU TRACE-INTACT 11/25/2018    SPECGRAV >=1.030 11/25/2018    GLUCOSEU 100 11/25/2018    GLUCOSEU NEGATIVE 11/23/2010       Radiology:  CT ABDOMEN PELVIS WO CONTRAST Additional Contrast? None   Final Result   1. Improved skin thickening and infiltration of the subcutaneous fat in the   patient's pannus. There is continued breakdown of the skin within the folds   of the pannus with subcutaneous gas. No focal collection identified.    2. Otherwise unremarkable CT of the abdomen and pelvis. Nonobstructive left   renal calculus. XR CHEST PORTABLE   Final Result   Stable chronic cardiomegaly without acute airspace consolidation or CHF. Assessment/Plan:    Active Hospital Problems    Diagnosis    Wound infection [T14. 8XXA, L08.9]    Chronic abdominal wound infection, sequela [S31.109S, L08.9]    Chest pain [R07.9]    ESRD (end stage renal disease) on dialysis ([facility] [address] [N18.6, Z99.2]    Elevated troponin [R79.89]    Chronic diastolic CHF (congestive heart failure) ([facility]) [I50.32]    SOB (shortness of breath) [R06.02]       Chest pain  - atypical , seen by cards   - negative stress test 2 years ago and recent surgery where she did fine from cardiac stress standpoint.   - no additional work up needed. Sepsis  - due to wound infections  - norepinephrine has been weaned off    ESRD  - Nephro following.  - dialysis    Elevated trop  -due to ESRD  -non cardiac. Chronic abdominal wall wound (pannus)  - admitted December and + cx: diphtheroids and bacteroids  - completed antibiotics  - missed outpt appointment last week   - CT abd/pelvis (ordered by ER): results above of chronic wound in pannus with continued breakdown of skin within folds with subcutaneous gas. - consult General Surgery and ID  for CT findings fo gas in subcut tissue. Dr. Yanni Davis has debrided the wounds on several days, they are improving and much less foul smelling.   - consult wound care ( was to follow with Dr. Yanni Davis weekly on discharge)  Likely due to calciphylaxis.     DM, type 2  - med-dose SSI  - POCT ac/hs  - increase lantus to 25 units subcu  - cont prandial coverage     Acute on chronic anemia  - Hemoglobin this morning is low at 6.2 and she will require transfusion.  - No active bleeding     DVT Prophylaxis:   Diet: DIET CARDIAC; Carb Control: 4 carb choices (60 gms)/meal  Dietary Nutrition Supplements: Renal Oral Supplement  Code Status: Full Code    PT/OT Eval Status: eval and treat     Dispo - we are working toward placement, select specialty seems like a good fit given her issues.      Ijeoma Jeffrey MD

## 2020-02-01 NOTE — PROGRESS NOTES
Reassessment complete. No changes noted to previous assessment (see doc flowsheets). 2nd unit of blood is complete, pt tolerated well. Will continue to monitor and assess.  Electronically signed by Rich Portillo RN on 2/1/2020 at 5:11 PM

## 2020-02-01 NOTE — PROGRESS NOTES
Status post excisional debridement of bilateral hips and abdominal wounds yesterday  The patient is clinically stable  Having some problems with stool contamination of the wounds. The dressings were just changed per the nurse.   Will evaluate the wounds tomorrow morning

## 2020-02-01 NOTE — PROGRESS NOTES
Dr. Indiana Tatum made aware of patients hypoglycemic events and the need for PRN D50. Most recent glucose was 117. Pt remains asymptomatic.  Electronically signed by Dieudonne Camarillo RN on 2/1/2020 at 2:07 PM

## 2020-02-01 NOTE — PLAN OF CARE
Problem: Falls - Risk of:  Goal: Will remain free from falls  Description  Will remain free from falls  Outcome: Ongoing  Goal: Absence of physical injury  Description  Absence of physical injury  Outcome: Ongoing     Problem: Risk for Impaired Skin Integrity  Goal: Tissue integrity - skin and mucous membranes  Description  Structural intactness and normal physiological function of skin and  mucous membranes.   Outcome: Ongoing     Problem: Pain:  Goal: Pain level will decrease  Description  Pain level will decrease  Outcome: Ongoing  Goal: Control of acute pain  Description  Control of acute pain  Outcome: Ongoing  Goal: Control of chronic pain  Description  Control of chronic pain  Outcome: Ongoing     Problem: OXYGENATION/RESPIRATORY FUNCTION  Goal: Patient will maintain patent airway  Outcome: Ongoing  Goal: Patient will achieve/maintain normal respiratory rate/effort  Description  Respiratory rate and effort will be within normal limits for the patient  Outcome: Ongoing     Problem: HEMODYNAMIC STATUS  Goal: Patient has stable vital signs and fluid balance  Outcome: Ongoing     Problem: FLUID AND ELECTROLYTE IMBALANCE  Goal: Fluid and electrolyte balance are achieved/maintained  Outcome: Ongoing     Problem: ACTIVITY INTOLERANCE/IMPAIRED MOBILITY  Goal: Mobility/activity is maintained at optimum level for patient  Outcome: Ongoing     Problem: Discharge Planning:  Goal: Discharged to appropriate level of care  Description  Discharged to appropriate level of care  Outcome: Ongoing     Problem: Serum Glucose Level - Abnormal:  Goal: Ability to maintain appropriate glucose levels will improve  Description  Ability to maintain appropriate glucose levels will improve  Outcome: Ongoing     Problem: Nutrition  Goal: Optimal nutrition therapy  Outcome: Ongoing

## 2020-02-02 LAB
ANION GAP SERPL CALCULATED.3IONS-SCNC: 17 MMOL/L (ref 3–16)
ANISOCYTOSIS: ABNORMAL
BANDED NEUTROPHILS RELATIVE PERCENT: 1 % (ref 0–7)
BASOPHILS ABSOLUTE: 0 K/UL (ref 0–0.2)
BASOPHILS RELATIVE PERCENT: 0 %
BUN BLDV-MCNC: 18 MG/DL (ref 7–20)
CALCIUM SERPL-MCNC: 8.6 MG/DL (ref 8.3–10.6)
CHLORIDE BLD-SCNC: 95 MMOL/L (ref 99–110)
CO2: 27 MMOL/L (ref 21–32)
CREAT SERPL-MCNC: 4.6 MG/DL (ref 0.6–1.2)
EOSINOPHILS ABSOLUTE: 0 K/UL (ref 0–0.6)
EOSINOPHILS RELATIVE PERCENT: 0 %
GFR AFRICAN AMERICAN: 12
GFR NON-AFRICAN AMERICAN: 10
GLUCOSE BLD-MCNC: 114 MG/DL (ref 70–99)
GLUCOSE BLD-MCNC: 127 MG/DL (ref 70–99)
GLUCOSE BLD-MCNC: 56 MG/DL (ref 70–99)
GLUCOSE BLD-MCNC: 77 MG/DL (ref 70–99)
GLUCOSE BLD-MCNC: 78 MG/DL (ref 70–99)
GLUCOSE BLD-MCNC: 78 MG/DL (ref 70–99)
HCT VFR BLD CALC: 27.7 % (ref 36–48)
HEMOGLOBIN: 9 G/DL (ref 12–16)
LYMPHOCYTES ABSOLUTE: 2.5 K/UL (ref 1–5.1)
LYMPHOCYTES RELATIVE PERCENT: 18 %
MACROCYTES: ABNORMAL
MCH RBC QN AUTO: 28.8 PG (ref 26–34)
MCHC RBC AUTO-ENTMCNC: 32.6 G/DL (ref 31–36)
MCV RBC AUTO: 88.1 FL (ref 80–100)
MICROCYTES: ABNORMAL
MONOCYTES ABSOLUTE: 0.6 K/UL (ref 0–1.3)
MONOCYTES RELATIVE PERCENT: 4 %
NEUTROPHILS ABSOLUTE: 11 K/UL (ref 1.7–7.7)
NEUTROPHILS RELATIVE PERCENT: 77 %
NUCLEATED RED BLOOD CELLS: 1 /100 WBC
NUCLEATED RED BLOOD CELLS: 1 /100 WBC
OVALOCYTES: ABNORMAL
PDW BLD-RTO: 17.3 % (ref 12.4–15.4)
PERFORMED ON: ABNORMAL
PERFORMED ON: NORMAL
PERFORMED ON: NORMAL
PLATELET # BLD: 142 K/UL (ref 135–450)
PMV BLD AUTO: 9.5 FL (ref 5–10.5)
POLYCHROMASIA: ABNORMAL
POTASSIUM SERPL-SCNC: 3.2 MMOL/L (ref 3.5–5.1)
RBC # BLD: 3.14 M/UL (ref 4–5.2)
SODIUM BLD-SCNC: 139 MMOL/L (ref 136–145)
WBC # BLD: 14.1 K/UL (ref 4–11)

## 2020-02-02 PROCEDURE — 6360000002 HC RX W HCPCS: Performed by: SURGERY

## 2020-02-02 PROCEDURE — 94660 CPAP INITIATION&MGMT: CPT

## 2020-02-02 PROCEDURE — 2580000003 HC RX 258: Performed by: SURGERY

## 2020-02-02 PROCEDURE — 6370000000 HC RX 637 (ALT 250 FOR IP): Performed by: SURGERY

## 2020-02-02 PROCEDURE — 94761 N-INVAS EAR/PLS OXIMETRY MLT: CPT

## 2020-02-02 PROCEDURE — 99222 1ST HOSP IP/OBS MODERATE 55: CPT | Performed by: INTERNAL MEDICINE

## 2020-02-02 PROCEDURE — 80048 BASIC METABOLIC PNL TOTAL CA: CPT

## 2020-02-02 PROCEDURE — 99024 POSTOP FOLLOW-UP VISIT: CPT | Performed by: SURGERY

## 2020-02-02 PROCEDURE — 1200000000 HC SEMI PRIVATE

## 2020-02-02 PROCEDURE — 2700000000 HC OXYGEN THERAPY PER DAY

## 2020-02-02 PROCEDURE — 2500000003 HC RX 250 WO HCPCS: Performed by: SURGERY

## 2020-02-02 PROCEDURE — 36591 DRAW BLOOD OFF VENOUS DEVICE: CPT

## 2020-02-02 PROCEDURE — 85025 COMPLETE CBC W/AUTO DIFF WBC: CPT

## 2020-02-02 PROCEDURE — 6370000000 HC RX 637 (ALT 250 FOR IP): Performed by: INTERNAL MEDICINE

## 2020-02-02 PROCEDURE — 94640 AIRWAY INHALATION TREATMENT: CPT

## 2020-02-02 RX ORDER — POTASSIUM CHLORIDE 20 MEQ/1
40 TABLET, EXTENDED RELEASE ORAL ONCE
Status: COMPLETED | OUTPATIENT
Start: 2020-02-02 | End: 2020-02-02

## 2020-02-02 RX ADMIN — BUDESONIDE 500 MCG: 0.5 SUSPENSION RESPIRATORY (INHALATION) at 20:23

## 2020-02-02 RX ADMIN — POTASSIUM CHLORIDE 40 MEQ: 1500 TABLET, EXTENDED RELEASE ORAL at 11:28

## 2020-02-02 RX ADMIN — NEBIVOLOL HYDROCHLORIDE 5 MG: 5 TABLET ORAL at 21:30

## 2020-02-02 RX ADMIN — TRAZODONE HYDROCHLORIDE 100 MG: 50 TABLET ORAL at 21:22

## 2020-02-02 RX ADMIN — ONDANSETRON 4 MG: 2 INJECTION INTRAMUSCULAR; INTRAVENOUS at 21:22

## 2020-02-02 RX ADMIN — Medication 10 ML: at 20:52

## 2020-02-02 RX ADMIN — HYDROMORPHONE HYDROCHLORIDE 0.5 MG: 1 INJECTION, SOLUTION INTRAMUSCULAR; INTRAVENOUS; SUBCUTANEOUS at 16:39

## 2020-02-02 RX ADMIN — FERROUS SULFATE TAB 325 MG (65 MG ELEMENTAL FE) 325 MG: 325 (65 FE) TAB at 08:43

## 2020-02-02 RX ADMIN — TAZOBACTAM SODIUM AND PIPERACILLIN SODIUM 3.38 G: 375; 3 INJECTION, SOLUTION INTRAVENOUS at 03:31

## 2020-02-02 RX ADMIN — BUDESONIDE 500 MCG: 0.5 SUSPENSION RESPIRATORY (INHALATION) at 09:32

## 2020-02-02 RX ADMIN — HYDROMORPHONE HYDROCHLORIDE 0.5 MG: 1 INJECTION, SOLUTION INTRAMUSCULAR; INTRAVENOUS; SUBCUTANEOUS at 12:31

## 2020-02-02 RX ADMIN — Medication 10 ML: at 08:44

## 2020-02-02 RX ADMIN — HYDROMORPHONE HYDROCHLORIDE 0.5 MG: 1 INJECTION, SOLUTION INTRAMUSCULAR; INTRAVENOUS; SUBCUTANEOUS at 03:32

## 2020-02-02 RX ADMIN — FERROUS SULFATE TAB 325 MG (65 MG ELEMENTAL FE) 325 MG: 325 (65 FE) TAB at 16:38

## 2020-02-02 RX ADMIN — DEXTROSE MONOHYDRATE 12.5 G: 500 INJECTION PARENTERAL at 16:39

## 2020-02-02 RX ADMIN — SEVELAMER CARBONATE 1.6 G: 800 POWDER, FOR SUSPENSION ORAL at 16:39

## 2020-02-02 RX ADMIN — Medication 10 ML: at 20:51

## 2020-02-02 RX ADMIN — APIXABAN 2.5 MG: 5 TABLET, FILM COATED ORAL at 20:50

## 2020-02-02 RX ADMIN — PANTOPRAZOLE SODIUM 40 MG: 40 TABLET, DELAYED RELEASE ORAL at 06:29

## 2020-02-02 RX ADMIN — ESCITALOPRAM OXALATE 10 MG: 10 TABLET ORAL at 08:43

## 2020-02-02 RX ADMIN — PREGABALIN 50 MG: 25 CAPSULE ORAL at 20:51

## 2020-02-02 RX ADMIN — HYDROMORPHONE HYDROCHLORIDE 0.5 MG: 1 INJECTION, SOLUTION INTRAMUSCULAR; INTRAVENOUS; SUBCUTANEOUS at 21:21

## 2020-02-02 RX ADMIN — COLLAGENASE SANTYL: 250 OINTMENT TOPICAL at 11:28

## 2020-02-02 RX ADMIN — TAZOBACTAM SODIUM AND PIPERACILLIN SODIUM 3.38 G: 375; 3 INJECTION, SOLUTION INTRAVENOUS at 16:39

## 2020-02-02 RX ADMIN — PREGABALIN 50 MG: 25 CAPSULE ORAL at 16:39

## 2020-02-02 RX ADMIN — PREGABALIN 50 MG: 25 CAPSULE ORAL at 08:43

## 2020-02-02 RX ADMIN — FORMOTEROL FUMARATE DIHYDRATE 20 MCG: 20 SOLUTION RESPIRATORY (INHALATION) at 09:32

## 2020-02-02 RX ADMIN — FORMOTEROL FUMARATE DIHYDRATE 20 MCG: 20 SOLUTION RESPIRATORY (INHALATION) at 20:22

## 2020-02-02 RX ADMIN — SEVELAMER CARBONATE 1.6 G: 800 POWDER, FOR SUSPENSION ORAL at 08:43

## 2020-02-02 RX ADMIN — APIXABAN 2.5 MG: 5 TABLET, FILM COATED ORAL at 08:42

## 2020-02-02 RX ADMIN — SEVELAMER CARBONATE 1.6 G: 800 POWDER, FOR SUSPENSION ORAL at 11:28

## 2020-02-02 RX ADMIN — FERROUS SULFATE TAB 325 MG (65 MG ELEMENTAL FE) 325 MG: 325 (65 FE) TAB at 11:28

## 2020-02-02 ASSESSMENT — PAIN DESCRIPTION - PAIN TYPE
TYPE: ACUTE PAIN

## 2020-02-02 ASSESSMENT — PAIN SCALES - GENERAL
PAINLEVEL_OUTOF10: 4
PAINLEVEL_OUTOF10: 0
PAINLEVEL_OUTOF10: 8
PAINLEVEL_OUTOF10: 0
PAINLEVEL_OUTOF10: 7
PAINLEVEL_OUTOF10: 5
PAINLEVEL_OUTOF10: 9
PAINLEVEL_OUTOF10: 0
PAINLEVEL_OUTOF10: 9
PAINLEVEL_OUTOF10: 0
PAINLEVEL_OUTOF10: 0
PAINLEVEL_OUTOF10: 8

## 2020-02-02 ASSESSMENT — PAIN DESCRIPTION - FREQUENCY
FREQUENCY: CONTINUOUS
FREQUENCY: CONTINUOUS

## 2020-02-02 ASSESSMENT — PAIN DESCRIPTION - LOCATION
LOCATION: ABDOMEN

## 2020-02-02 ASSESSMENT — PAIN DESCRIPTION - ONSET
ONSET: ON-GOING
ONSET: ON-GOING

## 2020-02-02 ASSESSMENT — PAIN DESCRIPTION - PROGRESSION
CLINICAL_PROGRESSION: NOT CHANGED
CLINICAL_PROGRESSION: RAPIDLY IMPROVING
CLINICAL_PROGRESSION: GRADUALLY WORSENING

## 2020-02-02 ASSESSMENT — PAIN DESCRIPTION - DESCRIPTORS
DESCRIPTORS: SHOOTING
DESCRIPTORS: DISCOMFORT
DESCRIPTORS: SHOOTING

## 2020-02-02 ASSESSMENT — PAIN - FUNCTIONAL ASSESSMENT
PAIN_FUNCTIONAL_ASSESSMENT: PREVENTS OR INTERFERES SOME ACTIVE ACTIVITIES AND ADLS
PAIN_FUNCTIONAL_ASSESSMENT: PREVENTS OR INTERFERES SOME ACTIVE ACTIVITIES AND ADLS

## 2020-02-02 ASSESSMENT — PAIN DESCRIPTION - ORIENTATION
ORIENTATION: RIGHT;LEFT;ANTERIOR;MID
ORIENTATION: RIGHT;LEFT;ANTERIOR;MID

## 2020-02-02 NOTE — PROGRESS NOTES
Shift assessment complete. Pt is alert and oriented Xe4. Lung sounds noted to be clear in the upper lobes and diminished in th lower lobes. Pt is NSR per monitor. No adventitious heart sounds noted. Bowel sounds present and active. Radial and pedal pulses present. Pt is anuric. Pt has Right Chest PAC in place, patent and flushing well with brisk blood return.      Pt to have dressings changed this am with physician.      Pt refusing to be turned at this time.  Electronically signed by Robb Cueva RN on 2/2/2020 at 8:59 AM

## 2020-02-02 NOTE — PROGRESS NOTES
Tuyet Alfaro is a 72 y.o. female patient.     Current Facility-Administered Medications   Medication Dose Route Frequency Provider Last Rate Last Dose    sodium chloride flush 0.9 % injection 10 mL  10 mL Intravenous PRN Jeniffer Larsen MD        polyethylene glycol (GLYCOLAX) packet 17 g  17 g Oral Daily PRN Darryl Muñoz MD        sodium chloride flush 0.9 % injection 10 mL  10 mL Intravenous 2 times per day Reva Patten MD   10 mL at 02/02/20 0844    sodium chloride flush 0.9 % injection 10 mL  10 mL Intravenous PRN Reva Patten MD   10 mL at 01/30/20 2244    ondansetron (ZOFRAN) injection 4 mg  4 mg Intravenous Q6H PRN Reva Patten MD   4 mg at 01/31/20 1954    HYDROmorphone HCl PF (DILAUDID) injection 0.5 mg  0.5 mg Intravenous Q4H PRN Reva Patten MD   0.5 mg at 02/02/20 1231    pantoprazole (PROTONIX) tablet 40 mg  40 mg Oral QAM AC Jeniffer Larsen MD   40 mg at 02/02/20 3427    calcium carbonate (TUMS) chewable tablet 500 mg  500 mg Oral TID PRN Jeniffer Larsen MD   500 mg at 01/30/20 2116    piperacillin-tazobactam (ZOSYN) 3.375 g in dextrose 50 mL IVPB extended infusion (premix)  3.375 g Intravenous Q12H Reva Patten MD   Stopped at 02/02/20 0735    lip balm petroleum free (PHYTOPLEX) stick   Topical PRN Reva Patten MD        ipratropium-albuterol (DUONEB) nebulizer solution 1 ampule  1 ampule Inhalation Q2H PRN Reva Patten MD        nebivolol (BYSTOLIC) tablet 2.5 mg  2.5 mg Oral 2 times per day on Mon Wed Fri Reva Patten MD   2.5 mg at 01/31/20 2121    nebivolol (BYSTOLIC) tablet 5 mg  5 mg Oral 2 times per day on Sun Tue Thu Sat Reva Patten MD   Stopped at 02/02/20 8158    sevelamer (RENVELA) packet 1.6 g  1.6 g Oral TID WC Reva Patten MD   1.6 g at 02/02/20 1128    budesonide (PULMICORT) nebulizer suspension 500 mcg  500 mcg Nebulization BID Reva Patten MD   500 mcg at 02/02/20 0932    apixaban (ELIQUIS) tablet 2.5 mg  2.5 mg Oral BID Rvea Patten, MD   2.5 mg at 02/02/20 0842    escitalopram (LEXAPRO) tablet 10 mg  10 mg Oral Daily Cat Rodríguez MD   10 mg at 02/02/20 9531    ferrous sulfate tablet 325 mg  325 mg Oral TID  Cat Rodríguez MD   325 mg at 02/02/20 1128    formoterol (PERFOROMIST) nebulizer solution 20 mcg  20 mcg Nebulization BID Cat Rodríguez MD   20 mcg at 02/02/20 0932    traZODone (DESYREL) tablet 100 mg  100 mg Oral Nightly PRN Cat Rodríguez MD   100 mg at 01/27/20 0049    pregabalin (LYRICA) capsule 50 mg  50 mg Oral TID Cat Rodríguez MD   50 mg at 02/02/20 0843    insulin lispro (1 Unit Dial) 0-12 Units  0-12 Units Subcutaneous TID  Cat Rodríguez MD   2 Units at 01/31/20 1730    insulin lispro (1 Unit Dial) 0-6 Units  0-6 Units Subcutaneous Nightly Cat Rodríguez MD   2 Units at 01/30/20 2116    glucose (GLUTOSE) 40 % oral gel 15 g  15 g Oral PRN Cat Rodríguez MD   15 g at 01/31/20 2211    dextrose 50 % IV solution  12.5 g Intravenous PRN Cat Rodríguez MD   12.5 g at 02/01/20 1311    glucagon (rDNA) injection 1 mg  1 mg Intramuscular PRN Cat Rodríguez MD        dextrose 5 % solution  100 mL/hr Intravenous PRN Cat Rodríguez MD        collagenase ointment   Topical Daily Cat Rodríguez MD        sodium thiosulfate 25 % injection 25 g  25 g Intravenous Q MWF Cat Rodríguez MD   25 g at 01/31/20 1344    perflutren lipid microspheres (DEFINITY) injection 1.65 mg  1.5 mL Intravenous ONCE PRN Cat Rodríguez MD        lidocaine (XYLOCAINE) 4 % external solution   Topical Once Cat Rodríguez MD        silver nitrate applicators applicator 10 each  10 each Topical Once Cat Rodríguez MD        sodium chloride flush 0.9 % injection 10 mL  10 mL Intravenous 2 times per day Cat Rodríguez MD   10 mL at 02/02/20 0844    sodium chloride flush 0.9 % injection 10 mL  10 mL Intravenous PRN Cat Rodríguez MD   10 mL at 01/30/20 0439    magnesium hydroxide (MILK OF MAGNESIA) 400 MG/5ML suspension 30 mL  30 mL Oral Daily PRN Stew Counter, MD        acetaminophen (TYLENOL) tablet 650 mg  650 mg Oral Q6H PRN Stew Counter, MD   650 mg at 01/28/20 0940     Allergies   Allergen Reactions    Codeine Nausea Only    Fentanyl Itching    Morphine      CHEST PAIN    Tramadol Hcl Other (See Comments)     Causes pt to have involuntary movements    Hydrocodone-Acetaminophen Itching and Rash    Percocet [Oxycodone-Acetaminophen] Itching    Procardia [Nifedipine] Nausea And Vomiting     Headache  Takes norvasc at home 12/22/15    Vicodin [Hydrocodone-Acetaminophen] Rash     Principal Problem:    Chest pain  Active Problems:    SOB (shortness of breath)    Chronic diastolic CHF (congestive heart failure) ([facility])    Elevated troponin    ESRD (end stage renal disease) on dialysis ([facility])    Chronic abdominal wound infection, sequela    Wound infection  Resolved Problems:    * No resolved hospital problems. *    Blood pressure 84/66, pulse 70, temperature 97.6 °F (36.4 °C), temperature source Temporal, resp. rate 12, height 5' 3\" (1.6 m), weight (!) 305 lb 1.9 oz (138.4 kg), last menstrual period 11/01/1996, SpO2 98 %, not currently breastfeeding. Subjective  Objective lower abdominal wound and bilateral hip wounds are generally clean. They appear somewhat dry. No current evidence of infection. Assessment & Plan 70-year-old female with multiple medical problems including end-stage renal disease who is seen in follow-up for large wounds of the lower abdomen and bilateral hip wounds. The wounds are generally clean. We will continue current wound care. The Patient's quality of life is very poor and she is having significant pain related to the wounds. Would strongly encourage the patient and family to consider palliative care/hospice.     Rolando Villafana MD  2/2/2020

## 2020-02-02 NOTE — PROGRESS NOTES
Dr. Raymond Reynoso at bedside to change dressings to wounds. Pt tolerated well. See flowsheets for location and dressing type.

## 2020-02-02 NOTE — PROGRESS NOTES
Nephrology Progress  Note  [phone]  [phone]   [email or web address].        Reason for Consult:  ESRD on HD     HISTORY OF PRESENT ILLNESS:      The patient is a 72 y.o. female with significant past medical history Morbid obesity , T2DM , HTN , ESRD on HD  of  who presents with chest pain   She is undergoing treatment for calciphylaxis and is due for wound debridement 1/28/2020   At this time she has no chest pain/ fever    PHYSICAL EXAM:    Vitals:    BP (!) 81/40   Pulse 71   Temp 97.2 °F (36.2 °C) (Temporal)   Resp 12   Ht 5' 3\" (1.6 m)   Wt (!) 305 lb 1.9 oz (138.4 kg)   LMP 11/01/1996 (Exact Date)   SpO2 100%   BMI 54.05 kg/m²   I/O last 3 completed shifts: In: 1265.4 [P.O.:370; I.V.:60.4; Blood:735; IV Piggyback:100]  Out: -   No intake/output data recorded. Physical Exam:  Gen:  alert, oriented x 3  Morbid obesity, in pain   PERRL , EOM +  Pallor +, No icterus  JVP not raised   CV: RRR no murmur or rub .   Lungs:B/ L air entry, Normal breath sounds   Abd: soft, bowel sounds + , No organomegaly   Ext: No edema, no cyanosis  Wounds Rt and left hip , lower ant abdominal wall  Left Upper arm AVG , Thrill +    DATA:    CBC with Differential:    Lab Results   Component Value Date    WBC 14.1 02/02/2020    RBC 3.14 02/02/2020    HGB 9.0 02/02/2020    HCT 27.7 02/02/2020     02/02/2020    MCV 88.1 02/02/2020    MCH 28.8 02/02/2020    MCHC 32.6 02/02/2020    RDW 17.3 02/02/2020    NRBC 1 02/02/2020    NRBC 1 02/02/2020    SEGSPCT 77.6 05/12/2013    BANDSPCT 1 02/02/2020    METASPCT 1 01/12/2018    LYMPHOPCT 18.0 02/02/2020    MONOPCT 4.0 02/02/2020    EOSPCT 2.6 01/24/2012    BASOPCT 0.0 02/02/2020    MONOSABS 0.6 02/02/2020    LYMPHSABS 2.5 02/02/2020    EOSABS 0.0 02/02/2020    BASOSABS 0.0 02/02/2020    DIFFTYPE Auto 05/12/2013     CMP:    Lab Results   Component Value Date     02/02/2020    K 3.2 02/02/2020    K 4.0 01/26/2020    CL 95 02/02/2020    CO2 27 02/02/2020    BUN 18 02/02/2020 CREATININE 4.6 02/02/2020    GFRAA 12 02/02/2020    GFRAA 50 05/12/2013    AGRATIO 0.5 01/26/2020    LABGLOM 10 02/02/2020    GLUCOSE 78 02/02/2020    PROT 6.1 01/26/2020    PROT 6.6 03/05/2013    LABALBU 2.0 01/28/2020    CALCIUM 8.6 02/02/2020    BILITOT 0.4 01/26/2020    ALKPHOS 86 01/26/2020    AST 29 01/26/2020    ALT 21 01/26/2020     Magnesium:    Lab Results   Component Value Date    MG 2.30 10/27/2019     Phosphorus:    Lab Results   Component Value Date    PHOS 2.4 01/28/2020     Uric Acid:    Lab Results   Component Value Date    LABURIC 2.8 05/17/2018     PT/INR:    Lab Results   Component Value Date    PROTIME 22.3 01/26/2020    PROTIME 22.8 09/08/2014    INR 1.91 01/26/2020     Troponin:    Lab Results   Component Value Date    TROPONINI 0.20 01/27/2020     U/A:    Lab Results   Component Value Date    NITRITE neg 05/06/2015    COLORU Brown 11/25/2018    PROTEINU >=300 11/25/2018    PHUR 5.5 11/25/2018    LABCAST 1-3 Mixed Cells 07/09/2018    WBCUA see below 11/25/2018    RBCUA see below 11/25/2018    MUCUS 3+ 06/18/2013    YEAST Present 08/20/2018    BACTERIA 3+ 11/25/2018    CLARITYU TURBID 11/25/2018    SPECGRAV >=1.030 11/25/2018    LEUKOCYTESUR SMALL 11/25/2018    UROBILINOGEN 0.2 11/25/2018    BILIRUBINUR SMALL 11/25/2018    BILIRUBINUR neg 05/06/2015    BILIRUBINUR NEGATIVE 11/23/2010    BLOODU TRACE-INTACT 11/25/2018    GLUCOSEU 100 11/25/2018    GLUCOSEU NEGATIVE 11/23/2010           IMPRESSION/RECOMMENDATIONS:      1. ESRD on HD    MWF at [facility]  2. Calciphylaxis   Continue Sodium Thiosulfate   3. Anemia of CKD   Target Hb 9-11   4. Renal osteodystrophy    Ca 8.8, Phos 2.4   5. Atrial Fibrillation  ` Rate controlled   6. Obesity   7. Hypotension   Ongoing . pain medication maybe contrfibuting  K - replace po   Prognosis : poor       Thank you for allowing me to participate in the care of this patient. I will continue to follow along. Please call with questions.     Angel Wright, MD  2/2/2020  [facility]

## 2020-02-02 NOTE — PROGRESS NOTES
Reassessment complete, VSS, no changes. Incontinent of small smear, dark brown/black. Pt reports intermittent abdominal pain which improves after PRN dilaudid. Pt resting comfortably in bed at this time, call light within reach.

## 2020-02-02 NOTE — PROGRESS NOTES
Hospitalist Progress Note      PCP: JURGEN Yates - CNP    Date of Admission: 1/26/2020    Chief Complaint: chest pain     Hospital Course:   72 y.o. female with PMHx of abdominal pain, chronic CHF, asthma, calciphylaxis, cervical cancer, chest pain, chronic kidney disease, Stage IV (HD- MWF), chronic respiratory failure with hypoxia, COPD, DM, polyneuropathy,HLD, HTN, PE, venous stasis who   presented to [facility] with above extensive history and recent admission in January for chronic wound who has been experiencing SOB associated with some reproducible chest pain, \"so bad I could hardly move. \"  She is on home oxygen now since her discharge and is at 4L with sats 98%. She denies dizziness or lightheadedness, nausea or vomiting or headaches with her chest pain. She denies any activity or exertion that caused the pain, although it appears musculoskeletal to me.     She endorses decreased appetite. She states she has not been getting up at the NH either and states when she was here she was working with PT/OT and was at least getting out of bed.      She states she is very unhappy with the care she is receiving at her [address], and  states he found her in her own stool where she laid for 30 mins after calling asking for help to get a bedpan.       Given her history of CHF, HTN, HLD and DM and cardiac history, we will bring her in under Observation and have Cardiology see her. She follows with Dr. Heather Sanders.        Subjective:   Doing well. CP resolved  Now just pain from calciphylaxis.   IV dilaudid is helping       Medications:  Reviewed    Infusion Medications    dextrose       Scheduled Medications    potassium chloride  40 mEq Oral Once    sodium chloride flush  10 mL Intravenous 2 times per day    pantoprazole  40 mg Oral QAM AC    piperacillin-tazobactam  3.375 g Intravenous Q12H    nebivolol  2.5 mg Oral 2 times per day on Mon Wed Fri    nebivolol  5 mg Oral 2 times per day on Sun Tue Thu Sat    sevelamer  1.6 g Oral TID WC    budesonide  500 mcg Nebulization BID    apixaban  2.5 mg Oral BID    escitalopram  10 mg Oral Daily    ferrous sulfate  325 mg Oral TID WC    formoterol  20 mcg Nebulization BID    pregabalin  50 mg Oral TID    insulin lispro  0-12 Units Subcutaneous TID WC    insulin lispro  0-6 Units Subcutaneous Nightly    collagenase   Topical Daily    sodium thiosulfate  25 g Intravenous Q MWF    lidocaine   Topical Once    silver nitrate applicators  10 each Topical Once    sodium chloride flush  10 mL Intravenous 2 times per day     PRN Meds: sodium chloride flush, polyethylene glycol, sodium chloride flush, ondansetron, HYDROmorphone, calcium carbonate, lip balm petroleum free, ipratropium-albuterol, traZODone, glucose, dextrose, glucagon (rDNA), dextrose, perflutren lipid microspheres, sodium chloride flush, magnesium hydroxide, acetaminophen      Intake/Output Summary (Last 24 hours) at 2/2/2020 1112  Last data filed at 2/2/2020 6202  Gross per 24 hour   Intake 1165.4 ml   Output --   Net 1165.4 ml       Physical Exam Performed:    BP (!) 81/40   Pulse 71   Temp 97.2 °F (36.2 °C) (Temporal)   Resp 12   Ht 5' 3\" (1.6 m)   Wt (!) 305 lb 1.9 oz (138.4 kg)   LMP 11/01/1996 (Exact Date)   SpO2 100%   BMI 54.05 kg/m²     General appearance: Morbidly obese, No apparent distress, appears stated age. Cooperative. HEENT:  Normocephalic, atraumatic. PERRLA. EOMi. Conjunctivae/corneas clear, no icterus, non-injected. Neck: Supple, with full range of motion. No jugular venous distention. Trachea midline. Respiratory:  NC 4L, Normal respiratory effort. Clear to auscultation, bilaterally without Rales/Wheezes/Rhonchi. Cardiovascular:  Anterior chest wall with reproducible tenderness on palpation in the left and mid-sternal region, Regular rate and rhythm without murmurs, rubs or gallops.   Abdomen: Soft, tender, non-distended, without rebound or guarding. Normal bowel sounds. Musculoskeletal:  No clubbing, cyanosis or edema bilaterally. Full range of motion without deformity. Skin: skin cloth in right groin/skin fold, Skin color, texture, turgor normal.  No rashes or lesions. Neurologic:  Neurovascularly intact without any focal sensory/motor deficits. Cranial nerves: II-XII intact, grossly intact. No facial asymmetry, tongue midline. Psychiatric:  Alert and oriented, thought content appropriate  Capillary Refill: Brisk,< 3 seconds   Peripheral Pulses: +2 palpable, equal bilaterally           Labs:   Recent Labs     01/31/20 0347 02/01/20 0410 02/01/20  2104 02/02/20 0344   WBC 15.8* 11.7*  --  14.1*   HGB 8.5* 6.2* 9.3* 9.0*   HCT 26.8* 19.5* 28.2* 27.7*    145  --  142     Recent Labs     01/31/20 0347 02/01/20 0410 02/02/20 0344   * 141 139   K 5.0 3.4* 3.2*   CL 90* 98* 95*   CO2 22 30 27   BUN 29* 14 18   CREATININE 6.1* 3.6* 4.6*   CALCIUM 9.1 8.6 8.6     No results for input(s): AST, ALT, BILIDIR, BILITOT, ALKPHOS in the last 72 hours. No results for input(s): INR in the last 72 hours. No results for input(s): [place] in the last 72 hours. Urinalysis:      Lab Results   Component Value Date    NITRU POSITIVE 11/25/2018    WBCUA see below 11/25/2018    BACTERIA 3+ 11/25/2018    RBCUA see below 11/25/2018    BLOODU TRACE-INTACT 11/25/2018    SPECGRAV >=1.030 11/25/2018    GLUCOSEU 100 11/25/2018    GLUCOSEU NEGATIVE 11/23/2010       Radiology:  CT ABDOMEN PELVIS WO CONTRAST Additional Contrast? None   Final Result   1. Improved skin thickening and infiltration of the subcutaneous fat in the   patient's pannus. There is continued breakdown of the skin within the folds   of the pannus with subcutaneous gas. No focal collection identified. 2. Otherwise unremarkable CT of the abdomen and pelvis. Nonobstructive left   renal calculus.          XR CHEST PORTABLE   Final Result   Stable chronic issues.      Michelle Morris MD

## 2020-02-02 NOTE — PROGRESS NOTES
Reassessment complete, VSS, no changes. Pt attempted BIPAP, however did not tolerate, placed back on 2L NC. Pt resting comfortably in bed, denies pain, call light within reach.

## 2020-02-02 NOTE — PROGRESS NOTES
Reassessment complete. No changes noted to previous assessment (see doc flowsheets). Will continue to monitor and assess.  Electronically signed by Ryan Flores RN on 2/2/2020 at 12:23 PM

## 2020-02-02 NOTE — CONSULTS
Infectious Diseases Inpatient Consult Note      Reason for Consult:  Pseudomonas on the wound cx with Calciphylaxis     Requesting Physician:  Bonilla Camacho     Primary Care Physician:  JURGEN Harmon CNP    History Obtained From:  Pt and Medical records     CHIEF COMPLAINT:     Chief Complaint   Patient presents with    Chest Pain     Patient arrived via [facility] from [facility] with c/o CP/SOB. Pt not on O2 at baseline. 324 ASA, 0.4 SL nitro administered en route. Non healing wound over the abdominal wall    HISTORY OF PRESENT ILLNESS:  72 y.o. woman with a history of incisional disease on hemodialysis, congestive heart failure, asthma, calciphylaxis, cervical cancer, chronic respiratory failure with hypoxemia diabetes morbid obesity diabetes with neuropathy poor mobility admitted from nursing home with ongoing chronic abdominal wall wounds secondary to calciphylaxis. She was hospitalized here back in November underwent extensive debridement and pathology confirmed calciphylaxis. Now she is been getting local wound care and wounds basically slowly healing but they are still deep and requiring pain medication with the dressing changes. Wound cultures are pending and growing Pseudomonas sensitivities noted. She is on hemodialysis through chest line.   Given her complicated course we are consulted for antibiotic duration large open abdominal wound secondary to calciphylaxis ongoing drainage with local pain      Past Medical History:    Past Medical History:   Diagnosis Date    Abdominal pain 8/20/2018    Acute on chronic congestive heart failure ([address] 6/26/2014    Asthma     Calcinosis cutis     Calciphylaxis 12/2/2019    Cervical cancer ([address]     Chest pain 5/16/2018    CHF (congestive heart failure) (HCC)     Chronic kidney disease, stage IV (severe) ([facility])     Dr King Strong    Chronic pain syndrome 3/27/2018    Chronic respiratory failure with hypoxia ([address]     CKD (chronic kidney disease) stage 4, GFR 15-29 ml/min ([facility]) 6/30/2017    Colon polyps     COPD (chronic obstructive pulmonary disease) ([facility])     Degenerative disc disease at L5-S1 level 6/18/2013     CT    Diabetes mellitus, insulin dependent (IDDM), uncontrolled ([address]     Diabetic polyneuropathy associated with type 2 diabetes mellitus ([address] 3/26/2019    Elevated troponin 9/9/2017    ESRD (end stage renal disease) on dialysis ([address] 02/14/2018    M-W-F DAVITA Maureen Cheese    GERD (gastroesophageal reflux disease)     Gout     History of blood transfusion     History of cervical cancer     Hyperlipidemia     Hypertension     Incarcerated ventral hernia     LVH (left ventricular hypertrophy)     Morbid obesity ([facility])     Mucus plugging of bronchi     Obstructive sleep apnea on CPAP     wears 2L O2 and BIPAP at night    Pneumonia     Psychiatric problem     breakdown long time ago but not current    Pulmonary embolism ([address] 02/06/2013    Type 2 diabetes mellitus with diabetic neuropathy, with long-term current use of insulin ([address] 9/12/2017    Urinary incontinence in female     Venous stasis of lower extremity        Past Surgical History:    Past Surgical History:   Procedure Laterality Date    ABDOMEN SURGERY N/A 12/26/2019    INCISION AND DRAINAGE OF ABDOMINAL WOUND AND BILATERAL HIP WOUNDS performed by Amanda Arroyo MD at [facility] N/A 1/30/2020    INCISION AND DRAINAGE OF ABDOMEN WOUND performed by Amanda Arroyo MD at [address]  1/14    Grace; clean cors    COLONOSCOPY  2010    polyp removed; Francyne Dies; repeat 5 years    COLONOSCOPY  6/25/13, 11/14    [place]    COLONOSCOPY N/A 10/10/2019    COLONOSCOPY POLYPECTOMY SNARE/COLD BIOPSY performed by Kevin Chong MD at [address] Right 3/28/2019    RIGHT BRACHIO CEPHALIC FISTULA CREATION performed by Laurie Nation MD at [address]  2/21/09    LVH with global hypokinesis; EF 55-60%    HYSTERECTOMY      INCISION AND DRAINAGE Right 8/22/2019    RIGHT ARM INCISION AND DEBRIDEMENT performed by Vega Rudd MD at [address] Bilateral 1/30/2020    BILATERAL HIP WOUND INCISION AND DRAINAGE performed by Jim Medrano MD at [address] [facility] ARTHROSCOPY Right 1999    OTHER SURGICAL HISTORY  02/22/2018    LEFT brachial artery axillary vein AV graft     UT OPEN SKULL SUPRATENT EXPLORE      Craniotomy, 3/21/19 patient denies any brain surgery or craniotomy    UT REPAIR INCISIONAL HERNIA,REDUCIBLE N/A 11/20/2018    LAPAROSCOPIC VENTRAL HERNIA REPAIR WITH MESH performed by Kishor Velazquez MD at [address] Right 7/18/2019    RIGHT BRACHIAL ARTERY AXILLARY VEIN GRAFT AND LIGATION OF RIGHT BRACHIO-CEPHALIC FISTULA ([id number]) performed by Vega Rudd MD at [place] [address]  10/96    TUNNELED VENOUS PORT PLACEMENT Right 11/2019    UPPER GASTROINTESTINAL ENDOSCOPY  6/25/13    UPPER GASTROINTESTINAL ENDOSCOPY N/A 11/21/2019    EGD BIOPSY performed by Joaquim King MD at [place] [address]  12/24/2016    Incarcerated ventral hernia repair with mesh       Current Medications:    Outpatient Medications Marked as Taking for the 1/26/20 encounter [facility] Encounter)   Medication Sig Dispense Refill    HYDROmorphone (DILAUDID) 2 MG tablet Take 2 mg by mouth every 12 hours.  lidocaine viscous hcl (XYLOCAINE) 2 % SOLN solution       ferrous sulfate 325 (65 Fe) MG tablet Take 325 mg by mouth 3 times daily (with meals)      collagenase 250 UNIT/GM ointment Apply topically daily Apply topically daily.  Left breat and abdominal area      insulin glargine (LANTUS) 100 UNIT/ML injection pen Inject 20 Units into the skin nightly 5 pen 3    HUMALOG KWIKPEN 100 UNIT/ML SOPN Inject 0-12 Units into the skin 3 times daily (before meals) 15 mL 5    apixaban (ELIQUIS) 2.5 MG TABS tablet Take 1 tablet by mouth 2 times daily 60 tablet 1    sevelamer (RENVELA) 800 MG tablet Take 2 tablets by mouth 3 times daily (with meals) 90 tablet 3    pregabalin (LYRICA) 50 MG capsule Take 1 capsule by mouth 3 times daily for 180 days. 90 capsule 5    nebivolol (BYSTOLIC) 2.5 MG tablet Take 2.5 mg by mouth BID on M, W, F. Take 5 mg BID Sun, Tues, Thurs, Sat 120 tablet 5    formoterol (PERFOROMIST) 20 MCG/2ML nebulizer solution Take 2 mLs by nebulization 2 times daily 120 mL 5    traZODone (DESYREL) 100 MG tablet Take 100 mg by mouth nightly as needed for Sleep      escitalopram (LEXAPRO) 10 MG tablet Take 1 tablet by mouth daily 30 tablet 5    lidocaine-prilocaine (EMLA) 2.5-2.5 % cream Apply topically as needed.  3 Tube 5    omeprazole (PRILOSEC) 40 MG delayed release capsule TAKE 1 CAPSULE BY MOUTH  DAILY 90 capsule 3    rosuvastatin (CRESTOR) 10 MG tablet Take 1 tablet by mouth daily Take 1 tablet by mouth  daily 90 tablet 3    fluticasone (FLONASE) 50 MCG/ACT nasal spray USE TWO SPRAY(S) IN EACH NOSTRIL ONCE DAILY 1 Bottle 5    allopurinol (ZYLOPRIM) 100 MG tablet Take 1 tablet by mouth daily 90 tablet 3    promethazine (PHENERGAN) 25 MG tablet Take 1 tablet by mouth every 8 hours as needed for Nausea 30 tablet 2    calcium acetate (PHOSLO) 667 MG capsule Take 2 capsules by mouth 3 times daily (with meals) 60 capsule 3    albuterol sulfate HFA (PROAIR HFA) 108 (90 Base) MCG/ACT inhaler Inhale 2 puffs into the lungs every 6 hours as needed for Wheezing 1 Inhaler 11    fluticasone-salmeterol (ADVAIR HFA) 230-21 MCG/ACT inhaler Inhale 1 puff into the lungs 2 times daily 1 Inhaler 11    TRULICITY 3.78 AN/7.1BX SOPN INJECT ONE  SYRINGE SUBCUTANEOUSLY ONCE A WEEK 15 pen 3    nitroGLYCERIN (NITROSTAT) 0.4 MG SL tablet DISSOLVE 1 TABLET UNDER THE TONGUE EVERY 5 MINUTES AS  NEEDED ; MAXIMUM OF 3  TABLETS IN 15 MINUTES 75 tablet 1    OXYGEN Inhale 2 L into the lungs daily as needed At night prn dizziness  Denies nausea/ vomiting/abdominal pain/diarrhea. Denies dysuria or change in urinary function. Denies joint swelling or pain. No myalgia, arthralgia. No rashes, skin lesions   Denies focal weakness, sensory change or other neurologic symptoms  No lymph node swelling or tenderness.     Large abdominal wound with open drainage ongoing cellulitis, lower leg edema    PHYSICAL EXAM:      Vitals:    BP 84/66   Pulse 70   Temp 97.6 °F (36.4 °C) (Temporal)   Resp 12   Ht 5' 3\" (1.6 m)   Wt (!) 305 lb 1.9 oz (138.4 kg)   LMP 11/01/1996 (Exact Date)   SpO2 98%   BMI 54.05 kg/m²     General Appearance: alert,in no acute distress, ++ pallor, no icterus morbid obesity  Skin: warm and dry, no rash or erythema  Head: normocephalic and atraumatic  Eyes: pupils equal, round, and reactive to light, conjunctivae normal  ENT: tympanic membrane, external ear and ear canal normal bilaterally, nose without deformity, nasal mucosa and turbinates normal without polyps  Neck: supple and non-tender without mass, no thyromegaly  no cervical lymphadenopathy  Pulmonary/Chest: Bibasilar crepitations no wheezes, rales or rhonchi, normal air movement, no respiratory distress  Cardiovascular:  normal S1 and S2, no murmurs, rubs, clicks, or gallops, no carotid bruits  Abdomen: soft, non-tender, non-distended, normal bowel sounds, very large pannus with abdominal open wound in the suprapubic suprapubic extending up to the umbilicus also on the right lateral hip area  Extremities: no cyanosis, clubbing or edema  Musculoskeletal: normal range of motion, no joint swelling, deformity or tenderness  Neurologic: reflexes normal and symmetric, no cranial nerve deficit  Psych:  Orientation, sensorium, mood normal  Lines:  HD line+   Chest port+          DATA:    Lab Results   Component Value Date    WBC 14.1 (H) 02/02/2020    HGB 9.0 (L) 02/02/2020    HCT 27.7 (L) 02/02/2020    MCV 88.1 02/02/2020     02/02/2020     Lab Results Component Value Date    CREATININE 4.6 (H) 02/02/2020    BUN 18 02/02/2020     02/02/2020    K 3.2 (L) 02/02/2020    CL 95 (L) 02/02/2020    CO2 27 02/02/2020       Hepatic Function Panel:   Lab Results   Component Value Date    ALKPHOS 86 01/26/2020    ALT 21 01/26/2020    AST 29 01/26/2020    PROT 6.1 01/26/2020    PROT 6.6 03/05/2013    BILITOT 0.4 01/26/2020    BILIDIR 0.2 10/16/2014    IBILI 0.1 10/16/2014    LABALBU 2.0 01/28/2020     UA:  Lab Results   Component Value Date    COLORU Brown 11/25/2018    CLARITYU TURBID 11/25/2018    GLUCOSEU 100 11/25/2018    GLUCOSEU NEGATIVE 11/23/2010    BILIRUBINUR SMALL 11/25/2018    BILIRUBINUR neg 05/06/2015    BILIRUBINUR NEGATIVE 11/23/2010    KETUA TRACE 11/25/2018    SPECGRAV >=1.030 11/25/2018    BLOODU TRACE-INTACT 11/25/2018    PHUR 5.5 11/25/2018    PROTEINU >=300 11/25/2018    UROBILINOGEN 0.2 11/25/2018    NITRU POSITIVE 11/25/2018    LEUKOCYTESUR SMALL 11/25/2018    LABMICR YES 11/25/2018    URINETYPE Not Specified 11/25/2018      Urine Microscopic:   Lab Results   Component Value Date    LABCAST 1-3 Mixed Cells 07/09/2018    BACTERIA 3+ 11/25/2018    COMU see below 04/14/2017    HYALCAST 8 09/15/2018    WBCUA see below 11/25/2018    RBCUA see below 11/25/2018    EPIU >100 11/25/2018         Scheduled Meds:   sodium chloride flush  10 mL Intravenous 2 times per day    pantoprazole  40 mg Oral QAM AC    piperacillin-tazobactam  3.375 g Intravenous Q12H    nebivolol  2.5 mg Oral 2 times per day on Mon Wed Fri    nebivolol  5 mg Oral 2 times per day on Sun Tue Thu Sat    sevelamer  1.6 g Oral TID WC    budesonide  500 mcg Nebulization BID    apixaban  2.5 mg Oral BID    escitalopram  10 mg Oral Daily    ferrous sulfate  325 mg Oral TID WC    formoterol  20 mcg Nebulization BID    pregabalin  50 mg Oral TID    insulin lispro  0-12 Units Subcutaneous TID WC    insulin lispro  0-6 Units Subcutaneous Nightly    collagenase   Topical Daily    sodium thiosulfate  25 g Intravenous Q MWF    lidocaine   Topical Once    silver nitrate applicators  10 each Topical Once    sodium chloride flush  10 mL Intravenous 2 times per day       Continuous Infusions:   dextrose         PRN Meds:  sodium chloride flush, polyethylene glycol, sodium chloride flush, ondansetron, HYDROmorphone, calcium carbonate, lip balm petroleum free, ipratropium-albuterol, traZODone, glucose, dextrose, glucagon (rDNA), dextrose, perflutren lipid microspheres, sodium chloride flush, magnesium hydroxide, acetaminophen    MICRO: cultures reviewed and updated by me          Wound Culture [[id number]] (Abnormal)  Collected: 01/27/20 1100   Order Status: Completed Specimen: Hip from Leg Updated: 01/31/20 0659    WOUND/ABSCESS --Abnormal     Mixed enteric  and skin mak  Heavy growth   No further workup   Abnormal     Gram Stain Result No WBC's seen   3+ Gram negative rods   1+ Gram positive cocci   1+ Gram positive rods   Abnormal     Organism Pseudomonas aeruginosaAbnormal     WOUND/ABSCESS Moderate growth   Narrative:     ORDER#: [id number]                          ORDERED BY: Niecy Myers  SOURCE: Leg                                COLLECTED:  01/27/20 11:00  ANTIBIOTICS AT [facility]. :                      RECEIVED :  01/27/20 20:21  Performed at:  [facility]  [address] [place] [facility] [address]   Phone [phone]   Culture blood #2 [[id number]] Collected: 01/26/20 1643   Order Status: Completed Specimen: Blood Updated: 01/30/20 1715    Culture, Blood 2 No Growth after 4 days of incubation. Narrative:     ORDER#: [id number]                          ORDERED BY: Reggie Muñoz  SOURCE: Blood port                         COLLECTED:  01/26/20 16:43  ANTIBIOTICS AT [facility]. :                      RECEIVED :  01/27/20 03:47  If child <=2 yrs old please draw pediatric bottle. ~Blood Culture #2  Performed at:  [facility]  [place] [address] underlying fat necrosis and septal/lobular  calcifications.  See comment. Roz Samuels of abdomen, excision:  -  Ulceration with underlying fat necrosis and arterial calcification. Yesica Carrillo of hip, left, excision:  -  Ulceration with underlying fat necrosis and septal calcifications. COMMENT: .  Specimens A-C similar morphologic features.  Ulceration with acute and  chronic inflammation, fat necrosis and calcium deposition can be seen in  the setting of calciphylaxis. AFB and PAS stains are performed and  interpreted as negative for mycobacterium and fungal elements  respectively.  A tissue Gram stain is performed with the interpretation  to be issued in a separate report. Janine Gaitan correlation is essential.      SAMEERA/SAMEERA  All pertinent images and reports for the current Hospitalization were reviewed by me.     IMPRESSION:    Patient Active Problem List   Diagnosis    Moderate asthma with exacerbation    Colon polyps    Microalbuminuria    SOB (shortness of breath)    Venous stasis of lower extremity    Obstructive sleep apnea on CPAP    Chronic diastolic CHF (congestive heart failure) (HCC)    Renal osteodystrophy    Poorly controlled type 2 diabetes mellitus (HCC)    History of pulmonary embolism    Anemia of chronic kidney failure (HCC)    Primary osteoarthritis of both knees    Essential hypertension, malignant    Restrictive lung disease    Gastroesophageal reflux disease    LVH (left ventricular hypertrophy)    Dyspnea and respiratory abnormalities    Acute on chronic respiratory failure with hypoxia and hypercapnia (HCC)    Obesity hypoventilation syndrome (HCC)    Morbid obesity due to excess calories (HCC)    COPD, moderate (HCC)    Gout    Warfarin-induced coagulopathy (HCC)    Chronic hypoxemic respiratory failure (HCC)    Constipation    DM (diabetes mellitus), secondary, uncontrolled, w/neurologic complic ([facility]    Elevated troponin    Diabetes education, encounter for    Primary osteoarthritis of left hip    ESRD (end stage renal disease) on dialysis ([address]    Chronic pain syndrome    Chest pain    Recurrent ventral hernia    Hypoglycemia    ESRD on hemodialysis ([address]    Diabetic polyneuropathy associated with type 2 diabetes mellitus ([address]    Infection of arteriovenous graft for hemodialysis ([address]    Essential hypertension    Dialysis patient ([address]    Postoperative pain    Other complication of arteriovenous dialysis fistula (HCC)    Weight loss counseling, encounter for    Respiratory failure ([address]    Chronic obstructive pulmonary disease ([address]    Pulmonary embolism (HCC)    Chronic pain of left lower extremity    Severe anemia    Cerebrovascular accident (CVA) ([address]    HTN (hypertension), benign    Vision loss of right eye    Open wound    Calciphylaxis    Cellulitis    Abdominal wall cellulitis    Opiate dependence, continuous ([address]    History of arterial ischemic stroke    Anaerobic cellulitis (HCC)    Chronic abdominal wound infection, sequela    Wound infection       Chronic nonhealing abdominal wall wounds  Secondary wound infection  Calciphylaxis  History of extensive debridement  CHF  End-stage renal disease on hemodialysis through chest line  Diabetes  Morbid obesity  Poor mobility  WBC elevation   Blood cx NGTD        Labs, Microbiology, Radiology and pertinent results from current hospitalization and care every where were reviewed by me as a part of the consultation. PLAN :  1. Cont IV Zosyn for now  2. Cont local care  3. Unfortunately calciphylaxis wounds are very  difficulty healing given the extent of the wound and the depth and with her body habitus and other associated comorbidities I doubt this wound will ever heal  4.   Continue supportive care overall her prognosis is  poor    Discussed with patient/Family and Nursing     Thanks for allowing me to participate in your patient's care please call me with any questions or concerns.     Dr. Meagan Redmond MD  [address] [place] [address] Physician  Phone: [phone]   Fax : [phone]

## 2020-02-02 NOTE — PROGRESS NOTES
Assessment complete, VSS, medications administered including PRN Dilaudid for acute abdominal pain per pt. Pt resting in bed, refusing turns at this time, pt reports she wants to sleep on her back. BG 95, will re-assess at 0200. Hgb recheck at 9.3. Pt with no other stated needs, call light within reach.

## 2020-02-03 PROBLEM — S31.109A CHRONIC WOUND INFECTION OF ABDOMEN: Status: ACTIVE | Noted: 2020-01-28

## 2020-02-03 LAB
ANION GAP SERPL CALCULATED.3IONS-SCNC: 18 MMOL/L (ref 3–16)
ANISOCYTOSIS: ABNORMAL
BASOPHILS ABSOLUTE: 0 K/UL (ref 0–0.2)
BASOPHILS RELATIVE PERCENT: 0 %
BUN BLDV-MCNC: 23 MG/DL (ref 7–20)
CALCIUM SERPL-MCNC: 8.5 MG/DL (ref 8.3–10.6)
CHLORIDE BLD-SCNC: 98 MMOL/L (ref 99–110)
CO2: 25 MMOL/L (ref 21–32)
CREAT SERPL-MCNC: 6 MG/DL (ref 0.6–1.2)
EOSINOPHILS ABSOLUTE: 0 K/UL (ref 0–0.6)
EOSINOPHILS RELATIVE PERCENT: 0 %
GFR AFRICAN AMERICAN: 9
GFR NON-AFRICAN AMERICAN: 7
GLUCOSE BLD-MCNC: 130 MG/DL (ref 70–99)
GLUCOSE BLD-MCNC: 135 MG/DL (ref 70–99)
GLUCOSE BLD-MCNC: 186 MG/DL (ref 70–99)
GLUCOSE BLD-MCNC: 222 MG/DL (ref 70–99)
GLUCOSE BLD-MCNC: 85 MG/DL (ref 70–99)
HCT VFR BLD CALC: 29.4 % (ref 36–48)
HEMOGLOBIN: 9.5 G/DL (ref 12–16)
LYMPHOCYTES ABSOLUTE: 2.2 K/UL (ref 1–5.1)
LYMPHOCYTES RELATIVE PERCENT: 13 %
MACROCYTES: ABNORMAL
MCH RBC QN AUTO: 28.9 PG (ref 26–34)
MCHC RBC AUTO-ENTMCNC: 32.3 G/DL (ref 31–36)
MCV RBC AUTO: 89.4 FL (ref 80–100)
MICROCYTES: ABNORMAL
MONOCYTES ABSOLUTE: 0.9 K/UL (ref 0–1.3)
MONOCYTES RELATIVE PERCENT: 5 %
NEUTROPHILS ABSOLUTE: 13.9 K/UL (ref 1.7–7.7)
NEUTROPHILS RELATIVE PERCENT: 82 %
PDW BLD-RTO: 17.7 % (ref 12.4–15.4)
PERFORMED ON: ABNORMAL
PERFORMED ON: NORMAL
PLATELET # BLD: 154 K/UL (ref 135–450)
PLATELET SLIDE REVIEW: ADEQUATE
PMV BLD AUTO: 10 FL (ref 5–10.5)
POLYCHROMASIA: ABNORMAL
POTASSIUM SERPL-SCNC: 4.1 MMOL/L (ref 3.5–5.1)
RBC # BLD: 3.29 M/UL (ref 4–5.2)
REASON FOR REJECTION: NORMAL
REJECTED TEST: NORMAL
SLIDE REVIEW: ABNORMAL
SODIUM BLD-SCNC: 141 MMOL/L (ref 136–145)
WBC # BLD: 17 K/UL (ref 4–11)

## 2020-02-03 PROCEDURE — 6360000002 HC RX W HCPCS: Performed by: SURGERY

## 2020-02-03 PROCEDURE — 2580000003 HC RX 258: Performed by: SURGERY

## 2020-02-03 PROCEDURE — 6370000000 HC RX 637 (ALT 250 FOR IP): Performed by: SURGERY

## 2020-02-03 PROCEDURE — 6370000000 HC RX 637 (ALT 250 FOR IP): Performed by: INTERNAL MEDICINE

## 2020-02-03 PROCEDURE — 2500000003 HC RX 250 WO HCPCS: Performed by: SURGERY

## 2020-02-03 PROCEDURE — 99024 POSTOP FOLLOW-UP VISIT: CPT | Performed by: SURGERY

## 2020-02-03 PROCEDURE — 94660 CPAP INITIATION&MGMT: CPT

## 2020-02-03 PROCEDURE — 94640 AIRWAY INHALATION TREATMENT: CPT

## 2020-02-03 PROCEDURE — 80048 BASIC METABOLIC PNL TOTAL CA: CPT

## 2020-02-03 PROCEDURE — 2500000003 HC RX 250 WO HCPCS: Performed by: INTERNAL MEDICINE

## 2020-02-03 PROCEDURE — 36591 DRAW BLOOD OFF VENOUS DEVICE: CPT

## 2020-02-03 PROCEDURE — 85025 COMPLETE CBC W/AUTO DIFF WBC: CPT

## 2020-02-03 PROCEDURE — APPNB30 APP NON BILLABLE TIME 0-30 MINS: Performed by: NURSE PRACTITIONER

## 2020-02-03 PROCEDURE — 99233 SBSQ HOSP IP/OBS HIGH 50: CPT | Performed by: INTERNAL MEDICINE

## 2020-02-03 PROCEDURE — 94761 N-INVAS EAR/PLS OXIMETRY MLT: CPT

## 2020-02-03 PROCEDURE — 6360000002 HC RX W HCPCS: Performed by: INTERNAL MEDICINE

## 2020-02-03 PROCEDURE — 93308 TTE F-UP OR LMTD: CPT

## 2020-02-03 PROCEDURE — 90935 HEMODIALYSIS ONE EVALUATION: CPT

## 2020-02-03 PROCEDURE — 1200000000 HC SEMI PRIVATE

## 2020-02-03 RX ORDER — CIPROFLOXACIN 500 MG/1
500 TABLET, FILM COATED ORAL
Status: DISCONTINUED | OUTPATIENT
Start: 2020-02-03 | End: 2020-02-05 | Stop reason: HOSPADM

## 2020-02-03 RX ORDER — OXYCODONE HYDROCHLORIDE 5 MG/1
5 TABLET ORAL EVERY 4 HOURS PRN
Status: DISCONTINUED | OUTPATIENT
Start: 2020-02-03 | End: 2020-02-05 | Stop reason: HOSPADM

## 2020-02-03 RX ORDER — COSYNTROPIN 0.25 MG/ML
250 INJECTION, POWDER, FOR SOLUTION INTRAMUSCULAR; INTRAVENOUS ONCE
Status: COMPLETED | OUTPATIENT
Start: 2020-02-04 | End: 2020-02-04

## 2020-02-03 RX ORDER — BISACODYL 10 MG
10 SUPPOSITORY, RECTAL RECTAL DAILY PRN
Status: DISCONTINUED | OUTPATIENT
Start: 2020-02-03 | End: 2020-02-05 | Stop reason: HOSPADM

## 2020-02-03 RX ORDER — HYDROMORPHONE HYDROCHLORIDE 1 MG/ML
0.5 INJECTION, SOLUTION INTRAMUSCULAR; INTRAVENOUS; SUBCUTANEOUS EVERY 6 HOURS PRN
Status: DISCONTINUED | OUTPATIENT
Start: 2020-02-03 | End: 2020-02-05 | Stop reason: HOSPADM

## 2020-02-03 RX ORDER — LACTOBACILLUS RHAMNOSUS GG 10B CELL
1 CAPSULE ORAL 2 TIMES DAILY WITH MEALS
Status: DISCONTINUED | OUTPATIENT
Start: 2020-02-03 | End: 2020-02-05 | Stop reason: HOSPADM

## 2020-02-03 RX ORDER — METRONIDAZOLE 250 MG/1
500 TABLET ORAL EVERY 8 HOURS SCHEDULED
Status: DISCONTINUED | OUTPATIENT
Start: 2020-02-03 | End: 2020-02-05 | Stop reason: HOSPADM

## 2020-02-03 RX ORDER — OXYCODONE HYDROCHLORIDE 5 MG/1
5 TABLET ORAL EVERY 4 HOURS PRN
Status: DISCONTINUED | OUTPATIENT
Start: 2020-02-03 | End: 2020-02-03 | Stop reason: ALTCHOICE

## 2020-02-03 RX ORDER — DOCUSATE SODIUM 100 MG/1
100 CAPSULE, LIQUID FILLED ORAL 2 TIMES DAILY
Status: DISCONTINUED | OUTPATIENT
Start: 2020-02-03 | End: 2020-02-05 | Stop reason: HOSPADM

## 2020-02-03 RX ORDER — OXYCODONE HYDROCHLORIDE 5 MG/1
10 TABLET ORAL EVERY 4 HOURS PRN
Status: DISCONTINUED | OUTPATIENT
Start: 2020-02-03 | End: 2020-02-05 | Stop reason: HOSPADM

## 2020-02-03 RX ADMIN — PREGABALIN 50 MG: 25 CAPSULE ORAL at 14:23

## 2020-02-03 RX ADMIN — CIPROFLOXACIN HYDROCHLORIDE 500 MG: 500 TABLET, FILM COATED ORAL at 14:30

## 2020-02-03 RX ADMIN — Medication 10 ML: at 23:05

## 2020-02-03 RX ADMIN — COLLAGENASE SANTYL: 250 OINTMENT TOPICAL at 14:24

## 2020-02-03 RX ADMIN — COLLAGENASE SANTYL: 250 OINTMENT TOPICAL at 13:32

## 2020-02-03 RX ADMIN — OXYCODONE 10 MG: 5 TABLET ORAL at 16:34

## 2020-02-03 RX ADMIN — INSULIN LISPRO 1 UNITS: 100 INJECTION, SOLUTION INTRAVENOUS; SUBCUTANEOUS at 01:00

## 2020-02-03 RX ADMIN — PANTOPRAZOLE SODIUM 40 MG: 40 TABLET, DELAYED RELEASE ORAL at 06:29

## 2020-02-03 RX ADMIN — SODIUM THIOSULFATE 25 G: 250 INJECTION, SOLUTION INTRAVENOUS at 14:24

## 2020-02-03 RX ADMIN — HYDROMORPHONE HYDROCHLORIDE 0.5 MG: 1 INJECTION, SOLUTION INTRAMUSCULAR; INTRAVENOUS; SUBCUTANEOUS at 13:51

## 2020-02-03 RX ADMIN — TAZOBACTAM SODIUM AND PIPERACILLIN SODIUM 3.38 G: 375; 3 INJECTION, SOLUTION INTRAVENOUS at 04:00

## 2020-02-03 RX ADMIN — FERROUS SULFATE TAB 325 MG (65 MG ELEMENTAL FE) 325 MG: 325 (65 FE) TAB at 17:53

## 2020-02-03 RX ADMIN — APIXABAN 2.5 MG: 5 TABLET, FILM COATED ORAL at 11:49

## 2020-02-03 RX ADMIN — Medication 1 CAPSULE: at 17:53

## 2020-02-03 RX ADMIN — METRONIDAZOLE 500 MG: 250 TABLET, FILM COATED ORAL at 14:23

## 2020-02-03 RX ADMIN — PREGABALIN 50 MG: 25 CAPSULE ORAL at 11:47

## 2020-02-03 RX ADMIN — OXYCODONE 10 MG: 5 TABLET ORAL at 11:48

## 2020-02-03 RX ADMIN — FORMOTEROL FUMARATE DIHYDRATE 20 MCG: 20 SOLUTION RESPIRATORY (INHALATION) at 20:55

## 2020-02-03 RX ADMIN — Medication 0.03 MCG/KG/MIN: at 09:01

## 2020-02-03 RX ADMIN — FERROUS SULFATE TAB 325 MG (65 MG ELEMENTAL FE) 325 MG: 325 (65 FE) TAB at 11:49

## 2020-02-03 RX ADMIN — APIXABAN 2.5 MG: 5 TABLET, FILM COATED ORAL at 22:53

## 2020-02-03 RX ADMIN — DOCUSATE SODIUM 100 MG: 100 CAPSULE, LIQUID FILLED ORAL at 11:49

## 2020-02-03 RX ADMIN — HYDROMORPHONE HYDROCHLORIDE 0.5 MG: 1 INJECTION, SOLUTION INTRAMUSCULAR; INTRAVENOUS; SUBCUTANEOUS at 09:35

## 2020-02-03 RX ADMIN — FORMOTEROL FUMARATE DIHYDRATE 20 MCG: 20 SOLUTION RESPIRATORY (INHALATION) at 07:27

## 2020-02-03 RX ADMIN — BUDESONIDE 500 MCG: 0.5 SUSPENSION RESPIRATORY (INHALATION) at 07:26

## 2020-02-03 RX ADMIN — ESCITALOPRAM OXALATE 10 MG: 10 TABLET ORAL at 11:49

## 2020-02-03 RX ADMIN — Medication 10 ML: at 14:24

## 2020-02-03 RX ADMIN — METRONIDAZOLE 500 MG: 250 TABLET, FILM COATED ORAL at 22:51

## 2020-02-03 RX ADMIN — HYDROMORPHONE HYDROCHLORIDE 0.5 MG: 1 INJECTION, SOLUTION INTRAMUSCULAR; INTRAVENOUS; SUBCUTANEOUS at 03:10

## 2020-02-03 RX ADMIN — BUDESONIDE 500 MCG: 0.5 SUSPENSION RESPIRATORY (INHALATION) at 20:55

## 2020-02-03 RX ADMIN — SEVELAMER CARBONATE 1.6 G: 800 POWDER, FOR SUSPENSION ORAL at 18:48

## 2020-02-03 RX ADMIN — Medication 10 ML: at 13:57

## 2020-02-03 ASSESSMENT — PAIN SCALES - GENERAL
PAINLEVEL_OUTOF10: 10
PAINLEVEL_OUTOF10: 10
PAINLEVEL_OUTOF10: 9
PAINLEVEL_OUTOF10: 0
PAINLEVEL_OUTOF10: 5
PAINLEVEL_OUTOF10: 0
PAINLEVEL_OUTOF10: 10
PAINLEVEL_OUTOF10: 0
PAINLEVEL_OUTOF10: 8
PAINLEVEL_OUTOF10: 7
PAINLEVEL_OUTOF10: 8
PAINLEVEL_OUTOF10: 8

## 2020-02-03 ASSESSMENT — PAIN DESCRIPTION - PROGRESSION
CLINICAL_PROGRESSION: RESOLVED
CLINICAL_PROGRESSION: GRADUALLY WORSENING
CLINICAL_PROGRESSION: RESOLVED

## 2020-02-03 ASSESSMENT — ENCOUNTER SYMPTOMS
EYE DISCHARGE: 0
COUGH: 0
CHEST TIGHTNESS: 0
COLOR CHANGE: 0
EYE REDNESS: 0
NAUSEA: 0
PHOTOPHOBIA: 0
ABDOMINAL PAIN: 0
TROUBLE SWALLOWING: 0
BLOOD IN STOOL: 0
RHINORRHEA: 0
STRIDOR: 0
FACIAL SWELLING: 0
CHOKING: 0
APNEA: 0
SHORTNESS OF BREATH: 0
DIARRHEA: 0

## 2020-02-03 ASSESSMENT — PAIN - FUNCTIONAL ASSESSMENT
PAIN_FUNCTIONAL_ASSESSMENT: PREVENTS OR INTERFERES WITH ALL ACTIVE AND SOME PASSIVE ACTIVITIES
PAIN_FUNCTIONAL_ASSESSMENT: ACTIVITIES ARE NOT PREVENTED

## 2020-02-03 ASSESSMENT — PAIN DESCRIPTION - FREQUENCY: FREQUENCY: CONTINUOUS

## 2020-02-03 ASSESSMENT — PAIN DESCRIPTION - DESCRIPTORS: DESCRIPTORS: ACHING

## 2020-02-03 ASSESSMENT — PAIN DESCRIPTION - LOCATION
LOCATION: ABDOMEN
LOCATION: GROIN

## 2020-02-03 ASSESSMENT — PAIN DESCRIPTION - ORIENTATION: ORIENTATION: RIGHT;LEFT;ANTERIOR;MID

## 2020-02-03 ASSESSMENT — PAIN DESCRIPTION - ONSET: ONSET: ON-GOING

## 2020-02-03 ASSESSMENT — PAIN DESCRIPTION - PAIN TYPE: TYPE: ACUTE PAIN

## 2020-02-03 NOTE — PROGRESS NOTES
GFRAA 9 02/03/2020    GFRAA 50 05/12/2013    AGRATIO 0.5 01/26/2020    LABGLOM 7 02/03/2020    GLUCOSE 135 02/03/2020    PROT 6.1 01/26/2020    PROT 6.6 03/05/2013    LABALBU 2.0 01/28/2020    CALCIUM 8.5 02/03/2020    BILITOT 0.4 01/26/2020    ALKPHOS 86 01/26/2020    AST 29 01/26/2020    ALT 21 01/26/2020     Magnesium:    Lab Results   Component Value Date    MG 2.30 10/27/2019     Phosphorus:    Lab Results   Component Value Date    PHOS 2.4 01/28/2020     Uric Acid:    Lab Results   Component Value Date    LABURIC 2.8 05/17/2018     PT/INR:    Lab Results   Component Value Date    PROTIME 22.3 01/26/2020    PROTIME 22.8 09/08/2014    INR 1.91 01/26/2020     Troponin:    Lab Results   Component Value Date    TROPONINI 0.20 01/27/2020     U/A:    Lab Results   Component Value Date    NITRITE neg 05/06/2015    COLORU Brown 11/25/2018    PROTEINU >=300 11/25/2018    PHUR 5.5 11/25/2018    LABCAST 1-3 Mixed Cells 07/09/2018    WBCUA see below 11/25/2018    RBCUA see below 11/25/2018    MUCUS 3+ 06/18/2013    YEAST Present 08/20/2018    BACTERIA 3+ 11/25/2018    CLARITYU TURBID 11/25/2018    SPECGRAV >=1.030 11/25/2018    LEUKOCYTESUR SMALL 11/25/2018    UROBILINOGEN 0.2 11/25/2018    BILIRUBINUR SMALL 11/25/2018    BILIRUBINUR neg 05/06/2015    BILIRUBINUR NEGATIVE 11/23/2010    BLOODU TRACE-INTACT 11/25/2018    GLUCOSEU 100 11/25/2018    GLUCOSEU NEGATIVE 11/23/2010           IMPRESSION/RECOMMENDATIONS:      1. ESRD on HD    MWF at [facility]  2. Calciphylaxis   Continue Sodium Thiosulfate   3. Anemia of CKD   Target Hb 9-11   4. Renal osteodystrophy    Ca 8.8, Phos 2.4   5. Atrial Fibrillation  ` Rate controlled   6. Obesity   7. Hypotension   Ongoing . pain medication maybe contrfibuting   However ECHO - foer EF and pericardial effusion   Cosyntropin test for adrenal insufficency  K - replace po   Prognosis : poor       Thank you for allowing me to participate in the care of this patient.   I will continue to follow along. Please call with questions.     Roopa Hartmann MD  2/3/2020  [facility]

## 2020-02-03 NOTE — PROGRESS NOTES
Shift assessment completed, see doc flowsheets. Patient resting in bed, Patient getting ready to start HD. Lungs clear, diminished, multiple wound sites with dressings. Abdomen soft with BS. SR on monitor, afebrile. Will continue to monitor.

## 2020-02-03 NOTE — PROGRESS NOTES
Reassessment completed, see doc flowsheets. No change in status. Received HD. SR on monitor. BP acceptable, remains on Levophed.

## 2020-02-03 NOTE — PROGRESS NOTES
Spoke with Dr Seven Guzman regarding cortisol test beging scheduled by pharmacy  to be done in am and MD states is OK to be done first thing in morning.

## 2020-02-03 NOTE — FLOWSHEET NOTE
Treatment time: 4 hours   Net Uf: 1000 ml     Pre weight: 134.7 kg   Post weight: 133.8 kg  EDW: TBD kg     Access used: CAREY AVF  Access function: GOOD      Medications or blood products given: NONE    Regular outpatient schedule: MWF     Summary of response to treatment: HYPOTENSIVE, STARTED ON LEVOPHED. OTHERWISE, TOLERATED TX WELL     Copy of dialysis treatment record placed in chart, to be scanned into EMR.     02/03/20 0800 02/03/20 1231   Treatment   Time On 0813  --    Time Off  --  1213   Treatment Goal 0  --    Observations & Evaluations   Level of Consciousness 0 0   Oriented X 4 4   Vital Signs   BP (!) 87/40 113/64   Temp 97.7 °F (36.5 °C)  --    Pulse 80 92   Resp 18 16   Weight 296 lb 15.4 oz (134.7 kg) 294 lb 15.6 oz (133.8 kg)   Weight Method Bedside scale Bed scale   Percent Weight Change 2.05 -0.67   Pain Assessment   Pain Assessment 0-10 0-10   Treatment Initiation   Dialyze Hours 4  --    Hemodialysis Fistula/Graft Arteriovenous vein graft Left Arm   No Placement Date or Time found. Access Type: Arteriovenous vein graft  Orientation: Left  Access Location: Arm   Site Assessment Clean;Dry; Intact Clean;Dry; Intact   Thrill Present Present   Bruit Present Present   Dressing Intervention  --  New   Dressing Status  --  Clean;Dry; Intact   Post-Hemodialysis Assessment   Post-Treatment Procedures  --  Blood returned   Machine Disinfection Process  --  Acid/Vinegar Clean;Heat Disinfect; Exterior Machine Disinfection   Rinseback Volume (ml)  --  400 ml   Total Liters Processed (l/min)  --  76.8 l/min   Dialyzer Clearance  --  Lightly streaked   Duration of Treatment (minutes)  --  240 minutes   NET Removed (ml)  --  1000 ml   Tolerated Treatment  --  Good   Physician Notified?  --  Yes

## 2020-02-03 NOTE — PLAN OF CARE
Problem: Falls - Risk of:  Goal: Will remain free from falls  Description  Will remain free from falls  2/2/2020 2248 by Loreto Taylor RN  Outcome: Ongoing  2/2/2020 0856 by Kathryn Redman RN  Outcome: Ongoing     Problem: Falls - Risk of:  Goal: Absence of physical injury  Description  Absence of physical injury  2/2/2020 0856 by Kathryn Redman RN  Outcome: Ongoing     Problem: Risk for Impaired Skin Integrity  Goal: Tissue integrity - skin and mucous membranes  Description  Structural intactness and normal physiological function of skin and  mucous membranes.   2/2/2020 2248 by Loreto Taylor RN  Outcome: Ongoing  2/2/2020 0856 by Kathryn Redman RN  Outcome: Ongoing     Problem: Pain:  Goal: Pain level will decrease  Description  Pain level will decrease  2/2/2020 2248 by Loreto Taylor RN  Outcome: Ongoing  2/2/2020 0856 by Kathryn Redman RN  Outcome: Ongoing     Problem: Pain:  Goal: Control of acute pain  Description  Control of acute pain  2/2/2020 0856 by Kathryn Redman RN  Outcome: Ongoing     Problem: Pain:  Goal: Control of chronic pain  Description  Control of chronic pain  2/2/2020 0856 by Kathryn Redman RN  Outcome: Ongoing     Problem: OXYGENATION/RESPIRATORY FUNCTION  Goal: Patient will maintain patent airway  2/2/2020 0856 by Kathryn Redman RN  Outcome: Ongoing     Problem: OXYGENATION/RESPIRATORY FUNCTION  Goal: Patient will achieve/maintain normal respiratory rate/effort  Description  Respiratory rate and effort will be within normal limits for the patient  2/2/2020 2248 by Loreto Taylor RN  Outcome: Ongoing  2/2/2020 0856 by Kathryn Redman RN  Outcome: Ongoing     Problem: OXYGENATION/RESPIRATORY FUNCTION  Goal: Patient will achieve/maintain normal respiratory rate/effort  Description  Respiratory rate and effort will be within normal limits for the patient  2/2/2020 2248 by Loreto Taylor RN  Outcome: Ongoing  2/2/2020 0856 by Kathryn Redman RN  Outcome: Ongoing     Problem: HEMODYNAMIC STATUS  Goal: Patient has stable vital signs and fluid balance  2/2/2020 2248 by Phoebe Rowland RN  Outcome: Ongoing  2/2/2020 0856 by Marce Harmon RN  Outcome: Ongoing     Problem: FLUID AND ELECTROLYTE IMBALANCE  Goal: Fluid and electrolyte balance are achieved/maintained  2/2/2020 2248 by Phoebe Rowland RN  Outcome: Ongoing  2/2/2020 0856 by Marce Harmon RN  Outcome: Ongoing     Problem: ACTIVITY INTOLERANCE/IMPAIRED MOBILITY  Goal: Mobility/activity is maintained at optimum level for patient  2/2/2020 2248 by Phoebe Rowland RN  Outcome: Ongoing  2/2/2020 0856 by Marce Harmon RN  Outcome: Ongoing

## 2020-02-03 NOTE — PROGRESS NOTES
Wet to dry dressings to abdominal wound and bilateral hip wounds, sacral wound. Patient tolerated well.

## 2020-02-03 NOTE — CARE COORDINATION
Chart reviewed for discharge planning. Barrier(s) to discharge-  precert through [facility] Medicare    Tentative discharge plan- to [facility]    Tentative discharge date-TBD    Remains in ICU on IV Zosyn & Levophed - extensive wound care. Precert still pending (started last Thurs). Patient is interested I going to [facility] if [facility] is not approved. Does not wish to return to [facility]. *Case management will continue to follow progress and update discharge plan as needed.     Padmini Sears RN BSN  Case Management

## 2020-02-03 NOTE — PROGRESS NOTES
dialysis [facility]) (02/14/2018), GERD (gastroesophageal reflux disease), Gout, History of blood transfusion, History of cervical cancer, Hyperlipidemia, Hypertension, Incarcerated ventral hernia, LVH (left ventricular hypertrophy), Morbid obesity ([facility] Mucus plugging of bronchi, Obstructive sleep apnea on CPAP, Pneumonia, Psychiatric problem, Pulmonary embolism ([facility] (02/06/2013), Type 2 diabetes mellitus with diabetic neuropathy, with long-term current use of insulin ([facility] (9/12/2017), Urinary incontinence in female, and Venous stasis of lower extremity. with following problems:    · Chronic nonhealing abdominal wounds with secondary wound infection with Pseudomonas  · History of extensive debridement of abdominal wound and bilateral hip wounds  · Severe calciphylaxis  · Open bilateral hip decubitus wounds with skin necrosis  · ESRD on hemodialysis  · Severe anemia  · History of pulmonary embolism  · History of stroke  · Poorly controlled type 2 diabetes mellitus  · COPD  · Diabetic nephropathy  · GERD  · Obesity Class 3 due to excess calorie intake : Body mass index is 52.25 kg/m². Discussion:      The patient is known to me from previous hospitalization in December for open abdominal wound infection from calciphylaxis. She is now admitted with the Pseudomonas infection of the wound. She was hypotensive on presentation requiring ICU admission and vasopressors and chronic hypoxic and hypercapnic respiratory failure requiring BiPAP. Blood cultures from admission are negative so far. Abdominal wound culture from January 27 is positive for Pseudomonas, which is pan susceptible    She is on IV Zosyn.   She had a low-grade temp of 99.3 today    Plan:     Diagnostic Workup:    · Continue to follow  fever curve, WBC count and blood cultures  · Follow up on liver and renal function  · Continue to monitor surgical cultures    Antimicrobials:    · Will stop IV Zosyn today  · Pseudomonas isolated from original · There is potential for severe exacerbation of infection/side effects of treatment. · Therapy requires intensive monitoring for antimicrobial agent toxicity. Thank you for involving me in the care of your patient. I will continue to follow. If you have any additional questions, please do not hesitate to contact me. Subjective: Interval history: Patient was seen and examined at bedside. Interval history was obtained. The patient appears tired. He appears to be tolerating IV Zosyn okay. Has some diarrhea. Has pain at the abdominal and bilateral hip surgical debridement wound sites     REVIEW OF SYSTEMS:    Review of Systems   Constitutional: Positive for fatigue. Negative for chills, diaphoresis and unexpected weight change. HENT: Negative for congestion, ear discharge, ear pain, facial swelling, hearing loss, rhinorrhea and trouble swallowing. Eyes: Negative for photophobia, discharge, redness and visual disturbance. Respiratory: Negative for apnea, cough, choking, chest tightness, shortness of breath and stridor. Cardiovascular: Negative for chest pain and palpitations. Gastrointestinal: Negative for abdominal pain, blood in stool, diarrhea and nausea. Endocrine: Negative for polydipsia, polyphagia and polyuria. Genitourinary: Negative for difficulty urinating, dysuria, frequency, hematuria, menstrual problem and vaginal discharge. Musculoskeletal: Negative for arthralgias, joint swelling, myalgias and neck stiffness. Deep ulceration of the lower abdominal area   Skin: Negative for color change and rash. Allergic/Immunologic: Negative for immunocompromised state. Neurological: Negative for dizziness, seizures, speech difficulty, light-headedness and headaches. Hematological: Negative for adenopathy. Psychiatric/Behavioral: Negative for agitation, hallucinations and suicidal ideas. Past Medical History: All past medical history reviewed today.     Past Medical History:   Diagnosis Date    Abdominal pain 8/20/2018    Acute on chronic congestive heart failure ([address] 6/26/2014    Asthma     Calcinosis cutis     Calciphylaxis 12/2/2019    Cervical cancer ([address]     Chest pain 5/16/2018    CHF (congestive heart failure) [facility]     Chronic kidney disease, stage IV (severe) ([facility])     Dr Nando Tuttle Chronic pain syndrome 3/27/2018    Chronic respiratory failure with hypoxia ([facility])     CKD (chronic kidney disease) stage 4, GFR 15-29 ml/min ([facility]) 6/30/2017    Colon polyps     COPD (chronic obstructive pulmonary disease) ([address]     Degenerative disc disease at L5-S1 level 6/18/2013     CT    Diabetes mellitus, insulin dependent (IDDM), uncontrolled ([address]     Diabetic polyneuropathy associated with type 2 diabetes mellitus ([address] 3/26/2019    Elevated troponin 9/9/2017    ESRD (end stage renal disease) on dialysis ([address] 02/14/2018    [facility]    GERD (gastroesophageal reflux disease)     Gout     History of blood transfusion     History of cervical cancer     Hyperlipidemia     Hypertension     Incarcerated ventral hernia     LVH (left ventricular hypertrophy)     Morbid obesity ([facility])     Mucus plugging of bronchi     Obstructive sleep apnea on CPAP     wears 2L O2 and BIPAP at night    Pneumonia     Psychiatric problem     breakdown long time ago but not current    Pulmonary embolism ([address] 02/06/2013    Type 2 diabetes mellitus with diabetic neuropathy, with long-term current use of insulin ([address] 9/12/2017    Urinary incontinence in female     Venous stasis of lower extremity        Past Surgical History: All past surgical history was reviewed today.     Past Surgical History:   Procedure Laterality Date    ABDOMEN SURGERY N/A 12/26/2019    INCISION AND DRAINAGE OF ABDOMINAL WOUND AND BILATERAL HIP WOUNDS performed by Genny Kirby MD at [facility] N/A 1/30/2020    INCISION AND DRAINAGE OF ABDOMEN WOUND performed by Mai Lara MD at [facility]  1/14    Grace; clean cors    COLONOSCOPY  2010    polyp removed; Mitali Garcia; repeat 5 years    [place]13, 11[place]    [place]    COLONOSCOPY N/A 10/10/2019    COLONOSCOPY POLYPECTOMY SNARE/COLD BIOPSY performed by Stephany Yoder MD at [address] Right 3/28/2019    RIGHT BRACHIO CEPHALIC FISTULA CREATION performed by Alondra Jaramillo MD at [address] [facility]  2/21/09    LVH with global hypokinesis; EF 55-60%    HYSTERECTOMY      INCISION AND DRAINAGE Right 8/22/2019    RIGHT ARM INCISION AND DEBRIDEMENT performed by Alondra Jaramillo MD at [facility] 1/30/2020    BILATERAL HIP WOUND INCISION AND DRAINAGE performed by Mai Lara MD at [address] [facility] ARTHROSCOPY Right 1999    OTHER SURGICAL HISTORY  02/22/2018    LEFT brachial artery axillary vein AV graft     DC OPEN SKULL SUPRATENT EXPLORE      Craniotomy, 3/21/19 patient denies any brain surgery or craniotomy    DC REPAIR INCISIONAL HERNIA,REDUCIBLE N/A 11/20/2018    LAPAROSCOPIC VENTRAL HERNIA REPAIR WITH MESH performed by Arline Nina MD at [address] Right 7/18/2019    RIGHT BRACHIAL ARTERY AXILLARY VEIN GRAFT AND LIGATION OF RIGHT BRACHIO-CEPHALIC FISTULA ([address], [address]) performed by Alondra Jaramillo MD at [place] [address]  10/96    TUNNELED VENOUS PORT PLACEMENT Right 11/2019    UPPER GASTROINTESTINAL ENDOSCOPY  6/25/13    UPPER GASTROINTESTINAL ENDOSCOPY N/A 11/21/2019    EGD BIOPSY performed by Larry Velasco MD at [place] [address]  12/24/2016    Incarcerated ventral hernia repair with mesh       Family History: All family history was reviewed today.         Problem Relation Age of Onset    Colon Cancer Father     Diabetes Father     High Blood Pressure Father     Colon Cancer Mother     Diabetes Mother     Colon Cancer Maternal Grandmother     Kidney Cancer Brother     Heart Disease Brother          age 54    High Blood Pressure Brother     High Blood Pressure Sister     High Blood Pressure Maternal Aunt     High Blood Pressure Sister     Heart Disease Sister        Objective:       PHYSICAL EXAM:      Vitals:   Vitals:    20 0700 20 0710 20 0800 20 1231   BP:   (!) 87/40 113/64   Pulse: 84  80 92   Resp:  17 18 16   Temp:   97.7 °F (36.5 °C)    TempSrc:       SpO2: 92% 92%     Weight:   296 lb 15.4 oz (134.7 kg) 294 lb 15.6 oz (133.8 kg)   Height:           Physical Exam  Vitals signs and nursing note reviewed. Constitutional:       General: She is not in acute distress. Appearance: She is well-developed. She is not diaphoretic. HENT:      Head: Normocephalic. Right Ear: External ear normal.      Left Ear: External ear normal.      Nose: Nose normal.   Eyes:      General: No scleral icterus. Right eye: No discharge. Left eye: No discharge. Conjunctiva/sclera: Conjunctivae normal.      Pupils: Pupils are equal, round, and reactive to light. Neck:      Musculoskeletal: Normal range of motion and neck supple. Cardiovascular:      Rate and Rhythm: Normal rate and regular rhythm. Heart sounds: No murmur. No friction rub. Pulmonary:      Effort: Pulmonary effort is normal.      Breath sounds: No stridor. No wheezing or rales. Chest:      Chest wall: No tenderness. Abdominal:      Palpations: Abdomen is soft. There is no mass. Tenderness: There is no abdominal tenderness. There is no guarding or rebound. Musculoskeletal:         General: No tenderness. Comments: Deep ulcers from surgical debridement noted at the lower abdominal wall area and bilateral hip areas   Lymphadenopathy:      Cervical: No cervical adenopathy. Skin:     General: Skin is warm and dry. Findings: No erythema or rash.    Neurological:      Mental Status: She is alert and oriented to person, place, and time. Motor: No abnormal muscle tone. Psychiatric:         Judgment: Judgment normal.           Lines: All vascular access sites are healthy with no local erythema, discharge or tenderness. Intake and output:    I/O last 3 completed shifts: In: 26 [P.O.:340; I.V.:231; IV Piggyback:50]  Out: -     Lab Data:   All available labs and old records have been reviewed by me. CBC:  Recent Labs     02/01/20 0410 02/01/20  2104 02/02/20 0344 02/03/20  0448   WBC 11.7*  --  14.1* 17.0*   RBC 2.16*  --  3.14* 3.29*   HGB 6.2* 9.3* 9.0* 9.5*   HCT 19.5* 28.2* 27.7* 29.4*     --  142 154   MCV 90.3  --  88.1 89.4   MCH 28.6  --  28.8 28.9   MCHC 31.6  --  32.6 32.3   RDW 20.5*  --  17.3* 17.7*   NRBC  --   --  1*  1*  --    BANDSPCT  --   --  1  --         BMP:  Recent Labs     02/01/20 0410 02/02/20  0344 02/03/20  0525    139 141   K 3.4* 3.2* 4.1   CL 98* 95* 98*   CO2 30 27 25   BUN 14 18 23*   CREATININE 3.6* 4.6* 6.0*   CALCIUM 8.6 8.6 8.5   GLUCOSE 80 78 135*        Hepatic Function Panel:   Lab Results   Component Value Date    ALKPHOS 86 01/26/2020    ALT 21 01/26/2020    AST 29 01/26/2020    PROT 6.1 01/26/2020    PROT 6.6 03/05/2013    BILITOT 0.4 01/26/2020    BILIDIR 0.2 10/16/2014    IBILI 0.1 10/16/2014    LABALBU 2.0 01/28/2020       CPK:   Lab Results   Component Value Date    CKTOTAL 129 07/11/2017     ESR:   Lab Results   Component Value Date    SEDRATE >130 (H) 12/26/2019     CRP: No results found for: CRP        Imaging: All pertinent images and reports for the current visit were reviewed by me during this visit. CT ABDOMEN PELVIS WO CONTRAST Additional Contrast? None   Final Result   1. Improved skin thickening and infiltration of the subcutaneous fat in the   patient's pannus. There is continued breakdown of the skin within the folds   of the pannus with subcutaneous gas. No focal collection identified.    2. Otherwise unremarkable CT of the abdomen and pelvis. Nonobstructive left   renal calculus. XR CHEST PORTABLE   Final Result   Stable chronic cardiomegaly without acute airspace consolidation or CHF. Medications: All current and past medications were reviewed.  docusate sodium  100 mg Oral BID    [START ON 2/4/2020] cosyntropin  250 mcg Intravenous Once    ciprofloxacin  500 mg Oral Daily    metroNIDAZOLE  500 mg Oral 3 times per day    lactobacillus  1 capsule Oral BID WC    sodium chloride flush  10 mL Intravenous 2 times per day    pantoprazole  40 mg Oral QAM AC    nebivolol  2.5 mg Oral 2 times per day on Mon Wed Fri    nebivolol  5 mg Oral 2 times per day on Sun Tue Thu Sat    sevelamer  1.6 g Oral TID WC    budesonide  500 mcg Nebulization BID    apixaban  2.5 mg Oral BID    escitalopram  10 mg Oral Daily    ferrous sulfate  325 mg Oral TID WC    formoterol  20 mcg Nebulization BID    pregabalin  50 mg Oral TID    insulin lispro  0-12 Units Subcutaneous TID WC    insulin lispro  0-6 Units Subcutaneous Nightly    collagenase   Topical Daily    sodium thiosulfate  25 g Intravenous Q MWF    lidocaine   Topical Once    silver nitrate applicators  10 each Topical Once    sodium chloride flush  10 mL Intravenous 2 times per day        norepinephrine 0.03 mcg/kg/min (02/03/20 0901)    dextrose         HYDROmorphone, bisacodyl, bisacodyl, oxyCODONE **OR** oxyCODONE, sodium chloride flush, polyethylene glycol, sodium chloride flush, ondansetron, calcium carbonate, lip balm petroleum free, ipratropium-albuterol, traZODone, glucose, dextrose, glucagon (rDNA), dextrose, perflutren lipid microspheres, sodium chloride flush, magnesium hydroxide, acetaminophen      Problem list:       Patient Active Problem List   Diagnosis Code    Moderate asthma with exacerbation J45. 901    Colon polyps K63.5    Microalbuminuria R80.9    SOB (shortness of breath) R06.02    Venous stasis of lower extremity I87.8 anemia D64.9    Cerebrovascular accident (CVA) ([address] I63.9    HTN (hypertension), benign I10    Vision loss of right eye H54.61    Open wound T14. 8XXA    Calciphylaxis E83.59    Cellulitis L03.90    Abdominal wall cellulitis L03.311    Opiate dependence, continuous (HCC) F11.20    History of arterial ischemic stroke Z86.73    Anaerobic cellulitis (HCC) A48.0    Chronic abdominal wound infection, sequela S31.109S, L08.9    Chronic wound infection of abdomen S31.109A, L08.9    Pseudomonas infection A49.8    Postoperative wound infection of left hip T81.49XA       Please note that this chart was generated using Dragon dictation software. Although every effort was made to ensure the accuracy of this automated transcription, some errors in transcription may have occurred inadvertently. If you may need any clarification, please do not hesitate to contact me through EP[employer] or at the phone number provided below with my electronic signature. Any pictures or media included in this note were obtained after taking informed verbal consent from the patient and with their approval to include those in the patient's medical record.     Lenin Messer MD, MPH  2/3/2020 , 12:57 PM   [facility]   Office: [phone]  Fax: [phone]  Tuesday AM clinic:   [address] [place] [address] 120  Thursday AM [id number] [place], [facility]

## 2020-02-03 NOTE — PROGRESS NOTES
Continue local wound care with wet to dry dressings with Dakin's daily and as needed  Risk of recurrence/non-healing is high with risk factors such as immobility, obesity, calciphylaxis and other significant medical comorbidities  Discharge planning involves likely LTAC such as [facility] which has its own wound care team. Will need advanced wound care followup either with LTAC or [facility] until wounds are healed, will provide contact information  Continue medical management of remainder of medical comorbidities per primary and consulting teams. General surgery team will follow peripherally. Please call with questions    Rod Mcfarlane.  Jeanie Rivera MD, FACS  2/3/2020  9:46 AM

## 2020-02-03 NOTE — PROGRESS NOTES
Nursing assessment completed. Vital Signs Stable. 98.8 temp, NSR 85, RR 18, BP 91/56 (63), 100% on 2LO2NC. Neuro intact. Patient remains visually impaired. Lights on. A&Ox2. Speech clear. S1S2 heart tones. LS Clear and diminished anterior/posterior. Cough on command. Abdomen soft, obese with active +BSx4 quadrants. Anuric.  +2/+2 radial pulses. +1/+1 pedal pulses  NS KVO to Right Chest port and site is unremarkable and dressing is CDI. Patient reporting pain level 9 out of 10 shooting in her wounds will attempt to provide comfort measures and pain medication. She is specifically requesting \"trazadone\" to help her rest.  Daughter at bedside. SR up x3. Call light in reach. Repositioned patient for comfort. Very resistive and scared with turning. Comfort, coaching and reassurance provided to ease her fear.

## 2020-02-04 LAB
ANION GAP SERPL CALCULATED.3IONS-SCNC: 28 MMOL/L (ref 3–16)
BASOPHILS ABSOLUTE: 0 K/UL (ref 0–0.2)
BASOPHILS RELATIVE PERCENT: 0.1 %
BUN BLDV-MCNC: 13 MG/DL (ref 7–20)
CALCIUM SERPL-MCNC: 8.8 MG/DL (ref 8.3–10.6)
CHLORIDE BLD-SCNC: 92 MMOL/L (ref 99–110)
CO2: 22 MMOL/L (ref 21–32)
CORTISOL TOTAL: 24.1 UG/DL
CREAT SERPL-MCNC: 4 MG/DL (ref 0.6–1.2)
EOSINOPHILS ABSOLUTE: 0.1 K/UL (ref 0–0.6)
EOSINOPHILS RELATIVE PERCENT: 0.3 %
GFR AFRICAN AMERICAN: 14
GFR NON-AFRICAN AMERICAN: 11
GLUCOSE BLD-MCNC: 195 MG/DL (ref 70–99)
GLUCOSE BLD-MCNC: 210 MG/DL (ref 70–99)
GLUCOSE BLD-MCNC: 240 MG/DL (ref 70–99)
GLUCOSE BLD-MCNC: 318 MG/DL (ref 70–99)
GLUCOSE BLD-MCNC: 321 MG/DL (ref 70–99)
HCT VFR BLD CALC: 28.6 % (ref 36–48)
HEMOGLOBIN: 9.2 G/DL (ref 12–16)
LYMPHOCYTES ABSOLUTE: 1.2 K/UL (ref 1–5.1)
LYMPHOCYTES RELATIVE PERCENT: 6.5 %
MCH RBC QN AUTO: 29.1 PG (ref 26–34)
MCHC RBC AUTO-ENTMCNC: 32.1 G/DL (ref 31–36)
MCV RBC AUTO: 90.5 FL (ref 80–100)
MONOCYTES ABSOLUTE: 0.9 K/UL (ref 0–1.3)
MONOCYTES RELATIVE PERCENT: 4.9 %
NEUTROPHILS ABSOLUTE: 16.4 K/UL (ref 1.7–7.7)
NEUTROPHILS RELATIVE PERCENT: 88.2 %
PDW BLD-RTO: 17.5 % (ref 12.4–15.4)
PERFORMED ON: ABNORMAL
PLATELET # BLD: 164 K/UL (ref 135–450)
PMV BLD AUTO: 10.2 FL (ref 5–10.5)
POTASSIUM SERPL-SCNC: 3.6 MMOL/L (ref 3.5–5.1)
RBC # BLD: 3.16 M/UL (ref 4–5.2)
SODIUM BLD-SCNC: 142 MMOL/L (ref 136–145)
WBC # BLD: 18.6 K/UL (ref 4–11)

## 2020-02-04 PROCEDURE — 2580000003 HC RX 258: Performed by: SURGERY

## 2020-02-04 PROCEDURE — 94640 AIRWAY INHALATION TREATMENT: CPT

## 2020-02-04 PROCEDURE — 6360000002 HC RX W HCPCS: Performed by: INTERNAL MEDICINE

## 2020-02-04 PROCEDURE — 94760 N-INVAS EAR/PLS OXIMETRY 1: CPT

## 2020-02-04 PROCEDURE — 6370000000 HC RX 637 (ALT 250 FOR IP): Performed by: INTERNAL MEDICINE

## 2020-02-04 PROCEDURE — 36591 DRAW BLOOD OFF VENOUS DEVICE: CPT

## 2020-02-04 PROCEDURE — 6370000000 HC RX 637 (ALT 250 FOR IP): Performed by: SURGERY

## 2020-02-04 PROCEDURE — 6360000002 HC RX W HCPCS

## 2020-02-04 PROCEDURE — 2700000000 HC OXYGEN THERAPY PER DAY

## 2020-02-04 PROCEDURE — 80400 ACTH STIMULATION PANEL: CPT

## 2020-02-04 PROCEDURE — 80048 BASIC METABOLIC PNL TOTAL CA: CPT

## 2020-02-04 PROCEDURE — 94660 CPAP INITIATION&MGMT: CPT

## 2020-02-04 PROCEDURE — 6360000002 HC RX W HCPCS: Performed by: SURGERY

## 2020-02-04 PROCEDURE — 36415 COLL VENOUS BLD VENIPUNCTURE: CPT

## 2020-02-04 PROCEDURE — 1200000000 HC SEMI PRIVATE

## 2020-02-04 PROCEDURE — 82533 TOTAL CORTISOL: CPT

## 2020-02-04 PROCEDURE — 99232 SBSQ HOSP IP/OBS MODERATE 35: CPT | Performed by: INTERNAL MEDICINE

## 2020-02-04 PROCEDURE — 85025 COMPLETE CBC W/AUTO DIFF WBC: CPT

## 2020-02-04 RX ORDER — BUDESONIDE 0.5 MG/2ML
INHALANT ORAL
Status: COMPLETED
Start: 2020-02-04 | End: 2020-02-04

## 2020-02-04 RX ORDER — FORMOTEROL FUMARATE 20 UG/2ML
SOLUTION RESPIRATORY (INHALATION)
Status: DISPENSED
Start: 2020-02-04 | End: 2020-02-04

## 2020-02-04 RX ADMIN — FORMOTEROL FUMARATE DIHYDRATE 20 MCG: 20 SOLUTION RESPIRATORY (INHALATION) at 20:42

## 2020-02-04 RX ADMIN — METRONIDAZOLE 500 MG: 250 TABLET, FILM COATED ORAL at 21:08

## 2020-02-04 RX ADMIN — SEVELAMER CARBONATE 1.6 G: 800 POWDER, FOR SUSPENSION ORAL at 13:14

## 2020-02-04 RX ADMIN — SEVELAMER CARBONATE 1.6 G: 800 POWDER, FOR SUSPENSION ORAL at 09:51

## 2020-02-04 RX ADMIN — CIPROFLOXACIN HYDROCHLORIDE 500 MG: 500 TABLET, FILM COATED ORAL at 09:47

## 2020-02-04 RX ADMIN — COLLAGENASE SANTYL: 250 OINTMENT TOPICAL at 09:51

## 2020-02-04 RX ADMIN — BUDESONIDE 500 MCG: 0.5 SUSPENSION RESPIRATORY (INHALATION) at 10:33

## 2020-02-04 RX ADMIN — INSULIN LISPRO 4 UNITS: 100 INJECTION, SOLUTION INTRAVENOUS; SUBCUTANEOUS at 21:11

## 2020-02-04 RX ADMIN — SEVELAMER CARBONATE 1.6 G: 800 POWDER, FOR SUSPENSION ORAL at 17:45

## 2020-02-04 RX ADMIN — Medication 10 ML: at 10:20

## 2020-02-04 RX ADMIN — APIXABAN 2.5 MG: 5 TABLET, FILM COATED ORAL at 09:45

## 2020-02-04 RX ADMIN — PREGABALIN 50 MG: 25 CAPSULE ORAL at 09:46

## 2020-02-04 RX ADMIN — PREGABALIN 50 MG: 25 CAPSULE ORAL at 13:25

## 2020-02-04 RX ADMIN — COSYNTROPIN 250 MCG: 0.25 INJECTION, POWDER, LYOPHILIZED, FOR SOLUTION INTRAVENOUS at 10:12

## 2020-02-04 RX ADMIN — METRONIDAZOLE 500 MG: 250 TABLET, FILM COATED ORAL at 13:24

## 2020-02-04 RX ADMIN — HYDROMORPHONE HYDROCHLORIDE 0.5 MG: 1 INJECTION, SOLUTION INTRAMUSCULAR; INTRAVENOUS; SUBCUTANEOUS at 14:17

## 2020-02-04 RX ADMIN — METRONIDAZOLE 500 MG: 250 TABLET, FILM COATED ORAL at 06:28

## 2020-02-04 RX ADMIN — INSULIN LISPRO 4 UNITS: 100 INJECTION, SOLUTION INTRAVENOUS; SUBCUTANEOUS at 13:15

## 2020-02-04 RX ADMIN — APIXABAN 2.5 MG: 5 TABLET, FILM COATED ORAL at 21:23

## 2020-02-04 RX ADMIN — FERROUS SULFATE TAB 325 MG (65 MG ELEMENTAL FE) 325 MG: 325 (65 FE) TAB at 17:45

## 2020-02-04 RX ADMIN — Medication 1 CAPSULE: at 17:45

## 2020-02-04 RX ADMIN — PREGABALIN 50 MG: 25 CAPSULE ORAL at 21:08

## 2020-02-04 RX ADMIN — FERROUS SULFATE TAB 325 MG (65 MG ELEMENTAL FE) 325 MG: 325 (65 FE) TAB at 09:47

## 2020-02-04 RX ADMIN — Medication 10 ML: at 21:10

## 2020-02-04 RX ADMIN — Medication 10 ML: at 21:09

## 2020-02-04 RX ADMIN — INSULIN LISPRO 4 UNITS: 100 INJECTION, SOLUTION INTRAVENOUS; SUBCUTANEOUS at 09:45

## 2020-02-04 RX ADMIN — Medication 1 CAPSULE: at 10:00

## 2020-02-04 RX ADMIN — BUDESONIDE 500 MCG: 0.5 SUSPENSION RESPIRATORY (INHALATION) at 20:42

## 2020-02-04 RX ADMIN — FORMOTEROL FUMARATE DIHYDRATE 20 MCG: 20 SOLUTION RESPIRATORY (INHALATION) at 10:34

## 2020-02-04 RX ADMIN — ESCITALOPRAM OXALATE 10 MG: 10 TABLET ORAL at 09:47

## 2020-02-04 RX ADMIN — FERROUS SULFATE TAB 325 MG (65 MG ELEMENTAL FE) 325 MG: 325 (65 FE) TAB at 13:24

## 2020-02-04 RX ADMIN — OXYCODONE 10 MG: 5 TABLET ORAL at 09:45

## 2020-02-04 RX ADMIN — Medication 10 ML: at 10:19

## 2020-02-04 RX ADMIN — INSULIN LISPRO 8 UNITS: 100 INJECTION, SOLUTION INTRAVENOUS; SUBCUTANEOUS at 17:46

## 2020-02-04 RX ADMIN — PANTOPRAZOLE SODIUM 40 MG: 40 TABLET, DELAYED RELEASE ORAL at 06:28

## 2020-02-04 ASSESSMENT — ENCOUNTER SYMPTOMS
CHEST TIGHTNESS: 0
FACIAL SWELLING: 0
SHORTNESS OF BREATH: 0
ABDOMINAL PAIN: 0
TROUBLE SWALLOWING: 0
COUGH: 0
PHOTOPHOBIA: 0
APNEA: 0
NAUSEA: 0
STRIDOR: 0
BLOOD IN STOOL: 0
CHOKING: 0
EYE DISCHARGE: 0
RHINORRHEA: 0
EYE REDNESS: 0
COLOR CHANGE: 0
DIARRHEA: 0

## 2020-02-04 ASSESSMENT — PAIN SCALES - GENERAL
PAINLEVEL_OUTOF10: 0
PAINLEVEL_OUTOF10: 10
PAINLEVEL_OUTOF10: 0
PAINLEVEL_OUTOF10: 10

## 2020-02-04 NOTE — PROGRESS NOTES
Hospitalist Progress Note      PCP: Haja Issa, APRN - CNP    Date of Admission: 2020    SUBJECTIVE:               OBJECTIVE:     Vitals    TEMPERATURE:  Current - Temp: 98.5 °F (36.9 °C); Max - Temp  Av.3 °F (36.8 °C)  Min: 97.7 °F (36.5 °C)  Max: 98.6 °F (37 °C)  RESPIRATIONS RANGE: Resp  Av.7  Min: 14  Max: 22  PULSE RANGE: Pulse  Av.5  Min: 83  Max: 96  BLOOD PRESSURE RANGE:  Systolic (22ATC), IZ , Min:80 , SLO:689   ; Diastolic (82OHD), BES:18, Min:53, Max:86    PULSE OXIMETRY RANGE: SpO2  Av.6 %  Min: 92 %  Max: 100 %  24HR INTAKE/OUTPUT:      Intake/Output Summary (Last 24 hours) at 2020 1305  Last data filed at 2/3/2020 2305  Gross per 24 hour   Intake 148 ml   Output --   Net 148 ml       Exam:    General appearance: Well, no apparent distress, appears stated age and cooperative. HEENT Normal cephalic, atraumatic without obvious deformity. Pupils equal, round, and reactive to light. Extra ocular muscles intact. Conjunctivae/corneas clear. Neck: Supple, No jugular venous distention/bruits. Trachea midline without thyromegaly or adenopathy with full range of motion. Lungs: Clear to ascultation, bilaterally without Rales/Wheezes/Rhonchi with good respiratory effort. Heart: Regular rate and rhythm with Normal S1/S2 without  murmurs, rubs or gallops, point of maximum impulse non-displaced  Abdomen: Soft, non-tender or non-distended without rigidity or guarding and positive bowel sounds all four quadrants. Extremities: No clubbing, cyanosis, or edema bilaterally. Full range of motion without deformity and normal gait intact. Skin: Skin color, texture, turgor normal. No rashes or lesions. Neurologic: Alert and oriented X 3,  neurovascularly intact with sensory/motor intact upper extremities/lower extremities, bilaterally. Cranial nerves:II-XII intact, grossly non-focal.  Mental status: Alert, oriented, thought content appropriate.         ASSESSMENT AND PLAN Hypotension - unclear etiology, requiring Levo with H/D; trivial pericardial effusion on TTE; cosyntropin test pending per Nephro; change pain regimen to PO    Chronic abdominal wall wound (pannus)  - Likely due to calciphylaxis. - admitted December and + cx: diphtheroids and bacteroids  - completed antibiotics  - missed outpt appointment last week   - CT abd/pelvis (ordered by ER): results above of chronic wound in pannus with continued breakdown of skin within folds with subcutaneous gas. - consult General Surgery and ID  for CT findings fo gas in subcut tissue.   Dr. Brenda Turner has debrided the wounds on several days, they are improving and much less foul smelling.   - consult wound care ( was to follow with Dr. Brenda Turner weekly on discharge)  - Wound cx growing pseudomonas aeruginosa.  PO Cipro/Flagyl per ID 2/3/2020      Chest pain  - atypical , seen by cards   - negative stress test 2 years ago and recent surgery where she did fine from cardiac stress standpoint.   - no additional work up needed.      Sepsis  - due to wound infections  - norepinephrine still required during h/d     ESRD  - Nephro following; TTE showed EF 60%; trivial pericardial effusion  - dialysis MWF     Elevated trop  -due to ESRD  -non cardiac      DM, type 2  - med-dose SSI  - POCT ac/hs  - cont lantus 25 units subcu  - cont prandial coverage      Acute on chronic anemia  - Hemoglobin stable at 9.0 this morning   - No active bleeding      DVT Prophylaxis:   Diet: Dietary Nutrition Supplements: Renal Oral Supplement  DIET CARDIAC; Carb Control: 5 carb choices (75 gms)/meal  Code Status: Full Code     PT/OT Eval Status: eval and treat      [address] [facility] declined [facility] specialty; Peer to Peer pending              [address] [facility], DO

## 2020-02-04 NOTE — PROGRESS NOTES
From Dr. Corrine Arauz note: \"Continue local wound care with wet to dry dressings with Dakin's daily and as needed\" but no actual order. Changed this afternoon, applied santyl to bottom. sBp went above 100 so I gave her IV dilaudid. Dialysis tomorrow, May need Levophed according to hx. CONSYNTROPIN TESTING COMPLETED, according to physician nursing communication order she will d/c to \"[facility]\" after dialysis       Wet to dry dressings changed on bilateral hips, under abd fold. Very deep, L hip & abd deepest. All have muscle & fat at wound base. Abdominal one has one area that is blackish, & has faint odor. Santyl applied to pressure sores on bottom after patient was incont of stool & cleaned thoroughly. Also cleaned arlene-are thoroughly & applied powder. Patient in severe pain & could hardly tolerate. Educated on how important it was she keep these area clean.  Pressure sores on bottom left thad w/ pt up on her side

## 2020-02-04 NOTE — PROGRESS NOTES
Nephrology Progress  Note  [phone]  [phone]   [email or web address].        Reason for Consult:  ESRD on HD     HISTORY OF PRESENT ILLNESS:      The patient is a 72 y.o. female with significant past medical history Morbid obesity , T2DM , HTN , ESRD on HD  of  who presents with chest pain   She is undergoing treatment for calciphylaxis and is due for wound debridement 1/28/2020   At this time she has no chest pain/ fever    Low BP , ECHO , trivial pericardial effusion , EF 60 %     Pain controlled       PHYSICAL EXAM:    Vitals:    BP (!) 84/53   Pulse 83   Temp 97.9 °F (36.6 °C) (Oral)   Resp 16   Ht 5' 3\" (1.6 m)   Wt 288 lb 1.6 oz (130.7 kg)   LMP 11/01/1996 (Exact Date)   SpO2 100%   BMI 51.03 kg/m²   I/O last 3 completed shifts: In: 148 [P.O.:120; I.V.:28]  Out: -   No intake/output data recorded. Physical Exam:  Gen:  alert, oriented x 3  Morbid obesity, in pain   PERRL , EOM +  Pallor +, No icterus  JVP not raised   CV: RRR no murmur or rub .   Lungs:B/ L air entry, Normal breath sounds   Abd: soft, bowel sounds + , No organomegaly   Ext: No edema, no cyanosis  Wounds Rt and left hip , lower ant abdominal wall  Left Upper arm AVG , Thrill +    DATA:    CBC with Differential:    Lab Results   Component Value Date    WBC 18.6 02/04/2020    RBC 3.16 02/04/2020    HGB 9.2 02/04/2020    HCT 28.6 02/04/2020     02/04/2020    MCV 90.5 02/04/2020    MCH 29.1 02/04/2020    MCHC 32.1 02/04/2020    RDW 17.5 02/04/2020    NRBC 1 02/02/2020    NRBC 1 02/02/2020    SEGSPCT 77.6 05/12/2013    BANDSPCT 1 02/02/2020    METASPCT 1 01/12/2018    LYMPHOPCT 6.5 02/04/2020    MONOPCT 4.9 02/04/2020    EOSPCT 2.6 01/24/2012    BASOPCT 0.1 02/04/2020    MONOSABS 0.9 02/04/2020    LYMPHSABS 1.2 02/04/2020    EOSABS 0.1 02/04/2020    BASOSABS 0.0 02/04/2020    DIFFTYPE Auto 05/12/2013     CMP:    Lab Results   Component Value Date     02/04/2020    K 3.6 02/04/2020    K 4.0 01/26/2020    CL 92 02/04/2020 CO2 22 02/04/2020    BUN 13 02/04/2020    CREATININE 4.0 02/04/2020    GFRAA 14 02/04/2020    GFRAA 50 05/12/2013    AGRATIO 0.5 01/26/2020    LABGLOM 11 02/04/2020    GLUCOSE 195 02/04/2020    PROT 6.1 01/26/2020    PROT 6.6 03/05/2013    LABALBU 2.0 01/28/2020    CALCIUM 8.8 02/04/2020    BILITOT 0.4 01/26/2020    ALKPHOS 86 01/26/2020    AST 29 01/26/2020    ALT 21 01/26/2020     Magnesium:    Lab Results   Component Value Date    MG 2.30 10/27/2019     Phosphorus:    Lab Results   Component Value Date    PHOS 2.4 01/28/2020     Uric Acid:    Lab Results   Component Value Date    LABURIC 2.8 05/17/2018     PT/INR:    Lab Results   Component Value Date    PROTIME 22.3 01/26/2020    PROTIME 22.8 09/08/2014    INR 1.91 01/26/2020     Troponin:    Lab Results   Component Value Date    TROPONINI 0.20 01/27/2020     U/A:    Lab Results   Component Value Date    NITRITE neg 05/06/2015    COLORU Brown 11/25/2018    PROTEINU >=300 11/25/2018    PHUR 5.5 11/25/2018    LABCAST 1-3 Mixed Cells 07/09/2018    WBCUA see below 11/25/2018    RBCUA see below 11/25/2018    MUCUS 3+ 06/18/2013    YEAST Present 08/20/2018    BACTERIA 3+ 11/25/2018    CLARITYU TURBID 11/25/2018    SPECGRAV >=1.030 11/25/2018    LEUKOCYTESUR SMALL 11/25/2018    UROBILINOGEN 0.2 11/25/2018    BILIRUBINUR SMALL 11/25/2018    BILIRUBINUR neg 05/06/2015    BILIRUBINUR NEGATIVE 11/23/2010    BLOODU TRACE-INTACT 11/25/2018    GLUCOSEU 100 11/25/2018    GLUCOSEU NEGATIVE 11/23/2010           IMPRESSION/RECOMMENDATIONS:      1. ESRD on HD    MWF at [facility]  2. Calciphylaxis   Continue Sodium Thiosulfate   3. Anemia of CKD   Target Hb 9-11   4. Renal osteodystrophy    Ca 8.8, Phos 2.4   5. Atrial Fibrillation  ` Rate controlled   6. Obesity   7. Hypotension   Ongoing .   pain medication maybe contrfibuting    ECHO -  EF 60 % and no pericardial effusion   Cosyntropin test for adrenal insufficency- pending   K - replace po   Prognosis : poor       Thank you for allowing me to participate in the care of this patient. I will continue to follow along. Please call with questions.     Beatrice Barton MD  2/4/2020  [facility]

## 2020-02-04 NOTE — PROGRESS NOTES
Infectious Diseases   Progress Note      Admission Date: 1/26/2020  Hospital Day: Hospital Day: 10   Attending: Clare Dejesus DO  Date of service: 2/4/2020     Chief complaint/ Reason for consult: The patient was seen today for the following:    · Chronic nonhealing abdominal wounds with secondary wound infection with Pseudomonas  · History of extensive debridement of abdominal wound and bilateral hip wounds  · Severe calciphylaxis  · Open bilateral hip decubitus wounds with skin necrosis  · ESRD on hemodialysis    Microbiology:        I have reviewed allavailable micro lab data and cultures    · Blood culture (2/2) - collected on 1/26/2020: Negative  · Abdominal wound culture  - collected on 1/26/2020: Pseudomonas aeruginosa    Pseudomonas aeruginosa (1)     Antibiotic Interpretation PEDRO Status    cefepime Sensitive 8 mcg/mL     ciprofloxacin Sensitive <=1 mcg/mL     gentamicin Sensitive <=4 mcg/mL     meropenem Sensitive <=1 mcg/mL     piperacillin-tazobactam Sensitive <=16 mcg/mL     tobramycin Sensitive <=4 mcg/mL         Antibiotics and immunizations:       Current antibiotics: All antibiotics and their doses were reviewed by me    Recent Abx Admin                   metroNIDAZOLE (FLAGYL) tablet 500 mg (mg) 500 mg Given 02/04/20 1324     500 mg Given  0628     500 mg Given 02/03/20 2251    ciprofloxacin (CIPRO) tablet 500 mg (mg) 500 mg Given 02/04/20 3072                  Immunization History: All immunization history was reviewed by me today.     Immunization History   Administered Date(s) Administered    Influenza Vaccine, unspecified formulation 09/18/2016    Influenza Virus Vaccine 11/01/2010, 10/25/2011, 09/10/2014, 09/18/2015, 10/17/2018, 10/17/2018    Influenza, Intradermal, Preservative free 09/19/2012, 10/16/2013    Influenza, Intradermal, Quadrivalent, Preservative Free 10/25/2017    Influenza, Quadv, IM, PF (6 mo and older Fluzone, Flulaval, Fluarix, and 3 yrs and older Afluria) 09/26/2019    Pneumococcal Conjugate 13-valent ([id number]) 08/25/2016    Pneumococcal Conjugate 7-valent (Prevnar7) 11/01/2010    Pneumococcal Polysaccharide ([id number]) 09/18/2015, 09/27/2018    Tdap (Boostrix, Adacel) 05/31/2011    Zoster Live (Zostavax) 02/19/2015       Known drug allergies: All allergies were reviewed and updated    Allergies   Allergen Reactions    Codeine Nausea Only    Fentanyl Itching    Morphine      CHEST PAIN    Tramadol Hcl Other (See Comments)     Causes pt to have involuntary movements    Hydrocodone-Acetaminophen Itching and Rash    Percocet [Oxycodone-Acetaminophen] Itching    Procardia [Nifedipine] Nausea And Vomiting     Headache  Takes norvasc at home 12/22/15    Vicodin [Hydrocodone-Acetaminophen] Rash       Social history:     Social History:  All social andepidemiologic history was reviewed and updated by me today as needed. · Tobacco use:   reports that she has never smoked. She has never used smokeless tobacco.  · Alcohol use:   reports no history of alcohol use. · Currently lives in: [facility]  ·  reports no history of drug use.          Assessment:     The patient is a 72 y.o. old female who  has a past medical history of Abdominal pain (8/20/2018), Acute on chronic congestive heart failure ([address] (6/26/2014), Asthma, Calcinosis cutis, Calciphylaxis (12/2/2019), Cervical cancer ([address] Chest pain (5/16/2018), CHF (congestive heart failure) ([address] Chronic kidney disease, stage IV (severe) ([facility]), Chronic pain syndrome (3/27/2018), Chronic respiratory failure with hypoxia ([address] CKD (chronic kidney disease) stage 4, GFR 15-29 ml/min ([facility]) (6/30/2017), Colon polyps, COPD (chronic obstructive pulmonary disease) ([address] Degenerative disc disease at L5-S1 level (6/18/2013 ), Diabetes mellitus, insulin dependent (IDDM), uncontrolled ([address] Diabetic polyneuropathy associated with type 2 diabetes mellitus ([address] (3/26/2019), Elevated troponin (9/9/2017), ESRD (end stage renal disease) on dialysis ([facility] (02/14/2018), GERD (gastroesophageal reflux disease), Gout, History of blood transfusion, History of cervical cancer, Hyperlipidemia, Hypertension, Incarcerated ventral hernia, LVH (left ventricular hypertrophy), Morbid obesity ([facility] Mucus plugging of bronchi, Obstructive sleep apnea on CPAP, Pneumonia, Psychiatric problem, Pulmonary embolism ([facility] (02/06/2013), Type 2 diabetes mellitus with diabetic neuropathy, with long-term current use of insulin ([facility] (9/12/2017), Urinary incontinence in female, and Venous stasis of lower extremity. with following problems:    · Chronic nonhealing abdominal wounds with secondary wound infection with Pseudomonas-currently on oral ciprofloxacin  · History of extensive debridement of abdominal wound and bilateral hip wounds-dressing in place  · Severe calciphylaxis-this creates a challenge with wound healing  · Open bilateral hip decubitus wounds with skin necrosis  · ESRD on hemodialysis  · Severe anemia  · History of pulmonary embolism  · History of stroke  · Poorly controlled type 2 diabetes mellitus-needs good glycemic control  · COPD  · Diabetic nephropathy  · GERD  · Obesity Class 3 due to excess calorie intake : Body mass index is 51.03 kg/m². Discussion:      I had switch the patient to oral ciprofloxacin and Flagyl yesterday. Surgical culture had grown Pseudomonas that is fluoroquinolone susceptible. Flagyl is for empiric anaerobic coverage. Blood cultures from admission remain negative    White cell count is 18,600 today    2D echo reviewed ejection fraction is 60%. Plan:     Diagnostic Workup:    · Continue to follow  fever curve, WBC count and blood cultures  · Follow up on liver and renal function    Antimicrobials:    · Will o continue p.o. ciprofloxacin at hemodialysis dose of 500 mg every 24 hour for Pseudomonas coverage  · Continue p.o.  Flagyl 500 mg every 8 hour  · Continue to watch for new fever or diarrhea  · The success of antibiotic will depend upon the bioavailability of antibiotic in the surgical wound which may not be perfused well due to calciphylaxis  · We will see how she does  · Surgical site care  · Pain control  · DVT prophylaxis  · Discussed all above with patient and RN      Drug Monitoring:    · Continue monitoring for antibiotic toxicity as follows: CMP, QTc interval  · Continue to watch for following: new or worsening fever, new hypotension, hives, lip swelling and redness or purulence at vascular access sites. I/v access Management:    · Continue to monitor i.v access sites for erythema, induration, discharge or tenderness. · As always, continue efforts to minimize tubes/lines/drains as clinically appropriate to reduce chances of line associated infections. Patient education and counseling:       · The patient was educated in detail about the side-effects of various antibiotics and things to watch for like new rashes, lip swelling, severe reaction, worsening diarrhea, break through fever etc.  · Discussed patient's condition and what to expect. All of the patient's questions were addressed in a satisfactory manner and patient verbalized understanding all instructions. Level of complexity of visit: High     Diabetes mellitus education and counseling:    Patient was educated in detail about the importance of keeping diabetes under control to improve the cure rate of infection and prevent future infections. Patient was advised to check blood glucose level regularly and to stay compliant with the diabetes medications. Patient was advised to the trim the toe nails carefully, wear shoes or slippers at all times and check both feet everyday before going to bed to look for any cuts, blisters, swelling or redness.  Importance of washing the feet everyday with soap and water and keeping them dry, and seeking prompt medical attention in case of a non-healing wound or ulcer on the feet was also highlighted. Thank you for involving me in the care of your patient. I will continue to follow. If you have anyadditional questions, please do not hesitate to contact me. Subjective: Interval history: Patient was seen and examined at bedside. Interval history was obtained. She appears tired. She is tolerating antibiotics okay. No fever. No diarrhea. REVIEW OF SYSTEMS:    Review of Systems   Constitutional: Positive for fatigue. Negative for chills, diaphoresis and unexpected weight change. HENT: Negative for congestion, ear discharge, ear pain, facial swelling, hearing loss, rhinorrhea and trouble swallowing. Eyes: Negative for photophobia, discharge, redness and visual disturbance. Respiratory: Negative for apnea, cough, choking, chest tightness, shortness of breath and stridor. Cardiovascular: Negative for chest pain and palpitations. Gastrointestinal: Negative for abdominal pain, blood in stool, diarrhea and nausea. Endocrine: Negative for polydipsia, polyphagia and polyuria. Genitourinary: Negative for difficulty urinating, dysuria, frequency, hematuria, menstrual problem and vaginal discharge. Musculoskeletal: Negative for arthralgias, joint swelling, myalgias and neck stiffness. Skin: Negative for color change and rash. Allergic/Immunologic: Negative for immunocompromised state. Neurological: Negative for dizziness, seizures, speech difficulty, light-headedness and headaches. Hematological: Negative for adenopathy. Psychiatric/Behavioral: Negative for agitation, hallucinations and suicidal ideas. Past Medical History: All past medical history reviewed today.     Past Medical History:   Diagnosis Date    Abdominal pain 8/20/2018    Acute on chronic congestive heart failure ([address] 6/26/2014    Asthma     Calcinosis cutis     Calciphylaxis 12/2/2019    Cervical cancer ([address]     Chest pain 5/16/2018    CHF (congestive heart failure) ([address]  Chronic kidney disease, stage IV (severe) ([facility]     Dr Steffen Fermin    Chronic pain syndrome 3/27/2018    Chronic respiratory failure with hypoxia ([facility])     CKD (chronic kidney disease) stage 4, GFR 15-29 ml/min ([facility]) 6/30/2017    Colon polyps     COPD (chronic obstructive pulmonary disease) ([facility])     Degenerative disc disease at L5-S1 level 6/18/2013     CT    Diabetes mellitus, insulin dependent (IDDM), uncontrolled ([address]     Diabetic polyneuropathy associated with type 2 diabetes mellitus ([address] 3/26/2019    Elevated troponin 9/9/2017    ESRD (end stage renal disease) on dialysis ([address] 02/14/2018    [facility]    GERD (gastroesophageal reflux disease)     Gout     History of blood transfusion     History of cervical cancer     Hyperlipidemia     Hypertension     Incarcerated ventral hernia     LVH (left ventricular hypertrophy)     Morbid obesity ([facility])     Mucus plugging of bronchi     Obstructive sleep apnea on CPAP     wears 2L O2 and BIPAP at night    Pneumonia     Psychiatric problem     breakdown long time ago but not current    Pulmonary embolism ([address] 02/06/2013    Type 2 diabetes mellitus with diabetic neuropathy, with long-term current use of insulin ([address] 9/12/2017    Urinary incontinence in female     Venous stasis of lower extremity        Past Surgical History: All past surgical history was reviewed today.     Past Surgical History:   Procedure Laterality Date    ABDOMEN SURGERY N/A 12/26/2019    INCISION AND DRAINAGE OF ABDOMINAL WOUND AND BILATERAL HIP WOUNDS performed by Criss Elizalde MD at [facility] N/A 1/30/2020    INCISION AND DRAINAGE OF ABDOMEN WOUND performed [facility] Criss Elizalde MD at [facility]  1/14    Grace; clean cors    COLONOSCOPY  2010    polyp removed; Mary Arredondo; repeat 5 years    COLONOSCOPY  6/25/13, 11/14    [facility]    COLONOSCOPY N/A 10/10/2019    COLONOSCOPY POLYPECTOMY SNARE/COLD BIOPSY performed by Te Abarca MD at [address] Right 3/28/2019    RIGHT BRACHIO CEPHALIC FISTULA CREATION performed by Ameya Ugarte MD at [address]  09    LVH with global hypokinesis; EF 55-60%    HYSTERECTOMY      INCISION AND DRAINAGE Right 2019    RIGHT ARM INCISION AND DEBRIDEMENT performed by Ameya Ugarte MD at [facility] Bilateral 2020    BILATERAL HIP WOUND INCISION AND DRAINAGE performed by Reva Patten MD at [address] ARTHROSCOPY Right     OTHER SURGICAL HISTORY  2018    LEFT brachial artery axillary vein AV graft     FL OPEN SKULL SUPRATENT EXPLORE      Craniotomy, 3/21/19 patient denies any brain surgery or craniotomy    FL REPAIR INCISIONAL HERNIA,REDUCIBLE N/A 2018    LAPAROSCOPIC VENTRAL HERNIA REPAIR WITH MESH performed by Josefa Valadez MD at [address] Right 2019    RIGHT BRACHIAL ARTERY AXILLARY VEIN GRAFT AND LIGATION OF RIGHT BRACHIO-CEPHALIC FISTULA (52420, [id number]) performed by Ameya Ugarte MD at [place] [address]  10/96    TUNNELED VENOUS PORT PLACEMENT Right 2019    UPPER GASTROINTESTINAL ENDOSCOPY  13    UPPER GASTROINTESTINAL ENDOSCOPY N/A 2019    EGD BIOPSY performed by Suzan Garvin MD at [place] [address]  2016    Incarcerated ventral hernia repair with mesh       Family History: All family history was reviewed today.         Problem Relation Age of Onset    Colon Cancer Father     Diabetes Father     High Blood Pressure Father     Colon Cancer Mother     Diabetes Mother     Colon Cancer Maternal Grandmother     Kidney Cancer Brother     Heart Disease Brother          age 54    High Blood Pressure Brother     High Blood Pressure Sister     High Blood Pressure Maternal Aunt     High Blood Pressure Sister     Heart Disease Sister        Objective: PHYSICAL EXAM:      Vitals:   Vitals:    02/04/20 1140 02/04/20 1156 02/04/20 1306 02/04/20 1353   BP:  (!) 80/55  101/63   Pulse: 90 88  87   Resp:  18     Temp:  98.5 °F (36.9 °C)     TempSrc:  Oral     SpO2:  99% 94%    Weight:       Height:           Physical Exam  Vitals signs and nursing note reviewed. Constitutional:       General: She is not in acute distress. Appearance: She is well-developed. She is not diaphoretic. HENT:      Head: Normocephalic. Right Ear: External ear normal.      Left Ear: External ear normal.      Nose: Nose normal.   Eyes:      General: No scleral icterus. Right eye: No discharge. Left eye: No discharge. Conjunctiva/sclera: Conjunctivae normal.      Pupils: Pupils are equal, round, and reactive to light. Neck:      Musculoskeletal: Normal range of motion and neck supple. Cardiovascular:      Rate and Rhythm: Normal rate and regular rhythm. Heart sounds: No murmur. No friction rub. Pulmonary:      Effort: Pulmonary effort is normal.      Breath sounds: No stridor. No wheezing or rales. Chest:      Chest wall: No tenderness. Abdominal:      Palpations: Abdomen is soft. There is no mass. Tenderness: There is no abdominal tenderness. There is no guarding or rebound. Musculoskeletal:         General: No tenderness. Lymphadenopathy:      Cervical: No cervical adenopathy. Skin:     General: Skin is warm and dry. Findings: No erythema or rash. Comments: Ongoing deep ulceration noted at the lower abdominal area and bilateral hip areas   Neurological:      Mental Status: She is alert and oriented to person, place, and time. Motor: No abnormal muscle tone. Psychiatric:         Judgment: Judgment normal.           Lines: All vascular access sites are healthy with no local erythema, discharge or tenderness. Intake and output:    I/O last 3 completed shifts: In: 148 [P.O.:120; I.V.:28]  Out: -     Lab Data:    All available labs and old records have been reviewed by me. CBC:  Recent Labs     02/02/20  0344 02/03/20  0448 02/04/20  0625   WBC 14.1* 17.0* 18.6*   RBC 3.14* 3.29* 3.16*   HGB 9.0* 9.5* 9.2*   HCT 27.7* 29.4* 28.6*    154 164   MCV 88.1 89.4 90.5   MCH 28.8 28.9 29.1   MCHC 32.6 32.3 32.1   RDW 17.3* 17.7* 17.5*   NRBC 1*  1*  --   --    BANDSPCT 1  --   --         BMP:  Recent Labs     02/02/20  0344 02/03/20  0525 02/04/20  0625    141 142   K 3.2* 4.1 3.6   CL 95* 98* 92*   CO2 27 25 22   BUN 18 23* 13   CREATININE 4.6* 6.0* 4.0*   CALCIUM 8.6 8.5 8.8   GLUCOSE 78 135* 195*        Hepatic Function Panel:   Lab Results   Component Value Date    ALKPHOS 86 01/26/2020    ALT 21 01/26/2020    AST 29 01/26/2020    PROT 6.1 01/26/2020    PROT 6.6 03/05/2013    BILITOT 0.4 01/26/2020    BILIDIR 0.2 10/16/2014    IBILI 0.1 10/16/2014    LABALBU 2.0 01/28/2020       CPK:   Lab Results   Component Value Date    CKTOTAL 129 07/11/2017     ESR:   Lab Results   Component Value Date    SEDRATE >130 (H) 12/26/2019     CRP: No results found for: CRP        Imaging: All pertinent images and reports for the current visit were reviewed by me during this visit. CT ABDOMEN PELVIS WO CONTRAST Additional Contrast? None   Final Result   1. Improved skin thickening and infiltration of the subcutaneous fat in the   patient's pannus. There is continued breakdown of the skin within the folds   of the pannus with subcutaneous gas. No focal collection identified. 2. Otherwise unremarkable CT of the abdomen and pelvis. Nonobstructive left   renal calculus. XR CHEST PORTABLE   Final Result   Stable chronic cardiomegaly without acute airspace consolidation or CHF. Medications: All current and past medications were reviewed.      docusate sodium  100 mg Oral BID    ciprofloxacin  500 mg Oral Daily    metroNIDAZOLE  500 mg Oral 3 times per day    lactobacillus  1 capsule Oral BID WC    sodium chloride flush  10 mL Intravenous 2 times per day    pantoprazole  40 mg Oral QAM AC    nebivolol  2.5 mg Oral 2 times per day on Mon Wed Fri    nebivolol  5 mg Oral 2 times per day on Sun Tue Thu Sat    sevelamer  1.6 g Oral TID WC    budesonide  500 mcg Nebulization BID    apixaban  2.5 mg Oral BID    escitalopram  10 mg Oral Daily    ferrous sulfate  325 mg Oral TID WC    formoterol  20 mcg Nebulization BID    pregabalin  50 mg Oral TID    insulin lispro  0-12 Units Subcutaneous TID WC    insulin lispro  0-6 Units Subcutaneous Nightly    collagenase   Topical Daily    sodium thiosulfate  25 g Intravenous Q MWF    lidocaine   Topical Once    silver nitrate applicators  10 each Topical Once    sodium chloride flush  10 mL Intravenous 2 times per day        norepinephrine Stopped (02/03/20 1430)    dextrose         HYDROmorphone, bisacodyl, bisacodyl, oxyCODONE **OR** oxyCODONE, sodium chloride flush, polyethylene glycol, sodium chloride flush, ondansetron, calcium carbonate, lip balm petroleum free, ipratropium-albuterol, traZODone, glucose, dextrose, glucagon (rDNA), dextrose, perflutren lipid microspheres, sodium chloride flush, magnesium hydroxide, acetaminophen      Problem list:       Patient Active Problem List   Diagnosis Code    Moderate asthma with exacerbation J45. 901    Colon polyps K63.5    Microalbuminuria R80.9    SOB (shortness of breath) R06.02    Venous stasis of lower extremity I87.8    Obstructive sleep apnea on CPAP G47.33, Z99.89    Chronic diastolic CHF (congestive heart failure) ([facility] I50.32    Renal osteodystrophy N25.0    Poorly controlled type 2 diabetes mellitus (HCC) E11.65    History of pulmonary embolism Z86.711    Anemia of chronic kidney failure ([facility]) N18.9, D63.1    Primary osteoarthritis of both knees M17.0    Essential hypertension, malignant I10    Restrictive lung disease J98.4    Gastroesophageal reflux disease K21.9    LVH (left ventricular hypertrophy) I51.7    Dyspnea and respiratory abnormalities R06.00, R06.89    Acute on chronic respiratory failure with hypoxia and hypercapnia (HCC) J96.21, J96.22    Obesity hypoventilation syndrome (HCC) E66.2    Morbid obesity due to excess calories ([facility]) E66.01    COPD, moderate ([facility]) J44.9    Gout M10.9    Warfarin-induced coagulopathy ([facility]) D68.32, T45.515A    Chronic hypoxemic respiratory failure ([facility]) J96.11    Constipation K59.00    DM (diabetes mellitus), secondary, uncontrolled, w/neurologic complic ([facility]) X20.70, E91.28    Elevated troponin R79.89    Diabetes education, encounter for Z71.89    Primary osteoarthritis of left hip M16.12    ESRD (end stage renal disease) on dialysis ([facility]) N18.6, Z99.2    Chronic pain syndrome G89.4    Chest pain R07.9    Recurrent ventral hernia K43.2    Hypoglycemia E16.2    ESRD on hemodialysis ([facility]) N18.6, Z99.2    Diabetic polyneuropathy associated with type 2 diabetes mellitus ([address] E11.42    Infection of arteriovenous graft for hemodialysis ([facility] T82. 7XXA    Essential hypertension I10    Dialysis patient ([facility] Z99.2    Postoperative pain P59.80    Other complication of arteriovenous dialysis fistula ([facility]) T82.898A    Weight loss counseling, encounter for Z71.3    Respiratory failure ([facility] J96.90    Chronic obstructive pulmonary disease ([facility]) J44.9    Pulmonary embolism ([facility]) I26.99    Chronic pain of left lower extremity M79.605, G89.29    Severe anemia D64.9    Cerebrovascular accident (CVA) ([facility] I63.9    HTN (hypertension), benign I10    Vision loss of right eye H54.61    Open wound T14. 8XXA    Calciphylaxis E83.59    Cellulitis L03.90    Abdominal wall cellulitis L03.311    Opiate dependence, continuous ([facility]) F11.20    History of arterial ischemic stroke Z86.73    Anaerobic cellulitis ([facility]) A48.0    Chronic abdominal wound infection, sequela S31.109S, L08.9    Chronic wound infection of abdomen S31.109A, L08.9    Pseudomonas infection A49.8    Postoperative wound infection of left hip T81.49XA       Please note that this chart was generated using Dragon dictation software. Although every effort was made to ensure the accuracy of this automated transcription, some errors in transcription may have occurred inadvertently. If you may need any clarification, please do not hesitate to contact me through EPIC or at the phone number provided below with my electronic signature. Any pictures or media included in this note were obtained after taking informed verbal consent from the patient and with their approval to include those in the patient's medical record.     Elder Alvarado MD, MPH  2/4/2020 , 2:55 PM   [facility] Infectious Disease   Office: [phone]  Fax: [phone]  Tuesday AM clinic:   [facility] [facility]  Thursday AM [id number] [place], [facility]

## 2020-02-04 NOTE — PROGRESS NOTES
Hospitalist Progress Note      PCP: Haja Issa, JURGEN - CNP    Date of Admission: 2020    SUBJECTIVE:   Tolerating dialysis; on Levo for BP control during H/D; no new concerns            OBJECTIVE:     Vitals    TEMPERATURE:  Current - Temp: 98.3 °F (36.8 °C); Max - Temp  Av.4 °F (36.9 °C)  Min: 97.7 °F (36.5 °C)  Max: 99.3 °F (37.4 °C)  RESPIRATIONS RANGE: Resp  Avg: 15.6  Min: 11  Max: 22  PULSE RANGE: Pulse  Av  Min: 79  Max: 96  BLOOD PRESSURE RANGE:  Systolic (12WZY), XBA:498 , Min:87 , LFB:129   ; Diastolic (45VME), LRJ:30, Min:40, Max:90    PULSE OXIMETRY RANGE: SpO2  Av.3 %  Min: 92 %  Max: 100 %  24HR INTAKE/OUTPUT:      Intake/Output Summary (Last 24 hours) at 2/3/2020 191  Last data filed at 2/3/2020 1400  Gross per 24 hour   Intake 539 ml   Output --   Net 539 ml       Exam:    General appearance: Well, no apparent distress, appears stated age and cooperative. HEENT Normal cephalic, atraumatic without obvious deformity. Pupils equal, round, and reactive to light. Extra ocular muscles intact. Conjunctivae/corneas clear. Neck: Supple, No jugular venous distention/bruits. Trachea midline without thyromegaly or adenopathy with full range of motion. Lungs: diminished in bases. Heart: Regular rate and rhythm with Normal S1/S2 without  murmurs, rubs or gallops, point of maximum impulse non-displaced  Abdomen: Soft, non-tender or non-distended without rigidity or guarding and positive bowel sounds all four quadrants. Extremities: No clubbing, cyanosis, or edema bilaterally. Full range of motion without deformity and normal gait intact. Skin: Skin color, texture, turgor normal. No rashes or lesions. Neurologic: Alert and oriented X 3,  neurovascularly intact with sensory/motor intact upper extremities/lower extremities, bilaterally. Cranial nerves:II-XII intact, grossly non-focal.  Mental status: Alert, oriented, thought content appropriate.         ASSESSMENT AND PLAN Hypotension - unclear etiology, requiring Levo with H/D; trivial pericardial effusion; cosyntropin test pending per Nephro; change pain regimen to PO    Chest pain  - atypical , seen by cards   - negative stress test 2 years ago and recent surgery where she did fine from cardiac stress standpoint.   - no additional work up needed.      Sepsis  - due to wound infections  - norepinephrine still required during h/d     ESRD  - Nephro following; TTE showed EF 60%; trivial pericardial effusion  - dialysis MWF     Elevated trop  -due to ESRD  -non cardiac      Chronic abdominal wall wound (pannus)  - Likely due to calciphylaxis. - admitted December and + cx: diphtheroids and bacteroids  - completed antibiotics  - missed outpt appointment last week   - CT abd/pelvis (ordered by ER): results above of chronic wound in pannus with continued breakdown of skin within folds with subcutaneous gas. - consult General Surgery and ID  for CT findings fo gas in subcut tissue. Dr. Parish Casey has debrided the wounds on several days, they are improving and much less foul smelling.   - consult wound care ( was to follow with Dr. Parish Casey weekly on discharge)  - Wound cx growing pseudomonas aeruginosa.  On iv zosyn per ID     DM, type 2  - med-dose SSI  - POCT ac/hs  - cont lantus 25 units subcu  - cont prandial coverage      Acute on chronic anemia  - Hemoglobin stable at 9.0 this morning   - No active bleeding      DVT Prophylaxis:   Diet: Dietary Nutrition Supplements: Renal Oral Supplement  DIET CARDIAC; Carb Control: 5 carb choices (75 gms)/meal  Code Status: Full Code     PT/OT Eval Status: eval and treat      Dispo - [facility] [address]

## 2020-02-04 NOTE — CARE COORDINATION
Discharge Planning:    Per Jaron Eilas ([phone]) of [address] denied patient. Requesting a pier to [employer] with patient's attending physician. This SW spoke with Abdirahman Dean, patient's attending provider. Dr. Garcia Dear ([phone]) requesting that the pier to pier phone conference be held between 1:00 pm and 4:00 pm on today 2/04/2020 or 1:00 pm to 4:00 pm on tomorrow 2/04/2020. This SW contact Jaron Elias of [facility] via the phone with this information.      Electronically signed by HOLLIS Lozoya on 2/4/2020 at 11:02 AM

## 2020-02-05 ENCOUNTER — APPOINTMENT (OUTPATIENT)
Dept: CT IMAGING | Age: 65
DRG: 570 | End: 2020-02-05
Payer: MEDICARE

## 2020-02-05 VITALS
HEART RATE: 68 BPM | OXYGEN SATURATION: 96 % | RESPIRATION RATE: 18 BRPM | TEMPERATURE: 97.8 F | WEIGHT: 293 LBS | SYSTOLIC BLOOD PRESSURE: 104 MMHG | BODY MASS INDEX: 51.91 KG/M2 | DIASTOLIC BLOOD PRESSURE: 68 MMHG | HEIGHT: 63 IN

## 2020-02-05 LAB
ANION GAP SERPL CALCULATED.3IONS-SCNC: 26 MMOL/L (ref 3–16)
BASOPHILS ABSOLUTE: 0.1 K/UL (ref 0–0.2)
BASOPHILS RELATIVE PERCENT: 0.4 %
BUN BLDV-MCNC: 19 MG/DL (ref 7–20)
CALCIUM SERPL-MCNC: 9.2 MG/DL (ref 8.3–10.6)
CHLORIDE BLD-SCNC: 93 MMOL/L (ref 99–110)
CO2: 23 MMOL/L (ref 21–32)
CORTISOL 30 MIN: 35.4 UG/DL
CORTISOL 60 MIN: 39.1 UG/DL
CORTISOL BASE: 24.5 UG/DL
CREAT SERPL-MCNC: 5.3 MG/DL (ref 0.6–1.2)
EOSINOPHILS ABSOLUTE: 0.1 K/UL (ref 0–0.6)
EOSINOPHILS RELATIVE PERCENT: 0.4 %
GFR AFRICAN AMERICAN: 10
GFR NON-AFRICAN AMERICAN: 8
GLUCOSE BLD-MCNC: 119 MG/DL (ref 70–99)
GLUCOSE BLD-MCNC: 142 MG/DL (ref 70–99)
GLUCOSE BLD-MCNC: 170 MG/DL (ref 70–99)
HCT VFR BLD CALC: 27.9 % (ref 36–48)
HEMOGLOBIN: 9.1 G/DL (ref 12–16)
LYMPHOCYTES ABSOLUTE: 1.2 K/UL (ref 1–5.1)
LYMPHOCYTES RELATIVE PERCENT: 6.2 %
MCH RBC QN AUTO: 29.1 PG (ref 26–34)
MCHC RBC AUTO-ENTMCNC: 32.4 G/DL (ref 31–36)
MCV RBC AUTO: 89.8 FL (ref 80–100)
MONOCYTES ABSOLUTE: 1 K/UL (ref 0–1.3)
MONOCYTES RELATIVE PERCENT: 5.4 %
NEUTROPHILS ABSOLUTE: 16.7 K/UL (ref 1.7–7.7)
NEUTROPHILS RELATIVE PERCENT: 87.6 %
PDW BLD-RTO: 17.5 % (ref 12.4–15.4)
PERFORMED ON: ABNORMAL
PERFORMED ON: ABNORMAL
PLATELET # BLD: 164 K/UL (ref 135–450)
PMV BLD AUTO: 10.6 FL (ref 5–10.5)
POTASSIUM SERPL-SCNC: 3.8 MMOL/L (ref 3.5–5.1)
RBC # BLD: 3.11 M/UL (ref 4–5.2)
SODIUM BLD-SCNC: 142 MMOL/L (ref 136–145)
WBC # BLD: 19.1 K/UL (ref 4–11)

## 2020-02-05 PROCEDURE — 87070 CULTURE OTHR SPECIMN AEROBIC: CPT

## 2020-02-05 PROCEDURE — 6370000000 HC RX 637 (ALT 250 FOR IP): Performed by: INTERNAL MEDICINE

## 2020-02-05 PROCEDURE — 87040 BLOOD CULTURE FOR BACTERIA: CPT

## 2020-02-05 PROCEDURE — 6360000004 HC RX CONTRAST MEDICATION: Performed by: INTERNAL MEDICINE

## 2020-02-05 PROCEDURE — P9047 ALBUMIN (HUMAN), 25%, 50ML: HCPCS | Performed by: INTERNAL MEDICINE

## 2020-02-05 PROCEDURE — 90935 HEMODIALYSIS ONE EVALUATION: CPT

## 2020-02-05 PROCEDURE — 87186 SC STD MICRODIL/AGAR DIL: CPT

## 2020-02-05 PROCEDURE — 87077 CULTURE AEROBIC IDENTIFY: CPT

## 2020-02-05 PROCEDURE — P9047 ALBUMIN (HUMAN), 25%, 50ML: HCPCS

## 2020-02-05 PROCEDURE — 99233 SBSQ HOSP IP/OBS HIGH 50: CPT | Performed by: INTERNAL MEDICINE

## 2020-02-05 PROCEDURE — 6360000002 HC RX W HCPCS

## 2020-02-05 PROCEDURE — 71260 CT THORAX DX C+: CPT

## 2020-02-05 PROCEDURE — 6360000002 HC RX W HCPCS: Performed by: INTERNAL MEDICINE

## 2020-02-05 PROCEDURE — 6370000000 HC RX 637 (ALT 250 FOR IP): Performed by: SURGERY

## 2020-02-05 PROCEDURE — 87076 CULTURE ANAEROBE IDENT EACH: CPT

## 2020-02-05 PROCEDURE — 80048 BASIC METABOLIC PNL TOTAL CA: CPT

## 2020-02-05 PROCEDURE — 2580000003 HC RX 258: Performed by: SURGERY

## 2020-02-05 PROCEDURE — 87075 CULTR BACTERIA EXCEPT BLOOD: CPT

## 2020-02-05 PROCEDURE — 94760 N-INVAS EAR/PLS OXIMETRY 1: CPT

## 2020-02-05 PROCEDURE — 87205 SMEAR GRAM STAIN: CPT

## 2020-02-05 PROCEDURE — 85025 COMPLETE CBC W/AUTO DIFF WBC: CPT

## 2020-02-05 RX ORDER — ALBUMIN (HUMAN) 12.5 G/50ML
12.5 SOLUTION INTRAVENOUS PRN
Status: DISCONTINUED | OUTPATIENT
Start: 2020-02-05 | End: 2020-02-05 | Stop reason: HOSPADM

## 2020-02-05 RX ORDER — METRONIDAZOLE 500 MG/1
500 TABLET ORAL EVERY 8 HOURS SCHEDULED
Qty: 30 TABLET | Refills: 0
Start: 2020-02-05 | End: 2020-02-15

## 2020-02-05 RX ORDER — MIDODRINE HYDROCHLORIDE 5 MG/1
TABLET ORAL
Status: DISCONTINUED
Start: 2020-02-05 | End: 2020-02-05 | Stop reason: HOSPADM

## 2020-02-05 RX ORDER — LACTOBACILLUS RHAMNOSUS GG 10B CELL
1 CAPSULE ORAL 2 TIMES DAILY WITH MEALS
Qty: 30 CAPSULE | Refills: 0
Start: 2020-02-05

## 2020-02-05 RX ORDER — CIPROFLOXACIN 500 MG/1
500 TABLET, FILM COATED ORAL
Qty: 10 TABLET | Refills: 0
Start: 2020-02-06 | End: 2020-02-16

## 2020-02-05 RX ORDER — LINEZOLID 600 MG/1
600 TABLET, FILM COATED ORAL 2 TIMES DAILY
Status: DISCONTINUED | OUTPATIENT
Start: 2020-02-05 | End: 2020-02-05 | Stop reason: HOSPADM

## 2020-02-05 RX ORDER — MIDODRINE HYDROCHLORIDE 5 MG/1
10 TABLET ORAL ONCE
Status: COMPLETED | OUTPATIENT
Start: 2020-02-05 | End: 2020-02-05

## 2020-02-05 RX ORDER — POLYETHYLENE GLYCOL 3350 17 G/17G
17 POWDER, FOR SOLUTION ORAL DAILY PRN
Qty: 527 G | Refills: 1
Start: 2020-02-05 | End: 2020-03-06

## 2020-02-05 RX ORDER — SEVELAMER CARBONATE FOR ORAL SUSPENSION 800 MG/1
1.6 POWDER, FOR SUSPENSION ORAL
Qty: 270 EACH | Refills: 0
Start: 2020-02-05

## 2020-02-05 RX ORDER — FORMOTEROL FUMARATE 20 UG/2ML
SOLUTION RESPIRATORY (INHALATION)
Status: DISPENSED
Start: 2020-02-05 | End: 2020-02-05

## 2020-02-05 RX ORDER — BUDESONIDE 0.5 MG/2ML
INHALANT ORAL
Status: DISPENSED
Start: 2020-02-05 | End: 2020-02-05

## 2020-02-05 RX ORDER — ALBUMIN (HUMAN) 12.5 G/50ML
SOLUTION INTRAVENOUS
Status: COMPLETED
Start: 2020-02-05 | End: 2020-02-05

## 2020-02-05 RX ADMIN — METRONIDAZOLE 500 MG: 250 TABLET, FILM COATED ORAL at 14:35

## 2020-02-05 RX ADMIN — Medication 10 ML: at 12:29

## 2020-02-05 RX ADMIN — APIXABAN 2.5 MG: 5 TABLET, FILM COATED ORAL at 14:36

## 2020-02-05 RX ADMIN — ALBUMIN (HUMAN): 0.25 INJECTION, SOLUTION INTRAVENOUS at 13:46

## 2020-02-05 RX ADMIN — OXYCODONE 10 MG: 5 TABLET ORAL at 09:04

## 2020-02-05 RX ADMIN — FERROUS SULFATE TAB 325 MG (65 MG ELEMENTAL FE) 325 MG: 325 (65 FE) TAB at 14:35

## 2020-02-05 RX ADMIN — CIPROFLOXACIN HYDROCHLORIDE 500 MG: 500 TABLET, FILM COATED ORAL at 14:36

## 2020-02-05 RX ADMIN — MIDODRINE HYDROCHLORIDE 10 MG: 5 TABLET ORAL at 08:48

## 2020-02-05 RX ADMIN — Medication 10 ML: at 14:39

## 2020-02-05 RX ADMIN — Medication 1 CAPSULE: at 14:35

## 2020-02-05 RX ADMIN — PANTOPRAZOLE SODIUM 40 MG: 40 TABLET, DELAYED RELEASE ORAL at 06:44

## 2020-02-05 RX ADMIN — METRONIDAZOLE 500 MG: 250 TABLET, FILM COATED ORAL at 05:11

## 2020-02-05 RX ADMIN — SEVELAMER CARBONATE 1.6 G: 800 POWDER, FOR SUSPENSION ORAL at 14:39

## 2020-02-05 RX ADMIN — ESCITALOPRAM OXALATE 10 MG: 10 TABLET ORAL at 14:34

## 2020-02-05 RX ADMIN — HYDROMORPHONE HYDROCHLORIDE 0.5 MG: 1 INJECTION, SOLUTION INTRAMUSCULAR; INTRAVENOUS; SUBCUTANEOUS at 12:29

## 2020-02-05 RX ADMIN — PREGABALIN 50 MG: 25 CAPSULE ORAL at 14:34

## 2020-02-05 RX ADMIN — OXYCODONE 10 MG: 5 TABLET ORAL at 01:14

## 2020-02-05 RX ADMIN — COLLAGENASE SANTYL: 250 OINTMENT TOPICAL at 14:39

## 2020-02-05 RX ADMIN — LINEZOLID 600 MG: 600 TABLET, FILM COATED ORAL at 14:36

## 2020-02-05 RX ADMIN — IOPAMIDOL 75 ML: 755 INJECTION, SOLUTION INTRAVENOUS at 16:35

## 2020-02-05 RX ADMIN — ALBUMIN (HUMAN) 12.5 G: 0.25 INJECTION, SOLUTION INTRAVENOUS at 08:47

## 2020-02-05 ASSESSMENT — ENCOUNTER SYMPTOMS
COLOR CHANGE: 0
PHOTOPHOBIA: 0
STRIDOR: 0
APNEA: 0
COUGH: 0
ABDOMINAL PAIN: 0
FACIAL SWELLING: 0
SHORTNESS OF BREATH: 0
CHEST TIGHTNESS: 0
CHOKING: 0
BLOOD IN STOOL: 0
EYE DISCHARGE: 0
TROUBLE SWALLOWING: 0
RHINORRHEA: 0
DIARRHEA: 0
NAUSEA: 0
EYE REDNESS: 0

## 2020-02-05 ASSESSMENT — PAIN SCALES - GENERAL
PAINLEVEL_OUTOF10: 2
PAINLEVEL_OUTOF10: 7
PAINLEVEL_OUTOF10: 8
PAINLEVEL_OUTOF10: 0
PAINLEVEL_OUTOF10: 8
PAINLEVEL_OUTOF10: 7
PAINLEVEL_OUTOF10: 0
PAINLEVEL_OUTOF10: 0
PAINLEVEL_OUTOF10: 8

## 2020-02-05 ASSESSMENT — PAIN DESCRIPTION - FREQUENCY
FREQUENCY: CONTINUOUS
FREQUENCY: CONTINUOUS

## 2020-02-05 ASSESSMENT — PAIN DESCRIPTION - PAIN TYPE
TYPE: ACUTE PAIN
TYPE: ACUTE PAIN

## 2020-02-05 ASSESSMENT — PAIN DESCRIPTION - DESCRIPTORS
DESCRIPTORS: ACHING
DESCRIPTORS: ACHING

## 2020-02-05 ASSESSMENT — PAIN DESCRIPTION - ONSET
ONSET: ON-GOING
ONSET: ON-GOING

## 2020-02-05 ASSESSMENT — PAIN DESCRIPTION - LOCATION
LOCATION: GROIN
LOCATION: GROIN

## 2020-02-05 ASSESSMENT — PAIN DESCRIPTION - ORIENTATION
ORIENTATION: RIGHT;LEFT;MID
ORIENTATION: RIGHT;LEFT;MID

## 2020-02-05 NOTE — PROGRESS NOTES
Occupational/PhysicalTherapy  Samreen Case  Pt currently DARRICK for HD. OT/PT to attempt evaluations when medically appropriate.    Thanks,  Derian Mancera, OTR/L [id number]  [address], [place] [id number]

## 2020-02-05 NOTE — PROGRESS NOTES
Occupational/PhysicalTherapy  [facility]  PT/OT evaluations attempted this date. Pt reporting 9/10 pain and had just been given pain medications. Educated on purpose of evals. Pt declined stating, \"I am in too much pain today, please come back tomorrow. \" Offered repositioning, arlene care. Pt continued to decline. OT/PT to attempt tomorrow if medically appropriate. Thanks.   Obed Barbosa, OTR/L [id number] [id number]

## 2020-02-05 NOTE — CARE COORDINATION
Discharge Planning:    Patient discharge held for today so that additional lab work can be completed. Physician aware that patient will lose pre-cert with insurance provider and bed with [facility] also informed patient's RN    SW will continue to provide discharge planning and support.     Electronically signed by HOLLIS Michele on 2/5/2020 at 3:23 PM

## 2020-02-05 NOTE — CARE COORDINATION
Discharge Planning:    Per Carlito Villaseñor at [facility]:  P2P process completed and patient was approved for care at [facility] at [facility]. Informed patient's RN-Renetta ([id number]).     Electronically signed by HOLLIS Gonzalez on 2/5/2020 at 11:59 AM

## 2020-02-05 NOTE — FLOWSHEET NOTE
Treatment time: 4  hours   Net Uf: 1600 ml     Pre weight: 135.5 kg   Post weight: 134.1 kg  EDW: tbd kg     Access used: jeancarlos avf  Access function: good      Medications or blood products given: midodrine, albumin    Regular outpatient schedule: mwf     Summary of response to treatment: tolerated tx well, PATIENT C/O PAIN LAST HOUR OF TREATMENT. NURSE CALL FOR PAIN MEDICATION X6 CALLS. NURSE STATED SHE WAS BUSY AND UNABLE TO BRING PAIN MEDICATION. Copy of dialysis treatment record placed in chart, to be scanned into EMR.     02/05/20 0740 02/05/20 1208   Treatment   Time On 0755  --    Time Off  --  1155   Treatment Goal 2000  --    Observations & Evaluations   Level of Consciousness 0 0   Oriented X 4 4   Vital Signs   BP (!) 134/30 (!) 152/62   Temp 97.7 °F (36.5 °C) 97.5 °F (36.4 °C)   Pulse 89 85   Resp 20 20   Weight 298 lb 11.6 oz (135.5 kg) 295 lb 10.2 oz (134.1 kg)   Weight Method Bed scale Bed scale   Percent Weight Change -0.09 -1.03   Pain Assessment   Pain Assessment 0-10 0-10   Pain Level 0 0   Treatment Initiation   Dialyze Hours 4  --    Hemodialysis Fistula/Graft Arteriovenous vein graft Left Arm   No Placement Date or Time found. Access Type: Arteriovenous vein graft  Orientation: Left  Access Location: Arm   Site Assessment Clean;Dry; Intact Clean;Dry; Intact   Thrill Present Present   Bruit Present Present   Dressing Intervention  --  New   Dressing Status  --  Clean;Dry; Intact   Post-Hemodialysis Assessment   Post-Treatment Procedures  --  Blood returned   Machine Disinfection Process  --  Acid/Vinegar Clean;Heat Disinfect; Exterior Machine Disinfection   Rinseback Volume (ml)  --  400 ml   Total Liters Processed (l/min)  --  85.6 l/min   Dialyzer Clearance  --  Lightly streaked   Duration of Treatment (minutes)  --  240 minutes   NET Removed (ml)  --  1600 ml   Tolerated Treatment  --  Good   Physician Notified?  --  Yes

## 2020-02-05 NOTE — CARE COORDINATION
Received voicemail from [address] [place] [address] that she would like to come out to assess the patient on Monday, if the patient is still admitted.  [phone]    [id number] BSN, [facility], [address] [place] [address]

## 2020-02-05 NOTE — PROGRESS NOTES
Nutrition Assessment (Low Risk)    Type and Reason for Visit: Reassess    Nutrition Recommendations:   No new nutrition related recommendations at this time     Nutrition Assessment:  Pt's nutrition status is stable as evidenced by pt report of consuming at least 50% of meals. Pt states she likes Nepro and is drinking 100% BID. Pt will remain at risk for nutrition compromise d/t presence of wounds; but will follow at low risk as nutrition status is stable and continue to monitor for changes in status.      Malnutrition Assessment:  · Malnutrition Status: No malnutrition    Nutrition Risk Level   Risk Level: Low    Nutrition Diagnosis:   · Problem: Increased nutrient needs  · Etiology: Increased demand for energy/nutrients    Signs and symptoms: Presence of wounds    Nutrition Intervention:  Food and/or Delivery: Continue current diet, Continue current ONS  Nutrition Education/Counseling/Coordination of Care:  Continued Inpatient Monitoring, Education Not Indicated      Electronically signed by Yanni Foster RD, LD on 2/5/20 at 1:42 PM    Contact Number: [phone]

## 2020-02-05 NOTE — PROGRESS NOTES
02/05/20 0027   NIV Type   NIV Started/Stopped On   Equipment Type v60   Mode Bilevel   Mask Type Full face mask   Mask Size Medium   Settings/Measurements   IPAP 12 cmH20   CPAP/EPAP 6 cmH2O   Rate Ordered 10   Resp 19   FiO2  28 %   I Time/ I Time % 1.2 s   Vt Exhaled 620 mL   Minute Volume 9 Liters   Mask Leak (lpm) 0 lpm   Comfort Level Good   Using Accessory Muscles No   SpO2 100

## 2020-02-05 NOTE — PROGRESS NOTES
Nephrology Progress  Note  [phone]  [phone]   [email or web address].        Reason for Consult:  ESRD on HD     HISTORY OF PRESENT ILLNESS:      The patient is a 72 y.o. female with significant past medical history Morbid obesity , T2DM , HTN , ESRD on HD  of  who presents with chest pain   She is undergoing treatment for calciphylaxis and is due for wound debridement 1/28/2020   At this time she has no chest pain/ fever    Low BP , ECHO , trivial pericardial effusion , EF 60 % , Cosyntropin - normal     Seen on HD       PHYSICAL EXAM:    Vitals:    BP (!) 134/30   Pulse 89   Temp 97.7 °F (36.5 °C)   Resp 20   Ht 5' 3\" (1.6 m)   Wt 298 lb 11.6 oz (135.5 kg)   LMP 11/01/1996 (Exact Date)   SpO2 100%   BMI 52.92 kg/m²   No intake/output data recorded. No intake/output data recorded. Physical Exam:  Gen:  alert, oriented x 3  Morbid obesity, in pain   PERRL , EOM +  Pallor +, No icterus  JVP not raised   CV: RRR no murmur or rub .   Lungs:B/ L air entry, Normal breath sounds   Abd: soft, bowel sounds + , No organomegaly   Ext: No edema, no cyanosis  Wounds Rt and left hip , lower ant abdominal wall  Left Upper arm AVG , Thrill +    DATA:    CBC with Differential:    Lab Results   Component Value Date    WBC 19.1 02/05/2020    RBC 3.11 02/05/2020    HGB 9.1 02/05/2020    HCT 27.9 02/05/2020     02/05/2020    MCV 89.8 02/05/2020    MCH 29.1 02/05/2020    MCHC 32.4 02/05/2020    RDW 17.5 02/05/2020    NRBC 1 02/02/2020    NRBC 1 02/02/2020    SEGSPCT 77.6 05/12/2013    BANDSPCT 1 02/02/2020    METASPCT 1 01/12/2018    LYMPHOPCT 6.2 02/05/2020    MONOPCT 5.4 02/05/2020    EOSPCT 2.6 01/24/2012    BASOPCT 0.4 02/05/2020    MONOSABS 1.0 02/05/2020    LYMPHSABS 1.2 02/05/2020    EOSABS 0.1 02/05/2020    BASOSABS 0.1 02/05/2020    DIFFTYPE Auto 05/12/2013     CMP:    Lab Results   Component Value Date     02/05/2020    K 3.8 02/05/2020    K 4.0 01/26/2020    CL 93 02/05/2020    CO2 23 02/05/2020 patient. I will continue to follow along. Please call with questions.     Rosa Zamora MD  2/5/2020  [facility]

## 2020-02-05 NOTE — DISCHARGE SUMMARY
Hospital Medicine Discharge Summary    Patient: Tracey Callaway     Gender: female  : 1955   Age: 72 y.o. MRN: [id number]    Admitting Physician: Litzy Sahu MD  Discharge Physician: Kassie Soto DO       Admit Date: 2020   Discharge Date:  2020    Disposition:  [facility] at [facility]    Discharge Diagnoses: Active Hospital Problems    Diagnosis Date Noted   Pennye Fails [I32.754]     Pseudomonas infection [A49.8]     Postoperative wound infection of left hip [T81.49XA]     Chronic wound infection of abdomen [S31.109A, L08.9] 2020    Chronic abdominal wound infection, sequela [S31.109S, L08.9] 2020    Chest pain [R07.9] 2018    ESRD (end stage renal disease) on dialysis ([facility] [address] [N18.6, Z99.2] 2018    Elevated troponin [R79.89] 2017    Chronic diastolic CHF (congestive heart failure) ([facility]) [I50.32] 2013    SOB (shortness of breath) [R06.02] 2012       Follow-up appointments:  one week    Outpatient to do list: n/a    Condition at Discharge:  Stable    Hospital Course:   72 y.o. female with PMHx of abdominal pain, chronic CHF, asthma, calciphylaxis, cervical cancer, chest pain, chronic kidney disease, Stage IV (HD- MWF), chronic respiratory failure with hypoxia, COPD, DM, polyneuropathy,HLD, HTN, PE, venous stasis who presented to [facility] with above extensive history and recent admission in January for chronic wound who has been experiencing SOB associated with some reproducible chest pain, \"so bad I could hardly move. \"  She is on home oxygen now since her discharge and is at 4L with sats 98%. She denies dizziness or lightheadedness, nausea or vomiting or headaches with her chest pain. She denies any activity or exertion that caused the pain, although it appears musculoskeletal to me.     She endorses decreased appetite.   She states she has not been getting up at the NH either and states when she was here she was working with PT/OT and was at least getting out of bed.      She states she is very unhappy with the care she is receiving at her [address], and  states he found her in her own stool where she laid for 30 mins after calling asking for help to get a bedpan.       Given her history of CHF, HTN, HLD and DM and cardiac history, we will bring her in under Observation and have Cardiology see her. She follows with Dr. Kirstin Stewart. Pt admitted for atypical CP; Cardio/Nephro/G/S/WND Care consulted; Nephro maintained H/D; ECHO repeated and stable, no further cardio w/u; Bilat hip and ABD ulcers debrided and ID consulted for secondary wnd infection with Pseudomonas; followed Cxs and adjusted AtB Ulcers likely d/t calciphylaxis; Pulm consulted for hypotension; meds adjusted; consyntropin unremarkable; eventually resolved; maintained on chronic O2 and BIPAP Tx;  ID switched Pt to PO AtBs and WBC increased so a CT Chest/ABD/Pelvis was performed 2/5/2020 and was unremarkable; Pt was felt stable for D/C to [facility] with continued PO ATB and ID consultation and ongoing wound care at [facility]; Prognosis is poor. Pt did not appear to have sepsis present on admission based on review of records           Discharge Medications:   Current Discharge Medication List        Current Discharge Medication List        Current Discharge Medication List      CONTINUE these medications which have NOT CHANGED    Details   !! HYDROmorphone (DILAUDID) 2 MG tablet Take 2 mg by mouth every 12 hours. lidocaine viscous hcl (XYLOCAINE) 2 % SOLN solution       ferrous sulfate 325 (65 Fe) MG tablet Take 325 mg by mouth 3 times daily (with meals)      collagenase 250 UNIT/GM ointment Apply topically daily Apply topically daily.  Left breat and abdominal area      insulin glargine (LANTUS) 100 UNIT/ML injection pen Inject 20 Units into the skin nightly  Qty: 5 pen, Refills: 3      HUMALOG KWIKPEN 100 UNIT/ML SOPN Inject 0-12 Units into the skin 3 times daily (before meals)  Qty: 15 mL, Refills: 5    Associated Diagnoses: Diabetic polyneuropathy associated with type 2 diabetes mellitus (HCC)      apixaban (ELIQUIS) 2.5 MG TABS tablet Take 1 tablet by mouth 2 times daily  Qty: 60 tablet, Refills: 1      sevelamer (RENVELA) 800 MG tablet Take 2 tablets by mouth 3 times daily (with meals)  Qty: 90 tablet, Refills: 3      pregabalin (LYRICA) 50 MG capsule Take 1 capsule by mouth 3 times daily for 180 days. Qty: 90 capsule, Refills: 5    Associated Diagnoses: Diabetic polyneuropathy associated with type 2 diabetes mellitus (HCC)      nebivolol (BYSTOLIC) 2.5 MG tablet Take 2.5 mg by mouth BID on M, W, F. Take 5 mg BID Sun, Tues, Thurs, Sat  Qty: 120 tablet, Refills: 5      formoterol (PERFOROMIST) 20 MCG/2ML nebulizer solution Take 2 mLs by nebulization 2 times daily  Qty: 120 mL, Refills: 5      traZODone (DESYREL) 100 MG tablet Take 100 mg by mouth nightly as needed for Sleep      escitalopram (LEXAPRO) 10 MG tablet Take 1 tablet by mouth daily  Qty: 30 tablet, Refills: 5    Associated Diagnoses: Anxiety and depression      lidocaine-prilocaine (EMLA) 2.5-2.5 % cream Apply topically as needed.   Qty: 3 Tube, Refills: 5      omeprazole (PRILOSEC) 40 MG delayed release capsule TAKE 1 CAPSULE BY MOUTH  DAILY  Qty: 90 capsule, Refills: 3      rosuvastatin (CRESTOR) 10 MG tablet Take 1 tablet by mouth daily Take 1 tablet by mouth  daily  Qty: 90 tablet, Refills: 3    Associated Diagnoses: Diabetic polyneuropathy associated with type 2 diabetes mellitus (HCC)      fluticasone (FLONASE) 50 MCG/ACT nasal spray USE TWO SPRAY(S) IN EACH NOSTRIL ONCE DAILY  Qty: 1 Bottle, Refills: 5    Comments: Please consider 90 day supplies to promote better adherence      allopurinol (ZYLOPRIM) 100 MG tablet Take 1 tablet by mouth daily  Qty: 90 tablet, Refills: 3      promethazine (PHENERGAN) 25 MG tablet Take 1 tablet by mouth every 8 hours as needed for Nausea  Qty: 30 tablet, Refills: 2      calcium acetate (PHOSLO) 667 MG capsule Take 2 capsules by mouth 3 times daily (with meals)  Qty: 60 capsule, Refills: 3      albuterol sulfate HFA (PROAIR HFA) 108 (90 Base) MCG/ACT inhaler Inhale 2 puffs into the lungs every 6 hours as needed for Wheezing  Qty: 1 Inhaler, Refills: 11      fluticasone-salmeterol (ADVAIR HFA) 230-21 MCG/ACT inhaler Inhale 1 puff into the lungs 2 times daily  Qty: 1 Inhaler, Refills: 11      TRULICITY 0.34 UF/6.3VZ SOPN INJECT ONE  SYRINGE SUBCUTANEOUSLY ONCE A WEEK  Qty: 15 pen, Refills: 3    Comments: Please consider 90 day supplies to promote better adherence      nitroGLYCERIN (NITROSTAT) 0.4 MG SL tablet DISSOLVE 1 TABLET UNDER THE TONGUE EVERY 5 MINUTES AS  NEEDED ; MAXIMUM OF 3  TABLETS IN 15 MINUTES  Qty: 75 tablet, Refills: 1      OXYGEN Inhale 2 L into the lungs daily as needed At night prn      !! HYDROmorphone (DILAUDID) 2 MG tablet Take 2 mg by mouth every 6 hours as needed for Pain.       budesonide (PULMICORT) 0.5 MG/2ML nebulizer suspension Take 2 mLs by nebulization 2 times daily  Qty: 60 ampule, Refills: 3      Diapers & Supplies MISC 1 each by Does not apply route 2 times daily  Qty: 100 each, Refills: 5      Incontinence Supply Disposable (DEPEND ADJUSTABLE UNDERWEAR LG) MISC 1 each by Does not apply route as needed (for urine incompetence)  Qty: 60 each, Refills: 5      !! UNIFINE PENTIPS 31G X 5 MM MISC USE WITH INSULIN PENS FOUR TIMES DAILY  Qty: 400 each, Refills: 3      !! RELION PEN NEEDLE 31G/8MM 31G X 8 MM MISC USE  THREE TIMES DAILY AS DIRECTED  Qty: 100 each, Refills: 5    Comments: Please consider 90 day supplies to promote better adherence      ONE TOUCH ULTRA TEST strip USE TO TEST 3-4 TIMES DAILY DUE TO FLUCTUATING SUGAR  Qty: 100 strip, Refills: 5    Comments: Please consider 90 day supplies to promote better adherence      ASSURE COMFORT LANCETS 30G MISC USE TO TEST BLOOD GLUCOSE THREE TIMES DAILY  Qty: 300 each, Refills: 3      Alcohol Swabs (ALCOHOL PREP) 70 % PADS USE TO TEST BLOOD GLUCOSE THREE TIMES DAILY  Qty: 300 each, Refills: 3      Blood Glucose Monitoring Suppl (ACCU-CHEK KENNETH SMARTVIEW) w/Device KIT USE TO TEST BLOOD GLUCOSE  Qty: 1 kit, Refills: 0      BiPAP Machine MISC by Does not apply route nightly      Spacer/Aero-Holding Chambers VENANCIO 1 Device by Does not apply route daily as needed  Qty: 1 Device, Refills: 0       !! - Potential duplicate medications found. Please discuss with provider. Current Discharge Medication List                           Note that greater than 30 minutes was spent in preparing discharge papers, discussing discharge with patient, medication review, etc.       Signed:    Verónica Escobar DO   2/5/2020      Thank you JURGEN Santiago CNP for the opportunity to be involved in this patient's care.  If you have any questions or concerns please feel free to contact me at [facility]

## 2020-02-05 NOTE — DISCHARGE INSTR - COC
Continuity of Care Form    Patient Name: Manuel Noonan   :  1955  MRN:  [id number]    Admit date:  2020  Discharge date:  20    Code Status Order: Full Code   Advance Directives:   Advance Care Flowsheet Documentation     Date/Time Healthcare Directive Type of Healthcare Directive Copy in [address] Agent's Name Healthcare Agent's Phone Number    20 0957  Yes, patient has an advance directive for healthcare treatment  --  No, copy requested from family  --  --  --    20 5786  Yes, patient has an advance directive for healthcare treatment  Durable power of  for health care  No, copy requested from family  Healthcare power of   Madeline Regan  [phone]          Admitting Physician:  Troy Winslow MD  PCP: JURGEN Kaiser CNP    Discharging Nurse: Wilfrid Schilder, [facility] Unit/Room#: 6IA-2578/6557-98  Discharging Unit Phone Number: [phone]    Emergency Contact:   Extended Emergency Contact Information  Primary Emergency Contact: Anoop Briones  Address: [address] Phone: [phone]  Relation: Spouse  Secondary Emergency Contact: [address] [place] [address] Road Phone: [phone]  Relation: Brother/Sister    Past Surgical History:  Past Surgical History:   Procedure Laterality Date    ABDOMEN SURGERY N/A 2019    INCISION AND DRAINAGE OF ABDOMINAL WOUND AND BILATERAL HIP WOUNDS performed by Chang Mccarthy MD at [facility] N/A 2020    INCISION AND DRAINAGE OF ABDOMEN WOUND performed by [facility]ccarthy [facility] at [facility]      Grace; clean cors    COLONOSCOPY  2010    [facility]; [facility]    COLONOSCOPY  13,     [place]    COLONOSCOPY N/A 10/10/2019    COLONOSCOPY POLYPECTOMY SNARE/COLD BIOPSY performed by Taryn Mullisn MD at [address] [place] [address] DIALYSIS FISTULA CREATION Right 3/28/2019    RIGHT BRACHIO CEPHALIC FISTULA CREATION performed by Vega Rudd MD at [address] [place] [address] Road  2/21/09    LVH with global hypokinesis; EF 55-60%    HYSTERECTOMY      INCISION AND DRAINAGE Right 8/22/2019    RIGHT ARM INCISION AND DEBRIDEMENT performed by [facility]ge[facility] Rudd MD at [facility] 1/30/2020    BILATERAL HIP WOUND INCISION AND DRAINAGE performed by Jim Medrano MD at [address] ARTHROSCOPY Right 1999    OTHER SURGICAL HISTORY  02/22/2018    LEFT brachial artery axillary vein AV graft     ME OPEN SKULL SUPRATENT EXPLORE      Craniotomy, 3/21/19 patient denies any brain surgery or craniotomy    ME REPAIR INCISIONAL HERNIA,REDUCIBLE N/A 11/20/2018    LAPAROSCOPIC VENTRAL HERNIA REPAIR WITH MESH performed by Kishor Velazquez MD at [address] Right 7/18/2019    RIGHT BRACHIAL ARTERY AXILLARY VEIN GRAFT AND LIGATION OF RIGHT BRACHIO-CEPHALIC FISTULA (89873, [address]) performed by Vega Rudd MD at [place] [address]  10/96    TUNNELED VENOUS PORT PLACEMENT Right 11/2019    UPPER GASTROINTESTINAL ENDOSCOPY  6/25/13    UPPER GASTROINTESTINAL ENDOSCOPY N/A 11/21/2019    EGD BIOPSY performed by Joaquim King MD at [place] [address]  12/24/2016    Incarcerated ventral hernia repair with mesh       Immunization History:   Immunization History   Administered Date(s) Administered    Influenza Vaccine, unspecified formulation 09/18/2016    Influenza Virus Vaccine 11/01/2010, 10/25/2011, 09/10/2014, 09/18/2015, 10/17/2018, 10/17/2018    Influenza, Intradermal, Preservative free 09/19/2012, 10/16/2013    Influenza, Intradermal, Quadrivalent, Preservative Free 10/25/2017    Influenza, Quadv, IM, PF (6 mo and older Fluzone, Flulaval, Fluarix, and 3 yrs and older Afluria) 09/26/2019    Pneumococcal Conjugate 13-valent (Ephriam Hefty) 08/25/2016    Pneumococcal Conjugate 7-valent (Prevnar7) 11/01/2010    Pneumococcal Polysaccharide ([id number]) 09/18/2015, 09/27/2018    Tdap (Boostrix, Adacel) 05/31/2011    Zoster Live (Zostavax) 02/19/2015       Active Problems:  Patient Active Problem List   Diagnosis Code    Moderate asthma with exacerbation J45. 901    Colon polyps K63.5    Microalbuminuria R80.9    SOB (shortness of breath) R06.02    Venous stasis of lower extremity I87.8    Obstructive sleep apnea on CPAP G47.33, Z99.89    Chronic diastolic CHF (congestive heart failure) ([facility]) I50.32    Renal osteodystrophy N25.0    Poorly controlled type 2 diabetes mellitus (HCC) E11.65    History of pulmonary embolism Z86.711    Anemia of chronic kidney failure ([facility]) N18.9, D63.1    Primary osteoarthritis of both knees M17.0    Essential hypertension, malignant I10    Restrictive lung disease J98.4    Gastroesophageal reflux disease K21.9    LVH (left ventricular hypertrophy) I51.7    Dyspnea and respiratory abnormalities R06.00, R06.89    Acute on chronic respiratory failure with hypoxia and hypercapnia ([facility]) J96.21, J96.22    Obesity hypoventilation syndrome ([facility]) E66.2    Morbid obesity due to excess calories ([facility]) E66.01    COPD, moderate ([facility]) J44.9    Gout M10.9    Warfarin-induced coagulopathy ([facility]) D68.32, T45.515A    Chronic hypoxemic respiratory failure ([facility]) J96.11    Constipation K59.00    DM (diabetes mellitus), secondary, uncontrolled, w/neurologic complic ([facility]) F54.54, K22.44    Elevated troponin R79.89    Diabetes education, encounter for Z71.89    Primary osteoarthritis of left hip M16.12    ESRD (end stage renal disease) on dialysis ([facility]) N18.6, Z99.2    Chronic pain syndrome G89.4    Chest pain R07.9    Recurrent ventral hernia K43.2    Hypoglycemia E16.2    ESRD on hemodialysis ([facility]) N18.6, Z99.2    Diabetic polyneuropathy associated with type 2 diabetes mellitus ([facility] E11.42    Infection of arteriovenous graft for hemodialysis ([facility] T82. 7XXA    Essential hypertension I10    Dialysis patient ([facility] Z99.2    Postoperative pain A03.02    Other complication of arteriovenous dialysis fistula ([facility]) T82.898A    Weight loss counseling, encounter for Z71.3    Respiratory failure ([facility] J96.90    Chronic obstructive pulmonary disease ([facility]) J44.9    Pulmonary embolism ([facility]) I26.99    Chronic pain of left lower extremity M79.605, G89.29    Severe anemia D64.9    Cerebrovascular accident (CVA) ([facility] I63.9    HTN (hypertension), benign I10    Vision loss of right eye H54.61    Open wound T14. 8XXA    Calciphylaxis E83.59    Cellulitis L03.90    Abdominal wall cellulitis L03.311    Opiate dependence, continuous ([facility]) F11.20    History of arterial ischemic stroke Z86.73    Anaerobic cellulitis ([facility]) A48.0    Chronic abdominal wound infection, sequela S31.109S, L08.9    Wound infection T14. 8XXA, L08.9    Pseudomonas infection A49.8    Postoperative wound infection of left hip T81.49XA       Isolation/Infection:   Isolation          No Isolation        Patient Infection Status     Infection Onset Added Last Indicated Last Indicated By Review Planned Expiration Resolved Resolved By    None active    Resolved    C-diff Rule Out  01/26/20 01/26/20 Clostridium difficile toxin/antigen (Ordered)   01/31/20 Courtney Douglas, RN          Nurse Assessment:  Last Vital Signs: BP (!) 134/30   Pulse 89   Temp 97.7 °F (36.5 °C)   Resp 20   Ht 5' 3\" (1.6 m)   Wt 298 lb 11.6 oz (135.5 kg)   LMP 11/01/1996 (Exact Date)   SpO2 100%   BMI 52.92 kg/m²     Last documented pain score (0-10 scale): Pain Level: 7  Last Weight:   Wt Readings from Last 1 Encounters:   02/05/20 298 lb 11.6 oz (135.5 kg)     Mental Status:  oriented and alert    IV Access:  - Central Venous Catheter  - site  right, insertion date: de-accessed 2/5/20  - Dialysis Catheter  - site  right, insertion date: 3/2019    Nursing Mobility/ADLs:  Walking Odor None 2/5/2020  2:05 AM   Number of days: 34       Wound 01/01/20 Leg Inner;Left 11cm x 2cm (Active)   Dressing Status Changed 2/5/2020  2:05 AM   Dressing Changed Changed/New 2/5/2020  2:05 AM   Dressing/Treatment Foam 2/5/2020  2:05 AM   Wound Cleansed Rinsed/Irrigated with saline 2/5/2020  2:05 AM   Dressing Change Due 01/10/20 1/28/2020  4:00 PM   Wound Assessment Red 2/5/2020  2:05 AM   Number of days: 34       Wound 01/01/20 Right;Upper 2cm x 3cm (Active)   Dressing Status Changed 2/5/2020  2:05 AM   Dressing Changed Changed/New 2/5/2020  2:05 AM   Dressing/Treatment Foam 2/5/2020  2:05 AM   Wound Cleansed Rinsed/Irrigated with saline 2/5/2020  2:05 AM   Wound Assessment Red 1/29/2020  4:00 PM   Number of days: 34       Wound (Active)   Number of days:         Elimination:  Continence:   · Bowel: No  · Bladder: No  Urinary Catheter: None   Colostomy/Ileostomy/Ileal Conduit: No       Date of Last BM: 2/4/20  No intake or output data in the 24 hours ending 02/05/20 1117  No intake/output data recorded. Safety Concerns: At Risk for Falls and Aspiration Risk    Impairments/Disabilities:      None    Nutrition Therapy:  Current Nutrition Therapy:   - Oral Diet:  Carb Control 4 carbs/meal (1800kcals/day) and Cardiac    Routes of Feeding: Oral  Liquids: No Restrictions  Daily Fluid Restriction: no  Last Modified Barium Swallow with Video (Video Swallowing Test): not done    Treatments at the Time of Hospital Discharge:   Respiratory Treatments:   Oxygen Therapy:  is on oxygen at 3 L/min per nasal cannula.   Ventilator:    - No ventilator support    Rehab Therapies: Physical Therapy and Occupational Therapy  Weight Bearing Status/Restrictions: No weight bearing restirctions  Other Medical Equipment (for information only, NOT a DME order):  hospital bed  Other Treatments:     Patient's personal belongings (please select all that are sent with patient):  None    RN SIGNATURE:  Electronically signed by Cameron Cinthiail Saloni on 2/5/20 at 12:10 PM    CASE MANAGEMENT/SOCIAL WORK SECTION    Inpatient Status Date:01/28/2020    Readmission Risk Assessment Score:  Readmission Risk              Risk of Unplanned Readmission:        82           Discharging to Facility/ Agency   · Name: [address] [place] [address]  · Phone:[phone]  · Fax:[phone]      / signature: Electronically signed by HOLLIS Fragoso on 2/5/2020 at 4:46 PM    PHYSICIAN SECTION    Prognosis: Poor    Condition at Discharge: Stable    Rehab Potential (if transferring to Rehab): Poor    Recommended Labs or Other Treatments After Discharge:     Physician Certification: I certify the above information and transfer of Sherrell Hay  is necessary for the continuing treatment of the diagnosis listed and that she requires LTAC for greater 30 days.      Update Admission H&P: No change in H&P    PHYSICIAN SIGNATURE:  Electronically signed by Colin Coyle DO on 2/5/20 at 5:07 PM

## 2020-02-05 NOTE — CARE COORDINATION
Discharge Plan:      Patient discharged TO FACILITY: [facility]. PHONE: [phone]: [phone]  SW/IBAN Planner faxed, [address] and [address]   Narcotic Prescriptions faxed were: not applicable  RN: Ninfa Neves will call report      Medical Transport with: [address] [facility] [phone]  Confirmation #:[id number]   time: 7:00 PM  Family advised of discharge?: CHILANGO spoke with patient's daughter Cecilia Dillard who was at patient's bedside. HENS Submitted?:  N/A  All discharge needs met per case management.     HOLLIS Love, [address] [place] [address]  [phone]    Electronically signed by HOLLIS Harris on 2/5/2020 at 4:42 PM

## 2020-02-05 NOTE — PROGRESS NOTES
Pneumococcal Conjugate 7-valent (Prevnar7) 11/01/2010    Pneumococcal Polysaccharide ([id number]) 09/18/2015, 09/27/2018    Tdap (Boostrix, Adacel) 05/31/2011    Zoster Live (Zostavax) 02/19/2015       Known drug allergies: All allergies were reviewed and updated    Allergies   Allergen Reactions    Codeine Nausea Only    Fentanyl Itching    Morphine      CHEST PAIN    Tramadol Hcl Other (See Comments)     Causes pt to have involuntary movements    Hydrocodone-Acetaminophen Itching and Rash    Percocet [Oxycodone-Acetaminophen] Itching    Procardia [Nifedipine] Nausea And Vomiting     Headache  Takes norvasc at home 12/22/15    Vicodin [Hydrocodone-Acetaminophen] Rash       Social history:     Social History:  All social andepidemiologic history was reviewed and updated by me today as needed. · Tobacco use:   reports that she has never smoked. She has never used smokeless tobacco.  · Alcohol use:   reports no history of alcohol use. · Currently lives in: [address] [place] [address]  ·  reports no history of drug use.          Assessment:     The patient is a 72 y.o. old female who  has a past medical history of Abdominal pain (8/20/2018), Acute on chronic congestive heart failure ([address] (6/26/2014), Asthma, Calcinosis cutis, Calciphylaxis (12/2/2019), Cervical cancer ([address] Chest pain (5/16/2018), CHF (congestive heart failure) ([address] Chronic kidney disease, stage IV (severe) ([facility]), Chronic pain syndrome (3/27/2018), Chronic respiratory failure with hypoxia ([address] CKD (chronic kidney disease) stage 4, GFR 15-29 ml/min ([facility]) (6/30/2017), Colon polyps, COPD (chronic obstructive pulmonary disease) ([address] Degenerative disc disease at L5-S1 level (6/18/2013 ), Diabetes mellitus, insulin dependent (IDDM), uncontrolled ([address] Diabetic polyneuropathy associated with type 2 diabetes mellitus ([address] (3/26/2019), Elevated troponin (9/9/2017), ESRD (end stage renal disease) on dialysis ([address] (02/14/2018), GERD (gastroesophageal reflux disease), Gout, History of blood transfusion, History of cervical cancer, Hyperlipidemia, Hypertension, Incarcerated ventral hernia, LVH (left ventricular hypertrophy), Morbid obesity ([address] Mucus plugging of bronchi, Obstructive sleep apnea on CPAP, Pneumonia, Psychiatric problem, Pulmonary embolism ([facility] (02/06/2013), Type 2 diabetes mellitus with diabetic neuropathy, with long-term current use of insulin ([facility] (9/12/2017), Urinary incontinence in female, and Venous stasis of lower extremity. with following problems:    · Chronic nonhealing abdominal wounds with secondary wound infection with Pseudomonas-covering oral ciprofloxacin  · History of extensive debridement of abdominal wound and bilateral hip wounds-dressing in place  · Severe calciphylaxis-this creates a challenge with wound healing  · Open bilateral hip decubitus wounds with skin necrosis  · ESRD on hemodialysis  · Severe anemia  · History of pulmonary embolism  · History of stroke  · Poorly controlled type 2 diabetes mellitus-diabetes counseling done  · COPD  · Diabetic nephropathy  · GERD  · Obesity Class 3 due to excess calorie intake : Body mass index is 52.92 kg/m². Discussion:      The patient is on oral ciprofloxacin and Flagyl. Seems to be tolerating antibiotics okay. Hip wound culture reviewed again and positive only for Pseudomonas that is covered with ciprofloxacin. Flagyl is for empiric anaerobic coverage. White cell count seems to be going up and is 19,100 which is concerning. Patient not on steroids. Plan:     Diagnostic Workup:    · Will order 2 sets of blood cultures today  · Continue to follow  fever curve, WBC count and blood cultures  · I ordered and reviewed CT scan of chest, abdomen and pelvis with IV contrast to rule out any pneumonia or underlying abscess given rising white cell count.   It did not show any acute intrapulmonary or intra-abdominal process  · Follow up on liver and renal function    Antimicrobials:    · Will continue p.o. ciprofloxacin 500 mg every 24 hour  · Continue p.o. Flagyl 500 mg every 8 hour  · White cell count going up. ·  Platelet count was [phone] today we will add empiric p.o. linezolid 600 mg every 12 hour  · We will follow-up on the culture results and clinical progress and above work-up and will make further recommendations accordingly  · If the patient gets discharged to a long-Pending sale to [facility] hospital, recommend continued ciprofloxacin and Flagyl for 10 days and involving the ID specialist at [facility] in the patient's care and further management  · Fall precautions  · Maintain good glycemic control  · Surgical site care  · Pain control  · DVT prophylaxis  · Discussed all above with patient and RN  · Discussed with Dr. Praful Mauricio in detail    Drug Monitoring:    · Continue monitoring for antibiotic toxicity as follows: CBC, CMP, QTc interval  · Continue to watch for following: new or worsening fever, new hypotension, hives, lip swelling and redness or purulence at vascular access sites. I/v access Management:    · Continue to monitor i.v access sites for erythema, induration, discharge or tenderness. · As always, continue efforts to minimize tubes/lines/drains as clinically appropriate to reduce chances of line associated infections. Patient education and counseling:       · The patient was educated in detail about the side-effects of various antibiotics and things to watch for like new rashes, lip swelling, severe reaction, worsening diarrhea, break through fever etc.  · Discussed patient's condition and what to expect. All of the patient's questions were addressed in a satisfactory manner and patient verbalized understanding all instructions. Level of complexity of visit: High     Weight loss counseling:    Extensive weight loss counseling was done.  It is important to set a realistic weight loss goal. First goal should be to avoid [place]    GERD (gastroesophageal reflux disease)     Gout     History of blood transfusion     History of cervical cancer     Hyperlipidemia     Hypertension     Incarcerated ventral hernia     LVH (left ventricular hypertrophy)     Morbid obesity (HCC)     Mucus plugging of bronchi     Obstructive sleep apnea on CPAP     wears 2L O2 and BIPAP at night    Pneumonia     Psychiatric problem     breakdown long time ago but not current    Pulmonary embolism ([facility] 02/06/2013    Type 2 diabetes mellitus with diabetic neuropathy, with long-term current use of insulin ([facility] 9/12/2017    Urinary incontinence in female     Venous stasis of lower extremity        Past Surgical History: All past surgical history was reviewed today.     Past Surgical History:   Procedure Laterality Date    ABDOMEN SURGERY N/A 12/26/2019    INCISION AND DRAINAGE OF ABDOMINAL WOUND AND BILATERAL HIP WOUNDS performed by Mai Lara MD at [facility] N/A 1/30/2020    INCISION AND DRAINAGE OF ABDOMEN WOUND performed by Mai Lara MD at [facility]  1/14    Grace; clean cors    COLONOSCOPY  2010    polyp removed; Mitali Garcia; repeat 5 years    COLONOSCOPY  6/25/13, 11/14    Mitali Garcia    COLONOSCOPY N/A 10/10/2019    COLONOSCOPY POLYPECTOMY SNARE/COLD BIOPSY performed by Stephany Yoder MD at [address] Right 3/28/2019    RIGHT BRACHIO CEPHALIC FISTULA CREATION performed by Alondra Jaramillo MD at [address] [facility]  2/21/09    LVH with global hypokinesis; EF 55-60%    HYSTERECTOMY      INCISION AND DRAINAGE Right 8/22/2019    RIGHT ARM INCISION AND DEBRIDEMENT performed by Alondra Jaramillo MD at [facility] 1/30/2020    BILATERAL HIP WOUND INCISION AND DRAINAGE performed by Mai Lara MD at [address] ARTHROSCOPY Right 1999    OTHER SURGICAL HISTORY  02/22/2018    LEFT brachial artery axillary vein AV graft     AL OPEN SKULL SUPRATENT EXPLORE      Craniotomy, 3/21/19 patient denies any brain surgery or craniotomy    AL REPAIR INCISIONAL HERNIA,REDUCIBLE N/A 2018    LAPAROSCOPIC VENTRAL HERNIA REPAIR WITH MESH performed by Solitario Gonzalez MD at [address] Right 2019    RIGHT BRACHIAL ARTERY AXILLARY VEIN GRAFT AND LIGATION OF RIGHT BRACHIO-CEPHALIC FISTULA ([address], [id number]) performed by Virginia Newman MD at [place] [address]  10/96    TUNNELED VENOUS PORT PLACEMENT Right 2019    UPPER GASTROINTESTINAL ENDOSCOPY  13    UPPER GASTROINTESTINAL ENDOSCOPY N/A 2019    EGD BIOPSY performed by Rl Rock MD at [place] [id number]  2016    Incarcerated ventral hernia repair with mesh       Family History: All family history was reviewed today. Problem Relation Age of Onset    Colon Cancer Father     Diabetes Father     High Blood Pressure Father     Colon Cancer Mother     Diabetes Mother     Colon Cancer Maternal Grandmother     Kidney Cancer Brother     Heart Disease Brother          age 54    High Blood Pressure Brother     High Blood Pressure Sister     High Blood Pressure Maternal Aunt     High Blood Pressure Sister     Heart Disease Sister        Objective:       PHYSICAL EXAM:      Vitals:   Vitals:    20 0519 20 0600 20 0730 20 0740   BP:   99/66 (!) 134/30   Pulse:  89 88 89   Resp:   16 20   Temp:   97.4 °F (36.3 °C) 97.7 °F (36.5 °C)   TempSrc:   Axillary    SpO2:   100%    Weight: 299 lb (135.6 kg)   298 lb 11.6 oz (135.5 kg)   Height:           Physical Exam  Vitals signs and nursing note reviewed. Constitutional:       General: She is not in acute distress. Appearance: She is well-developed. She is not diaphoretic. HENT:      Head: Normocephalic.       Right Ear: External ear normal.      Left Ear: External ear normal.      Nose: Nose normal. Eyes:      General: No scleral icterus. Right eye: No discharge. Left eye: No discharge. Conjunctiva/sclera: Conjunctivae normal.      Pupils: Pupils are equal, round, and reactive to light. Neck:      Musculoskeletal: Normal range of motion and neck supple. Cardiovascular:      Rate and Rhythm: Normal rate and regular rhythm. Heart sounds: No murmur. No friction rub. Pulmonary:      Effort: Pulmonary effort is normal.      Breath sounds: No stridor. No wheezing or rales. Chest:      Chest wall: No tenderness. Abdominal:      Palpations: Abdomen is soft. There is no mass. Tenderness: There is no abdominal tenderness. There is no guarding or rebound. Musculoskeletal:         General: No tenderness. Comments: Abdominal and bilateral hip wounds noted again   Lymphadenopathy:      Cervical: No cervical adenopathy. Skin:     General: Skin is warm and dry. Findings: No erythema or rash. Neurological:      Mental Status: She is alert and oriented to person, place, and time. Motor: No abnormal muscle tone. Psychiatric:         Judgment: Judgment normal.           Lines: All vascular access sites are healthy with no local erythema, discharge or tenderness. Intake and output:    No intake/output data recorded. Lab Data:   All available labs and old records have been reviewed by me.     CBC:  Recent Labs     02/03/20  0448 02/04/20  0625 02/05/20  0615   WBC 17.0* 18.6* 19.1*   RBC 3.29* 3.16* 3.11*   HGB 9.5* 9.2* 9.1*   HCT 29.4* 28.6* 27.9*    164 164   MCV 89.4 90.5 89.8   MCH 28.9 29.1 29.1   MCHC 32.3 32.1 32.4   RDW 17.7* 17.5* 17.5*        BMP:  Recent Labs     02/03/20  0525 02/04/20  0625 02/05/20  0615    142 142   K 4.1 3.6 3.8   CL 98* 92* 93*   CO2 25 22 23   BUN 23* 13 19   CREATININE 6.0* 4.0* 5.3*   CALCIUM 8.5 8.8 9.2   GLUCOSE 135* 195* 170*        Hepatic Function Panel:   Lab Results   Component Value Date    ALKPHOS 86 01/26/2020    ALT 21 01/26/2020    AST 29 01/26/2020    PROT 6.1 01/26/2020    PROT 6.6 03/05/2013    BILITOT 0.4 01/26/2020    BILIDIR 0.2 10/16/2014    IBILI 0.1 10/16/2014    LABALBU 2.0 01/28/2020       CPK:   Lab Results   Component Value Date    CKTOTAL 129 07/11/2017     ESR:   Lab Results   Component Value Date    SEDRATE >130 (H) 12/26/2019     CRP: No results found for: CRP        Imaging: All pertinent images and reports for the current visit were reviewed by me during this visit. CT ABDOMEN PELVIS WO CONTRAST Additional Contrast? None   Final Result   1. Improved skin thickening and infiltration of the subcutaneous fat in the   patient's pannus. There is continued breakdown of the skin within the folds   of the pannus with subcutaneous gas. No focal collection identified. 2. Otherwise unremarkable CT of the abdomen and pelvis. Nonobstructive left   renal calculus. XR CHEST PORTABLE   Final Result   Stable chronic cardiomegaly without acute airspace consolidation or CHF. Medications: All current and past medications were reviewed.      budesonide        formoterol        midodrine        albumin human        docusate sodium  100 mg Oral BID    ciprofloxacin  500 mg Oral Daily    metroNIDAZOLE  500 mg Oral 3 times per day    lactobacillus  1 capsule Oral BID WC    sodium chloride flush  10 mL Intravenous 2 times per day    pantoprazole  40 mg Oral QAM AC    nebivolol  2.5 mg Oral 2 times per day on Mon Wed Fri    nebivolol  5 mg Oral 2 times per day on Sun Tue Thu Sat    sevelamer  1.6 g Oral TID WC    budesonide  500 mcg Nebulization BID    apixaban  2.5 mg Oral BID    escitalopram  10 mg Oral Daily    ferrous sulfate  325 mg Oral TID WC    formoterol  20 mcg Nebulization BID    pregabalin  50 mg Oral TID    insulin lispro  0-12 Units Subcutaneous TID WC    insulin lispro  0-6 Units Subcutaneous Nightly    collagenase   Topical Daily    sodium Diabetes education, encounter for Z71.89    Primary osteoarthritis of left hip M16.12    ESRD (end stage renal disease) on dialysis ([facility]) N18.6, Z99.2    Chronic pain syndrome G89.4    Chest pain R07.9    Recurrent ventral hernia K43.2    Hypoglycemia E16.2    ESRD on hemodialysis ([facility]) N18.6, Z99.2    Diabetic polyneuropathy associated with type 2 diabetes mellitus ([facility] E11.42    Infection of arteriovenous graft for hemodialysis ([facility] T82. 7XXA    Essential hypertension I10    Dialysis patient ([facility] Z99.2    Postoperative pain O66.28    Other complication of arteriovenous dialysis fistula ([facility]) T82.898A    Weight loss counseling, encounter for Z71.3    Respiratory failure ([facility] J96.90    Chronic obstructive pulmonary disease ([facility]) J44.9    Pulmonary embolism ([facility]) I26.99    Chronic pain of left lower extremity M79.605, G89.29    Severe anemia D64.9    Cerebrovascular accident (CVA) ([facility] I63.9    HTN (hypertension), benign I10    Vision loss of right eye H54.61    Open wound T14. 8XXA    Calciphylaxis E83.59    Cellulitis L03.90    Abdominal wall cellulitis L03.311    Opiate dependence, continuous ([facility]) F11.20    History of arterial ischemic stroke Z86.73    Anaerobic cellulitis ([facility]) A48.0    Chronic abdominal wound infection, sequela S31.109S, L08.9    Wound infection T14. 8XXA, L08.9    Pseudomonas infection A49.8    Postoperative wound infection of left hip T81.49XA       Please note that this chart was generated using Dragon dictation software. Although every effort was made to ensure the accuracy of this automated transcription, some errors in transcription may have occurred inadvertently. If you may need any clarification, please do not hesitate to contact me through EPIC or at the phone number provided below with my electronic signature.   Any pictures or media included in this note were obtained after taking informed verbal consent from the patient and with their approval to include those in the patient's medical record.     Mylene Bains MD, MPH  2/5/2020 , 11:49 AM   [facility] Infectious Disease   Office: [phone]  Fax: [phone]  Tuesday AM clinic:   [address] [place] [address] 120  Thursday AM [id number] [place], [facility]

## 2020-02-05 NOTE — FLOWSHEET NOTE
Pt took BiPAP of at this time stating, \"I need a break,\" called RT, NC placed at 3L, pt denies any other needs at this time.

## 2020-02-05 NOTE — PLAN OF CARE
Problem: Falls - Risk of:  Goal: Will remain free from falls  Description  Will remain free from falls  Outcome: Ongoing  Goal: Absence of physical injury  Description  Absence of physical injury  Outcome: Ongoing     Problem: Risk for Impaired Skin Integrity  Goal: Tissue integrity - skin and mucous membranes  Description  Structural intactness and normal physiological function of skin and  mucous membranes. Outcome: Ongoing     Problem: Pain:  Description  Pain management should include both nonpharmacologic and pharmacologic interventions.   Goal: Pain level will decrease  Description  Pain level will decrease  Outcome: Ongoing  Goal: Control of acute pain  Description  Control of acute pain  Outcome: Ongoing  Goal: Control of chronic pain  Description  Control of chronic pain  Outcome: Ongoing     Problem: OXYGENATION/RESPIRATORY FUNCTION  Goal: Patient will maintain patent airway  Outcome: Ongoing  Goal: Patient will achieve/maintain normal respiratory rate/effort  Description  Respiratory rate and effort will be within normal limits for the patient  Outcome: Ongoing     Problem: HEMODYNAMIC STATUS  Goal: Patient has stable vital signs and fluid balance  Outcome: Ongoing     Problem: FLUID AND ELECTROLYTE IMBALANCE  Goal: Fluid and electrolyte balance are achieved/maintained  Outcome: Ongoing     Problem: ACTIVITY INTOLERANCE/IMPAIRED MOBILITY  Goal: Mobility/activity is maintained at optimum level for patient  Outcome: Ongoing     Problem: Discharge Planning:  Goal: Discharged to appropriate level of care  Description  Discharged to appropriate level of care  Outcome: Ongoing     Problem: Serum Glucose Level - Abnormal:  Goal: Ability to maintain appropriate glucose levels will improve  Description  Ability to maintain appropriate glucose levels will improve  Outcome: Ongoing     Problem: Nutrition  Goal: Optimal nutrition therapy  Outcome: Ongoing

## 2020-02-06 NOTE — DISCHARGE SUMMARY
Patient discharged with transportation in stable condition to [facility]. Discharge instructions and prescriptions reviewed with patient and family members. Patient and family verbalized understanding. Patient and family verbalized understanding. All belongings in tow including discharge paperwork.

## 2020-02-09 LAB
BLOOD CULTURE, ROUTINE: NORMAL
CULTURE, BLOOD 2: NORMAL

## 2020-02-10 ENCOUNTER — TELEPHONE (OUTPATIENT)
Dept: FAMILY MEDICINE CLINIC | Age: 65
End: 2020-02-10

## 2020-02-12 ENCOUNTER — TELEPHONE (OUTPATIENT)
Dept: INFECTIOUS DISEASES | Age: 65
End: 2020-02-12

## 2020-02-12 LAB
ANAEROBIC CULTURE: ABNORMAL
GRAM STAIN RESULT: ABNORMAL
ORGANISM: ABNORMAL
ORGANISM: ABNORMAL
WOUND/ABSCESS: ABNORMAL

## 2020-02-12 NOTE — TELEPHONE ENCOUNTER
Wound culture reviewed. The patient has ciprofloxacin resistant Acinetobacter and and susceptible Pseudomonas. Advise stopping ciprofloxacin and start IV tobramycin at hemodialysis dose for 2 weeks. If the patient is at a facility, okay for the facility pharmacist or nephrologist to manage tobramycin dose. Otherwise, start tobramycin 1.5 mg/kg of adjusted body weight of tobramycin with each hemodialysis for 2 weeks. Check weekly tobramycin level pre-hemodialysis with target level of 3 to 5 mg/L. Thanks.     Mika Turk MD, MPH  2/12/2020, 10:31 AM  [facility] Infectious Disease   Office: [phone]  Fax: [phone]  Tuesday AM clinic:   [address] [address]  Thursday AM clinic: [address] [place] [facility]

## 2020-02-26 PROBLEM — R79.89 ELEVATED TROPONIN: Status: RESOLVED | Noted: 2017-09-09 | Resolved: 2020-02-26

## 2020-02-26 PROBLEM — R77.8 ELEVATED TROPONIN: Status: RESOLVED | Noted: 2017-09-09 | Resolved: 2020-02-26

## 2020-03-25 ENCOUNTER — TELEPHONE (OUTPATIENT)
Dept: FAMILY MEDICINE CLINIC | Age: 65
End: 2020-03-25

## 2022-09-06 NOTE — CARE COORDINATION
Discharge Planning. SW reviewed chart, patient has a discharge order. Patient is active with [facility]. RONALDO and Orders were faxed. [facility] was notified of patient discharge. Social work will follow through discharge.      Electronically signed by HOLLIS Dangelo, ALIA on 9/28/19 at 1:50 PM [phone]

## 2023-03-14 NOTE — PROGRESS NOTES
Contacted by pharmacy regarding patient admitted early this morning and consult for warfarin dosing with a history of pulmonary embolism with a subtheraputic INR at time of admission. INR was 1.23. Pharmacy is inquiring about bridging. Patient is post-op laparoscopic ventral hernia repair 11/20/18 and will not bridge patient at this time. Further decision about anticoagulation per rounding hospitalists.      Tonny Mccullough, JURGEN - CNP 11/24/18 [id number] Benzoyl Peroxide Counseling: Patient counseled that medicine may cause skin irritation and bleach clothing.  In the event of skin irritation, the patient was advised to reduce the amount of the drug applied or use it less frequently.   The patient verbalized understanding of the proper use and possible adverse effects of benzoyl peroxide.  All of the patient's questions and concerns were addressed.

## 2023-12-04 NOTE — H&P
current    Pulmonary embolism ([facility] [address] 2/6/13    Type 2 diabetes mellitus with diabetic neuropathy, with long-term current use of insulin ([facility] [address] 9/12/2017    Urinary incontinence in female     Venous stasis of lower extremity        Past Surgical History:          Procedure Laterality Date    CARDIAC CATHETERIZATION  1/14    Grace; clean cors    COLONOSCOPY  2010    polyp removed; Lidia Correia; repeat 5 years    COLONOSCOPY  6/25/13, 11/14    [facility]    DOPPLER ECHOCARDIOGRAPHY  2/21/09    LVH with global hypokinesis; EF 55-60%    HYSTERECTOMY      KNEE ARTHROSCOPY Right 1999    OTHER SURGICAL HISTORY  02/22/2018    LEFT brachial artery axillary vein AV graft     LA OPEN SKULL SUPRATENT EXPLORE      Craniotomy    LA REPAIR INCISIONAL HERNIA,REDUCIBLE N/A 11/20/2018    LAPAROSCOPIC VENTRAL HERNIA REPAIR WITH MESH performed by Moni Valles MD at [place] [address]  10/96    UPPER GASTROINTESTINAL ENDOSCOPY  6/25/13    VENTRAL HERNIA REPAIR  12/24/2016    Incarcerated ventral hernia repair with mesh       Medications Prior to Admission:      Prior to Admission medications    Medication Sig Start Date End Date Taking? Authorizing Provider   traMADol (ULTRAM) 50 MG tablet Take 1 tablet by mouth every 4 hours as needed for Pain for up to 7 days. Intended supply: 7 days. Take lowest dose possible to manage pain. 11/21/18 11/28/18  Moni Valles MD   docusate sodium (COLACE) 100 MG capsule Take 1 capsule by mouth 2 times daily 11/21/18   Moni Valles MD   ondansetron (ZOFRAN ODT) 4 MG disintegrating tablet Take 1-2 tablets by mouth every 8 hours as needed for Nausea May substitute the non ODT tablets if not covered financially by the insurance plan.  11/3/18   Lucie Lopez MD   lidocaine (XYLOCAINE) 5 % ointment APPLY 2 GRAMS (2 SCOOPS) TO AFFECTED AREA TWO TIMES DAILY AS NEEDED 10/17/18   Historical Provider, MD   oxiconazole nitrate (OXISTAT) 1 % CREA cream APPLY TO AFFECTED INR  1.23*     Recent Labs      11/24/18   0417   TROPONINI  0.10*       Urinalysis:      Lab Results   Component Value Date    NITRU Negative 09/15/2018    WBCUA 50 09/15/2018    BACTERIA 2+ 09/15/2018    RBCUA 32 09/15/2018    BLOODU SMALL 09/15/2018    SPECGRAV 1.015 09/15/2018    GLUCOSEU Negative 09/15/2018    GLUCOSEU NEGATIVE 11/23/2010       Radiology:        EKG:  I have reviewed the EKG with the following interpretation: NSR    CT CHEST WO CONTRAST   Final Result   Fluid collection and gas along the ventral abdominal hernia repair may be   still postoperative related given proximity to surgery as opposed to   infection. Correlate with wound evaluation. No acute intra-abdominal or intrapelvic abnormality. Cholelithiasis without evidence of cholecystitis or bile duct dilatation. No acute cardiopulmonary findings. CT ABDOMEN PELVIS WO CONTRAST Additional Contrast? None   Final Result   Fluid collection and gas along the ventral abdominal hernia repair may be   still postoperative related given proximity to surgery as opposed to   infection. Correlate with wound evaluation. No acute intra-abdominal or intrapelvic abnormality. Cholelithiasis without evidence of cholecystitis or bile duct dilatation. No acute cardiopulmonary findings. ASSESSMENT/PLAN:     COPD exacerbation: CT chest non acute. Treat with iv steroids and duonebs. Oxygen as needed. Check flu. Hypoglycemia: sec to poor intake. Monitor with low dose lantus and SSI now as she is on steroids now. ESRD: HD per nephro    Recent ventral hernia repair    Htn: cont meds    Gerd: ppi    HLD: crestor       Ho PE: inr subtherapeutic .  Dose coumadin per pharm       DVT Prophylaxis: coumadin /heparin ppx  Diet:  carb control  diet   Code Status: Prior    PT/OT Eval Status: ordered    Dispo - medical floor/tele       Kelsi Jenkins MD    Thank you Venu Marino APRN - CNP for the opportunity to - - -

## 2024-02-29 NOTE — TELEPHONE ENCOUNTER
[facility] ortho referral  Dr. Suresh Dalal  [phone]  Evaluation of
Has patient been informed?
Left VM for pt to call back and receive referral information.
29-Feb-2024 20:10

## (undated) DEVICE — MOUTHPIECE ENDOSCP L CTRL OPN AND SIDE PORTS DISP

## (undated) DEVICE — APPLICATOR PREP 26ML 0.7% IOD POVACRYLEX 74% ISO ALC ST

## (undated) DEVICE — STERILE LATEX POWDER FREE SURGICAL GLOVES WITH HYDROGEL COATING: Brand: PROTEXIS

## (undated) DEVICE — GOWN SIRUS NONREIN XL W/TWL: Brand: MEDLINE INDUSTRIES, INC.

## (undated) DEVICE — DRAPE THER FLUID WARMING 66X44 IN FLAT SLUSH DBL DISC ORS

## (undated) DEVICE — PAD,ABDOMINAL,8"X10",ST,LF: Brand: MEDLINE

## (undated) DEVICE — TOWEL,OR,DSP,ST,WHITE,DLX,XR,4/PK,20PK/C: Brand: MEDLINE

## (undated) DEVICE — MAJOR SET UP PK

## (undated) DEVICE — HYPODERMIC SAFETY NEEDLE: Brand: MAGELLAN

## (undated) DEVICE — SKIN AFFIX SURG ADHESIVE 72/CS 0.55ML: Brand: MEDLINE

## (undated) DEVICE — ELECTRODE PT RET AD L9FT HI MOIST COND ADH HYDRGEL CORDED

## (undated) DEVICE — NEEDLE INSUF L120MM DIA2MM DISP FOR PNEUMOPERI ENDOPATH

## (undated) DEVICE — BLADE ES ELASTOMERIC COAT INSUL DURABLE BEND UPTO 90DEG

## (undated) DEVICE — Device: Brand: DELTEC

## (undated) DEVICE — PMI DISPOSABLE PUNCTURE CLOSURE DEVICE / SUTURE GRASPER: Brand: PMI

## (undated) DEVICE — 35 ML SYRINGE LUER-LOCK TIP: Brand: MONOJECT

## (undated) DEVICE — SHEET,DRAPE,53X77,STERILE: Brand: MEDLINE

## (undated) DEVICE — SOLUTION IV IRRIG POUR BRL 0.9% SODIUM CHL 2F7124

## (undated) DEVICE — TOWEL,OR,DSP,ST,BLUE,STD,4/PK,20PK/CS: Brand: MEDLINE

## (undated) DEVICE — SET BLD COLL 21GA TB L12IN NDL L0.75IN WNG GRN SIMP EZ TO

## (undated) DEVICE — FORCEPS BX L240CM WRK CHN 2.8MM STD CAP W/ NDL MIC MESH

## (undated) DEVICE — HANDPIECE SET WITH HIGH FLOW TIP AND SUCTION TUBE: Brand: INTERPULSE

## (undated) DEVICE — BANDAGE COBAN 4 IN COMPR W4INXL5YD FOAM COHESIVE QUIK STK SELF ADH SFT

## (undated) DEVICE — SUTURE PERMAHAND SZ 2-0 L12X18IN NONABSORBABLE BLK SILK A185H

## (undated) DEVICE — GOWN,AURORA,NONREINF,RAGLAN,XXL,STERILE: Brand: MEDLINE

## (undated) DEVICE — KIT OR ROOM TURNOVER W/STRAP

## (undated) DEVICE — SUTURE ETHLN SZ 3-0 L18IN NONABSORBABLE BLK L24MM PS-1 3/8 1663G

## (undated) DEVICE — STERILE POLYISOPRENE POWDER-FREE SURGICAL GLOVES: Brand: PROTEXIS

## (undated) DEVICE — T-DRAPE,EXTREMITY,STERILE: Brand: MEDLINE

## (undated) DEVICE — SEALER LAP L37CM MARYLAND JAW OPN NANO COAT MULTIFUNCTIONAL

## (undated) DEVICE — LOTION PREP REMV 5OZ IODO CLR TINC OF BENZ DURAPREP

## (undated) DEVICE — SYRINGE MED 10ML TRNSLUC BRL PLUNG BLK MRK POLYPR CTRL

## (undated) DEVICE — TROCAR ENDOSCP L100MM DIA5MM BLDELSS STBL SL OBT RADLUC

## (undated) DEVICE — PROCEDURE KIT ENDOSCP CUST

## (undated) DEVICE — VESSEL LOOPS,MINI, RED: Brand: DEVON

## (undated) DEVICE — SCANLAN® VASCU-STATT® SINGLE-USE BULLDOG CLAMP - MIDI ANGLED 45° (WHITE), CLAMPING PRESSURE 25-30 G (2/STERILE PKG): Brand: SCANLAN® VASCU-STATT® SINGLE-USE BULLDOG CLAMP

## (undated) DEVICE — TROCAR ENDOSCP SHFT L150MM DIA8MM TEAL BLDELSS W/ STBL

## (undated) DEVICE — BW-412T DISP COMBO CLEANING BRUSH: Brand: SINGLE USE COMBINATION CLEANING BRUSH

## (undated) DEVICE — SUTURE MCRYL SZ 4-0 L27IN ABSRB UD L19MM PS-2 1/2 CIR PRIM Y426H

## (undated) DEVICE — BANDAGE COMPR W4INXL15FT BGE E SGL LAYERED CLP CLSR

## (undated) DEVICE — STAPLER INT DIA5MM 25 ABSRB STRP FIX DISP FOR HERN MESH

## (undated) DEVICE — MESH HERN DIA4.5IN CIR W/ ECHO PS POS SYS VENTRALIGHT ST: Type: IMPLANTABLE DEVICE | Site: ABDOMEN | Status: NON-FUNCTIONAL

## (undated) DEVICE — COVER LT HNDL BLU PLAS

## (undated) DEVICE — BANDAGE,GAUZE,BULKEE II,4.5"X4.1YD,STRL: Brand: MEDLINE

## (undated) DEVICE — SUTURE VCRL SZ 3-0 L36IN ABSRB UD L36MM CT-1 1/2 CIR J944H

## (undated) DEVICE — TRAY PREP DRY W/ PREM GLV 2 APPL 6 SPNG 2 UNDPD 1 OVERWRAP

## (undated) DEVICE — SUTURE BOOT: Brand: DEROYAL

## (undated) DEVICE — SPONGE LAP W18XL18IN WHT COT 4 PLY FLD STRUNG RADPQ DISP ST

## (undated) DEVICE — TROCAR ENDOSCP L100MM DIA5MM BLDELSS STBL SL THRD OPT VW

## (undated) DEVICE — NEEDLE SPNL 22GA L3.5IN BLK HUB S STL REG WALL FIT STYL W/

## (undated) DEVICE — SUTURE PERMAHAND SZ 4-0 L18IN NONABSORBABLE BLK SILK BRAID A183H

## (undated) DEVICE — CLIP INT SM WIDE RED TI TRNSVRS GRV CHEVRON SHP W/ PRECIS

## (undated) DEVICE — SUTURE COAT VCRL SZ 0 L18IN ABSRB UD W/O NDL POLYGLACTIN J112T

## (undated) DEVICE — TOWEL,OR,DSP,ST,BLUE,STD,6/PK,12PK/CS: Brand: MEDLINE

## (undated) DEVICE — DRAPE,LAP,CHOLE,W/TROUGHS,STERILE: Brand: MEDLINE

## (undated) DEVICE — Z DISCONTINUED USE 2429233 DRESSING FOAM W10XL10CM 5 LAYR SELF ADH VERSATILE SAFETAC

## (undated) DEVICE — Z INACTIVE USE 2535480 CLIP LIG M BLU TI HRT SHP WIRE HORZ 180 PER BX

## (undated) DEVICE — INTENDED FOR TISSUE SEPARATION, AND OTHER PROCEDURES THAT REQUIRE A SHARP SURGICAL BLADE TO PUNCTURE OR CUT.: Brand: BARD-PARKER ® STAINLESS STEEL BLADES

## (undated) DEVICE — CHLORAPREP 26ML ORANGE

## (undated) DEVICE — SUTURE VCRL SZ 2-0 L18IN ABSRB UD CT-1 L36MM 1/2 CIR J839D

## (undated) DEVICE — 3M™ COBAN™ NL STERILE NON-LATEX SELF-ADHERENT WRAP, 2084S, 4 IN X 5 YD (10 CM X 4,5 M), 18 ROLLS/CASE: Brand: 3M™ COBAN™

## (undated) DEVICE — MERCY FAIRFIELD TURNOVER KIT: Brand: MEDLINE INDUSTRIES, INC.

## (undated) DEVICE — SET VLV 3 PC AWS DISPOSABLE GRDIAN SCOPEVALET

## (undated) DEVICE — SUTURE ETHLN SZ 3-0 L30IN NONABSORBABLE BLK L36MM FSLX 3/8 1673BH

## (undated) DEVICE — Device: Brand: DISPOSABLE ELECTROSURGICAL SNARE

## (undated) DEVICE — GOWN SIRUS NONREIN LG W/TWL: Brand: MEDLINE INDUSTRIES, INC.

## (undated) DEVICE — 3M™ IOBAN™ 2 ANTIMICROBIAL INCISE DRAPE 6650EZ: Brand: IOBAN™ 2

## (undated) DEVICE — SOLUTION IV IRRIG WATER 500ML POUR BRL ST 2F7113

## (undated) DEVICE — SUTURE VCRL SZ 4-0 L18IN ABSRB UD L19MM PS-2 3/8 CIR PRIM J496H

## (undated) DEVICE — PENCIL ES ULT VAC W TELSCP NOSE EZ CLN BLDE 10FT

## (undated) DEVICE — STAPLER SKIN H3.9MM WIRE DIA0.58MM CRWN 6.9MM 35 STPL ROT

## (undated) DEVICE — GAUZE,SPONGE,4"X4",8PLY,STRL,LF,10/TRAY: Brand: MEDLINE

## (undated) DEVICE — LOOP LIG SUT SZ 0 L18IN ABSRB POLYDIOXANONE MFIL PDS II

## (undated) DEVICE — SPONGES GAUZE X-RAY 4X4 16PLY

## (undated) DEVICE — SUTURE VCRL SZ 3-0 L27IN ABSRB UD L26MM SH 1/2 CIR J416H

## (undated) DEVICE — 6 ML SYRINGE LUER-LOCK TIP: Brand: MONOJECT

## (undated) DEVICE — DRESSING FOAM W6.3XL7.9IN TAN SACR SELF ADH MEPILEX BORD

## (undated) DEVICE — PACK PROCEDURE SURG EXTREMITY MFFOP CUST

## (undated) DEVICE — SOLUTION IRRIG 2000ML 0.9% SOD CHL USP UROMATIC PLAS CONT

## (undated) DEVICE — CORD ES L10FT MPLR LAP

## (undated) DEVICE — 60 ML SYRINGE,REGULAR TIP: Brand: MONOJECT

## (undated) DEVICE — TROCAR ENDOSCP L100MM DIA11MM STBL SL BLDELSS DISP ENDOPATH

## (undated) DEVICE — LAPAROSCOPY PK

## (undated) DEVICE — TROCAR ENDOSCP L100MM DIA12MM BLDELSS OBT RADLUC STBL SL

## (undated) DEVICE — BOWL MED L 32OZ PLAS W/ MOLD GRAD EZ OPN PEEL PCH

## (undated) DEVICE — SOLUTION IV IRRIG WATER 1000ML POUR BRL 2F7114

## (undated) DEVICE — DRESSING PETRO W3XL3IN OIL EMUL N ADH GZ KNIT IMPREG CELOS

## (undated) DEVICE — FOGARTY - HYDRAGRIP SURGICAL - CLAMP INSERTS: Brand: FOGARTY SOFTJAW

## (undated) DEVICE — SUTURE VCRL SZ 0 L18IN ABSRB VLT L26MM CT-2 1/2 CIR J727D

## (undated) DEVICE — DECANTER: Brand: UNBRANDED

## (undated) DEVICE — Device

## (undated) DEVICE — SUTURE VCRL SZ 3-0 L18IN ABSRB UD L26MM SH 1/2 CIR J864D

## (undated) DEVICE — LOOP,VESSEL,MAXI,BLUE,2/PK,STERILE: Brand: MEDLINE

## (undated) DEVICE — INTENDED TO BE USED TO OCCLUDE, RETRACT AND IDENTIFY ARTERIES, VEINS, TENDONS AND NERVES IN SURGICAL PROCEDURES: Brand: STERION®  VESSEL LOOP

## (undated) DEVICE — SUTURE NONABSORBABLE MONOFILAMENT 7-0 BV-1 1X24 IN PROLENE 8702H

## (undated) DEVICE — BINDER ABD UNISX 12IN 85IN 3XL UNIV

## (undated) DEVICE — SUTURE PROL SZ 6-0 L24IN NONABSORBABLE BLU L13MM C-1 3/8 8726H

## (undated) DEVICE — SYRINGE, LUER LOCK, 10ML: Brand: MEDLINE

## (undated) DEVICE — SHEET, T, LAPAROTOMY, STERILE: Brand: MEDLINE

## (undated) DEVICE — SUTURE PROL SZ 6-0 L24IN NONABSORBABLE BLU L9.3MM BV-1 3/8 8805H

## (undated) DEVICE — SUTURE GOR TX SZ 1 L36IN NONABSORBABLE L36MM THX-36 1/2 CIR 0N07A

## (undated) DEVICE — BANDAGE,GAUZE,4.5"X4.1YD,STERILE,LF: Brand: MEDLINE

## (undated) DEVICE — VESSEL LOOPS,MAXI, BLUE: Brand: DEVON